# Patient Record
Sex: MALE | Race: WHITE | NOT HISPANIC OR LATINO | Employment: OTHER | URBAN - METROPOLITAN AREA
[De-identification: names, ages, dates, MRNs, and addresses within clinical notes are randomized per-mention and may not be internally consistent; named-entity substitution may affect disease eponyms.]

---

## 2019-08-15 LAB
EXT MICROALBUMIN URINE RANDOM: 2.5
HBA1C MFR BLD HPLC: 7.6 %

## 2019-08-19 ENCOUNTER — HOSPITAL ENCOUNTER (EMERGENCY)
Facility: HOSPITAL | Age: 64
Discharge: HOME | End: 2019-08-19
Attending: EMERGENCY MEDICINE
Payer: COMMERCIAL

## 2019-08-19 VITALS
DIASTOLIC BLOOD PRESSURE: 61 MMHG | HEART RATE: 85 BPM | OXYGEN SATURATION: 96 % | SYSTOLIC BLOOD PRESSURE: 123 MMHG | TEMPERATURE: 98.3 F | RESPIRATION RATE: 18 BRPM

## 2019-08-19 DIAGNOSIS — T18.128A ESOPHAGEAL OBSTRUCTION DUE TO FOOD IMPACTION: Primary | ICD-10-CM

## 2019-08-19 DIAGNOSIS — K22.2 ESOPHAGEAL OBSTRUCTION DUE TO FOOD IMPACTION: Primary | ICD-10-CM

## 2019-08-19 PROCEDURE — 96374 THER/PROPH/DIAG INJ IV PUSH: CPT

## 2019-08-19 PROCEDURE — 99283 EMERGENCY DEPT VISIT LOW MDM: CPT

## 2019-08-19 PROCEDURE — 99283 EMERGENCY DEPT VISIT LOW MDM: CPT | Performed by: EMERGENCY MEDICINE

## 2019-08-19 RX ADMIN — GLUCAGON HYDROCHLORIDE 1 MG: KIT at 22:35

## 2019-08-20 NOTE — ED PROVIDER NOTES
History  Chief Complaint   Patient presents with    Difficulty Swallowing     Pt reports eating shrimp and feeling like it is "stuck in my back not my throat"  Pt reports denies CP, difficulty breathing or sore/swollen throat  Pt reports he was bringing up phlem right after event  Pt reports this has happened before recently but this is worse, pt was suppsoed to see GI but has not had appointment yet  79-year-old male presents with recurrent episode of foreign body sensation in his throat since 1630 hours after eating two small shrimp  Patient notes multiple episodes over the past month that typically have resolved spontaneously  Patient was eating this room tonight and subsequently was unable to tolerate even oral secretions  Patient has been unable to tolerate any oral intake since the onset of the symptoms  He describes a sensation of something stuck in his throat since the symptoms started that is unchanged  Patient was administered glucagon with improvement in his symptoms  He subsequently was able to tolerate oral intake without difficulty  Impression and plan:  Esophageal blockage likely secondary to food bolus that is recurrent  Patient symptoms improved following treatment with glucagon in the emergency room but patient will require close outpatient follow-up with Gastroenterology for EGD to evaluate etiology of recurrent symptoms  I discussed follow-up, return precautions in detail with the patient        History provided by:  Patient  Foreign Body in Throat   Location:  Swallowed  Suspected object:  Food  Pain quality:  Aching  Pain severity:  Mild  Duration:  4 hours  Timing:  Constant  Progression:  Unchanged  Chronicity:  New  Associated symptoms: no abdominal pain, no choking, no congestion, no cough, no cyanosis, no difficulty breathing, no drooling, no ear pain, no hearing loss, no nasal discharge, no nausea, no nosebleeds, no rectal bleeding, no rectal pain, no rhinorrhea, no sore throat, no trouble swallowing, no voice change and no vomiting        None       Past Medical History:   Diagnosis Date    [facility] )     Diabetes mellitus ([facility] )     Hypertension        Past Surgical History:   Procedure Laterality Date    DISC REMOVAL         History reviewed  No pertinent family history  I have reviewed and agree with the history as documented  Social History     Tobacco Use    Smoking status: Never Smoker    Smokeless tobacco: Never Used   Substance Use Topics    Alcohol use: Not Currently    Drug use: Not Currently        Review of Systems   HENT: Negative for congestion, drooling, ear pain, hearing loss, nosebleeds, rhinorrhea, sore throat, trouble swallowing and voice change  Respiratory: Negative for cough and choking  Cardiovascular: Negative for cyanosis  Gastrointestinal: Negative for abdominal pain, nausea, rectal pain and vomiting  Physical Exam  Physical Exam   Constitutional: He appears well-developed and well-nourished  No distress  HENT:   Head: Normocephalic and atraumatic  Right Ear: External ear normal    Mouth/Throat: Oropharynx is clear and moist    Eyes: Pupils are equal, round, and reactive to light  EOM are normal    Neck: Normal range of motion  Neck supple  Cardiovascular: Normal rate  Pulmonary/Chest: Effort normal and breath sounds normal  No stridor  No respiratory distress  He has no wheezes  He has no rales  Abdominal: He exhibits no distension  Musculoskeletal: He exhibits no edema or deformity  Lymphadenopathy:     He has no cervical adenopathy  Neurological: He is alert  Skin: Skin is dry  He is not diaphoretic  Psychiatric: He has a normal mood and affect  Vitals reviewed        Vital Signs  ED Triage Vitals [08/19/19 2035]   Temperature Pulse Respirations Blood Pressure SpO2   98 3 °F (36 8 °C) 89 18 137/63 98 %      Temp Source Heart Rate Source Patient Position - Orthostatic VS BP Location FiO2 (%)   Oral Monitor Sitting Left arm --      Pain Score       3           Vitals:    08/19/19 2035 08/19/19 2230   BP: 137/63 123/61   Pulse: 89 85   Patient Position - Orthostatic VS: Sitting Sitting         Visual Acuity      ED Medications  Medications   glucagon (GLUCAGEN) injection 1 mg (1 mg Intravenous Given 8/19/19 2235)       Diagnostic Studies  Results Reviewed     None                 No orders to display              Procedures  Procedures       ED Course  ED Course as of Aug 20 0631   [facility] Aug 19, 2019   2311 After glucagon, patient's fluid bolus passed and he was able to tolerate oral intake without difficulty  I explained the importance of follow-up with Gastroenterology, contacting them tomorrow for close follow-up and likely EGD  Discussed return precautions in detail  Identification of Seniors at Risk      Most Recent Value   (ISAR) Identification of Seniors at Risk   Before the illness or injury that brought you to the Emergency, did you need someone to help you on a regular basis? 0 Filed at: 08/19/2019 2041   In the last 24 hours, have you needed more help than usual?  0 Filed at: 08/19/2019 2041   Have you been hospitalized for one or more nights during the past 6 months? 0 Filed at: 08/19/2019 2041   In general, do you see well?  0 Filed at: 08/19/2019 2041   In general, do you have serious problems with your memory? 0 Filed at: 08/19/2019 2041   Do you take more than three different medications every day?   1 Filed at: 08/19/2019 2041   ISAR Score  1 Filed at: 08/19/2019 2041                          MDM    Disposition  Final diagnoses:   Esophageal obstruction due to food impaction     Time reflects when diagnosis was documented in both MDM as applicable and the Disposition within this note     Time User Action Codes Description Comment    8/19/2019 11:12 PM Armando Chappell [K22 2,  B96 215V] Esophageal obstruction due to food impaction       ED Disposition     ED Disposition Condition Date/Time Comment    Discharge Stable Mon Aug 19, 2019 11:12 PM Skinny Charles discharge to home/self care  Follow-up Information     Follow up With Specialties Details Why Contact Info Additional [facility] Schedule an appointment as soon as possible for a visit  Recurrent food blockages  [address] [facility], [address] [facility],  [facility], [place], [place], [address] Emergency Department Emergency Medicine Go to  If symptoms worsen 34 [facility] ED, [address] [place] [address], [phone]          There are no discharge medications for this patient          ED Provider  Electronically Signed by           Randy Seaman MD  08/20/19 8458

## 2019-08-21 ENCOUNTER — OFFICE VISIT (OUTPATIENT)
Dept: GASTROENTEROLOGY | Facility: CLINIC | Age: 64
End: 2019-08-21
Payer: COMMERCIAL

## 2019-08-21 ENCOUNTER — PREP FOR PROCEDURE (OUTPATIENT)
Dept: GASTROENTEROLOGY | Facility: CLINIC | Age: 64
End: 2019-08-21

## 2019-08-21 VITALS
SYSTOLIC BLOOD PRESSURE: 116 MMHG | DIASTOLIC BLOOD PRESSURE: 70 MMHG | HEART RATE: 74 BPM | BODY MASS INDEX: 27.66 KG/M2 | WEIGHT: 193.2 LBS | HEIGHT: 70 IN

## 2019-08-21 DIAGNOSIS — R13.19 ESOPHAGEAL DYSPHAGIA: Primary | ICD-10-CM

## 2019-08-21 PROCEDURE — 99243 OFF/OP CNSLTJ NEW/EST LOW 30: CPT | Performed by: PHYSICIAN ASSISTANT

## 2019-08-21 RX ORDER — ALBUTEROL SULFATE 2.5 MG/3ML
3 SOLUTION RESPIRATORY (INHALATION) 3 TIMES DAILY
COMMUNITY
End: 2019-09-20 | Stop reason: SDUPTHER

## 2019-08-21 RX ORDER — ALBUTEROL SULFATE 90 UG/1
AEROSOL, METERED RESPIRATORY (INHALATION)
COMMUNITY
End: 2019-09-20 | Stop reason: ALTCHOICE

## 2019-08-21 RX ORDER — TRAMADOL HYDROCHLORIDE 50 MG/1
TABLET ORAL
COMMUNITY
End: 2019-09-25 | Stop reason: ALTCHOICE

## 2019-08-21 RX ORDER — PANTOPRAZOLE SODIUM 40 MG/1
40 TABLET, DELAYED RELEASE ORAL DAILY
Qty: 30 TABLET | Refills: 1 | Status: SHIPPED | OUTPATIENT
Start: 2019-08-21 | End: 2020-06-30 | Stop reason: ALTCHOICE

## 2019-08-21 RX ORDER — PIOGLITAZONEHYDROCHLORIDE 30 MG/1
30 TABLET ORAL DAILY
COMMUNITY
End: 2020-01-22

## 2019-08-21 NOTE — PROGRESS NOTES
[facility] - Outpatient Consultation  Chantelle Coffman 71 y o  male MRN: [id number]  Encounter: [id number]          ASSESSMENT AND PLAN:      1  Esophageal dysphagia  3 distinct episodes over the past few months  Start PPI  Schedule EGD  Reviewed swallowing precautions - chew food well, eat slowly, always drink liquids with swallows    ______________________________________________________________________    HPI:  50-year-old male presents for evaluation of dysphagia  He reports that over the past 2 months he has had 3 distinct episodes of food getting stuck  He states that each time the food gets stuck to where he cannot tolerate any liquids or secretions  It is very painful when he gets stuck  Usually the food will pass spontaneously however this past Monday August 19th he had to present herself to the [facility] Emergency Room  By the time he was evaluated the food bolus had passed and no further testing was performed  He admits that he has a constant pressure in his back and mild odynophagia since Monday  He denies hematemesis or melena  He has experienced some weight loss however he is unsure how quickly this has happened  He has a history of alcohol and tobacco abuse  He quit smoking approximately 1 year ago and stopped drinking alcohol several years ago  REVIEW OF SYSTEMS:    CONSTITUTIONAL: Denies any fever, chills, rigors, and weight loss  HEENT: No earache or tinnitus  Denies hearing loss or visual disturbances  CARDIOVASCULAR: No chest pain or palpitations  RESPIRATORY: Denies any cough, hemoptysis, shortness of breath or dyspnea on exertion  GASTROINTESTINAL: As noted in the History of Present Illness  GENITOURINARY: No problems with urination  Denies any hematuria or dysuria  NEUROLOGIC: No dizziness or vertigo, denies headaches  MUSCULOSKELETAL: Denies any muscle or joint pain  SKIN: Denies skin rashes or itching     ENDOCRINE: Denies excessive thirst  Denies intolerance to heat or cold  PSYCHOSOCIAL: Denies depression or anxiety  Denies any recent memory loss  Historical Information   Past Medical History:   Diagnosis Date    [facility]) ([facility] )     Diabetes mellitus ([facility] )     Hypertension      Past Surgical History:   Procedure Laterality Date    DISC REMOVAL       Social History   Social History     Substance and Sexual Activity   Alcohol Use Not Currently     Social History     Substance and Sexual Activity   Drug Use Not Currently     Social History     Tobacco Use   Smoking Status Never Smoker   Smokeless Tobacco Never Used     Family History   Problem Relation Age of Onset    Diabetes Mother     No Known Problems Father        Meds/Allergies       Current Outpatient Medications:     albuterol (2 5 mg/3 mL) 0 083 % nebulizer solution    albuterol (PROAIR HFA) 90 mcg/act inhaler    canagliflozin (INVOKANA) 100 mg    pioglitazone (ACTOS) 30 mg tablet    sitaGLIPtin (JANUVIA) 100 mg tablet    traMADol (ULTRAM) 50 mg tablet    pantoprazole (PROTONIX) 40 mg tablet    Allergies   Allergen Reactions    Penicillins Itching           Objective     Blood pressure 116/70, pulse 74, height 5' 10" (1 778 m), weight 87 6 kg (193 lb 3 2 oz)  Body mass index is 27 72 kg/m²  PHYSICAL EXAM:      General Appearance:   Alert, cooperative, no distress   HEENT:   Normocephalic, atraumatic, anicteric      Neck:  Supple, symmetrical, trachea midline   Lungs:   Clear to auscultation bilaterally; no rales, rhonchi or wheezing; respirations unlabored    Heart[de-identified]   Regular rate and rhythm; no murmur, rub, or gallop     Abdomen:   Soft, non-tender, non-distended; normal bowel sounds; no masses, no organomegaly    Genitalia:   Deferred    Rectal:   Deferred    Extremities:  No cyanosis, clubbing or edema    Pulses:  2+ and symmetric    Skin:  No jaundice, rashes, or lesions    Lymph nodes:  No palpable cervical lymphadenopathy        Lab Results:   No visits with results within 1 Day(s) from this visit  Latest known visit with results is:   No results found for any previous visit  Radiology Results:   No results found

## 2019-08-23 ENCOUNTER — ANESTHESIA EVENT (OUTPATIENT)
Dept: GASTROENTEROLOGY | Facility: HOSPITAL | Age: 64
End: 2019-08-23

## 2019-08-23 NOTE — ANESTHESIA PREPROCEDURE EVALUATION
Review of Systems/Medical History  Patient summary reviewed        Cardiovascular  Hypertension ,    Pulmonary  COPD ,        GI/Hepatic    GERD ,        Negative  ROS        Endo/Other  Diabetes ,      GYN  Negative gynecology ROS          Hematology  Negative hematology ROS      Musculoskeletal  Negative musculoskeletal ROS        Neurology  Negative neurology ROS      Psychology   Negative psychology ROS              Physical Exam    Airway    Mallampati score: II  TM Distance: >3 FB  Neck ROM: full     Dental       Cardiovascular  Cardiovascular exam normal    Pulmonary  Pulmonary exam normal     Other Findings        Anesthesia Plan  ASA Score- 2     Anesthesia Type- IV sedation with anesthesia with ASA Monitors  Additional Monitors:   Airway Plan:         Plan Factors-    Induction- intravenous  Postoperative Plan-     Informed Consent- Anesthetic plan and risks discussed with patient  I personally reviewed this patient with the CRNA  Discussed and agreed on the Anesthesia Plan with the CRNA  Pancho Diaz

## 2019-08-24 ENCOUNTER — ANESTHESIA (OUTPATIENT)
Dept: GASTROENTEROLOGY | Facility: HOSPITAL | Age: 64
End: 2019-08-24

## 2019-08-24 ENCOUNTER — HOSPITAL ENCOUNTER (OUTPATIENT)
Dept: GASTROENTEROLOGY | Facility: HOSPITAL | Age: 64
Setting detail: OUTPATIENT SURGERY
Discharge: HOME | End: 2019-08-24
Attending: INTERNAL MEDICINE | Admitting: INTERNAL MEDICINE
Payer: COMMERCIAL

## 2019-08-24 VITALS
RESPIRATION RATE: 17 BRPM | TEMPERATURE: 97 F | DIASTOLIC BLOOD PRESSURE: 66 MMHG | HEART RATE: 82 BPM | OXYGEN SATURATION: 99 % | SYSTOLIC BLOOD PRESSURE: 119 MMHG

## 2019-08-24 DIAGNOSIS — R13.19 ESOPHAGEAL DYSPHAGIA: ICD-10-CM

## 2019-08-24 LAB — GLUCOSE SERPL-MCNC: 112 MG/DL (ref 65–140)

## 2019-08-24 PROCEDURE — 88363 XM ARCHIVE TISSUE MOLEC ANAL: CPT | Performed by: PATHOLOGY

## 2019-08-24 PROCEDURE — 88342 IMHCHEM/IMCYTCHM 1ST ANTB: CPT | Performed by: PATHOLOGY

## 2019-08-24 PROCEDURE — 88360 TUMOR IMMUNOHISTOCHEM/MANUAL: CPT | Performed by: PATHOLOGY

## 2019-08-24 PROCEDURE — 88305 TISSUE EXAM BY PATHOLOGIST: CPT | Performed by: PATHOLOGY

## 2019-08-24 PROCEDURE — 82948 REAGENT STRIP/BLOOD GLUCOSE: CPT

## 2019-08-24 PROCEDURE — 43239 EGD BIOPSY SINGLE/MULTIPLE: CPT | Performed by: INTERNAL MEDICINE

## 2019-08-24 RX ORDER — SODIUM CHLORIDE, SODIUM LACTATE, POTASSIUM CHLORIDE, CALCIUM CHLORIDE 600; 310; 30; 20 MG/100ML; MG/100ML; MG/100ML; MG/100ML
125 INJECTION, SOLUTION INTRAVENOUS CONTINUOUS
Status: DISCONTINUED | OUTPATIENT
Start: 2019-08-24 | End: 2019-08-28 | Stop reason: HOSPADM

## 2019-08-24 RX ORDER — LIDOCAINE HYDROCHLORIDE 10 MG/ML
INJECTION, SOLUTION EPIDURAL; INFILTRATION; INTRACAUDAL; PERINEURAL AS NEEDED
Status: DISCONTINUED | OUTPATIENT
Start: 2019-08-24 | End: 2019-08-24 | Stop reason: SURG

## 2019-08-24 RX ORDER — PROPOFOL 10 MG/ML
INJECTION, EMULSION INTRAVENOUS AS NEEDED
Status: DISCONTINUED | OUTPATIENT
Start: 2019-08-24 | End: 2019-08-24 | Stop reason: SURG

## 2019-08-24 RX ADMIN — PROPOFOL 50 MG: 10 INJECTION, EMULSION INTRAVENOUS at 08:58

## 2019-08-24 RX ADMIN — PROPOFOL 100 MG: 10 INJECTION, EMULSION INTRAVENOUS at 08:50

## 2019-08-24 RX ADMIN — LIDOCAINE HYDROCHLORIDE 5 ML: 10 INJECTION, SOLUTION EPIDURAL; INFILTRATION; INTRACAUDAL; PERINEURAL at 08:50

## 2019-08-24 RX ADMIN — PROPOFOL 50 MG: 10 INJECTION, EMULSION INTRAVENOUS at 08:54

## 2019-08-24 NOTE — DISCHARGE INSTRUCTIONS
Upper Endoscopy   WHAT YOU NEED TO KNOW:   An upper endoscopy is also called an upper gastrointestinal (GI) endoscopy, or an esophagogastroduodenoscopy (EGD)  You may feel bloated, gassy, or have some abdominal discomfort after your procedure  Your throat may be sore for 24 to 36 hours  You may burp or pass gas from air that is still inside your body  DISCHARGE INSTRUCTIONS:   Call 911 for any of the following:   · You have sudden chest pain or trouble breathing  Seek care immediately if:   · You feel dizzy or faint  · You have trouble swallowing  · Your bowel movements are very dark or black  · Your abdomen is hard and firm and you have severe pain  · You vomit blood  Contact your healthcare provider if:   · You feel full or bloated and cannot burp or pass gas  · You have not had a bowel movement for 3 days after your procedure  · You have neck pain  · You have a fever or chills  · You have nausea or are vomiting  · You have a rash or hives  · You have questions or concerns about your endoscopy  Relieve a sore throat:  Suck on throat lozenges or crushed ice  Gargle with a small amount of warm salt water  Mix 1 teaspoon of salt and 1 cup of warm water to make salt water  Relieve gas and discomfort from bloating:  Lie on your right side with a heating pad on your abdomen  Take short walks to help pass gas  Eat small meals until bloating is relieved  Rest after your procedure: You have been given medicine to relax you  Do not  drive or make important decisions until the day after your procedure  Return to your normal activity as directed  You can usually return to work the day after your procedure  Follow up with your healthcare provider as directed:  Write down your questions so you remember to ask them during your visits     © 2017 [address] is for End User's use only and may not be sold, redistributed or otherwise used for commercial purposes  All illustrations and images included in CareNotes® are the copyrighted property of A D A M [employer] Inc  or Doroteo Clarke  The above information is an  only  It is not intended as medical advice for individual conditions or treatments  Talk to your doctor, nurse or pharmacist before following any medical regimen to see if it is safe and effective for you

## 2019-08-24 NOTE — ANESTHESIA POSTPROCEDURE EVALUATION
Post-Op Assessment Note    CV Status:  Stable  Pain Score: 0    Pain management: adequate     Mental Status:  Alert and awake   Hydration Status:  Euvolemic   PONV Controlled:  Controlled   Airway Patency:  Patent   Post Op Vitals Reviewed: Yes      Staff: Anesthesiologist           /66 (08/24/19 0900)    Temp (!) 97 °F (36 1 °C) (08/24/19 0900)    Pulse 86 (08/24/19 0900)   Resp 18 (08/24/19 0900)    SpO2 94 % (08/24/19 0900)

## 2019-08-24 NOTE — H&P
History and Physical -  [facility]  Epifanio Deras 71 y o  male MRN: [id number]      HPI: Epifanio Deras is a 71y o  year old male who presents for dysphagia      REVIEW OF SYSTEMS: Per the HPI, and otherwise unremarkable  Historical Information   Past Medical History:   Diagnosis Date    [facility]) ([facility] )     Diabetes mellitus ([facility] )     Hypertension      Past Surgical History:   Procedure Laterality Date    DISC REMOVAL       Social History   Social History     Substance and Sexual Activity   Alcohol Use Not Currently     Social History     Substance and Sexual Activity   Drug Use Not Currently     Social History     Tobacco Use   Smoking Status Never Smoker   Smokeless Tobacco Never Used     Family History   Problem Relation Age of Onset    Diabetes Mother     No Known Problems Father        Meds/Allergies       (Not in a hospital admission)    Allergies   Allergen Reactions    Penicillins Itching       Objective     There were no vitals taken for this visit  PHYSICAL EXAM    Gen: NAD  CV: RRR  CHEST: Clear  ABD: soft, NT/ND  EXT: no edema      ASSESSMENT/PLAN:  This is a 71y o  year old male here for esophagogastroduodenoscopy with biopsy and possible dilation, and he is stable and optimized for his procedure

## 2019-08-29 ENCOUNTER — TELEPHONE (OUTPATIENT)
Dept: GASTROENTEROLOGY | Facility: CLINIC | Age: 64
End: 2019-08-29

## 2019-08-29 ENCOUNTER — OFFICE VISIT (OUTPATIENT)
Dept: GASTROENTEROLOGY | Facility: CLINIC | Age: 64
End: 2019-08-29
Payer: COMMERCIAL

## 2019-08-29 VITALS
WEIGHT: 192.8 LBS | DIASTOLIC BLOOD PRESSURE: 70 MMHG | SYSTOLIC BLOOD PRESSURE: 110 MMHG | HEIGHT: 70 IN | HEART RATE: 93 BPM | BODY MASS INDEX: 27.6 KG/M2

## 2019-08-29 DIAGNOSIS — C15.5 MALIGNANT NEOPLASM OF LOWER THIRD OF ESOPHAGUS (HCC): Primary | ICD-10-CM

## 2019-08-29 PROCEDURE — 99214 OFFICE O/P EST MOD 30 MIN: CPT | Performed by: INTERNAL MEDICINE

## 2019-08-29 NOTE — TELEPHONE ENCOUNTER
morena patient-I entered a referral for oncology, radiation oncology, and thoracic surgery, the patient is aware he should be getting a call from these departments please follow up next week to make sure he has appointments made thanks

## 2019-08-29 NOTE — PROGRESS NOTES
[facility] - Outpatient Follow-up Note  Leo Norman 71 y o  male MRN: [id number]  Encounter: [id number]          ASSESSMENT AND PLAN:      1  Malignant neoplasm of lower third of esophagus (HCC)  Adenocarcinoma    Consults to radiation oncology, oncology, thoracic surgery    CT scan of the chest/abdomen/pelvis    PET scan    ______________________________________________________________________    SUBJECTIVE:  This 71year old male returns to the office for followup on his recent EGD performed 19 which showed neoplastic changes and luminal narrowing in the distal 1/3 of the esophagus, 30-40 cm from the incisors  Biopsies obtained demonstrated adenocarcinoma, 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            The patient was initially referred for the complaint of dyshagia  He admits to some weight loss  There has been odynophagia  He has a long history of acid reflux symptoms  His last EGD prior to this one was over 20 years ago  REVIEW OF SYSTEMS IS OTHERWISE NEGATIVE        Historical Information   Past Medical History:   Diagnosis Date    COPD [facility]chronic obstructive [facility] disease) [facility]     Diabetes mellitus ([facility] )     Hypertension      Past Surgical History:   Procedure Laterality Date    DISC REMOVAL       Social History   Social History     Substance and Sexual Activity   Alcohol Use Not Currently     Social History     Substance and Sexual Activity   Drug Use Not Currently     Social History     Tobacco Use   Smoking Status Former Smoker    Last attempt to quit: 2017    Years since quittin 6   Smokeless Tobacco Never Used     Family History   Problem Relation Age of Onset    Diabetes Mother     No Known Problems Father        Meds/Allergies       Current Outpatient Medications:     albuterol (2 5 mg/3 mL) 0 083 % nebulizer solution    albuterol (PROAIR HFA) 90 mcg/act inhaler   canagliflozin (INVOKANA) 100 mg    pantoprazole (PROTONIX) 40 mg tablet    pioglitazone (ACTOS) 30 mg tablet    sitaGLIPtin (JANUVIA) 100 mg tablet    traMADol (ULTRAM) 50 mg tablet    Allergies   Allergen Reactions    Penicillins Itching           Objective     Blood pressure 110/70, pulse 93, height 5' 10" (1 778 m), weight 87 5 kg (192 lb 12 8 oz)  Body mass index is 27 66 kg/m²  PHYSICAL EXAM:      General Appearance:   Alert, cooperative, no distress   HEENT:   Normocephalic, atraumatic, anicteric      Neck:  Supple, symmetrical, trachea midline   Lungs:   Clear to auscultation bilaterally; no rales, rhonchi or wheezing; respirations unlabored    Heart[de-identified]   Regular rate and rhythm; no murmur, rub, or gallop  Abdomen:   Soft, non-tender, non-distended; normal bowel sounds; no masses, no organomegaly    Genitalia:   Deferred    Rectal:   Deferred    Extremities:  No cyanosis, clubbing or edema    Pulses:  2+ and symmetric    Skin:  No jaundice, rashes, or lesions    Lymph nodes:  No palpable cervical lymphadenopathy        Lab Results:   No visits with results within 1 Day(s) from this visit     Latest known visit with results is:   Hospital Outpatient Visit on 08/24/2019   Component Date Value    POC Glucose 08/24/2019 112     Case Report 08/24/2019                      Value:Surgical Pathology Report                         Case: [id number]                                   Authorizing Provider:  Saad Loyola DO          Collected:           08/24/2019 6003              Ordering Location:      [facility]       Received:            08/24/2019 [facility]                                                             Pathologist:           Ynes Hernandez MD                                                               Specimen:    Esophagus, esophagus                                                                       Addendum 08/24/2019 Value:This result contains rich text formatting which cannot be displayed here   Final Diagnosis 08/24/2019                      Value: This result contains rich text formatting which cannot be displayed here   Additional Information 08/24/2019                      Value: This result contains rich text formatting which cannot be displayed here  Rossy Fairly Description 08/24/2019                      Value: This result contains rich text formatting which cannot be displayed here   Clinical Information 08/24/2019                      Value:Cold bx r/o Malignancy    FINDINGS:  Severe, generalized edematous, erythematous and hemorrhagic mucosa in the  lower third of the esophagus (30 cm from the incisors) with bleeding  before intervention; performed 10 cold biopsies  There is circumferential  erythema, friability, easy bleeding, neoplastic abnormality and narrowing  from 30 cm down to 40 cm  Ten biopsies were obtained to rule out  malignancy  A dilation was not performed since I suspect that the  underlying etiology is malignancy    The stomach and duodenum appeared normal     Collected:  8/24/2019  8:56 AM   Status:  Edited Result - FINAL   Visible to patient:  No (Not Released)   Dx:  Esophageal dysphagia   Component    Case Report   Surgical Pathology Report                         Case: [id number]                                    Authorizing Provider: Debbi Dyson DO          Collected:           08/24/2019 0856               Ordering Location:      [facility]       Received:            08/24/2019 [address] [place] [address] Endoscopy                                                              Pathologist:           Henrietta Rodriguez MD                                                                Specimen:    Esophagus, esophagus                                                                       Addendum   HER2 IMMUNOHISTOCHEMICAL ANALYSIS FOR GASTRIC/ESOPHAGEAL ADENOCARCINOMA     Test Description                                        Result                                                     HER2/SHIMON by IHC (clone [id number])                   3+/Positive         These immunohistochemical tests were performed at [facility] in [place], [place] and interpreted by Dr Micki Adams  An electronic copy of this report will be kept on file in the Medical Records Department at [facility]      Comment:      Immunohistochemistry scoring for Her2 in gastric and gastroesophageal carcinoma (2016 CAP/ASCO guidelines)     Biopsy specimen staining pattern:     0 No reactivity or no membranous reactivity in any tumor cell  1+ Tumor cell cluster with a faint or barely perceptible membranous reactivity irrespective of percentage of tumor cells  2+ Tumor cell cluster with a weak to moderate complete, basolateral, or lateral membranous reactivity irrespective of  percentage of tumor cells stained  3+ Tumor cell cluster with a strong complete, basolateral, or lateral membranous reactivity irrespective of tumor cells stained     Surgical specimen staining pattern:     0 No reactivity or membranous reactivity in less than 10% of tumor cells  1+ Faint or barely perceptible membranous reactivity in 10% or more of tumor cells; cells are reactive only in part of their  membrane  2+ Weak to moderate complete, basolateral, or lateral membranous reactivity in 10% or more of tumor cells  3+ Strong complete, basolateral, or lateral membranous reactivity in 10% or more of tumor cells     A positive control for each antibody has been reviewed and accepted       Reference:   1  Aga POSADA, Omkar, Andrey CB et al  HER2 Testing and Clinical Decision Making in Gastroesophageal Adenocarcinoma: Guideline from the [address], [address] for Clinical Pathology, and [address] of Clinical Oncology  Arch [facility]ol [facility]  Kami Bolanos: 10 5858/arpa  [id number]      Addendum electronically signed by Marcos Paul MD on 8/29/2019 at  2:54 PM   Final Diagnosis   A  Esophagus (biopsy):  - At 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      Comment:  - CK AE1/3 highlights few single cells and small clusters of cells  P53 is aberrantly expressed   - Dr Sameera Brennan was notified of the diagnosis on 8/29/19 at 7:14 am via Epic messaging/email    - Her2 IHC has been ordered and results will be indicated in an addendum  Electronically signed by Marcos Paul MD on 8/29/2019 at  7:17 AM   Additional Information    All controls performed with the immunohistochemical stains reported above reacted appropriately  These tests were developed and their performance characteristics determined by [facility] or [facility]  They may not be cleared or approved by the [place]  Food and Drug Administration  The FDA has determined that such clearance or approval is not necessary  These tests are used for clinical purposes  They should not be regarded as investigational or for research  This laboratory has been approved by [place] [address], designated as a high-complexity laboratory and is qualified to perform these tests  Gross Description    A  The specimen is received in formalin, labeled with the patient's name and medical record number, and is designated "esophagus biopsy rule out malignancy  The specimen consists of multiple tan friable soft tissue fragments measuring in aggregate 0 8 x 0 8 x 0 1 cm  Entirely submitted   Screened cassette      Note: The estimated total formalin fixation time based upon information provided by the submitting clinician and the standard processing schedule is under 72 hours      MCrites       Clinical Information    Cold bx r/o Malignancy     FINDINGS:   Severe, generalized edematous, erythematous and hemorrhagic mucosa in the   lower third of the esophagus (30 cm from the incisors) with bleeding   before intervention; performed 10 cold biopsies  There is circumferential   erythema, friability, easy bleeding, neoplastic abnormality and narrowing   from 30 cm down to 40 cm  Ten biopsies were obtained to rule out   malignancy  A dilation was not performed since I suspect that the   underlying etiology is malignancy  The stomach and duodenum appeared normal   Resulting Agency [facility]         Specimen Collected: 08/24/19  8:56 AM   Last Resulted: 08/29/19  2:54 PM                  Radiology Results:   No results found

## 2019-08-30 ENCOUNTER — TELEPHONE (OUTPATIENT)
Dept: GASTROENTEROLOGY | Facility: CLINIC | Age: 64
End: 2019-08-30

## 2019-08-30 NOTE — TELEPHONE ENCOUNTER
Ct scan approved:    Jake:  Approved from 8/30/2019 to 10/01/2019  Case number [id number]  Spoke to clinical review :  Jovan Toney

## 2019-09-06 ENCOUNTER — TELEPHONE (OUTPATIENT)
Dept: CARDIAC SURGERY | Facility: CLINIC | Age: 64
End: 2019-09-06

## 2019-09-06 ENCOUNTER — TELEPHONE (OUTPATIENT)
Dept: GASTROENTEROLOGY | Facility: CLINIC | Age: 64
End: 2019-09-06

## 2019-09-06 ENCOUNTER — HOSPITAL ENCOUNTER (OUTPATIENT)
Dept: CT IMAGING | Facility: CLINIC | Age: 64
Discharge: HOME | End: 2019-09-06
Payer: COMMERCIAL

## 2019-09-06 DIAGNOSIS — C15.5 MALIGNANT NEOPLASM OF LOWER THIRD OF ESOPHAGUS (HCC): ICD-10-CM

## 2019-09-06 PROCEDURE — 74177 CT ABD & PELVIS W/CONTRAST: CPT

## 2019-09-06 PROCEDURE — 71260 CT THORAX DX C+: CPT

## 2019-09-06 RX ADMIN — IOHEXOL 100 ML: 350 INJECTION, SOLUTION INTRAVENOUS at 14:09

## 2019-09-06 NOTE — TELEPHONE ENCOUNTER
Patient was referred by Dr John Billings from [facility] informed me of this  Dr Gloria De Anda office called today to inform her of the referral for us and med onc  Patient has a new diagnosis of esophageal cancer  Patient has only had an EGD done, and is having a CT CAP done today  I lm on patients phone to call us to sched an appt  He does not need to see med onc at this point till after he see's us

## 2019-09-06 NOTE — TELEPHONE ENCOUNTER
Called Nydia and explained PET won't get approved until after results of CT showing cancer did not metastasis  PET scan will be rescheduled further out   Will put in reminder to be on the lookout for the CT to result

## 2019-09-06 NOTE — TELEPHONE ENCOUNTER
Spoke to Thoracic and they will call pt to make appt and explain until results of CT can't make appts for radioation oncology or oncology

## 2019-09-06 NOTE — TELEPHONE ENCOUNTER
Dr Maria Elena Solis - Patient called has not received phone call from radiologist or oncologist  Please call patient  [phone]

## 2019-09-06 NOTE — TELEPHONE ENCOUNTER
Dr Mayte Doran called  lmom - patient is scheduled for Pet Scan on Tuesday 09/10/19 - Denied   Please call Marily Henry at [phone]

## 2019-09-10 NOTE — TELEPHONE ENCOUNTER
I did not hear back from patient, I called and left another message for patient to call me back in regards to scheduling a new patient appt

## 2019-09-12 ENCOUNTER — TELEPHONE (OUTPATIENT)
Dept: GASTROENTEROLOGY | Facility: CLINIC | Age: 64
End: 2019-09-12

## 2019-09-12 NOTE — TELEPHONE ENCOUNTER
Called Austin Arevalo back    Since CT results not in as of yet and there is a 48 hour window to cancel, pt will be called back to reschedule a week out since we need the results of the CT scan to determine the auth decision for the PET scan

## 2019-09-12 NOTE — TELEPHONE ENCOUNTER
Michelle charlton - Leida Sweet called regarding canceled pet scan, had questions  Needs to know before noon   Ty

## 2019-09-12 NOTE — TELEPHONE ENCOUNTER
Called [facility] and faxed CT results and clinicals to the appeal department   Awaiting decision    Case number [id number]  Sukhwinder gamble

## 2019-09-12 NOTE — TELEPHONE ENCOUNTER
----- Message from Jacobo Curry DO sent at 9/12/2019 12:04 PM EDT -----  I have reviewed the results of the CT scan of the abdomen  The findings are consistent with the adenocarcinoma of the distal esophagus which has already been observed, biopsied and described to the patient  There is a abnormality in left upper lobe in the chest   Patient should also be set up for a PET scan  Please set this up as soon as possible  Also please call the patient with this result and make them aware that were doing a PET scan at the next evaluation

## 2019-09-13 ENCOUNTER — TELEPHONE (OUTPATIENT)
Dept: GASTROENTEROLOGY | Facility: CLINIC | Age: 64
End: 2019-09-13

## 2019-09-16 ENCOUNTER — APPOINTMENT (EMERGENCY)
Dept: CT IMAGING | Facility: HOSPITAL | Age: 64
DRG: 375 | End: 2019-09-16
Payer: COMMERCIAL

## 2019-09-16 ENCOUNTER — TELEPHONE (OUTPATIENT)
Dept: GASTROENTEROLOGY | Facility: CLINIC | Age: 64
End: 2019-09-16

## 2019-09-16 ENCOUNTER — HOSPITAL ENCOUNTER (INPATIENT)
Facility: HOSPITAL | Age: 64
LOS: 1 days | Discharge: HOME | DRG: 375 | End: 2019-09-18
Attending: EMERGENCY MEDICINE | Admitting: INTERNAL MEDICINE
Payer: COMMERCIAL

## 2019-09-16 DIAGNOSIS — C15.9 ESOPHAGEAL ADENOCARCINOMA (HCC): Primary | ICD-10-CM

## 2019-09-16 DIAGNOSIS — R13.10 DYSPHAGIA: Primary | ICD-10-CM

## 2019-09-16 DIAGNOSIS — K22.2 ESOPHAGEAL OBSTRUCTION: ICD-10-CM

## 2019-09-16 DIAGNOSIS — C15.5 MALIGNANT NEOPLASM OF LOWER THIRD OF ESOPHAGUS (HCC): ICD-10-CM

## 2019-09-16 LAB
ALBUMIN SERPL BCP-MCNC: 3.5 G/DL (ref 3.5–5)
ALP SERPL-CCNC: 48 U/L (ref 46–116)
ALT SERPL W P-5'-P-CCNC: 11 U/L (ref 12–78)
ANION GAP SERPL CALCULATED.3IONS-SCNC: 8 MMOL/L (ref 4–13)
AST SERPL W P-5'-P-CCNC: 7 U/L (ref 5–45)
BASOPHILS # BLD AUTO: 0.06 THOUSANDS/ΜL (ref 0–0.1)
BASOPHILS NFR BLD AUTO: 1 % (ref 0–1)
BILIRUB SERPL-MCNC: 1.1 MG/DL (ref 0.2–1)
BUN SERPL-MCNC: 26 MG/DL (ref 5–25)
CALCIUM SERPL-MCNC: 9.6 MG/DL (ref 8.3–10.1)
CHLORIDE SERPL-SCNC: 101 MMOL/L (ref 100–108)
CO2 SERPL-SCNC: 28 MMOL/L (ref 21–32)
CREAT SERPL-MCNC: 1.12 MG/DL (ref 0.6–1.3)
EOSINOPHIL # BLD AUTO: 0.61 THOUSAND/ΜL (ref 0–0.61)
EOSINOPHIL NFR BLD AUTO: 6 % (ref 0–6)
ERYTHROCYTE [DISTWIDTH] IN BLOOD BY AUTOMATED COUNT: 13.5 % (ref 11.6–15.1)
GFR SERPL CREATININE-BSD FRML MDRD: 69 ML/MIN/1.73SQ M
GLUCOSE SERPL-MCNC: 141 MG/DL (ref 65–140)
HCT VFR BLD AUTO: 38.5 % (ref 36.5–49.3)
HGB BLD-MCNC: 12.4 G/DL (ref 12–17)
IMM GRANULOCYTES # BLD AUTO: 0.04 THOUSAND/UL (ref 0–0.2)
IMM GRANULOCYTES NFR BLD AUTO: 0 % (ref 0–2)
LYMPHOCYTES # BLD AUTO: 1.22 THOUSANDS/ΜL (ref 0.6–4.47)
LYMPHOCYTES NFR BLD AUTO: 12 % (ref 14–44)
MCH RBC QN AUTO: 27.9 PG (ref 26.8–34.3)
MCHC RBC AUTO-ENTMCNC: 32.2 G/DL (ref 31.4–37.4)
MCV RBC AUTO: 87 FL (ref 82–98)
MONOCYTES # BLD AUTO: 0.67 THOUSAND/ΜL (ref 0.17–1.22)
MONOCYTES NFR BLD AUTO: 7 % (ref 4–12)
NEUTROPHILS # BLD AUTO: 7.2 THOUSANDS/ΜL (ref 1.85–7.62)
NEUTS SEG NFR BLD AUTO: 74 % (ref 43–75)
NRBC BLD AUTO-RTO: 0 /100 WBCS
PLATELET # BLD AUTO: 252 THOUSANDS/UL (ref 149–390)
PMV BLD AUTO: 9.6 FL (ref 8.9–12.7)
POTASSIUM SERPL-SCNC: 3.9 MMOL/L (ref 3.5–5.3)
PROT SERPL-MCNC: 7.5 G/DL (ref 6.4–8.2)
RBC # BLD AUTO: 4.45 MILLION/UL (ref 3.88–5.62)
SODIUM SERPL-SCNC: 137 MMOL/L (ref 136–145)
WBC # BLD AUTO: 9.8 THOUSAND/UL (ref 4.31–10.16)

## 2019-09-16 PROCEDURE — 74177 CT ABD & PELVIS W/CONTRAST: CPT

## 2019-09-16 PROCEDURE — 80053 COMPREHEN METABOLIC PANEL: CPT | Performed by: PHYSICIAN ASSISTANT

## 2019-09-16 PROCEDURE — 71260 CT THORAX DX C+: CPT

## 2019-09-16 PROCEDURE — 96361 HYDRATE IV INFUSION ADD-ON: CPT

## 2019-09-16 PROCEDURE — 96374 THER/PROPH/DIAG INJ IV PUSH: CPT

## 2019-09-16 PROCEDURE — 36415 COLL VENOUS BLD VENIPUNCTURE: CPT | Performed by: PHYSICIAN ASSISTANT

## 2019-09-16 PROCEDURE — 99285 EMERGENCY DEPT VISIT HI MDM: CPT

## 2019-09-16 PROCEDURE — 85025 COMPLETE CBC W/AUTO DIFF WBC: CPT | Performed by: PHYSICIAN ASSISTANT

## 2019-09-16 RX ORDER — ONDANSETRON 2 MG/ML
4 INJECTION INTRAMUSCULAR; INTRAVENOUS ONCE
Status: COMPLETED | OUTPATIENT
Start: 2019-09-16 | End: 2019-09-16

## 2019-09-16 RX ADMIN — SODIUM CHLORIDE 1000 ML: 0.9 INJECTION, SOLUTION INTRAVENOUS at 22:36

## 2019-09-16 RX ADMIN — IOHEXOL 100 ML: 350 INJECTION, SOLUTION INTRAVENOUS at 23:55

## 2019-09-16 RX ADMIN — ONDANSETRON 4 MG: 2 INJECTION INTRAMUSCULAR; INTRAVENOUS at 22:36

## 2019-09-16 NOTE — TELEPHONE ENCOUNTER
Dr Lisa Covarrubias - Patient's daughter, Charissa Jett called  Patient DX with Esophageal cancer - Patient is in so much pain he can not eat  Dwindling away to nothing  Needs to speak with someone about options   Please call Charissa Jett at [phone]

## 2019-09-16 NOTE — TELEPHONE ENCOUNTER
BRAULIO: Spoke with patient and his daughter Jacoby Ferguson  H/O esophageal adenocarcinoma    Patient c/o persistent tightness in his chest with mild relief wearing an old back brace  He is only able to sleep for short periods at a time  He feels very fatigued  He was taking whey protein, suggested patient start using boost, or ensure complete to supplement his meal times  He is able to drink the formula although solid foods are inconsistently tolerated  Patient has been denied for PET scan 9/19/2019 and his visit for thoracic surgery is 9/20/2019  We sent and appeal for the PET scan  Patient expresses he will be changing to Medicare in the next month or two once he gets a pharmacy plan in place  Spoke with Mary from the [facility] [place], whom spoke with Hever Wilson GI nurse navigator  She states we should at least put in a referral to palliative care for patient  I placed referral  Patient daughter will call to schedule an OV  She will call us for any additional questions  Offered temporary pain medication for patient by Dylan Muniz PA-C, patient daughter will let us know if her father would like this

## 2019-09-17 ENCOUNTER — TELEPHONE (OUTPATIENT)
Dept: GASTROENTEROLOGY | Facility: CLINIC | Age: 64
End: 2019-09-17

## 2019-09-17 PROBLEM — G47.33 OBSTRUCTIVE SLEEP APNEA SYNDROME: Status: ACTIVE | Noted: 2019-09-17

## 2019-09-17 PROBLEM — E78.00 HIGH CHOLESTEROL: Status: ACTIVE | Noted: 2019-09-17

## 2019-09-17 PROBLEM — R11.10 VOMITING: Status: ACTIVE | Noted: 2019-09-17

## 2019-09-17 PROBLEM — S09.90XS HEADACHES DUE TO OLD HEAD INJURY: Status: ACTIVE | Noted: 2019-09-17

## 2019-09-17 PROBLEM — J44.9 COPD (CHRONIC OBSTRUCTIVE PULMONARY DISEASE) (HCC): Status: ACTIVE | Noted: 2019-09-17

## 2019-09-17 PROBLEM — C15.9 ESOPHAGEAL CANCER (HCC): Status: ACTIVE | Noted: 2019-09-17

## 2019-09-17 PROBLEM — E11.65 TYPE 2 DIABETES MELLITUS WITH HYPERGLYCEMIA, WITHOUT LONG-TERM CURRENT USE OF INSULIN (HCC): Status: ACTIVE | Noted: 2019-09-17

## 2019-09-17 PROBLEM — C15.5 MALIGNANT NEOPLASM OF LOWER THIRD OF ESOPHAGUS (HCC): Status: ACTIVE | Noted: 2019-09-17

## 2019-09-17 PROBLEM — E11.9 TYPE 2 DIABETES MELLITUS (HCC): Status: ACTIVE | Noted: 2019-09-17

## 2019-09-17 PROBLEM — K22.2 ESOPHAGEAL OBSTRUCTION: Status: ACTIVE | Noted: 2019-09-17

## 2019-09-17 PROBLEM — G44.309 HEADACHES DUE TO OLD HEAD INJURY: Status: ACTIVE | Noted: 2019-09-17

## 2019-09-17 LAB
ANION GAP SERPL CALCULATED.3IONS-SCNC: 11 MMOL/L (ref 4–13)
BUN SERPL-MCNC: 22 MG/DL (ref 5–25)
CALCIUM SERPL-MCNC: 9 MG/DL (ref 8.3–10.1)
CHLORIDE SERPL-SCNC: 103 MMOL/L (ref 100–108)
CO2 SERPL-SCNC: 23 MMOL/L (ref 21–32)
CREAT SERPL-MCNC: 0.84 MG/DL (ref 0.6–1.3)
GFR SERPL CREATININE-BSD FRML MDRD: 92 ML/MIN/1.73SQ M
GLUCOSE SERPL-MCNC: 100 MG/DL (ref 65–140)
GLUCOSE SERPL-MCNC: 101 MG/DL (ref 65–140)
GLUCOSE SERPL-MCNC: 69 MG/DL (ref 65–140)
GLUCOSE SERPL-MCNC: 72 MG/DL (ref 65–140)
GLUCOSE SERPL-MCNC: 90 MG/DL (ref 65–140)
POTASSIUM SERPL-SCNC: 3.7 MMOL/L (ref 3.5–5.3)
SODIUM SERPL-SCNC: 137 MMOL/L (ref 136–145)

## 2019-09-17 PROCEDURE — 99223 1ST HOSP IP/OBS HIGH 75: CPT | Performed by: INTERNAL MEDICINE

## 2019-09-17 PROCEDURE — 80048 BASIC METABOLIC PNL TOTAL CA: CPT | Performed by: INTERNAL MEDICINE

## 2019-09-17 PROCEDURE — 82948 REAGENT STRIP/BLOOD GLUCOSE: CPT

## 2019-09-17 PROCEDURE — 99285 EMERGENCY DEPT VISIT HI MDM: CPT | Performed by: PHYSICIAN ASSISTANT

## 2019-09-17 PROCEDURE — 99223 1ST HOSP IP/OBS HIGH 75: CPT | Performed by: NURSE PRACTITIONER

## 2019-09-17 RX ORDER — SODIUM CHLORIDE, SODIUM GLUCONATE, SODIUM ACETATE, POTASSIUM CHLORIDE, MAGNESIUM CHLORIDE, SODIUM PHOSPHATE, DIBASIC, AND POTASSIUM PHOSPHATE .53; .5; .37; .037; .03; .012; .00082 G/100ML; G/100ML; G/100ML; G/100ML; G/100ML; G/100ML; G/100ML
100 INJECTION, SOLUTION INTRAVENOUS CONTINUOUS
Status: DISPENSED | OUTPATIENT
Start: 2019-09-17 | End: 2019-09-18

## 2019-09-17 RX ORDER — ACETAMINOPHEN 325 MG/1
650 TABLET ORAL EVERY 6 HOURS PRN
Status: DISCONTINUED | OUTPATIENT
Start: 2019-09-17 | End: 2019-09-18 | Stop reason: HOSPADM

## 2019-09-17 RX ORDER — ALBUTEROL SULFATE 2.5 MG/3ML
2.5 SOLUTION RESPIRATORY (INHALATION) EVERY 4 HOURS PRN
Status: DISCONTINUED | OUTPATIENT
Start: 2019-09-17 | End: 2019-09-18 | Stop reason: HOSPADM

## 2019-09-17 RX ORDER — DIPHENHYDRAMINE HYDROCHLORIDE 50 MG/ML
12.5 INJECTION INTRAMUSCULAR; INTRAVENOUS
Status: DISCONTINUED | OUTPATIENT
Start: 2019-09-17 | End: 2019-09-18 | Stop reason: HOSPADM

## 2019-09-17 RX ORDER — ONDANSETRON 2 MG/ML
4 INJECTION INTRAMUSCULAR; INTRAVENOUS EVERY 4 HOURS PRN
Status: DISCONTINUED | OUTPATIENT
Start: 2019-09-17 | End: 2019-09-18 | Stop reason: HOSPADM

## 2019-09-17 RX ORDER — ERGOCALCIFEROL 1.25 MG/1
50000 CAPSULE ORAL
COMMUNITY
Start: 2019-08-24 | End: 2019-09-25

## 2019-09-17 RX ORDER — OXYCODONE HYDROCHLORIDE 5 MG/1
5 TABLET ORAL EVERY 6 HOURS PRN
Status: DISCONTINUED | OUTPATIENT
Start: 2019-09-17 | End: 2019-09-18 | Stop reason: HOSPADM

## 2019-09-17 RX ORDER — LEVOTHYROXINE SODIUM 0.03 MG/1
25 TABLET ORAL
COMMUNITY
Start: 2019-08-24 | End: 2020-10-26

## 2019-09-17 RX ADMIN — ENOXAPARIN SODIUM 40 MG: 40 INJECTION SUBCUTANEOUS at 08:13

## 2019-09-17 RX ADMIN — SODIUM CHLORIDE, SODIUM GLUCONATE, SODIUM ACETATE, POTASSIUM CHLORIDE AND MAGNESIUM CHLORIDE 100 ML/HR: 526; 502; 368; 37; 30 INJECTION, SOLUTION INTRAVENOUS at 22:17

## 2019-09-17 RX ADMIN — ACETAMINOPHEN 650 MG: 325 TABLET, FILM COATED ORAL at 10:46

## 2019-09-17 RX ADMIN — SODIUM CHLORIDE, SODIUM GLUCONATE, SODIUM ACETATE, POTASSIUM CHLORIDE AND MAGNESIUM CHLORIDE 100 ML/HR: 526; 502; 368; 37; 30 INJECTION, SOLUTION INTRAVENOUS at 11:49

## 2019-09-17 RX ADMIN — SODIUM CHLORIDE, SODIUM GLUCONATE, SODIUM ACETATE, POTASSIUM CHLORIDE AND MAGNESIUM CHLORIDE 100 ML/HR: 526; 502; 368; 37; 30 INJECTION, SOLUTION INTRAVENOUS at 05:31

## 2019-09-17 NOTE — ASSESSMENT & PLAN NOTE
Secondary to esophageal malignancy  CT scan demonstrated progression of lower esophageal mass  Patient now has dysphagia to solids and liquids    · NPO  · Follow-up GI consult  · Hydration with IV isolyte

## 2019-09-17 NOTE — H&P
H&P- Kim Mall 1955, 71 y o  male MRN: [id number]    Unit/Bed#: ERON Encounter: [id number]    Primary Care Provider: Coty Mack MD   Date and time admitted to hospital: 9/16/2019  9:59 PM        Malignant neoplasm of lower third of esophagus [facility])  Assessment & Plan  This is the primary source of patient's esophageal obstruction  CT imaging demonstrated progression of his lower esophageal mass  In addition, CT imaging demonstrated a right adrenal nodule, a posterior bladder nodule, and a left upper lobe lung nodule (this is stable from prior imaging, but requires follow-up)  Patient does not currently smoke, although he quit about a year and half ago  Does not drink alcohol  · Patient thus far unable to obtain PET CT scan secondary to insurance reasons  · GI consult  · Would also recommend Oncology consult  · NPO as outlined under plan for esophageal obstruction    * Esophageal obstruction  Assessment & Plan  Secondary to esophageal malignancy  CT scan demonstrated progression of lower esophageal mass  Patient now has dysphagia to solids and liquids  · NPO  · Follow-up GI consult  · Hydration with IV isolyte    Type 2 diabetes mellitus with hyperglycemia, without long-term current use of insulin (Formerly [facility])  Assessment & Plan  Lab Results   Component Value Date    HGBA1C 7 6 08/15/2019       No results for input(s): POCGLU in the last 72 hours  Blood Sugar Average: Last 72 hrs:     · Hold oral diabetic medications while inpatient  · Will start correctional insulin every 6 hours while NPO    COPD (chronic obstructive pulmonary disease) ([facility])  Assessment & Plan  · Albuterol p r n    · Patient does not take inhaler as outpatient      VTE Prophylaxis: Enoxaparin (Lovenox)  / sequential compression device   Code Status: No Order full code  POLST: There is no POLST form on file for this patient (pre-hospital)    Anticipated Length of Stay:  Patient will be admitted on an Inpatient basis with an anticipated length of stay of > 2 midnights  Justification for Hospital Stay: Please see detailed plans noted above  Chief Complaint:     Dysphagia    History of Present Illness:  Magda Jaeger is a 71 y o  male who is presenting with primary complaint of esophageal dysphagia/obstruction  Patient was recently seen in the ED for similar complaint, where he was diagnosed with esophageal cancer  Outpatient follow-up for a PET-CT scan was arranged, however, patient was unable to obtain this secondary to insurance reasons  Since then, he has reported progressively worsening dysphagia  Patient is unable to tolerate solids  He ate chicken broth which soon came up, afterward was vomiting repeatedly, along with trace hematemesis  He has no abdominal pain, diarrhea, fevers, chills, chest pain, shortness of breath  CT imaging in the ED showed what appeared to be progressive worsening of his lower esophageal mass along with nodules of the right adrenal, posterior bladder, as well as a left upper lung nodule which is consistent from prior imaging  ED staff spoke with GI physician, who recommended inpatient monitoring  Currently, patient is awake, in no acute distress  Says he is not in pain currently  However, is reporting difficulty sleeping recently and is requesting something for sleep  Review of Systems   Constitutional: Negative for chills and fever  HENT: Positive for trouble swallowing  Respiratory: Negative for choking, chest tightness and shortness of breath  Gastrointestinal: Positive for nausea and vomiting  Negative for abdominal pain and diarrhea  Endocrine: Negative for polydipsia, polyphagia and polyuria  Genitourinary: Negative  Negative for dysuria  Musculoskeletal: Negative for arthralgias and myalgias  Skin: Negative for pallor, rash and wound  Allergic/Immunologic: Negative for immunocompromised state     Neurological: Negative for dizziness, syncope, light-headedness and headaches  Hematological: Negative for adenopathy  Psychiatric/Behavioral: Negative for agitation and confusion  Past Medical and Surgical History:   Past Medical History:   Diagnosis Date    COPD (chronic obstructive pulmonary disease) ([facility] )     Diabetes mellitus ([facility] )     Hypertension      Past Surgical History:   Procedure Laterality Date    BACK SURGERY      DISC REMOVAL      TONSILLECTOMY         Meds/Allergies:    (Not in a hospital admission)    Allergies: Allergies   Allergen Reactions    Penicillins Itching     History:  Marital Status: Single   Occupation: Retired  Patient Pre-hospital Living Situation: Own home  Patient Pre-hospital Level of Mobility: Ambulatory  Patient Pre-hospital Diet Restrictions: N/A  Substance Use History:   Social History     Substance and Sexual Activity   Alcohol Use Not Currently     Social History     Tobacco Use   Smoking Status Former Smoker    Last attempt to quit: 2017    Years since quittin 7   Smokeless Tobacco Never Used     Social History     Substance and Sexual Activity   Drug Use Not Currently       Family History:  Family History   Problem Relation Age of Onset    Diabetes Mother     No Known Problems Father        Physical Exam:     Vitals:   Blood Pressure: 113/62 (19)  Pulse: 91 (19)  Temperature: 98 8 °F (37 1 °C) (19)  Temp Source: Oral (19)  Respirations: 20 (19)  Height: 5' 10" (177 8 cm) (19)  Weight - Scale: 87 5 kg (192 lb 14 4 oz) (19)  SpO2: 97 % (19)    Constitutional:  Well developed, well nourished, no acute distress, non-toxic appearance   Eyes:  PERRL, conjunctiva normal   HENT:  Atraumatic, external ears normal, nose normal, oropharynx moist, no pharyngeal exudates  Neck - normal range of motion, no tenderness, supple   Respiratory:  No respiratory distress  Normal breath sounds   No rales, no wheezing   Cardiovascular: Normal rate, normal rhythm, no murmurs, no gallops, no rubs   GI:  Soft, nondistended, normal bowel sounds, nontender, no organomegaly, no mass, no rebound, no guarding   :  No costovertebral angle tenderness  Musculoskeletal:  No edema, no tenderness, no deformities  Back - no tenderness  Integument:  Well hydrated, no rash   Lymphatic:  No lymphadenopathy noted   Neurologic:  Alert & awake, communicative, CN 2-12 normal, normal motor function, normal sensory function, no focal deficits noted   Psychiatric:  Speech and behavior appropriate       Lab Results: I have personally reviewed pertinent reports  Results from last 7 days   Lab Units 09/16/19  2235   WBC Thousand/uL 9 80   HEMOGLOBIN g/dL 12 4   HEMATOCRIT % 38 5   PLATELETS Thousands/uL 252   NEUTROS PCT % 74   LYMPHS PCT % 12*   MONOS PCT % 7   EOS PCT % 6     Results from last 7 days   Lab Units 09/16/19  2235   POTASSIUM mmol/L 3 9   CHLORIDE mmol/L 101   CO2 mmol/L 28   BUN mg/dL 26*   CREATININE mg/dL 1 12   CALCIUM mg/dL 9 6   ALK PHOS U/L 48   ALT U/L 11*   AST U/L 7           EKG:  Not on file    Imaging: I have personally reviewed pertinent reports  and I have personally reviewed pertinent films in PACS    Ct Chest Abdomen Pelvis W Contrast    Result Date: 9/17/2019  Narrative: CT CHEST, ABDOMEN AND PELVIS WITH IV CONTRAST INDICATION:   dysphagia  Patient recently diagnosed with adenocarcinoma of the lower 3rd of the esophagus  Dysphagia, "feels like there is a bubble in my throat",  blood in vomit  History of COPD, diabetes, hypertension  Prior history of tobacco use  COMPARISON:  September 6, 2019  TECHNIQUE: CT examination of the chest, abdomen and pelvis was performed  Axial, sagittal, and coronal 2D reformatted images were created from the source data and submitted for interpretation  Radiation dose length product (DLP) for this visit:  650 mGy-cm     This examination, like all CT scans performed in the [facility], was performed utilizing techniques to minimize radiation dose exposure, including the use of iterative reconstruction and automated exposure control  IV Contrast:  100 mL of iohexol (OMNIPAQUE) Enteric Contrast: Enteric contrast was not administered  FINDINGS: CHEST LUNGS:  A focal area of opacity within the anterior aspect of the left upper lobe of the lung measuring approximately 7 x 8 mm is again evident, presently best demonstrated on series 3 image 28  Once again, this appears somewhat more linear on the coronal images  This is similar in appearance to September 6, 2019  Continued follow-up is recommended  Minimal atelectasis in the inferior left lingula is unchanged  PLEURA:  Unremarkable  HEART/GREAT VESSELS:  Coronary artery calcifications are present  There is mild atherosclerosis of the thoracic aorta  MEDIASTINUM AND SHEA:  There is irregular wall thickening and some mucosal enhancement evident involving the distal esophagus and extending to the left hand aspect of the upper stomach  There is a masslike area of abnormal soft tissue density along the left hand aspect of the upper stomach which measures approximately 5 5 x 3 6 cm  There appears to be some infiltration of the fat adjacent to this masslike area which is suspicious for local extension of disease  Several mildly prominent nodes are present between this mass and the liver, measuring up to 1 2 x 0 9 cm  A left para esophageal node on series 2 image 43 presently measures 1 x 0 6 cm whereas it measured 0 8 x 0 6 cm on the prior study  There is some fluid within the esophagus near the  level of the gregg  A subcarinal node measures approximately 8 mm short axis, similar to the prior study  A right paratracheal node measures approximately 9 mm, similar to the prior study  Another right paratracheal node measures approximately 10 mm, also similar to the prior study  CHEST WALL AND LOWER NECK:   Unremarkable   ABDOMEN LIVER/BILIARY TREE: There is a small area of focal fatty infiltration adjacent to the falciform ligament, similar to the previous examination  GALLBLADDER:  There are gallstone(s) within the gallbladder, without pericholecystic inflammatory changes  SPLEEN:  Unremarkable  PANCREAS:  Unremarkable  ADRENAL GLANDS:  There is an approximately 1 5 x 1 cm right adrenal nodule  This appears similar to slightly larger compared to the prior examination  There is a questionable 0 6 x 0 5 cm left adrenal nodule, not clearly demonstrated on the prior study  Follow-up of both of these nodules is suggested  KIDNEYS/URETERS:  Unremarkable  No hydronephrosis  STOMACH AND BOWEL:  As described above  There is no evidence of small bowel obstruction  There is no evidence of acute diverticulitis  APPENDIX:  A normal appendix was visualized  ABDOMINOPELVIC CAVITY:  As described above  No pneumoperitoneum  No significant ascites  VESSELS:  There is atherosclerosis  There is no abdominal aortic aneurysm  PELVIS REPRODUCTIVE ORGANS AND URINARY BLADDER:  There is a soft tissue density nodule within the posterior aspect of the urinary bladder near the midline which measures approximately 1 2 cm  This appears similar to the prior study  This could be caused by indentation of the prostate, however, a small bladder mass cannot be entirely excluded  Urology consultation and follow-up is recommended  ABDOMINAL WALL/INGUINAL REGIONS:  Small fat-containing left inguinal hernia  OSSEOUS STRUCTURES:  No acute fracture or destructive osseous lesion  Disc spacers are present at L5-S1  Impression: There is irregular wall thickening involving the distal esophagus and extending to the left hand aspect of the upper stomach  There is a masslike area of abnormal soft tissue density along the left hand aspect of the upper stomach which measures approximately 5 5 x 3 6 cm, and there appears to be some infiltration of the adjacent fat    This is concerning for local extension of the patient's known neoplasm  Several mildly prominent nodes are present between this mass and the liver  There are also some mildly prominent mediastinal nodes  Please see discussion  There is some fluid within the esophagus near level of the gregg  There is an approximately 1 5 x 1 cm right adrenal nodule  This appears similar to slightly larger compared to the prior examination  There is a questionable tiny left adrenal nodule  Follow-up of both of these adrenal findings is suggested  There is an approximately 1 2 cm soft tissue density nodule within the posterior aspect of the urinary bladder  Although this may be related to indentation of the prostate, a small bladder mass cannot be entirely excluded  Urology consultation and follow-up is recommended  A focal area of opacity within the anterior aspect of the left upper lobe of the lung appears similar to September 6, 2019  Please see discussion  Continued follow-up is recommended  This could be reassessed with chest CT in 3 months  PET/CT could also be considered, however, it is of borderline size for accurate characterization with PET/ CT  If more remote CTs of the chest exist, direct comparison would be helpful  PET/CT should be considered for further evaluation of the extent of adenopathy and extent of disease  PET/CT or MRI could be utilized to evaluate the adrenal findings, if there are no contraindications  Cholelithiasis  No CT evidence of acute cholecystitis  Atherosclerosis  Coronary artery disease  Other findings as described above  Please see discussion  The study was marked in [facility] for immediate notification  Workstation performed: [id number]     Ct Chest Abdomen Pelvis W Contrast    Result Date: 9/12/2019  Narrative: CT CHEST, ABDOMEN AND PELVIS WITH IV CONTRAST INDICATION:   C15 5: Malignant neoplasm of lower third of esophagus  Recent diagnosis of esophageal carcinoma (adenocarcinoma)  COMPARISON:  None   TECHNIQUE: CT examination of the chest, abdomen and pelvis was performed  Axial, sagittal, and coronal 2D reformatted images were created from the source data and submitted for interpretation  Radiation dose length product (DLP) for this visit:  818 mGy-cm   This examination, like all CT scans performed in the [facility], was performed utilizing techniques to minimize radiation dose exposure, including the use of iterative reconstruction and automated exposure control  IV Contrast:  100 mL of iohexol (OMNIPAQUE) Enteric Contrast: Enteric contrast was administered  FINDINGS: CHEST LUNGS:  Solitary focal opacity noted within the anterior left upper lobe, on image 205/25 measuring 7 x 8 mm  On coronal imaging appearing somewhat more elongated although with central nodular aspect about 6 mm  PLEURA:  Unremarkable  HEART/GREAT VESSELS:  Coronary artery calcification  Otherwise unremarkable MEDIASTINUM AND SHEA:  As already known, abnormal appearance of the distal esophagus, beginning just below level of the inferior pulmonary veins and extending to the gastric cardia  Irregular wall thickening and mucosal enhancement noted  Along the left margin of the abnormal esophagus image 201/41 there is a paraesophageal lymph node measuring 8 x 6 mm  A subcarinal lymph node demonstrates short axis diameter 8 mm  CHEST WALL AND LOWER NECK:   Unremarkable  ABDOMEN LIVER/BILIARY TREE:  Unremarkable  GALLBLADDER:  There are gallstone(s) within the gallbladder, without pericholecystic inflammatory changes  SPLEEN:  Unremarkable  PANCREAS:  Unremarkable  ADRENAL GLANDS:  There is a right adrenal nodule measuring 1 2 x 0 7 cm in size  It is somewhat small for accurate characterization  As best as can be told, density is well above fluid on this enhanced only exam KIDNEYS/URETERS:  Unremarkable  No hydronephrosis  STOMACH AND BOWEL:  As above, abnormal appearance of the distal esophagus, involving the gastric cardia as well  Moderately increased stool within the colon  Small bowel unremarkable  APPENDIX:  No findings to suggest appendicitis  ABDOMINOPELVIC CAVITY:  Posterior to the gastric cardia there is an ovoid soft tissue nodule best measured on coronal image 79, measuring 1 7 x 1 0 cm  Along the right margin of the gastric cardia on coronal image 71 there is a soft tissue nodule measuring 1 3 x 1 1 cm, and there is also an ill-defined right lateral border of the gastric cardia for example image 201/54 and coronal image 68  There is a trace amount of free fluid present within the pelvis  VESSELS:  Unremarkable for patient's age  PELVIS REPRODUCTIVE ORGANS:  See below URINARY BLADDER:  Soft tissue nodular density associated with the posterior inferior aspect of the urinary bladder image 201/114 or coronal image 84 measuring approximately 9 mm  This is favored to be enlargement of the median lobe of the prostate gland, less likely bladder polyp ABDOMINAL WALL/INGUINAL REGIONS:  Unremarkable  OSSEOUS STRUCTURES:  No acute fracture or destructive osseous lesion  Impression: 1  Irregular wall thickening involving the distal esophagus beginning just below the inferior pulmonary veins, extending through the gastric cardia compatible with biopsy-proven adenocarcinoma  As above, there is involvement of the gastric cardia, and possible extra luminal extension of tumor along its right lateral margin 2  There is mild adenopathy identified adjacent to the gastric cardia and distal esophagus  Additional borderline prominent mediastinal lymph nodes 3  There is a left upper lobe opacity identified  Although potentially representing an area of inflammation or scarring, an irregularly marginated solitary pulmonary metastasis should be excluded  This could either be reassessed with chest CT in 3 months time or further evaluated with PET CT (it is of borderline size for accurate characterization with PET) 4   Hypertrophy of the median lobe of the prostate (favored) or less likely 9 mm bladder polyp 5  Nonspecific 1 2 cm right adrenal nodule  Overall, PET/CT may be helpful to determine exact extent of adenopathy within the lower chest and upper abdomen, evaluation for any extraluminal extension of tumor, and further characterization of left upper lobe density and right adrenal nodule Workstation performed: [id number]         Portions of the record may have been created with voice recognition software  Occasional wrong word or "sound a like" substitutions may have occurred due to the inherent limitations of voice recognition software  Read the chart carefully and recognize, using context, where substitutions have occurred

## 2019-09-17 NOTE — TELEPHONE ENCOUNTER
PET approved however according to message from Jc Jorge:   Clarence Lucio needs to call [employer] and verify that he is going to [facility] and then they should release the authorization number    Called pt but he was in hospital , went to ER last night  Advised pt needing to call [employer]   the patient verified he will be going to [facility] in [facility]  Could not give [employer] number o f [phone] due to pt not having something to write with  Pt said he would call back later

## 2019-09-17 NOTE — ASSESSMENT & PLAN NOTE
Lab Results   Component Value Date    HGBA1C 7 6 08/15/2019       No results for input(s): POCGLU in the last 72 hours      Blood Sugar Average: Last 72 hrs:     · Hold oral diabetic medications while inpatient  · Will start correctional insulin every 6 hours while NPO

## 2019-09-17 NOTE — PLAN OF CARE
Problem: Nutrition/Hydration-ADULT  Goal: Nutrient/Hydration intake appropriate for improving, restoring or maintaining nutritional needs  Description  Monitor and assess patient's nutrition/hydration status for malnutrition  Collaborate with interdisciplinary team and initiate plan and interventions as ordered  Monitor patient's weight and dietary intake as ordered or per policy  Utilize nutrition screening tool and intervene as necessary  Determine patient's food preferences and provide high-protein, high-caloric foods as appropriate       INTERVENTIONS:  - Monitor oral intake, urinary output, labs, and treatment plans  - Assess nutrition and hydration status and recommend course of action  - Evaluate amount of meals eaten  - Assist patient with eating if necessary   - Allow adequate time for meals  - Recommend/ encourage appropriate diets, oral nutritional supplements, and vitamin/mineral supplements  - Order, calculate, and assess calorie counts as needed  - Recommend, monitor, and adjust tube feedings and TPN/PPN based on assessed needs  - Assess need for intravenous fluids  - Provide specific nutrition/hydration education as appropriate  - Include patient/family/caregiver in decisions related to nutrition  Outcome: Progressing     Problem: PAIN - ADULT  Goal: Verbalizes/displays adequate comfort level or baseline comfort level  Description  Interventions:  - Encourage patient to monitor pain and request assistance  - Assess pain using appropriate pain scale  - Administer analgesics based on type and severity of pain and evaluate response  - Implement non-pharmacological measures as appropriate and evaluate response  - Consider cultural and social influences on pain and pain management  - Notify physician/advanced practitioner if interventions unsuccessful or patient reports new pain  Outcome: Progressing     Problem: INFECTION - ADULT  Goal: Absence or prevention of progression during hospitalization  Description  INTERVENTIONS:  - Assess and monitor for signs and symptoms of infection  - Monitor lab/diagnostic results  - Monitor all insertion sites, i e  indwelling lines, tubes, and drains  - Monitor endotracheal if appropriate and nasal secretions for changes in amount and color  - [place] appropriate cooling/warming therapies per order  - Administer medications as ordered  - Instruct and encourage patient and family to use good hand hygiene technique  - Identify and instruct in appropriate isolation precautions for identified infection/condition  Outcome: Progressing  Goal: Absence of fever/infection during neutropenic period  Description  INTERVENTIONS:  - Monitor WBC    Outcome: Progressing     Problem: SAFETY ADULT  Goal: Patient will remain free of falls  Description  INTERVENTIONS:  - Assess patient frequently for physical needs  -  Identify cognitive and physical deficits and behaviors that affect risk of falls    -  [place] fall precautions as indicated by assessment   - Educate patient/family on patient safety including physical limitations  - Instruct patient to call for assistance with activity based on assessment  - Modify environment to reduce risk of injury  - Consider OT/PT consult to assist with strengthening/mobility  Outcome: Progressing  Goal: Maintain or return to baseline ADL function  Description  INTERVENTIONS:  -  Assess patient's ability to carry out ADLs; assess patient's baseline for ADL function and identify physical deficits which impact ability to perform ADLs (bathing, care of mouth/teeth, toileting, grooming, dressing, etc )  - Assess/evaluate cause of self-care deficits   - Assess range of motion  - Assess patient's mobility; develop plan if impaired  - Assess patient's need for assistive devices and provide as appropriate  - Encourage maximum independence but intervene and supervise when necessary  - Involve family in performance of ADLs  - Assess for home care needs following discharge   - Consider OT consult to assist with ADL evaluation and planning for discharge  - Provide patient education as appropriate  Outcome: Progressing  Goal: Maintain or return mobility status to optimal level  Description  INTERVENTIONS:  - Assess patient's baseline mobility status (ambulation, transfers, stairs, etc )    - Identify cognitive and physical deficits and behaviors that affect mobility  - Identify mobility aids required to assist with transfers and/or ambulation (gait belt, sit-to-stand, lift, walker, cane, etc )  - [place] fall precautions as indicated by assessment  - Record patient progress and toleration of activity level on Mobility SBAR; progress patient to next Phase/Stage  - Instruct patient to call for assistance with activity based on assessment  - Consider rehabilitation consult to assist with strengthening/weightbearing, etc   Outcome: Progressing     Problem: DISCHARGE PLANNING  Goal: Discharge to home or other facility with appropriate resources  Description  INTERVENTIONS:  - Identify barriers to discharge w/patient and caregiver  - Arrange for needed discharge resources and transportation as appropriate  - Identify discharge learning needs (meds, wound care, etc )  - Arrange for interpretive services to assist at discharge as needed  - Refer to Case Management Department for coordinating discharge planning if the patient needs post-hospital services based on physician/advanced practitioner order or complex needs related to functional status, cognitive ability, or social support system  Outcome: Progressing     Problem: Knowledge Deficit  Goal: Patient/family/caregiver demonstrates understanding of disease process, treatment plan, medications, and discharge instructions  Description  Complete learning assessment and assess knowledge base    Interventions:  - Provide teaching at level of understanding  - Provide teaching via preferred learning methods  Outcome: Progressing

## 2019-09-17 NOTE — ED PROVIDER NOTES
History  Chief Complaint   Patient presents with    Vomiting     Per Pt " I was Dx with a tumor in my throat in August  My throat feels like there is a bubble in it  I saw blood in my vomit  "      Patient is a 39-year-old male who was recently diagnosed with esophageal tumor  He states that he has been having increased issues and difficulty of swallowing  He has been seeing Dr Whit Coffman outpatient GI for evaluation treatment of his dysphagia  Dr Whit Coffman has recommended at PET scan, which the insurance company has not improved  Patient states he began having more difficulty swallowing today  He states feels that there is a bubble stuck in his throat  Patient states he was vomiting earlier today because of the sensation  Prior to Admission Medications   Prescriptions Last Dose Informant Patient Reported? Taking?    albuterol (2 5 mg/3 mL) 0 083 % nebulizer solution  Self Yes No   Sig: 3 mL 3 (three) times a day   albuterol (PROAIR HFA) 90 mcg/act inhaler  Self Yes No   canagliflozin (INVOKANA) 100 mg  Self Yes Yes   Sig: Daily   ergocalciferol (VITAMIN D2) 50,000 units   Yes No   Sig: Take 50,000 Units by mouth   levothyroxine 25 mcg tablet   Yes Yes   Sig: Take 25 mcg by mouth daily in the early morning   pantoprazole (PROTONIX) 40 mg tablet  Self No No   Sig: Take 1 tablet (40 mg total) by mouth daily   pioglitazone (ACTOS) 30 mg tablet  Self Yes Yes   Sig: pioglitazone 30 mg tablet   sitaGLIPtin (JANUVIA) 100 mg tablet  Self Yes Yes   traMADol (ULTRAM) 50 mg tablet  Self Yes Yes   Sig: tramadol 50 mg tablet   take 1 tablet by mouth every 12 hours      Facility-Administered Medications: None       Past Medical History:   Diagnosis Date    [facility] [address] [facility]     [facility]     Hypertension     Sleep apnea        Past Surgical History:   Procedure Laterality Date    BACK SURGERY      DISC REMOVAL      TONSILLECTOMY         Family History   Problem Relation Age of Onset    Diabetes Mother     No Known Problems Father      I have reviewed and agree with the history as documented  Social History     Tobacco Use    Smoking status: Former Smoker     Last attempt to quit: 2017     Years since quittin 7    Smokeless tobacco: Never Used   Substance Use Topics    Alcohol use: Not Currently     Alcohol/week: 0 0 standard drinks     Frequency: Never     Drinks per session: 1 or 2     Binge frequency: Never    Drug use: Not Currently        Review of Systems   Constitutional: Negative for fever  HENT: Positive for trouble swallowing  Cardiovascular: Negative for chest pain  All other systems reviewed and are negative  Physical Exam  Physical Exam   Constitutional: He is oriented to person, place, and time  He appears well-developed and well-nourished  HENT:   Head: Normocephalic and atraumatic  Right Ear: External ear normal    Left Ear: External ear normal    Nose: Nose normal    Mouth/Throat: Oropharynx is clear and moist    Cardiovascular: Normal rate, regular rhythm and normal heart sounds  Pulmonary/Chest: Effort normal and breath sounds normal    Musculoskeletal: Normal range of motion  Neurological: He is alert and oriented to person, place, and time  Skin: Skin is warm and dry  Psychiatric: He has a normal mood and affect  His behavior is normal  Judgment and thought content normal    Vitals reviewed        Vital Signs  ED Triage Vitals   Temperature Pulse Respirations Blood Pressure SpO2   19   98 8 °F (37 1 °C) 101 16 104/57 99 %      Temp Source Heart Rate Source Patient Position - Orthostatic VS BP Location FiO2 (%)   19 --   Oral Monitor Sitting Left arm       Pain Score       19 2242       No Pain           Vitals:    19 2242 19 0030 19 0200 19 0230   BP: 117/68 137/72 118/69 113/62   Pulse: 93 90 91 91   Patient Position - Orthostatic VS: Sitting Lying Lying          Visual Acuity      ED Medications  Medications   insulin lispro (HumaLOG) 100 units/mL subcutaneous injection 1-6 Units (has no administration in time range)   ondansetron (ZOFRAN) injection 4 mg (has no administration in time range)   albuterol inhalation solution 2 5 mg (has no administration in time range)   enoxaparin (LOVENOX) subcutaneous injection 40 mg (has no administration in time range)   diphenhydrAMINE (BENADRYL) injection 12 5 mg (has no administration in time range)   multi-electrolyte (ISOLYTE-S PH 7 4 equivalent) IV solution (has no administration in time range)   ondansetron (ZOFRAN) injection 4 mg (4 mg Intravenous Given 9/16/19 2236)   sodium chloride 0 9 % bolus 1,000 mL (0 mL Intravenous Stopped 9/17/19 0103)   iohexol (OMNIPAQUE) 350 MG/ML injection (MULTI-DOSE) 100 mL (100 mL Intravenous Given 9/16/19 2355)       Diagnostic Studies  Results Reviewed     Procedure Component Value Units Date/Time    Basic metabolic panel [[id number]]     Lab Status:  No result Specimen:  Blood     Platelet count [[id number]]     Lab Status:  No result Specimen:  Blood     Comprehensive metabolic panel [[id number]]  (Abnormal) Collected:  09/16/19 2235    Lab Status:  Final result Specimen:  Blood from Arm, Right Updated:  09/16/19 2307     Sodium 137 mmol/L      Potassium 3 9 mmol/L      Chloride 101 mmol/L      CO2 28 mmol/L      ANION GAP 8 mmol/L      BUN 26 mg/dL      Creatinine 1 12 mg/dL      Glucose 141 mg/dL      Calcium 9 6 mg/dL      AST 7 U/L      ALT 11 U/L      Alkaline Phosphatase 48 U/L      Total Protein 7 5 g/dL      Albumin 3 5 g/dL      Total Bilirubin 1 10 mg/dL      eGFR 69 ml/min/1 73sq m     Narrative:       [place] guidelines for Chronic Kidney Disease (CKD):     Stage 1 with normal or high GFR (GFR > 90 mL/min/1 73 square meters)    Stage 2 Mild CKD (GFR = 60-89 mL/min/1 73 square meters)    Stage 3A Moderate CKD (GFR = 45-59 mL/min/1 73 square meters)    Stage 3B Moderate CKD (GFR = 30-44 mL/min/1 73 square meters)    Stage 4 Severe CKD (GFR = 15-29 mL/min/1 73 square meters)    Stage 5 End Stage CKD (GFR <15 mL/min/1 73 square meters)  Note: GFR calculation is accurate only with a steady state creatinine    CBC and differential [[id number]]  (Abnormal) Collected:  09/16/19 2235    Lab Status:  Final result Specimen:  Blood from Arm, Right Updated:  09/16/19 2251     WBC 9 80 Thousand/uL      RBC 4 45 Million/uL      Hemoglobin 12 4 g/dL      Hematocrit 38 5 %      MCV 87 fL      MCH 27 9 pg      MCHC 32 2 g/dL      RDW 13 5 %      MPV 9 6 fL      Platelets 083 Thousands/uL      nRBC 0 /100 WBCs      Neutrophils Relative 74 %      Immat GRANS % 0 %      Lymphocytes Relative 12 %      Monocytes Relative 7 %      Eosinophils Relative 6 %      Basophils Relative 1 %      Neutrophils Absolute 7 20 Thousands/µL      Immature Grans Absolute 0 04 Thousand/uL      Lymphocytes Absolute 1 22 Thousands/µL      Monocytes Absolute 0 67 Thousand/µL      Eosinophils Absolute 0 61 Thousand/µL      Basophils Absolute 0 06 Thousands/µL                  CT chest abdomen pelvis w contrast   Final Result by Luis Armando Cedeno MD (09/17 0116)      There is irregular wall thickening involving the distal esophagus and extending to the left hand aspect of the upper stomach  There is a masslike area of abnormal soft tissue density along the left hand aspect of the upper stomach which measures    approximately 5 5 x 3 6 cm, and there appears to be some infiltration of the adjacent fat  This is concerning for local extension of the patient's known neoplasm  Several mildly prominent nodes are present between this mass and the liver  There are    also some mildly prominent mediastinal nodes  Please see discussion  There is some fluid within the esophagus near level of the gregg        There is an approximately 1 5 x 1 cm right adrenal nodule  This appears similar to slightly larger compared to the prior examination  There is a questionable tiny left adrenal nodule  Follow-up of both of these adrenal findings is suggested  There is an approximately 1 2 cm soft tissue density nodule within the posterior aspect of the urinary bladder  Although this may be related to indentation of the prostate, a small bladder mass cannot be entirely excluded  Urology consultation and    follow-up is recommended  A focal area of opacity within the anterior aspect of the left upper lobe of the lung appears similar to September 6, 2019  Please see discussion  Continued follow-up is recommended  This could be reassessed with chest CT in 3 months  PET/CT could    also be considered, however, it is of borderline size for accurate characterization with PET/ CT  If more remote CTs of the chest exist, direct comparison would be helpful  PET/CT should be considered for further evaluation of the extent of adenopathy and extent of disease  PET/CT or MRI could be utilized to evaluate the adrenal findings, if there are no contraindications  Cholelithiasis  No CT evidence of acute cholecystitis  Atherosclerosis  Coronary artery disease  Other findings as described above  Please see discussion  The study was marked in [facility] for immediate notification  Workstation performed: [id number]                    Procedures  Procedures       ED Course                               MDM  Number of Diagnoses or Management Options  Dysphagia:   Diagnosis management comments: Patient is a 57-year-old male that was recently diagnosed with esophageal cancer  Patient has been the active stages of diagnostic procedures and studies  He has not seen Oncology  Patient is having worsening symptoms  CT scan was reviewed and is consistent with esophageal tumor    I spoke with Dr Norberto Sims and he recommended admission to the hospital with consult for GI  Patient be admitted to the [facility] service  Amount and/or Complexity of Data Reviewed  Clinical lab tests: ordered and reviewed  Tests in the radiology section of CPT®: ordered and reviewed  Independent visualization of images, tracings, or specimens: yes    Risk of Complications, Morbidity, and/or Mortality  Presenting problems: moderate        Disposition  Final diagnoses:   Dysphagia     Time reflects when diagnosis was documented in both MDM as applicable and the Disposition within this note     Time User Action Codes Description Comment    9/17/2019  2:24 AM Terry Means Add [R13 10] Dysphagia     9/17/2019  2:26 AM Catana Krabbe D Add [K22 2] Esophageal obstruction     9/17/2019  2:26 AM Madina Graham Add [C15 5] Malignant neoplasm of lower third of esophagus [facility])       ED Disposition     ED Disposition Condition Date/Time Comment    Admit Stable Tue Sep 17, 2019  2:24 AM Case was discussed with Dr Merrill Wilson and the patient's admission status was agreed to be Admission Status: inpatient status to the service of Dr Merrill Wilson          Follow-up Information    None         Current Discharge Medication List      CONTINUE these medications which have NOT CHANGED    Details   canagliflozin (INVOKANA) 100 mg Daily      levothyroxine 25 mcg tablet Take 25 mcg by mouth daily in the early morning      pioglitazone (ACTOS) 30 mg tablet pioglitazone 30 mg tablet      sitaGLIPtin (JANUVIA) 100 mg tablet       traMADol (ULTRAM) 50 mg tablet tramadol 50 mg tablet   take 1 tablet by mouth every 12 hours      albuterol (2 5 mg/3 mL) 0 083 % nebulizer solution 3 mL 3 (three) times a day      albuterol (PROAIR HFA) 90 mcg/act inhaler     Comments: Substitution to a formulary equivalent within the same pharmaceutical class is authorized       ergocalciferol (VITAMIN D2) 50,000 units Take 50,000 Units by mouth      pantoprazole (PROTONIX) 40 mg tablet Take 1 tablet (40 mg total) by mouth daily  Qty: 30 tablet, Refills: 1    Associated Diagnoses: Esophageal dysphagia           No discharge procedures on file      ED Provider  Electronically Signed by           Jason Beckman PA-C  09/17/19 0695

## 2019-09-17 NOTE — PLAN OF CARE
Problem: Nutrition/Hydration-ADULT  Goal: Nutrient/Hydration intake appropriate for improving, restoring or maintaining nutritional needs  Description  Monitor and assess patient's nutrition/hydration status for malnutrition  Collaborate with interdisciplinary team and initiate plan and interventions as ordered  Monitor patient's weight and dietary intake as ordered or per policy  Utilize nutrition screening tool and intervene as necessary  Determine patient's food preferences and provide high-protein, high-caloric foods as appropriate       INTERVENTIONS:  - Monitor oral intake, urinary output, labs, and treatment plans  - Assess nutrition and hydration status and recommend course of action  - Evaluate amount of meals eaten  - Assist patient with eating if necessary   - Allow adequate time for meals  - Recommend/ encourage appropriate diets, oral nutritional supplements, and vitamin/mineral supplements  - Order, calculate, and assess calorie counts as needed  - Recommend, monitor, and adjust tube feedings and TPN/PPN based on assessed needs  - Assess need for intravenous fluids  - Provide specific nutrition/hydration education as appropriate  - Include patient/family/caregiver in decisions related to nutrition  Outcome: Progressing     Problem: PAIN - ADULT  Goal: Verbalizes/displays adequate comfort level or baseline comfort level  Description  Interventions:  - Encourage patient to monitor pain and request assistance  - Assess pain using appropriate pain scale  - Administer analgesics based on type and severity of pain and evaluate response  - Implement non-pharmacological measures as appropriate and evaluate response  - Consider cultural and social influences on pain and pain management  - Notify physician/advanced practitioner if interventions unsuccessful or patient reports new pain  Outcome: Progressing     Problem: INFECTION - ADULT  Goal: Absence or prevention of progression during hospitalization  Description  INTERVENTIONS:  - Assess and monitor for signs and symptoms of infection  - Monitor lab/diagnostic results  - Monitor all insertion sites, i e  indwelling lines, tubes, and drains  - Monitor endotracheal if appropriate and nasal secretions for changes in amount and color  - [place] appropriate cooling/warming therapies per order  - Administer medications as ordered  - Instruct and encourage patient and family to use good hand hygiene technique  - Identify and instruct in appropriate isolation precautions for identified infection/condition  Outcome: Progressing  Goal: Absence of fever/infection during neutropenic period  Description  INTERVENTIONS:  - Monitor WBC    Outcome: Progressing     Problem: SAFETY ADULT  Goal: Patient will remain free of falls  Description  INTERVENTIONS:  - Assess patient frequently for physical needs  -  Identify cognitive and physical deficits and behaviors that affect risk of falls    -  [place] fall precautions as indicated by assessment   - Educate patient/family on patient safety including physical limitations  - Instruct patient to call for assistance with activity based on assessment  - Modify environment to reduce risk of injury  - Consider OT/PT consult to assist with strengthening/mobility  Outcome: Progressing  Goal: Maintain or return to baseline ADL function  Description  INTERVENTIONS:  -  Assess patient's ability to carry out ADLs; assess patient's baseline for ADL function and identify physical deficits which impact ability to perform ADLs (bathing, care of mouth/teeth, toileting, grooming, dressing, etc )  - Assess/evaluate cause of self-care deficits   - Assess range of motion  - Assess patient's mobility; develop plan if impaired  - Assess patient's need for assistive devices and provide as appropriate  - Encourage maximum independence but intervene and supervise when necessary  - Involve family in performance of ADLs  - Assess for home care needs following discharge   - Consider OT consult to assist with ADL evaluation and planning for discharge  - Provide patient education as appropriate  Outcome: Progressing  Goal: Maintain or return mobility status to optimal level  Description  INTERVENTIONS:  - Assess patient's baseline mobility status (ambulation, transfers, stairs, etc )    - Identify cognitive and physical deficits and behaviors that affect mobility  - Identify mobility aids required to assist with transfers and/or ambulation (gait belt, sit-to-stand, lift, walker, cane, etc )  - [place] fall precautions as indicated by assessment  - Record patient progress and toleration of activity level on Mobility SBAR; progress patient to next Phase/Stage  - Instruct patient to call for assistance with activity based on assessment  - Consider rehabilitation consult to assist with strengthening/weightbearing, etc   Outcome: Progressing     Problem: DISCHARGE PLANNING  Goal: Discharge to home or other facility with appropriate resources  Description  INTERVENTIONS:  - Identify barriers to discharge w/patient and caregiver  - Arrange for needed discharge resources and transportation as appropriate  - Identify discharge learning needs (meds, wound care, etc )  - Arrange for interpretive services to assist at discharge as needed  - Refer to Case Management Department for coordinating discharge planning if the patient needs post-hospital services based on physician/advanced practitioner order or complex needs related to functional status, cognitive ability, or social support system  Outcome: Progressing     Problem: Knowledge Deficit  Goal: Patient/family/caregiver demonstrates understanding of disease process, treatment plan, medications, and discharge instructions  Description  Complete learning assessment and assess knowledge base    Interventions:  - Provide teaching at level of understanding  - Provide teaching via preferred learning methods  Outcome: Progressing     Problem: GASTROINTESTINAL - ADULT  Goal: Minimal or absence of nausea and/or vomiting  Description  INTERVENTIONS:  - Administer IV fluids if ordered to ensure adequate hydration  - Maintain NPO status until nausea and vomiting are resolved  - Nasogastric tube if ordered  - Administer ordered antiemetic medications as needed  - Provide nonpharmacologic comfort measures as appropriate  - Advance diet as tolerated, if ordered  - Consider nutrition services referral to assist patient with adequate nutrition and appropriate food choices  Outcome: Progressing     Problem: METABOLIC, FLUID AND ELECTROLYTES - ADULT  Goal: Electrolytes maintained within normal limits  Description  INTERVENTIONS:  - Monitor labs and assess patient for signs and symptoms of electrolyte imbalances  - Administer electrolyte replacement as ordered  - Monitor response to electrolyte replacements, including repeat lab results as appropriate  - Instruct patient on fluid and nutrition as appropriate  Outcome: Progressing  Goal: Glucose maintained within target range  Description  INTERVENTIONS:  - Monitor Blood Glucose as ordered  - Assess for signs and symptoms of hyperglycemia and hypoglycemia  - Administer ordered medications to maintain glucose within target range  - Assess nutritional intake and initiate nutrition service referral as needed  Outcome: Progressing

## 2019-09-17 NOTE — ASSESSMENT & PLAN NOTE
This is the primary source of patient's esophageal obstruction  CT imaging demonstrated progression of his lower esophageal mass  In addition, CT imaging demonstrated a right adrenal nodule, a posterior bladder nodule, and a left upper lobe lung nodule (this is stable from prior imaging, but requires follow-up)  Patient does not currently smoke, although he quit about a year and half ago  Does not drink alcohol    · Patient thus far unable to obtain PET CT scan secondary to insurance reasons  · GI consult  · Would also recommend Oncology consult  · NPO as outlined under plan for esophageal obstruction

## 2019-09-17 NOTE — CONSULTS
Consultation - [facility]  Nabil Phillips 71 y o  male MRN: [id number]  Unit/Bed#: -01 Encounter: [id number]         Reason for Consult / Principal Problem:  Esophageal adenocarcinoma    HPI:  Julio Nino is a 26-year-old male with history of type 2 diabetes, COPD, and previous tobacco/alcohol abuse  Recently diagnosed with esophageal adenocarcinoma by Dr Ayala Foss on 08/24  EGD revealing circumferential narrowing in neoplastic changes in the distal esophagus with marked friability  Biopsies consistent with adenocarcinoma  Patient presented to the emergency room yesterday as he had an episode of vomiting after eating  Patient reports that he was having a chicken broth he experienced an episode of vomiting as he felt as though the liquid was not reaching his stomach  Patient reports that he was vomiting intermittently for 3 hours  When he vomited the 1st time, he did know a small amount of bright red streaking which she also saw on other subsequent episodes  Because of this, patient's daughter decided to bring the patient to the emergency room  On admission, patient's hemoglobin was 12 4  BUN very slightly elevated at 26  CT with IV contrast revealing findings consistent with adenocarcinoma of the esophagus with a masslike area of abnormal soft tissue density along the left hand aspect of the upper stomach measuring 5 5 x 3 6 cm with infiltration into the adjacent fat  There is also mildly prominent nodes between this infiltrative mass area and the liver  There are mildly prominent mediastinal lymph nodes  Patient reports that he does feel more improved since yesterday, does not feel as though he has a sensation of liquid stuck in his chest   He has had no further episodes of hematemesis thus far today  He is complaining of chest tightness and upper back pain  Review of Systems:    CONSTITUTIONAL: Denies any fever, chills, or rigors   Good appetite, and no recent weight loss   HEENT: No earache or tinnitus  Denies hearing loss or visual disturbances  CARDIOVASCULAR: No chest pain or palpitations  RESPIRATORY: Denies any cough, hemoptysis, shortness of breath or dyspnea on exertion  GASTROINTESTINAL: As noted in the History of Present Illness  GENITOURINARY: No problems with urination  Denies any hematuria or dysuria  NEUROLOGIC: No dizziness or vertigo, denies headaches  MUSCULOSKELETAL: Denies any muscle or joint pain  SKIN: Denies skin rashes or itching  ENDOCRINE: Denies excessive thirst  Denies intolerance to heat or cold  PSYCHOSOCIAL: Denies depression or anxiety  Denies any recent memory loss         Historical Information   Past Medical History:   Diagnosis Date    COPD (chronic obstructive pulmonary disease) ([facility] )     Diabetes mellitus ([facility] )     Hypertension     Sleep apnea      Past Surgical History:   Procedure Laterality Date    BACK SURGERY      DISC REMOVAL      TONSILLECTOMY       Social History   Social History     Substance and Sexual Activity   Alcohol Use Not Currently    Alcohol/week: 0 0 standard drinks    Frequency: Never    Drinks per session: 1 or 2    Binge frequency: Never     Social History     Substance and Sexual Activity   Drug Use Not Currently     Social History     Tobacco Use   Smoking Status Former Smoker    Last attempt to quit: 2017    Years since quittin 7   Smokeless Tobacco Never Used     Family History   Problem Relation Age of Onset    Diabetes Mother     No Known Problems Father         Meds/Allergies     Current Facility-Administered Medications   Medication Dose Route Frequency    acetaminophen (TYLENOL) tablet 650 mg  650 mg Oral Q6H PRN    albuterol inhalation solution 2 5 mg  2 5 mg Nebulization Q4H PRN    diphenhydrAMINE (BENADRYL) injection 12 5 mg  12 5 mg Intravenous HS PRN    enoxaparin (LOVENOX) subcutaneous injection 40 mg  40 mg Subcutaneous Daily    insulin lispro (HumaLOG) 100 units/mL subcutaneous injection 1-6 Units  1-6 Units Subcutaneous Q6H [facility]    multi-electrolyte (ISOLYTE-S PH 7 4 equivalent) IV solution  100 mL/hr Intravenous Continuous    ondansetron (ZOFRAN) injection 4 mg  4 mg Intravenous Q4H PRN    oxyCODONE (ROXICODONE) IR tablet 5 mg  5 mg Oral Q6H PRN       Allergies   Allergen Reactions    Penicillins Itching         Objective     Blood pressure 109/58, pulse 82, temperature 98 5 °F (36 9 °C), temperature source Oral, resp  rate 18, height 5' 10" (1 778 m), weight 87 5 kg (192 lb 14 4 oz), SpO2 95 %  Intake/Output Summary (Last 24 hours) at 9/17/2019 1224  Last data filed at 9/17/2019 1149  Gross per 24 hour   Intake 500 ml   Output --   Net 500 ml         PHYSICAL EXAM:      General Appearance:   Alert and oriented x 3  Cooperative, and in no respiratory distress   HEENT:   Normocephalic, atraumatic, anicteric      Neck:  Supple, symmetrical, trachea midline   Lungs:   Clear to auscultation bilaterally; no rales, rhonchi or wheezing; respirations unlabored    Heart[de-identified]   S1 and S2 normal; regular rate and rhythm; no murmur, rub, or gallop     Abdomen:   Soft, non-tender, non-distended; normal bowel sounds; no masses, no organomegaly    Genitalia:   Deferred    Rectal:   Deferred    Extremities:  No cyanosis, clubbing or edema    Pulses:  2+ and symmetric all extremities    Skin:  Skin color, texture, turgor normal, no rashes or lesions    Lymph nodes:  No palpable cervical or supraclavicular lymphadenopathy        Lab Results:   Results from last 7 days   Lab Units 09/16/19  2235   WBC Thousand/uL 9 80   HEMOGLOBIN g/dL 12 4   HEMATOCRIT % 38 5   PLATELETS Thousands/uL 252   NEUTROS PCT % 74   LYMPHS PCT % 12*   MONOS PCT % 7   EOS PCT % 6     Results from last 7 days   Lab Units 09/17/19  0533 09/16/19  2235   POTASSIUM mmol/L 3 7 3 9   CHLORIDE mmol/L 103 101   CO2 mmol/L 23 28   BUN mg/dL 22 26*   CREATININE mg/dL 0 84 1 12   CALCIUM mg/dL 9 0 9 6   ALK PHOS U/L --  48   ALT U/L  --  11*   AST U/L  --  7               Imaging Studies: I have personally reviewed pertinent imaging studies  Ct Chest Abdomen Pelvis W Contrast    Result Date: 9/17/2019  Impression: There is irregular wall thickening involving the distal esophagus and extending to the left hand aspect of the upper stomach  There is a masslike area of abnormal soft tissue density along the left hand aspect of the upper stomach which measures approximately 5 5 x 3 6 cm, and there appears to be some infiltration of the adjacent fat  This is concerning for local extension of the patient's known neoplasm  Several mildly prominent nodes are present between this mass and the liver  There are also some mildly prominent mediastinal nodes  Please see discussion  There is some fluid within the esophagus near level of the gregg  There is an approximately 1 5 x 1 cm right adrenal nodule  This appears similar to slightly larger compared to the prior examination  There is a questionable tiny left adrenal nodule  Follow-up of both of these adrenal findings is suggested  There is an approximately 1 2 cm soft tissue density nodule within the posterior aspect of the urinary bladder  Although this may be related to indentation of the prostate, a small bladder mass cannot be entirely excluded  Urology consultation and follow-up is recommended  A focal area of opacity within the anterior aspect of the left upper lobe of the lung appears similar to September 6, 2019  Please see discussion  Continued follow-up is recommended  This could be reassessed with chest CT in 3 months  PET/CT could also be considered, however, it is of borderline size for accurate characterization with PET/ CT  If more remote CTs of the chest exist, direct comparison would be helpful  PET/CT should be considered for further evaluation of the extent of adenopathy and extent of disease    PET/CT or MRI could be utilized to evaluate the adrenal findings, if there are no contraindications  Cholelithiasis  No CT evidence of acute cholecystitis  Atherosclerosis  Coronary artery disease  Other findings as described above  Please see discussion  The study was marked in [facility] for immediate notification  Workstation performed: [id number]       ASSESSMENT and PLAN:      1) Esophageal adenocarcinoma with infiltration and possible metastasis - As reported on CT scan  He had an EGD with biopsy confirming adenocarcinoma  He has a history of alcohol and tobacco abuse  He has not been evaluated by Oncology yet  - Will appreciate Oncology recommendations  - Unclear if patient would be a candidate for stent placement given the extensive infiltration seen on CT scan  - Will consult palliative care for pain management    The patient was seen and examined by Dr Eddie Garcia, all perla medical decisions were made with Dr Eddie Garcia  Thank you for allowing us to participate in the care of this pleasant patient  We will follow up with you closely

## 2019-09-17 NOTE — QUICK NOTE
Post admission check    69-year-old male recent diagnosis of esophageal cancer presenting with nausea and vomiting and dysphagia  CT of the chest abdomen pelvis again demonstrated irregular thickening of distal esophagus as well as multiple prominent nodules in liver mediastinal nodes  New findings of left upper lobe as well as adrenal nodule also seen    Gastroenterology and Oncology consult placed and pending  Continue supportive care

## 2019-09-17 NOTE — UTILIZATION REVIEW
Initial Clinical Review    Admission: Date/Time/Statement: Inpatient Admission Orders (From admission, onward)     Ordered        09/17/19 0228  Inpatient Admission  Once                   Orders Placed This Encounter   Procedures    Inpatient Admission     Standing Status:   Standing     Number of Occurrences:   1     Order Specific Question:   Admitting Physician     Answer:   Keon Samuels [[id number]]     Order Specific Question:   Level of Care     Answer:   Med Surg [[id number]]     Order Specific Question:   Estimated length of stay     Answer:   More than 2 Midnights     Order Specific Question:   Certification     Answer:   I certify that inpatient services are medically necessary for this patient for a duration of greater than two midnights  See H&P and MD Progress Notes for additional information about the patient's course of treatment  ED Arrival Information     Expected Arrival Acuity Means of Arrival Escorted By Service Admission Type    - 9/16/2019 20:47 Urgent [facility] Hospitalist Urgent    Arrival Complaint    vomiting blood         Chief Complaint   Patient presents with    Vomiting     Per Pt " I was Dx with a tumor in my throat in August  My throat feels like there is a bubble in it  I saw blood in my vomit  "      Assessment/Plan: 72 yo male to ED from  home w/ c/o esophageal dysphagia /obstruction   Recently seen in ED and dx w/ esophageal cancer  C/o progressively worsening dysphagia / Unable to tolerate solids  CT imaging in the ED showed what appeared to be progressive worsening of his lower esophageal mass along with nodules of the right adrenal, posterior bladder, as well as a left upper lung nodule which is consistent from prior imaging  Admitted IP status   W/ malignant neoplasm lower third of esophagus  Plan to consult GI , oncology and make NPO , IVF        ED Triage Vitals   Temperature Pulse Respirations Blood Pressure SpO2   09/16/19 2114 09/16/19 2114 09/16/19 2114 09/16/19 2114 09/16/19 2114   98 8 °F (37 1 °C) 101 16 104/57 99 %      Temp Source Heart Rate Source Patient Position - Orthostatic VS BP Location FiO2 (%)   09/16/19 2114 09/16/19 2114 09/16/19 2114 09/16/19 2114 --   Oral Monitor Sitting Left arm       Pain Score       09/16/19 2242       No Pain        Wt Readings from Last 1 Encounters:   09/16/19 87 5 kg (192 lb 14 4 oz)     Additional Vital Signs:   09/17/19 0700  98 5 °F (36 9 °C)  82  18  109/58  77  95 %  None (Room air)  Lying   09/17/19 0230  --  91  --  113/62  --  97 %  --  --   09/17/19 0200  --  91  20  118/69  --  98 %  --  Lying   09/17/19 0030  --  90  20  137/72  --  98 %  None (Room air)  Lying   09/16/19 2242  --  93  20  117/68  --  98 %  None (Room air)  Sitting       Pertinent Labs/Diagnostic Test Results:   9/16 CT chest - There is irregular wall thickening involving the distal esophagus and extending to the left hand aspect of the upper stomach  There is a masslike area of abnormal soft tissue density along the left hand aspect of the upper stomach which measures   approximately 5 5 x 3 6 cm, and there appears to be some infiltration of the adjacent fat  This is concerning for local extension of the patient's known neoplasm  Several mildly prominent nodes are present between this mass and the liver  There are   also some mildly prominent mediastinal nodes  Please see discussion    There is some fluid within the esophagus near level of the gregg    There is an approximately 1 5 x 1 cm right adrenal nodule  This appears similar to slightly larger compared to the prior examination  There is a questionable tiny left adrenal nodule  Follow-up of both of these adrenal findings is suggested   Ildefonso Oliveraney is an approximately 1 2 cm soft tissue density nodule within the posterior aspect of the urinary bladder  Although this may be related to indentation of the prostate, a small bladder mass cannot be entirely excluded    Urology consultation and   follow-up is recommended    A focal area of opacity within the anterior aspect of the left upper lobe of the lung appears similar to September 6, 2019  Please see discussion  Continued follow-up is recommended  This could be reassessed with chest CT in 3 months  PET/CT could   also be considered, however, it is of borderline size for accurate characterization with PET/ CT  If more remote CTs of the chest exist, direct comparison would be helpful    PET/CT should be considered for further evaluation of the extent of adenopathy and extent of disease  PET/CT or MRI could be utilized to evaluate the adrenal findings, if there are no contraindications    Cholelithiasis  No CT evidence of acute cholecystitis    Atherosclerosis    Coronary artery disease      Results from last 7 days   Lab Units 09/16/19  2235   WBC Thousand/uL 9 80   HEMOGLOBIN g/dL 12 4   HEMATOCRIT % 38 5   PLATELETS Thousands/uL 252   NEUTROS ABS Thousands/µL 7 20     Results from last 7 days   Lab Units 09/17/19  0533 09/16/19  2235   SODIUM mmol/L 137 137   POTASSIUM mmol/L 3 7 3 9   CHLORIDE mmol/L 103 101   CO2 mmol/L 23 28   ANION GAP mmol/L 11 8   BUN mg/dL 22 26*   CREATININE mg/dL 0 84 1 12   EGFR ml/min/1 73sq m 92 69   CALCIUM mg/dL 9 0 9 6     Results from last 7 days   Lab Units 09/16/19  2235   AST U/L 7   ALT U/L 11*   ALK PHOS U/L 48   TOTAL PROTEIN g/dL 7 5   ALBUMIN g/dL 3 5   TOTAL BILIRUBIN mg/dL 1 10*     Results from last 7 days   Lab Units 09/17/19  1041 09/17/19  0536   POC GLUCOSE mg/dl 90 100     Results from last 7 days   Lab Units 09/17/19  0533 09/16/19  2235   GLUCOSE RANDOM mg/dL 101 141*     ED Treatment:   Medication Administration from 09/16/2019 2047 to 09/17/2019 0305       Date/Time Order Dose Route Action     09/16/2019 2236 ondansetron (ZOFRAN) injection 4 mg 4 mg Intravenous Given     09/16/2019 2236 sodium chloride 0 9 % bolus 1,000 mL 1,000 mL Intravenous New Bag        Past Medical History: Diagnosis Date    [facility]chronic obstructive pulmonary disease) ([facility] [address] )     Diabetes mellitus ([facility] )     Hypertension     Sleep apnea      Present on Admission:   Malignant neoplasm of lower third of esophagus (HCC)   Esophageal obstruction   COPD (chronic obstructive pulmonary disease) (HCC)   Type 2 diabetes mellitus with hyperglycemia, without long-term current use of insulin (HCC)      Admitting Diagnosis: Malignant neoplasm of lower third of esophagus (HCC) [C15 5]  Vomiting [R11 10]  Esophageal obstruction [K22 2]  Dysphagia [R13 10]  Age/Sex: 71 y o  male  Admission Orders:    Current Facility-Administered Medications:  acetaminophen 650 mg Oral Q6H PRN     albuterol 2 5 mg Nebulization Q4H PRN     diphenhydrAMINE 12 5 mg Intravenous HS PRN     enoxaparin 40 mg Subcutaneous Daily     insulin lispro 1-6 Units Subcutaneous Q6H [address]     multi-electrolyte 100 mL/hr Intravenous Continuous     ondansetron 4 mg Intravenous Q4H PRN     oxyCODONE 5 mg Oral Q6H PRN       NPO   Up and OOB as armida   SCD  Fingerstick q6hr     IP CONSULT TO GASTROENTEROLOGY  IP CONSULT TO ONCOLOGY    Network Utilization Review Department  Phone: [phone]; Fax [phone]  [email or web address]  ATTENTION: Please call with any questions or concerns to [phone]  and carefully listen to the prompts so that you are directed to the right person  Send all requests for admission clinical reviews, approved or denied determinations and any other requests to fax [phone]   All voicemails are confidential

## 2019-09-17 NOTE — ED NOTES
1  CC- vomitting r/t esophageal cancer   2  Admission related to injury? 3  Orientation status- alert oriented x4  4  Abnormal labs, tests, vitals- abnormal Ct Scan  5  Medication drips- N/a  6  Time of last narcotics- N/A  7  IV lines, drains, tubes- 20 Rac  8  Isolation status - N/A  9  Skin  10   Ambulation status-  11  ED phone number     Moises Dean RN  09/17/19 7305

## 2019-09-17 NOTE — CONSULTS
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  Esophageal biopsy revealed at least 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 and high-grade dysplasia  He then presented to the emergency room today on 09/17/2019 with a complaint of vomiting and dysphagia  His blood counts remain within acceptable range  Bilirubin is very slightly elevated at 1 1  He did have a CT chest abdomen pelvis completed yesterday showing irregular wall thickening of the distal esophagus and extending to the left hand aspect of the upper stomach  There is a masslike area in the upper stomach measuring 5 5 x 3 6 cm  There are several mildly prominent nodes between this mass and liver along with mildly prominent mediastinal nodes  There is a right adrenal nodule measuring 1 5 x 1 cm along with a questionable left adrenal nodule  There is a 1 2 cm nodule within the posterior aspect of the urinary bladder that may either be a mass or may be related to the indentation of the prostate  There is also a stable focal area of opacity within the anterior aspect of the left upper lobe of the lung  He also noted blood in his vomit  At this point he is on a clear liquid diet and is still having difficulty swallowing  He denies chest pain, shortness of breath, nausea, diarrhea, bleeding/bruising, and fevers/infection      Review of Systems - As stated in the HPI otherwise the fourteen point review of systems was negative      Past Medical History:   Diagnosis Date    COPD (chronic obstructive pulmonary disease) ([facility] )     Diabetes mellitus ([facility] )     Hypertension     Sleep apnea        Social History     Socioeconomic History    Marital status: Single     Spouse name: None    Number of children: None    Years of education: None    Highest education level: None   Occupational History    None   Social Needs    Financial resource strain: None    Food insecurity:     Worry: None     Inability: None    Transportation needs:     Medical: None     Non-medical: None   Tobacco Use    Smoking status: Former Smoker     Last attempt to quit: 2017     Years since quittin 7    Smokeless tobacco: Never Used   Substance and Sexual Activity    Alcohol use: Not Currently     Alcohol/week: 0 0 standard drinks     Frequency: Never     Drinks per session: 1 or 2     Binge frequency: Never    Drug use: Not Currently    Sexual activity: None   Lifestyle    Physical activity:     Days per week: None     Minutes per session: None    Stress: None   Relationships    Social connections:     Talks on phone: None     Gets together: None     Attends Restorationism service: None     Active member of club or organization: None     Attends meetings of clubs or organizations: None     Relationship status: None    Intimate partner violence:     Fear of current or ex partner: None     Emotionally abused: None     Physically abused: None     Forced sexual activity: None   Other Topics Concern    None   Social History Narrative    None       Family History   Problem Relation Age of Onset    Diabetes Mother     No Known Problems Father        Allergies   Allergen Reactions    Penicillins Itching         Current Facility-Administered Medications:     acetaminophen (TYLENOL) tablet 650 mg, 650 mg, Oral, Q6H PRN, Rabia Rai MD, 650 mg at 19 1046    albuterol inhalation solution 2 5 mg, 2 5 mg, Nebulization, Q4H PRN, Tana Zacarias ROSALINDA Bynum DO    diphenhydrAMINE (BENADRYL) injection 12 5 mg, 12 5 mg, Intravenous, HS PRN, Feroz Bynum DO    enoxaparin (LOVENOX) subcutaneous injection 40 mg, 40 mg, Subcutaneous, Daily, Feroz Bynum DO, 40 mg at 09/17/19 0813    insulin lispro (HumaLOG) 100 units/mL subcutaneous injection 1-6 Units, 1-6 Units, Subcutaneous, Q6H [facility] **AND** Fingerstick Glucose (POCT), , , Q6H, Ferzo Bynum DO    multi-electrolyte (ISOLYTE-S PH 7 4 equivalent) IV solution, 100 mL/hr, Intravenous, Continuous, Feroz Bynum DO, Last Rate: 100 mL/hr at 09/17/19 1149, 100 mL/hr at 09/17/19 1149    ondansetron (ZOFRAN) injection 4 mg, 4 mg, Intravenous, Q4H PRN, Feroz Bynum DO    oxyCODONE (ROXICODONE) IR tablet 5 mg, 5 mg, Oral, Q6H PRN, Ramesh Rodriguez MD      /59 (BP Location: Left arm)   Pulse 82   Temp 97 9 °F (36 6 °C) (Oral)   Resp 18   Ht 5' 10" (1 778 m)   Wt 87 1 kg (192 lb)   SpO2 97%   BMI 27 55 kg/m²     General Appearance:    Alert, oriented        Eyes:    PERRL   Ears:    Normal external ear canals, both ears   Nose:   Nares normal, septum midline   Throat:   Mucosa moist  Pharynx without injection  Neck:   Supple       Lungs:     Clear to auscultation bilaterally   Chest Wall:    No tenderness or deformity    Heart:    Regular rate and rhythm       Abdomen:     Soft, non-tender, bowel sounds +, no organomegaly           Extremities:   Extremities no cyanosis or edema       Skin:   no rash or icterus      Lymph nodes:   Cervical, supraclavicular, and axillary nodes normal   Neurologic:   CNII-XII intact, normal strength, sensation and reflexes     Throughout               Recent Results (from the past 48 hour(s))   CBC and differential    Collection Time: 09/16/19 10:35 PM   Result Value Ref Range    WBC 9 80 4 31 - 10 16 Thousand/uL    RBC 4 45 3 88 - 5 62 Million/uL    Hemoglobin 12 4 12 0 - 17 0 g/dL    Hematocrit 38 5 36 5 - 49 3 %    MCV 87 82 - 98 fL    MCH 27 9 26 8 - 34 3 pg    MCHC 32 2 31 4 - 37 4 g/dL    RDW 13 5 11 6 - 15 1 %    MPV 9 6 8 9 - 12 7 fL    Platelets 658 341 - 644 Thousands/uL    nRBC 0 /100 WBCs    Neutrophils Relative 74 43 - 75 %    Immat GRANS % 0 0 - 2 %    Lymphocytes Relative 12 (L) 14 - 44 %    Monocytes Relative 7 4 - 12 %    Eosinophils Relative 6 0 - 6 %    Basophils Relative 1 0 - 1 %    Neutrophils Absolute 7 20 1 85 - 7 62 Thousands/µL    Immature Grans Absolute 0 04 0 00 - 0 20 Thousand/uL    Lymphocytes Absolute 1 22 0 60 - 4 47 Thousands/µL    Monocytes Absolute 0 67 0 17 - 1 22 Thousand/µL    Eosinophils Absolute 0 61 0 00 - 0 61 Thousand/µL    Basophils Absolute 0 06 0 00 - 0 10 Thousands/µL   Comprehensive metabolic panel    Collection Time: 09/16/19 10:35 PM   Result Value Ref Range    Sodium 137 136 - 145 mmol/L    Potassium 3 9 3 5 - 5 3 mmol/L    Chloride 101 100 - 108 mmol/L    CO2 28 21 - 32 mmol/L    ANION GAP 8 4 - 13 mmol/L    BUN 26 (H) 5 - 25 mg/dL    Creatinine 1 12 0 60 - 1 30 mg/dL    Glucose 141 (H) 65 - 140 mg/dL    Calcium 9 6 8 3 - 10 1 mg/dL    AST 7 5 - 45 U/L    ALT 11 (L) 12 - 78 U/L    Alkaline Phosphatase 48 46 - 116 U/L    Total Protein 7 5 6 4 - 8 2 g/dL    Albumin 3 5 3 5 - 5 0 g/dL    Total Bilirubin 1 10 (H) 0 20 - 1 00 mg/dL    eGFR 69 ml/min/1 73sq m   Basic metabolic panel    Collection Time: 09/17/19  5:33 AM   Result Value Ref Range    Sodium 137 136 - 145 mmol/L    Potassium 3 7 3 5 - 5 3 mmol/L    Chloride 103 100 - 108 mmol/L    CO2 23 21 - 32 mmol/L    ANION GAP 11 4 - 13 mmol/L    BUN 22 5 - 25 mg/dL    Creatinine 0 84 0 60 - 1 30 mg/dL    Glucose 101 65 - 140 mg/dL    Calcium 9 0 8 3 - 10 1 mg/dL    eGFR 92 ml/min/1 73sq m   Fingerstick Glucose (POCT)    Collection Time: 09/17/19  5:36 AM   Result Value Ref Range    POC Glucose 100 65 - 140 mg/dl   Fingerstick Glucose (POCT)    Collection Time: 09/17/19 10:41 AM   Result Value Ref Range    POC Glucose 90 65 - 140 mg/dl         Ct Chest Abdomen Pelvis W Contrast    Result Date: 9/17/2019  Narrative: CT CHEST, ABDOMEN AND PELVIS WITH IV CONTRAST INDICATION:   dysphagia  Patient recently diagnosed with adenocarcinoma of the lower 3rd of the esophagus  Dysphagia, "feels like there is a bubble in my throat",  blood in vomit  History of COPD, diabetes, hypertension  Prior history of tobacco use  COMPARISON:  September 6, 2019  TECHNIQUE: CT examination of the chest, abdomen and pelvis was performed  Axial, sagittal, and coronal 2D reformatted images were created from the source data and submitted for interpretation  Radiation dose length product (DLP) for this visit:  650 mGy-cm   This examination, like all CT scans performed in the [facility], was performed utilizing techniques to minimize radiation dose exposure, including the use of iterative reconstruction and automated exposure control  IV Contrast:  100 mL of iohexol (OMNIPAQUE) Enteric Contrast: Enteric contrast was not administered  FINDINGS: CHEST LUNGS:  A focal area of opacity within the anterior aspect of the left upper lobe of the lung measuring approximately 7 x 8 mm is again evident, presently best demonstrated on series 3 image 28  Once again, this appears somewhat more linear on the coronal images  This is similar in appearance to September 6, 2019  Continued follow-up is recommended  Minimal atelectasis in the inferior left lingula is unchanged  PLEURA:  Unremarkable  HEART/GREAT VESSELS:  Coronary artery calcifications are present  There is mild atherosclerosis of the thoracic aorta  MEDIASTINUM AND SHEA:  There is irregular wall thickening and some mucosal enhancement evident involving the distal esophagus and extending to the left hand aspect of the upper stomach  There is a masslike area of abnormal soft tissue density along the left hand aspect of the upper stomach which measures approximately 5 5 x 3 6 cm    There appears to be some infiltration of the fat adjacent to this masslike area which is suspicious for local extension of disease  Several mildly prominent nodes are present between this mass and the liver, measuring up to 1 2 x 0 9 cm  A left para esophageal node on series 2 image 43 presently measures 1 x 0 6 cm whereas it measured 0 8 x 0 6 cm on the prior study  There is some fluid within the esophagus near the  level of the gregg  A subcarinal node measures approximately 8 mm short axis, similar to the prior study  A right paratracheal node measures approximately 9 mm, similar to the prior study  Another right paratracheal node measures approximately 10 mm, also similar to the prior study  CHEST WALL AND LOWER NECK:   Unremarkable  ABDOMEN LIVER/BILIARY TREE:  There is a small area of focal fatty infiltration adjacent to the falciform ligament, similar to the previous examination  GALLBLADDER:  There are gallstone(s) within the gallbladder, without pericholecystic inflammatory changes  SPLEEN:  Unremarkable  PANCREAS:  Unremarkable  ADRENAL GLANDS:  There is an approximately 1 5 x 1 cm right adrenal nodule  This appears similar to slightly larger compared to the prior examination  There is a questionable 0 6 x 0 5 cm left adrenal nodule, not clearly demonstrated on the prior study  Follow-up of both of these nodules is suggested  KIDNEYS/URETERS:  Unremarkable  No hydronephrosis  STOMACH AND BOWEL:  As described above  There is no evidence of small bowel obstruction  There is no evidence of acute diverticulitis  APPENDIX:  A normal appendix was visualized  ABDOMINOPELVIC CAVITY:  As described above  No pneumoperitoneum  No significant ascites  VESSELS:  There is atherosclerosis  There is no abdominal aortic aneurysm  PELVIS REPRODUCTIVE ORGANS AND URINARY BLADDER:  There is a soft tissue density nodule within the posterior aspect of the urinary bladder near the midline which measures approximately 1 2 cm  This appears similar to the prior study    This could be caused by indentation of the prostate, however, a small bladder mass cannot be entirely excluded  Urology consultation and follow-up is recommended  ABDOMINAL WALL/INGUINAL REGIONS:  Small fat-containing left inguinal hernia  OSSEOUS STRUCTURES:  No acute fracture or destructive osseous lesion  Disc spacers are present at L5-S1  Impression: There is irregular wall thickening involving the distal esophagus and extending to the left hand aspect of the upper stomach  There is a masslike area of abnormal soft tissue density along the left hand aspect of the upper stomach which measures approximately 5 5 x 3 6 cm, and there appears to be some infiltration of the adjacent fat  This is concerning for local extension of the patient's known neoplasm  Several mildly prominent nodes are present between this mass and the liver  There are also some mildly prominent mediastinal nodes  Please see discussion  There is some fluid within the esophagus near level of the gregg  There is an approximately 1 5 x 1 cm right adrenal nodule  This appears similar to slightly larger compared to the prior examination  There is a questionable tiny left adrenal nodule  Follow-up of both of these adrenal findings is suggested  There is an approximately 1 2 cm soft tissue density nodule within the posterior aspect of the urinary bladder  Although this may be related to indentation of the prostate, a small bladder mass cannot be entirely excluded  Urology consultation and follow-up is recommended  A focal area of opacity within the anterior aspect of the left upper lobe of the lung appears similar to September 6, 2019  Please see discussion  Continued follow-up is recommended  This could be reassessed with chest CT in 3 months  PET/CT could also be considered, however, it is of borderline size for accurate characterization with PET/ CT  If more remote CTs of the chest exist, direct comparison would be helpful   PET/CT should be considered for further evaluation of the extent of adenopathy and extent of disease  PET/CT or MRI could be utilized to evaluate the adrenal findings, if there are no contraindications  Cholelithiasis  No CT evidence of acute cholecystitis  Atherosclerosis  Coronary artery disease  Other findings as described above  Please see discussion  The study was marked in [facility] for immediate notification  Workstation performed: [id number]     Ct Chest Abdomen Pelvis W Contrast    Result Date: 9/12/2019  Narrative: CT CHEST, ABDOMEN AND PELVIS WITH IV CONTRAST INDICATION:   C15 5: Malignant neoplasm of lower third of esophagus  Recent diagnosis of esophageal carcinoma (adenocarcinoma)  COMPARISON:  None  TECHNIQUE: CT examination of the chest, abdomen and pelvis was performed  Axial, sagittal, and coronal 2D reformatted images were created from the source data and submitted for interpretation  Radiation dose length product (DLP) for this visit:  818 mGy-cm   This examination, like all CT scans performed in the [facility], was performed utilizing techniques to minimize radiation dose exposure, including the use of iterative reconstruction and automated exposure control  IV Contrast:  100 mL of iohexol (OMNIPAQUE) Enteric Contrast: Enteric contrast was administered  FINDINGS: CHEST LUNGS:  Solitary focal opacity noted within the anterior left upper lobe, on image 205/25 measuring 7 x 8 mm  On coronal imaging appearing somewhat more elongated although with central nodular aspect about 6 mm  PLEURA:  Unremarkable  HEART/GREAT VESSELS:  Coronary artery calcification  Otherwise unremarkable MEDIASTINUM AND SHEA:  As already known, abnormal appearance of the distal esophagus, beginning just below level of the inferior pulmonary veins and extending to the gastric cardia  Irregular wall thickening and mucosal enhancement noted    Along the left margin of the abnormal esophagus image 201/41 there is a paraesophageal lymph node measuring 8 x 6 mm  A subcarinal lymph node demonstrates short axis diameter 8 mm  CHEST WALL AND LOWER NECK:   Unremarkable  ABDOMEN LIVER/BILIARY TREE:  Unremarkable  GALLBLADDER:  There are gallstone(s) within the gallbladder, without pericholecystic inflammatory changes  SPLEEN:  Unremarkable  PANCREAS:  Unremarkable  ADRENAL GLANDS:  There is a right adrenal nodule measuring 1 2 x 0 7 cm in size  It is somewhat small for accurate characterization  As best as can be told, density is well above fluid on this enhanced only exam KIDNEYS/URETERS:  Unremarkable  No hydronephrosis  STOMACH AND BOWEL:  As above, abnormal appearance of the distal esophagus, involving the gastric cardia as well  Moderately increased stool within the colon  Small bowel unremarkable  APPENDIX:  No findings to suggest appendicitis  ABDOMINOPELVIC CAVITY:  Posterior to the gastric cardia there is an ovoid soft tissue nodule best measured on coronal image 79, measuring 1 7 x 1 0 cm  Along the right margin of the gastric cardia on coronal image 71 there is a soft tissue nodule measuring 1 3 x 1 1 cm, and there is also an ill-defined right lateral border of the gastric cardia for example image 201/54 and coronal image 68  There is a trace amount of free fluid present within the pelvis  VESSELS:  Unremarkable for patient's age  PELVIS REPRODUCTIVE ORGANS:  See below URINARY BLADDER:  Soft tissue nodular density associated with the posterior inferior aspect of the urinary bladder image 201/114 or coronal image 84 measuring approximately 9 mm  This is favored to be enlargement of the median lobe of the prostate gland, less likely bladder polyp ABDOMINAL WALL/INGUINAL REGIONS:  Unremarkable  OSSEOUS STRUCTURES:  No acute fracture or destructive osseous lesion  Impression: 1   Irregular wall thickening involving the distal esophagus beginning just below the inferior pulmonary veins, extending through the gastric cardia compatible with biopsy-proven adenocarcinoma  As above, there is involvement of the gastric cardia, and possible extra luminal extension of tumor along its right lateral margin 2  There is mild adenopathy identified adjacent to the gastric cardia and distal esophagus  Additional borderline prominent mediastinal lymph nodes 3  There is a left upper lobe opacity identified  Although potentially representing an area of inflammation or scarring, an irregularly marginated solitary pulmonary metastasis should be excluded  This could either be reassessed with chest CT in 3 months time or further evaluated with PET CT (it is of borderline size for accurate characterization with PET) 4  Hypertrophy of the median lobe of the prostate (favored) or less likely 9 mm bladder polyp 5  Nonspecific 1 2 cm right adrenal nodule  Overall, PET/CT may be helpful to determine exact extent of adenopathy within the lower chest and upper abdomen, evaluation for any extraluminal extension of tumor, and further characterization of left upper lobe density and right adrenal nodule Workstation performed: [id number]       Assessment and Plan:    The patient is a 58-year-old male who originally presented with difficulty swallowing and was seen by a gastroenterologist   An EGD was performed and he was found to have biopsy-proven carcinoma of the esophagus  CT scan reveals irregular wall thickening of the distal esophagus and left hand aspect of the upper stomach with masslike area along the upper stomach  There is also lymphadenopathy of the mediastinal nodes and of the nodes between the stomach mass and the liver  A right adrenal nodule, tiny left adrenal nodule, and bladder nodule were also identified  Follow-up is scheduled to evaluate the extent of his disease further with a PET-CT on 09/19/2019  This will also allow us to complete his staging    I will plan to set him up to see Dr Vivian Alexandre after his scan and I will also ask our schedulers to set him up with radiation oncology as well  He will likely be receiving chemo/radiation  I did discuss with the patient that the biopsy of his esophagus did reveal carcinoma and the results of his CT scan showing possible extensive disease  He is aware of his PET scan on Thursday and plans to follow through with having it done  Again I have asked our schedulers to set him up with Dr Radha Cash and Radiation Oncology preferably next week  Please let me know if you have any questions

## 2019-09-18 ENCOUNTER — TELEPHONE (OUTPATIENT)
Dept: GASTROENTEROLOGY | Facility: CLINIC | Age: 64
End: 2019-09-18

## 2019-09-18 ENCOUNTER — TELEPHONE (OUTPATIENT)
Dept: HEMATOLOGY ONCOLOGY | Facility: CLINIC | Age: 64
End: 2019-09-18

## 2019-09-18 VITALS
BODY MASS INDEX: 27.49 KG/M2 | HEIGHT: 70 IN | HEART RATE: 77 BPM | WEIGHT: 192 LBS | DIASTOLIC BLOOD PRESSURE: 79 MMHG | TEMPERATURE: 97.6 F | RESPIRATION RATE: 18 BRPM | OXYGEN SATURATION: 98 % | SYSTOLIC BLOOD PRESSURE: 163 MMHG

## 2019-09-18 LAB
GLUCOSE SERPL-MCNC: 63 MG/DL (ref 65–140)
GLUCOSE SERPL-MCNC: 73 MG/DL (ref 65–140)
GLUCOSE SERPL-MCNC: 90 MG/DL (ref 65–140)

## 2019-09-18 PROCEDURE — 99239 HOSP IP/OBS DSCHRG MGMT >30: CPT | Performed by: INTERNAL MEDICINE

## 2019-09-18 PROCEDURE — 82948 REAGENT STRIP/BLOOD GLUCOSE: CPT

## 2019-09-18 RX ADMIN — ENOXAPARIN SODIUM 40 MG: 40 INJECTION SUBCUTANEOUS at 09:22

## 2019-09-18 NOTE — ASSESSMENT & PLAN NOTE
Secondary to esophageal malignancy  CT scan demonstrated progression of lower esophageal mass  Patient now has dysphagia to solids and liquids    Clear liquid diet advanced to soft surgical as tolerated

## 2019-09-18 NOTE — TELEPHONE ENCOUNTER
New Patient Encounter    New Patient Intake Form   Patient Details:  Yenifer Lombardo  1955  [id number]    Background Information:  [address] starts by opening a telephone encounter and gathering the following information   Who is calling to schedule? If not self, relationship to patient? self   Referring Provider Luz Marina Martin   What is the diagnosis? Esophageal CA   When was the diagnosis? 8/2019   Is patient aware of diagnosis? Yes   Reason for visit? NP DX   Have you had any testing done? If so: when, where? Yes   Are records in [place]? yes   Was the patient told to bring a disk? no   Scheduling Information:   Preferred [place]:  [facility]     Requesting Specific Provider? no   Are there any dates/time the patient cannot be seen? no   Counseling Pre-Screen:  If the patient answers YES to any of the below questions, please route to the appropriate location specific counselor    Have you felt anxious or worried about cancer and the treatment you are receiving? Yes   Has your diagnosis caused physical, emotional, or financial hardship for you? No   Note: Do not ask the patient about transportation issues/needs  Please notate if the patient brings it up and the counselor will schedule accordingly  Miscellaneous: PET scheduled for 9/19, Thoracic surgery consult 9/20   After completing the above information, please route to Financial Counselor and the appropriate Nurse Navigator for review

## 2019-09-18 NOTE — ASSESSMENT & PLAN NOTE
Lab Results   Component Value Date    HGBA1C 7 6 08/15/2019       Recent Labs     09/17/19 2052 09/18/19  0025 09/18/19  0633 09/18/19  0825   POCGLU 69 73 63* 90       Blood Sugar Average: Last 72 hrs:  (P) 32 [id number]   · Resume oral antidiabetic medications upon discharge

## 2019-09-18 NOTE — DISCHARGE SUMMARY
Discharge- Corinna Ybarra 1955, 71 y o  male MRN: [id number]    Unit/Bed#: -01 Encounter: [id number]    Primary Care Provider: Veronica De Los Santos MD   Date and time admitted to hospital: 9/16/2019  9:59 PM        Malignant neoplasm of lower third of esophagus [facility])  Assessment & Plan  Biopsy-proven   This is the primary source of patient's esophageal obstruction  CT imaging demonstrated progression of his lower esophageal mass  In addition, CT imaging demonstrated a right adrenal nodule, a posterior bladder nodule, and a left upper lobe lung nodule (this is stable from prior imaging, but requires follow-up)  Patient does not currently smoke, although he quit about a year and half ago  Gastroenterology Oncology recommendations appreciated  Patient scheduled for PET scan tomorrow 09/19/2019  He has appointment with radiation oncology on 09/20/2019  After PET scan patient should follow up with Hematology/Oncology as well as Gastroenterology plan of care was discussed extensively with the patient            Type 2 diabetes mellitus with hyperglycemia, without long-term current use of insulin [facility])  Assessment & Plan  Lab Results   Component Value Date    HGBA1C 7 6 08/15/2019       Recent Labs     09/17/19  2052 09/18/19  0025 09/18/19  0633 09/18/19  0825   POCGLU 69 73 63* 90       Blood Sugar Average: Last 72 hrs:  (P) 91 [id number]   · Resume oral antidiabetic medications upon discharge    COPD (chronic obstructive pulmonary disease) (HCC)  Assessment & Plan  · Albuterol p r n  · Patient does not take inhaler as outpatient    * Esophageal obstruction  Assessment & Plan  Secondary to esophageal malignancy  CT scan demonstrated progression of lower esophageal mass  Patient now has dysphagia to solids and liquids    Clear liquid diet advanced to soft surgical as tolerated          Discharging Physician / Practitioner: Joseph Elliott MD  PCP: Veronica De Los Santos MD  Admission Date: Admission Orders (From admission, onward)     Ordered        09/17/19 0228  Inpatient Admission  Once                   Discharge Date: 09/18/19    Resolved Problems  Date Reviewed: 9/18/2019    None          Consultations During Hospital Stay:  · Oncology  · Gastroenterology    Procedures Performed:   · None    Significant Findings / Test Results:   · CT scan    Incidental Findings:   · None    Test Results Pending at Discharge (will require follow up): · None     Outpatient Tests Requested:  · None    Complications:  None    Reason for Admission:  None    Hospital Course:     Miraim Washington is a 71 y o  male patient with recent diagnosis of esophageal cancer, currently awaiting to get a PET scan as outpatient, delayed due to insurance issues who originally presented to the hospital on 9/16/2019 due to dysphagia  CT of the chest abdomen pelvis demonstrated irregular wall thickening of distal esophagus extending into upper stomach also concerning for esophageal cancer as well as multiple metastatic notes  He was seen by Gastroenterology, recommended no further inpatient evaluation at this time  Recommended clear liquid diet and to be advanced as tolerated  Also was evaluated by Oncology recommended to get his PET scan as outpatient(scheduled for tomorrow 09/19/2019) and have close outpatient follow-up with Medical Oncology as well as Radiation Oncology  Please see above list of diagnoses and related plan for additional information  Condition at Discharge: stable     Discharge Day Visit / Exam:     Subjective:  Patient seen and examined    No complaints at this time  Vitals: Blood Pressure: 163/79 (09/18/19 0700)  Pulse: 77 (09/18/19 0700)  Temperature: 97 6 °F (36 4 °C) (09/18/19 0700)  Temp Source: Oral (09/18/19 0700)  Respirations: 18 (09/18/19 0700)  Height: 5' 10" (177 8 cm) (09/17/19 1505)  Weight - Scale: 87 1 kg (192 lb) (09/17/19 1505)  SpO2: 98 % (09/18/19 0700)  Exam:   Physical Exam Constitutional: He is oriented to person, place, and time  He appears well-developed and well-nourished  HENT:   Head: Normocephalic and atraumatic  Eyes: Pupils are equal, round, and reactive to light  EOM are normal    Neck: Normal range of motion  Neck supple  Cardiovascular: Normal rate and regular rhythm  Pulmonary/Chest: Effort normal and breath sounds normal    Abdominal: Soft  Bowel sounds are normal    Musculoskeletal: Normal range of motion  Neurological: He is alert and oriented to person, place, and time  Discussion with Family:  Patient    Discharge instructions/Information to patient and family:   See after visit summary for information provided to patient and family  Provisions for Follow-Up Care:  See after visit summary for information related to follow-up care and any pertinent home health orders  Disposition:     Home    Planned Readmission:  None  Discharge Statement:  I spent 35 minutes discharging the patient  This time was spent on the day of discharge  I had direct contact with the patient on the day of discharge  Greater than 50% of the total time was spent examining patient, answering all patient questions, arranging and discussing plan of care with patient as well as directly providing post-discharge instructions  Additional time then spent on discharge activities  Discharge Medications:  See after visit summary for reconciled discharge medications provided to patient and family        ** Please Note: This note has been constructed using a voice recognition system **

## 2019-09-18 NOTE — ASSESSMENT & PLAN NOTE
Biopsy-proven   This is the primary source of patient's esophageal obstruction  CT imaging demonstrated progression of his lower esophageal mass  In addition, CT imaging demonstrated a right adrenal nodule, a posterior bladder nodule, and a left upper lobe lung nodule (this is stable from prior imaging, but requires follow-up)  Patient does not currently smoke, although he quit about a year and half ago  Gastroenterology Oncology recommendations appreciated    Patient scheduled for PET scan tomorrow 09/19/2019  He has appointment with radiation oncology on 09/20/2019  After PET scan patient should follow up with Hematology/Oncology as well as Gastroenterology plan of care was discussed extensively with the patient

## 2019-09-18 NOTE — PLAN OF CARE
Problem: Nutrition/Hydration-ADULT  Goal: Nutrient/Hydration intake appropriate for improving, restoring or maintaining nutritional needs  Description  Monitor and assess patient's nutrition/hydration status for malnutrition  Collaborate with interdisciplinary team and initiate plan and interventions as ordered  Monitor patient's weight and dietary intake as ordered or per policy  Utilize nutrition screening tool and intervene as necessary  Determine patient's food preferences and provide high-protein, high-caloric foods as appropriate       INTERVENTIONS:  - Monitor oral intake, urinary output, labs, and treatment plans  - Assess nutrition and hydration status and recommend course of action  - Evaluate amount of meals eaten  - Assist patient with eating if necessary   - Allow adequate time for meals  - Recommend/ encourage appropriate diets, oral nutritional supplements, and vitamin/mineral supplements  - Order, calculate, and assess calorie counts as needed  - Recommend, monitor, and adjust tube feedings and TPN/PPN based on assessed needs  - Assess need for intravenous fluids  - Provide specific nutrition/hydration education as appropriate  - Include patient/family/caregiver in decisions related to nutrition  Outcome: Progressing     Problem: PAIN - ADULT  Goal: Verbalizes/displays adequate comfort level or baseline comfort level  Description  Interventions:  - Encourage patient to monitor pain and request assistance  - Assess pain using appropriate pain scale  - Administer analgesics based on type and severity of pain and evaluate response  - Implement non-pharmacological measures as appropriate and evaluate response  - Consider cultural and social influences on pain and pain management  - Notify physician/advanced practitioner if interventions unsuccessful or patient reports new pain  Outcome: Progressing     Problem: INFECTION - ADULT  Goal: Absence or prevention of progression during hospitalization  Description  INTERVENTIONS:  - Assess and monitor for signs and symptoms of infection  - Monitor lab/diagnostic results  - Monitor all insertion sites, i e  indwelling lines, tubes, and drains  - Monitor endotracheal if appropriate and nasal secretions for changes in amount and color  - [place] appropriate cooling/warming therapies per order  - Administer medications as ordered  - Instruct and encourage patient and family to use good hand hygiene technique  - Identify and instruct in appropriate isolation precautions for identified infection/condition  Outcome: Progressing  Goal: Absence of fever/infection during neutropenic period  Description  INTERVENTIONS:  - Monitor WBC    Outcome: Progressing     Problem: SAFETY ADULT  Goal: Patient will remain free of falls  Description  INTERVENTIONS:  - Assess patient frequently for physical needs  -  Identify cognitive and physical deficits and behaviors that affect risk of falls    -  [place] fall precautions as indicated by assessment   - Educate patient/family on patient safety including physical limitations  - Instruct patient to call for assistance with activity based on assessment  - Modify environment to reduce risk of injury  - Consider OT/PT consult to assist with strengthening/mobility  Outcome: Progressing  Goal: Maintain or return to baseline ADL function  Description  INTERVENTIONS:  -  Assess patient's ability to carry out ADLs; assess patient's baseline for ADL function and identify physical deficits which impact ability to perform ADLs (bathing, care of mouth/teeth, toileting, grooming, dressing, etc )  - Assess/evaluate cause of self-care deficits   - Assess range of motion  - Assess patient's mobility; develop plan if impaired  - Assess patient's need for assistive devices and provide as appropriate  - Encourage maximum independence but intervene and supervise when necessary  - Involve family in performance of ADLs  - Assess for home care needs following discharge   - Consider OT consult to assist with ADL evaluation and planning for discharge  - Provide patient education as appropriate  Outcome: Progressing  Goal: Maintain or return mobility status to optimal level  Description  INTERVENTIONS:  - Assess patient's baseline mobility status (ambulation, transfers, stairs, etc )    - Identify cognitive and physical deficits and behaviors that affect mobility  - Identify mobility aids required to assist with transfers and/or ambulation (gait belt, sit-to-stand, lift, walker, cane, etc )  - [place] fall precautions as indicated by assessment  - Record patient progress and toleration of activity level on Mobility SBAR; progress patient to next Phase/Stage  - Instruct patient to call for assistance with activity based on assessment  - Consider rehabilitation consult to assist with strengthening/weightbearing, etc   Outcome: Progressing     Problem: DISCHARGE PLANNING  Goal: Discharge to home or other facility with appropriate resources  Description  INTERVENTIONS:  - Identify barriers to discharge w/patient and caregiver  - Arrange for needed discharge resources and transportation as appropriate  - Identify discharge learning needs (meds, wound care, etc )  - Arrange for interpretive services to assist at discharge as needed  - Refer to Case Management Department for coordinating discharge planning if the patient needs post-hospital services based on physician/advanced practitioner order or complex needs related to functional status, cognitive ability, or social support system  Outcome: Progressing     Problem: Knowledge Deficit  Goal: Patient/family/caregiver demonstrates understanding of disease process, treatment plan, medications, and discharge instructions  Description  Complete learning assessment and assess knowledge base    Interventions:  - Provide teaching at level of understanding  - Provide teaching via preferred learning methods  Outcome: Progressing     Problem: GASTROINTESTINAL - ADULT  Goal: Minimal or absence of nausea and/or vomiting  Description  INTERVENTIONS:  - Administer IV fluids if ordered to ensure adequate hydration  - Maintain NPO status until nausea and vomiting are resolved  - Nasogastric tube if ordered  - Administer ordered antiemetic medications as needed  - Provide nonpharmacologic comfort measures as appropriate  - Advance diet as tolerated, if ordered  - Consider nutrition services referral to assist patient with adequate nutrition and appropriate food choices  Outcome: Progressing     Problem: METABOLIC, FLUID AND ELECTROLYTES - ADULT  Goal: Electrolytes maintained within normal limits  Description  INTERVENTIONS:  - Monitor labs and assess patient for signs and symptoms of electrolyte imbalances  - Administer electrolyte replacement as ordered  - Monitor response to electrolyte replacements, including repeat lab results as appropriate  - Instruct patient on fluid and nutrition as appropriate  Outcome: Progressing  Goal: Glucose maintained within target range  Description  INTERVENTIONS:  - Monitor Blood Glucose as ordered  - Assess for signs and symptoms of hyperglycemia and hypoglycemia  - Administer ordered medications to maintain glucose within target range  - Assess nutritional intake and initiate nutrition service referral as needed  Outcome: Progressing     Problem: Potential for Falls  Goal: Patient will remain free of falls  Description  INTERVENTIONS:  - Assess patient frequently for physical needs  -  Identify cognitive and physical deficits and behaviors that affect risk of falls    -  [place] fall precautions as indicated by assessment   - Educate patient/family on patient safety including physical limitations  - Instruct patient to call for assistance with activity based on assessment  - Modify environment to reduce risk of injury  - Consider OT/PT consult to assist with strengthening/mobility  Outcome: Progressing

## 2019-09-18 NOTE — SOCIAL WORK
Not readmission  Cm met w pt who resides w his daughter, Camille Adenuntto Cousins is POA and pt has advanced directive  Pt ind w adls  No dme  Drives  Reports no needs  Has oncology follow up with MD Alisa Lacy  PCP is KEMAL Mack  Pt agreeable to cm making f u PCP appt   Cm scheduled for 9/26 and notified pt

## 2019-09-19 ENCOUNTER — TELEPHONE (OUTPATIENT)
Dept: GASTROENTEROLOGY | Facility: CLINIC | Age: 64
End: 2019-09-19

## 2019-09-19 ENCOUNTER — HOSPITAL ENCOUNTER (OUTPATIENT)
Dept: RADIOLOGY | Age: 64
Discharge: HOME | End: 2019-09-19
Payer: COMMERCIAL

## 2019-09-19 DIAGNOSIS — C15.5 MALIGNANT NEOPLASM OF LOWER THIRD OF ESOPHAGUS (HCC): ICD-10-CM

## 2019-09-19 LAB — GLUCOSE SERPL-MCNC: 82 MG/DL (ref 65–140)

## 2019-09-19 PROCEDURE — 78815 PET IMAGE W/CT SKULL-THIGH: CPT

## 2019-09-19 PROCEDURE — 82948 REAGENT STRIP/BLOOD GLUCOSE: CPT

## 2019-09-19 PROCEDURE — A9552 F18 FDG: HCPCS

## 2019-09-19 NOTE — TELEPHONE ENCOUNTER
Michelle pt - Pt not doing well, his daughter called she needs some help with pt  Please call [phone] ty

## 2019-09-19 NOTE — TELEPHONE ENCOUNTER
BRAULIO: Spoke with patients daughter Tish Choudhary  On the way to patients PET scan, patient lost his vision, and was breathing heavily  His vision returned as they pulled into [address]  She states the nurse at the PET scan would monitor him during his testing  Merlene Grumbling she should have him evaluated in the ED if symptoms reoccur  She understands patient needs to see thoracic surgery, and oncology

## 2019-09-19 NOTE — UTILIZATION REVIEW
Notification of Discharge  This is a Notification of Discharge from our facility [address]  Please be advised that this patient has been discharge from our facility  Below you will find the admission and discharge date and time including the patients disposition  PRESENTATION DATE: 9/16/2019  9:59 PM  OBS ADMISSION DATE: 09/16/2019  IP ADMISSION DATE: 9/17/19 0229   DISCHARGE DATE: 9/18/2019  1:15 PM  DISPOSITION: Home/Self Care Home/Self Care   Admission Orders listed below:  Admission Orders (From admission, onward)     Ordered        09/17/19 0228  Inpatient Admission  Once                   Please contact the UR Department if additional information is required to close this patient's authorization/case  [address]  Utilization Review Department  Phone: [phone]; Fax [phone]  [email or web address]  ATTENTION: Please call with any questions or concerns to [phone]  and carefully listen to the prompts so that you are directed to the right person  Send all requests for admission clinical reviews, approved or denied determinations and any other requests to fax [phone]   All voicemails are confidential

## 2019-09-19 NOTE — TELEPHONE ENCOUNTER
Michelle pt - [facility] called regarding this pt, there were significant findings  Please call [phone] option 3   Ty

## 2019-09-19 NOTE — TELEPHONE ENCOUNTER
Yes I agree he should go to ED at [facility]  Thank you

## 2019-09-20 ENCOUNTER — OFFICE VISIT (OUTPATIENT)
Dept: CARDIAC SURGERY | Facility: CLINIC | Age: 64
End: 2019-09-20
Payer: COMMERCIAL

## 2019-09-20 VITALS
HEIGHT: 70 IN | HEART RATE: 104 BPM | BODY MASS INDEX: 25.91 KG/M2 | DIASTOLIC BLOOD PRESSURE: 58 MMHG | SYSTOLIC BLOOD PRESSURE: 90 MMHG | OXYGEN SATURATION: 99 % | TEMPERATURE: 98 F | WEIGHT: 181 LBS

## 2019-09-20 DIAGNOSIS — F51.01 PRIMARY INSOMNIA: ICD-10-CM

## 2019-09-20 DIAGNOSIS — C15.5 MALIGNANT NEOPLASM OF LOWER THIRD OF ESOPHAGUS (HCC): Primary | ICD-10-CM

## 2019-09-20 DIAGNOSIS — R06.02 SHORTNESS OF BREATH: ICD-10-CM

## 2019-09-20 PROCEDURE — 99245 OFF/OP CONSLTJ NEW/EST HI 55: CPT | Performed by: THORACIC SURGERY (CARDIOTHORACIC VASCULAR SURGERY)

## 2019-09-20 RX ORDER — ALBUTEROL SULFATE 2.5 MG/3ML
2.5 SOLUTION RESPIRATORY (INHALATION) EVERY 6 HOURS PRN
Qty: 1 VIAL | Refills: 5 | Status: SHIPPED | OUTPATIENT
Start: 2019-09-20 | End: 2020-02-12 | Stop reason: HOSPADM

## 2019-09-20 NOTE — PROGRESS NOTES
Thoracic Consult  Assessment/Plan:    Malignant neoplasm of lower third of esophagus [facility])    Mr Beth Nascimento is a 50-year-old male who presents to my office with a brand new diagnosis of esophageal cancer  Today in the office, we had a long conversation regarding his newly diagnosed esophageal cancer  I explained to him that the typical management of esophageal cancer is chemotherapy and radiation 1st followed by surgical resection  However, I discussed with him his PET-CT  I personally reviewed all of his imaging in PACS including the PET-CT  The PET-CT demonstrates a 6th rib lesion and the patient denies a history of trauma to the area  It is possible that this is consistent with a metastasis  I explained to him that if this is a metastasis, he is not a candidate for resection  In the interim, the bigger problem is his continued weight loss and inability to tolerate a diet  My recommendation would be for a feeding tube to be placed  I have put in a referral to Surgical Oncology for their evaluation  In addition, he has a appointment scheduled with Dr Holden Evans and Dr Clarisa Belle to discuss chemotherapy and radiation  I explained to him that if he is deemed resectable, he would come back and see thoracic surgery after his chemotherapy and radiation has been complete  Additionally, I discussed with him that in order to complete his staging, he may need an additional EGD with an EUS  We will discuss him at the next tumor board meeting to discuss further workup of his esophageal cancer and moving forward with his treatment  Jennifer Gan MD  Thoracic Surgery  (Available on [place] Text)  Office: [phone]         Diagnoses and all orders for this visit:    Malignant neoplasm of lower third of esophagus [facility])  -     Ambulatory referral to Surgical Oncology;  Future    Primary insomnia  -     Melatonin 5 MG/ML LIQD; Take 2 mL (10 mg total) by mouth daily at bedtime as needed (insomnia)    Shortness of breath  - albuterol (2 5 mg/3 mL) 0 083 % nebulizer solution; Take 1 vial (2 5 mg total) by nebulization every 6 (six) hours as needed for wheezing or shortness of breath          Thoracic History   Diagnosis:    Procedures/Surgeries:    Pathology:    Adjuvant Therapy:       Subjective:    Patient ID: Deb Castro is a 71 y o  male  HPI    Mr Gaurav Merlos is a 25-year-old male who presents to my office with a brand new diagnosis of esophageal cancer  Today in the office, he states that this started about a month ago  He has been unable to swallow solid foods  He is still able to drink liquids without a problem  He reports a 25 lb weight loss over the past month  He reports that he has had a history of reflux his entire life noting that he had an ulcer at the age of 23  He denies any odynophagia  He has a history of smoking having quit approximately 18 months ago        The following portions of the patient's history were reviewed and updated as appropriate: allergies, current medications, past family history, past medical history, past social history, past surgical history and problem list     Past Medical History:   Diagnosis Date    COPD [facility]chronic obstructive [facility] disease) ([facility] )     Diabetes mellitus ([facility] )     Difficulty swallowing     Esophageal mass     Hypertension     Sleep apnea       Past Surgical History:   Procedure Laterality Date    BACK SURGERY      DISC REMOVAL      TONSILLECTOMY        Family History   Problem Relation Age of Onset    Diabetes Mother     No Known Problems Father       Social History     Socioeconomic History    Marital status: Single     Spouse name: Not on file    Number of children: Not on file    Years of education: Not on file    Highest education level: Not on file   Occupational History    Not on file   Social Needs    Financial resource strain: Not on file    Food insecurity:     Worry: Not on file     Inability: Not on file    Transportation needs: Medical: Not on file     Non-medical: Not on file   Tobacco Use    Smoking status: Former Smoker     Packs/day: 0 50     Years: 50 00     Pack years: 25 00     Types: Cigarettes     Last attempt to quit: 2017     Years since quittin 7    Smokeless tobacco: Never Used   Substance and Sexual Activity    Alcohol use: Not Currently     Alcohol/week: 0 0 standard drinks     Frequency: Never     Drinks per session: 1 or 2     Binge frequency: Never    Drug use: Not Currently    Sexual activity: Not on file   Lifestyle    Physical activity:     Days per week: Not on file     Minutes per session: Not on file    Stress: Not on file   Relationships    Social connections:     Talks on phone: Not on file     Gets together: Not on file     Attends Christian service: Not on file     Active member of club or organization: Not on file     Attends meetings of clubs or organizations: Not on file     Relationship status: Not on file    Intimate partner violence:     Fear of current or ex partner: Not on file     Emotionally abused: Not on file     Physically abused: Not on file     Forced sexual activity: Not on file   Other Topics Concern    Not on file   Social History Narrative    Not on file      Review of Systems   Constitutional: Positive for activity change, appetite change, fatigue and unexpected weight change  Negative for chills and fever  HENT: Positive for trouble swallowing  Eyes: Negative  Negative for visual disturbance  Respiratory: Negative for cough, chest tightness, shortness of breath, wheezing and stridor  Cardiovascular: Negative for chest pain and leg swelling  Gastrointestinal: Negative  Negative for abdominal pain, nausea and vomiting  Endocrine: Negative  Genitourinary: Negative  Musculoskeletal: Negative  Skin: Negative  Allergic/Immunologic: Negative  Neurological: Negative for speech difficulty, weakness, light-headedness and headaches     Hematological: Negative for adenopathy  Psychiatric/Behavioral: Negative  Objective:   Physical Exam   Constitutional: He is oriented to person, place, and time  He appears well-developed and well-nourished  HENT:   Head: Normocephalic and atraumatic  Neck: Normal range of motion  No tracheal deviation present  Cardiovascular: Normal rate, regular rhythm and normal heart sounds  No murmur heard  Pulmonary/Chest: Effort normal and breath sounds normal  No stridor  No respiratory distress  He has no wheezes  He has no rales  He exhibits no tenderness  Abdominal: Soft  Bowel sounds are normal  He exhibits no distension  There is no tenderness  Musculoskeletal: Normal range of motion  He exhibits no edema  Lymphadenopathy:     He has no cervical adenopathy  Neurological: He is alert and oriented to person, place, and time  Skin: Skin is warm and dry  No rash noted  He is not diaphoretic  No erythema  No pallor  Psychiatric: He has a normal mood and affect  His behavior is normal  Judgment and thought content normal    Nursing note and vitals reviewed  BP 90/58 (BP Location: Right arm, Patient Position: Sitting, Cuff Size: Large)   Pulse 104   Temp 98 °F (36 7 °C) (Oral)   Ht 5' 10" (1 778 m)   Wt 82 1 kg (181 lb)   SpO2 99%   BMI 25 97 kg/m²     No Chest XR results available for this patient  No CT Chest results available for this patient  Ct Chest Abdomen Pelvis W Contrast    Result Date: 9/17/2019  Narrative CT CHEST, ABDOMEN AND PELVIS WITH IV CONTRAST INDICATION:   dysphagia  Patient recently diagnosed with adenocarcinoma of the lower 3rd of the esophagus  Dysphagia, "feels like there is a bubble in my throat",  blood in vomit  History of COPD, diabetes, hypertension  Prior history of tobacco use  COMPARISON:  September 6, 2019  TECHNIQUE: CT examination of the chest, abdomen and pelvis was performed   Axial, sagittal, and coronal 2D reformatted images were created from the source data and submitted for interpretation  Radiation dose length product (DLP) for this visit:  650 mGy-cm   This examination, like all CT scans performed in the [facility], was performed utilizing techniques to minimize radiation dose exposure, including the use of iterative reconstruction and automated exposure control  IV Contrast:  100 mL of iohexol (OMNIPAQUE) Enteric Contrast: Enteric contrast was not administered  FINDINGS: CHEST LUNGS:  A focal area of opacity within the anterior aspect of the left upper lobe of the lung measuring approximately 7 x 8 mm is again evident, presently best demonstrated on series 3 image 28  Once again, this appears somewhat more linear on the coronal images  This is similar in appearance to September 6, 2019  Continued follow-up is recommended  Minimal atelectasis in the inferior left lingula is unchanged  PLEURA:  Unremarkable  HEART/GREAT VESSELS:  Coronary artery calcifications are present  There is mild atherosclerosis of the thoracic aorta  MEDIASTINUM AND SHEA:  There is irregular wall thickening and some mucosal enhancement evident involving the distal esophagus and extending to the left hand aspect of the upper stomach  There is a masslike area of abnormal soft tissue density along the left hand aspect of the upper stomach which measures approximately 5 5 x 3 6 cm  There appears to be some infiltration of the fat adjacent to this masslike area which is suspicious for local extension of disease  Several mildly prominent nodes are present between this mass and the liver, measuring up to 1 2 x 0 9 cm  A left para esophageal node on series 2 image 43 presently measures 1 x 0 6 cm whereas it measured 0 8 x 0 6 cm on the prior study  There is some fluid within the esophagus near the  level of the gregg  A subcarinal node measures approximately 8 mm short axis, similar to the prior study    A right paratracheal node measures approximately 9 mm, similar to the prior study  Another right paratracheal node measures approximately 10 mm, also similar to the prior study  CHEST WALL AND LOWER NECK:   Unremarkable  ABDOMEN LIVER/BILIARY TREE:  There is a small area of focal fatty infiltration adjacent to the falciform ligament, similar to the previous examination  GALLBLADDER:  There are gallstone(s) within the gallbladder, without pericholecystic inflammatory changes  SPLEEN:  Unremarkable  PANCREAS:  Unremarkable  ADRENAL GLANDS:  There is an approximately 1 5 x 1 cm right adrenal nodule  This appears similar to slightly larger compared to the prior examination  There is a questionable 0 6 x 0 5 cm left adrenal nodule, not clearly demonstrated on the prior study  Follow-up of both of these nodules is suggested  KIDNEYS/URETERS:  Unremarkable  No hydronephrosis  STOMACH AND BOWEL:  As described above  There is no evidence of small bowel obstruction  There is no evidence of acute diverticulitis  APPENDIX:  A normal appendix was visualized  ABDOMINOPELVIC CAVITY:  As described above  No pneumoperitoneum  No significant ascites  VESSELS:  There is atherosclerosis  There is no abdominal aortic aneurysm  PELVIS REPRODUCTIVE ORGANS AND URINARY BLADDER:  There is a soft tissue density nodule within the posterior aspect of the urinary bladder near the midline which measures approximately 1 2 cm  This appears similar to the prior study  This could be caused by indentation of the prostate, however, a small bladder mass cannot be entirely excluded  Urology consultation and follow-up is recommended  ABDOMINAL WALL/INGUINAL REGIONS:  Small fat-containing left inguinal hernia  OSSEOUS STRUCTURES:  No acute fracture or destructive osseous lesion  Disc spacers are present at L5-S1  Impression There is irregular wall thickening involving the distal esophagus and extending to the left hand aspect of the upper stomach    There is a masslike area of abnormal soft tissue density along the left hand aspect of the upper stomach which measures approximately 5 5 x 3 6 cm, and there appears to be some infiltration of the adjacent fat  This is concerning for local extension of the patient's known neoplasm  Several mildly prominent nodes are present between this mass and the liver  There are also some mildly prominent mediastinal nodes  Please see discussion  There is some fluid within the esophagus near level of the gregg  There is an approximately 1 5 x 1 cm right adrenal nodule  This appears similar to slightly larger compared to the prior examination  There is a questionable tiny left adrenal nodule  Follow-up of both of these adrenal findings is suggested  There is an approximately 1 2 cm soft tissue density nodule within the posterior aspect of the urinary bladder  Although this may be related to indentation of the prostate, a small bladder mass cannot be entirely excluded  Urology consultation and follow-up is recommended  A focal area of opacity within the anterior aspect of the left upper lobe of the lung appears similar to September 6, 2019  Please see discussion  Continued follow-up is recommended  This could be reassessed with chest CT in 3 months  PET/CT could also be considered, however, it is of borderline size for accurate characterization with PET/ CT  If more remote CTs of the chest exist, direct comparison would be helpful  PET/CT should be considered for further evaluation of the extent of adenopathy and extent of disease  PET/CT or MRI could be utilized to evaluate the adrenal findings, if there are no contraindications  Cholelithiasis  No CT evidence of acute cholecystitis  Atherosclerosis  Coronary artery disease  Other findings as described above  Please see discussion  The study was marked in [facility] for immediate notification   Workstation performed: [id number]     Ct Chest Abdomen Pelvis W Contrast    Result Date: 9/12/2019  Narrative CT CHEST, ABDOMEN AND PELVIS WITH IV CONTRAST INDICATION:   C15 5: Malignant neoplasm of lower third of esophagus  Recent diagnosis of esophageal carcinoma (adenocarcinoma)  COMPARISON:  None  TECHNIQUE: CT examination of the chest, abdomen and pelvis was performed  Axial, sagittal, and coronal 2D reformatted images were created from the source data and submitted for interpretation  Radiation dose length product (DLP) for this visit:  818 mGy-cm   This examination, like all CT scans performed in the [facility], was performed utilizing techniques to minimize radiation dose exposure, including the use of iterative reconstruction and automated exposure control  IV Contrast:  100 mL of iohexol (OMNIPAQUE) Enteric Contrast: Enteric contrast was administered  FINDINGS: CHEST LUNGS:  Solitary focal opacity noted within the anterior left upper lobe, on image 205/25 measuring 7 x 8 mm  On coronal imaging appearing somewhat more elongated although with central nodular aspect about 6 mm  PLEURA:  Unremarkable  HEART/GREAT VESSELS:  Coronary artery calcification  Otherwise unremarkable MEDIASTINUM AND SHEA:  As already known, abnormal appearance of the distal esophagus, beginning just below level of the inferior pulmonary veins and extending to the gastric cardia  Irregular wall thickening and mucosal enhancement noted  Along the left margin of the abnormal esophagus image 201/41 there is a paraesophageal lymph node measuring 8 x 6 mm  A subcarinal lymph node demonstrates short axis diameter 8 mm  CHEST WALL AND LOWER NECK:   Unremarkable  ABDOMEN LIVER/BILIARY TREE:  Unremarkable  GALLBLADDER:  There are gallstone(s) within the gallbladder, without pericholecystic inflammatory changes  SPLEEN:  Unremarkable  PANCREAS:  Unremarkable  ADRENAL GLANDS:  There is a right adrenal nodule measuring 1 2 x 0 7 cm in size  It is somewhat small for accurate characterization    As best as can be told, density is well above fluid on this enhanced only exam KIDNEYS/URETERS:  Unremarkable  No hydronephrosis  STOMACH AND BOWEL:  As above, abnormal appearance of the distal esophagus, involving the gastric cardia as well  Moderately increased stool within the colon  Small bowel unremarkable  APPENDIX:  No findings to suggest appendicitis  ABDOMINOPELVIC CAVITY:  Posterior to the gastric cardia there is an ovoid soft tissue nodule best measured on coronal image 79, measuring 1 7 x 1 0 cm  Along the right margin of the gastric cardia on coronal image 71 there is a soft tissue nodule measuring 1 3 x 1 1 cm, and there is also an ill-defined right lateral border of the gastric cardia for example image 201/54 and coronal image 68  There is a trace amount of free fluid present within the pelvis  VESSELS:  Unremarkable for patient's age  PELVIS REPRODUCTIVE ORGANS:  See below URINARY BLADDER:  Soft tissue nodular density associated with the posterior inferior aspect of the urinary bladder image 201/114 or coronal image 84 measuring approximately 9 mm  This is favored to be enlargement of the median lobe of the prostate gland, less likely bladder polyp ABDOMINAL WALL/INGUINAL REGIONS:  Unremarkable  OSSEOUS STRUCTURES:  No acute fracture or destructive osseous lesion  Impression 1  Irregular wall thickening involving the distal esophagus beginning just below the inferior pulmonary veins, extending through the gastric cardia compatible with biopsy-proven adenocarcinoma  As above, there is involvement of the gastric cardia, and possible extra luminal extension of tumor along its right lateral margin 2  There is mild adenopathy identified adjacent to the gastric cardia and distal esophagus  Additional borderline prominent mediastinal lymph nodes 3  There is a left upper lobe opacity identified  Although potentially representing an area of inflammation or scarring, an irregularly marginated solitary pulmonary metastasis should be excluded    This could either be reassessed with chest CT in 3 months time or further evaluated with PET CT (it is of borderline size for accurate characterization with PET) 4  Hypertrophy of the median lobe of the prostate (favored) or less likely 9 mm bladder polyp 5  Nonspecific 1 2 cm right adrenal nodule  Overall, PET/CT may be helpful to determine exact extent of adenopathy within the lower chest and upper abdomen, evaluation for any extraluminal extension of tumor, and further characterization of left upper lobe density and right adrenal nodule Workstation performed: [id number]      Nm Pet Ct Skull Base To Mid Thigh    Result Date: 9/19/2019  Narrative PET/CT SCAN INDICATION: Newly diagnosed esophageal cancer  Initial staging  C15 5: Malignant neoplasm of lower third of esophagus MODIFIER: PI COMPARISON: CT chest abdomen pelvis 9/16/2019 and 9/6/2019 CELL TYPE:  Intramucosal carcinoma, 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 TECHNIQUE:   8 8 mCi F-18-FDG administered IV  Multiplanar attenuation corrected and non attenuation corrected PET images are available for interpretation, and contiguous, low dose, axial CT sections were obtained from the skull base through the femurs   Intravenous contrast material was not utilized  This examination, like all CT scans performed in the [facility], was performed utilizing techniques to minimize radiation dose exposure, including the use of iterative reconstruction and automated exposure control  Fasting serum glucose: 82 mg/dl FINDINGS: VISUALIZED BRAIN:   No acute abnormalities are seen  HEAD/NECK:   Fairly diffuse FDG uptake at the thyroid gland, SUV max of 6 2 may be related to thyroiditis  No FDG avid cervical adenopathy is seen  CT images: Scattered mucosal thickening noted in the paranasal sinuses  CHEST:   Extensive FDG uptake at the distal esophagus leading to the GE junction and proximal stomach, SUV max of 21 0  Associated wall thickening noted here on CT   No FDG avid lymph nodes  CT images: Scattered coronary calcifications noted  Stable subcentimeter irregular density in the left upper lobe anteriorly, image 110 series 3  This is not FDG avid  ABDOMEN:   Extensive FDG uptake at the distal esophagus leading to the GE junction and proximal stomach, SUV max of 21 0  Associated wall thickening noted here on CT  Soft tissue density at the level of the proximal stomach extending to the gastrohepatic region measures 6 0 x 4 7 cm  There may be contiguous FDG avid lymphadenopathy in the gastrohepatic region where is there is soft tissue fullness here on CT inseparable from the gastric wall thickening  Small focus of FDG uptake noted posterior to the proximal stomach, SUV max of 3 7  There is a 2 1 x 1 5 cm perigastric lymph node here on CT image 164 series 3  No suspicious FDG uptake at the adrenal glands  CT images: There is cholelithiasis  Stable nodularity of the right adrenal gland  PELVIS: Focal FDG uptake at the distal sigmoid colon, SUV max of 7 8  Possible eccentric wall thickening on CT  Additional focus of FDG uptake at the level of the proximal sigmoid colon, SUV max of 6 6  No obvious findings here on CT  CT images: Prostate is mildly prominent  Tiny fat-containing left inguinal hernia  OSSEOUS STRUCTURES: Focal FDG uptake at the left anterior 6th rib demonstrates SUV max of 3 7  No obvious findings here on CT  Subtle focus of FDG uptake at the right lateral 2nd rib, SUV max of 2 2  No obvious findings here on CT  CT images: Prior lumbosacral fusion  Impression 1  Extensive FDG uptake at the distal esophagus, GE junction and proximal stomach compatible with known malignancy  2  FDG avid perigastric lymph node posterior to the proximal stomach suspicious for metastasis  There may be FDG avid gastrohepatic lymphadenopathy but this is inseparable from the gastric mass  3   Focal FDG uptake at the left anterior 6th rib    Possible subtle focus of FDG uptake at the right lateral 2nd rib  No obvious findings here on the limited low-dose CT images  Metastasis is not excluded  Distribution is not typical for prior trauma  Consider follow-up with MRI of the chest  4   Foci of FDG uptake at the sigmoid colon for which underlying lesions should be excluded  Recommend follow-up with colonoscopy  5   Fairly diffuse FDG uptake at the thyroid gland, may be related to thyroiditis  The study was marked in [employer] for significant notification  Workstation performed: [id number]      No Barium Swallow results available for this patient

## 2019-09-20 NOTE — ASSESSMENT & PLAN NOTE
Mr Mari Mai is a 14-year-old male who presents to my office with a brand new diagnosis of esophageal cancer  Today in the office, we had a long conversation regarding his newly diagnosed esophageal cancer  I explained to him that the typical management of esophageal cancer is chemotherapy and radiation 1st followed by surgical resection  However, I discussed with him his PET-CT  I personally reviewed all of his imaging in PACS including the PET-CT  The PET-CT demonstrates a 6th rib lesion and the patient denies a history of trauma to the area  It is possible that this is consistent with a metastasis  I explained to him that if this is a metastasis, he is not a candidate for resection  In the interim, the bigger problem is his continued weight loss and inability to tolerate a diet  My recommendation would be for a feeding tube to be placed  I have put in a referral to Surgical Oncology for their evaluation  In addition, he has a appointment scheduled with Dr Lady Helton and Dr Erika Bruno to discuss chemotherapy and radiation  I explained to him that if he is deemed resectable, he would come back and see thoracic surgery after his chemotherapy and radiation has been complete  Additionally, I discussed with him that in order to complete his staging, he may need an additional EGD with an EUS  We will discuss him at the next tumor board meeting to discuss further workup of his esophageal cancer and moving forward with his treatment      Jenise Aleman MD  Thoracic Surgery  (Available on [place] Text)  Office: [phone]

## 2019-09-23 ENCOUNTER — TELEPHONE (OUTPATIENT)
Dept: CARDIAC SURGERY | Facility: CLINIC | Age: 64
End: 2019-09-23

## 2019-09-23 NOTE — TELEPHONE ENCOUNTER
Pts Daughter Iman Alfarows called and stated that Dr Oscar Rose ordered a nebulizer with solution but pt only received the solution  Pt does not have a nebulizer and would like to know how about getting one  Pts daughter can be reached back at [phone]  As per [facility], pt should call family doctor to get a nebulizer

## 2019-09-24 ENCOUNTER — CONSULT (OUTPATIENT)
Dept: SURGICAL ONCOLOGY | Facility: CLINIC | Age: 64
End: 2019-09-24
Payer: COMMERCIAL

## 2019-09-24 ENCOUNTER — TRANSCRIBE ORDERS (OUTPATIENT)
Dept: LAB | Facility: CLINIC | Age: 64
End: 2019-09-24

## 2019-09-24 ENCOUNTER — TREATMENT (OUTPATIENT)
Dept: GASTROENTEROLOGY | Facility: CLINIC | Age: 64
End: 2019-09-24

## 2019-09-24 ENCOUNTER — OFFICE VISIT (OUTPATIENT)
Dept: LAB | Facility: CLINIC | Age: 64
End: 2019-09-24
Payer: MEDICARE

## 2019-09-24 ENCOUNTER — DOCUMENTATION (OUTPATIENT)
Dept: HEMATOLOGY ONCOLOGY | Facility: CLINIC | Age: 64
End: 2019-09-24

## 2019-09-24 ENCOUNTER — CONSULT (OUTPATIENT)
Dept: HEMATOLOGY ONCOLOGY | Facility: CLINIC | Age: 64
End: 2019-09-24
Payer: COMMERCIAL

## 2019-09-24 VITALS
SYSTOLIC BLOOD PRESSURE: 142 MMHG | HEIGHT: 69 IN | RESPIRATION RATE: 16 BRPM | DIASTOLIC BLOOD PRESSURE: 60 MMHG | BODY MASS INDEX: 26.96 KG/M2 | HEART RATE: 94 BPM | TEMPERATURE: 96 F | OXYGEN SATURATION: 97 % | WEIGHT: 182 LBS

## 2019-09-24 VITALS
SYSTOLIC BLOOD PRESSURE: 142 MMHG | RESPIRATION RATE: 16 BRPM | HEIGHT: 69 IN | WEIGHT: 182 LBS | HEART RATE: 86 BPM | DIASTOLIC BLOOD PRESSURE: 60 MMHG | TEMPERATURE: 96.8 F | BODY MASS INDEX: 26.96 KG/M2

## 2019-09-24 DIAGNOSIS — T45.1X5A CHEMOTHERAPY INDUCED NEUTROPENIA (HCC): ICD-10-CM

## 2019-09-24 DIAGNOSIS — C16.0 GE JUNCTION CARCINOMA (HCC): ICD-10-CM

## 2019-09-24 DIAGNOSIS — Z79.899 ENCOUNTER FOR MONITORING CARDIOTOXIC DRUG THERAPY: Primary | ICD-10-CM

## 2019-09-24 DIAGNOSIS — Z51.81 ENCOUNTER FOR MONITORING CARDIOTOXIC DRUG THERAPY: Primary | ICD-10-CM

## 2019-09-24 DIAGNOSIS — C15.5 MALIGNANT NEOPLASM OF LOWER THIRD OF ESOPHAGUS (HCC): ICD-10-CM

## 2019-09-24 DIAGNOSIS — C15.5 MALIGNANT NEOPLASM OF LOWER THIRD OF ESOPHAGUS (HCC): Primary | ICD-10-CM

## 2019-09-24 DIAGNOSIS — D70.1 CHEMOTHERAPY INDUCED NEUTROPENIA (HCC): ICD-10-CM

## 2019-09-24 PROCEDURE — 93005 ELECTROCARDIOGRAM TRACING: CPT

## 2019-09-24 PROCEDURE — 99214 OFFICE O/P EST MOD 30 MIN: CPT | Performed by: INTERNAL MEDICINE

## 2019-09-24 PROCEDURE — 99205 OFFICE O/P NEW HI 60 MIN: CPT | Performed by: SURGERY

## 2019-09-24 RX ORDER — LEVOFLOXACIN 5 MG/ML
500 INJECTION, SOLUTION INTRAVENOUS ONCE
Status: CANCELLED | OUTPATIENT
Start: 2019-09-24

## 2019-09-24 NOTE — PROGRESS NOTES
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  Esophageal biopsy revealed at least 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 and high-grade dysplasia  He was seen by her colleagues in cardiothoracic surgery who recommended neoadjuvant therapy upfront  He is going to be presented at tumor board  PET/CT scan showed Extensive FDG uptake at the distal esophagus GE junction and proximal stomach compatible with known malignancy  There were FDG avid perigastric lymph node posterior to the proximal stomach suspicious for metastatic disease and possible gastrohepatic lymphadenopathy which was inseparable from the gastric mass  There is also some uptake at the sixth rib but the patient states he did have trauma to this area recently  Metastatic disease could not be ruled out  There is also some uptake in the sigmoid colon for which a colonoscopy was recommended down the road  The patient himself states he cannot swallow  Denies any nausea or vomiting  Does get short of breath on occasion  His main complaint is dysphagia  The rest of his 14 point review of systems today was negative  He is set up for port and feeding tube within this week      Previous Hematologic/ Oncologic History:       Malignant neoplasm of lower third of esophagus ([address] )    8/24/2019 Initial Diagnosis     Malignant neoplasm of lower third of esophagus (HCC)  At least intramucosal carcinoma  Her2/SHIMON positive             Test Results:    Imaging: Ct Chest Abdomen Pelvis W Contrast    Result Date: 9/17/2019  Narrative: CT CHEST, ABDOMEN AND PELVIS WITH IV CONTRAST INDICATION:   dysphagia  Patient recently diagnosed with adenocarcinoma of the lower 3rd of the esophagus  Dysphagia, "feels like there is a bubble in my throat",  blood in vomit  History of COPD, diabetes, hypertension  Prior history of tobacco use  COMPARISON:  September 6, 2019  TECHNIQUE: CT examination of the chest, abdomen and pelvis was performed  Axial, sagittal, and coronal 2D reformatted images were created from the source data and submitted for interpretation  Radiation dose length product (DLP) for this visit:  650 mGy-cm   This examination, like all CT scans performed in the [facility], was performed utilizing techniques to minimize radiation dose exposure, including the use of iterative reconstruction and automated exposure control  IV Contrast:  100 mL of iohexol (OMNIPAQUE) Enteric Contrast: Enteric contrast was not administered  FINDINGS: CHEST LUNGS:  A focal area of opacity within the anterior aspect of the left upper lobe of the lung measuring approximately 7 x 8 mm is again evident, presently best demonstrated on series 3 image 28  Once again, this appears somewhat more linear on the coronal images  This is similar in appearance to September 6, 2019  Continued follow-up is recommended  Minimal atelectasis in the inferior left lingula is unchanged  PLEURA:  Unremarkable  HEART/GREAT VESSELS:  Coronary artery calcifications are present  There is mild atherosclerosis of the thoracic aorta  MEDIASTINUM AND SHEA:  There is irregular wall thickening and some mucosal enhancement evident involving the distal esophagus and extending to the left hand aspect of the upper stomach    There is a masslike area of abnormal soft tissue density along the left hand aspect of the upper stomach which measures approximately 5 5 x 3 6 cm  There appears to be some infiltration of the fat adjacent to this masslike area which is suspicious for local extension of disease  Several mildly prominent nodes are present between this mass and the liver, measuring up to 1 2 x 0 9 cm  A left para esophageal node on series 2 image 43 presently measures 1 x 0 6 cm whereas it measured 0 8 x 0 6 cm on the prior study  There is some fluid within the esophagus near the  level of the gregg  A subcarinal node measures approximately 8 mm short axis, similar to the prior study  A right paratracheal node measures approximately 9 mm, similar to the prior study  Another right paratracheal node measures approximately 10 mm, also similar to the prior study  CHEST WALL AND LOWER NECK:   Unremarkable  ABDOMEN LIVER/BILIARY TREE:  There is a small area of focal fatty infiltration adjacent to the falciform ligament, similar to the previous examination  GALLBLADDER:  There are gallstone(s) within the gallbladder, without pericholecystic inflammatory changes  SPLEEN:  Unremarkable  PANCREAS:  Unremarkable  ADRENAL GLANDS:  There is an approximately 1 5 x 1 cm right adrenal nodule  This appears similar to slightly larger compared to the prior examination  There is a questionable 0 6 x 0 5 cm left adrenal nodule, not clearly demonstrated on the prior study  Follow-up of both of these nodules is suggested  KIDNEYS/URETERS:  Unremarkable  No hydronephrosis  STOMACH AND BOWEL:  As described above  There is no evidence of small bowel obstruction  There is no evidence of acute diverticulitis  APPENDIX:  A normal appendix was visualized  ABDOMINOPELVIC CAVITY:  As described above  No pneumoperitoneum  No significant ascites  VESSELS:  There is atherosclerosis  There is no abdominal aortic aneurysm   PELVIS REPRODUCTIVE ORGANS AND URINARY BLADDER:  There is a soft tissue density nodule within the posterior aspect of the urinary bladder near the midline which measures approximately 1 2 cm  This appears similar to the prior study  This could be caused by indentation of the prostate, however, a small bladder mass cannot be entirely excluded  Urology consultation and follow-up is recommended  ABDOMINAL WALL/INGUINAL REGIONS:  Small fat-containing left inguinal hernia  OSSEOUS STRUCTURES:  No acute fracture or destructive osseous lesion  Disc spacers are present at L5-S1  Impression: There is irregular wall thickening involving the distal esophagus and extending to the left hand aspect of the upper stomach  There is a masslike area of abnormal soft tissue density along the left hand aspect of the upper stomach which measures approximately 5 5 x 3 6 cm, and there appears to be some infiltration of the adjacent fat  This is concerning for local extension of the patient's known neoplasm  Several mildly prominent nodes are present between this mass and the liver  There are also some mildly prominent mediastinal nodes  Please see discussion  There is some fluid within the esophagus near level of the gregg  There is an approximately 1 5 x 1 cm right adrenal nodule  This appears similar to slightly larger compared to the prior examination  There is a questionable tiny left adrenal nodule  Follow-up of both of these adrenal findings is suggested  There is an approximately 1 2 cm soft tissue density nodule within the posterior aspect of the urinary bladder  Although this may be related to indentation of the prostate, a small bladder mass cannot be entirely excluded  Urology consultation and follow-up is recommended  A focal area of opacity within the anterior aspect of the left upper lobe of the lung appears similar to September 6, 2019  Please see discussion  Continued follow-up is recommended  This could be reassessed with chest CT in 3 months    PET/CT could also be considered, however, it is of borderline size for accurate characterization with PET/ CT  If more remote CTs of the chest exist, direct comparison would be helpful  PET/CT should be considered for further evaluation of the extent of adenopathy and extent of disease  PET/CT or MRI could be utilized to evaluate the adrenal findings, if there are no contraindications  Cholelithiasis  No CT evidence of acute cholecystitis  Atherosclerosis  Coronary artery disease  Other findings as described above  Please see discussion  The study was marked in [facility] for immediate notification  Workstation performed: [id number]     Ct Chest Abdomen Pelvis W Contrast    Result Date: 9/12/2019  Narrative: CT CHEST, ABDOMEN AND PELVIS WITH IV CONTRAST INDICATION:   C15 5: Malignant neoplasm of lower third of esophagus  Recent diagnosis of esophageal carcinoma (adenocarcinoma)  COMPARISON:  None  TECHNIQUE: CT examination of the chest, abdomen and pelvis was performed  Axial, sagittal, and coronal 2D reformatted images were created from the source data and submitted for interpretation  Radiation dose length product (DLP) for this visit:  818 mGy-cm   This examination, like all CT scans performed in the [facility], was performed utilizing techniques to minimize radiation dose exposure, including the use of iterative reconstruction and automated exposure control  IV Contrast:  100 mL of iohexol (OMNIPAQUE) Enteric Contrast: Enteric contrast was administered  FINDINGS: CHEST LUNGS:  Solitary focal opacity noted within the anterior left upper lobe, on image 205/25 measuring 7 x 8 mm  On coronal imaging appearing somewhat more elongated although with central nodular aspect about 6 mm  PLEURA:  Unremarkable  HEART/GREAT VESSELS:  Coronary artery calcification    Otherwise unremarkable MEDIASTINUM AND SHEA:  As already known, abnormal appearance of the distal esophagus, beginning just below level of the inferior pulmonary veins and extending to the gastric cardia  Irregular wall thickening and mucosal enhancement noted  Along the left margin of the abnormal esophagus image 201/41 there is a paraesophageal lymph node measuring 8 x 6 mm  A subcarinal lymph node demonstrates short axis diameter 8 mm  CHEST WALL AND LOWER NECK:   Unremarkable  ABDOMEN LIVER/BILIARY TREE:  Unremarkable  GALLBLADDER:  There are gallstone(s) within the gallbladder, without pericholecystic inflammatory changes  SPLEEN:  Unremarkable  PANCREAS:  Unremarkable  ADRENAL GLANDS:  There is a right adrenal nodule measuring 1 2 x 0 7 cm in size  It is somewhat small for accurate characterization  As best as can be told, density is well above fluid on this enhanced only exam KIDNEYS/URETERS:  Unremarkable  No hydronephrosis  STOMACH AND BOWEL:  As above, abnormal appearance of the distal esophagus, involving the gastric cardia as well  Moderately increased stool within the colon  Small bowel unremarkable  APPENDIX:  No findings to suggest appendicitis  ABDOMINOPELVIC CAVITY:  Posterior to the gastric cardia there is an ovoid soft tissue nodule best measured on coronal image 79, measuring 1 7 x 1 0 cm  Along the right margin of the gastric cardia on coronal image 71 there is a soft tissue nodule measuring 1 3 x 1 1 cm, and there is also an ill-defined right lateral border of the gastric cardia for example image 201/54 and coronal image 68  There is a trace amount of free fluid present within the pelvis  VESSELS:  Unremarkable for patient's age  PELVIS REPRODUCTIVE ORGANS:  See below URINARY BLADDER:  Soft tissue nodular density associated with the posterior inferior aspect of the urinary bladder image 201/114 or coronal image 84 measuring approximately 9 mm  This is favored to be enlargement of the median lobe of the prostate gland, less likely bladder polyp ABDOMINAL WALL/INGUINAL REGIONS:  Unremarkable   OSSEOUS STRUCTURES:  No acute fracture or destructive osseous lesion  Impression: 1  Irregular wall thickening involving the distal esophagus beginning just below the inferior pulmonary veins, extending through the gastric cardia compatible with biopsy-proven adenocarcinoma  As above, there is involvement of the gastric cardia, and possible extra luminal extension of tumor along its right lateral margin 2  There is mild adenopathy identified adjacent to the gastric cardia and distal esophagus  Additional borderline prominent mediastinal lymph nodes 3  There is a left upper lobe opacity identified  Although potentially representing an area of inflammation or scarring, an irregularly marginated solitary pulmonary metastasis should be excluded  This could either be reassessed with chest CT in 3 months time or further evaluated with PET CT (it is of borderline size for accurate characterization with PET) 4  Hypertrophy of the median lobe of the prostate (favored) or less likely 9 mm bladder polyp 5  Nonspecific 1 2 cm right adrenal nodule  Overall, PET/CT may be helpful to determine exact extent of adenopathy within the lower chest and upper abdomen, evaluation for any extraluminal extension of tumor, and further characterization of left upper lobe density and right adrenal nodule Workstation performed: [id number]     Nm Pet Ct Skull Base To Mid Thigh    Result Date: 9/19/2019  Narrative: PET/CT SCAN INDICATION: Newly diagnosed esophageal cancer  Initial staging  C15 5: Malignant neoplasm of lower third of esophagus MODIFIER: PI COMPARISON: CT chest abdomen pelvis 9/16/2019 and 9/6/2019 CELL TYPE:  Intramucosal carcinoma, 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 TECHNIQUE:   8 8 mCi F-18-FDG administered IV  Multiplanar attenuation corrected and non attenuation corrected PET images are available for interpretation, and contiguous, low dose, axial CT sections were obtained from the skull base through the femurs   Intravenous contrast material was not utilized    This examination, like all CT scans performed in the [facility], was performed utilizing techniques to minimize radiation dose exposure, including the use of iterative reconstruction and automated exposure control  Fasting serum glucose: 82 mg/dl FINDINGS: VISUALIZED BRAIN:   No acute abnormalities are seen  HEAD/NECK:   Fairly diffuse FDG uptake at the thyroid gland, SUV max of 6 2 may be related to thyroiditis  No FDG avid cervical adenopathy is seen  CT images: Scattered mucosal thickening noted in the paranasal sinuses  CHEST:   Extensive FDG uptake at the distal esophagus leading to the GE junction and proximal stomach, SUV max of 21 0  Associated wall thickening noted here on CT  No FDG avid lymph nodes  CT images: Scattered coronary calcifications noted  Stable subcentimeter irregular density in the left upper lobe anteriorly, image 110 series 3  This is not FDG avid  ABDOMEN:   Extensive FDG uptake at the distal esophagus leading to the GE junction and proximal stomach, SUV max of 21 0  Associated wall thickening noted here on CT  Soft tissue density at the level of the proximal stomach extending to the gastrohepatic region measures 6 0 x 4 7 cm  There may be contiguous FDG avid lymphadenopathy in the gastrohepatic region where is there is soft tissue fullness here on CT inseparable from the gastric wall thickening  Small focus of FDG uptake noted posterior to the proximal stomach, SUV max of 3 7  There is a 2 1 x 1 5 cm perigastric lymph node here on CT image 164 series 3  No suspicious FDG uptake at the adrenal glands  CT images: There is cholelithiasis  Stable nodularity of the right adrenal gland  PELVIS: Focal FDG uptake at the distal sigmoid colon, SUV max of 7 8  Possible eccentric wall thickening on CT  Additional focus of FDG uptake at the level of the proximal sigmoid colon, SUV max of 6 6  No obvious findings here on CT  CT images: Prostate is mildly prominent    Tiny fat-containing left inguinal hernia  OSSEOUS STRUCTURES: Focal FDG uptake at the left anterior 6th rib demonstrates SUV max of 3 7  No obvious findings here on CT  Subtle focus of FDG uptake at the right lateral 2nd rib, SUV max of 2 2  No obvious findings here on CT  CT images: Prior lumbosacral fusion  Impression: 1  Extensive FDG uptake at the distal esophagus, GE junction and proximal stomach compatible with known malignancy  2  FDG avid perigastric lymph node posterior to the proximal stomach suspicious for metastasis  There may be FDG avid gastrohepatic lymphadenopathy but this is inseparable from the gastric mass  3   Focal FDG uptake at the left anterior 6th rib  Possible subtle focus of FDG uptake at the right lateral 2nd rib  No obvious findings here on the limited low-dose CT images  Metastasis is not excluded  Distribution is not typical for prior trauma  Consider follow-up with MRI of the chest  4   Foci of FDG uptake at the sigmoid colon for which underlying lesions should be excluded  Recommend follow-up with colonoscopy  5   Fairly diffuse FDG uptake at the thyroid gland, may be related to thyroiditis  The study was marked in [employer] for significant notification  Workstation performed: [id number]       Labs:   Lab Results   Component Value Date    WBC 9 80 09/16/2019    HGB 12 4 09/16/2019    HCT 38 5 09/16/2019    MCV 87 09/16/2019     09/16/2019     Lab Results   Component Value Date    K 3 7 09/17/2019     09/17/2019    CO2 23 09/17/2019    BUN 22 09/17/2019    CREATININE 0 84 09/17/2019    CALCIUM 9 0 09/17/2019    AST 7 09/16/2019    ALT 11 (L) 09/16/2019    ALKPHOS 48 09/16/2019    EGFR 92 09/17/2019     ROS: As stated in history of present illness otherwise her 14 point review of systems today was negative      Active Problems:   Patient Active Problem List   Diagnosis    COPD (chronic obstructive pulmonary disease) ([address]    Headaches due to old head injury    High cholesterol    Obstructive sleep apnea syndrome    Type 2 diabetes mellitus with hyperglycemia, without long-term current use of insulin (HCC)    Malignant neoplasm of lower third of esophagus (HCC)    Esophageal obstruction       Past Medical History:   Past Medical History:   Diagnosis Date    [facility]ch[facility] obstructive [facility] disease) ([facility] )     Diabetes mellitus ([facility] )     Difficulty swallowing     Esophageal mass     Hypertension     Sleep apnea        Surgical History:   Past Surgical History:   Procedure Laterality Date    BACK SURGERY      DISC REMOVAL      TONSILLECTOMY         Family History:    Family History   Problem Relation Age of Onset    Diabetes Mother     No Known Problems Father        Cancer-related family history is not on file      Social History:   Social History     Socioeconomic History    Marital status: Single     Spouse name: Not on file    Number of children: Not on file    Years of education: Not on file    Highest education level: Not on file   Occupational History    Not on file   Social Needs    Financial resource strain: Not on file    Food insecurity:     Worry: Not on file     Inability: Not on file    Transportation needs:     Medical: Not on file     Non-medical: Not on file   Tobacco Use    Smoking status: Former Smoker     Packs/day: 0 50     Years: 50 00     Pack years: 25 00     Types: Cigarettes     Last attempt to quit: 2017     Years since quittin 7    Smokeless tobacco: Never Used   Substance and Sexual Activity    Alcohol use: Not Currently     Alcohol/week: 0 0 standard drinks     Frequency: Never     Drinks per session: 1 or 2     Binge frequency: Never    Drug use: Not Currently    Sexual activity: Not on file   Lifestyle    Physical activity:     Days per week: Not on file     Minutes per session: Not on file    Stress: Not on file   Relationships    Social connections:     Talks on phone: Not on file     Gets together: Not on file Attends Cheondoism service: Not on file     Active member of club or organization: Not on file     Attends meetings of clubs or organizations: Not on file     Relationship status: Not on file    Intimate partner violence:     Fear of current or ex partner: Not on file     Emotionally abused: Not on file     Physically abused: Not on file     Forced sexual activity: Not on file   Other Topics Concern    Not on file   Social History Narrative    Not on file       Current Medications:   Current Outpatient Medications   Medication Sig Dispense Refill    albuterol (2 5 mg/3 mL) 0 083 % nebulizer solution Take 1 vial (2 5 mg total) by nebulization every 6 (six) hours as needed for wheezing or shortness of breath 1 vial 5    canagliflozin (INVOKANA) 100 mg Daily      ergocalciferol (VITAMIN D2) 50,000 units Take 50,000 Units by mouth      levothyroxine 25 mcg tablet Take 25 mcg by mouth daily in the early morning      Melatonin 5 MG/ML LIQD Take 2 mL (10 mg total) by mouth daily at bedtime as needed (insomnia) 1 Bottle 1    pantoprazole (PROTONIX) 40 mg tablet Take 1 tablet (40 mg total) by mouth daily 30 tablet 1    pioglitazone (ACTOS) 30 mg tablet pioglitazone 30 mg tablet      sitaGLIPtin (JANUVIA) 100 mg tablet       traMADol (ULTRAM) 50 mg tablet tramadol 50 mg tablet   take 1 tablet by mouth every 12 hours       No current facility-administered medications for this visit  Allergies: Allergies   Allergen Reactions    Penicillins Itching         Physical Exam:    Body surface area is 1 98 meters squared      Wt Readings from Last 3 Encounters:   09/24/19 82 6 kg (182 lb)   09/20/19 82 1 kg (181 lb)   09/17/19 87 1 kg (192 lb)        Temp Readings from Last 3 Encounters:   09/24/19 (!) 96 °F (35 6 °C) (Tympanic)   09/20/19 98 °F (36 7 °C) (Oral)   09/18/19 97 6 °F (36 4 °C) (Oral)        BP Readings from Last 3 Encounters:   09/24/19 142/60   09/20/19 90/58   09/18/19 163/79         Pulse Readings from Last 3 Encounters:   09/24/19 94   09/20/19 104   09/18/19 77       Physical Exam     Constitutional   General appearance: No acute distress, well appearing and well nourished  Eyes   Conjunctiva and lids: No swelling, erythema or discharge  Pupils and irises: Equal, round and reactive to light  Ears, Nose, Mouth, and Throat   External inspection of ears and nose: Normal     Nasal mucosa, septum, and turbinates: Normal without edema or erythema  Oropharynx: Normal with no erythema, edema, exudate or lesions  Pulmonary   Respiratory effort: No increased work of breathing or signs of respiratory distress  Auscultation of lungs: Clear to auscultation  Cardiovascular   Palpation of heart: Normal PMI, no thrills  Auscultation of heart: Normal rate and rhythm, normal S1 and S2, without murmurs  Examination of extremities for edema and/or varicosities: Normal     Carotid pulses: Normal     Abdomen   Abdomen: Non-tender, no masses  Liver and spleen: No hepatomegaly or splenomegaly  Lymphatic   Palpation of lymph nodes in neck: No lymphadenopathy  Musculoskeletal   Gait and station: Normal     Digits and nails: Normal without clubbing or cyanosis  Inspection/palpation of joints, bones, and muscles: Normal     Skin   Skin and subcutaneous tissue: Normal without rashes or lesions  Neurologic   Cranial nerves: Cranial nerves 2-12 intact  Sensation: No sensory loss  Psychiatric   Orientation to person, place, and time: Normal     Mood and affect: Normal       Assessment/ Plan:      Patient is a 50-year-old male with newly diagnosed locally advanced GE junction malignancy which is HER-2 positive  I  Had a discussion with the patient's surgical oncologist will be seeing him later today   I think this tumor Has a reasonable amount of gastric involvement although it is a GE junction tumor and as such I think the patient would benefit from neoadjuvant chemotherapy upfront prior to consideration of either concurrent chemoradiation or even surgery  I explained the rationale behind my thoughts to the patient and his family  They agreed with this assessment  We will give him Herceptin with FLOT chemotherapy Every 2 weeks for 6 cycles  This will consist of 5-FU 2600 mg/m² over 24 hours, leucovorin 20 mg meter square, Oxaliplatin 85 mg/m2 square and Taxotere 60 mg meter square  I went over the risks benefits and alternatives of the drug  I explained the possible side effects to include but not limited to nausea, vomiting, diarrhea, mouth sores, neuropathy, low blood counts and even death  I will give him Neulasta for chemotherapy-induced neutropenia  I will also give him Venofer for 6 doses  Once he completed 6 doses of chemotherapy we will reimage him  If he has a nice response we will then consider concurrent chemoradiation and then surgery  If he has a very nice response with very little residual disease surgery could possibly be considered for now I did talk to him about most probably getting concurrent chemoradiation after the chemotherapy  Goals and Barriers:  Current Goal: Prolong Survival from esophageal Cancer  Barriers: None  Patient's Capacity to Self Care:  Patient able to self care  Portions of the record may have been created with voice recognition software   Occasional wrong word or "sound a like" substitutions may have occurred due to the inherent limitations of voice recognition software   Read the chart carefully and recognize, using context, where substitutions have occurred

## 2019-09-24 NOTE — TELEPHONE ENCOUNTER
Called insurance & spoke to casper call ref# [id number] pt has an active ppo plan that runs on a contract year 07/01/19-06/30/20 not a cobra plan  No other insurance  Effective date is 04/01/18  Pt has chemo & radiation benefits that are the same  No deduct/no co-pays/no out of pocket/covered 100% chemo drugs covered 100$% advanced imaging has no deduct/no co-pay/no out of pocket covered 100%  xrays have a $25 co-pay then covered 100%  Labs have a $10 co-pay then covered 100%  Can go to any in network lab   Genetic & genomic testing is under the labs  in pt hospitalization & out pt surgery has a $25 co-pay then covered 100%  RX benefits are thru [facility] [facility]  That's the only rx info  Doesn't show a speciality pharmacy  Can buy & bill   Called the pt to go over the benefits but his daughter answered & sd that he's on his way to the Dr Nat Bonner call when they get back

## 2019-09-24 NOTE — PROGRESS NOTES
PET scan has demonstrated focal uptake in the sigmoid colon  Colonoscopy was recommended  I will proceed to a colonoscopy at the earliest convenient date

## 2019-09-24 NOTE — PROGRESS NOTES
GI Oncology Nurse Navigator Visit    Met Farrah Mario after his office visit with Dr Yeny Otero, introduced myself and explained how I can assist him throughout his treatment process, provided him with my card with my contact information, they do have insurance questions, will ask financial to reach out, instructed him to call with any other questions or concerns

## 2019-09-24 NOTE — PROGRESS NOTES
Surgical Oncology Consult       [address] [facility]  [facility] [place]  [address] [facility] [address]  [facility] [address]    [facility]  1955  [id number]  [address]  [facility] [place]  [address] [facility] [address]    Diagnoses and all orders for this visit:    Malignant neoplasm of lower third of esophagus ([address] )  -     Ambulatory referral to Surgical Oncology  -     Case request operating room: INSERTION VENOUS PORT (PORT-A-CATH), INSERTION JEJUNOSTOMY TUBE OPEN; Standing    Other orders  -     Incentive spirometry; Standing  -     Insert and maintain IV line; Standing  -     Void On-Call to O R ; Standing  -     Place sequential compression device; Standing  -     EKG 12 lead; Future  -     levofloxacin (LEVAQUIN) IVPB (premix) 500 mg  -     metroNIDAZOLE (FLAGYL) IVPB (premix) 500 mg        Chief Complaint   Patient presents with    Esophageal Cancer       No follow-ups on file         Malignant neoplasm of lower third of esophagus (HCC)    8/24/2019 Initial Diagnosis     Malignant neoplasm of lower third of esophagus (HCC)  At least intramucosal carcinoma  Her2/SHIMON positive      10/2/2019 -  Chemotherapy     pegfilgrastim (NEULASTA ONPRO) subcutaneous injection kit 6 mg, 6 mg, Subcutaneous, Once, 0 of 6 cycles  DOCEtaxel (TAXOTERE) 118 8 mg in sodium chloride 0 9 % 250 mL chemo infusion, 60 mg/m2 = 118 8 mg, Intravenous, Once, 0 of 6 cycles  leucovorin 396 mg in dextrose 5 % 250 mL IVPB, 200 mg/m2 = 396 mg (50 % of original dose 400 mg/m2), Intravenous, Once, 0 of 6 cycles  Dose modification: 200 mg/m2 (original dose 400 mg/m2, Cycle 1, Reason: Other (See Comments), Comment: FLOT regimen standard)  oxaliplatin (ELOXATIN) 168 3 mg in dextrose 5 % 250 mL chemo infusion, 85 mg/m2, Intravenous, Once, 0 of 6 cycles  trastuzumab (HERCEPTIN) 496 mg in sodium chloride 0 9 % 250 mL chemo infusion, 6 mg/kg, Intravenous, Once, 0 of 6 cycles        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[address] )    9/24/2019 Initial Diagnosis     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HCC)      10/2/2019 -  Chemotherapy     pegfilgrastim (NEULASTA ONPRO) subcutaneous injection kit 6 mg, 6 mg, Subcutaneous, Once, 0 of 6 cycles  DOCEtaxel (TAXOTERE) 118 8 mg in sodium chloride 0 9 % 250 mL chemo infusion, 60 mg/m2 = 118 8 mg, Intravenous, Once, 0 of 6 cycles  leucovorin 396 mg in dextrose 5 % 250 mL IVPB, 200 mg/m2 = 396 mg (50 % of original dose 400 mg/m2), Intravenous, Once, 0 of 6 cycles  Dose modification: 200 mg/m2 (original dose 400 mg/m2, Cycle 1, Reason: Other (See Comments), Comment: FLOT regimen standard)  oxaliplatin (ELOXATIN) 168 3 mg in dextrose 5 % 250 mL chemo infusion, 85 mg/m2, Intravenous, Once, 0 of 6 cycles  trastuzumab (HERCEPTIN) 496 mg in sodium chloride 0 9 % 250 mL chemo infusion, 6 mg/kg, Intravenous, Once, 0 of 6 cycles         History of Present Illness:  70-year-old male who initially started have dysphagia with solid foods approximately 1 month ago  He has essentially been drinking liquids without difficulty  He is not clear how much weight he has lost over the last 6 months, but he has lost approximately 10 lb over the last week  He has been essentially subsisting on liquids and 2 ensures a day  He does have a history of reflux with an ulcer at age 23  Quit smoking 18 months ago  EGD on he August 21, 2019 revealed erythematous and hemorrhagic mucosa in the lower 3rd of the esophagus, 30 cm from the incisors with bleeding  This extended from 30-40 cm  Biopsy revealed adenocarcinoma  This was HER2 positive  He comes in now to discuss further therapy  CT from September 6, 2019 revealed wall thickening of the distal esophagus extending to the level of the gastric cardia  There was also adenopathy posterior to the gastric cardia  A left upper lobe pulmonary opacity was seen  There was a nonspecific 1 2 cm right adrenal nodule    Follow-up PET-CT from September 19, 2019 reveals extensive FDG activity at the GE junction as well as a perigastric lymph node  There was also uptake in the left 6th rib and right lateral 2nd rib  No CT findings were seen  There was focal FDG uptake in the sigmoid colon  Colonoscopy was recommended  I personally reviewed his films  The patient denied any rib trauma to me, but did tell medical oncology that he recently had some chest trauma  He denies any nausea or vomiting  Review of Systems  Complete ROS Surg Onc:   Constitutional: The patient denies new or recent history of general fatigue  10 lb weight loss in the last week  Change in appetite  Eyes: No complaints of visual problems, no scleral icterus  ENT: no complaints of ear pain, no hoarseness, no difficulty swallowing,  no tinnitus and no new masses in head, oral cavity, or neck  Cardiovascular: No complaints of chest pain, no palpitations, no ankle edema  Respiratory: No complaints of shortness of breath, no cough  Gastrointestinal: No complaints of jaundice, no bloody stools, no pale stools  Genitourinary: No complaints of dysuria, no hematuria, no nocturia, no frequent urination, no urethral discharge  Musculoskeletal: No complaints of weakness, paralysis, joint stiffness or arthralgias  Integumentary: No complaints of rash, no new lesions  Neurological: No complaints of convulsions, no seizures, no dizziness  Hematologic/Lymphatic: No complaints of easy bruising  Endocrine:  No hot or cold intolerance  No polydipsia, polyphagia, or polyuria  Allergy/immunology:  No environmental allergies  No food allergies  Not immunocompromised  Skin:  No pallor or rash  No wound            Patient Active Problem List   Diagnosis    COPD (chronic obstructive pulmonary disease) [facility]    Headaches due to old head injury    High cholesterol    Obstructive sleep apnea syndrome    Type 2 diabetes mellitus with hyperglycemia, without long-term current use of insulin ([facility] )    Malignant neoplasm of lower third of esophagus ([facility] )    Esophageal obstruction    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[place] [address] )    Chemotherapy induced neutropenia (HCC)     Past Medical History:   Diagnosis Date    COPD (chronic obstructive pulmonary disease) ([facility] )     Diabetes mellitus ([facility] )     Difficulty swallowing     Esophageal mass     Hypertension     Sleep apnea      Past Surgical History:   Procedure Laterality Date    BACK SURGERY      DISC REMOVAL      TONSILLECTOMY       Family History   Problem Relation Age of Onset    Diabetes Mother     No Known Problems Father      Social History     Socioeconomic History    Marital status: Single     Spouse name: Not on file    Number of children: Not on file    Years of education: Not on file    Highest education level: Not on file   Occupational History    Not on file   Social Needs    Financial resource strain: Not on file    Food insecurity:     Worry: Not on file     Inability: Not on file    Transportation needs:     Medical: Not on file     Non-medical: Not on file   Tobacco Use    Smoking status: Former Smoker     Packs/day: 0 50     Years: 50 00     Pack years: 25 00     Types: Cigarettes     Last attempt to quit: 2017     Years since quittin 7    Smokeless tobacco: Never Used   Substance and Sexual Activity    Alcohol use: Not Currently     Alcohol/week: 0 0 standard drinks     Frequency: Never     Drinks per session: 1 or 2     Binge frequency: Never    Drug use: Not Currently    Sexual activity: Not on file   Lifestyle    Physical activity:     Days per week: Not on file     Minutes per session: Not on file    Stress: Not on file   Relationships    Social connections:     Talks on phone: Not on file     Gets together: Not on file     Attends Ca[employer] service: Not on file     Active member of club or organization: Not on file     Attends meetings of clubs or organizations: Not on file     Relationship status: Not on file    Intimate partner violence:     Fear of current or ex partner: Not on file     Emotionally abused: Not on file     Physically abused: Not on file     Forced sexual activity: Not on file   Other Topics Concern    Not on file   Social History Narrative    Not on file       Current Outpatient Medications:     albuterol (2 5 mg/3 mL) 0 083 % nebulizer solution, Take 1 vial (2 5 mg total) by nebulization every 6 (six) hours as needed for wheezing or shortness of breath, Disp: 1 vial, Rfl: 5    canagliflozin (INVOKANA) 100 mg, Daily, Disp: , Rfl:     ergocalciferol (VITAMIN D2) 50,000 units, Take 50,000 Units by mouth, Disp: , Rfl:     levothyroxine 25 mcg tablet, Take 25 mcg by mouth daily in the early morning, Disp: , Rfl:     Melatonin 5 MG/ML LIQD, Take 2 mL (10 mg total) by mouth daily at bedtime as needed (insomnia), Disp: 1 Bottle, Rfl: 1    pantoprazole (PROTONIX) 40 mg tablet, Take 1 tablet (40 mg total) by mouth daily, Disp: 30 tablet, Rfl: 1    pioglitazone (ACTOS) 30 mg tablet, pioglitazone 30 mg tablet, Disp: , Rfl:     sitaGLIPtin (JANUVIA) 100 mg tablet, , Disp: , Rfl:     traMADol (ULTRAM) 50 mg tablet, tramadol 50 mg tablet  take 1 tablet by mouth every 12 hours, Disp: , Rfl:   Allergies   Allergen Reactions    Penicillins Itching     Vitals:    09/24/19 1303   BP: 142/60   Pulse: 86   Resp: 16   Temp: (!) 96 8 °F (36 °C)       Physical Exam   Constitutional: General appearance: The Patient is well-developed and well-nourished who appears the stated age in no acute distress  Patient is pleasant and talkative  HEENT:  Normocephalic  Sclerae are anicteric  Mucous membranes are moist  Neck is supple without adenopathy  No JVD  Chest: The lungs are clear to auscultation  Cardiac: Heart is regular rate  Abdomen: Abdomen is soft, non-tender, non-distended and without masses  Extremities: There is no clubbing or cyanosis  There is no edema  Symmetric  Neuro: Grossly nonfocal  Gait is normal      Lymphatic: No evidence of cervical adenopathy bilaterally  No evidence of axillary adenopathy bilaterally  No evidence of inguinal adenopathy bilaterally  Skin: Warm, anicteric  Psych:  Patient is pleasant and talkative  Breasts:      Pathology:  Addendum   HER2 IMMUNOHISTOCHEMICAL ANALYSIS FOR GASTRIC/ESOPHAGEAL ADENOCARCINOMA     Test Description                                        Result                                                     HER2/SHIMON by IHC (clone [id number])                   3+/Positive         These immunohistochemical tests were performed at [facility] and interpreted by Dr Flores Higuera  An electronic copy of this report will be kept on file in the Medical Records Department at [facility]      Comment:      Immunohistochemistry scoring for Her2 in gastric and gastroesophageal carcinoma (2016 CAP/ASCO guidelines)     Biopsy specimen staining pattern:     0 No reactivity or no membranous reactivity in any tumor cell  1+ Tumor cell cluster with a faint or barely perceptible membranous reactivity irrespective of percentage of tumor cells  2+ Tumor cell cluster with a weak to moderate complete, basolateral, or lateral membranous reactivity irrespective of  percentage of tumor cells stained  3+ Tumor cell cluster with a strong complete, basolateral, or lateral membranous reactivity irrespective of tumor cells stained     Surgical specimen staining pattern:     0 No reactivity or membranous reactivity in less than 10% of tumor cells  1+ Faint or barely perceptible membranous reactivity in 10% or more of tumor cells; cells are reactive only in part of their  membrane    2+ Weak to moderate complete, basolateral, or lateral membranous reactivity in 10% or more of tumor cells  3+ Strong complete, basolateral, or lateral membranous reactivity in 10% or more of tumor cells     A positive control for each antibody has been reviewed and accepted       Reference:   1  Omkar Samuels, Andrey MOREAU et al  HER2 Testing and Clinical Decision Making in Gastroesophageal Adenocarcinoma: Guideline from the [address], [address] for Clinical Pathology, and [address] of Clinical Oncology  [facility]  Nicole Dus: [phone]/[id number]  [phone]       Addendum electronically signed by Emeterio Parra MD on 8/29/2019 at  2:54 PM   Final Diagnosis   A  Esophagus (biopsy):  - At 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      Comment:  - CK AE1/3 highlights few single cells and small clusters of cells  P53 is aberrantly expressed   - Dr Mark Leone was notified of the diagnosis on 8/29/19 at 7:14 am via Epic messaging/email    - Her2 IHC has been ordered and results will be indicated in an addendum  Electronically signed by Emeterio Parra MD on 8/29/2019 at  7:17 AM   Additional Information    All controls performed with the immunohistochemical stains reported above reacted appropriately  These tests were developed and their performance characteristics determined by [facility] or [facility]  They may not be cleared or approved by the [place]  Food and Drug Administration  The FDA has determined that such clearance or approval is not necessary  These tests are used for clinical purposes  They should not be regarded as investigational or for research  This laboratory has been approved by [place] 88, designated as a high-complexity laboratory and is qualified to perform these tests  Labs:      Imaging  Ct Chest Abdomen Pelvis W Contrast    Result Date: 9/17/2019  Narrative: CT CHEST, ABDOMEN AND PELVIS WITH IV CONTRAST INDICATION:   dysphagia  Patient recently diagnosed with adenocarcinoma of the lower 3rd of the esophagus  Dysphagia, "feels like there is a bubble in my throat",  blood in vomit    History of COPD, diabetes, hypertension  Prior history of tobacco use  COMPARISON:  September 6, 2019  TECHNIQUE: CT examination of the chest, abdomen and pelvis was performed  Axial, sagittal, and coronal 2D reformatted images were created from the source data and submitted for interpretation  Radiation dose length product (DLP) for this visit:  650 mGy-cm   This examination, like all CT scans performed in the [facility], was performed utilizing techniques to minimize radiation dose exposure, including the use of iterative reconstruction and automated exposure control  IV Contrast:  100 mL of iohexol (OMNIPAQUE) Enteric Contrast: Enteric contrast was not administered  FINDINGS: CHEST LUNGS:  A focal area of opacity within the anterior aspect of the left upper lobe of the lung measuring approximately 7 x 8 mm is again evident, presently best demonstrated on series 3 image 28  Once again, this appears somewhat more linear on the coronal images  This is similar in appearance to September 6, 2019  Continued follow-up is recommended  Minimal atelectasis in the inferior left lingula is unchanged  PLEURA:  Unremarkable  HEART/GREAT VESSELS:  Coronary artery calcifications are present  There is mild atherosclerosis of the thoracic aorta  MEDIASTINUM AND SHEA:  There is irregular wall thickening and some mucosal enhancement evident involving the distal esophagus and extending to the left hand aspect of the upper stomach  There is a masslike area of abnormal soft tissue density along the left hand aspect of the upper stomach which measures approximately 5 5 x 3 6 cm  There appears to be some infiltration of the fat adjacent to this masslike area which is suspicious for local extension of disease  Several mildly prominent nodes are present between this mass and the liver, measuring up to 1 2 x 0 9 cm    A left para esophageal node on series 2 image 43 presently measures 1 x 0 6 cm whereas it measured 0 8 x 0 6 cm on the prior study  There is some fluid within the esophagus near the  level of the gregg  A subcarinal node measures approximately 8 mm short axis, similar to the prior study  A right paratracheal node measures approximately 9 mm, similar to the prior study  Another right paratracheal node measures approximately 10 mm, also similar to the prior study  CHEST WALL AND LOWER NECK:   Unremarkable  ABDOMEN LIVER/BILIARY TREE:  There is a small area of focal fatty infiltration adjacent to the falciform ligament, similar to the previous examination  GALLBLADDER:  There are gallstone(s) within the gallbladder, without pericholecystic inflammatory changes  SPLEEN:  Unremarkable  PANCREAS:  Unremarkable  ADRENAL GLANDS:  There is an approximately 1 5 x 1 cm right adrenal nodule  This appears similar to slightly larger compared to the prior examination  There is a questionable 0 6 x 0 5 cm left adrenal nodule, not clearly demonstrated on the prior study  Follow-up of both of these nodules is suggested  KIDNEYS/URETERS:  Unremarkable  No hydronephrosis  STOMACH AND BOWEL:  As described above  There is no evidence of small bowel obstruction  There is no evidence of acute diverticulitis  APPENDIX:  A normal appendix was visualized  ABDOMINOPELVIC CAVITY:  As described above  No pneumoperitoneum  No significant ascites  VESSELS:  There is atherosclerosis  There is no abdominal aortic aneurysm  PELVIS REPRODUCTIVE ORGANS AND URINARY BLADDER:  There is a soft tissue density nodule within the posterior aspect of the urinary bladder near the midline which measures approximately 1 2 cm  This appears similar to the prior study  This could be caused by indentation of the prostate, however, a small bladder mass cannot be entirely excluded  Urology consultation and follow-up is recommended  ABDOMINAL WALL/INGUINAL REGIONS:  Small fat-containing left inguinal hernia   OSSEOUS STRUCTURES:  No acute fracture or destructive osseous lesion  Disc spacers are present at L5-S1  Impression: There is irregular wall thickening involving the distal esophagus and extending to the left hand aspect of the upper stomach  There is a masslike area of abnormal soft tissue density along the left hand aspect of the upper stomach which measures approximately 5 5 x 3 6 cm, and there appears to be some infiltration of the adjacent fat  This is concerning for local extension of the patient's known neoplasm  Several mildly prominent nodes are present between this mass and the liver  There are also some mildly prominent mediastinal nodes  Please see discussion  There is some fluid within the esophagus near level of the gregg  There is an approximately 1 5 x 1 cm right adrenal nodule  This appears similar to slightly larger compared to the prior examination  There is a questionable tiny left adrenal nodule  Follow-up of both of these adrenal findings is suggested  There is an approximately 1 2 cm soft tissue density nodule within the posterior aspect of the urinary bladder  Although this may be related to indentation of the prostate, a small bladder mass cannot be entirely excluded  Urology consultation and follow-up is recommended  A focal area of opacity within the anterior aspect of the left upper lobe of the lung appears similar to September 6, 2019  Please see discussion  Continued follow-up is recommended  This could be reassessed with chest CT in 3 months  PET/CT could also be considered, however, it is of borderline size for accurate characterization with PET/ CT  If more remote CTs of the chest exist, direct comparison would be helpful  PET/CT should be considered for further evaluation of the extent of adenopathy and extent of disease  PET/CT or MRI could be utilized to evaluate the adrenal findings, if there are no contraindications  Cholelithiasis  No CT evidence of acute cholecystitis  Atherosclerosis    Coronary artery disease  Other findings as described above  Please see discussion  The study was marked in [facility] for immediate notification  Workstation performed: [id number]     Ct Chest Abdomen Pelvis W Contrast    Result Date: 9/12/2019  Narrative: CT CHEST, ABDOMEN AND PELVIS WITH IV CONTRAST INDICATION:   C15 5: Malignant neoplasm of lower third of esophagus  Recent diagnosis of esophageal carcinoma (adenocarcinoma)  COMPARISON:  None  TECHNIQUE: CT examination of the chest, abdomen and pelvis was performed  Axial, sagittal, and coronal 2D reformatted images were created from the source data and submitted for interpretation  Radiation dose length product (DLP) for this visit:  818 mGy-cm   This examination, like all CT scans performed in the [facility], was performed utilizing techniques to minimize radiation dose exposure, including the use of iterative reconstruction and automated exposure control  IV Contrast:  100 mL of iohexol (OMNIPAQUE) Enteric Contrast: Enteric contrast was administered  FINDINGS: CHEST LUNGS:  Solitary focal opacity noted within the anterior left upper lobe, on image 205/25 measuring 7 x 8 mm  On coronal imaging appearing somewhat more elongated although with central nodular aspect about 6 mm  PLEURA:  Unremarkable  HEART/GREAT VESSELS:  Coronary artery calcification  Otherwise unremarkable MEDIASTINUM AND SHEA:  As already known, abnormal appearance of the distal esophagus, beginning just below level of the inferior pulmonary veins and extending to the gastric cardia  Irregular wall thickening and mucosal enhancement noted  Along the left margin of the abnormal esophagus image 201/41 there is a paraesophageal lymph node measuring 8 x 6 mm  A subcarinal lymph node demonstrates short axis diameter 8 mm  CHEST WALL AND LOWER NECK:   Unremarkable  ABDOMEN LIVER/BILIARY TREE:  Unremarkable   GALLBLADDER:  There are gallstone(s) within the gallbladder, without pericholecystic inflammatory changes  SPLEEN:  Unremarkable  PANCREAS:  Unremarkable  ADRENAL GLANDS:  There is a right adrenal nodule measuring 1 2 x 0 7 cm in size  It is somewhat small for accurate characterization  As best as can be told, density is well above fluid on this enhanced only exam KIDNEYS/URETERS:  Unremarkable  No hydronephrosis  STOMACH AND BOWEL:  As above, abnormal appearance of the distal esophagus, involving the gastric cardia as well  Moderately increased stool within the colon  Small bowel unremarkable  APPENDIX:  No findings to suggest appendicitis  ABDOMINOPELVIC CAVITY:  Posterior to the gastric cardia there is an ovoid soft tissue nodule best measured on coronal image 79, measuring 1 7 x 1 0 cm  Along the right margin of the gastric cardia on coronal image 71 there is a soft tissue nodule measuring 1 3 x 1 1 cm, and there is also an ill-defined right lateral border of the gastric cardia for example image 201/54 and coronal image 68  There is a trace amount of free fluid present within the pelvis  VESSELS:  Unremarkable for patient's age  PELVIS REPRODUCTIVE ORGANS:  See below URINARY BLADDER:  Soft tissue nodular density associated with the posterior inferior aspect of the urinary bladder image 201/114 or coronal image 84 measuring approximately 9 mm  This is favored to be enlargement of the median lobe of the prostate gland, less likely bladder polyp ABDOMINAL WALL/INGUINAL REGIONS:  Unremarkable  OSSEOUS STRUCTURES:  No acute fracture or destructive osseous lesion  Impression: 1  Irregular wall thickening involving the distal esophagus beginning just below the inferior pulmonary veins, extending through the gastric cardia compatible with biopsy-proven adenocarcinoma  As above, there is involvement of the gastric cardia, and possible extra luminal extension of tumor along its right lateral margin 2  There is mild adenopathy identified adjacent to the gastric cardia and distal esophagus  Additional borderline prominent mediastinal lymph nodes 3  There is a left upper lobe opacity identified  Although potentially representing an area of inflammation or scarring, an irregularly marginated solitary pulmonary metastasis should be excluded  This could either be reassessed with chest CT in 3 months time or further evaluated with PET CT (it is of borderline size for accurate characterization with PET) 4  Hypertrophy of the median lobe of the prostate (favored) or less likely 9 mm bladder polyp 5  Nonspecific 1 2 cm right adrenal nodule  Overall, PET/CT may be helpful to determine exact extent of adenopathy within the lower chest and upper abdomen, evaluation for any extraluminal extension of tumor, and further characterization of left upper lobe density and right adrenal nodule Workstation performed: [id number]     Nm Pet Ct Skull Base To Mid Thigh    Result Date: 9/19/2019  Narrative: PET/CT SCAN INDICATION: Newly diagnosed esophageal cancer  Initial staging  C15 5: Malignant neoplasm of lower third of esophagus MODIFIER: PI COMPARISON: CT chest abdomen pelvis 9/16/2019 and 9/6/2019 CELL TYPE:  Intramucosal carcinoma, 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 TECHNIQUE:   8 8 mCi F-18-FDG administered IV  Multiplanar attenuation corrected and non attenuation corrected PET images are available for interpretation, and contiguous, low dose, axial CT sections were obtained from the skull base through the femurs   Intravenous contrast material was not utilized  This examination, like all CT scans performed in the [facility], was performed utilizing techniques to minimize radiation dose exposure, including the use of iterative reconstruction and automated exposure control  Fasting serum glucose: 82 mg/dl FINDINGS: VISUALIZED BRAIN:   No acute abnormalities are seen  HEAD/NECK:   Fairly diffuse FDG uptake at the thyroid gland, SUV max of 6 2 may be related to thyroiditis   No FDG avid cervical adenopathy is seen  CT images: Scattered mucosal thickening noted in the paranasal sinuses  CHEST:   Extensive FDG uptake at the distal esophagus leading to the GE junction and proximal stomach, SUV max of 21 0  Associated wall thickening noted here on CT  No FDG avid lymph nodes  CT images: Scattered coronary calcifications noted  Stable subcentimeter irregular density in the left upper lobe anteriorly, image 110 series 3  This is not FDG avid  ABDOMEN:   Extensive FDG uptake at the distal esophagus leading to the GE junction and proximal stomach, SUV max of 21 0  Associated wall thickening noted here on CT  Soft tissue density at the level of the proximal stomach extending to the gastrohepatic region measures 6 0 x 4 7 cm  There may be contiguous FDG avid lymphadenopathy in the gastrohepatic region where is there is soft tissue fullness here on CT inseparable from the gastric wall thickening  Small focus of FDG uptake noted posterior to the proximal stomach, SUV max of 3 7  There is a 2 1 x 1 5 cm perigastric lymph node here on CT image 164 series 3  No suspicious FDG uptake at the adrenal glands  CT images: There is cholelithiasis  Stable nodularity of the right adrenal gland  PELVIS: Focal FDG uptake at the distal sigmoid colon, SUV max of 7 8  Possible eccentric wall thickening on CT  Additional focus of FDG uptake at the level of the proximal sigmoid colon, SUV max of 6 6  No obvious findings here on CT  CT images: Prostate is mildly prominent  Tiny fat-containing left inguinal hernia  OSSEOUS STRUCTURES: Focal FDG uptake at the left anterior 6th rib demonstrates SUV max of 3 7  No obvious findings here on CT  Subtle focus of FDG uptake at the right lateral 2nd rib, SUV max of 2 2  No obvious findings here on CT  CT images: Prior lumbosacral fusion  Impression: 1  Extensive FDG uptake at the distal esophagus, GE junction and proximal stomach compatible with known malignancy   2  FDG avid perigastric lymph node posterior to the proximal stomach suspicious for metastasis  There may be FDG avid gastrohepatic lymphadenopathy but this is inseparable from the gastric mass  3   Focal FDG uptake at the left anterior 6th rib  Possible subtle focus of FDG uptake at the right lateral 2nd rib  No obvious findings here on the limited low-dose CT images  Metastasis is not excluded  Distribution is not typical for prior trauma  Consider follow-up with MRI of the chest  4   Foci of FDG uptake at the sigmoid colon for which underlying lesions should be excluded  Recommend follow-up with colonoscopy  5   Fairly diffuse FDG uptake at the thyroid gland, may be related to thyroiditis  The study was marked in EPIC for significant notification  Workstation performed: [id number]     I reviewed the above laboratory and imaging data  Discussion/Summary:  48-year-old male with a clinical [id number]/1 esophageal carcinoma  We discussed typically with esophageal cancer, chemo radiation followed by surgery would be the standard procedure  His PET-CT is certainly concerning for rib metastasis, although the patient is completely asymptomatic  Because of the finding of a possible metastasis I do not think it is unreasonable to consider a short course of chemotherapy and then based on those results potentially add chemo radiation  I suspect his swallowing difficulties will improve just with chemotherapy alone  I have recommended that he have a port as well as a feeding tube placed since he may undergo esophagectomy in the future  We did discuss that if the rib lesions are indeed metastasis, chemotherapy would be the mainstay of his treatment  He has already seen Medical Oncology who is planning on chemotherapy followed by possible chemo radiation, which I think in this case is reasonable given his PET-CT findings    I explained the risks of port placement and feeding jejunostomy to include bleeding, infection, need for further surgery, wound complications, port malfunction, DVT, pneumothorax, and adjacent organ injury  Informed consent was obtained  I have discussed this with Dr Charli Yañez  We will schedule this at our earliest mutual convenience  He will need an outpatient colonoscopy in the future  He is agreeable to this  All his questions were answered

## 2019-09-24 NOTE — LETTER
September 24, 2019     Mp Hester [address]  2014 [facility]  [address] [address]    Patient: Sharri Valencia   YOB: 1955   Date of Visit: 9/24/2019       Dear Dr Vinh Pedraza:    Thank you for referring Sharri Valencia to me for evaluation  Below are my notes for this consultation  If you have questions, please do not hesitate to call me  I look forward to following your patient along with you  Sincerely,        Zayra Pittman MD        CC: MD Remy Urena MD Juliann Munroe, MD  9/24/2019  1:46 PM  Sign at close encounter               Surgical Oncology Consult       [address] [facility]  [address] [facility] 200 [facility]  1955  [id number]  [address] [facility] SURGICAL ONCOLOGY [place]  [address] [facility] [address]    Diagnoses and all orders for this visit:    Malignant neoplasm of lower third of esophagus ([facility] )  -     Ambulatory referral to Surgical Oncology  -     Case request operating room: INSERTION VENOUS PORT (PORT-A-CATH), INSERTION JEJUNOSTOMY TUBE OPEN; Standing    Other orders  -     Incentive spirometry; Standing  -     Insert and maintain IV line; Standing  -     Void On-Call to O R ; Standing  -     Place sequential compression device; Standing  -     EKG 12 lead; Future  -     levofloxacin (LEVAQUIN) IVPB (premix) 500 mg  -     metroNIDAZOLE (FLAGYL) IVPB (premix) 500 mg        Chief Complaint   Patient presents with    Esophageal Cancer       No follow-ups on file         Malignant neoplasm of lower third of esophagus ([facility] )    8/24/2019 Initial Diagnosis     Malignant neoplasm of lower third of esophagus (HCC)  At least intramucosal carcinoma  Her2/SHIMON positive      10/2/2019 -  Chemotherapy     pegfilgrastim (NEULASTA ONPRO) subcutaneous injection kit 6 mg, 6 mg, Subcutaneous, Once, 0 of 6 cycles  DOCEtaxel (TAXOTERE) 118 8 mg in sodium chloride 0 9 % 250 mL chemo infusion, 60 mg/m2 = 118 8 mg, Intravenous, Once, 0 of 6 cycles  leucovorin 396 mg in dextrose 5 % 250 mL IVPB, 200 mg/m2 = 396 mg (50 % of original dose 400 mg/m2), Intravenous, Once, 0 of 6 cycles  Dose modification: 200 mg/m2 (original dose 400 mg/m2, Cycle 1, Reason: Other (See Comments), Comment: FLOT regimen standard)  oxaliplatin (ELOXATIN) 168 3 mg in dextrose 5 % 250 mL chemo infusion, 85 mg/m2, Intravenous, Once, 0 of 6 cycles  trastuzumab (HERCEPTIN) 496 mg in sodium chloride 0 9 % 250 mL chemo infusion, 6 mg/kg, Intravenous, Once, 0 of 6 cycles        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HCC)    9/24/2019 Initial Diagnosis     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HCC)      10/2/2019 -  Chemotherapy     pegfilgrastim (NEULASTA ONPRO) subcutaneous injection kit 6 mg, 6 mg, Subcutaneous, Once, 0 of 6 cycles  DOCEtaxel (TAXOTERE) 118 8 mg in sodium chloride 0 9 % 250 mL chemo infusion, 60 mg/m2 = 118 8 mg, Intravenous, Once, 0 of 6 cycles  leucovorin 396 mg in dextrose 5 % 250 mL IVPB, 200 mg/m2 = 396 mg (50 % of original dose 400 mg/m2), Intravenous, Once, 0 of 6 cycles  Dose modification: 200 mg/m2 (original dose 400 mg/m2, Cycle 1, Reason: Other (See Comments), Comment: FLOT regimen standard)  oxaliplatin (ELOXATIN) 168 3 mg in dextrose 5 % 250 mL chemo infusion, 85 mg/m2, Intravenous, Once, 0 of 6 cycles  trastuzumab (HERCEPTIN) 496 mg in sodium chloride 0 9 % 250 mL chemo infusion, 6 mg/kg, Intravenous, Once, 0 of 6 cycles         History of Present Illness:  62-year-old male who initially started have dysphagia with solid foods approximately 1 month ago  He has essentially been drinking liquids without difficulty  He is not clear how much weight he has lost over the last 6 months, but he has lost approximately 10 lb over the last week  He has been essentially subsisting on liquids and 2 ensures a day  He does have a history of reflux with an ulcer at age 23   Quit smoking 18 months ago  EGD on he August 21, 2019 revealed erythematous and hemorrhagic mucosa in the lower 3rd of the esophagus, 30 cm from the incisors with bleeding  This extended from 30-40 cm  Biopsy revealed adenocarcinoma  This was HER2 positive  He comes in now to discuss further therapy  CT from September 6, 2019 revealed wall thickening of the distal esophagus extending to the level of the gastric cardia  There was also adenopathy posterior to the gastric cardia  A left upper lobe pulmonary opacity was seen  There was a nonspecific 1 2 cm right adrenal nodule  Follow-up PET-CT from September 19, 2019 reveals extensive FDG activity at the GE junction as well as a perigastric lymph node  There was also uptake in the left 6th rib and right lateral 2nd rib  No CT findings were seen  There was focal FDG uptake in the sigmoid colon  Colonoscopy was recommended  I personally reviewed his films  The patient denied any rib trauma to me, but did tell medical oncology that he recently had some chest trauma  He denies any nausea or vomiting  Review of Systems  Complete ROS Surg Onc:   Constitutional: The patient denies new or recent history of general fatigue  10 lb weight loss in the last week  Change in appetite  Eyes: No complaints of visual problems, no scleral icterus  ENT: no complaints of ear pain, no hoarseness, no difficulty swallowing,  no tinnitus and no new masses in head, oral cavity, or neck  Cardiovascular: No complaints of chest pain, no palpitations, no ankle edema  Respiratory: No complaints of shortness of breath, no cough  Gastrointestinal: No complaints of jaundice, no bloody stools, no pale stools  Genitourinary: No complaints of dysuria, no hematuria, no nocturia, no frequent urination, no urethral discharge  Musculoskeletal: No complaints of weakness, paralysis, joint stiffness or arthralgias  Integumentary: No complaints of rash, no new lesions     Neurological: No complaints of convulsions, no seizures, no dizziness  Hematologic/Lymphatic: No complaints of easy bruising  Endocrine:  No hot or cold intolerance  No polydipsia, polyphagia, or polyuria  Allergy/immunology:  No environmental allergies  No food allergies  Not immunocompromised  Skin:  No pallor or rash  No wound            Patient Active Problem List   Diagnosis    [facility] disease[facility]    Headaches due to old head injury    High cholesterol    Obstructive sleep apnea syndrome    Type 2 diabetes mellitus with hyperglycemia, without long-term current use of insulin (HCC)    Malignant neoplasm of lower third of esophagus ([facility]    Esophageal obstruction    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[facility] )    Chemotherapy induced neutropenia ([facility])     Past Medical History:   Diagnosis Date    COPD (chronic obstructive pulmonary disease) ([facility] )     Diabetes mellitus ([facility] )     Difficulty swallowing     Esophageal mass     Hypertension     Sleep apnea      Past Surgical History:   Procedure Laterality Date    BACK SURGERY      DISC REMOVAL      TONSILLECTOMY       Family History   Problem Relation Age of Onset    Diabetes Mother     No Known Problems Father      Social History     Socioeconomic History    Marital status: Single     Spouse name: Not on file    Number of children: Not on file    Years of education: Not on file    Highest education level: Not on file   Occupational History    Not on file   Social Needs    Financial resource strain: Not on file    Food insecurity:     Worry: Not on file     Inability: Not on file    Transportation needs:     Medical: Not on file     Non-medical: Not on file   Tobacco Use    Smoking status: Former Smoker     Packs/day: 0 50     Years: 50 00     Pack years: 25 00     Types: Cigarettes     Last attempt to quit: 2017     Years since quittin 7    Smokeless tobacco: Never Used   Substance and Sexual Activity    Alcohol use: Not Currently     Alcohol/week: 0 0 standard drinks     Frequency: Never     Drinks per session: 1 or 2     Binge frequency: Never    Drug use: Not Currently    Sexual activity: Not on file   Lifestyle    Physical activity:     Days per week: Not on file     Minutes per session: Not on file    Stress: Not on file   Relationships    Social connections:     Talks on phone: Not on file     Gets together: Not on file     Attends Alevism service: Not on file     Active member of club or organization: Not on file     Attends meetings of clubs or organizations: Not on file     Relationship status: Not on file    Intimate partner violence:     Fear of current or ex partner: Not on file     Emotionally abused: Not on file     Physically abused: Not on file     Forced sexual activity: Not on file   Other Topics Concern    Not on file   Social History Narrative    Not on file       Current Outpatient Medications:     albuterol (2 5 mg/3 mL) 0 083 % nebulizer solution, Take 1 vial (2 5 mg total) by nebulization every 6 (six) hours as needed for wheezing or shortness of breath, Disp: 1 vial, Rfl: 5    canagliflozin (INVOKANA) 100 mg, Daily, Disp: , Rfl:     ergocalciferol (VITAMIN D2) 50,000 units, Take 50,000 Units by mouth, Disp: , Rfl:     levothyroxine 25 mcg tablet, Take 25 mcg by mouth daily in the early morning, Disp: , Rfl:     Melatonin 5 MG/ML LIQD, Take 2 mL (10 mg total) by mouth daily at bedtime as needed (insomnia), Disp: 1 Bottle, Rfl: 1    pantoprazole (PROTONIX) 40 mg tablet, Take 1 tablet (40 mg total) by mouth daily, Disp: 30 tablet, Rfl: 1    pioglitazone (ACTOS) 30 mg tablet, pioglitazone 30 mg tablet, Disp: , Rfl:     sitaGLIPtin (JANUVIA) 100 mg tablet, , Disp: , Rfl:     traMADol (ULTRAM) 50 mg tablet, tramadol 50 mg tablet  take 1 tablet by mouth every 12 hours, Disp: , Rfl:   Allergies   Allergen Reactions    Penicillins Itching     Vitals:    09/24/19 1303   BP: 142/60   Pulse: 86 Resp: 16   Temp: (!) 96 8 °F (36 °C)       Physical Exam   Constitutional: General appearance: The Patient is well-developed and well-nourished who appears the stated age in no acute distress  Patient is pleasant and talkative  HEENT:  Normocephalic  Sclerae are anicteric  Mucous membranes are moist  Neck is supple without adenopathy  No JVD  Chest: The lungs are clear to auscultation  Cardiac: Heart is regular rate  Abdomen: Abdomen is soft, non-tender, non-distended and without masses  Extremities: There is no clubbing or cyanosis  There is no edema  Symmetric  Neuro: Grossly nonfocal  Gait is normal      Lymphatic: No evidence of cervical adenopathy bilaterally  No evidence of axillary adenopathy bilaterally  No evidence of inguinal adenopathy bilaterally  Skin: Warm, anicteric  Psych:  Patient is pleasant and talkative  Breasts:      Pathology:  Addendum   HER2 IMMUNOHISTOCHEMICAL ANALYSIS FOR GASTRIC/ESOPHAGEAL ADENOCARCINOMA     Test Description                                        Result                                                     HER2/SHIMON by IHC (clone [id number])                   3+/Positive         These immunohistochemical tests were performed at [facility] in [place], [place] and interpreted by Dr Antonio Dickens    An electronic copy of this report will be kept on file in the Medical Records Department at [address] [place] [address]      Comment:      Immunohistochemistry scoring for Her2 in gastric and gastroesophageal carcinoma (2016 CAP/ASCO guidelines)     Biopsy specimen staining pattern:     0 No reactivity or no membranous reactivity in any tumor cell  1+ Tumor cell cluster with a faint or barely perceptible membranous reactivity irrespective of percentage of tumor cells  2+ Tumor cell cluster with a weak to moderate complete, basolateral, or lateral membranous reactivity irrespective of  percentage of tumor cells stained  3+ Tumor cell cluster with a strong complete, basolateral, or lateral membranous reactivity irrespective of tumor cells stained     Surgical specimen staining pattern:     0 No reactivity or membranous reactivity in less than 10% of tumor cells  1+ Faint or barely perceptible membranous reactivity in 10% or more of tumor cells; cells are reactive only in part of their  membrane  2+ Weak to moderate complete, basolateral, or lateral membranous reactivity in 10% or more of tumor cells  3+ Strong complete, basolateral, or lateral membranous reactivity in 10% or more of tumor cells     A positive control for each antibody has been reviewed and accepted       Reference:   1  Omkar Samuels Ventura CB et al  HER2 Testing and Clinical Decision Making in Gastroesophageal Adenocarcinoma: Guideline from the [address], [address] for Clinical Pathology, and [address] of Clinical Oncology  Arch Pathol [facility]: [id number]/arpa  [id number]       Addendum electronically signed by Lakshmi Lyman MD on 8/29/2019 at  2:54 PM   Final Diagnosis   A  Esophagus (biopsy):  - At 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      Comment:  - CK AE1/3 highlights few single cells and small clusters of cells  P53 is aberrantly expressed   - Dr John Paul Burnett was notified of the diagnosis on 8/29/19 at 7:14 am via Epic messaging/email    - Her2 IHC has been ordered and results will be indicated in an addendum  Electronically signed by Lakshmi Lyman MD on 8/29/2019 at  7:17 AM   Additional Information    All controls performed with the immunohistochemical stains reported above reacted appropriately  These tests were developed and their performance characteristics determined by [facility] or [facility]  They may not be cleared or approved by the [place]  Food and Drug Administration   The FDA has determined that such clearance or approval is not necessary  These tests are used for clinical purposes  They should not be regarded as investigational or for research  This laboratory has been approved by [place] [id number], designated as a high-complexity laboratory and is qualified to perform these tests  Labs:      Imaging  Ct Chest Abdomen Pelvis W Contrast    Result Date: 9/17/2019  Narrative: CT CHEST, ABDOMEN AND PELVIS WITH IV CONTRAST INDICATION:   dysphagia  Patient recently diagnosed with adenocarcinoma of the lower 3rd of the esophagus  Dysphagia, "feels like there is a bubble in my throat",  blood in vomit  History of COPD, diabetes, hypertension  Prior history of tobacco use  COMPARISON:  September 6, 2019  TECHNIQUE: CT examination of the chest, abdomen and pelvis was performed  Axial, sagittal, and coronal 2D reformatted images were created from the source data and submitted for interpretation  Radiation dose length product (DLP) for this visit:  650 mGy-cm   This examination, like all CT scans performed in the [facility], was performed utilizing techniques to minimize radiation dose exposure, including the use of iterative reconstruction and automated exposure control  IV Contrast:  100 mL of iohexol (OMNIPAQUE) Enteric Contrast: Enteric contrast was not administered  FINDINGS: CHEST LUNGS:  A focal area of opacity within the anterior aspect of the left upper lobe of the lung measuring approximately 7 x 8 mm is again evident, presently best demonstrated on series 3 image 28  Once again, this appears somewhat more linear on the coronal images  This is similar in appearance to September 6, 2019  Continued follow-up is recommended  Minimal atelectasis in the inferior left lingula is unchanged  PLEURA:  Unremarkable  HEART/GREAT VESSELS:  Coronary artery calcifications are present  There is mild atherosclerosis of the thoracic aorta   MEDIASTINUM AND SHEA:  There is irregular wall thickening and some mucosal enhancement evident involving the distal esophagus and extending to the left hand aspect of the upper stomach  There is a masslike area of abnormal soft tissue density along the left hand aspect of the upper stomach which measures approximately 5 5 x 3 6 cm  There appears to be some infiltration of the fat adjacent to this masslike area which is suspicious for local extension of disease  Several mildly prominent nodes are present between this mass and the liver, measuring up to 1 2 x 0 9 cm  A left para esophageal node on series 2 image 43 presently measures 1 x 0 6 cm whereas it measured 0 8 x 0 6 cm on the prior study  There is some fluid within the esophagus near the  level of the gregg  A subcarinal node measures approximately 8 mm short axis, similar to the prior study  A right paratracheal node measures approximately 9 mm, similar to the prior study  Another right paratracheal node measures approximately 10 mm, also similar to the prior study  CHEST WALL AND LOWER NECK:   Unremarkable  ABDOMEN LIVER/BILIARY TREE:  There is a small area of focal fatty infiltration adjacent to the falciform ligament, similar to the previous examination  GALLBLADDER:  There are gallstone(s) within the gallbladder, without pericholecystic inflammatory changes  SPLEEN:  Unremarkable  PANCREAS:  Unremarkable  ADRENAL GLANDS:  There is an approximately 1 5 x 1 cm right adrenal nodule  This appears similar to slightly larger compared to the prior examination  There is a questionable 0 6 x 0 5 cm left adrenal nodule, not clearly demonstrated on the prior study  Follow-up of both of these nodules is suggested  KIDNEYS/URETERS:  Unremarkable  No hydronephrosis  STOMACH AND BOWEL:  As described above  There is no evidence of small bowel obstruction  There is no evidence of acute diverticulitis  APPENDIX:  A normal appendix was visualized  ABDOMINOPELVIC CAVITY:  As described above  No pneumoperitoneum  No significant ascites   VESSELS:  There is atherosclerosis  There is no abdominal aortic aneurysm  PELVIS REPRODUCTIVE ORGANS AND URINARY BLADDER:  There is a soft tissue density nodule within the posterior aspect of the urinary bladder near the midline which measures approximately 1 2 cm  This appears similar to the prior study  This could be caused by indentation of the prostate, however, a small bladder mass cannot be entirely excluded  Urology consultation and follow-up is recommended  ABDOMINAL WALL/INGUINAL REGIONS:  Small fat-containing left inguinal hernia  OSSEOUS STRUCTURES:  No acute fracture or destructive osseous lesion  Disc spacers are present at L5-S1  Impression: There is irregular wall thickening involving the distal esophagus and extending to the left hand aspect of the upper stomach  There is a masslike area of abnormal soft tissue density along the left hand aspect of the upper stomach which measures approximately 5 5 x 3 6 cm, and there appears to be some infiltration of the adjacent fat  This is concerning for local extension of the patient's known neoplasm  Several mildly prominent nodes are present between this mass and the liver  There are also some mildly prominent mediastinal nodes  Please see discussion  There is some fluid within the esophagus near level of the gregg  There is an approximately 1 5 x 1 cm right adrenal nodule  This appears similar to slightly larger compared to the prior examination  There is a questionable tiny left adrenal nodule  Follow-up of both of these adrenal findings is suggested  There is an approximately 1 2 cm soft tissue density nodule within the posterior aspect of the urinary bladder  Although this may be related to indentation of the prostate, a small bladder mass cannot be entirely excluded  Urology consultation and follow-up is recommended  A focal area of opacity within the anterior aspect of the left upper lobe of the lung appears similar to September 6, 2019    Please see discussion  Continued follow-up is recommended  This could be reassessed with chest CT in 3 months  PET/CT could also be considered, however, it is of borderline size for accurate characterization with PET/ CT  If more remote CTs of the chest exist, direct comparison would be helpful  PET/CT should be considered for further evaluation of the extent of adenopathy and extent of disease  PET/CT or MRI could be utilized to evaluate the adrenal findings, if there are no contraindications  Cholelithiasis  No CT evidence of acute cholecystitis  Atherosclerosis  Coronary artery disease  Other findings as described above  Please see discussion  The study was marked in [facility] for immediate notification  Workstation performed: [id number]     Ct Chest Abdomen Pelvis W Contrast    Result Date: 9/12/2019  Narrative: CT CHEST, ABDOMEN AND PELVIS WITH IV CONTRAST INDICATION:   C15 5: Malignant neoplasm of lower third of esophagus  Recent diagnosis of esophageal carcinoma (adenocarcinoma)  COMPARISON:  None  TECHNIQUE: CT examination of the chest, abdomen and pelvis was performed  Axial, sagittal, and coronal 2D reformatted images were created from the source data and submitted for interpretation  Radiation dose length product (DLP) for this visit:  818 mGy-cm   This examination, like all CT scans performed in the [facility], was performed utilizing techniques to minimize radiation dose exposure, including the use of iterative reconstruction and automated exposure control  IV Contrast:  100 mL of iohexol (OMNIPAQUE) Enteric Contrast: Enteric contrast was administered  FINDINGS: CHEST LUNGS:  Solitary focal opacity noted within the anterior left upper lobe, on image 205/25 measuring 7 x 8 mm  On coronal imaging appearing somewhat more elongated although with central nodular aspect about 6 mm  PLEURA:  Unremarkable  HEART/GREAT VESSELS:  Coronary artery calcification    Otherwise unremarkable MEDIASTINUM AND SHEA:  As already known, abnormal appearance of the distal esophagus, beginning just below level of the inferior pulmonary veins and extending to the gastric cardia  Irregular wall thickening and mucosal enhancement noted  Along the left margin of the abnormal esophagus image 201/41 there is a paraesophageal lymph node measuring 8 x 6 mm  A subcarinal lymph node demonstrates short axis diameter 8 mm  CHEST WALL AND LOWER NECK:   Unremarkable  ABDOMEN LIVER/BILIARY TREE:  Unremarkable  GALLBLADDER:  There are gallstone(s) within the gallbladder, without pericholecystic inflammatory changes  SPLEEN:  Unremarkable  PANCREAS:  Unremarkable  ADRENAL GLANDS:  There is a right adrenal nodule measuring 1 2 x 0 7 cm in size  It is somewhat small for accurate characterization  As best as can be told, density is well above fluid on this enhanced only exam KIDNEYS/URETERS:  Unremarkable  No hydronephrosis  STOMACH AND BOWEL:  As above, abnormal appearance of the distal esophagus, involving the gastric cardia as well  Moderately increased stool within the colon  Small bowel unremarkable  APPENDIX:  No findings to suggest appendicitis  ABDOMINOPELVIC CAVITY:  Posterior to the gastric cardia there is an ovoid soft tissue nodule best measured on coronal image 79, measuring 1 7 x 1 0 cm  Along the right margin of the gastric cardia on coronal image 71 there is a soft tissue nodule measuring 1 3 x 1 1 cm, and there is also an ill-defined right lateral border of the gastric cardia for example image 201/54 and coronal image 68  There is a trace amount of free fluid present within the pelvis  VESSELS:  Unremarkable for patient's age  PELVIS REPRODUCTIVE ORGANS:  See below URINARY BLADDER:  Soft tissue nodular density associated with the posterior inferior aspect of the urinary bladder image 201/114 or coronal image 84 measuring approximately 9 mm    This is favored to be enlargement of the median lobe of the prostate gland, less likely bladder polyp ABDOMINAL WALL/INGUINAL REGIONS:  Unremarkable  OSSEOUS STRUCTURES:  No acute fracture or destructive osseous lesion  Impression: 1  Irregular wall thickening involving the distal esophagus beginning just below the inferior pulmonary veins, extending through the gastric cardia compatible with biopsy-proven adenocarcinoma  As above, there is involvement of the gastric cardia, and possible extra luminal extension of tumor along its right lateral margin 2  There is mild adenopathy identified adjacent to the gastric cardia and distal esophagus  Additional borderline prominent mediastinal lymph nodes 3  There is a left upper lobe opacity identified  Although potentially representing an area of inflammation or scarring, an irregularly marginated solitary pulmonary metastasis should be excluded  This could either be reassessed with chest CT in 3 months time or further evaluated with PET CT (it is of borderline size for accurate characterization with PET) 4  Hypertrophy of the median lobe of the prostate (favored) or less likely 9 mm bladder polyp 5  Nonspecific 1 2 cm right adrenal nodule  Overall, PET/CT may be helpful to determine exact extent of adenopathy within the lower chest and upper abdomen, evaluation for any extraluminal extension of tumor, and further characterization of left upper lobe density and right adrenal nodule Workstation performed: [id number]     Nm Pet Ct Skull Base To Mid Thigh    Result Date: 9/19/2019  Narrative: PET/CT SCAN INDICATION: Newly diagnosed esophageal cancer  Initial staging  C15 5: Malignant neoplasm of lower third of esophagus MODIFIER: PI COMPARISON: CT chest abdomen pelvis 9/16/2019 and 9/6/2019 CELL TYPE:  Intramucosal carcinoma, 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 TECHNIQUE:   8 8 mCi F-18-FDG administered IV   Multiplanar attenuation corrected and non attenuation corrected PET images are available for interpretation, and contiguous, low dose, axial CT sections were obtained from the skull base through the femurs   Intravenous contrast material was not utilized  This examination, like all CT scans performed in the [facility], was performed utilizing techniques to minimize radiation dose exposure, including the use of iterative reconstruction and automated exposure control  Fasting serum glucose: 82 mg/dl FINDINGS: VISUALIZED BRAIN:   No acute abnormalities are seen  HEAD/NECK:   Fairly diffuse FDG uptake at the thyroid gland, SUV max of 6 2 may be related to thyroiditis  No FDG avid cervical adenopathy is seen  CT images: Scattered mucosal thickening noted in the paranasal sinuses  CHEST:   Extensive FDG uptake at the distal esophagus leading to the GE junction and proximal stomach, SUV max of 21 0  Associated wall thickening noted here on CT  No FDG avid lymph nodes  CT images: Scattered coronary calcifications noted  Stable subcentimeter irregular density in the left upper lobe anteriorly, image 110 series 3  This is not FDG avid  ABDOMEN:   Extensive FDG uptake at the distal esophagus leading to the GE junction and proximal stomach, SUV max of 21 0  Associated wall thickening noted here on CT  Soft tissue density at the level of the proximal stomach extending to the gastrohepatic region measures 6 0 x 4 7 cm  There may be contiguous FDG avid lymphadenopathy in the gastrohepatic region where is there is soft tissue fullness here on CT inseparable from the gastric wall thickening  Small focus of FDG uptake noted posterior to the proximal stomach, SUV max of 3 7  There is a 2 1 x 1 5 cm perigastric lymph node here on CT image 164 series 3  No suspicious FDG uptake at the adrenal glands  CT images: There is cholelithiasis  Stable nodularity of the right adrenal gland  PELVIS: Focal FDG uptake at the distal sigmoid colon, SUV max of 7 8  Possible eccentric wall thickening on CT    Additional focus of FDG uptake at the level of the proximal sigmoid colon, SUV max of 6 6  No obvious findings here on CT  CT images: Prostate is mildly prominent  Tiny fat-containing left inguinal hernia  OSSEOUS STRUCTURES: Focal FDG uptake at the left anterior 6th rib demonstrates SUV max of 3 7  No obvious findings here on CT  Subtle focus of FDG uptake at the right lateral 2nd rib, SUV max of 2 2  No obvious findings here on CT  CT images: Prior lumbosacral fusion  Impression: 1  Extensive FDG uptake at the distal esophagus, GE junction and proximal stomach compatible with known malignancy  2  FDG avid perigastric lymph node posterior to the proximal stomach suspicious for metastasis  There may be FDG avid gastrohepatic lymphadenopathy but this is inseparable from the gastric mass  3   Focal FDG uptake at the left anterior 6th rib  Possible subtle focus of FDG uptake at the right lateral 2nd rib  No obvious findings here on the limited low-dose CT images  Metastasis is not excluded  Distribution is not typical for prior trauma  Consider follow-up with MRI of the chest  4   Foci of FDG uptake at the sigmoid colon for which underlying lesions should be excluded  Recommend follow-up with colonoscopy  5   Fairly diffuse FDG uptake at the thyroid gland, may be related to thyroiditis  The study was marked in EPIC for significant notification  Workstation performed: [id number]     I reviewed the above laboratory and imaging data  Discussion/Summary:  59-year-old male with a clinical [id number]/1 esophageal carcinoma  We discussed typically with esophageal cancer, chemo radiation followed by surgery would be the standard procedure  His PET-CT is certainly concerning for rib metastasis, although the patient is completely asymptomatic  Because of the finding of a possible metastasis I do not think it is unreasonable to consider a short course of chemotherapy and then based on those results potentially add chemo radiation    I suspect his swallowing difficulties will improve just with chemotherapy alone  I have recommended that he have a port as well as a feeding tube placed since he may undergo esophagectomy in the future  We did discuss that if the rib lesions are indeed metastasis, chemotherapy would be the mainstay of his treatment  He has already seen Medical Oncology who is planning on chemotherapy followed by possible chemo radiation, which I think in this case is reasonable given his PET-CT findings  I explained the risks of port placement and feeding jejunostomy to include bleeding, infection, need for further surgery, wound complications, port malfunction, DVT, pneumothorax, and adjacent organ injury  Informed consent was obtained  I have discussed this with Dr Bessy Copeland  We will schedule this at our earliest mutual convenience  He will need an outpatient colonoscopy in the future  He is agreeable to this  All his questions were answered

## 2019-09-24 NOTE — H&P (VIEW-ONLY)
Surgical Oncology Consult       [address] [facility]  [facility]  [address] [facility] [address]  [facility] [address]    Cindymello Vazquez  1955  [phone]  [address]  [facility] [place]  [address] [address]    Diagnoses and all orders for this visit:    Malignant neoplasm of lower third of esophagus ([address] )  -     Ambulatory referral to Surgical Oncology  -     Case request operating room: INSERTION VENOUS PORT (PORT-A-CATH), INSERTION JEJUNOSTOMY TUBE OPEN; Standing    Other orders  -     Incentive spirometry; Standing  -     Insert and maintain IV line; Standing  -     Void On-Call to O R ; Standing  -     Place sequential compression device; Standing  -     EKG 12 lead; Future  -     levofloxacin (LEVAQUIN) IVPB (premix) 500 mg  -     metroNIDAZOLE (FLAGYL) IVPB (premix) 500 mg        Chief Complaint   Patient presents with    Esophageal Cancer       No follow-ups on file         Malignant neoplasm of lower third of esophagus (HCC)    8/24/2019 Initial Diagnosis     Malignant neoplasm of lower third of esophagus (HCC)  At least intramucosal carcinoma  Her2/SHIMON positive      10/2/2019 -  Chemotherapy     pegfilgrastim (NEULASTA ONPRO) subcutaneous injection kit 6 mg, 6 mg, Subcutaneous, Once, 0 of 6 cycles  DOCEtaxel (TAXOTERE) 118 8 mg in sodium chloride 0 9 % 250 mL chemo infusion, 60 mg/m2 = 118 8 mg, Intravenous, Once, 0 of 6 cycles  leucovorin 396 mg in dextrose 5 % 250 mL IVPB, 200 mg/m2 = 396 mg (50 % of original dose 400 mg/m2), Intravenous, Once, 0 of 6 cycles  Dose modification: 200 mg/m2 (original dose 400 mg/m2, Cycle 1, Reason: Other (See Comments), Comment: FLOT regimen standard)  oxaliplatin (ELOXATIN) 168 3 mg in dextrose 5 % 250 mL chemo infusion, 85 mg/m2, Intravenous, Once, 0 of 6 cycles  trastuzumab (HERCEPTIN) 496 mg in sodium chloride 0 9 % 250 mL chemo infusion, 6 mg/kg, Intravenous, Once, 0 of 6 cycles        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[address] )    9/24/2019 Initial Diagnosis     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HCC)      10/2/2019 -  Chemotherapy     pegfilgrastim (NEULASTA ONPRO) subcutaneous injection kit 6 mg, 6 mg, Subcutaneous, Once, 0 of 6 cycles  DOCEtaxel (TAXOTERE) 118 8 mg in sodium chloride 0 9 % 250 mL chemo infusion, 60 mg/m2 = 118 8 mg, Intravenous, Once, 0 of 6 cycles  leucovorin 396 mg in dextrose 5 % 250 mL IVPB, 200 mg/m2 = 396 mg (50 % of original dose 400 mg/m2), Intravenous, Once, 0 of 6 cycles  Dose modification: 200 mg/m2 (original dose 400 mg/m2, Cycle 1, Reason: Other (See Comments), Comment: FLOT regimen standard)  oxaliplatin (ELOXATIN) 168 3 mg in dextrose 5 % 250 mL chemo infusion, 85 mg/m2, Intravenous, Once, 0 of 6 cycles  trastuzumab (HERCEPTIN) 496 mg in sodium chloride 0 9 % 250 mL chemo infusion, 6 mg/kg, Intravenous, Once, 0 of 6 cycles         History of Present Illness:  69-year-old male who initially started have dysphagia with solid foods approximately 1 month ago  He has essentially been drinking liquids without difficulty  He is not clear how much weight he has lost over the last 6 months, but he has lost approximately 10 lb over the last week  He has been essentially subsisting on liquids and 2 ensures a day  He does have a history of reflux with an ulcer at age 23  Quit smoking 18 months ago  EGD on he August 21, 2019 revealed erythematous and hemorrhagic mucosa in the lower 3rd of the esophagus, 30 cm from the incisors with bleeding  This extended from 30-40 cm  Biopsy revealed adenocarcinoma  This was HER2 positive  He comes in now to discuss further therapy  CT from September 6, 2019 revealed wall thickening of the distal esophagus extending to the level of the gastric cardia  There was also adenopathy posterior to the gastric cardia  A left upper lobe pulmonary opacity was seen  There was a nonspecific 1 2 cm right adrenal nodule    Follow-up PET-CT from September 19, 2019 reveals extensive FDG activity at the GE junction as well as a perigastric lymph node  There was also uptake in the left 6th rib and right lateral 2nd rib  No CT findings were seen  There was focal FDG uptake in the sigmoid colon  Colonoscopy was recommended  I personally reviewed his films  The patient denied any rib trauma to me, but did tell medical oncology that he recently had some chest trauma  He denies any nausea or vomiting  Review of Systems  Complete ROS Surg Onc:   Constitutional: The patient denies new or recent history of general fatigue  10 lb weight loss in the last week  Change in appetite  Eyes: No complaints of visual problems, no scleral icterus  ENT: no complaints of ear pain, no hoarseness, no difficulty swallowing,  no tinnitus and no new masses in head, oral cavity, or neck  Cardiovascular: No complaints of chest pain, no palpitations, no ankle edema  Respiratory: No complaints of shortness of breath, no cough  Gastrointestinal: No complaints of jaundice, no bloody stools, no pale stools  Genitourinary: No complaints of dysuria, no hematuria, no nocturia, no frequent urination, no urethral discharge  Musculoskeletal: No complaints of weakness, paralysis, joint stiffness or arthralgias  Integumentary: No complaints of rash, no new lesions  Neurological: No complaints of convulsions, no seizures, no dizziness  Hematologic/Lymphatic: No complaints of easy bruising  Endocrine:  No hot or cold intolerance  No polydipsia, polyphagia, or polyuria  Allergy/immunology:  No environmental allergies  No food allergies  Not immunocompromised  Skin:  No pallor or rash  No wound            Patient Active Problem List   Diagnosis    COPD (chronic obstructive pulmonary disease) ([facility]    Headaches due to old head injury    High cholesterol    Obstructive sleep apnea syndrome    Type 2 diabetes mellitus with hyperglycemia, without long-term current use of insulin ([facility] )    Malignant neoplasm of lower third of esophagus ([facility] )    Esophageal obstruction    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[place] [address] )    Chemotherapy induced neutropenia (HCC)     Past Medical History:   Diagnosis Date    COPD (chronic obstructive pulmonary disease) ([facility] )     Diabetes mellitus ([facility] )     Difficulty swallowing     Esophageal mass     Hypertension     Sleep apnea      Past Surgical History:   Procedure Laterality Date    BACK SURGERY      DISC REMOVAL      TONSILLECTOMY       Family History   Problem Relation Age of Onset    Diabetes Mother     No Known Problems Father      Social History     Socioeconomic History    Marital status: Single     Spouse name: Not on file    Number of children: Not on file    Years of education: Not on file    Highest education level: Not on file   Occupational History    Not on file   Social Needs    Financial resource strain: Not on file    Food insecurity:     Worry: Not on file     Inability: Not on file    Transportation needs:     Medical: Not on file     Non-medical: Not on file   Tobacco Use    Smoking status: Former Smoker     Packs/day: 0 50     Years: 50 00     Pack years: 25 00     Types: Cigarettes     Last attempt to quit: 2017     Years since quittin 7    Smokeless tobacco: Never Used   Substance and Sexual Activity    Alcohol use: Not Currently     Alcohol/week: 0 0 standard drinks     Frequency: Never     Drinks per session: 1 or 2     Binge frequency: Never    Drug use: Not Currently    Sexual activity: Not on file   Lifestyle    Physical activity:     Days per week: Not on file     Minutes per session: Not on file    Stress: Not on file   Relationships    Social connections:     Talks on phone: Not on file     Gets together: Not on file     Attends Christian service: Not on file     Active member of club or organization: Not on file     Attends meetings of clubs or organizations: Not on file     Relationship status: Not on file    Intimate partner violence:     Fear of current or ex partner: Not on file     Emotionally abused: Not on file     Physically abused: Not on file     Forced sexual activity: Not on file   Other Topics Concern    Not on file   Social History Narrative    Not on file       Current Outpatient Medications:     albuterol (2 5 mg/3 mL) 0 083 % nebulizer solution, Take 1 vial (2 5 mg total) by nebulization every 6 (six) hours as needed for wheezing or shortness of breath, Disp: 1 vial, Rfl: 5    canagliflozin (INVOKANA) 100 mg, Daily, Disp: , Rfl:     ergocalciferol (VITAMIN D2) 50,000 units, Take 50,000 Units by mouth, Disp: , Rfl:     levothyroxine 25 mcg tablet, Take 25 mcg by mouth daily in the early morning, Disp: , Rfl:     Melatonin 5 MG/ML LIQD, Take 2 mL (10 mg total) by mouth daily at bedtime as needed (insomnia), Disp: 1 Bottle, Rfl: 1    pantoprazole (PROTONIX) 40 mg tablet, Take 1 tablet (40 mg total) by mouth daily, Disp: 30 tablet, Rfl: 1    pioglitazone (ACTOS) 30 mg tablet, pioglitazone 30 mg tablet, Disp: , Rfl:     sitaGLIPtin (JANUVIA) 100 mg tablet, , Disp: , Rfl:     traMADol (ULTRAM) 50 mg tablet, tramadol 50 mg tablet  take 1 tablet by mouth every 12 hours, Disp: , Rfl:   Allergies   Allergen Reactions    Penicillins Itching     Vitals:    09/24/19 1303   BP: 142/60   Pulse: 86   Resp: 16   Temp: (!) 96 8 °F (36 °C)       Physical Exam   Constitutional: General appearance: The Patient is well-developed and well-nourished who appears the stated age in no acute distress  Patient is pleasant and talkative  HEENT:  Normocephalic  Sclerae are anicteric  Mucous membranes are moist  Neck is supple without adenopathy  No JVD  Chest: The lungs are clear to auscultation  Cardiac: Heart is regular rate  Abdomen: Abdomen is soft, non-tender, non-distended and without masses  Extremities: There is no clubbing or cyanosis  There is no edema  Symmetric  Neuro: Grossly nonfocal  Gait is normal      Lymphatic: No evidence of cervical adenopathy bilaterally  No evidence of axillary adenopathy bilaterally  No evidence of inguinal adenopathy bilaterally  Skin: Warm, anicteric  Psych:  Patient is pleasant and talkative  Breasts:      Pathology:  Addendum   HER2 IMMUNOHISTOCHEMICAL ANALYSIS FOR GASTRIC/ESOPHAGEAL ADENOCARCINOMA     Test Description                                        Result                                                     HER2/SHIMON by IHC (clone [id number])                   3+/Positive         These immunohistochemical tests were performed at [facility] in [place], [place] and interpreted by Dr Viktor Arguelles  An electronic copy of this report will be kept on file in the Medical Records Department at [facility]      Comment:      Immunohistochemistry scoring for Her2 in gastric and gastroesophageal carcinoma (2016 CAP/ASCO guidelines)     Biopsy specimen staining pattern:     0 No reactivity or no membranous reactivity in any tumor cell  1+ Tumor cell cluster with a faint or barely perceptible membranous reactivity irrespective of percentage of tumor cells  2+ Tumor cell cluster with a weak to moderate complete, basolateral, or lateral membranous reactivity irrespective of  percentage of tumor cells stained  3+ Tumor cell cluster with a strong complete, basolateral, or lateral membranous reactivity irrespective of tumor cells stained     Surgical specimen staining pattern:     0 No reactivity or membranous reactivity in less than 10% of tumor cells  1+ Faint or barely perceptible membranous reactivity in 10% or more of tumor cells; cells are reactive only in part of their  membrane    2+ Weak to moderate complete, basolateral, or lateral membranous reactivity in 10% or more of tumor cells  3+ Strong complete, basolateral, or lateral membranous reactivity in 10% or more of tumor cells     A positive control for each antibody has been reviewed and accepted       Reference:   1  Omkar Samuels, Andrey MOREAU et al  HER2 Testing and Clinical Decision Making in Gastroesophageal Adenocarcinoma: Guideline from the [address], [address] for Clinical Pathology, and [address] of Clinical Oncology  Arch Pathol [facility] Med  [place]: [phone]/[facility]  [id number]       Addendum electronically signed by Henrietta Rodriguez MD on 8/29/2019 at  2:54 PM   Final Diagnosis   A  Esophagus (biopsy):  - At 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      Comment:  - CK AE1/3 highlights few single cells and small clusters of cells  P53 is aberrantly expressed   - Dr Harlow Moritz was notified of the diagnosis on 8/29/19 at 7:14 am via Epic messaging/email    - Her2 IHC has been ordered and results will be indicated in an addendum  Electronically signed by Henrietta Rodriguez MD on 8/29/2019 at  7:17 AM   Additional Information    All controls performed with the immunohistochemical stains reported above reacted appropriately  These tests were developed and their performance characteristics determined by [facility] or [facility]  They may not be cleared or approved by the [place]  Food and Drug Administration  The FDA has determined that such clearance or approval is not necessary  These tests are used for clinical purposes  They should not be regarded as investigational or for research  This laboratory has been approved by [place] , designated as a high-complexity laboratory and is qualified to perform these tests  Labs:      Imaging  Ct Chest Abdomen Pelvis W Contrast    Result Date: 9/17/2019  Narrative: CT CHEST, ABDOMEN AND PELVIS WITH IV CONTRAST INDICATION:   dysphagia  Patient recently diagnosed with adenocarcinoma of the lower 3rd of the esophagus  Dysphagia, "feels like there is a bubble in my throat",  blood in vomit    History of COPD, diabetes, hypertension  Prior history of tobacco use  COMPARISON:  September 6, 2019  TECHNIQUE: CT examination of the chest, abdomen and pelvis was performed  Axial, sagittal, and coronal 2D reformatted images were created from the source data and submitted for interpretation  Radiation dose length product (DLP) for this visit:  650 mGy-cm   This examination, like all CT scans performed in the [facility], was performed utilizing techniques to minimize radiation dose exposure, including the use of iterative reconstruction and automated exposure control  IV Contrast:  100 mL of iohexol (OMNIPAQUE) Enteric Contrast: Enteric contrast was not administered  FINDINGS: CHEST LUNGS:  A focal area of opacity within the anterior aspect of the left upper lobe of the lung measuring approximately 7 x 8 mm is again evident, presently best demonstrated on series 3 image 28  Once again, this appears somewhat more linear on the coronal images  This is similar in appearance to September 6, 2019  Continued follow-up is recommended  Minimal atelectasis in the inferior left lingula is unchanged  PLEURA:  Unremarkable  HEART/GREAT VESSELS:  Coronary artery calcifications are present  There is mild atherosclerosis of the thoracic aorta  MEDIASTINUM AND SHEA:  There is irregular wall thickening and some mucosal enhancement evident involving the distal esophagus and extending to the left hand aspect of the upper stomach  There is a masslike area of abnormal soft tissue density along the left hand aspect of the upper stomach which measures approximately 5 5 x 3 6 cm  There appears to be some infiltration of the fat adjacent to this masslike area which is suspicious for local extension of disease  Several mildly prominent nodes are present between this mass and the liver, measuring up to 1 2 x 0 9 cm    A left para esophageal node on series 2 image 43 presently measures 1 x 0 6 cm whereas it measured 0 8 x 0 6 cm on the prior study  There is some fluid within the esophagus near the  level of the gregg  A subcarinal node measures approximately 8 mm short axis, similar to the prior study  A right paratracheal node measures approximately 9 mm, similar to the prior study  Another right paratracheal node measures approximately 10 mm, also similar to the prior study  CHEST WALL AND LOWER NECK:   Unremarkable  ABDOMEN LIVER/BILIARY TREE:  There is a small area of focal fatty infiltration adjacent to the falciform ligament, similar to the previous examination  GALLBLADDER:  There are gallstone(s) within the gallbladder, without pericholecystic inflammatory changes  SPLEEN:  Unremarkable  PANCREAS:  Unremarkable  ADRENAL GLANDS:  There is an approximately 1 5 x 1 cm right adrenal nodule  This appears similar to slightly larger compared to the prior examination  There is a questionable 0 6 x 0 5 cm left adrenal nodule, not clearly demonstrated on the prior study  Follow-up of both of these nodules is suggested  KIDNEYS/URETERS:  Unremarkable  No hydronephrosis  STOMACH AND BOWEL:  As described above  There is no evidence of small bowel obstruction  There is no evidence of acute diverticulitis  APPENDIX:  A normal appendix was visualized  ABDOMINOPELVIC CAVITY:  As described above  No pneumoperitoneum  No significant ascites  VESSELS:  There is atherosclerosis  There is no abdominal aortic aneurysm  PELVIS REPRODUCTIVE ORGANS AND URINARY BLADDER:  There is a soft tissue density nodule within the posterior aspect of the urinary bladder near the midline which measures approximately 1 2 cm  This appears similar to the prior study  This could be caused by indentation of the prostate, however, a small bladder mass cannot be entirely excluded  Urology consultation and follow-up is recommended  ABDOMINAL WALL/INGUINAL REGIONS:  Small fat-containing left inguinal hernia   OSSEOUS STRUCTURES:  No acute fracture or destructive osseous lesion  Disc spacers are present at L5-S1  Impression: There is irregular wall thickening involving the distal esophagus and extending to the left hand aspect of the upper stomach  There is a masslike area of abnormal soft tissue density along the left hand aspect of the upper stomach which measures approximately 5 5 x 3 6 cm, and there appears to be some infiltration of the adjacent fat  This is concerning for local extension of the patient's known neoplasm  Several mildly prominent nodes are present between this mass and the liver  There are also some mildly prominent mediastinal nodes  Please see discussion  There is some fluid within the esophagus near level of the gregg  There is an approximately 1 5 x 1 cm right adrenal nodule  This appears similar to slightly larger compared to the prior examination  There is a questionable tiny left adrenal nodule  Follow-up of both of these adrenal findings is suggested  There is an approximately 1 2 cm soft tissue density nodule within the posterior aspect of the urinary bladder  Although this may be related to indentation of the prostate, a small bladder mass cannot be entirely excluded  Urology consultation and follow-up is recommended  A focal area of opacity within the anterior aspect of the left upper lobe of the lung appears similar to September 6, 2019  Please see discussion  Continued follow-up is recommended  This could be reassessed with chest CT in 3 months  PET/CT could also be considered, however, it is of borderline size for accurate characterization with PET/ CT  If more remote CTs of the chest exist, direct comparison would be helpful  PET/CT should be considered for further evaluation of the extent of adenopathy and extent of disease  PET/CT or MRI could be utilized to evaluate the adrenal findings, if there are no contraindications  Cholelithiasis  No CT evidence of acute cholecystitis  Atherosclerosis    Coronary artery disease  Other findings as described above  Please see discussion  The study was marked in [facility] for immediate notification  Workstation performed: [id number]     Ct Chest Abdomen Pelvis W Contrast    Result Date: 9/12/2019  Narrative: CT CHEST, ABDOMEN AND PELVIS WITH IV CONTRAST INDICATION:   C15 5: Malignant neoplasm of lower third of esophagus  Recent diagnosis of esophageal carcinoma (adenocarcinoma)  COMPARISON:  None  TECHNIQUE: CT examination of the chest, abdomen and pelvis was performed  Axial, sagittal, and coronal 2D reformatted images were created from the source data and submitted for interpretation  Radiation dose length product (DLP) for this visit:  818 mGy-cm   This examination, like all CT scans performed in the [facility], was performed utilizing techniques to minimize radiation dose exposure, including the use of iterative reconstruction and automated exposure control  IV Contrast:  100 mL of iohexol (OMNIPAQUE) Enteric Contrast: Enteric contrast was administered  FINDINGS: CHEST LUNGS:  Solitary focal opacity noted within the anterior left upper lobe, on image 205/25 measuring 7 x 8 mm  On coronal imaging appearing somewhat more elongated although with central nodular aspect about 6 mm  PLEURA:  Unremarkable  HEART/GREAT VESSELS:  Coronary artery calcification  Otherwise unremarkable MEDIASTINUM AND SHEA:  As already known, abnormal appearance of the distal esophagus, beginning just below level of the inferior pulmonary veins and extending to the gastric cardia  Irregular wall thickening and mucosal enhancement noted  Along the left margin of the abnormal esophagus image 201/41 there is a paraesophageal lymph node measuring 8 x 6 mm  A subcarinal lymph node demonstrates short axis diameter 8 mm  CHEST WALL AND LOWER NECK:   Unremarkable  ABDOMEN LIVER/BILIARY TREE:  Unremarkable   GALLBLADDER:  There are gallstone(s) within the gallbladder, without pericholecystic inflammatory changes  SPLEEN:  Unremarkable  PANCREAS:  Unremarkable  ADRENAL GLANDS:  There is a right adrenal nodule measuring 1 2 x 0 7 cm in size  It is somewhat small for accurate characterization  As best as can be told, density is well above fluid on this enhanced only exam KIDNEYS/URETERS:  Unremarkable  No hydronephrosis  STOMACH AND BOWEL:  As above, abnormal appearance of the distal esophagus, involving the gastric cardia as well  Moderately increased stool within the colon  Small bowel unremarkable  APPENDIX:  No findings to suggest appendicitis  ABDOMINOPELVIC CAVITY:  Posterior to the gastric cardia there is an ovoid soft tissue nodule best measured on coronal image 79, measuring 1 7 x 1 0 cm  Along the right margin of the gastric cardia on coronal image 71 there is a soft tissue nodule measuring 1 3 x 1 1 cm, and there is also an ill-defined right lateral border of the gastric cardia for example image 201/54 and coronal image 68  There is a trace amount of free fluid present within the pelvis  VESSELS:  Unremarkable for patient's age  PELVIS REPRODUCTIVE ORGANS:  See below URINARY BLADDER:  Soft tissue nodular density associated with the posterior inferior aspect of the urinary bladder image 201/114 or coronal image 84 measuring approximately 9 mm  This is favored to be enlargement of the median lobe of the prostate gland, less likely bladder polyp ABDOMINAL WALL/INGUINAL REGIONS:  Unremarkable  OSSEOUS STRUCTURES:  No acute fracture or destructive osseous lesion  Impression: 1  Irregular wall thickening involving the distal esophagus beginning just below the inferior pulmonary veins, extending through the gastric cardia compatible with biopsy-proven adenocarcinoma  As above, there is involvement of the gastric cardia, and possible extra luminal extension of tumor along its right lateral margin 2  There is mild adenopathy identified adjacent to the gastric cardia and distal esophagus  Additional borderline prominent mediastinal lymph nodes 3  There is a left upper lobe opacity identified  Although potentially representing an area of inflammation or scarring, an irregularly marginated solitary pulmonary metastasis should be excluded  This could either be reassessed with chest CT in 3 months time or further evaluated with PET CT (it is of borderline size for accurate characterization with PET) 4  Hypertrophy of the median lobe of the prostate (favored) or less likely 9 mm bladder polyp 5  Nonspecific 1 2 cm right adrenal nodule  Overall, PET/CT may be helpful to determine exact extent of adenopathy within the lower chest and upper abdomen, evaluation for any extraluminal extension of tumor, and further characterization of left upper lobe density and right adrenal nodule Workstation performed: [id number]     Nm Pet Ct Skull Base To Mid Thigh    Result Date: 9/19/2019  Narrative: PET/CT SCAN INDICATION: Newly diagnosed esophageal cancer  Initial staging  C15 5: Malignant neoplasm of lower third of esophagus MODIFIER: PI COMPARISON: CT chest abdomen pelvis 9/16/2019 and 9/6/2019 CELL TYPE:  Intramucosal carcinoma, 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 TECHNIQUE:   8 8 mCi F-18-FDG administered IV  Multiplanar attenuation corrected and non attenuation corrected PET images are available for interpretation, and contiguous, low dose, axial CT sections were obtained from the skull base through the femurs   Intravenous contrast material was not utilized  This examination, like all CT scans performed in the [facility], was performed utilizing techniques to minimize radiation dose exposure, including the use of iterative reconstruction and automated exposure control  Fasting serum glucose: 82 mg/dl FINDINGS: VISUALIZED BRAIN:   No acute abnormalities are seen  HEAD/NECK:   Fairly diffuse FDG uptake at the thyroid gland, SUV max of 6 2 may be related to thyroiditis   No FDG avid cervical adenopathy is seen  CT images: Scattered mucosal thickening noted in the paranasal sinuses  CHEST:   Extensive FDG uptake at the distal esophagus leading to the GE junction and proximal stomach, SUV max of 21 0  Associated wall thickening noted here on CT  No FDG avid lymph nodes  CT images: Scattered coronary calcifications noted  Stable subcentimeter irregular density in the left upper lobe anteriorly, image 110 series 3  This is not FDG avid  ABDOMEN:   Extensive FDG uptake at the distal esophagus leading to the GE junction and proximal stomach, SUV max of 21 0  Associated wall thickening noted here on CT  Soft tissue density at the level of the proximal stomach extending to the gastrohepatic region measures 6 0 x 4 7 cm  There may be contiguous FDG avid lymphadenopathy in the gastrohepatic region where is there is soft tissue fullness here on CT inseparable from the gastric wall thickening  Small focus of FDG uptake noted posterior to the proximal stomach, SUV max of 3 7  There is a 2 1 x 1 5 cm perigastric lymph node here on CT image 164 series 3  No suspicious FDG uptake at the adrenal glands  CT images: There is cholelithiasis  Stable nodularity of the right adrenal gland  PELVIS: Focal FDG uptake at the distal sigmoid colon, SUV max of 7 8  Possible eccentric wall thickening on CT  Additional focus of FDG uptake at the level of the proximal sigmoid colon, SUV max of 6 6  No obvious findings here on CT  CT images: Prostate is mildly prominent  Tiny fat-containing left inguinal hernia  OSSEOUS STRUCTURES: Focal FDG uptake at the left anterior 6th rib demonstrates SUV max of 3 7  No obvious findings here on CT  Subtle focus of FDG uptake at the right lateral 2nd rib, SUV max of 2 2  No obvious findings here on CT  CT images: Prior lumbosacral fusion  Impression: 1  Extensive FDG uptake at the distal esophagus, GE junction and proximal stomach compatible with known malignancy   2  FDG avid perigastric lymph node posterior to the proximal stomach suspicious for metastasis  There may be FDG avid gastrohepatic lymphadenopathy but this is inseparable from the gastric mass  3   Focal FDG uptake at the left anterior 6th rib  Possible subtle focus of FDG uptake at the right lateral 2nd rib  No obvious findings here on the limited low-dose CT images  Metastasis is not excluded  Distribution is not typical for prior trauma  Consider follow-up with MRI of the chest  4   Foci of FDG uptake at the sigmoid colon for which underlying lesions should be excluded  Recommend follow-up with colonoscopy  5   Fairly diffuse FDG uptake at the thyroid gland, may be related to thyroiditis  The study was marked in EPIC for significant notification  Workstation performed: [id number]     I reviewed the above laboratory and imaging data  Discussion/Summary:  78-year-old male with a clinical T3N1M0/1 esophageal carcinoma  We discussed typically with esophageal cancer, chemo radiation followed by surgery would be the standard procedure  His PET-CT is certainly concerning for rib metastasis, although the patient is completely asymptomatic  Because of the finding of a possible metastasis I do not think it is unreasonable to consider a short course of chemotherapy and then based on those results potentially add chemo radiation  I suspect his swallowing difficulties will improve just with chemotherapy alone  I have recommended that he have a port as well as a feeding tube placed since he may undergo esophagectomy in the future  We did discuss that if the rib lesions are indeed metastasis, chemotherapy would be the mainstay of his treatment  He has already seen Medical Oncology who is planning on chemotherapy followed by possible chemo radiation, which I think in this case is reasonable given his PET-CT findings    I explained the risks of port placement and feeding jejunostomy to include bleeding, infection, need for further surgery, wound complications, port malfunction, DVT, pneumothorax, and adjacent organ injury  Informed consent was obtained  I have discussed this with Dr Aleshia Johnson  We will schedule this at our earliest mutual convenience  He will need an outpatient colonoscopy in the future  He is agreeable to this  All his questions were answered

## 2019-09-25 ENCOUNTER — RADIATION ONCOLOGY CONSULT (OUTPATIENT)
Dept: RADIATION ONCOLOGY | Facility: CLINIC | Age: 64
End: 2019-09-25
Attending: RADIOLOGY

## 2019-09-25 ENCOUNTER — CLINICAL SUPPORT (OUTPATIENT)
Dept: RADIATION ONCOLOGY | Facility: CLINIC | Age: 64
End: 2019-09-25
Attending: RADIOLOGY

## 2019-09-25 ENCOUNTER — DOCUMENTATION (OUTPATIENT)
Dept: INFUSION CENTER | Facility: CLINIC | Age: 64
End: 2019-09-25

## 2019-09-25 ENCOUNTER — ANESTHESIA EVENT (OUTPATIENT)
Dept: PERIOP | Facility: HOSPITAL | Age: 64
DRG: 346 | End: 2019-09-25
Payer: COMMERCIAL

## 2019-09-25 VITALS
SYSTOLIC BLOOD PRESSURE: 128 MMHG | DIASTOLIC BLOOD PRESSURE: 80 MMHG | WEIGHT: 182 LBS | RESPIRATION RATE: 16 BRPM | HEART RATE: 92 BPM | BODY MASS INDEX: 26.96 KG/M2 | HEIGHT: 69 IN

## 2019-09-25 DIAGNOSIS — C16.0 GE JUNCTION CARCINOMA (HCC): Primary | ICD-10-CM

## 2019-09-25 DIAGNOSIS — C15.5 MALIGNANT NEOPLASM OF LOWER THIRD OF ESOPHAGUS (HCC): ICD-10-CM

## 2019-09-25 LAB
ATRIAL RATE: 81 BPM
P AXIS: 64 DEGREES
PR INTERVAL: 168 MS
QRS AXIS: 49 DEGREES
QRSD INTERVAL: 74 MS
QT INTERVAL: 348 MS
QTC INTERVAL: 404 MS
T WAVE AXIS: 35 DEGREES
VENTRICULAR RATE: 81 BPM

## 2019-09-25 PROCEDURE — 93010 ELECTROCARDIOGRAM REPORT: CPT | Performed by: INTERNAL MEDICINE

## 2019-09-25 NOTE — SOCIAL WORK
MSW reviewed pt's referral from the [facility] and reviewed his chart notes as well  He has been in touch with a financial counselor as well as his nurse navigator  He is scheduled to begin chemo on 10/8, MSW will meet him at this appointment, to introduce myself and assess for any needs

## 2019-09-25 NOTE — PROGRESS NOTES
Sumeet Alegre 1955 is a 71 y o  male    Oncology History    8/19/19 to ER with recurrent episode of foreign body sensation in his throat  hours after eating two small shrimp  Patient notes multiple episodes over the past month that typically have resolved spontaneously    Impression and plan:  Esophageal blockage likely secondary to food bolus that is recurrent  Patient symptoms improved following treatment with glucagon in the emergency room but patient will require close outpatient follow-up with Gastroenterology for EGD to evaluate etiology of recurrent symptoms    8/21/19 Yoan Lindsay PA-C Gastroenterology  over the past 2 months he has had 3 distinct episodes of food getting stuck  He states that each time the food gets stuck to where he cannot tolerate any liquids or secretions  Plan EGD    8/24/19 EGD Dr Lisa Covarrubias  · Severe, generalized edematous, erythematous and hemorrhagic mucosa in the lower third of the esophagus (30 cm from the incisors) with bleeding before intervention; performed 10 cold biopsies  There is circumferential erythema, friability, easy bleeding,  neoplastic abnormality and narrowing from 30 cm down to 40 cm  Ten biopsies were obtained to rule out malignancy  A dilation was not performed since I suspect that the underlying etiology is malignancy  · The stomach and duodenum appeared normal    Esophagus (biopsy):  - At 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     HER2 positive    9/6/19 CT chest, abdomen and pelvis  IMPRESSION:     1  Irregular wall thickening involving the distal esophagus beginning just below the inferior pulmonary veins, extending through the gastric cardia compatible with biopsy-proven adenocarcinoma  As above, there is involvement of the gastric cardia, and possible extra luminal extension of tumor along its right lateral margin  2   There is mild adenopathy identified adjacent to the gastric cardia and distal esophagus  Additional borderline prominent mediastinal lymph nodes  3  There is a left upper lobe opacity identified  Although potentially representing an area of inflammation or scarring, an irregularly marginated solitary pulmonary metastasis should be excluded  This could either be reassessed with chest CT in 3 months time or further evaluated with PET CT (it is of borderline size for accurate characterization with PET)  4  Hypertrophy of the median lobe of the prostate (favored) or less likely 9 mm bladder polyp  5  Nonspecific 1 2 cm right adrenal nodule  Overall, PET/CT may be helpful to determine exact extent of adenopathy within the lower chest and upper abdomen, evaluation for any extraluminal extension of tumor, and further characterization of left upper lobe density and right adrenal nodule    9/16-9/18 admitted with dysphagia    9/16/19 CT chest, abdomen and pelvis  IMPRESSION:     There is irregular wall thickening involving the distal esophagus and extending to the left hand aspect of the upper stomach  There is a masslike area of abnormal soft tissue density along the left hand aspect of the upper stomach which measures approximately 5 5 x 3 6 cm, and there appears to be some infiltration of the adjacent fat  This is concerning for local extension of the patient's known neoplasm  Several mildly prominent nodes are present between this mass and the liver  There are also some mildly prominent mediastinal nodes  Please see discussion      There is some fluid within the esophagus near level of the gregg      There is an approximately 1 5 x 1 cm right adrenal nodule  This appears similar to slightly larger compared to the prior examination  There is a questionable tiny left adrenal nodule  Follow-up of both of these adrenal findings is suggested      There is an approximately 1 2 cm soft tissue density nodule within the posterior aspect of the urinary bladder    Although this may be related to indentation of the prostate, a small bladder mass cannot be entirely excluded  Urology consultation and follow-up is recommended      A focal area of opacity within the anterior aspect of the left upper lobe of the lung appears similar to September 6, 2019  Please see discussion  Continued follow-up is recommended  This could be reassessed with chest CT in 3 months  PET/CT could also be considered, however, it is of borderline size for accurate characterization with PET/ CT  If more remote CTs of the chest exist, direct comparison would be helpful      PET/CT should be considered for further evaluation of the extent of adenopathy and extent of disease  PET/CT or MRI could be utilized to evaluate the adrenal findings, if there are no contraindications      Cholelithiasis  No CT evidence of acute cholecystitis      Atherosclerosis  Coronary artery disease      Other findings as described above  Please see discussion    9/19/19 PET  IMPRESSION:     1  Extensive FDG uptake at the distal esophagus, GE junction and proximal stomach compatible with known malignancy  2  FDG avid perigastric lymph node posterior to the proximal stomach suspicious for metastasis  There may be FDG avid gastrohepatic lymphadenopathy but this is inseparable from the gastric mass  3   Focal FDG uptake at the left anterior 6th rib  Possible subtle focus of FDG uptake at the right lateral 2nd rib  No obvious findings here on the limited low-dose CT images  Metastasis is not excluded  Distribution is not typical for prior trauma  Consider follow-up with MRI of the chest   4   Foci of FDG uptake at the sigmoid colon for which underlying lesions should be excluded  Recommend follow-up with colonoscopy  5   Fairly diffuse FDG uptake at the thyroid gland, may be related to thyroiditis    9/20/19 Parmjit Castillo MD  Plan feeding tube, referral to Med Onc and Rad Onc    If the rib lesion is a metastasis, he is not a candidate for resection    9/24/19 Dr Carol House  recommended that he have a port as well as a feeding tube placed since he may undergo esophagectomy in the future  We did discuss that if the rib lesions are indeed metastasis, chemotherapy would be the mainstay of his treatment    9/24/19 Dr Vivian Alexandre  " I think the patient would benefit from neoadjuvant chemotherapy upfront prior to consideration of either concurrent chemoradiation or even surgery  Herceptin with FLOT chemotherapy Every 2 weeks for 6 cycles  This will consist of 5-FU 2600 mg/m² over 24 hours, leucovorin 20 mg meter square, Oxaliplatin 85 mg/m2 square and Taxotere 60 mg meter square  Once he completed 6 doses of chemotherapy we will reimage him  If he has a nice response we will then consider concurrent chemoradiation and then surgery  If he has a very nice response with very little residual disease surgery could possibly be considered for now I did talk to him about most probably getting concurrent chemoradiation after the chemotherapy "    9/26/19 Port insertion and J tube insertion scheduled at [place] (Dr Carol House)  10/8/19 Infusion scheduled  Dr Juan Alberto Richards on colonoscopy ASAP    Mostly liquids  Can feel any food going     A lot of phlegm gets stuck and causes back pain    Constant cough if tries to lie down  Not sleeping much  Only sleep is sitting          Malignant neoplasm of lower third of esophagus (HCC)    8/24/2019 Initial Diagnosis     Malignant neoplasm of lower third of esophagus (HCC)  At least intramucosal carcinoma  Her2/SHIMON positive      10/2/2019 -  Chemotherapy     pegfilgrastim (NEULASTA ONPRO) subcutaneous injection kit 6 mg, 6 mg, Subcutaneous, Once, 0 of 6 cycles  DOCEtaxel (TAXOTERE) 99 mg in sodium chloride 0 9 % 250 mL chemo infusion, 50 mg/m2 = 99 mg (83 3 % of original dose 60 mg/m2), Intravenous, Once, 0 of 6 cycles  Dose modification: 50 mg/m2 (original dose 60 mg/m2, Cycle 1, Reason: Other (See Comments))  leucovorin 396 mg in dextrose 5 % 250 mL IVPB, 200 mg/m2 = 396 mg (50 % of original dose 400 mg/m2), Intravenous, Once, 0 of 6 cycles  Dose modification: 200 mg/m2 (original dose 400 mg/m2, Cycle 1, Reason: Other (See Comments), Comment: FLOT regimen standard)  oxaliplatin (ELOXATIN) 168 3 mg in dextrose 5 % 250 mL chemo infusion, 85 mg/m2, Intravenous, Once, 0 of 6 cycles  trastuzumab (HERCEPTIN) 496 mg in sodium chloride 0 9 % 250 mL chemo infusion, 6 mg/kg, Intravenous, Once, 0 of 6 cycles        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HCC)    8/24/2019 Biopsy     Esophagus (biopsy):  - At 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       9/24/2019 Initial Diagnosis     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HCC)      10/2/2019 -  Chemotherapy     pegfilgrastim (NEULASTA ONPRO) subcutaneous injection kit 6 mg, 6 mg, Subcutaneous, Once, 0 of 6 cycles  DOCEtaxel (TAXOTERE) 99 mg in sodium chloride 0 9 % 250 mL chemo infusion, 50 mg/m2 = 99 mg (83 3 % of original dose 60 mg/m2), Intravenous, Once, 0 of 6 cycles  Dose modification: 50 mg/m2 (original dose 60 mg/m2, Cycle 1, Reason: Other (See Comments))  leucovorin 396 mg in dextrose 5 % 250 mL IVPB, 200 mg/m2 = 396 mg (50 % of original dose 400 mg/m2), Intravenous, Once, 0 of 6 cycles  Dose modification: 200 mg/m2 (original dose 400 mg/m2, Cycle 1, Reason: Other (See Comments), Comment: FLOT regimen standard)  oxaliplatin (ELOXATIN) 168 3 mg in dextrose 5 % 250 mL chemo infusion, 85 mg/m2, Intravenous, Once, 0 of 6 cycles  trastuzumab (HERCEPTIN) 496 mg in sodium chloride 0 9 % 250 mL chemo infusion, 6 mg/kg, Intravenous, Once, 0 of 6 cycles             Health Maintenance   Topic Date Due    Hepatitis C Screening  1955    Depression Screening PHQ  1955    CRC Screening: Colonoscopy  1955    Diabetic Foot Exam  07/19/1964    DM Eye Exam  07/19/1964    BMI: Followup Plan  07/19/1972    HEPATITIS B VACCINES (1 of 3 - Risk 3-dose series) 1973    DTaP,Tdap,and Td Vaccines (1 - Tdap) 1975    INFLUENZA VACCINE  2019    Fall Risk  2019    Pneumococcal Vaccine: 65+ Years (1 of 2 - PCV13) 2019    HEMOGLOBIN A1C  02/15/2020    URINE MICROALBUMIN  08/15/2020    BMI: Adult  2020    Pneumococcal Vaccine: Pediatrics (0 to 5 Years) and At-Risk Patients (6 to 59 Years)  Aged Out       Past Medical History:   Diagnosis Date    COPD [facility]chronic obstructive pulmonary disease) ([facility] [address] [facility]     Diabetes mellitus ([facility] )     Difficulty swallowing     Esophageal mass     Sleep apnea        Past Surgical History:   Procedure Laterality Date    BACK SURGERY      DISC REMOVAL      TONSILLECTOMY         Family History   Problem Relation Age of Onset    Diabetes Mother     No Known Problems Father        Social History     Tobacco Use    Smoking status: Former Smoker     Packs/day: 0 50     Years: 50 00     Pack years: 25 00     Types: Cigarettes     Last attempt to quit: 2017     Years since quittin 7    Smokeless tobacco: Never Used   Substance Use Topics    Alcohol use: Not Currently     Alcohol/week: 0 0 standard drinks     Frequency: Never     Drinks per session: 1 or 2     Binge frequency: Never    Drug use: Not Currently          Current Outpatient Medications:     albuterol (2 5 mg/3 mL) 0 083 % nebulizer solution, Take 1 vial (2 5 mg total) by nebulization every 6 (six) hours as needed for wheezing or shortness of breath, Disp: 1 vial, Rfl: 5    canagliflozin (INVOKANA) 100 mg, Daily, Disp: , Rfl:     levothyroxine 25 mcg tablet, Take 25 mcg by mouth daily in the early morning, Disp: , Rfl:     Melatonin 5 MG/ML LIQD, Take 2 mL (10 mg total) by mouth daily at bedtime as needed (insomnia), Disp: 1 Bottle, Rfl: 1    pantoprazole (PROTONIX) 40 mg tablet, Take 1 tablet (40 mg total) by mouth daily, Disp: 30 tablet, Rfl: 1    pioglitazone (ACTOS) 30 mg tablet, Take 30 mg by mouth daily , Disp: , Rfl:   sitaGLIPtin (JANUVIA) 100 mg tablet, Take 100 mg by mouth daily , Disp: , Rfl:     Allergies   Allergen Reactions    Penicillins Itching        Review of Systems:  Review of Systems   Constitutional: Positive for activity change, appetite change (wants to eat but limited) and fatigue  HENT: Negative  Eyes: Negative  Respiratory: Positive for cough and shortness of breath  Gastrointestinal: Negative for nausea and vomiting  Endocrine: Negative  Genitourinary: Negative  Musculoskeletal: Positive for back pain (when tries to eat)  Skin: Negative  Allergic/Immunologic: Negative  Neurological: Negative  Hematological: Negative  Psychiatric/Behavioral: Negative  Vitals:    09/25/19 1157   BP: 128/80   BP Location: Right arm   Patient Position: Sitting   Cuff Size: Standard   Pulse: 92   Resp: 16   Weight: 82 6 kg (182 lb)   Height: 5' 9" (1 753 m)   Oxygen 94%    Pain Score: 0-No pain    Pain assessment: 0    No results found for: PSA:    Imaging:No images are attached to the encounter

## 2019-09-25 NOTE — PRE-PROCEDURE INSTRUCTIONS
Pre-Surgery Instructions:   Medication Instructions    albuterol (2 5 mg/3 mL) 0 083 % nebulizer solution Instructed patient per Anesthesia Guidelines   canagliflozin (INVOKANA) 100 mg Instructed patient per Anesthesia Guidelines   levothyroxine 25 mcg tablet Instructed patient per Anesthesia Guidelines   Melatonin 5 MG/ML LIQD Instructed patient per Anesthesia Guidelines   pantoprazole (PROTONIX) 40 mg tablet Instructed patient per Anesthesia Guidelines   pioglitazone (ACTOS) 30 mg tablet Instructed patient per Anesthesia Guidelines   sitaGLIPtin (JANUVIA) 100 mg tablet Instructed patient per Anesthesia Guidelines  WILL TAKE THYROID PILL, PROTONIX, AM SURG SM SIP H2O  NO DM PILLS AM SURG  INSTR ON KATEY CALL,  REPORT LOC , BRING PHOTO ID/MED LIST/INS  INFO ,SHOWER REV , STOP ASA/NSAID/VIT 7 DAY PREOP, PT VERBALIZES UNDERSTANDING W/ NO FURTHER QUESTIONS

## 2019-09-26 ENCOUNTER — ANESTHESIA (OUTPATIENT)
Dept: PERIOP | Facility: HOSPITAL | Age: 64
DRG: 346 | End: 2019-09-26
Payer: COMMERCIAL

## 2019-09-26 ENCOUNTER — DOCUMENTATION (OUTPATIENT)
Dept: HEMATOLOGY ONCOLOGY | Facility: CLINIC | Age: 64
End: 2019-09-26

## 2019-09-26 ENCOUNTER — APPOINTMENT (OUTPATIENT)
Dept: RADIOLOGY | Facility: HOSPITAL | Age: 64
DRG: 346 | End: 2019-09-26
Payer: COMMERCIAL

## 2019-09-26 ENCOUNTER — TELEPHONE (OUTPATIENT)
Dept: NUTRITION | Facility: CLINIC | Age: 64
End: 2019-09-26

## 2019-09-26 ENCOUNTER — HOSPITAL ENCOUNTER (INPATIENT)
Facility: HOSPITAL | Age: 64
LOS: 3 days | Discharge: HOME HEALTH | DRG: 346 | End: 2019-09-29
Attending: SURGERY | Admitting: SURGERY
Payer: COMMERCIAL

## 2019-09-26 ENCOUNTER — HOSPITAL ENCOUNTER (OUTPATIENT)
Dept: RADIOLOGY | Facility: HOSPITAL | Age: 64
Setting detail: SURGERY ADMIT
Discharge: HOME | DRG: 346 | End: 2019-09-26
Payer: COMMERCIAL

## 2019-09-26 DIAGNOSIS — K22.2 ESOPHAGEAL OBSTRUCTION: ICD-10-CM

## 2019-09-26 DIAGNOSIS — E11.65 TYPE 2 DIABETES MELLITUS WITH HYPERGLYCEMIA, WITHOUT LONG-TERM CURRENT USE OF INSULIN (HCC): ICD-10-CM

## 2019-09-26 DIAGNOSIS — C15.5 MALIGNANT NEOPLASM OF LOWER THIRD OF ESOPHAGUS (HCC): ICD-10-CM

## 2019-09-26 DIAGNOSIS — C16.0 GE JUNCTION CARCINOMA (HCC): Primary | ICD-10-CM

## 2019-09-26 LAB
GLUCOSE SERPL-MCNC: 117 MG/DL (ref 65–140)
GLUCOSE SERPL-MCNC: 134 MG/DL (ref 65–140)
GLUCOSE SERPL-MCNC: 152 MG/DL (ref 65–140)
GLUCOSE SERPL-MCNC: 189 MG/DL (ref 65–140)
GLUCOSE SERPL-MCNC: 195 MG/DL (ref 65–140)

## 2019-09-26 PROCEDURE — 77001 FLUOROGUIDE FOR VEIN DEVICE: CPT | Performed by: SURGERY

## 2019-09-26 PROCEDURE — 94760 N-INVAS EAR/PLS OXIMETRY 1: CPT

## 2019-09-26 PROCEDURE — 02HV33Z INSERTION OF INFUSION DEVICE INTO SUPERIOR VENA CAVA, PERCUTANEOUS APPROACH: ICD-10-PCS | Performed by: SURGERY

## 2019-09-26 PROCEDURE — 77001 FLUOROGUIDE FOR VEIN DEVICE: CPT

## 2019-09-26 PROCEDURE — 36571 INSERT PICVAD CATH: CPT | Performed by: SURGERY

## 2019-09-26 PROCEDURE — C1788 PORT, INDWELLING, IMP: HCPCS | Performed by: SURGERY

## 2019-09-26 PROCEDURE — 71045 X-RAY EXAM CHEST 1 VIEW: CPT

## 2019-09-26 PROCEDURE — 0D9A30Z DRAINAGE OF JEJUNUM WITH DRAINAGE DEVICE, PERCUTANEOUS APPROACH: ICD-10-PCS | Performed by: SURGERY

## 2019-09-26 PROCEDURE — 44300 OPEN BOWEL TO SKIN: CPT | Performed by: SURGERY

## 2019-09-26 PROCEDURE — 94640 AIRWAY INHALATION TREATMENT: CPT

## 2019-09-26 PROCEDURE — 82948 REAGENT STRIP/BLOOD GLUCOSE: CPT

## 2019-09-26 DEVICE — PORT HIGH PROFILE 8FR KIT PRO-FUSE: Type: IMPLANTABLE DEVICE | Site: CHEST  WALL | Status: FUNCTIONAL

## 2019-09-26 RX ORDER — OXYCODONE HCL 5 MG/5 ML
10 SOLUTION, ORAL ORAL EVERY 4 HOURS PRN
Status: DISCONTINUED | OUTPATIENT
Start: 2019-09-26 | End: 2019-09-29 | Stop reason: HOSPADM

## 2019-09-26 RX ORDER — EPHEDRINE SULFATE 50 MG/ML
INJECTION INTRAVENOUS AS NEEDED
Status: DISCONTINUED | OUTPATIENT
Start: 2019-09-26 | End: 2019-09-26 | Stop reason: SURG

## 2019-09-26 RX ORDER — ROCURONIUM BROMIDE 10 MG/ML
INJECTION, SOLUTION INTRAVENOUS AS NEEDED
Status: DISCONTINUED | OUTPATIENT
Start: 2019-09-26 | End: 2019-09-26 | Stop reason: SURG

## 2019-09-26 RX ORDER — HYDROMORPHONE HCL/PF 1 MG/ML
0.5 SYRINGE (ML) INJECTION
Status: DISCONTINUED | OUTPATIENT
Start: 2019-09-26 | End: 2019-09-29 | Stop reason: HOSPADM

## 2019-09-26 RX ORDER — LEVALBUTEROL 1.25 MG/.5ML
1.25 SOLUTION, CONCENTRATE RESPIRATORY (INHALATION)
Status: DISCONTINUED | OUTPATIENT
Start: 2019-09-26 | End: 2019-09-29 | Stop reason: HOSPADM

## 2019-09-26 RX ORDER — LEVOTHYROXINE SODIUM 0.03 MG/1
25 TABLET ORAL
Status: DISCONTINUED | OUTPATIENT
Start: 2019-09-27 | End: 2019-09-29 | Stop reason: HOSPADM

## 2019-09-26 RX ORDER — HEPARIN SODIUM 5000 [USP'U]/ML
5000 INJECTION, SOLUTION INTRAVENOUS; SUBCUTANEOUS EVERY 8 HOURS SCHEDULED
Status: DISCONTINUED | OUTPATIENT
Start: 2019-09-26 | End: 2019-09-29 | Stop reason: HOSPADM

## 2019-09-26 RX ORDER — LIDOCAINE HYDROCHLORIDE 10 MG/ML
0.5 INJECTION, SOLUTION EPIDURAL; INFILTRATION; INTRACAUDAL; PERINEURAL ONCE AS NEEDED
Status: COMPLETED | OUTPATIENT
Start: 2019-09-26 | End: 2019-09-26

## 2019-09-26 RX ORDER — HYDROMORPHONE HCL/PF 1 MG/ML
0.2 SYRINGE (ML) INJECTION
Status: DISCONTINUED | OUTPATIENT
Start: 2019-09-26 | End: 2019-09-26 | Stop reason: HOSPADM

## 2019-09-26 RX ORDER — ACETAMINOPHEN 160 MG/1
500 BAR, CHEWABLE ORAL EVERY 6 HOURS PRN
Status: DISCONTINUED | OUTPATIENT
Start: 2019-09-26 | End: 2019-09-27 | Stop reason: SDUPTHER

## 2019-09-26 RX ORDER — DEXMEDETOMIDINE HYDROCHLORIDE 100 UG/ML
INJECTION, SOLUTION INTRAVENOUS AS NEEDED
Status: DISCONTINUED | OUTPATIENT
Start: 2019-09-26 | End: 2019-09-26 | Stop reason: SURG

## 2019-09-26 RX ORDER — SUCCINYLCHOLINE/SOD CL,ISO/PF 100 MG/5ML
SYRINGE (ML) INTRAVENOUS AS NEEDED
Status: DISCONTINUED | OUTPATIENT
Start: 2019-09-26 | End: 2019-09-26 | Stop reason: SURG

## 2019-09-26 RX ORDER — SODIUM CHLORIDE, SODIUM LACTATE, POTASSIUM CHLORIDE, CALCIUM CHLORIDE 600; 310; 30; 20 MG/100ML; MG/100ML; MG/100ML; MG/100ML
100 INJECTION, SOLUTION INTRAVENOUS CONTINUOUS
Status: DISCONTINUED | OUTPATIENT
Start: 2019-09-26 | End: 2019-09-27

## 2019-09-26 RX ORDER — LANOLIN ALCOHOL/MO/W.PET/CERES
6 CREAM (GRAM) TOPICAL
Status: DISCONTINUED | OUTPATIENT
Start: 2019-09-26 | End: 2019-09-29 | Stop reason: HOSPADM

## 2019-09-26 RX ORDER — LIDOCAINE HYDROCHLORIDE 10 MG/ML
INJECTION, SOLUTION INFILTRATION; PERINEURAL AS NEEDED
Status: DISCONTINUED | OUTPATIENT
Start: 2019-09-26 | End: 2019-09-26 | Stop reason: SURG

## 2019-09-26 RX ORDER — BUPIVACAINE HYDROCHLORIDE 2.5 MG/ML
INJECTION, SOLUTION EPIDURAL; INFILTRATION; INTRACAUDAL AS NEEDED
Status: DISCONTINUED | OUTPATIENT
Start: 2019-09-26 | End: 2019-09-26 | Stop reason: HOSPADM

## 2019-09-26 RX ORDER — 0.9 % SODIUM CHLORIDE 0.9 %
VIAL (ML) INJECTION AS NEEDED
Status: DISCONTINUED | OUTPATIENT
Start: 2019-09-26 | End: 2019-09-26 | Stop reason: HOSPADM

## 2019-09-26 RX ORDER — PROPOFOL 10 MG/ML
INJECTION, EMULSION INTRAVENOUS AS NEEDED
Status: DISCONTINUED | OUTPATIENT
Start: 2019-09-26 | End: 2019-09-26 | Stop reason: SURG

## 2019-09-26 RX ORDER — ONDANSETRON 2 MG/ML
INJECTION INTRAMUSCULAR; INTRAVENOUS AS NEEDED
Status: DISCONTINUED | OUTPATIENT
Start: 2019-09-26 | End: 2019-09-26 | Stop reason: SURG

## 2019-09-26 RX ORDER — ALBUTEROL SULFATE 2.5 MG/3ML
2.5 SOLUTION RESPIRATORY (INHALATION) EVERY 6 HOURS PRN
Status: DISCONTINUED | OUTPATIENT
Start: 2019-09-26 | End: 2019-09-29

## 2019-09-26 RX ORDER — ONDANSETRON 2 MG/ML
4 INJECTION INTRAMUSCULAR; INTRAVENOUS ONCE AS NEEDED
Status: DISCONTINUED | OUTPATIENT
Start: 2019-09-26 | End: 2019-09-26 | Stop reason: HOSPADM

## 2019-09-26 RX ORDER — MAGNESIUM HYDROXIDE 1200 MG/15ML
LIQUID ORAL AS NEEDED
Status: DISCONTINUED | OUTPATIENT
Start: 2019-09-26 | End: 2019-09-26 | Stop reason: HOSPADM

## 2019-09-26 RX ORDER — OXYCODONE HCL 5 MG/5 ML
5 SOLUTION, ORAL ORAL EVERY 4 HOURS PRN
Status: DISCONTINUED | OUTPATIENT
Start: 2019-09-26 | End: 2019-09-29 | Stop reason: HOSPADM

## 2019-09-26 RX ORDER — LEVOFLOXACIN 5 MG/ML
500 INJECTION, SOLUTION INTRAVENOUS ONCE
Status: COMPLETED | OUTPATIENT
Start: 2019-09-26 | End: 2019-09-26

## 2019-09-26 RX ORDER — MIDAZOLAM HYDROCHLORIDE 1 MG/ML
INJECTION INTRAMUSCULAR; INTRAVENOUS AS NEEDED
Status: DISCONTINUED | OUTPATIENT
Start: 2019-09-26 | End: 2019-09-26 | Stop reason: SURG

## 2019-09-26 RX ORDER — ONDANSETRON 2 MG/ML
4 INJECTION INTRAMUSCULAR; INTRAVENOUS EVERY 6 HOURS PRN
Status: DISCONTINUED | OUTPATIENT
Start: 2019-09-26 | End: 2019-09-29 | Stop reason: HOSPADM

## 2019-09-26 RX ORDER — FENTANYL CITRATE/PF 50 MCG/ML
25 SYRINGE (ML) INJECTION
Status: COMPLETED | OUTPATIENT
Start: 2019-09-26 | End: 2019-09-26

## 2019-09-26 RX ORDER — DEXAMETHASONE SODIUM PHOSPHATE 10 MG/ML
INJECTION, SOLUTION INTRAMUSCULAR; INTRAVENOUS AS NEEDED
Status: DISCONTINUED | OUTPATIENT
Start: 2019-09-26 | End: 2019-09-26 | Stop reason: SURG

## 2019-09-26 RX ORDER — PANTOPRAZOLE SODIUM 40 MG/1
40 TABLET, DELAYED RELEASE ORAL
Status: DISCONTINUED | OUTPATIENT
Start: 2019-09-27 | End: 2019-09-29 | Stop reason: HOSPADM

## 2019-09-26 RX ORDER — SODIUM CHLORIDE, SODIUM LACTATE, POTASSIUM CHLORIDE, CALCIUM CHLORIDE 600; 310; 30; 20 MG/100ML; MG/100ML; MG/100ML; MG/100ML
125 INJECTION, SOLUTION INTRAVENOUS CONTINUOUS
Status: DISCONTINUED | OUTPATIENT
Start: 2019-09-26 | End: 2019-09-26

## 2019-09-26 RX ORDER — FENTANYL CITRATE 50 UG/ML
INJECTION, SOLUTION INTRAMUSCULAR; INTRAVENOUS AS NEEDED
Status: DISCONTINUED | OUTPATIENT
Start: 2019-09-26 | End: 2019-09-26 | Stop reason: SURG

## 2019-09-26 RX ORDER — SODIUM CHLORIDE FOR INHALATION 0.9 %
3 VIAL, NEBULIZER (ML) INHALATION
Status: DISCONTINUED | OUTPATIENT
Start: 2019-09-26 | End: 2019-09-29 | Stop reason: HOSPADM

## 2019-09-26 RX ADMIN — FENTANYL CITRATE 50 MCG: 50 INJECTION, SOLUTION INTRAMUSCULAR; INTRAVENOUS at 13:31

## 2019-09-26 RX ADMIN — INSULIN LISPRO 1 UNITS: 100 INJECTION, SOLUTION INTRAVENOUS; SUBCUTANEOUS at 20:07

## 2019-09-26 RX ADMIN — LIDOCAINE HYDROCHLORIDE 0.5 ML: 10 INJECTION, SOLUTION EPIDURAL; INFILTRATION; INTRACAUDAL; PERINEURAL at 11:57

## 2019-09-26 RX ADMIN — PHENYLEPHRINE HYDROCHLORIDE 200 MCG: 10 INJECTION INTRAVENOUS at 13:28

## 2019-09-26 RX ADMIN — SODIUM CHLORIDE, SODIUM LACTATE, POTASSIUM CHLORIDE, AND CALCIUM CHLORIDE: .6; .31; .03; .02 INJECTION, SOLUTION INTRAVENOUS at 14:28

## 2019-09-26 RX ADMIN — SUGAMMADEX 200 MG: 100 INJECTION, SOLUTION INTRAVENOUS at 14:39

## 2019-09-26 RX ADMIN — DEXMEDETOMIDINE HCL 8 MCG: 100 INJECTION INTRAVENOUS at 14:50

## 2019-09-26 RX ADMIN — DEXMEDETOMIDINE HCL 8 MCG: 100 INJECTION INTRAVENOUS at 14:17

## 2019-09-26 RX ADMIN — MIDAZOLAM 2 MG: 1 INJECTION INTRAMUSCULAR; INTRAVENOUS at 13:12

## 2019-09-26 RX ADMIN — FENTANYL CITRATE 25 MCG: 50 INJECTION INTRAMUSCULAR; INTRAVENOUS at 17:03

## 2019-09-26 RX ADMIN — Medication 80 MG: at 13:19

## 2019-09-26 RX ADMIN — PHENYLEPHRINE HYDROCHLORIDE 200 MCG: 10 INJECTION INTRAVENOUS at 13:34

## 2019-09-26 RX ADMIN — EPHEDRINE SULFATE 10 MG: 50 INJECTION, SOLUTION INTRAVENOUS at 13:45

## 2019-09-26 RX ADMIN — PROPOFOL 160 MG: 10 INJECTION, EMULSION INTRAVENOUS at 13:19

## 2019-09-26 RX ADMIN — PROPOFOL 40 MG: 10 INJECTION, EMULSION INTRAVENOUS at 13:29

## 2019-09-26 RX ADMIN — FENTANYL CITRATE 25 MCG: 50 INJECTION INTRAMUSCULAR; INTRAVENOUS at 15:51

## 2019-09-26 RX ADMIN — HEPARIN SODIUM 5000 UNITS: 5000 INJECTION INTRAVENOUS; SUBCUTANEOUS at 22:15

## 2019-09-26 RX ADMIN — OXYCODONE HYDROCHLORIDE 5 MG: 5 SOLUTION ORAL at 17:49

## 2019-09-26 RX ADMIN — ISODIUM CHLORIDE 3 ML: 0.03 SOLUTION RESPIRATORY (INHALATION) at 19:08

## 2019-09-26 RX ADMIN — LIDOCAINE HYDROCHLORIDE 50 MG: 10 INJECTION, SOLUTION INFILTRATION; PERINEURAL at 13:19

## 2019-09-26 RX ADMIN — DEXMEDETOMIDINE HCL 16 MCG: 100 INJECTION INTRAVENOUS at 14:47

## 2019-09-26 RX ADMIN — FENTANYL CITRATE 50 MCG: 50 INJECTION, SOLUTION INTRAMUSCULAR; INTRAVENOUS at 13:19

## 2019-09-26 RX ADMIN — FENTANYL CITRATE 25 MCG: 50 INJECTION INTRAMUSCULAR; INTRAVENOUS at 15:59

## 2019-09-26 RX ADMIN — ONDANSETRON 4 MG: 2 INJECTION INTRAMUSCULAR; INTRAVENOUS at 13:31

## 2019-09-26 RX ADMIN — METRONIDAZOLE 500 MG: 500 INJECTION, SOLUTION INTRAVENOUS at 13:27

## 2019-09-26 RX ADMIN — DEXAMETHASONE SODIUM PHOSPHATE 10 MG: 10 INJECTION, SOLUTION INTRAMUSCULAR; INTRAVENOUS at 13:31

## 2019-09-26 RX ADMIN — FENTANYL CITRATE 25 MCG: 50 INJECTION INTRAMUSCULAR; INTRAVENOUS at 16:10

## 2019-09-26 RX ADMIN — LEVALBUTEROL HYDROCHLORIDE 1.25 MG: 1.25 SOLUTION, CONCENTRATE RESPIRATORY (INHALATION) at 19:08

## 2019-09-26 RX ADMIN — ROCURONIUM BROMIDE 20 MG: 10 INJECTION INTRAVENOUS at 13:24

## 2019-09-26 RX ADMIN — INSULIN LISPRO 1 UNITS: 100 INJECTION, SOLUTION INTRAVENOUS; SUBCUTANEOUS at 23:27

## 2019-09-26 RX ADMIN — SODIUM CHLORIDE, SODIUM LACTATE, POTASSIUM CHLORIDE, AND CALCIUM CHLORIDE 125 ML/HR: .6; .31; .03; .02 INJECTION, SOLUTION INTRAVENOUS at 11:57

## 2019-09-26 RX ADMIN — LEVOFLOXACIN 500 MG: 5 INJECTION, SOLUTION INTRAVENOUS at 11:50

## 2019-09-26 NOTE — TELEPHONE ENCOUNTER
Thelma Ku to set up nutrition consultation after receiving notification by David Corbin RN on 9/26/19 that pt is appropriate for oncology nutrition care (reason for referral: Esophageal CA dx and TF)  No answer, left voice message with reason for today's call and this RDs contact information asking that Nadya Samanta call back as able/desired  PMH: COPD, DM, Jejunostomy 9/26/19  Oncology Diagnosis & Treatments: Malignant neoplasm of lower third of esophagus diagnosed 8/24/19  Chemotherapy (neulasta, taxotere, oxaliplatin, trastuzumab) to start 10/8/19  Oncology History    8/19/19 to ER with recurrent episode of foreign body sensation in his throat  hours after eating two small shrimp  Patient notes multiple episodes over the past month that typically have resolved spontaneously    Impression and plan:  Esophageal blockage likely secondary to food bolus that is recurrent  Patient symptoms improved following treatment with glucagon in the emergency room but patient will require close outpatient follow-up with Gastroenterology for EGD to evaluate etiology of recurrent symptoms    8/21/19 Yolanda Mckenna PA-C Gastroenterology  over the past 2 months he has had 3 distinct episodes of food getting stuck  He states that each time the food gets stuck to where he cannot tolerate any liquids or secretions  Plan EGD    8/24/19 EGD Dr Leno Ramos  · Severe, generalized edematous, erythematous and hemorrhagic mucosa in the lower third of the esophagus (30 cm from the incisors) with bleeding before intervention; performed 10 cold biopsies  There is circumferential erythema, friability, easy bleeding,  neoplastic abnormality and narrowing from 30 cm down to 40 cm  Ten biopsies were obtained to rule out malignancy  A dilation was not performed since I suspect that the underlying etiology is malignancy    · The stomach and duodenum appeared normal    Esophagus (biopsy):  - At 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     HER2 positive    9/6/19 CT chest, abdomen and pelvis  IMPRESSION:     1  Irregular wall thickening involving the distal esophagus beginning just below the inferior pulmonary veins, extending through the gastric cardia compatible with biopsy-proven adenocarcinoma  As above, there is involvement of the gastric cardia, and possible extra luminal extension of tumor along its right lateral margin  2  There is mild adenopathy identified adjacent to the gastric cardia and distal esophagus  Additional borderline prominent mediastinal lymph nodes  3  There is a left upper lobe opacity identified  Although potentially representing an area of inflammation or scarring, an irregularly marginated solitary pulmonary metastasis should be excluded  This could either be reassessed with chest CT in 3 months time or further evaluated with PET CT (it is of borderline size for accurate characterization with PET)  4  Hypertrophy of the median lobe of the prostate (favored) or less likely 9 mm bladder polyp  5  Nonspecific 1 2 cm right adrenal nodule  Overall, PET/CT may be helpful to determine exact extent of adenopathy within the lower chest and upper abdomen, evaluation for any extraluminal extension of tumor, and further characterization of left upper lobe density and right adrenal nodule    9/16-9/18 admitted with dysphagia    9/16/19 CT chest, abdomen and pelvis  IMPRESSION:     There is irregular wall thickening involving the distal esophagus and extending to the left hand aspect of the upper stomach  There is a masslike area of abnormal soft tissue density along the left hand aspect of the upper stomach which measures approximately 5 5 x 3 6 cm, and there appears to be some infiltration of the adjacent fat  This is concerning for local extension of the patient's known neoplasm  Several mildly prominent nodes are present between this mass and the liver    There are also some mildly prominent mediastinal nodes  Please see discussion      There is some fluid within the esophagus near level of the gregg      There is an approximately 1 5 x 1 cm right adrenal nodule  This appears similar to slightly larger compared to the prior examination  There is a questionable tiny left adrenal nodule  Follow-up of both of these adrenal findings is suggested      There is an approximately 1 2 cm soft tissue density nodule within the posterior aspect of the urinary bladder  Although this may be related to indentation of the prostate, a small bladder mass cannot be entirely excluded  Urology consultation and follow-up is recommended      A focal area of opacity within the anterior aspect of the left upper lobe of the lung appears similar to September 6, 2019  Please see discussion  Continued follow-up is recommended  This could be reassessed with chest CT in 3 months  PET/CT could also be considered, however, it is of borderline size for accurate characterization with PET/ CT  If more remote CTs of the chest exist, direct comparison would be helpful      PET/CT should be considered for further evaluation of the extent of adenopathy and extent of disease  PET/CT or MRI could be utilized to evaluate the adrenal findings, if there are no contraindications      Cholelithiasis  No CT evidence of acute cholecystitis      Atherosclerosis  Coronary artery disease      Other findings as described above  Please see discussion    9/19/19 PET  IMPRESSION:     1  Extensive FDG uptake at the distal esophagus, GE junction and proximal stomach compatible with known malignancy  2  FDG avid perigastric lymph node posterior to the proximal stomach suspicious for metastasis  There may be FDG avid gastrohepatic lymphadenopathy but this is inseparable from the gastric mass  3   Focal FDG uptake at the left anterior 6th rib  Possible subtle focus of FDG uptake at the right lateral 2nd rib    No obvious findings here on the limited low-dose CT images  Metastasis is not excluded  Distribution is not typical for prior trauma  Consider follow-up with MRI of the chest   4   Foci of FDG uptake at the sigmoid colon for which underlying lesions should be excluded  Recommend follow-up with colonoscopy  5   Fairly diffuse FDG uptake at the thyroid gland, may be related to thyroiditis    9/20/19 Mary Kate Mancera MD  Plan feeding tube, referral to Med Onc and Rad Onc  If the rib lesion is a metastasis, he is not a candidate for resection    9/24/19 Dr Marian Fernandes  recommended that he have a port as well as a feeding tube placed since he may undergo esophagectomy in the future  We did discuss that if the rib lesions are indeed metastasis, chemotherapy would be the mainstay of his treatment    9/24/19 Dr Rickie Francis  " I think the patient would benefit from neoadjuvant chemotherapy upfront prior to consideration of either concurrent chemoradiation or even surgery  Herceptin with FLOT chemotherapy Every 2 weeks for 6 cycles  This will consist of 5-FU 2600 mg/m² over 24 hours, leucovorin 20 mg meter square, Oxaliplatin 85 mg/m2 square and Taxotere 60 mg meter square  Once he completed 6 doses of chemotherapy we will reimage him  If he has a nice response we will then consider concurrent chemoradiation and then surgery  If he has a very nice response with very little residual disease surgery could possibly be considered for now I did talk to him about most probably getting concurrent chemoradiation after the chemotherapy "    9/26/19 Port insertion and J tube insertion scheduled at [facility] (Dr Marian Fernandes)  10/8/19 Infusion scheduled  Dr Barton Agent on colonoscopy ASAP    Mostly liquids  Can feel any food going     A lot of phlegm gets stuck and causes back pain    Constant cough if tries to lie down  Not sleeping much  Only sleep is sitting          Malignant neoplasm of lower third of esophagus (HCC)    8/24/2019 Initial Diagnosis Malignant neoplasm of lower third of esophagus (HCC)  At least intramucosal carcinoma  Her2/SHIMON positive      10/2/2019 -  Chemotherapy     pegfilgrastim (NEULASTA ONPRO) subcutaneous injection kit 6 mg, 6 mg, Subcutaneous, Once, 0 of 6 cycles  DOCEtaxel (TAXOTERE) 99 mg in sodium chloride 0 9 % 250 mL chemo infusion, 50 mg/m2 = 99 mg (83 3 % of original dose 60 mg/m2), Intravenous, Once, 0 of 6 cycles  Dose modification: 50 mg/m2 (original dose 60 mg/m2, Cycle 1, Reason: Other (See Comments))  leucovorin 396 mg in dextrose 5 % 250 mL IVPB, 200 mg/m2 = 396 mg (50 % of original dose 400 mg/m2), Intravenous, Once, 0 of 6 cycles  Dose modification: 200 mg/m2 (original dose 400 mg/m2, Cycle 1, Reason: Other (See Comments), Comment: FLOT regimen standard)  oxaliplatin (ELOXATIN) 168 3 mg in dextrose 5 % 250 mL chemo infusion, 85 mg/m2, Intravenous, Once, 0 of 6 cycles  trastuzumab (HERCEPTIN) 496 mg in sodium chloride 0 9 % 250 mL chemo infusion, 6 mg/kg, Intravenous, Once, 0 of 6 cycles        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HCC)    8/24/2019 Biopsy     Esophagus (biopsy):  - At 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       9/24/2019 Initial Diagnosis     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HCC)      10/2/2019 -  Chemotherapy     pegfilgrastim (NEULASTA ONPRO) subcutaneous injection kit 6 mg, 6 mg, Subcutaneous, Once, 0 of 6 cycles  DOCEtaxel (TAXOTERE) 99 mg in sodium chloride 0 9 % 250 mL chemo infusion, 50 mg/m2 = 99 mg (83 3 % of original dose 60 mg/m2), Intravenous, Once, 0 of 6 cycles  Dose modification: 50 mg/m2 (original dose 60 mg/m2, Cycle 1, Reason: Other (See Comments))  leucovorin 396 mg in dextrose 5 % 250 mL IVPB, 200 mg/m2 = 396 mg (50 % of original dose 400 mg/m2), Intravenous, Once, 0 of 6 cycles  Dose modification: 200 mg/m2 (original dose 400 mg/m2, Cycle 1, Reason: Other (See Comments), Comment: FLOT regimen standard)  oxaliplatin (ELOXATIN) 168 3 mg in dextrose 5 % 250 mL chemo infusion, 85 mg/m2, Intravenous, Once, 0 of 6 cycles  trastuzumab (HERCEPTIN) 496 mg in sodium chloride 0 9 % 250 mL chemo infusion, 6 mg/kg, Intravenous, Once, 0 of 6 cycles       Past Medical History:   Diagnosis Date    COPD [facility]chronic obstructive [facility]) [facility] [address] [facility]     Diabetes mellitus [facility] [address] [facility]     Difficulty swallowing     Esophageal mass     Sleep apnea      Past Surgical History:   Procedure Laterality Date    BACK SURGERY      DISC REMOVAL      TONSILLECTOMY         Review of Medications:   No current facility-administered medications for this visit  No current outpatient medications on file      Facility-Administered Medications Ordered in Other Visits:     dexamethasone (PF) (DECADRON) injection, , , PRN, Lazara Bray MD, 10 mg at 09/26/19 1331    ePHEDrine injection, , , PRN, Lazara Bray MD, 10 mg at 09/26/19 1345    fentanyl citrate (PF) 100 MCG/2ML, , Intravenous, PRN, Lazara Bray MD, 50 mcg at 09/26/19 1331    lactated ringers infusion, 125 mL/hr, Intravenous, Continuous, Lazara Bray MD, Last Rate: 125 mL/hr at 09/26/19 1157    lidocaine (XYLOCAINE) 1 % injection, , , PRN, Lazara Bray MD, 50 mg at 09/26/19 1319    midazolam (VERSED) injection, , Intravenous, PRN, Lazara Bray MD, 2 mg at 09/26/19 1312    ondansetron (ZOFRAN) injection, , , PRN, Lazara Bray MD, 4 mg at 09/26/19 1331    phenylephrine bolus from bag, , , PRN, Lazara Bray MD, 200 mcg at 09/26/19 1334    propofol (DIPRIVAN) 200 MG/20ML bolus injection, , Intravenous, PRN, Lazara Bray MD, 40 mg at 09/26/19 1329    rocuronium (ZEMURON) injection, , , PRN, Lazara Bray MD, 20 mg at 09/26/19 1324    sodium chloride (PF) 0 9 % injection, , , PRN, Sonia Grossman MD, 20 mL at 09/26/19 1359    sodium chloride 0 9 % irrigation solution, , , PRN, Sonia Grossman MD, 300 mL at 09/26/19 1357    sterile water irrigation solution, , , PRN, Sonia Grossman MD, 1,000 mL at 09/26/19 1356   Succinylcholine Chloride 100 mg/5 mL syringe, , Intravenous, PRN, Asiya Medina MD, 80 mg at 09/26/19 1319    Most Recent Lab Results:   Lab Results   Component Value Date    WBC 9 80 09/16/2019    ALT 11 (L) 09/16/2019    AST 7 09/16/2019    K 3 7 09/17/2019    K 3 9 09/16/2019    BUN 22 09/17/2019    BUN 26 (H) 09/16/2019    CREATININE 0 84 09/17/2019    CREATININE 1 12 09/16/2019    HGBA1C 7 6 08/15/2019    CALCIUM 9 0 09/17/2019       Anthropometric Measurements:   Ht Readings from Last 1 Encounters:   09/26/19 5' 9" (1 753 m)     -Weight History: Wt Readings from Last 15 Encounters:   09/26/19 82 6 kg (182 lb)   09/25/19 82 6 kg (182 lb)   09/24/19 82 6 kg (182 lb)   09/24/19 82 6 kg (182 lb)   09/20/19 82 1 kg (181 lb)   09/17/19 87 1 kg (192 lb)   08/29/19 87 5 kg (192 lb 12 8 oz)   08/21/19 87 6 kg (193 lb 3 2 oz)     Estimated body mass index is 26 88 kg/m² as calculated from the following:    Height as of an earlier encounter on 9/26/19: 5' 9" (1 753 m)  Weight as of an earlier encounter on 9/26/19: 82 6 kg (182 lb)       Estimated Nutrition Needs: (based on 82 7kg / 182#)  Energy Needs: 9296-1517 kcal/day (30 to 35 calories/kg)  Protein Needs:  grams/day (1 2-1 5 grams/kg)  Fluid Needs: 8623-7340 mL/day (1 mL/kcal)

## 2019-09-26 NOTE — ANESTHESIA POSTPROCEDURE EVALUATION
Post-Op Assessment Note    CV Status:  Stable  Pain Score: 0    Pain management: adequate     Mental Status:  Sleepy   Hydration Status:  Euvolemic   PONV Controlled:  Controlled   Airway Patency:  Patent   Post Op Vitals Reviewed:  Yes              BP   134/71   Temp   96 8   Pulse  80   Resp   16   SpO2   100

## 2019-09-26 NOTE — PLAN OF CARE
Problem: PAIN - ADULT  Goal: Verbalizes/displays adequate comfort level or baseline comfort level  Description  Interventions:  - Encourage patient to monitor pain and request assistance  - Assess pain using appropriate pain scale  - Administer analgesics based on type and severity of pain and evaluate response  - Implement non-pharmacological measures as appropriate and evaluate response  - Consider cultural and social influences on pain and pain management  - Notify physician/advanced practitioner if interventions unsuccessful or patient reports new pain  Outcome: Progressing     Problem: INFECTION - ADULT  Goal: Absence or prevention of progression during hospitalization  Description  INTERVENTIONS:  - Assess and monitor for signs and symptoms of infection  - Monitor lab/diagnostic results  - Monitor all insertion sites, i e  indwelling lines, tubes, and drains  - Monitor endotracheal if appropriate and nasal secretions for changes in amount and color  - [place] appropriate cooling/warming therapies per order  - Administer medications as ordered  - Instruct and encourage patient and family to use good hand hygiene technique  - Identify and instruct in appropriate isolation precautions for identified infection/condition  Outcome: Progressing     Problem: SAFETY ADULT  Goal: Patient will remain free of falls  Description  INTERVENTIONS:  - Assess patient frequently for physical needs  -  Identify cognitive and physical deficits and behaviors that affect risk of falls    -  [place] fall precautions as indicated by assessment   - Educate patient/family on patient safety including physical limitations  - Instruct patient to call for assistance with activity based on assessment  - Modify environment to reduce risk of injury  - Consider OT/PT consult to assist with strengthening/mobility  Outcome: Progressing  Goal: Maintain or return to baseline ADL function  Description  INTERVENTIONS:  -  Assess patient's ability to carry out ADLs; assess patient's baseline for ADL function and identify physical deficits which impact ability to perform ADLs (bathing, care of mouth/teeth, toileting, grooming, dressing, etc )  - Assess/evaluate cause of self-care deficits   - Assess range of motion  - Assess patient's mobility; develop plan if impaired  - Assess patient's need for assistive devices and provide as appropriate  - Encourage maximum independence but intervene and supervise when necessary  - Involve family in performance of ADLs  - Assess for home care needs following discharge   - Consider OT consult to assist with ADL evaluation and planning for discharge  - Provide patient education as appropriate  Outcome: Progressing  Goal: Maintain or return mobility status to optimal level  Description  INTERVENTIONS:  - Assess patient's baseline mobility status (ambulation, transfers, stairs, etc )    - Identify cognitive and physical deficits and behaviors that affect mobility  - Identify mobility aids required to assist with transfers and/or ambulation (gait belt, sit-to-stand, lift, walker, cane, etc )  - [place] fall precautions as indicated by assessment  - Record patient progress and toleration of activity level on Mobility SBAR; progress patient to next Phase/Stage  - Instruct patient to call for assistance with activity based on assessment  - Consider rehabilitation consult to assist with strengthening/weightbearing, etc   Outcome: Progressing     Problem: DISCHARGE PLANNING  Goal: Discharge to home or other facility with appropriate resources  Description  INTERVENTIONS:  - Identify barriers to discharge w/patient and caregiver  - Arrange for needed discharge resources and transportation as appropriate  - Identify discharge learning needs (meds, wound care, etc )  - Arrange for interpretive services to assist at discharge as needed  - Refer to Case Management Department for coordinating discharge planning if the patient needs post-hospital services based on physician/advanced practitioner order or complex needs related to functional status, cognitive ability, or social support system  Outcome: Progressing     Problem: Knowledge Deficit  Goal: Patient/family/caregiver demonstrates understanding of disease process, treatment plan, medications, and discharge instructions  Description  Complete learning assessment and assess knowledge base    Interventions:  - Provide teaching at level of understanding  - Provide teaching via preferred learning methods  Outcome: Progressing

## 2019-09-26 NOTE — OP NOTE
OPERATIVE REPORT  PATIENT NAME: Sharri Valencia    :  1955  MRN: [id number]  Pt Location: [facility] OR ROOM 05    SURGERY DATE: 2019    Surgeon(s) and Role:     * Zayra Pittman MD - Primary     * Fifi Dawson MD - Assisting    Preop Diagnosis:  Malignant neoplasm of lower third of esophagus ([address] ) [C15 5]    Post-Op Diagnosis Codes:     * Malignant neoplasm of lower third of esophagus ([address] ) [C15 5]    Procedure(s) (LRB):  INSERTION VENOUS PORT (PORT-A-CATH) (N/A)  INSERTION JEJUNOSTOMY TUBE OPEN (N/A)    Specimen(s):  * No specimens in log *    Estimated Blood Loss:   Minimal    Drains:  Gastrostomy/Enterostomy Jejunostomy 14 Fr  LUQ (Active)   Number of days: 0       Anesthesia Type:   General    Operative Indications:  Malignant neoplasm of lower third of esophagus ([address] ) [C135]  59-year-old male with esophagus cancer  He needs a port for chemotherapy and a J-tube for nutritional support  Risks and benefits were explained  Informed consent was obtained  Patient was brought to the operating room  Operative Findings:  Catheter tip at the cavoatrial junction  No evidence of metastatic disease in the abdomen  Complications:   None    Procedure and Technique:  After identifying the patient, general anesthesia was induced  Patient was prepped and draped in the usual sterile fashion  A time-out was performed  An incision was made in the left deltopectoral groove  This was taken down to the cephalic vein  The cephalic vein was encircled and tied off distally with 2-0 silk suture  A 2-0 silk was placed proximally around the vein to prevent any backbleeding  The catheter was introduced through the vein and confirmed to be in the superior vena cava using fluoroscopy  A subcutaneous port pocket was created using sharp dissection  There was excellent hemostasis  Three 2-0 Prolene sutures were placed in the port pocket and attached to the port   Using fluoroscopy, the catheter was manipulated into the cavoatrial junction  Catheter was cut and attached to the port and the port was secured using the previously placed Prolene sutures  The port withdrew blood easily and flushed very easily  The proximal tie was placed around the catheter and vein to prevent any backbleeding  Wound was now copiously irrigated there was excellent hemostasis  The incision was approximated with interrupted 3-0 Vicryl suture subcutaneously and a running 4 Monocryl suture in a subcuticular fashion  Steri-Strips were applied  Port was once again accessed and withdrew blood easily and flushed very easily and was ultimately flushed with the final Hep-Lock solution  Histocryl was applied  We now turned our attention to the J-tube  A small supraumbilical incision was made  Using sharp dissection this was taken through the skin and through the fascia into the peritoneal cavity  There was no evidence of carcinomatosis  We were able to identify the ligament of Treitz  Approximately 30 cm distal to the ligament of Treitz we identified a suitable area of small bowel for placement J-tube  A 3 0 silk pursestring suture was placed  The J-tube was brought through a separate stab incision  An enterotomy was made  The J-tube was placed through the enterotomy and 3 0 silk pursestring suture was secured  A [place] tunnel was created with interrupted 3 0 silk suture in interrupted fashion  The small bowel was now tacked at its exit site from the anterior abdominal wall at 4 quadrants  The J-tube was now tacked proximally distally with interrupted 3 0 silk suture  The J-tube was secured to the skin using 3 0 nylon suture  Wound was copiously irrigated  There was excellent hemostasis  The fascia was approximated with 1  PDS suture in a running fashion  Subcutaneous tissue was irrigated  Skin was approximated with interrupted 3 0 Vicryl suture subcutaneously and a running 4 Monocryl suture in a subcuticular fashion    Histocryl was used on the skin  The patient was extubated  Patient tolerated the procedure well and was taken to the recovery room in stable condition  I was present and participated in all aspects of this procedure  A chest x-ray was pending at the time of this dictation         I was present for the entire procedure    Patient Disposition:  PACU     SIGNATURE: Zayra Pittman MD  DATE: September 26, 2019  TIME: 2:44 PM

## 2019-09-26 NOTE — RESPIRATORY THERAPY NOTE
RT Protocol Note  Teri Ferguson 71 y o  male MRN: [id number]  Unit/Bed#: -01 Encounter: [id number]    Assessment    Principal Problem:    Malignant neoplasm of lower third of esophagus ([facility] )      Home Pulmonary Medications:  2 5 mg albuterol prn       Past Medical History:   Diagnosis Date    COPD (chronic obstructive pulmonary disease) ([facility] )     Diabetes mellitus ([place] [address] )     Difficulty swallowing     Esophageal mass     Sleep apnea      Social History     Socioeconomic History    Marital status: Single     Spouse name: None    Number of children: None    Years of education: None    Highest education level: None   Occupational History    None   Social Needs    Financial resource strain: None    Food insecurity:     Worry: None     Inability: None    Transportation needs:     Medical: None     Non-medical: None   Tobacco Use    Smoking status: Former Smoker     Packs/day: 0 50     Years: 50 00     Pack years: 25 00     Types: Cigarettes     Last attempt to quit: 2017     Years since quittin 7    Smokeless tobacco: Never Used   Substance and Sexual Activity    Alcohol use: Not Currently     Alcohol/week: 0 0 standard drinks     Frequency: Never     Drinks per session: 1 or 2     Binge frequency: Never    Drug use: Never    Sexual activity: None   Lifestyle    Physical activity:     Days per week: None     Minutes per session: None    Stress: None   Relationships    Social connections:     Talks on phone: None     Gets together: None     Attends [employer] service: None     Active member of club or organization: None     Attends meetings of clubs or organizations: None     Relationship status: None    Intimate partner violence:     Fear of current or ex partner: None     Emotionally abused: None     Physically abused: None     Forced sexual activity: None   Other Topics Concern    None   Social History Narrative    None       Subjective         Objective    Physical Exam: Assessment Type: Assess only  General Appearance: Alert, Awake  Respiratory Pattern: Normal  Chest Assessment: Chest expansion symmetrical  Bilateral Breath Sounds: Clear, Diminished    Vitals:  Blood pressure 107/73, pulse 85, temperature (!) 97 3 °F (36 3 °C), resp  rate 18, height 5' 9" (1 753 m), weight 82 6 kg (182 lb), SpO2 100 %  Imaging and other studies: I have personally reviewed pertinent reports  Plan    Respiratory Plan: Home Bronchodilator Patient pathway        Resp Comments: Pt evaluted per respiratory protocal  He was found to be resting comfortably with no sob  BS are clear  Per pts MAR he has albuterol prn at home, and this has been ordered by the pnysician as well  After speaking with the pt he states he has been using his UDN TID because of coughing and sob issues   He is not been admitted for an exacerbation of his COPD but I will order UDN TID at his request

## 2019-09-26 NOTE — ANESTHESIA PREPROCEDURE EVALUATION
Review of Systems/Medical History  Patient summary reviewed  Chart reviewed  No history of anesthetic complications     Cardiovascular  Hypertension ,    Pulmonary  COPD , Sleep apnea ,        GI/Hepatic  Dysphagia (difficulty swallowing secretions),   GERD well controlled,   Comment: Esophageal CA          Endo/Other  Diabetes type 2 , History of thyroid disease , hypothyroidism,      GYN       Hematology  Negative hematology ROS      Musculoskeletal  Negative musculoskeletal ROS        Neurology  Negative neurology ROS      Psychology   Negative psychology ROS              Physical Exam    Airway    Mallampati score: II  TM Distance: >3 FB  Neck ROM: full     Dental       Cardiovascular  Rhythm: regular, Rate: normal, Cardiovascular exam normal    Pulmonary  Pulmonary exam normal Breath sounds clear to auscultation,     Other Findings        Anesthesia Plan  ASA Score- 3     Anesthesia Type- general with ASA Monitors  Additional Monitors:   Airway Plan: ETT  Plan Factors-    Induction- intravenous and rapid sequence induction  Postoperative Plan- Plan for postoperative opioid use  Planned trial extubation    Informed Consent- Anesthetic plan and risks discussed with patient

## 2019-09-26 NOTE — INTERVAL H&P NOTE
H&P reviewed  After examining the patient I find no changes in the patients condition since the H&P had been written      Vitals:    09/26/19 1135   BP: 115/65   Pulse: 84   Resp: 18   Temp: 97 7 °F (36 5 °C)   SpO2: 95%

## 2019-09-26 NOTE — PROGRESS NOTES
revwd the auth w/Natalie & she sd that the pt isn't coming for chemo until 10/08/19  Its a little too early for the auth it will be done as it gets closer to the treatment date  Mo Bailey the pts daughter to let her know this but I spoke to the pt & she isn't home   He will give her the message to call me back

## 2019-09-27 LAB
GLUCOSE SERPL-MCNC: 122 MG/DL (ref 65–140)
GLUCOSE SERPL-MCNC: 139 MG/DL (ref 65–140)
GLUCOSE SERPL-MCNC: 148 MG/DL (ref 65–140)
GLUCOSE SERPL-MCNC: 166 MG/DL (ref 65–140)
GLUCOSE SERPL-MCNC: 169 MG/DL (ref 65–140)

## 2019-09-27 PROCEDURE — 94760 N-INVAS EAR/PLS OXIMETRY 1: CPT

## 2019-09-27 PROCEDURE — 94640 AIRWAY INHALATION TREATMENT: CPT

## 2019-09-27 PROCEDURE — 82948 REAGENT STRIP/BLOOD GLUCOSE: CPT

## 2019-09-27 PROCEDURE — 99024 POSTOP FOLLOW-UP VISIT: CPT | Performed by: SURGERY

## 2019-09-27 RX ORDER — ACETAMINOPHEN 160 MG/5ML
650 SUSPENSION, ORAL (FINAL DOSE FORM) ORAL EVERY 4 HOURS PRN
Status: DISCONTINUED | OUTPATIENT
Start: 2019-09-27 | End: 2019-09-29 | Stop reason: HOSPADM

## 2019-09-27 RX ADMIN — ONDANSETRON 4 MG: 2 INJECTION INTRAMUSCULAR; INTRAVENOUS at 03:43

## 2019-09-27 RX ADMIN — ISODIUM CHLORIDE 3 ML: 0.03 SOLUTION RESPIRATORY (INHALATION) at 08:38

## 2019-09-27 RX ADMIN — PANTOPRAZOLE SODIUM 40 MG: 40 TABLET, DELAYED RELEASE ORAL at 10:56

## 2019-09-27 RX ADMIN — OXYCODONE HYDROCHLORIDE 10 MG: 5 SOLUTION ORAL at 03:52

## 2019-09-27 RX ADMIN — OXYCODONE HYDROCHLORIDE 10 MG: 5 SOLUTION ORAL at 20:26

## 2019-09-27 RX ADMIN — LEVOTHYROXINE SODIUM 25 MCG: 25 TABLET ORAL at 05:46

## 2019-09-27 RX ADMIN — ISODIUM CHLORIDE 3 ML: 0.03 SOLUTION RESPIRATORY (INHALATION) at 19:41

## 2019-09-27 RX ADMIN — HEPARIN SODIUM 5000 UNITS: 5000 INJECTION INTRAVENOUS; SUBCUTANEOUS at 05:45

## 2019-09-27 RX ADMIN — INSULIN LISPRO 1 UNITS: 100 INJECTION, SOLUTION INTRAVENOUS; SUBCUTANEOUS at 16:36

## 2019-09-27 RX ADMIN — HEPARIN SODIUM 5000 UNITS: 5000 INJECTION INTRAVENOUS; SUBCUTANEOUS at 21:28

## 2019-09-27 RX ADMIN — LEVALBUTEROL HYDROCHLORIDE 1.25 MG: 1.25 SOLUTION, CONCENTRATE RESPIRATORY (INHALATION) at 14:00

## 2019-09-27 RX ADMIN — HEPARIN SODIUM 5000 UNITS: 5000 INJECTION INTRAVENOUS; SUBCUTANEOUS at 14:02

## 2019-09-27 RX ADMIN — ISODIUM CHLORIDE 3 ML: 0.03 SOLUTION RESPIRATORY (INHALATION) at 14:00

## 2019-09-27 RX ADMIN — INSULIN LISPRO 1 UNITS: 100 INJECTION, SOLUTION INTRAVENOUS; SUBCUTANEOUS at 21:27

## 2019-09-27 RX ADMIN — ACETAMINOPHEN 650 MG: 160 SUSPENSION ORAL at 12:13

## 2019-09-27 RX ADMIN — LEVALBUTEROL HYDROCHLORIDE 1.25 MG: 1.25 SOLUTION, CONCENTRATE RESPIRATORY (INHALATION) at 19:41

## 2019-09-27 RX ADMIN — LEVALBUTEROL HYDROCHLORIDE 1.25 MG: 1.25 SOLUTION, CONCENTRATE RESPIRATORY (INHALATION) at 08:38

## 2019-09-27 RX ADMIN — OXYCODONE HYDROCHLORIDE 5 MG: 5 SOLUTION ORAL at 10:56

## 2019-09-27 NOTE — MALNUTRITION/BMI
This medical record reflects one or more clinical indicators suggestive of malnutrition  Malnutrition Findings:   Malnutrition type: Acute illness(malnutrition related to esophageal cancer as evidenced by <50% energy intake for >5 days and 6% weight loss x 1 month to be treated with tube feeding )  Degree of Malnutrition: Other severe protein calorie malnutrition  Malnutrition Characteristics: Inadequate energy, Weight loss    See Nutrition note dated 9/27/19 for additional details  Completed nutrition assessment is viewable in the nutrition documentation

## 2019-09-27 NOTE — PLAN OF CARE
Problem: PAIN - ADULT  Goal: Verbalizes/displays adequate comfort level or baseline comfort level  Description  Interventions:  - Encourage patient to monitor pain and request assistance  - Assess pain using appropriate pain scale  - Administer analgesics based on type and severity of pain and evaluate response  - Implement non-pharmacological measures as appropriate and evaluate response  - Consider cultural and social influences on pain and pain management  - Notify physician/advanced practitioner if interventions unsuccessful or patient reports new pain  Outcome: Progressing     Problem: INFECTION - ADULT  Goal: Absence or prevention of progression during hospitalization  Description  INTERVENTIONS:  - Assess and monitor for signs and symptoms of infection  - Monitor lab/diagnostic results  - Monitor all insertion sites, i e  indwelling lines, tubes, and drains  - Monitor endotracheal if appropriate and nasal secretions for changes in amount and color  - Brick appropriate cooling/warming therapies per order  - Administer medications as ordered  - Instruct and encourage patient and family to use good hand hygiene technique  - Identify and instruct in appropriate isolation precautions for identified infection/condition  Outcome: Progressing     Problem: SAFETY ADULT  Goal: Patient will remain free of falls  Description  INTERVENTIONS:  - Assess patient frequently for physical needs  -  Identify cognitive and physical deficits and behaviors that affect risk of falls    -  Brick fall precautions as indicated by assessment   - Educate patient/family on patient safety including physical limitations  - Instruct patient to call for assistance with activity based on assessment  - Modify environment to reduce risk of injury  - Consider OT/PT consult to assist with strengthening/mobility  Outcome: Progressing  Goal: Maintain or return to baseline ADL function  Description  INTERVENTIONS:  -  Assess patient's ability to carry out ADLs; assess patient's baseline for ADL function and identify physical deficits which impact ability to perform ADLs (bathing, care of mouth/teeth, toileting, grooming, dressing, etc )  - Assess/evaluate cause of self-care deficits   - Assess range of motion  - Assess patient's mobility; develop plan if impaired  - Assess patient's need for assistive devices and provide as appropriate  - Encourage maximum independence but intervene and supervise when necessary  - Involve family in performance of ADLs  - Assess for home care needs following discharge   - Consider OT consult to assist with ADL evaluation and planning for discharge  - Provide patient education as appropriate  Outcome: Progressing  Goal: Maintain or return mobility status to optimal level  Description  INTERVENTIONS:  - Assess patient's baseline mobility status (ambulation, transfers, stairs, etc )    - Identify cognitive and physical deficits and behaviors that affect mobility  - Identify mobility aids required to assist with transfers and/or ambulation (gait belt, sit-to-stand, lift, walker, cane, etc )  - [place] fall precautions as indicated by assessment  - Record patient progress and toleration of activity level on Mobility SBAR; progress patient to next Phase/Stage  - Instruct patient to call for assistance with activity based on assessment  - Consider rehabilitation consult to assist with strengthening/weightbearing, etc   Outcome: Progressing     Problem: DISCHARGE PLANNING  Goal: Discharge to home or other facility with appropriate resources  Description  INTERVENTIONS:  - Identify barriers to discharge w/patient and caregiver  - Arrange for needed discharge resources and transportation as appropriate  - Identify discharge learning needs (meds, wound care, etc )  - Arrange for interpretive services to assist at discharge as needed  - Refer to Case Management Department for coordinating discharge planning if the patient needs post-hospital services based on physician/advanced practitioner order or complex needs related to functional status, cognitive ability, or social support system  Outcome: Progressing     Problem: Knowledge Deficit  Goal: Patient/family/caregiver demonstrates understanding of disease process, treatment plan, medications, and discharge instructions  Description  Complete learning assessment and assess knowledge base  Interventions:  - Provide teaching at level of understanding  - Provide teaching via preferred learning methods  Outcome: Progressing     Problem: Nutrition/Hydration-ADULT  Goal: Nutrient/Hydration intake appropriate for improving, restoring or maintaining nutritional needs  Description  Monitor and assess patient's nutrition/hydration status for malnutrition  Collaborate with interdisciplinary team and initiate plan and interventions as ordered  Monitor patient's weight and dietary intake as ordered or per policy  Utilize nutrition screening tool and intervene as necessary  Determine patient's food preferences and provide high-protein, high-caloric foods as appropriate       INTERVENTIONS:  - Monitor oral intake, urinary output, labs, and treatment plans  - Assess nutrition and hydration status and recommend course of action  - Evaluate amount of meals eaten  - Assist patient with eating if necessary   - Allow adequate time for meals  - Recommend/ encourage appropriate diets, oral nutritional supplements, and vitamin/mineral supplements  - Order, calculate, and assess calorie counts as needed  - Recommend, monitor, and adjust tube feedings and TPN/PPN based on assessed needs  - Assess need for intravenous fluids  - Provide specific nutrition/hydration education as appropriate  - Include patient/family/caregiver in decisions related to nutrition  Outcome: Progressing

## 2019-09-27 NOTE — SOCIAL WORK
Initial interview:     CM met with the patient to review the CM role and discuss possible dc needs  Pt lives with his Dtr Faraz Mccloud in a 2 story home in [place] [address]  1 PETE[address]  1st floor bedroom and full bathroom  No DME  No hx of  VNA  or IP rehab  Pt denied drug, etoh or mental illness history  Dtr Faraz Mccloud is POA and he has a LWl; CM requested document copy for patient's chart  Prescriptions are filled at [facility] in [place]  Main contact:  Dtr / Leeroy Rliey [phone]  Dtr Maricel Hunter [phone]  Son [address] w RN VNA and Infusion services for dc with new J-tube tube feeds  CM discussed dc service needs for discharge; pt agreeable and requesting [facility]  and [employer] for TF supplies  ECIN sent  Patient not anticipated for dc for another day or two; pending TF goals and tolerance  CM reviewed d/c planning process including the following: identifying help at home, patient preference for d/c planning needs, [facility], availability of treatment team to discuss questions or concerns patient and/or family may have regarding understanding medications and recognizing signs and symptoms once discharged  CM also encouraged patient to follow up with all recommended appointments after discharge  Patient advised of importance for patient and family to participate in managing patients medical well being

## 2019-09-27 NOTE — NUTRITION
09/27/19 1301   Recommendations/Interventions   Summary with current liquid PO intake recommend providing tube feeding to meet 80% of his calorie needs: Recommend Britton [email or web address] advance as tolerated to goal rate of 85ml/hr to run for 16 hours daily  Once at goal, TF will provide 1360ml, 2040kcal, 87g pro, 1034ml free water  Flush with at least 60ml water at start and end of tube feeding cycle  Will need additional water flushes if PO fluid intake declines  Monitor weight and electrolytes

## 2019-09-27 NOTE — PROGRESS NOTES
Pt is a 72 y/o M POD#1 s/p J tube insertion and R port a cath on 9/26/19 w Dr Smith Dad  Surg onc paged w concern for 10-15 mins of Right upper extremity weakness and shoulder discomfort w concern for possible stroke  Pt's main complaint is discomfort in R shoulder  Of note pt explained he has had multiple prior injuries to Right arm shoulder, and w baseline weakness as he "can not throw a baseball at home"  On exam Pt AAOx3  CN 2-12 grossly intact  RUE strength mildly reduced  Otherwise sensation intact upper and lower extremities b/l  Also strength left arm, and b/l lower extremities full  Pt ambulating without difficulty  No evidence of CVA       Roby Cavazos MD  9/27/2019  11:14 AM

## 2019-09-27 NOTE — UTILIZATION REVIEW
Initial Clinical Review    Elective Inpatient surgical procedure    Age/Sex: 71 y o  male     Surgery Date: 9//26    Procedure: S/P INSERTION VENOUS PORT (PORT-A-CATH) (N/A)  INSERTION JEJUNOSTOMY TUBE OPEN (N/A)     Anesthesia: General    Admission Orders: Date/Time/Statement: Inpatient Admission Orders (From admission, onward)     Ordered        09/26/19 1715  Inpatient Admission  Once                   Orders Placed This Encounter   Procedures    Inpatient Admission     Standing Status:   Standing     Number of Occurrences:   1     Order Specific Question:   Admitting Physician     Answer:   Beatrice Valdez     Order Specific Question:   Level of Care     Answer:   Med Surg [16]     Order Specific Question:   Estimated length of stay     Answer:   Inpatient Only Surgery     Vital Signs: /64   Pulse 91   Temp 98 6 °F (37 °C)   Resp 18   Ht 5' 9" (1 753 m)   Wt 82 6 kg (182 lb)   SpO2 97%   BMI 26 88 kg/m²      Diet: Tube Feed with Oral Diet - Jevity 1 5; continuous; 85; Surgical; Diabetic FL liuqids  Mobility: Up as tolerated  DVT Prophylaxis: Sequential compression device    Medications/Pain Control:   Current Facility-Administered Medications:  acetaminophen 650 mg Oral Q4H PRN 9/27 x1   albuterol 2 5 mg Nebulization Q6H PRN   heparin (porcine) 5,000 Units Subcutaneous Q8H [address]   HYDROmorphone 0 5 mg Intravenous Q3H PRN   insulin lispro 1-6 Units Subcutaneous TID AC   insulin lispro 1-6 Units Subcutaneous HS   levalbuterol 1 25 mg Nebulization TID   levothyroxine 25 mcg Oral Early Morning   melatonin 6 mg Oral HS PRN   ondansetron 4 mg Intravenous Q6H PRN  9/27 x1   oxyCODONE 10 mg Oral Q4H PRN  9/27 x1   oxyCODONE 5 mg Oral Q4H PRN  9/27 x1,  9/28 x1   pantoprazole 40 mg Oral Early Morning   sodium chloride 3 mL Nebulization TID     Network Utilization Review Department  Phone: [phone]; Fax [phone]  [email or web address]  ATTENTION: Please call with any questions or concerns to [phone]  and carefully listen to the prompts so that you are directed to the right person  Send all requests for admission clinical reviews, approved or denied determinations and any other requests to fax [phone]   All voicemails are confidential

## 2019-09-27 NOTE — PROGRESS NOTES
Progress Note - Oncologic Surgery   Wiley Guevara 71 y o  male MRN: [id number]  Unit/Bed#: [place]2-01 Encounter: [id number]    Assessment:  71 y o  male 1 Day Post-Op s/p Procedure(s) (LRB):  INSERTION VENOUS PORT (PORT-A-CATH) (N/A)  INSERTION JEJUNOSTOMY TUBE OPEN (N/A)    Plan:  - Diet Surgical; Diabetic FL Liquids  - OOBTC, AAT  - Pulmonary Toilet, IS  - PPx: SQH, PPI, Colace  - Pain and Nausea control PRN  - Consult to Nutrition for tube feed recs, will start tube feeds today  - CM for home needs      Subjective/Objective     Subjective:  No acute events overnight  Pain is well controlled with pain medication  Denies any nausea vomiting  Objective:     Blood pressure 107/69, pulse 99, temperature 98 4 °F (36 9 °C), resp  rate 19, height 5' 9" (1 753 m), weight 82 6 kg (182 lb), SpO2 96 %  ,Body mass index is 26 88 kg/m²        Intake/Output Summary (Last 24 hours) at 9/27/2019 0419  Last data filed at 9/26/2019 2100  Gross per 24 hour   Intake 1480 ml   Output --   Net 1480 ml       Invasive Devices     Central Venous Catheter Line            Port A Cath 09/26/19 Left Chest less than 1 day          Peripheral Intravenous Line            Peripheral IV 09/26/19 Left Hand less than 1 day          Drain            Gastrostomy/Enterostomy Jejunostomy 14 Fr  LUQ less than 1 day                Physical Exam:   Gen: NAD, A&O, Comfortable   Chest: Normal work of breathing, no resp distress, left chest incision CDI over port  Abd: S, ND, NT, midline incision CDI, J-tube site with minimal seepage  Ext: No edema  Skin: warm, dry, intact      Lab, Imaging and other studies:CBC: No results found for: WBC, HGB, HCT, MCV, PLT, ADJUSTEDWBC, MCH, MCHC, RDW, MPV, NRBC, CMP: No results found for: SODIUM, K, CL, CO2, ANIONGAP, BUN, CREATININE, GLUCOSE, CALCIUM, AST, ALT, ALKPHOS, PROT, BILITOT, EGFR, Coagulation: No results found for: PT, INR, APTT, Urinalysis: No results found for: Bianka Cedar, SPECGRAV, PHUR, LEUKOCYTESUR, NITRITE, PROTEINUA, GLUCOSEU, KETONESU, BILIRUBINUR, BLOODU, Amylase: No results found for: AMYLASE, Lipase: No results found for: LIPASE  VTE Pharmacologic Prophylaxis: Heparin  VTE Mechanical Prophylaxis: sequential compression device

## 2019-09-28 LAB
ANION GAP SERPL CALCULATED.3IONS-SCNC: 7 MMOL/L (ref 4–13)
BASOPHILS # BLD AUTO: 0.03 THOUSANDS/ΜL (ref 0–0.1)
BASOPHILS NFR BLD AUTO: 0 % (ref 0–1)
BUN SERPL-MCNC: 14 MG/DL (ref 5–25)
CALCIUM SERPL-MCNC: 8.8 MG/DL (ref 8.3–10.1)
CHLORIDE SERPL-SCNC: 102 MMOL/L (ref 100–108)
CO2 SERPL-SCNC: 28 MMOL/L (ref 21–32)
CREAT SERPL-MCNC: 0.91 MG/DL (ref 0.6–1.3)
EOSINOPHIL # BLD AUTO: 0.19 THOUSAND/ΜL (ref 0–0.61)
EOSINOPHIL NFR BLD AUTO: 3 % (ref 0–6)
ERYTHROCYTE [DISTWIDTH] IN BLOOD BY AUTOMATED COUNT: 13.9 % (ref 11.6–15.1)
GFR SERPL CREATININE-BSD FRML MDRD: 88 ML/MIN/1.73SQ M
GLUCOSE SERPL-MCNC: 165 MG/DL (ref 65–140)
GLUCOSE SERPL-MCNC: 167 MG/DL (ref 65–140)
GLUCOSE SERPL-MCNC: 179 MG/DL (ref 65–140)
GLUCOSE SERPL-MCNC: 190 MG/DL (ref 65–140)
GLUCOSE SERPL-MCNC: 199 MG/DL (ref 65–140)
HCT VFR BLD AUTO: 28.5 % (ref 36.5–49.3)
HGB BLD-MCNC: 8.7 G/DL (ref 12–17)
IMM GRANULOCYTES # BLD AUTO: 0.04 THOUSAND/UL (ref 0–0.2)
IMM GRANULOCYTES NFR BLD AUTO: 1 % (ref 0–2)
LYMPHOCYTES # BLD AUTO: 1.17 THOUSANDS/ΜL (ref 0.6–4.47)
LYMPHOCYTES NFR BLD AUTO: 17 % (ref 14–44)
MCH RBC QN AUTO: 26.7 PG (ref 26.8–34.3)
MCHC RBC AUTO-ENTMCNC: 30.5 G/DL (ref 31.4–37.4)
MCV RBC AUTO: 87 FL (ref 82–98)
MONOCYTES # BLD AUTO: 0.5 THOUSAND/ΜL (ref 0.17–1.22)
MONOCYTES NFR BLD AUTO: 7 % (ref 4–12)
NEUTROPHILS # BLD AUTO: 4.83 THOUSANDS/ΜL (ref 1.85–7.62)
NEUTS SEG NFR BLD AUTO: 72 % (ref 43–75)
NRBC BLD AUTO-RTO: 0 /100 WBCS
PLATELET # BLD AUTO: 331 THOUSANDS/UL (ref 149–390)
PMV BLD AUTO: 9.4 FL (ref 8.9–12.7)
POTASSIUM SERPL-SCNC: 3.7 MMOL/L (ref 3.5–5.3)
RBC # BLD AUTO: 3.26 MILLION/UL (ref 3.88–5.62)
SODIUM SERPL-SCNC: 137 MMOL/L (ref 136–145)
WBC # BLD AUTO: 6.76 THOUSAND/UL (ref 4.31–10.16)

## 2019-09-28 PROCEDURE — 99024 POSTOP FOLLOW-UP VISIT: CPT | Performed by: SURGERY

## 2019-09-28 PROCEDURE — 82948 REAGENT STRIP/BLOOD GLUCOSE: CPT

## 2019-09-28 PROCEDURE — 80048 BASIC METABOLIC PNL TOTAL CA: CPT | Performed by: SURGERY

## 2019-09-28 PROCEDURE — 85025 COMPLETE CBC W/AUTO DIFF WBC: CPT | Performed by: SURGERY

## 2019-09-28 PROCEDURE — 94760 N-INVAS EAR/PLS OXIMETRY 1: CPT

## 2019-09-28 PROCEDURE — 94640 AIRWAY INHALATION TREATMENT: CPT

## 2019-09-28 RX ADMIN — INSULIN LISPRO 1 UNITS: 100 INJECTION, SOLUTION INTRAVENOUS; SUBCUTANEOUS at 07:03

## 2019-09-28 RX ADMIN — HEPARIN SODIUM 5000 UNITS: 5000 INJECTION INTRAVENOUS; SUBCUTANEOUS at 05:40

## 2019-09-28 RX ADMIN — INSULIN LISPRO 2 UNITS: 100 INJECTION, SOLUTION INTRAVENOUS; SUBCUTANEOUS at 16:24

## 2019-09-28 RX ADMIN — ISODIUM CHLORIDE 3 ML: 0.03 SOLUTION RESPIRATORY (INHALATION) at 19:19

## 2019-09-28 RX ADMIN — HEPARIN SODIUM 5000 UNITS: 5000 INJECTION INTRAVENOUS; SUBCUTANEOUS at 21:45

## 2019-09-28 RX ADMIN — HEPARIN SODIUM 5000 UNITS: 5000 INJECTION INTRAVENOUS; SUBCUTANEOUS at 14:12

## 2019-09-28 RX ADMIN — INSULIN LISPRO 1 UNITS: 100 INJECTION, SOLUTION INTRAVENOUS; SUBCUTANEOUS at 21:44

## 2019-09-28 RX ADMIN — PANTOPRAZOLE SODIUM 40 MG: 40 TABLET, DELAYED RELEASE ORAL at 05:40

## 2019-09-28 RX ADMIN — LEVALBUTEROL HYDROCHLORIDE 1.25 MG: 1.25 SOLUTION, CONCENTRATE RESPIRATORY (INHALATION) at 19:19

## 2019-09-28 RX ADMIN — LEVALBUTEROL HYDROCHLORIDE 1.25 MG: 1.25 SOLUTION, CONCENTRATE RESPIRATORY (INHALATION) at 13:19

## 2019-09-28 RX ADMIN — INSULIN LISPRO 2 UNITS: 100 INJECTION, SOLUTION INTRAVENOUS; SUBCUTANEOUS at 10:57

## 2019-09-28 RX ADMIN — ISODIUM CHLORIDE 3 ML: 0.03 SOLUTION RESPIRATORY (INHALATION) at 06:31

## 2019-09-28 RX ADMIN — LEVOTHYROXINE SODIUM 25 MCG: 25 TABLET ORAL at 05:40

## 2019-09-28 RX ADMIN — LEVALBUTEROL HYDROCHLORIDE 1.25 MG: 1.25 SOLUTION, CONCENTRATE RESPIRATORY (INHALATION) at 06:31

## 2019-09-28 RX ADMIN — ISODIUM CHLORIDE 3 ML: 0.03 SOLUTION RESPIRATORY (INHALATION) at 13:19

## 2019-09-28 NOTE — PROGRESS NOTES
Progress Note - Oncologic Surgery   Sumeet Alegre 71 y o  male MRN: [id number]  Unit/Bed#: -01 Encounter: [id number]    Assessment:  71 y o  male 2 Days Post-Op s/p Procedure(s) (LRB):  INSERTION VENOUS PORT (PORT-A-CATH) (N/A)  INSERTION JEJUNOSTOMY TUBE OPEN (N/A)    Plan:  - Diet Enteral/Parenteral; Tube Feeding with Oral Diet; Jevity 1 5; Continuous; 85; Surgical; Diabetic FL Liquids   - tube feeds at 70 mL/hr this morning, almost to goal of 85 mL/hr, patient tolerating well  - OOBTC, AAT  - Pulmonary Toilet, IS   - PPx: SQH, PPI  - Pain and Nausea control PRN  - Consult to Nutrition for tube feed recs, will start tube feeds today  - CM for home needs  - Disp: VNA established for J-tube feeds, will d/c once tolerating goal tube feeds      Subjective/Objective     Subjective:  Nothing acute overnight  Patient is tolerating advancement of J-tube feeds  Patient denies any flatus or bowel movement  Patient endorses feeling the need to go this morning  Objective:     Blood pressure 116/68, pulse 85, temperature 98 2 °F (36 8 °C), resp  rate 20, height 5' 9" (1 753 m), weight 82 6 kg (182 lb), SpO2 96 %  ,Body mass index is 26 88 kg/m²        Intake/Output Summary (Last 24 hours) at 9/28/2019 0646  Last data filed at 9/28/2019 0500  Gross per 24 hour   Intake 696 ml   Output 800 ml   Net -104 ml       Invasive Devices     Central Venous Catheter Line            Port A Cath 09/26/19 Left Chest 1 day          Peripheral Intravenous Line            Peripheral IV 09/26/19 Left Hand 1 day          Drain            Gastrostomy/Enterostomy Jejunostomy 14 Fr  LUQ 1 day                Physical Exam:   Gen: NAD, A&O, Comfortable   Chest: Normal work of breathing, no resp distress, Port site CDI  Abd: S, ND, NT, midline incision and j-tube CDI  Ext: No edema  Skin: warm, dry, intact        Lab, Imaging and other studies:CBC:   Lab Results   Component Value Date    WBC 6 76 09/28/2019    HGB 8 7 (L) 09/28/2019    HCT 28 5 (L) 09/28/2019    MCV 87 09/28/2019     09/28/2019    MCH 26 7 (L) 09/28/2019    MCHC 30 5 (L) 09/28/2019    RDW 13 9 09/28/2019    MPV 9 4 09/28/2019    NRBC 0 09/28/2019   , CMP:   Lab Results   Component Value Date    SODIUM 137 09/28/2019    K 3 7 09/28/2019     09/28/2019    CO2 28 09/28/2019    BUN 14 09/28/2019    CREATININE 0 91 09/28/2019    CALCIUM 8 8 09/28/2019    EGFR 88 09/28/2019   , Coagulation: No results found for: PT, INR, APTT, Urinalysis: No results found for: COLORU, CLARITYU, SPECGRAV, PHUR, LEUKOCYTESUR, NITRITE, PROTEINUA, GLUCOSEU, KETONESU, BILIRUBINUR, BLOODU, Amylase: No results found for: AMYLASE, Lipase: No results found for: LIPASE  VTE Pharmacologic Prophylaxis: Heparin  VTE Mechanical Prophylaxis: sequential compression device

## 2019-09-28 NOTE — SOCIAL WORK
CM spoke with provider regarding [address] services  Planned d/c for today  [facility]  [address] [facility] will contact CM back if they can accommodate a time for tonight to start services  CM made provider aware  Kakaren Fernandez unable to start services until 9/29  CM made provider aware  Pt will d/c tomorrow with [address] [facility]  F2F filled out in chart  C aware of d/c tomorrow  F2F filled out   [facility] entered in f/u providers and fax [phone] entered for AVS

## 2019-09-29 VITALS
HEART RATE: 82 BPM | BODY MASS INDEX: 26.96 KG/M2 | SYSTOLIC BLOOD PRESSURE: 116 MMHG | HEIGHT: 69 IN | DIASTOLIC BLOOD PRESSURE: 63 MMHG | TEMPERATURE: 97.7 F | OXYGEN SATURATION: 97 % | WEIGHT: 182 LBS | RESPIRATION RATE: 18 BRPM

## 2019-09-29 LAB
GLUCOSE SERPL-MCNC: 128 MG/DL (ref 65–140)
GLUCOSE SERPL-MCNC: 232 MG/DL (ref 65–140)

## 2019-09-29 PROCEDURE — 94640 AIRWAY INHALATION TREATMENT: CPT

## 2019-09-29 PROCEDURE — 82948 REAGENT STRIP/BLOOD GLUCOSE: CPT

## 2019-09-29 PROCEDURE — 94760 N-INVAS EAR/PLS OXIMETRY 1: CPT

## 2019-09-29 PROCEDURE — 94668 MNPJ CHEST WALL SBSQ: CPT

## 2019-09-29 PROCEDURE — 94664 DEMO&/EVAL PT USE INHALER: CPT

## 2019-09-29 PROCEDURE — 99024 POSTOP FOLLOW-UP VISIT: CPT | Performed by: SURGERY

## 2019-09-29 RX ORDER — ALBUTEROL SULFATE 90 UG/1
2 AEROSOL, METERED RESPIRATORY (INHALATION) EVERY 4 HOURS PRN
Status: DISCONTINUED | OUTPATIENT
Start: 2019-09-29 | End: 2019-09-29 | Stop reason: HOSPADM

## 2019-09-29 RX ORDER — ACETAMINOPHEN 160 MG/5ML
650 SUSPENSION, ORAL (FINAL DOSE FORM) ORAL EVERY 4 HOURS PRN
Qty: 118 ML | Refills: 0 | Status: SHIPPED | OUTPATIENT
Start: 2019-09-29 | End: 2020-02-12 | Stop reason: HOSPADM

## 2019-09-29 RX ADMIN — LEVOTHYROXINE SODIUM 25 MCG: 25 TABLET ORAL at 06:33

## 2019-09-29 RX ADMIN — OXYCODONE HYDROCHLORIDE 5 MG: 5 SOLUTION ORAL at 00:13

## 2019-09-29 RX ADMIN — ISODIUM CHLORIDE 3 ML: 0.03 SOLUTION RESPIRATORY (INHALATION) at 07:18

## 2019-09-29 RX ADMIN — LEVALBUTEROL HYDROCHLORIDE 1.25 MG: 1.25 SOLUTION, CONCENTRATE RESPIRATORY (INHALATION) at 07:18

## 2019-09-29 RX ADMIN — ACETAMINOPHEN 650 MG: 160 SUSPENSION ORAL at 03:29

## 2019-09-29 RX ADMIN — HEPARIN SODIUM 5000 UNITS: 5000 INJECTION INTRAVENOUS; SUBCUTANEOUS at 06:33

## 2019-09-29 RX ADMIN — PANTOPRAZOLE SODIUM 40 MG: 40 TABLET, DELAYED RELEASE ORAL at 06:33

## 2019-09-29 RX ADMIN — INSULIN LISPRO 3 UNITS: 100 INJECTION, SOLUTION INTRAVENOUS; SUBCUTANEOUS at 10:55

## 2019-09-29 NOTE — NURSING NOTE
Patient discharged to home walking accompanied by daughter in no acute distress or pain  AVS explained to patient prior to discharge  Patient had [employer] and tube feeding supplies with them at time of discharge

## 2019-09-29 NOTE — SOCIAL WORK
SOL received a call from [facility] on call Abbykatiana Burr who reported the Pt's tube feeds will be delivered to his room today by 1pm  SOL made Pt's daughter Orin Acuña and RN aware of the above  Orin Acuña reported she will be transporting the Pt upon his d/c today  SOL made [facility]  aware of the pending d/c for today

## 2019-09-29 NOTE — SOCIAL WORK
SOL was in contact with Pt's daughter Sahra Reyez [phone] regarding the delivery of the Pt's tube feeds  Sahra Reyez reported no tube feeds had been delivered as of yet  SOL called [facility] on call Suma, requesting a call back to discuss the delivery of the Pt's tube feeds for his d/c today  SOL currently awaiting a call back  SOL will continue to follow

## 2019-09-29 NOTE — RESPIRATORY THERAPY NOTE
RT Protocol Note  [facility] 71 y o  male MRN: [id number]  Unit/Bed#: -01 Encounter: [id number]    Assessment    Principal Problem:    Malignant neoplasm of lower third of esophagus ([facility] )      Home Pulmonary Medications:         Past Medical History:   Diagnosis Date    COPD (chronic obstructive pulmonary disease) ([facility] )     Diabetes mellitus ([place] [address] )     Difficulty swallowing     Esophageal mass     Sleep apnea      Social History     Socioeconomic History    Marital status: Single     Spouse name: None    Number of children: None    Years of education: None    Highest education level: None   Occupational History    None   Social Needs    Financial resource strain: None    Food insecurity:     Worry: None     Inability: None    Transportation needs:     Medical: None     Non-medical: None   Tobacco Use    Smoking status: Former Smoker     Packs/day: 0 50     Years: 50 00     Pack years: 25 00     Types: Cigarettes     Last attempt to quit: 2017     Years since quittin 7    Smokeless tobacco: Never Used   Substance and Sexual Activity    Alcohol use: Not Currently     Alcohol/week: 0 0 standard drinks     Frequency: Never     Drinks per session: 1 or 2     Binge frequency: Never    Drug use: Never    Sexual activity: None   Lifestyle    Physical activity:     Days per week: None     Minutes per session: None    Stress: None   Relationships    Social connections:     Talks on phone: None     Gets together: None     Attends Mandaeism service: None     Active member of club or organization: None     Attends meetings of clubs or organizations: None     Relationship status: None    Intimate partner violence:     Fear of current or ex partner: None     Emotionally abused: None     Physically abused: None     Forced sexual activity: None   Other Topics Concern    None   Social History Narrative    None       Subjective         Objective    Physical Exam:   Assessment Type: (P) Assess only  General Appearance: (P) Alert, Awake  Respiratory Pattern: (P) Shallow  Chest Assessment: (P) Chest expansion symmetrical  Bilateral Breath Sounds: (P) Diminished, Coarse  Cough: (P) Non-productive, Strong, Moist  O2 Device: (P) RA    Vitals:  Blood pressure 118/67, pulse 92, temperature 98 6 °F (37 °C), resp  rate (P) 18, height 5' 9" (1 753 m), weight 82 6 kg (182 lb), SpO2 94 %  Imaging and other studies: I have personally reviewed pertinent reports  O2 Device: (P) RA     Plan    Respiratory Plan: Home Bronchodilator Patient pathway        Resp Comments: (P) Pt called for prn udn tx  Upon arrival to room pt sitting on edge of bed holding right rib area  Pt c/o pain with cough  Pt cutting breaths off and stating he does not want to cough because it hurts  Pt again instructed on stenting with blanket roll, sat pt up further and helped with deep breathing and coughing  Pt moved mucus and now lungs are clear  Discussed with pt at length about needing to use IS  Pt also instructed on flutter at this time  Pt does not have any wheezing and feels much better after coughing with flutter valve  No prn udn tx was given at this time  Also discussed KIMMIE with pt and COPD  Pt quit smoking 2yr ago, former 2ppd smoker  Pt used to see someone in [address] [place] [address] for his lungs however since moving here does not see anyone  Discussed [facility] with pt  Pt also has KIMMIE and used to have a machine he states he could not stand it and returned it  Pt says he moves constantly and would get wrapped up in hosing  Advised pt to discuss this with our pulmonary group as he does not sleep at night and finds himself wanting to nap around noon time

## 2019-09-29 NOTE — PROGRESS NOTES
Progress Note - Oncologic Surgery   Epifanio Deras 71 y o  male MRN: [id number]  Unit/Bed#: [facility]2-01 Encounter: [id number]    Assessment:  71 y o  male 3 Days Post-Op s/p Procedure(s) (LRB):  INSERTION VENOUS PORT (PORT-A-CATH) (N/A)  INSERTION JEJUNOSTOMY TUBE OPEN (N/A)    Plan:  - Diet Enteral/Parenteral; Tube Feeding with Oral Diet; Jevity 1 5; Continuous; 85; Surgical; Diabetic FL Liquids   - OOBTC, AAT  - Pulmonary Toilet, IS   - PPx: SQH, PPI  - Pain and Nausea control PRN  - CM for home needs  - Disp: VNA established for J-tube feeds, D/C home today      Subjective/Objective     Subjective:  No acute events overnight  Patient tolerated his tube feeds  No complaints  Objective:     Blood pressure 116/63, pulse 82, temperature 97 7 °F (36 5 °C), temperature source Oral, resp  rate 18, height 5' 9" (1 753 m), weight 82 6 kg (182 lb), SpO2 97 %  ,Body mass index is 26 88 kg/m²        Intake/Output Summary (Last 24 hours) at 9/29/2019 0845  Last data filed at 9/29/2019 0730  Gross per 24 hour   Intake 2356 ml   Output 500 ml   Net 1856 ml       Invasive Devices     Central Venous Catheter Line            Port A Cath 09/26/19 Left Chest 2 days          Peripheral Intravenous Line            Peripheral IV 09/26/19 Left Hand 2 days          Drain            Gastrostomy/Enterostomy Jejunostomy 14 Fr  LUQ 2 days                Physical Exam:   Gen: NAD, A&O, Comfortable   Chest: Normal work of breathing, no resp distress, port site CDI  Abd: S, ND, NT, midline incision CDI, J-tube with minimal dried blood  Ext: No edema  Skin: warm, dry, intact        Lab, Imaging and other studies:CBC:   No results found for: WBC, HGB, HCT, MCV, PLT, ADJUSTEDWBC, MCH, MCHC, RDW, MPV, NRBC, CMP:   No results found for: SODIUM, K, CL, CO2, ANIONGAP, BUN, CREATININE, GLUCOSE, CALCIUM, AST, ALT, ALKPHOS, PROT, BILITOT, EGFR, Coagulation: No results found for: PT, INR, APTT, Urinalysis: No results found for: COLORU, CLARITYU, Cherise Breeding, LEUKOCYTESUR, NITRITE, PROTEINUA, GLUCOSEU, KETONESU, BILIRUBINUR, BLOODU, Amylase: No results found for: AMYLASE, Lipase: No results found for: LIPASE  VTE Pharmacologic Prophylaxis: Heparin  VTE Mechanical Prophylaxis: sequential compression device

## 2019-09-29 NOTE — DISCHARGE SUMMARY
Discharge Summary - Surgical Oncology  Leo Norman 71 y o  male MRN: [id number]  Unit/Bed#: -01 Encounter: [id number]    Admission Date: 9/26/2019     Discharge Date: 09/29/19    Admitting Diagnosis: Malignant neoplasm of lower third of esophagus [facility]) [C15 5]    Discharge Diagnosis: Same    Attending: Dr Vish Funk Physician(s): None    Procedures Performed:   9/26 Port-a-cath and open jejunostomy tube placement    Pathology: N/A    Hospital Course: Patient was taken to the operating room on 9/26 for the above stated procedure  Intra-operative findings include port catheter tip terminating at the cavoatrial junction and no evidence of metastatic disease in the abdomen  Procedure was without complication and for further details please refer to the full operative report  A PACU chest xray demonstrated no evidence of pneumothorax  The patient return to the floor post-operatively where we worked with case management to coordinate delivery of his tube feeds  The remainder of his hospital course was unremarkable and he was tolerating tube feeds at goal on the day of discharge, ambulating without assistance, his pain was well controlled, and he was voiding spontaneously  He was discharged home on 9/29 with VNA to continue the remainder of his convalescence  Condition at Discharge: good     Discharge instructions/Information to patient and family:   See after visit summary for information provided to patient and family  Provisions for Follow-Up Care:  See after visit summary for information related to follow-up care and any pertinent home health orders  Disposition: Home    Planned Readmission: No    Discharge Statement   I spent 30 minutes discharging the patient  This time was spent on the day of discharge  I had direct contact with the patient on the day of discharge  Additional documentation is required if more than 30 minutes were spent on discharge       Discharge Medications:  See after visit summary for reconciled discharge medications provided to patient and family

## 2019-09-30 ENCOUNTER — HOSPITAL ENCOUNTER (OUTPATIENT)
Dept: NON INVASIVE DIAGNOSTICS | Facility: CLINIC | Age: 64
Discharge: HOME | End: 2019-09-30
Payer: COMMERCIAL

## 2019-09-30 DIAGNOSIS — Z79.899 ENCOUNTER FOR MONITORING CARDIOTOXIC DRUG THERAPY: ICD-10-CM

## 2019-09-30 DIAGNOSIS — Z51.81 ENCOUNTER FOR MONITORING CARDIOTOXIC DRUG THERAPY: ICD-10-CM

## 2019-09-30 PROCEDURE — 93306 TTE W/DOPPLER COMPLETE: CPT

## 2019-09-30 NOTE — PROGRESS NOTES
Consultation - Radiation Oncology     WOX:[id number] : 1955  Encounter: [id number]  Patient Information: Jannet Winter      CHIEF COMPLAINT  Chief Complaint   Patient presents with    Consult    Esophageal Cancer          History of Present Illness   Jannet Winter is a 71y o  year old male who presented to ED in 19 with recurrent symptoms of foreign body sensation after swallowing food and dysphagia to food and secretions        19 EGD with Dr Suzanne Wolf demonstrated hemorrhagic mucosa in the lower third of the esophagus (30 cm from the incisors) with bleeding before intervention  Neoplastic abnormality and narrowing from 30 cm down to 40 cm   Ten biopsies were obtained    The stomach and duodenum appeared normal     The pathology demonstrated 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  Tumor cells were HER2 positive      19 CT chest, abdomen and pelvis showed irregular wall thickening involving the distal esophagus beginning just below the inferior pulmonary veins, extending through the gastric cardia compatible with biopsy-proven adenocarcinoma  [place] was involvement of the gastric cardia, and possible extra luminal extension of tumor along its right lateral margin  There was mild adenopathy adjacent to the gastric cardia and distal esophagus   Additional borderline prominent mediastinal lymph nodes were noted    Nonspecific 1 2 cm right adrenal nodule        -19 admitted with dysphagia      19 CT chest, abdomen and pelvis again demonstrated irregular wall thickening involving the distal esophagus and extending to the left upper stomach   There was a masslike area of abnormal soft tissue density about 5 5cm along the left hand aspect of the upper stomach with some infiltration of the adjacent fat   Several mildly prominent nodes are present between this mass and the liver   There were also some mildly prominent mediastinal nodes  Garlan Michael was an approximately 1 5 cm right adrenal nodule  Garlan Michael was a focal area of opacity within the anterior aspect of the left upper lobe of the lung   Continued follow-up is recommended        9/19/19 PET scan showed extensive FDG uptake at the distal esophagus, GE junction and proximal stomach compatible with known malignancy  FDG avid perigastric lymph node posterior to the proximal stomach suspicious for metastasis   There may be FDG avid gastrohepatic lymphadenopathy but this was inseparable from the gastric mass  Focal FDG uptake at the left anterior 6th rib   Metastasis was not excluded  Foci of FDG uptake at the sigmoid colon for which underlying lesions should be excluded   Recommend follow-up with colonoscopy      9/20/19 Eliseo Rodriguez MD has planned feeding tube  Dr Lesia Goff is planning colonoscopy      9/24/19 Dr Mary Rodriguez of medical oncology has recommended upfront neoadjuvant chemotherapy with Herceptin with FLOT for 6 cycles  Depending on response, the patient may be considered concurrent chemoradiation and/or surgery       9/26/19 Port insertion and J tube insertion scheduled at [place] (Dr Alfredo Benito)  10/8/19 Infusion scheduled    The patient continues to experience dysphagia primarily to secretions  This results in discomfort and gagging  He is able to tolerate liquids, but has lost about 25 pounds in the last 6 months due to difficulty eating  He denies effortless regurgitation  He has odynophagia as well  He denies productive cough, fever, or palpitations  He has back pain with food getting stuck and he has cough when lying down flat  Historical Information   Oncology History    8/19/19 to ER with recurrent episode of foreign body sensation in his throat  hours after eating two small shrimp    Patient notes multiple episodes over the past month that typically have resolved spontaneously    Impression and plan:  Esophageal blockage likely secondary to food bolus that is recurrent  Patient symptoms improved following treatment with glucagon in the emergency room but patient will require close outpatient follow-up with Gastroenterology for EGD to evaluate etiology of recurrent symptoms    8/21/19 Dwayne Rubin PA-C Gastroenterology  over the past 2 months he has had 3 distinct episodes of food getting stuck  He states that each time the food gets stuck to where he cannot tolerate any liquids or secretions  Plan EGD    8/24/19 EGD Dr Ayala Foss  · Severe, generalized edematous, erythematous and hemorrhagic mucosa in the lower third of the esophagus (30 cm from the incisors) with bleeding before intervention; performed 10 cold biopsies  There is circumferential erythema, friability, easy bleeding,  neoplastic abnormality and narrowing from 30 cm down to 40 cm  Ten biopsies were obtained to rule out malignancy  A dilation was not performed since I suspect that the underlying etiology is malignancy  · The stomach and duodenum appeared normal    Esophagus (biopsy):  - At 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     HER2 positive    9/6/19 CT chest, abdomen and pelvis  IMPRESSION:     1  Irregular wall thickening involving the distal esophagus beginning just below the inferior pulmonary veins, extending through the gastric cardia compatible with biopsy-proven adenocarcinoma  As above, there is involvement of the gastric cardia, and possible extra luminal extension of tumor along its right lateral margin  2  There is mild adenopathy identified adjacent to the gastric cardia and distal esophagus  Additional borderline prominent mediastinal lymph nodes  3  There is a left upper lobe opacity identified  Although potentially representing an area of inflammation or scarring, an irregularly marginated solitary pulmonary metastasis should be excluded    This could either be reassessed with chest CT in 3 months time or further evaluated with PET CT (it is of borderline size for accurate characterization with PET)  4  Hypertrophy of the median lobe of the prostate (favored) or less likely 9 mm bladder polyp  5  Nonspecific 1 2 cm right adrenal nodule  Overall, PET/CT may be helpful to determine exact extent of adenopathy within the lower chest and upper abdomen, evaluation for any extraluminal extension of tumor, and further characterization of left upper lobe density and right adrenal nodule    9/16-9/18 admitted with dysphagia    9/16/19 CT chest, abdomen and pelvis  IMPRESSION:     There is irregular wall thickening involving the distal esophagus and extending to the left hand aspect of the upper stomach  There is a masslike area of abnormal soft tissue density along the left hand aspect of the upper stomach which measures approximately 5 5 x 3 6 cm, and there appears to be some infiltration of the adjacent fat  This is concerning for local extension of the patient's known neoplasm  Several mildly prominent nodes are present between this mass and the liver  There are also some mildly prominent mediastinal nodes  Please see discussion      There is some fluid within the esophagus near level of the gregg      There is an approximately 1 5 x 1 cm right adrenal nodule  This appears similar to slightly larger compared to the prior examination  There is a questionable tiny left adrenal nodule  Follow-up of both of these adrenal findings is suggested      There is an approximately 1 2 cm soft tissue density nodule within the posterior aspect of the urinary bladder  Although this may be related to indentation of the prostate, a small bladder mass cannot be entirely excluded  Urology consultation and follow-up is recommended      A focal area of opacity within the anterior aspect of the left upper lobe of the lung appears similar to September 6, 2019  Please see discussion  Continued follow-up is recommended    This could be reassessed with chest CT in 3 months  PET/CT could also be considered, however, it is of borderline size for accurate characterization with PET/ CT  If more remote CTs of the chest exist, direct comparison would be helpful      PET/CT should be considered for further evaluation of the extent of adenopathy and extent of disease  PET/CT or MRI could be utilized to evaluate the adrenal findings, if there are no contraindications      Cholelithiasis  No CT evidence of acute cholecystitis      Atherosclerosis  Coronary artery disease      Other findings as described above  Please see discussion    9/19/19 PET  IMPRESSION:     1  Extensive FDG uptake at the distal esophagus, GE junction and proximal stomach compatible with known malignancy  2  FDG avid perigastric lymph node posterior to the proximal stomach suspicious for metastasis  There may be FDG avid gastrohepatic lymphadenopathy but this is inseparable from the gastric mass  3   Focal FDG uptake at the left anterior 6th rib  Possible subtle focus of FDG uptake at the right lateral 2nd rib  No obvious findings here on the limited low-dose CT images  Metastasis is not excluded  Distribution is not typical for prior trauma  Consider follow-up with MRI of the chest   4   Foci of FDG uptake at the sigmoid colon for which underlying lesions should be excluded  Recommend follow-up with colonoscopy  5   Fairly diffuse FDG uptake at the thyroid gland, may be related to thyroiditis    9/20/19 Anjali Forde MD  Plan feeding tube, referral to Med Onc and Rad Onc  If the rib lesion is a metastasis, he is not a candidate for resection    9/24/19 Dr Criss Fowler  recommended that he have a port as well as a feeding tube placed since he may undergo esophagectomy in the future    We did discuss that if the rib lesions are indeed metastasis, chemotherapy would be the mainstay of his treatment    9/24/19 Dr Andrea Peterson  " I think the patient would benefit from neoadjuvant chemotherapy upfront prior to consideration of either concurrent chemoradiation or even surgery  Herceptin with FLOT chemotherapy Every 2 weeks for 6 cycles  This will consist of 5-FU 2600 mg/m² over 24 hours, leucovorin 20 mg meter square, Oxaliplatin 85 mg/m2 square and Taxotere 60 mg meter square  Once he completed 6 doses of chemotherapy we will reimage him  If he has a nice response we will then consider concurrent chemoradiation and then surgery  If he has a very nice response with very little residual disease surgery could possibly be considered for now I did talk to him about most probably getting concurrent chemoradiation after the chemotherapy "    9/26/19 Port insertion and J tube insertion scheduled at [place] (Dr Darell Buckley)  10/8/19 Infusion scheduled  Dr Kait Kaplan on colonoscopy ASAP    Mostly liquids  Can feel any food going     A lot of phlegm gets stuck and causes back pain    Constant cough if tries to lie down  Not sleeping much  Only sleep is sitting          Malignant neoplasm of lower third of esophagus (HCC)    8/24/2019 Initial Diagnosis     Malignant neoplasm of lower third of esophagus (HCC)  At least intramucosal carcinoma  Her2/SHIMON positive      10/2/2019 -  Chemotherapy     pegfilgrastim (NEULASTA ONPRO) subcutaneous injection kit 6 mg, 6 mg, Subcutaneous, Once, 0 of 6 cycles  DOCEtaxel (TAXOTERE) 99 mg in sodium chloride 0 9 % 250 mL chemo infusion, 50 mg/m2 = 99 mg (83 3 % of original dose 60 mg/m2), Intravenous, Once, 0 of 6 cycles  Dose modification: 50 mg/m2 (original dose 60 mg/m2, Cycle 1, Reason: Other (See Comments))  leucovorin 396 mg in dextrose 5 % 250 mL IVPB, 200 mg/m2 = 396 mg (50 % of original dose 400 mg/m2), Intravenous, Once, 0 of 6 cycles  Dose modification: 200 mg/m2 (original dose 400 mg/m2, Cycle 1, Reason: Other (See Comments), Comment: FLOT regimen standard)  oxaliplatin (ELOXATIN) 168 3 mg in dextrose 5 % 250 mL chemo infusion, 85 mg/m2, Intravenous, Once, 0 of 6 cycles  trastuzumab (HERCEPTIN) 496 mg in sodium chloride 0 9 % 250 mL chemo infusion, 6 mg/kg, Intravenous, Once, 0 of 6 cycles        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[facility] [address] )    8/24/2019 Biopsy     Esophagus (biopsy):  - At 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       9/24/2019 Initial Diagnosis     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HCC)      10/2/2019 -  Chemotherapy     pegfilgrastim (NEULASTA ONPRO) subcutaneous injection kit 6 mg, 6 mg, Subcutaneous, Once, 0 of 6 cycles  DOCEtaxel (TAXOTERE) 99 mg in sodium chloride 0 9 % 250 mL chemo infusion, 50 mg/m2 = 99 mg (83 3 % of original dose 60 mg/m2), Intravenous, Once, 0 of 6 cycles  Dose modification: 50 mg/m2 (original dose 60 mg/m2, Cycle 1, Reason: Other (See Comments))  leucovorin 396 mg in dextrose 5 % 250 mL IVPB, 200 mg/m2 = 396 mg (50 % of original dose 400 mg/m2), Intravenous, Once, 0 of 6 cycles  Dose modification: 200 mg/m2 (original dose 400 mg/m2, Cycle 1, Reason: Other (See Comments), Comment: FLOT regimen standard)  oxaliplatin (ELOXATIN) 168 3 mg in dextrose 5 % 250 mL chemo infusion, 85 mg/m2, Intravenous, Once, 0 of 6 cycles  trastuzumab (HERCEPTIN) 496 mg in sodium chloride 0 9 % 250 mL chemo infusion, 6 mg/kg, Intravenous, Once, 0 of 6 cycles         Past Medical History:   Diagnosis Date    COPD [facility]chronic obstructive pulmonary disease) ([facility] )     Diabetes mellitus ([facility] )     Difficulty swallowing     Esophageal mass     Sleep apnea      Past Surgical History:   Procedure Laterality Date    BACK SURGERY      DISC REMOVAL      FL GUIDED CENTRAL VENOUS ACCESS DEVICE INSERTION  9/26/2019    GASTROJEJUNOSTOMY W/ JEJUNOSTOMY TUBE N/A 9/26/2019    Procedure: INSERTION JEJUNOSTOMY TUBE OPEN;  Surgeon: Rolf Lees MD;  [facility];  Service: Surgical Oncology    TONSILLECTOMY      TUNNELED VENOUS PORT PLACEMENT N/A 9/26/2019    Procedure: INSERTION VENOUS PORT (PORT-A-CATH);   Surgeon: Emilia Gaona MD;  Location: [facility];  Service: Surgical Oncology       Family History   Problem Relation Age of Onset    Diabetes Mother     No Known Problems Father        Social History   Social History     Substance and Sexual Activity   Alcohol Use Not Currently    Alcohol/week: 0 0 standard drinks    Frequency: Never    Drinks per session: 1 or 2    Binge frequency: Never     Social History     Substance and Sexual Activity   Drug Use Never     Social History     Tobacco Use   Smoking Status Former Smoker    Packs/day: 0 50    Years: 50 00    Pack years: 25 00    Types: Cigarettes    Last attempt to quit: 2017    Years since quittin 7   Smokeless Tobacco Never Used         Meds/Allergies     Current Outpatient Medications:     albuterol (2 5 mg/3 mL) 0 083 % nebulizer solution, Take 1 vial (2 5 mg total) by nebulization every 6 (six) hours as needed for wheezing or shortness of breath, Disp: 1 vial, Rfl: 5    canagliflozin (INVOKANA) 100 mg, Daily, Disp: , Rfl:     levothyroxine 25 mcg tablet, Take 25 mcg by mouth daily in the early morning, Disp: , Rfl:     Melatonin 5 MG/ML LIQD, Take 2 mL (10 mg total) by mouth daily at bedtime as needed (insomnia), Disp: 1 Bottle, Rfl: 1    pantoprazole (PROTONIX) 40 mg tablet, Take 1 tablet (40 mg total) by mouth daily, Disp: 30 tablet, Rfl: 1    pioglitazone (ACTOS) 30 mg tablet, Take 30 mg by mouth daily , Disp: , Rfl:     sitaGLIPtin (JANUVIA) 100 mg tablet, Take 100 mg by mouth daily , Disp: , Rfl:     acetaminophen (TYLENOL) 160 mg/5 mL suspension, Take 20 3 mL (650 mg total) by mouth every 4 (four) hours as needed for mild pain or fever, Disp: 118 mL, Rfl: 0  Allergies   Allergen Reactions    Penicillins Itching       Review of Systems   Constitutional: Positive for activity change, appetite change (wants to eat but limited) and fatigue  HENT: Negative  Eyes: Negative  Respiratory: Positive for cough and shortness of breath  Gastrointestinal: Negative for nausea and vomiting  Endocrine: Negative  Genitourinary: Negative  Musculoskeletal: Positive for back pain (when tries to eat)  Skin: Negative  Allergic/Immunologic: Negative  Neurological: Negative  Hematological: Negative  Psychiatric/Behavioral: Negative  OBJECTIVE:   /80 (BP Location: Right arm, Patient Position: Sitting, Cuff Size: Standard)   Pulse 92   Resp 16   Ht 5' 9" (1 753 m)   Wt 82 6 kg (182 lb)   BMI 26 88 kg/m²   Pain Assessment:  0  Performance Status: Karnofsky: 80 - Normal activity with effort; some signs or symptoms of disease    Physical Exam   Constitutional: He is oriented to person, place, and time  He appears well-developed and well-nourished  No distress  Eyes: No scleral icterus  Cardiovascular: Normal rate and regular rhythm  No murmur heard  Pulmonary/Chest: Effort normal  No respiratory distress  He has no rhonchi  Abdominal: Soft  He exhibits no distension  There is no tenderness  Musculoskeletal: He exhibits no edema  Lymphadenopathy:     He has no cervical adenopathy  Right: No supraclavicular adenopathy present  Left: No supraclavicular adenopathy present  Neurological: He is alert and oriented to person, place, and time  Psychiatric: He has a normal mood and affect  His behavior is normal    Nursing note and vitals reviewed  RESULTS  Imaging Studies    Ct Chest Abdomen Pelvis W Contrast    Result Date: 9/17/2019  Narrative: CT CHEST, ABDOMEN AND PELVIS WITH IV CONTRAST INDICATION:   dysphagia  Patient recently diagnosed with adenocarcinoma of the lower 3rd of the esophagus  Dysphagia, "feels like there is a bubble in my throat",  blood in vomit  History of COPD, diabetes, hypertension  Prior history of tobacco use  COMPARISON:  September 6, 2019  TECHNIQUE: CT examination of the chest, abdomen and pelvis was performed   Axial, sagittal, and coronal 2D reformatted images were created from the source data and submitted for interpretation  Radiation dose length product (DLP) for this visit:  650 mGy-cm   This examination, like all CT scans performed in the [facility], was performed utilizing techniques to minimize radiation dose exposure, including the use of iterative reconstruction and automated exposure control  IV Contrast:  100 mL of iohexol (OMNIPAQUE) Enteric Contrast: Enteric contrast was not administered  FINDINGS: CHEST LUNGS:  A focal area of opacity within the anterior aspect of the left upper lobe of the lung measuring approximately 7 x 8 mm is again evident, presently best demonstrated on series 3 image 28  Once again, this appears somewhat more linear on the coronal images  This is similar in appearance to September 6, 2019  Continued follow-up is recommended  Minimal atelectasis in the inferior left lingula is unchanged  PLEURA:  Unremarkable  HEART/GREAT VESSELS:  Coronary artery calcifications are present  There is mild atherosclerosis of the thoracic aorta  MEDIASTINUM AND SHEA:  There is irregular wall thickening and some mucosal enhancement evident involving the distal esophagus and extending to the left hand aspect of the upper stomach  There is a masslike area of abnormal soft tissue density along the left hand aspect of the upper stomach which measures approximately 5 5 x 3 6 cm  There appears to be some infiltration of the fat adjacent to this masslike area which is suspicious for local extension of disease  Several mildly prominent nodes are present between this mass and the liver, measuring up to 1 2 x 0 9 cm  A left para esophageal node on series 2 image 43 presently measures 1 x 0 6 cm whereas it measured 0 8 x 0 6 cm on the prior study  There is some fluid within the esophagus near the  level of the gregg  A subcarinal node measures approximately 8 mm short axis, similar to the prior study    A right paratracheal node measures approximately 9 mm, similar to the prior study  Another right paratracheal node measures approximately 10 mm, also similar to the prior study  CHEST WALL AND LOWER NECK:   Unremarkable  ABDOMEN LIVER/BILIARY TREE:  There is a small area of focal fatty infiltration adjacent to the falciform ligament, similar to the previous examination  GALLBLADDER:  There are gallstone(s) within the gallbladder, without pericholecystic inflammatory changes  SPLEEN:  Unremarkable  PANCREAS:  Unremarkable  ADRENAL GLANDS:  There is an approximately 1 5 x 1 cm right adrenal nodule  This appears similar to slightly larger compared to the prior examination  There is a questionable 0 6 x 0 5 cm left adrenal nodule, not clearly demonstrated on the prior study  Follow-up of both of these nodules is suggested  KIDNEYS/URETERS:  Unremarkable  No hydronephrosis  STOMACH AND BOWEL:  As described above  There is no evidence of small bowel obstruction  There is no evidence of acute diverticulitis  APPENDIX:  A normal appendix was visualized  ABDOMINOPELVIC CAVITY:  As described above  No pneumoperitoneum  No significant ascites  VESSELS:  There is atherosclerosis  There is no abdominal aortic aneurysm  PELVIS REPRODUCTIVE ORGANS AND URINARY BLADDER:  There is a soft tissue density nodule within the posterior aspect of the urinary bladder near the midline which measures approximately 1 2 cm  This appears similar to the prior study  This could be caused by indentation of the prostate, however, a small bladder mass cannot be entirely excluded  Urology consultation and follow-up is recommended  ABDOMINAL WALL/INGUINAL REGIONS:  Small fat-containing left inguinal hernia  OSSEOUS STRUCTURES:  No acute fracture or destructive osseous lesion  Disc spacers are present at L5-S1  Impression: There is irregular wall thickening involving the distal esophagus and extending to the left hand aspect of the upper stomach    There is a masslike area of abnormal soft tissue density along the left hand aspect of the upper stomach which measures approximately 5 5 x 3 6 cm, and there appears to be some infiltration of the adjacent fat  This is concerning for local extension of the patient's known neoplasm  Several mildly prominent nodes are present between this mass and the liver  There are also some mildly prominent mediastinal nodes  Please see discussion  There is some fluid within the esophagus near level of the gregg  There is an approximately 1 5 x 1 cm right adrenal nodule  This appears similar to slightly larger compared to the prior examination  There is a questionable tiny left adrenal nodule  Follow-up of both of these adrenal findings is suggested  There is an approximately 1 2 cm soft tissue density nodule within the posterior aspect of the urinary bladder  Although this may be related to indentation of the prostate, a small bladder mass cannot be entirely excluded  Urology consultation and follow-up is recommended  A focal area of opacity within the anterior aspect of the left upper lobe of the lung appears similar to September 6, 2019  Please see discussion  Continued follow-up is recommended  This could be reassessed with chest CT in 3 months  PET/CT could also be considered, however, it is of borderline size for accurate characterization with PET/ CT  If more remote CTs of the chest exist, direct comparison would be helpful  PET/CT should be considered for further evaluation of the extent of adenopathy and extent of disease  PET/CT or MRI could be utilized to evaluate the adrenal findings, if there are no contraindications  Cholelithiasis  No CT evidence of acute cholecystitis  Atherosclerosis  Coronary artery disease  Other findings as described above  Please see discussion  The study was marked in [facility] for immediate notification   Workstation performed: [id number]     Ct Chest Abdomen Pelvis W Contrast    Result Date: 9/12/2019  Narrative: CT CHEST, ABDOMEN AND PELVIS WITH IV CONTRAST INDICATION:   C15 5: Malignant neoplasm of lower third of esophagus  Recent diagnosis of esophageal carcinoma (adenocarcinoma)  COMPARISON:  None  TECHNIQUE: CT examination of the chest, abdomen and pelvis was performed  Axial, sagittal, and coronal 2D reformatted images were created from the source data and submitted for interpretation  Radiation dose length product (DLP) for this visit:  818 mGy-cm   This examination, like all CT scans performed in the [facility], was performed utilizing techniques to minimize radiation dose exposure, including the use of iterative reconstruction and automated exposure control  IV Contrast:  100 mL of iohexol (OMNIPAQUE) Enteric Contrast: Enteric contrast was administered  FINDINGS: CHEST LUNGS:  Solitary focal opacity noted within the anterior left upper lobe, on image 205/25 measuring 7 x 8 mm  On coronal imaging appearing somewhat more elongated although with central nodular aspect about 6 mm  PLEURA:  Unremarkable  HEART/GREAT VESSELS:  Coronary artery calcification  Otherwise unremarkable MEDIASTINUM AND SHEA:  As already known, abnormal appearance of the distal esophagus, beginning just below level of the inferior pulmonary veins and extending to the gastric cardia  Irregular wall thickening and mucosal enhancement noted  Along the left margin of the abnormal esophagus image 201/41 there is a paraesophageal lymph node measuring 8 x 6 mm  A subcarinal lymph node demonstrates short axis diameter 8 mm  CHEST WALL AND LOWER NECK:   Unremarkable  ABDOMEN LIVER/BILIARY TREE:  Unremarkable  GALLBLADDER:  There are gallstone(s) within the gallbladder, without pericholecystic inflammatory changes  SPLEEN:  Unremarkable  PANCREAS:  Unremarkable  ADRENAL GLANDS:  There is a right adrenal nodule measuring 1 2 x 0 7 cm in size  It is somewhat small for accurate characterization    As best as can be told, density is well above fluid on this enhanced only exam KIDNEYS/URETERS:  Unremarkable  No hydronephrosis  STOMACH AND BOWEL:  As above, abnormal appearance of the distal esophagus, involving the gastric cardia as well  Moderately increased stool within the colon  Small bowel unremarkable  APPENDIX:  No findings to suggest appendicitis  ABDOMINOPELVIC CAVITY:  Posterior to the gastric cardia there is an ovoid soft tissue nodule best measured on coronal image 79, measuring 1 7 x 1 0 cm  Along the right margin of the gastric cardia on coronal image 71 there is a soft tissue nodule measuring 1 3 x 1 1 cm, and there is also an ill-defined right lateral border of the gastric cardia for example image 201/54 and coronal image 68  There is a trace amount of free fluid present within the pelvis  VESSELS:  Unremarkable for patient's age  PELVIS REPRODUCTIVE ORGANS:  See below URINARY BLADDER:  Soft tissue nodular density associated with the posterior inferior aspect of the urinary bladder image 201/114 or coronal image 84 measuring approximately 9 mm  This is favored to be enlargement of the median lobe of the prostate gland, less likely bladder polyp ABDOMINAL WALL/INGUINAL REGIONS:  Unremarkable  OSSEOUS STRUCTURES:  No acute fracture or destructive osseous lesion  Impression: 1  Irregular wall thickening involving the distal esophagus beginning just below the inferior pulmonary veins, extending through the gastric cardia compatible with biopsy-proven adenocarcinoma  As above, there is involvement of the gastric cardia, and possible extra luminal extension of tumor along its right lateral margin 2  There is mild adenopathy identified adjacent to the gastric cardia and distal esophagus  Additional borderline prominent mediastinal lymph nodes 3  There is a left upper lobe opacity identified    Although potentially representing an area of inflammation or scarring, an irregularly marginated solitary pulmonary metastasis should be excluded  This could either be reassessed with chest CT in 3 months time or further evaluated with PET CT (it is of borderline size for accurate characterization with PET) 4  Hypertrophy of the median lobe of the prostate (favored) or less likely 9 mm bladder polyp 5  Nonspecific 1 2 cm right adrenal nodule  Overall, PET/CT may be helpful to determine exact extent of adenopathy within the lower chest and upper abdomen, evaluation for any extraluminal extension of tumor, and further characterization of left upper lobe density and right adrenal nodule Workstation performed: [id number]     Nm Pet Ct Skull Base To Mid Thigh    Result Date: 9/19/2019  Narrative: PET/CT SCAN INDICATION: Newly diagnosed esophageal cancer  Initial staging  C15 5: Malignant neoplasm of lower third of esophagus MODIFIER: PI COMPARISON: CT chest abdomen pelvis 9/16/2019 and 9/6/2019 CELL TYPE:  Intramucosal carcinoma, 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 TECHNIQUE:   8 8 mCi F-18-FDG administered IV  Multiplanar attenuation corrected and non attenuation corrected PET images are available for interpretation, and contiguous, low dose, axial CT sections were obtained from the skull base through the femurs   Intravenous contrast material was not utilized  This examination, like all CT scans performed in the [facility], was performed utilizing techniques to minimize radiation dose exposure, including the use of iterative reconstruction and automated exposure control  Fasting serum glucose: 82 mg/dl FINDINGS: VISUALIZED BRAIN:   No acute abnormalities are seen  HEAD/NECK:   Fairly diffuse FDG uptake at the thyroid gland, SUV max of 6 2 may be related to thyroiditis  No FDG avid cervical adenopathy is seen  CT images: Scattered mucosal thickening noted in the paranasal sinuses  CHEST:   Extensive FDG uptake at the distal esophagus leading to the GE junction and proximal stomach, SUV max of 21 0    Associated wall thickening noted here on CT  No FDG avid lymph nodes  CT images: Scattered coronary calcifications noted  Stable subcentimeter irregular density in the left upper lobe anteriorly, image 110 series 3  This is not FDG avid  ABDOMEN:   Extensive FDG uptake at the distal esophagus leading to the GE junction and proximal stomach, SUV max of 21 0  Associated wall thickening noted here on CT  Soft tissue density at the level of the proximal stomach extending to the gastrohepatic region measures 6 0 x 4 7 cm  There may be contiguous FDG avid lymphadenopathy in the gastrohepatic region where is there is soft tissue fullness here on CT inseparable from the gastric wall thickening  Small focus of FDG uptake noted posterior to the proximal stomach, SUV max of 3 7  There is a 2 1 x 1 5 cm perigastric lymph node here on CT image 164 series 3  No suspicious FDG uptake at the adrenal glands  CT images: There is cholelithiasis  Stable nodularity of the right adrenal gland  PELVIS: Focal FDG uptake at the distal sigmoid colon, SUV max of 7 8  Possible eccentric wall thickening on CT  Additional focus of FDG uptake at the level of the proximal sigmoid colon, SUV max of 6 6  No obvious findings here on CT  CT images: Prostate is mildly prominent  Tiny fat-containing left inguinal hernia  OSSEOUS STRUCTURES: Focal FDG uptake at the left anterior 6th rib demonstrates SUV max of 3 7  No obvious findings here on CT  Subtle focus of FDG uptake at the right lateral 2nd rib, SUV max of 2 2  No obvious findings here on CT  CT images: Prior lumbosacral fusion  Impression: 1  Extensive FDG uptake at the distal esophagus, GE junction and proximal stomach compatible with known malignancy  2  FDG avid perigastric lymph node posterior to the proximal stomach suspicious for metastasis  There may be FDG avid gastrohepatic lymphadenopathy but this is inseparable from the gastric mass   3   Focal FDG uptake at the left anterior 6th rib   Possible subtle focus of FDG uptake at the right lateral 2nd rib  No obvious findings here on the limited low-dose CT images  Metastasis is not excluded  Distribution is not typical for prior trauma  Consider follow-up with MRI of the chest  4   Foci of FDG uptake at the sigmoid colon for which underlying lesions should be excluded  Recommend follow-up with colonoscopy  5   Fairly diffuse FDG uptake at the thyroid gland, may be related to thyroiditis  The study was marked in EPIC for significant notification  Workstation performed: [id number]     Fl Guided Central Venous Access Device Insertion    Result Date: 9/27/2019  Narrative: C-ARM - chest INDICATION: C15 5: Malignant neoplasm of lower third of esophagus  Procedure guidance  COMPARISON:  None TECHNIQUE: FLUOROSCOPY TIME:   11 SECONDS 2 FLUOROSCOPIC IMAGES FINDINGS: Fluoroscopic guidance provided for surgical procedure  Osseous and soft tissue detail limited by technique  Impression: Fluoroscopic guidance provided for surgical procedure  Please refer to the separate procedure notes for additional details   Workstation performed: [id number]       Pathology:  Collected:  8/24/2019 08:56 Status:  Edited Result - FINAL   Visible to patient:  Yes (MyChart) Dx:  Esophageal dysphagia   Component    Case Report   Surgical Pathology Report                         Case: [id number]                                    Authorizing Provider: Horacio Allen DO          Collected:           08/24/2019 0856               Ordering Location:      [facility]       Received:            08/24/2019 [address] [place] [address] Endoscopy                                                              Pathologist:           Jose D Bartholomew MD                                                                Specimen:    Esophagus, esophagus                                                                       Addendum   HER2 IMMUNOHISTOCHEMICAL ANALYSIS FOR GASTRIC/ESOPHAGEAL ADENOCARCINOMA     Test Description                                        Result                                                     HER2/SHIMON by IHC (clone [id number])                   3+/Positive         These immunohistochemical tests were performed at [facility] in [place], [place] and interpreted by Dr Kim Lee  An electronic copy of this report will be kept on file in the Medical Records Department at [facility]      Comment:      Immunohistochemistry scoring for Her2 in gastric and gastroesophageal carcinoma (2016 CAP/ASCO guidelines)     Biopsy specimen staining pattern:     0 No reactivity or no membranous reactivity in any tumor cell  1+ Tumor cell cluster with a faint or barely perceptible membranous reactivity irrespective of percentage of tumor cells  2+ Tumor cell cluster with a weak to moderate complete, basolateral, or lateral membranous reactivity irrespective of  percentage of tumor cells stained  3+ Tumor cell cluster with a strong complete, basolateral, or lateral membranous reactivity irrespective of tumor cells stained     Surgical specimen staining pattern:     0 No reactivity or membranous reactivity in less than 10% of tumor cells  1+ Faint or barely perceptible membranous reactivity in 10% or more of tumor cells; cells are reactive only in part of their  membrane  2+ Weak to moderate complete, basolateral, or lateral membranous reactivity in 10% or more of tumor cells  3+ Strong complete, basolateral, or lateral membranous reactivity in 10% or more of tumor cells     A positive control for each antibody has been reviewed and accepted       Reference:   1  Aga POSADA, Omkar, Andrey CB et al  HER2 Testing and Clinical Decision Making in Gastroesophageal Adenocarcinoma: Guideline from the College of [address] [place] [address] Rd, [address] for Clinical Pathology, and [address] of Clinical Oncology  Arch Pathol Lab Med   Doi: 10 [id number]/[facility]  [id number]       Addendum electronically signed by Scott Patiño MD on 8/29/2019 at [phone]   Final Diagnosis   A  Esophagus (biopsy):  - At 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      Comment:  - CK AE1/3 highlights few single cells and small clusters of cells  P53 is aberrantly expressed   - Dr Cristiane Marx was notified of the diagnosis on 8/29/19 at 7:14 am via Epic messaging/email    - Her2 IHC has been ordered and results will be indicated in an addendum  Electronically signed by Scott Patiño MD on 8/29/2019 at 8487               ASSESSMENT  1  Malignant neoplasm of lower third of esophagus [facility])  Ambulatory referral to Radiation Oncology         PLAN/DISCUSSION    Yenifer Lombardo is a 71y o  year old male with at least stage III, possible stage IV, esophageal adenocarcinoma of the GE junction with suspicious lymphadenopathy and possible infiltration from gastric cardia tumor component into adjacent fat  There are areas of FDG uptake in the ribs and a questionable pulmonary lesion  Given his presentation, I agree with upfront  chemotherapy with Herceptin and FLOT  Depending on response he may or may not need consolidative chemotherapy and radiation  We discussed the role of radiation depending on response of tumor to chemotherapy  Radiation may be delivered either with palliative or definitive or neoadjuvant intent  Dose would range from 30Gy in 10fractions to 50 4Gy in 28 fractions  Neoadjuvant chemotherapy with FLOT alone is also excepted standard of care assuming patient is not stage IV and considered resectable after completion of systemic therapy  The patient was given the opportunity to ask questions, which were answered to his satisfaction  We will be happy to see him again as the need arises  We are available for any questions or concerns        Gissell Gomez MD  1/77/3077,4:59 PM      Portions of the record may have been created with voice recognition software   Occasional wrong word or "sound a like" substitutions may have occurred due to the inherent limitations of voice recognition software   Read the chart carefully and recognize, using context, where substitutions have occurred

## 2019-09-30 NOTE — UTILIZATION REVIEW
Notification of Discharge  This is a Notification of Discharge from our facility [address]  Please be advised that this patient has been discharge from our facility  Below you will find the admission and discharge date and time including the patients disposition  PRESENTATION DATE: 9/26/2019 10:55 AM  OBS ADMISSION DATE:  IP ADMISSION DATE: 9/26/19 1715   DISCHARGE DATE: 9/29/2019 12:37 PM  DISPOSITION: Home with [facility] with [facility]   Admission Orders listed below:  Admission Orders (From admission, onward)     Ordered        09/26/19 1715  Inpatient Admission  Once                   Please contact the UR Department if additional information is required to close this patient's authorization/case  [address]  Utilization Review Department  Phone: [phone]; Fax [phone]  [email or web address]  ATTENTION: Please call with any questions or concerns to [phone]  and carefully listen to the prompts so that you are directed to the right person  Send all requests for admission clinical reviews, approved or denied determinations and any other requests to fax [phone]   All voicemails are confidential

## 2019-10-02 PROCEDURE — 93306 TTE W/DOPPLER COMPLETE: CPT | Performed by: INTERNAL MEDICINE

## 2019-10-02 NOTE — SOCIAL WORK
Post-Discharge phone call:     CM received a call from [place] [phone] requesting dc instructions and tube feeds orders  Saray reported that it has take two days to reach the patient by telephone and have him answer his door  SAMEERA Saeed found his cell phone on the front lawn when she went to visit  CM faxed AVS, DC summary, tube feed order and dietitian's note to [facility] via [address]       Pako Saeed [phone]

## 2019-10-03 ENCOUNTER — TELEPHONE (OUTPATIENT)
Dept: GASTROENTEROLOGY | Facility: CLINIC | Age: 64
End: 2019-10-03

## 2019-10-03 ENCOUNTER — PREP FOR PROCEDURE (OUTPATIENT)
Dept: GASTROENTEROLOGY | Facility: CLINIC | Age: 64
End: 2019-10-03

## 2019-10-03 ENCOUNTER — DOCUMENTATION (OUTPATIENT)
Dept: HEMATOLOGY ONCOLOGY | Facility: CLINIC | Age: 64
End: 2019-10-03

## 2019-10-03 DIAGNOSIS — Z12.11 SPECIAL SCREENING FOR MALIGNANT NEOPLASMS, COLON: Primary | ICD-10-CM

## 2019-10-03 NOTE — TELEPHONE ENCOUNTER
Michelle pt - Pt's nurse Dar Durbin called and has some questions about pt's colonoscopy  Please call [phone]

## 2019-10-03 NOTE — PROGRESS NOTES
GI Oncology Nurse Navigator:  Rectal/GI Multidisciplinary Conference: 10/3/19    Diagnosis: Esophageal Cancer      Philipp Mir was presented at the Rectal/GI Multidisciplinary Conference today  PHYSICIAN RECOMMENDED PLAN:    -Chemotherapy up front, FLOT + Herceptin,  x3 months    -Repeat imaging    -Possible concurrent chemotherapy/radiation therapy    -Set up for colonoscopy or flexible sigmoidoscopy if cannot prep (per Dr Gabino Toribio prep can be given through patients feeding tube)    -If patient needs a palliative stent due to obstruction at a later date and he is not a surgical candidate, contact Dr Varela Clear    -Message sent to Dr Santi Marquez via tiger text about setting up patient for colonoscopy as he has been following him    -Spoke to Alva Butler, Dr Servin Check , Singh Davenport is scheduled for a colonoscopy on 10/7/19, Dr Servin Check office is taking care of the details and has contacted patient with instructions    Team agreed to plan

## 2019-10-03 NOTE — PROGRESS NOTES
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  Chemotherapy (neulasta, taxotere, oxaliplatin, trastuzumab) started 10/8/19  Oncology History    8/19/19 to ER with recurrent episode of foreign body sensation in his throat  hours after eating two small shrimp  Patient notes multiple episodes over the past month that typically have resolved spontaneously    Impression and plan:  Esophageal blockage likely secondary to food bolus that is recurrent  Patient symptoms improved following treatment with glucagon in the emergency room but patient will require close outpatient follow-up with Gastroenterology for EGD to evaluate etiology of recurrent symptoms    8/21/19 Mindy Lomeli PA-C Gastroenterology  over the past 2 months he has had 3 distinct episodes of food getting stuck  He states that each time the food gets stuck to where he cannot tolerate any liquids or secretions  Plan EGD    8/24/19 EGD Dr Rolando Echevarria  · Severe, generalized edematous, erythematous and hemorrhagic mucosa in the lower third of the esophagus (30 cm from the incisors) with bleeding before intervention; performed 10 cold biopsies  There is circumferential erythema, friability, easy bleeding,  neoplastic abnormality and narrowing from 30 cm down to 40 cm  Ten biopsies were obtained to rule out malignancy  A dilation was not performed since I suspect that the underlying etiology is malignancy    · The stomach and duodenum appeared normal    Esophagus (biopsy):  - At 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     HER2 positive    9/6/19 CT chest, abdomen and pelvis  IMPRESSION:     1  Irregular wall thickening involving the distal esophagus beginning just below the inferior pulmonary veins, extending through the gastric cardia compatible with biopsy-proven adenocarcinoma  As above, there is involvement of the gastric cardia, and possible extra luminal extension of tumor along its right lateral margin  2  There is mild adenopathy identified adjacent to the gastric cardia and distal esophagus  Additional borderline prominent mediastinal lymph nodes  3  There is a left upper lobe opacity identified  Although potentially representing an area of inflammation or scarring, an irregularly marginated solitary pulmonary metastasis should be excluded  This could either be reassessed with chest CT in 3 months time or further evaluated with PET CT (it is of borderline size for accurate characterization with PET)  4  Hypertrophy of the median lobe of the prostate (favored) or less likely 9 mm bladder polyp  5  Nonspecific 1 2 cm right adrenal nodule  Overall, PET/CT may be helpful to determine exact extent of adenopathy within the lower chest and upper abdomen, evaluation for any extraluminal extension of tumor, and further characterization of left upper lobe density and right adrenal nodule    9/16-9/18 admitted with dysphagia    9/16/19 CT chest, abdomen and pelvis  IMPRESSION:     There is irregular wall thickening involving the distal esophagus and extending to the left hand aspect of the upper stomach  There is a masslike area of abnormal soft tissue density along the left hand aspect of the upper stomach which measures approximately 5 5 x 3 6 cm, and there appears to be some infiltration of the adjacent fat  This is concerning for local extension of the patient's known neoplasm  Several mildly prominent nodes are present between this mass and the liver  There are also some mildly prominent mediastinal nodes    Please see discussion      There is some fluid within the esophagus near level of the gregg      There is an approximately 1 5 x 1 cm right adrenal nodule  This appears similar to slightly larger compared to the prior examination  There is a questionable tiny left adrenal nodule  Follow-up of both of these adrenal findings is suggested      There is an approximately 1 2 cm soft tissue density nodule within the posterior aspect of the urinary bladder  Although this may be related to indentation of the prostate, a small bladder mass cannot be entirely excluded  Urology consultation and follow-up is recommended      A focal area of opacity within the anterior aspect of the left upper lobe of the lung appears similar to September 6, 2019  Please see discussion  Continued follow-up is recommended  This could be reassessed with chest CT in 3 months  PET/CT could also be considered, however, it is of borderline size for accurate characterization with PET/ CT  If more remote CTs of the chest exist, direct comparison would be helpful      PET/CT should be considered for further evaluation of the extent of adenopathy and extent of disease  PET/CT or MRI could be utilized to evaluate the adrenal findings, if there are no contraindications      Cholelithiasis  No CT evidence of acute cholecystitis      Atherosclerosis  Coronary artery disease      Other findings as described above  Please see discussion    9/19/19 PET  IMPRESSION:     1  Extensive FDG uptake at the distal esophagus, GE junction and proximal stomach compatible with known malignancy  2  FDG avid perigastric lymph node posterior to the proximal stomach suspicious for metastasis  There may be FDG avid gastrohepatic lymphadenopathy but this is inseparable from the gastric mass  3   Focal FDG uptake at the left anterior 6th rib  Possible subtle focus of FDG uptake at the right lateral 2nd rib  No obvious findings here on the limited low-dose CT images  Metastasis is not excluded  Distribution is not typical for prior trauma  Consider follow-up with MRI of the chest   4   Foci of FDG uptake at the sigmoid colon for which underlying lesions should be excluded  Recommend follow-up with colonoscopy  5   Fairly diffuse FDG uptake at the thyroid gland, may be related to thyroiditis    9/20/19 Robert Canchola MD  Plan feeding tube, referral to Med Onc and Rad Onc  If the rib lesion is a metastasis, he is not a candidate for resection    9/24/19 Dr Jayne Hurt  recommended that he have a port as well as a feeding tube placed since he may undergo esophagectomy in the future  We did discuss that if the rib lesions are indeed metastasis, chemotherapy would be the mainstay of his treatment    9/24/19 Dr Meaghan Petit  " I think the patient would benefit from neoadjuvant chemotherapy upfront prior to consideration of either concurrent chemoradiation or even surgery  Herceptin with FLOT chemotherapy Every 2 weeks for 6 cycles  This will consist of 5-FU 2600 mg/m² over 24 hours, leucovorin 20 mg meter square, Oxaliplatin 85 mg/m2 square and Taxotere 60 mg meter square  Once he completed 6 doses of chemotherapy we will reimage him  If he has a nice response we will then consider concurrent chemoradiation and then surgery  If he has a very nice response with very little residual disease surgery could possibly be considered for now I did talk to him about most probably getting concurrent chemoradiation after the chemotherapy "    9/26/19 Port insertion and J tube insertion scheduled at [facility] (Dr Jayne Hurt)  10/8/19 Infusion scheduled  Dr Obey Lopez on colonoscopy ASAP    Mostly liquids  Can feel any food going     A lot of phlegm gets stuck and causes back pain    Constant cough if tries to lie down  Not sleeping much  Only sleep is sitting          Malignant neoplasm of lower third of esophagus ([address] )    8/24/2019 Initial Diagnosis     Malignant neoplasm of lower third of esophagus (HCC)  At least intramucosal carcinoma  Her2/SHIMON positive      10/8/2019 -  Chemotherapy     pegfilgrastim (NEULASTA ONPRO) subcutaneous injection kit 6 mg, 6 mg, Subcutaneous, Once, 1 of 6 cycles  DOCEtaxel (TAXOTERE) 99 mg in sodium chloride 0 9 % 250 mL chemo infusion, 50 mg/m2 = 99 mg (83 3 % of original dose 60 mg/m2), Intravenous, Once, 1 of 6 cycles  Dose modification: 50 mg/m2 (original dose 60 mg/m2, Cycle 1, Reason: Other (See Comments))  leucovorin 396 mg in dextrose 5 % 250 mL IVPB, 200 mg/m2 = 396 mg (50 % of original dose 400 mg/m2), Intravenous, Once, 1 of 6 cycles  Dose modification: 200 mg/m2 (original dose 400 mg/m2, Cycle 1, Reason: Other (See Comments), Comment: FLOT regimen standard)  oxaliplatin (ELOXATIN) 168 3 mg in dextrose 5 % 250 mL chemo infusion, 85 mg/m2 = 168 3 mg, Intravenous, Once, 1 of 6 cycles  trastuzumab (HERCEPTIN) 496 mg in sodium chloride 0 9 % 250 mL chemo infusion, 6 mg/kg = 496 mg, Intravenous, Once, 1 of 6 cycles        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HCC)    8/24/2019 Biopsy     Esophagus (biopsy):  - At 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       9/24/2019 Initial Diagnosis     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HCC)      10/8/2019 -  Chemotherapy     pegfilgrastim (NEULASTA ONPRO) subcutaneous injection kit 6 mg, 6 mg, Subcutaneous, Once, 1 of 6 cycles  DOCEtaxel (TAXOTERE) 99 mg in sodium chloride 0 9 % 250 mL chemo infusion, 50 mg/m2 = 99 mg (83 3 % of original dose 60 mg/m2), Intravenous, Once, 1 of 6 cycles  Dose modification: 50 mg/m2 (original dose 60 mg/m2, Cycle 1, Reason: Other (See Comments))  leucovorin 396 mg in dextrose 5 % 250 mL IVPB, 200 mg/m2 = 396 mg (50 % of original dose 400 mg/m2), Intravenous, Once, 1 of 6 cycles  Dose modification: 200 mg/m2 (original dose 400 mg/m2, Cycle 1, Reason: Other (See Comments), Comment: FLOT regimen standard)  oxaliplatin (ELOXATIN) 168 3 mg in dextrose 5 % 250 mL chemo infusion, 85 mg/m2 = 168 3 mg, Intravenous, Once, 1 of 6 cycles  trastuzumab (HERCEPTIN) 496 mg in sodium chloride 0 9 % 250 mL chemo infusion, 6 mg/kg = 496 mg, Intravenous, Once, 1 of 6 cycles       Past Medical History:   Diagnosis Date    [facility]     Diabetes mellitus [facility] [facility]     Difficulty swallowing     Esophageal mass     Sleep apnea      Past Surgical History:   Procedure Laterality Date    BACK SURGERY      DISC REMOVAL      FL GUIDED CENTRAL VENOUS ACCESS DEVICE INSERTION  9/26/2019    GASTROJEJUNOSTOMY W/ JEJUNOSTOMY TUBE N/A 9/26/2019    Procedure: INSERTION JEJUNOSTOMY TUBE OPEN;  Surgeon: Denver Romance, MD;  [facility]: [facility];  Service: Surgical Oncology    TONSILLECTOMY      TUNNELED VENOUS PORT PLACEMENT N/A 9/26/2019    Procedure: INSERTION VENOUS PORT (PORT-A-CATH); Surgeon: Denver Romance, MD;  Location: [facility];  Service: Surgical Oncology       Review of Medications:   Vitamins, Supplements and Herbals: yes: Vitamin A supplment by mouth and Advised against high dose antioxidant supplements during tx      Current Outpatient Medications:     acetaminophen (TYLENOL) 160 mg/5 mL suspension, Take 20 3 mL (650 mg total) by mouth every 4 (four) hours as needed for mild pain or fever, Disp: 118 mL, Rfl: 0    albuterol (2 5 mg/3 mL) 0 083 % nebulizer solution, Take 1 vial (2 5 mg total) by nebulization every 6 (six) hours as needed for wheezing or shortness of breath, Disp: 1 vial, Rfl: 5    canagliflozin (INVOKANA) 100 mg, Daily, Disp: , Rfl:     dexamethasone (DECADRON) 4 mg tablet, Take 2 tablets (8 mg total) by mouth 2 (two) times a day with meals The day before and the day after each chemotherapy treatment, Disp: 48 tablet, Rfl: 0    levothyroxine 25 mcg tablet, Take 25 mcg by mouth daily in the early morning, Disp: , Rfl:     Melatonin 5 MG/ML LIQD, Take 2 mL (10 mg total) by mouth daily at bedtime as needed (insomnia), Disp: 1 Bottle, Rfl: 1    Nutritional Supplements (JEVITY 1 5 SHUN) LIQD, Take 85 mL by mouth continuous [email or web address] [email or web address] as tolerated to goal rate of 85ml/hr to run for 16 hours daily  Flush with at least 60ml water at start and end of cycle  Needs additional water flushes if PO fluid intake declines, Disp: 1000 mL, Rfl: 0    pantoprazole (PROTONIX) 40 mg tablet, Take 1 tablet (40 mg total) by mouth daily, Disp: 30 tablet, Rfl: 1    pioglitazone (ACTOS) 30 mg tablet, Take 30 mg by mouth daily , Disp: , Rfl:     prochlorperazine (COMPAZINE) 10 mg tablet, Take 1 tablet (10 mg total) by mouth every 6 (six) hours as needed for nausea or vomiting, Disp: 45 tablet, Rfl: 3    sitaGLIPtin (JANUVIA) 100 mg tablet, Take 100 mg by mouth daily , Disp: , Rfl:   No current facility-administered medications for this visit       Facility-Administered Medications Ordered in Other Visits:     dexamethasone (DECADRON) 10 mg in sodium chloride 0 9 % 50 mL IVPB, 10 mg, Intravenous, Once, Oksana Alexandra MD    dextrose 5 % infusion, 20 mL/hr, Intravenous, Once, Oksana Alexandra MD    DOCEtaxel (TAXOTERE) 99 mg in sodium chloride 0 9 % 250 mL chemo infusion, 50 mg/m2 (Treatment Plan Recorded), Intravenous, Once, Oksana Alexandra MD    fosaprepitant (EMEND) 150 mg in sodium chloride 0 9 % 250 mL IVPB, 150 mg, Intravenous, Once, Oksana Alexandra MD    iron sucrose (VENOFER) 200 mg in sodium chloride 0 9 % 100 mL IVPB, 200 mg, Intravenous, Once, Oksana Alexandra MD, Last Rate: 100 mL/hr at 10/08/19 0916, 200 mg at 10/08/19 0916    leucovorin 396 mg in dextrose 5 % 250 mL IVPB, 200 mg/m2 (Treatment Plan Recorded), Intravenous, Once, Oksana Alexandra MD    oxaliplatin (ELOXATIN) 168 3 mg in dextrose 5 % 250 mL chemo infusion, 85 mg/m2 (Treatment Plan Recorded), Intravenous, Once, Oksana Alexandra MD    palonosetron (ALOXI) injection 0 25 mg, 0 25 mg, Intravenous, Once, Oksana Alexandra MD    trastuzumab (HERCEPTIN) 496 mg in sodium chloride 0 9 % 250 mL chemo infusion, 6 mg/kg (Treatment Plan Recorded), Intravenous, Once, Oksana Alexandra MD    Most Recent Lab Results:   Lab Results   Component Value Date    WBC 7 56 10/07/2019    ALT 13 10/07/2019    AST 8 10/07/2019    K 4 7 10/07/2019    K 3 7 09/28/2019    BUN 23 10/07/2019    BUN 14 09/28/2019    CREATININE 0 90 10/07/2019    CREATININE 0 91 09/28/2019    HGBA1C 7 6 08/15/2019    CALCIUM 9 5 10/07/2019       Anthropometric Measurements:   Height: 69"  Ht Readings from Last 1 Encounters:   10/08/19 5' 10" (1 778 m)     -Weight History:   Usual Weight: 205#  Ideal Body Weight: 160#  Wt Readings from Last 20 Encounters:   10/08/19 81 6 kg (180 lb)   09/26/19 82 6 kg (182 lb)   09/25/19 82 6 kg (182 lb)   09/24/19 82 6 kg (182 lb)   09/24/19 82 6 kg (182 lb)   09/20/19 82 1 kg (181 lb)   09/17/19 87 1 kg (192 lb)   08/29/19 87 5 kg (192 lb 12 8 oz)   08/21/19 87 6 kg (193 lb 3 2 oz)     Varian: None  Weight Changes: loss of 2 0# (1 0%) in 2 weeks (not significant) and loss of 12 0# (6 3%) in 1 month (significant)  Estimated body mass index is 25 83 kg/m² as calculated from the following:    Height as of an earlier encounter on 10/8/19: 5' 10" (1 778 m)  Weight as of an earlier encounter on 10/8/19: 81 6 kg (180 lb)      Nutrition-Focused Physical Findings: none observed    Food/Nutrition-Related History & Client/Social History:    Current Nutrition Impact Symptoms:  [] Nausea  [] Reduced Appetite  [] Acid Reflux    [] Vomiting  [x] Unintended Wt Loss -6 3% x 1 month [] Malabsorption    [] Diarrhea  [] Unintended Wt Gain  [] Dumping Syndrome    [x] Constipation  [] Thick Mucous/Secretions  [] Abdominal Pain    [x] Dysgeusia (Altered Taste) - pts sister reports he has had altered taste and smell for years [] Xerostomia (Dry Mouth)  [] Gas    [x] Dysosmia (Altered Smell)  [] Thrush  [] Difficulty Chewing    [] Oral Mucositis (Sore Mouth)  [x] Fatigue   [] Other:    [] Odynophagia   [] Esophagitis  [] Other:    [x] Dysphagia  [] Early Satiety  [] No Problems Eating Food Allergies: no  Food Intolerances: no    Current Diet: Liquids and TF  Current Nutrition Intake: Less than usual   Appetite: Good - pt reports that he is hungry but has trouble swallowing  Nutrition Route: PO and J-Tube  Meal planning/preparation mainly done by: Self and Daughter  Oral Care: brushes QD  Activity level: Mainly sedentary, feels fatigued often  Social Hx: Sisters are visiting him from out of state, his daughter Fortunato Diego helps take care of him  Diet Recall:   Breakfast: Tried to eat oatmeal but felt it was "getting stuck"   Lunch: Glucerna Hunger Smart Shake with 1/4c almond milk  Dinner: [employer] with 1/4c almond milk  Snacks: none    Beverages: water (8z x3)  Supplements:    Glucerna Hunger Smart (10 oz, 180 kcal, 15 g pro) 2-3 daily, somtimes mixed with 1/4c almond milk     Enteral Nutrition:  Pt admitted to [facility] for Jejunostomy, discharged on:   Initiation: Jevity 1 5 at 10 mL/hr via J-tube cycled over 16 hours and advance (as tolerated) until goal rate is achieved   TF Goal: Jevity 1 5 at 85 mL/hr via J-tube cycled over 16 hours daily   Water Flushin mL free water flush at the beginning and end of TF infusion (120 mL total), will need additional water flushes if PO fluid intake declines   Enteral Nutrition goal to provide: 1360 mL volume (5 7 cartons day), 2040 kcal, 87 grams protein, 1034 mL free water, 1154 mL total water daily +PO intake of fluid  Reported Enteral Nutrition Intake:   · TF Infused: Jevity 1 5 at 85 mL/hr via J-tube cycled over 16 hours daily (until 5 7 cartons is infused)  · Water Flushin mL free water flush at the beginning and end of TF infusion (120 mL total) plus 720 mL free water PO during the day  (total of 120 mL/day in flushes; 720 mL watre PO)  · Enteral Nutrition provides: 1360 mL volume (5 7 containers/day), 2040 kcal, 87 grams protein, 1034 mL free water, 1874 mL total water daily      DME: [employer]     Estimated Nutrition Needs: (based on 82 7kg/ 182# actual wt)  Energy Needs: 3340-1073 kcal/day (30-35 kcal/kg)  Protein Needs:  grams/day (1 2-1 5 g/kg)  Fluid Needs: 8184-8077 mL/day (1 mL/kcal)    Discussion/Summary: Singh Davenport was seen in infusion for initial nutrition consultation  He reports that he is using his jejunostomy as his main source of nutrition due to difficulty swallowing  He has been infusing Jevity 1 5 at 85 mL / hr for 16 hrs via j-tube  By mouth, he is able to tolerate water (about 24 oz daily) and 2-3 Glucerna Hunger Smart Shakes daily (360-540 kcal, 30-45g pro)  This meets about 14-22% estimated calorie needs, 30-45% estimated protein needs, and 29% estimated fluids needs  Singh Davenport states that when he tries to consume anything solid by mouth such as oatmeal, he feels that it gets stuck when trying to swallow  Recommend that Singh Davenport use j-tube as main source of nutrition and drink nutrition supplements PO as tolerated  Discussed higher calorie nutrition supplements such as Boost VHC (530 kcal, 22g pro), Enu nutrition shakes (340 kcal, 17g pro)  Pt does not want to try Ensure Enlive, Ensure Plus, or Boost Plus due to the sugar content  Pt currently mixes Glucerna shake with almond milk, suggested that he use whole milk for higher calorie and protein content  Discussed proper j-tube use and care, TF goals, hydration goals, and individualized calorie and protein goals  Will contact SurgOnc RN/MD for tube feeding order  Due to difficulty swallowing, this patient may benefit from Speech Therapy consult      Discussed/Reviewed:   · the importance of weight maintenance during CA tx  · indications & use of oral nutrition supplements : Choose nutrition supplement with >300 calories  · how to modify foods for anticipated nutrition impact symptoms pt may experience during CA tx  · maintaining proper oral care Brushing TID, non alcohol containing mouthwash, baking soda/salt water rinse  · adequate hydation & tips to increase overall fluid intake : goal of 83-96 oz fluid daily, fluids PO as tolerated, use j-tube for additional flushes  · MNT for: Esophageal cancer, jejunostomy nutrition, difficulty swallowing  · individualized calorie, protein and fluid daily goals  · MNT for patient specific tx plan -  avoid cold temperature foods/fluids during the course of oxaliplatin tx  · individualized enteral nutrition recommendations & plan: see below:     Enteral nutrition is needed for Fabiano's sole source of nutrition, recommend:  TF Goal: Jevity 1 5 at 110 mL/hr via J-tube cycled over 15 hours daily (until 7 cartons is infused)  Water Flushin mL free water flush at the beginning and end of TF infusion (250 mL total) plus 125 mL free water flush 8 times per day (1000 mL total) (total of 1250 mL/day in flushes; flushes should be spread throughout the day and every 2-3 hours during TF infusion; will need to adjust flushes prn for IV fluids)  Enteral Nutrition goal to provide: 1650 mL volume (7 cartons day), 2475 kcal, 105 grams protein, 1254 mL free water, 2504 mL total water daily  *Pt will require an enteral feeding pump to administer TF due to J-Tube due to esophageal cancer diagnosis  All questions and concerns addressed during todays visit  Erick Bermudez has RD contact information  Nutrition Diagnosis:     · Increased Nutrient Needs (kcal & pro) related to increased demand for nutrients and disease state as evidenced by cancer dx and pt undergoing tx for cancer  · Swallowing Difficulty related to diagnosis/treatment related causes as evidenced by pt report of difficulty swallowing, only able to tolerate liquid PO       Intervention & Recommendations:     Topics addressed: Nutrition education, Nutrition Symptom Management, Adequate Hydration, Medical Food Supplements, Nutrition-Related Medication Management, Vitamin & Mineral Supplements, Enteral Nutrition and Weight Management    Barriers: None  Readiness to change: preparation  Comprehension: verbalizes understanding  Expected Compliance: good    Materials Provided: [employer] Eating Hints book, Tube Feeding at Home booklet, Feeding Tube Use & Care booklet, Ensure samples, Ensure Coupons, Boost samples, Boost Coupons, Soft and Moist High-Protein Menu Ideas, Jevity 1 5 samples, Enu Nutrition Shake samples  and Oncology Nutrition Services Brochure  Monitoring & Evaluation:     Dietitian to Monitor: Food and Nutrition Intake, Nutrtion Impact Symptoms, Body Weight, Enteral and/or Parenteral Intake and Biochemical Data     Goals:  · weight stabilization  · adequate nutrition related symptom management  · pt to meet >/=75% estimated nutrition needs daily  · achieve tube feeding goal rate    · Progress Towards Goals: Initiated    Nutrition Rx & Recommendations:  · Diet: high calorie high protein liquids by mouth and tube feeding  · Avoid spicy, acidic, sharp/hard/crunchy foods & carbonated beverages as needed  · Nutrition Supplements: Choose nutrition supplements with greater than 300 calories  Refer to handout with highlighted suggestions     May use homemade shakes/smoothies as desired  If using a pre-made shake/bar, choose ones with >300 calories and >10 grams protein (ex  Ensure Enlive, Ensure Plus, Boost Plus, Boost Very High Calorie, etc )  · Stay hydrated by sipping fluids of choice/tolerance throughout the day  · Liquid nutrition may be better tolerated than solids at times  · Refer to Eating Hints book for other meal/snack ideas and symptom management  · Follow proper oral care; Try baking soda/salt water rinse recipe (mix 3/4 tsp salt + 1 tsp baking soda + 1 qt water; rinse with pain water after using) in Eating Hints book (pg 18)  Brush your teeth before/after meals & before bed  · Weigh yourself regularly  If you notice weight loss, make an effort to increase your daily food/calorie intake   If you continue to notice loss after these efforts, reach out to your dietitian to establish a plan to stabilize weight  · Consider stopping all high dose antioxidant supplements (vitamin A, C, E, selenium, etc )  Refer to [facility] "About Herbs" website for more information on the safety of supplements  · Your syringes are each 60 mL or 2 fl oz  Always flush your feeding tube with 60 mL room-temp water 1-2 times daily while feeding tube is not in use  · Tube Feeding Plan: See below:   Enteral nutrition is needed for Fabiano's sole source of nutrition, recommend:  TF Goal: Jevity 1 5 at 110 mL/hr via J-tube cycled over 15 hours daily (until 7 cartons is infused)  Water Flushin mL free water flush at the beginning and end of TF infusion (250 mL total) plus 125 mL free water flush 8 times per day (1000 mL total) (total of 1250 mL/day in flushes; flushes should be spread throughout the day and every 2-3 hours during TF infusion; will need to adjust flushes prn for IV fluids)  Enteral Nutrition goal to provide: 1650 mL volume (7 cartons day), 2475 kcal, 105 grams protein, 1254 mL free water, 2504 mL total water daily  Remember that 1 carton of Jevity 1 5 = 355 calories   *Pt will require an enteral feeding pump to administer TF due to J-Tube due to esophageal cancer diagnosis  · Call [facility] when you have 10 days left of TF supplies: [phone], option 3 (customer support)        Follow Up Plan: 10/25/19 phone visit 1pm    Recommend Referral to Other Providers: Speech Language Pathologist

## 2019-10-04 ENCOUNTER — TELEPHONE (OUTPATIENT)
Dept: GASTROENTEROLOGY | Facility: CLINIC | Age: 64
End: 2019-10-04

## 2019-10-04 DIAGNOSIS — C15.5 MALIGNANT NEOPLASM OF LOWER THIRD OF ESOPHAGUS (HCC): Primary | ICD-10-CM

## 2019-10-04 DIAGNOSIS — C16.0 GE JUNCTION CARCINOMA (HCC): Primary | ICD-10-CM

## 2019-10-04 DIAGNOSIS — R11.0 NAUSEA: ICD-10-CM

## 2019-10-04 DIAGNOSIS — T45.1X5A CHEMOTHERAPY INDUCED NEUTROPENIA (HCC): ICD-10-CM

## 2019-10-04 DIAGNOSIS — D70.1 CHEMOTHERAPY INDUCED NEUTROPENIA (HCC): ICD-10-CM

## 2019-10-04 DIAGNOSIS — C15.5 MALIGNANT NEOPLASM OF LOWER THIRD OF ESOPHAGUS (HCC): ICD-10-CM

## 2019-10-04 RX ORDER — DEXTROSE MONOHYDRATE 50 MG/ML
20 INJECTION, SOLUTION INTRAVENOUS ONCE
Status: CANCELLED | OUTPATIENT
Start: 2019-10-08

## 2019-10-04 RX ORDER — DEXAMETHASONE 4 MG/1
8 TABLET ORAL 2 TIMES DAILY WITH MEALS
Qty: 48 TABLET | Refills: 0 | Status: SHIPPED | OUTPATIENT
Start: 2019-10-04 | End: 2019-10-14

## 2019-10-04 RX ORDER — PALONOSETRON 0.05 MG/ML
0.25 INJECTION, SOLUTION INTRAVENOUS ONCE
Status: CANCELLED | OUTPATIENT
Start: 2019-10-08

## 2019-10-04 RX ORDER — LACTOSE-REDUCED FOOD/FIBER 0.06 G-1.5
85 LIQUID (ML) ORAL CONTINUOUS
Qty: 1000 ML | Refills: 0 | Status: ON HOLD | OUTPATIENT
Start: 2019-10-04 | End: 2020-02-12 | Stop reason: SDUPTHER

## 2019-10-04 RX ORDER — SODIUM CHLORIDE 9 MG/ML
20 INJECTION, SOLUTION INTRAVENOUS ONCE
Status: CANCELLED | OUTPATIENT
Start: 2019-10-08

## 2019-10-04 RX ORDER — PROCHLORPERAZINE MALEATE 10 MG
10 TABLET ORAL EVERY 6 HOURS PRN
Qty: 45 TABLET | Refills: 3 | Status: SHIPPED | OUTPATIENT
Start: 2019-10-04 | End: 2020-01-22

## 2019-10-04 NOTE — SOCIAL WORK
Post-DC follow up for TF orders:     10/4/2019 1635 hrs:    CM received signed home TF orders and updated dc instructions, from Dr Senia Fragoso  Script and DCI sent via [facility] to [facility]

## 2019-10-04 NOTE — TELEPHONE ENCOUNTER
morena pt        Pt's daughter called pt has too many things going on and she cancelled the colonoscopy for Monday 10/7

## 2019-10-07 ENCOUNTER — APPOINTMENT (OUTPATIENT)
Dept: LAB | Facility: CLINIC | Age: 64
End: 2019-10-07
Payer: MEDICARE

## 2019-10-07 DIAGNOSIS — C15.5 MALIGNANT NEOPLASM OF LOWER THIRD OF ESOPHAGUS (HCC): ICD-10-CM

## 2019-10-07 DIAGNOSIS — D70.1 CHEMOTHERAPY INDUCED NEUTROPENIA (HCC): ICD-10-CM

## 2019-10-07 DIAGNOSIS — C16.0 GE JUNCTION CARCINOMA (HCC): ICD-10-CM

## 2019-10-07 DIAGNOSIS — T45.1X5A CHEMOTHERAPY INDUCED NEUTROPENIA (HCC): ICD-10-CM

## 2019-10-07 LAB
ALBUMIN SERPL BCP-MCNC: 3.5 G/DL (ref 3.5–5)
ALP SERPL-CCNC: 46 U/L (ref 46–116)
ALT SERPL W P-5'-P-CCNC: 13 U/L (ref 12–78)
ANION GAP SERPL CALCULATED.3IONS-SCNC: 6 MMOL/L (ref 4–13)
AST SERPL W P-5'-P-CCNC: 8 U/L (ref 5–45)
ATRIAL RATE: 89 BPM
BASOPHILS # BLD AUTO: 0.05 THOUSANDS/ΜL (ref 0–0.1)
BASOPHILS NFR BLD AUTO: 1 % (ref 0–1)
BILIRUB SERPL-MCNC: 0.78 MG/DL (ref 0.2–1)
BUN SERPL-MCNC: 23 MG/DL (ref 5–25)
CALCIUM SERPL-MCNC: 9.5 MG/DL (ref 8.3–10.1)
CHLORIDE SERPL-SCNC: 107 MMOL/L (ref 100–108)
CO2 SERPL-SCNC: 26 MMOL/L (ref 21–32)
CREAT SERPL-MCNC: 0.9 MG/DL (ref 0.6–1.3)
EOSINOPHIL # BLD AUTO: 0.26 THOUSAND/ΜL (ref 0–0.61)
EOSINOPHIL NFR BLD AUTO: 3 % (ref 0–6)
ERYTHROCYTE [DISTWIDTH] IN BLOOD BY AUTOMATED COUNT: 14.9 % (ref 11.6–15.1)
GFR SERPL CREATININE-BSD FRML MDRD: 89 ML/MIN/1.73SQ M
GLUCOSE P FAST SERPL-MCNC: 135 MG/DL (ref 65–99)
HCT VFR BLD AUTO: 28.7 % (ref 36.5–49.3)
HGB BLD-MCNC: 8.6 G/DL (ref 12–17)
IMM GRANULOCYTES # BLD AUTO: 0.03 THOUSAND/UL (ref 0–0.2)
IMM GRANULOCYTES NFR BLD AUTO: 0 % (ref 0–2)
LYMPHOCYTES # BLD AUTO: 1.72 THOUSANDS/ΜL (ref 0.6–4.47)
LYMPHOCYTES NFR BLD AUTO: 23 % (ref 14–44)
MCH RBC QN AUTO: 26.2 PG (ref 26.8–34.3)
MCHC RBC AUTO-ENTMCNC: 30 G/DL (ref 31.4–37.4)
MCV RBC AUTO: 88 FL (ref 82–98)
MONOCYTES # BLD AUTO: 0.47 THOUSAND/ΜL (ref 0.17–1.22)
MONOCYTES NFR BLD AUTO: 6 % (ref 4–12)
NEUTROPHILS # BLD AUTO: 5.03 THOUSANDS/ΜL (ref 1.85–7.62)
NEUTS SEG NFR BLD AUTO: 67 % (ref 43–75)
NRBC BLD AUTO-RTO: 0 /100 WBCS
P AXIS: 57 DEGREES
PLATELET # BLD AUTO: 335 THOUSANDS/UL (ref 149–390)
PMV BLD AUTO: 9.7 FL (ref 8.9–12.7)
POTASSIUM SERPL-SCNC: 4.7 MMOL/L (ref 3.5–5.3)
PR INTERVAL: 168 MS
PROT SERPL-MCNC: 7.1 G/DL (ref 6.4–8.2)
QRS AXIS: 52 DEGREES
QRSD INTERVAL: 88 MS
QT INTERVAL: 338 MS
QTC INTERVAL: 411 MS
RBC # BLD AUTO: 3.28 MILLION/UL (ref 3.88–5.62)
SODIUM SERPL-SCNC: 139 MMOL/L (ref 136–145)
T WAVE AXIS: 33 DEGREES
VENTRICULAR RATE: 89 BPM
WBC # BLD AUTO: 7.56 THOUSAND/UL (ref 4.31–10.16)

## 2019-10-07 PROCEDURE — 80053 COMPREHEN METABOLIC PANEL: CPT

## 2019-10-07 PROCEDURE — 93010 ELECTROCARDIOGRAM REPORT: CPT | Performed by: INTERNAL MEDICINE

## 2019-10-07 PROCEDURE — 85025 COMPLETE CBC W/AUTO DIFF WBC: CPT

## 2019-10-07 PROCEDURE — 36415 COLL VENOUS BLD VENIPUNCTURE: CPT

## 2019-10-08 ENCOUNTER — NUTRITION (OUTPATIENT)
Dept: NUTRITION | Facility: CLINIC | Age: 64
End: 2019-10-08

## 2019-10-08 ENCOUNTER — HOSPITAL ENCOUNTER (OUTPATIENT)
Dept: INFUSION CENTER | Facility: CLINIC | Age: 64
Discharge: HOME | End: 2019-10-08
Payer: COMMERCIAL

## 2019-10-08 VITALS
BODY MASS INDEX: 25.77 KG/M2 | SYSTOLIC BLOOD PRESSURE: 114 MMHG | RESPIRATION RATE: 22 BRPM | DIASTOLIC BLOOD PRESSURE: 54 MMHG | TEMPERATURE: 98 F | WEIGHT: 180 LBS | HEART RATE: 85 BPM | HEIGHT: 70 IN

## 2019-10-08 DIAGNOSIS — Z71.3 NUTRITIONAL COUNSELING: Primary | ICD-10-CM

## 2019-10-08 DIAGNOSIS — C16.0 GE JUNCTION CARCINOMA (HCC): ICD-10-CM

## 2019-10-08 DIAGNOSIS — D70.1 CHEMOTHERAPY INDUCED NEUTROPENIA (HCC): ICD-10-CM

## 2019-10-08 DIAGNOSIS — T45.1X5A CHEMOTHERAPY INDUCED NEUTROPENIA (HCC): ICD-10-CM

## 2019-10-08 DIAGNOSIS — C15.5 MALIGNANT NEOPLASM OF LOWER THIRD OF ESOPHAGUS (HCC): Primary | ICD-10-CM

## 2019-10-08 PROCEDURE — 96375 TX/PRO/DX INJ NEW DRUG ADDON: CPT

## 2019-10-08 PROCEDURE — 96413 CHEMO IV INFUSION 1 HR: CPT

## 2019-10-08 PROCEDURE — 96415 CHEMO IV INFUSION ADDL HR: CPT

## 2019-10-08 PROCEDURE — 96417 CHEMO IV INFUS EACH ADDL SEQ: CPT

## 2019-10-08 PROCEDURE — 96367 TX/PROPH/DG ADDL SEQ IV INF: CPT

## 2019-10-08 PROCEDURE — 96368 THER/DIAG CONCURRENT INF: CPT

## 2019-10-08 PROCEDURE — G0498 CHEMO EXTEND IV INFUS W/PUMP: HCPCS

## 2019-10-08 RX ORDER — PALONOSETRON 0.05 MG/ML
0.25 INJECTION, SOLUTION INTRAVENOUS ONCE
Status: COMPLETED | OUTPATIENT
Start: 2019-10-08 | End: 2019-10-08

## 2019-10-08 RX ORDER — DEXTROSE MONOHYDRATE 50 MG/ML
20 INJECTION, SOLUTION INTRAVENOUS ONCE
Status: COMPLETED | OUTPATIENT
Start: 2019-10-08 | End: 2019-10-08

## 2019-10-08 RX ORDER — SODIUM CHLORIDE 9 MG/ML
20 INJECTION, SOLUTION INTRAVENOUS ONCE
Status: COMPLETED | OUTPATIENT
Start: 2019-10-08 | End: 2019-10-08

## 2019-10-08 RX ADMIN — DOCETAXEL 99 MG: 20 INJECTION, SOLUTION, CONCENTRATE INTRAVENOUS at 11:26

## 2019-10-08 RX ADMIN — TRASTUZUMAB 496 MG: 150 INJECTION, POWDER, LYOPHILIZED, FOR SOLUTION INTRAVENOUS at 12:33

## 2019-10-08 RX ADMIN — PALONOSETRON 0.25 MG: 0.25 INJECTION, SOLUTION INTRAVENOUS at 11:17

## 2019-10-08 RX ADMIN — DEXAMETHASONE SODIUM PHOSPHATE 10 MG: 10 INJECTION, SOLUTION INTRAMUSCULAR; INTRAVENOUS at 10:19

## 2019-10-08 RX ADMIN — SODIUM CHLORIDE 20 ML/HR: 0.9 INJECTION, SOLUTION INTRAVENOUS at 09:13

## 2019-10-08 RX ADMIN — IRON SUCROSE 200 MG: 20 INJECTION, SOLUTION INTRAVENOUS at 09:16

## 2019-10-08 RX ADMIN — DEXTROSE 20 ML/HR: 5 SOLUTION INTRAVENOUS at 14:08

## 2019-10-08 RX ADMIN — OXALIPLATIN 168.3 MG: 5 INJECTION, SOLUTION, CONCENTRATE INTRAVENOUS at 14:10

## 2019-10-08 RX ADMIN — SODIUM CHLORIDE 150 MG: 0.9 INJECTION, SOLUTION INTRAVENOUS at 10:42

## 2019-10-08 RX ADMIN — LEUCOVORIN CALCIUM 396 MG: 350 INJECTION, POWDER, LYOPHILIZED, FOR SOLUTION INTRAMUSCULAR; INTRAVENOUS at 14:08

## 2019-10-08 NOTE — PLAN OF CARE
Problem: Potential for Falls  Goal: Patient will remain free of falls  Description  INTERVENTIONS:  - Assess patient frequently for physical needs  -  Identify cognitive and physical deficits and behaviors that affect risk of falls  -  Eloy fall precautions as indicated by assessment   - Educate patient/family on patient safety including physical limitations  - Instruct patient to call for assistance with activity based on assessment  - Modify environment to reduce risk of injury  - Consider OT/PT consult to assist with strengthening/mobility  Outcome: Progressing     Problem: Knowledge Deficit  Goal: Patient/family/caregiver demonstrates understanding of disease process, treatment plan, medications, and discharge instructions  Description  Complete learning assessment and assess knowledge base    Interventions:  - Provide teaching at level of understanding  - Provide teaching via preferred learning methods  Outcome: Progressing

## 2019-10-08 NOTE — SOCIAL WORK
MSW met with pt in the infusion center today, he is here for his first infusion  He says that his two sisters, from [place] and [place], flew in to be here with him today and plan on staying with him for an undetermined amount of time  He lives in a multi-family home, his daughter and her family live in one unit and he lives in the other  He has two other children in [place] and says that they are a close, supportive family  He has recently retired and unfortunately was diagnosed the day after he retired from his job  His residential was planned and had nothing to do with his health issues  MSW expressed my condolences at his diagnosis and the timing of it, and pt laughed and said "Why? I won! I got out of there  And I didn't have to leave because of this  I don't care about the timing of it, I still feel like I won "  He denies any needs or concerns at this time  He says that his family will be bringing him to all appointments and treatments, and his insurance is Medicare with a secondary through his residential plan  He denies any need for ongoing support  MSW explained the role of the cancer counselor and provided my contact information  Pt agreed to call me if his needs change before his next treatment  MSW will remain available to pt and his family throughout his treatment time  No other needs or concerns at this time

## 2019-10-08 NOTE — PATIENT INSTRUCTIONS
Nutrition Rx & Recommendations:  · Diet: high calorie high protein liquids by mouth and tube feeding  · Avoid spicy, acidic, sharp/hard/crunchy foods & carbonated beverages as needed  · Nutrition Supplements: Choose nutrition supplements with greater than 300 calories  Refer to handout with highlighted suggestions     May use homemade shakes/smoothies as desired  If using a pre-made shake/bar, choose ones with >300 calories and >10 grams protein (ex  Ensure Enlive, Ensure Plus, Boost Plus, Boost Very High Calorie, etc )  · Stay hydrated by sipping fluids of choice/tolerance throughout the day  · Liquid nutrition may be better tolerated than solids at times  · Refer to Eating Hints book for other meal/snack ideas and symptom management  · Follow proper oral care; Try baking soda/salt water rinse recipe (mix 3/4 tsp salt + 1 tsp baking soda + 1 qt water; rinse with pain water after using) in Eating Hints book (pg 18)  Brush your teeth before/after meals & before bed  · Weigh yourself regularly  If you notice weight loss, make an effort to increase your daily food/calorie intake  If you continue to notice loss after these efforts, reach out to your dietitian to establish a plan to stabilize weight  · Consider stopping all high dose antioxidant supplements (vitamin A, C, E, selenium, etc )  Refer to [facility] "About Herbs" website for more information on the safety of supplements  · Your syringes are each 60 mL or 2 fl oz  Always flush your feeding tube with 60 mL room-temp water 1-2 times daily while feeding tube is not in use  · Tube Feeding Plan: See below:   Enteral nutrition is needed for Fabiano's sole source of nutrition, recommend:  TF Goal: Jevity 1 5 at 110 mL/hr via J-tube cycled over 15 hours daily (until 7 cartons is infused)    Water Flushin mL free water flush at the beginning and end of TF infusion (250 mL total) plus 125 mL free water flush 8 times per day (1000 mL total) (total of 1250 mL/day in flushes; flushes should be spread throughout the day and every 2-3 hours during TF infusion; will need to adjust flushes prn for IV fluids)  Enteral Nutrition goal to provide: 1650 mL volume (7 cartons day), 2475 kcal, 105 grams protein, 1254 mL free water, 2504 mL total water daily  Remember that 1 carton of Jevity 1 5 = 355 calories   *Pt will require an enteral feeding pump to administer TF due to J-Tube due to esophageal cancer diagnosis  · Call [facility] when you have 10 days left of TF supplies: [phone], option [phone] (customer support)        Follow Up Plan: 10/25/19 phone visit 1pm    Recommend Referral to Other Providers: Speech Language Pathologist

## 2019-10-08 NOTE — Clinical Note
Can you assist in ordering this patient's tube feeding from [facility]? Recommendations for formula/volume are included in RD note   Thank you

## 2019-10-08 NOTE — Clinical Note
Patient needs tube feeding order sent to [facility], please see RD note with recommendations  Thanks!

## 2019-10-08 NOTE — PROGRESS NOTES
We do not manage his tube feedings  Please contact his ordering provider for information in regards to an order  Thanks

## 2019-10-08 NOTE — PROGRESS NOTES
Patient here today accompanied by his 2 sisters  for his first chemo infusion, port accessed well and without incident, blood return noted and flushed well, education on port given, education on taking home prescribed meds given "patient states he did not take his home meds yesterday"   Patient educated the importance of maintaining his home medications, patient sister states patient lives with this daughter and she will make sure he takes his meds, vitals are stable, labs are within parameters to treat, EF IS 60%

## 2019-10-09 ENCOUNTER — HOSPITAL ENCOUNTER (OUTPATIENT)
Dept: INFUSION CENTER | Facility: CLINIC | Age: 64
Discharge: HOME | End: 2019-10-09
Payer: COMMERCIAL

## 2019-10-09 ENCOUNTER — TELEPHONE (OUTPATIENT)
Dept: HEMATOLOGY ONCOLOGY | Facility: CLINIC | Age: 64
End: 2019-10-09

## 2019-10-09 DIAGNOSIS — C16.0 GE JUNCTION CARCINOMA (HCC): ICD-10-CM

## 2019-10-09 DIAGNOSIS — C15.5 MALIGNANT NEOPLASM OF LOWER THIRD OF ESOPHAGUS (HCC): Primary | ICD-10-CM

## 2019-10-09 DIAGNOSIS — R11.0 NAUSEA: Primary | ICD-10-CM

## 2019-10-09 DIAGNOSIS — D70.1 CHEMOTHERAPY INDUCED NEUTROPENIA (HCC): ICD-10-CM

## 2019-10-09 DIAGNOSIS — T45.1X5A CHEMOTHERAPY INDUCED NEUTROPENIA (HCC): ICD-10-CM

## 2019-10-09 PROCEDURE — 96372 THER/PROPH/DIAG INJ SC/IM: CPT

## 2019-10-09 RX ORDER — ONDANSETRON HYDROCHLORIDE 4 MG/5ML
8 SOLUTION ORAL EVERY 8 HOURS PRN
Qty: 240 ML | Refills: 6 | Status: SHIPPED | OUTPATIENT
Start: 2019-10-09 | End: 2020-01-22

## 2019-10-09 RX ADMIN — PEGFILGRASTIM 6 MG: KIT SUBCUTANEOUS at 16:29

## 2019-10-09 NOTE — PROGRESS NOTES
Pt completed cadd disconnect with port flush per protocol without any difficulty or complaint  Pt neulasta onpro was placed in rt arm, pt watched video and given instructions when medication will deliver and when to remove  Pt verbalizes understanding  Pt decline AVS, pt aware of return appointment  Pt d/c'ed in stable condition

## 2019-10-09 NOTE — TELEPHONE ENCOUNTER
Pt daughter called asking for a script for nausea in liquid form  Pt had chemo yesterday    Please call back Sunday Gus at [phone]

## 2019-10-10 NOTE — PROGRESS NOTES
NEW PATIENT FOLLOW UP PHONE CALL    Pt states he is doing well  Drinking as much as he can tolerate and using his feeding tube for the remainder of nutrition  Pt notes some constipation, aware that he can take an OTC stool softener if needed  Zofran prescription has not been picked up from pharmacy yet  Pt's daughter will pick it up today  Educated pt on nausea/anti-nausea medication  Pt's only complaint was that he had to sit for a very long time  Informed pt that his treatment lengths will be the same amount of time for each infusion unless treatment plan is changed  Encouraged pt to walk around infusion center as he is receiving treatment  Informed patient of bed option in infusion as well as chair, where he had his first infusion  Pt is not interested in Nutrition or Treatment classes due to having to travel  Majority of pt's support is going to be going back home to different states  Aware of next appointment with [facility] and infusion  Pt will obtain labs the day before his next infusion

## 2019-10-11 ENCOUNTER — TELEPHONE (OUTPATIENT)
Dept: NUTRITION | Facility: CLINIC | Age: 64
End: 2019-10-11

## 2019-10-11 NOTE — TELEPHONE ENCOUNTER
Reached out to [facility] to get an update on the status of Fabiano's tube feeding order delivery  [facility] states that they have not received a tube feeding order for Mr  Mikel Santoro  Followed up with GI, who will be sending the order today

## 2019-10-14 DIAGNOSIS — C16.0 GE JUNCTION CARCINOMA (HCC): Primary | ICD-10-CM

## 2019-10-15 ENCOUNTER — OFFICE VISIT (OUTPATIENT)
Dept: HEMATOLOGY ONCOLOGY | Facility: CLINIC | Age: 64
End: 2019-10-15
Payer: MEDICARE

## 2019-10-15 ENCOUNTER — TELEPHONE (OUTPATIENT)
Dept: HEMATOLOGY ONCOLOGY | Facility: CLINIC | Age: 64
End: 2019-10-15

## 2019-10-15 ENCOUNTER — TELEPHONE (OUTPATIENT)
Dept: GASTROENTEROLOGY | Facility: CLINIC | Age: 64
End: 2019-10-15

## 2019-10-15 ENCOUNTER — TELEPHONE (OUTPATIENT)
Dept: PALLIATIVE MEDICINE | Facility: CLINIC | Age: 64
End: 2019-10-15

## 2019-10-15 ENCOUNTER — TELEPHONE (OUTPATIENT)
Dept: NUTRITION | Facility: CLINIC | Age: 64
End: 2019-10-15

## 2019-10-15 VITALS
OXYGEN SATURATION: 98 % | TEMPERATURE: 96 F | HEIGHT: 70 IN | HEART RATE: 102 BPM | WEIGHT: 176 LBS | RESPIRATION RATE: 16 BRPM | SYSTOLIC BLOOD PRESSURE: 106 MMHG | DIASTOLIC BLOOD PRESSURE: 58 MMHG | BODY MASS INDEX: 25.2 KG/M2

## 2019-10-15 DIAGNOSIS — G47.9 DIFFICULTY SLEEPING: ICD-10-CM

## 2019-10-15 DIAGNOSIS — C16.0 GE JUNCTION CARCINOMA (HCC): Primary | ICD-10-CM

## 2019-10-15 PROCEDURE — 99214 OFFICE O/P EST MOD 30 MIN: CPT | Performed by: NURSE PRACTITIONER

## 2019-10-15 RX ORDER — PALONOSETRON 0.05 MG/ML
0.25 INJECTION, SOLUTION INTRAVENOUS ONCE
Status: CANCELLED | OUTPATIENT
Start: 2019-10-22

## 2019-10-15 RX ORDER — DEXTROSE MONOHYDRATE 50 MG/ML
20 INJECTION, SOLUTION INTRAVENOUS ONCE
Status: CANCELLED | OUTPATIENT
Start: 2019-10-22

## 2019-10-15 RX ORDER — SODIUM CHLORIDE 9 MG/ML
20 INJECTION, SOLUTION INTRAVENOUS ONCE
Status: CANCELLED | OUTPATIENT
Start: 2019-10-22

## 2019-10-15 NOTE — TELEPHONE ENCOUNTER
Marisa from [employer] is calling @ this [facility] patient- is  wanting management of restless leg syndrome/sleeplessness- is this appropriate?

## 2019-10-15 NOTE — TELEPHONE ENCOUNTER
Spoke with Ritu Adhikari about order  Corrected everything they needed and sent back today! Thank you

## 2019-10-15 NOTE — TELEPHONE ENCOUNTER
Called for status update and they need the following info:    1  Length needed  2  order date  3  Notes of why needed  4   Rate need to below 99 ML for medicare

## 2019-10-15 NOTE — TELEPHONE ENCOUNTER
His fatigue alone in the setting of a cancer diagnosis qualifies him for Palliative Care  He should also work on getting a PCP if he does not have one already

## 2019-10-15 NOTE — TELEPHONE ENCOUNTER
Notified by Kuldip You , that pts daughter, Kelly Ceron, called twice today with questions about her father's TF order as she has not heard anything yet  This RD reached out to Kelly Ceron to inform her that [place] office (GI) will be calling the [employer] [facility]) to find out more information about the status of Fabiano's TF order as it was faxed last week  This RD also inquired about how much Jevity 1 5 the pt has left  Saraemory Ceron reports that she is not concerned with the Jevity 1 5 because they have a small supply still, but, she is concerned with running out of [employer]  This RD will leave a box of enteral bags for Kelly Ceron to  in the [address] tomorrow morning  Also informed Kelly Ceron that James Lopez ([facility] RD) is out of the office until next Thursday  I asked that she please contact me with nutrition-related questions in the meantime, contact info provided

## 2019-10-15 NOTE — PROGRESS NOTES
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    HPI:  Vani Cason was seen for initial consultation 9/24/2019 regarding  Newly Diagnosed locally advanced GE junction/gastric cancer which is HER-2/thea positive  The patient is a 26-year-old male who was originally referred to Gastroenterology with the complaint of dysphagia  [facility] then had an EGD performed on 08/24/2019 showing neoplastic changes and luminal narrowing in the distal 1/3 of the esophagus   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   Esophageal biopsy revealed at least 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 and high-grade dysplasia   He was seen by her colleagues in cardiothoracic surgery who recommended neoadjuvant therapy upfront  He is going to be presented at tumor board  PET/CT scan showed Extensive FDG uptake at the distal esophagus GE junction and proximal stomach compatible with known malignancy  There were FDG avid perigastric lymph node posterior to the proximal stomach suspicious for metastatic disease and possible gastrohepatic lymphadenopathy which was inseparable from the gastric mass  There is also some uptake at the sixth rib but the patient states he did have trauma to this area recently  Metastatic disease could not be ruled out  There is also some uptake in the sigmoid colon for which a colonoscopy was recommended down the road  Previous Hematologic/ Oncologic History:    Oncology History    8/19/19 to ER with recurrent episode of foreign body sensation in his throat  hours after eating two small shrimp  Patient notes multiple episodes over the past month that typically have resolved spontaneously    Impression and plan:  Esophageal blockage likely secondary to food bolus that is recurrent  Patient symptoms improved following treatment with glucagon in the emergency room but patient will require close outpatient follow-up with Gastroenterology for EGD to evaluate etiology of recurrent symptoms    8/21/19 Yonathan Escobar PA-C Gastroenterology  over the past 2 months he has had 3 distinct episodes of food getting stuck  He states that each time the food gets stuck to where he cannot tolerate any liquids or secretions  Plan EGD    8/24/19 EGD Dr Deuce Joshi  · Severe, generalized edematous, erythematous and hemorrhagic mucosa in the lower third of the esophagus (30 cm from the incisors) with bleeding before intervention; performed 10 cold biopsies  There is circumferential erythema, friability, easy bleeding,  neoplastic abnormality and narrowing from 30 cm down to 40 cm  Ten biopsies were obtained to rule out malignancy  A dilation was not performed since I suspect that the underlying etiology is malignancy  · The stomach and duodenum appeared normal    Esophagus (biopsy):  - At 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     HER2 positive    9/6/19 CT chest, abdomen and pelvis  IMPRESSION:     1  Irregular wall thickening involving the distal esophagus beginning just below the inferior pulmonary veins, extending through the gastric cardia compatible with biopsy-proven adenocarcinoma  As above, there is involvement of the gastric cardia, and possible extra luminal extension of tumor along its right lateral margin  2  There is mild adenopathy identified adjacent to the gastric cardia and distal esophagus  Additional borderline prominent mediastinal lymph nodes  3  There is a left upper lobe opacity identified  Although potentially representing an area of inflammation or scarring, an irregularly marginated solitary pulmonary metastasis should be excluded    This could either be reassessed with chest CT in 3 months time or further evaluated with PET CT (it is of borderline size for accurate characterization with PET)  4  Hypertrophy of the median lobe of the prostate (favored) or less likely 9 mm bladder polyp  5  Nonspecific 1 2 cm right adrenal nodule  Overall, PET/CT may be helpful to determine exact extent of adenopathy within the lower chest and upper abdomen, evaluation for any extraluminal extension of tumor, and further characterization of left upper lobe density and right adrenal nodule    9/16-9/18 admitted with dysphagia    9/16/19 CT chest, abdomen and pelvis  IMPRESSION:     There is irregular wall thickening involving the distal esophagus and extending to the left hand aspect of the upper stomach  There is a masslike area of abnormal soft tissue density along the left hand aspect of the upper stomach which measures approximately 5 5 x 3 6 cm, and there appears to be some infiltration of the adjacent fat  This is concerning for local extension of the patient's known neoplasm  Several mildly prominent nodes are present between this mass and the liver  There are also some mildly prominent mediastinal nodes  Please see discussion      There is some fluid within the esophagus near level of the gregg      There is an approximately 1 5 x 1 cm right adrenal nodule  This appears similar to slightly larger compared to the prior examination  There is a questionable tiny left adrenal nodule  Follow-up of both of these adrenal findings is suggested      There is an approximately 1 2 cm soft tissue density nodule within the posterior aspect of the urinary bladder  Although this may be related to indentation of the prostate, a small bladder mass cannot be entirely excluded  Urology consultation and follow-up is recommended      A focal area of opacity within the anterior aspect of the left upper lobe of the lung appears similar to September 6, 2019  Please see discussion  Continued follow-up is recommended    This could be reassessed with chest CT in 3 months  PET/CT could also be considered, however, it is of borderline size for accurate characterization with PET/ CT  If more remote CTs of the chest exist, direct comparison would be helpful      PET/CT should be considered for further evaluation of the extent of adenopathy and extent of disease  PET/CT or MRI could be utilized to evaluate the adrenal findings, if there are no contraindications      Cholelithiasis  No CT evidence of acute cholecystitis      Atherosclerosis  Coronary artery disease      Other findings as described above  Please see discussion    9/19/19 PET  IMPRESSION:     1  Extensive FDG uptake at the distal esophagus, GE junction and proximal stomach compatible with known malignancy  2  FDG avid perigastric lymph node posterior to the proximal stomach suspicious for metastasis  There may be FDG avid gastrohepatic lymphadenopathy but this is inseparable from the gastric mass  3   Focal FDG uptake at the left anterior 6th rib  Possible subtle focus of FDG uptake at the right lateral 2nd rib  No obvious findings here on the limited low-dose CT images  Metastasis is not excluded  Distribution is not typical for prior trauma  Consider follow-up with MRI of the chest   4   Foci of FDG uptake at the sigmoid colon for which underlying lesions should be excluded  Recommend follow-up with colonoscopy  5   Fairly diffuse FDG uptake at the thyroid gland, may be related to thyroiditis    9/20/19 Mk Parker MD  Plan feeding tube, referral to Med Onc and Rad Onc  If the rib lesion is a metastasis, he is not a candidate for resection    9/24/19 Dr Neri Olmstead  recommended that he have a port as well as a feeding tube placed since he may undergo esophagectomy in the future    We did discuss that if the rib lesions are indeed metastasis, chemotherapy would be the mainstay of his treatment    9/24/19 Dr Chela Ladd  " I think the patient would benefit from neoadjuvant chemotherapy upfront prior to consideration of either concurrent chemoradiation or even surgery  Herceptin with FLOT chemotherapy Every 2 weeks for 6 cycles  This will consist of 5-FU 2600 mg/m² over 24 hours, leucovorin 20 mg meter square, Oxaliplatin 85 mg/m2 square and Taxotere 60 mg meter square  Once he completed 6 doses of chemotherapy we will reimage him  If he has a nice response we will then consider concurrent chemoradiation and then surgery  If he has a very nice response with very little residual disease surgery could possibly be considered for now I did talk to him about most probably getting concurrent chemoradiation after the chemotherapy "    9/26/19 Port insertion and J tube insertion scheduled [facility] (Dr Ines Barbosa)  10/8/19 Infusion scheduled  Dr Priya Alicia on colonoscopy ASAP    Mostly liquids  Can feel any food going     A lot of phlegm gets stuck and causes back pain    Constant cough if tries to lie down  Not sleeping much  Only sleep is sitting          Malignant neoplasm of lower third of esophagus (HCC)    8/24/2019 Initial Diagnosis     Malignant neoplasm of lower third of esophagus (HCC)  At least intramucosal carcinoma  Her2/SHIMON positive      10/8/2019 -  Chemotherapy     pegfilgrastim (NEULASTA ONPRO) subcutaneous injection kit 6 mg, 6 mg, Subcutaneous, Once, 1 of 6 cycles  DOCEtaxel (TAXOTERE) 99 mg in sodium chloride 0 9 % 250 mL chemo infusion, 50 mg/m2 = 99 mg (83 3 % of original dose 60 mg/m2), Intravenous, Once, 1 of 6 cycles  Dose modification: 50 mg/m2 (original dose 60 mg/m2, Cycle 1, Reason: Other (See Comments))  Administration: 99 mg (10/8/2019)  leucovorin 396 mg in dextrose 5 % 250 mL IVPB, 200 mg/m2 = 396 mg (50 % of original dose 400 mg/m2), Intravenous, Once, 1 of 6 cycles  Dose modification: 200 mg/m2 (original dose 400 mg/m2, Cycle 1, Reason: Other (See Comments), Comment: FLOT regimen standard)  Administration: 396 mg (10/8/2019)  oxaliplatin (ELOXATIN) 168 3 mg in dextrose 5 % 250 mL chemo infusion, 85 mg/m2 = 168 3 mg, Intravenous, Once, 1 of 6 cycles  Administration: 168 3 mg (10/8/2019)  trastuzumab (HERCEPTIN) 496 mg in sodium chloride 0 9 % 250 mL chemo infusion, 6 mg/kg = 496 mg, Intravenous, Once, 1 of 6 cycles  Administration: 496 mg (10/8/2019)        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[address] )    8/24/2019 Biopsy     Esophagus (biopsy):  - At 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       9/24/2019 Initial Diagnosis     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HCC)      10/8/2019 -  Chemotherapy     pegfilgrastim (NEULASTA ONPRO) subcutaneous injection kit 6 mg, 6 mg, Subcutaneous, Once, 1 of 6 cycles  DOCEtaxel (TAXOTERE) 99 mg in sodium chloride 0 9 % 250 mL chemo infusion, 50 mg/m2 = 99 mg (83 3 % of original dose 60 mg/m2), Intravenous, Once, 1 of 6 cycles  Dose modification: 50 mg/m2 (original dose 60 mg/m2, Cycle 1, Reason: Other (See Comments))  Administration: 99 mg (10/8/2019)  leucovorin 396 mg in dextrose 5 % 250 mL IVPB, 200 mg/m2 = 396 mg (50 % of original dose 400 mg/m2), Intravenous, Once, 1 of 6 cycles  Dose modification: 200 mg/m2 (original dose 400 mg/m2, Cycle 1, Reason: Other (See Comments), Comment: FLOT regimen standard)  Administration: 396 mg (10/8/2019)  oxaliplatin (ELOXATIN) 168 3 mg in dextrose 5 % 250 mL chemo infusion, 85 mg/m2 = 168 3 mg, Intravenous, Once, 1 of 6 cycles  Administration: 168 3 mg (10/8/2019)  trastuzumab (HERCEPTIN) 496 mg in sodium chloride 0 9 % 250 mL chemo infusion, 6 mg/kg = 496 mg, Intravenous, Once, 1 of 6 cycles  Administration: 496 mg (10/8/2019)         Current Hematologic/ Oncologic Treatment:    FLOT plus Herceptin    Interval History:    The patient returns for follow-up visit  He has completed his 1st cycle of FLOT plus Herceptin  Echo was completed on 09/30/2019 and estimated a left ventricular ejection fraction of 60%  Blood counts remain within acceptable range    He is still anemic hemoglobin 8 6 and he is receiving Venofer 200 mg with each of his treatments  His biggest complaint today is that he is extremely fatigued and is having difficulty sleeping  He also has restless legs  He also reports some mild nausea however he is taking liquid Zofran for this and reports that his nausea and prove significantly after taking the Zofran  He did not take his Decadron with his 1st cycle of chemotherapy stating that he has difficulty swallowing pills  He did report that his swallowing has slightly improved and he was able to eat a peanut butter sandwich yesterday  Otherwise denies chest pain, shortness of breath, vomiting, diarrhea, bleeding/bruising, and fevers/infection  Test Results:    Imaging: Xr Chest Portable    Result Date: 9/26/2019  Narrative: CHEST INDICATION:   post op  COMPARISON:  None EXAM PERFORMED/VIEWS:  XR CHEST PORTABLE FINDINGS:  There is a left-sided Mediport with its tip in SVC  Cardiomediastinal silhouette appears unremarkable  The lungs are clear  No pneumothorax or pleural effusion  Osseous structures appear within normal limits for patient age  Impression: No acute cardiopulmonary disease  Workstation performed: [id number]     Ct Chest Abdomen Pelvis W Contrast    Result Date: 9/17/2019  Narrative: CT CHEST, ABDOMEN AND PELVIS WITH IV CONTRAST INDICATION:   dysphagia  Patient recently diagnosed with adenocarcinoma of the lower 3rd of the esophagus  Dysphagia, "feels like there is a bubble in my throat",  blood in vomit  History of COPD, diabetes, hypertension  Prior history of tobacco use  COMPARISON:  September 6, 2019  TECHNIQUE: CT examination of the chest, abdomen and pelvis was performed  Axial, sagittal, and coronal 2D reformatted images were created from the source data and submitted for interpretation  Radiation dose length product (DLP) for this visit:  650 mGy-cm     This examination, like all CT scans performed in the [facility], was performed utilizing techniques to minimize radiation dose exposure, including the use of iterative reconstruction and automated exposure control  IV Contrast:  100 mL of iohexol (OMNIPAQUE) Enteric Contrast: Enteric contrast was not administered  FINDINGS: CHEST LUNGS:  A focal area of opacity within the anterior aspect of the left upper lobe of the lung measuring approximately 7 x 8 mm is again evident, presently best demonstrated on series 3 image 28  Once again, this appears somewhat more linear on the coronal images  This is similar in appearance to September 6, 2019  Continued follow-up is recommended  Minimal atelectasis in the inferior left lingula is unchanged  PLEURA:  Unremarkable  HEART/GREAT VESSELS:  Coronary artery calcifications are present  There is mild atherosclerosis of the thoracic aorta  MEDIASTINUM AND SHEA:  There is irregular wall thickening and some mucosal enhancement evident involving the distal esophagus and extending to the left hand aspect of the upper stomach  There is a masslike area of abnormal soft tissue density along the left hand aspect of the upper stomach which measures approximately 5 5 x 3 6 cm  There appears to be some infiltration of the fat adjacent to this masslike area which is suspicious for local extension of disease  Several mildly prominent nodes are present between this mass and the liver, measuring up to 1 2 x 0 9 cm  A left para esophageal node on series 2 image 43 presently measures 1 x 0 6 cm whereas it measured 0 8 x 0 6 cm on the prior study  There is some fluid within the esophagus near the  level of the gregg  A subcarinal node measures approximately 8 mm short axis, similar to the prior study  A right paratracheal node measures approximately 9 mm, similar to the prior study  Another right paratracheal node measures approximately 10 mm, also similar to the prior study  CHEST WALL AND LOWER NECK:   Unremarkable   ABDOMEN LIVER/BILIARY TREE: There is a small area of focal fatty infiltration adjacent to the falciform ligament, similar to the previous examination  GALLBLADDER:  There are gallstone(s) within the gallbladder, without pericholecystic inflammatory changes  SPLEEN:  Unremarkable  PANCREAS:  Unremarkable  ADRENAL GLANDS:  There is an approximately 1 5 x 1 cm right adrenal nodule  This appears similar to slightly larger compared to the prior examination  There is a questionable 0 6 x 0 5 cm left adrenal nodule, not clearly demonstrated on the prior study  Follow-up of both of these nodules is suggested  KIDNEYS/URETERS:  Unremarkable  No hydronephrosis  STOMACH AND BOWEL:  As described above  There is no evidence of small bowel obstruction  There is no evidence of acute diverticulitis  APPENDIX:  A normal appendix was visualized  ABDOMINOPELVIC CAVITY:  As described above  No pneumoperitoneum  No significant ascites  VESSELS:  There is atherosclerosis  There is no abdominal aortic aneurysm  PELVIS REPRODUCTIVE ORGANS AND URINARY BLADDER:  There is a soft tissue density nodule within the posterior aspect of the urinary bladder near the midline which measures approximately 1 2 cm  This appears similar to the prior study  This could be caused by indentation of the prostate, however, a small bladder mass cannot be entirely excluded  Urology consultation and follow-up is recommended  ABDOMINAL WALL/INGUINAL REGIONS:  Small fat-containing left inguinal hernia  OSSEOUS STRUCTURES:  No acute fracture or destructive osseous lesion  Disc spacers are present at L5-S1  Impression: There is irregular wall thickening involving the distal esophagus and extending to the left hand aspect of the upper stomach  There is a masslike area of abnormal soft tissue density along the left hand aspect of the upper stomach which measures approximately 5 5 x 3 6 cm, and there appears to be some infiltration of the adjacent fat    This is concerning for local extension of the patient's known neoplasm  Several mildly prominent nodes are present between this mass and the liver  There are also some mildly prominent mediastinal nodes  Please see discussion  There is some fluid within the esophagus near level of the gregg  There is an approximately 1 5 x 1 cm right adrenal nodule  This appears similar to slightly larger compared to the prior examination  There is a questionable tiny left adrenal nodule  Follow-up of both of these adrenal findings is suggested  There is an approximately 1 2 cm soft tissue density nodule within the posterior aspect of the urinary bladder  Although this may be related to indentation of the prostate, a small bladder mass cannot be entirely excluded  Urology consultation and follow-up is recommended  A focal area of opacity within the anterior aspect of the left upper lobe of the lung appears similar to September 6, 2019  Please see discussion  Continued follow-up is recommended  This could be reassessed with chest CT in 3 months  PET/CT could also be considered, however, it is of borderline size for accurate characterization with PET/ CT  If more remote CTs of the chest exist, direct comparison would be helpful  PET/CT should be considered for further evaluation of the extent of adenopathy and extent of disease  PET/CT or MRI could be utilized to evaluate the adrenal findings, if there are no contraindications  Cholelithiasis  No CT evidence of acute cholecystitis  Atherosclerosis  Coronary artery disease  Other findings as described above  Please see discussion  The study was marked in [facility] for immediate notification  Workstation performed: [id number]     Nm Pet Ct Skull Base To Mid Thigh    Result Date: 9/19/2019  Narrative: PET/CT SCAN INDICATION: Newly diagnosed esophageal cancer  Initial staging    C15 5: Malignant neoplasm of lower third of esophagus MODIFIER: PI COMPARISON: CT chest abdomen pelvis 9/16/2019 and 9/6/2019 CELL TYPE:  Intramucosal carcinoma, 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 TECHNIQUE:   8 8 mCi F-18-FDG administered IV  Multiplanar attenuation corrected and non attenuation corrected PET images are available for interpretation, and contiguous, low dose, axial CT sections were obtained from the skull base through the femurs   Intravenous contrast material was not utilized  This examination, like all CT scans performed in the [facility], was performed utilizing techniques to minimize radiation dose exposure, including the use of iterative reconstruction and automated exposure control  Fasting serum glucose: 82 mg/dl FINDINGS: VISUALIZED BRAIN:   No acute abnormalities are seen  HEAD/NECK:   Fairly diffuse FDG uptake at the thyroid gland, SUV max of 6 2 may be related to thyroiditis  No FDG avid cervical adenopathy is seen  CT images: Scattered mucosal thickening noted in the paranasal sinuses  CHEST:   Extensive FDG uptake at the distal esophagus leading to the GE junction and proximal stomach, SUV max of 21 0  Associated wall thickening noted here on CT  No FDG avid lymph nodes  CT images: Scattered coronary calcifications noted  Stable subcentimeter irregular density in the left upper lobe anteriorly, image 110 series 3  This is not FDG avid  ABDOMEN:   Extensive FDG uptake at the distal esophagus leading to the GE junction and proximal stomach, SUV max of 21 0  Associated wall thickening noted here on CT  Soft tissue density at the level of the proximal stomach extending to the gastrohepatic region measures 6 0 x 4 7 cm  There may be contiguous FDG avid lymphadenopathy in the gastrohepatic region where is there is soft tissue fullness here on CT inseparable from the gastric wall thickening  Small focus of FDG uptake noted posterior to the proximal stomach, SUV max of 3 7  There is a 2 1 x 1 5 cm perigastric lymph node here on CT image 164 series 3  No suspicious FDG uptake at the adrenal glands   CT images: There is cholelithiasis  Stable nodularity of the right adrenal gland  PELVIS: Focal FDG uptake at the distal sigmoid colon, SUV max of 7 8  Possible eccentric wall thickening on CT  Additional focus of FDG uptake at the level of the proximal sigmoid colon, SUV max of 6 6  No obvious findings here on CT  CT images: Prostate is mildly prominent  Tiny fat-containing left inguinal hernia  OSSEOUS STRUCTURES: Focal FDG uptake at the left anterior 6th rib demonstrates SUV max of 3 7  No obvious findings here on CT  Subtle focus of FDG uptake at the right lateral 2nd rib, SUV max of 2 2  No obvious findings here on CT  CT images: Prior lumbosacral fusion  Impression: 1  Extensive FDG uptake at the distal esophagus, GE junction and proximal stomach compatible with known malignancy  2  FDG avid perigastric lymph node posterior to the proximal stomach suspicious for metastasis  There may be FDG avid gastrohepatic lymphadenopathy but this is inseparable from the gastric mass  3   Focal FDG uptake at the left anterior 6th rib  Possible subtle focus of FDG uptake at the right lateral 2nd rib  No obvious findings here on the limited low-dose CT images  Metastasis is not excluded  Distribution is not typical for prior trauma  Consider follow-up with MRI of the chest  4   Foci of FDG uptake at the sigmoid colon for which underlying lesions should be excluded  Recommend follow-up with colonoscopy  5   Fairly diffuse FDG uptake at the thyroid gland, may be related to thyroiditis  The study was marked in EP[employer] for significant notification  Workstation performed: [id number]     Fl Guided Central Venous Access Device Insertion    Result Date: 9/27/2019  Narrative: C-ARM - chest INDICATION: C15 5: Malignant neoplasm of lower third of esophagus  Procedure guidance  COMPARISON:  None TECHNIQUE: FLUOROSCOPY TIME:   11 SECONDS 2 FLUOROSCOPIC IMAGES FINDINGS: Fluoroscopic guidance provided for surgical procedure  Osseous and soft tissue detail limited by technique  Impression: Fluoroscopic guidance provided for surgical procedure  Please refer to the separate procedure notes for additional details  Workstation performed: [id number]       Labs:   Lab Results   Component Value Date    WBC 7 56 10/07/2019    HGB 8 6 (L) 10/07/2019    HCT 28 7 (L) 10/07/2019    MCV 88 10/07/2019     10/07/2019     Lab Results   Component Value Date    K 4 7 10/07/2019     10/07/2019    CO2 26 10/07/2019    BUN 23 10/07/2019    CREATININE 0 90 10/07/2019    GLUF 135 (H) 10/07/2019    CALCIUM 9 5 10/07/2019    AST 8 10/07/2019    ALT 13 10/07/2019    ALKPHOS 46 10/07/2019    EGFR 88 10/07/2019         No results found for: SPEP, UPEP    No results found for: PSA    No results found for: CEA    No results found for:     No results found for: AFP    No results found for: IRON, TIBC, FERRITIN    No results found for: [id number]      ROS: As stated in the history of present illness otherwise his 14 point review of systems today was negative        Active Problems:   Patient Active Problem List   Diagnosis    COPD (chronic obstructive pulmonary disease) (HCC)    Headaches due to old head injury    High cholesterol    Obstructive sleep apnea syndrome    Type 2 diabetes mellitus with hyperglycemia, without long-term current use of insulin (HCC)    Malignant neoplasm of lower third of esophagus (HCC)    Esophageal obstruction    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[address] )    Chemotherapy induced neutropenia (HCC)       Past Medical History:   Past Medical History:   Diagnosis Date    COPD [facility]chronic obstructive pulmonary disease) [facility] [address] [facility]     Diabetes mellitus ([address] )     Difficulty swallowing     Esophageal mass     Sleep apnea        Surgical History:   Past Surgical History:   Procedure Laterality Date    BACK SURGERY      DISC REMOVAL      FL GUIDED CENTRAL VENOUS ACCESS DEVICE INSERTION  9/26/2019    GASTROJEJUNOSTOMY W/ JEJUNOSTOMY TUBE N/A 2019    Procedure: INSERTION JEJUNOSTOMY TUBE OPEN;  Surgeon: Denver Romance, MD;  Location: [facility];  Service: Surgical Oncology    TONSILLECTOMY      TUNNELED VENOUS PORT PLACEMENT N/A 2019    Procedure: INSERTION VENOUS PORT (PORT-A-CATH); Surgeon: Denver Romance, MD;  Location: [facility];  Service: Surgical Oncology       Family History:    Family History   Problem Relation Age of Onset    Diabetes Mother     No Known Problems Father        Cancer-related family history is not on file      Social History:   Social History     Socioeconomic History    Marital status: Single     Spouse name: Not on file    Number of children: Not on file    Years of education: Not on file    Highest education level: Not on file   Occupational History    Not on file   Social Needs    Financial resource strain: Not on file    Food insecurity:     Worry: Not on file     Inability: Not on file    Transportation needs:     Medical: Not on file     Non-medical: Not on file   Tobacco Use    Smoking status: Former Smoker     Packs/day: 0 50     Years: 50 00     Pack years: 25 00     Types: Cigarettes     Last attempt to quit: 2017     Years since quittin 8    Smokeless tobacco: Never Used   Substance and Sexual Activity    Alcohol use: Not Currently     Alcohol/week: 0 0 standard drinks     Frequency: Never     Drinks per session: 1 or 2     Binge frequency: Never    Drug use: Never    Sexual activity: Not on file   Lifestyle    Physical activity:     Days per week: Not on file     Minutes per session: Not on file    Stress: Not on file   Relationships    Social connections:     Talks on phone: Not on file     Gets together: Not on file     Attends Holiness service: Not on file     Active member of club or organization: Not on file     Attends meetings of clubs or organizations: Not on file     Relationship status: Not on file    Intimate partner violence:     Fear of current or ex partner: Not on file     Emotionally abused: Not on file     Physically abused: Not on file     Forced sexual activity: Not on file   Other Topics Concern    Not on file   Social History Narrative    Not on file       Current Medications:   Current Outpatient Medications   Medication Sig Dispense Refill    acetaminophen (TYLENOL) 160 mg/5 mL suspension Take 20 3 mL (650 mg total) by mouth every 4 (four) hours as needed for mild pain or fever 118 mL 0    albuterol (2 5 mg/3 mL) 0 083 % nebulizer solution Take 1 vial (2 5 mg total) by nebulization every 6 (six) hours as needed for wheezing or shortness of breath 1 vial 5    canagliflozin (INVOKANA) 100 mg Daily      dexamethasone (DECADRON) 1 MG/ML solution Take 8 mL (8 mg total) by mouth daily 30 mL 6    levothyroxine 25 mcg tablet Take 25 mcg by mouth daily in the early morning      Melatonin 5 MG/ML LIQD Take 2 mL (10 mg total) by mouth daily at bedtime as needed (insomnia) 1 Bottle 1    Nutritional Supplements (JEVITY 1 5 SHUN) LIQD Take 85 mL by mouth continuous [email or web address] [email or web address] advance as tolerated to goal rate of 85ml/hr to run for 16 hours daily  Flush with at least 60ml water at start and end of cycle  Needs additional water flushes if PO fluid intake declines 1000 mL 0    ondansetron (ZOFRAN) 4 MG/5ML solution Take 10 mL (8 mg total) by mouth every 8 (eight) hours as needed for nausea or vomiting 240 mL 6    pantoprazole (PROTONIX) 40 mg tablet Take 1 tablet (40 mg total) by mouth daily 30 tablet 1    pioglitazone (ACTOS) 30 mg tablet Take 30 mg by mouth daily       prochlorperazine (COMPAZINE) 10 mg tablet Take 1 tablet (10 mg total) by mouth every 6 (six) hours as needed for nausea or vomiting 45 tablet 3    sitaGLIPtin (JANUVIA) 100 mg tablet Take 100 mg by mouth daily        No current facility-administered medications for this visit  Allergies:    Allergies   Allergen Reactions    Penicillins Itching       Physical Exam:    There is no height or weight on file to calculate BSA  Wt Readings from Last 3 Encounters:   10/08/19 81 6 kg (180 lb)   09/26/19 82 6 kg (182 lb)   09/25/19 82 6 kg (182 lb)        Temp Readings from Last 3 Encounters:   10/08/19 98 °F (36 7 °C) (Oral)   09/29/19 97 7 °F (36 5 °C) (Oral)   09/24/19 (!) 96 8 °F (36 °C)        BP Readings from Last 3 Encounters:   10/08/19 114/54   09/29/19 116/63   09/25/19 128/80         Pulse Readings from Last 3 Encounters:   10/08/19 85   09/29/19 82   09/25/19 92     @LASTSAO2(3)@      Physical Exam     Constitutional   General appearance: No acute distress, well appearing and well nourished  Eyes   Conjunctiva and lids: No swelling, erythema or discharge  Pupils and irises: Equal, round and reactive to light  Ears, Nose, Mouth, and Throat   External inspection of ears and nose: Normal     Nasal mucosa, septum, and turbinates: Normal without edema or erythema  Oropharynx: Normal with no erythema, edema, exudate or lesions  Pulmonary   Respiratory effort: No increased work of breathing or signs of respiratory distress  Auscultation of lungs: Clear to auscultation  Cardiovascular   Palpation of heart: Normal PMI, no thrills  Auscultation of heart: Normal rate and rhythm, normal S1 and S2, without murmurs  Examination of extremities for edema and/or varicosities: Normal     Carotid pulses: Normal     Abdomen   Abdomen: Non-tender, no masses  Liver and spleen: No hepatomegaly or splenomegaly  Lymphatic   Palpation of lymph nodes in neck: No lymphadenopathy  Musculoskeletal   Gait and station: Normal     Digits and nails: Normal without clubbing or cyanosis  Inspection/palpation of joints, bones, and muscles: Normal     Skin   Skin and subcutaneous tissue: Normal without rashes or lesions  Neurologic   Cranial nerves: Cranial nerves 2-12 intact  Sensation: No sensory loss      Psychiatric   Orientation to person, place, and time: Normal     Mood and affect: Normal         Assessment / Plan:    The patient is a 79-year-old male with 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  He has a reasonable amount of gastric involvement so it was decided that he would benefit from neoadjuvant chemotherapy prior to resection  He has been started on chemotherapy treatment with FLOT plus Herceptin and is due for cycle 2  On 10/22/2019  Yesterday we did receive a call from his daughter, Sahil Coffman, stating that he was unable to swallow his Decadron pills at home so his Decadron was switched to a liquid form which the patient did confirm yesterday they he would be able to swallow  He agreed to take 8 mg b i d  The day before and after treatment  He is receiving Decadron on the day of treatment at the infusion center  We will plan to proceed with treatment AKA cycle #4 on 10/22/19 provided his lab work remains within acceptable range  This includes 5 FU 2600 milligrams/meter squared/day over 24 hours, leucovorin 200 milligrams/meter squared, oxaliplatin 85 milligrams/meter squared, Taxotere 50 milligrams/meter squared, and Herceptin 4 milligrams/kilogram   He will continue receiving Venofer 200 mg with each of his treatments  I will refer him to palliative care for assistance with symptom management, specifically difficulty sleeping and restless leg  We will see him back in 4 weeks  The patient is in agreement with the plan and will call with any questions or concerns in the meantime  Goals and Barriers:  Current Goal:  Prolong Survival from gastroesophageal junction cancer  Barriers: None  Patient's Capacity to Self Care:  Patient able to self care    ECOG    Portions of the record may have been created with voice recognition software   Occasional wrong word or "sound a like" substitutions may have occurred due to the inherent limitations of voice recognition software   Read the chart carefully and recognize, using context, where substitutions have occurred

## 2019-10-15 NOTE — TELEPHONE ENCOUNTER
Kj Sotomayor from Palliative care called regarding a new patient appt  She stated she would reach out to the patient directly

## 2019-10-16 DIAGNOSIS — C16.0 GE JUNCTION CARCINOMA (HCC): Primary | ICD-10-CM

## 2019-10-16 DIAGNOSIS — C15.5 MALIGNANT NEOPLASM OF LOWER THIRD OF ESOPHAGUS (HCC): ICD-10-CM

## 2019-10-16 DIAGNOSIS — D70.1 CHEMOTHERAPY INDUCED NEUTROPENIA (HCC): ICD-10-CM

## 2019-10-16 DIAGNOSIS — T45.1X5A CHEMOTHERAPY INDUCED NEUTROPENIA (HCC): ICD-10-CM

## 2019-10-21 ENCOUNTER — APPOINTMENT (OUTPATIENT)
Dept: LAB | Facility: CLINIC | Age: 64
End: 2019-10-21
Payer: MEDICARE

## 2019-10-21 DIAGNOSIS — C16.0 GE JUNCTION CARCINOMA (HCC): ICD-10-CM

## 2019-10-21 DIAGNOSIS — T45.1X5A CHEMOTHERAPY INDUCED NEUTROPENIA (HCC): ICD-10-CM

## 2019-10-21 DIAGNOSIS — C15.5 MALIGNANT NEOPLASM OF LOWER THIRD OF ESOPHAGUS (HCC): ICD-10-CM

## 2019-10-21 DIAGNOSIS — D70.1 CHEMOTHERAPY INDUCED NEUTROPENIA (HCC): ICD-10-CM

## 2019-10-21 LAB
ALBUMIN SERPL BCP-MCNC: 3.8 G/DL (ref 3.5–5)
ALP SERPL-CCNC: 94 U/L (ref 46–116)
ALT SERPL W P-5'-P-CCNC: 16 U/L (ref 12–78)
ANION GAP SERPL CALCULATED.3IONS-SCNC: 8 MMOL/L (ref 4–13)
ANISOCYTOSIS BLD QL SMEAR: PRESENT
AST SERPL W P-5'-P-CCNC: 13 U/L (ref 5–45)
BASOPHILS # BLD MANUAL: 0 THOUSAND/UL (ref 0–0.1)
BASOPHILS NFR MAR MANUAL: 0 % (ref 0–1)
BILIRUB SERPL-MCNC: 1.08 MG/DL (ref 0.2–1)
BUN SERPL-MCNC: 13 MG/DL (ref 5–25)
CALCIUM SERPL-MCNC: 9.1 MG/DL (ref 8.3–10.1)
CHLORIDE SERPL-SCNC: 107 MMOL/L (ref 100–108)
CO2 SERPL-SCNC: 25 MMOL/L (ref 21–32)
CREAT SERPL-MCNC: 0.98 MG/DL (ref 0.6–1.3)
EOSINOPHIL # BLD MANUAL: 0 THOUSAND/UL (ref 0–0.4)
EOSINOPHIL NFR BLD MANUAL: 0 % (ref 0–6)
ERYTHROCYTE [DISTWIDTH] IN BLOOD BY AUTOMATED COUNT: 17.2 % (ref 11.6–15.1)
GFR SERPL CREATININE-BSD FRML MDRD: 81 ML/MIN/1.73SQ M
GLUCOSE P FAST SERPL-MCNC: 110 MG/DL (ref 65–99)
HCT VFR BLD AUTO: 27.4 % (ref 36.5–49.3)
HGB BLD-MCNC: 7.9 G/DL (ref 12–17)
LYMPHOCYTES # BLD AUTO: 0.91 THOUSAND/UL (ref 0.6–4.47)
LYMPHOCYTES # BLD AUTO: 8 % (ref 14–44)
MCH RBC QN AUTO: 25.5 PG (ref 26.8–34.3)
MCHC RBC AUTO-ENTMCNC: 28.8 G/DL (ref 31.4–37.4)
MCV RBC AUTO: 88 FL (ref 82–98)
MONOCYTES # BLD AUTO: 0.23 THOUSAND/UL (ref 0–1.22)
MONOCYTES NFR BLD: 2 % (ref 4–12)
NEUTROPHILS # BLD MANUAL: 9.83 THOUSAND/UL (ref 1.85–7.62)
NEUTS SEG NFR BLD AUTO: 86 % (ref 43–75)
NRBC BLD AUTO-RTO: 2 /100 WBCS
PLATELET # BLD AUTO: 140 THOUSANDS/UL (ref 149–390)
PLATELET BLD QL SMEAR: ADEQUATE
PMV BLD AUTO: 10.7 FL (ref 8.9–12.7)
POLYCHROMASIA BLD QL SMEAR: PRESENT
POTASSIUM SERPL-SCNC: 4.1 MMOL/L (ref 3.5–5.3)
PROT SERPL-MCNC: 7.1 G/DL (ref 6.4–8.2)
RBC # BLD AUTO: 3.1 MILLION/UL (ref 3.88–5.62)
RBC MORPH BLD: PRESENT
SODIUM SERPL-SCNC: 140 MMOL/L (ref 136–145)
VARIANT LYMPHS # BLD AUTO: 4 %
WBC # BLD AUTO: 11.43 THOUSAND/UL (ref 4.31–10.16)

## 2019-10-21 PROCEDURE — 85027 COMPLETE CBC AUTOMATED: CPT

## 2019-10-21 PROCEDURE — 36415 COLL VENOUS BLD VENIPUNCTURE: CPT

## 2019-10-21 PROCEDURE — 85007 BL SMEAR W/DIFF WBC COUNT: CPT

## 2019-10-21 PROCEDURE — 80053 COMPREHEN METABOLIC PANEL: CPT

## 2019-10-22 ENCOUNTER — HOSPITAL ENCOUNTER (OUTPATIENT)
Dept: INFUSION CENTER | Facility: CLINIC | Age: 64
Discharge: HOME | End: 2019-10-22
Payer: COMMERCIAL

## 2019-10-22 VITALS
SYSTOLIC BLOOD PRESSURE: 104 MMHG | HEIGHT: 70 IN | WEIGHT: 174 LBS | BODY MASS INDEX: 24.91 KG/M2 | HEART RATE: 93 BPM | RESPIRATION RATE: 22 BRPM | TEMPERATURE: 98.8 F | DIASTOLIC BLOOD PRESSURE: 55 MMHG

## 2019-10-22 DIAGNOSIS — C15.5 MALIGNANT NEOPLASM OF LOWER THIRD OF ESOPHAGUS (HCC): Primary | ICD-10-CM

## 2019-10-22 DIAGNOSIS — D70.1 CHEMOTHERAPY INDUCED NEUTROPENIA (HCC): ICD-10-CM

## 2019-10-22 DIAGNOSIS — C16.0 GE JUNCTION CARCINOMA (HCC): ICD-10-CM

## 2019-10-22 DIAGNOSIS — D50.0 IRON DEFICIENCY ANEMIA DUE TO CHRONIC BLOOD LOSS: ICD-10-CM

## 2019-10-22 DIAGNOSIS — T45.1X5A CHEMOTHERAPY INDUCED NEUTROPENIA (HCC): ICD-10-CM

## 2019-10-22 PROBLEM — D64.9 ANEMIA, UNSPECIFIED: Status: ACTIVE | Noted: 2019-10-22

## 2019-10-22 LAB
ABO GROUP BLD: NORMAL
BLD GP AB SCN SERPL QL: NEGATIVE
RH BLD: POSITIVE
SPECIMEN EXPIRATION DATE: NORMAL

## 2019-10-22 PROCEDURE — G0498 CHEMO EXTEND IV INFUS W/PUMP: HCPCS

## 2019-10-22 PROCEDURE — 96367 TX/PROPH/DG ADDL SEQ IV INF: CPT

## 2019-10-22 PROCEDURE — 86850 RBC ANTIBODY SCREEN: CPT | Performed by: INTERNAL MEDICINE

## 2019-10-22 PROCEDURE — 96368 THER/DIAG CONCURRENT INF: CPT

## 2019-10-22 PROCEDURE — 96417 CHEMO IV INFUS EACH ADDL SEQ: CPT

## 2019-10-22 PROCEDURE — 86920 COMPATIBILITY TEST SPIN: CPT

## 2019-10-22 PROCEDURE — 96413 CHEMO IV INFUSION 1 HR: CPT

## 2019-10-22 PROCEDURE — 96372 THER/PROPH/DIAG INJ SC/IM: CPT

## 2019-10-22 PROCEDURE — 86901 BLOOD TYPING SEROLOGIC RH(D): CPT | Performed by: INTERNAL MEDICINE

## 2019-10-22 PROCEDURE — 86900 BLOOD TYPING SEROLOGIC ABO: CPT | Performed by: INTERNAL MEDICINE

## 2019-10-22 PROCEDURE — 96415 CHEMO IV INFUSION ADDL HR: CPT

## 2019-10-22 RX ORDER — DEXTROSE MONOHYDRATE 50 MG/ML
20 INJECTION, SOLUTION INTRAVENOUS ONCE
Status: COMPLETED | OUTPATIENT
Start: 2019-10-22 | End: 2019-10-22

## 2019-10-22 RX ORDER — SODIUM CHLORIDE 9 MG/ML
20 INJECTION, SOLUTION INTRAVENOUS ONCE
Status: COMPLETED | OUTPATIENT
Start: 2019-10-22 | End: 2019-10-22

## 2019-10-22 RX ORDER — ACETAMINOPHEN 325 MG/1
650 TABLET ORAL ONCE
Status: CANCELLED | OUTPATIENT
Start: 2019-10-24

## 2019-10-22 RX ORDER — SODIUM CHLORIDE 9 MG/ML
20 INJECTION, SOLUTION INTRAVENOUS ONCE
Status: CANCELLED | OUTPATIENT
Start: 2019-10-24

## 2019-10-22 RX ORDER — PALONOSETRON 0.05 MG/ML
0.25 INJECTION, SOLUTION INTRAVENOUS ONCE
Status: COMPLETED | OUTPATIENT
Start: 2019-10-22 | End: 2019-10-22

## 2019-10-22 RX ADMIN — LEUCOVORIN CALCIUM 396 MG: 350 INJECTION, POWDER, LYOPHILIZED, FOR SOLUTION INTRAMUSCULAR; INTRAVENOUS at 13:47

## 2019-10-22 RX ADMIN — OXALIPLATIN 168.3 MG: 5 INJECTION, SOLUTION, CONCENTRATE INTRAVENOUS at 13:55

## 2019-10-22 RX ADMIN — IRON SUCROSE 200 MG: 20 INJECTION, SOLUTION INTRAVENOUS at 09:57

## 2019-10-22 RX ADMIN — DEXAMETHASONE SODIUM PHOSPHATE 10 MG: 10 INJECTION, SOLUTION INTRAMUSCULAR; INTRAVENOUS at 10:58

## 2019-10-22 RX ADMIN — DOCETAXEL 99 MG: 20 INJECTION, SOLUTION, CONCENTRATE INTRAVENOUS at 12:38

## 2019-10-22 RX ADMIN — SODIUM CHLORIDE 20 ML/HR: 0.9 INJECTION, SOLUTION INTRAVENOUS at 09:54

## 2019-10-22 RX ADMIN — DEXTROSE 20 ML/HR: 5 SOLUTION INTRAVENOUS at 13:46

## 2019-10-22 RX ADMIN — TRASTUZUMAB 331 MG: 150 INJECTION, POWDER, LYOPHILIZED, FOR SOLUTION INTRAVENOUS at 12:03

## 2019-10-22 RX ADMIN — SODIUM CHLORIDE 150 MG: 0.9 INJECTION, SOLUTION INTRAVENOUS at 11:29

## 2019-10-22 RX ADMIN — PALONOSETRON 0.25 MG: 0.25 INJECTION, SOLUTION INTRAVENOUS at 11:29

## 2019-10-22 NOTE — PROGRESS NOTES
Spoke with Ahsan Parsons ok to treat with a hemoglobin of 7 9, patient will go down and sign consent after treatment today for blood, patient made aware

## 2019-10-22 NOTE — PROGRESS NOTES
Patient here today for chemo,hemoglobin below parameters patient does report being very tired, awaiting on how to proceed from Dr Beth Los office   Vitals are stable at this time

## 2019-10-23 ENCOUNTER — HOSPITAL ENCOUNTER (OUTPATIENT)
Dept: INFUSION CENTER | Facility: CLINIC | Age: 64
Discharge: HOME | End: 2019-10-23
Payer: COMMERCIAL

## 2019-10-23 VITALS
HEART RATE: 102 BPM | OXYGEN SATURATION: 100 % | RESPIRATION RATE: 16 BRPM | DIASTOLIC BLOOD PRESSURE: 53 MMHG | SYSTOLIC BLOOD PRESSURE: 92 MMHG | TEMPERATURE: 98 F

## 2019-10-23 DIAGNOSIS — C16.0 GE JUNCTION CARCINOMA (HCC): ICD-10-CM

## 2019-10-23 DIAGNOSIS — C15.5 MALIGNANT NEOPLASM OF LOWER THIRD OF ESOPHAGUS (HCC): Primary | ICD-10-CM

## 2019-10-23 DIAGNOSIS — T45.1X5A CHEMOTHERAPY INDUCED NEUTROPENIA (HCC): ICD-10-CM

## 2019-10-23 DIAGNOSIS — D70.1 CHEMOTHERAPY INDUCED NEUTROPENIA (HCC): ICD-10-CM

## 2019-10-23 PROCEDURE — 96372 THER/PROPH/DIAG INJ SC/IM: CPT

## 2019-10-23 RX ORDER — SODIUM CHLORIDE 9 MG/ML
20 INJECTION, SOLUTION INTRAVENOUS ONCE
Status: CANCELLED | OUTPATIENT
Start: 2019-10-23

## 2019-10-23 RX ORDER — ACETAMINOPHEN 325 MG/1
650 TABLET ORAL ONCE
Status: CANCELLED | OUTPATIENT
Start: 2019-10-23

## 2019-10-23 RX ADMIN — PEGFILGRASTIM 6 MG: KIT SUBCUTANEOUS at 16:22

## 2019-10-23 NOTE — PATIENT INSTRUCTIONS
Neulasta OnPro-    Please make sure device is blinking green at all times  If it blinks red, please contact providers office ASAP  Device will start infusion Neulasta on 10/24/2019 at 730pm    It will infuse for 45 minutes  You can remove the device on 10/24/2019 at 830pm       Please call providers office with any concerns

## 2019-10-23 NOTE — PROGRESS NOTES
Treatment tolerated without complication, no questions at this time  Res vol 0mls  CADD d/c'd and port flushed per protocol without any issue  Pt states that he is really tired  BP low upon arrival  Pt did not have much to eat or drink today  Offered patient ginger ale  Drank some of ginger ale, pt appeared to be feeling a little better upon discharge  BP increased closer to baseline for pt  Pt is set up for blood transfusion tomorrow at 9am, pt and patients daughter aware  Neulasta OnPro placed on R arm  Green light flashing upon discharge  Pt and daughter aware of when to remove device tomorrow  AVS declined

## 2019-10-24 ENCOUNTER — HOSPITAL ENCOUNTER (OUTPATIENT)
Dept: INFUSION CENTER | Facility: CLINIC | Age: 64
Discharge: HOME | End: 2019-10-24
Payer: MEDICARE

## 2019-10-24 VITALS
SYSTOLIC BLOOD PRESSURE: 115 MMHG | TEMPERATURE: 98.4 F | DIASTOLIC BLOOD PRESSURE: 61 MMHG | HEART RATE: 67 BPM | RESPIRATION RATE: 16 BRPM

## 2019-10-24 DIAGNOSIS — D50.0 IRON DEFICIENCY ANEMIA DUE TO CHRONIC BLOOD LOSS: Primary | ICD-10-CM

## 2019-10-24 PROCEDURE — 36430 TRANSFUSION BLD/BLD COMPNT: CPT

## 2019-10-24 PROCEDURE — P9016 RBC LEUKOCYTES REDUCED: HCPCS

## 2019-10-24 PROCEDURE — 96365 THER/PROPH/DIAG IV INF INIT: CPT

## 2019-10-24 RX ORDER — SODIUM CHLORIDE 9 MG/ML
20 INJECTION, SOLUTION INTRAVENOUS ONCE
Status: COMPLETED | OUTPATIENT
Start: 2019-10-24 | End: 2019-10-24

## 2019-10-24 RX ORDER — ACETAMINOPHEN 325 MG/1
650 TABLET ORAL ONCE
Status: COMPLETED | OUTPATIENT
Start: 2019-10-24 | End: 2019-10-24

## 2019-10-24 RX ADMIN — ACETAMINOPHEN 650 MG: 325 TABLET ORAL at 09:39

## 2019-10-24 RX ADMIN — DIPHENHYDRAMINE HYDROCHLORIDE 25 MG: 50 INJECTION, SOLUTION INTRAMUSCULAR; INTRAVENOUS at 09:44

## 2019-10-24 RX ADMIN — SODIUM CHLORIDE 20 ML/HR: 0.9 INJECTION, SOLUTION INTRAVENOUS at 09:38

## 2019-10-24 NOTE — PROGRESS NOTES
Transfusions tolerated well without any adverse reactions  Aware of next appointments  AVS provided

## 2019-10-24 NOTE — PROGRESS NOTES
Outpatient Oncology Nutrition Consult     Type of Consult: Follow Up  Care Location: Telephone Call  - met with patient and daughter Amado Ness  Reason for referral: Franki RN: Esophageal CA dx and TF (Date of referral: 9/26/19)    Nutrition Assessment:     PMH: COPD, DM, Jejunostomy 9/26/19  Oncology Diagnosis & Treatments: Malignant neoplasm of lower third of esophagus diagnosed 8/24/19  Chemotherapy (neulasta, taxotere, oxaliplatin, trastuzumab) started 10/8/19  Oncology History    8/19/19 to ER with recurrent episode of foreign body sensation in his throat  hours after eating two small shrimp  Patient notes multiple episodes over the past month that typically have resolved spontaneously    Impression and plan:  Esophageal blockage likely secondary to food bolus that is recurrent  Patient symptoms improved following treatment with glucagon in the emergency room but patient will require close outpatient follow-up with Gastroenterology for EGD to evaluate etiology of recurrent symptoms    8/21/19 Miracle Carnes PA-C Gastroenterology  over the past 2 months he has had 3 distinct episodes of food getting stuck  He states that each time the food gets stuck to where he cannot tolerate any liquids or secretions  Plan EGD    8/24/19 EGD Dr Joana Henao  · Severe, generalized edematous, erythematous and hemorrhagic mucosa in the lower third of the esophagus (30 cm from the incisors) with bleeding before intervention; performed 10 cold biopsies  There is circumferential erythema, friability, easy bleeding,  neoplastic abnormality and narrowing from 30 cm down to 40 cm  Ten biopsies were obtained to rule out malignancy  A dilation was not performed since I suspect that the underlying etiology is malignancy    · The stomach and duodenum appeared normal    Esophagus (biopsy):  - At 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     HER2 positive    9/6/19 CT chest, abdomen and pelvis  IMPRESSION:     1  Irregular wall thickening involving the distal esophagus beginning just below the inferior pulmonary veins, extending through the gastric cardia compatible with biopsy-proven adenocarcinoma  As above, there is involvement of the gastric cardia, and possible extra luminal extension of tumor along its right lateral margin  2  There is mild adenopathy identified adjacent to the gastric cardia and distal esophagus  Additional borderline prominent mediastinal lymph nodes  3  There is a left upper lobe opacity identified  Although potentially representing an area of inflammation or scarring, an irregularly marginated solitary pulmonary metastasis should be excluded  This could either be reassessed with chest CT in 3 months time or further evaluated with PET CT (it is of borderline size for accurate characterization with PET)  4  Hypertrophy of the median lobe of the prostate (favored) or less likely 9 mm bladder polyp  5  Nonspecific 1 2 cm right adrenal nodule  Overall, PET/CT may be helpful to determine exact extent of adenopathy within the lower chest and upper abdomen, evaluation for any extraluminal extension of tumor, and further characterization of left upper lobe density and right adrenal nodule    9/16-9/18 admitted with dysphagia    9/16/19 CT chest, abdomen and pelvis  IMPRESSION:     There is irregular wall thickening involving the distal esophagus and extending to the left hand aspect of the upper stomach  There is a masslike area of abnormal soft tissue density along the left hand aspect of the upper stomach which measures approximately 5 5 x 3 6 cm, and there appears to be some infiltration of the adjacent fat  This is concerning for local extension of the patient's known neoplasm  Several mildly prominent nodes are present between this mass and the liver  There are also some mildly prominent mediastinal nodes    Please see discussion      There is some fluid within the esophagus near level of the gregg      There is an approximately 1 5 x 1 cm right adrenal nodule  This appears similar to slightly larger compared to the prior examination  There is a questionable tiny left adrenal nodule  Follow-up of both of these adrenal findings is suggested      There is an approximately 1 2 cm soft tissue density nodule within the posterior aspect of the urinary bladder  Although this may be related to indentation of the prostate, a small bladder mass cannot be entirely excluded  Urology consultation and follow-up is recommended      A focal area of opacity within the anterior aspect of the left upper lobe of the lung appears similar to September 6, 2019  Please see discussion  Continued follow-up is recommended  This could be reassessed with chest CT in 3 months  PET/CT could also be considered, however, it is of borderline size for accurate characterization with PET/ CT  If more remote CTs of the chest exist, direct comparison would be helpful      PET/CT should be considered for further evaluation of the extent of adenopathy and extent of disease  PET/CT or MRI could be utilized to evaluate the adrenal findings, if there are no contraindications      Cholelithiasis  No CT evidence of acute cholecystitis      Atherosclerosis  Coronary artery disease      Other findings as described above  Please see discussion    9/19/19 PET  IMPRESSION:     1  Extensive FDG uptake at the distal esophagus, GE junction and proximal stomach compatible with known malignancy  2  FDG avid perigastric lymph node posterior to the proximal stomach suspicious for metastasis  There may be FDG avid gastrohepatic lymphadenopathy but this is inseparable from the gastric mass  3   Focal FDG uptake at the left anterior 6th rib  Possible subtle focus of FDG uptake at the right lateral 2nd rib  No obvious findings here on the limited low-dose CT images  Metastasis is not excluded  Distribution is not typical for prior trauma  Consider follow-up with MRI of the chest   4   Foci of FDG uptake at the sigmoid colon for which underlying lesions should be excluded  Recommend follow-up with colonoscopy  5   Fairly diffuse FDG uptake at the thyroid gland, may be related to thyroiditis    9/20/19 Jessica Mcmahan MD  Plan feeding tube, referral to Med Onc and Rad Onc  If the rib lesion is a metastasis, he is not a candidate for resection    9/24/19 Dr Gabino Anders  recommended that he have a port as well as a feeding tube placed since he may undergo esophagectomy in the future  We did discuss that if the rib lesions are indeed metastasis, chemotherapy would be the mainstay of his treatment    9/24/19 Dr Sydnee Kerns  " I think the patient would benefit from neoadjuvant chemotherapy upfront prior to consideration of either concurrent chemoradiation or even surgery  Herceptin with FLOT chemotherapy Every 2 weeks for 6 cycles  This will consist of 5-FU 2600 mg/m² over 24 hours, leucovorin 20 mg meter square, Oxaliplatin 85 mg/m2 square and Taxotere 60 mg meter square  Once he completed 6 doses of chemotherapy we will reimage him  If he has a nice response we will then consider concurrent chemoradiation and then surgery  If he has a very nice response with very little residual disease surgery could possibly be considered for now I did talk to him about most probably getting concurrent chemoradiation after the chemotherapy "    9/26/19 Port insertion and J tube insertion scheduled at [place] (Dr Gabino Anders)  10/8/19 Infusion scheduled  Dr Arminda Butler on colonoscopy ASAP    Mostly liquids  Can feel any food going     A lot of phlegm gets stuck and causes back pain    Constant cough if tries to lie down  Not sleeping much  Only sleep is sitting          Malignant neoplasm of lower third of esophagus ([address] )    8/24/2019 Initial Diagnosis     Malignant neoplasm of lower third of esophagus (HCC)  At least intramucosal carcinoma  Her2/SHIMON positive      10/8/2019 -  Chemotherapy     pegfilgrastim (NEULASTA ONPRO) subcutaneous injection kit 6 mg, 6 mg, Subcutaneous, Once, 2 of 6 cycles  Administration: 6 mg (10/9/2019), 6 mg (10/23/2019)  DOCEtaxel (TAXOTERE) 99 mg in sodium chloride 0 9 % 250 mL chemo infusion, 50 mg/m2 = 99 mg (83 3 % of original dose 60 mg/m2), Intravenous, Once, 2 of 6 cycles  Dose modification: 50 mg/m2 (original dose 60 mg/m2, Cycle 1, Reason: Other (See Comments))  Administration: 99 mg (10/8/2019), 99 mg (10/22/2019)  leucovorin 396 mg in dextrose 5 % 250 mL IVPB, 200 mg/m2 = 396 mg (50 % of original dose 400 mg/m2), Intravenous, Once, 2 of 6 cycles  Dose modification: 200 mg/m2 (original dose 400 mg/m2, Cycle 1, Reason: Other (See Comments), Comment: FLOT regimen standard)  Administration: 396 mg (10/8/2019), 396 mg (10/22/2019)  oxaliplatin (ELOXATIN) 168 3 mg in dextrose 5 % 250 mL chemo infusion, 85 mg/m2 = 168 3 mg, Intravenous, Once, 2 of 6 cycles  Administration: 168 3 mg (10/8/2019), 168 3 mg (10/22/2019)  trastuzumab (HERCEPTIN) 496 mg in sodium chloride 0 9 % 250 mL chemo infusion, 6 mg/kg = 496 mg, Intravenous, Once, 2 of 6 cycles  Administration: 496 mg (10/8/2019), 331 mg (10/22/2019)        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[address] )    8/24/2019 Biopsy     Esophagus (biopsy):  - At 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       9/24/2019 Initial Diagnosis     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HCC)      10/8/2019 -  Chemotherapy     pegfilgrastim (NEULASTA ONPRO) subcutaneous injection kit 6 mg, 6 mg, Subcutaneous, Once, 2 of 6 cycles  Administration: 6 mg (10/9/2019), 6 mg (10/23/2019)  DOCEtaxel (TAXOTERE) 99 mg in sodium chloride 0 9 % 250 mL chemo infusion, 50 mg/m2 = 99 mg (83 3 % of original dose 60 mg/m2), Intravenous, Once, 2 of 6 cycles  Dose modification: 50 mg/m2 (original dose 60 mg/m2, Cycle 1, Reason: Other (See Comments))  Administration: 99 mg (10/8/2019), 99 mg (10/22/2019)  leucovorin 396 mg in dextrose 5 % 250 mL IVPB, 200 mg/m2 = 396 mg (50 % of original dose 400 mg/m2), Intravenous, Once, 2 of 6 cycles  Dose modification: 200 mg/m2 (original dose 400 mg/m2, Cycle 1, Reason: Other (See Comments), Comment: FLOT regimen standard)  Administration: 396 mg (10/8/2019), 396 mg (10/22/2019)  oxaliplatin (ELOXATIN) 168 3 mg in dextrose 5 % 250 mL chemo infusion, 85 mg/m2 = 168 3 mg, Intravenous, Once, 2 of 6 cycles  Administration: 168 3 mg (10/8/2019), 168 3 mg (10/22/2019)  trastuzumab (HERCEPTIN) 496 mg in sodium chloride 0 9 % 250 mL chemo infusion, 6 mg/kg = 496 mg, Intravenous, Once, 2 of 6 cycles  Administration: 496 mg (10/8/2019), 331 mg (10/22/2019)       Past Medical History:   Diagnosis Date    COPD [facility]chronic obstructive pulmonary disease) [facility] [address]     Diabetes mellitus ([facility] [address] )     Difficulty swallowing     Esophageal mass     Sleep apnea      Past Surgical History:   Procedure Laterality Date    BACK SURGERY      DISC REMOVAL      FL GUIDED CENTRAL VENOUS ACCESS DEVICE INSERTION  9/26/2019    GASTROJEJUNOSTOMY W/ JEJUNOSTOMY TUBE N/A 9/26/2019    Procedure: INSERTION JEJUNOSTOMY TUBE OPEN;  Surgeon: Precious Ryan MD;  [facility]: [facility];  Service: Surgical Oncology    TONSILLECTOMY      TUNNELED VENOUS PORT PLACEMENT N/A 9/26/2019    Procedure: INSERTION VENOUS PORT (PORT-A-CATH); Surgeon: Precious Ryan MD;  Location: [facility];  Service: Surgical Oncology       Review of Medications:   Vitamins, Supplements and Herbals: yes: Vitamin A supplment by mouth and Advised against high dose antioxidant supplements during tx      Current Outpatient Medications:     acetaminophen (TYLENOL) 160 mg/5 mL suspension, Take 20 3 mL (650 mg total) by mouth every 4 (four) hours as needed for mild pain or fever, Disp: 118 mL, Rfl: 0    albuterol (2 5 mg/3 mL) 0 083 % nebulizer solution, Take 1 vial (2 5 mg total) by nebulization every 6 (six) hours as needed for wheezing or shortness of breath, Disp: 1 vial, Rfl: 5    canagliflozin (INVOKANA) 100 mg, Daily, Disp: , Rfl:     dexamethasone (DECADRON) 1 MG/ML solution, Take 8 mL (8 mg total) by mouth daily, Disp: 30 mL, Rfl: 6    levothyroxine 25 mcg tablet, Take 25 mcg by mouth daily in the early morning, Disp: , Rfl:     Melatonin 5 MG/ML LIQD, Take 2 mL (10 mg total) by mouth daily at bedtime as needed (insomnia), Disp: 1 Bottle, Rfl: 1    Nutritional Supplements (JEVITY 1 5 SHUN) LIQD, Take 85 mL by mouth continuous [email or web address] as tolerated to goal rate of 85ml/hr to run for 16 hours daily  Flush with at least 60ml water at start and end of cycle  Needs additional water flushes if PO fluid intake declines, Disp: 1000 mL, Rfl: 0    ondansetron (ZOFRAN) 4 MG/5ML solution, Take 10 mL (8 mg total) by mouth every 8 (eight) hours as needed for nausea or vomiting, Disp: 240 mL, Rfl: 6    pantoprazole (PROTONIX) 40 mg tablet, Take 1 tablet (40 mg total) by mouth daily, Disp: 30 tablet, Rfl: 1    pioglitazone (ACTOS) 30 mg tablet, Take 30 mg by mouth daily , Disp: , Rfl:     prochlorperazine (COMPAZINE) 10 mg tablet, Take 1 tablet (10 mg total) by mouth every 6 (six) hours as needed for nausea or vomiting, Disp: 45 tablet, Rfl: 3    sitaGLIPtin (JANUVIA) 100 mg tablet, Take 100 mg by mouth daily , Disp: , Rfl:   No current facility-administered medications for this visit       Most Recent Lab Results:   Lab Results   Component Value Date    WBC 11 43 (H) 10/21/2019    ALT 16 10/21/2019    AST 13 10/21/2019    K 4 1 10/21/2019    K 4 7 10/07/2019    BUN 13 10/21/2019    BUN 23 10/07/2019    CREATININE 0 98 10/21/2019    CREATININE 0 90 10/07/2019    HGBA1C 7 6 08/15/2019    CALCIUM 9 1 10/21/2019       Anthropometric Measurements:   Height: 69"  Ht Readings from Last 1 Encounters:   10/22/19 5' 10" (1 778 m)     -Weight History:   Usual Weight: 205#  Ideal Body Weight: 160#  Wt Readings from Last 20 Encounters:   10/22/19 78 9 kg (174 lb)   10/15/19 79 8 kg (176 lb)   10/08/19 81 6 kg (180 lb)   09/26/19 82 6 kg (182 lb)   09/25/19 82 6 kg (182 lb)   09/24/19 82 6 kg (182 lb)   09/24/19 82 6 kg (182 lb)   09/20/19 82 1 kg (181 lb)   09/17/19 87 1 kg (192 lb)   08/29/19 87 5 kg (192 lb 12 8 oz)   08/21/19 87 6 kg (193 lb 3 2 oz)     Varian: None  Weight Changes: loss of 8 0# (4 4%) in 1 month (not significant)     Estimated body mass index is 24 97 kg/m² as calculated from the following:    Height as of 10/22/19: 5' 10" (1 778 m)  Weight as of 10/22/19: 78 9 kg (174 lb)  Nutrition-Focused Physical Findings: n/a-phone visit     Food/Nutrition-Related History & Client/Social History:    Current Nutrition Impact Symptoms:  [x] Nausea  [] Reduced Appetite  [] Acid Reflux    [] Vomiting  [x] Unintended Wt Loss - loss of 4 4% in last month  [] Malabsorption    [] Diarrhea  [] Unintended Wt Gain  [] Dumping Syndrome    [x] Constipation -significant [] Thick Mucous/Secretions  [] Abdominal Pain    [x] Dysgeusia (Altered Taste) -has had altered taste and smell for years, per pts sister at initial visit [] Xerostomia (Dry Mouth)  [] Gas    [x] Dysosmia (Altered Smell)  [] Thrush  [] Difficulty Chewing    [] Oral Mucositis (Sore Mouth)  [x] Fatigue   [] Other:    [] Odynophagia   [] Esophagitis  [] Other:    [x] Dysphagia - "improved slightly over the last few days"  [] Early Satiety  [] No Problems Eating      Food Allergies: no  Food Intolerances: no    Current Diet: Liquids and TF  Current Nutrition Intake: Unchanged from usual  Appetite: Good   Nutrition Route: PO and J-Tube  Meal planning/preparation mainly done by: Self and Daughter  Oral Care: brushes QD  Activity level: Mainly sedentary, feels fatigued often  Social Hx: Sisters are visiting him from out of state, his daughter Rita Shayna helps take care of him       Diet Recall:   1 meal/ day by mouth: scrambled eggs or chicken noodle soup   3 cartons Jevity 1 5 PO (1066 kcal, 45g pro, 540 mL free water)     Beverages: water (16 9 oz x1)  Supplements:    None     Enteral Nutrition:  Reported Enteral Nutrition Intake:   · TF Infused: Jevity 1 5 at 85 mL/hr via J-tube cycled over 11 hours daily (until 4 cartons is infused)  · Water Flushin mL free water flush at the beginning and end of TF infusion (120 mL total) plus 500 mL free water PO during the day  (total of 120 mL/day in flushes; 500 mL water PO)  · Enteral Nutrition provides: 948 mL volume (4 containers/day), 1422 kcal, 60 5 grams protein, 720 mL free water, 1340 mL total water daily  DME: [facility]     Estimated Nutrition Needs: (based on 82 7kg/ 182# actual wt)  Energy Needs: 0721-7945 kcal/day (30-35 kcal/kg)  Protein Needs:  grams/day (1 2-1 5 g/kg)  Fluid Needs: 1694-6119 mL/day (1 mL/kcal)    Discussion/Summary: Laura Tony was contacted for nutrition follow up  Spoke mostly to his daughter Rhianna Leal because he was not feeling well during time of call  She reports that he has been consuming Jevity 1 5 through a combination of Jtube and PO  He will infuse 4 cartons of Jevity 1 5 via Jtube and drink 3 cartons of Jevity 1 5 by mouth daily  This totals 7 cartons of Jevity 1 5 daily ( 2488 kcal, 106 g pro, 1261 mL free water, 1340 mL total water with flushes and PO)  Discussed proper j-tube use and care, TF goals, hydration goals, and individualized calorie and protein goals  Rhianna Leal also reports that he is consuming at least 1 meal by mouth usually scrambled eggs or chicken soup  She states that his swallowing has improved over the last few days so it has been easier for him to consume food/liquids by mouth  Encouraged that he continue PO as much as possible as tolerated  Reviewed nutrition supplements that patient can drink PO  Encouraged adequate hydration to help reduce constipation        Discussed/Reviewed:   · the importance of weight maintenance during CA tx  · indications & use of oral nutrition supplements : Choose nutrition supplement with >300 calories  · how to modify foods for anticipated nutrition impact symptoms pt may experience during CA tx  · maintaining proper oral care Brushing TID, non alcohol containing mouthwash, baking soda/salt water rinse  · adequate hydation & tips to increase overall fluid intake : goal of 83-96 oz fluid daily, fluids PO as tolerated, use j-tube for additional flushes  · MNT for: jejunostomy nutrition, constipation, nausea  · individualized calorie, protein and fluid daily goals  · MNT for patient specific tx plan -  avoid cold temperature foods/fluids during the course of oxaliplatin tx  · individualized enteral nutrition recommendations & plan: see below:     Enteral nutrition is needed for Fabiano's sole source of nutrition, recommend:  TF Goal: Jevity 1 5 at 99 mL/hr via J-tube cycled over 17 hours daily (until 7 cartons is infused)  Water Flushin mL free water flush at the beginning and end of TF infusion (250 mL total) plus 125 mL free water flush 8 times per day (1000 mL total) (total of 1250 mL/day in flushes; flushes should be spread throughout the day and every 2-3 hours during TF infusion; will need to adjust flushes prn for IV fluids)  Enteral Nutrition goal to provide: 1650 mL volume (7 cartons day), 2485 kcal, 106 grams protein, 1261 mL free water, 2511 mL total water daily  *Pt will require an enteral feeding pump to administer TF due to J-Tube due to esophageal cancer diagnosis  All questions and concerns addressed during todays visit  Johanna Wadsworth has RD contact information  Nutrition Diagnosis:     · Increased Nutrient Needs (kcal & pro) related to increased demand for nutrients and disease state as evidenced by cancer dx and pt undergoing tx for cancer    Intervention & Recommendations:     Topics addressed: Nutrition education, Nutrition Symptom Management, Adequate Hydration, Medical Food Supplements, Nutrition-Related Medication Management, Vitamin & Mineral Supplements, Enteral Nutrition and Weight Management    Barriers: None  Readiness to change: preparation  Comprehension: verbalizes understanding  Expected Compliance: good    Materials Provided: not applicable  Monitoring & Evaluation:     Dietitian to Monitor: Food and Nutrition Intake, Nutrtion Impact Symptoms, Body Weight, Enteral and/or Parenteral Intake and Biochemical Data     Goals:  · weight stabilization  · adequate nutrition related symptom management  · pt to meet >/=75% estimated nutrition needs daily    · Progress Towards Goals: Progressing    Nutrition Rx & Recommendations:  · Diet: high calorie high protein by mouth and tube feeding  · Avoid spicy, acidic, sharp/hard/crunchy foods & carbonated beverages as needed  · Eat slowly and chew food thoroughly before swallowing  · Nutrition Supplements: Choose nutrition supplements with greater than 300 calories  Refer to handout with highlighted suggestions     May use homemade shakes/smoothies as desired  If using a pre-made shake/bar, choose ones with >300 calories and >10 grams protein (ex  Ensure Enlive, Ensure Plus, Boost Plus, Boost Very High Calorie, etc )  · Include protein at all meals/snacks  · Include a variety of foods (as tolerated/allowed by diet)  · Stay hydrated by sipping fluids of choice/tolerance throughout the day  · Liquid nutrition may be better tolerated than solids at times  · Alter food choices and eating patterns to accommodate changing needs  · Refer to Eating Hints book for other meal/snack ideas and symptom management  · Follow proper oral care; Try baking soda/salt water rinse recipe (mix 3/4 tsp salt + 1 tsp baking soda + 1 qt water; rinse with pain water after using) in Eating Hints book (pg 18)  Brush your teeth before/after meals & before bed    · For constipation: drink plenty of fluids (>64 oz/day); drink hot liquids; eat high-fiber foods as tolerated (whole grains, beans, peas, nuts, seeds, fruits, vegetables, etc); increase physical activity as tolerated  Avoid increasing fiber intake too quickly, add fiber into your diet slowly; keep a record of your bowel movements (see pages 13-14 in you Eating Hints book)  · For nausea/vomiting: try plain/bland foods; try lemon/lime flavors; eat 5-6 small meals/day (except when vomiting); do not skip meals/snacks; choose appealing foods; sip only small amounts of liquids during meals; stay hydrated in between meals; eat foods/drinks that are room temperature; eat dry toast/crackers (see pages  21-22 and 31-32 in your Eating Hints book)  · Weigh yourself regularly  If you notice weight loss, make an effort to increase your daily food/calorie intake  If you continue to notice loss after these efforts, reach out to your dietitian to establish a plan to stabilize weight  · Consider stopping all high dose antioxidant supplements (vitamin A, C, E, selenium, etc )  Refer to [facility] "About Herbs" website for more information on the safety of supplements  · Your syringes are each 60 mL or 2 fl oz  Always flush your feeding tube with 60 mL room-temp water 1-2 times daily while feeding tube is not in use  · Tube Feeding Plan: See below:   Enteral nutrition is needed for Fabiano's sole source of nutrition, recommend:  TF Goal: Jevity 1 5 at 99 mL/hr via J-tube cycled over 17 hours daily (until 7 cartons is infused)  Water Flushin mL free water flush at the beginning and end of TF infusion (250 mL total) plus 125 mL free water flush 8 times per day (1000 mL total) (total of 1250 mL/day in flushes; flushes should be spread throughout the day and every 2-3 hours during TF infusion; will need to adjust flushes prn for IV fluids)  Enteral Nutrition goal to provide: 1650 mL volume (7 cartons day), 2485 kcal, 106 grams protein, 1261 mL free water, 2511 mL total water daily    Remember that 1 carton of Jevity 1 5 = 355 calories   *Pt will require an enteral feeding pump to administer TF due to J-Tube due to esophageal cancer diagnosis  · Call [facility] when you have 10 days left of TF supplies: [phone], option 3 (customer support)        Follow Up Plan: 11/8/19 1 pm phone visit     Recommend Referral to Other Providers: none at this time

## 2019-10-24 NOTE — PROGRESS NOTES
Pt here for 2 units PRBC, stats he is still feeling tired, occasional dizziness  Vitals stable  Labs reviewed- hgb 7 9 on 10/21, pt received chemotherapy treatment since then as well  Call bell in reach, will continue to monitor

## 2019-10-25 ENCOUNTER — OFFICE VISIT (OUTPATIENT)
Dept: GASTROENTEROLOGY | Facility: CLINIC | Age: 64
End: 2019-10-25
Payer: MEDICARE

## 2019-10-25 ENCOUNTER — NUTRITION (OUTPATIENT)
Dept: NUTRITION | Facility: CLINIC | Age: 64
End: 2019-10-25

## 2019-10-25 ENCOUNTER — PREP FOR PROCEDURE (OUTPATIENT)
Dept: GASTROENTEROLOGY | Facility: CLINIC | Age: 64
End: 2019-10-25

## 2019-10-25 VITALS
SYSTOLIC BLOOD PRESSURE: 90 MMHG | BODY MASS INDEX: 25.45 KG/M2 | DIASTOLIC BLOOD PRESSURE: 58 MMHG | HEART RATE: 80 BPM | WEIGHT: 177.8 LBS | HEIGHT: 70 IN

## 2019-10-25 DIAGNOSIS — C15.9 ESOPHAGEAL ADENOCARCINOMA (HCC): Primary | ICD-10-CM

## 2019-10-25 DIAGNOSIS — D50.0 IRON DEFICIENCY ANEMIA DUE TO CHRONIC BLOOD LOSS: Primary | ICD-10-CM

## 2019-10-25 DIAGNOSIS — D50.0 IRON DEFICIENCY ANEMIA DUE TO CHRONIC BLOOD LOSS: ICD-10-CM

## 2019-10-25 DIAGNOSIS — Z71.3 NUTRITIONAL COUNSELING: Primary | ICD-10-CM

## 2019-10-25 DIAGNOSIS — R94.8 ABNORMAL PET SCAN OF COLON: ICD-10-CM

## 2019-10-25 LAB
ABO GROUP BLD BPU: NORMAL
ABO GROUP BLD BPU: NORMAL
BPU ID: NORMAL
BPU ID: NORMAL
CROSSMATCH: NORMAL
CROSSMATCH: NORMAL
UNIT DISPENSE STATUS: NORMAL
UNIT DISPENSE STATUS: NORMAL
UNIT PRODUCT CODE: NORMAL
UNIT PRODUCT CODE: NORMAL
UNIT RH: NORMAL
UNIT RH: NORMAL

## 2019-10-25 PROCEDURE — 99213 OFFICE O/P EST LOW 20 MIN: CPT | Performed by: PHYSICIAN ASSISTANT

## 2019-10-25 NOTE — PROGRESS NOTES
[facility] - Outpatient Follow-up Note  Kendell Grande 71 y o  male MRN: [id number]  Encounter: [id number]          ASSESSMENT AND PLAN:      1  Esophageal adenocarcinoma ([facility] )  Diagnosed in September  Receiving Chemo  Has J-Tube placed by Dr Kelley Prado    2  Abnormal PET scan of colon    3  Iron deficiency anemia due to chronic blood loss  Likely in part to esophageal tumor  Will plan colonoscopy for further investigation    ______________________________________________________________________    SUBJECTIVE:  43-year-old male with a recently diagnosed esophageal adenocarcinoma presents for evaluation of anemia  He is currently receiving chemotherapy  He had a G-tube placed by Dr Zuleika Argueta earlier this month  He reports that he is able to swallow however he frequently has a very poor appetite thus he does use the J-tube for supplementation  He denies any other abdominal pain or rectal bleeding  He denies any melena  He has no vomiting or hematemesis  He was found to be significantly anemic recently  He is receiving iron infusions and required a blood transfusion several days ago  He had a PET-CT scan after he was diagnosed in September which did show uptake in the sigmoid colon  A colonoscopy was recommended and scheduled for him however due to all of his other necessary medical appointments this was canceled  REVIEW OF SYSTEMS IS OTHERWISE NEGATIVE        Historical Information   Past Medical History:   Diagnosis Date    COPD (chronic obstructive pulmonary disease) ([place] [address] )     Diabetes mellitus ([facility] )     Difficulty swallowing     Esophageal mass     Sleep apnea      Past Surgical History:   Procedure Laterality Date    BACK SURGERY      DISC REMOVAL      FL GUIDED CENTRAL VENOUS ACCESS DEVICE INSERTION  9/26/2019    GASTROJEJUNOSTOMY W/ JEJUNOSTOMY TUBE N/A 9/26/2019    Procedure: INSERTION JEJUNOSTOMY TUBE OPEN;  Surgeon: Modesto Lira MD;  [facility]: [facility]; Service: Surgical Oncology    TONSILLECTOMY      TUNNELED VENOUS PORT PLACEMENT N/A 2019    Procedure: INSERTION VENOUS PORT (PORT-A-CATH); Surgeon: Maral Chatterjee MD;  Location[facility];  Service: Surgical Oncology     Social History   Social History     Substance and Sexual Activity   Alcohol Use Not Currently    Alcohol/week: 0 0 standard drinks    Frequency: Never    Drinks per session: 1 or 2    Binge frequency: Never     Social History     Substance and Sexual Activity   Drug Use Never     Social History     Tobacco Use   Smoking Status Former Smoker    Packs/day: 0 50    Years: 50 00    Pack years: 25 00    Types: Cigarettes    Last attempt to quit: 2017    Years since quittin 8   Smokeless Tobacco Never Used     Family History   Problem Relation Age of Onset    Diabetes Mother     No Known Problems Father        Meds/Allergies       Current Outpatient Medications:     acetaminophen (TYLENOL) 160 mg/5 mL suspension    albuterol (2 5 mg/3 mL) 0 083 % nebulizer solution    canagliflozin (INVOKANA) 100 mg    dexamethasone (DECADRON) 1 MG/ML solution    levothyroxine 25 mcg tablet    Melatonin 5 MG/ML LIQD    Nutritional Supplements (JEVITY 1 5 SHUN) LIQD    ondansetron (ZOFRAN) 4 MG/5ML solution    pantoprazole (PROTONIX) 40 mg tablet    pioglitazone (ACTOS) 30 mg tablet    prochlorperazine (COMPAZINE) 10 mg tablet    sitaGLIPtin (JANUVIA) 100 mg tablet    Allergies   Allergen Reactions    Penicillins Itching           Objective     Blood pressure 90/58, pulse 80, height 5' 10" (1 778 m), weight 80 6 kg (177 lb 12 8 oz)  Body mass index is 25 51 kg/m²        PHYSICAL EXAM:      General Appearance:   Alert, cooperative, no distress   HEENT:   Normocephalic, atraumatic, anicteric      Neck:  Supple, symmetrical, trachea midline   Lungs:   Clear to auscultation bilaterally; no rales, rhonchi or wheezing; respirations unlabored    Heart[de-identified]   Regular rate and rhythm; no murmur, rub, or gallop  Abdomen:   Soft, non-tender, non-distended; normal bowel sounds; no masses, no organomegaly    Genitalia:   Deferred    Rectal:   Deferred    Extremities:  No cyanosis, clubbing or edema    Pulses:  2+ and symmetric    Skin:  No jaundice, rashes, or lesions    Lymph nodes:  No palpable cervical lymphadenopathy        Lab Results:   No visits with results within 1 Day(s) from this visit  Latest known visit with results is:   Hospital Outpatient Visit on 10/24/2019   Component Date Value    Unit Product Code 10/25/2019 [id number]     Unit Number 10/25/2019 [id number]     Unit ABO 10/25/2019 O     Unit RH 10/25/2019 POS     Crossmatch 10/25/2019 Compatible     Unit Dispense Status 10/25/2019 Presumed Trans     Unit Product Code 10/25/2019 [id number]     Unit Number 10/25/2019 [id number]     Unit ABO 10/25/2019 O     Unit RH 10/25/2019 POS     Crossmatch 10/25/2019 Compatible     Unit Dispense Status 10/25/2019 Presumed Trans          Radiology Results:   Xr Chest Portable    Result Date: 9/26/2019  Narrative: CHEST INDICATION:   post op  COMPARISON:  None EXAM PERFORMED/VIEWS:  XR CHEST PORTABLE FINDINGS:  There is a left-sided Mediport with its tip in SVC  Cardiomediastinal silhouette appears unremarkable  The lungs are clear  No pneumothorax or pleural effusion  Osseous structures appear within normal limits for patient age  Impression: No acute cardiopulmonary disease  Workstation performed: [id number]     Fl Guided Central Venous Access Device Insertion    Result Date: 9/27/2019  Narrative: C-ARM - chest INDICATION: C15 5: Malignant neoplasm of lower third of esophagus  Procedure guidance  COMPARISON:  None TECHNIQUE: FLUOROSCOPY TIME:   11 SECONDS 2 FLUOROSCOPIC IMAGES FINDINGS: Fluoroscopic guidance provided for surgical procedure  Osseous and soft tissue detail limited by technique  Impression: Fluoroscopic guidance provided for surgical procedure    Please refer to the separate procedure notes for additional details   Workstation performed: [id number]

## 2019-10-25 NOTE — PATIENT INSTRUCTIONS
Nutrition Rx & Recommendations:  · Diet: high calorie high protein by mouth and tube feeding  · Avoid spicy, acidic, sharp/hard/crunchy foods & carbonated beverages as needed  · Eat slowly and chew food thoroughly before swallowing  · Nutrition Supplements: Choose nutrition supplements with greater than 300 calories  Refer to handout with highlighted suggestions     May use homemade shakes/smoothies as desired  If using a pre-made shake/bar, choose ones with >300 calories and >10 grams protein (ex  Ensure Enlive, Ensure Plus, Boost Plus, Boost Very High Calorie, etc )  · Include protein at all meals/snacks  · Include a variety of foods (as tolerated/allowed by diet)  · Stay hydrated by sipping fluids of choice/tolerance throughout the day  · Liquid nutrition may be better tolerated than solids at times  · Alter food choices and eating patterns to accommodate changing needs  · Refer to Eating Hints book for other meal/snack ideas and symptom management  · Follow proper oral care; Try baking soda/salt water rinse recipe (mix 3/4 tsp salt + 1 tsp baking soda + 1 qt water; rinse with pain water after using) in Eating Hints book (pg 18)  Brush your teeth before/after meals & before bed  · For constipation: drink plenty of fluids (>64 oz/day); drink hot liquids; eat high-fiber foods as tolerated (whole grains, beans, peas, nuts, seeds, fruits, vegetables, etc); increase physical activity as tolerated  Avoid increasing fiber intake too quickly, add fiber into your diet slowly; keep a record of your bowel movements (see pages 13-14 in you Eating Hints book)    · For nausea/vomiting: try plain/bland foods; try lemon/lime flavors; eat 5-6 small meals/day (except when vomiting); do not skip meals/snacks; choose appealing foods; sip only small amounts of liquids during meals; stay hydrated in between meals; eat foods/drinks that are room temperature; eat dry toast/crackers (see pages  21-22 and 31-32 in your Eating Hints book)  · Weigh yourself regularly  If you notice weight loss, make an effort to increase your daily food/calorie intake  If you continue to notice loss after these efforts, reach out to your dietitian to establish a plan to stabilize weight  · Consider stopping all high dose antioxidant supplements (vitamin A, C, E, selenium, etc )  Refer to [facility] "About Herbs" website for more information on the safety of supplements  · Your syringes are each 60 mL or 2 fl oz  Always flush your feeding tube with 60 mL room-temp water 1-2 times daily while feeding tube is not in use  · Tube Feeding Plan: See below:   Enteral nutrition is needed for Fabiano's sole source of nutrition, recommend:  TF Goal: Jevity 1 5 at 99 mL/hr via J-tube cycled over 17 hours daily (until 7 cartons is infused)  Water Flushin mL free water flush at the beginning and end of TF infusion (250 mL total) plus 125 mL free water flush 8 times per day (1000 mL total) (total of 1250 mL/day in flushes; flushes should be spread throughout the day and every 2-3 hours during TF infusion; will need to adjust flushes prn for IV fluids)  Enteral Nutrition goal to provide: 1650 mL volume (7 cartons day), 2485 kcal, 106 grams protein, 1261 mL free water, 2511 mL total water daily  Remember that 1 carton of Jevity 1 5 = 355 calories   *Pt will require an enteral feeding pump to administer TF due to J-Tube due to esophageal cancer diagnosis  · Call [facility] when you have 10 days left of TF supplies: [phone], option 3 (customer support)        Follow Up Plan: 19 1 pm phone visit     Recommend Referral to Other Providers: none at this time

## 2019-10-28 ENCOUNTER — SOCIAL WORK (OUTPATIENT)
Dept: PALLIATIVE MEDICINE | Facility: CLINIC | Age: 64
End: 2019-10-28
Payer: MEDICARE

## 2019-10-28 ENCOUNTER — OFFICE VISIT (OUTPATIENT)
Dept: PALLIATIVE MEDICINE | Facility: CLINIC | Age: 64
End: 2019-10-28
Payer: MEDICARE

## 2019-10-28 VITALS
TEMPERATURE: 97.8 F | BODY MASS INDEX: 25.54 KG/M2 | DIASTOLIC BLOOD PRESSURE: 64 MMHG | WEIGHT: 178 LBS | HEART RATE: 78 BPM | OXYGEN SATURATION: 98 % | SYSTOLIC BLOOD PRESSURE: 94 MMHG | RESPIRATION RATE: 20 BRPM

## 2019-10-28 DIAGNOSIS — Z71.89 COUNSELING AND COORDINATION OF CARE: Primary | ICD-10-CM

## 2019-10-28 DIAGNOSIS — G25.81 RESTLESS LEG SYNDROME: ICD-10-CM

## 2019-10-28 DIAGNOSIS — C15.5 MALIGNANT NEOPLASM OF LOWER THIRD OF ESOPHAGUS (HCC): Primary | ICD-10-CM

## 2019-10-28 DIAGNOSIS — C80.1 NEUROPATHY ASSOCIATED WITH CANCER (HCC): ICD-10-CM

## 2019-10-28 DIAGNOSIS — G63 NEUROPATHY ASSOCIATED WITH CANCER (HCC): ICD-10-CM

## 2019-10-28 DIAGNOSIS — G47.01 INSOMNIA DUE TO MEDICAL CONDITION: ICD-10-CM

## 2019-10-28 PROCEDURE — NC001 PR NO CHARGE

## 2019-10-28 PROCEDURE — 99204 OFFICE O/P NEW MOD 45 MIN: CPT | Performed by: INTERNAL MEDICINE

## 2019-10-28 RX ORDER — GABAPENTIN 300 MG/1
300 CAPSULE ORAL
Qty: 30 CAPSULE | Refills: 0 | Status: SHIPPED | OUTPATIENT
Start: 2019-10-28 | End: 2019-12-18 | Stop reason: SDUPTHER

## 2019-10-28 RX ORDER — ROPINIROLE 0.25 MG/1
0.25 TABLET, FILM COATED ORAL
Qty: 30 TABLET | Refills: 0 | Status: SHIPPED | OUTPATIENT
Start: 2019-10-28 | End: 2019-12-18 | Stop reason: SDUPTHER

## 2019-10-28 NOTE — PROGRESS NOTES
Mr  Malick Land and his daughter, Jimmy Curran present for initial [facility] consult today  LSW introduced self and Dr Ramana Glynn explained service  Pt endorses RLS symptoms and poor sleep  Pt does not like to take a lot of medicines but is agreeable to low-dose prescriptions "to be able to get a couple hours of sleep "  Confirmed pt has Medicare and [employer]  He appears to be coping well with his illness and treatments, and has good support from his daughter  LSW will be available for further support as needed

## 2019-10-28 NOTE — PROGRESS NOTES
Palliative and Supportive Care   Gonzalo Beth 71 y o  male [id number]    Assessment/Plan:  1  Malignant neoplasm of lower third of esophagus (HCC)    2  Insomnia due to medical condition    3  Restless leg syndrome    4  Neuropathy associated with cancer ([address] )      Begin below meds  Patient's requip can be upped to 0 5mg qHS in a few days if poor efficacy  Advised to take OTC tylenol as well  D/c melatonin  Does not appear to be a primary sleep disturbance but rather sleeplessness from his discomfort  Follow up in 2-3 weeks  Requested Prescriptions     Signed Prescriptions Disp Refills    gabapentin (NEURONTIN) 300 mg capsule 30 capsule 0     Sig: Take 1 capsule (300 mg total) by mouth daily at bedtime    rOPINIRole (REQUIP) 0 25 mg tablet 30 tablet 0     Sig: Take 1 tablet (0 25 mg total) by mouth daily at bedtime     There are no discontinued medications  Representatives have queried the patient's controlled substance dispensing history in the Prescription Drug Monitoring Program in compliance with regulations before I have prescribed any controlled substances  The prescription history is consistent with prescribed therapy and our practice policies  25 minutes were spent face to face with Gonzalo Beth and his daughter with greater than 50% of the time spent in counseling or coordination of care including discussions of treatment instructions   All of the patient's questions were answered during this discussion  No follow-ups on file  Subjective:   Chief Complaint  New consultation for:  symptom management  HPI     Gonzalo Beth is a 71 y o  male who presented with difficulty swallowing in August Unfortunately he has now been diagnosed with esophageal cancer and has had a PEG Tube placed by Dr Aleida Bhagat and is undergoing neoadjuvant treatment with FLOT + Herceptin with Dr Xavier Guzman  Patient's chief complaint today is lower extremity dullness/discomfort    Patient endorses worsening sensation of what he describes as "leather" of his bilateral feet  This is worse when he lays down  Did have some relief with tylenol  Worsened over the past few weeks  Simultaneously patient reports approximately 1 year of restless leg syndrome  He has taken topical witch hazel to help in the past   He feels his feet are constantly fidgeting when he is resting  Neither of these problems are severe when patient is up and walking about  Otherwise patient reports a good overall mood  He denies any other pain other than occasional low back pain  He reports his appetite is returning and his ability to take PO has actually improved  Denies nausea  Denies SOB  No constipation  Good energy level, though fatigued  The following portions of the medical history were reviewed: past medical history, problem list, medication list, and social history  Current Outpatient Medications:     acetaminophen (TYLENOL) 160 mg/5 mL suspension, Take 20 3 mL (650 mg total) by mouth every 4 (four) hours as needed for mild pain or fever, Disp: 118 mL, Rfl: 0    albuterol (2 5 mg/3 mL) 0 083 % nebulizer solution, Take 1 vial (2 5 mg total) by nebulization every 6 (six) hours as needed for wheezing or shortness of breath, Disp: 1 vial, Rfl: 5    canagliflozin (INVOKANA) 100 mg, Daily, Disp: , Rfl:     dexamethasone (DECADRON) 1 MG/ML solution, Take 8 mL (8 mg total) by mouth daily, Disp: 30 mL, Rfl: 6    gabapentin (NEURONTIN) 300 mg capsule, Take 1 capsule (300 mg total) by mouth daily at bedtime, Disp: 30 capsule, Rfl: 0    levothyroxine 25 mcg tablet, Take 25 mcg by mouth daily in the early morning, Disp: , Rfl:     Melatonin 5 MG/ML LIQD, Take 2 mL (10 mg total) by mouth daily at bedtime as needed (insomnia), Disp: 1 Bottle, Rfl: 1    Nutritional Supplements (JEVITY 1 5 SHUN) LIQD, Take 85 mL by mouth continuous [email or web address] [email or web address] advance as tolerated to goal rate of 85ml/hr to run for 16 hours daily   Flush with at least 60ml water at start and end of cycle  Needs additional water flushes if PO fluid intake declines, Disp: 1000 mL, Rfl: 0    ondansetron (ZOFRAN) 4 MG/5ML solution, Take 10 mL (8 mg total) by mouth every 8 (eight) hours as needed for nausea or vomiting, Disp: 240 mL, Rfl: 6    pantoprazole (PROTONIX) 40 mg tablet, Take 1 tablet (40 mg total) by mouth daily, Disp: 30 tablet, Rfl: 1    pioglitazone (ACTOS) 30 mg tablet, Take 30 mg by mouth daily , Disp: , Rfl:     prochlorperazine (COMPAZINE) 10 mg tablet, Take 1 tablet (10 mg total) by mouth every 6 (six) hours as needed for nausea or vomiting, Disp: 45 tablet, Rfl: 3    rOPINIRole (REQUIP) 0 25 mg tablet, Take 1 tablet (0 25 mg total) by mouth daily at bedtime, Disp: 30 tablet, Rfl: 0    sitaGLIPtin (JANUVIA) 100 mg tablet, Take 100 mg by mouth daily , Disp: , Rfl:   Review of Systems   Constitutional: Positive for appetite change, fatigue and unexpected weight change  Negative for activity change  HENT: Positive for trouble swallowing (improving)  Negative for sore throat  Respiratory: Negative for cough and shortness of breath  Cardiovascular: Negative for chest pain and leg swelling  Gastrointestinal: Negative for constipation, diarrhea and nausea  Musculoskeletal: Positive for back pain  Neurological: Negative for weakness  Psychiatric/Behavioral: Positive for sleep disturbance  Negative for dysphoric mood  The patient is not nervous/anxious  All other systems negative    Objective:  Vital Signs  BP 94/64 (BP Location: Left arm, Patient Position: Sitting, Cuff Size: Standard)   Pulse 78   Temp 97 8 °F (36 6 °C) (Oral)   Resp 20   Wt 80 7 kg (178 lb)   SpO2 98%   BMI 25 54 kg/m²    Physical Exam    Constitutional: Appears well-developed and well-nourished  In no acute distress  Head: Normocephalic and atraumatic  Eyes: EOM are normal  Right eye exhibits no discharge  Left eye exhibits no discharge  No scleral icterus  Pulmonary/Chest: Effort normal  No stridor  No respiratory distress  Abdominal: No distension  PEG tube  Musculoskeletal: No edema  Neurological: Alert, oriented and appropriately conversant  Skin: Skin is dry, not diaphoretic  Psychiatric: Displays a normal mood and affect  Behavior, judgement and thought content appear normal    Vitals reviewed

## 2019-10-29 RX ORDER — DEXTROSE MONOHYDRATE 50 MG/ML
20 INJECTION, SOLUTION INTRAVENOUS ONCE
Status: CANCELLED | OUTPATIENT
Start: 2019-11-05

## 2019-10-29 RX ORDER — PALONOSETRON 0.05 MG/ML
0.25 INJECTION, SOLUTION INTRAVENOUS ONCE
Status: CANCELLED | OUTPATIENT
Start: 2019-11-05

## 2019-10-29 RX ORDER — SODIUM CHLORIDE 9 MG/ML
20 INJECTION, SOLUTION INTRAVENOUS ONCE
Status: CANCELLED | OUTPATIENT
Start: 2019-11-05

## 2019-10-30 DIAGNOSIS — D70.1 CHEMOTHERAPY INDUCED NEUTROPENIA (HCC): ICD-10-CM

## 2019-10-30 DIAGNOSIS — C16.0 GE JUNCTION CARCINOMA (HCC): Primary | ICD-10-CM

## 2019-10-30 DIAGNOSIS — T45.1X5A CHEMOTHERAPY INDUCED NEUTROPENIA (HCC): ICD-10-CM

## 2019-10-30 DIAGNOSIS — C15.5 MALIGNANT NEOPLASM OF LOWER THIRD OF ESOPHAGUS (HCC): ICD-10-CM

## 2019-11-04 ENCOUNTER — APPOINTMENT (OUTPATIENT)
Dept: LAB | Facility: CLINIC | Age: 64
End: 2019-11-04
Payer: COMMERCIAL

## 2019-11-04 DIAGNOSIS — C16.0 GE JUNCTION CARCINOMA (HCC): ICD-10-CM

## 2019-11-04 DIAGNOSIS — T45.1X5A CHEMOTHERAPY INDUCED NEUTROPENIA (HCC): ICD-10-CM

## 2019-11-04 DIAGNOSIS — C15.5 MALIGNANT NEOPLASM OF LOWER THIRD OF ESOPHAGUS (HCC): ICD-10-CM

## 2019-11-04 DIAGNOSIS — D70.1 CHEMOTHERAPY INDUCED NEUTROPENIA (HCC): ICD-10-CM

## 2019-11-04 LAB
ALBUMIN SERPL BCP-MCNC: 3.1 G/DL (ref 3.5–5)
ALP SERPL-CCNC: 97 U/L (ref 46–116)
ALT SERPL W P-5'-P-CCNC: 16 U/L (ref 12–78)
ANION GAP SERPL CALCULATED.3IONS-SCNC: 7 MMOL/L (ref 4–13)
AST SERPL W P-5'-P-CCNC: 14 U/L (ref 5–45)
BASOPHILS # BLD MANUAL: 0 THOUSAND/UL (ref 0–0.1)
BASOPHILS NFR MAR MANUAL: 0 % (ref 0–1)
BILIRUB SERPL-MCNC: 0.44 MG/DL (ref 0.2–1)
BUN SERPL-MCNC: 11 MG/DL (ref 5–25)
CALCIUM SERPL-MCNC: 8.8 MG/DL (ref 8.3–10.1)
CHLORIDE SERPL-SCNC: 110 MMOL/L (ref 100–108)
CO2 SERPL-SCNC: 24 MMOL/L (ref 21–32)
CREAT SERPL-MCNC: 0.88 MG/DL (ref 0.6–1.3)
EOSINOPHIL # BLD MANUAL: 0 THOUSAND/UL (ref 0–0.4)
EOSINOPHIL NFR BLD MANUAL: 0 % (ref 0–6)
ERYTHROCYTE [DISTWIDTH] IN BLOOD BY AUTOMATED COUNT: 16.4 % (ref 11.6–15.1)
GFR SERPL CREATININE-BSD FRML MDRD: 90 ML/MIN/1.73SQ M
GLUCOSE P FAST SERPL-MCNC: 125 MG/DL (ref 65–99)
HCT VFR BLD AUTO: 32.6 % (ref 36.5–49.3)
HGB BLD-MCNC: 9.6 G/DL (ref 12–17)
LYMPHOCYTES # BLD AUTO: 1.21 THOUSAND/UL (ref 0.6–4.47)
LYMPHOCYTES # BLD AUTO: 9 % (ref 14–44)
MCH RBC QN AUTO: 25.4 PG (ref 26.8–34.3)
MCHC RBC AUTO-ENTMCNC: 29.4 G/DL (ref 31.4–37.4)
MCV RBC AUTO: 86 FL (ref 82–98)
METAMYELOCYTES NFR BLD MANUAL: 1 % (ref 0–1)
MONOCYTES # BLD AUTO: 0.67 THOUSAND/UL (ref 0–1.22)
MONOCYTES NFR BLD: 5 % (ref 4–12)
NEUTROPHILS # BLD MANUAL: 11.42 THOUSAND/UL (ref 1.85–7.62)
NEUTS BAND NFR BLD MANUAL: 9 % (ref 0–8)
NEUTS SEG NFR BLD AUTO: 76 % (ref 43–75)
NRBC BLD AUTO-RTO: 0 /100 WBCS
PLATELET # BLD AUTO: 171 THOUSANDS/UL (ref 149–390)
PLATELET BLD QL SMEAR: ADEQUATE
PMV BLD AUTO: 9.7 FL (ref 8.9–12.7)
POTASSIUM SERPL-SCNC: 4.3 MMOL/L (ref 3.5–5.3)
PROT SERPL-MCNC: 6.2 G/DL (ref 6.4–8.2)
RBC # BLD AUTO: 3.78 MILLION/UL (ref 3.88–5.62)
SODIUM SERPL-SCNC: 141 MMOL/L (ref 136–145)
TOTAL CELLS COUNTED SPEC: 100
WBC # BLD AUTO: 13.43 THOUSAND/UL (ref 4.31–10.16)

## 2019-11-04 PROCEDURE — 80053 COMPREHEN METABOLIC PANEL: CPT

## 2019-11-04 PROCEDURE — 36415 COLL VENOUS BLD VENIPUNCTURE: CPT

## 2019-11-04 PROCEDURE — 85027 COMPLETE CBC AUTOMATED: CPT

## 2019-11-04 PROCEDURE — 85007 BL SMEAR W/DIFF WBC COUNT: CPT

## 2019-11-05 ENCOUNTER — HOSPITAL ENCOUNTER (OUTPATIENT)
Dept: INFUSION CENTER | Facility: CLINIC | Age: 64
Discharge: HOME | End: 2019-11-05
Payer: COMMERCIAL

## 2019-11-05 VITALS
SYSTOLIC BLOOD PRESSURE: 126 MMHG | TEMPERATURE: 97.8 F | WEIGHT: 188.05 LBS | HEART RATE: 100 BPM | DIASTOLIC BLOOD PRESSURE: 58 MMHG | HEIGHT: 70 IN | BODY MASS INDEX: 26.92 KG/M2 | RESPIRATION RATE: 20 BRPM

## 2019-11-05 DIAGNOSIS — C15.5 MALIGNANT NEOPLASM OF LOWER THIRD OF ESOPHAGUS (HCC): Primary | ICD-10-CM

## 2019-11-05 DIAGNOSIS — C16.0 GE JUNCTION CARCINOMA (HCC): ICD-10-CM

## 2019-11-05 DIAGNOSIS — D70.1 CHEMOTHERAPY INDUCED NEUTROPENIA (HCC): ICD-10-CM

## 2019-11-05 DIAGNOSIS — T45.1X5A CHEMOTHERAPY INDUCED NEUTROPENIA (HCC): ICD-10-CM

## 2019-11-05 PROCEDURE — 96413 CHEMO IV INFUSION 1 HR: CPT

## 2019-11-05 PROCEDURE — 96367 TX/PROPH/DG ADDL SEQ IV INF: CPT

## 2019-11-05 PROCEDURE — 96375 TX/PRO/DX INJ NEW DRUG ADDON: CPT

## 2019-11-05 PROCEDURE — 96417 CHEMO IV INFUS EACH ADDL SEQ: CPT

## 2019-11-05 PROCEDURE — G0498 CHEMO EXTEND IV INFUS W/PUMP: HCPCS

## 2019-11-05 PROCEDURE — 96415 CHEMO IV INFUSION ADDL HR: CPT

## 2019-11-05 PROCEDURE — 96368 THER/DIAG CONCURRENT INF: CPT

## 2019-11-05 RX ORDER — DEXTROSE MONOHYDRATE 50 MG/ML
20 INJECTION, SOLUTION INTRAVENOUS ONCE
Status: COMPLETED | OUTPATIENT
Start: 2019-11-05 | End: 2019-11-05

## 2019-11-05 RX ORDER — SODIUM CHLORIDE 9 MG/ML
20 INJECTION, SOLUTION INTRAVENOUS ONCE
Status: COMPLETED | OUTPATIENT
Start: 2019-11-05 | End: 2019-11-05

## 2019-11-05 RX ORDER — PALONOSETRON 0.05 MG/ML
0.25 INJECTION, SOLUTION INTRAVENOUS ONCE
Status: COMPLETED | OUTPATIENT
Start: 2019-11-05 | End: 2019-11-05

## 2019-11-05 RX ADMIN — OXALIPLATIN 168.3 MG: 5 INJECTION, SOLUTION, CONCENTRATE INTRAVENOUS at 14:09

## 2019-11-05 RX ADMIN — SODIUM CHLORIDE 150 MG: 0.9 INJECTION, SOLUTION INTRAVENOUS at 11:13

## 2019-11-05 RX ADMIN — TRASTUZUMAB 331 MG: 150 INJECTION, POWDER, LYOPHILIZED, FOR SOLUTION INTRAVENOUS at 12:11

## 2019-11-05 RX ADMIN — LEUCOVORIN CALCIUM 396 MG: 350 INJECTION, POWDER, LYOPHILIZED, FOR SOLUTION INTRAMUSCULAR; INTRAVENOUS at 14:09

## 2019-11-05 RX ADMIN — SODIUM CHLORIDE 20 ML/HR: 0.9 INJECTION, SOLUTION INTRAVENOUS at 09:35

## 2019-11-05 RX ADMIN — DEXAMETHASONE SODIUM PHOSPHATE 10 MG: 10 INJECTION, SOLUTION INTRAMUSCULAR; INTRAVENOUS at 10:50

## 2019-11-05 RX ADMIN — IRON SUCROSE 200 MG: 20 INJECTION, SOLUTION INTRAVENOUS at 09:43

## 2019-11-05 RX ADMIN — PALONOSETRON 0.25 MG: 0.25 INJECTION, SOLUTION INTRAVENOUS at 12:07

## 2019-11-05 RX ADMIN — DOCETAXEL 99 MG: 20 INJECTION, SOLUTION, CONCENTRATE INTRAVENOUS at 12:47

## 2019-11-05 RX ADMIN — DEXTROSE 20 ML/HR: 5 SOLUTION INTRAVENOUS at 14:05

## 2019-11-05 NOTE — PROGRESS NOTES
Pt here for chemotherapy, states his stomach is upset, feels that he drank his protein drink too fast this morning  Pt notes that sometimes when he wipes his nose there is blood on the tissue  Reports that he never has a nose bleed, just some blood on the tissue  Encouraged pt to monitor and report persistent bleeding  Feels well otherwise  Vitals stable  Labs reviewed-within parameters for treatment  Call bell in reach, will continue to monitor

## 2019-11-05 NOTE — PROGRESS NOTES
Pt tolerated treatment well  CADD connected after two RN check  Green light flashing, screen displays running upon discharge  Pt aware to return 11/6/2019 at 4pm for disconnect  AVS provided  Pt was set up for [facility] with [facility] transport

## 2019-11-06 ENCOUNTER — HOSPITAL ENCOUNTER (OUTPATIENT)
Dept: INFUSION CENTER | Facility: CLINIC | Age: 64
Discharge: HOME | End: 2019-11-06
Payer: COMMERCIAL

## 2019-11-06 DIAGNOSIS — T45.1X5A CHEMOTHERAPY INDUCED NEUTROPENIA (HCC): ICD-10-CM

## 2019-11-06 DIAGNOSIS — C15.5 MALIGNANT NEOPLASM OF LOWER THIRD OF ESOPHAGUS (HCC): Primary | ICD-10-CM

## 2019-11-06 DIAGNOSIS — C16.0 GE JUNCTION CARCINOMA (HCC): ICD-10-CM

## 2019-11-06 DIAGNOSIS — D70.1 CHEMOTHERAPY INDUCED NEUTROPENIA (HCC): ICD-10-CM

## 2019-11-06 PROCEDURE — 96372 THER/PROPH/DIAG INJ SC/IM: CPT

## 2019-11-06 RX ADMIN — PEGFILGRASTIM 6 MG: KIT SUBCUTANEOUS at 16:23

## 2019-11-06 NOTE — PROGRESS NOTES
Pt presents for CADD pump disconnection and neulasta onpro  Pt offers no complaints  Patch placed on R arm  AVD declined, next appointment reviewed

## 2019-11-08 ENCOUNTER — NUTRITION (OUTPATIENT)
Dept: NUTRITION | Facility: CLINIC | Age: 64
End: 2019-11-08

## 2019-11-08 DIAGNOSIS — Z71.3 NUTRITIONAL COUNSELING: Primary | ICD-10-CM

## 2019-11-08 NOTE — PATIENT INSTRUCTIONS
Nutrition Rx & Recommendations:  · Diet: High Calorie, High Protein (for high calorie foods see pages 52-53, and for high protein foods see pages 49-51 in your Eating Hints book)  · Avoid spicy, acidic, sharp/hard/crunchy foods & carbonated beverages as needed  · Eat slowly and chew food thoroughly before swallowing  · Keep a daily food journal (pen/paper, elham such as [employer])  · Nutrition Supplements: Choose nutrition supplements with greater than 300 calories  Refer to handout with highlighted suggestions     May use homemade shakes/smoothies as desired  If using a pre-made shake/bar, choose ones with >300 calories and >10 grams protein (ex  Ensure Enlive, Ensure Plus, Boost Plus, Boost Very High Calorie, etc )  · Small, frequent meals/snacks may be easier to tolerate than 3 large daily meals  Aim for 5-6 small meals per day (every 2-3 hours)  · Include protein at all meals/snacks  · Include a variety of foods (as tolerated/allowed by diet)  · Stay hydrated by sipping fluids of choice/tolerance throughout the day  · Liquid nutrition may be better tolerated than solids at times  · Alter food choices and eating patterns to accommodate changing needs  · Incorporate physical activity as able/allowed  · Refer to Eating Hints book for other meal/snack ideas and symptom management  · Follow proper oral care; Try baking soda/salt water rinse recipe (mix 3/4 tsp salt + 1 tsp baking soda + 1 qt water; rinse with pain water after using) in Eating Hints book (pg 18)  Brush your teeth before/after meals & before bed  · For lack of taste: Practice good oral care  Blend fresh fruits into shakes, ice cream or yogurt  Eat frozen fruits: grapes, chopped cantaloupe, watermelon  Select fresh vegetables (may be more appealing than canned/frozen)  Add extra flavor to foods: experiment with spices, salt & sweeteners, extra oil/butter; marinate meats (see pages 29-30 in your Eating Hints book)    · For constipation: drink plenty of fluids (>64 oz/day); drink hot liquids; eat high-fiber foods as tolerated (whole grains, beans, peas, nuts, seeds, fruits, vegetables, etc); increase physical activity as tolerated  Avoid increasing fiber intake too quickly, add fiber into your diet slowly; keep a record of your bowel movements (see pages 13-14 in you Eating Hints book)  · Consider trying "Apple/Prune Sauce" recipe on page 14 of your Eating Hints booklet  Be sure to drink 8 oz of water after taking 1-2 tbsp of the mixture before bedtime  · For nausea/vomiting: try plain/bland foods; try lemon/lime flavors; eat 5-6 small meals/day (except when vomiting); do not skip meals/snacks; choose appealing foods; sip only small amounts of liquids during meals; stay hydrated in between meals; eat foods/drinks that are room temperature; eat dry toast/crackers (see pages  21-22 and 31-32 in your Eating Hints book)  · Weigh yourself regularly  If you notice weight loss, make an effort to increase your daily food/calorie intake  If you continue to notice loss after these efforts, reach out to your dietitian to establish a plan to stabilize weight  · Consider stopping all high dose antioxidant supplements (vitamin A, C, E, selenium, etc )  Refer to [facility] "About Herbs" website for more information on the safety of supplements  · High calorie foods to try: peanut butter, whole milk, cheese  (see pages 52-53 in your Eating Hints book)  · High protein foods to try: eggs, fish, chicken, greek yogurt  (see pages 49-51 in your Eating Hints book)  · Foods that are easy to chew and swallow: mashed potatoes, yogurt, cream soups, smoothies (see page 47 in your Eating Hints book)  · Your syringes are each 60 mL or 2 fl oz  Always flush your feeding tube with 60 mL room-temp water 1-2 times daily while feeding tube is not in use  · Tube Feeding Plan: See below:    In the event enteral nutrition is needed for Fabiano's sole source of nutrition, recommend:  TF Goal: Jevity 1 5 at 99 mL/hr via J-tube cycled over 17 hours daily (until 7 cartons is infused)  Water Flushin mL free water flush at the beginning and end of TF infusion (250 mL total) plus 125 mL free water flush 8 times per day (1000 mL total) (total of 1250 mL/day in flushes; flushes should be spread throughout the day and every 2-3 hours during TF infusion; will need to adjust flushes prn for IV fluids)  Enteral Nutrition goal to provide: 1650 mL volume (7 cartons day), 2485 kcal, 106 grams protein, 1261 mL free water, 2511 mL total water daily  Remember that 1 carton of Jevity 1 5 = 355 calories   *Pt will require an enteral feeding pump to administer TF due to J-Tube due to esophageal cancer diagnosis  · Call [facility] when you have 10 days left of TF supplies: [phone], option 3 (customer support)        Follow Up Plan: 19 during infusion      Recommend Referral to Other Providers: none at this time

## 2019-11-08 NOTE — PROGRESS NOTES
Outpatient Oncology Nutrition Consult     Type of Consult: Follow Up  Care Location: Telephone Call     Reason for referral: Franki RN: Esophageal CA dx and TF (Date of referral: 9/26/19)    Nutrition Assessment:     PMH: COPD, DM, Jejunostomy 9/26/19  Oncology Diagnosis & Treatments: Malignant neoplasm of lower third of esophagus diagnosed 8/24/19  Chemotherapy (neulasta, taxotere, oxaliplatin, trastuzumab) started 10/8/19  Oncology History    8/19/19 to ER with recurrent episode of foreign body sensation in his throat  hours after eating two small shrimp  Patient notes multiple episodes over the past month that typically have resolved spontaneously    Impression and plan:  Esophageal blockage likely secondary to food bolus that is recurrent  Patient symptoms improved following treatment with glucagon in the emergency room but patient will require close outpatient follow-up with Gastroenterology for EGD to evaluate etiology of recurrent symptoms    8/21/19 Bronson Kingsley PA-C Gastroenterology  over the past 2 months he has had 3 distinct episodes of food getting stuck  He states that each time the food gets stuck to where he cannot tolerate any liquids or secretions  Plan EGD    8/24/19 EGD Dr Nathalie Aquino  · Severe, generalized edematous, erythematous and hemorrhagic mucosa in the lower third of the esophagus (30 cm from the incisors) with bleeding before intervention; performed 10 cold biopsies  There is circumferential erythema, friability, easy bleeding,  neoplastic abnormality and narrowing from 30 cm down to 40 cm  Ten biopsies were obtained to rule out malignancy  A dilation was not performed since I suspect that the underlying etiology is malignancy  · The stomach and duodenum appeared normal    Esophagus (biopsy):  - At 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     HER2 positive    9/6/19 CT chest, abdomen and pelvis  IMPRESSION:     1  Irregular wall thickening involving the distal esophagus beginning just below the inferior pulmonary veins, extending through the gastric cardia compatible with biopsy-proven adenocarcinoma  As above, there is involvement of the gastric cardia, and possible extra luminal extension of tumor along its right lateral margin  2  There is mild adenopathy identified adjacent to the gastric cardia and distal esophagus  Additional borderline prominent mediastinal lymph nodes  3  There is a left upper lobe opacity identified  Although potentially representing an area of inflammation or scarring, an irregularly marginated solitary pulmonary metastasis should be excluded  This could either be reassessed with chest CT in 3 months time or further evaluated with PET CT (it is of borderline size for accurate characterization with PET)  4  Hypertrophy of the median lobe of the prostate (favored) or less likely 9 mm bladder polyp  5  Nonspecific 1 2 cm right adrenal nodule  Overall, PET/CT may be helpful to determine exact extent of adenopathy within the lower chest and upper abdomen, evaluation for any extraluminal extension of tumor, and further characterization of left upper lobe density and right adrenal nodule    9/16-9/18 admitted with dysphagia    9/16/19 CT chest, abdomen and pelvis  IMPRESSION:     There is irregular wall thickening involving the distal esophagus and extending to the left hand aspect of the upper stomach  There is a masslike area of abnormal soft tissue density along the left hand aspect of the upper stomach which measures approximately 5 5 x 3 6 cm, and there appears to be some infiltration of the adjacent fat  This is concerning for local extension of the patient's known neoplasm  Several mildly prominent nodes are present between this mass and the liver  There are also some mildly prominent mediastinal nodes    Please see discussion      There is some fluid within the esophagus near level of the gregg      There is an approximately 1 5 x 1 cm right adrenal nodule  This appears similar to slightly larger compared to the prior examination  There is a questionable tiny left adrenal nodule  Follow-up of both of these adrenal findings is suggested      There is an approximately 1 2 cm soft tissue density nodule within the posterior aspect of the urinary bladder  Although this may be related to indentation of the prostate, a small bladder mass cannot be entirely excluded  Urology consultation and follow-up is recommended      A focal area of opacity within the anterior aspect of the left upper lobe of the lung appears similar to September 6, 2019  Please see discussion  Continued follow-up is recommended  This could be reassessed with chest CT in 3 months  PET/CT could also be considered, however, it is of borderline size for accurate characterization with PET/ CT  If more remote CTs of the chest exist, direct comparison would be helpful      PET/CT should be considered for further evaluation of the extent of adenopathy and extent of disease  PET/CT or MRI could be utilized to evaluate the adrenal findings, if there are no contraindications      Cholelithiasis  No CT evidence of acute cholecystitis      Atherosclerosis  Coronary artery disease      Other findings as described above  Please see discussion    9/19/19 PET  IMPRESSION:     1  Extensive FDG uptake at the distal esophagus, GE junction and proximal stomach compatible with known malignancy  2  FDG avid perigastric lymph node posterior to the proximal stomach suspicious for metastasis  There may be FDG avid gastrohepatic lymphadenopathy but this is inseparable from the gastric mass  3   Focal FDG uptake at the left anterior 6th rib  Possible subtle focus of FDG uptake at the right lateral 2nd rib  No obvious findings here on the limited low-dose CT images  Metastasis is not excluded  Distribution is not typical for prior trauma  Consider follow-up with MRI of the chest   4   Foci of FDG uptake at the sigmoid colon for which underlying lesions should be excluded  Recommend follow-up with colonoscopy  5   Fairly diffuse FDG uptake at the thyroid gland, may be related to thyroiditis    9/20/19 Eulalio Lynch MD  Plan feeding tube, referral to Med Onc and Rad Onc  If the rib lesion is a metastasis, he is not a candidate for resection    9/24/19 Dr Aubree Gandhi  recommended that he have a port as well as a feeding tube placed since he may undergo esophagectomy in the future  We did discuss that if the rib lesions are indeed metastasis, chemotherapy would be the mainstay of his treatment    9/24/19 Dr Jordin Thomas  " I think the patient would benefit from neoadjuvant chemotherapy upfront prior to consideration of either concurrent chemoradiation or even surgery  Herceptin with FLOT chemotherapy Every 2 weeks for 6 cycles  This will consist of 5-FU 2600 mg/m² over 24 hours, leucovorin 20 mg meter square, Oxaliplatin 85 mg/m2 square and Taxotere 60 mg meter square  Once he completed 6 doses of chemotherapy we will reimage him  If he has a nice response we will then consider concurrent chemoradiation and then surgery  If he has a very nice response with very little residual disease surgery could possibly be considered for now I did talk to him about most probably getting concurrent chemoradiation after the chemotherapy "    9/26/19 Port insertion and J tube [facility] scheduled at [facility] (Dr Aubree Gandhi)  10/8/19 Infusion scheduled  Dr Alexander Blackman on colonoscopy ASAP    Mostly liquids  Can feel any food going     A lot of phlegm gets stuck and causes back pain    Constant cough if tries to lie down  Not sleeping much  Only sleep is sitting          Malignant neoplasm of lower third of esophagus ([address] )    8/24/2019 Initial Diagnosis     Malignant neoplasm of lower third of esophagus ([address] )  At least intramucosal carcinoma  Her2/SHIMON positive      10/8/2019 - Chemotherapy     pegfilgrastim (NEULASTA ONPRO) subcutaneous injection kit 6 mg, 6 mg, Subcutaneous, Once, 3 of 6 cycles  Administration: 6 mg (10/9/2019), 6 mg (10/23/2019)  DOCEtaxel (TAXOTERE) 99 mg in sodium chloride 0 9 % 250 mL chemo infusion, 50 mg/m2 = 99 mg (83 3 % of original dose 60 mg/m2), Intravenous, Once, 3 of 6 cycles  Dose modification: 50 mg/m2 (original dose 60 mg/m2, Cycle 1, Reason: Other (See Comments))  Administration: 99 mg (10/8/2019), 99 mg (10/22/2019), 99 mg (11/5/2019)  leucovorin 396 mg in dextrose 5 % 250 mL IVPB, 200 mg/m2 = 396 mg (50 % of original dose 400 mg/m2), Intravenous, Once, 3 of 6 cycles  Dose modification: 200 mg/m2 (original dose 400 mg/m2, Cycle 1, Reason: Other (See Comments), Comment: FLOT regimen standard)  Administration: 396 mg (10/8/2019), 396 mg (10/22/2019), 396 mg (11/5/2019)  oxaliplatin (ELOXATIN) 168 3 mg in dextrose 5 % 250 mL chemo infusion, 85 mg/m2 = 168 3 mg, Intravenous, Once, 3 of 6 cycles  Administration: 168 3 mg (10/8/2019), 168 3 mg (10/22/2019), 168 3 mg (11/5/2019)  trastuzumab (HERCEPTIN) 496 mg in sodium chloride 0 9 % 250 mL chemo infusion, 6 mg/kg = 496 mg, Intravenous, Once, 3 of 6 cycles  Administration: 496 mg (10/8/2019), 331 mg (10/22/2019), 331 mg (11/5/2019)        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[address] )    8/24/2019 Biopsy     Esophagus (biopsy):  - At 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       9/24/2019 Initial Diagnosis     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[facility])      10/8/2019 -  Chemotherapy     pegfilgrastim (NEULASTA ONPRO) subcutaneous injection kit 6 mg, 6 mg, Subcutaneous, Once, 3 of 6 cycles  Administration: 6 mg (10/9/2019), 6 mg (10/23/2019)  DOCEtaxel (TAXOTERE) 99 mg in sodium chloride 0 9 % 250 mL chemo infusion, 50 mg/m2 = 99 mg (83 3 % of original dose 60 mg/m2), Intravenous, Once, 3 of 6 cycles  Dose modification: 50 mg/m2 (original dose 60 mg/m2, Cycle 1, Reason: Other (See Comments))  Administration: 99 mg (10/8/2019), 99 mg (10/22/2019), 99 mg (11/5/2019)  leucovorin 396 mg in dextrose 5 % 250 mL IVPB, 200 mg/m2 = 396 mg (50 % of original dose 400 mg/m2), Intravenous, Once, 3 of 6 cycles  Dose modification: 200 mg/m2 (original dose 400 mg/m2, Cycle 1, Reason: Other (See Comments), Comment: FLOT regimen standard)  Administration: 396 mg (10/8/2019), 396 mg (10/22/2019), 396 mg (11/5/2019)  oxaliplatin (ELOXATIN) 168 3 mg in dextrose 5 % 250 mL chemo infusion, 85 mg/m2 = 168 3 mg, Intravenous, Once, 3 of 6 cycles  Administration: 168 3 mg (10/8/2019), 168 3 mg (10/22/2019), 168 3 mg (11/5/2019)  trastuzumab (HERCEPTIN) 496 mg in sodium chloride 0 9 % 250 mL chemo infusion, 6 mg/kg = 496 mg, Intravenous, Once, 3 of 6 cycles  Administration: 496 mg (10/8/2019), 331 mg (10/22/2019), 331 mg (11/5/2019)       Past Medical History:   Diagnosis Date    [facility]chronic obstructive [facility]) [facility] [address] [facility]     Diabetes mellitus [facility] [address] [facility]     Difficulty swallowing     Esophageal mass     Sleep apnea      Past Surgical History:   Procedure Laterality Date    BACK SURGERY      DISC REMOVAL      FL GUIDED CENTRAL VENOUS ACCESS DEVICE INSERTION  9/26/2019    GASTROJEJUNOSTOMY W/ JEJUNOSTOMY TUBE N/A 9/26/2019    Procedure: INSERTION JEJUNOSTOMY TUBE OPEN;  Surgeon: Marley Díaz MD;  [facility]: [facility];  Service: Surgical Oncology    TONSILLECTOMY      TUNNELED VENOUS PORT PLACEMENT N/A 9/26/2019    Procedure: INSERTION VENOUS PORT (PORT-A-CATH);   Surgeon: Marley Díaz MD;  Location: [facility];  Service: Surgical Oncology       Review of Medications:   Vitamins, Supplements and Herbals: no    Current Outpatient Medications:     acetaminophen (TYLENOL) 160 mg/5 mL suspension, Take 20 3 mL (650 mg total) by mouth every 4 (four) hours as needed for mild pain or fever, Disp: 118 mL, Rfl: 0    albuterol (2 5 mg/3 mL) 0 083 % nebulizer solution, Take 1 vial (2 5 mg total) by nebulization every 6 (six) hours as needed for wheezing or shortness of breath, Disp: 1 vial, Rfl: 5    canagliflozin (INVOKANA) 100 mg, Daily, Disp: , Rfl:     dexamethasone (DECADRON) 1 MG/ML solution, Take 8 mL (8 mg total) by mouth daily, Disp: 30 mL, Rfl: 6    gabapentin (NEURONTIN) 300 mg capsule, Take 1 capsule (300 mg total) by mouth daily at bedtime, Disp: 30 capsule, Rfl: 0    levothyroxine 25 mcg tablet, Take 25 mcg by mouth daily in the early morning, Disp: , Rfl:     Nutritional Supplements (JEVITY 1 5 SHUN) LIQD, Take 85 mL by mouth continuous [email or web address] [email or web address] advance as tolerated to goal rate of 85ml/hr to run for 16 hours daily  Flush with at least 60ml water at start and end of cycle  Needs additional water flushes if PO fluid intake declines, Disp: 1000 mL, Rfl: 0    ondansetron (ZOFRAN) 4 MG/5ML solution, Take 10 mL (8 mg total) by mouth every 8 (eight) hours as needed for nausea or vomiting, Disp: 240 mL, Rfl: 6    pantoprazole (PROTONIX) 40 mg tablet, Take 1 tablet (40 mg total) by mouth daily, Disp: 30 tablet, Rfl: 1    pioglitazone (ACTOS) 30 mg tablet, Take 30 mg by mouth daily , Disp: , Rfl:     prochlorperazine (COMPAZINE) 10 mg tablet, Take 1 tablet (10 mg total) by mouth every 6 (six) hours as needed for nausea or vomiting, Disp: 45 tablet, Rfl: 3    rOPINIRole (REQUIP) 0 25 mg tablet, Take 1 tablet (0 25 mg total) by mouth daily at bedtime, Disp: 30 tablet, Rfl: 0    sitaGLIPtin (JANUVIA) 100 mg tablet, Take 100 mg by mouth daily , Disp: , Rfl:   No current facility-administered medications for this visit       Most Recent Lab Results:   Lab Results   Component Value Date    WBC 13 43 (H) 11/04/2019    ALT 16 11/04/2019    AST 14 11/04/2019    K 4 3 11/04/2019    K 4 1 10/21/2019    BUN 11 11/04/2019    BUN 13 10/21/2019    CREATININE 0 88 11/04/2019    CREATININE 0 98 10/21/2019    HGBA1C 7 6 08/15/2019    CALCIUM 8 8 11/04/2019       Anthropometric Measurements:   Height: 69"  Ht Readings from Last 1 Encounters:   11/05/19 5' 10" (1 778 m)     -Weight History:   Usual Weight: 205#  Ideal Body Weight: 160#  Wt Readings from Last 20 Encounters:   11/05/19 85 3 kg (188 lb 0 8 oz)   10/28/19 80 7 kg (178 lb)   10/25/19 80 6 kg (177 lb 12 8 oz)   10/22/19 78 9 kg (174 lb)   10/15/19 79 8 kg (176 lb)   10/08/19 81 6 kg (180 lb)   09/26/19 82 6 kg (182 lb)   09/25/19 82 6 kg (182 lb)   09/24/19 82 6 kg (182 lb)   09/24/19 82 6 kg (182 lb)   09/20/19 82 1 kg (181 lb)   09/17/19 87 1 kg (192 lb)   08/29/19 87 5 kg (192 lb 12 8 oz)   08/21/19 87 6 kg (193 lb 3 2 oz)     Varian: None  Weight Changes: gain of 8 1#/ (4 5%) in 1 month (not significant) and gain of 10 1#/ (5 6%) in 1 week (significant)    Estimated body mass index is 26 98 kg/m² as calculated from the following:    Height as of 11/5/19: 5' 10" (1 778 m)  Weight as of 11/5/19: 85 3 kg (188 lb 0 8 oz)      Nutrition-Focused Physical Findings: n/a-phone visit     Food/Nutrition-Related History & Client/Social History:    Current Nutrition Impact Symptoms:  [] Nausea  [] Reduced Appetite  [] Acid Reflux    [] Vomiting  [] Unintended Wt Loss -resolved, pt now gaining weight [] Malabsorption    [] Diarrhea  [] Unintended Wt Gain  [] Dumping Syndrome    [x] Constipation -"occasional" [] Thick Mucous/Secretions  [] Abdominal Pain    [x] Dysgeusia (Altered Taste) -has had altered taste and smell for years, per pts sister at initial visit [] Xerostomia (Dry Mouth)  [x] Gas    [x] Dysosmia (Altered Smell)  [] Thrush  [] Difficulty Chewing    [] Oral Mucositis (Sore Mouth)  [x] Fatigue -improving  [] Other:    [] Odynophagia   [] Esophagitis  [] Other:    [] Dysphagia - resolved   [] Early Satiety  [] No Problems Eating      Food Allergies: no  Food Intolerances: no    Current Diet: Regular Diet, No Restrictions  Current Nutrition Intake: More than usual  Appetite: Good   Nutrition Route: PO; pt not using J-tube for nutrition at this time   Meal planning/preparation mainly done by: Self and Daughter  Oral Care: brushes QD  Activity level: Mainly sedentary, ADL  Social Hx: His daughter Reginaldo helps take care of him  Diet Recall:   Breakfast: scrambled eggs with sausage, coffee   Lunch: chicken pot pie  Dinner: spaghetti with tomato sauce and meatballs/sausage    Beverages: water (16 9 oz x3), coffee (16oz x1)   Supplements:    Ensure Enlive (8 oz, 350 kcal, 20 g pro) "once in a while"      Enteral Nutrition:  Reported Enteral Nutrition Intake:   · Pt has J-tube, was infusing Jevity 1 5 but has been eating well for the past few days so he has not been using TF      DME: [employer]     Estimated Nutrition Needs: (based on 82 7kg/ 182# actual wt)  Energy Needs: 1291-4086 kcal/day (30-35 kcal/kg)  Protein Needs:  grams/day (1 2-1 5 g/kg)  Fluid Needs: 3156-3037 mL/day (1 mL/kcal)    Discussion/Summary: Gauri Sun was contacted for nutrition follow up  He reports that his appetite and ability to swallow have improved significantly  For the past few days he states that he has been eating well and has not used his tube feeding  His weight has been increasing, he has gained 10# in one week  Encouraged him to continue to monitor his weight and use tube feeding to supplement his PO diet if he starts losing weight or PO intakes decrease  Reviewed nutrition supplements that patient can drink PO, he states that he drinks Ensure "once in a while " Encouraged that he continue PO as much as possible as tolerated  Gauri Sun complains of gas recently  Reviewed MNT for gas including: chew slowly and avoid talking when eating to avoid swallowing too much air, chew with mouth closed, gas causing foods      Discussed/Reviewed:   · the importance of weight maintenance during CA tx  · indications & use of oral nutrition supplements : Choose nutrition supplement with >300 calories  · how to modify foods for anticipated nutrition impact symptoms pt may experience during CA tx  · high protein foods to include at all meals and snacks eggs, fish, greek yogurt, chicken   · ways to increase overall calorie intake peanut butter, whole milk, cheese  · encouraged eating every 2-3 hours (5-6 small meals/day)  · maintaining proper oral care Brushing TID, non alcohol containing mouthwash, baking soda/salt water rinse  · how to modify foods & eating for nutrition impact symptoms avoid spicy/acidic foods, avoid sharp/crunchy foods, soft/easy to swallow foods as needed   · adequate hydation & tips to increase overall fluid intake : goal of 83-96 oz fluid daily, fluids PO as much as possible, use j-tube for additional flushes  · MNT for: jejunostomy nutrition, gas  · individualized calorie, protein and fluid daily goals  · MNT for patient specific tx plan -  avoid cold temperature foods/fluids during the course of oxaliplatin tx  · individualized enteral nutrition recommendations & plan: see below: In the event enteral nutrition is needed for Fabiano's sole source of nutrition, recommend:  TF Goal: Jevity 1 5 at 99 mL/hr via J-tube cycled over 17 hours daily (until 7 cartons is infused)  Water Flushin mL free water flush at the beginning and end of TF infusion (250 mL total) plus 125 mL free water flush 8 times per day (1000 mL total) (total of 1250 mL/day in flushes; flushes should be spread throughout the day and every 2-3 hours during TF infusion; will need to adjust flushes prn for IV fluids)  Enteral Nutrition goal to provide: 1650 mL volume (7 cartons day), 2485 kcal, 106 grams protein, 1261 mL free water, 2511 mL total water daily  *Pt will require an enteral feeding pump to administer TF due to J-Tube due to esophageal cancer diagnosis  All questions and concerns addressed during todays visit  Martinez Roy has RD contact information      Nutrition Diagnosis:     · Increased Nutrient Needs (kcal & pro) related to increased demand for nutrients and disease state as evidenced by cancer dx and pt undergoing tx for cancer  Intervention & Recommendations:     Topics addressed: Nutrition education, Balance/Variety, Meals & Snacks, Meal planning, Choosing high protein meals/snacks, Meal pattern: eating small/frequent meals (every 2-3 hours), Nutrition Symptom Management, Adequate Hydration, Medical Food Supplements, Nutrition-Related Medication Management, Vitamin & Mineral Supplements, Enteral Nutrition and Weight Management    Barriers: None  Readiness to change: preparation  Comprehension: verbalizes understanding  Expected Compliance: good    Materials Provided: not applicable  Monitoring & Evaluation:     Dietitian to Monitor: Food and Nutrition Intake, Nutrtion Impact Symptoms, Body Weight, Enteral and/or Parenteral Intake and Biochemical Data     Goals:  · adequate nutrition related symptom management  · pt to meet >/=75% estimated nutrition needs daily  · Weight maintenance    · Progress Towards Goals: Progressing    Nutrition Rx & Recommendations:  · Diet: High Calorie, High Protein (for high calorie foods see pages 52-53, and for high protein foods see pages 49-51 in your Eating Hints book)  · Avoid spicy, acidic, sharp/hard/crunchy foods & carbonated beverages as needed  · Eat slowly and chew food thoroughly before swallowing  · Keep a daily food journal (pen/paper, elham such as [employer])  · Nutrition Supplements: Choose nutrition supplements with greater than 300 calories  Refer to handout with highlighted suggestions     May use homemade shakes/smoothies as desired  If using a pre-made shake/bar, choose ones with >300 calories and >10 grams protein (ex  Ensure Enlive, Ensure Plus, Boost Plus, Boost Very High Calorie, etc )  · Small, frequent meals/snacks may be easier to tolerate than 3 large daily meals  Aim for 5-6 small meals per day (every 2-3 hours)  · Include protein at all meals/snacks  · Include a variety of foods (as tolerated/allowed by diet)    · Stay hydrated by sipping fluids of choice/tolerance throughout the day  · Liquid nutrition may be better tolerated than solids at times  · Alter food choices and eating patterns to accommodate changing needs  · Incorporate physical activity as able/allowed  · Refer to Eating Hints book for other meal/snack ideas and symptom management  · Follow proper oral care; Try baking soda/salt water rinse recipe (mix 3/4 tsp salt + 1 tsp baking soda + 1 qt water; rinse with pain water after using) in Eating Hints book (pg 18)  Brush your teeth before/after meals & before bed  · For lack of taste: Practice good oral care  Blend fresh fruits into shakes, ice cream or yogurt  Eat frozen fruits: grapes, chopped cantaloupe, watermelon  Select fresh vegetables (may be more appealing than canned/frozen)  Add extra flavor to foods: experiment with spices, salt & sweeteners, extra oil/butter; marinate meats (see pages 29-30 in your Eating Hints book)  · For constipation: drink plenty of fluids (>64 oz/day); drink hot liquids; eat high-fiber foods as tolerated (whole grains, beans, peas, nuts, seeds, fruits, vegetables, etc); increase physical activity as tolerated  Avoid increasing fiber intake too quickly, add fiber into your diet slowly; keep a record of your bowel movements (see pages 13-14 in you Eating Hints book)  · Consider trying "Apple/Prune Sauce" recipe on page 14 of your Eating Hints booklet  Be sure to drink 8 oz of water after taking 1-2 tbsp of the mixture before bedtime  · For nausea/vomiting: try plain/bland foods; try lemon/lime flavors; eat 5-6 small meals/day (except when vomiting); do not skip meals/snacks; choose appealing foods; sip only small amounts of liquids during meals; stay hydrated in between meals; eat foods/drinks that are room temperature; eat dry toast/crackers (see pages  21-22 and 31-32 in your Eating Hints book)  · Weigh yourself regularly   If you notice weight loss, make an effort to increase your daily food/calorie intake  If you continue to notice loss after these efforts, reach out to your dietitian to establish a plan to stabilize weight  · Consider stopping all high dose antioxidant supplements (vitamin A, C, E, selenium, etc )  Refer to [facility] "About Herbs" website for more information on the safety of supplements  · High calorie foods to try: peanut butter, whole milk, cheese  (see pages 52-53 in your Eating Hints book)  · High protein foods to try: eggs, fish, chicken, greek yogurt  (see pages 49-51 in your Eating Hints book)  · Foods that are easy to chew and swallow: mashed potatoes, yogurt, cream soups, smoothies (see page 47 in your Eating Hints book)  · Your syringes are each 60 mL or 2 fl oz  Always flush your feeding tube with 60 mL room-temp water 1-2 times daily while feeding tube is not in use  · Tube Feeding Plan: See below: In the event enteral nutrition is needed for [facility] sole source of nutrition, recommend:  TF Goal: Jevity 1 5 at 99 mL/hr via J-tube cycled over 17 hours daily (until 7 cartons is infused)  Water Flushin mL free water flush at the beginning and end of TF infusion (250 mL total) plus 125 mL free water flush 8 times per day (1000 mL total) (total of 1250 mL/day in flushes; flushes should be spread throughout the day and every 2-3 hours during TF infusion; will need to adjust flushes prn for IV fluids)  Enteral Nutrition goal to provide: 1650 mL volume (7 cartons day), 2485 kcal, 106 grams protein, 1261 mL free water, 2511 mL total water daily  Remember that 1 carton of Jevity 1 5 = 355 calories   *Pt will require an enteral feeding pump to administer TF due to J-Tube due to esophageal cancer diagnosis  · Call [facility] when you have 10 days left of TF supplies: [phone], option 3 (customer support)        Follow Up Plan: 19 during infusion      Recommend Referral to Other Providers: none at this time

## 2019-11-11 ENCOUNTER — TELEPHONE (OUTPATIENT)
Dept: HEMATOLOGY ONCOLOGY | Facility: CLINIC | Age: 64
End: 2019-11-11

## 2019-11-11 NOTE — TELEPHONE ENCOUNTER
Please reschedule patient with Indra Ferguson in [facility] at [phone]

## 2019-11-11 NOTE — TELEPHONE ENCOUNTER
[facility] for pt to be seen in [place] on 11/15 at [facility] with Vianey Crocker

## 2019-11-12 ENCOUNTER — ANESTHESIA (OUTPATIENT)
Dept: GASTROENTEROLOGY | Facility: HOSPITAL | Age: 64
End: 2019-11-12

## 2019-11-12 ENCOUNTER — HOSPITAL ENCOUNTER (OUTPATIENT)
Dept: GASTROENTEROLOGY | Facility: HOSPITAL | Age: 64
Setting detail: OUTPATIENT SURGERY
Discharge: HOME | End: 2019-11-12
Attending: INTERNAL MEDICINE
Payer: COMMERCIAL

## 2019-11-12 ENCOUNTER — ANESTHESIA EVENT (OUTPATIENT)
Dept: GASTROENTEROLOGY | Facility: HOSPITAL | Age: 64
End: 2019-11-12

## 2019-11-12 VITALS
OXYGEN SATURATION: 96 % | HEART RATE: 77 BPM | DIASTOLIC BLOOD PRESSURE: 58 MMHG | TEMPERATURE: 53.6 F | BODY MASS INDEX: 26.98 KG/M2 | SYSTOLIC BLOOD PRESSURE: 112 MMHG | RESPIRATION RATE: 18 BRPM | HEIGHT: 70 IN

## 2019-11-12 DIAGNOSIS — D50.0 IRON DEFICIENCY ANEMIA DUE TO CHRONIC BLOOD LOSS: ICD-10-CM

## 2019-11-12 LAB — GLUCOSE SERPL-MCNC: 100 MG/DL (ref 65–140)

## 2019-11-12 PROCEDURE — 88342 IMHCHEM/IMCYTCHM 1ST ANTB: CPT | Performed by: PATHOLOGY

## 2019-11-12 PROCEDURE — 88341 IMHCHEM/IMCYTCHM EA ADD ANTB: CPT | Performed by: PATHOLOGY

## 2019-11-12 PROCEDURE — 88305 TISSUE EXAM BY PATHOLOGIST: CPT | Performed by: PATHOLOGY

## 2019-11-12 PROCEDURE — 82948 REAGENT STRIP/BLOOD GLUCOSE: CPT

## 2019-11-12 PROCEDURE — 45385 COLONOSCOPY W/LESION REMOVAL: CPT | Performed by: INTERNAL MEDICINE

## 2019-11-12 RX ORDER — PROPOFOL 10 MG/ML
INJECTION, EMULSION INTRAVENOUS AS NEEDED
Status: DISCONTINUED | OUTPATIENT
Start: 2019-11-12 | End: 2019-11-12 | Stop reason: SURG

## 2019-11-12 RX ORDER — LIDOCAINE HYDROCHLORIDE 10 MG/ML
0.5 INJECTION, SOLUTION EPIDURAL; INFILTRATION; INTRACAUDAL; PERINEURAL ONCE AS NEEDED
Status: DISCONTINUED | OUTPATIENT
Start: 2019-11-12 | End: 2019-11-16 | Stop reason: HOSPADM

## 2019-11-12 RX ORDER — SODIUM CHLORIDE, SODIUM LACTATE, POTASSIUM CHLORIDE, CALCIUM CHLORIDE 600; 310; 30; 20 MG/100ML; MG/100ML; MG/100ML; MG/100ML
125 INJECTION, SOLUTION INTRAVENOUS CONTINUOUS
Status: DISCONTINUED | OUTPATIENT
Start: 2019-11-12 | End: 2019-11-12

## 2019-11-12 RX ORDER — EPHEDRINE SULFATE 50 MG/ML
INJECTION INTRAVENOUS AS NEEDED
Status: DISCONTINUED | OUTPATIENT
Start: 2019-11-12 | End: 2019-11-12 | Stop reason: SURG

## 2019-11-12 RX ORDER — ONDANSETRON 2 MG/ML
4 INJECTION INTRAMUSCULAR; INTRAVENOUS ONCE AS NEEDED
Status: CANCELLED | OUTPATIENT
Start: 2019-11-12

## 2019-11-12 RX ORDER — METOCLOPRAMIDE HYDROCHLORIDE 5 MG/ML
10 INJECTION INTRAMUSCULAR; INTRAVENOUS ONCE AS NEEDED
Status: CANCELLED | OUTPATIENT
Start: 2019-11-12

## 2019-11-12 RX ORDER — DIPHENHYDRAMINE HYDROCHLORIDE 50 MG/ML
12.5 INJECTION INTRAMUSCULAR; INTRAVENOUS ONCE AS NEEDED
Status: CANCELLED | OUTPATIENT
Start: 2019-11-12

## 2019-11-12 RX ORDER — LIDOCAINE HYDROCHLORIDE 10 MG/ML
INJECTION, SOLUTION EPIDURAL; INFILTRATION; INTRACAUDAL; PERINEURAL AS NEEDED
Status: DISCONTINUED | OUTPATIENT
Start: 2019-11-12 | End: 2019-11-12 | Stop reason: SURG

## 2019-11-12 RX ORDER — ONDANSETRON 2 MG/ML
INJECTION INTRAMUSCULAR; INTRAVENOUS AS NEEDED
Status: DISCONTINUED | OUTPATIENT
Start: 2019-11-12 | End: 2019-11-12 | Stop reason: SURG

## 2019-11-12 RX ADMIN — SODIUM CHLORIDE, SODIUM LACTATE, POTASSIUM CHLORIDE, AND CALCIUM CHLORIDE 125 ML/HR: .6; .31; .03; .02 INJECTION, SOLUTION INTRAVENOUS at 10:34

## 2019-11-12 RX ADMIN — PROPOFOL 30 MG: 10 INJECTION, EMULSION INTRAVENOUS at 11:31

## 2019-11-12 RX ADMIN — PROPOFOL 80 MG: 10 INJECTION, EMULSION INTRAVENOUS at 11:16

## 2019-11-12 RX ADMIN — PROPOFOL 30 MG: 10 INJECTION, EMULSION INTRAVENOUS at 11:22

## 2019-11-12 RX ADMIN — ONDANSETRON 4 MG: 2 INJECTION INTRAMUSCULAR; INTRAVENOUS at 11:25

## 2019-11-12 RX ADMIN — PROPOFOL 30 MG: 10 INJECTION, EMULSION INTRAVENOUS at 11:34

## 2019-11-12 RX ADMIN — LIDOCAINE HYDROCHLORIDE 50 MG: 10 INJECTION, SOLUTION EPIDURAL; INFILTRATION; INTRACAUDAL; PERINEURAL at 11:16

## 2019-11-12 RX ADMIN — EPHEDRINE SULFATE 10 MG: 50 INJECTION, SOLUTION INTRAVENOUS at 11:38

## 2019-11-12 RX ADMIN — PROPOFOL 30 MG: 10 INJECTION, EMULSION INTRAVENOUS at 11:25

## 2019-11-12 RX ADMIN — PROPOFOL 30 MG: 10 INJECTION, EMULSION INTRAVENOUS at 11:24

## 2019-11-12 RX ADMIN — PROPOFOL 30 MG: 10 INJECTION, EMULSION INTRAVENOUS at 11:29

## 2019-11-12 RX ADMIN — PROPOFOL 20 MG: 10 INJECTION, EMULSION INTRAVENOUS at 11:19

## 2019-11-12 NOTE — ANESTHESIA PREPROCEDURE EVALUATION
Review of Systems/Medical History  Patient summary reviewed  Chart reviewed  No history of anesthetic complications     Cardiovascular  Hyperlipidemia,    Pulmonary  COPD , Sleep apnea ,        GI/Hepatic    Esophageal disease esophageal cancer, GI malignancy, Bowel prep       Negative  ROS        Endo/Other  Diabetes type 2 ,      GYN       Hematology  Anemia chronic blood loss anemia,     Musculoskeletal  Negative musculoskeletal ROS        Neurology    Headaches,    Psychology   Negative psychology ROS              Physical Exam    Airway    Mallampati score: II  TM Distance: >3 FB  Neck ROM: full     Dental   No notable dental hx     Cardiovascular  Rhythm: regular, Rate: normal, Cardiovascular exam normal    Pulmonary  Pulmonary exam normal Breath sounds clear to auscultation,     Other Findings        Anesthesia Plan  ASA Score- 3     Anesthesia Type- IV sedation with anesthesia with ASA Monitors  Additional Monitors:   Airway Plan:         Plan Factors-    Induction- intravenous  Postoperative Plan-     Informed Consent- Anesthetic plan and risks discussed with patient  I personally reviewed this patient with the CRNA  Discussed and agreed on the Anesthesia Plan with the CRNA  Jeny Padilla Recent labs personally reviewed:  Lab Results   Component Value Date    WBC 13 43 (H) 11/04/2019    HGB 9 6 (L) 11/04/2019     11/04/2019     Lab Results   Component Value Date    K 4 3 11/04/2019    BUN 11 11/04/2019    CREATININE 0 88 11/04/2019     No results found for: PTT   No results found for: INR    Blood type O    Lab Results   Component Value Date    HGBA1C 7 6 08/15/2019       I, Jose Guy MD, have personally seen and evaluated the patient prior to anesthetic care  I have reviewed the pre-anesthetic record, and other medical records if appropriate to the anesthetic care  If a CRNA is involved in the case, I have reviewed the CRNA assessment, if present, and agree   Risks/benefits and alternatives discussed with patient including possible PONV, sore throat, and possibility of rare anesthetic and surgical emergencies

## 2019-11-12 NOTE — H&P
History and Physical -  [facility]  Joseph Ledesma 71 y o  male MRN: [id number]      HPI: Joseph Ledesma is a 71y o  year old male who presents for iron deficiency anemia      REVIEW OF SYSTEMS: Per the HPI, and otherwise unremarkable  Historical Information   Past Medical History:   Diagnosis Date    [facility]ronic [facility] [address] [facility]     Diabetes mellitus ([facility] )     Difficulty swallowing     Disease of thyroid gland     Esophageal mass     History of transfusion     Sleep apnea      Past Surgical History:   Procedure Laterality Date    BACK SURGERY      x2    DISC REMOVAL      FL GUIDED CENTRAL VENOUS ACCESS DEVICE INSERTION  2019    GASTROJEJUNOSTOMY W/ JEJUNOSTOMY TUBE N/A 2019    Procedure: INSERTION JEJUNOSTOMY TUBE OPEN;  Surgeon: Zahra Zamora MD;  [facility];  Service: Surgical Oncology    TONSILLECTOMY      TUNNELED VENOUS PORT PLACEMENT N/A 2019    Procedure: INSERTION VENOUS PORT (PORT-A-CATH); Surgeon: Zahra Zamora MD;  [facility];  Service: Surgical Oncology     Social History   Social History     Substance and Sexual Activity   Alcohol Use Not Currently    Alcohol/week: 0 0 standard drinks    Frequency: Never    Drinks per session: 1 or 2    Binge frequency: Never     Social History     Substance and Sexual Activity   Drug Use Never     Social History     Tobacco Use   Smoking Status Former Smoker    Packs/day: 0 50    Years: 50 00    Pack years: 25 00    Types: Cigarettes    Last attempt to quit: 2017    Years since quittin 8   Smokeless Tobacco Never Used     Family History   Problem Relation Age of Onset    Diabetes Mother     No Known Problems Father        Meds/Allergies       (Not in a hospital admission)    Allergies   Allergen Reactions    Penicillins Itching       Objective     Blood pressure 118/64, pulse 84, temperature (!) 97 1 °F (36 2 °C), temperature source Temporal, resp  rate 18, height 5' 10" (1 778 m), SpO2 99 %  PHYSICAL EXAM    Gen: NAD  CV: RRR  CHEST: Clear  ABD: soft, NT/ND  EXT: no edema      ASSESSMENT/PLAN:  This is a 71y o  year old male here for colonoscopy, and he is stable and optimized for his procedure

## 2019-11-12 NOTE — DISCHARGE INSTRUCTIONS
Colonoscopy   WHAT YOU NEED TO KNOW:   A colonoscopy is a procedure to examine the inside of your colon (intestine) with a scope  Polyps or tissue growths may have been removed during your colonoscopy  It is normal to feel bloated and to have some abdominal discomfort  You should be passing gas  If you have hemorrhoids or you had polyps removed, you may have a small amount of bleeding  DISCHARGE INSTRUCTIONS:   Seek care immediately if:   · You have a large amount of bright red blood in your bowel movements  · Your abdomen is hard and firm and you have severe pain  · You have sudden trouble breathing  Contact your healthcare provider if:   · You develop a rash or hives  · You have a fever within 24 hours of your procedure  · You have not had a bowel movement for 3 days after your procedure  · You have questions or concerns about your condition or care  Activity:   · Do not lift, strain, or run  for 3 days after your procedure  · Rest after your procedure  You have been given medicine to relax you  Do not  drive or make important decisions until the day after your procedure  Return to your normal activity as directed  · Relieve gas and discomfort from bloating  by lying on your right side with a heating pad on your abdomen  You may need to take short walks to help the gas move out  Eat small meals until bloating is relieved  If you had polyps removed: For 7 days after your procedure:  · Do not  take aspirin  · Do not  go on long car rides  Help prevent constipation:   · Eat a variety of healthy foods  Healthy foods include fruit, vegetables, whole-grain breads, low-fat dairy products, beans, lean meat, and fish  Ask if you need to be on a special diet  Your healthcare provider may recommend that you eat high-fiber foods such as cooked beans  Fiber helps you have regular bowel movements  · Drink liquids as directed    Adults should drink between 9 and 13 eight-ounce cups of liquid every day  Ask what amount is best for you  For most people, good liquids to drink are water, juice, and milk  · Exercise as directed  Talk to your healthcare provider about the best exercise plan for you  Exercise can help prevent constipation, decrease your blood pressure and improve your health  Follow up with your healthcare provider as directed:  Write down your questions so you remember to ask them during your visits  © 2017 [facility] Information is for End User's use only and may not be sold, redistributed or otherwise used for commercial purposes  All illustrations and images included in CareNotes® are the copyrighted property of [employer] M [employer]  or Doroteo Clarke  The above information is an  only  It is not intended as medical advice for individual conditions or treatments  Talk to your doctor, nurse or pharmacist before following any medical regimen to see if it is safe and effective for you

## 2019-11-12 NOTE — ANESTHESIA POSTPROCEDURE EVALUATION
Post-Op Assessment Note    CV Status:  Stable  Pain Score: 0    Pain management: adequate     Mental Status:  Awake   Hydration Status:  Stable   PONV Controlled:  None   Airway Patency:  Patent and adequate   Post Op Vitals Reviewed: Yes      Staff: CRNA           BP   97/57   Temp     Pulse  86   Resp   20   SpO2   98

## 2019-11-13 DIAGNOSIS — C15.5 MALIGNANT NEOPLASM OF LOWER THIRD OF ESOPHAGUS (HCC): ICD-10-CM

## 2019-11-13 DIAGNOSIS — C16.0 GE JUNCTION CARCINOMA (HCC): Primary | ICD-10-CM

## 2019-11-13 DIAGNOSIS — D70.1 CHEMOTHERAPY INDUCED NEUTROPENIA (HCC): ICD-10-CM

## 2019-11-13 DIAGNOSIS — T45.1X5A CHEMOTHERAPY INDUCED NEUTROPENIA (HCC): ICD-10-CM

## 2019-11-13 RX ORDER — SODIUM CHLORIDE 9 MG/ML
20 INJECTION, SOLUTION INTRAVENOUS ONCE
Status: CANCELLED | OUTPATIENT
Start: 2019-11-19

## 2019-11-13 RX ORDER — PALONOSETRON 0.05 MG/ML
0.25 INJECTION, SOLUTION INTRAVENOUS ONCE
Status: CANCELLED | OUTPATIENT
Start: 2019-11-19

## 2019-11-13 RX ORDER — DEXTROSE MONOHYDRATE 50 MG/ML
20 INJECTION, SOLUTION INTRAVENOUS ONCE
Status: CANCELLED | OUTPATIENT
Start: 2019-11-19

## 2019-11-15 ENCOUNTER — OFFICE VISIT (OUTPATIENT)
Dept: HEMATOLOGY ONCOLOGY | Facility: CLINIC | Age: 64
End: 2019-11-15
Payer: MEDICARE

## 2019-11-15 VITALS
BODY MASS INDEX: 26.05 KG/M2 | TEMPERATURE: 98.8 F | DIASTOLIC BLOOD PRESSURE: 66 MMHG | RESPIRATION RATE: 16 BRPM | HEART RATE: 101 BPM | HEIGHT: 70 IN | OXYGEN SATURATION: 97 % | WEIGHT: 182 LBS | SYSTOLIC BLOOD PRESSURE: 102 MMHG

## 2019-11-15 DIAGNOSIS — C16.0 GE JUNCTION CARCINOMA (HCC): Primary | ICD-10-CM

## 2019-11-15 PROCEDURE — 99214 OFFICE O/P EST MOD 30 MIN: CPT | Performed by: NURSE PRACTITIONER

## 2019-11-15 RX ORDER — SODIUM CHLORIDE 9 MG/ML
20 INJECTION, SOLUTION INTRAVENOUS ONCE
Status: CANCELLED | OUTPATIENT
Start: 2019-12-03

## 2019-11-15 RX ORDER — PALONOSETRON 0.05 MG/ML
0.25 INJECTION, SOLUTION INTRAVENOUS ONCE
Status: CANCELLED | OUTPATIENT
Start: 2019-12-03

## 2019-11-15 RX ORDER — DEXTROSE MONOHYDRATE 50 MG/ML
20 INJECTION, SOLUTION INTRAVENOUS ONCE
Status: CANCELLED | OUTPATIENT
Start: 2019-12-03

## 2019-11-15 NOTE — PROGRESS NOTES
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    HPI:  Raad Johnson seen for initial consultation 9/24/2019 regarding  Newly Diagnosed locally advanced GE junction/gastric cancer which is HER-2/thea positive  The patient is a 65-year-old male who was originally referred to Gastroenterology with the complaint of dysphagia  Anabel Edwards then had an EGD performed on 08/24/2019 showing neoplastic changes and luminal narrowing in the distal 1/3 of the esophagus   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   Esophageal biopsy revealed at least 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 and high-grade dysplasia   He was seen by her colleagues in cardiothoracic surgery who recommended neoadjuvant therapy upfront  He is going to be presented at tumor board  PET/CT scan showed Extensive FDG uptake at the distal esophagus GE junction and proximal stomach compatible with known malignancy  There were FDG avid perigastric lymph node posterior to the proximal stomach suspicious for metastatic disease and possible gastrohepatic lymphadenopathy which was inseparable from the gastric mass  There is also some uptake at the sixth rib but the patient states he did have trauma to this area recently  Metastatic disease could not be ruled out  There is also some uptake in the sigmoid colon for which a colonoscopy was recommended down the road  Previous Hematologic/ Oncologic History:    Oncology History    8/19/19 to ER with recurrent episode of foreign body sensation in his throat  hours after eating two small shrimp  Patient notes multiple episodes over the past month that typically have resolved spontaneously    Impression and plan:  Esophageal blockage likely secondary to food bolus that is recurrent    Patient symptoms improved following treatment with glucagon in the emergency room but patient will require close outpatient follow-up with Gastroenterology for EGD to evaluate etiology of recurrent symptoms    8/21/19 Jocelyn Duenas PA-C Gastroenterology  over the past 2 months he has had 3 distinct episodes of food getting stuck  He states that each time the food gets stuck to where he cannot tolerate any liquids or secretions  Plan EGD    8/24/19 EGD Dr Ricardo John  · Severe, generalized edematous, erythematous and hemorrhagic mucosa in the lower third of the esophagus (30 cm from the incisors) with bleeding before intervention; performed 10 cold biopsies  There is circumferential erythema, friability, easy bleeding,  neoplastic abnormality and narrowing from 30 cm down to 40 cm  Ten biopsies were obtained to rule out malignancy  A dilation was not performed since I suspect that the underlying etiology is malignancy  · The stomach and duodenum appeared normal    Esophagus (biopsy):  - At 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     HER2 positive    9/6/19 CT chest, abdomen and pelvis  IMPRESSION:     1  Irregular wall thickening involving the distal esophagus beginning just below the inferior pulmonary veins, extending through the gastric cardia compatible with biopsy-proven adenocarcinoma  As above, there is involvement of the gastric cardia, and possible extra luminal extension of tumor along its right lateral margin  2  There is mild adenopathy identified adjacent to the gastric cardia and distal esophagus  Additional borderline prominent mediastinal lymph nodes  3  There is a left upper lobe opacity identified  Although potentially representing an area of inflammation or scarring, an irregularly marginated solitary pulmonary metastasis should be excluded    This could either be reassessed with chest CT in 3 months time or further evaluated with PET CT (it is of borderline size for accurate characterization with PET)  4  Hypertrophy of the median lobe of the prostate (favored) or less likely 9 mm bladder polyp  5  Nonspecific 1 2 cm right adrenal nodule  Overall, PET/CT may be helpful to determine exact extent of adenopathy within the lower chest and upper abdomen, evaluation for any extraluminal extension of tumor, and further characterization of left upper lobe density and right adrenal nodule    9/16-9/18 admitted with dysphagia    9/16/19 CT chest, abdomen and pelvis  IMPRESSION:     There is irregular wall thickening involving the distal esophagus and extending to the left hand aspect of the upper stomach  There is a masslike area of abnormal soft tissue density along the left hand aspect of the upper stomach which measures approximately 5 5 x 3 6 cm, and there appears to be some infiltration of the adjacent fat  This is concerning for local extension of the patient's known neoplasm  Several mildly prominent nodes are present between this mass and the liver  There are also some mildly prominent mediastinal nodes  Please see discussion      There is some fluid within the esophagus near level of the gregg      There is an approximately 1 5 x 1 cm right adrenal nodule  This appears similar to slightly larger compared to the prior examination  There is a questionable tiny left adrenal nodule  Follow-up of both of these adrenal findings is suggested      There is an approximately 1 2 cm soft tissue density nodule within the posterior aspect of the urinary bladder  Although this may be related to indentation of the prostate, a small bladder mass cannot be entirely excluded  Urology consultation and follow-up is recommended      A focal area of opacity within the anterior aspect of the left upper lobe of the lung appears similar to September 6, 2019  Please see discussion  Continued follow-up is recommended    This could be reassessed with chest CT in 3 months  PET/CT could also be considered, however, it is of borderline size for accurate characterization with PET/ CT  If more remote CTs of the chest exist, direct comparison would be helpful      PET/CT should be considered for further evaluation of the extent of adenopathy and extent of disease  PET/CT or MRI could be utilized to evaluate the adrenal findings, if there are no contraindications      Cholelithiasis  No CT evidence of acute cholecystitis      Atherosclerosis  Coronary artery disease      Other findings as described above  Please see discussion    9/19/19 PET  IMPRESSION:     1  Extensive FDG uptake at the distal esophagus, GE junction and proximal stomach compatible with known malignancy  2  FDG avid perigastric lymph node posterior to the proximal stomach suspicious for metastasis  There may be FDG avid gastrohepatic lymphadenopathy but this is inseparable from the gastric mass  3   Focal FDG uptake at the left anterior 6th rib  Possible subtle focus of FDG uptake at the right lateral 2nd rib  No obvious findings here on the limited low-dose CT images  Metastasis is not excluded  Distribution is not typical for prior trauma  Consider follow-up with MRI of the chest   4   Foci of FDG uptake at the sigmoid colon for which underlying lesions should be excluded  Recommend follow-up with colonoscopy  5   Fairly diffuse FDG uptake at the thyroid gland, may be related to thyroiditis    9/20/19 Edin Bradley MD  Plan feeding tube, referral to Med Onc and Rad Onc  If the rib lesion is a metastasis, he is not a candidate for resection    9/24/19 Dr Trever Mcknight  recommended that he have a port as well as a feeding tube placed since he may undergo esophagectomy in the future    We did discuss that if the rib lesions are indeed metastasis, chemotherapy would be the mainstay of his treatment    9/24/19 Dr Rincon Parents  " I think the patient would benefit from neoadjuvant chemotherapy upfront prior to consideration of either concurrent chemoradiation or even surgery  Herceptin with FLOT chemotherapy Every 2 weeks for 6 cycles  This will consist of 5-FU 2600 mg/m² over 24 hours, leucovorin 20 mg meter square, Oxaliplatin 85 mg/m2 square and Taxotere 60 mg meter square  Once he completed 6 doses of chemotherapy we will reimage him  If he has a nice response we will then consider concurrent chemoradiation and then surgery  If he has a very nice response with very little residual disease surgery could possibly be considered for now I did talk to him about most probably getting concurrent chemoradiation after the chemotherapy "    9/26/19 Port insertion and J tube insertion scheduled at [facility] [place] (Dr Mauricio Guerra)  10/8/19 Infusion scheduled  Dr Frank King on colonoscopy ASAP    Mostly liquids  Can feel any food going     A lot of phlegm gets stuck and causes back pain    Constant cough if tries to lie down  Not sleeping much  Only sleep is sitting          Malignant neoplasm of lower third of esophagus ([address] )    8/24/2019 Initial Diagnosis     Malignant neoplasm of lower third of esophagus (HCC)  At least intramucosal carcinoma  Her2/SHIMON positive      10/8/2019 -  Chemotherapy     palonosetron (ALOXI) injection 0 25 mg, 0 25 mg, Intravenous, Once, 3 of 6 cycles  Administration: 0 25 mg (10/8/2019), 0 25 mg (10/22/2019), 0 25 mg (11/5/2019)  pegfilgrastim (NEULASTA ONPRO) subcutaneous injection kit 6 mg, 6 mg, Subcutaneous, Once, 3 of 6 cycles  Administration: 6 mg (10/9/2019), 6 mg (10/23/2019), 6 mg (11/6/2019)  fosaprepitant (EMEND) 150 mg in sodium chloride 0 9 % 250 mL IVPB, 150 mg, Intravenous, Once, 3 of 6 cycles  Administration: 150 mg (10/8/2019), 150 mg (10/22/2019), 150 mg (11/5/2019)  DOCEtaxel (TAXOTERE) 99 mg in sodium chloride 0 9 % 250 mL chemo infusion, 50 mg/m2 = 99 mg (83 3 % of original dose 60 mg/m2), Intravenous, Once, 3 of 6 cycles  Dose modification: 50 mg/m2 (original dose 60 mg/m2, Cycle 1, Reason: Other (See Comments))  Administration: 99 mg (10/8/2019), 99 mg (10/22/2019), 99 mg (11/5/2019)  leucovorin 396 mg in dextrose 5 % 250 mL IVPB, 200 mg/m2 = 396 mg (50 % of original dose 400 mg/m2), Intravenous, Once, 3 of 6 cycles  Dose modification: 200 mg/m2 (original dose 400 mg/m2, Cycle 1, Reason: Other (See Comments), Comment: FLOT regimen standard)  Administration: 396 mg (10/8/2019), 396 mg (10/22/2019), 396 mg (11/5/2019)  oxaliplatin (ELOXATIN) 168 3 mg in dextrose 5 % 250 mL chemo infusion, 85 mg/m2 = 168 3 mg, Intravenous, Once, 3 of 6 cycles  Administration: 168 3 mg (10/8/2019), 168 3 mg (10/22/2019), 168 3 mg (11/5/2019)  trastuzumab (HERCEPTIN) 496 mg in sodium chloride 0 9 % 250 mL chemo infusion, 6 mg/kg = 496 mg, Intravenous, Once, 3 of 6 cycles  Administration: 496 mg (10/8/2019), 331 mg (10/22/2019), 331 mg (11/5/2019)        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[facility] [address] )    8/24/2019 Biopsy     Esophagus (biopsy):  - At 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       9/24/2019 Initial Diagnosis     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[facility] [address] )      10/8/2019 -  Chemotherapy     palonosetron (ALOXI) injection 0 25 mg, 0 25 mg, Intravenous, Once, 3 of 6 cycles  Administration: 0 25 mg (10/8/2019), 0 25 mg (10/22/2019), 0 25 mg (11/5/2019)  pegfilgrastim (NEULASTA ONPRO) subcutaneous injection kit 6 mg, 6 mg, Subcutaneous, Once, 3 of 6 cycles  Administration: 6 mg (10/9/2019), 6 mg (10/23/2019), 6 mg (11/6/2019)  fosaprepitant (EMEND) 150 mg in sodium chloride 0 9 % 250 mL IVPB, 150 mg, Intravenous, Once, 3 of 6 cycles  Administration: 150 mg (10/8/2019), 150 mg (10/22/2019), 150 mg (11/5/2019)  DOCEtaxel (TAXOTERE) 99 mg in sodium chloride 0 9 % 250 mL chemo infusion, 50 mg/m2 = 99 mg (83 3 % of original dose 60 mg/m2), Intravenous, Once, 3 of 6 cycles  Dose modification: 50 mg/m2 (original dose 60 mg/m2, Cycle 1, Reason: Other (See Comments))  Administration: 99 mg (10/8/2019), 99 mg (10/22/2019), 99 mg (11/5/2019)  leucovorin 396 mg in dextrose 5 % 250 mL IVPB, 200 mg/m2 = 396 mg (50 % of original dose 400 mg/m2), Intravenous, Once, 3 of 6 cycles  Dose modification: 200 mg/m2 (original dose 400 mg/m2, Cycle 1, Reason: Other (See Comments), Comment: FLOT regimen standard)  Administration: 396 mg (10/8/2019), 396 mg (10/22/2019), 396 mg (11/5/2019)  oxaliplatin (ELOXATIN) 168 3 mg in dextrose 5 % 250 mL chemo infusion, 85 mg/m2 = 168 3 mg, Intravenous, Once, 3 of 6 cycles  Administration: 168 3 mg (10/8/2019), 168 3 mg (10/22/2019), 168 3 mg (11/5/2019)  trastuzumab (HERCEPTIN) 496 mg in sodium chloride 0 9 % 250 mL chemo infusion, 6 mg/kg = 496 mg, Intravenous, Once, 3 of 6 cycles  Administration: 496 mg (10/8/2019), 331 mg (10/22/2019), 331 mg (11/5/2019)         Current Hematologic/ Oncologic Treatment:    FLOT plus Herceptin every 2 weeks for 6 cycles    Interval History:    The patient returns for follow-up visit  Overall he feels well  He is eating well and only giving himself 3 cans of tube feeding through his PEG tube  Denies chest pain , shortness of breath , nausea , vomiting , diarrhea , bleeding/bruising , and fevers /infection  Blood counts remain within acceptable range  Test Results:    Imaging: No results found      Labs:   Lab Results   Component Value Date    WBC 13 43 (H) 11/04/2019    HGB 9 6 (L) 11/04/2019    HCT 32 6 (L) 11/04/2019    MCV 86 11/04/2019     11/04/2019     Lab Results   Component Value Date    K 4 3 11/04/2019     (H) 11/04/2019    CO2 24 11/04/2019    BUN 11 11/04/2019    CREATININE 0 88 11/04/2019    GLUF 125 (H) 11/04/2019    CALCIUM 8 8 11/04/2019    AST 14 11/04/2019    ALT 16 11/04/2019    ALKPHOS 97 11/04/2019    EGFR 90 11/04/2019         No results found for: SPEP, UPEP    No results found for: PSA    No results found for: CEA    No results found for:     No results found for: AFP    No results found for: IRON, TIBC, FERRITIN    No results found for: [id number]      ROS: As stated in the history of present illness otherwise his 14 point review of systems today was negative  Active Problems:   Patient Active Problem List   Diagnosis    COPD (chronic obstructive pulmonary disease) (HCC)    Headaches due to old head injury    High cholesterol    Obstructive sleep apnea syndrome    Type 2 diabetes mellitus with hyperglycemia, without long-term current use of insulin (HCC)    Malignant neoplasm of lower third of esophagus (HCC)    Esophageal obstruction    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HCC)    Chemotherapy induced neutropenia (HCC)    Anemia, unspecified    Insomnia due to medical condition       Past Medical History:   Past Medical History:   Diagnosis Date    Cancer ([facility] )     esophageal    COPD (chronic obstructive pulmonary disease) ([facility] )     Diabetes mellitus ([place] [address] )     Difficulty swallowing     Disease of thyroid gland     Esophageal mass     History of transfusion     Sleep apnea        Surgical History:   Past Surgical History:   Procedure Laterality Date    BACK SURGERY      x2    DISC REMOVAL      FL GUIDED CENTRAL VENOUS ACCESS DEVICE INSERTION  9/26/2019    GASTROJEJUNOSTOMY W/ JEJUNOSTOMY TUBE N/A 9/26/2019    Procedure: INSERTION JEJUNOSTOMY TUBE OPEN;  Surgeon: Simba Odell MD;  [facility];  Service: Surgical Oncology    TONSILLECTOMY      TUNNELED VENOUS PORT PLACEMENT N/A 9/26/2019    Procedure: INSERTION VENOUS PORT (PORT-A-CATH); Surgeon: Simba Odell MD;  [facility];  Service: Surgical Oncology       Family History:    Family History   Problem Relation Age of Onset    Diabetes Mother     No Known Problems Father        Cancer-related family history is not on file      Social History:   Social History     Socioeconomic History    Marital status: Single     Spouse name: Not on file    Number of children: Not on file    Years of education: Not on file    Highest education level: Not on file   Occupational History    Not on file   Social Needs    Financial resource strain: Not on file    Food insecurity:     Worry: Not on file     Inability: Not on file    Transportation needs:     Medical: Not on file     Non-medical: Not on file   Tobacco Use    Smoking status: Former Smoker     Packs/day: 0 50     Years: 50 00     Pack years: 25 00     Types: Cigarettes     Last attempt to quit: 2017     Years since quittin 8    Smokeless tobacco: Never Used   Substance and Sexual Activity    Alcohol use: Not Currently     Alcohol/week: 0 0 standard drinks     Frequency: Never     Drinks per session: 1 or 2     Binge frequency: Never    Drug use: Never    Sexual activity: Not on file   Lifestyle    Physical activity:     Days per week: Not on file     Minutes per session: Not on file    Stress: Not on file   Relationships    Social connections:     Talks on phone: Not on file     Gets together: Not on file     Attends [employer] service: Not on file     Active member of club or organization: Not on file     Attends meetings of clubs or organizations: Not on file     Relationship status: Not on file    Intimate partner violence:     Fear of current or ex partner: Not on file     Emotionally abused: Not on file     Physically abused: Not on file     Forced sexual activity: Not on file   Other Topics Concern    Not on file   Social History Narrative    Not on file       Current Medications:   Current Outpatient Medications   Medication Sig Dispense Refill    acetaminophen (TYLENOL) 160 mg/5 mL suspension Take 20 3 mL (650 mg total) by mouth every 4 (four) hours as needed for mild pain or fever 118 mL 0    albuterol (2 5 mg/3 mL) 0 083 % nebulizer solution Take 1 vial (2 5 mg total) by nebulization every 6 (six) hours as needed for wheezing or shortness of breath 1 vial 5    canagliflozin (INVOKANA) 100 mg Daily      dexamethasone (DECADRON) 1 MG/ML solution Take 8 mL (8 mg total) by mouth daily 30 mL 6    gabapentin (NEURONTIN) 300 mg capsule Take 1 capsule (300 mg total) by mouth daily at bedtime 30 capsule 0    levothyroxine 25 mcg tablet Take 25 mcg by mouth daily in the early morning      Nutritional Supplements (JEVITY 1 5 SHUN) LIQD Take 85 mL by mouth continuous [email or web address] advance as tolerated to goal rate of 85ml/hr to run for 16 hours daily  Flush with at least 60ml water at start and end of cycle  Needs additional water flushes if PO fluid intake declines 1000 mL 0    ondansetron (ZOFRAN) 4 MG/5ML solution Take 10 mL (8 mg total) by mouth every 8 (eight) hours as needed for nausea or vomiting 240 mL 6    pantoprazole (PROTONIX) 40 mg tablet Take 1 tablet (40 mg total) by mouth daily 30 tablet 1    pioglitazone (ACTOS) 30 mg tablet Take 30 mg by mouth daily       prochlorperazine (COMPAZINE) 10 mg tablet Take 1 tablet (10 mg total) by mouth every 6 (six) hours as needed for nausea or vomiting 45 tablet 3    rOPINIRole (REQUIP) 0 25 mg tablet Take 1 tablet (0 25 mg total) by mouth daily at bedtime 30 tablet 0    sitaGLIPtin (JANUVIA) 100 mg tablet Take 100 mg by mouth daily        No current facility-administered medications for this visit  Facility-Administered Medications Ordered in Other Visits   Medication Dose Route Frequency Provider Last Rate Last Dose    lidocaine (PF) (XYLOCAINE-MPF) 1 % injection 0 5 mL  0 5 mL Infiltration Once PRN Geroge Lundborg, MD           Allergies: Allergies   Allergen Reactions    Penicillins Itching       Physical Exam:    There is no height or weight on file to calculate BSA      Wt Readings from Last 3 Encounters:   11/05/19 85 3 kg (188 lb 0 8 oz)   10/28/19 80 7 kg (178 lb)   10/25/19 80 6 kg (177 lb 12 8 oz)        Temp Readings from Last 3 Encounters:   11/12/19 (!) 53 6 °F (12 °C)   11/05/19 97 8 °F (36 6 °C) (Oral)   10/28/19 97 8 °F (36 6 °C) (Oral)        BP Readings from Last 3 Encounters:   11/12/19 112/58   11/05/19 126/58   10/28/19 94/64         Pulse Readings from Last 3 Encounters:   11/12/19 77   11/05/19 100   10/28/19 78     @LASTSAO2(3)@      Physical Exam     Constitutional   General appearance: No acute distress, well appearing and well nourished  Eyes   Conjunctiva and lids: No swelling, erythema or discharge  Pupils and irises: Equal, round and reactive to light  Ears, Nose, Mouth, and Throat   External inspection of ears and nose: Normal     Nasal mucosa, septum, and turbinates: Normal without edema or erythema  Oropharynx: Normal with no erythema, edema, exudate or lesions  Pulmonary   Respiratory effort: No increased work of breathing or signs of respiratory distress  Auscultation of lungs: Clear to auscultation  Cardiovascular   Palpation of heart: Normal PMI, no thrills  Auscultation of heart: Normal rate and rhythm, normal S1 and S2, without murmurs  Examination of extremities for edema and/or varicosities: Normal     Carotid pulses: Normal     Abdomen   Abdomen: Non-tender, no masses  Liver and spleen: No hepatomegaly or splenomegaly  Lymphatic   Palpation of lymph nodes in neck: No lymphadenopathy  Musculoskeletal   Gait and station: Normal     Digits and nails: Normal without clubbing or cyanosis  Inspection/palpation of joints, bones, and muscles: Normal     Skin   Skin and subcutaneous tissue: Normal without rashes or lesions  Neurologic   Cranial nerves: Cranial nerves 2-12 intact  Sensation: No sensory loss  Psychiatric   Orientation to person, place, and time: Normal     Mood and affect: Normal         Assessment / Plan:    The patient is a 77-year-old male with 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  He has a reasonable amount of gastric involvement so it was decided that he would benefit from neoadjuvant chemotherapy prior to resection    He has been started on chemotherapy treatment with FLOT plus Herceptin for 3 months I e  6 cycles  We will plan to proceed with treatment provided his lab work remains within acceptable range  This includes 5 FU 2600 milligrams/meter squared/day over 24 hours, leucovorin 200 milligrams/meter squared, oxaliplatin 85 milligrams/meter squared, Taxotere 50 milligrams/meter squared, and Herceptin 4 milligrams/kilogram   He will continue receiving Venofer 200 mg with each of his treatments  He is taking Decadron 8mg BID the day before and after treatment  He gets Decadron at the infusion center the day of his treatment  We will plan to see him back prior to cycle 6 at this visit we will order a PET-CT to be done prior to follow-up with Surgical Oncology  In the meantime he will call if any questions or concerns  Goals and Barriers:  Current Goal:  Prolong Survival from GE junction malignancy  Barriers: None  Patient's Capacity to Self Care:  Patient able to self care  Portions of the record may have been created with voice recognition software   Occasional wrong word or "sound a like" substitutions may have occurred due to the inherent limitations of voice recognition software   Read the chart carefully and recognize, using context, where substitutions have occurred

## 2019-11-18 ENCOUNTER — APPOINTMENT (OUTPATIENT)
Dept: LAB | Facility: CLINIC | Age: 64
End: 2019-11-18
Payer: COMMERCIAL

## 2019-11-18 DIAGNOSIS — C16.0 GE JUNCTION CARCINOMA (HCC): ICD-10-CM

## 2019-11-18 DIAGNOSIS — T45.1X5A CHEMOTHERAPY INDUCED NEUTROPENIA (HCC): ICD-10-CM

## 2019-11-18 DIAGNOSIS — C15.5 MALIGNANT NEOPLASM OF LOWER THIRD OF ESOPHAGUS (HCC): ICD-10-CM

## 2019-11-18 DIAGNOSIS — D70.1 CHEMOTHERAPY INDUCED NEUTROPENIA (HCC): ICD-10-CM

## 2019-11-18 LAB
ALBUMIN SERPL BCP-MCNC: 3.6 G/DL (ref 3.5–5)
ALP SERPL-CCNC: 94 U/L (ref 46–116)
ALT SERPL W P-5'-P-CCNC: 12 U/L (ref 12–78)
ANION GAP SERPL CALCULATED.3IONS-SCNC: 10 MMOL/L (ref 4–13)
ANISOCYTOSIS BLD QL SMEAR: PRESENT
AST SERPL W P-5'-P-CCNC: 6 U/L (ref 5–45)
BASOPHILS # BLD MANUAL: 0.13 THOUSAND/UL (ref 0–0.1)
BASOPHILS NFR MAR MANUAL: 1 % (ref 0–1)
BILIRUB SERPL-MCNC: 0.51 MG/DL (ref 0.2–1)
BUN SERPL-MCNC: 11 MG/DL (ref 5–25)
CALCIUM SERPL-MCNC: 8.4 MG/DL (ref 8.3–10.1)
CHLORIDE SERPL-SCNC: 110 MMOL/L (ref 100–108)
CO2 SERPL-SCNC: 22 MMOL/L (ref 21–32)
CREAT SERPL-MCNC: 0.87 MG/DL (ref 0.6–1.3)
EOSINOPHIL # BLD MANUAL: 0.4 THOUSAND/UL (ref 0–0.4)
EOSINOPHIL NFR BLD MANUAL: 3 % (ref 0–6)
ERYTHROCYTE [DISTWIDTH] IN BLOOD BY AUTOMATED COUNT: 17 % (ref 11.6–15.1)
GFR SERPL CREATININE-BSD FRML MDRD: 91 ML/MIN/1.73SQ M
GLUCOSE P FAST SERPL-MCNC: 106 MG/DL (ref 65–99)
HCT VFR BLD AUTO: 33.4 % (ref 36.5–49.3)
HGB BLD-MCNC: 9.9 G/DL (ref 12–17)
HYPERCHROMIA BLD QL SMEAR: PRESENT
LYMPHOCYTES # BLD AUTO: 1.74 THOUSAND/UL (ref 0.6–4.47)
LYMPHOCYTES # BLD AUTO: 13 % (ref 14–44)
MCH RBC QN AUTO: 25 PG (ref 26.8–34.3)
MCHC RBC AUTO-ENTMCNC: 29.6 G/DL (ref 31.4–37.4)
MCV RBC AUTO: 84 FL (ref 82–98)
MONOCYTES # BLD AUTO: 0.13 THOUSAND/UL (ref 0–1.22)
MONOCYTES NFR BLD: 1 % (ref 4–12)
NEUTROPHILS # BLD MANUAL: 10.98 THOUSAND/UL (ref 1.85–7.62)
NEUTS BAND NFR BLD MANUAL: 2 % (ref 0–8)
NEUTS SEG NFR BLD AUTO: 80 % (ref 43–75)
NRBC BLD AUTO-RTO: 0 /100 WBCS
PLATELET # BLD AUTO: 177 THOUSANDS/UL (ref 149–390)
PLATELET BLD QL SMEAR: ADEQUATE
PMV BLD AUTO: 10 FL (ref 8.9–12.7)
POIKILOCYTOSIS BLD QL SMEAR: PRESENT
POLYCHROMASIA BLD QL SMEAR: PRESENT
POTASSIUM SERPL-SCNC: 3.9 MMOL/L (ref 3.5–5.3)
PROT SERPL-MCNC: 6.6 G/DL (ref 6.4–8.2)
RBC # BLD AUTO: 3.96 MILLION/UL (ref 3.88–5.62)
RBC MORPH BLD: PRESENT
SODIUM SERPL-SCNC: 142 MMOL/L (ref 136–145)
TOXIC GRANULES BLD QL SMEAR: PRESENT
WBC # BLD AUTO: 13.39 THOUSAND/UL (ref 4.31–10.16)

## 2019-11-18 PROCEDURE — 85027 COMPLETE CBC AUTOMATED: CPT

## 2019-11-18 PROCEDURE — 36415 COLL VENOUS BLD VENIPUNCTURE: CPT

## 2019-11-18 PROCEDURE — 85007 BL SMEAR W/DIFF WBC COUNT: CPT

## 2019-11-18 PROCEDURE — 80053 COMPREHEN METABOLIC PANEL: CPT

## 2019-11-18 NOTE — PROGRESS NOTES
Outpatient Oncology Nutrition Consult     Type of Consult: Follow Up  Care Location: [facility]     Reason for referral: Franki RN: Esophageal CA dx and TF (Date of referral: 9/26/19)    Nutrition Assessment:     PMH: COPD, DM, Jejunostomy 9/26/19  Oncology Diagnosis & Treatments: Malignant neoplasm of lower third of esophagus diagnosed 8/24/19  Chemotherapy (neulasta, taxotere, oxaliplatin, trastuzumab) started 10/8/19  Oncology History    8/19/19 to ER with recurrent episode of foreign body sensation in his throat  hours after eating two small shrimp  Patient notes multiple episodes over the past month that typically have resolved spontaneously    Impression and plan:  Esophageal blockage likely secondary to food bolus that is recurrent  Patient symptoms improved following treatment with glucagon in the emergency room but patient will require close outpatient follow-up with Gastroenterology for EGD to evaluate etiology of recurrent symptoms    8/21/19 Deandre Calvin PA-C Gastroenterology  over the past 2 months he has had 3 distinct episodes of food getting stuck  He states that each time the food gets stuck to where he cannot tolerate any liquids or secretions  Plan EGD    8/24/19 EGD Dr Everardo Kidd  · Severe, generalized edematous, erythematous and hemorrhagic mucosa in the lower third of the esophagus (30 cm from the incisors) with bleeding before intervention; performed 10 cold biopsies  There is circumferential erythema, friability, easy bleeding,  neoplastic abnormality and narrowing from 30 cm down to 40 cm  Ten biopsies were obtained to rule out malignancy  A dilation was not performed since I suspect that the underlying etiology is malignancy  · The stomach and duodenum appeared normal    Esophagus (biopsy):  - At 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     HER2 positive    9/6/19 CT chest, abdomen and pelvis  IMPRESSION:     1  Irregular wall thickening involving the distal esophagus beginning just below the inferior pulmonary veins, extending through the gastric cardia compatible with biopsy-proven adenocarcinoma  As above, there is involvement of the gastric cardia, and possible extra luminal extension of tumor along its right lateral margin  2  There is mild adenopathy identified adjacent to the gastric cardia and distal esophagus  Additional borderline prominent mediastinal lymph nodes  3  There is a left upper lobe opacity identified  Although potentially representing an area of inflammation or scarring, an irregularly marginated solitary pulmonary metastasis should be excluded  This could either be reassessed with chest CT in 3 months time or further evaluated with PET CT (it is of borderline size for accurate characterization with PET)  4  Hypertrophy of the median lobe of the prostate (favored) or less likely 9 mm bladder polyp  5  Nonspecific 1 2 cm right adrenal nodule  Overall, PET/CT may be helpful to determine exact extent of adenopathy within the lower chest and upper abdomen, evaluation for any extraluminal extension of tumor, and further characterization of left upper lobe density and right adrenal nodule    9/16-9/18 admitted with dysphagia    9/16/19 CT chest, abdomen and pelvis  IMPRESSION:     There is irregular wall thickening involving the distal esophagus and extending to the left hand aspect of the upper stomach  There is a masslike area of abnormal soft tissue density along the left hand aspect of the upper stomach which measures approximately 5 5 x 3 6 cm, and there appears to be some infiltration of the adjacent fat  This is concerning for local extension of the patient's known neoplasm  Several mildly prominent nodes are present between this mass and the liver  There are also some mildly prominent mediastinal nodes    Please see discussion      There is some fluid within the esophagus near level of the gregg      There is an approximately 1 5 x 1 cm right adrenal nodule  This appears similar to slightly larger compared to the prior examination  There is a questionable tiny left adrenal nodule  Follow-up of both of these adrenal findings is suggested      There is an approximately 1 2 cm soft tissue density nodule within the posterior aspect of the urinary bladder  Although this may be related to indentation of the prostate, a small bladder mass cannot be entirely excluded  Urology consultation and follow-up is recommended      A focal area of opacity within the anterior aspect of the left upper lobe of the lung appears similar to September 6, 2019  Please see discussion  Continued follow-up is recommended  This could be reassessed with chest CT in 3 months  PET/CT could also be considered, however, it is of borderline size for accurate characterization with PET/ CT  If more remote CTs of the chest exist, direct comparison would be helpful      PET/CT should be considered for further evaluation of the extent of adenopathy and extent of disease  PET/CT or MRI could be utilized to evaluate the adrenal findings, if there are no contraindications      Cholelithiasis  No CT evidence of acute cholecystitis      Atherosclerosis  Coronary artery disease      Other findings as described above  Please see discussion    9/19/19 PET  IMPRESSION:     1  Extensive FDG uptake at the distal esophagus, GE junction and proximal stomach compatible with known malignancy  2  FDG avid perigastric lymph node posterior to the proximal stomach suspicious for metastasis  There may be FDG avid gastrohepatic lymphadenopathy but this is inseparable from the gastric mass  3   Focal FDG uptake at the left anterior 6th rib  Possible subtle focus of FDG uptake at the right lateral 2nd rib  No obvious findings here on the limited low-dose CT images  Metastasis is not excluded  Distribution is not typical for prior trauma  Consider follow-up with MRI of the chest   4   Foci of FDG uptake at the sigmoid colon for which underlying lesions should be excluded  Recommend follow-up with colonoscopy  5   Fairly diffuse FDG uptake at the thyroid gland, may be related to thyroiditis    9/20/19 Nancy Duran MD  Plan feeding tube, referral to Med Onc and Rad Onc  If the rib lesion is a metastasis, he is not a candidate for resection    9/24/19 Dr Jeffrey Michaels  recommended that he have a port as well as a feeding tube placed since he may undergo esophagectomy in the future  We did discuss that if the rib lesions are indeed metastasis, chemotherapy would be the mainstay of his treatment    9/24/19 Dr Leticia Menendez  " I think the patient would benefit from neoadjuvant chemotherapy upfront prior to consideration of either concurrent chemoradiation or even surgery  Herceptin with FLOT chemotherapy Every 2 weeks for 6 cycles  This will consist of 5-FU 2600 mg/m² over 24 hours, leucovorin 20 mg meter square, Oxaliplatin 85 mg/m2 square and Taxotere 60 mg meter square  Once he completed 6 doses of chemotherapy we will reimage him  If he has a nice response we will then consider concurrent chemoradiation and then surgery  If he has a very nice response with very little residual disease surgery could possibly be considered for now I did talk to him about most probably getting concurrent chemoradiation after the chemotherapy "    9/26/19 Port insertion and J tube insertion scheduled at [place] (Dr Jeffrey Michaels)  10/8/19 Infusion scheduled  Dr Norberto Quesada on colonoscopy ASAP    Mostly liquids  Can feel any food going     A lot of phlegm gets stuck and causes back pain    Constant cough if tries to lie down  Not sleeping much  Only sleep is sitting          Malignant neoplasm of lower third of esophagus ([address] )    8/24/2019 Initial Diagnosis     Malignant neoplasm of lower third of esophagus ([address] )  At least intramucosal carcinoma  Her2/SHIMON positive      10/8/2019 - Chemotherapy     palonosetron (ALOXI) injection 0 25 mg, 0 25 mg, Intravenous, Once, 4 of 6 cycles  Administration: 0 25 mg (10/8/2019), 0 25 mg (10/22/2019), 0 25 mg (11/5/2019)  pegfilgrastim (NEULASTA ONPRO) subcutaneous injection kit 6 mg, 6 mg, Subcutaneous, Once, 4 of 6 cycles  Administration: 6 mg (10/9/2019), 6 mg (10/23/2019), 6 mg (11/6/2019)  fosaprepitant (EMEND) 150 mg in sodium chloride 0 9 % 250 mL IVPB, 150 mg, Intravenous, Once, 4 of 6 cycles  Administration: 150 mg (10/8/2019), 150 mg (10/22/2019), 150 mg (11/5/2019)  DOCEtaxel (TAXOTERE) 99 mg in sodium chloride 0 9 % 250 mL chemo infusion, 50 mg/m2 = 99 mg (83 3 % of original dose 60 mg/m2), Intravenous, Once, 4 of 6 cycles  Dose modification: 50 mg/m2 (original dose 60 mg/m2, Cycle 1, Reason: Other (See Comments))  Administration: 99 mg (10/8/2019), 99 mg (10/22/2019), 99 mg (11/5/2019)  leucovorin 396 mg in dextrose 5 % 250 mL IVPB, 200 mg/m2 = 396 mg (50 % of original dose 400 mg/m2), Intravenous, Once, 4 of 6 cycles  Dose modification: 200 mg/m2 (original dose 400 mg/m2, Cycle 1, Reason: Other (See Comments), Comment: FLOT regimen standard)  Administration: 396 mg (10/8/2019), 396 mg (10/22/2019), 396 mg (11/5/2019)  oxaliplatin (ELOXATIN) 168 3 mg in dextrose 5 % 250 mL chemo infusion, 85 mg/m2 = 168 3 mg, Intravenous, Once, 4 of 6 cycles  Administration: 168 3 mg (10/8/2019), 168 3 mg (10/22/2019), 168 3 mg (11/5/2019)  trastuzumab (HERCEPTIN) 496 mg in sodium chloride 0 9 % 250 mL chemo infusion, 6 mg/kg = 496 mg, Intravenous, Once, 4 of 6 cycles  Administration: 496 mg (10/8/2019), 331 mg (10/22/2019), 331 mg (11/5/2019)        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[address] )    8/24/2019 Biopsy     Esophagus (biopsy):  - At 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       9/24/2019 Initial Diagnosis     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[facility])      10/8/2019 -  Chemotherapy     palonosetron (ALOXI) injection 0 25 mg, 0 25 mg, Intravenous, Once, 4 of 6 cycles  Administration: 0 25 mg (10/8/2019), 0 25 mg (10/22/2019), 0 25 mg (11/5/2019)  pegfilgrastim (NEULASTA ONPRO) subcutaneous injection kit 6 mg, 6 mg, Subcutaneous, Once, 4 of 6 cycles  Administration: 6 mg (10/9/2019), 6 mg (10/23/2019), 6 mg (11/6/2019)  fosaprepitant (EMEND) 150 mg in sodium chloride 0 9 % 250 mL IVPB, 150 mg, Intravenous, Once, 4 of 6 cycles  Administration: 150 mg (10/8/2019), 150 mg (10/22/2019), 150 mg (11/5/2019)  DOCEtaxel (TAXOTERE) 99 mg in sodium chloride 0 9 % 250 mL chemo infusion, 50 mg/m2 = 99 mg (83 3 % of original dose 60 mg/m2), Intravenous, Once, 4 of 6 cycles  Dose modification: 50 mg/m2 (original dose 60 mg/m2, Cycle 1, Reason: Other (See Comments))  Administration: 99 mg (10/8/2019), 99 mg (10/22/2019), 99 mg (11/5/2019)  leucovorin 396 mg in dextrose 5 % 250 mL IVPB, 200 mg/m2 = 396 mg (50 % of original dose 400 mg/m2), Intravenous, Once, 4 of 6 cycles  Dose modification: 200 mg/m2 (original dose 400 mg/m2, Cycle 1, Reason: Other (See Comments), Comment: FLOT regimen standard)  Administration: 396 mg (10/8/2019), 396 mg (10/22/2019), 396 mg (11/5/2019)  oxaliplatin (ELOXATIN) 168 3 mg in dextrose 5 % 250 mL chemo infusion, 85 mg/m2 = 168 3 mg, Intravenous, Once, 4 of 6 cycles  Administration: 168 3 mg (10/8/2019), 168 3 mg (10/22/2019), 168 3 mg (11/5/2019)  trastuzumab (HERCEPTIN) 496 mg in sodium chloride 0 9 % 250 mL chemo infusion, 6 mg/kg = 496 mg, Intravenous, Once, 4 of 6 cycles  Administration: 496 mg (10/8/2019), 331 mg (10/22/2019), 331 mg (11/5/2019)       Past Medical History:   Diagnosis Date    Cancer ([facility] )     esophageal    COPD (chronic obstructive pulmonary disease) ([facility] )     Diabetes mellitus ([facility] )     Difficulty swallowing     Disease of thyroid gland     Esophageal mass     History of transfusion     Sleep apnea      Past Surgical History:   Procedure Laterality Date    BACK SURGERY      x2    DISC REMOVAL      FL GUIDED CENTRAL VENOUS ACCESS DEVICE INSERTION  9/26/2019    GASTROJEJUNOSTOMY W/ JEJUNOSTOMY TUBE N/A 9/26/2019    Procedure: INSERTION JEJUNOSTOMY TUBE OPEN;  Surgeon: Luke Montana MD;  [facility]: [facility];  Service: Surgical Oncology    TONSILLECTOMY      TUNNELED VENOUS PORT PLACEMENT N/A 9/26/2019    Procedure: INSERTION VENOUS PORT (PORT-A-CATH); Surgeon: Luke Montana MD;  Location: [facility];  Service: Surgical Oncology       Review of Medications:   Vitamins, Supplements and Herbals: no    Current Outpatient Medications:     acetaminophen (TYLENOL) 160 mg/5 mL suspension, Take 20 3 mL (650 mg total) by mouth every 4 (four) hours as needed for mild pain or fever, Disp: 118 mL, Rfl: 0    albuterol (2 5 mg/3 mL) 0 083 % nebulizer solution, Take 1 vial (2 5 mg total) by nebulization every 6 (six) hours as needed for wheezing or shortness of breath, Disp: 1 vial, Rfl: 5    canagliflozin (INVOKANA) 100 mg, Daily, Disp: , Rfl:     dexamethasone (DECADRON) 1 MG/ML solution, Take 8 mL (8 mg total) by mouth daily, Disp: 30 mL, Rfl: 6    gabapentin (NEURONTIN) 300 mg capsule, Take 1 capsule (300 mg total) by mouth daily at bedtime, Disp: 30 capsule, Rfl: 0    levothyroxine 25 mcg tablet, Take 25 mcg by mouth daily in the early morning, Disp: , Rfl:     Nutritional Supplements (JEVITY 1 5 SHUN) LIQD, Take 85 mL by mouth continuous Jevity Lidia@[employer] advance as tolerated to goal rate of 85ml/hr to run for 16 hours daily  Flush with at least 60ml water at start and end of cycle   Needs additional water flushes if PO fluid intake declines, Disp: 1000 mL, Rfl: 0    ondansetron (ZOFRAN) 4 MG/5ML solution, Take 10 mL (8 mg total) by mouth every 8 (eight) hours as needed for nausea or vomiting, Disp: 240 mL, Rfl: 6    pantoprazole (PROTONIX) 40 mg tablet, Take 1 tablet (40 mg total) by mouth daily, Disp: 30 tablet, Rfl: 1    pioglitazone (ACTOS) 30 mg tablet, Take 30 mg by mouth daily , Disp: , Rfl:     prochlorperazine (COMPAZINE) 10 mg tablet, Take 1 tablet (10 mg total) by mouth every 6 (six) hours as needed for nausea or vomiting, Disp: 45 tablet, Rfl: 3    rOPINIRole (REQUIP) 0 25 mg tablet, Take 1 tablet (0 25 mg total) by mouth daily at bedtime, Disp: 30 tablet, Rfl: 0    sitaGLIPtin (JANUVIA) 100 mg tablet, Take 100 mg by mouth daily , Disp: , Rfl:   No current facility-administered medications for this visit       Facility-Administered Medications Ordered in Other Visits:     dexamethasone (DECADRON) 10 mg in sodium chloride 0 9 % 50 mL IVPB, 10 mg, Intravenous, Once, Saad Fajardo MD, Last Rate: 150 mL/hr at 11/19/19 1029, 10 mg at 11/19/19 1029    dextrose 5 % infusion, 20 mL/hr, Intravenous, Once, Saad Fajardo MD    DOCEtaxel (TAXOTERE) 99 mg in sodium chloride 0 9 % 250 mL chemo infusion, 50 mg/m2 (Treatment Plan Recorded), Intravenous, Once, Saad Fajardo MD    fosaprepitant (EMEND) 150 mg in sodium chloride 0 9 % 250 mL IVPB, 150 mg, Intravenous, Once, Saad Fajardo MD    iron sucrose (VENOFER) 200 mg in sodium chloride 0 9 % 100 mL IVPB, 200 mg, Intravenous, Once, Saad Fajardo MD    leucovorin 396 mg in dextrose 5 % 250 mL IVPB, 200 mg/m2 (Treatment Plan Recorded), Intravenous, Once, Saad Fajardo MD    oxaliplatin (ELOXATIN) 168 3 mg in dextrose 5 % 250 mL chemo infusion, 85 mg/m2 (Treatment Plan Recorded), Intravenous, Once, Saad Fajardo MD    palonosetron (ALOXI) injection 0 25 mg, 0 25 mg, Intravenous, Once, Saad Fajardo MD    trastuzumab (HERCEPTIN) 331 mg in sodium chloride 0 9 % 250 mL chemo infusion, 4 mg/kg (Treatment Plan Recorded), Intravenous, Once, Saad Fajardo MD    Most Recent Lab Results:   Lab Results   Component Value Date    WBC 13 39 (H) 11/18/2019    ALT 12 11/18/2019    AST 6 11/18/2019    K 3 9 11/18/2019    K 4 3 11/04/2019    BUN 11 11/18/2019    BUN 11 11/04/2019    CREATININE 0 87 11/18/2019    CREATININE 0 88 11/04/2019    HGBA1C 7 6 08/15/2019    CALCIUM 8 4 11/18/2019 Anthropometric Measurements:   Height: 69"  Ht Readings from Last 1 Encounters:   11/19/19 5' 10" (1 778 m)     -Weight History:   Usual Weight: 205#  Ideal Body Weight: 160#  Wt Readings from Last 20 Encounters:   11/19/19 88 4 kg (194 lb 14 2 oz)   11/15/19 82 6 kg (182 lb)   11/05/19 85 3 kg (188 lb 0 8 oz)   10/28/19 80 7 kg (178 lb)   10/25/19 80 6 kg (177 lb 12 8 oz)   10/22/19 78 9 kg (174 lb)   10/15/19 79 8 kg (176 lb)   10/08/19 81 6 kg (180 lb)   09/26/19 82 6 kg (182 lb)   09/25/19 82 6 kg (182 lb)   09/24/19 82 6 kg (182 lb)   09/24/19 82 6 kg (182 lb)   09/20/19 82 1 kg (181 lb)   09/17/19 87 1 kg (192 lb)   08/29/19 87 5 kg (192 lb 12 8 oz)   08/21/19 87 6 kg (193 lb 3 2 oz)     Varian: None  Weight Changes: gain of 20 9#/ (12 0%) in 1 month (significant)     Estimated body mass index is 27 96 kg/m² as calculated from the following:    Height as of an earlier encounter on 11/19/19: 5' 10" (1 778 m)  Weight as of an earlier encounter on 11/19/19: 88 4 kg (194 lb 14 2 oz)      Nutrition-Focused Physical Findings: none observed     Food/Nutrition-Related History & Client/Social History:    Current Nutrition Impact Symptoms:  [x] Nausea - when he takes steroid [] Reduced Appetite  [] Acid Reflux    [] Vomiting  [] Unintended Wt Loss  [] Malabsorption    [x] Diarrhea - reports taking something for it this morning but cannot remember what he took [] Unintended Wt Gain  [] Dumping Syndrome    [] Constipation  [] Thick Mucous/Secretions  [] Abdominal Pain    [x] Dysgeusia (Altered Taste) -has had altered taste and smell for years, per pts sister at initial visit [x] Xerostomia (Dry Mouth) - "once in a while"  [x] Gas    [x] Dysosmia (Altered Smell)  [] Thrush  [] Difficulty Chewing    [] Oral Mucositis (Sore Mouth)  [x] Fatigue -improving  [] Other:    [] Odynophagia   [] Esophagitis  [] Other:    [] Dysphagia - resolved   [] Early Satiety  [] No Problems Eating      Food Allergies: no  Food Intolerances: no    Current Diet: Regular Diet, No Restrictions  Current Nutrition Intake: More than usual  Appetite: Good   Nutrition Route: PO; pt not using J-tube for nutrition at this time   Meal planning/preparation mainly done by: Self and Daughter  Oral Care: brushes QD  Activity level: Mainly sedentary, ADL  Social Hx: His daughter Isauro Liang helps take care of him  Diet Recall:   Breakfast: scrambled eggs with sausage, coffee   Lunch: Tuna salad on whole wheat bread    Dinner: spaghetti with tomato/meat sauce     Beverages: water (16 9 oz x3), coffee (16oz x1), hot tea (8oz x1)   Supplements:    Ensure Enlive (8 oz, 350 kcal, 20 g pro) Has but not drinking   Boost Plus (8 oz, 360 kcal, 14 g pro) has but not drinking      Enteral Nutrition:  Reported Enteral Nutrition Intake:   · Has not been using J-tube for nutrition     DME: [employer]     Estimated Nutrition Needs: (based on 82 7kg/ 182# actual wt)  Energy Needs: 3377-7808 kcal/day (30-35 kcal/kg)  Protein Needs:  grams/day (1 2-1 5 g/kg)  Fluid Needs: 1318-1125 mL/day (1 mL/kcal)    Discussion/Summary: Johanna Wadsworth was seen in infusion for nutrition follow up  He reports that his appetite and ability to swallow have improved significantly and he has been eating more than usual  He has not been using his J-tube for nutrition  Due to his increased PO intakes, his weight has significantly increased over the last month  He did express some frustration with his family and what they "allow" him to eat  He states that they tell him he cannot eat some of the things that he is hungry for  Explained to Johanna Wadsworth the need for increased calories and protein during the treatment process and discussed food sources/ways to add extra calories and protein  This RD has discussed nutrition with Fabiano's family during past nutrition follow ups and is available for family to contact with further nutrition questions about Fabiano's nutrition needs   Johanna Wadsworth states that he has not been drinking nutrition supplements, but has many at home incase his appetite decreases  Kamlesh Varner also states that he feels nausea when he takes the steroid he has been prescribed, encouraged him to follow directions for use that are either on the prescription bottle or were provided by the pharmacy and/or report symptom to prescribing MD  The directions for use may include taking the medication either with food or without food, which may effect nausea  Discussed/Reviewed:   · the importance of weight maintenance during CA tx  · indications & use of oral nutrition supplements : Choose nutrition supplement with >300 calories   · how to modify foods for anticipated nutrition impact symptoms pt may experience during CA tx  · high protein foods to include at all meals and snacks eggs, fish, greek yogurt, chicken   · ways to increase overall calorie intake peanut butter, whole milk, cheese  · encouraged eating every 2-3 hours (5-6 small meals/day)  · maintaining proper oral care Brushing TID, non alcohol containing mouthwash, baking soda/salt water rinse  · how to modify foods & eating for nutrition impact symptoms avoid spicy/acidic foods, avoid sharp/crunchy foods, soft/easy to swallow foods as needed   · adequate hydation & tips to increase overall fluid intake : goal of 83-96 oz fluid daily  · MNT for: nausea, nutrition throughout the treatment process, diarrhea   · individualized calorie, protein and fluid daily goals  · MNT for patient specific tx plan -  avoid cold temperature foods/fluids during the course of oxaliplatin tx  · individualized enteral nutrition recommendations & plan: see below: In the event enteral nutrition is needed for Fabiano's sole source of nutrition, recommend:  TF Goal: Jevity 1 5 at 99 mL/hr via J-tube cycled over 17 hours daily (until 7 cartons is infused)    Water Flushin mL free water flush at the beginning and end of TF infusion (250 mL total) plus 125 mL free water flush 8 times per day (1000 mL total) (total of 1250 mL/day in flushes; flushes should be spread throughout the day and every 2-3 hours during TF infusion; will need to adjust flushes prn for IV fluids)  Enteral Nutrition goal to provide: 1650 mL volume (7 cartons day), 2485 kcal, 106 grams protein, 1261 mL free water, 2511 mL total water daily  *Pt will require an enteral feeding pump to administer TF due to J-Tube due to esophageal cancer diagnosis  All questions and concerns addressed during todays visit  Grazyna Ralph has RD contact information  Nutrition Diagnosis:     · Increased Nutrient Needs (kcal & pro) related to increased demand for nutrients and disease state as evidenced by cancer dx and pt undergoing tx for cancer    Intervention & Recommendations:     Topics addressed: Nutrition education, Balance/Variety, Meals & Snacks, Meal planning, Choosing high protein meals/snacks, Meal pattern: eating small/frequent meals (every 2-3 hours), Nutrition Symptom Management, Adequate Hydration, Medical Food Supplements, Nutrition-Related Medication Management, Vitamin & Mineral Supplements, Enteral Nutrition and Weight Management    Barriers: Lack of Family Support- related to foods he would like to eat   Readiness to change: preparation  Comprehension: verbalizes understanding  Expected Compliance: fair    Materials Provided: declined-nutrition supplements   Monitoring & Evaluation:     Dietitian to Monitor: Food and Nutrition Intake, Nutrtion Impact Symptoms, Body Weight, Enteral and/or Parenteral Intake and Biochemical Data     Goals:  · adequate nutrition related symptom management  · pt to meet >/=75% estimated nutrition needs daily  · Weight maintenance    · Progress Towards Goals: Progressing    Nutrition Rx & Recommendations:  · Diet: High Calorie, High Protein (for high calorie foods see pages 52-53, and for high protein foods see pages 49-51 in your Eating Hints book)  · Avoid spicy, acidic, sharp/hard/crunchy foods & carbonated beverages as needed  · Eat slowly and chew food thoroughly before swallowing  · Keep a daily food journal (pen/paper, elham such as [employer])  · Nutrition Supplements: Choose nutrition supplements with greater than 300 calories  Refer to handout with highlighted suggestions     May use homemade shakes/smoothies as desired  If using a pre-made shake/bar, choose ones with >300 calories and >10 grams protein (ex  Ensure Enlive, Ensure Plus, Boost Plus, Boost Very High Calorie, etc )  · Small, frequent meals/snacks may be easier to tolerate than 3 large daily meals  Aim for 5-6 small meals per day (every 2-3 hours)  · Include protein at all meals/snacks  · Include a variety of foods (as tolerated/allowed by diet)  · Stay hydrated by sipping fluids of choice/tolerance throughout the day  · Liquid nutrition may be better tolerated than solids at times  · Alter food choices and eating patterns to accommodate changing needs  · Incorporate physical activity as able/allowed  · Refer to Eating Hints book for other meal/snack ideas and symptom management  · Follow proper oral care; Try baking soda/salt water rinse recipe (mix 3/4 tsp salt + 1 tsp baking soda + 1 qt water; rinse with pain water after using) in Eating Hints book (pg 18)  Brush your teeth before/after meals & before bed  · For lack of taste: Practice good oral care  Blend fresh fruits into shakes, ice cream or yogurt  Eat frozen fruits: grapes, chopped cantaloupe, watermelon  Select fresh vegetables (may be more appealing than canned/frozen)  Add extra flavor to foods: experiment with spices, salt & sweeteners, extra oil/butter; marinate meats (see pages 29-30 in your Eating Hints book)  · For diarrhea: drink plenty of fluids (>64 oz/day), avoid carbonation; eat 5-6 small meals/day; include high sodium & potassium foods/liquids (Sodium: soup/broth;   Potassium: bananas, canned apricots, potatoes); eat low-fiber foods; choose foods/liquids that are room temperature; avoid foods/drinks that can make diarrhea worse (ex  high fiber, high sugar, hot/cold drinks, greasy/fatty foods, gas forming foods such as beans, raw produce, milk products, alcohol, spicy foods, caffeine, sugar alcohols (xylitol, sorbitol), and apple juice (high in sorbitol) (see pages 15-16 in your Eating Hints book)  If diarrhea is severe, consider adding Banatrol (banana flakes) 1-3 times per day mixed in applesauce (can be purchased online from [address] [place] [address])  · Try adding soluble fiber: applesauce, banana, oatmeal, potatoes, rice, etc  to help thicken stools  · For nausea/vomiting: try plain/bland foods; try lemon/lime flavors; eat 5-6 small meals/day (except when vomiting); do not skip meals/snacks; choose appealing foods; sip only small amounts of liquids during meals; stay hydrated in between meals; eat foods/drinks that are room temperature; eat dry toast/crackers (see pages  21-22 and 31-32 in your Eating Hints book)  · Weigh yourself regularly  If you notice weight loss, make an effort to increase your daily food/calorie intake  If you continue to notice loss after these efforts, reach out to your dietitian to establish a plan to stabilize weight  · Consider stopping all high dose antioxidant supplements (vitamin A, C, E, selenium, etc )  Refer to [facility] "About Herbs" website for more information on the safety of supplements  · High calorie foods to try: peanut butter, whole milk, cheese  (see pages 52-53 in your Eating Hints book)  · High protein foods to try: eggs, fish, chicken, greek yogurt  (see pages 49-51 in your Eating Hints book)  · Foods that are easy to chew and swallow: mashed potatoes, yogurt, cream soups, smoothies (see page 47 in your Eating Hints book)  · Your syringes are each 60 mL or 2 fl oz  Always flush your feeding tube with 60 mL room-temp water 1-2 times daily while feeding tube is not in use    · Tube Feeding Plan: See below: In the event enteral nutrition is needed for [facility] sole source of nutrition, recommend:  TF Goal: Jevity 1 5 at 99 mL/hr via J-tube cycled over 17 hours daily (until 7 cartons is infused)  Water Flushin mL free water flush at the beginning and end of TF infusion (250 mL total) plus 125 mL free water flush 8 times per day (1000 mL total) (total of 1250 mL/day in flushes; flushes should be spread throughout the day and every 2-3 hours during TF infusion; will need to adjust flushes prn for IV fluids)  Enteral Nutrition goal to provide: 1650 mL volume (7 cartons day), 2485 kcal, 106 grams protein, 1261 mL free water, 2511 mL total water daily  Remember that 1 carton of Jevity 1 5 = 355 calories   *Pt will require an enteral feeding pump to administer TF due to J-Tube due to esophageal cancer diagnosis  · Call [facility] when you have 10 days left of TF supplies: [phone], option 3 (customer support)        Follow Up Plan: 19 during infusion        Recommend Referral to Other Providers: none at this time

## 2019-11-19 ENCOUNTER — HOSPITAL ENCOUNTER (OUTPATIENT)
Dept: INFUSION CENTER | Facility: CLINIC | Age: 64
Discharge: HOME | End: 2019-11-19
Payer: COMMERCIAL

## 2019-11-19 ENCOUNTER — NUTRITION (OUTPATIENT)
Dept: NUTRITION | Facility: CLINIC | Age: 64
End: 2019-11-19

## 2019-11-19 VITALS
HEART RATE: 95 BPM | BODY MASS INDEX: 27.9 KG/M2 | WEIGHT: 194.89 LBS | HEIGHT: 70 IN | RESPIRATION RATE: 20 BRPM | TEMPERATURE: 97.8 F | OXYGEN SATURATION: 96 % | DIASTOLIC BLOOD PRESSURE: 58 MMHG | SYSTOLIC BLOOD PRESSURE: 122 MMHG

## 2019-11-19 DIAGNOSIS — T45.1X5A CHEMOTHERAPY INDUCED NEUTROPENIA (HCC): ICD-10-CM

## 2019-11-19 DIAGNOSIS — Z71.3 NUTRITIONAL COUNSELING: Primary | ICD-10-CM

## 2019-11-19 DIAGNOSIS — C15.5 MALIGNANT NEOPLASM OF LOWER THIRD OF ESOPHAGUS (HCC): Primary | ICD-10-CM

## 2019-11-19 DIAGNOSIS — D70.1 CHEMOTHERAPY INDUCED NEUTROPENIA (HCC): ICD-10-CM

## 2019-11-19 DIAGNOSIS — C16.0 GE JUNCTION CARCINOMA (HCC): ICD-10-CM

## 2019-11-19 PROCEDURE — 96413 CHEMO IV INFUSION 1 HR: CPT

## 2019-11-19 PROCEDURE — 96375 TX/PRO/DX INJ NEW DRUG ADDON: CPT

## 2019-11-19 PROCEDURE — 96417 CHEMO IV INFUS EACH ADDL SEQ: CPT

## 2019-11-19 PROCEDURE — 96367 TX/PROPH/DG ADDL SEQ IV INF: CPT

## 2019-11-19 PROCEDURE — 96368 THER/DIAG CONCURRENT INF: CPT

## 2019-11-19 PROCEDURE — 96415 CHEMO IV INFUSION ADDL HR: CPT

## 2019-11-19 PROCEDURE — G0498 CHEMO EXTEND IV INFUS W/PUMP: HCPCS

## 2019-11-19 RX ORDER — SODIUM CHLORIDE 9 MG/ML
20 INJECTION, SOLUTION INTRAVENOUS ONCE
Status: COMPLETED | OUTPATIENT
Start: 2019-11-19 | End: 2019-11-19

## 2019-11-19 RX ORDER — PALONOSETRON 0.05 MG/ML
0.25 INJECTION, SOLUTION INTRAVENOUS ONCE
Status: COMPLETED | OUTPATIENT
Start: 2019-11-19 | End: 2019-11-19

## 2019-11-19 RX ORDER — DEXTROSE MONOHYDRATE 50 MG/ML
20 INJECTION, SOLUTION INTRAVENOUS ONCE
Status: DISCONTINUED | OUTPATIENT
Start: 2019-11-19 | End: 2019-11-22 | Stop reason: HOSPADM

## 2019-11-19 RX ADMIN — PALONOSETRON 0.25 MG: 0.05 INJECTION, SOLUTION INTRAVENOUS at 11:56

## 2019-11-19 RX ADMIN — IRON SUCROSE 200 MG: 20 INJECTION, SOLUTION INTRAVENOUS at 10:54

## 2019-11-19 RX ADMIN — TRASTUZUMAB 331 MG: 150 INJECTION, POWDER, LYOPHILIZED, FOR SOLUTION INTRAVENOUS at 13:33

## 2019-11-19 RX ADMIN — SODIUM CHLORIDE 20 ML/HR: 0.9 INJECTION, SOLUTION INTRAVENOUS at 10:29

## 2019-11-19 RX ADMIN — DEXAMETHASONE SODIUM PHOSPHATE 10 MG: 10 INJECTION, SOLUTION INTRAMUSCULAR; INTRAVENOUS at 10:29

## 2019-11-19 RX ADMIN — LEUCOVORIN CALCIUM 396 MG: 350 INJECTION, POWDER, LYOPHILIZED, FOR SOLUTION INTRAMUSCULAR; INTRAVENOUS at 14:30

## 2019-11-19 RX ADMIN — OXALIPLATIN 168.3 MG: 5 INJECTION, SOLUTION, CONCENTRATE INTRAVENOUS at 14:30

## 2019-11-19 RX ADMIN — SODIUM CHLORIDE 150 MG: 0.9 INJECTION, SOLUTION INTRAVENOUS at 11:55

## 2019-11-19 RX ADMIN — DOCETAXEL 99 MG: 20 INJECTION, SOLUTION, CONCENTRATE INTRAVENOUS at 12:28

## 2019-11-19 NOTE — PLAN OF CARE
Problem: Potential for Falls  Goal: Patient will remain free of falls  Description  INTERVENTIONS:  - Assess patient frequently for physical needs  -  Identify cognitive and physical deficits and behaviors that affect risk of falls  -  [place] fall precautions as indicated by assessment   - Educate patient/family on patient safety including physical limitations  - Instruct patient to call for assistance with activity based on assessment  - Modify environment to reduce risk of injury  - Consider OT/PT consult to assist with strengthening/mobility  11/19/2019 1220 by Johny Cotto RN  Outcome: Progressing  11/19/2019 0851 by Johny Cotto RN  Outcome: Progressing     Problem: Knowledge Deficit  Goal: Patient/family/caregiver demonstrates understanding of disease process, treatment plan, medications, and discharge instructions  Description  Complete learning assessment and assess knowledge base    Interventions:  - Provide teaching at level of understanding  - Provide teaching via preferred learning methods  11/19/2019 1220 by Johny Cotto RN  Outcome: Progressing  11/19/2019 0851 by Johny Cotto RN  Outcome: Progressing

## 2019-11-19 NOTE — PATIENT INSTRUCTIONS
Nutrition Rx & Recommendations:  · Diet: High Calorie, High Protein (for high calorie foods see pages 52-53, and for high protein foods see pages 49-51 in your Eating Hints book)  · Avoid spicy, acidic, sharp/hard/crunchy foods & carbonated beverages as needed  · Eat slowly and chew food thoroughly before swallowing  · Keep a daily food journal (pen/paper, elham such as [employer])  · Nutrition Supplements: Choose nutrition supplements with greater than 300 calories  Refer to handout with highlighted suggestions     May use homemade shakes/smoothies as desired  If using a pre-made shake/bar, choose ones with >300 calories and >10 grams protein (ex  Ensure Enlive, Ensure Plus, Boost Plus, Boost Very High Calorie, etc )  · Small, frequent meals/snacks may be easier to tolerate than 3 large daily meals  Aim for 5-6 small meals per day (every 2-3 hours)  · Include protein at all meals/snacks  · Include a variety of foods (as tolerated/allowed by diet)  · Stay hydrated by sipping fluids of choice/tolerance throughout the day  · Liquid nutrition may be better tolerated than solids at times  · Alter food choices and eating patterns to accommodate changing needs  · Incorporate physical activity as able/allowed  · Refer to Eating Hints book for other meal/snack ideas and symptom management  · Follow proper oral care; Try baking soda/salt water rinse recipe (mix 3/4 tsp salt + 1 tsp baking soda + 1 qt water; rinse with pain water after using) in Eating Hints book (pg 18)  Brush your teeth before/after meals & before bed  · For lack of taste: Practice good oral care  Blend fresh fruits into shakes, ice cream or yogurt  Eat frozen fruits: grapes, chopped cantaloupe, watermelon  Select fresh vegetables (may be more appealing than canned/frozen)  Add extra flavor to foods: experiment with spices, salt & sweeteners, extra oil/butter; marinate meats (see pages 29-30 in your Eating Hints book)    · For diarrhea: drink plenty of fluids (>64 oz/day), avoid carbonation; eat 5-6 small meals/day; include high sodium & potassium foods/liquids (Sodium: soup/broth; Potassium: bananas, canned apricots, potatoes); eat low-fiber foods; choose foods/liquids that are room temperature; avoid foods/drinks that can make diarrhea worse (ex  high fiber, high sugar, hot/cold drinks, greasy/fatty foods, gas forming foods such as beans, raw produce, milk products, alcohol, spicy foods, caffeine, sugar alcohols (xylitol, sorbitol), and apple juice (high in sorbitol) (see pages 15-16 in your Eating Hints book)  If diarrhea is severe, consider adding Banatrol (banana flakes) 1-3 times per day mixed in applesauce (can be purchased online from [address] [place] [address])  · Try adding soluble fiber: applesauce, banana, oatmeal, potatoes, rice, etc  to help thicken stools  · For nausea/vomiting: try plain/bland foods; try lemon/lime flavors; eat 5-6 small meals/day (except when vomiting); do not skip meals/snacks; choose appealing foods; sip only small amounts of liquids during meals; stay hydrated in between meals; eat foods/drinks that are room temperature; eat dry toast/crackers (see pages  21-22 and 31-32 in your Eating Hints book)  · Weigh yourself regularly  If you notice weight loss, make an effort to increase your daily food/calorie intake  If you continue to notice loss after these efforts, reach out to your dietitian to establish a plan to stabilize weight  · Consider stopping all high dose antioxidant supplements (vitamin A, C, E, selenium, etc )  Refer to [facility] "About Herbs" website for more information on the safety of supplements    · High calorie foods to try: peanut butter, whole milk, cheese  (see pages 52-53 in your Eating Hints book)  · High protein foods to try: eggs, fish, chicken, greek yogurt  (see pages 49-51 in your Eating Hints book)  · Foods that are easy to chew and swallow: mashed potatoes, yogurt, cream soups, smoothies (see page 47 in your Eating Hints book)  · Your syringes are each 60 mL or 2 fl oz  Always flush your feeding tube with 60 mL room-temp water 1-2 times daily while feeding tube is not in use  · Tube Feeding Plan: See below: In the event enteral nutrition is needed for [facility] sole source of nutrition, recommend:  TF Goal: Jevity 1 5 at 99 mL/hr via J-tube cycled over 17 hours daily (until 7 cartons is infused)  Water Flushin mL free water flush at the beginning and end of TF infusion (250 mL total) plus 125 mL free water flush 8 times per day (1000 mL total) (total of 1250 mL/day in flushes; flushes should be spread throughout the day and every 2-3 hours during TF infusion; will need to adjust flushes prn for IV fluids)  Enteral Nutrition goal to provide: 1650 mL volume (7 cartons day), 2485 kcal, 106 grams protein, 1261 mL free water, 2511 mL total water daily  Remember that 1 carton of Jevity 1 5 = 355 calories   *Pt will require an enteral feeding pump to administer TF due to J-Tube due to esophageal cancer diagnosis  · Call [facility] when you have 10 days left of TF supplies: [phone], option 3 (customer support)        Follow Up Plan: 19 during infusion        Recommend Referral to Other Providers: none at this time

## 2019-11-19 NOTE — PROGRESS NOTES
Pt tolerated chemo well, port flushed CADD CONCNECTED, running and flashing green, patient to come back 11/20 at 430 for disconnect and onpro

## 2019-11-19 NOTE — PLAN OF CARE
Problem: Potential for Falls  Goal: Patient will remain free of falls  Description  INTERVENTIONS:  - Assess patient frequently for physical needs  -  Identify cognitive and physical deficits and behaviors that affect risk of falls  -  [place] fall precautions as indicated by assessment   - Educate patient/family on patient safety including physical limitations  - Instruct patient to call for assistance with activity based on assessment  - Modify environment to reduce risk of injury  - Consider OT/PT consult to assist with strengthening/mobility  Outcome: Progressing     Problem: Knowledge Deficit  Goal: Patient/family/caregiver demonstrates understanding of disease process, treatment plan, medications, and discharge instructions  Description  Complete learning assessment and assess knowledge base    Interventions:  - Provide teaching at level of understanding  - Provide teaching via preferred learning methods  Outcome: Progressing

## 2019-11-20 ENCOUNTER — HOSPITAL ENCOUNTER (OUTPATIENT)
Dept: INFUSION CENTER | Facility: CLINIC | Age: 64
Discharge: HOME | End: 2019-11-20
Payer: COMMERCIAL

## 2019-11-20 DIAGNOSIS — C16.0 GE JUNCTION CARCINOMA (HCC): ICD-10-CM

## 2019-11-20 DIAGNOSIS — T45.1X5A CHEMOTHERAPY INDUCED NEUTROPENIA (HCC): ICD-10-CM

## 2019-11-20 DIAGNOSIS — C15.5 MALIGNANT NEOPLASM OF LOWER THIRD OF ESOPHAGUS (HCC): Primary | ICD-10-CM

## 2019-11-20 DIAGNOSIS — D70.1 CHEMOTHERAPY INDUCED NEUTROPENIA (HCC): ICD-10-CM

## 2019-11-20 PROCEDURE — 96372 THER/PROPH/DIAG INJ SC/IM: CPT

## 2019-11-20 RX ADMIN — PEGFILGRASTIM 6 MG: KIT SUBCUTANEOUS at 16:33

## 2019-11-20 NOTE — PROGRESS NOTES
Pt presents for CADD pump disconnection  Pt offers no complaints, reservoir volume 0mL  Port de accessed without difficulty  Small "lump" noted on port site after needle discontinuation, no discoloration noted  Pt denies any pain around port  Pt advised to monitor site and notify MD if swelling remains or worsens  Greta New made aware  Pt verbalized understanding  AVS declined, next appointment reviewed

## 2019-11-22 DIAGNOSIS — C15.5 MALIGNANT NEOPLASM OF LOWER THIRD OF ESOPHAGUS (HCC): ICD-10-CM

## 2019-11-22 DIAGNOSIS — D70.1 CHEMOTHERAPY INDUCED NEUTROPENIA (HCC): ICD-10-CM

## 2019-11-22 DIAGNOSIS — T45.1X5A CHEMOTHERAPY INDUCED NEUTROPENIA (HCC): ICD-10-CM

## 2019-11-22 DIAGNOSIS — C16.0 GE JUNCTION CARCINOMA (HCC): Primary | ICD-10-CM

## 2019-12-02 ENCOUNTER — APPOINTMENT (OUTPATIENT)
Dept: LAB | Facility: CLINIC | Age: 64
End: 2019-12-02
Payer: COMMERCIAL

## 2019-12-02 DIAGNOSIS — C16.0 GE JUNCTION CARCINOMA (HCC): ICD-10-CM

## 2019-12-02 DIAGNOSIS — D70.1 CHEMOTHERAPY INDUCED NEUTROPENIA (HCC): ICD-10-CM

## 2019-12-02 DIAGNOSIS — T45.1X5A CHEMOTHERAPY INDUCED NEUTROPENIA (HCC): ICD-10-CM

## 2019-12-02 DIAGNOSIS — C15.5 MALIGNANT NEOPLASM OF LOWER THIRD OF ESOPHAGUS (HCC): ICD-10-CM

## 2019-12-02 LAB
ALBUMIN SERPL BCP-MCNC: 3.6 G/DL (ref 3.5–5)
ALP SERPL-CCNC: 101 U/L (ref 46–116)
ALT SERPL W P-5'-P-CCNC: 14 U/L (ref 12–78)
ANION GAP SERPL CALCULATED.3IONS-SCNC: 5 MMOL/L (ref 4–13)
ANISOCYTOSIS BLD QL SMEAR: PRESENT
AST SERPL W P-5'-P-CCNC: 14 U/L (ref 5–45)
BASOPHILS # BLD MANUAL: 0.29 THOUSAND/UL (ref 0–0.1)
BASOPHILS NFR MAR MANUAL: 2 % (ref 0–1)
BILIRUB SERPL-MCNC: 0.57 MG/DL (ref 0.2–1)
BUN SERPL-MCNC: 13 MG/DL (ref 5–25)
CALCIUM SERPL-MCNC: 8.9 MG/DL (ref 8.3–10.1)
CHLORIDE SERPL-SCNC: 111 MMOL/L (ref 100–108)
CO2 SERPL-SCNC: 27 MMOL/L (ref 21–32)
CREAT SERPL-MCNC: 0.84 MG/DL (ref 0.6–1.3)
EOSINOPHIL # BLD MANUAL: 0.58 THOUSAND/UL (ref 0–0.4)
EOSINOPHIL NFR BLD MANUAL: 4 % (ref 0–6)
ERYTHROCYTE [DISTWIDTH] IN BLOOD BY AUTOMATED COUNT: 18.6 % (ref 11.6–15.1)
GFR SERPL CREATININE-BSD FRML MDRD: 92 ML/MIN/1.73SQ M
GLUCOSE P FAST SERPL-MCNC: 128 MG/DL (ref 65–99)
HCT VFR BLD AUTO: 33.4 % (ref 36.5–49.3)
HGB BLD-MCNC: 10 G/DL (ref 12–17)
LYMPHOCYTES # BLD AUTO: 1.45 THOUSAND/UL (ref 0.6–4.47)
LYMPHOCYTES # BLD AUTO: 10 % (ref 14–44)
MCH RBC QN AUTO: 24.9 PG (ref 26.8–34.3)
MCHC RBC AUTO-ENTMCNC: 29.9 G/DL (ref 31.4–37.4)
MCV RBC AUTO: 83 FL (ref 82–98)
MONOCYTES # BLD AUTO: 0.72 THOUSAND/UL (ref 0–1.22)
MONOCYTES NFR BLD: 5 % (ref 4–12)
NEUTROPHILS # BLD MANUAL: 10.71 THOUSAND/UL (ref 1.85–7.62)
NEUTS BAND NFR BLD MANUAL: 1 % (ref 0–8)
NEUTS SEG NFR BLD AUTO: 73 % (ref 43–75)
NRBC BLD AUTO-RTO: 0 /100 WBCS
NRBC BLD AUTO-RTO: 3 /100 WBC (ref 0–2)
PLATELET # BLD AUTO: 104 THOUSANDS/UL (ref 149–390)
PLATELET BLD QL SMEAR: ABNORMAL
PMV BLD AUTO: 10.3 FL (ref 8.9–12.7)
POIKILOCYTOSIS BLD QL SMEAR: PRESENT
POLYCHROMASIA BLD QL SMEAR: PRESENT
POTASSIUM SERPL-SCNC: 3.9 MMOL/L (ref 3.5–5.3)
PROT SERPL-MCNC: 6.5 G/DL (ref 6.4–8.2)
RBC # BLD AUTO: 4.01 MILLION/UL (ref 3.88–5.62)
RBC MORPH BLD: PRESENT
SODIUM SERPL-SCNC: 143 MMOL/L (ref 136–145)
VARIANT LYMPHS # BLD AUTO: 5 %
WBC # BLD AUTO: 14.47 THOUSAND/UL (ref 4.31–10.16)

## 2019-12-02 PROCEDURE — 85007 BL SMEAR W/DIFF WBC COUNT: CPT

## 2019-12-02 PROCEDURE — 36415 COLL VENOUS BLD VENIPUNCTURE: CPT

## 2019-12-02 PROCEDURE — 80053 COMPREHEN METABOLIC PANEL: CPT

## 2019-12-02 PROCEDURE — 85027 COMPLETE CBC AUTOMATED: CPT

## 2019-12-03 ENCOUNTER — HOSPITAL ENCOUNTER (OUTPATIENT)
Dept: INFUSION CENTER | Facility: CLINIC | Age: 64
Discharge: HOME | End: 2019-12-03
Payer: COMMERCIAL

## 2019-12-03 VITALS
DIASTOLIC BLOOD PRESSURE: 65 MMHG | WEIGHT: 197.09 LBS | TEMPERATURE: 97.4 F | RESPIRATION RATE: 20 BRPM | HEART RATE: 94 BPM | HEIGHT: 70 IN | SYSTOLIC BLOOD PRESSURE: 113 MMHG | BODY MASS INDEX: 28.22 KG/M2

## 2019-12-03 DIAGNOSIS — D70.1 CHEMOTHERAPY INDUCED NEUTROPENIA (HCC): ICD-10-CM

## 2019-12-03 DIAGNOSIS — T45.1X5A CHEMOTHERAPY INDUCED NEUTROPENIA (HCC): ICD-10-CM

## 2019-12-03 DIAGNOSIS — R19.7 DIARRHEA, UNSPECIFIED TYPE: Primary | ICD-10-CM

## 2019-12-03 DIAGNOSIS — C15.5 MALIGNANT NEOPLASM OF LOWER THIRD OF ESOPHAGUS (HCC): Primary | ICD-10-CM

## 2019-12-03 DIAGNOSIS — C16.0 GE JUNCTION CARCINOMA (HCC): ICD-10-CM

## 2019-12-03 PROCEDURE — 96417 CHEMO IV INFUS EACH ADDL SEQ: CPT

## 2019-12-03 PROCEDURE — 96367 TX/PROPH/DG ADDL SEQ IV INF: CPT

## 2019-12-03 PROCEDURE — 96368 THER/DIAG CONCURRENT INF: CPT

## 2019-12-03 PROCEDURE — 96375 TX/PRO/DX INJ NEW DRUG ADDON: CPT

## 2019-12-03 PROCEDURE — 96413 CHEMO IV INFUSION 1 HR: CPT

## 2019-12-03 PROCEDURE — 96415 CHEMO IV INFUSION ADDL HR: CPT

## 2019-12-03 PROCEDURE — G0498 CHEMO EXTEND IV INFUS W/PUMP: HCPCS

## 2019-12-03 RX ORDER — PALONOSETRON 0.05 MG/ML
0.25 INJECTION, SOLUTION INTRAVENOUS ONCE
Status: COMPLETED | OUTPATIENT
Start: 2019-12-03 | End: 2019-12-03

## 2019-12-03 RX ORDER — SODIUM CHLORIDE 9 MG/ML
20 INJECTION, SOLUTION INTRAVENOUS ONCE
Status: COMPLETED | OUTPATIENT
Start: 2019-12-03 | End: 2019-12-03

## 2019-12-03 RX ORDER — DEXTROSE MONOHYDRATE 50 MG/ML
20 INJECTION, SOLUTION INTRAVENOUS ONCE
Status: COMPLETED | OUTPATIENT
Start: 2019-12-03 | End: 2019-12-03

## 2019-12-03 RX ADMIN — SODIUM CHLORIDE 150 MG: 0.9 INJECTION, SOLUTION INTRAVENOUS at 12:06

## 2019-12-03 RX ADMIN — IRON SUCROSE 200 MG: 20 INJECTION, SOLUTION INTRAVENOUS at 10:46

## 2019-12-03 RX ADMIN — TRASTUZUMAB 331 MG: 150 INJECTION, POWDER, LYOPHILIZED, FOR SOLUTION INTRAVENOUS at 14:03

## 2019-12-03 RX ADMIN — SODIUM CHLORIDE 20 ML/HR: 0.9 INJECTION, SOLUTION INTRAVENOUS at 10:44

## 2019-12-03 RX ADMIN — DEXTROSE 20 ML/HR: 5 SOLUTION INTRAVENOUS at 14:40

## 2019-12-03 RX ADMIN — DOCETAXEL 99 MG: 20 INJECTION, SOLUTION, CONCENTRATE INTRAVENOUS at 12:56

## 2019-12-03 RX ADMIN — DEXAMETHASONE SODIUM PHOSPHATE 10 MG: 10 INJECTION, SOLUTION INTRAMUSCULAR; INTRAVENOUS at 11:43

## 2019-12-03 RX ADMIN — OXALIPLATIN 168.3 MG: 5 INJECTION, SOLUTION, CONCENTRATE INTRAVENOUS at 14:42

## 2019-12-03 RX ADMIN — PALONOSETRON 0.25 MG: 0.05 INJECTION, SOLUTION INTRAVENOUS at 12:50

## 2019-12-03 RX ADMIN — LEUCOVORIN CALCIUM 396 MG: 350 INJECTION, POWDER, LYOPHILIZED, FOR SOLUTION INTRAMUSCULAR; INTRAVENOUS at 14:41

## 2019-12-03 NOTE — PLAN OF CARE
Problem: Potential for Falls  Goal: Patient will remain free of falls  Description  INTERVENTIONS:  - Assess patient frequently for physical needs  -  Identify cognitive and physical deficits and behaviors that affect risk of falls  -  [place] fall precautions as indicated by assessment   - Educate patient/family on patient safety including physical limitations  - Instruct patient to call for assistance with activity based on assessment  - Modify environment to reduce risk of injury  - Consider OT/PT consult to assist with strengthening/mobility  Outcome: Progressing     Problem: Knowledge Deficit  Goal: Patient/family/caregiver demonstrates understanding of disease process, treatment plan, medications, and discharge instructions  Description  Complete learning assessment and assess knowledge base    Interventions:  - Provide teaching at level of understanding  - Provide teaching via preferred learning methods  Outcome: Progressing

## 2019-12-03 NOTE — PROGRESS NOTES
Pt tolerated treatment without incident  CADD patent and running  AVS declined, tomorrows appointment time reviewed

## 2019-12-04 ENCOUNTER — HOSPITAL ENCOUNTER (OUTPATIENT)
Dept: INFUSION CENTER | Facility: CLINIC | Age: 64
Discharge: HOME | End: 2019-12-04
Payer: COMMERCIAL

## 2019-12-04 DIAGNOSIS — C16.0 GE JUNCTION CARCINOMA (HCC): ICD-10-CM

## 2019-12-04 DIAGNOSIS — D70.1 CHEMOTHERAPY INDUCED NEUTROPENIA (HCC): ICD-10-CM

## 2019-12-04 DIAGNOSIS — C15.5 MALIGNANT NEOPLASM OF LOWER THIRD OF ESOPHAGUS (HCC): Primary | ICD-10-CM

## 2019-12-04 DIAGNOSIS — T45.1X5A CHEMOTHERAPY INDUCED NEUTROPENIA (HCC): ICD-10-CM

## 2019-12-04 PROCEDURE — 96372 THER/PROPH/DIAG INJ SC/IM: CPT

## 2019-12-04 RX ADMIN — PEGFILGRASTIM 6 MG: KIT SUBCUTANEOUS at 16:47

## 2019-12-04 NOTE — PROGRESS NOTES
Pt returns for CADD disconnect  While reviewing medications, pt reports will not take home meds while on CADD pump "it is too much at once"  Encouraged strongly for patient to continue home medications, in particular the diabetes medication  Pt reports will not take the steriod on day after because he does not feel good when he takes it  CADD DC'd    OnPro intact with green light blinking

## 2019-12-09 RX ORDER — PALONOSETRON 0.05 MG/ML
0.25 INJECTION, SOLUTION INTRAVENOUS ONCE
Status: CANCELLED | OUTPATIENT
Start: 2019-12-17

## 2019-12-09 RX ORDER — DEXTROSE MONOHYDRATE 50 MG/ML
20 INJECTION, SOLUTION INTRAVENOUS ONCE
Status: CANCELLED | OUTPATIENT
Start: 2019-12-17

## 2019-12-09 RX ORDER — SODIUM CHLORIDE 9 MG/ML
20 INJECTION, SOLUTION INTRAVENOUS ONCE
Status: CANCELLED | OUTPATIENT
Start: 2019-12-17

## 2019-12-10 DIAGNOSIS — C16.0 GE JUNCTION CARCINOMA (HCC): Primary | ICD-10-CM

## 2019-12-10 DIAGNOSIS — T45.1X5A CHEMOTHERAPY INDUCED NEUTROPENIA (HCC): ICD-10-CM

## 2019-12-10 DIAGNOSIS — D70.1 CHEMOTHERAPY INDUCED NEUTROPENIA (HCC): ICD-10-CM

## 2019-12-10 DIAGNOSIS — C15.5 MALIGNANT NEOPLASM OF LOWER THIRD OF ESOPHAGUS (HCC): ICD-10-CM

## 2019-12-16 ENCOUNTER — APPOINTMENT (OUTPATIENT)
Dept: LAB | Facility: CLINIC | Age: 64
End: 2019-12-16
Payer: COMMERCIAL

## 2019-12-16 DIAGNOSIS — D70.1 CHEMOTHERAPY INDUCED NEUTROPENIA (HCC): ICD-10-CM

## 2019-12-16 DIAGNOSIS — D50.0 IRON DEFICIENCY ANEMIA DUE TO CHRONIC BLOOD LOSS: ICD-10-CM

## 2019-12-16 DIAGNOSIS — C15.5 MALIGNANT NEOPLASM OF LOWER THIRD OF ESOPHAGUS (HCC): ICD-10-CM

## 2019-12-16 DIAGNOSIS — T45.1X5A CHEMOTHERAPY INDUCED NEUTROPENIA (HCC): ICD-10-CM

## 2019-12-16 DIAGNOSIS — C16.0 GE JUNCTION CARCINOMA (HCC): ICD-10-CM

## 2019-12-16 LAB
ANISOCYTOSIS BLD QL SMEAR: PRESENT
BASOPHILS # BLD MANUAL: 0 THOUSAND/UL (ref 0–0.1)
BASOPHILS NFR MAR MANUAL: 0 % (ref 0–1)
EOSINOPHIL # BLD MANUAL: 0.11 THOUSAND/UL (ref 0–0.4)
EOSINOPHIL NFR BLD MANUAL: 1 % (ref 0–6)
ERYTHROCYTE [DISTWIDTH] IN BLOOD BY AUTOMATED COUNT: 20.4 % (ref 11.6–15.1)
HCT VFR BLD AUTO: 35.4 % (ref 36.5–49.3)
HGB BLD-MCNC: 10.5 G/DL (ref 12–17)
LYMPHOCYTES # BLD AUTO: 2.27 THOUSAND/UL (ref 0.6–4.47)
LYMPHOCYTES # BLD AUTO: 21 % (ref 14–44)
MCH RBC QN AUTO: 25.4 PG (ref 26.8–34.3)
MCHC RBC AUTO-ENTMCNC: 29.7 G/DL (ref 31.4–37.4)
MCV RBC AUTO: 86 FL (ref 82–98)
MONOCYTES # BLD AUTO: 0.43 THOUSAND/UL (ref 0–1.22)
MONOCYTES NFR BLD: 4 % (ref 4–12)
MYELOCYTES NFR BLD MANUAL: 1 % (ref 0–1)
NEUTROPHILS # BLD MANUAL: 7.13 THOUSAND/UL (ref 1.85–7.62)
NEUTS BAND NFR BLD MANUAL: 4 % (ref 0–8)
NEUTS SEG NFR BLD AUTO: 62 % (ref 43–75)
NRBC BLD AUTO-RTO: 1 /100 WBCS
PLATELET # BLD AUTO: 78 THOUSANDS/UL (ref 149–390)
PLATELET BLD QL SMEAR: ABNORMAL
PMV BLD AUTO: 11.1 FL (ref 8.9–12.7)
POIKILOCYTOSIS BLD QL SMEAR: PRESENT
POLYCHROMASIA BLD QL SMEAR: PRESENT
RBC # BLD AUTO: 4.14 MILLION/UL (ref 3.88–5.62)
RBC MORPH BLD: PRESENT
TOXIC GRANULES BLD QL SMEAR: PRESENT
VARIANT LYMPHS # BLD AUTO: 7 %
WBC # BLD AUTO: 10.8 THOUSAND/UL (ref 4.31–10.16)

## 2019-12-16 PROCEDURE — 36415 COLL VENOUS BLD VENIPUNCTURE: CPT

## 2019-12-16 PROCEDURE — 85007 BL SMEAR W/DIFF WBC COUNT: CPT

## 2019-12-16 PROCEDURE — 85027 COMPLETE CBC AUTOMATED: CPT

## 2019-12-17 ENCOUNTER — HOSPITAL ENCOUNTER (OUTPATIENT)
Dept: INFUSION CENTER | Facility: CLINIC | Age: 64
Discharge: HOME | End: 2019-12-17
Payer: COMMERCIAL

## 2019-12-17 ENCOUNTER — NUTRITION (OUTPATIENT)
Dept: NUTRITION | Facility: CLINIC | Age: 64
End: 2019-12-17

## 2019-12-17 VITALS
WEIGHT: 190.92 LBS | HEIGHT: 70 IN | OXYGEN SATURATION: 96 % | TEMPERATURE: 97.8 F | DIASTOLIC BLOOD PRESSURE: 58 MMHG | BODY MASS INDEX: 27.33 KG/M2 | SYSTOLIC BLOOD PRESSURE: 123 MMHG | HEART RATE: 85 BPM | RESPIRATION RATE: 18 BRPM

## 2019-12-17 DIAGNOSIS — C16.0 GE JUNCTION CARCINOMA (HCC): ICD-10-CM

## 2019-12-17 DIAGNOSIS — T45.1X5A CHEMOTHERAPY INDUCED NEUTROPENIA (HCC): ICD-10-CM

## 2019-12-17 DIAGNOSIS — D70.1 CHEMOTHERAPY INDUCED NEUTROPENIA (HCC): ICD-10-CM

## 2019-12-17 DIAGNOSIS — C15.5 MALIGNANT NEOPLASM OF LOWER THIRD OF ESOPHAGUS (HCC): Primary | ICD-10-CM

## 2019-12-17 DIAGNOSIS — Z71.3 NUTRITIONAL COUNSELING: Primary | ICD-10-CM

## 2019-12-17 LAB
ALBUMIN SERPL BCP-MCNC: 3.1 G/DL (ref 3.5–5)
ALP SERPL-CCNC: 94 U/L (ref 46–116)
ALT SERPL W P-5'-P-CCNC: 16 U/L (ref 12–78)
ANION GAP SERPL CALCULATED.3IONS-SCNC: 9 MMOL/L (ref 4–13)
AST SERPL W P-5'-P-CCNC: 15 U/L (ref 5–45)
BILIRUB SERPL-MCNC: 0.6 MG/DL (ref 0.2–1)
BUN SERPL-MCNC: 12 MG/DL (ref 5–25)
CALCIUM SERPL-MCNC: 7.7 MG/DL (ref 8.3–10.1)
CHLORIDE SERPL-SCNC: 107 MMOL/L (ref 100–108)
CO2 SERPL-SCNC: 26 MMOL/L (ref 21–32)
CREAT SERPL-MCNC: 0.75 MG/DL (ref 0.6–1.3)
GFR SERPL CREATININE-BSD FRML MDRD: 96 ML/MIN/1.73SQ M
GLUCOSE SERPL-MCNC: 124 MG/DL (ref 65–140)
POTASSIUM SERPL-SCNC: 3.5 MMOL/L (ref 3.5–5.3)
PROT SERPL-MCNC: 6.3 G/DL (ref 6.4–8.2)
SODIUM SERPL-SCNC: 142 MMOL/L (ref 136–145)

## 2019-12-17 PROCEDURE — 96417 CHEMO IV INFUS EACH ADDL SEQ: CPT

## 2019-12-17 PROCEDURE — 80053 COMPREHEN METABOLIC PANEL: CPT | Performed by: INTERNAL MEDICINE

## 2019-12-17 PROCEDURE — 96368 THER/DIAG CONCURRENT INF: CPT

## 2019-12-17 PROCEDURE — G0498 CHEMO EXTEND IV INFUS W/PUMP: HCPCS

## 2019-12-17 PROCEDURE — 96375 TX/PRO/DX INJ NEW DRUG ADDON: CPT

## 2019-12-17 PROCEDURE — 96413 CHEMO IV INFUSION 1 HR: CPT

## 2019-12-17 PROCEDURE — 96415 CHEMO IV INFUSION ADDL HR: CPT

## 2019-12-17 PROCEDURE — 96367 TX/PROPH/DG ADDL SEQ IV INF: CPT

## 2019-12-17 RX ORDER — SODIUM CHLORIDE 9 MG/ML
20 INJECTION, SOLUTION INTRAVENOUS ONCE
Status: COMPLETED | OUTPATIENT
Start: 2019-12-17 | End: 2019-12-17

## 2019-12-17 RX ORDER — DEXTROSE MONOHYDRATE 50 MG/ML
20 INJECTION, SOLUTION INTRAVENOUS ONCE
Status: COMPLETED | OUTPATIENT
Start: 2019-12-17 | End: 2019-12-17

## 2019-12-17 RX ORDER — PALONOSETRON 0.05 MG/ML
0.25 INJECTION, SOLUTION INTRAVENOUS ONCE
Status: COMPLETED | OUTPATIENT
Start: 2019-12-17 | End: 2019-12-17

## 2019-12-17 RX ADMIN — SODIUM CHLORIDE 150 MG: 0.9 INJECTION, SOLUTION INTRAVENOUS at 12:16

## 2019-12-17 RX ADMIN — TRASTUZUMAB 331 MG: 150 INJECTION, POWDER, LYOPHILIZED, FOR SOLUTION INTRAVENOUS at 14:04

## 2019-12-17 RX ADMIN — IRON SUCROSE 200 MG: 20 INJECTION, SOLUTION INTRAVENOUS at 10:33

## 2019-12-17 RX ADMIN — DOCETAXEL 99 MG: 20 INJECTION, SOLUTION, CONCENTRATE INTRAVENOUS at 12:59

## 2019-12-17 RX ADMIN — PALONOSETRON 0.25 MG: 0.05 INJECTION, SOLUTION INTRAVENOUS at 12:18

## 2019-12-17 RX ADMIN — DEXAMETHASONE SODIUM PHOSPHATE 10 MG: 10 INJECTION, SOLUTION INTRAMUSCULAR; INTRAVENOUS at 11:48

## 2019-12-17 RX ADMIN — OXALIPLATIN 168.3 MG: 5 INJECTION, SOLUTION INTRAVENOUS at 14:48

## 2019-12-17 RX ADMIN — LEUCOVORIN CALCIUM 396 MG: 350 INJECTION, POWDER, LYOPHILIZED, FOR SOLUTION INTRAMUSCULAR; INTRAVENOUS at 14:46

## 2019-12-17 RX ADMIN — DEXTROSE 20 ML/HR: 5 SOLUTION INTRAVENOUS at 14:47

## 2019-12-17 RX ADMIN — SODIUM CHLORIDE 20 ML/HR: 0.9 INJECTION, SOLUTION INTRAVENOUS at 10:34

## 2019-12-17 NOTE — PROGRESS NOTES
Outpatient Oncology Nutrition Consult     Type of Consult: Follow Up  Care Location: [facility]     Reason for referral: Franki RN: Esophageal CA dx and TF (Date of referral: 9/26/19)    Nutrition Assessment:     PMH: COPD, DM, Jejunostomy 9/26/19  Oncology Diagnosis & Treatments: Malignant neoplasm of lower third of esophagus diagnosed 8/24/19  Chemotherapy (neulasta, taxotere, oxaliplatin, trastuzumab) started 10/8/19  Oncology History    8/19/19 to ER with recurrent episode of foreign body sensation in his throat  hours after eating two small shrimp  Patient notes multiple episodes over the past month that typically have resolved spontaneously    Impression and plan:  Esophageal blockage likely secondary to food bolus that is recurrent  Patient symptoms improved following treatment with glucagon in the emergency room but patient will require close outpatient follow-up with Gastroenterology for EGD to evaluate etiology of recurrent symptoms    8/21/19 Bety Arias PA-C Gastroenterology  over the past 2 months he has had 3 distinct episodes of food getting stuck  He states that each time the food gets stuck to where he cannot tolerate any liquids or secretions  Plan EGD    8/24/19 EGD Dr Kelby Saucedo  · Severe, generalized edematous, erythematous and hemorrhagic mucosa in the lower third of the esophagus (30 cm from the incisors) with bleeding before intervention; performed 10 cold biopsies  There is circumferential erythema, friability, easy bleeding,  neoplastic abnormality and narrowing from 30 cm down to 40 cm  Ten biopsies were obtained to rule out malignancy  A dilation was not performed since I suspect that the underlying etiology is malignancy  · The stomach and duodenum appeared normal    Esophagus (biopsy):  - At 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     HER2 positive    9/6/19 CT chest, abdomen and pelvis  IMPRESSION:     1  Irregular wall thickening involving the distal esophagus beginning just below the inferior pulmonary veins, extending through the gastric cardia compatible with biopsy-proven adenocarcinoma  As above, there is involvement of the gastric cardia, and possible extra luminal extension of tumor along its right lateral margin  2  There is mild adenopathy identified adjacent to the gastric cardia and distal esophagus  Additional borderline prominent mediastinal lymph nodes  3  There is a left upper lobe opacity identified  Although potentially representing an area of inflammation or scarring, an irregularly marginated solitary pulmonary metastasis should be excluded  This could either be reassessed with chest CT in 3 months time or further evaluated with PET CT (it is of borderline size for accurate characterization with PET)  4  Hypertrophy of the median lobe of the prostate (favored) or less likely 9 mm bladder polyp  5  Nonspecific 1 2 cm right adrenal nodule  Overall, PET/CT may be helpful to determine exact extent of adenopathy within the lower chest and upper abdomen, evaluation for any extraluminal extension of tumor, and further characterization of left upper lobe density and right adrenal nodule    9/16-9/18 admitted with dysphagia    9/16/19 CT chest, abdomen and pelvis  IMPRESSION:     There is irregular wall thickening involving the distal esophagus and extending to the left hand aspect of the upper stomach  There is a masslike area of abnormal soft tissue density along the left hand aspect of the upper stomach which measures approximately 5 5 x 3 6 cm, and there appears to be some infiltration of the adjacent fat  This is concerning for local extension of the patient's known neoplasm  Several mildly prominent nodes are present between this mass and the liver  There are also some mildly prominent mediastinal nodes    Please see discussion      There is some fluid within the esophagus near level of the gregg      There is an approximately 1 5 x 1 cm right adrenal nodule  This appears similar to slightly larger compared to the prior examination  There is a questionable tiny left adrenal nodule  Follow-up of both of these adrenal findings is suggested      There is an approximately 1 2 cm soft tissue density nodule within the posterior aspect of the urinary bladder  Although this may be related to indentation of the prostate, a small bladder mass cannot be entirely excluded  Urology consultation and follow-up is recommended      A focal area of opacity within the anterior aspect of the left upper lobe of the lung appears similar to September 6, 2019  Please see discussion  Continued follow-up is recommended  This could be reassessed with chest CT in 3 months  PET/CT could also be considered, however, it is of borderline size for accurate characterization with PET/ CT  If more remote CTs of the chest exist, direct comparison would be helpful      PET/CT should be considered for further evaluation of the extent of adenopathy and extent of disease  PET/CT or MRI could be utilized to evaluate the adrenal findings, if there are no contraindications      Cholelithiasis  No CT evidence of acute cholecystitis      Atherosclerosis  Coronary artery disease      Other findings as described above  Please see discussion    9/19/19 PET  IMPRESSION:     1  Extensive FDG uptake at the distal esophagus, GE junction and proximal stomach compatible with known malignancy  2  FDG avid perigastric lymph node posterior to the proximal stomach suspicious for metastasis  There may be FDG avid gastrohepatic lymphadenopathy but this is inseparable from the gastric mass  3   Focal FDG uptake at the left anterior 6th rib  Possible subtle focus of FDG uptake at the right lateral 2nd rib  No obvious findings here on the limited low-dose CT images  Metastasis is not excluded  Distribution is not typical for prior trauma  Consider follow-up with MRI of the chest   4   Foci of FDG uptake at the sigmoid colon for which underlying lesions should be excluded  Recommend follow-up with colonoscopy  5   Fairly diffuse FDG uptake at the thyroid gland, may be related to thyroiditis    9/20/19 Omar Stallworth MD  Plan feeding tube, referral to Med Onc and Rad Onc  If the rib lesion is a metastasis, he is not a candidate for resection    9/24/19 Dr Kelley Prado  recommended that he have a port as well as a feeding tube placed since he may undergo esophagectomy in the future  We did discuss that if the rib lesions are indeed metastasis, chemotherapy would be the mainstay of his treatment    9/24/19 Dr Kamala Flores  " I think the patient would benefit from neoadjuvant chemotherapy upfront prior to consideration of either concurrent chemoradiation or even surgery  Herceptin with FLOT chemotherapy Every 2 weeks for 6 cycles  This will consist of 5-FU 2600 mg/m² over 24 hours, leucovorin 20 mg meter square, Oxaliplatin 85 mg/m2 square and Taxotere 60 mg meter square  Once he completed 6 doses of chemotherapy we will reimage him  If he has a nice response we will then consider concurrent chemoradiation and then surgery  If he has a very nice response with very little residual disease surgery could possibly be considered for now I did talk to him about most probably getting concurrent chemoradiation after the chemotherapy "    9/26/19 Port insertion and J tube insertion scheduled at [facility] (Dr Kelley Prado)  10/8/19 Infusion scheduled  Dr Maria A Donovan on colonoscopy ASAP    Mostly liquids  Can feel any food going     A lot of phlegm gets stuck and causes back pain    Constant cough if tries to lie down  Not sleeping much  Only sleep is sitting          Malignant neoplasm of lower third of esophagus ([address] )    8/24/2019 Initial Diagnosis     Malignant neoplasm of lower third of esophagus ([address] )  At least intramucosal carcinoma  Her2/SHIMON positive      10/8/2019 - Chemotherapy     palonosetron (ALOXI) injection 0 25 mg, 0 25 mg, Intravenous, Once, 6 of 6 cycles  Administration: 0 25 mg (10/8/2019), 0 25 mg (10/22/2019), 0 25 mg (11/5/2019), 0 25 mg (11/19/2019), 0 25 mg (12/3/2019)  pegfilgrastim (NEULASTA ONPRO) subcutaneous injection kit 6 mg, 6 mg, Subcutaneous, Once, 6 of 6 cycles  Administration: 6 mg (10/9/2019), 6 mg (10/23/2019), 6 mg (11/6/2019), 6 mg (11/20/2019), 6 mg (12/4/2019)  fosaprepitant (EMEND) 150 mg in sodium chloride 0 9 % 250 mL IVPB, 150 mg, Intravenous, Once, 6 of 6 cycles  Administration: 150 mg (10/8/2019), 150 mg (10/22/2019), 150 mg (11/5/2019), 150 mg (11/19/2019), 150 mg (12/3/2019)  DOCEtaxel (TAXOTERE) 99 mg in sodium chloride 0 9 % 250 mL chemo infusion, 50 mg/m2 = 99 mg (83 3 % of original dose 60 mg/m2), Intravenous, Once, 6 of 6 cycles  Dose modification: 50 mg/m2 (original dose 60 mg/m2, Cycle 1, Reason: Other (See Comments))  Administration: 99 mg (10/8/2019), 99 mg (10/22/2019), 99 mg (11/5/2019), 99 mg (11/19/2019), 99 mg (12/3/2019)  leucovorin 396 mg in dextrose 5 % 250 mL IVPB, 200 mg/m2 = 396 mg (50 % of original dose 400 mg/m2), Intravenous, Once, 6 of 6 cycles  Dose modification: 200 mg/m2 (original dose 400 mg/m2, Cycle 1, Reason: Other (See Comments), Comment: FLOT regimen standard)  Administration: 396 mg (10/8/2019), 396 mg (10/22/2019), 396 mg (11/5/2019), 396 mg (11/19/2019), 396 mg (12/3/2019)  oxaliplatin (ELOXATIN) 168 3 mg in dextrose 5 % 250 mL chemo infusion, 85 mg/m2 = 168 3 mg, Intravenous, Once, 6 of 6 cycles  Administration: 168 3 mg (10/8/2019), 168 3 mg (10/22/2019), 168 3 mg (11/5/2019), 168 3 mg (11/19/2019), 168 3 mg (12/3/2019)  trastuzumab (HERCEPTIN) 496 mg in sodium chloride 0 9 % 250 mL chemo infusion, 6 mg/kg = 496 mg, Intravenous, Once, 6 of 6 cycles  Administration: 496 mg (10/8/2019), 331 mg (10/22/2019), 331 mg (11/5/2019), 331 mg (11/19/2019), 331 mg (12/3/2019)        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[facility] [address]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 Biopsy     Esophagus (biopsy):  - At 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       9/24/2019 Initial Diagnosis     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[address] )      10/8/2019 -  Chemotherapy     palonosetron (ALOXI) injection 0 25 mg, 0 25 mg, Intravenous, Once, 6 of 6 cycles  Administration: 0 25 mg (10/8/2019), 0 25 mg (10/22/2019), 0 25 mg (11/5/2019), 0 25 mg (11/19/2019), 0 25 mg (12/3/2019)  pegfilgrastim (NEULASTA ONPRO) subcutaneous injection kit 6 mg, 6 mg, Subcutaneous, Once, 6 of 6 cycles  Administration: 6 mg (10/9/2019), 6 mg (10/23/2019), 6 mg (11/6/2019), 6 mg (11/20/2019), 6 mg (12/4/2019)  fosaprepitant (EMEND) 150 mg in sodium chloride 0 9 % 250 mL IVPB, 150 mg, Intravenous, Once, 6 of 6 cycles  Administration: 150 mg (10/8/2019), 150 mg (10/22/2019), 150 mg (11/5/2019), 150 mg (11/19/2019), 150 mg (12/3/2019)  DOCEtaxel (TAXOTERE) 99 mg in sodium chloride 0 9 % 250 mL chemo infusion, 50 mg/m2 = 99 mg (83 3 % of original dose 60 mg/m2), Intravenous, Once, 6 of 6 cycles  Dose modification: 50 mg/m2 (original dose 60 mg/m2, Cycle 1, Reason: Other (See Comments))  Administration: 99 mg (10/8/2019), 99 mg (10/22/2019), 99 mg (11/5/2019), 99 mg (11/19/2019), 99 mg (12/3/2019)  leucovorin 396 mg in dextrose 5 % 250 mL IVPB, 200 mg/m2 = 396 mg (50 % of original dose 400 mg/m2), Intravenous, Once, 6 of 6 cycles  Dose modification: 200 mg/m2 (original dose 400 mg/m2, Cycle 1, Reason: Other (See Comments), Comment: FLOT regimen standard)  Administration: 396 mg (10/8/2019), 396 mg (10/22/2019), 396 mg (11/5/2019), 396 mg (11/19/2019), 396 mg (12/3/2019)  oxaliplatin (ELOXATIN) 168 3 mg in dextrose 5 % 250 mL chemo infusion, 85 mg/m2 = 168 3 mg, Intravenous, Once, 6 of 6 cycles  Administration: 168 3 mg (10/8/2019), 168 3 mg (10/22/2019), 168 3 mg (11/5/2019), 168 3 mg (11/19/2019), 168 3 mg (12/3/2019)  trastuzumab (HERCEPTIN) 496 mg in sodium chloride 0 9 % 250 mL chemo infusion, 6 mg/kg = 496 mg, Intravenous, Once, 6 of 6 cycles  Administration: 496 mg (10/8/2019), 331 mg (10/22/2019), 331 mg (11/5/2019), 331 mg (11/19/2019), 331 mg (12/3/2019)       Past Medical History:   Diagnosis Date    Cancer ([facility] )     esophageal    COPD (chronic obstructive pulmonary disease) ([facility] )     Diabetes mellitus ([facility] )     Difficulty swallowing     Disease of thyroid gland     Esophageal mass     History of transfusion     Sleep apnea      Past Surgical History:   Procedure Laterality Date    BACK SURGERY      x2    DISC REMOVAL      FL GUIDED CENTRAL VENOUS ACCESS DEVICE INSERTION  9/26/2019    GASTROJEJUNOSTOMY W/ JEJUNOSTOMY TUBE N/A 9/26/2019    Procedure: INSERTION JEJUNOSTOMY TUBE OPEN;  Surgeon: Mary Ann Holt MD;  [facility]: [facility];  Service: Surgical Oncology    TONSILLECTOMY      TUNNELED VENOUS PORT PLACEMENT N/A 9/26/2019    Procedure: INSERTION VENOUS PORT (PORT-A-CATH);   Surgeon: Mary Ann Holt MD;  Location: [facility];  Service: Surgical Oncology       Review of Medications:   Vitamins, Supplements and Herbals: no    Current Outpatient Medications:     acetaminophen (TYLENOL) 160 mg/5 mL suspension, Take 20 3 mL (650 mg total) by mouth every 4 (four) hours as needed for mild pain or fever, Disp: 118 mL, Rfl: 0    albuterol (2 5 mg/3 mL) 0 083 % nebulizer solution, Take 1 vial (2 5 mg total) by nebulization every 6 (six) hours as needed for wheezing or shortness of breath, Disp: 1 vial, Rfl: 5    canagliflozin (INVOKANA) 100 mg, Daily, Disp: , Rfl:     dexamethasone (DECADRON) 1 MG/ML solution, Take 8 mL (8 mg total) by mouth daily, Disp: 30 mL, Rfl: 6    gabapentin (NEURONTIN) 300 mg capsule, Take 1 capsule (300 mg total) by mouth daily at bedtime, Disp: 30 capsule, Rfl: 0    levothyroxine 25 mcg tablet, Take 25 mcg by mouth daily in the early morning, Disp: , Rfl:     Nutritional Supplements (JEVITY 1 5 SHUN) LIQD, Take 85 mL by mouth continuous [email or web address] as tolerated to goal rate of 85ml/hr to run for 16 hours daily  Flush with at least 60ml water at start and end of cycle  Needs additional water flushes if PO fluid intake declines, Disp: 1000 mL, Rfl: 0    ondansetron (ZOFRAN) 4 MG/5ML solution, Take 10 mL (8 mg total) by mouth every 8 (eight) hours as needed for nausea or vomiting, Disp: 240 mL, Rfl: 6    pantoprazole (PROTONIX) 40 mg tablet, Take 1 tablet (40 mg total) by mouth daily, Disp: 30 tablet, Rfl: 1    pioglitazone (ACTOS) 30 mg tablet, Take 30 mg by mouth daily , Disp: , Rfl:     prochlorperazine (COMPAZINE) 10 mg tablet, Take 1 tablet (10 mg total) by mouth every 6 (six) hours as needed for nausea or vomiting, Disp: 45 tablet, Rfl: 3    rOPINIRole (REQUIP) 0 25 mg tablet, Take 1 tablet (0 25 mg total) by mouth daily at bedtime, Disp: 30 tablet, Rfl: 0    sitaGLIPtin (JANUVIA) 100 mg tablet, Take 100 mg by mouth daily , Disp: , Rfl:   No current facility-administered medications for this visit       Facility-Administered Medications Ordered in Other Visits:     dextrose 5 % infusion, 20 mL/hr, Intravenous, Once, Moses Desouza MD    DOCEtaxel (TAXOTERE) 99 mg in sodium chloride 0 9 % 250 mL chemo infusion, 50 mg/m2 (Treatment Plan Recorded), Intravenous, Once, Moses Desouza MD    fosaprepitant (EMEND) 150 mg in sodium chloride 0 9 % 250 mL IVPB, 150 mg, Intravenous, Once, Moses Desouza MD    leucovorin 396 mg in dextrose 5 % 250 mL IVPB, 200 mg/m2 (Treatment Plan Recorded), Intravenous, Once, Moses Desouza MD    oxaliplatin (ELOXATIN) 168 3 mg in dextrose 5 % 250 mL chemo infusion, 85 mg/m2 (Treatment Plan Recorded), Intravenous, Once, Moses Desouza MD    palonosetron (ALOXI) injection 0 25 mg, 0 25 mg, Intravenous, Once, Moses Desouza MD    trastuzumab (HERCEPTIN) 331 mg in sodium chloride 0 9 % 250 mL chemo infusion, 4 mg/kg (Treatment Plan Recorded), Intravenous, Once, Moses Desouza MD    Most Recent Lab Results:   Lab Results Component Value Date    WBC 10 80 (H) 12/16/2019    ALT 16 12/17/2019    AST 15 12/17/2019    ALB 3 1 (L) 12/17/2019    SODIUM 142 12/17/2019    SODIUM 143 12/02/2019    K 3 5 12/17/2019    K 3 9 12/02/2019     12/17/2019    BUN 12 12/17/2019    BUN 13 12/02/2019    CREATININE 0 75 12/17/2019    CREATININE 0 84 12/02/2019    EGFR 96 12/17/2019    POCGLU 100 11/12/2019    GLUF 128 (H) 12/02/2019    GLUF 106 (H) 11/18/2019    HGBA1C 7 6 08/15/2019    CALCIUM 7 7 (L) 12/17/2019       Anthropometric Measurements:   Height: 69"  Ht Readings from Last 1 Encounters:   12/17/19 5' 10" (1 778 m)     -Weight History:   Usual Weight: 205#  Ideal Body Weight: 160#  Wt Readings from Last 20 Encounters:   12/17/19 86 6 kg (190 lb 14 7 oz)   12/03/19 89 4 kg (197 lb 1 5 oz)   11/19/19 88 4 kg (194 lb 14 2 oz)   11/15/19 82 6 kg (182 lb)   11/05/19 85 3 kg (188 lb 0 8 oz)   10/28/19 80 7 kg (178 lb)   10/25/19 80 6 kg (177 lb 12 8 oz)   10/22/19 78 9 kg (174 lb)   10/15/19 79 8 kg (176 lb)   10/08/19 81 6 kg (180 lb)   09/26/19 82 6 kg (182 lb)   09/25/19 82 6 kg (182 lb)   09/24/19 82 6 kg (182 lb)   09/24/19 82 6 kg (182 lb)   09/20/19 82 1 kg (181 lb)   09/17/19 87 1 kg (192 lb)   08/29/19 87 5 kg (192 lb 12 8 oz)   08/21/19 87 6 kg (193 lb 3 2 oz)     Varian: None  Weight Changes: loss of 6 2# (3 1%) in 2 weeks (not significant) and loss of 8 9# (4 9%) in 1 month (not significant)    Estimated body mass index is 27 39 kg/m² as calculated from the following:    Height as of an earlier encounter on 12/17/19: 5' 10" (1 778 m)  Weight as of an earlier encounter on 12/17/19: 86 6 kg (190 lb 14 7 oz)      Nutrition-Focused Physical Findings: none observed     Food/Nutrition-Related History & Client/Social History:    Current Nutrition Impact Symptoms:  [] Nausea  [] Reduced Appetite  [] Acid Reflux    [] Vomiting  [] Unintended Wt Loss  [] Malabsorption    [] Diarrhea  [] Unintended Wt Gain  [] Dumping Syndrome    [] Constipation  [] Thick Mucous/Secretions  [] Abdominal Pain    [x] Dysgeusia (Altered Taste) -has had altered taste and smell for years, per pts sister at initial visit [x] Xerostomia (Dry Mouth) - "once in a while"  [] Gas    [x] Dysosmia (Altered Smell)  [] Thrush  [] Difficulty Chewing    [] Oral Mucositis (Sore Mouth)  [x] Fatigue -improving  [] Other:    [] Odynophagia   [] Esophagitis  [] Other:    [] Dysphagia - resolved   [] Early Satiety  [] No Problems Eating      Food Allergies: no  Food Intolerances: no    Current Diet: Regular Diet, No Restrictions  Current Nutrition Intake: Unchanged from last visit  Appetite: Good   Nutrition Route: PO; pt not using J-tube for nutrition at this time reports J-tube has odor and secretions  Has apt with SurgOnc on 12/18  Meal planning/preparation mainly done by: Self and Daughter  Oral Care: brushes QD  Activity level: Mainly sedentary, ADL  Social Hx: His daughter Jimmy Curran helps take care of him  Diet Recall:   Breakfast: scrambled eggs with forrester, coffee   Lunch: hot dog   Dinner: chicken baked, and 1 cup mac and cheese     Beverages: water (16 9 oz x3), coffee (16oz x1), Flavored sparkling water (8oz x1)   Supplements:    Ensure Enlive (8 oz, 350 kcal, 20 g pro) Has but not drinking   Boost Plus (8 oz, 360 kcal, 14 g pro) has but not drinking      Enteral Nutrition:  Reported Enteral Nutrition Intake:   · Has not been using J-tube for nutrition "for the past month"     DME: [employer]     Estimated Nutrition Needs: (based on 82 7kg/ 182# actual wt)  Energy Needs: 7321-6352 kcal/day (30-35 kcal/kg)  Protein Needs:  grams/day (1 2-1 5 g/kg)  Fluid Needs: 0821-6824 mL/day (1 mL/kcal)    Discussion/Summary: Orman Blizzard was seen in infusion for nutrition follow up  He continues to eat well by mouth and reports not using his Jtube for nutrition   He no longer is drinking Ensure Enlive or Boost Plus by mouth, suggested that he resume 1x daily and increase frequency if PO intakes and/or weight decreases  Reinforced need for high calorie and high protein diet during and after cancer treatment  He reported that he has been feeling some tingling in his fingers, reviewed the need to avoid cold with oxaliplatin  Will monitor for surgical plan and possible need for tube feeding recommendations post chemo completion  Discussed/Reviewed:   · the importance of weight maintenance during CA tx  · indications & use of oral nutrition supplements  · how to modify foods for anticipated nutrition impact symptoms pt may experience during CA tx  · high protein foods to include at all meals and snacks  · ways to increase overall calorie intake  · encouraged eating every 2-3 hours (5-6 small meals/day)  · maintaining proper oral care  · adequate hydation & tips to increase overall fluid intake  · MNT for: nutrition during and after cancer treatment, unintentional weight loss  · nutrition strategies to promote healing post-tx  · individualized calorie, protein and fluid daily goals  · MNT for patient specific tx plan -  avoid cold temperature foods/fluids during the course of oxaliplatin tx  · individualized enteral nutrition recommendations & plan: (see below)     In the event enteral nutrition is needed for Fabiano's sole source of nutrition, recommend:  TF Goal: Jevity 1 5 at 99 mL/hr via J-tube cycled over 17 hours daily (until 7 cartons is infused)  Water Flushin mL free water flush at the beginning and end of TF infusion (250 mL total) plus 125 mL free water flush 8 times per day (1000 mL total) (total of 1250 mL/day in flushes; flushes should be spread throughout the day and every 2-3 hours during TF infusion; will need to adjust flushes prn for IV fluids)  Enteral Nutrition goal to provide: 1650 mL volume (7 cartons day), 2485 kcal, 106 grams protein, 1261 mL free water, 2511 mL total water daily    *Pt will require an enteral feeding pump to administer TF due to J-Tube due to esophageal cancer diagnosis  All questions and concerns addressed during todays visit  Gauri Sun has RD contact information  Nutrition Diagnosis:     · Increased Nutrient Needs (kcal & pro) related to increased demand for nutrients and disease state as evidenced by cancer dx and pt undergoing tx for cancer  Intervention & Recommendations:     Topics addressed: Nutrition education, Balance/Variety, Meals & Snacks, Meal planning, Choosing high protein meals/snacks, Meal pattern: eating small/frequent meals (every 2-3 hours), Nutrition Symptom Management, Adequate Hydration, Medical Food Supplements, Nutrition-Related Medication Management, Vitamin & Mineral Supplements, Enteral Nutrition and Weight Management    Barriers: Lack of Family Support- related to foods he would like to eat   Readiness to change: preparation  Comprehension: verbalizes understanding  Expected Compliance: fair    Materials Provided: declined-nutrition supplements   Monitoring & Evaluation:     Dietitian to Monitor: Food and Nutrition Intake, Nutrtion Impact Symptoms, Body Weight, Enteral and/or Parenteral Intake and Biochemical Data     Goals:  · weight maintenance/stabilization  · adequate nutrition impact symptom management  · pt to meet >/=75% estimated nutrition needs daily    · Progress Towards Goals: Progressing    Nutrition Rx & Recommendations:  · Diet: High Calorie, High Protein (for high calorie foods see pages 52-53, and for high protein foods see pages 49-51 in your Eating Hints book)  · Avoid spicy, acidic, sharp/hard/crunchy foods & carbonated beverages as needed  · Eat slowly and chew food thoroughly before swallowing  · Keep a daily food journal (pen/paper, elham such as [employer])  · Nutrition Supplements: Ensure Enlive or Boost Plus 1-2x daily  Increase frequency if appetite and/or weight decreases     May use homemade shakes/smoothies as desired    If using a pre-made shake/bar, choose ones with >300 calories and >10 grams protein (ex  Ensure Enlive, Ensure Plus, Boost Plus, Boost Very High Calorie, etc )  · Small, frequent meals/snacks may be easier to tolerate than 3 large daily meals  Aim for 5-6 small meals per day (every 2-3 hours)  · Include protein at all meals/snacks  · Include a variety of foods (as tolerated/allowed by diet)  · Stay hydrated by sipping fluids of choice/tolerance throughout the day  · Liquid nutrition may be better tolerated than solids at times  · Alter food choices and eating patterns to accommodate changing needs  · Incorporate physical activity as able/allowed  · Refer to Eating Hints book for other meal/snack ideas and symptom management  · Follow proper oral care; Try baking soda/salt water rinse recipe (mix 3/4 tsp salt + 1 tsp baking soda + 1 qt water; rinse with pain water after using) in Eating Hints book (pg 18)  Brush your teeth before/after meals & before bed  · For lack of taste: Practice good oral care  Blend fresh fruits into shakes, ice cream or yogurt  Eat frozen fruits: grapes, chopped cantaloupe, watermelon  Select fresh vegetables (may be more appealing than canned/frozen)  Add extra flavor to foods: experiment with spices, salt & sweeteners, extra oil/butter; marinate meats (see pages 29-30 in your Eating Hints book)  · For dry mouth: sip water throughout the day; try very sweet drinks; chew gum or suck on hard candy, popsicles, & ice chips; eat easy to swallow foods (such as pureed cooked foods or soups); moisten food with sauce/gravy/salad dressing; do not drink beer, wine, or any type of alcohol; keep lips moist with lip balm; avoid tobacco products & second-hand smoke; try Biotene as needed (see pages 17-18 in your Eating Hints book)  Follow proper oral care; Try baking soda/salt water rinse recipe (mix 3/4 tsp salt + 1 tsp baking soda + 1 qt water; rinse with pain water after using) in Eating Hints book (pg 18)    Brush your teeth before/after meals & before bed  · Weigh yourself regularly  If you notice weight loss, make an effort to increase your daily food/calorie intake  If you continue to notice loss after these efforts, reach out to your dietitian to establish a plan to stabilize weight  · Consider stopping all high dose antioxidant supplements (vitamin A, C, E, selenium, etc )  Refer to [facility] "About Herbs" website for more information on the safety of supplements  · High calorie foods to try: peanut butter, whole milk, cheese  (see pages 52-53 in your Eating Hints book)  · High protein foods to try: eggs, fish, chicken, greek yogurt  (see pages 49-51 in your Eating Hints book)  · Foods that are easy to chew and swallow: mashed potatoes, yogurt, cream soups, smoothies (see page 47 in your Eating Hints book)  · Your syringes are each 60 mL or 2 fl oz  Always flush your feeding tube with 60 mL room-temp water 1-2 times daily while feeding tube is not in use       Follow Up Plan: 1/17/19 phone visit 10:30 am         Recommend Referral to Other Providers: none at this time

## 2019-12-17 NOTE — PROGRESS NOTES
Infusion center visit complete without issue  Cadd pump connected and running  Green light flashing  Verified with 2nd RN  Pt aware of apt time  to disconnect 12/18/19   Pt declined AVS

## 2019-12-17 NOTE — PATIENT INSTRUCTIONS
Nutrition Rx & Recommendations:  · Diet: High Calorie, High Protein (for high calorie foods see pages 52-53, and for high protein foods see pages 49-51 in your Eating Hints book)  · Avoid spicy, acidic, sharp/hard/crunchy foods & carbonated beverages as needed  · Eat slowly and chew food thoroughly before swallowing  · Keep a daily food journal (pen/paper, elham such as "[employer]  · Nutrition Supplements: Ensure Enlive or Boost Plus 1-2x daily  Increase frequency if appetite and/or weight decreases     May use homemade shakes/smoothies as desired  If using a pre-made shake/bar, choose ones with >300 calories and >10 grams protein (ex  Ensure Enlive, Ensure Plus, Boost Plus, Boost Very High Calorie, etc )  · Small, frequent meals/snacks may be easier to tolerate than 3 large daily meals  Aim for 5-6 small meals per day (every 2-3 hours)  · Include protein at all meals/snacks  · Include a variety of foods (as tolerated/allowed by diet)  · Stay hydrated by sipping fluids of choice/tolerance throughout the day  · Liquid nutrition may be better tolerated than solids at times  · Alter food choices and eating patterns to accommodate changing needs  · Incorporate physical activity as able/allowed  · Refer to Eating Hints book for other meal/snack ideas and symptom management  · Follow proper oral care; Try baking soda/salt water rinse recipe (mix 3/4 tsp salt + 1 tsp baking soda + 1 qt water; rinse with pain water after using) in Eating Hints book (pg 18)  Brush your teeth before/after meals & before bed  · For lack of taste: Practice good oral care  Blend fresh fruits into shakes, ice cream or yogurt  Eat frozen fruits: grapes, chopped cantaloupe, watermelon  Select fresh vegetables (may be more appealing than canned/frozen)  Add extra flavor to foods: experiment with spices, salt & sweeteners, extra oil/butter; marinate meats (see pages 29-30 in your Eating Hints book)    · For dry mouth: sip water throughout the day; try very sweet drinks; chew gum or suck on hard candy, popsicles, & ice chips; eat easy to swallow foods (such as pureed cooked foods or soups); moisten food with sauce/gravy/salad dressing; do not drink beer, wine, or any type of alcohol; keep lips moist with lip balm; avoid tobacco products & second-hand smoke; try Biotene as needed (see pages 17-18 in your Eating Hints book)  Follow proper oral care; Try baking soda/salt water rinse recipe (mix 3/4 tsp salt + 1 tsp baking soda + 1 qt water; rinse with pain water after using) in Eating Hints book (pg 18)  Brush your teeth before/after meals & before bed  · Weigh yourself regularly  If you notice weight loss, make an effort to increase your daily food/calorie intake  If you continue to notice loss after these efforts, reach out to your dietitian to establish a plan to stabilize weight  · Consider stopping all high dose antioxidant supplements (vitamin A, C, E, selenium, etc )  Refer to [facility] "About Herbs" website for more information on the safety of supplements  · High calorie foods to try: peanut butter, whole milk, cheese  (see pages 52-53 in your Eating Hints book)  · High protein foods to try: eggs, fish, chicken, greek yogurt  (see pages 49-51 in your Eating Hints book)  · Foods that are easy to chew and swallow: mashed potatoes, yogurt, cream soups, smoothies (see page 47 in your Eating Hints book)  · Your syringes are each 60 mL or 2 fl oz  Always flush your feeding tube with 60 mL room-temp water 1-2 times daily while feeding tube is not in use       Follow Up Plan: 1/17/19 phone visit 10:30 am         Recommend Referral to Other Providers: none at this time

## 2019-12-18 ENCOUNTER — HOSPITAL ENCOUNTER (OUTPATIENT)
Dept: INFUSION CENTER | Facility: CLINIC | Age: 64
Discharge: HOME | End: 2019-12-18
Payer: COMMERCIAL

## 2019-12-18 VITALS — TEMPERATURE: 98.5 F

## 2019-12-18 DIAGNOSIS — D70.1 CHEMOTHERAPY INDUCED NEUTROPENIA (HCC): ICD-10-CM

## 2019-12-18 DIAGNOSIS — C15.5 MALIGNANT NEOPLASM OF LOWER THIRD OF ESOPHAGUS (HCC): Primary | ICD-10-CM

## 2019-12-18 DIAGNOSIS — G63 NEUROPATHY ASSOCIATED WITH CANCER (HCC): ICD-10-CM

## 2019-12-18 DIAGNOSIS — G25.81 RESTLESS LEG SYNDROME: ICD-10-CM

## 2019-12-18 DIAGNOSIS — C80.1 NEUROPATHY ASSOCIATED WITH CANCER (HCC): ICD-10-CM

## 2019-12-18 DIAGNOSIS — C16.0 GE JUNCTION CARCINOMA (HCC): ICD-10-CM

## 2019-12-18 DIAGNOSIS — T45.1X5A CHEMOTHERAPY INDUCED NEUTROPENIA (HCC): ICD-10-CM

## 2019-12-18 PROBLEM — Z46.59 ENCOUNTER FOR CARE RELATED TO FEEDING TUBE: Status: ACTIVE | Noted: 2019-12-18

## 2019-12-18 PROCEDURE — 96372 THER/PROPH/DIAG INJ SC/IM: CPT

## 2019-12-18 RX ORDER — ROPINIROLE 0.25 MG/1
TABLET, FILM COATED ORAL
Qty: 30 TABLET | Refills: 0 | Status: SHIPPED | OUTPATIENT
Start: 2019-12-18 | End: 2020-05-17

## 2019-12-18 RX ORDER — GABAPENTIN 300 MG/1
CAPSULE ORAL
Qty: 30 CAPSULE | Refills: 0 | Status: SHIPPED | OUTPATIENT
Start: 2019-12-18 | End: 2020-05-20

## 2019-12-18 RX ADMIN — PEGFILGRASTIM 6 MG: KIT SUBCUTANEOUS at 17:01

## 2019-12-18 NOTE — PROGRESS NOTES
Pt presents for CADD pump disconnection  Pt offers no complaints  Reservoir volume 0mL  Neulasta onpro placed  On left arm, instructed on care and removal time  Port flushed and de accessed without difficulty  AVS declined, pt aware this is last treatment

## 2019-12-19 ENCOUNTER — OFFICE VISIT (OUTPATIENT)
Dept: HEMATOLOGY ONCOLOGY | Facility: CLINIC | Age: 64
End: 2019-12-19
Payer: COMMERCIAL

## 2019-12-19 ENCOUNTER — OFFICE VISIT (OUTPATIENT)
Dept: SURGICAL ONCOLOGY | Facility: CLINIC | Age: 64
End: 2019-12-19

## 2019-12-19 VITALS
BODY MASS INDEX: 27.35 KG/M2 | OXYGEN SATURATION: 98 % | SYSTOLIC BLOOD PRESSURE: 138 MMHG | TEMPERATURE: 98.2 F | WEIGHT: 191 LBS | HEIGHT: 70 IN | HEART RATE: 90 BPM | DIASTOLIC BLOOD PRESSURE: 74 MMHG | RESPIRATION RATE: 16 BRPM

## 2019-12-19 VITALS
DIASTOLIC BLOOD PRESSURE: 80 MMHG | BODY MASS INDEX: 27.49 KG/M2 | HEIGHT: 70 IN | HEART RATE: 99 BPM | RESPIRATION RATE: 18 BRPM | SYSTOLIC BLOOD PRESSURE: 120 MMHG | WEIGHT: 192 LBS

## 2019-12-19 DIAGNOSIS — Z46.59 ENCOUNTER FOR CARE RELATED TO FEEDING TUBE: ICD-10-CM

## 2019-12-19 DIAGNOSIS — C16.0 GE JUNCTION CARCINOMA (HCC): Primary | ICD-10-CM

## 2019-12-19 PROCEDURE — 99024 POSTOP FOLLOW-UP VISIT: CPT | Performed by: SURGERY

## 2019-12-19 PROCEDURE — 99214 OFFICE O/P EST MOD 30 MIN: CPT | Performed by: INTERNAL MEDICINE

## 2019-12-19 NOTE — PROGRESS NOTES
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[address] )    Encounter for care related to feeding tube        Chief Complaint   Patient presents with    Post-op     possibly remove feeding tube        No follow-ups on file  Oncology History    8/19/19 to ER with recurrent episode of foreign body sensation in his throat  hours after eating two small shrimp  Patient notes multiple episodes over the past month that typically have resolved spontaneously    Impression and plan:  Esophageal blockage likely secondary to food bolus that is recurrent  Patient symptoms improved following treatment with glucagon in the emergency room but patient will require close outpatient follow-up with Gastroenterology for EGD to evaluate etiology of recurrent symptoms    8/21/19 Alia Nation PA-C Gastroenterology  over the past 2 months he has had 3 distinct episodes of food getting stuck  He states that each time the food gets stuck to where he cannot tolerate any liquids or secretions  Plan EGD    8/24/19 EGD Dr Ricky Long  · Severe, generalized edematous, erythematous and hemorrhagic mucosa in the lower third of the esophagus (30 cm from the incisors) with bleeding before intervention; performed 10 cold biopsies  There is circumferential erythema, friability, easy bleeding,  neoplastic abnormality and narrowing from 30 cm down to 40 cm  Ten biopsies were obtained to rule out malignancy  A dilation was not performed since I suspect that the underlying etiology is malignancy    · The stomach and duodenum appeared normal    Esophagus (biopsy):  - At 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     HER2 positive    9/6/19 CT chest, abdomen and pelvis  IMPRESSION:     1  Irregular wall thickening involving the distal esophagus beginning just below the inferior pulmonary veins, extending through the gastric cardia compatible with biopsy-proven adenocarcinoma  As above, there is involvement of the gastric cardia, and possible extra luminal extension of tumor along its right lateral margin  2  There is mild adenopathy identified adjacent to the gastric cardia and distal esophagus  Additional borderline prominent mediastinal lymph nodes  3  There is a left upper lobe opacity identified  Although potentially representing an area of inflammation or scarring, an irregularly marginated solitary pulmonary metastasis should be excluded  This could either be reassessed with chest CT in 3 months time or further evaluated with PET CT (it is of borderline size for accurate characterization with PET)  4  Hypertrophy of the median lobe of the prostate (favored) or less likely 9 mm bladder polyp  5  Nonspecific 1 2 cm right adrenal nodule  Overall, PET/CT may be helpful to determine exact extent of adenopathy within the lower chest and upper abdomen, evaluation for any extraluminal extension of tumor, and further characterization of left upper lobe density and right adrenal nodule    9/16-9/18 admitted with dysphagia    9/16/19 CT chest, abdomen and pelvis  IMPRESSION:     There is irregular wall thickening involving the distal esophagus and extending to the left hand aspect of the upper stomach  There is a masslike area of abnormal soft tissue density along the left hand aspect of the upper stomach which measures approximately 5 5 x 3 6 cm, and there appears to be some infiltration of the adjacent fat  This is concerning for local extension of the patient's known neoplasm  Several mildly prominent nodes are present between this mass and the liver    There are also some mildly prominent mediastinal nodes  Please see discussion      There is some fluid within the esophagus near level of the gregg      There is an approximately 1 5 x 1 cm right adrenal nodule  This appears similar to slightly larger compared to the prior examination  There is a questionable tiny left adrenal nodule  Follow-up of both of these adrenal findings is suggested      There is an approximately 1 2 cm soft tissue density nodule within the posterior aspect of the urinary bladder  Although this may be related to indentation of the prostate, a small bladder mass cannot be entirely excluded  Urology consultation and follow-up is recommended      A focal area of opacity within the anterior aspect of the left upper lobe of the lung appears similar to September 6, 2019  Please see discussion  Continued follow-up is recommended  This could be reassessed with chest CT in 3 months  PET/CT could also be considered, however, it is of borderline size for accurate characterization with PET/ CT  If more remote CTs of the chest exist, direct comparison would be helpful      PET/CT should be considered for further evaluation of the extent of adenopathy and extent of disease  PET/CT or MRI could be utilized to evaluate the adrenal findings, if there are no contraindications      Cholelithiasis  No CT evidence of acute cholecystitis      Atherosclerosis  Coronary artery disease      Other findings as described above  Please see discussion    9/19/19 PET  IMPRESSION:     1  Extensive FDG uptake at the distal esophagus, GE junction and proximal stomach compatible with known malignancy  2  FDG avid perigastric lymph node posterior to the proximal stomach suspicious for metastasis  There may be FDG avid gastrohepatic lymphadenopathy but this is inseparable from the gastric mass  3   Focal FDG uptake at the left anterior 6th rib  Possible subtle focus of FDG uptake at the right lateral 2nd rib    No obvious findings here on the limited low-dose CT images  Metastasis is not excluded  Distribution is not typical for prior trauma  Consider follow-up with MRI of the chest   4   Foci of FDG uptake at the sigmoid colon for which underlying lesions should be excluded  Recommend follow-up with colonoscopy  5   Fairly diffuse FDG uptake at the thyroid gland, may be related to thyroiditis    9/20/19 Nancy Duran MD  Plan feeding tube, referral to Med Onc and Rad Onc  If the rib lesion is a metastasis, he is not a candidate for resection    9/24/19 Dr Jeffrey Michaels  recommended that he have a port as well as a feeding tube placed since he may undergo esophagectomy in the future  We did discuss that if the rib lesions are indeed metastasis, chemotherapy would be the mainstay of his treatment    9/24/19 Dr Leticia Menendez  " I think the patient would benefit from neoadjuvant chemotherapy upfront prior to consideration of either concurrent chemoradiation or even surgery  Herceptin with FLOT chemotherapy Every 2 weeks for 6 cycles  This will consist of 5-FU 2600 mg/m² over 24 hours, leucovorin 20 mg meter square, Oxaliplatin 85 mg/m2 square and Taxotere 60 mg meter square  Once he completed 6 doses of chemotherapy we will reimage him  If he has a nice response we will then consider concurrent chemoradiation and then surgery  If he has a very nice response with very little residual disease surgery could possibly be considered for now I did talk to him about most probably getting concurrent chemoradiation after the chemotherapy "    9/26/19 Port insertion and J tube insertion scheduled at [facility] (Dr Jeffrey Michaels)  10/8/19 Infusion scheduled  Dr Norberto Quesada on colonoscopy ASAP    Mostly liquids  Can feel any food going     A lot of phlegm gets stuck and causes back pain    Constant cough if tries to lie down  Not sleeping much  Only sleep is sitting          Malignant neoplasm of lower third of esophagus (HCC)    8/24/2019 Initial Diagnosis     Malignant neoplasm of lower third of esophagus (HCC)  At least intramucosal carcinoma  Her2/SHIMON positive      10/8/2019 - 12/30/2019 Chemotherapy     palonosetron (ALOXI) injection 0 25 mg, 0 25 mg, Intravenous, Once, 6 of 6 cycles  Administration: 0 25 mg (10/8/2019), 0 25 mg (10/22/2019), 0 25 mg (11/5/2019), 0 25 mg (11/19/2019), 0 25 mg (12/3/2019), 0 25 mg (12/17/2019)  pegfilgrastim (NEULASTA ONPRO) subcutaneous injection kit 6 mg, 6 mg, Subcutaneous, Once, 6 of 6 cycles  Administration: 6 mg (10/9/2019), 6 mg (10/23/2019), 6 mg (11/6/2019), 6 mg (11/20/2019), 6 mg (12/4/2019), 6 mg (12/18/2019)  fosaprepitant (EMEND) 150 mg in sodium chloride 0 9 % 250 mL IVPB, 150 mg, Intravenous, Once, 6 of 6 cycles  Administration: 150 mg (10/8/2019), 150 mg (10/22/2019), 150 mg (11/5/2019), 150 mg (11/19/2019), 150 mg (12/3/2019), 150 mg (12/17/2019)  DOCEtaxel (TAXOTERE) 99 mg in sodium chloride 0 9 % 250 mL chemo infusion, 50 mg/m2 = 99 mg (83 3 % of original dose 60 mg/m2), Intravenous, Once, 6 of 6 cycles  Dose modification: 50 mg/m2 (original dose 60 mg/m2, Cycle 1, Reason: Other (See Comments))  Administration: 99 mg (10/8/2019), 99 mg (10/22/2019), 99 mg (11/5/2019), 99 mg (11/19/2019), 99 mg (12/3/2019), 99 mg (12/17/2019)  leucovorin 396 mg in dextrose 5 % 250 mL IVPB, 200 mg/m2 = 396 mg (50 % of original dose 400 mg/m2), Intravenous, Once, 6 of 6 cycles  Dose modification: 200 mg/m2 (original dose 400 mg/m2, Cycle 1, Reason: Other (See Comments), Comment: FLOT regimen standard)  Administration: 396 mg (10/8/2019), 396 mg (10/22/2019), 396 mg (11/5/2019), 396 mg (11/19/2019), 396 mg (12/3/2019), 396 mg (12/17/2019)  oxaliplatin (ELOXATIN) 168 3 mg in dextrose 5 % 250 mL chemo infusion, 85 mg/m2 = 168 3 mg, Intravenous, Once, 6 of 6 cycles  Administration: 168 3 mg (10/8/2019), 168 3 mg (10/22/2019), 168 3 mg (11/5/2019), 168 3 mg (11/19/2019), 168 3 mg (12/3/2019), 168 3 mg (12/17/2019)  trastuzumab (HERCEPTIN) 496 mg in sodium chloride 0 9 % 250 mL chemo infusion, 6 mg/kg = 496 mg, Intravenous, Once, 6 of 6 cycles  Administration: 496 mg (10/8/2019), 331 mg (10/22/2019), 331 mg (11/5/2019), 331 mg (11/19/2019), 331 mg (12/3/2019), 331 mg (12/17/2019)        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[address] )    8/24/2019 Biopsy     Esophagus (biopsy):  - At 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       9/24/2019 Initial Diagnosis     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HCC)      10/8/2019 - 12/30/2019 Chemotherapy     palonosetron (ALOXI) injection 0 25 mg, 0 25 mg, Intravenous, Once, 6 of 6 cycles  Administration: 0 25 mg (10/8/2019), 0 25 mg (10/22/2019), 0 25 mg (11/5/2019), 0 25 mg (11/19/2019), 0 25 mg (12/3/2019), 0 25 mg (12/17/2019)  pegfilgrastim (NEULASTA ONPRO) subcutaneous injection kit 6 mg, 6 mg, Subcutaneous, Once, 6 of 6 cycles  Administration: 6 mg (10/9/2019), 6 mg (10/23/2019), 6 mg (11/6/2019), 6 mg (11/20/2019), 6 mg (12/4/2019), 6 mg (12/18/2019)  fosaprepitant (EMEND) 150 mg in sodium chloride 0 9 % 250 mL IVPB, 150 mg, Intravenous, Once, 6 of 6 cycles  Administration: 150 mg (10/8/2019), 150 mg (10/22/2019), 150 mg (11/5/2019), 150 mg (11/19/2019), 150 mg (12/3/2019), 150 mg (12/17/2019)  DOCEtaxel (TAXOTERE) 99 mg in sodium chloride 0 9 % 250 mL chemo infusion, 50 mg/m2 = 99 mg (83 3 % of original dose 60 mg/m2), Intravenous, Once, 6 of 6 cycles  Dose modification: 50 mg/m2 (original dose 60 mg/m2, Cycle 1, Reason: Other (See Comments))  Administration: 99 mg (10/8/2019), 99 mg (10/22/2019), 99 mg (11/5/2019), 99 mg (11/19/2019), 99 mg (12/3/2019), 99 mg (12/17/2019)  leucovorin 396 mg in dextrose 5 % 250 mL IVPB, 200 mg/m2 = 396 mg (50 % of original dose 400 mg/m2), Intravenous, Once, 6 of 6 cycles  Dose modification: 200 mg/m2 (original dose 400 mg/m2, Cycle 1, Reason: Other (See Comments), Comment: FLOT regimen standard)  Administration: 396 mg (10/8/2019), 396 mg (10/22/2019), 396 mg (11/5/2019), 396 mg (11/19/2019), 396 mg (12/3/2019), 396 mg (12/17/2019)  oxaliplatin (ELOXATIN) 168 3 mg in dextrose 5 % 250 mL chemo infusion, 85 mg/m2 = 168 3 mg, Intravenous, Once, 6 of 6 cycles  Administration: 168 3 mg (10/8/2019), 168 3 mg (10/22/2019), 168 3 mg (11/5/2019), 168 3 mg (11/19/2019), 168 3 mg (12/3/2019), 168 3 mg (12/17/2019)  trastuzumab (HERCEPTIN) 496 mg in sodium chloride 0 9 % 250 mL chemo infusion, 6 mg/kg = 496 mg, Intravenous, Once, 6 of 6 cycles  Administration: 496 mg (10/8/2019), 331 mg (10/22/2019), 331 mg (11/5/2019), 331 mg (11/19/2019), 331 mg (12/3/2019), 331 mg (12/17/2019)             History of Present Illness:  Patient returns because he is questioning what to do with this feeding tube  He is not using his feeding tube except for flushes  He is eating all food without any difficulty or dysphagia  He notices a foul smell coming from the feeding tube  Review of Systems  Complete ROS Surg Onc:   Complete ROS Surg Onc:   Constitutional: The patient denies new or recent history of general fatigue, no recent weight loss, no change in appetite  Eyes: No complaints of visual problems, no scleral icterus  ENT: no complaints of ear pain, no hoarseness, no difficulty swallowing,  no tinnitus and no new masses in head, oral cavity, or neck  Cardiovascular: No complaints of chest pain, no palpitations, no ankle edema  Respiratory: No complaints of shortness of breath, no cough  Gastrointestinal: No complaints of jaundice, no bloody stools, no pale stools  Genitourinary: No complaints of dysuria, no hematuria, no nocturia, no frequent urination, no urethral discharge  Musculoskeletal: No complaints of weakness, paralysis, joint stiffness or arthralgias  Integumentary: No complaints of rash, no new lesions  Neurological: No complaints of convulsions, no seizures, no dizziness  Hematologic/Lymphatic: No complaints of easy bruising  Endocrine:  No hot or cold intolerance  No polydipsia, polyphagia, or polyuria  Allergy/immunology:  No environmental allergies  No food allergies  Not immunocompromised  Skin:  No pallor or rash  No wound  Patient Active Problem List   Diagnosis    COPD (chronic obstructive pulmonary disease) (HCC)    Headaches due to old head injury    High cholesterol    Obstructive sleep apnea syndrome    Type 2 diabetes mellitus with hyperglycemia, without long-term current use of insulin (HCC)    Malignant neoplasm of lower third of esophagus (HCC)    Esophageal obstruction    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[place] [address] )    [facility]ropenia [facility])    Anemia, unspecified    Insomnia due to medical condition    Encounter for care related to feeding tube     Past Medical History:   Diagnosis Date    Cancer ([place] [address] )     esophageal    COPD (chronic obstructive pulmonary disease) ([address] [place] [address] )     Diabetes mellitus ([place] [address] )     Difficulty swallowing     Disease of thyroid gland     Esophageal mass     History of transfusion     Sleep apnea      Past Surgical History:   Procedure Laterality Date    BACK SURGERY      x2    DISC REMOVAL      FL GUIDED CENTRAL VENOUS ACCESS DEVICE INSERTION  9/26/2019    GASTROJEJUNOSTOMY W/ JEJUNOSTOMY TUBE N/A 9/26/2019    Procedure: INSERTION JEJUNOSTOMY TUBE OPEN;  Surgeon: Anabella Burciaga MD;  [facility];  Service: Surgical Oncology    TONSILLECTOMY      TUNNELED VENOUS PORT PLACEMENT N/A 9/26/2019    Procedure: INSERTION VENOUS PORT (PORT-A-CATH);   Surgeon: Anabella Burciaga MD;  [facility];  Service: Surgical Oncology     Family History   Problem Relation Age of Onset    Diabetes Mother     No Known Problems Father      Social History     Socioeconomic History    Marital status: Single     Spouse name: Not on file    Number of children: Not on file    Years of education: Not on file    Highest education level: Not on file   Occupational History    Not on file   Social Needs    Financial resource strain: Not on file    Food insecurity:     Worry: Not on file     Inability: Not on file    Transportation needs:     Medical: Not on file     Non-medical: Not on file   Tobacco Use    Smoking status: Former Smoker     Packs/day: 0 50     Years: 50 00     Pack years: 25 00     Types: Cigarettes     Last attempt to quit: 2017     Years since quittin 9    Smokeless tobacco: Never Used   Substance and Sexual Activity    Alcohol use: Not Currently     Alcohol/week: 0 0 standard drinks     Frequency: Never     Drinks per session: 1 or 2     Binge frequency: Never    Drug use: Never    Sexual activity: Not on file   Lifestyle    Physical activity:     Days per week: Not on file     Minutes per session: Not on file    Stress: Not on file   Relationships    Social connections:     Talks on phone: Not on file     Gets together: Not on file     Attends [employer] service: Not on file     Active member of club or organization: Not on file     Attends meetings of clubs or organizations: Not on file     Relationship status: Not on file    Intimate partner violence:     Fear of current or ex partner: Not on file     Emotionally abused: Not on file     Physically abused: Not on file     Forced sexual activity: Not on file   Other Topics Concern    Not on file   Social History Narrative    Not on file       Current Outpatient Medications:     acetaminophen (TYLENOL) 160 mg/5 mL suspension, Take 20 3 mL (650 mg total) by mouth every 4 (four) hours as needed for mild pain or fever, Disp: 118 mL, Rfl: 0    albuterol (2 5 mg/3 mL) 0 083 % nebulizer solution, Take 1 vial (2 5 mg total) by nebulization every 6 (six) hours as needed for wheezing or shortness of breath, Disp: 1 vial, Rfl: 5    canagliflozin (INVOKANA) 100 mg, Daily, Disp: , Rfl:     dexamethasone (DECADRON) 1 MG/ML solution, Take 8 mL (8 mg total) by mouth daily, Disp: 30 mL, Rfl: 6    gabapentin (NEURONTIN) 300 mg capsule, take 1 capsule by mouth daily at bedtime, Disp: 30 capsule, Rfl: 0    levothyroxine 25 mcg tablet, Take 25 mcg by mouth daily in the early morning, Disp: , Rfl:     Nutritional Supplements (JEVITY 1 5 SHUN) LIQD, Take 85 mL by mouth continuous Jevity Tiki@[employer] advance as tolerated to goal rate of 85ml/hr to run for 16 hours daily  Flush with at least 60ml water at start and end of cycle  Needs additional water flushes if PO fluid intake declines, Disp: 1000 mL, Rfl: 0    ondansetron (ZOFRAN) 4 MG/5ML solution, Take 10 mL (8 mg total) by mouth every 8 (eight) hours as needed for nausea or vomiting, Disp: 240 mL, Rfl: 6    pantoprazole (PROTONIX) 40 mg tablet, Take 1 tablet (40 mg total) by mouth daily, Disp: 30 tablet, Rfl: 1    pioglitazone (ACTOS) 30 mg tablet, Take 30 mg by mouth daily , Disp: , Rfl:     prochlorperazine (COMPAZINE) 10 mg tablet, Take 1 tablet (10 mg total) by mouth every 6 (six) hours as needed for nausea or vomiting, Disp: 45 tablet, Rfl: 3    rOPINIRole (REQUIP) 0 25 mg tablet, take 1 tablet by mouth daily at bedtime, Disp: 30 tablet, Rfl: 0    sitaGLIPtin (JANUVIA) 100 mg tablet, Take 100 mg by mouth daily , Disp: , Rfl:   No current facility-administered medications for this visit  Allergies   Allergen Reactions    Penicillins Itching     Vitals:    12/19/19 1246   BP: 120/80   Pulse: 99   Resp: 18       Physical Exam  Constitutional: General appearance: The Patient is well-developed and well-nourished who appears the stated age in no acute distress  Patient is pleasant and talkative  HEENT:  Normocephalic  Sclerae are anicteric  Mucous membranes are moist      Abdomen: Abdomen is soft, non-tender, non-distended and without masses  J-tube site is clear  Sutures were removed and the site was cleaned  Extremities: There is no clubbing or cyanosis  There is no edema  Symmetric    Neuro: Grossly nonfocal  Gait is normal      Skin: Warm, anicteric  Psych:  Patient is pleasant and talkative  Breasts:        Pathology:  [unfilled]    Labs:      Imaging  No results found  I reviewed the above laboratory and imaging data  Discussion/Summary:  69-year-old male with a clinical T3 N1 M0 esophageal carcinoma  He has completed chemotherapy  He is getting his PET-CT in the next week  We will await those results  Based on those results he may either going to radiation or potentially surgery  The feeding tube sutures were removed and the site was cleaned  I did inflate the balloon with 4 cc of saline to make sure that J-tube status intact  I explained the reasoning of leaving the J-tube in since we would need 1 after surgery  He is agreeable to keeping the J-tube in place  I will see him back tentatively either after the PET or after he completes radiation  All of his questions were answered

## 2019-12-19 NOTE — LETTER
December 19, 2019     Sari Valdez MD  [address] [facility] [address]    Patient: Ann Penn   YOB: 1955   Date of Visit: 12/19/2019       Dear Dr Matteo Overton:    Thank you for referring Ann Penn to me for evaluation  Below are my notes for this consultation  If you have questions, please do not hesitate to call me  I look forward to following your patient along with you  Sincerely,        Yamilka Kraft MD        CC: MD Dom Berry MD Linward Mustache, MD  12/19/2019  1:13 PM  Sign at close encounter               Surgical Oncology Follow Up       [address]  5/40/3630  [id number]  [facility]  [facility]  [facility]  [address]  [facility] [address]    Diagnoses and all orders for this visit:    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[facility] [address] )    Encounter for care related to feeding tube        Chief Complaint   Patient presents with    Post-op     possibly remove feeding tube        No follow-ups on file  Oncology History    8/19/19 to ER with recurrent episode of foreign body sensation in his throat  hours after eating two small shrimp  Patient notes multiple episodes over the past month that typically have resolved spontaneously    Impression and plan:  Esophageal blockage likely secondary to food bolus that is recurrent  Patient symptoms improved following treatment with glucagon in the emergency room but patient will require close outpatient follow-up with Gastroenterology for EGD to evaluate etiology of recurrent symptoms    8/21/19 Lorna Zuniga PA-C Gastroenterology  over the past 2 months he has had 3 distinct episodes of food getting stuck  He states that each time the food gets stuck to where he cannot tolerate any liquids or secretions    Plan EGD    8/24/19 EGD Dr Harjinder Mahoney  · Severe, generalized edematous, erythematous and hemorrhagic mucosa in the lower third of the esophagus (30 cm from the incisors) with bleeding before intervention; performed 10 cold biopsies  There is circumferential erythema, friability, easy bleeding,  neoplastic abnormality and narrowing from 30 cm down to 40 cm  Ten biopsies were obtained to rule out malignancy  A dilation was not performed since I suspect that the underlying etiology is malignancy  · The stomach and duodenum appeared normal    Esophagus (biopsy):  - At 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     HER2 positive    9/6/19 CT chest, abdomen and pelvis  IMPRESSION:     1  Irregular wall thickening involving the distal esophagus beginning just below the inferior pulmonary veins, extending through the gastric cardia compatible with biopsy-proven adenocarcinoma  As above, there is involvement of the gastric cardia, and possible extra luminal extension of tumor along its right lateral margin  2  There is mild adenopathy identified adjacent to the gastric cardia and distal esophagus  Additional borderline prominent mediastinal lymph nodes  3  There is a left upper lobe opacity identified  Although potentially representing an area of inflammation or scarring, an irregularly marginated solitary pulmonary metastasis should be excluded  This could either be reassessed with chest CT in 3 months time or further evaluated with PET CT (it is of borderline size for accurate characterization with PET)  4  Hypertrophy of the median lobe of the prostate (favored) or less likely 9 mm bladder polyp  5  Nonspecific 1 2 cm right adrenal nodule    Overall, PET/CT may be helpful to determine exact extent of adenopathy within the lower chest and upper abdomen, evaluation for any extraluminal extension of tumor, and further characterization of left upper lobe density and right adrenal nodule    9/16-9/18 admitted with dysphagia    9/16/19 CT chest, abdomen and pelvis  IMPRESSION:     There is irregular wall thickening involving the distal esophagus and extending to the left hand aspect of the upper stomach  There is a masslike area of abnormal soft tissue density along the left hand aspect of the upper stomach which measures approximately 5 5 x 3 6 cm, and there appears to be some infiltration of the adjacent fat  This is concerning for local extension of the patient's known neoplasm  Several mildly prominent nodes are present between this mass and the liver  There are also some mildly prominent mediastinal nodes  Please see discussion      There is some fluid within the esophagus near level of the gregg      There is an approximately 1 5 x 1 cm right adrenal nodule  This appears similar to slightly larger compared to the prior examination  There is a questionable tiny left adrenal nodule  Follow-up of both of these adrenal findings is suggested      There is an approximately 1 2 cm soft tissue density nodule within the posterior aspect of the urinary bladder  Although this may be related to indentation of the prostate, a small bladder mass cannot be entirely excluded  Urology consultation and follow-up is recommended      A focal area of opacity within the anterior aspect of the left upper lobe of the lung appears similar to September 6, 2019  Please see discussion  Continued follow-up is recommended  This could be reassessed with chest CT in 3 months  PET/CT could also be considered, however, it is of borderline size for accurate characterization with PET/ CT  If more remote CTs of the chest exist, direct comparison would be helpful      PET/CT should be considered for further evaluation of the extent of adenopathy and extent of disease  PET/CT or MRI could be utilized to evaluate the adrenal findings, if there are no contraindications      Cholelithiasis    No CT evidence of acute cholecystitis      Atherosclerosis  Coronary artery disease      Other findings as described above  Please see discussion    9/19/19 PET  IMPRESSION:     1  Extensive FDG uptake at the distal esophagus, GE junction and proximal stomach compatible with known malignancy  2  FDG avid perigastric lymph node posterior to the proximal stomach suspicious for metastasis  There may be FDG avid gastrohepatic lymphadenopathy but this is inseparable from the gastric mass  3   Focal FDG uptake at the left anterior 6th rib  Possible subtle focus of FDG uptake at the right lateral 2nd rib  No obvious findings here on the limited low-dose CT images  Metastasis is not excluded  Distribution is not typical for prior trauma  Consider follow-up with MRI of the chest   4   Foci of FDG uptake at the sigmoid colon for which underlying lesions should be excluded  Recommend follow-up with colonoscopy  5   Fairly diffuse FDG uptake at the thyroid gland, may be related to thyroiditis    9/20/19 Anand Campa MD  Plan feeding tube, referral to Med Onc and Rad Onc  If the rib lesion is a metastasis, he is not a candidate for resection    9/24/19 Dr Ham Hernandez  recommended that he have a port as well as a feeding tube placed since he may undergo esophagectomy in the future  We did discuss that if the rib lesions are indeed metastasis, chemotherapy would be the mainstay of his treatment    9/24/19 Dr Jackie Wetzel  " I think the patient would benefit from neoadjuvant chemotherapy upfront prior to consideration of either concurrent chemoradiation or even surgery  Herceptin with FLOT chemotherapy Every 2 weeks for 6 cycles  This will consist of 5-FU 2600 mg/m² over 24 hours, leucovorin 20 mg meter square, Oxaliplatin 85 mg/m2 square and Taxotere 60 mg meter square  Once he completed 6 doses of chemotherapy we will reimage him  If he has a nice response we will then consider concurrent chemoradiation and then surgery   If he has a very nice response with very little residual disease surgery could possibly be considered for now I did talk to him about most probably getting concurrent chemoradiation after the chemotherapy "    9/26/19 Port insertion and J tube insertion scheduled at [facility] (Dr Vinita Shannon)  10/8/19 Infusion scheduled  Dr Stanley Cazares on colonoscopy ASAP    Mostly liquids  Can feel any food going     A lot of phlegm gets stuck and causes back pain    Constant cough if tries to lie down  Not sleeping much  Only sleep is sitting          Malignant neoplasm of lower third of esophagus (HCC)    8/24/2019 Initial Diagnosis     Malignant neoplasm of lower third of esophagus (HCC)  At least intramucosal carcinoma  Her2/SHIMON positive      10/8/2019 - 12/30/2019 Chemotherapy     palonosetron (ALOXI) injection 0 25 mg, 0 25 mg, Intravenous, Once, 6 of 6 cycles  Administration: 0 25 mg (10/8/2019), 0 25 mg (10/22/2019), 0 25 mg (11/5/2019), 0 25 mg (11/19/2019), 0 25 mg (12/3/2019), 0 25 mg (12/17/2019)  pegfilgrastim (NEULASTA ONPRO) subcutaneous injection kit 6 mg, 6 mg, Subcutaneous, Once, 6 of 6 cycles  Administration: 6 mg (10/9/2019), 6 mg (10/23/2019), 6 mg (11/6/2019), 6 mg (11/20/2019), 6 mg (12/4/2019), 6 mg (12/18/2019)  fosaprepitant (EMEND) 150 mg in sodium chloride 0 9 % 250 mL IVPB, 150 mg, Intravenous, Once, 6 of 6 cycles  Administration: 150 mg (10/8/2019), 150 mg (10/22/2019), 150 mg (11/5/2019), 150 mg (11/19/2019), 150 mg (12/3/2019), 150 mg (12/17/2019)  DOCEtaxel (TAXOTERE) 99 mg in sodium chloride 0 9 % 250 mL chemo infusion, 50 mg/m2 = 99 mg (83 3 % of original dose 60 mg/m2), Intravenous, Once, 6 of 6 cycles  Dose modification: 50 mg/m2 (original dose 60 mg/m2, Cycle 1, Reason: Other (See Comments))  Administration: 99 mg (10/8/2019), 99 mg (10/22/2019), 99 mg (11/5/2019), 99 mg (11/19/2019), 99 mg (12/3/2019), 99 mg (12/17/2019)  leucovorin 396 mg in dextrose 5 % 250 mL IVPB, 200 mg/m2 = 396 mg (50 % of original dose 400 mg/m2), Intravenous, Once, 6 of 6 cycles  Dose modification: 200 mg/m2 (original dose 400 mg/m2, Cycle 1, Reason: Other (See Comments), Comment: FLOT regimen standard)  Administration: 396 mg (10/8/2019), 396 mg (10/22/2019), 396 mg (11/5/2019), 396 mg (11/19/2019), 396 mg (12/3/2019), 396 mg (12/17/2019)  oxaliplatin (ELOXATIN) 168 3 mg in dextrose 5 % 250 mL chemo infusion, 85 mg/m2 = 168 3 mg, Intravenous, Once, 6 of 6 cycles  Administration: 168 3 mg (10/8/2019), 168 3 mg (10/22/2019), 168 3 mg (11/5/2019), 168 3 mg (11/19/2019), 168 3 mg (12/3/2019), 168 3 mg (12/17/2019)  trastuzumab (HERCEPTIN) 496 mg in sodium chloride 0 9 % 250 mL chemo infusion, 6 mg/kg = 496 mg, Intravenous, Once, 6 of 6 cycles  Administration: 496 mg (10/8/2019), 331 mg (10/22/2019), 331 mg (11/5/2019), 331 mg (11/19/2019), 331 mg (12/3/2019), 331 mg (12/17/2019)        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[facility] [address] )    8/24/2019 Biopsy     Esophagus (biopsy):  - At 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       9/24/2019 Initial Diagnosis     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[facility] [address] )      10/8/2019 - 12/30/2019 Chemotherapy     palonosetron (ALOXI) injection 0 25 mg, 0 25 mg, Intravenous, Once, 6 of 6 cycles  Administration: 0 25 mg (10/8/2019), 0 25 mg (10/22/2019), 0 25 mg (11/5/2019), 0 25 mg (11/19/2019), 0 25 mg (12/3/2019), 0 25 mg (12/17/2019)  pegfilgrastim (NEULASTA ONPRO) subcutaneous injection kit 6 mg, 6 mg, Subcutaneous, Once, 6 of 6 cycles  Administration: 6 mg (10/9/2019), 6 mg (10/23/2019), 6 mg (11/6/2019), 6 mg (11/20/2019), 6 mg (12/4/2019), 6 mg (12/18/2019)  fosaprepitant (EMEND) 150 mg in sodium chloride 0 9 % 250 mL IVPB, 150 mg, Intravenous, Once, 6 of 6 cycles  Administration: 150 mg (10/8/2019), 150 mg (10/22/2019), 150 mg (11/5/2019), 150 mg (11/19/2019), 150 mg (12/3/2019), 150 mg (12/17/2019)  DOCEtaxel (TAXOTERE) 99 mg in sodium chloride 0 9 % 250 mL chemo infusion, 50 mg/m2 = 99 mg (83 3 % of original dose 60 mg/m2), Intravenous, Once, 6 of 6 cycles  Dose modification: 50 mg/m2 (original dose 60 mg/m2, Cycle 1, Reason: Other (See Comments))  Administration: 99 mg (10/8/2019), 99 mg (10/22/2019), 99 mg (11/5/2019), 99 mg (11/19/2019), 99 mg (12/3/2019), 99 mg (12/17/2019)  leucovorin 396 mg in dextrose 5 % 250 mL IVPB, 200 mg/m2 = 396 mg (50 % of original dose 400 mg/m2), Intravenous, Once, 6 of 6 cycles  Dose modification: 200 mg/m2 (original dose 400 mg/m2, Cycle 1, Reason: Other (See Comments), Comment: FLOT regimen standard)  Administration: 396 mg (10/8/2019), 396 mg (10/22/2019), 396 mg (11/5/2019), 396 mg (11/19/2019), 396 mg (12/3/2019), 396 mg (12/17/2019)  oxaliplatin (ELOXATIN) 168 3 mg in dextrose 5 % 250 mL chemo infusion, 85 mg/m2 = 168 3 mg, Intravenous, Once, 6 of 6 cycles  Administration: 168 3 mg (10/8/2019), 168 3 mg (10/22/2019), 168 3 mg (11/5/2019), 168 3 mg (11/19/2019), 168 3 mg (12/3/2019), 168 3 mg (12/17/2019)  trastuzumab (HERCEPTIN) 496 mg in sodium chloride 0 9 % 250 mL chemo infusion, 6 mg/kg = 496 mg, Intravenous, Once, 6 of 6 cycles  Administration: 496 mg (10/8/2019), 331 mg (10/22/2019), 331 mg (11/5/2019), 331 mg (11/19/2019), 331 mg (12/3/2019), 331 mg (12/17/2019)             History of Present Illness:  Patient returns because he is questioning what to do with this feeding tube  He is not using his feeding tube except for flushes  He is eating all food without any difficulty or dysphagia  He notices a foul smell coming from the feeding tube  Review of Systems  Complete ROS Surg Onc:   Complete ROS Surg Onc:   Constitutional: The patient denies new or recent history of general fatigue, no recent weight loss, no change in appetite  Eyes: No complaints of visual problems, no scleral icterus  ENT: no complaints of ear pain, no hoarseness, no difficulty swallowing,  no tinnitus and no new masses in head, oral cavity, or neck     Cardiovascular: No complaints of chest pain, no palpitations, no ankle edema  Respiratory: No complaints of shortness of breath, no cough  Gastrointestinal: No complaints of jaundice, no bloody stools, no pale stools  Genitourinary: No complaints of dysuria, no hematuria, no nocturia, no frequent urination, no urethral discharge  Musculoskeletal: No complaints of weakness, paralysis, joint stiffness or arthralgias  Integumentary: No complaints of rash, no new lesions  Neurological: No complaints of convulsions, no seizures, no dizziness  Hematologic/Lymphatic: No complaints of easy bruising  Endocrine:  No hot or cold intolerance  No polydipsia, polyphagia, or polyuria  Allergy/immunology:  No environmental allergies  No food allergies  Not immunocompromised  Skin:  No pallor or rash  No wound          Patient Active Problem List   Diagnosis    COPD (chronic obstructive pulmonary disease) (HCC)    Headaches due to old head injury    High cholesterol    Obstructive sleep apnea syndrome    Type 2 diabetes mellitus with hyperglycemia, without long-term current use of insulin (HCC)    Malignant neoplasm of lower third of esophagus (HCC)    Esophageal obstruction    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    Chemotherapy induced neutropenia (HCC)    Anemia, unspecified    Insomnia due to medical condition    Encounter for care related to feeding tube     Past Medical History:   Diagnosis Date    Cancer ([facility] )     esophageal    [facility]ch[facility] disease) [facility]     Diabetes mellitus ([facility]     Difficulty swallowing     Disease of thyroid gland     Esophageal mass     History of transfusion     Sleep apnea      Past Surgical History:   Procedure Laterality Date    BACK SURGERY      x2    DISC REMOVAL      FL GUIDED CENTRAL VENOUS ACCESS DEVICE INSERTION  9/26/2019    GASTROJEJUNOSTOMY W/ JEJUNOSTOMY TUBE N/A 9/26/2019    Procedure: INSERTION JEJUNOSTOMY TUBE OPEN;  Surgeon: Tika Zhang MD; [facility]: [facility];  Service: Surgical Oncology    TONSILLECTOMY      TUNNELED VENOUS PORT PLACEMENT N/A 2019    Procedure: INSERTION VENOUS PORT (PORT-A-CATH);   Surgeon: Jesse Saurez MD;  Location: [facility];  Service: Surgical Oncology     Family History   Problem Relation Age of Onset    Diabetes Mother     No Known Problems Father      Social History     Socioeconomic History    Marital status: Single     Spouse name: Not on file    Number of children: Not on file    Years of education: Not on file    Highest education level: Not on file   Occupational History    Not on file   Social Needs    Financial resource strain: Not on file    Food insecurity:     Worry: Not on file     Inability: Not on file    Transportation needs:     Medical: Not on file     Non-medical: Not on file   Tobacco Use    Smoking status: Former Smoker     Packs/day: 0 50     Years: 50 00     Pack years: 25 00     Types: Cigarettes     Last attempt to quit: 2017     Years since quittin 9    Smokeless tobacco: Never Used   Substance and Sexual Activity    Alcohol use: Not Currently     Alcohol/week: 0 0 standard drinks     Frequency: Never     Drinks per session: 1 or 2     Binge frequency: Never    Drug use: Never    Sexual activity: Not on file   Lifestyle    Physical activity:     Days per week: Not on file     Minutes per session: Not on file    Stress: Not on file   Relationships    Social connections:     Talks on phone: Not on file     Gets together: Not on file     Attends [employer] service: Not on file     Active member of club or organization: Not on file     Attends meetings of clubs or organizations: Not on file     Relationship status: Not on file    Intimate partner violence:     Fear of current or ex partner: Not on file     Emotionally abused: Not on file     Physically abused: Not on file     Forced sexual activity: Not on file   Other Topics Concern    Not on file   Social History Narrative    Not on file       Current Outpatient Medications:     acetaminophen (TYLENOL) 160 mg/5 mL suspension, Take 20 3 mL (650 mg total) by mouth every 4 (four) hours as needed for mild pain or fever, Disp: 118 mL, Rfl: 0    albuterol (2 5 mg/3 mL) 0 083 % nebulizer solution, Take 1 vial (2 5 mg total) by nebulization every 6 (six) hours as needed for wheezing or shortness of breath, Disp: 1 vial, Rfl: 5    canagliflozin (INVOKANA) 100 mg, Daily, Disp: , Rfl:     dexamethasone (DECADRON) 1 MG/ML solution, Take 8 mL (8 mg total) by mouth daily, Disp: 30 mL, Rfl: 6    gabapentin (NEURONTIN) 300 mg capsule, take 1 capsule by mouth daily at bedtime, Disp: 30 capsule, Rfl: 0    levothyroxine 25 mcg tablet, Take 25 mcg by mouth daily in the early morning, Disp: , Rfl:     Nutritional Supplements (JEVITY 1 5 SHUN) LIQD, Take 85 mL by mouth continuous [email or web address] as tolerated to goal rate of 85ml/hr to run for 16 hours daily  Flush with at least 60ml water at start and end of cycle  Needs additional water flushes if PO fluid intake declines, Disp: 1000 mL, Rfl: 0    ondansetron (ZOFRAN) 4 MG/5ML solution, Take 10 mL (8 mg total) by mouth every 8 (eight) hours as needed for nausea or vomiting, Disp: 240 mL, Rfl: 6    pantoprazole (PROTONIX) 40 mg tablet, Take 1 tablet (40 mg total) by mouth daily, Disp: 30 tablet, Rfl: 1    pioglitazone (ACTOS) 30 mg tablet, Take 30 mg by mouth daily , Disp: , Rfl:     prochlorperazine (COMPAZINE) 10 mg tablet, Take 1 tablet (10 mg total) by mouth every 6 (six) hours as needed for nausea or vomiting, Disp: 45 tablet, Rfl: 3    rOPINIRole (REQUIP) 0 25 mg tablet, take 1 tablet by mouth daily at bedtime, Disp: 30 tablet, Rfl: 0    sitaGLIPtin (JANUVIA) 100 mg tablet, Take 100 mg by mouth daily , Disp: , Rfl:   No current facility-administered medications for this visit     Allergies   Allergen Reactions    Penicillins Itching     Vitals:    12/19/19 1246   BP: 120/80   Pulse: 99   Resp: 18       Physical Exam  Constitutional: General appearance: The Patient is well-developed and well-nourished who appears the stated age in no acute distress  Patient is pleasant and talkative  HEENT:  Normocephalic  Sclerae are anicteric  Mucous membranes are moist      Abdomen: Abdomen is soft, non-tender, non-distended and without masses  J-tube site is clear  Sutures were removed and the site was cleaned  Extremities: There is no clubbing or cyanosis  There is no edema  Symmetric  Neuro: Grossly nonfocal  Gait is normal      Skin: Warm, anicteric  Psych:  Patient is pleasant and talkative  Breasts:        Pathology:  [unfilled]    Labs:      Imaging  No results found  I reviewed the above laboratory and imaging data  Discussion/Summary:  59-year-old male with a clinical T3 N1 M0 esophageal carcinoma  He has completed chemotherapy  He is getting his PET-CT in the next week  We will await those results  Based on those results he may either going to radiation or potentially surgery  The feeding tube sutures were removed and the site was cleaned  I did inflate the balloon with 4 cc of saline to make sure that J-tube status intact  I explained the reasoning of leaving the J-tube in since we would need 1 after surgery  He is agreeable to keeping the J-tube in place  I will see him back tentatively either after the PET or after he completes radiation  All of his questions were answered

## 2019-12-19 NOTE — PROGRESS NOTES
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    HPI:      Randell Scott seen for initial consultation 9/24/2019 regarding  Newly Diagnosed locally advanced GE junction/gastric cancer which is HER-2/thea positive  The patient is a 69-year-old male who was originally referred to Gastroenterology with the complaint of dysphagia  [facility] then had an EGD performed on 08/24/2019 showing neoplastic changes and luminal narrowing in the distal 1/3 of the esophagus   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   Esophageal biopsy revealed at least 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 and high-grade dysplasia   He was seen by her colleagues in cardiothoracic surgery who recommended neoadjuvant therapy upfront  He is going to be presented at tumor board  PET/CT scan showed Extensive FDG uptake at the distal esophagus GE junction and proximal stomach compatible with known malignancy  There were FDG avid perigastric lymph node posterior to the proximal stomach suspicious for metastatic disease and possible gastrohepatic lymphadenopathy which was inseparable from the gastric mass  There is also some uptake at the sixth rib but the patient states he did have trauma to this area recently  Metastatic disease could not be ruled out  There is also some uptake in the sigmoid colon for which a colonoscopy was recommended down the road  Previous Hematologic/ Oncologic History:    Oncology History    8/19/19 to ER with recurrent episode of foreign body sensation in his throat  hours after eating two small shrimp  Patient notes multiple episodes over the past month that typically have resolved spontaneously    Impression and plan:  Esophageal blockage likely secondary to food bolus that is recurrent  Patient symptoms improved following treatment with glucagon in the emergency room but patient will require close outpatient follow-up with Gastroenterology for EGD to evaluate etiology of recurrent symptoms    8/21/19 Bronson Kingsley PA-C Gastroenterology  over the past 2 months he has had 3 distinct episodes of food getting stuck  He states that each time the food gets stuck to where he cannot tolerate any liquids or secretions  Plan EGD    8/24/19 EGD Dr Nathalie Aquino  · Severe, generalized edematous, erythematous and hemorrhagic mucosa in the lower third of the esophagus (30 cm from the incisors) with bleeding before intervention; performed 10 cold biopsies  There is circumferential erythema, friability, easy bleeding,  neoplastic abnormality and narrowing from 30 cm down to 40 cm  Ten biopsies were obtained to rule out malignancy  A dilation was not performed since I suspect that the underlying etiology is malignancy  · The stomach and duodenum appeared normal    Esophagus (biopsy):  - At 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     HER2 positive    9/6/19 CT chest, abdomen and pelvis  IMPRESSION:     1  Irregular wall thickening involving the distal esophagus beginning just below the inferior pulmonary veins, extending through the gastric cardia compatible with biopsy-proven adenocarcinoma  As above, there is involvement of the gastric cardia, and possible extra luminal extension of tumor along its right lateral margin  2  There is mild adenopathy identified adjacent to the gastric cardia and distal esophagus  Additional borderline prominent mediastinal lymph nodes  3  There is a left upper lobe opacity identified  Although potentially representing an area of inflammation or scarring, an irregularly marginated solitary pulmonary metastasis should be excluded    This could either be reassessed with chest CT in 3 months time or further evaluated with PET CT (it is of borderline size for accurate characterization with PET)  4  Hypertrophy of the median lobe of the prostate (favored) or less likely 9 mm bladder polyp  5  Nonspecific 1 2 cm right adrenal nodule  Overall, PET/CT may be helpful to determine exact extent of adenopathy within the lower chest and upper abdomen, evaluation for any extraluminal extension of tumor, and further characterization of left upper lobe density and right adrenal nodule    9/16-9/18 admitted with dysphagia    9/16/19 CT chest, abdomen and pelvis  IMPRESSION:     There is irregular wall thickening involving the distal esophagus and extending to the left hand aspect of the upper stomach  There is a masslike area of abnormal soft tissue density along the left hand aspect of the upper stomach which measures approximately 5 5 x 3 6 cm, and there appears to be some infiltration of the adjacent fat  This is concerning for local extension of the patient's known neoplasm  Several mildly prominent nodes are present between this mass and the liver  There are also some mildly prominent mediastinal nodes  Please see discussion      There is some fluid within the esophagus near level of the gregg      There is an approximately 1 5 x 1 cm right adrenal nodule  This appears similar to slightly larger compared to the prior examination  There is a questionable tiny left adrenal nodule  Follow-up of both of these adrenal findings is suggested      There is an approximately 1 2 cm soft tissue density nodule within the posterior aspect of the urinary bladder  Although this may be related to indentation of the prostate, a small bladder mass cannot be entirely excluded  Urology consultation and follow-up is recommended      A focal area of opacity within the anterior aspect of the left upper lobe of the lung appears similar to September 6, 2019  Please see discussion  Continued follow-up is recommended    This could be reassessed with chest CT in 3 months  PET/CT could also be considered, however, it is of borderline size for accurate characterization with PET/ CT  If more remote CTs of the chest exist, direct comparison would be helpful      PET/CT should be considered for further evaluation of the extent of adenopathy and extent of disease  PET/CT or MRI could be utilized to evaluate the adrenal findings, if there are no contraindications      Cholelithiasis  No CT evidence of acute cholecystitis      Atherosclerosis  Coronary artery disease      Other findings as described above  Please see discussion    9/19/19 PET  IMPRESSION:     1  Extensive FDG uptake at the distal esophagus, GE junction and proximal stomach compatible with known malignancy  2  FDG avid perigastric lymph node posterior to the proximal stomach suspicious for metastasis  There may be FDG avid gastrohepatic lymphadenopathy but this is inseparable from the gastric mass  3   Focal FDG uptake at the left anterior 6th rib  Possible subtle focus of FDG uptake at the right lateral 2nd rib  No obvious findings here on the limited low-dose CT images  Metastasis is not excluded  Distribution is not typical for prior trauma  Consider follow-up with MRI of the chest   4   Foci of FDG uptake at the sigmoid colon for which underlying lesions should be excluded  Recommend follow-up with colonoscopy  5   Fairly diffuse FDG uptake at the thyroid gland, may be related to thyroiditis    9/20/19 Mendy Davis MD  Plan feeding tube, referral to Med Onc and Rad Onc  If the rib lesion is a metastasis, he is not a candidate for resection    9/24/19 Dr Arnold Dietrich  recommended that he have a port as well as a feeding tube placed since he may undergo esophagectomy in the future    We did discuss that if the rib lesions are indeed metastasis, chemotherapy would be the mainstay of his treatment    9/24/19 Dr Branden Gunderson  " I think the patient would benefit from neoadjuvant chemotherapy upfront prior to consideration of either concurrent chemoradiation or even surgery  Herceptin with FLOT chemotherapy Every 2 weeks for 6 cycles  This will consist of 5-FU 2600 mg/m² over 24 hours, leucovorin 20 mg meter square, Oxaliplatin 85 mg/m2 square and Taxotere 60 mg meter square  Once he completed 6 doses of chemotherapy we will reimage him  If he has a nice response we will then consider concurrent chemoradiation and then surgery  If he has a very nice response with very little residual disease surgery could possibly be considered for now I did talk to him about most probably getting concurrent chemoradiation after the chemotherapy "    9/26/19 Port insertion and J tube insertion scheduled at [facility] (Dr Ines Barbosa)  10/8/19 Infusion scheduled  Dr Priya Alicia on colonoscopy ASAP    Mostly liquids  Can feel any food going     A lot of phlegm gets stuck and causes back pain    Constant cough if tries to lie down  Not sleeping much  Only sleep is sitting          Malignant neoplasm of lower third of esophagus (HCC)    8/24/2019 Initial Diagnosis     Malignant neoplasm of lower third of esophagus (HCC)  At least intramucosal carcinoma  Her2/SHIMON positive      10/8/2019 -  Chemotherapy     palonosetron (ALOXI) injection 0 25 mg, 0 25 mg, Intravenous, Once, 6 of 6 cycles  Administration: 0 25 mg (10/8/2019), 0 25 mg (10/22/2019), 0 25 mg (11/5/2019), 0 25 mg (11/19/2019), 0 25 mg (12/3/2019), 0 25 mg (12/17/2019)  pegfilgrastim (NEULASTA ONPRO) subcutaneous injection kit 6 mg, 6 mg, Subcutaneous, Once, 6 of 6 cycles  Administration: 6 mg (10/9/2019), 6 mg (10/23/2019), 6 mg (11/6/2019), 6 mg (11/20/2019), 6 mg (12/4/2019), 6 mg (12/18/2019)  fosaprepitant (EMEND) 150 mg in sodium chloride 0 9 % 250 mL IVPB, 150 mg, Intravenous, Once, 6 of 6 cycles  Administration: 150 mg (10/8/2019), 150 mg (10/22/2019), 150 mg (11/5/2019), 150 mg (11/19/2019), 150 mg (12/3/2019), 150 mg (12/17/2019)  DOCEtaxel (TAXOTERE) 99 mg in sodium chloride 0 9 % 250 mL chemo infusion, 50 mg/m2 = 99 mg (83 3 % of original dose 60 mg/m2), Intravenous, Once, 6 of 6 cycles  Dose modification: 50 mg/m2 (original dose 60 mg/m2, Cycle 1, Reason: Other (See Comments))  Administration: 99 mg (10/8/2019), 99 mg (10/22/2019), 99 mg (11/5/2019), 99 mg (11/19/2019), 99 mg (12/3/2019), 99 mg (12/17/2019)  leucovorin 396 mg in dextrose 5 % 250 mL IVPB, 200 mg/m2 = 396 mg (50 % of original dose 400 mg/m2), Intravenous, Once, 6 of 6 cycles  Dose modification: 200 mg/m2 (original dose 400 mg/m2, Cycle 1, Reason: Other (See Comments), Comment: FLOT regimen standard)  Administration: 396 mg (10/8/2019), 396 mg (10/22/2019), 396 mg (11/5/2019), 396 mg (11/19/2019), 396 mg (12/3/2019), 396 mg (12/17/2019)  oxaliplatin (ELOXATIN) 168 3 mg in dextrose 5 % 250 mL chemo infusion, 85 mg/m2 = 168 3 mg, Intravenous, Once, 6 of 6 cycles  Administration: 168 3 mg (10/8/2019), 168 3 mg (10/22/2019), 168 3 mg (11/5/2019), 168 3 mg (11/19/2019), 168 3 mg (12/3/2019), 168 3 mg (12/17/2019)  trastuzumab (HERCEPTIN) 496 mg in sodium chloride 0 9 % 250 mL chemo infusion, 6 mg/kg = 496 mg, Intravenous, Once, 6 of 6 cycles  Administration: 496 mg (10/8/2019), 331 mg (10/22/2019), 331 mg (11/5/2019), 331 mg (11/19/2019), 331 mg (12/3/2019), 331 mg (12/17/2019)        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[address] )    8/24/2019 Biopsy     Esophagus (biopsy):  - At 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       9/24/2019 Initial Diagnosis     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HCC)      10/8/2019 -  Chemotherapy     palonosetron (ALOXI) injection 0 25 mg, 0 25 mg, Intravenous, Once, 6 of 6 cycles  Administration: 0 25 mg (10/8/2019), 0 25 mg (10/22/2019), 0 25 mg (11/5/2019), 0 25 mg (11/19/2019), 0 25 mg (12/3/2019), 0 25 mg (12/17/2019)  pegfilgrastim (NEULASTA ONPRO) subcutaneous injection kit 6 mg, 6 mg, Subcutaneous, Once, 6 of 6 cycles  Administration: 6 mg (10/9/2019), 6 mg (10/23/2019), 6 mg (11/6/2019), 6 mg (11/20/2019), 6 mg (12/4/2019), 6 mg (12/18/2019)  fosaprepitant (EMEND) 150 mg in sodium chloride 0 9 % 250 mL IVPB, 150 mg, Intravenous, Once, 6 of 6 cycles  Administration: 150 mg (10/8/2019), 150 mg (10/22/2019), 150 mg (11/5/2019), 150 mg (11/19/2019), 150 mg (12/3/2019), 150 mg (12/17/2019)  DOCEtaxel (TAXOTERE) 99 mg in sodium chloride 0 9 % 250 mL chemo infusion, 50 mg/m2 = 99 mg (83 3 % of original dose 60 mg/m2), Intravenous, Once, 6 of 6 cycles  Dose modification: 50 mg/m2 (original dose 60 mg/m2, Cycle 1, Reason: Other (See Comments))  Administration: 99 mg (10/8/2019), 99 mg (10/22/2019), 99 mg (11/5/2019), 99 mg (11/19/2019), 99 mg (12/3/2019), 99 mg (12/17/2019)  leucovorin 396 mg in dextrose 5 % 250 mL IVPB, 200 mg/m2 = 396 mg (50 % of original dose 400 mg/m2), Intravenous, Once, 6 of 6 cycles  Dose modification: 200 mg/m2 (original dose 400 mg/m2, Cycle 1, Reason: Other (See Comments), Comment: FLOT regimen standard)  Administration: 396 mg (10/8/2019), 396 mg (10/22/2019), 396 mg (11/5/2019), 396 mg (11/19/2019), 396 mg (12/3/2019), 396 mg (12/17/2019)  oxaliplatin (ELOXATIN) 168 3 mg in dextrose 5 % 250 mL chemo infusion, 85 mg/m2 = 168 3 mg, Intravenous, Once, 6 of 6 cycles  Administration: 168 3 mg (10/8/2019), 168 3 mg (10/22/2019), 168 3 mg (11/5/2019), 168 3 mg (11/19/2019), 168 3 mg (12/3/2019), 168 3 mg (12/17/2019)  trastuzumab (HERCEPTIN) 496 mg in sodium chloride 0 9 % 250 mL chemo infusion, 6 mg/kg = 496 mg, Intravenous, Once, 6 of 6 cycles  Administration: 496 mg (10/8/2019), 331 mg (10/22/2019), 331 mg (11/5/2019), 331 mg (11/19/2019), 331 mg (12/3/2019), 331 mg (12/17/2019)         Current Hematologic/ Oncologic Treatment:      FLOT plus Herceptin every 2 weeks for 6 cycles  He has completed cycle #6 on 18 December  Interval History:      The patient returns for follow-up visit   He has finished up his SLO T chemotherapy with Herceptin  The plan is now to repeat his PET CT scan  We'll also set him up to see our colleagues in radiation  Again if he has a nice response we will consider neoadjuvant concurrent chemoradiation and then proceed with surgery  If he has a very nice response he can even proceed straight to surgery  For now I will schedule his imaging and see him back in approximately 2-4 weeks  More importantly I will have him see our colleagues in surgery And radiation oncology with the results of the PET/CT scan  As far as symptoms are concerned he is fatigued  He is eating very well  Has gaining weight  States he is to try to control his weight which I explained he should not do at this time  He should eat and gain weight within reason considering he has diabetes in preparation for surgery and I explained this would help him recover better and get more treatment with less side effects  He is concerned slightly about his feeding tube and states it's smells  He is seeing our colleagues in surgery who placed it later today  On exam it does not look infected today  Denies any nausea denies any vomiting denies any diarrhea  Rest of his 14 point review of systems today was negative  Test Results:    Imaging: No results found  Labs:   Lab Results   Component Value Date    WBC 10 80 (H) 12/16/2019    HGB 10 5 (L) 12/16/2019    HCT 35 4 (L) 12/16/2019    MCV 86 12/16/2019    PLT 78 (L) 12/16/2019     Lab Results   Component Value Date    K 3 5 12/17/2019     12/17/2019    CO2 26 12/17/2019    BUN 12 12/17/2019    CREATININE 0 75 12/17/2019    GLUF 128 (H) 12/02/2019    CALCIUM 7 7 (L) 12/17/2019    AST 15 12/17/2019    ALT 16 12/17/2019    ALKPHOS 94 12/17/2019    EGFR 96 12/17/2019         ROS: As stated in the history of present illness otherwise his 14 point review of systems today was negative        Active Problems:   Patient Active Problem List   Diagnosis    COPD (chronic obstructive pulmonary disease) (HCC)    Headaches due to old head injury    High cholesterol    Obstructive sleep apnea syndrome    Type 2 diabetes mellitus with hyperglycemia, without long-term current use of insulin (HCC)    Malignant neoplasm of lower third of esophagus (HCC)    Esophageal obstruction    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HCC)    Chemotherapy induced neutropenia (HCC)    Anemia, unspecified    Insomnia due to medical condition    Encounter for care related to feeding tube       Past Medical History:   Past Medical History:   Diagnosis Date    Cancer ([place] [address] )     esophageal    COPD (chronic obstructive pulmonary disease) ([place] [address] )     Diabetes mellitus ([place] [address] )     Difficulty swallowing     Disease of thyroid gland     Esophageal mass     History of transfusion     Sleep apnea        Surgical History:   Past Surgical History:   Procedure Laterality Date    BACK SURGERY      x2    DISC REMOVAL      FL GUIDED CENTRAL VENOUS ACCESS DEVICE INSERTION  9/26/2019    GASTROJEJUNOSTOMY W/ JEJUNOSTOMY TUBE N/A 9/26/2019    Procedure: INSERTION JEJUNOSTOMY TUBE OPEN;  Surgeon: Jp Nelson MD;  [facility]: [facility];  Service: Surgical Oncology    TONSILLECTOMY      TUNNELED VENOUS PORT PLACEMENT N/A 9/26/2019    Procedure: INSERTION VENOUS PORT (PORT-A-CATH); Surgeon: Jp Nelson MD;  Location: [facility];  Service: Surgical Oncology       Family History:    Family History   Problem Relation Age of Onset    Diabetes Mother     No Known Problems Father        Cancer-related family history is not on file      Social History:   Social History     Socioeconomic History    Marital status: Single     Spouse name: Not on file    Number of children: Not on file    Years of education: Not on file    Highest education level: Not on file   Occupational History    Not on file   Social Needs    Financial resource strain: Not on file    Food insecurity:     Worry: Not on file     Inability: Not on file    Transportation needs:     Medical: Not on file Non-medical: Not on file   Tobacco Use    Smoking status: Former Smoker     Packs/day: 0 50     Years: 50 00     Pack years: 25 00     Types: Cigarettes     Last attempt to quit: 2017     Years since quittin 9    Smokeless tobacco: Never Used   Substance and Sexual Activity    Alcohol use: Not Currently     Alcohol/week: 0 0 standard drinks     Frequency: Never     Drinks per session: 1 or 2     Binge frequency: Never    Drug use: Never    Sexual activity: Not on file   Lifestyle    Physical activity:     Days per week: Not on file     Minutes per session: Not on file    Stress: Not on file   Relationships    Social connections:     Talks on phone: Not on file     Gets together: Not on file     Attends Hinduism service: Not on file     Active member of club or organization: Not on file     Attends meetings of clubs or organizations: Not on file     Relationship status: Not on file    Intimate partner violence:     Fear of current or ex partner: Not on file     Emotionally abused: Not on file     Physically abused: Not on file     Forced sexual activity: Not on file   Other Topics Concern    Not on file   Social History Narrative    Not on file       Current Medications:   Current Outpatient Medications   Medication Sig Dispense Refill    acetaminophen (TYLENOL) 160 mg/5 mL suspension Take 20 3 mL (650 mg total) by mouth every 4 (four) hours as needed for mild pain or fever 118 mL 0    albuterol (2 5 mg/3 mL) 0 083 % nebulizer solution Take 1 vial (2 5 mg total) by nebulization every 6 (six) hours as needed for wheezing or shortness of breath 1 vial 5    canagliflozin (INVOKANA) 100 mg Daily      dexamethasone (DECADRON) 1 MG/ML solution Take 8 mL (8 mg total) by mouth daily 30 mL 6    gabapentin (NEURONTIN) 300 mg capsule take 1 capsule by mouth daily at bedtime 30 capsule 0    levothyroxine 25 mcg tablet Take 25 mcg by mouth daily in the early morning      Nutritional Supplements (JEVITY 1 5 SHUN) LIQD Take 85 mL by mouth continuous [employer]evity [employer] advance as tolerated to goal rate of 85ml/hr to run for 16 hours daily  Flush with at least 60ml water at start and end of cycle  Needs additional water flushes if PO fluid intake declines 1000 mL 0    ondansetron (ZOFRAN) 4 MG/5ML solution Take 10 mL (8 mg total) by mouth every 8 (eight) hours as needed for nausea or vomiting 240 mL 6    pantoprazole (PROTONIX) 40 mg tablet Take 1 tablet (40 mg total) by mouth daily 30 tablet 1    pioglitazone (ACTOS) 30 mg tablet Take 30 mg by mouth daily       prochlorperazine (COMPAZINE) 10 mg tablet Take 1 tablet (10 mg total) by mouth every 6 (six) hours as needed for nausea or vomiting 45 tablet 3    rOPINIRole (REQUIP) 0 25 mg tablet take 1 tablet by mouth daily at bedtime 30 tablet 0    sitaGLIPtin (JANUVIA) 100 mg tablet Take 100 mg by mouth daily        No current facility-administered medications for this visit  Allergies: Allergies   Allergen Reactions    Penicillins Itching       Physical Exam:    Body surface area is 2 05 meters squared  Wt Readings from Last 3 Encounters:   12/19/19 86 6 kg (191 lb)   12/17/19 86 6 kg (190 lb 14 7 oz)   12/03/19 89 4 kg (197 lb 1 5 oz)        Temp Readings from Last 3 Encounters:   12/19/19 98 2 °F (36 8 °C) (Oral)   12/18/19 98 5 °F (36 9 °C) (Oral)   12/17/19 97 8 °F (36 6 °C) (Temporal)        BP Readings from Last 3 Encounters:   12/19/19 138/74   12/17/19 123/58   12/03/19 113/65         Pulse Readings from Last 3 Encounters:   12/19/19 90   12/17/19 85   12/03/19 94         Physical Exam     Constitutional   General appearance: No acute distress, well appearing and well nourished  Eyes   Conjunctiva and lids: No swelling, erythema or discharge  Pupils and irises: Equal, round and reactive to light      Ears, Nose, Mouth, and Throat   External inspection of ears and nose: Normal     Nasal mucosa, septum, and turbinates: Normal without edema or erythema  Oropharynx: Normal with no erythema, edema, exudate or lesions  Pulmonary   Respiratory effort: No increased work of breathing or signs of respiratory distress  Auscultation of lungs: Clear to auscultation  Cardiovascular   Palpation of heart: Normal PMI, no thrills  Auscultation of heart: Normal rate and rhythm, normal S1 and S2, without murmurs  Examination of extremities for edema and/or varicosities: Normal     Carotid pulses: Normal     Abdomen   Abdomen: Non-tender, no masses  Liver and spleen: No hepatomegaly or splenomegaly  Lymphatic   Palpation of lymph nodes in neck: No lymphadenopathy  Musculoskeletal   Gait and station: Normal     Digits and nails: Normal without clubbing or cyanosis  Inspection/palpation of joints, bones, and muscles: Normal     Skin   Skin and subcutaneous tissue: Normal without rashes or lesions  Neurologic   Cranial nerves: Cranial nerves 2-12 intact  Sensation: No sensory loss  Psychiatric   Orientation to person, place, and time: Normal     Mood and affect: Normal         Assessment / Plan:    Patient is a 26-year-old male with newly diagnosed locally advanced GE junction malignancy which is HER-2 positive  I  Had a discussion with the patient's surgical oncologist will be seeing him later today  I think this tumor Has a reasonable amount of gastric involvement although it is a GE junction tumor and as such I think the patient would benefit from neoadjuvant chemotherapy upfront prior to consideration of either concurrent chemoradiation or even surgery  I explained the rationale behind my thoughts to the patient and his family  They agreed with this assessment  We will give him Herceptin with FLOT chemotherapy Every 2 weeks for 6 cycles  This will consist of 5-FU 2600 mg/m² over 24 hours, leucovorin 20 mg meter square, Oxaliplatin 85 mg/m2 square and Taxotere 60 mg meter square   The patient started this and has done quite well with 6 cycles  He has had a very nice response clinically where he is swallowing very well  He states this has been a very positive change over the last 4-6 weeks  I will now repeat his PET/CT scan  Depending on the results he will either go for surgery or we will proceed with concurrent chemoradiation and then possibly surgery  He is seeing our colleagues in surgery today  This is for his feeding tube  He does not look infected on exam but I would like for them to evaluate it also  I will see him back in 2-4 weeks with the results of his PET CT scan  I will set him up to see our colleagues in surgery and radiation oncology with the results of the scan prior to seeing me so we can make an form a plan  The patient is in agreement with this  He will be off of chemotherapy till he gets his scan  We will then decide how to proceed  Goals and Barriers:  Current Goal:  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

## 2019-12-30 ENCOUNTER — HOSPITAL ENCOUNTER (OUTPATIENT)
Dept: RADIOLOGY | Age: 64
Discharge: HOME | End: 2019-12-30
Payer: COMMERCIAL

## 2019-12-30 DIAGNOSIS — C16.0 GE JUNCTION CARCINOMA (HCC): ICD-10-CM

## 2019-12-30 LAB — GLUCOSE SERPL-MCNC: 102 MG/DL (ref 65–140)

## 2019-12-30 PROCEDURE — A9552 F18 FDG: HCPCS

## 2019-12-30 PROCEDURE — 78815 PET IMAGE W/CT SKULL-THIGH: CPT

## 2019-12-30 PROCEDURE — 82948 REAGENT STRIP/BLOOD GLUCOSE: CPT

## 2020-01-02 ENCOUNTER — TELEPHONE (OUTPATIENT)
Dept: HEMATOLOGY ONCOLOGY | Facility: CLINIC | Age: 65
End: 2020-01-02

## 2020-01-02 ENCOUNTER — TELEPHONE (OUTPATIENT)
Dept: CARDIAC SURGERY | Facility: CLINIC | Age: 65
End: 2020-01-02

## 2020-01-02 NOTE — TELEPHONE ENCOUNTER
Our office received a New Patient referral from Dr Kervin Boston from [address] [place] [address] nurse navigator  We reviewed patient's information on 12/31/19 with Dr Dann Green, and left message for patient to call our office back to schedule New patient appointment for esophagectomy consult

## 2020-01-02 NOTE — TELEPHONE ENCOUNTER
Called and informed the patient that Dr Leatha Rankin is on vacation and will return on Monday 1/6  Until then the results will have to wait to be reviewed

## 2020-01-03 ENCOUNTER — TELEPHONE (OUTPATIENT)
Dept: RADIATION ONCOLOGY | Facility: CLINIC | Age: 65
End: 2020-01-03

## 2020-01-03 NOTE — TELEPHONE ENCOUNTER
Pt  LM  questioning reason for L/C today in [place]  FD returned patients phone call and patient has decided to cancel his L/C today and declined to kwabena  Patient states he wishes to F/U with Dr Majorie Goltz and he was under the impression that a plan was already in place whether to have surgery or XRT  FD explained that Dr Lidia Clifford would go over the PET CT w/patient and discuss treatment options and co-ordinate with his care team   Pt  stated he will call the office if he needs to see us and hung up   Mr Nails Form is not scheduled to F/U with MED ONC unitl 1/14   Maicol Tang,  RAD ONC

## 2020-01-06 ENCOUNTER — TELEPHONE (OUTPATIENT)
Dept: HEMATOLOGY ONCOLOGY | Facility: HOSPITAL | Age: 65
End: 2020-01-06

## 2020-01-06 ENCOUNTER — TELEPHONE (OUTPATIENT)
Dept: HEMATOLOGY ONCOLOGY | Facility: CLINIC | Age: 65
End: 2020-01-06

## 2020-01-06 NOTE — TELEPHONE ENCOUNTER
Carlos Guajardo called looking to speak to Naya  Patient would not go into detail with me about the matter was concerning  Carlos Guajardo stated he would like to have the results of his PET scan  Please review

## 2020-01-06 NOTE — TELEPHONE ENCOUNTER
Dr Shelia Bess and I both spoke with Juvenla Salmeron over the phone  His daughter was also involved in the conversation  Patient was requesting PET CT scan results and also requesting that we tell him what the next step in his treatment is  We both explained to the patient that his PET-CT scan showed a favorable response to treatment  We explained to him that he will either next need radiation and then surgery or surgery  He had canceled his radiation appointment  He was advised to reschedule his radiation appointment as soon as possible and keep his appointment with the surgeon  He was also made aware that if he does not pursue next treatment steps soon with radiation and surgery, then there is risk for his disease progressing and potentially becoming uncurable  He wanted to discuss radiation options and surgical options with us (medical oncology)  We explained that we cannot discuss this as we do not give radiation therapy or perform surgery  We explained to him and his daughter several times that he will need to be seen by radiation oncology and surgical oncology to discuss next steps  He will be following up with us this month as well and we will ensure that there is a plan in place  All of his questions were answered

## 2020-01-08 ENCOUNTER — APPOINTMENT (OUTPATIENT)
Dept: RADIATION ONCOLOGY | Facility: CLINIC | Age: 65
End: 2020-01-08
Payer: COMMERCIAL

## 2020-01-08 ENCOUNTER — CLINICAL SUPPORT (OUTPATIENT)
Dept: RADIATION ONCOLOGY | Facility: CLINIC | Age: 65
End: 2020-01-08
Attending: RADIOLOGY
Payer: COMMERCIAL

## 2020-01-08 VITALS
SYSTOLIC BLOOD PRESSURE: 155 MMHG | HEART RATE: 97 BPM | RESPIRATION RATE: 16 BRPM | WEIGHT: 194 LBS | DIASTOLIC BLOOD PRESSURE: 78 MMHG | BODY MASS INDEX: 27.77 KG/M2 | HEIGHT: 70 IN

## 2020-01-08 DIAGNOSIS — C15.5 MALIGNANT NEOPLASM OF LOWER THIRD OF ESOPHAGUS (HCC): Primary | ICD-10-CM

## 2020-01-08 DIAGNOSIS — C16.0 GE JUNCTION CARCINOMA (HCC): Primary | ICD-10-CM

## 2020-01-08 PROCEDURE — 99211 OFF/OP EST MAY X REQ PHY/QHP: CPT | Performed by: RADIOLOGY

## 2020-01-08 NOTE — PROGRESS NOTES
Justa Valenzuela 1955 is a 71 y o  male     Follow up visit     Oncology History    Initially seen in consultation 9/25/19  Plan was to start with chemotherapy and depending on response he may or may not need consolidative chemotherapy and radiation  10/8/19 Initiated chemotherapyTaxotere, Oxaliplatin, Herceptin, 5FU    10/25/19 Colonoscopy  1  IMPRESSION:  2  1  Sessile polyp of the ascending colon, status post cold snare polypectomy   3  2  Pedunculated polyp of the sigmoid colon, status post hot snare polypectomy    12/17/19 Last Taxotere, Oxaliplatin, Herceptin with 5FU disconnect 12/18 12/19/19 Dr Ham Ruvalcaba and if a good response will consider neoadjuvant concurrent chemoradiation and then proceed with surgery  If he has a very nice response he can even proceed straight to surgery  I will have him see our colleagues in surgery and radiation oncology with the results of the PET/CT scan    12/19/19 Naldo Zuniga MD  Will await PET results  Based on those results he may either going to radiation or potentially surgery  The feeding tube sutures were removed and the site was cleaned  I did inflate the balloon with 4 cc of saline to make sure that J-tube status intact  Will keep J tube as he will need it post operatively  I will see him back tentatively either after the PET or after he completes radiation    12/30/19 PET  IMPRESSION:  1  Findings compatible with response to therapy  2  Significant interval improvement in abnormal FDG uptake at the distal esophagus, gastroesophageal junction and the proximal stomach  Mild residual FDG uptake suggested at the distal esophagus and gastroesophageal junction, residual disease not excluded  3  No findings for hypermetabolic metastasis  4  New splenomegaly with mild diffuse FDG uptake, question related to hematopoietic response to therapy  5   New diffuse FDG uptake in the osseous structures likely representing marrow response to therapy  Potentially this does limit evaluation for underlying lesions  6   Now more conspicuous focal photopenia in the right frontal lobe of the brain anteriorly, may be related to prior infarct  Questionable encephalomalacia here on CT  Correlate with dedicated brain imaging, MRI or CT if not previously evaluated  1/6/20 Christophe King phone call  PET-CT scan showed a favorable response to treatment  We explained to him that he will either next need radiation and then surgery or surgery  He wanted to discuss radiation options and surgical options with us (medical oncology)  We explained that we cannot discuss this as we do not give radiation therapy or perform surgery        1/14/20 Lala ParikhSARA follow up after seeing Rad onc, surg onc & pet    1/16/19 Nick Salazar MD to discuss surgery        Malignant neoplasm of lower third of esophagus ([facility] )    8/24/2019 Initial Diagnosis     Malignant neoplasm of lower third of esophagus ([facility] [address] )  At least intramucosal carcinoma  Her2/SHIMON positive      10/8/2019 - 12/30/2019 Chemotherapy     palonosetron (ALOXI) injection 0 25 mg, 0 25 mg, Intravenous, Once, 6 of 6 cycles  Administration: 0 25 mg (10/8/2019), 0 25 mg (10/22/2019), 0 25 mg (11/5/2019), 0 25 mg (11/19/2019), 0 25 mg (12/3/2019), 0 25 mg (12/17/2019)  pegfilgrastim (NEULASTA ONPRO) subcutaneous injection kit 6 mg, 6 mg, Subcutaneous, Once, 6 of 6 cycles  Administration: 6 mg (10/9/2019), 6 mg (10/23/2019), 6 mg (11/6/2019), 6 mg (11/20/2019), 6 mg (12/4/2019), 6 mg (12/18/2019)  fosaprepitant (EMEND) 150 mg in sodium chloride 0 9 % 250 mL IVPB, 150 mg, Intravenous, Once, 6 of 6 cycles  Administration: 150 mg (10/8/2019), 150 mg (10/22/2019), 150 mg (11/5/2019), 150 mg (11/19/2019), 150 mg (12/3/2019), 150 mg (12/17/2019)  DOCEtaxel (TAXOTERE) 99 mg in sodium chloride 0 9 % 250 mL chemo infusion, 50 mg/m2 = 99 mg (83 3 % of original dose 60 mg/m2), Intravenous, Once, 6 of 6 cycles  Dose modification: 50 mg/m2 (original dose 60 mg/m2, Cycle 1, Reason: Other (See Comments))  Administration: 99 mg (10/8/2019), 99 mg (10/22/2019), 99 mg (11/5/2019), 99 mg (11/19/2019), 99 mg (12/3/2019), 99 mg (12/17/2019)  leucovorin 396 mg in dextrose 5 % 250 mL IVPB, 200 mg/m2 = 396 mg (50 % of original dose 400 mg/m2), Intravenous, Once, 6 of 6 cycles  Dose modification: 200 mg/m2 (original dose 400 mg/m2, Cycle 1, Reason: Other (See Comments), Comment: FLOT regimen standard)  Administration: 396 mg (10/8/2019), 396 mg (10/22/2019), 396 mg (11/5/2019), 396 mg (11/19/2019), 396 mg (12/3/2019), 396 mg (12/17/2019)  oxaliplatin (ELOXATIN) 168 3 mg in dextrose 5 % 250 mL chemo infusion, 85 mg/m2 = 168 3 mg, Intravenous, Once, 6 of 6 cycles  Administration: 168 3 mg (10/8/2019), 168 3 mg (10/22/2019), 168 3 mg (11/5/2019), 168 3 mg (11/19/2019), 168 3 mg (12/3/2019), 168 3 mg (12/17/2019)  trastuzumab (HERCEPTIN) 496 mg in sodium chloride 0 9 % 250 mL chemo infusion, 6 mg/kg = 496 mg, Intravenous, Once, 6 of 6 cycles  Administration: 496 mg (10/8/2019), 331 mg (10/22/2019), 331 mg (11/5/2019), 331 mg (11/19/2019), 331 mg (12/3/2019), 331 mg (12/17/2019)        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[facility] [address] )    8/24/2019 Biopsy     Esophagus (biopsy):  - At 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       9/24/2019 Initial Diagnosis     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HCC)      10/8/2019 - 12/30/2019 Chemotherapy     palonosetron (ALOXI) injection 0 25 mg, 0 25 mg, Intravenous, Once, 6 of 6 cycles  Administration: 0 25 mg (10/8/2019), 0 25 mg (10/22/2019), 0 25 mg (11/5/2019), 0 25 mg (11/19/2019), 0 25 mg (12/3/2019), 0 25 mg (12/17/2019)  pegfilgrastim (NEULASTA ONPRO) subcutaneous injection kit 6 mg, 6 mg, Subcutaneous, Once, 6 of 6 cycles  Administration: 6 mg (10/9/2019), 6 mg (10/23/2019), 6 mg (11/6/2019), 6 mg (11/20/2019), 6 mg (12/4/2019), 6 mg (12/18/2019)  fosaprepitant (EMEND) 150 mg in sodium chloride 0 9 % 250 mL IVPB, 150 mg, Intravenous, Once, 6 of 6 cycles  Administration: 150 mg (10/8/2019), 150 mg (10/22/2019), 150 mg (11/5/2019), 150 mg (11/19/2019), 150 mg (12/3/2019), 150 mg (12/17/2019)  DOCEtaxel (TAXOTERE) 99 mg in sodium chloride 0 9 % 250 mL chemo infusion, 50 mg/m2 = 99 mg (83 3 % of original dose 60 mg/m2), Intravenous, Once, 6 of 6 cycles  Dose modification: 50 mg/m2 (original dose 60 mg/m2, Cycle 1, Reason: Other (See Comments))  Administration: 99 mg (10/8/2019), 99 mg (10/22/2019), 99 mg (11/5/2019), 99 mg (11/19/2019), 99 mg (12/3/2019), 99 mg (12/17/2019)  leucovorin 396 mg in dextrose 5 % 250 mL IVPB, 200 mg/m2 = 396 mg (50 % of original dose 400 mg/m2), Intravenous, Once, 6 of 6 cycles  Dose modification: 200 mg/m2 (original dose 400 mg/m2, Cycle 1, Reason: Other (See Comments), Comment: FLOT regimen standard)  Administration: 396 mg (10/8/2019), 396 mg (10/22/2019), 396 mg (11/5/2019), 396 mg (11/19/2019), 396 mg (12/3/2019), 396 mg (12/17/2019)  oxaliplatin (ELOXATIN) 168 3 mg in dextrose 5 % 250 mL chemo infusion, 85 mg/m2 = 168 3 mg, Intravenous, Once, 6 of 6 cycles  Administration: 168 3 mg (10/8/2019), 168 3 mg (10/22/2019), 168 3 mg (11/5/2019), 168 3 mg (11/19/2019), 168 3 mg (12/3/2019), 168 3 mg (12/17/2019)  trastuzumab (HERCEPTIN) 496 mg in sodium chloride 0 9 % 250 mL chemo infusion, 6 mg/kg = 496 mg, Intravenous, Once, 6 of 6 cycles  Administration: 496 mg (10/8/2019), 331 mg (10/22/2019), 331 mg (11/5/2019), 331 mg (11/19/2019), 331 mg (12/3/2019), 331 mg (12/17/2019)         Clinical Trial: no      Health Maintenance   Topic Date Due    Hepatitis C Screening  1955    Medicare Annual Wellness Visit (AWV)  1955    Diabetic Foot Exam  07/19/1964    DM Eye Exam  07/19/1964    DTaP,Tdap,and Td Vaccines (1 - Tdap) 07/19/1965    HIV Screening  07/19/1969    BMI: Followup Plan  07/19/1972    Hepatitis B Vaccine (1 of 3 - Risk 3-dose series) 07/19/1973    Influenza Vaccine  07/01/2019    Fall Risk  07/19/2019    Pneumococcal Vaccine: 65+ Years (1 of 2 - PCV13) 07/19/2019    HEMOGLOBIN A1C  02/15/2020    URINE MICROALBUMIN  08/15/2020    Depression Screening PHQ  09/25/2020    BMI: Adult  12/19/2020    CRC Screening: Colonoscopy  11/12/2022    Pneumococcal Vaccine: Pediatrics (0 to 5 Years) and At-Risk Patients (6 to 59 Years)  Aged Out    HIB Vaccine  Aged Out    IPV Vaccine  Aged Out    Hepatitis A Vaccine  Aged Out    Meningococcal ACWY Vaccine  Aged Out    HPV Vaccine  Aged Out       Patient Active Problem List   Diagnosis    COPD (chronic obstructive pulmonary disease) [facility] [address] )    Headaches due to old head injury    High cholesterol    Obstructive sleep apnea syndrome    Type 2 diabetes mellitus with hyperglycemia, without long-term current use of insulin [facility]    Malignant neoplasm of lower third of esophagus (HCC)    Esophageal obstruction    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[facility] [address] )    Chemotherapy induced neutropenia (HCC)    Anemia, unspecified    Insomnia due to medical condition    Encounter for care related to feeding tube     Past Medical History:   Diagnosis Date    Cancer [facility]     esophageal    COPD [facility]chronic obstructive [facility] disease) [facility]     Diabetes mellitus ([facility] [address] )     Difficulty swallowing     Disease of thyroid gland     Esophageal mass     History of transfusion     Sleep apnea      Past Surgical History:   Procedure Laterality Date    BACK SURGERY      x2    DISC REMOVAL      FL GUIDED CENTRAL VENOUS ACCESS DEVICE INSERTION  9/26/2019    GASTROJEJUNOSTOMY W/ JEJUNOSTOMY TUBE N/A 9/26/2019    Procedure: INSERTION JEJUNOSTOMY TUBE OPEN;  Surgeon: Maral Chatterjee MD;  [facility];  Service: Surgical Oncology    TONSILLECTOMY      TUNNELED VENOUS PORT PLACEMENT N/A 9/26/2019    Procedure: INSERTION VENOUS PORT (PORT-A-CATH);   Surgeon: Maral Chatterjee MD;  [facility];  Service: Surgical Oncology     Family History   Problem Relation Age of Onset    Diabetes Mother     No Known Problems Father      Social History     Socioeconomic History    Marital status: Single     Spouse name: Not on file    Number of children: Not on file    Years of education: Not on file    Highest education level: Not on file   Occupational History    Not on file   Social Needs    Financial resource strain: Not on file    Food insecurity:     Worry: Not on file     Inability: Not on file    Transportation needs:     Medical: Not on file     Non-medical: Not on file   Tobacco Use    Smoking status: Former Smoker     Packs/day: 0 50     Years: 50 00     Pack years: 25 00     Types: Cigarettes     Last attempt to quit: 2017     Years since quittin 0    Smokeless tobacco: Never Used   Substance and Sexual Activity    Alcohol use: Not Currently     Alcohol/week: 0 0 standard drinks     Frequency: Never     Drinks per session: 1 or 2     Binge frequency: Never    Drug use: Never    Sexual activity: Not on file   Lifestyle    Physical activity:     Days per week: Not on file     Minutes per session: Not on file    Stress: Not on file   Relationships    Social connections:     Talks on phone: Not on file     Gets together: Not on file     Attends Denominational service: Not on file     Active member of club or organization: Not on file     Attends meetings of clubs or organizations: Not on file     Relationship status: Not on file    Intimate partner violence:     Fear of current or ex partner: Not on file     Emotionally abused: Not on file     Physically abused: Not on file     Forced sexual activity: Not on file   Other Topics Concern    Not on file   Social History Narrative    Not on file       Current Outpatient Medications:     acetaminophen (TYLENOL) 160 mg/5 mL suspension, Take 20 3 mL (650 mg total) by mouth every 4 (four) hours as needed for mild pain or fever, Disp: 118 mL, Rfl: 0    albuterol (2 5 mg/3 mL) 0 083 % nebulizer solution, Take 1 vial (2 5 mg total) by nebulization every 6 (six) hours as needed for wheezing or shortness of breath, Disp: 1 vial, Rfl: 5    canagliflozin (INVOKANA) 100 mg, Daily, Disp: , Rfl:     gabapentin (NEURONTIN) 300 mg capsule, take 1 capsule by mouth daily at bedtime, Disp: 30 capsule, Rfl: 0    levothyroxine 25 mcg tablet, Take 25 mcg by mouth daily in the early morning, Disp: , Rfl:     pioglitazone (ACTOS) 30 mg tablet, Take 30 mg by mouth daily , Disp: , Rfl:     rOPINIRole (REQUIP) 0 25 mg tablet, take 1 tablet by mouth daily at bedtime, Disp: 30 tablet, Rfl: 0    sitaGLIPtin (JANUVIA) 100 mg tablet, Take 100 mg by mouth daily , Disp: , Rfl:     dexamethasone (DECADRON) 1 MG/ML solution, Take 8 mL (8 mg total) by mouth daily (Patient not taking: Reported on 1/8/2020), Disp: 30 mL, Rfl: 6    Nutritional Supplements (JEVITY 1 5 SHUN) LIQD, Take 85 mL by mouth continuous Jevity @[employer] advance as tolerated to goal rate of 85ml/hr to run for 16 hours daily  Flush with at least 60ml water at start and end of cycle  Needs additional water flushes if PO fluid intake declines (Patient not taking: Reported on 1/8/2020), Disp: 1000 mL, Rfl: 0    ondansetron (ZOFRAN) 4 MG/5ML solution, Take 10 mL (8 mg total) by mouth every 8 (eight) hours as needed for nausea or vomiting (Patient not taking: Reported on 1/8/2020), Disp: 240 mL, Rfl: 6    pantoprazole (PROTONIX) 40 mg tablet, Take 1 tablet (40 mg total) by mouth daily, Disp: 30 tablet, Rfl: 1    prochlorperazine (COMPAZINE) 10 mg tablet, Take 1 tablet (10 mg total) by mouth every 6 (six) hours as needed for nausea or vomiting (Patient not taking: Reported on 1/8/2020), Disp: 45 tablet, Rfl: 3  Allergies   Allergen Reactions    Penicillins Itching       Review of Systems:  Review of Systems   Constitutional: Positive for fatigue  HENT: Negative  Eyes: Negative  Respiratory: Negative      Cardiovascular: Negative  Gastrointestinal:        Eating everything  Has excess gas   Endocrine: Negative  Genitourinary: Negative  Musculoskeletal: Negative  Skin: Negative  Allergic/Immunologic: Negative  Neurological: Negative  Hematological: Negative  Psychiatric/Behavioral: Negative  Vitals:    01/08/20 0946   BP: 155/78   BP Location: Right arm   Patient Position: Sitting   Cuff Size: Standard   Pulse: 97   Resp: 16   Weight: 88 kg (194 lb)   Height: 5' 10" (1 778 m)        Pain Score: 0-No pain      Imaging:Nm Pet Ct Skull Base To Mid Thigh    Result Date: 12/30/2019  Narrative: PET/CT SCAN INDICATION: Restaging of esophageal/gastric cancer postchemotherapy  C16 0: Malignant neoplasm of cardia MODIFIER: PS COMPARISON: PET CT 9/19/2019 CELL TYPE:  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, biopsy esophagus 8/24/2019 TECHNIQUE:   8 33 mCi F-18-FDG administered IV  Multiplanar attenuation corrected and non attenuation corrected PET images were acquired 60 minutes post injection  Contiguous, low dose, axial CT sections were obtained from the skull vertex through the femurs   Intravenous contrast material was not utilized  This examination, like all CT scans performed in the [facility], was performed utilizing techniques to minimize radiation dose exposure, including the use of iterative reconstruction and automated exposure control  Fasting serum glucose: 102 mg/dl FINDINGS: VISUALIZED BRAIN:   No acute abnormalities are seen  Now more conspicuous focal photopenia in the right frontal lobe anteriorly, question related to prior infarct  Questionable encephalomalacia  here on CT  HEAD/NECK:   Diffuse thyroid gland activity again noted, SUV max of 5 0, previously 6 2  This may be related to underlying thyroiditis  There is a physiologic distribution of FDG  No FDG avid cervical adenopathy is seen  CT images: Unremarkable  CHEST:   Mild residual FDG uptake at the distal esophagus and gastroesophageal junction, SUV max of 2 6, previously 21 0  Persistent circumferential wall thickening suggested here on CT  No FDG avid lymph nodes  CT images: Left-sided Mediport line tip terminates at the SVC  Scattered coronary artery calcifications  Stable 5 mm irregular nodular density in the left upper lobe anteriorly image 106 series 3, not FDG avid  Calcified granuloma in the left upper lobe  ABDOMEN:   No FDG avid soft tissue lesions are seen  No residual FDG uptake at the proximal stomach, SUV max of 1 8 in this region  No FDG avid lymph nodes  Previously there was a FDG avid lymph node posterior to the proximal stomach which is no longer seen  New splenomegaly with mild FDG uptake, SUV max of 3 0, higher than the liver, SUV max of 2 6  Spleen measures 16 8 cm in AP dimension  CT images: Cholelithiasis  Stable nodularity of the right adrenal gland, not FDG avid  Jejunostomy tube in position  Mild ascites, new  PELVIS: No FDG avid soft tissue lesions are seen  Previously noted colonic foci of FDG uptake are no longer seen  CT images: Mildly prominent prostate  Tiny fat-containing left inguinal hernia  OSSEOUS STRUCTURES: New diffuse FDG uptake in the osseous structures likely representing response to therapy  Potentially this does limit evaluation for the previously seen small rib foci of FDG uptake and other underlying lesions  CT images: Lumbosacral fusion device  Impression: 1  Findings compatible with response to therapy  2  Significant interval improvement in abnormal FDG uptake at the distal esophagus, gastroesophageal junction and the proximal stomach  Mild residual FDG uptake suggested at the distal esophagus and gastroesophageal junction, residual disease not excluded  3  No findings for hypermetabolic metastasis  4  New splenomegaly with mild diffuse FDG uptake, question related to hematopoietic response to therapy   5  New diffuse FDG uptake in the osseous structures likely representing marrow response to therapy  Potentially this does limit evaluation for underlying lesions  6   Now more conspicuous focal photopenia in the right frontal lobe of the brain anteriorly, may be related to prior infarct  Questionable encephalomalacia here on CT  Correlate with dedicated brain imaging, MRI or CT if not previously evaluated   Workstation performed: [id number]

## 2020-01-09 ENCOUNTER — DOCUMENTATION (OUTPATIENT)
Dept: HEMATOLOGY ONCOLOGY | Facility: CLINIC | Age: 65
End: 2020-01-09

## 2020-01-09 NOTE — PROGRESS NOTES
GI Oncology Nurse Navigator:  Rectal/GI Multidisciplinary Conference: 1/9/2020    Diagnosis: GE Junction Cancer      Carmen Schuler was presented at the Rectal/GI Multidisciplinary Conference today  PHYSICIAN RECOMMENDED PLAN:    -Coordinate appointments for surgery    Team agreed to plan

## 2020-01-10 ENCOUNTER — TELEPHONE (OUTPATIENT)
Dept: HEMATOLOGY ONCOLOGY | Facility: CLINIC | Age: 65
End: 2020-01-10

## 2020-01-10 NOTE — TELEPHONE ENCOUNTER
Spoke with patient on the phone today  Advised him to call Thoracic Surgery to schedule an appointment for surgical consultation  I provided him with their office number, [phone], and advised he be seen at the soonest possible appointment  I also reviewed and confirmed with him his appointments for next week with Canelo Seymour and Dr Jayne Hurt  He has my contact information should he have any questions

## 2020-01-10 NOTE — PROGRESS NOTES
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 into adjacent fat  Staging PET in 2019 demonstrated areas of FDG uptake in the ribs and a questionable pulmonary lesion  He was seen in consultation 19 and FLOT chemotherapy with Herceptin was planned  FLOT and Herceptin chemotherapy was initiated on 10/8/19  He completed 6 cycles on 2019  He tolerated chemotherapy well with resolution of previous dysphagia and odynophagia symptoms  He is currently tolerating and eating a normal diet  He denies reflux, gastritis, nausea, vomiting, diarrhea, odynophagia or dysphagia  He denies diarrhea or abdominal cramping  He denies any bone pain  He is no longer using his J-tube, which was inserted due to severe dysphagia and odynophagia with weight loss at presentation  He states that he suffered cellulitis around the J-tube site, but since sutures were removed in December by Dr Joellen Muniz, he has been able to clean the area more easily and has had no further issues  He has gained 12 pounds since initial consultation in our office  He denies any chest pain, palpitations, shortness of breath, persistent cough      10/25/19 Colonoscopy  1   IMPRESSION:  2  1          Sessile polyp of the ascending colon, status post cold snare polypectomy   3  2          Pedunculated polyp of the sigmoid colon, status post hot snare polypectomy      19 PET demonstrated significant interval improvement in abnormal FDG uptake at the distal esophagus, gastroesophageal junction and the proximal stomach   Mild residual FDG uptake suggested at the distal esophagus and gastroesophageal junction  There is also some residual thickening of soft tissue in this area  No lymphadenopathy noted  No findings for hypermetabolic metastasis   New diffuse FDG uptake in the osseous structures likely representing marrow response to therapy   Potentially this does limit evaluation for underlying lesions  Now more conspicuous focal photopenia in the right frontal lobe of the brain anteriorly, may be related to prior infarct        The patient states that he suffered significant head trauma in the past   He has suffered chronic headaches for many years, but states that recently his headaches have decreased in frequency  He denies any seizures, new visual changes, or gait instability  He has peripheral neuropathy in his fingers and toes associated with treatment, but denies any new focal numbness or weakness otherwise      Upcoming appointments:  1/14/20 Early SARA Shanks follow up after seeing Rad onc, surg onc & pet   1/16/19 Tressia Pallas, MD to discuss surgery    Historical Information      Malignant neoplasm of lower third of esophagus ([facility] [address] )    8/24/2019 Initial Diagnosis     Malignant neoplasm of lower third of esophagus ([facility] [address] )  At least intramucosal carcinoma  Her2/SHIMON positive      10/8/2019 - 12/30/2019 Chemotherapy     palonosetron (ALOXI) injection 0 25 mg, 0 25 mg, Intravenous, Once, 6 of 6 cycles  Administration: 0 25 mg (10/8/2019), 0 25 mg (10/22/2019), 0 25 mg (11/5/2019), 0 25 mg (11/19/2019), 0 25 mg (12/3/2019), 0 25 mg (12/17/2019)  pegfilgrastim (NEULASTA ONPRO) subcutaneous injection kit 6 mg, 6 mg, Subcutaneous, Once, 6 of 6 cycles  Administration: 6 mg (10/9/2019), 6 mg (10/23/2019), 6 mg (11/6/2019), 6 mg (11/20/2019), 6 mg (12/4/2019), 6 mg (12/18/2019)  fosaprepitant (EMEND) 150 mg in sodium chloride 0 9 % 250 mL IVPB, 150 mg, Intravenous, Once, 6 of 6 cycles  Administration: 150 mg (10/8/2019), 150 mg (10/22/2019), 150 mg (11/5/2019), 150 mg (11/19/2019), 150 mg (12/3/2019), 150 mg (12/17/2019)  DOCEtaxel (TAXOTERE) 99 mg in sodium chloride 0 9 % 250 mL chemo infusion, 50 mg/m2 = 99 mg (83 3 % of original dose 60 mg/m2), Intravenous, Once, 6 of 6 cycles  Dose modification: 50 mg/m2 (original dose 60 mg/m2, Cycle 1, Reason: Other (See Comments))  Administration: 99 mg (10/8/2019), 99 mg (10/22/2019), 99 mg (11/5/2019), 99 mg (11/19/2019), 99 mg (12/3/2019), 99 mg (12/17/2019)  leucovorin 396 mg in dextrose 5 % 250 mL IVPB, 200 mg/m2 = 396 mg (50 % of original dose 400 mg/m2), Intravenous, Once, 6 of 6 cycles  Dose modification: 200 mg/m2 (original dose 400 mg/m2, Cycle 1, Reason: Other (See Comments), Comment: FLOT regimen standard)  Administration: 396 mg (10/8/2019), 396 mg (10/22/2019), 396 mg (11/5/2019), 396 mg (11/19/2019), 396 mg (12/3/2019), 396 mg (12/17/2019)  oxaliplatin (ELOXATIN) 168 3 mg in dextrose 5 % 250 mL chemo infusion, 85 mg/m2 = 168 3 mg, Intravenous, Once, 6 of 6 cycles  Administration: 168 3 mg (10/8/2019), 168 3 mg (10/22/2019), 168 3 mg (11/5/2019), 168 3 mg (11/19/2019), 168 3 mg (12/3/2019), 168 3 mg (12/17/2019)  trastuzumab (HERCEPTIN) 496 mg in sodium chloride 0 9 % 250 mL chemo infusion, 6 mg/kg = 496 mg, Intravenous, Once, 6 of 6 cycles  Administration: 496 mg (10/8/2019), 331 mg (10/22/2019), 331 mg (11/5/2019), 331 mg (11/19/2019), 331 mg (12/3/2019), 331 mg (12/17/2019)        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[facility] [address] )    8/24/2019 Biopsy     Esophagus (biopsy):  - At 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       9/24/2019 Initial Diagnosis     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H[facility])      10/8/2019 - 12/30/2019 Chemotherapy     palonosetron (ALOXI) injection 0 25 mg, 0 25 mg, Intravenous, Once, 6 of 6 cycles  Administration: 0 25 mg (10/8/2019), 0 25 mg (10/22/2019), 0 25 mg (11/5/2019), 0 25 mg (11/19/2019), 0 25 mg (12/3/2019), 0 25 mg (12/17/2019)  pegfilgrastim (NEULASTA ONPRO) subcutaneous injection kit 6 mg, 6 mg, Subcutaneous, Once, 6 of 6 cycles  Administration: 6 mg (10/9/2019), 6 mg (10/23/2019), 6 mg (11/6/2019), 6 mg (11/20/2019), 6 mg (12/4/2019), 6 mg (12/18/2019)  fosaprepitant (EMEND) 150 mg in sodium chloride 0 9 % 250 mL IVPB, 150 mg, Intravenous, Once, 6 of 6 cycles  Administration: 150 mg (10/8/2019), 150 mg (10/22/2019), 150 mg (11/5/2019), 150 mg (11/19/2019), 150 mg (12/3/2019), 150 mg (12/17/2019)  DOCEtaxel (TAXOTERE) 99 mg in sodium chloride 0 9 % 250 mL chemo infusion, 50 mg/m2 = 99 mg (83 3 % of original dose 60 mg/m2), Intravenous, Once, 6 of 6 cycles  Dose modification: 50 mg/m2 (original dose 60 mg/m2, Cycle 1, Reason: Other (See Comments))  Administration: 99 mg (10/8/2019), 99 mg (10/22/2019), 99 mg (11/5/2019), 99 mg (11/19/2019), 99 mg (12/3/2019), 99 mg (12/17/2019)  leucovorin 396 mg in dextrose 5 % 250 mL IVPB, 200 mg/m2 = 396 mg (50 % of original dose 400 mg/m2), Intravenous, Once, 6 of 6 cycles  Dose modification: 200 mg/m2 (original dose 400 mg/m2, Cycle 1, Reason: Other (See Comments), Comment: FLOT regimen standard)  Administration: 396 mg (10/8/2019), 396 mg (10/22/2019), 396 mg (11/5/2019), 396 mg (11/19/2019), 396 mg (12/3/2019), 396 mg (12/17/2019)  oxaliplatin (ELOXATIN) 168 3 mg in dextrose 5 % 250 mL chemo infusion, 85 mg/m2 = 168 3 mg, Intravenous, Once, 6 of 6 cycles  Administration: 168 3 mg (10/8/2019), 168 3 mg (10/22/2019), 168 3 mg (11/5/2019), 168 3 mg (11/19/2019), 168 3 mg (12/3/2019), 168 3 mg (12/17/2019)  trastuzumab (HERCEPTIN) 496 mg in sodium chloride 0 9 % 250 mL chemo infusion, 6 mg/kg = 496 mg, Intravenous, Once, 6 of 6 cycles  Administration: 496 mg (10/8/2019), 331 mg (10/22/2019), 331 mg (11/5/2019), 331 mg (11/19/2019), 331 mg (12/3/2019), 331 mg (12/17/2019)           Past Medical History:   Diagnosis Date    Cancer ([facility] [address] )     esophageal    COPD (chronic obstructive pulmonary disease) ([facility] [address] [facility]     [facility]     Difficulty swallowing     Disease of thyroid gland     Esophageal mass     History of transfusion     Sleep apnea      Past Surgical History:   Procedure Laterality Date    BACK SURGERY      x2    DISC REMOVAL      FL GUIDED CENTRAL VENOUS ACCESS DEVICE INSERTION  2019    GASTROJEJUNOSTOMY W/ JEJUNOSTOMY TUBE N/A 2019    Procedure: INSERTION JEJUNOSTOMY TUBE OPEN;  Surgeon: Ihsan Haywood MD;  [facility];  Service: Surgical Oncology    TONSILLECTOMY      TUNNELED VENOUS PORT PLACEMENT N/A 2019    Procedure: INSERTION VENOUS PORT (PORT-A-CATH);   Surgeon: Ihsan Haywood MD;  [facility];  Service: Surgical Oncology       Family History   Problem Relation Age of Onset    Diabetes Mother     No Known Problems Father        Social History   Social History     Substance and Sexual Activity   Alcohol Use Not Currently    Alcohol/week: 0 0 standard drinks    Frequency: Never    Drinks per session: 1 or 2    Binge frequency: Never     Social History     Substance and Sexual Activity   Drug Use Never     Social History     Tobacco Use   Smoking Status Former Smoker    Packs/day: 0 50    Years: 50 00    Pack years: 25 00    Types: Cigarettes    Last attempt to quit: 2017    Years since quittin 0   Smokeless Tobacco Never Used         Meds/Allergies     Current Outpatient Medications:     acetaminophen (TYLENOL) 160 mg/5 mL suspension, Take 20 3 mL (650 mg total) by mouth every 4 (four) hours as needed for mild pain or fever, Disp: 118 mL, Rfl: 0    albuterol (2 5 mg/3 mL) 0 083 % nebulizer solution, Take 1 vial (2 5 mg total) by nebulization every 6 (six) hours as needed for wheezing or shortness of breath, Disp: 1 vial, Rfl: 5    canagliflozin (INVOKANA) 100 mg, Daily, Disp: , Rfl:     gabapentin (NEURONTIN) 300 mg capsule, take 1 capsule by mouth daily at bedtime, Disp: 30 capsule, Rfl: 0    levothyroxine 25 mcg tablet, Take 25 mcg by mouth daily in the early morning, Disp: , Rfl:     pioglitazone (ACTOS) 30 mg tablet, Take 30 mg by mouth daily , Disp: , Rfl:     rOPINIRole (REQUIP) 0 25 mg tablet, take 1 tablet by mouth daily at bedtime, Disp: 30 tablet, Rfl: 0    sitaGLIPtin (JANUVIA) 100 mg tablet, Take 100 mg by mouth daily , Disp: , Rfl:     dexamethasone (DECADRON) 1 MG/ML solution, Take 8 mL (8 mg total) by mouth daily (Patient not taking: Reported on 1/8/2020), Disp: 30 mL, Rfl: 6    Nutritional Supplements (JEVITY 1 5 SHUN) LIQD, Take 85 mL by mouth continuous [email or web address] [email or web address] advance as tolerated to goal rate of 85ml/hr to run for 16 hours daily  Flush with at least 60ml water at start and end of cycle  Needs additional water flushes if PO fluid intake declines (Patient not taking: Reported on 1/8/2020), Disp: 1000 mL, Rfl: 0    ondansetron (ZOFRAN) 4 MG/5ML solution, Take 10 mL (8 mg total) by mouth every 8 (eight) hours as needed for nausea or vomiting (Patient not taking: Reported on 1/8/2020), Disp: 240 mL, Rfl: 6    pantoprazole (PROTONIX) 40 mg tablet, Take 1 tablet (40 mg total) by mouth daily, Disp: 30 tablet, Rfl: 1    prochlorperazine (COMPAZINE) 10 mg tablet, Take 1 tablet (10 mg total) by mouth every 6 (six) hours as needed for nausea or vomiting (Patient not taking: Reported on 1/8/2020), Disp: 45 tablet, Rfl: 3  Allergies   Allergen Reactions    Penicillins Itching       Review of Systems    Constitutional: Positive for fatigue  HENT: Negative  Eyes: Negative  Respiratory: Negative  Cardiovascular: Negative  Gastrointestinal:        Eating everything  Has excess gas   Endocrine: Negative  Genitourinary: Negative  Musculoskeletal: Negative  Skin: Negative  Allergic/Immunologic: Negative  Neurological: Negative  Hematological: Negative  Psychiatric/Behavioral: Negative          OBJECTIVE:   /78 (BP Location: Right arm, Patient Position: Sitting, Cuff Size: Standard)   Pulse 97   Resp 16   Ht 5' 10" (1 778 m)   Wt 88 kg (194 lb)   BMI 27 84 kg/m²   Pain Assessment:  0  Performance Status: Karnofsky: 90 - Able to carry on normal activity; minor signs or symptoms of disease     Physical Exam   Constitutional: He is oriented to person, place, and time  He appears well-developed and well-nourished  No distress  HENT:   Mouth/Throat: Oropharynx is clear and moist    Eyes: No scleral icterus  Cardiovascular: Normal rate and regular rhythm  No murmur heard  Pulmonary/Chest: Effort normal  No respiratory distress  He has no wheezes  He has no rhonchi  He has no rales  Abdominal: Soft  He exhibits no distension  There is no tenderness  J-tube site is clean, dry, intact   Musculoskeletal: He exhibits no edema  Lymphadenopathy:     He has no cervical adenopathy  Right: No supraclavicular adenopathy present  Left: No supraclavicular adenopathy present  Neurological: He is alert and oriented to person, place, and time  He has normal strength  No sensory deficit  Gait normal    Psychiatric: He has a normal mood and affect  His behavior is normal    Nursing note and vitals reviewed  RESULTS  Lab Results    Chemistry        Component Value Date/Time    K 3 5 12/17/2019 1017     12/17/2019 1017    CO2 26 12/17/2019 1017    BUN 12 12/17/2019 1017    CREATININE 0 75 12/17/2019 1017        Component Value Date/Time    CALCIUM 7 7 (L) 12/17/2019 1017    ALKPHOS 94 12/17/2019 1017    AST 15 12/17/2019 1017    ALT 16 12/17/2019 1017          Lab Results   Component Value Date    WBC 10 80 (H) 12/16/2019    HGB 10 5 (L) 12/16/2019    HCT 35 4 (L) 12/16/2019    MCV 86 12/16/2019    PLT 78 (L) 12/16/2019       Imaging Studies  Nm Pet Ct Skull Base To Mid Thigh    Result Date: 12/30/2019  Narrative: PET/CT SCAN INDICATION: Restaging of esophageal/gastric cancer postchemotherapy    C16 0: Malignant neoplasm of cardia MODIFIER: PS COMPARISON: PET CT 9/19/2019 CELL TYPE:  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, biopsy esophagus 8/24/2019 TECHNIQUE:   8 33 mCi F-18-FDG administered IV  Multiplanar attenuation corrected and non attenuation corrected PET images were acquired 60 minutes post injection  Contiguous, low dose, axial CT sections were obtained from the skull vertex through the femurs   Intravenous contrast material was not utilized  This examination, like all CT scans performed in the [facility], was performed utilizing techniques to minimize radiation dose exposure, including the use of iterative reconstruction and automated exposure control  Fasting serum glucose: 102 mg/dl FINDINGS: VISUALIZED BRAIN:   No acute abnormalities are seen  Now more conspicuous focal photopenia in the right frontal lobe anteriorly, question related to prior infarct  Questionable encephalomalacia  here on CT  HEAD/NECK:   Diffuse thyroid gland activity again noted, SUV max of 5 0, previously 6 2  This may be related to underlying thyroiditis  There is a physiologic distribution of FDG  No FDG avid cervical adenopathy is seen  CT images: Unremarkable  CHEST:   Mild residual FDG uptake at the distal esophagus and gastroesophageal junction, SUV max of 2 6, previously 21 0  Persistent circumferential wall thickening suggested here on CT  No FDG avid lymph nodes  CT images: Left-sided Mediport line tip terminates at the SVC  Scattered coronary artery calcifications  Stable 5 mm irregular nodular density in the left upper lobe anteriorly image 106 series 3, not FDG avid  Calcified granuloma in the left upper lobe  ABDOMEN:   No FDG avid soft tissue lesions are seen  No residual FDG uptake at the proximal stomach, SUV max of 1 8 in this region  No FDG avid lymph nodes    Previously there was a FDG avid lymph node posterior to the proximal stomach which is no longer seen  New splenomegaly with mild FDG uptake, SUV max of 3 0, higher than the liver, SUV max of 2 6  Spleen measures 16 8 cm in AP dimension  CT images: Cholelithiasis  Stable nodularity of the right adrenal gland, not FDG avid  Jejunostomy tube in position  Mild ascites, new  PELVIS: No FDG avid soft tissue lesions are seen  Previously noted colonic foci of FDG uptake are no longer seen  CT images: Mildly prominent prostate  Tiny fat-containing left inguinal hernia  OSSEOUS STRUCTURES: New diffuse FDG uptake in the osseous structures likely representing response to therapy  Potentially this does limit evaluation for the previously seen small rib foci of FDG uptake and other underlying lesions  CT images: Lumbosacral fusion device  Impression: 1  Findings compatible with response to therapy  2  Significant interval improvement in abnormal FDG uptake at the distal esophagus, gastroesophageal junction and the proximal stomach  Mild residual FDG uptake suggested at the distal esophagus and gastroesophageal junction, residual disease not excluded  3  No findings for hypermetabolic metastasis  4  New splenomegaly with mild diffuse FDG uptake, question related to hematopoietic response to therapy  5   New diffuse FDG uptake in the osseous structures likely representing marrow response to therapy  Potentially this does limit evaluation for underlying lesions  6   Now more conspicuous focal photopenia in the right frontal lobe of the brain anteriorly, may be related to prior infarct  Questionable encephalomalacia here on CT  Correlate with dedicated brain imaging, MRI or CT if not previously evaluated  Workstation performed: [id number]         ASSESSMENT  1   Malignant neoplasm of lower third of esophagus [facility])           PLAN/DISCUSSION    Candelaria Vasquez is a 71y o  year old male with at least stage III, possible stage IV, esophageal adenocarcinoma of the GE junction with suspicious lymphadenopathy and possible infiltration from gastric cardia tumor component into adjacent fat  Staging PET in September 2019 demonstrated areas of FDG uptake in the ribs and a questionable pulmonary lesion  He completed FLOT x 6 chemotherapy and Herceptin on 12/17/2019  His previous symptoms of dysphagia and odynophagia have resolved  Repeat PET demonstrates good partial response with resolution of previous lymphadenopathy, significant decrease in primary tumor extent and size and no new metastases  Previous pulmonary lesion is stable and is not FDG avid  Rib lesions are not fully evaluated due to uptake in the bone marrow, but no obvious osseous metastasis  Given the patient's good clinical response, resolution of previous tumor related symptoms, and no definite evidence of distant metastatic disease at this time, I recommended the patient proceed with surgical resection  This is in accordance with NCCN guidelines  He will follow-up with Dr Jeffrey Michaels next week  If he is not a surgical candidate due to unexpected comorbidities, then we discussed potential definitive concurrent chemo radiation briefly  We would plan a dose of 50 4Gy with concurrent chemotherapy in this scenario  We briefly reviewed the rationale as well as the acute and late toxicities of radiation for esophageal cancer  A more detailed discussion would be warranted if he actually proceed radiation, which is currently not planned  The patient was noted with an area of photopenia in the brain on PET that appeared more notable on this exam   He is neurologically stable with no gross symptoms  He has a history of head trauma in the past  I reviewed this imaging with Radiology and it was felt that this area was inconsistent with metastasis or acute infarction  His case will be presented at GI working group tomorrow    I will defer further imaging at this time unless the GI working group or our colleagues in medical or surgical oncology feel further work-up is indicated  We would be happy to see him again should the need arise  Brittany Posaad MD  1/10/2020,1:41 PM      Portions of the record may have been created with voice recognition software   Occasional wrong word or "sound a like" substitutions may have occurred due to the inherent limitations of voice recognition software   Read the chart carefully and recognize, using context, where substitutions have occurred

## 2020-01-13 ENCOUNTER — TELEPHONE (OUTPATIENT)
Dept: HEMATOLOGY ONCOLOGY | Facility: HOSPITAL | Age: 65
End: 2020-01-13

## 2020-01-13 NOTE — TELEPHONE ENCOUNTER
Called and [facility] for pt to return my call  We want to know if the patient can come in at 11 and see Dr Parrish Carmen instead of seeing Perfecto Amos at 80 tomorrow 1/14

## 2020-01-14 ENCOUNTER — OFFICE VISIT (OUTPATIENT)
Dept: HEMATOLOGY ONCOLOGY | Facility: CLINIC | Age: 65
End: 2020-01-14
Payer: COMMERCIAL

## 2020-01-14 VITALS
RESPIRATION RATE: 16 BRPM | TEMPERATURE: 98.3 F | SYSTOLIC BLOOD PRESSURE: 126 MMHG | HEART RATE: 93 BPM | OXYGEN SATURATION: 98 % | BODY MASS INDEX: 27.77 KG/M2 | DIASTOLIC BLOOD PRESSURE: 86 MMHG | WEIGHT: 194 LBS | HEIGHT: 70 IN

## 2020-01-14 DIAGNOSIS — C15.5 MALIGNANT NEOPLASM OF LOWER THIRD OF ESOPHAGUS (HCC): Primary | ICD-10-CM

## 2020-01-14 DIAGNOSIS — C16.0 GE JUNCTION CARCINOMA (HCC): ICD-10-CM

## 2020-01-14 PROCEDURE — 99214 OFFICE O/P EST MOD 30 MIN: CPT | Performed by: NURSE PRACTITIONER

## 2020-01-14 NOTE — PROGRESS NOTES
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    HPI:  Rhett Hdz seen for initial consultation 9/24/2019 regarding  Newly Diagnosed locally advanced GE junction/gastric cancer which is HER-2/thea positive  The patient is a 50-year-old male who was originally referred to Gastroenterology with the complaint of dysphagia  Lestine Party then had an EGD performed on 08/24/2019 showing neoplastic changes and luminal narrowing in the distal 1/3 of the esophagus   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   Esophageal biopsy revealed at least 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 and high-grade dysplasia   He was seen by her colleagues in cardiothoracic surgery who recommended neoadjuvant therapy upfront  He is going to be presented at tumor board  PET/CT scan showed Extensive FDG uptake at the distal esophagus GE junction and proximal stomach compatible with known malignancy  There were FDG avid perigastric lymph node posterior to the proximal stomach suspicious for metastatic disease and possible gastrohepatic lymphadenopathy which was inseparable from the gastric mass  There is also some uptake at the sixth rib but the patient states he did have trauma to this area recently  Metastatic disease could not be ruled out  There is also some uptake in the sigmoid colon for which a colonoscopy was recommended down the road       Previous Hematologic/ Oncologic History:       Malignant neoplasm of lower third of esophagus ([facility] [address] )    8/24/2019 Initial Diagnosis     Malignant neoplasm of lower third of esophagus ([facility] [address] )  At least intramucosal carcinoma  Her2/THEA positive      10/8/2019 - 12/30/2019 Chemotherapy     palonosetron (ALOXI) injection 0 25 mg, 0 25 mg, Intravenous, Once, 6 of 6 cycles  Administration: 0 25 mg (10/8/2019), 0 25 mg (10/22/2019), 0 25 mg (11/5/2019), 0 25 mg (11/19/2019), 0 25 mg (12/3/2019), 0 25 mg (12/17/2019)  pegfilgrastim (NEULASTA ONPRO) subcutaneous injection kit 6 mg, 6 mg, Subcutaneous, Once, 6 of 6 cycles  Administration: 6 mg (10/9/2019), 6 mg (10/23/2019), 6 mg (11/6/2019), 6 mg (11/20/2019), 6 mg (12/4/2019), 6 mg (12/18/2019)  fosaprepitant (EMEND) 150 mg in sodium chloride 0 9 % 250 mL IVPB, 150 mg, Intravenous, Once, 6 of 6 cycles  Administration: 150 mg (10/8/2019), 150 mg (10/22/2019), 150 mg (11/5/2019), 150 mg (11/19/2019), 150 mg (12/3/2019), 150 mg (12/17/2019)  DOCEtaxel (TAXOTERE) 99 mg in sodium chloride 0 9 % 250 mL chemo infusion, 50 mg/m2 = 99 mg (83 3 % of original dose 60 mg/m2), Intravenous, Once, 6 of 6 cycles  Dose modification: 50 mg/m2 (original dose 60 mg/m2, Cycle 1, Reason: Other (See Comments))  Administration: 99 mg (10/8/2019), 99 mg (10/22/2019), 99 mg (11/5/2019), 99 mg (11/19/2019), 99 mg (12/3/2019), 99 mg (12/17/2019)  leucovorin 396 mg in dextrose 5 % 250 mL IVPB, 200 mg/m2 = 396 mg (50 % of original dose 400 mg/m2), Intravenous, Once, 6 of 6 cycles  Dose modification: 200 mg/m2 (original dose 400 mg/m2, Cycle 1, Reason: Other (See Comments), Comment: FLOT regimen standard)  Administration: 396 mg (10/8/2019), 396 mg (10/22/2019), 396 mg (11/5/2019), 396 mg (11/19/2019), 396 mg (12/3/2019), 396 mg (12/17/2019)  oxaliplatin (ELOXATIN) 168 3 mg in dextrose 5 % 250 mL chemo infusion, 85 mg/m2 = 168 3 mg, Intravenous, Once, 6 of 6 cycles  Administration: 168 3 mg (10/8/2019), 168 3 mg (10/22/2019), 168 3 mg (11/5/2019), 168 3 mg (11/19/2019), 168 3 mg (12/3/2019), 168 3 mg (12/17/2019)  trastuzumab (HERCEPTIN) 496 mg in sodium chloride 0 9 % 250 mL chemo infusion, 6 mg/kg = 496 mg, Intravenous, Once, 6 of 6 cycles  Administration: 496 mg (10/8/2019), 331 mg (10/22/2019), 331 mg (11/5/2019), 331 mg (11/19/2019), 331 mg (12/3/2019), 331 mg (12/17/2019)        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[facility] [address] )    8/24/2019 Biopsy     Esophagus (biopsy):  - At 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       9/24/2019 Initial Diagnosis     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HCC)      10/8/2019 - 12/30/2019 Chemotherapy     palonosetron (ALOXI) injection 0 25 mg, 0 25 mg, Intravenous, Once, 6 of 6 cycles  Administration: 0 25 mg (10/8/2019), 0 25 mg (10/22/2019), 0 25 mg (11/5/2019), 0 25 mg (11/19/2019), 0 25 mg (12/3/2019), 0 25 mg (12/17/2019)  pegfilgrastim (NEULASTA ONPRO) subcutaneous injection kit 6 mg, 6 mg, Subcutaneous, Once, 6 of 6 cycles  Administration: 6 mg (10/9/2019), 6 mg (10/23/2019), 6 mg (11/6/2019), 6 mg (11/20/2019), 6 mg (12/4/2019), 6 mg (12/18/2019)  fosaprepitant (EMEND) 150 mg in sodium chloride 0 9 % 250 mL IVPB, 150 mg, Intravenous, Once, 6 of 6 cycles  Administration: 150 mg (10/8/2019), 150 mg (10/22/2019), 150 mg (11/5/2019), 150 mg (11/19/2019), 150 mg (12/3/2019), 150 mg (12/17/2019)  DOCEtaxel (TAXOTERE) 99 mg in sodium chloride 0 9 % 250 mL chemo infusion, 50 mg/m2 = 99 mg (83 3 % of original dose 60 mg/m2), Intravenous, Once, 6 of 6 cycles  Dose modification: 50 mg/m2 (original dose 60 mg/m2, Cycle 1, Reason: Other (See Comments))  Administration: 99 mg (10/8/2019), 99 mg (10/22/2019), 99 mg (11/5/2019), 99 mg (11/19/2019), 99 mg (12/3/2019), 99 mg (12/17/2019)  leucovorin 396 mg in dextrose 5 % 250 mL IVPB, 200 mg/m2 = 396 mg (50 % of original dose 400 mg/m2), Intravenous, Once, 6 of 6 cycles  Dose modification: 200 mg/m2 (original dose 400 mg/m2, Cycle 1, Reason: Other (See Comments), Comment: FLOT regimen standard)  Administration: 396 mg (10/8/2019), 396 mg (10/22/2019), 396 mg (11/5/2019), 396 mg (11/19/2019), 396 mg (12/3/2019), 396 mg (12/17/2019)  oxaliplatin (ELOXATIN) 168 3 mg in dextrose 5 % 250 mL chemo infusion, 85 mg/m2 = 168 3 mg, Intravenous, Once, 6 of 6 cycles  Administration: 168 3 mg (10/8/2019), 168 3 mg (10/22/2019), 168 3 mg (11/5/2019), 168 3 mg (11/19/2019), 168 3 mg (12/3/2019), 168 3 mg (12/17/2019)  trastuzumab (HERCEPTIN) 496 mg in sodium chloride 0 9 % 250 mL chemo infusion, 6 mg/kg = 496 mg, Intravenous, Once, 6 of 6 cycles  Administration: 496 mg (10/8/2019), 331 mg (10/22/2019), 331 mg (11/5/2019), 331 mg (11/19/2019), 331 mg (12/3/2019), 331 mg (12/17/2019)         Current Hematologic/ Oncologic Treatment:    FLOT plus Herceptin every 2 weeks for 6 cycles  He has completed cycle #6 on 18 December  Interval History:    The patient returns for follow-up visit  Completed chemotherapy with flat plus Herceptin on December 18th  Overall he is doing well  Denies chest pain, shortness of breath, nausea, vomiting, diarrhea, bleeding/bruising, and fevers/infection  He is eating well and is not using his feeding tube anymore  He did see Radiation Oncology who recommended surgical intervention  He is plan to see surgical oncology in 2 days  If he is deemed not to be a surgical candidate then he will proceed with concurrent chemoradiation  Test Results:    Imaging: Nm Pet Ct Skull Base To Mid Thigh    Result Date: 12/30/2019  Narrative: PET/CT SCAN INDICATION: Restaging of esophageal/gastric cancer postchemotherapy  C16 0: Malignant neoplasm of cardia MODIFIER: PS COMPARISON: PET CT 9/19/2019 CELL TYPE:  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, biopsy esophagus 8/24/2019 TECHNIQUE:   8 33 mCi F-18-FDG administered IV  Multiplanar attenuation corrected and non attenuation corrected PET images were acquired 60 minutes post injection  Contiguous, low dose, axial CT sections were obtained from the skull vertex through the femurs   Intravenous contrast material was not utilized    This examination, like all CT scans performed in the [place], was performed utilizing techniques to minimize radiation dose exposure, including the use of iterative reconstruction and automated exposure control  Fasting serum glucose: 102 mg/dl FINDINGS: VISUALIZED BRAIN:   No acute abnormalities are seen  Now more conspicuous focal photopenia in the right frontal lobe anteriorly, question related to prior infarct  Questionable encephalomalacia  here on CT  HEAD/NECK:   Diffuse thyroid gland activity again noted, SUV max of 5 0, previously 6 2  This may be related to underlying thyroiditis  There is a physiologic distribution of FDG  No FDG avid cervical adenopathy is seen  CT images: Unremarkable  CHEST:   Mild residual FDG uptake at the distal esophagus and gastroesophageal junction, SUV max of 2 6, previously 21 0  Persistent circumferential wall thickening suggested here on CT  No FDG avid lymph nodes  CT images: Left-sided Mediport line tip terminates at the SVC  Scattered coronary artery calcifications  Stable 5 mm irregular nodular density in the left upper lobe anteriorly image 106 series 3, not FDG avid  Calcified granuloma in the left upper lobe  ABDOMEN:   No FDG avid soft tissue lesions are seen  No residual FDG uptake at the proximal stomach, SUV max of 1 8 in this region  No FDG avid lymph nodes  Previously there was a FDG avid lymph node posterior to the proximal stomach which is no longer seen  New splenomegaly with mild FDG uptake, SUV max of 3 0, higher than the liver, SUV max of 2 6  Spleen measures 16 8 cm in AP dimension  CT images: Cholelithiasis  Stable nodularity of the right adrenal gland, not FDG avid  Jejunostomy tube in position  Mild ascites, new  PELVIS: No FDG avid soft tissue lesions are seen  Previously noted colonic foci of FDG uptake are no longer seen  CT images: Mildly prominent prostate  Tiny fat-containing left inguinal hernia  OSSEOUS STRUCTURES: New diffuse FDG uptake in the osseous structures likely representing response to therapy    Potentially this does limit evaluation for the previously seen small rib foci of FDG uptake and other underlying lesions  CT images: Lumbosacral fusion device  Impression: 1  Findings compatible with response to therapy  2  Significant interval improvement in abnormal FDG uptake at the distal esophagus, gastroesophageal junction and the proximal stomach  Mild residual FDG uptake suggested at the distal esophagus and gastroesophageal junction, residual disease not excluded  3  No findings for hypermetabolic metastasis  4  New splenomegaly with mild diffuse FDG uptake, question related to hematopoietic response to therapy  5   New diffuse FDG uptake in the osseous structures likely representing marrow response to therapy  Potentially this does limit evaluation for underlying lesions  6   Now more conspicuous focal photopenia in the right frontal lobe of the brain anteriorly, may be related to prior infarct  Questionable encephalomalacia here on CT  Correlate with dedicated brain imaging, MRI or CT if not previously evaluated  Workstation performed: [id number]       Labs:   Lab Results   Component Value Date    WBC 10 80 (H) 12/16/2019    HGB 10 5 (L) 12/16/2019    HCT 35 4 (L) 12/16/2019    MCV 86 12/16/2019    PLT 78 (L) 12/16/2019     Lab Results   Component Value Date    K 3 5 12/17/2019     12/17/2019    CO2 26 12/17/2019    BUN 12 12/17/2019    CREATININE 0 75 12/17/2019    GLUF 128 (H) 12/02/2019    CALCIUM 7 7 (L) 12/17/2019    AST 15 12/17/2019    ALT 16 12/17/2019    ALKPHOS 94 12/17/2019    EGFR 96 12/17/2019         No results found for: SPEP, UPEP    No results found for: PSA    No results found for: CEA    No results found for:     No results found for: AFP    No results found for: IRON, TIBC, FERRITIN    No results found for: APTENGMS21      ROS: As stated in the history of present illness otherwise his 14 point review of systems today was negative        Active Problems:   Patient Active Problem List Diagnosis    COPD (chronic obstructive pulmonary disease) (HCC)    Headaches due to old head injury    High cholesterol    Obstructive sleep apnea syndrome    Type 2 diabetes mellitus with hyperglycemia, without long-term current use of insulin (HCC)    Malignant neoplasm of lower third of esophagus (HCC)    Esophageal obstruction    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HCC)    Chemotherapy induced neutropenia (HCC)    Anemia, unspecified    Insomnia due to medical condition    Encounter for care related to feeding tube       Past Medical History:   Past Medical History:   Diagnosis Date    Cancer ([facility] )     esophageal    COPD (chronic obstructive pulmonary disease) ([facility] )     Diabetes mellitus ([place] [address] )     Difficulty swallowing     Disease of thyroid gland     Esophageal mass     History of transfusion     Sleep apnea        Surgical History:   Past Surgical History:   Procedure Laterality Date    BACK SURGERY      x2    DISC REMOVAL      FL GUIDED CENTRAL VENOUS ACCESS DEVICE INSERTION  9/26/2019    GASTROJEJUNOSTOMY W/ JEJUNOSTOMY TUBE N/A 9/26/2019    Procedure: INSERTION JEJUNOSTOMY TUBE OPEN;  Surgeon: Cristy Curran MD;  [facility]: [facility];  Service: Surgical Oncology    TONSILLECTOMY      TUNNELED VENOUS PORT PLACEMENT N/A 9/26/2019    Procedure: INSERTION VENOUS PORT (PORT-A-CATH); Surgeon: Cristy Curran MD;  [facility]: [facility];  Service: Surgical Oncology       Family History:    Family History   Problem Relation Age of Onset    Diabetes Mother     No Known Problems Father        Cancer-related family history is not on file      Social History:   Social History     Socioeconomic History    Marital status: Single     Spouse name: Not on file    Number of children: Not on file    Years of education: Not on file    Highest education level: Not on file   Occupational History    Not on file   Social Needs    Financial resource strain: Not on file    Food insecurity:     Worry: Not on file Inability: Not on file    Transportation needs:     Medical: Not on file     Non-medical: Not on file   Tobacco Use    Smoking status: Former Smoker     Packs/day: 0 50     Years: 50 00     Pack years: 25 00     Types: Cigarettes     Last attempt to quit: 2017     Years since quittin 0    Smokeless tobacco: Never Used   Substance and Sexual Activity    Alcohol use: Not Currently     Alcohol/week: 0 0 standard drinks     Frequency: Never     Drinks per session: 1 or 2     Binge frequency: Never    Drug use: Never    Sexual activity: Not on file   Lifestyle    Physical activity:     Days per week: Not on file     Minutes per session: Not on file    Stress: Not on file   Relationships    Social connections:     Talks on phone: Not on file     Gets together: Not on file     Attends Jew service: Not on file     Active member of club or organization: Not on file     Attends meetings of clubs or organizations: Not on file     Relationship status: Not on file    Intimate partner violence:     Fear of current or ex partner: Not on file     Emotionally abused: Not on file     Physically abused: Not on file     Forced sexual activity: Not on file   Other Topics Concern    Not on file   Social History Narrative    Not on file       Current Medications:   Current Outpatient Medications   Medication Sig Dispense Refill    acetaminophen (TYLENOL) 160 mg/5 mL suspension Take 20 3 mL (650 mg total) by mouth every 4 (four) hours as needed for mild pain or fever 118 mL 0    albuterol (2 5 mg/3 mL) 0 083 % nebulizer solution Take 1 vial (2 5 mg total) by nebulization every 6 (six) hours as needed for wheezing or shortness of breath 1 vial 5    canagliflozin (INVOKANA) 100 mg Daily      dexamethasone (DECADRON) 1 MG/ML solution Take 8 mL (8 mg total) by mouth daily 30 mL 6    gabapentin (NEURONTIN) 300 mg capsule take 1 capsule by mouth daily at bedtime 30 capsule 0    levothyroxine 25 mcg tablet Take 25 mcg by mouth daily in the early morning      Nutritional Supplements (JEVITY 1 5 SHUN) LIQD Take 85 mL by mouth continuous Jevity Tiki@O2 [place] advance as tolerated to goal rate of 85ml/hr to run for 16 hours daily  Flush with at least 60ml water at start and end of cycle  Needs additional water flushes if PO fluid intake declines 1000 mL 0    ondansetron (ZOFRAN) 4 MG/5ML solution Take 10 mL (8 mg total) by mouth every 8 (eight) hours as needed for nausea or vomiting 240 mL 6    pantoprazole (PROTONIX) 40 mg tablet Take 1 tablet (40 mg total) by mouth daily 30 tablet 1    pioglitazone (ACTOS) 30 mg tablet Take 30 mg by mouth daily       prochlorperazine (COMPAZINE) 10 mg tablet Take 1 tablet (10 mg total) by mouth every 6 (six) hours as needed for nausea or vomiting 45 tablet 3    rOPINIRole (REQUIP) 0 25 mg tablet take 1 tablet by mouth daily at bedtime 30 tablet 0    sitaGLIPtin (JANUVIA) 100 mg tablet Take 100 mg by mouth daily        No current facility-administered medications for this visit  Allergies: Allergies   Allergen Reactions    Penicillins Itching       Physical Exam:    Body surface area is 2 06 meters squared  Wt Readings from Last 3 Encounters:   01/14/20 88 kg (194 lb)   01/08/20 88 kg (194 lb)   12/19/19 87 1 kg (192 lb)        Temp Readings from Last 3 Encounters:   01/14/20 98 3 °F (36 8 °C) (Oral)   12/19/19 98 2 °F (36 8 °C) (Oral)   12/18/19 98 5 °F (36 9 °C) (Oral)        BP Readings from Last 3 Encounters:   01/14/20 126/86   01/08/20 155/78   12/19/19 120/80         Pulse Readings from Last 3 Encounters:   01/14/20 93   01/08/20 97   12/19/19 99     @LASTSAO2(3)@      Physical Exam     Constitutional   General appearance: No acute distress, well appearing and well nourished  Eyes   Conjunctiva and lids: No swelling, erythema or discharge  Pupils and irises: Equal, round and reactive to light      Ears, Nose, Mouth, and Throat   External inspection of ears and nose: Normal     Nasal mucosa, septum, and turbinates: Normal without edema or erythema  Oropharynx: Normal with no erythema, edema, exudate or lesions  Pulmonary   Respiratory effort: No increased work of breathing or signs of respiratory distress  Auscultation of lungs: Clear to auscultation  Cardiovascular   Palpation of heart: Normal PMI, no thrills  Auscultation of heart: Normal rate and rhythm, normal S1 and S2, without murmurs  Examination of extremities for edema and/or varicosities: Normal     Carotid pulses: Normal     Abdomen   Abdomen: Non-tender, no masses  Liver and spleen: No hepatomegaly or splenomegaly  Lymphatic   Palpation of lymph nodes in neck: No lymphadenopathy  Musculoskeletal   Gait and station: Normal     Digits and nails: Normal without clubbing or cyanosis  Inspection/palpation of joints, bones, and muscles: Normal     Skin   Skin and subcutaneous tissue: Normal without rashes or lesions  Neurologic   Cranial nerves: Cranial nerves 2-12 intact  Sensation: No sensory loss  Psychiatric   Orientation to person, place, and time: Normal     Mood and affect: Normal         Assessment / Plan:    The patient is a 78-year-old male with 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   He has a reasonable amount of gastric involvement so it was decided that he would benefit from neoadjuvant chemotherapy prior to resection   He received neoadjuvant chemotherapy treatment with FLOT plus Herceptin for 6 cycles which ended December 18th 2019  He is now being evaluated for surgical intervention versus neoadjuvant concurrent chemoradiation  He did see Radiation Oncology who recommended surgical intervention  If he is not a candidate for surgical intervention he will return to radiation oncology and also to us to most likely start concurrent chemoradiation    In the event that he does proceed to surgical intervention we will plan to see him back in 2-3 months otherwise will see him back sooner to start him on concurrent chemoradiation  We will plan to call him next week with an appointment depending on his discussion with Surgical Oncology  The patient is in agreement with the plan and will call with any questions or concerns  Goals and Barriers:  Current Goal:  Prolong Survival from GE junction malignancy  Barriers: None  Patient's Capacity to Self Care:  Patient able to self care  Portions of the record may have been created with voice recognition software   Occasional wrong word or "sound a like" substitutions may have occurred due to the inherent limitations of voice recognition software   Read the chart carefully and recognize, using context, where substitutions have occurred

## 2020-01-16 ENCOUNTER — OFFICE VISIT (OUTPATIENT)
Dept: SURGICAL ONCOLOGY | Facility: CLINIC | Age: 65
End: 2020-01-16
Payer: COMMERCIAL

## 2020-01-16 ENCOUNTER — TRANSCRIBE ORDERS (OUTPATIENT)
Dept: ADMINISTRATIVE | Facility: HOSPITAL | Age: 65
End: 2020-01-16

## 2020-01-16 ENCOUNTER — HOSPITAL ENCOUNTER (OUTPATIENT)
Dept: MRI IMAGING | Facility: HOSPITAL | Age: 65
Discharge: HOME | DRG: 326 | End: 2020-01-16
Attending: SURGERY
Payer: COMMERCIAL

## 2020-01-16 ENCOUNTER — OFFICE VISIT (OUTPATIENT)
Dept: LAB | Facility: HOSPITAL | Age: 65
End: 2020-01-16
Attending: SURGERY
Payer: COMMERCIAL

## 2020-01-16 ENCOUNTER — APPOINTMENT (OUTPATIENT)
Dept: LAB | Facility: HOSPITAL | Age: 65
End: 2020-01-16
Attending: SURGERY
Payer: COMMERCIAL

## 2020-01-16 VITALS
RESPIRATION RATE: 18 BRPM | SYSTOLIC BLOOD PRESSURE: 124 MMHG | DIASTOLIC BLOOD PRESSURE: 82 MMHG | HEIGHT: 70 IN | BODY MASS INDEX: 27.63 KG/M2 | HEART RATE: 86 BPM | WEIGHT: 193 LBS | TEMPERATURE: 98.3 F

## 2020-01-16 DIAGNOSIS — C16.0 GE JUNCTION CARCINOMA (HCC): ICD-10-CM

## 2020-01-16 DIAGNOSIS — Z01.818 PREOP TESTING: Primary | ICD-10-CM

## 2020-01-16 DIAGNOSIS — Z01.818 PREOP TESTING: ICD-10-CM

## 2020-01-16 DIAGNOSIS — C16.0 GE JUNCTION CARCINOMA (HCC): Primary | ICD-10-CM

## 2020-01-16 LAB
ALBUMIN SERPL BCP-MCNC: 3.5 G/DL (ref 3.5–5)
ALP SERPL-CCNC: 63 U/L (ref 46–116)
ALT SERPL W P-5'-P-CCNC: 14 U/L (ref 12–78)
ANION GAP SERPL CALCULATED.3IONS-SCNC: 9 MMOL/L (ref 4–13)
APTT PPP: 34 SECONDS (ref 23–37)
AST SERPL W P-5'-P-CCNC: 12 U/L (ref 5–45)
ATRIAL RATE: 74 BPM
BASOPHILS # BLD AUTO: 0.09 THOUSANDS/ΜL (ref 0–0.1)
BASOPHILS NFR BLD AUTO: 2 % (ref 0–1)
BILIRUB SERPL-MCNC: 0.7 MG/DL (ref 0.2–1)
BUN SERPL-MCNC: 14 MG/DL (ref 5–25)
CALCIUM SERPL-MCNC: 9 MG/DL (ref 8.3–10.1)
CHLORIDE SERPL-SCNC: 104 MMOL/L (ref 100–108)
CO2 SERPL-SCNC: 26 MMOL/L (ref 21–32)
CREAT SERPL-MCNC: 0.83 MG/DL (ref 0.6–1.3)
EOSINOPHIL # BLD AUTO: 1.01 THOUSAND/ΜL (ref 0–0.61)
EOSINOPHIL NFR BLD AUTO: 18 % (ref 0–6)
ERYTHROCYTE [DISTWIDTH] IN BLOOD BY AUTOMATED COUNT: 20.1 % (ref 11.6–15.1)
EST. AVERAGE GLUCOSE BLD GHB EST-MCNC: 108 MG/DL
GFR SERPL CREATININE-BSD FRML MDRD: 92 ML/MIN/1.73SQ M
GLUCOSE SERPL-MCNC: 135 MG/DL (ref 65–140)
HBA1C MFR BLD: 5.4 % (ref 4.2–6.3)
HCT VFR BLD AUTO: 40.6 % (ref 36.5–49.3)
HGB BLD-MCNC: 12.5 G/DL (ref 12–17)
IMM GRANULOCYTES # BLD AUTO: 0.03 THOUSAND/UL (ref 0–0.2)
IMM GRANULOCYTES NFR BLD AUTO: 1 % (ref 0–2)
INR PPP: 0.99 (ref 0.84–1.19)
LYMPHOCYTES # BLD AUTO: 1.06 THOUSANDS/ΜL (ref 0.6–4.47)
LYMPHOCYTES NFR BLD AUTO: 19 % (ref 14–44)
MCH RBC QN AUTO: 26.4 PG (ref 26.8–34.3)
MCHC RBC AUTO-ENTMCNC: 30.8 G/DL (ref 31.4–37.4)
MCV RBC AUTO: 86 FL (ref 82–98)
MONOCYTES # BLD AUTO: 0.5 THOUSAND/ΜL (ref 0.17–1.22)
MONOCYTES NFR BLD AUTO: 9 % (ref 4–12)
NEUTROPHILS # BLD AUTO: 3 THOUSANDS/ΜL (ref 1.85–7.62)
NEUTS SEG NFR BLD AUTO: 51 % (ref 43–75)
NRBC BLD AUTO-RTO: 0 /100 WBCS
P AXIS: 60 DEGREES
PLATELET # BLD AUTO: 170 THOUSANDS/UL (ref 149–390)
PMV BLD AUTO: 9.3 FL (ref 8.9–12.7)
POTASSIUM SERPL-SCNC: 4.5 MMOL/L (ref 3.5–5.3)
PR INTERVAL: 170 MS
PROT SERPL-MCNC: 7.3 G/DL (ref 6.4–8.2)
PROTHROMBIN TIME: 13.1 SECONDS (ref 11.6–14.5)
QRS AXIS: 54 DEGREES
QRSD INTERVAL: 80 MS
QT INTERVAL: 360 MS
QTC INTERVAL: 399 MS
RBC # BLD AUTO: 4.73 MILLION/UL (ref 3.88–5.62)
SODIUM SERPL-SCNC: 139 MMOL/L (ref 136–145)
T WAVE AXIS: 33 DEGREES
VENTRICULAR RATE: 74 BPM
WBC # BLD AUTO: 5.69 THOUSAND/UL (ref 4.31–10.16)

## 2020-01-16 PROCEDURE — 85730 THROMBOPLASTIN TIME PARTIAL: CPT

## 2020-01-16 PROCEDURE — 86850 RBC ANTIBODY SCREEN: CPT

## 2020-01-16 PROCEDURE — 93005 ELECTROCARDIOGRAM TRACING: CPT

## 2020-01-16 PROCEDURE — 93010 ELECTROCARDIOGRAM REPORT: CPT | Performed by: INTERNAL MEDICINE

## 2020-01-16 PROCEDURE — 83036 HEMOGLOBIN GLYCOSYLATED A1C: CPT

## 2020-01-16 PROCEDURE — 36415 COLL VENOUS BLD VENIPUNCTURE: CPT

## 2020-01-16 PROCEDURE — 85025 COMPLETE CBC W/AUTO DIFF WBC: CPT

## 2020-01-16 PROCEDURE — 80053 COMPREHEN METABOLIC PANEL: CPT

## 2020-01-16 PROCEDURE — 99215 OFFICE O/P EST HI 40 MIN: CPT | Performed by: SURGERY

## 2020-01-16 PROCEDURE — 86901 BLOOD TYPING SEROLOGIC RH(D): CPT

## 2020-01-16 PROCEDURE — 85610 PROTHROMBIN TIME: CPT

## 2020-01-16 PROCEDURE — 70553 MRI BRAIN STEM W/O & W/DYE: CPT

## 2020-01-16 PROCEDURE — A9585 GADOBUTROL INJECTION: HCPCS | Performed by: SURGERY

## 2020-01-16 PROCEDURE — 86900 BLOOD TYPING SEROLOGIC ABO: CPT

## 2020-01-16 RX ADMIN — GADOBUTROL 8 ML: 604.72 INJECTION INTRAVENOUS at 12:03

## 2020-01-16 NOTE — H&P (VIEW-ONLY)
Surgical Oncology Follow Up       [address] SURGICAL ONCOLOGY [place]  [address]  [facility] [place] [address]  1955  [phone]  [address] [facility]  [address]  [facility] [place] [address]    Diagnoses and all orders for this visit:    GE junction carcinoma [facility])        Chief Complaint   Patient presents with    Follow-up     discuss surgery        No follow-ups on file           Malignant neoplasm of lower third of esophagus (HCC)    8/24/2019 Initial Diagnosis     Malignant neoplasm of lower third of esophagus (HCC)  At least intramucosal carcinoma  Her2/SHIMON positive      10/8/2019 - 12/30/2019 Chemotherapy     palonosetron (ALOXI) injection 0 25 mg, 0 25 mg, Intravenous, Once, 6 of 6 cycles  Administration: 0 25 mg (10/8/2019), 0 25 mg (10/22/2019), 0 25 mg (11/5/2019), 0 25 mg (11/19/2019), 0 25 mg (12/3/2019), 0 25 mg (12/17/2019)  pegfilgrastim (NEULASTA ONPRO) subcutaneous injection kit 6 mg, 6 mg, Subcutaneous, Once, 6 of 6 cycles  Administration: 6 mg (10/9/2019), 6 mg (10/23/2019), 6 mg (11/6/2019), 6 mg (11/20/2019), 6 mg (12/4/2019), 6 mg (12/18/2019)  fosaprepitant (EMEND) 150 mg in sodium chloride 0 9 % 250 mL IVPB, 150 mg, Intravenous, Once, 6 of 6 cycles  Administration: 150 mg (10/8/2019), 150 mg (10/22/2019), 150 mg (11/5/2019), 150 mg (11/19/2019), 150 mg (12/3/2019), 150 mg (12/17/2019)  DOCEtaxel (TAXOTERE) 99 mg in sodium chloride 0 9 % 250 mL chemo infusion, 50 mg/m2 = 99 mg (83 3 % of original dose 60 mg/m2), Intravenous, Once, 6 of 6 cycles  Dose modification: 50 mg/m2 (original dose 60 mg/m2, Cycle 1, Reason: Other (See Comments))  Administration: 99 mg (10/8/2019), 99 mg (10/22/2019), 99 mg (11/5/2019), 99 mg (11/19/2019), 99 mg (12/3/2019), 99 mg (12/17/2019)  leucovorin 396 mg in dextrose 5 % 250 mL IVPB, 200 mg/m2 = 396 mg (50 % of original dose 400 mg/m2), Intravenous, Once, 6 of 6 cycles  Dose modification: 200 mg/m2 (original dose 400 mg/m2, Cycle 1, Reason: Other (See Comments), Comment: FLOT regimen standard)  Administration: 396 mg (10/8/2019), 396 mg (10/22/2019), 396 mg (11/5/2019), 396 mg (11/19/2019), 396 mg (12/3/2019), 396 mg (12/17/2019)  oxaliplatin (ELOXATIN) 168 3 mg in dextrose 5 % 250 mL chemo infusion, 85 mg/m2 = 168 3 mg, Intravenous, Once, 6 of 6 cycles  Administration: 168 3 mg (10/8/2019), 168 3 mg (10/22/2019), 168 3 mg (11/5/2019), 168 3 mg (11/19/2019), 168 3 mg (12/3/2019), 168 3 mg (12/17/2019)  trastuzumab (HERCEPTIN) 496 mg in sodium chloride 0 9 % 250 mL chemo infusion, 6 mg/kg = 496 mg, Intravenous, Once, 6 of 6 cycles  Administration: 496 mg (10/8/2019), 331 mg (10/22/2019), 331 mg (11/5/2019), 331 mg (11/19/2019), 331 mg (12/3/2019), 331 mg (12/17/2019)        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[facility] [address] )    8/24/2019 Biopsy     Esophagus (biopsy):  - At 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       9/24/2019 Initial Diagnosis     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HCC)      10/8/2019 - 12/30/2019 Chemotherapy     palonosetron (ALOXI) injection 0 25 mg, 0 25 mg, Intravenous, Once, 6 of 6 cycles  Administration: 0 25 mg (10/8/2019), 0 25 mg (10/22/2019), 0 25 mg (11/5/2019), 0 25 mg (11/19/2019), 0 25 mg (12/3/2019), 0 25 mg (12/17/2019)  pegfilgrastim (NEULASTA ONPRO) subcutaneous injection kit 6 mg, 6 mg, Subcutaneous, Once, 6 of 6 cycles  Administration: 6 mg (10/9/2019), 6 mg (10/23/2019), 6 mg (11/6/2019), 6 mg (11/20/2019), 6 mg (12/4/2019), 6 mg (12/18/2019)  fosaprepitant (EMEND) 150 mg in sodium chloride 0 9 % 250 mL IVPB, 150 mg, Intravenous, Once, 6 of 6 cycles  Administration: 150 mg (10/8/2019), 150 mg (10/22/2019), 150 mg (11/5/2019), 150 mg (11/19/2019), 150 mg (12/3/2019), 150 mg (12/17/2019)  DOCEtaxel (TAXOTERE) 99 mg in sodium chloride 0 9 % 250 mL chemo infusion, 50 mg/m2 = 99 mg (83 3 % of original dose 60 mg/m2), Intravenous, Once, 6 of 6 cycles  Dose modification: 50 mg/m2 (original dose 60 mg/m2, Cycle 1, Reason: Other (See Comments))  Administration: 99 mg (10/8/2019), 99 mg (10/22/2019), 99 mg (11/5/2019), 99 mg (11/19/2019), 99 mg (12/3/2019), 99 mg (12/17/2019)  leucovorin 396 mg in dextrose 5 % 250 mL IVPB, 200 mg/m2 = 396 mg (50 % of original dose 400 mg/m2), Intravenous, Once, 6 of 6 cycles  Dose modification: 200 mg/m2 (original dose 400 mg/m2, Cycle 1, Reason: Other (See Comments), Comment: FLOT regimen standard)  Administration: 396 mg (10/8/2019), 396 mg (10/22/2019), 396 mg (11/5/2019), 396 mg (11/19/2019), 396 mg (12/3/2019), 396 mg (12/17/2019)  oxaliplatin (ELOXATIN) 168 3 mg in dextrose 5 % 250 mL chemo infusion, 85 mg/m2 = 168 3 mg, Intravenous, Once, 6 of 6 cycles  Administration: 168 3 mg (10/8/2019), 168 3 mg (10/22/2019), 168 3 mg (11/5/2019), 168 3 mg (11/19/2019), 168 3 mg (12/3/2019), 168 3 mg (12/17/2019)  trastuzumab (HERCEPTIN) 496 mg in sodium chloride 0 9 % 250 mL chemo infusion, 6 mg/kg = 496 mg, Intravenous, Once, 6 of 6 cycles  Administration: 496 mg (10/8/2019), 331 mg (10/22/2019), 331 mg (11/5/2019), 331 mg (11/19/2019), 331 mg (12/3/2019), 331 mg (12/17/2019)             History of Present Illness:  Patient returns in follow-up  He initially started to have dysphagia with solid foods in August of 2019  EGD at that time revealed adenocarcinoma extending from 30-40 cm  There was significant wall thickening seen on CT extending to the level gastric cardia  PET-CT revealed FDG activity in the GE junction as well as a perigastric lymph node  There was also uptake in a left 6th rib and right lateral 2nd rib  There was FDG activity in the colon  Colonoscopy was negative for a colon cancer  He underwent neoadjuvant chemotherapy because of the questionable rib metastasis  Follow-up PET-CT from December 30, 2019 revealed an excellent response to therapy  There was minimal FDG uptake in the distal esophagus  There was no obvious metastasis  I personally reviewed the films  He comes in now to discuss further therapy  He was discussed at upper GI working group and surgery was recommended  He has seen Radiation Oncology who has also recommended surgery and he is not really inclined to have surgery at this time  He comes in now to discuss surgical intervention  His appetite is good  He is eating all food without difficulty  He is gaining weight  Review of Systems  Complete ROS Surg Onc:   Complete ROS Surg Onc:   Constitutional: The patient denies new or recent history of general fatigue, no recent weight loss, no change in appetite  Eyes: No complaints of visual problems, no scleral icterus  ENT: no complaints of ear pain, no hoarseness, no difficulty swallowing,  no tinnitus and no new masses in head, oral cavity, or neck  Cardiovascular: No complaints of chest pain, no palpitations, no ankle edema  Respiratory: No complaints of shortness of breath, no cough  Gastrointestinal: No complaints of jaundice, no bloody stools, no pale stools  Genitourinary: No complaints of dysuria, no hematuria, no nocturia, no frequent urination, no urethral discharge  Musculoskeletal: No complaints of weakness, paralysis, joint stiffness or arthralgias  Integumentary: No complaints of rash, no new lesions  Neurological: No complaints of convulsions, no seizures, no dizziness  Hematologic/Lymphatic: No complaints of easy bruising  Endocrine:  No hot or cold intolerance  No polydipsia, polyphagia, or polyuria  Allergy/immunology:  No environmental allergies  No food allergies  Not immunocompromised  Skin:  No pallor or rash  No wound          Patient Active Problem List   Diagnosis    COPD (chronic obstructive pulmonary disease) (HCC)    Headaches due to old head injury    High cholesterol    Obstructive sleep apnea syndrome    Type 2 diabetes mellitus with hyperglycemia, without long-term current use of insulin (HCC)    Malignant neoplasm of lower third of esophagus (HCC)    Esophageal obstruction    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HCC)    Chemotherapy induced neutropenia (HCC)    Anemia, unspecified    Insomnia due to medical condition    Encounter for care related to feeding tube     Past Medical History:   Diagnosis Date    Cancer ([facility] )     esophageal    COPD (chronic obstructive pulmonary disease) ([facility] )     Diabetes mellitus ([place] [address] )     Difficulty swallowing     Disease of thyroid gland     Esophageal mass     History of transfusion     Sleep apnea      Past Surgical History:   Procedure Laterality Date    BACK SURGERY      x2    DISC REMOVAL      FL GUIDED CENTRAL VENOUS ACCESS DEVICE INSERTION  2019    GASTROJEJUNOSTOMY W/ JEJUNOSTOMY TUBE N/A 2019    Procedure: INSERTION JEJUNOSTOMY TUBE OPEN;  Surgeon: Crystal Gallagher MD;  [facility];  Service: Surgical Oncology    TONSILLECTOMY      TUNNELED VENOUS PORT PLACEMENT N/A 2019    Procedure: INSERTION VENOUS PORT (PORT-A-CATH);   Surgeon: Crystal Gallagher MD;  [facility];  Service: Surgical Oncology     Family History   Problem Relation Age of Onset    Diabetes Mother     No Known Problems Father      Social History     Socioeconomic History    Marital status: Single     Spouse name: Not on file    Number of children: Not on file    Years of education: Not on file    Highest education level: Not on file   Occupational History    Not on file   Social Needs    Financial resource strain: Not on file    Food insecurity:     Worry: Not on file     Inability: Not on file    Transportation needs:     Medical: Not on file     Non-medical: Not on file   Tobacco Use    Smoking status: Former Smoker     Packs/day: 0 50     Years: 50 00     Pack years: 25 00     Types: Cigarettes     Last attempt to quit: 2017     Years since quittin 0    Smokeless tobacco: Never Used   Substance and Sexual Activity    Alcohol use: Not Currently     Alcohol/week: 0 0 standard drinks     Frequency: Never     Drinks per session: 1 or 2     Binge frequency: Never    Drug use: Never    Sexual activity: Not on file   Lifestyle    Physical activity:     Days per week: Not on file     Minutes per session: Not on file    Stress: Not on file   Relationships    Social connections:     Talks on phone: Not on file     Gets together: Not on file     Attends [employer] service: Not on file     Active member of club or organization: Not on file     Attends meetings of clubs or organizations: Not on file     Relationship status: Not on file    Intimate partner violence:     Fear of current or ex partner: Not on file     Emotionally abused: Not on file     Physically abused: Not on file     Forced sexual activity: Not on file   Other Topics Concern    Not on file   Social History Narrative    Not on file       Current Outpatient Medications:     acetaminophen (TYLENOL) 160 mg/5 mL suspension, Take 20 3 mL (650 mg total) by mouth every 4 (four) hours as needed for mild pain or fever, Disp: 118 mL, Rfl: 0    albuterol (2 5 mg/3 mL) 0 083 % nebulizer solution, Take 1 vial (2 5 mg total) by nebulization every 6 (six) hours as needed for wheezing or shortness of breath, Disp: 1 vial, Rfl: 5    canagliflozin (INVOKANA) 100 mg, Daily, Disp: , Rfl:     dexamethasone (DECADRON) 1 MG/ML solution, Take 8 mL (8 mg total) by mouth daily, Disp: 30 mL, Rfl: 6    gabapentin (NEURONTIN) 300 mg capsule, take 1 capsule by mouth daily at bedtime, Disp: 30 capsule, Rfl: 0    levothyroxine 25 mcg tablet, Take 25 mcg by mouth daily in the early morning, Disp: , Rfl:     Nutritional Supplements (JEVITY 1 5 SHUN) LIQD, Take 85 mL by mouth continuous Jevity Tulio@[employer] advance as tolerated to goal rate of 85ml/hr to run for 16 hours daily  Flush with at least 60ml water at start and end of cycle   Needs additional water flushes if PO fluid intake declines, Disp: 1000 mL, Rfl: 0    ondansetron (ZOFRAN) 4 MG/5ML solution, Take 10 mL (8 mg total) by mouth every 8 (eight) hours as needed for nausea or vomiting, Disp: 240 mL, Rfl: 6    pantoprazole (PROTONIX) 40 mg tablet, Take 1 tablet (40 mg total) by mouth daily, Disp: 30 tablet, Rfl: 1    pioglitazone (ACTOS) 30 mg tablet, Take 30 mg by mouth daily , Disp: , Rfl:     prochlorperazine (COMPAZINE) 10 mg tablet, Take 1 tablet (10 mg total) by mouth every 6 (six) hours as needed for nausea or vomiting, Disp: 45 tablet, Rfl: 3    rOPINIRole (REQUIP) 0 25 mg tablet, take 1 tablet by mouth daily at bedtime, Disp: 30 tablet, Rfl: 0    sitaGLIPtin (JANUVIA) 100 mg tablet, Take 100 mg by mouth daily , Disp: , Rfl:   Allergies   Allergen Reactions    Penicillins Itching     Vitals:    01/16/20 0959   BP: 124/82   Pulse: 86   Resp: 18   Temp: 98 3 °F (36 8 °C)       Physical Exam  Constitutional: General appearance: The Patient is well-developed and well-nourished who appears the stated age in no acute distress  Patient is pleasant and talkative  HEENT:  Normocephalic  Sclerae are anicteric  Mucous membranes are moist  Neck is supple without adenopathy  No JVD  Chest: The lungs are clear to auscultation  Cardiac: Heart is regular rate  Abdomen: Abdomen is soft, non-tender, non-distended and without masses  Extremities: There is no clubbing or cyanosis  There is no edema  Symmetric  Neuro: Grossly nonfocal  Gait is normal      Lymphatic: No evidence of cervical adenopathy bilaterally  No evidence of axillary adenopathy bilaterally  No evidence of inguinal adenopathy bilaterally  Skin: Warm, anicteric  Psych:  Patient is pleasant and talkative    Breasts:        Pathology:  [unfilled]    Labs:      Imaging  Nm Pet Ct Skull Base To Mid Thigh    Result Date: 12/30/2019  Narrative: PET/CT SCAN INDICATION: Restaging of esophageal/gastric cancer postchemotherapy  C16 0: Malignant neoplasm of cardia MODIFIER: PS COMPARISON: PET CT 9/19/2019 CELL TYPE:  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, biopsy esophagus 8/24/2019 TECHNIQUE:   8 33 mCi F-18-FDG administered IV  Multiplanar attenuation corrected and non attenuation corrected PET images were acquired 60 minutes post injection  Contiguous, low dose, axial CT sections were obtained from the skull vertex through the femurs   Intravenous contrast material was not utilized  This examination, like all CT scans performed in the [facility], was performed utilizing techniques to minimize radiation dose exposure, including the use of iterative reconstruction and automated exposure control  Fasting serum glucose: 102 mg/dl FINDINGS: VISUALIZED BRAIN:   No acute abnormalities are seen  Now more conspicuous focal photopenia in the right frontal lobe anteriorly, question related to prior infarct  Questionable encephalomalacia  here on CT  HEAD/NECK:   Diffuse thyroid gland activity again noted, SUV max of 5 0, previously 6 2  This may be related to underlying thyroiditis  There is a physiologic distribution of FDG  No FDG avid cervical adenopathy is seen  CT images: Unremarkable  CHEST:   Mild residual FDG uptake at the distal esophagus and gastroesophageal junction, SUV max of 2 6, previously 21 0  Persistent circumferential wall thickening suggested here on CT  No FDG avid lymph nodes  CT images: Left-sided Mediport line tip terminates at the SVC  Scattered coronary artery calcifications  Stable 5 mm irregular nodular density in the left upper lobe anteriorly image 106 series 3, not FDG avid  Calcified granuloma in the left upper lobe  ABDOMEN:   No FDG avid soft tissue lesions are seen  No residual FDG uptake at the proximal stomach, SUV max of 1 8 in this region  No FDG avid lymph nodes    Previously there was a FDG avid lymph node posterior to the proximal stomach which is no longer seen  New splenomegaly with mild FDG uptake, SUV max of 3 0, higher than the liver, SUV max of 2 6  Spleen measures 16 8 cm in AP dimension  CT images: Cholelithiasis  Stable nodularity of the right adrenal gland, not FDG avid  Jejunostomy tube in position  Mild ascites, new  PELVIS: No FDG avid soft tissue lesions are seen  Previously noted colonic foci of FDG uptake are no longer seen  CT images: Mildly prominent prostate  Tiny fat-containing left inguinal hernia  OSSEOUS STRUCTURES: New diffuse FDG uptake in the osseous structures likely representing response to therapy  Potentially this does limit evaluation for the previously seen small rib foci of FDG uptake and other underlying lesions  CT images: Lumbosacral fusion device  Impression: 1  Findings compatible with response to therapy  2  Significant interval improvement in abnormal FDG uptake at the distal esophagus, gastroesophageal junction and the proximal stomach  Mild residual FDG uptake suggested at the distal esophagus and gastroesophageal junction, residual disease not excluded  3  No findings for hypermetabolic metastasis  4  New splenomegaly with mild diffuse FDG uptake, question related to hematopoietic response to therapy  5   New diffuse FDG uptake in the osseous structures likely representing marrow response to therapy  Potentially this does limit evaluation for underlying lesions  6   Now more conspicuous focal photopenia in the right frontal lobe of the brain anteriorly, may be related to prior infarct  Questionable encephalomalacia here on CT  Correlate with dedicated brain imaging, MRI or CT if not previously evaluated  Workstation performed: [id number]     I reviewed the above laboratory and imaging data  Discussion/Summary:  53-year-old male with a clinical [id number] esophageal adenocarcinoma  He has completed neoadjuvant chemotherapy    I did discuss chemo radiation to make sure no metastasis recur in the rib while he is undergoing therapy  We also discussed that he likely would not be able to receive radiation after esophagectomy with the conduit in place  Once again, he is disinclined to have any radiation at this time  We discussed surgical intervention which would be EGD, transhiatal esophagectomy, possible [place]-Cylde esophagogastrectomy  I explained the risks including bleeding, infection, need for further surgery, wound complications, adjacent organ injury, anastomotic leak, sepsis, mi, DVT, stroke, pulmonary embolism, and death  Informed consent was obtained  We will plan on scheduling this at our earliest mutual convenience once he has met with thoracic surgery  I will obtain an MRI as suggested by his PET-CT  We will inform him those results  All of his questions were answered

## 2020-01-16 NOTE — LETTER
January 16, 2020     José Serrano, [address]  [facility] [address]    Patient: Philipp Mir   YOB: 1955   Date of Visit: 1/16/2020       Dear Dr Clemons Found:    Thank you for referring Philipp Mir to me for evaluation  Below are my notes for this consultation  If you have questions, please do not hesitate to call me  I look forward to following your patient along with you  Sincerely,        Cristy Curran MD        CC: MD Ji Bedolla MD Sol Roca, MD Lark Chow, MD  1/16/2020 10:36 AM  Sign at close encounter               Surgical Oncology Follow Up       [address] [facility]  [address]  6/24/9027  [id number]  [facility]  [facility]  [address]  [facility] [address]    Diagnoses and all orders for this visit:    GE junction carcinoma [facility])        Chief Complaint   Patient presents with    Follow-up     discuss surgery        No follow-ups on file           Malignant neoplasm of lower third of esophagus (HCC)    8/24/2019 Initial Diagnosis     Malignant neoplasm of lower third of esophagus (HCC)  At least intramucosal carcinoma  Her2/SHIMON positive      10/8/2019 - 12/30/2019 Chemotherapy     palonosetron (ALOXI) injection 0 25 mg, 0 25 mg, Intravenous, Once, 6 of 6 cycles  Administration: 0 25 mg (10/8/2019), 0 25 mg (10/22/2019), 0 25 mg (11/5/2019), 0 25 mg (11/19/2019), 0 25 mg (12/3/2019), 0 25 mg (12/17/2019)  pegfilgrastim (NEULASTA ONPRO) subcutaneous injection kit 6 mg, 6 mg, Subcutaneous, Once, 6 of 6 cycles  Administration: 6 mg (10/9/2019), 6 mg (10/23/2019), 6 mg (11/6/2019), 6 mg (11/20/2019), 6 mg (12/4/2019), 6 mg (12/18/2019)  fosaprepitant (EMEND) 150 mg in sodium chloride 0 9 % 250 mL IVPB, 150 mg, Intravenous, Once, 6 of 6 cycles  Administration: 150 mg (10/8/2019), 150 mg (10/22/2019), 150 mg (11/5/2019), 150 mg (11/19/2019), 150 mg (12/3/2019), 150 mg (12/17/2019)  DOCEtaxel (TAXOTERE) 99 mg in sodium chloride 0 9 % 250 mL chemo infusion, 50 mg/m2 = 99 mg (83 3 % of original dose 60 mg/m2), Intravenous, Once, 6 of 6 cycles  Dose modification: 50 mg/m2 (original dose 60 mg/m2, Cycle 1, Reason: Other (See Comments))  Administration: 99 mg (10/8/2019), 99 mg (10/22/2019), 99 mg (11/5/2019), 99 mg (11/19/2019), 99 mg (12/3/2019), 99 mg (12/17/2019)  leucovorin 396 mg in dextrose 5 % 250 mL IVPB, 200 mg/m2 = 396 mg (50 % of original dose 400 mg/m2), Intravenous, Once, 6 of 6 cycles  Dose modification: 200 mg/m2 (original dose 400 mg/m2, Cycle 1, Reason: Other (See Comments), Comment: FLOT regimen standard)  Administration: 396 mg (10/8/2019), 396 mg (10/22/2019), 396 mg (11/5/2019), 396 mg (11/19/2019), 396 mg (12/3/2019), 396 mg (12/17/2019)  oxaliplatin (ELOXATIN) 168 3 mg in dextrose 5 % 250 mL chemo infusion, 85 mg/m2 = 168 3 mg, Intravenous, Once, 6 of 6 cycles  Administration: 168 3 mg (10/8/2019), 168 3 mg (10/22/2019), 168 3 mg (11/5/2019), 168 3 mg (11/19/2019), 168 3 mg (12/3/2019), 168 3 mg (12/17/2019)  trastuzumab (HERCEPTIN) 496 mg in sodium chloride 0 9 % 250 mL chemo infusion, 6 mg/kg = 496 mg, Intravenous, Once, 6 of 6 cycles  Administration: 496 mg (10/8/2019), 331 mg (10/22/2019), 331 mg (11/5/2019), 331 mg (11/19/2019), 331 mg (12/3/2019), 331 mg (12/17/2019)        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[facility] [address] )    8/24/2019 Biopsy     Esophagus (biopsy):  - At 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       9/24/2019 Initial Diagnosis     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HCC)      10/8/2019 - 12/30/2019 Chemotherapy     palonosetron (ALOXI) injection 0 25 mg, 0 25 mg, Intravenous, Once, 6 of 6 cycles  Administration: 0 25 mg (10/8/2019), 0 25 mg (10/22/2019), 0 25 mg (11/5/2019), 0 25 mg (11/19/2019), 0 25 mg (12/3/2019), 0 25 mg (12/17/2019)  pegfilgrastim (NEULASTA ONPRO) subcutaneous injection kit 6 mg, 6 mg, Subcutaneous, Once, 6 of 6 cycles  Administration: 6 mg (10/9/2019), 6 mg (10/23/2019), 6 mg (11/6/2019), 6 mg (11/20/2019), 6 mg (12/4/2019), 6 mg (12/18/2019)  fosaprepitant (EMEND) 150 mg in sodium chloride 0 9 % 250 mL IVPB, 150 mg, Intravenous, Once, 6 of 6 cycles  Administration: 150 mg (10/8/2019), 150 mg (10/22/2019), 150 mg (11/5/2019), 150 mg (11/19/2019), 150 mg (12/3/2019), 150 mg (12/17/2019)  DOCEtaxel (TAXOTERE) 99 mg in sodium chloride 0 9 % 250 mL chemo infusion, 50 mg/m2 = 99 mg (83 3 % of original dose 60 mg/m2), Intravenous, Once, 6 of 6 cycles  Dose modification: 50 mg/m2 (original dose 60 mg/m2, Cycle 1, Reason: Other (See Comments))  Administration: 99 mg (10/8/2019), 99 mg (10/22/2019), 99 mg (11/5/2019), 99 mg (11/19/2019), 99 mg (12/3/2019), 99 mg (12/17/2019)  leucovorin 396 mg in dextrose 5 % 250 mL IVPB, 200 mg/m2 = 396 mg (50 % of original dose 400 mg/m2), Intravenous, Once, 6 of 6 cycles  Dose modification: 200 mg/m2 (original dose 400 mg/m2, Cycle 1, Reason: Other (See Comments), Comment: FLOT regimen standard)  Administration: 396 mg (10/8/2019), 396 mg (10/22/2019), 396 mg (11/5/2019), 396 mg (11/19/2019), 396 mg (12/3/2019), 396 mg (12/17/2019)  oxaliplatin (ELOXATIN) 168 3 mg in dextrose 5 % 250 mL chemo infusion, 85 mg/m2 = 168 3 mg, Intravenous, Once, 6 of 6 cycles  Administration: 168 3 mg (10/8/2019), 168 3 mg (10/22/2019), 168 3 mg (11/5/2019), 168 3 mg (11/19/2019), 168 3 mg (12/3/2019), 168 3 mg (12/17/2019)  trastuzumab (HERCEPTIN) 496 mg in sodium chloride 0 9 % 250 mL chemo infusion, 6 mg/kg = 496 mg, Intravenous, Once, 6 of 6 cycles  Administration: 496 mg (10/8/2019), 331 mg (10/22/2019), 331 mg (11/5/2019), 331 mg (11/19/2019), 331 mg (12/3/2019), 331 mg (12/17/2019)             History of Present Illness:  Patient returns in follow-up    He initially started to have dysphagia with solid foods in August of 2019  EGD at that time revealed adenocarcinoma extending from 30-40 cm  There was significant wall thickening seen on CT extending to the level gastric cardia  PET-CT revealed FDG activity in the GE junction as well as a perigastric lymph node  There was also uptake in a left 6th rib and right lateral 2nd rib  There was FDG activity in the colon  Colonoscopy was negative for a colon cancer  He underwent neoadjuvant chemotherapy because of the questionable rib metastasis  Follow-up PET-CT from December 30, 2019 revealed an excellent response to therapy  There was minimal FDG uptake in the distal esophagus  There was no obvious metastasis  I personally reviewed the films  He comes in now to discuss further therapy  He was discussed at upper GI working group and surgery was recommended  He has seen Radiation Oncology who has also recommended surgery and he is not really inclined to have surgery at this time  He comes in now to discuss surgical intervention  His appetite is good  He is eating all food without difficulty  He is gaining weight  Review of Systems  Complete ROS Surg Onc:   Complete ROS Surg Onc:   Constitutional: The patient denies new or recent history of general fatigue, no recent weight loss, no change in appetite  Eyes: No complaints of visual problems, no scleral icterus  ENT: no complaints of ear pain, no hoarseness, no difficulty swallowing,  no tinnitus and no new masses in head, oral cavity, or neck  Cardiovascular: No complaints of chest pain, no palpitations, no ankle edema  Respiratory: No complaints of shortness of breath, no cough  Gastrointestinal: No complaints of jaundice, no bloody stools, no pale stools  Genitourinary: No complaints of dysuria, no hematuria, no nocturia, no frequent urination, no urethral discharge  Musculoskeletal: No complaints of weakness, paralysis, joint stiffness or arthralgias    Integumentary: No complaints of rash, no new lesions  Neurological: No complaints of convulsions, no seizures, no dizziness  Hematologic/Lymphatic: No complaints of easy bruising  Endocrine:  No hot or cold intolerance  No polydipsia, polyphagia, or polyuria  Allergy/immunology:  No environmental allergies  No food allergies  Not immunocompromised  Skin:  No pallor or rash  No wound  Patient Active Problem List   Diagnosis    COPD (chronic obstructive pulmonary disease) (HCC)    Headaches due to old head injury    High cholesterol    Obstructive sleep apnea syndrome    Type 2 diabetes mellitus with hyperglycemia, without long-term current use of insulin (HCC)    Malignant neoplasm of lower third of esophagus (HCC)    Esophageal obstruction    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[place] [address] )    Chemotherapy induced neutropenia (HCC)    Anemia, unspecified    Insomnia due to medical condition    Encounter for care related to feeding tube     Past Medical History:   Diagnosis Date    Cancer ([place] [address] )     esophageal    COPD (chronic obstructive pulmonary disease) ([place] [address] )     Diabetes mellitus ([place] [address] )     Difficulty swallowing     Disease of thyroid gland     Esophageal mass     History of transfusion     Sleep apnea      Past Surgical History:   Procedure Laterality Date    BACK SURGERY      x2    DISC REMOVAL      FL GUIDED CENTRAL VENOUS ACCESS DEVICE INSERTION  9/26/2019    GASTROJEJUNOSTOMY W/ JEJUNOSTOMY TUBE N/A 9/26/2019    Procedure: INSERTION JEJUNOSTOMY TUBE OPEN;  Surgeon: Sandra Hernandez MD;  [facility];  Service: Surgical Oncology    TONSILLECTOMY      TUNNELED VENOUS PORT PLACEMENT N/A 9/26/2019    Procedure: INSERTION VENOUS PORT (PORT-A-CATH);   Surgeon: Sandra Hernandez MD;  [facility];  Service: Surgical Oncology     Family History   Problem Relation Age of Onset    Diabetes Mother     No Known Problems Father      Social History     Socioeconomic History    Marital status: Single     Spouse name: Not on file    Number of children: Not on file    Years of education: Not on file    Highest education level: Not on file   Occupational History    Not on file   Social Needs    Financial resource strain: Not on file    Food insecurity:     Worry: Not on file     Inability: Not on file    Transportation needs:     Medical: Not on file     Non-medical: Not on file   Tobacco Use    Smoking status: Former Smoker     Packs/day: 0 50     Years: 50 00     Pack years: 25 00     Types: Cigarettes     Last attempt to quit: 2017     Years since quittin 0    Smokeless tobacco: Never Used   Substance and Sexual Activity    Alcohol use: Not Currently     Alcohol/week: 0 0 standard drinks     Frequency: Never     Drinks per session: 1 or 2     Binge frequency: Never    Drug use: Never    Sexual activity: Not on file   Lifestyle    Physical activity:     Days per week: Not on file     Minutes per session: Not on file    Stress: Not on file   Relationships    Social connections:     Talks on phone: Not on file     Gets together: Not on file     Attends Sikh service: Not on file     Active member of club or organization: Not on file     Attends meetings of clubs or organizations: Not on file     Relationship status: Not on file    Intimate partner violence:     Fear of current or ex partner: Not on file     Emotionally abused: Not on file     Physically abused: Not on file     Forced sexual activity: Not on file   Other Topics Concern    Not on file   Social History Narrative    Not on file       Current Outpatient Medications:     acetaminophen (TYLENOL) 160 mg/5 mL suspension, Take 20 3 mL (650 mg total) by mouth every 4 (four) hours as needed for mild pain or fever, Disp: 118 mL, Rfl: 0    albuterol (2 5 mg/3 mL) 0 083 % nebulizer solution, Take 1 vial (2 5 mg total) by nebulization every 6 (six) hours as needed for wheezing or shortness of breath, Disp: 1 vial, Rfl: 5    canagliflozin (INVOKANA) 100 mg, Daily, Disp: , Rfl:     dexamethasone (DECADRON) 1 MG/ML solution, Take 8 mL (8 mg total) by mouth daily, Disp: 30 mL, Rfl: 6    gabapentin (NEURONTIN) 300 mg capsule, take 1 capsule by mouth daily at bedtime, Disp: 30 capsule, Rfl: 0    levothyroxine 25 mcg tablet, Take 25 mcg by mouth daily in the early morning, Disp: , Rfl:     Nutritional Supplements (JEVITY 1 5 SHUN) LIQD, Take 85 mL by mouth continuous [email or web address] as tolerated to goal rate of 85ml/hr to run for 16 hours daily  Flush with at least 60ml water at start and end of cycle  Needs additional water flushes if PO fluid intake declines, Disp: 1000 mL, Rfl: 0    ondansetron (ZOFRAN) 4 MG/5ML solution, Take 10 mL (8 mg total) by mouth every 8 (eight) hours as needed for nausea or vomiting, Disp: 240 mL, Rfl: 6    pantoprazole (PROTONIX) 40 mg tablet, Take 1 tablet (40 mg total) by mouth daily, Disp: 30 tablet, Rfl: 1    pioglitazone (ACTOS) 30 mg tablet, Take 30 mg by mouth daily , Disp: , Rfl:     prochlorperazine (COMPAZINE) 10 mg tablet, Take 1 tablet (10 mg total) by mouth every 6 (six) hours as needed for nausea or vomiting, Disp: 45 tablet, Rfl: 3    rOPINIRole (REQUIP) 0 25 mg tablet, take 1 tablet by mouth daily at bedtime, Disp: 30 tablet, Rfl: 0    sitaGLIPtin (JANUVIA) 100 mg tablet, Take 100 mg by mouth daily , Disp: , Rfl:   Allergies   Allergen Reactions    Penicillins Itching     Vitals:    01/16/20 0959   BP: 124/82   Pulse: 86   Resp: 18   Temp: 98 3 °F (36 8 °C)       Physical Exam  Constitutional: General appearance: The Patient is well-developed and well-nourished who appears the stated age in no acute distress  Patient is pleasant and talkative  HEENT:  Normocephalic  Sclerae are anicteric  Mucous membranes are moist  Neck is supple without adenopathy  No JVD  Chest: The lungs are clear to auscultation  Cardiac: Heart is regular rate       Abdomen: Abdomen is soft, non-tender, non-distended and without masses  Extremities: There is no clubbing or cyanosis  There is no edema  Symmetric  Neuro: Grossly nonfocal  Gait is normal      Lymphatic: No evidence of cervical adenopathy bilaterally  No evidence of axillary adenopathy bilaterally  No evidence of inguinal adenopathy bilaterally  Skin: Warm, anicteric  Psych:  Patient is pleasant and talkative  Breasts:        Pathology:  [unfilled]    Labs:      Imaging  Nm Pet Ct Skull Base To Mid Thigh    Result Date: 12/30/2019  Narrative: PET/CT SCAN INDICATION: Restaging of esophageal/gastric cancer postchemotherapy  C16 0: Malignant neoplasm of cardia MODIFIER: PS COMPARISON: PET CT 9/19/2019 CELL TYPE:  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, biopsy esophagus 8/24/2019 TECHNIQUE:   8 33 mCi F-18-FDG administered IV  Multiplanar attenuation corrected and non attenuation corrected PET images were acquired 60 minutes post injection  Contiguous, low dose, axial CT sections were obtained from the skull vertex through the femurs   Intravenous contrast material was not utilized  This examination, like all CT scans performed in the [facility], was performed utilizing techniques to minimize radiation dose exposure, including the use of iterative reconstruction and automated exposure control  Fasting serum glucose: 102 mg/dl FINDINGS: VISUALIZED BRAIN:   No acute abnormalities are seen  Now more conspicuous focal photopenia in the right frontal lobe anteriorly, question related to prior infarct  Questionable encephalomalacia  here on CT  HEAD/NECK:   Diffuse thyroid gland activity again noted, SUV max of 5 0, previously 6 2  This may be related to underlying thyroiditis  There is a physiologic distribution of FDG  No FDG avid cervical adenopathy is seen  CT images: Unremarkable   CHEST:   Mild residual FDG uptake at the distal esophagus and gastroesophageal junction, SUV max of 2 6, previously 21 0  Persistent circumferential wall thickening suggested here on CT  No FDG avid lymph nodes  CT images: Left-sided Mediport line tip terminates at the SVC  Scattered coronary artery calcifications  Stable 5 mm irregular nodular density in the left upper lobe anteriorly image 106 series 3, not FDG avid  Calcified granuloma in the left upper lobe  ABDOMEN:   No FDG avid soft tissue lesions are seen  No residual FDG uptake at the proximal stomach, SUV max of 1 8 in this region  No FDG avid lymph nodes  Previously there was a FDG avid lymph node posterior to the proximal stomach which is no longer seen  New splenomegaly with mild FDG uptake, SUV max of 3 0, higher than the liver, SUV max of 2 6  Spleen measures 16 8 cm in AP dimension  CT images: Cholelithiasis  Stable nodularity of the right adrenal gland, not FDG avid  Jejunostomy tube in position  Mild ascites, new  PELVIS: No FDG avid soft tissue lesions are seen  Previously noted colonic foci of FDG uptake are no longer seen  CT images: Mildly prominent prostate  Tiny fat-containing left inguinal hernia  OSSEOUS STRUCTURES: New diffuse FDG uptake in the osseous structures likely representing response to therapy  Potentially this does limit evaluation for the previously seen small rib foci of FDG uptake and other underlying lesions  CT images: Lumbosacral fusion device  Impression: 1  Findings compatible with response to therapy  2  Significant interval improvement in abnormal FDG uptake at the distal esophagus, gastroesophageal junction and the proximal stomach  Mild residual FDG uptake suggested at the distal esophagus and gastroesophageal junction, residual disease not excluded  3  No findings for hypermetabolic metastasis  4  New splenomegaly with mild diffuse FDG uptake, question related to hematopoietic response to therapy   5   New diffuse FDG uptake in the osseous structures likely representing marrow response to therapy  Potentially this does limit evaluation for underlying lesions  6   Now more conspicuous focal photopenia in the right frontal lobe of the brain anteriorly, may be related to prior infarct  Questionable encephalomalacia here on CT  Correlate with dedicated brain imaging, MRI or CT if not previously evaluated  Workstation performed: [id number]     I reviewed the above laboratory and imaging data  Discussion/Summary:  50-year-old male with a clinical [id number] esophageal adenocarcinoma  He has completed neoadjuvant chemotherapy  I did discuss chemo radiation to make sure no metastasis recur in the rib while he is undergoing therapy  We also discussed that he likely would not be able to receive radiation after esophagectomy with the conduit in place  Once again, he is disinclined to have any radiation at this time  We discussed surgical intervention which would be EGD, transhiatal esophagectomy, possible [place] esophagogastrectomy  I explained the risks including bleeding, infection, need for further surgery, wound complications, adjacent organ injury, anastomotic leak, sepsis, mi, DVT, stroke, pulmonary embolism, and death  Informed consent was obtained  We will plan on scheduling this at our earliest mutual convenience once he has met with thoracic surgery  I will obtain an MRI as suggested by his PET-CT  We will inform him those results  All of his questions were answered

## 2020-01-16 NOTE — PROGRESS NOTES
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)        Chief Complaint   Patient presents with    Follow-up     discuss surgery        No follow-ups on file           Malignant neoplasm of lower third of esophagus (HCC)    8/24/2019 Initial Diagnosis     Malignant neoplasm of lower third of esophagus (HCC)  At least intramucosal carcinoma  Her2/SHIMON positive      10/8/2019 - 12/30/2019 Chemotherapy     palonosetron (ALOXI) injection 0 25 mg, 0 25 mg, Intravenous, Once, 6 of 6 cycles  Administration: 0 25 mg (10/8/2019), 0 25 mg (10/22/2019), 0 25 mg (11/5/2019), 0 25 mg (11/19/2019), 0 25 mg (12/3/2019), 0 25 mg (12/17/2019)  pegfilgrastim (NEULASTA ONPRO) subcutaneous injection kit 6 mg, 6 mg, Subcutaneous, Once, 6 of 6 cycles  Administration: 6 mg (10/9/2019), 6 mg (10/23/2019), 6 mg (11/6/2019), 6 mg (11/20/2019), 6 mg (12/4/2019), 6 mg (12/18/2019)  fosaprepitant (EMEND) 150 mg in sodium chloride 0 9 % 250 mL IVPB, 150 mg, Intravenous, Once, 6 of 6 cycles  Administration: 150 mg (10/8/2019), 150 mg (10/22/2019), 150 mg (11/5/2019), 150 mg (11/19/2019), 150 mg (12/3/2019), 150 mg (12/17/2019)  DOCEtaxel (TAXOTERE) 99 mg in sodium chloride 0 9 % 250 mL chemo infusion, 50 mg/m2 = 99 mg (83 3 % of original dose 60 mg/m2), Intravenous, Once, 6 of 6 cycles  Dose modification: 50 mg/m2 (original dose 60 mg/m2, Cycle 1, Reason: Other (See Comments))  Administration: 99 mg (10/8/2019), 99 mg (10/22/2019), 99 mg (11/5/2019), 99 mg (11/19/2019), 99 mg (12/3/2019), 99 mg (12/17/2019)  leucovorin 396 mg in dextrose 5 % 250 mL IVPB, 200 mg/m2 = 396 mg (50 % of original dose 400 mg/m2), Intravenous, Once, 6 of 6 cycles  Dose modification: 200 mg/m2 (original dose 400 mg/m2, Cycle 1, Reason: Other (See Comments), Comment: FLOT regimen standard)  Administration: 396 mg (10/8/2019), 396 mg (10/22/2019), 396 mg (11/5/2019), 396 mg (11/19/2019), 396 mg (12/3/2019), 396 mg (12/17/2019)  oxaliplatin (ELOXATIN) 168 3 mg in dextrose 5 % 250 mL chemo infusion, 85 mg/m2 = 168 3 mg, Intravenous, Once, 6 of 6 cycles  Administration: 168 3 mg (10/8/2019), 168 3 mg (10/22/2019), 168 3 mg (11/5/2019), 168 3 mg (11/19/2019), 168 3 mg (12/3/2019), 168 3 mg (12/17/2019)  trastuzumab (HERCEPTIN) 496 mg in sodium chloride 0 9 % 250 mL chemo infusion, 6 mg/kg = 496 mg, Intravenous, Once, 6 of 6 cycles  Administration: 496 mg (10/8/2019), 331 mg (10/22/2019), 331 mg (11/5/2019), 331 mg (11/19/2019), 331 mg (12/3/2019), 331 mg (12/17/2019)        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[facility] )    8/24/2019 Biopsy     Esophagus (biopsy):  - At 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       9/24/2019 Initial Diagnosis     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HCC)      10/8/2019 - 12/30/2019 Chemotherapy     palonosetron (ALOXI) injection 0 25 mg, 0 25 mg, Intravenous, Once, 6 of 6 cycles  Administration: 0 25 mg (10/8/2019), 0 25 mg (10/22/2019), 0 25 mg (11/5/2019), 0 25 mg (11/19/2019), 0 25 mg (12/3/2019), 0 25 mg (12/17/2019)  pegfilgrastim (NEULASTA ONPRO) subcutaneous injection kit 6 mg, 6 mg, Subcutaneous, Once, 6 of 6 cycles  Administration: 6 mg (10/9/2019), 6 mg (10/23/2019), 6 mg (11/6/2019), 6 mg (11/20/2019), 6 mg (12/4/2019), 6 mg (12/18/2019)  fosaprepitant (EMEND) 150 mg in sodium chloride 0 9 % 250 mL IVPB, 150 mg, Intravenous, Once, 6 of 6 cycles  Administration: 150 mg (10/8/2019), 150 mg (10/22/2019), 150 mg (11/5/2019), 150 mg (11/19/2019), 150 mg (12/3/2019), 150 mg (12/17/2019)  DOCEtaxel (TAXOTERE) 99 mg in sodium chloride 0 9 % 250 mL chemo infusion, 50 mg/m2 = 99 mg (83 3 % of original dose 60 mg/m2), Intravenous, Once, 6 of 6 cycles  Dose modification: 50 mg/m2 (original dose 60 mg/m2, Cycle 1, Reason: Other (See Comments))  Administration: 99 mg (10/8/2019), 99 mg (10/22/2019), 99 mg (11/5/2019), 99 mg (11/19/2019), 99 mg (12/3/2019), 99 mg (12/17/2019)  leucovorin 396 mg in dextrose 5 % 250 mL IVPB, 200 mg/m2 = 396 mg (50 % of original dose 400 mg/m2), Intravenous, Once, 6 of 6 cycles  Dose modification: 200 mg/m2 (original dose 400 mg/m2, Cycle 1, Reason: Other (See Comments), Comment: FLOT regimen standard)  Administration: 396 mg (10/8/2019), 396 mg (10/22/2019), 396 mg (11/5/2019), 396 mg (11/19/2019), 396 mg (12/3/2019), 396 mg (12/17/2019)  oxaliplatin (ELOXATIN) 168 3 mg in dextrose 5 % 250 mL chemo infusion, 85 mg/m2 = 168 3 mg, Intravenous, Once, 6 of 6 cycles  Administration: 168 3 mg (10/8/2019), 168 3 mg (10/22/2019), 168 3 mg (11/5/2019), 168 3 mg (11/19/2019), 168 3 mg (12/3/2019), 168 3 mg (12/17/2019)  trastuzumab (HERCEPTIN) 496 mg in sodium chloride 0 9 % 250 mL chemo infusion, 6 mg/kg = 496 mg, Intravenous, Once, 6 of 6 cycles  Administration: 496 mg (10/8/2019), 331 mg (10/22/2019), 331 mg (11/5/2019), 331 mg (11/19/2019), 331 mg (12/3/2019), 331 mg (12/17/2019)             History of Present Illness:  Patient returns in follow-up  He initially started to have dysphagia with solid foods in August of 2019  EGD at that time revealed adenocarcinoma extending from 30-40 cm  There was significant wall thickening seen on CT extending to the level gastric cardia  PET-CT revealed FDG activity in the GE junction as well as a perigastric lymph node  There was also uptake in a left 6th rib and right lateral 2nd rib  There was FDG activity in the colon  Colonoscopy was negative for a colon cancer  He underwent neoadjuvant chemotherapy because of the questionable rib metastasis  Follow-up PET-CT from December 30, 2019 revealed an excellent response to therapy  There was minimal FDG uptake in the distal esophagus  There was no obvious metastasis  I personally reviewed the films  He comes in now to discuss further therapy  He was discussed at upper GI working group and surgery was recommended  He has seen Radiation Oncology who has also recommended surgery and he is not really inclined to have surgery at this time  He comes in now to discuss surgical intervention  His appetite is good  He is eating all food without difficulty  He is gaining weight  Review of Systems  Complete ROS Surg Onc:   Complete ROS Surg Onc:   Constitutional: The patient denies new or recent history of general fatigue, no recent weight loss, no change in appetite  Eyes: No complaints of visual problems, no scleral icterus  ENT: no complaints of ear pain, no hoarseness, no difficulty swallowing,  no tinnitus and no new masses in head, oral cavity, or neck  Cardiovascular: No complaints of chest pain, no palpitations, no ankle edema  Respiratory: No complaints of shortness of breath, no cough  Gastrointestinal: No complaints of jaundice, no bloody stools, no pale stools  Genitourinary: No complaints of dysuria, no hematuria, no nocturia, no frequent urination, no urethral discharge  Musculoskeletal: No complaints of weakness, paralysis, joint stiffness or arthralgias  Integumentary: No complaints of rash, no new lesions  Neurological: No complaints of convulsions, no seizures, no dizziness  Hematologic/Lymphatic: No complaints of easy bruising  Endocrine:  No hot or cold intolerance  No polydipsia, polyphagia, or polyuria  Allergy/immunology:  No environmental allergies  No food allergies  Not immunocompromised  Skin:  No pallor or rash  No wound          Patient Active Problem List   Diagnosis    COPD (chronic obstructive pulmonary disease) (HCC)    Headaches due to old head injury    High cholesterol    Obstructive sleep apnea syndrome    Type 2 diabetes mellitus with hyperglycemia, without long-term current use of insulin (HCC)    Malignant neoplasm of lower third of esophagus (HCC)    Esophageal obstruction    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HCC)    Chemotherapy induced neutropenia (HCC)    Anemia, unspecified    Insomnia due to medical condition    Encounter for care related to feeding tube     Past Medical History:   Diagnosis Date    Cancer ([facility] )     esophageal    COPD (chronic obstructive pulmonary disease) ([facility] )     Diabetes mellitus ([place] [address] )     Difficulty swallowing     Disease of thyroid gland     Esophageal mass     History of transfusion     Sleep apnea      Past Surgical History:   Procedure Laterality Date    BACK SURGERY      x2    DISC REMOVAL      FL GUIDED CENTRAL VENOUS ACCESS DEVICE INSERTION  2019    GASTROJEJUNOSTOMY W/ JEJUNOSTOMY TUBE N/A 2019    Procedure: INSERTION JEJUNOSTOMY TUBE OPEN;  Surgeon: Zahra Zamora MD;  [facility];  Service: Surgical Oncology    TONSILLECTOMY      TUNNELED VENOUS PORT PLACEMENT N/A 2019    Procedure: INSERTION VENOUS PORT (PORT-A-CATH);   Surgeon: Zahra Zamora MD;  [facility];  Service: Surgical Oncology     Family History   Problem Relation Age of Onset    Diabetes Mother     No Known Problems Father      Social History     Socioeconomic History    Marital status: Single     Spouse name: Not on file    Number of children: Not on file    Years of education: Not on file    Highest education level: Not on file   Occupational History    Not on file   Social Needs    Financial resource strain: Not on file    Food insecurity:     Worry: Not on file     Inability: Not on file    Transportation needs:     Medical: Not on file     Non-medical: Not on file   Tobacco Use    Smoking status: Former Smoker     Packs/day: 0 50     Years: 50 00     Pack years: 25 00     Types: Cigarettes     Last attempt to quit: 2017     Years since quittin 0    Smokeless tobacco: Never Used   Substance and Sexual Activity    Alcohol use: Not Currently     Alcohol/week: 0 0 standard drinks     Frequency: Never     Drinks per session: 1 or 2     Binge frequency: Never    Drug use: Never    Sexual activity: Not on file   Lifestyle    Physical activity:     Days per week: Not on file     Minutes per session: Not on file    Stress: Not on file   Relationships    Social connections:     Talks on phone: Not on file     Gets together: Not on file     Attends Jain service: Not on file     Active member of club or organization: Not on file     Attends meetings of clubs or organizations: Not on file     Relationship status: Not on file    Intimate partner violence:     Fear of current or ex partner: Not on file     Emotionally abused: Not on file     Physically abused: Not on file     Forced sexual activity: Not on file   Other Topics Concern    Not on file   Social History Narrative    Not on file       Current Outpatient Medications:     acetaminophen (TYLENOL) 160 mg/5 mL suspension, Take 20 3 mL (650 mg total) by mouth every 4 (four) hours as needed for mild pain or fever, Disp: 118 mL, Rfl: 0    albuterol (2 5 mg/3 mL) 0 083 % nebulizer solution, Take 1 vial (2 5 mg total) by nebulization every 6 (six) hours as needed for wheezing or shortness of breath, Disp: 1 vial, Rfl: 5    canagliflozin (INVOKANA) 100 mg, Daily, Disp: , Rfl:     dexamethasone (DECADRON) 1 MG/ML solution, Take 8 mL (8 mg total) by mouth daily, Disp: 30 mL, Rfl: 6    gabapentin (NEURONTIN) 300 mg capsule, take 1 capsule by mouth daily at bedtime, Disp: 30 capsule, Rfl: 0    levothyroxine 25 mcg tablet, Take 25 mcg by mouth daily in the early morning, Disp: , Rfl:     Nutritional Supplements (JEVITY 1 5 SHUN) LIQD, Take 85 mL by mouth continuous Jevity [email or web address] as tolerated to goal rate of 85ml/hr to run for 16 hours daily  Flush with at least 60ml water at start and end of cycle   Needs additional water flushes if PO fluid intake declines, Disp: 1000 mL, Rfl: 0    ondansetron (ZOFRAN) 4 MG/5ML solution, Take 10 mL (8 mg total) by mouth every 8 (eight) hours as needed for nausea or vomiting, Disp: 240 mL, Rfl: 6    pantoprazole (PROTONIX) 40 mg tablet, Take 1 tablet (40 mg total) by mouth daily, Disp: 30 tablet, Rfl: 1    pioglitazone (ACTOS) 30 mg tablet, Take 30 mg by mouth daily , Disp: , Rfl:     prochlorperazine (COMPAZINE) 10 mg tablet, Take 1 tablet (10 mg total) by mouth every 6 (six) hours as needed for nausea or vomiting, Disp: 45 tablet, Rfl: 3    rOPINIRole (REQUIP) 0 25 mg tablet, take 1 tablet by mouth daily at bedtime, Disp: 30 tablet, Rfl: 0    sitaGLIPtin (JANUVIA) 100 mg tablet, Take 100 mg by mouth daily , Disp: , Rfl:   Allergies   Allergen Reactions    Penicillins Itching     Vitals:    01/16/20 0959   BP: 124/82   Pulse: 86   Resp: 18   Temp: 98 3 °F (36 8 °C)       Physical Exam  Constitutional: General appearance: The Patient is well-developed and well-nourished who appears the stated age in no acute distress  Patient is pleasant and talkative  HEENT:  Normocephalic  Sclerae are anicteric  Mucous membranes are moist  Neck is supple without adenopathy  No JVD  Chest: The lungs are clear to auscultation  Cardiac: Heart is regular rate  Abdomen: Abdomen is soft, non-tender, non-distended and without masses  Extremities: There is no clubbing or cyanosis  There is no edema  Symmetric  Neuro: Grossly nonfocal  Gait is normal      Lymphatic: No evidence of cervical adenopathy bilaterally  No evidence of axillary adenopathy bilaterally  No evidence of inguinal adenopathy bilaterally  Skin: Warm, anicteric  Psych:  Patient is pleasant and talkative    Breasts:        Pathology:  [unfilled]    Labs:      Imaging  Nm Pet Ct Skull Base To Mid Thigh    Result Date: 12/30/2019  Narrative: PET/CT SCAN INDICATION: Restaging of esophageal/gastric cancer postchemotherapy  C16 0: Malignant neoplasm of cardia MODIFIER: PS COMPARISON: PET CT 9/19/2019 CELL TYPE:  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, biopsy esophagus 8/24/2019 TECHNIQUE:   8 33 mCi F-18-FDG administered IV  Multiplanar attenuation corrected and non attenuation corrected PET images were acquired 60 minutes post injection  Contiguous, low dose, axial CT sections were obtained from the skull vertex through the femurs   Intravenous contrast material was not utilized  This examination, like all CT scans performed in the [facility], was performed utilizing techniques to minimize radiation dose exposure, including the use of iterative reconstruction and automated exposure control  Fasting serum glucose: 102 mg/dl FINDINGS: VISUALIZED BRAIN:   No acute abnormalities are seen  Now more conspicuous focal photopenia in the right frontal lobe anteriorly, question related to prior infarct  Questionable encephalomalacia  here on CT  HEAD/NECK:   Diffuse thyroid gland activity again noted, SUV max of 5 0, previously 6 2  This may be related to underlying thyroiditis  There is a physiologic distribution of FDG  No FDG avid cervical adenopathy is seen  CT images: Unremarkable  CHEST:   Mild residual FDG uptake at the distal esophagus and gastroesophageal junction, SUV max of 2 6, previously 21 0  Persistent circumferential wall thickening suggested here on CT  No FDG avid lymph nodes  CT images: Left-sided Mediport line tip terminates at the SVC  Scattered coronary artery calcifications  Stable 5 mm irregular nodular density in the left upper lobe anteriorly image 106 series 3, not FDG avid  Calcified granuloma in the left upper lobe  ABDOMEN:   No FDG avid soft tissue lesions are seen  No residual FDG uptake at the proximal stomach, SUV max of 1 8 in this region  No FDG avid lymph nodes    Previously there was a FDG avid lymph node posterior to the proximal stomach which is no longer seen  New splenomegaly with mild FDG uptake, SUV max of 3 0, higher than the liver, SUV max of 2 6  Spleen measures 16 8 cm in AP dimension  CT images: Cholelithiasis  Stable nodularity of the right adrenal gland, not FDG avid  Jejunostomy tube in position  Mild ascites, new  PELVIS: No FDG avid soft tissue lesions are seen  Previously noted colonic foci of FDG uptake are no longer seen  CT images: Mildly prominent prostate  Tiny fat-containing left inguinal hernia  OSSEOUS STRUCTURES: New diffuse FDG uptake in the osseous structures likely representing response to therapy  Potentially this does limit evaluation for the previously seen small rib foci of FDG uptake and other underlying lesions  CT images: Lumbosacral fusion device  Impression: 1  Findings compatible with response to therapy  2  Significant interval improvement in abnormal FDG uptake at the distal esophagus, gastroesophageal junction and the proximal stomach  Mild residual FDG uptake suggested at the distal esophagus and gastroesophageal junction, residual disease not excluded  3  No findings for hypermetabolic metastasis  4  New splenomegaly with mild diffuse FDG uptake, question related to hematopoietic response to therapy  5   New diffuse FDG uptake in the osseous structures likely representing marrow response to therapy  Potentially this does limit evaluation for underlying lesions  6   Now more conspicuous focal photopenia in the right frontal lobe of the brain anteriorly, may be related to prior infarct  Questionable encephalomalacia here on CT  Correlate with dedicated brain imaging, MRI or CT if not previously evaluated  Workstation performed: [id number]     I reviewed the above laboratory and imaging data  Discussion/Summary:  26-year-old male with a clinical [id number] esophageal adenocarcinoma  He has completed neoadjuvant chemotherapy    I did discuss chemo radiation to make sure no metastasis recur in the rib while he is undergoing therapy  We also discussed that he likely would not be able to receive radiation after esophagectomy with the conduit in place  Once again, he is disinclined to have any radiation at this time  We discussed surgical intervention which would be EGD, transhiatal esophagectomy, possible [place] esophagogastrectomy  I explained the risks including bleeding, infection, need for further surgery, wound complications, adjacent organ injury, anastomotic leak, sepsis, mi, DVT, stroke, pulmonary embolism, and death  Informed consent was obtained  We will plan on scheduling this at our earliest mutual convenience once he has met with thoracic surgery  I will obtain an MRI as suggested by his PET-CT  We will inform him those results  All of his questions were answered

## 2020-01-17 ENCOUNTER — NUTRITION (OUTPATIENT)
Dept: NUTRITION | Facility: CLINIC | Age: 65
End: 2020-01-17

## 2020-01-17 DIAGNOSIS — Z71.3 NUTRITIONAL COUNSELING: Primary | ICD-10-CM

## 2020-01-17 DIAGNOSIS — C16.0 GE JUNCTION CARCINOMA (HCC): Primary | ICD-10-CM

## 2020-01-17 NOTE — PROGRESS NOTES
Outpatient Oncology Nutrition Consult     Type of Consult: Follow Up  Care Location: Telephone Call     Reason for referral: Franki RN: Esophageal CA dx and TF (Date of referral: 9/26/19)    Nutrition Assessment:     PMH: COPD, DM, Jejunostomy 9/26/19  Oncology Diagnosis & Treatments: Malignant neoplasm of lower third of esophagus diagnosed 8/24/19  Chemotherapy (neulasta, taxotere, oxaliplatin, trastuzumab) started 10/8/19, EOT 12/30/19  Planned for esophagectomy 1/28/20        Malignant neoplasm of lower third of esophagus (HCC)    8/24/2019 Initial Diagnosis     Malignant neoplasm of lower third of esophagus (HCC)  At least intramucosal carcinoma  Her2/SHIMON positive      10/8/2019 - 12/30/2019 Chemotherapy     palonosetron (ALOXI) injection 0 25 mg, 0 25 mg, Intravenous, Once, 6 of 6 cycles  Administration: 0 25 mg (10/8/2019), 0 25 mg (10/22/2019), 0 25 mg (11/5/2019), 0 25 mg (11/19/2019), 0 25 mg (12/3/2019), 0 25 mg (12/17/2019)  pegfilgrastim (NEULASTA ONPRO) subcutaneous injection kit 6 mg, 6 mg, Subcutaneous, Once, 6 of 6 cycles  Administration: 6 mg (10/9/2019), 6 mg (10/23/2019), 6 mg (11/6/2019), 6 mg (11/20/2019), 6 mg (12/4/2019), 6 mg (12/18/2019)  fosaprepitant (EMEND) 150 mg in sodium chloride 0 9 % 250 mL IVPB, 150 mg, Intravenous, Once, 6 of 6 cycles  Administration: 150 mg (10/8/2019), 150 mg (10/22/2019), 150 mg (11/5/2019), 150 mg (11/19/2019), 150 mg (12/3/2019), 150 mg (12/17/2019)  DOCEtaxel (TAXOTERE) 99 mg in sodium chloride 0 9 % 250 mL chemo infusion, 50 mg/m2 = 99 mg (83 3 % of original dose 60 mg/m2), Intravenous, Once, 6 of 6 cycles  Dose modification: 50 mg/m2 (original dose 60 mg/m2, Cycle 1, Reason: Other (See Comments))  Administration: 99 mg (10/8/2019), 99 mg (10/22/2019), 99 mg (11/5/2019), 99 mg (11/19/2019), 99 mg (12/3/2019), 99 mg (12/17/2019)  leucovorin 396 mg in dextrose 5 % 250 mL IVPB, 200 mg/m2 = 396 mg (50 % of original dose 400 mg/m2), Intravenous, Once, 6 of 6 cycles  Dose modification: 200 mg/m2 (original dose 400 mg/m2, Cycle 1, Reason: Other (See Comments), Comment: FLOT regimen standard)  Administration: 396 mg (10/8/2019), 396 mg (10/22/2019), 396 mg (11/5/2019), 396 mg (11/19/2019), 396 mg (12/3/2019), 396 mg (12/17/2019)  oxaliplatin (ELOXATIN) 168 3 mg in dextrose 5 % 250 mL chemo infusion, 85 mg/m2 = 168 3 mg, Intravenous, Once, 6 of 6 cycles  Administration: 168 3 mg (10/8/2019), 168 3 mg (10/22/2019), 168 3 mg (11/5/2019), 168 3 mg (11/19/2019), 168 3 mg (12/3/2019), 168 3 mg (12/17/2019)  trastuzumab (HERCEPTIN) 496 mg in sodium chloride 0 9 % 250 mL chemo infusion, 6 mg/kg = 496 mg, Intravenous, Once, 6 of 6 cycles  Administration: 496 mg (10/8/2019), 331 mg (10/22/2019), 331 mg (11/5/2019), 331 mg (11/19/2019), 331 mg (12/3/2019), 331 mg (12/17/2019)        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[facility] [address] )    8/24/2019 Biopsy     Esophagus (biopsy):  - At 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       9/24/2019 Initial Diagnosis     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HCC)      10/8/2019 - 12/30/2019 Chemotherapy     palonosetron (ALOXI) injection 0 25 mg, 0 25 mg, Intravenous, Once, 6 of 6 cycles  Administration: 0 25 mg (10/8/2019), 0 25 mg (10/22/2019), 0 25 mg (11/5/2019), 0 25 mg (11/19/2019), 0 25 mg (12/3/2019), 0 25 mg (12/17/2019)  pegfilgrastim (NEULASTA ONPRO) subcutaneous injection kit 6 mg, 6 mg, Subcutaneous, Once, 6 of 6 cycles  Administration: 6 mg (10/9/2019), 6 mg (10/23/2019), 6 mg (11/6/2019), 6 mg (11/20/2019), 6 mg (12/4/2019), 6 mg (12/18/2019)  fosaprepitant (EMEND) 150 mg in sodium chloride 0 9 % 250 mL IVPB, 150 mg, Intravenous, Once, 6 of 6 cycles  Administration: 150 mg (10/8/2019), 150 mg (10/22/2019), 150 mg (11/5/2019), 150 mg (11/19/2019), 150 mg (12/3/2019), 150 mg (12/17/2019)  DOCEtaxel (TAXOTERE) 99 mg in sodium chloride 0 9 % 250 mL chemo infusion, 50 mg/m2 = 99 mg (83 3 % of original dose 60 mg/m2), Intravenous, Once, 6 of 6 cycles  Dose modification: 50 mg/m2 (original dose 60 mg/m2, Cycle 1, Reason: Other (See Comments))  Administration: 99 mg (10/8/2019), 99 mg (10/22/2019), 99 mg (11/5/2019), 99 mg (11/19/2019), 99 mg (12/3/2019), 99 mg (12/17/2019)  leucovorin 396 mg in dextrose 5 % 250 mL IVPB, 200 mg/m2 = 396 mg (50 % of original dose 400 mg/m2), Intravenous, Once, 6 of 6 cycles  Dose modification: 200 mg/m2 (original dose 400 mg/m2, Cycle 1, Reason: Other (See Comments), Comment: FLOT regimen standard)  Administration: 396 mg (10/8/2019), 396 mg (10/22/2019), 396 mg (11/5/2019), 396 mg (11/19/2019), 396 mg (12/3/2019), 396 mg (12/17/2019)  oxaliplatin (ELOXATIN) 168 3 mg in dextrose 5 % 250 mL chemo infusion, 85 mg/m2 = 168 3 mg, Intravenous, Once, 6 of 6 cycles  Administration: 168 3 mg (10/8/2019), 168 3 mg (10/22/2019), 168 3 mg (11/5/2019), 168 3 mg (11/19/2019), 168 3 mg (12/3/2019), 168 3 mg (12/17/2019)  trastuzumab (HERCEPTIN) 496 mg in sodium chloride 0 9 % 250 mL chemo infusion, 6 mg/kg = 496 mg, Intravenous, Once, 6 of 6 cycles  Administration: 496 mg (10/8/2019), 331 mg (10/22/2019), 331 mg (11/5/2019), 331 mg (11/19/2019), 331 mg (12/3/2019), 331 mg (12/17/2019)       Past Medical History:   Diagnosis Date    Cancer ([address] )     esophageal    COPD (chronic obstructive pulmonary disease) ([facility] [address] )     Diabetes mellitus ([facility] [address] )     Difficulty swallowing     Disease of thyroid gland     Esophageal mass     History of transfusion     Sleep apnea      Past Surgical History:   Procedure Laterality Date    BACK SURGERY      x2    DISC REMOVAL      FL GUIDED CENTRAL VENOUS ACCESS DEVICE INSERTION  9/26/2019    GASTROJEJUNOSTOMY W/ JEJUNOSTOMY TUBE N/A 9/26/2019    Procedure: INSERTION JEJUNOSTOMY TUBE OPEN;  Surgeon: Dipak Umanzor MD;  [facility]: [facility];  Service: Surgical Oncology    TONSILLECTOMY      TUNNELED VENOUS PORT PLACEMENT N/A 9/26/2019    Procedure: INSERTION VENOUS PORT (PORT-A-CATH); Surgeon: Naldo Zuniga MD;  Location: [facility];  Service: Surgical Oncology       Review of Medications:   Vitamins, Supplements and Herbals: no    Current Outpatient Medications:     acetaminophen (TYLENOL) 160 mg/5 mL suspension, Take 20 3 mL (650 mg total) by mouth every 4 (four) hours as needed for mild pain or fever, Disp: 118 mL, Rfl: 0    albuterol (2 5 mg/3 mL) 0 083 % nebulizer solution, Take 1 vial (2 5 mg total) by nebulization every 6 (six) hours as needed for wheezing or shortness of breath, Disp: 1 vial, Rfl: 5    canagliflozin (INVOKANA) 100 mg, Daily, Disp: , Rfl:     dexamethasone (DECADRON) 1 MG/ML solution, Take 8 mL (8 mg total) by mouth daily, Disp: 30 mL, Rfl: 6    gabapentin (NEURONTIN) 300 mg capsule, take 1 capsule by mouth daily at bedtime, Disp: 30 capsule, Rfl: 0    levothyroxine 25 mcg tablet, Take 25 mcg by mouth daily in the early morning, Disp: , Rfl:     Nutritional Supplements (JEVITY 1 5 SHUN) LIQD, Take 85 mL by mouth continuous Jevity [email or web address] advance as tolerated to goal rate of 85ml/hr to run for 16 hours daily  Flush with at least 60ml water at start and end of cycle   Needs additional water flushes if PO fluid intake declines, Disp: 1000 mL, Rfl: 0    ondansetron (ZOFRAN) 4 MG/5ML solution, Take 10 mL (8 mg total) by mouth every 8 (eight) hours as needed for nausea or vomiting, Disp: 240 mL, Rfl: 6    pantoprazole (PROTONIX) 40 mg tablet, Take 1 tablet (40 mg total) by mouth daily, Disp: 30 tablet, Rfl: 1    pioglitazone (ACTOS) 30 mg tablet, Take 30 mg by mouth daily , Disp: , Rfl:     prochlorperazine (COMPAZINE) 10 mg tablet, Take 1 tablet (10 mg total) by mouth every 6 (six) hours as needed for nausea or vomiting, Disp: 45 tablet, Rfl: 3    rOPINIRole (REQUIP) 0 25 mg tablet, take 1 tablet by mouth daily at bedtime, Disp: 30 tablet, Rfl: 0    sitaGLIPtin (JANUVIA) 100 mg tablet, Take 100 mg by mouth daily , Disp: , Rfl:   No current facility-administered medications for this visit  Most Recent Lab Results:   Lab Results   Component Value Date    WBC 5 69 01/16/2020    ALT 14 01/16/2020    AST 12 01/16/2020    ALB 3 5 01/16/2020    SODIUM 139 01/16/2020    SODIUM 142 12/17/2019    K 4 5 01/16/2020    K 3 5 12/17/2019     01/16/2020    BUN 14 01/16/2020    BUN 12 12/17/2019    CREATININE 0 83 01/16/2020    CREATININE 0 75 12/17/2019    EGFR 92 01/16/2020    POCGLU 102 12/30/2019    GLUF 128 (H) 12/02/2019    GLUF 106 (H) 11/18/2019    HGBA1C 5 4 01/16/2020    HGBA1C 7 6 08/15/2019    CALCIUM 9 0 01/16/2020       Anthropometric Measurements:   Height: 69"  Ht Readings from Last 1 Encounters:   01/16/20 5' 10" (1 778 m)     -Weight History:   Usual Weight: 205#  Ideal Body Weight: 160#  Wt Readings from Last 20 Encounters:   01/16/20 87 5 kg (193 lb)   01/14/20 88 kg (194 lb)   01/08/20 88 kg (194 lb)   12/19/19 87 1 kg (192 lb)   12/19/19 86 6 kg (191 lb)   12/17/19 86 6 kg (190 lb 14 7 oz)   12/03/19 89 4 kg (197 lb 1 5 oz)   11/19/19 88 4 kg (194 lb 14 2 oz)   11/15/19 82 6 kg (182 lb)   11/05/19 85 3 kg (188 lb 0 8 oz)   10/28/19 80 7 kg (178 lb)   10/25/19 80 6 kg (177 lb 12 8 oz)   10/22/19 78 9 kg (174 lb)   10/15/19 79 8 kg (176 lb)   10/08/19 81 6 kg (180 lb)   09/26/19 82 6 kg (182 lb)   09/25/19 82 6 kg (182 lb)   09/24/19 82 6 kg (182 lb)   09/24/19 82 6 kg (182 lb)   09/20/19 82 1 kg (181 lb)     Varian: None  Weight Changes: gain of 1 0#/ (0 5%) in 1 week (not significant) and gain of 2 1#/ (1 1%) in 1 month (not significant)    Estimated body mass index is 27 69 kg/m² as calculated from the following:    Height as of 1/16/20: 5' 10" (1 778 m)  Weight as of 1/16/20: 87 5 kg (193 lb)      Nutrition-Focused Physical Findings: n/a-phone visit      Food/Nutrition-Related History & Client/Social History:    Current Nutrition Impact Symptoms:  [] Nausea  [] Reduced Appetite  [] Acid Reflux    [] Vomiting  [] Unintended Wt Loss [] Malabsorption    [] Diarrhea  [] Unintended Wt Gain  [] Dumping Syndrome    [] Constipation  [] Thick Mucous/Secretions  [] Abdominal Pain    [x] Dysgeusia (Altered Taste) -has had altered taste and smell for years, per pts sister at initial visit [] Xerostomia (Dry Mouth)  [] Gas    [x] Dysosmia (Altered Smell)  [] Thrush  [] Difficulty Chewing    [] Oral Mucositis (Sore Mouth)  [x] Fatigue -improving  [] Other:    [] Odynophagia   [] Esophagitis  [] Other:    [] Dysphagia  [] Early Satiety  [x] No Problems Eating      Food Allergies: no  Food Intolerances: no    Current Diet: Regular Diet, No Restrictions  Current Nutrition Intake: Unchanged from last visit  Appetite: Good   Nutrition Route: PO; pt not using J-tube for nutrition at this time  Meal planning/preparation mainly done by: Self and Daughter  Oral Care: brushes QD  Activity level: Mainly sedentary, ADL  Feels like he is regaining strength  Social Hx: His daughter Curt Leslie helps take care of him  Diet Recall:   Breakfast: scrambled eggs with forrester, coffee   Lunch: Pork and beans   Dinner: spaghetti with tomato sauce     Beverages: water (16 9 oz x3), coffee (16oz x1)  Supplements:    Ensure Enlive (8 oz, 350 kcal, 20 g pro) Has but not drinking   Boost Plus (8 oz, 360 kcal, 14 g pro) has but not drinking      Enteral Nutrition:  Reported Enteral Nutrition Intake:   · Has not been using J-tube for nutrition     DME: [facility]     Estimated Nutrition Needs: (based on 87 7kg/ 193# actual wt)  Energy Needs: 0059-3427 kcal/day (30-35 kcal/kg)  Protein Needs: 105-131 grams/day (1 2-1 5 g/kg)  Fluid Needs: 8979-4342 mL/day (1 mL/kcal)    Discussion/Summary: Veronica Tineo was contacted for follow up nutrition consultation  He reports that at this time he is able to eat anything he wants and his appetite is very good  He is planned for esophagectomy on 1/28/20 after which he will likely require nutrition via J-tube  He has not been using his J-tube for nutrition  Reviewed use of J-tube for nutrition and using pump for administering tube feeding formula (see below for specific recommendations)  Maxx Bonner is familiar with this process as he was using his J-tube for nutrition Oct 2019-2019  He reports that he is comfortable using his J-tube for nutrition and has no questions at this time  Reviewed post esophagectomy diet, will send handout in the mail for Maxx Bonner to review prior to surgery  Encouraged him to reach out if he has further questions about tube feeding nutrition and/or post esophagectomy diet  Will follow up with patient post surgery  Discussed/Reviewed:   · weight management  · indications & use of oral nutrition supplements  · how to modify foods for anticipated nutrition impact symptoms pt may experience during CA tx  · high protein foods to include at all meals and snacks  · ways to increase overall calorie intake  · encouraged eating every 2-3 hours (5-6 small meals/day)  · maintaining proper oral care  · adequate hydation & tips to increase overall fluid intake  · MNT for: enteral nutrition, post esophagectomy diet, weight maintenance   · nutrition strategies to promote healing post-tx  · individualized calorie, protein and fluid daily goals  · individualized enteral nutrition recommendations & plan: (see below)     In the event enteral nutrition is needed for Fabiano's sole source of nutrition, recommend:  TF Goal: Jevity 1 5 at 99 mL/hr via J-tube cycled over 18 hours daily (until 7 5 cartons infused)  Water Flushin mL free water flush at the beginning and end of TF infusion (250 mL total) plus 125 mL free water flush 8 times per day (1000 mL total) (total of 1250 mL/day in flushes; flushes should be spread throughout the day and every 2-3 hours during TF infusion; will need to adjust flushes prn for IV fluids)     Enteral Nutrition goal to provide: 1782 mL volume (7 5 cartons day), 2673 kcal, 114 grams protein, 1354 mL free water, 2604 mL total water daily  *Pt will require an enteral feeding pump to administer TF due to J-Tube due to esophageal cancer diagnosis  All questions and concerns addressed during todays visit  Deborah Conti has RD contact information  Nutrition Diagnosis:     · Increased Nutrient Needs (kcal & pro) related to increased demand for nutrients and disease state as evidenced by recovering from cancer tx  Intervention & Recommendations:     Topics addressed: Nutrition education, Balance/Variety, Meals & Snacks, Meal planning, Choosing high protein meals/snacks, Meal pattern: eating small/frequent meals (every 2-3 hours), Nutrition Symptom Management, Adequate Hydration, Medical Food Supplements, Nutrition-Related Medication Management, Enteral Nutrition and Weight Management    Barriers: Lack of Family Support- related to foods he would like to eat   Readiness to change: preparation  Comprehension: verbalizes understanding  Expected Compliance: good    Materials Provided: Diet After Esophagectomy or Gastrectomy and declined-mailed to home   Monitoring & Evaluation:     Dietitian to Monitor: Food and Nutrition Intake, Nutrtion Impact Symptoms, Body Weight, Enteral and/or Parenteral Intake and Biochemical Data     Goals:  · weight maintenance/stabilization  · adequate nutrition impact symptom management  · pt to meet >/=75% estimated nutrition needs daily    · Progress Towards Goals: Progressing    Nutrition Rx & Recommendations:    Before Surgery:   · Diet: Continue High Calorie High Protein diet  · Nutrition Supplementation: Ensure Enlive or Boost Plus at least 2x daily  After Surgery:  · Advance diet according to doctor's instructions  · When you are able to have solid foods: start with soft foods such as oatmeal, pudding, custards  · Eat smaller/more frequent meals, chew food well and eat slowly  · Avoid beverages that are very hot or very cold     · Avoid foods high in fiber like bran and beans, choose low fiber foods such as white bread, white pasta, canned fruit  · Avoid high sugar foods (>10g sugar per serving)  · Include a protein at every meal (greek yogurt, peanut butter, cottage cheese)  · Limit high fat and greasy/fried foods  · Stay adequately hydrated with at least 64 oz non caffeine beverages daily  Avoid carbonated beverages and alcohol  · Do not lie flat for at least 2hrs after eating to help reduce risk of acid reflux  · Refer to Eating Hints Book for tips on symptom management  · Refer to hand out "Diet After an Esophagectomy" for reinforcement on these recommendations  · Contact your physician or dietitian if you are experiencing diarrhea, cramping, or abdominal pain  Tube Feeding Recommendations:   Tube Feeding Goal: Jevity 1 5 at 99 mL/hr via J-tube cycled over 18 hours daily (until 7 5 cartons infused)  Water Flushin mL free water flush at the beginning and end of TF infusion (250 mL total) plus 125 mL free water flush 8 times per day (1000 mL total) (total of 1250 mL/day in flushes; flushes should be spread throughout the day and every 2-3 hours during TF infusion; will need to adjust flushes prn for IV fluids)  Enteral Nutrition goal to provide: 1782 mL volume (7 5 cartons day), 2673 kcal, 114 grams protein, 1354 mL free water, 2604 mL total water daily        Follow Up Plan: 20 phone visit  10am        Recommend Referral to Other Providers: none at this time

## 2020-01-17 NOTE — PATIENT INSTRUCTIONS
Nutrition Rx & Recommendations:    Before Surgery:   · Diet: Continue High Calorie High Protein diet  · Nutrition Supplementation: Ensure Enlive or Boost Plus at least 2x daily  After Surgery:  · Advance diet according to doctor's instructions  · When you are able to have solid foods: start with soft foods such as oatmeal, pudding, custards  · Eat smaller/more frequent meals, chew food well and eat slowly  · Avoid beverages that are very hot or very cold  · Avoid foods high in fiber like bran and beans, choose low fiber foods such as white bread, white pasta, canned fruit  · Avoid high sugar foods (>10g sugar per serving)  · Include a protein at every meal (greek yogurt, peanut butter, cottage cheese)  · Limit high fat and greasy/fried foods  · Stay adequately hydrated with at least 64 oz non caffeine beverages daily  Avoid carbonated beverages and alcohol  · Do not lie flat for at least 2hrs after eating to help reduce risk of acid reflux  · Refer to Eating Hints Book for tips on symptom management  · Refer to hand out "Diet After an Esophagectomy" for reinforcement on these recommendations  · Contact your physician or dietitian if you are experiencing diarrhea, cramping, or abdominal pain  Tube Feeding Recommendations:   Tube Feeding Goal: Jevity 1 5 at 99 mL/hr via J-tube cycled over 18 hours daily (until 7 5 cartons infused)  Water Flushin mL free water flush at the beginning and end of TF infusion (250 mL total) plus 125 mL free water flush 8 times per day (1000 mL total) (total of 1250 mL/day in flushes; flushes should be spread throughout the day and every 2-3 hours during TF infusion; will need to adjust flushes prn for IV fluids)  Enteral Nutrition goal to provide: 1782 mL volume (7 5 cartons day), 2673 kcal, 114 grams protein, 1354 mL free water, 2604 mL total water daily        Follow Up Plan: 20 phone visit  10am        Recommend Referral to Other Providers: none at this time

## 2020-01-23 ENCOUNTER — OFFICE VISIT (OUTPATIENT)
Dept: CARDIAC SURGERY | Facility: CLINIC | Age: 65
End: 2020-01-23
Payer: COMMERCIAL

## 2020-01-23 VITALS
SYSTOLIC BLOOD PRESSURE: 110 MMHG | DIASTOLIC BLOOD PRESSURE: 57 MMHG | HEART RATE: 100 BPM | HEIGHT: 70 IN | OXYGEN SATURATION: 98 % | TEMPERATURE: 98 F | WEIGHT: 196.4 LBS | BODY MASS INDEX: 28.12 KG/M2

## 2020-01-23 DIAGNOSIS — C15.5 MALIGNANT NEOPLASM OF LOWER THIRD OF ESOPHAGUS (HCC): Primary | ICD-10-CM

## 2020-01-23 PROCEDURE — 99215 OFFICE O/P EST HI 40 MIN: CPT | Performed by: THORACIC SURGERY (CARDIOTHORACIC VASCULAR SURGERY)

## 2020-01-23 NOTE — ASSESSMENT & PLAN NOTE
Mr Luis Eduardo Crowley presents to discuss esophagogastrectomy after completing chemotherapy approximately one month ago  PET-CT demonstrated significant response to therapy, so surgical resection is recommended  Two options for esophagectomy include Yaakov-Clyde versus transhiatal approaches, which would be determined after laparoscopic portion of operation  Each operation was discussed at length as well as indications for both  Risks and benefits discussed  Questions were addressed and consent obtained  He signed consent with Dr Mauricio Guerra 1/16/20  Post-operative course presented  He is to ensure adequate nutrition in the meantime to optimize healing  Surgery scheduled for 1/28/2020  His labwork is up to date

## 2020-01-23 NOTE — PROGRESS NOTES
Thoracic Follow-Up  Assessment/Plan:    Malignant neoplasm of lower third of esophagus [facility])  Mr Veronika Harmon presents to discuss esophagogastrectomy after completing chemotherapy approximately one month ago  PET-CT demonstrated significant response to therapy, so surgical resection is recommended  Two options for esophagectomy include Brayton-Clyde versus transhiatal approaches, which would be determined after laparoscopic portion of operation  Each operation was discussed at length as well as indications for both  Risks and benefits discussed  Questions were addressed and consent obtained  He signed consent with Dr Trip Solano 1/16/20  Post-operative course presented  He is to ensure adequate nutrition in the meantime to optimize healing  Surgery scheduled for 1/28/2020  His labwork is up to date  Diagnoses and all orders for this visit:    Malignant neoplasm of lower third of esophagus [facility])          Thoracic History   Diagnosis: clinical T3 N1 M0 esophageal carcinoma    Procedure:  Pathology:  Adjuvant therapy: neoadjuvant FLOT x 6 chemotherapy and Herceptin, completed 12/17/19     Subjective:    Patient ID: Justa Valenzuela is a 71 y o  male  HPI   Mr Veronika Harmon is a 71year old man who initially presented to the office 9/20/219 for evaluation of esophageal cancer  He had been experiencing progressive dysphagia and 25 lb weight loss  EGD on 8/21/19 revealed erythematous and hemorrhagic mucosa in the lower 3rd of the esophagus, 30 cm from the incisors with bleeding  This extended from 30-40 cm  Biopsy revealed adenocarcinoma  This was HER2 positive  PET-CT 9/19/19 demonstrated uptake at distal esophagus, avid perigastric lymph node  There was also a 6th rib lesion that could represent metastatic disease  He had a J tube and venous port-a-cath placed 9/26/19  He began chemotherapy 10/8/19 and completed 12/17/19     Repeat PET-CT 12/30/19showed significant response to herapy with mild residual uptake at the distal esophagus  The perigastric lymph node was not avid  There were new diffuse FDG uptake within osseous structures representing marrow response to therapy  There was a brain lesion within the frontal lobe, recommended brain MRI  Brain MRI 1/16/20 showed no metastatic disease  There is encephalomalacia and gliosis of right frontal lobe, corresponding to PET lesion  It was decided that patient would not undergo radiation therapy given his excellent response  On discussion, he reports tingling of his finger tips from chemotherapy  He also has ecchymosis under his nails that is improving since completing chemotherapy  He denies dysphagia or regurgitation  He reports heartburn occasionally, not daily  Takes Rolaids for this only  Denies chest pain or shortness of breath  He can climb a flight of stairs without difficulty  Denies fevers, chills, or worsening fatigue  His appetite is very much improved  He is tolerating all food consistencies, and has gained 15 pounds since September  The following portions of the patient's history were reviewed and updated as appropriate: allergies, current medications, past family history, past medical history, past social history, past surgical history and problem list     Review of Systems   Constitutional: Negative for activity change, appetite change, chills, diaphoresis, fatigue, fever and unexpected weight change  HENT: Negative for trouble swallowing  Eyes: Negative for visual disturbance  Respiratory: Negative for cough, chest tightness, shortness of breath and wheezing  Cardiovascular: Negative for chest pain and palpitations  Gastrointestinal: Negative for constipation, diarrhea, nausea and vomiting  Musculoskeletal: Negative for joint swelling and myalgias  Skin: Negative for color change and rash  Neurological: Negative for weakness, light-headedness and headaches  Hematological: Negative for adenopathy  Does not bruise/bleed easily  Psychiatric/Behavioral: Positive for sleep disturbance  Negative for confusion  Objective:   Physical Exam   Constitutional: He is oriented to person, place, and time  He appears well-developed and well-nourished  No distress  HENT:   Head: Normocephalic and atraumatic  Eyes: Conjunctivae are normal  No scleral icterus  Neck: Normal range of motion  Neck supple  No tracheal deviation present  Cardiovascular: Normal rate, regular rhythm and normal heart sounds  Pulmonary/Chest: Effort normal and breath sounds normal  No respiratory distress  Left port-a-cath   Abdominal: Soft  Bowel sounds are normal  He exhibits no distension  J tube  No evidence of infection at site  Musculoskeletal:   Subungual hematomas, healing, right hand  Lymphadenopathy:     He has no cervical adenopathy  Neurological: He is alert and oriented to person, place, and time  Skin: Skin is warm and dry  Psychiatric: He has a normal mood and affect  His behavior is normal  Judgment and thought content normal    /57 (BP Location: Left arm, Patient Position: Sitting, Cuff Size: Large)   Pulse 100   Temp 98 °F (36 7 °C)   Ht 5' 10" (1 778 m)   Wt 89 1 kg (196 lb 6 4 oz)   SpO2 98%   BMI 28 18 kg/m²       Ct Chest Abdomen Pelvis W Contrast    Result Date: 9/17/2019  Narrative CT CHEST, ABDOMEN AND PELVIS WITH IV CONTRAST INDICATION:   dysphagia  Patient recently diagnosed with adenocarcinoma of the lower 3rd of the esophagus  Dysphagia, "feels like there is a bubble in my throat",  blood in vomit  History of COPD, diabetes, hypertension  Prior history of tobacco use  COMPARISON:  September 6, 2019  TECHNIQUE: CT examination of the chest, abdomen and pelvis was performed  Axial, sagittal, and coronal 2D reformatted images were created from the source data and submitted for interpretation  Radiation dose length product (DLP) for this visit:  650 mGy-cm     This examination, like all CT scans performed in the [facility], was performed utilizing techniques to minimize radiation dose exposure, including the use of iterative reconstruction and automated exposure control  IV Contrast:  100 mL of iohexol (OMNIPAQUE) Enteric Contrast: Enteric contrast was not administered  FINDINGS: CHEST LUNGS:  A focal area of opacity within the anterior aspect of the left upper lobe of the lung measuring approximately 7 x 8 mm is again evident, presently best demonstrated on series 3 image 28  Once again, this appears somewhat more linear on the coronal images  This is similar in appearance to September 6, 2019  Continued follow-up is recommended  Minimal atelectasis in the inferior left lingula is unchanged  PLEURA:  Unremarkable  HEART/GREAT VESSELS:  Coronary artery calcifications are present  There is mild atherosclerosis of the thoracic aorta  MEDIASTINUM AND SHEA:  There is irregular wall thickening and some mucosal enhancement evident involving the distal esophagus and extending to the left hand aspect of the upper stomach  There is a masslike area of abnormal soft tissue density along the left hand aspect of the upper stomach which measures approximately 5 5 x 3 6 cm  There appears to be some infiltration of the fat adjacent to this masslike area which is suspicious for local extension of disease  Several mildly prominent nodes are present between this mass and the liver, measuring up to 1 2 x 0 9 cm  A left para esophageal node on series 2 image 43 presently measures 1 x 0 6 cm whereas it measured 0 8 x 0 6 cm on the prior study  There is some fluid within the esophagus near the  level of the gregg  A subcarinal node measures approximately 8 mm short axis, similar to the prior study  A right paratracheal node measures approximately 9 mm, similar to the prior study  Another right paratracheal node measures approximately 10 mm, also similar to the prior study   CHEST WALL AND LOWER NECK: Unremarkable  ABDOMEN LIVER/BILIARY TREE:  There is a small area of focal fatty infiltration adjacent to the falciform ligament, similar to the previous examination  GALLBLADDER:  There are gallstone(s) within the gallbladder, without pericholecystic inflammatory changes  SPLEEN:  Unremarkable  PANCREAS:  Unremarkable  ADRENAL GLANDS:  There is an approximately 1 5 x 1 cm right adrenal nodule  This appears similar to slightly larger compared to the prior examination  There is a questionable 0 6 x 0 5 cm left adrenal nodule, not clearly demonstrated on the prior study  Follow-up of both of these nodules is suggested  KIDNEYS/URETERS:  Unremarkable  No hydronephrosis  STOMACH AND BOWEL:  As described above  There is no evidence of small bowel obstruction  There is no evidence of acute diverticulitis  APPENDIX:  A normal appendix was visualized  ABDOMINOPELVIC CAVITY:  As described above  No pneumoperitoneum  No significant ascites  VESSELS:  There is atherosclerosis  There is no abdominal aortic aneurysm  PELVIS REPRODUCTIVE ORGANS AND URINARY BLADDER:  There is a soft tissue density nodule within the posterior aspect of the urinary bladder near the midline which measures approximately 1 2 cm  This appears similar to the prior study  This could be caused by indentation of the prostate, however, a small bladder mass cannot be entirely excluded  Urology consultation and follow-up is recommended  ABDOMINAL WALL/INGUINAL REGIONS:  Small fat-containing left inguinal hernia  OSSEOUS STRUCTURES:  No acute fracture or destructive osseous lesion  Disc spacers are present at L5-S1  Impression There is irregular wall thickening involving the distal esophagus and extending to the left hand aspect of the upper stomach    There is a masslike area of abnormal soft tissue density along the left hand aspect of the upper stomach which measures approximately 5 5 x 3 6 cm, and there appears to be some infiltration of the adjacent fat  This is concerning for local extension of the patient's known neoplasm  Several mildly prominent nodes are present between this mass and the liver  There are also some mildly prominent mediastinal nodes  Please see discussion  There is some fluid within the esophagus near level of the gregg  There is an approximately 1 5 x 1 cm right adrenal nodule  This appears similar to slightly larger compared to the prior examination  There is a questionable tiny left adrenal nodule  Follow-up of both of these adrenal findings is suggested  There is an approximately 1 2 cm soft tissue density nodule within the posterior aspect of the urinary bladder  Although this may be related to indentation of the prostate, a small bladder mass cannot be entirely excluded  Urology consultation and follow-up is recommended  A focal area of opacity within the anterior aspect of the left upper lobe of the lung appears similar to September 6, 2019  Please see discussion  Continued follow-up is recommended  This could be reassessed with chest CT in 3 months  PET/CT could also be considered, however, it is of borderline size for accurate characterization with PET/ CT  If more remote CTs of the chest exist, direct comparison would be helpful  PET/CT should be considered for further evaluation of the extent of adenopathy and extent of disease  PET/CT or MRI could be utilized to evaluate the adrenal findings, if there are no contraindications  Cholelithiasis  No CT evidence of acute cholecystitis  Atherosclerosis  Coronary artery disease  Other findings as described above  Please see discussion  The study was marked in [facility] for immediate notification  Workstation performed: [id number]     Ct Chest Abdomen Pelvis W Contrast    Result Date: 9/12/2019  Narrative CT CHEST, ABDOMEN AND PELVIS WITH IV CONTRAST INDICATION:   C15 5: Malignant neoplasm of lower third of esophagus    Recent diagnosis of esophageal carcinoma (adenocarcinoma)  COMPARISON:  None  TECHNIQUE: CT examination of the chest, abdomen and pelvis was performed  Axial, sagittal, and coronal 2D reformatted images were created from the source data and submitted for interpretation  Radiation dose length product (DLP) for this visit:  818 mGy-cm   This examination, like all CT scans performed in the [address] [place] [address], was performed utilizing techniques to minimize radiation dose exposure, including the use of iterative reconstruction and automated exposure control  IV Contrast:  100 mL of iohexol (OMNIPAQUE) Enteric Contrast: Enteric contrast was administered  FINDINGS: CHEST LUNGS:  Solitary focal opacity noted within the anterior left upper lobe, on image 205/25 measuring 7 x 8 mm  On coronal imaging appearing somewhat more elongated although with central nodular aspect about 6 mm  PLEURA:  Unremarkable  HEART/GREAT VESSELS:  Coronary artery calcification  Otherwise unremarkable MEDIASTINUM AND SHEA:  As already known, abnormal appearance of the distal esophagus, beginning just below level of the inferior pulmonary veins and extending to the gastric cardia  Irregular wall thickening and mucosal enhancement noted  Along the left margin of the abnormal esophagus image 201/41 there is a paraesophageal lymph node measuring 8 x 6 mm  A subcarinal lymph node demonstrates short axis diameter 8 mm  CHEST WALL AND LOWER NECK:   Unremarkable  ABDOMEN LIVER/BILIARY TREE:  Unremarkable  GALLBLADDER:  There are gallstone(s) within the gallbladder, without pericholecystic inflammatory changes  SPLEEN:  Unremarkable  PANCREAS:  Unremarkable  ADRENAL GLANDS:  There is a right adrenal nodule measuring 1 2 x 0 7 cm in size  It is somewhat small for accurate characterization  As best as can be told, density is well above fluid on this enhanced only exam KIDNEYS/URETERS:  Unremarkable  No hydronephrosis   STOMACH AND BOWEL:  As above, abnormal appearance of the distal esophagus, involving the gastric cardia as well  Moderately increased stool within the colon  Small bowel unremarkable  APPENDIX:  No findings to suggest appendicitis  ABDOMINOPELVIC CAVITY:  Posterior to the gastric cardia there is an ovoid soft tissue nodule best measured on coronal image 79, measuring 1 7 x 1 0 cm  Along the right margin of the gastric cardia on coronal image 71 there is a soft tissue nodule measuring 1 3 x 1 1 cm, and there is also an ill-defined right lateral border of the gastric cardia for example image 201/54 and coronal image 68  There is a trace amount of free fluid present within the pelvis  VESSELS:  Unremarkable for patient's age  PELVIS REPRODUCTIVE ORGANS:  See below URINARY BLADDER:  Soft tissue nodular density associated with the posterior inferior aspect of the urinary bladder image 201/114 or coronal image 84 measuring approximately 9 mm  This is favored to be enlargement of the median lobe of the prostate gland, less likely bladder polyp ABDOMINAL WALL/INGUINAL REGIONS:  Unremarkable  OSSEOUS STRUCTURES:  No acute fracture or destructive osseous lesion  Impression 1  Irregular wall thickening involving the distal esophagus beginning just below the inferior pulmonary veins, extending through the gastric cardia compatible with biopsy-proven adenocarcinoma  As above, there is involvement of the gastric cardia, and possible extra luminal extension of tumor along its right lateral margin 2  There is mild adenopathy identified adjacent to the gastric cardia and distal esophagus  Additional borderline prominent mediastinal lymph nodes 3  There is a left upper lobe opacity identified  Although potentially representing an area of inflammation or scarring, an irregularly marginated solitary pulmonary metastasis should be excluded    This could either be reassessed with chest CT in 3 months time or further evaluated with PET CT (it is of borderline size for accurate characterization with PET) 4  Hypertrophy of the median lobe of the prostate (favored) or less likely 9 mm bladder polyp 5  Nonspecific 1 2 cm right adrenal nodule  Overall, PET/CT may be helpful to determine exact extent of adenopathy within the lower chest and upper abdomen, evaluation for any extraluminal extension of tumor, and further characterization of left upper lobe density and right adrenal nodule Workstation performed: [id number]      Nm Pet Ct Skull Base To Mid Thigh    Result Date: 12/30/2019  Narrative PET/CT SCAN INDICATION: Restaging of esophageal/gastric cancer postchemotherapy  C16 0: Malignant neoplasm of cardia MODIFIER: PS COMPARISON: PET CT 9/19/2019 CELL TYPE:  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, biopsy esophagus 8/24/2019 TECHNIQUE:   8 33 mCi F-18-FDG administered IV  Multiplanar attenuation corrected and non attenuation corrected PET images were acquired 60 minutes post injection  Contiguous, low dose, axial CT sections were obtained from the skull vertex through the femurs   Intravenous contrast material was not utilized  This examination, like all CT scans performed in the [facility], was performed utilizing techniques to minimize radiation dose exposure, including the use of iterative reconstruction and automated exposure control  Fasting serum glucose: 102 mg/dl FINDINGS: VISUALIZED BRAIN:   No acute abnormalities are seen  Now more conspicuous focal photopenia in the right frontal lobe anteriorly, question related to prior infarct  Questionable encephalomalacia  here on CT  HEAD/NECK:   Diffuse thyroid gland activity again noted, SUV max of 5 0, previously 6 2  This may be related to underlying thyroiditis  There is a physiologic distribution of FDG  No FDG avid cervical adenopathy is seen  CT images: Unremarkable   CHEST:   Mild residual FDG uptake at the distal esophagus and gastroesophageal junction, SUV max of 2 6, previously 21 0  Persistent circumferential wall thickening suggested here on CT  No FDG avid lymph nodes  CT images: Left-sided Mediport line tip terminates at the SVC  Scattered coronary artery calcifications  Stable 5 mm irregular nodular density in the left upper lobe anteriorly image 106 series 3, not FDG avid  Calcified granuloma in the left upper lobe  ABDOMEN:   No FDG avid soft tissue lesions are seen  No residual FDG uptake at the proximal stomach, SUV max of 1 8 in this region  No FDG avid lymph nodes  Previously there was a FDG avid lymph node posterior to the proximal stomach which is no longer seen  New splenomegaly with mild FDG uptake, SUV max of 3 0, higher than the liver, SUV max of 2 6  Spleen measures 16 8 cm in AP dimension  CT images: Cholelithiasis  Stable nodularity of the right adrenal gland, not FDG avid  Jejunostomy tube in position  Mild ascites, new  PELVIS: No FDG avid soft tissue lesions are seen  Previously noted colonic foci of FDG uptake are no longer seen  CT images: Mildly prominent prostate  Tiny fat-containing left inguinal hernia  OSSEOUS STRUCTURES: New diffuse FDG uptake in the osseous structures likely representing response to therapy  Potentially this does limit evaluation for the previously seen small rib foci of FDG uptake and other underlying lesions  CT images: Lumbosacral fusion device  Impression 1  Findings compatible with response to therapy  2  Significant interval improvement in abnormal FDG uptake at the distal esophagus, gastroesophageal junction and the proximal stomach  Mild residual FDG uptake suggested at the distal esophagus and gastroesophageal junction, residual disease not excluded  3  No findings for hypermetabolic metastasis  4  New splenomegaly with mild diffuse FDG uptake, question related to hematopoietic response to therapy   5   New diffuse FDG uptake in the osseous structures likely representing marrow response to therapy  Potentially this does limit evaluation for underlying lesions  6   Now more conspicuous focal photopenia in the right frontal lobe of the brain anteriorly, may be related to prior infarct  Questionable encephalomalacia here on CT  Correlate with dedicated brain imaging, MRI or CT if not previously evaluated  Workstation performed: [id number]     Nm Pet Ct Skull Base To Mid Thigh    Result Date: 9/19/2019  Narrative PET/CT SCAN INDICATION: Newly diagnosed esophageal cancer  Initial staging  C15 5: Malignant neoplasm of lower third of esophagus MODIFIER: PI COMPARISON: CT chest abdomen pelvis 9/16/2019 and 9/6/2019 CELL TYPE:  Intramucosal carcinoma, 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 TECHNIQUE:   8 8 mCi F-18-FDG administered IV  Multiplanar attenuation corrected and non attenuation corrected PET images are available for interpretation, and contiguous, low dose, axial CT sections were obtained from the skull base through the femurs   Intravenous contrast material was not utilized  This examination, like all CT scans performed in the [facility], was performed utilizing techniques to minimize radiation dose exposure, including the use of iterative reconstruction and automated exposure control  Fasting serum glucose: 82 mg/dl FINDINGS: VISUALIZED BRAIN:   No acute abnormalities are seen  HEAD/NECK:   Fairly diffuse FDG uptake at the thyroid gland, SUV max of 6 2 may be related to thyroiditis  No FDG avid cervical adenopathy is seen  CT images: Scattered mucosal thickening noted in the paranasal sinuses  CHEST:   Extensive FDG uptake at the distal esophagus leading to the GE junction and proximal stomach, SUV max of 21 0  Associated wall thickening noted here on CT  No FDG avid lymph nodes  CT images: Scattered coronary calcifications noted  Stable subcentimeter irregular density in the left upper lobe anteriorly, image 110 series 3  This is not FDG avid   ABDOMEN:   Extensive FDG uptake at the distal esophagus leading to the GE junction and proximal stomach, SUV max of 21 0  Associated wall thickening noted here on CT  Soft tissue density at the level of the proximal stomach extending to the gastrohepatic region measures 6 0 x 4 7 cm  There may be contiguous FDG avid lymphadenopathy in the gastrohepatic region where is there is soft tissue fullness here on CT inseparable from the gastric wall thickening  Small focus of FDG uptake noted posterior to the proximal stomach, SUV max of 3 7  There is a 2 1 x 1 5 cm perigastric lymph node here on CT image 164 series 3  No suspicious FDG uptake at the adrenal glands  CT images: There is cholelithiasis  Stable nodularity of the right adrenal gland  PELVIS: Focal FDG uptake at the distal sigmoid colon, SUV max of 7 8  Possible eccentric wall thickening on CT  Additional focus of FDG uptake at the level of the proximal sigmoid colon, SUV max of 6 6  No obvious findings here on CT  CT images: Prostate is mildly prominent  Tiny fat-containing left inguinal hernia  OSSEOUS STRUCTURES: Focal FDG uptake at the left anterior 6th rib demonstrates SUV max of 3 7  No obvious findings here on CT  Subtle focus of FDG uptake at the right lateral 2nd rib, SUV max of 2 2  No obvious findings here on CT  CT images: Prior lumbosacral fusion  Impression 1  Extensive FDG uptake at the distal esophagus, GE junction and proximal stomach compatible with known malignancy  2  FDG avid perigastric lymph node posterior to the proximal stomach suspicious for metastasis  There may be FDG avid gastrohepatic lymphadenopathy but this is inseparable from the gastric mass  3   Focal FDG uptake at the left anterior 6th rib  Possible subtle focus of FDG uptake at the right lateral 2nd rib  No obvious findings here on the limited low-dose CT images  Metastasis is not excluded  Distribution is not typical for prior trauma   Consider follow-up with MRI of the chest  4   Foci of FDG uptake at the sigmoid colon for which underlying lesions should be excluded  Recommend follow-up with colonoscopy  5   Fairly diffuse FDG uptake at the thyroid gland, may be related to thyroiditis  The study was marked in [employer] for significant notification   Workstation performed: [id number]

## 2020-01-23 NOTE — H&P (VIEW-ONLY)
Thoracic Follow-Up  Assessment/Plan:    Malignant neoplasm of lower third of esophagus [facility])  Mr Luis Eduardo Crowley presents to discuss esophagogastrectomy after completing chemotherapy approximately one month ago  PET-CT demonstrated significant response to therapy, so surgical resection is recommended  Two options for esophagectomy include Yaakov-Clyde versus transhiatal approaches, which would be determined after laparoscopic portion of operation  Each operation was discussed at length as well as indications for both  Risks and benefits discussed  Questions were addressed and consent obtained  He signed consent with Dr Mauricio Guerra 1/16/20  Post-operative course presented  He is to ensure adequate nutrition in the meantime to optimize healing  Surgery scheduled for 1/28/2020  His labwork is up to date  Diagnoses and all orders for this visit:    Malignant neoplasm of lower third of esophagus [facility])          Thoracic History   Diagnosis: clinical T3 N1 M0 esophageal carcinoma    Procedure:  Pathology:  Adjuvant therapy: neoadjuvant FLOT x 6 chemotherapy and Herceptin, completed 12/17/19     Subjective:    Patient ID: Marion England is a 71 y o  male  HPI   Mr Luis Eduardo Crowley is a 71year old man who initially presented to the office 9/20/219 for evaluation of esophageal cancer  He had been experiencing progressive dysphagia and 25 lb weight loss  EGD on 8/21/19 revealed erythematous and hemorrhagic mucosa in the lower 3rd of the esophagus, 30 cm from the incisors with bleeding  This extended from 30-40 cm  Biopsy revealed adenocarcinoma  This was HER2 positive  PET-CT 9/19/19 demonstrated uptake at distal esophagus, avid perigastric lymph node  There was also a 6th rib lesion that could represent metastatic disease  He had a J tube and venous port-a-cath placed 9/26/19  He began chemotherapy 10/8/19 and completed 12/17/19     Repeat PET-CT 12/30/19showed significant response to herapy with mild residual uptake at the distal esophagus  The perigastric lymph node was not avid  There were new diffuse FDG uptake within osseous structures representing marrow response to therapy  There was a brain lesion within the frontal lobe, recommended brain MRI  Brain MRI 1/16/20 showed no metastatic disease  There is encephalomalacia and gliosis of right frontal lobe, corresponding to PET lesion  It was decided that patient would not undergo radiation therapy given his excellent response  On discussion, he reports tingling of his finger tips from chemotherapy  He also has ecchymosis under his nails that is improving since completing chemotherapy  He denies dysphagia or regurgitation  He reports heartburn occasionally, not daily  Takes Rolaids for this only  Denies chest pain or shortness of breath  He can climb a flight of stairs without difficulty  Denies fevers, chills, or worsening fatigue  His appetite is very much improved  He is tolerating all food consistencies, and has gained 15 pounds since September  The following portions of the patient's history were reviewed and updated as appropriate: allergies, current medications, past family history, past medical history, past social history, past surgical history and problem list     Review of Systems   Constitutional: Negative for activity change, appetite change, chills, diaphoresis, fatigue, fever and unexpected weight change  HENT: Negative for trouble swallowing  Eyes: Negative for visual disturbance  Respiratory: Negative for cough, chest tightness, shortness of breath and wheezing  Cardiovascular: Negative for chest pain and palpitations  Gastrointestinal: Negative for constipation, diarrhea, nausea and vomiting  Musculoskeletal: Negative for joint swelling and myalgias  Skin: Negative for color change and rash  Neurological: Negative for weakness, light-headedness and headaches  Hematological: Negative for adenopathy  Does not bruise/bleed easily  Psychiatric/Behavioral: Positive for sleep disturbance  Negative for confusion  Objective:   Physical Exam   Constitutional: He is oriented to person, place, and time  He appears well-developed and well-nourished  No distress  HENT:   Head: Normocephalic and atraumatic  Eyes: Conjunctivae are normal  No scleral icterus  Neck: Normal range of motion  Neck supple  No tracheal deviation present  Cardiovascular: Normal rate, regular rhythm and normal heart sounds  Pulmonary/Chest: Effort normal and breath sounds normal  No respiratory distress  Left port-a-cath   Abdominal: Soft  Bowel sounds are normal  He exhibits no distension  J tube  No evidence of infection at site  Musculoskeletal:   Subungual hematomas, healing, right hand  Lymphadenopathy:     He has no cervical adenopathy  Neurological: He is alert and oriented to person, place, and time  Skin: Skin is warm and dry  Psychiatric: He has a normal mood and affect  His behavior is normal  Judgment and thought content normal    /57 (BP Location: Left arm, Patient Position: Sitting, Cuff Size: Large)   Pulse 100   Temp 98 °F (36 7 °C)   Ht 5' 10" (1 778 m)   Wt 89 1 kg (196 lb 6 4 oz)   SpO2 98%   BMI 28 18 kg/m²       Ct Chest Abdomen Pelvis W Contrast    Result Date: 9/17/2019  Narrative CT CHEST, ABDOMEN AND PELVIS WITH IV CONTRAST INDICATION:   dysphagia  Patient recently diagnosed with adenocarcinoma of the lower 3rd of the esophagus  Dysphagia, "feels like there is a bubble in my throat",  blood in vomit  History of COPD, diabetes, hypertension  Prior history of tobacco use  COMPARISON:  September 6, 2019  TECHNIQUE: CT examination of the chest, abdomen and pelvis was performed  Axial, sagittal, and coronal 2D reformatted images were created from the source data and submitted for interpretation  Radiation dose length product (DLP) for this visit:  650 mGy-cm     This examination, like all CT scans performed in the [facility], was performed utilizing techniques to minimize radiation dose exposure, including the use of iterative reconstruction and automated exposure control  IV Contrast:  100 mL of iohexol (OMNIPAQUE) Enteric Contrast: Enteric contrast was not administered  FINDINGS: CHEST LUNGS:  A focal area of opacity within the anterior aspect of the left upper lobe of the lung measuring approximately 7 x 8 mm is again evident, presently best demonstrated on series 3 image 28  Once again, this appears somewhat more linear on the coronal images  This is similar in appearance to September 6, 2019  Continued follow-up is recommended  Minimal atelectasis in the inferior left lingula is unchanged  PLEURA:  Unremarkable  HEART/GREAT VESSELS:  Coronary artery calcifications are present  There is mild atherosclerosis of the thoracic aorta  MEDIASTINUM AND SHEA:  There is irregular wall thickening and some mucosal enhancement evident involving the distal esophagus and extending to the left hand aspect of the upper stomach  There is a masslike area of abnormal soft tissue density along the left hand aspect of the upper stomach which measures approximately 5 5 x 3 6 cm  There appears to be some infiltration of the fat adjacent to this masslike area which is suspicious for local extension of disease  Several mildly prominent nodes are present between this mass and the liver, measuring up to 1 2 x 0 9 cm  A left para esophageal node on series 2 image 43 presently measures 1 x 0 6 cm whereas it measured 0 8 x 0 6 cm on the prior study  There is some fluid within the esophagus near the  level of the gregg  A subcarinal node measures approximately 8 mm short axis, similar to the prior study  A right paratracheal node measures approximately 9 mm, similar to the prior study  Another right paratracheal node measures approximately 10 mm, also similar to the prior study   CHEST WALL AND LOWER NECK: Unremarkable  ABDOMEN LIVER/BILIARY TREE:  There is a small area of focal fatty infiltration adjacent to the falciform ligament, similar to the previous examination  GALLBLADDER:  There are gallstone(s) within the gallbladder, without pericholecystic inflammatory changes  SPLEEN:  Unremarkable  PANCREAS:  Unremarkable  ADRENAL GLANDS:  There is an approximately 1 5 x 1 cm right adrenal nodule  This appears similar to slightly larger compared to the prior examination  There is a questionable 0 6 x 0 5 cm left adrenal nodule, not clearly demonstrated on the prior study  Follow-up of both of these nodules is suggested  KIDNEYS/URETERS:  Unremarkable  No hydronephrosis  STOMACH AND BOWEL:  As described above  There is no evidence of small bowel obstruction  There is no evidence of acute diverticulitis  APPENDIX:  A normal appendix was visualized  ABDOMINOPELVIC CAVITY:  As described above  No pneumoperitoneum  No significant ascites  VESSELS:  There is atherosclerosis  There is no abdominal aortic aneurysm  PELVIS REPRODUCTIVE ORGANS AND URINARY BLADDER:  There is a soft tissue density nodule within the posterior aspect of the urinary bladder near the midline which measures approximately 1 2 cm  This appears similar to the prior study  This could be caused by indentation of the prostate, however, a small bladder mass cannot be entirely excluded  Urology consultation and follow-up is recommended  ABDOMINAL WALL/INGUINAL REGIONS:  Small fat-containing left inguinal hernia  OSSEOUS STRUCTURES:  No acute fracture or destructive osseous lesion  Disc spacers are present at L5-S1  Impression There is irregular wall thickening involving the distal esophagus and extending to the left hand aspect of the upper stomach    There is a masslike area of abnormal soft tissue density along the left hand aspect of the upper stomach which measures approximately 5 5 x 3 6 cm, and there appears to be some infiltration of the adjacent fat  This is concerning for local extension of the patient's known neoplasm  Several mildly prominent nodes are present between this mass and the liver  There are also some mildly prominent mediastinal nodes  Please see discussion  There is some fluid within the esophagus near level of the gregg  There is an approximately 1 5 x 1 cm right adrenal nodule  This appears similar to slightly larger compared to the prior examination  There is a questionable tiny left adrenal nodule  Follow-up of both of these adrenal findings is suggested  There is an approximately 1 2 cm soft tissue density nodule within the posterior aspect of the urinary bladder  Although this may be related to indentation of the prostate, a small bladder mass cannot be entirely excluded  Urology consultation and follow-up is recommended  A focal area of opacity within the anterior aspect of the left upper lobe of the lung appears similar to September 6, 2019  Please see discussion  Continued follow-up is recommended  This could be reassessed with chest CT in 3 months  PET/CT could also be considered, however, it is of borderline size for accurate characterization with PET/ CT  If more remote CTs of the chest exist, direct comparison would be helpful  PET/CT should be considered for further evaluation of the extent of adenopathy and extent of disease  PET/CT or MRI could be utilized to evaluate the adrenal findings, if there are no contraindications  Cholelithiasis  No CT evidence of acute cholecystitis  Atherosclerosis  Coronary artery disease  Other findings as described above  Please see discussion  The study was marked in [facility] for immediate notification  Workstation performed: [id number]     Ct Chest Abdomen Pelvis W Contrast    Result Date: 9/12/2019  Narrative CT CHEST, ABDOMEN AND PELVIS WITH IV CONTRAST INDICATION:   C15 5: Malignant neoplasm of lower third of esophagus    Recent diagnosis of esophageal carcinoma (adenocarcinoma)  COMPARISON:  None  TECHNIQUE: CT examination of the chest, abdomen and pelvis was performed  Axial, sagittal, and coronal 2D reformatted images were created from the source data and submitted for interpretation  Radiation dose length product (DLP) for this visit:  818 mGy-cm   This examination, like all CT scans performed in the [facility], was performed utilizing techniques to minimize radiation dose exposure, including the use of iterative reconstruction and automated exposure control  IV Contrast:  100 mL of iohexol (OMNIPAQUE) Enteric Contrast: Enteric contrast was administered  FINDINGS: CHEST LUNGS:  Solitary focal opacity noted within the anterior left upper lobe, on image 205/25 measuring 7 x 8 mm  On coronal imaging appearing somewhat more elongated although with central nodular aspect about 6 mm  PLEURA:  Unremarkable  HEART/GREAT VESSELS:  Coronary artery calcification  Otherwise unremarkable MEDIASTINUM AND SHEA:  As already known, abnormal appearance of the distal esophagus, beginning just below level of the inferior pulmonary veins and extending to the gastric cardia  Irregular wall thickening and mucosal enhancement noted  Along the left margin of the abnormal esophagus image 201/41 there is a paraesophageal lymph node measuring 8 x 6 mm  A subcarinal lymph node demonstrates short axis diameter 8 mm  CHEST WALL AND LOWER NECK:   Unremarkable  ABDOMEN LIVER/BILIARY TREE:  Unremarkable  GALLBLADDER:  There are gallstone(s) within the gallbladder, without pericholecystic inflammatory changes  SPLEEN:  Unremarkable  PANCREAS:  Unremarkable  ADRENAL GLANDS:  There is a right adrenal nodule measuring 1 2 x 0 7 cm in size  It is somewhat small for accurate characterization  As best as can be told, density is well above fluid on this enhanced only exam KIDNEYS/URETERS:  Unremarkable  No hydronephrosis   STOMACH AND BOWEL:  As above, abnormal appearance of the distal esophagus, involving the gastric cardia as well  Moderately increased stool within the colon  Small bowel unremarkable  APPENDIX:  No findings to suggest appendicitis  ABDOMINOPELVIC CAVITY:  Posterior to the gastric cardia there is an ovoid soft tissue nodule best measured on coronal image 79, measuring 1 7 x 1 0 cm  Along the right margin of the gastric cardia on coronal image 71 there is a soft tissue nodule measuring 1 3 x 1 1 cm, and there is also an ill-defined right lateral border of the gastric cardia for example image 201/54 and coronal image 68  There is a trace amount of free fluid present within the pelvis  VESSELS:  Unremarkable for patient's age  PELVIS REPRODUCTIVE ORGANS:  See below URINARY BLADDER:  Soft tissue nodular density associated with the posterior inferior aspect of the urinary bladder image 201/114 or coronal image 84 measuring approximately 9 mm  This is favored to be enlargement of the median lobe of the prostate gland, less likely bladder polyp ABDOMINAL WALL/INGUINAL REGIONS:  Unremarkable  OSSEOUS STRUCTURES:  No acute fracture or destructive osseous lesion  Impression 1  Irregular wall thickening involving the distal esophagus beginning just below the inferior pulmonary veins, extending through the gastric cardia compatible with biopsy-proven adenocarcinoma  As above, there is involvement of the gastric cardia, and possible extra luminal extension of tumor along its right lateral margin 2  There is mild adenopathy identified adjacent to the gastric cardia and distal esophagus  Additional borderline prominent mediastinal lymph nodes 3  There is a left upper lobe opacity identified  Although potentially representing an area of inflammation or scarring, an irregularly marginated solitary pulmonary metastasis should be excluded    This could either be reassessed with chest CT in 3 months time or further evaluated with PET CT (it is of borderline size for accurate characterization with PET) 4  Hypertrophy of the median lobe of the prostate (favored) or less likely 9 mm bladder polyp 5  Nonspecific 1 2 cm right adrenal nodule  Overall, PET/CT may be helpful to determine exact extent of adenopathy within the lower chest and upper abdomen, evaluation for any extraluminal extension of tumor, and further characterization of left upper lobe density and right adrenal nodule Workstation performed: [id number]      Nm Pet Ct Skull Base To Mid Thigh    Result Date: 12/30/2019  Narrative PET/CT SCAN INDICATION: Restaging of esophageal/gastric cancer postchemotherapy  C16 0: Malignant neoplasm of cardia MODIFIER: PS COMPARISON: PET CT 9/19/2019 CELL TYPE:  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, biopsy esophagus 8/24/2019 TECHNIQUE:   8 33 mCi F-18-FDG administered IV  Multiplanar attenuation corrected and non attenuation corrected PET images were acquired 60 minutes post injection  Contiguous, low dose, axial CT sections were obtained from the skull vertex through the femurs   Intravenous contrast material was not utilized  This examination, like all CT scans performed in the [facility], was performed utilizing techniques to minimize radiation dose exposure, including the use of iterative reconstruction and automated exposure control  Fasting serum glucose: 102 mg/dl FINDINGS: VISUALIZED BRAIN:   No acute abnormalities are seen  Now more conspicuous focal photopenia in the right frontal lobe anteriorly, question related to prior infarct  Questionable encephalomalacia  here on CT  HEAD/NECK:   Diffuse thyroid gland activity again noted, SUV max of 5 0, previously 6 2  This may be related to underlying thyroiditis  There is a physiologic distribution of FDG  No FDG avid cervical adenopathy is seen  CT images: Unremarkable   CHEST:   Mild residual FDG uptake at the distal esophagus and gastroesophageal junction, SUV max of 2 6, previously 21 0  Persistent circumferential wall thickening suggested here on CT  No FDG avid lymph nodes  CT images: Left-sided Mediport line tip terminates at the SVC  Scattered coronary artery calcifications  Stable 5 mm irregular nodular density in the left upper lobe anteriorly image 106 series 3, not FDG avid  Calcified granuloma in the left upper lobe  ABDOMEN:   No FDG avid soft tissue lesions are seen  No residual FDG uptake at the proximal stomach, SUV max of 1 8 in this region  No FDG avid lymph nodes  Previously there was a FDG avid lymph node posterior to the proximal stomach which is no longer seen  New splenomegaly with mild FDG uptake, SUV max of 3 0, higher than the liver, SUV max of 2 6  Spleen measures 16 8 cm in AP dimension  CT images: Cholelithiasis  Stable nodularity of the right adrenal gland, not FDG avid  Jejunostomy tube in position  Mild ascites, new  PELVIS: No FDG avid soft tissue lesions are seen  Previously noted colonic foci of FDG uptake are no longer seen  CT images: Mildly prominent prostate  Tiny fat-containing left inguinal hernia  OSSEOUS STRUCTURES: New diffuse FDG uptake in the osseous structures likely representing response to therapy  Potentially this does limit evaluation for the previously seen small rib foci of FDG uptake and other underlying lesions  CT images: Lumbosacral fusion device  Impression 1  Findings compatible with response to therapy  2  Significant interval improvement in abnormal FDG uptake at the distal esophagus, gastroesophageal junction and the proximal stomach  Mild residual FDG uptake suggested at the distal esophagus and gastroesophageal junction, residual disease not excluded  3  No findings for hypermetabolic metastasis  4  New splenomegaly with mild diffuse FDG uptake, question related to hematopoietic response to therapy   5   New diffuse FDG uptake in the osseous structures likely representing marrow response to therapy  Potentially this does limit evaluation for underlying lesions  6   Now more conspicuous focal photopenia in the right frontal lobe of the brain anteriorly, may be related to prior infarct  Questionable encephalomalacia here on CT  Correlate with dedicated brain imaging, MRI or CT if not previously evaluated  Workstation performed: [id number]     Nm Pet Ct Skull Base To Mid Thigh    Result Date: 9/19/2019  Narrative PET/CT SCAN INDICATION: Newly diagnosed esophageal cancer  Initial staging  C15 5: Malignant neoplasm of lower third of esophagus MODIFIER: PI COMPARISON: CT chest abdomen pelvis 9/16/2019 and 9/6/2019 CELL TYPE:  Intramucosal carcinoma, 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 TECHNIQUE:   8 8 mCi F-18-FDG administered IV  Multiplanar attenuation corrected and non attenuation corrected PET images are available for interpretation, and contiguous, low dose, axial CT sections were obtained from the skull base through the femurs   Intravenous contrast material was not utilized  This examination, like all CT scans performed in the [facility], was performed utilizing techniques to minimize radiation dose exposure, including the use of iterative reconstruction and automated exposure control  Fasting serum glucose: 82 mg/dl FINDINGS: VISUALIZED BRAIN:   No acute abnormalities are seen  HEAD/NECK:   Fairly diffuse FDG uptake at the thyroid gland, SUV max of 6 2 may be related to thyroiditis  No FDG avid cervical adenopathy is seen  CT images: Scattered mucosal thickening noted in the paranasal sinuses  CHEST:   Extensive FDG uptake at the distal esophagus leading to the GE junction and proximal stomach, SUV max of 21 0  Associated wall thickening noted here on CT  No FDG avid lymph nodes  CT images: Scattered coronary calcifications noted  Stable subcentimeter irregular density in the left upper lobe anteriorly, image 110 series 3  This is not FDG avid   ABDOMEN:   Extensive FDG uptake at the distal esophagus leading to the GE junction and proximal stomach, SUV max of 21 0  Associated wall thickening noted here on CT  Soft tissue density at the level of the proximal stomach extending to the gastrohepatic region measures 6 0 x 4 7 cm  There may be contiguous FDG avid lymphadenopathy in the gastrohepatic region where is there is soft tissue fullness here on CT inseparable from the gastric wall thickening  Small focus of FDG uptake noted posterior to the proximal stomach, SUV max of 3 7  There is a 2 1 x 1 5 cm perigastric lymph node here on CT image 164 series 3  No suspicious FDG uptake at the adrenal glands  CT images: There is cholelithiasis  Stable nodularity of the right adrenal gland  PELVIS: Focal FDG uptake at the distal sigmoid colon, SUV max of 7 8  Possible eccentric wall thickening on CT  Additional focus of FDG uptake at the level of the proximal sigmoid colon, SUV max of 6 6  No obvious findings here on CT  CT images: Prostate is mildly prominent  Tiny fat-containing left inguinal hernia  OSSEOUS STRUCTURES: Focal FDG uptake at the left anterior 6th rib demonstrates SUV max of 3 7  No obvious findings here on CT  Subtle focus of FDG uptake at the right lateral 2nd rib, SUV max of 2 2  No obvious findings here on CT  CT images: Prior lumbosacral fusion  Impression 1  Extensive FDG uptake at the distal esophagus, GE junction and proximal stomach compatible with known malignancy  2  FDG avid perigastric lymph node posterior to the proximal stomach suspicious for metastasis  There may be FDG avid gastrohepatic lymphadenopathy but this is inseparable from the gastric mass  3   Focal FDG uptake at the left anterior 6th rib  Possible subtle focus of FDG uptake at the right lateral 2nd rib  No obvious findings here on the limited low-dose CT images  Metastasis is not excluded  Distribution is not typical for prior trauma   Consider follow-up with MRI of the chest  4   Foci of FDG uptake at the sigmoid colon for which underlying lesions should be excluded  Recommend follow-up with colonoscopy  5   Fairly diffuse FDG uptake at the thyroid gland, may be related to thyroiditis  The study was marked in [employer] for significant notification   Workstation performed: [id number]

## 2020-01-27 ENCOUNTER — ANESTHESIA EVENT (OUTPATIENT)
Dept: PERIOP | Facility: HOSPITAL | Age: 65
DRG: 326 | End: 2020-01-27
Payer: COMMERCIAL

## 2020-01-28 ENCOUNTER — ANESTHESIA (OUTPATIENT)
Dept: PERIOP | Facility: HOSPITAL | Age: 65
DRG: 326 | End: 2020-01-28
Payer: COMMERCIAL

## 2020-01-28 ENCOUNTER — APPOINTMENT (INPATIENT)
Dept: RADIOLOGY | Facility: HOSPITAL | Age: 65
DRG: 326 | End: 2020-01-28
Payer: COMMERCIAL

## 2020-01-28 ENCOUNTER — HOSPITAL ENCOUNTER (INPATIENT)
Facility: HOSPITAL | Age: 65
LOS: 15 days | Discharge: HOME HEALTH | DRG: 326 | End: 2020-02-12
Attending: SURGERY | Admitting: SURGERY
Payer: COMMERCIAL

## 2020-01-28 DIAGNOSIS — I48.91 ATRIAL FIBRILLATION, UNSPECIFIED TYPE (HCC): ICD-10-CM

## 2020-01-28 DIAGNOSIS — E11.65 TYPE 2 DIABETES MELLITUS WITH HYPERGLYCEMIA, WITHOUT LONG-TERM CURRENT USE OF INSULIN (HCC): ICD-10-CM

## 2020-01-28 DIAGNOSIS — R33.9 URINARY RETENTION: ICD-10-CM

## 2020-01-28 DIAGNOSIS — E27.40 ADRENAL INSUFFICIENCY (HCC): ICD-10-CM

## 2020-01-28 DIAGNOSIS — R49.0 DYSPHONIA: ICD-10-CM

## 2020-01-28 DIAGNOSIS — J38.01 PARALYSIS OF LEFT VOCAL FOLD: Primary | ICD-10-CM

## 2020-01-28 DIAGNOSIS — C16.0 GE JUNCTION CARCINOMA (HCC): ICD-10-CM

## 2020-01-28 DIAGNOSIS — J90 PLEURAL EFFUSION, BILATERAL: ICD-10-CM

## 2020-01-28 DIAGNOSIS — Z46.59 ENCOUNTER FOR CARE RELATED TO FEEDING TUBE: ICD-10-CM

## 2020-01-28 LAB
ABO GROUP BLD: NORMAL
ALBUMIN SERPL BCP-MCNC: 3 G/DL (ref 3.5–5)
ALP SERPL-CCNC: 43 U/L (ref 46–116)
ALT SERPL W P-5'-P-CCNC: 49 U/L (ref 12–78)
ANION GAP SERPL CALCULATED.3IONS-SCNC: 5 MMOL/L (ref 4–13)
ANION GAP SERPL CALCULATED.3IONS-SCNC: 7 MMOL/L (ref 4–13)
AST SERPL W P-5'-P-CCNC: 55 U/L (ref 5–45)
BASE EXCESS BLDA CALC-SCNC: -3 MMOL/L (ref -2–3)
BASE EXCESS BLDA CALC-SCNC: -4.6 MMOL/L
BASE EXCESS BLDA CALC-SCNC: -5 MMOL/L (ref -2–3)
BASOPHILS # BLD AUTO: 0.02 THOUSANDS/ΜL (ref 0–0.1)
BASOPHILS NFR BLD AUTO: 0 % (ref 0–1)
BILIRUB SERPL-MCNC: 1.09 MG/DL (ref 0.2–1)
BLD GP AB SCN SERPL QL: NEGATIVE
BUN SERPL-MCNC: 14 MG/DL (ref 5–25)
BUN SERPL-MCNC: 16 MG/DL (ref 5–25)
CA-I BLD-SCNC: 1.12 MMOL/L (ref 1.12–1.32)
CA-I BLD-SCNC: 1.18 MMOL/L (ref 1.12–1.32)
CALCIUM SERPL-MCNC: 7.4 MG/DL (ref 8.3–10.1)
CALCIUM SERPL-MCNC: 7.8 MG/DL (ref 8.3–10.1)
CHLORIDE SERPL-SCNC: 112 MMOL/L (ref 100–108)
CHLORIDE SERPL-SCNC: 116 MMOL/L (ref 100–108)
CO2 SERPL-SCNC: 21 MMOL/L (ref 21–32)
CO2 SERPL-SCNC: 23 MMOL/L (ref 21–32)
CREAT SERPL-MCNC: 0.69 MG/DL (ref 0.6–1.3)
CREAT SERPL-MCNC: 0.69 MG/DL (ref 0.6–1.3)
EOSINOPHIL # BLD AUTO: 0.02 THOUSAND/ΜL (ref 0–0.61)
EOSINOPHIL NFR BLD AUTO: 0 % (ref 0–6)
ERYTHROCYTE [DISTWIDTH] IN BLOOD BY AUTOMATED COUNT: 18.4 % (ref 11.6–15.1)
ERYTHROCYTE [DISTWIDTH] IN BLOOD BY AUTOMATED COUNT: 18.5 % (ref 11.6–15.1)
GFR SERPL CREATININE-BSD FRML MDRD: 100 ML/MIN/1.73SQ M
GFR SERPL CREATININE-BSD FRML MDRD: 100 ML/MIN/1.73SQ M
GLUCOSE SERPL-MCNC: 128 MG/DL (ref 65–140)
GLUCOSE SERPL-MCNC: 129 MG/DL (ref 65–140)
GLUCOSE SERPL-MCNC: 132 MG/DL (ref 65–140)
GLUCOSE SERPL-MCNC: 140 MG/DL (ref 65–140)
GLUCOSE SERPL-MCNC: 140 MG/DL (ref 65–140)
GLUCOSE SERPL-MCNC: 145 MG/DL (ref 65–140)
GLUCOSE SERPL-MCNC: 160 MG/DL (ref 65–140)
HCO3 BLDA-SCNC: 20.6 MMOL/L (ref 22–28)
HCO3 BLDA-SCNC: 21.6 MMOL/L (ref 22–28)
HCO3 BLDA-SCNC: 23.1 MMOL/L (ref 22–28)
HCT VFR BLD AUTO: 33.8 % (ref 36.5–49.3)
HCT VFR BLD AUTO: 36.3 % (ref 36.5–49.3)
HCT VFR BLD CALC: 30 % (ref 36.5–49.3)
HCT VFR BLD CALC: 31 % (ref 36.5–49.3)
HGB BLD-MCNC: 10.4 G/DL (ref 12–17)
HGB BLD-MCNC: 11.4 G/DL (ref 12–17)
HGB BLDA-MCNC: 10.2 G/DL (ref 12–17)
HGB BLDA-MCNC: 10.5 G/DL (ref 12–17)
IMM GRANULOCYTES # BLD AUTO: 0.02 THOUSAND/UL (ref 0–0.2)
IMM GRANULOCYTES NFR BLD AUTO: 0 % (ref 0–2)
LACTATE SERPL-SCNC: 0.7 MMOL/L (ref 0.5–2)
LYMPHOCYTES # BLD AUTO: 0.41 THOUSANDS/ΜL (ref 0.6–4.47)
LYMPHOCYTES NFR BLD AUTO: 6 % (ref 14–44)
MAGNESIUM SERPL-MCNC: 1.7 MG/DL (ref 1.6–2.6)
MAGNESIUM SERPL-MCNC: 1.9 MG/DL (ref 1.6–2.6)
MCH RBC QN AUTO: 26.3 PG (ref 26.8–34.3)
MCH RBC QN AUTO: 26.5 PG (ref 26.8–34.3)
MCHC RBC AUTO-ENTMCNC: 30.8 G/DL (ref 31.4–37.4)
MCHC RBC AUTO-ENTMCNC: 31.4 G/DL (ref 31.4–37.4)
MCV RBC AUTO: 84 FL (ref 82–98)
MCV RBC AUTO: 85 FL (ref 82–98)
MONOCYTES # BLD AUTO: 0.47 THOUSAND/ΜL (ref 0.17–1.22)
MONOCYTES NFR BLD AUTO: 7 % (ref 4–12)
NASAL CANNULA: 2
NEUTROPHILS # BLD AUTO: 6.16 THOUSANDS/ΜL (ref 1.85–7.62)
NEUTS SEG NFR BLD AUTO: 87 % (ref 43–75)
NRBC BLD AUTO-RTO: 0 /100 WBCS
O2 CT BLDA-SCNC: 16.5 ML/DL (ref 16–23)
OXYHGB MFR BLDA: 96.3 % (ref 94–97)
PCO2 BLD: 23 MMOL/L (ref 21–32)
PCO2 BLD: 24 MMOL/L (ref 21–32)
PCO2 BLD: 45.1 MM HG (ref 36–44)
PCO2 BLD: 45.8 MM HG (ref 36–44)
PCO2 BLDA: 38.6 MM HG (ref 36–44)
PH BLD: 7.29 [PH] (ref 7.35–7.45)
PH BLD: 7.31 [PH] (ref 7.35–7.45)
PH BLDA: 7.34 [PH] (ref 7.35–7.45)
PLATELET # BLD AUTO: 100 THOUSANDS/UL (ref 149–390)
PLATELET # BLD AUTO: 104 THOUSANDS/UL (ref 149–390)
PMV BLD AUTO: 9.4 FL (ref 8.9–12.7)
PMV BLD AUTO: 9.6 FL (ref 8.9–12.7)
PO2 BLD: 101 MM HG (ref 75–129)
PO2 BLD: 123 MM HG (ref 75–129)
PO2 BLDA: 107.2 MM HG (ref 75–129)
POTASSIUM BLD-SCNC: 3.5 MMOL/L (ref 3.5–5.3)
POTASSIUM BLD-SCNC: 3.7 MMOL/L (ref 3.5–5.3)
POTASSIUM SERPL-SCNC: 3.8 MMOL/L (ref 3.5–5.3)
POTASSIUM SERPL-SCNC: 4.1 MMOL/L (ref 3.5–5.3)
PROT SERPL-MCNC: 5.6 G/DL (ref 6.4–8.2)
RBC # BLD AUTO: 3.96 MILLION/UL (ref 3.88–5.62)
RBC # BLD AUTO: 4.3 MILLION/UL (ref 3.88–5.62)
RH BLD: POSITIVE
SAO2 % BLD FROM PO2: 97 % (ref 60–85)
SAO2 % BLD FROM PO2: 98 % (ref 60–85)
SODIUM BLD-SCNC: 138 MMOL/L (ref 136–145)
SODIUM BLD-SCNC: 140 MMOL/L (ref 136–145)
SODIUM SERPL-SCNC: 140 MMOL/L (ref 136–145)
SODIUM SERPL-SCNC: 144 MMOL/L (ref 136–145)
SPECIMEN EXPIRATION DATE: NORMAL
SPECIMEN SOURCE: ABNORMAL
WBC # BLD AUTO: 6.18 THOUSAND/UL (ref 4.31–10.16)
WBC # BLD AUTO: 7.1 THOUSAND/UL (ref 4.31–10.16)

## 2020-01-28 PROCEDURE — 0DJ08ZZ INSPECTION OF UPPER INTESTINAL TRACT, VIA NATURAL OR ARTIFICIAL OPENING ENDOSCOPIC: ICD-10-PCS | Performed by: SURGERY

## 2020-01-28 PROCEDURE — 83605 ASSAY OF LACTIC ACID: CPT | Performed by: NURSE PRACTITIONER

## 2020-01-28 PROCEDURE — 85027 COMPLETE CBC AUTOMATED: CPT | Performed by: SURGERY

## 2020-01-28 PROCEDURE — 88304 TISSUE EXAM BY PATHOLOGIST: CPT | Performed by: PATHOLOGY

## 2020-01-28 PROCEDURE — 71045 X-RAY EXAM CHEST 1 VIEW: CPT

## 2020-01-28 PROCEDURE — 88309 TISSUE EXAM BY PATHOLOGIST: CPT | Performed by: PATHOLOGY

## 2020-01-28 PROCEDURE — 88331 PATH CONSLTJ SURG 1 BLK 1SPC: CPT | Performed by: PATHOLOGY

## 2020-01-28 PROCEDURE — 85014 HEMATOCRIT: CPT

## 2020-01-28 PROCEDURE — 0DT40ZZ RESECTION OF ESOPHAGOGASTRIC JUNCTION, OPEN APPROACH: ICD-10-PCS | Performed by: SURGERY

## 2020-01-28 PROCEDURE — 84132 ASSAY OF SERUM POTASSIUM: CPT

## 2020-01-28 PROCEDURE — 88342 IMHCHEM/IMCYTCHM 1ST ANTB: CPT | Performed by: PATHOLOGY

## 2020-01-28 PROCEDURE — 86900 BLOOD TYPING SEROLOGIC ABO: CPT | Performed by: SURGERY

## 2020-01-28 PROCEDURE — 85025 COMPLETE CBC W/AUTO DIFF WBC: CPT | Performed by: NURSE PRACTITIONER

## 2020-01-28 PROCEDURE — 82803 BLOOD GASES ANY COMBINATION: CPT

## 2020-01-28 PROCEDURE — 80053 COMPREHEN METABOLIC PANEL: CPT | Performed by: NURSE PRACTITIONER

## 2020-01-28 PROCEDURE — 82805 BLOOD GASES W/O2 SATURATION: CPT | Performed by: NURSE PRACTITIONER

## 2020-01-28 PROCEDURE — 43107 REMOVAL OF ESOPHAGUS: CPT | Performed by: THORACIC SURGERY (CARDIOTHORACIC VASCULAR SURGERY)

## 2020-01-28 PROCEDURE — 82330 ASSAY OF CALCIUM: CPT

## 2020-01-28 PROCEDURE — 88341 IMHCHEM/IMCYTCHM EA ADD ANTB: CPT | Performed by: PATHOLOGY

## 2020-01-28 PROCEDURE — 80048 BASIC METABOLIC PNL TOTAL CA: CPT | Performed by: SURGERY

## 2020-01-28 PROCEDURE — 82947 ASSAY GLUCOSE BLOOD QUANT: CPT

## 2020-01-28 PROCEDURE — 83735 ASSAY OF MAGNESIUM: CPT | Performed by: SURGERY

## 2020-01-28 PROCEDURE — 43235 EGD DIAGNOSTIC BRUSH WASH: CPT | Performed by: SURGERY

## 2020-01-28 PROCEDURE — 82948 REAGENT STRIP/BLOOD GLUCOSE: CPT

## 2020-01-28 PROCEDURE — 86901 BLOOD TYPING SEROLOGIC RH(D): CPT | Performed by: SURGERY

## 2020-01-28 PROCEDURE — 99223 1ST HOSP IP/OBS HIGH 75: CPT | Performed by: NURSE PRACTITIONER

## 2020-01-28 PROCEDURE — 86850 RBC ANTIBODY SCREEN: CPT | Performed by: SURGERY

## 2020-01-28 PROCEDURE — 86923 COMPATIBILITY TEST ELECTRIC: CPT

## 2020-01-28 PROCEDURE — 43107 REMOVAL OF ESOPHAGUS: CPT | Performed by: SURGERY

## 2020-01-28 PROCEDURE — 83735 ASSAY OF MAGNESIUM: CPT | Performed by: NURSE PRACTITIONER

## 2020-01-28 PROCEDURE — 0DR507Z REPLACEMENT OF ESOPHAGUS WITH AUTOLOGOUS TISSUE SUBSTITUTE, OPEN APPROACH: ICD-10-PCS | Performed by: SURGERY

## 2020-01-28 PROCEDURE — 84295 ASSAY OF SERUM SODIUM: CPT

## 2020-01-28 RX ORDER — EPHEDRINE SULFATE 50 MG/ML
INJECTION INTRAVENOUS AS NEEDED
Status: DISCONTINUED | OUTPATIENT
Start: 2020-01-28 | End: 2020-01-28 | Stop reason: SURG

## 2020-01-28 RX ORDER — MAGNESIUM SULFATE HEPTAHYDRATE 40 MG/ML
2 INJECTION, SOLUTION INTRAVENOUS ONCE
Status: COMPLETED | OUTPATIENT
Start: 2020-01-28 | End: 2020-01-28

## 2020-01-28 RX ORDER — ALBUTEROL SULFATE 2.5 MG/3ML
2.5 SOLUTION RESPIRATORY (INHALATION) EVERY 6 HOURS PRN
Status: DISCONTINUED | OUTPATIENT
Start: 2020-01-28 | End: 2020-02-11

## 2020-01-28 RX ORDER — CEFAZOLIN SODIUM 2 G/50ML
2000 SOLUTION INTRAVENOUS ONCE
Status: COMPLETED | OUTPATIENT
Start: 2020-01-28 | End: 2020-01-28

## 2020-01-28 RX ORDER — HYDROMORPHONE HCL/PF 1 MG/ML
SYRINGE (ML) INJECTION AS NEEDED
Status: DISCONTINUED | OUTPATIENT
Start: 2020-01-28 | End: 2020-01-28 | Stop reason: SURG

## 2020-01-28 RX ORDER — SODIUM CHLORIDE, SODIUM GLUCONATE, SODIUM ACETATE, POTASSIUM CHLORIDE, MAGNESIUM CHLORIDE, SODIUM PHOSPHATE, DIBASIC, AND POTASSIUM PHOSPHATE .53; .5; .37; .037; .03; .012; .00082 G/100ML; G/100ML; G/100ML; G/100ML; G/100ML; G/100ML; G/100ML
1000 INJECTION, SOLUTION INTRAVENOUS ONCE
Status: COMPLETED | OUTPATIENT
Start: 2020-01-28 | End: 2020-01-28

## 2020-01-28 RX ORDER — MIDAZOLAM HYDROCHLORIDE 2 MG/2ML
INJECTION, SOLUTION INTRAMUSCULAR; INTRAVENOUS AS NEEDED
Status: DISCONTINUED | OUTPATIENT
Start: 2020-01-28 | End: 2020-01-28 | Stop reason: SURG

## 2020-01-28 RX ORDER — MAGNESIUM HYDROXIDE 1200 MG/15ML
LIQUID ORAL AS NEEDED
Status: DISCONTINUED | OUTPATIENT
Start: 2020-01-28 | End: 2020-01-28 | Stop reason: HOSPADM

## 2020-01-28 RX ORDER — HYDROMORPHONE HCL/PF 1 MG/ML
0.5 SYRINGE (ML) INJECTION EVERY 2 HOUR PRN
Status: DISCONTINUED | OUTPATIENT
Start: 2020-01-28 | End: 2020-02-01

## 2020-01-28 RX ORDER — FENTANYL CITRATE 50 UG/ML
INJECTION, SOLUTION INTRAMUSCULAR; INTRAVENOUS AS NEEDED
Status: DISCONTINUED | OUTPATIENT
Start: 2020-01-28 | End: 2020-01-28 | Stop reason: SURG

## 2020-01-28 RX ORDER — ONDANSETRON 2 MG/ML
4 INJECTION INTRAMUSCULAR; INTRAVENOUS ONCE AS NEEDED
Status: DISCONTINUED | OUTPATIENT
Start: 2020-01-28 | End: 2020-01-28 | Stop reason: HOSPADM

## 2020-01-28 RX ORDER — ALBUMIN, HUMAN INJ 5% 5 %
12.5 SOLUTION INTRAVENOUS ONCE
Status: COMPLETED | OUTPATIENT
Start: 2020-01-28 | End: 2020-01-28

## 2020-01-28 RX ORDER — LIDOCAINE HYDROCHLORIDE 10 MG/ML
INJECTION, SOLUTION EPIDURAL; INFILTRATION; INTRACAUDAL; PERINEURAL AS NEEDED
Status: DISCONTINUED | OUTPATIENT
Start: 2020-01-28 | End: 2020-01-28 | Stop reason: SURG

## 2020-01-28 RX ORDER — CHLORHEXIDINE GLUCONATE 0.12 MG/ML
15 RINSE ORAL EVERY 12 HOURS SCHEDULED
Status: DISCONTINUED | OUTPATIENT
Start: 2020-01-28 | End: 2020-01-31

## 2020-01-28 RX ORDER — SODIUM CHLORIDE, SODIUM LACTATE, POTASSIUM CHLORIDE, CALCIUM CHLORIDE 600; 310; 30; 20 MG/100ML; MG/100ML; MG/100ML; MG/100ML
20 INJECTION, SOLUTION INTRAVENOUS CONTINUOUS
Status: DISCONTINUED | OUTPATIENT
Start: 2020-01-28 | End: 2020-01-28

## 2020-01-28 RX ORDER — ONDANSETRON 2 MG/ML
INJECTION INTRAMUSCULAR; INTRAVENOUS AS NEEDED
Status: DISCONTINUED | OUTPATIENT
Start: 2020-01-28 | End: 2020-01-28 | Stop reason: SURG

## 2020-01-28 RX ORDER — BUPIVACAINE HYDROCHLORIDE 2.5 MG/ML
INJECTION, SOLUTION EPIDURAL; INFILTRATION; INTRACAUDAL AS NEEDED
Status: DISCONTINUED | OUTPATIENT
Start: 2020-01-28 | End: 2020-01-28 | Stop reason: SURG

## 2020-01-28 RX ORDER — SODIUM CHLORIDE 9 MG/ML
INJECTION, SOLUTION INTRAVENOUS CONTINUOUS PRN
Status: DISCONTINUED | OUTPATIENT
Start: 2020-01-28 | End: 2020-01-28 | Stop reason: SURG

## 2020-01-28 RX ORDER — SODIUM CHLORIDE, SODIUM GLUCONATE, SODIUM ACETATE, POTASSIUM CHLORIDE, MAGNESIUM CHLORIDE, SODIUM PHOSPHATE, DIBASIC, AND POTASSIUM PHOSPHATE .53; .5; .37; .037; .03; .012; .00082 G/100ML; G/100ML; G/100ML; G/100ML; G/100ML; G/100ML; G/100ML
1000 INJECTION, SOLUTION INTRAVENOUS ONCE
Status: COMPLETED | OUTPATIENT
Start: 2020-01-28 | End: 2020-01-29

## 2020-01-28 RX ORDER — PROPOFOL 10 MG/ML
INJECTION, EMULSION INTRAVENOUS AS NEEDED
Status: DISCONTINUED | OUTPATIENT
Start: 2020-01-28 | End: 2020-01-28 | Stop reason: SURG

## 2020-01-28 RX ORDER — NEOSTIGMINE METHYLSULFATE 1 MG/ML
INJECTION INTRAVENOUS AS NEEDED
Status: DISCONTINUED | OUTPATIENT
Start: 2020-01-28 | End: 2020-01-28 | Stop reason: SURG

## 2020-01-28 RX ORDER — HEPARIN SODIUM 5000 [USP'U]/ML
5000 INJECTION, SOLUTION INTRAVENOUS; SUBCUTANEOUS EVERY 8 HOURS SCHEDULED
Status: DISCONTINUED | OUTPATIENT
Start: 2020-01-28 | End: 2020-01-30

## 2020-01-28 RX ORDER — CALCIUM GLUCONATE 20 MG/ML
2 INJECTION, SOLUTION INTRAVENOUS ONCE
Status: COMPLETED | OUTPATIENT
Start: 2020-01-28 | End: 2020-01-28

## 2020-01-28 RX ORDER — GLYCOPYRROLATE 0.2 MG/ML
INJECTION INTRAMUSCULAR; INTRAVENOUS AS NEEDED
Status: DISCONTINUED | OUTPATIENT
Start: 2020-01-28 | End: 2020-01-28 | Stop reason: SURG

## 2020-01-28 RX ORDER — SODIUM CHLORIDE, SODIUM LACTATE, POTASSIUM CHLORIDE, CALCIUM CHLORIDE 600; 310; 30; 20 MG/100ML; MG/100ML; MG/100ML; MG/100ML
125 INJECTION, SOLUTION INTRAVENOUS CONTINUOUS
Status: DISCONTINUED | OUTPATIENT
Start: 2020-01-28 | End: 2020-01-28

## 2020-01-28 RX ORDER — ACETAMINOPHEN 325 MG/1
650 TABLET ORAL EVERY 6 HOURS
Status: DISCONTINUED | OUTPATIENT
Start: 2020-01-28 | End: 2020-01-29

## 2020-01-28 RX ORDER — SODIUM CHLORIDE, SODIUM GLUCONATE, SODIUM ACETATE, POTASSIUM CHLORIDE, MAGNESIUM CHLORIDE, SODIUM PHOSPHATE, DIBASIC, AND POTASSIUM PHOSPHATE .53; .5; .37; .037; .03; .012; .00082 G/100ML; G/100ML; G/100ML; G/100ML; G/100ML; G/100ML; G/100ML
125 INJECTION, SOLUTION INTRAVENOUS CONTINUOUS
Status: DISCONTINUED | OUTPATIENT
Start: 2020-01-28 | End: 2020-01-30

## 2020-01-28 RX ORDER — PANTOPRAZOLE SODIUM 40 MG/1
40 INJECTION, POWDER, FOR SOLUTION INTRAVENOUS
Status: DISCONTINUED | OUTPATIENT
Start: 2020-01-29 | End: 2020-01-31

## 2020-01-28 RX ORDER — ONDANSETRON 2 MG/ML
4 INJECTION INTRAMUSCULAR; INTRAVENOUS EVERY 6 HOURS PRN
Status: DISCONTINUED | OUTPATIENT
Start: 2020-01-28 | End: 2020-02-12 | Stop reason: HOSPADM

## 2020-01-28 RX ORDER — KETAMINE HYDROCHLORIDE 50 MG/ML
INJECTION, SOLUTION, CONCENTRATE INTRAMUSCULAR; INTRAVENOUS AS NEEDED
Status: DISCONTINUED | OUTPATIENT
Start: 2020-01-28 | End: 2020-01-28 | Stop reason: SURG

## 2020-01-28 RX ORDER — SODIUM CHLORIDE 9 MG/ML
125 INJECTION, SOLUTION INTRAVENOUS CONTINUOUS
Status: DISCONTINUED | OUTPATIENT
Start: 2020-01-28 | End: 2020-01-28

## 2020-01-28 RX ORDER — ALBUMIN, HUMAN INJ 5% 5 %
SOLUTION INTRAVENOUS CONTINUOUS PRN
Status: DISCONTINUED | OUTPATIENT
Start: 2020-01-28 | End: 2020-01-28 | Stop reason: SURG

## 2020-01-28 RX ORDER — ROCURONIUM BROMIDE 10 MG/ML
INJECTION, SOLUTION INTRAVENOUS AS NEEDED
Status: DISCONTINUED | OUTPATIENT
Start: 2020-01-28 | End: 2020-01-28 | Stop reason: SURG

## 2020-01-28 RX ORDER — POTASSIUM CHLORIDE 14.9 MG/ML
20 INJECTION INTRAVENOUS
Status: COMPLETED | OUTPATIENT
Start: 2020-01-28 | End: 2020-01-29

## 2020-01-28 RX ORDER — HYDROMORPHONE HCL/PF 1 MG/ML
0.2 SYRINGE (ML) INJECTION
Status: DISCONTINUED | OUTPATIENT
Start: 2020-01-28 | End: 2020-01-28 | Stop reason: HOSPADM

## 2020-01-28 RX ADMIN — CEFAZOLIN SODIUM 2000 MG: 2 SOLUTION INTRAVENOUS at 12:30

## 2020-01-28 RX ADMIN — SODIUM CHLORIDE, SODIUM LACTATE, POTASSIUM CHLORIDE, AND CALCIUM CHLORIDE: .6; .31; .03; .02 INJECTION, SOLUTION INTRAVENOUS at 07:05

## 2020-01-28 RX ADMIN — LIDOCAINE HYDROCHLORIDE 3 ML: 10 INJECTION, SOLUTION EPIDURAL; INFILTRATION; INTRACAUDAL; PERINEURAL at 08:15

## 2020-01-28 RX ADMIN — BUPIVACAINE HYDROCHLORIDE 3 ML: 2.5 INJECTION, SOLUTION EPIDURAL; INFILTRATION; INTRACAUDAL at 13:40

## 2020-01-28 RX ADMIN — SODIUM CHLORIDE, SODIUM GLUCONATE, SODIUM ACETATE, POTASSIUM CHLORIDE, MAGNESIUM CHLORIDE, SODIUM PHOSPHATE, DIBASIC, AND POTASSIUM PHOSPHATE 1000 ML: .53; .5; .37; .037; .03; .012; .00082 INJECTION, SOLUTION INTRAVENOUS at 23:39

## 2020-01-28 RX ADMIN — PHENYLEPHRINE HYDROCHLORIDE 50 MCG/MIN: 10 INJECTION INTRAVENOUS at 08:47

## 2020-01-28 RX ADMIN — ROCURONIUM BROMIDE 10 MG: 50 INJECTION, SOLUTION INTRAVENOUS at 12:22

## 2020-01-28 RX ADMIN — ALBUMIN (HUMAN): 12.5 SOLUTION INTRAVENOUS at 11:36

## 2020-01-28 RX ADMIN — SODIUM CHLORIDE, SODIUM LACTATE, POTASSIUM CHLORIDE, AND CALCIUM CHLORIDE: .6; .31; .03; .02 INJECTION, SOLUTION INTRAVENOUS at 09:09

## 2020-01-28 RX ADMIN — GLYCOPYRROLATE 1 MG: 0.2 INJECTION, SOLUTION INTRAMUSCULAR; INTRAVENOUS at 13:02

## 2020-01-28 RX ADMIN — ACETAMINOPHEN 650 MG: 325 TABLET ORAL at 21:40

## 2020-01-28 RX ADMIN — HYDROMORPHONE HYDROCHLORIDE 0.2 MG: 1 INJECTION, SOLUTION INTRAMUSCULAR; INTRAVENOUS; SUBCUTANEOUS at 13:04

## 2020-01-28 RX ADMIN — KETAMINE HYDROCHLORIDE 30 MG: 50 INJECTION, SOLUTION INTRAMUSCULAR; INTRAVENOUS at 08:18

## 2020-01-28 RX ADMIN — ROCURONIUM BROMIDE 40 MG: 50 INJECTION, SOLUTION INTRAVENOUS at 10:28

## 2020-01-28 RX ADMIN — HYDROMORPHONE HYDROCHLORIDE 0.4 MG: 1 INJECTION, SOLUTION INTRAMUSCULAR; INTRAVENOUS; SUBCUTANEOUS at 12:08

## 2020-01-28 RX ADMIN — ALBUMIN (HUMAN) 12.5 G: 12.5 INJECTION, SOLUTION INTRAVENOUS at 14:14

## 2020-01-28 RX ADMIN — SODIUM CHLORIDE, SODIUM GLUCONATE, SODIUM ACETATE, POTASSIUM CHLORIDE, MAGNESIUM CHLORIDE, SODIUM PHOSPHATE, DIBASIC, AND POTASSIUM PHOSPHATE 125 ML/HR: .53; .5; .37; .037; .03; .012; .00082 INJECTION, SOLUTION INTRAVENOUS at 17:30

## 2020-01-28 RX ADMIN — BUPIVACAINE HYDROCHLORIDE 3 ML: 2.5 INJECTION, SOLUTION EPIDURAL; INFILTRATION; INTRACAUDAL at 10:13

## 2020-01-28 RX ADMIN — METRONIDAZOLE 500 MG: 500 INJECTION, SOLUTION INTRAVENOUS at 08:46

## 2020-01-28 RX ADMIN — HEPARIN SODIUM 5000 UNITS: 5000 INJECTION INTRAVENOUS; SUBCUTANEOUS at 21:41

## 2020-01-28 RX ADMIN — EPHEDRINE SULFATE 10 MG: 50 INJECTION, SOLUTION INTRAVENOUS at 09:15

## 2020-01-28 RX ADMIN — CEFAZOLIN SODIUM 2000 MG: 2 SOLUTION INTRAVENOUS at 08:30

## 2020-01-28 RX ADMIN — ROCURONIUM BROMIDE 40 MG: 50 INJECTION, SOLUTION INTRAVENOUS at 08:18

## 2020-01-28 RX ADMIN — FENTANYL CITRATE 50 MCG: 50 INJECTION, SOLUTION INTRAMUSCULAR; INTRAVENOUS at 08:16

## 2020-01-28 RX ADMIN — SODIUM CHLORIDE, SODIUM LACTATE, POTASSIUM CHLORIDE, AND CALCIUM CHLORIDE: .6; .31; .03; .02 INJECTION, SOLUTION INTRAVENOUS at 10:21

## 2020-01-28 RX ADMIN — SODIUM CHLORIDE: 0.9 INJECTION, SOLUTION INTRAVENOUS at 11:57

## 2020-01-28 RX ADMIN — SODIUM CHLORIDE 125 ML/HR: 0.9 INJECTION, SOLUTION INTRAVENOUS at 14:11

## 2020-01-28 RX ADMIN — HEPARIN SODIUM 5000 UNITS: 5000 INJECTION INTRAVENOUS; SUBCUTANEOUS at 16:05

## 2020-01-28 RX ADMIN — CHLORHEXIDINE GLUCONATE 0.12% ORAL RINSE 15 ML: 1.2 LIQUID ORAL at 21:41

## 2020-01-28 RX ADMIN — CHLORHEXIDINE GLUCONATE 0.12% ORAL RINSE 15 ML: 1.2 LIQUID ORAL at 16:05

## 2020-01-28 RX ADMIN — KETAMINE HYDROCHLORIDE 20 MG: 50 INJECTION, SOLUTION INTRAMUSCULAR; INTRAVENOUS at 09:38

## 2020-01-28 RX ADMIN — PHENYLEPHRINE HYDROCHLORIDE 200 MCG: 10 INJECTION INTRAVENOUS at 08:45

## 2020-01-28 RX ADMIN — HYDROMORPHONE HYDROCHLORIDE 0.2 MG: 1 INJECTION, SOLUTION INTRAMUSCULAR; INTRAVENOUS; SUBCUTANEOUS at 12:45

## 2020-01-28 RX ADMIN — MIDAZOLAM 2 MG: 1 INJECTION INTRAMUSCULAR; INTRAVENOUS at 07:40

## 2020-01-28 RX ADMIN — POTASSIUM CHLORIDE 20 MEQ: 14.9 INJECTION, SOLUTION INTRAVENOUS at 17:40

## 2020-01-28 RX ADMIN — PROPOFOL 130 MG: 10 INJECTION, EMULSION INTRAVENOUS at 08:18

## 2020-01-28 RX ADMIN — PROPOFOL 70 MG: 10 INJECTION, EMULSION INTRAVENOUS at 08:23

## 2020-01-28 RX ADMIN — POTASSIUM CHLORIDE 20 MEQ: 14.9 INJECTION, SOLUTION INTRAVENOUS at 20:36

## 2020-01-28 RX ADMIN — ROPIVACAINE HYDROCHLORIDE: 5 INJECTION, SOLUTION EPIDURAL; INFILTRATION; PERINEURAL at 13:58

## 2020-01-28 RX ADMIN — SODIUM CHLORIDE, SODIUM LACTATE, POTASSIUM CHLORIDE, AND CALCIUM CHLORIDE: .6; .31; .03; .02 INJECTION, SOLUTION INTRAVENOUS at 11:35

## 2020-01-28 RX ADMIN — NEOSTIGMINE METHYLSULFATE 5 MG: 1 INJECTION INTRAVENOUS at 13:02

## 2020-01-28 RX ADMIN — ONDANSETRON 4 MG: 2 INJECTION INTRAMUSCULAR; INTRAVENOUS at 12:32

## 2020-01-28 RX ADMIN — SODIUM CHLORIDE, SODIUM LACTATE, POTASSIUM CHLORIDE, AND CALCIUM CHLORIDE 1000 ML: .6; .31; .03; .02 INJECTION, SOLUTION INTRAVENOUS at 13:50

## 2020-01-28 RX ADMIN — PHENYLEPHRINE HYDROCHLORIDE 200 MCG: 10 INJECTION INTRAVENOUS at 08:33

## 2020-01-28 RX ADMIN — ROCURONIUM BROMIDE 40 MG: 50 INJECTION, SOLUTION INTRAVENOUS at 09:03

## 2020-01-28 RX ADMIN — ALBUMIN (HUMAN): 12.5 SOLUTION INTRAVENOUS at 10:11

## 2020-01-28 RX ADMIN — EPHEDRINE SULFATE 5 MG: 50 INJECTION, SOLUTION INTRAVENOUS at 11:43

## 2020-01-28 RX ADMIN — PHENYLEPHRINE HYDROCHLORIDE 200 MCG: 10 INJECTION INTRAVENOUS at 09:25

## 2020-01-28 RX ADMIN — GLYCOPYRROLATE 0.2 MG: 0.2 INJECTION, SOLUTION INTRAMUSCULAR; INTRAVENOUS at 09:16

## 2020-01-28 RX ADMIN — HYDROMORPHONE HYDROCHLORIDE 0.2 MG: 1 INJECTION, SOLUTION INTRAMUSCULAR; INTRAVENOUS; SUBCUTANEOUS at 12:55

## 2020-01-28 RX ADMIN — MAGNESIUM SULFATE HEPTAHYDRATE 2 G: 40 INJECTION, SOLUTION INTRAVENOUS at 17:36

## 2020-01-28 RX ADMIN — PHENYLEPHRINE HYDROCHLORIDE 50 MCG: 10 INJECTION INTRAVENOUS at 13:45

## 2020-01-28 RX ADMIN — SODIUM CHLORIDE: 0.9 INJECTION, SOLUTION INTRAVENOUS at 08:49

## 2020-01-28 RX ADMIN — FENTANYL CITRATE 50 MCG: 50 INJECTION, SOLUTION INTRAMUSCULAR; INTRAVENOUS at 09:03

## 2020-01-28 RX ADMIN — SODIUM CHLORIDE, SODIUM GLUCONATE, SODIUM ACETATE, POTASSIUM CHLORIDE, MAGNESIUM CHLORIDE, SODIUM PHOSPHATE, DIBASIC, AND POTASSIUM PHOSPHATE 1000 ML: .53; .5; .37; .037; .03; .012; .00082 INJECTION, SOLUTION INTRAVENOUS at 19:45

## 2020-01-28 RX ADMIN — CALCIUM GLUCONATE 2 G: 20 INJECTION, SOLUTION INTRAVENOUS at 19:41

## 2020-01-28 NOTE — ANESTHESIA PROCEDURE NOTES
Arterial Line Insertion  Performed by: Sky Hirsch CRNA  Authorized by: Sky Hirsch CRNA   Consent given by: patient  Patient understanding: patient states understanding of the procedure being performed  Patient consent: the patient's understanding of the procedure matches consent given  Procedure consent: procedure consent matches procedure scheduled  Relevant documents: relevant documents present and verified  Test results: test results available and properly labeled  Site marked: the operative site was marked  Radiology Images: Radiology Images displayed and confirmed   If images not available, report reviewed  Indications: multiple ABGs and hemodynamic monitoring  Orientation:  Left  Location: radial artery

## 2020-01-28 NOTE — ANESTHESIA POSTPROCEDURE EVALUATION
Post-Op Assessment Note    CV Status:  Stable  Pain Score: 0    Pain management: adequate     Mental Status:  Arousable   Hydration Status:  Stable   PONV Controlled:  None   Airway Patency:  Patent   Post Op Vitals Reviewed: Yes      Staff: Anesthesiologist, CRNA   Comments: PIV x 2 in situ  Left radial brandyn in place  Epidural secured   Patient comfortable    Post-op block assessment: secured with tape and no complications        BP  113/72   Temp  95 4   Pulse  64   Resp   15   SpO2   98

## 2020-01-28 NOTE — OP NOTE
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]    Procedure(s) (LRB):  ESOPHAGECTOMY, TRANSHIATAL (N/A)  ESOPHAGOGASTRODUODENOSCOPY (EGD) (N/A)    Specimen(s):  ID Type Source Tests Collected by Time Destination   1 : esophagastrectomy stitch on celiac lymph node Tissue Esophagus TISSUE EXAM Dipak Umanzor MD 2020 1127    2 : final esophageal margin Tissue Anastomatic site TISSUE EXAM Dipak Umanzor MD 2020 1207        Estimated Blood Loss:   450 mL    Drains:  Open Drain Anterior; Left Neck (Active)   Site Description Unable to view 2020  3:37 PM   Dressing Status Dry; Intact; New drainage 2020  3:37 PM   Drainage Appearance Bloody 2020  3:37 PM   Number of days: 0       NG/OG/Enteral Tube Nasogastric Left nares (Active)   Site Assessment Clean;Dry; Intact 2020  3:37 PM   Status Suction-low continuous 2020  3:37 PM   Drainage Appearance Bloody 2020  3:37 PM   Intake (mL) 0 mL 2020  4:00 PM   Output (mL) 0 mL 2020  4:00 PM   Number of days: 0       Gastrostomy/Enterostomy Jejunostomy 14 Fr  LUQ (Active)   Surrounding Skin Dry; Intact 2020  3:37 PM   Drain Status Clamped 2020  3:37 PM   Dressing Status Open to air 2020  3:37 PM   Dressing Type Open to air 2020  3:37 PM   Intake (mL) 0 mL 2020  4:00 PM   Output (mL) 0 mL 2020  4:00 PM   Number of days: 124       Urethral Catheter Latex 16 Fr   (Active)   Site Assessment Clean;Skin intact 2020  3:37 PM   Collection Container Standard drainage bag 2020  3:37 PM   Securement Method Securing device (Describe) 1/28/2020  3:37 PM   Output (mL) 220 mL 1/28/2020  4:00 PM   Number of days: 0       Anesthesia Type:   General w/ Epidural    Operative Indications:  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[address] ) [C120]  48-year-old male with a a clinical T3 N1 M0 esophageal adenocarcinoma  He completed neoadjuvant chemotherapy  He now presents for resection  Risks and benefits were explained  Informed consent was obtained  Patient was brought to the operating room  Operative Findings:  Frozen sections were negative    Complications:   None    Procedure and Technique:  Patient was brought to the operating room and identified   General anesthesia was achieved  Lines were placed by Anesthesia  We then proceeded with EGD which I performed  This revealed some thickening of the distal esophagus but no tumor    The stomach appeared normal   There was food debris in the stomach  We could not adequately see the GE junction on retroflex view secondary to the food that was present despite adequate irrigation  The pylorus appeared normal and the duodenum distended normally as well  The scope was then withdrawn and the air and fluid were removed  Patient then had his arms tucked and the neck was extended   Patient was then prepped and draped in the usual sterile fashion  An upper midline incision was made from the umbilicus to the xiphoid   Using sharp dissection this was taken down through the skin, subcutaneous tissue, through the fascia and into the peritoneal cavity   The abdomen was explored and there was no evidence of metastasis  We now proceeded with our esophagogastrectomy  We entered the lesser sac through an avascular plane in the gastrocolic omentum   We now marched up the greater curve of the stomach preserving the right gastroepiploic artery taking this to the level of the spleen   The short gastrics were divided with the EnSeal device preserving the gastroepiploic along its course     We did free up the phrenoesophageal ligament at this time and mobilized the stomach from the right and left riley while freeing the stomach from the gastrohepatic ligament  We then worked towards the right side of the stomach towards the pylorus freeing this area up completely preserving the right gastroepiploic artery in its entirety   The esophagus was now encircled with a Penrose drain   A Kocher maneuver was now performed  We now were able to visualize the left gastric vessels  All lisa tissue was swept with the specimen   We divided the left gastric pedicle with two 45 white loads stapling device with all lisa tissue taken with the specimen after we ensured that there was a pulse in the hepatic artery   We now proceeded with our transhiatal dissection  We dissected the esophagus away from the pericardium and the back at the airway   Any tissue lateral to the esophagus was divided with the cautery or the EnSeal device  Once the airway was free, the lateral margins were dissected bluntly and freed  At this point the entire esophagus was free to the cervical spine  Dr Candy Medrano concomitantly perform the neck exploration   The mediastinum was packed and our neck incision was now made along the sternocleidomastoid   The platysma was divided   The sternocleidomastoid was retracted laterally and the carotid sheath was kept laterally   Middle thyroid vein was clamped, divided and ligated with 2 0 silk ties   Dissection plane was taken down to the spine and then into the mediastinum just anterior to the spinal ligament  The esophagus was now encircled with a Penrose drain  The dissection then continued until the neck and abdominal incisions met   The orogastric tube was removed and the esophagus was encircled   The esophagus was divided with a Penrose drain connected to the distal esophagus and the specimen was now brought through the abdomen   This was divided from the Penrose drain and the mediastinum was once again packed   We now started to tubularize our esophagus  We made sure the conduit was 4-5 cm wide   The lesser omentum was divided with the EnSeal device  Now using sequential fires of a 45 green load stapler we tubularized the stomach  Small area of bleeding at 1 edge of the staple line was oversewn with 2 0 silk sutures in a Lembert fashion  The specimen was oriented and sent to pathology for frozen section   The left gastric pedicle was marked with a suture  Frozen sections were negative   After we assured adequate hemostasis in the mediastinum, the conduit was attached to the Penrose drain and then gently delivered into the neck   This was not under any tension and the conduit appeared completely viable   A gastrotomy was made on the anterior surface of the conduit   The staple line was removed from the esophagus  A side-to-side, stapled anastomosis between the posterior  esophageal wall and the anterior gastric wall was created with a 45 blue load stapler  The billy was then closed with a running 4 0 PDS suture starting at either end of the incision and secured centrally  Kerry Flight was then pulled back into its normal anatomic location behind the trachea   An NG tube was then placed with the tip at the diaphragm  A Penrose drain was placed in the neck incision  This was secured to the skin using 3 0 nylon suture   The neck incision was irrigated with saline and then closed with interrupted 2 0 Vicryl suture for the platysma and skin staples  J-tube was already in place  The wound was now copiously irrigated   There was excellent hemostasis   Sponge and needle counts were correct   Bookwalter retractor was removed   Fascia was approximated with 1 PDS suture starting at either end of the incision and secured centrally   Subcutaneous tissue was irrigated   Skin was approximated with staples   Sterile dressings were applied   Patient was extubated having tolerated the procedure well and taken to the recovery room in stable condition   I was present and participated in all aspects of this procedure  A surgeon, and co-surgeon were critical for the performance of this operation due to each performing part of the operation that was specific to their specialty  I performed the EGD, the abdominal portion of the procedure including creation of the gastric conduit in lower mediastinal dissection  Dr Jake Quintanilla performed the transhiatal dissection as well as the creation of the esophagogastric anastomosis         I was present for the entire procedure, A qualified resident physician was not available and A co-surgeon was required because of skills and techniques relevant to speciality    Patient Disposition:  PACU     SIGNATURE: Alejo Delatorre MD  DATE: January 28, 2020  TIME: 4:46 PM

## 2020-01-28 NOTE — ANESTHESIA PREPROCEDURE EVALUATION
Review of Systems/Medical History  Patient summary reviewed  Chart reviewed  No history of anesthetic complications     Cardiovascular  Hyperlipidemia,    Pulmonary  Smoker ex-smoker  , COPD mild- PRN medication , Sleep apnea ,        GI/Hepatic    Esophageal disease esophageal cancer, GI malignancy,        Negative  ROS        Endo/Other  Diabetes type 2 Oral agent, History of thyroid disease , hypothyroidism,      GYN       Hematology  Anemia chronic blood loss anemia,     Musculoskeletal  Negative musculoskeletal ROS        Neurology    Headaches,    Psychology   Negative psychology ROS              Physical Exam    Airway    Mallampati score: II  TM Distance: >3 FB  Neck ROM: full     Dental   No notable dental hx     Cardiovascular      Pulmonary  Breath sounds clear to auscultation,     Other Findings        Anesthesia Plan  ASA Score- 3     Anesthesia Type- general and epidural with ASA Monitors  Additional Monitors: arterial line  Airway Plan: ETT  Plan Factors-    Induction- intravenous  Postoperative Plan- Plan for postoperative opioid use  Planned trial extubation    Informed Consent- Anesthetic plan and risks discussed with patient  I personally reviewed this patient with the CRNA  Discussed and agreed on the Anesthesia Plan with the CRNA  Elena Li Recent labs personally reviewed:  Lab Results   Component Value Date    WBC 5 69 01/16/2020    HGB 12 5 01/16/2020     01/16/2020     Lab Results   Component Value Date    K 4 5 01/16/2020    BUN 14 01/16/2020    CREATININE 0 83 01/16/2020     Lab Results   Component Value Date    PTT 34 01/16/2020      Lab Results   Component Value Date    INR 0 99 01/16/2020       Blood type O    Lab Results   Component Value Date    HGBA1C 5 4 01/16/2020       IJourdan MD, have personally seen and evaluated the patient prior to anesthetic care    I have reviewed the pre-anesthetic record, and other medical records if appropriate to the anesthetic care  If a CRNA is involved in the case, I have reviewed the CRNA assessment, if present, and agree  Risks/benefits and alternatives discussed with patient including possible PONV, sore throat, and possibility of rare anesthetic and surgical emergencies

## 2020-01-28 NOTE — OP NOTE
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]    Procedure(s) (LRB):  ESOPHAGECTOMY, TRANSHIATAL (N/A)  ESOPHAGOGASTRODUODENOSCOPY (EGD) (N/A)    Specimen(s):  ID Type Source Tests Collected by Time Destination   1 : esophagastrectomy stitch on celiac lymph node Tissue Esophagus TISSUE Brittany Burciaga MD 2020 1127    2 : final esophageal margin Tissue Esophagus TISSUE EXAM Anabella Burciaga MD 2020 1207        Estimated Blood Loss:   450 mL    Drains:  Open Drain Anterior; Left Neck (Active)   Number of days: 0       Gastrostomy/Enterostomy Jejunostomy 14 Fr  LUQ (Active)   Drain Status Clamped 2020  7:00 AM   Dressing Status Clean;Dry; Intact 2020  7:00 AM   Number of days: 124       Urethral Catheter Latex 16 Fr  (Active)   Number of days: 0       Anesthesia Type:   General w/ Epidural    Operative Indications:  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]  41-year-old male with distal esophageal carcinoma from 30-40 cm status post neoadjuvant chemotherapy with excellent response on PET-CT  History of COPD  Operative Findings: Thickening at GE junction but no gross tumor visible  EGD showed normal appearance the proximal esophagus  No masses seen in the stomach    Complications:   None    Procedure and Technique:  After obtaining informed consent from the patient, they were transferred to the operating room and placed supine on the operating table  Bilateral SCDs were placed and turned on prior to induction of general anesthesia   General anesthesia was then induced and an endotracheal tube was placed without incident  Monitoring lines and arterial lines were placed  The patient was then positioned with bilateral arms tucked at their side in all bony prominences were padded  Shoulder roll was placed to facilitate neck extension and the patient's head was tilted to the right  Doshi catheter was placed under sterile conditions  Next a formal time-out was called verifying patient , date of birth, procedure, consent, antibiotics, beta-blocker as indicated, anticipated specimens with handling, SCD use and other special considerations  Next an EGD was performed to evaluate the esophagus  There is no evidence of endoluminal masses on this  We could easily get through the GE junction  There is moderate amount of food debris in the stomach at the GE junction at the pylorus  This was irrigated out to better see  We are able to fully see into the duodenum through the pylorus  Examination of the hiatus showed no evidence of masses  The endoscope was removed at this time and a nasogastric tube was placed  Next the patient's abdomen chest and neck were prepped and draped in standard sterile fashion  Will defer to co-surgeon Dr Shannon Lake to dictate the abdominal portion of the case  After the Surgical Oncology team made entry into the abdomen and confirmed that there is no evidence of metastasis we began young our procedure in the left neck  A 5 cm incision was made along the anterior border of sternocleidomastoid from the cricoid cartilage down to the sternal notch  Using motor cautery we dissected down through the skin and soft tissue  We stayed on the anterior border of the sternocleidomastoid and mobilized this laterally  Staying in this plane we 1st came to the omohyoid muscle that was transected  Using finger dissection we stretched the plane just medial to the anterior border of SCM  We then continued this dissection moving towards the prevertebral space  We encountered the middle thyroid vein    This was tied off with 3 0 silk and ligated  At the level of the thyroid cartilage we encountered the inferior thyroid artery  This was also tied off and ligated  We continued our dissection down to the prevertebral space retracting the carotid sheath and its contents laterally  We were able to identify the esophagus and bluntly dissected around this  With blunt finger dissection we were able to stay in the prevertebral space posteriorly and on the trachea anteriorly  We encircled the esophagus with our finger and then passed a Penrose around this to help pull this up into the neck  From the neck incision we then began to mobilize down into the chest   Staying on the prevertebral space we bluntly dissected this off of the esophagus  We then started to dissect anteriorly off of the trachea  At this point in the case the Surgical Oncology team had finished their gastric mobilization and we began our mediastinal dissection from below  From below we placed a hand through the diaphragmatic crura staying posterior to the esophagus  We 1st developed the posterior plane staying on the prevertebral fascia  We continued this up cephalad as far as possible  This allowed us to meet up with our posterior dissection from the neck  We then went to the anterior surface of the esophagus  The NG tube help to confirm esophageal position  Staying on the anterior margin of the esophagus we continued this dissection upwards  We are eventually able to dissect under the trachea and into our plane from the neck  We then performed our circumferential dissection with combination of blunt dissection cautery where we could visualize attachments  With a hand in the neck and in the belly we could confirm that we were circumferentially freed  The cervical esophagus was then stapled at a oblique angle dividing our proximal and distal esophagus  In this staple line on the distal aspect we stapled the Penrose drain in place  Proximal esophagus was clamped with a Nicholas loosely and the distal esophagus was pulled down through the abdomen pulling the Penrose drain down maintaining our space for future conduit  With the help of the surgical oncology team we then created our gastric conduit  Care was taken to make a it least 5 cm wide conduit while maintaining gastric margins  Once we completed our conduit the specimen was sent off for final gastric margins  Frozen mentally came back as negative  The stomach was well perfused at this time  The distal tip of our conduit was pink and healthy  We loosely sutured the tip of the conduit to our Penrose drain and pushed this up into the neck from the abdomen  We maintain the right word orientation of this staple line for this to ensure that was not twisted  Once we could see our gastric conduit in the neck we loosely grasped this with a Nicholas and could easily advance this into our surgical field  The proximal staple line of and the esophagus was cut off with scissors and sent for final pathology  A 3-0 silk stay stitch was placed distally on the gastric conduit to elevate this to the skin  We then mapped out our incision  Staying away from our staple line we placed stay sutures through the stomach and esophagus to maintain our correct orientation  A 45 mm blue load Endo-MELISSA was used  We made a 1 5 cm gastrotomy  The anvil of the stapler was placed through our gastrotomy an angled towards healthy stomach, way from the staple line  The staple cartridges placed into the proximal esophagus  We advanced this with the help of this stay sutures and stapled the esophagus and stomach together for approximately 30 mm to create our back wall  This staple line looked well  We then ran for 0 PDS sutures from either corner towards the middle  These were full-thickness bites on the esophagus and stomach  We ensured mucosal any each bite  These were eventually tied down    The anastomosis was marked with clips at the stay suture sites  The anastomosis  was placed back into its cervical and thoracic location  Under direct palpation an NG tube was gently placed through the anastomosis and down into the conduit at the level the hiatus  We had previously palpated the pylorus and this was widely patent  This was approximately 3 cm distal to the hiatus  The left neck was copiously irrigated out and suctioned  This was dry at this time  We then began to close  A half-inch Penrose drain was placed near our anastomosis  We then closed the neck in layers with interrupted 2 0 Vicryl, interrupted 3 0 Vicryl and loose sutures  A drain stitch was placed to the Penrose drain  Dry sterile dressings were applied and the drapes were broken down  All instrument and needle counts were correct at this time  The patient was extubated and transported to the PACU in stable condition  My portion of the procedure was completed at this time  The surgical oncology team will dictate the abdominal portion in full  I was scrubbed and was present for the entire procedure        I was present for the entire procedure and A co-surgeon was required because of skills and techniques relevant to speciality    Patient Disposition:  Critical Care Unit    SIGNATURE: Singh De Guzman MD  DATE: January 28, 2020  TIME: 1:16 PM

## 2020-01-28 NOTE — CONSULTS
Consult - [address] [facility] 71 y o  male MRN: [id number]  Unit/Bed#: [facility] 266-86 Encounter: [id number]      -------------------------------------------------------------------------------------------------------------  Chief Complaint: Esophageal Adenocarcinoma    History of Present Illness     Salena Goldmann is a 71 y o  male who presents with a esophageal adenocarcinoma, T3 N1, diagnosed August 2019  This was a lower 3rd esophageal cancer extending from 30-40 cm from the incisors  PET-CT was obtained at that time which showed a PET avid mass and perigastric lymph node  He was treated with adjuvant chemotherapy from September through December 27, 2019  Repeat PET-CT on December 30, 2019 showed excellent response with minimal uptake at the mass  and no distant lymphadenopathy  He was evaluated Dr Rochelle Mcmahan surgical Oncology team last week and felt that he was an appropriate candidate for esophagectomy  Esophagogastrectomy with exploratory laporotomy was done by Dr Tl May, today under general anesthesia with epidural  Penrose drain was placed encircling the esophagus, one was also placed at the neck incision, as well as an NGT at the tip of the diaphragm  During procedure Dr Tl May notes "Thickening at [address] [place] [address] & [facility] junction but no gross tumor visible  EGD showed normal appearance the proximal esophagus  No masses seen in the stomach"  Patient was taken to PACU and extubated  No immediate complications noted  History obtained from chart review  -------------------------------------------------------------------------------------------------------------  Assessment and Plan:    Neuro:    Diagnosis: Analgesia, post-op  o Tylenol 650mg Q 6  o Ropivacaine 0 1% and fentanyl 2mcg/mL PCEA at a continuous rate of 6 mL/hr  - PCEA dose 3ml for 3 doses and hour  o Dilaudid 0 5 Q2 p r n   For breakthrough pain  o CAM ICU, delirium precautions, regulate sleep-wake      CV:    No acute issues continue to monitor VS   MAP goal >70   Currently, NSR in the 80s      Pulm:   Diagnosis: COPD  o Plan:   - PRN albuterol for SOB  - Currently on 2 L nasal cannula, continue to maintain SpO2 greater than 95%      GI:    Diagnosis:  Esophageal neoplasm S/p esophagectomy with exploratory laparotomy  o Plan  - Continue frequent exams of neck and abdominal wound  - Keep NG tube to suction  - Monitor neck and abdominal Penrose output  :    No acute issues   Monitor I&O   Maintain Doshi until tomorrow      F/E/N:    Isolate 125 mL/Hr   Replete electrolytes as needed   NPO until cleared by thoracic/white surgery      Heme/Onc:    Diagnosis:  Esophageal neoplasm s/p esophagectomy with exploratory laparotomy  o Plan:  - Per postop note no gross tumor was observed during exploratory laparotomy  - Will discuss future needs white surgery    Endo:    Diagnosis: DM  o Plan:   - Patient takes Invokana at home, which will be held due to NPO status   - Started on SSI, algorithm 1    - Q 6 hour blood sugars    ID:    No acute issues at this time   Received Ancef and Flagyl intra-op   Continue to monitor temperatures an WBCs      MSK/Skin:    No acute issues   Assist with turning and repositioning Q2   OOB when cleared by primary      Disposition: Continue Critical Care   Code Status: Level 1 - Full Code  --------------------------------------------------------------------------------------------------------------  Review of Systems   Constitutional: Negative  HENT: Positive for sore throat  Negative for congestion, ear pain, facial swelling, mouth sores and nosebleeds  Eyes: Negative  Respiratory: Positive for shortness of breath  Negative for cough, choking, chest tightness, wheezing and stridor  Gastrointestinal: Positive for abdominal pain  Negative for abdominal distention, constipation, diarrhea, nausea and vomiting  Endocrine: Negative  Genitourinary: Negative  Musculoskeletal: Negative  Allergic/Immunologic: Negative  Neurological: Negative  Hematological: Negative  Psychiatric/Behavioral: Negative  Physical Exam   Constitutional: He is oriented to person, place, and time  He appears well-developed and well-nourished  No distress  HENT:   Head: Normocephalic and atraumatic  Right Ear: External ear normal    Left Ear: External ear normal    Mouth/Throat: Uvula is midline, oropharynx is clear and moist and mucous membranes are normal    NGT present in left nostril   Eyes: Pupils are equal, round, and reactive to light  Conjunctivae, EOM and lids are normal    Neck:       Cardiovascular: Normal rate, regular rhythm, normal heart sounds and normal pulses  Pulmonary/Chest: Effort normal and breath sounds normal    Abdominal: Soft  Bowel sounds are normal        Midline incision covered with gauze and Tegaderm  Small amount of blood noted on upper 1/3 of dressing  Genitourinary:   Genitourinary Comments: Tico present   Neurological: He is alert and oriented to person, place, and time  He has normal strength  No cranial nerve deficit or sensory deficit  GCS eye subscore is 4  GCS verbal subscore is 5  GCS motor subscore is 6  Skin: Skin is warm and dry  Capillary refill takes less than 2 seconds  See wound notes in neck and abdominal section  Psychiatric: He has a normal mood and affect   His speech is normal and behavior is normal  Judgment and thought content normal  Cognition and memory are normal      --------------------------------------------------------------------------------------------------------------  Vitals:   Vitals:    01/28/20 1518 01/28/20 1540 01/28/20 1552 01/28/20 1600   BP: 109/64      Pulse: 70 72  76   Resp: 14 14  13   Temp:   (!) 97 3 °F (36 3 °C)    TempSrc:   Oral    SpO2: 98% 98%  99%   Weight:       Height:         Temp  Min: 95 4 °F (35 2 °C)  Max: 98 3 °F (36 8 °C)  IBW: 73 kg  Height: 5' 10" (177 8 cm)  Body mass index is 27 41 kg/m²  Laboratory and Diagnostics:  Results from last 7 days   Lab Units 20  1349 20  1241 20  1125   WBC Thousand/uL 6 18  --   --    HEMOGLOBIN g/dL 10 4*  --   --    I STAT HEMOGLOBIN g/dl  --  10 2* 10 5*   HEMATOCRIT % 33 8*  --   --    HEMATOCRIT, ISTAT %  --  30* 31*   PLATELETS Thousands/uL 104*  --   --      Results from last 7 days   Lab Units 20  1350 20  1241 20  1125   SODIUM mmol/L 144  --   --    POTASSIUM mmol/L 3 8  --   --    CHLORIDE mmol/L 116*  --   --    CO2 mmol/L 23  --   --    CO2, I-STAT mmol/L  --  23 24   ANION GAP mmol/L 5  --   --    BUN mg/dL 16  --   --    CREATININE mg/dL 0 69  --   --    CALCIUM mg/dL 7 4*  --   --    GLUCOSE RANDOM mg/dL 140  --   --      Results from last 7 days   Lab Units 20  1350   MAGNESIUM mg/dL 1 7                   ABG:    VBG:          Micro:        EKG:  No new EKG  Imagin/28: Postsurgical chest x-ray reviewed showing mild vascular congestion with patchy bibasilar atelectasis    Historical Information   Past Medical History:   Diagnosis Date    COPD (chronic obstructive pulmonary disease) ([facility] )     Diabetes mellitus (HCC)     Difficulty swallowing     Disease of thyroid gland     Esophageal mass     History of chemotherapy     History of transfusion     Malignant neoplasm of lower third of esophagus ([facility] )     Port-A-Cath in place     LCW    Sleep apnea     no CPAP     Past Surgical History:   Procedure Laterality Date    BACK SURGERY      x2    DISC REMOVAL      FL GUIDED CENTRAL VENOUS ACCESS DEVICE INSERTION  2019    GASTROJEJUNOSTOMY W/ JEJUNOSTOMY TUBE N/A 2019    Procedure: INSERTION JEJUNOSTOMY TUBE OPEN;  Surgeon: Cristy Curran MD;  [facility]: [facility];  Service: Surgical Oncology    TONSILLECTOMY      TUNNELED VENOUS PORT PLACEMENT N/A 2019    Procedure: INSERTION VENOUS PORT (PORT-A-CATH);   Surgeon: Cristy Curran MD;  [facility];  Service: Surgical Oncology     Social History   Social History     Substance and Sexual Activity   Alcohol Use Not Currently    Alcohol/week: 0 0 standard drinks    Frequency: Never    Drinks per session: 1 or 2    Binge frequency: Never     Social History     Substance and Sexual Activity   Drug Use Never     Social History     Tobacco Use   Smoking Status Former Smoker    Packs/day: 0 50    Years: 50 00    Pack years: 25 00    Types: Cigarettes    Last attempt to quit: 2017    Years since quittin 0   Smokeless Tobacco Never Used       Family History:   Family History   Problem Relation Age of Onset    Diabetes Mother     No Known Problems Father      I have reviewed this patient's family history and commented on sigificant items within the HPI      Medications:  Current Facility-Administered Medications   Medication Dose Route Frequency    acetaminophen (TYLENOL) tablet 650 mg  650 mg Oral Q6H    chlorhexidine (PERIDEX) 0 12 % oral rinse 15 mL  15 mL Swish & Spit Q12H [facility]    heparin (porcine) subcutaneous injection 5,000 Units  5,000 Units Subcutaneous Q8H [facility]    HYDROmorphone (DILAUDID) injection 0 5 mg  0 5 mg Intravenous Q2H PRN    lactated ringers infusion  20 mL/hr Intravenous Continuous    lactated ringers infusion  125 mL/hr Intravenous Continuous    ondansetron (ZOFRAN) injection 4 mg  4 mg Intravenous Q6H PRN    ropivacaine 0 1% and fentaNYL 2 mcg/mL PCEA   Epidural Continuous    sodium chloride 0 9 % infusion  125 mL/hr Intravenous Continuous     Home medications:  Prior to Admission Medications   Prescriptions Last Dose Informant Patient Reported? Taking? Nutritional Supplements (JEVITY 1 5 SHUN) LIQD More than a month at Unknown time Self No No   Sig: Take 85 mL by mouth continuous [email or web address] advance as tolerated to goal rate of 85ml/hr to run for 16 hours daily  Flush with at least 60ml water at start and end of cycle   Needs additional water flushes if PO fluid intake declines acetaminophen (TYLENOL) 160 mg/5 mL suspension 1/21/2020 Self No Yes   Sig: Take 20 3 mL (650 mg total) by mouth every 4 (four) hours as needed for mild pain or fever   albuterol (2 5 mg/3 mL) 0 083 % nebulizer solution More than a month at Unknown time Self No No   Sig: Take 1 vial (2 5 mg total) by nebulization every 6 (six) hours as needed for wheezing or shortness of breath   Patient not taking: Reported on 1/23/2020   canagliflozin (INVOKANA) 100 mg 1/27/2020 at Unknown time Self Yes Yes   Sig: Daily   gabapentin (NEURONTIN) 300 mg capsule 1/27/2020 at Unknown time Self No Yes   Sig: take 1 capsule by mouth daily at bedtime   levothyroxine 25 mcg tablet 1/28/2020 at 0430 Self Yes Yes   Sig: Take 25 mcg by mouth daily in the early morning   pantoprazole (PROTONIX) 40 mg tablet 1/28/2020 at 0430 Self No Yes   Sig: Take 1 tablet (40 mg total) by mouth daily   rOPINIRole (REQUIP) 0 25 mg tablet 1/27/2020 at Unknown time Self No Yes   Sig: take 1 tablet by mouth daily at bedtime      Facility-Administered Medications: None     Allergies: Allergies   Allergen Reactions    Penicillins Itching     ------------------------------------------------------------------------------------------------------------  Advance Directive and Living Will:      Power of :    POLST:    ------------------------------------------------------------------------------------------------------------  Anticipated Length of Stay is > 2 midnights    Counseling / Coordination of Care  Total Critical Care time spent 40 minutes excluding procedures, teaching and family updates  SARA Garcia        Portions of the record may have been created with voice recognition software  Occasional wrong word or "sound a like" substitutions may have occurred due to the inherent limitations of voice recognition software    Read the chart carefully and recognize, using context, where substitutions have occurred

## 2020-01-28 NOTE — INTERVAL H&P NOTE
H&P reviewed  After examining the patient I find no changes in the patients condition since the H&P had been written      Vitals:    01/28/20 0619   BP: 114/67   Pulse: 90   Resp: 18   Temp: (!) 96 5 °F (35 8 °C)   SpO2: 99%

## 2020-01-29 ENCOUNTER — APPOINTMENT (INPATIENT)
Dept: RADIOLOGY | Facility: HOSPITAL | Age: 65
DRG: 326 | End: 2020-01-29
Payer: COMMERCIAL

## 2020-01-29 LAB
ABO GROUP BLD BPU: NORMAL
ABO GROUP BLD BPU: NORMAL
ANION GAP SERPL CALCULATED.3IONS-SCNC: 7 MMOL/L (ref 4–13)
BASE EXCESS BLDA CALC-SCNC: -3.5 MMOL/L
BASOPHILS # BLD AUTO: 0.02 THOUSANDS/ΜL (ref 0–0.1)
BASOPHILS NFR BLD AUTO: 0 % (ref 0–1)
BPU ID: NORMAL
BPU ID: NORMAL
BUN SERPL-MCNC: 15 MG/DL (ref 5–25)
CALCIUM SERPL-MCNC: 8 MG/DL (ref 8.3–10.1)
CHLORIDE SERPL-SCNC: 112 MMOL/L (ref 100–108)
CO2 SERPL-SCNC: 23 MMOL/L (ref 21–32)
CREAT SERPL-MCNC: 0.75 MG/DL (ref 0.6–1.3)
CROSSMATCH: NORMAL
CROSSMATCH: NORMAL
EOSINOPHIL # BLD AUTO: 0 THOUSAND/ΜL (ref 0–0.61)
EOSINOPHIL NFR BLD AUTO: 0 % (ref 0–6)
ERYTHROCYTE [DISTWIDTH] IN BLOOD BY AUTOMATED COUNT: 18.5 % (ref 11.6–15.1)
GFR SERPL CREATININE-BSD FRML MDRD: 96 ML/MIN/1.73SQ M
GLUCOSE SERPL-MCNC: 131 MG/DL (ref 65–140)
GLUCOSE SERPL-MCNC: 132 MG/DL (ref 65–140)
GLUCOSE SERPL-MCNC: 135 MG/DL (ref 65–140)
HCO3 BLDA-SCNC: 21.1 MMOL/L (ref 22–28)
HCT VFR BLD AUTO: 34.8 % (ref 36.5–49.3)
HGB BLD-MCNC: 10.8 G/DL (ref 12–17)
IMM GRANULOCYTES # BLD AUTO: 0.02 THOUSAND/UL (ref 0–0.2)
IMM GRANULOCYTES NFR BLD AUTO: 0 % (ref 0–2)
LYMPHOCYTES # BLD AUTO: 0.49 THOUSANDS/ΜL (ref 0.6–4.47)
LYMPHOCYTES NFR BLD AUTO: 8 % (ref 14–44)
MAGNESIUM SERPL-MCNC: 2.4 MG/DL (ref 1.6–2.6)
MCH RBC QN AUTO: 26.2 PG (ref 26.8–34.3)
MCHC RBC AUTO-ENTMCNC: 31 G/DL (ref 31.4–37.4)
MCV RBC AUTO: 85 FL (ref 82–98)
MONOCYTES # BLD AUTO: 0.46 THOUSAND/ΜL (ref 0.17–1.22)
MONOCYTES NFR BLD AUTO: 7 % (ref 4–12)
NASAL CANNULA: 3
NEUTROPHILS # BLD AUTO: 5.48 THOUSANDS/ΜL (ref 1.85–7.62)
NEUTS SEG NFR BLD AUTO: 85 % (ref 43–75)
NRBC BLD AUTO-RTO: 0 /100 WBCS
O2 CT BLDA-SCNC: 16.6 ML/DL (ref 16–23)
OXYHGB MFR BLDA: 91.6 % (ref 94–97)
PCO2 BLDA: 36.7 MM HG (ref 36–44)
PH BLDA: 7.38 [PH] (ref 7.35–7.45)
PLATELET # BLD AUTO: 109 THOUSANDS/UL (ref 149–390)
PMV BLD AUTO: 9.6 FL (ref 8.9–12.7)
PO2 BLDA: 67.7 MM HG (ref 75–129)
POTASSIUM SERPL-SCNC: 4.3 MMOL/L (ref 3.5–5.3)
RBC # BLD AUTO: 4.12 MILLION/UL (ref 3.88–5.62)
SODIUM SERPL-SCNC: 142 MMOL/L (ref 136–145)
SPECIMEN SOURCE: ABNORMAL
UNIT DISPENSE STATUS: NORMAL
UNIT DISPENSE STATUS: NORMAL
UNIT PRODUCT CODE: NORMAL
UNIT PRODUCT CODE: NORMAL
UNIT RH: NORMAL
UNIT RH: NORMAL
WBC # BLD AUTO: 6.47 THOUSAND/UL (ref 4.31–10.16)

## 2020-01-29 PROCEDURE — 71045 X-RAY EXAM CHEST 1 VIEW: CPT

## 2020-01-29 PROCEDURE — 99232 SBSQ HOSP IP/OBS MODERATE 35: CPT | Performed by: ANESTHESIOLOGY

## 2020-01-29 PROCEDURE — 94760 N-INVAS EAR/PLS OXIMETRY 1: CPT

## 2020-01-29 PROCEDURE — 82805 BLOOD GASES W/O2 SATURATION: CPT | Performed by: STUDENT IN AN ORGANIZED HEALTH CARE EDUCATION/TRAINING PROGRAM

## 2020-01-29 PROCEDURE — 99232 SBSQ HOSP IP/OBS MODERATE 35: CPT | Performed by: EMERGENCY MEDICINE

## 2020-01-29 PROCEDURE — C9113 INJ PANTOPRAZOLE SODIUM, VIA: HCPCS | Performed by: NURSE PRACTITIONER

## 2020-01-29 PROCEDURE — 99024 POSTOP FOLLOW-UP VISIT: CPT | Performed by: STUDENT IN AN ORGANIZED HEALTH CARE EDUCATION/TRAINING PROGRAM

## 2020-01-29 PROCEDURE — 94660 CPAP INITIATION&MGMT: CPT

## 2020-01-29 PROCEDURE — 80048 BASIC METABOLIC PNL TOTAL CA: CPT | Performed by: SURGERY

## 2020-01-29 PROCEDURE — 99024 POSTOP FOLLOW-UP VISIT: CPT | Performed by: THORACIC SURGERY (CARDIOTHORACIC VASCULAR SURGERY)

## 2020-01-29 PROCEDURE — NC001 PR NO CHARGE: Performed by: NURSE PRACTITIONER

## 2020-01-29 PROCEDURE — 0W9930Z DRAINAGE OF RIGHT PLEURAL CAVITY WITH DRAINAGE DEVICE, PERCUTANEOUS APPROACH: ICD-10-PCS | Performed by: THORACIC SURGERY (CARDIOTHORACIC VASCULAR SURGERY)

## 2020-01-29 PROCEDURE — 85025 COMPLETE CBC W/AUTO DIFF WBC: CPT | Performed by: SURGERY

## 2020-01-29 PROCEDURE — 82948 REAGENT STRIP/BLOOD GLUCOSE: CPT

## 2020-01-29 PROCEDURE — 83735 ASSAY OF MAGNESIUM: CPT | Performed by: SURGERY

## 2020-01-29 RX ORDER — ALBUMIN, HUMAN INJ 5% 5 %
25 SOLUTION INTRAVENOUS ONCE
Status: COMPLETED | OUTPATIENT
Start: 2020-01-29 | End: 2020-01-29

## 2020-01-29 RX ORDER — SODIUM CHLORIDE, SODIUM GLUCONATE, SODIUM ACETATE, POTASSIUM CHLORIDE, MAGNESIUM CHLORIDE, SODIUM PHOSPHATE, DIBASIC, AND POTASSIUM PHOSPHATE .53; .5; .37; .037; .03; .012; .00082 G/100ML; G/100ML; G/100ML; G/100ML; G/100ML; G/100ML; G/100ML
1000 INJECTION, SOLUTION INTRAVENOUS ONCE
Status: COMPLETED | OUTPATIENT
Start: 2020-01-29 | End: 2020-01-29

## 2020-01-29 RX ORDER — LIDOCAINE HYDROCHLORIDE 10 MG/ML
5 INJECTION, SOLUTION EPIDURAL; INFILTRATION; INTRACAUDAL; PERINEURAL ONCE
Status: COMPLETED | OUTPATIENT
Start: 2020-01-29 | End: 2020-01-30

## 2020-01-29 RX ORDER — ACETAMINOPHEN 325 MG/1
975 TABLET ORAL EVERY 6 HOURS
Status: DISCONTINUED | OUTPATIENT
Start: 2020-01-29 | End: 2020-01-30

## 2020-01-29 RX ORDER — METHOCARBAMOL 500 MG/1
500 TABLET, FILM COATED ORAL EVERY 6 HOURS PRN
Status: DISCONTINUED | OUTPATIENT
Start: 2020-01-29 | End: 2020-02-12 | Stop reason: HOSPADM

## 2020-01-29 RX ORDER — KETOROLAC TROMETHAMINE 30 MG/ML
15 INJECTION, SOLUTION INTRAMUSCULAR; INTRAVENOUS EVERY 6 HOURS SCHEDULED
Status: COMPLETED | OUTPATIENT
Start: 2020-01-29 | End: 2020-01-29

## 2020-01-29 RX ORDER — GABAPENTIN 250 MG/5ML
100 SOLUTION ORAL 3 TIMES DAILY
Status: DISCONTINUED | OUTPATIENT
Start: 2020-01-29 | End: 2020-01-29

## 2020-01-29 RX ORDER — GABAPENTIN 250 MG/5ML
100 SOLUTION ORAL 3 TIMES DAILY
Status: DISCONTINUED | OUTPATIENT
Start: 2020-01-29 | End: 2020-02-12 | Stop reason: HOSPADM

## 2020-01-29 RX ORDER — LIDOCAINE HYDROCHLORIDE 10 MG/ML
INJECTION, SOLUTION EPIDURAL; INFILTRATION; INTRACAUDAL; PERINEURAL
Status: COMPLETED
Start: 2020-01-29 | End: 2020-01-30

## 2020-01-29 RX ORDER — FUROSEMIDE 10 MG/ML
20 INJECTION INTRAMUSCULAR; INTRAVENOUS ONCE
Status: COMPLETED | OUTPATIENT
Start: 2020-01-29 | End: 2020-01-29

## 2020-01-29 RX ADMIN — ACETAMINOPHEN 975 MG: 325 TABLET ORAL at 12:19

## 2020-01-29 RX ADMIN — HEPARIN SODIUM 5000 UNITS: 5000 INJECTION INTRAVENOUS; SUBCUTANEOUS at 06:33

## 2020-01-29 RX ADMIN — CHLORHEXIDINE GLUCONATE 0.12% ORAL RINSE 15 ML: 1.2 LIQUID ORAL at 08:49

## 2020-01-29 RX ADMIN — HYDROMORPHONE HYDROCHLORIDE 0.5 MG: 1 INJECTION, SOLUTION INTRAMUSCULAR; INTRAVENOUS; SUBCUTANEOUS at 14:15

## 2020-01-29 RX ADMIN — PANTOPRAZOLE SODIUM 40 MG: 40 INJECTION, POWDER, FOR SOLUTION INTRAVENOUS at 08:49

## 2020-01-29 RX ADMIN — ACETAMINOPHEN 975 MG: 325 TABLET ORAL at 18:18

## 2020-01-29 RX ADMIN — GABAPENTIN 100 MG: 250 SUSPENSION ORAL at 21:58

## 2020-01-29 RX ADMIN — SODIUM CHLORIDE, SODIUM GLUCONATE, SODIUM ACETATE, POTASSIUM CHLORIDE, MAGNESIUM CHLORIDE, SODIUM PHOSPHATE, DIBASIC, AND POTASSIUM PHOSPHATE 1000 ML: .53; .5; .37; .037; .03; .012; .00082 INJECTION, SOLUTION INTRAVENOUS at 06:49

## 2020-01-29 RX ADMIN — METHOCARBAMOL TABLETS 500 MG: 500 TABLET, COATED ORAL at 18:17

## 2020-01-29 RX ADMIN — HYDROMORPHONE HYDROCHLORIDE 0.5 MG: 1 INJECTION, SOLUTION INTRAMUSCULAR; INTRAVENOUS; SUBCUTANEOUS at 11:05

## 2020-01-29 RX ADMIN — KETOROLAC TROMETHAMINE 15 MG: 30 INJECTION, SOLUTION INTRAMUSCULAR at 18:17

## 2020-01-29 RX ADMIN — METHOCARBAMOL TABLETS 500 MG: 500 TABLET, COATED ORAL at 09:25

## 2020-01-29 RX ADMIN — GABAPENTIN 100 MG: 250 SUSPENSION ORAL at 16:45

## 2020-01-29 RX ADMIN — GABAPENTIN 100 MG: 250 SUSPENSION ORAL at 11:10

## 2020-01-29 RX ADMIN — ACETAMINOPHEN 650 MG: 325 TABLET ORAL at 06:33

## 2020-01-29 RX ADMIN — KETOROLAC TROMETHAMINE 15 MG: 30 INJECTION, SOLUTION INTRAMUSCULAR at 12:20

## 2020-01-29 RX ADMIN — HEPARIN SODIUM 5000 UNITS: 5000 INJECTION INTRAVENOUS; SUBCUTANEOUS at 15:00

## 2020-01-29 RX ADMIN — FUROSEMIDE 20 MG: 10 INJECTION, SOLUTION INTRAMUSCULAR; INTRAVENOUS at 20:17

## 2020-01-29 RX ADMIN — HEPARIN SODIUM 5000 UNITS: 5000 INJECTION INTRAVENOUS; SUBCUTANEOUS at 21:58

## 2020-01-29 RX ADMIN — ALBUMIN (HUMAN) 25 G: 12.5 INJECTION, SOLUTION INTRAVENOUS at 01:30

## 2020-01-29 RX ADMIN — CHLORHEXIDINE GLUCONATE 0.12% ORAL RINSE 15 ML: 1.2 LIQUID ORAL at 21:58

## 2020-01-29 NOTE — CONSULTS
Acute pain service following  Please see note by Dr Gearldine Dancer for full details of encounter      SARA Ren  Acute Pain Service

## 2020-01-29 NOTE — QUICK NOTE
Post op check - Surgical Oncology/Thoracic  Carolyn Avila 71 y o  male MRN: [id number]  Unit/Bed#: [facility] 518-01 Encounter: [id number]    Assessment:  72 yo mal with T3N1M0 esophageal adenocarcinoma s/p neoadjuvant chemo now s/p transhiatal esophagectomy with Thoracic and surgical oncology doing well  Plan:  NPO/NGT  Epidural  Maintain J-tube  Maintain Asha  Penrose in neck incision        Subjective/Objective     Subjective: Pain controlled, denies n/v  Doing well  Objective:     Vitals: Blood pressure 109/64, pulse 88, temperature 97 5 °F (36 4 °C), temperature source Oral, resp  rate (!) 23, height 5' 10" (1 778 m), weight 86 6 kg (191 lb), SpO2 98 %  ,Body mass index is 27 41 kg/m²  I/O       01/27 0701 - 01/28 0700 01/28 0701 - 01/29 0700    P  O   0    I V  (mL/kg)  5477 1 (63 2)    NG/GT  30    IV Piggyback  1900    Total Intake(mL/kg)  7407 1 (85 5)    Urine (mL/kg/hr)  1460 (1 4)    Emesis/NG output  0    Drains  0    Blood  450    Total Output  1910    Net  +5497 1                Physical Exam:  GEN: NAD  HEENT: MMM  Neck: incision covered with dressing, penrose in place under dressing some serosang output on gauze underneath  CV: RRR  Lung: normal effort  Ab: Soft, NT  Distended, tympanitic, nontender  Incision covered with gauze and tegaderm with some serosang on guaze but mostly clean, dry and intact  Extrem: No CCE  Neuro:  A+Ox3, motor and sensation grossly intact      Lab, Imaging and other studies:   CBC with diff:   Lab Results   Component Value Date    WBC 7 10 01/28/2020    HGB 11 4 (L) 01/28/2020    HCT 36 3 (L) 01/28/2020    MCV 84 01/28/2020     (L) 01/28/2020    MCH 26 5 (L) 01/28/2020    MCHC 31 4 01/28/2020    RDW 18 5 (H) 01/28/2020    MPV 9 6 01/28/2020    NRBC 0 01/28/2020   , BMP/CMP:   Lab Results   Component Value Date    SODIUM 140 01/28/2020    K 4 1 01/28/2020     (H) 01/28/2020    CO2 21 01/28/2020    CO2 23 01/28/2020    BUN 14 01/28/2020    CREATININE 0 69 01/28/2020    GLUCOSE 140 01/28/2020    CALCIUM 7 8 (L) 01/28/2020    AST 55 (H) 01/28/2020    ALT 49 01/28/2020    ALKPHOS 43 (L) 01/28/2020    EGFR 100 01/28/2020     VTE Pharmacologic Prophylaxis: Heparin  VTE Mechanical Prophylaxis: sequential compression device

## 2020-01-29 NOTE — CONSULTS
Anesthesia Progress Note - Epidural Pain Management    [facility] MRN: [id number]  Unit/Bed#: [facility] 521-36 Encounter: [id number]    SURGERY DATE: 1/28/2020  Post-Op Diagnosis Codes:     * 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[address] ) [C16 0]    Assessment:   71 y o  male STATUS POST   Procedure(s):  ESOPHAGECTOMY, TRANSHIATAL  ESOPHAGOGASTRODUODENOSCOPY (EGD)  POD# 1    Plan:   Patient's pain control is reasonable at this point, the bigger issue is hypotension most likely secondary to his PCEA  Blood pressures were treated with fluid boluses overnight and his CXR this morning is showing signs of fluid overload  We are holding his epidural this morning to allow his pressures to recover  In the meantime we are supplementing his pain control with adjunct medications  When his pressures improve we can restart his PCEA at a lower CI rate      1) Hold epidural to allow blood pressure to recover  2) Restart epidural at 4/4/10/4 when appropriate  3) To supplement analgesia start dilaudid IV 0 5mg q2h PRN, toradol 15mg q6h x2 doses, robaxin 500mg q6h, and gabapentin 100mg q8h    Please call  ( [facility] [phone]) with any questions    Subjective:  Current pain location(s): RLQ abdomen  Pain Scale:   5/10    Meds/Allergies     Allergies   Allergen Reactions    Penicillins Itching       Objective     Temp:  [95 4 °F (35 2 °C)-99 °F (37 2 °C)] 98 4 °F (36 9 °C)  HR:  [] 92  Resp:  [5-30] 19  BP: ()/(49-66) 97/52  Arterial Line BP: ()/(44-62) 126/46    Physical Exam:  Gen: Well appearing, NAD  CV: RRR  Pulm: CTAB  Neuro: No focal deficits  Epidural Site: Catheter in good position, not tender to palpation, site clean    SIGNATURE: Venecia Briggs MD  DATE: January 29, 2020  TIME: 8:52 AM

## 2020-01-29 NOTE — PLAN OF CARE
Problem: Prexisting or High Potential for Compromised Skin Integrity  Goal: Skin integrity is maintained or improved  Description  INTERVENTIONS:  - Identify patients at risk for skin breakdown  - Assess and monitor skin integrity  - Assess and monitor nutrition and hydration status  - Monitor labs   - Assess for incontinence   - Turn and reposition patient  - Assist with mobility/ambulation  - Relieve pressure over bony prominences  - Avoid friction and shearing  - Provide appropriate hygiene as needed including keeping skin clean and dry  - Evaluate need for skin moisturizer/barrier cream  - Collaborate with interdisciplinary team   - Patient/family teaching  - Consider wound care consult   Outcome: Progressing     Problem: Potential for Falls  Goal: Patient will remain free of falls  Description  INTERVENTIONS:  - Assess patient frequently for physical needs  -  Identify cognitive and physical deficits and behaviors that affect risk of falls    -  [place] fall precautions as indicated by assessment   - Educate patient/family on patient safety including physical limitations  - Instruct patient to call for assistance with activity based on assessment  - Modify environment to reduce risk of injury  - Consider OT/PT consult to assist with strengthening/mobility  Outcome: Progressing     Problem: PAIN - ADULT  Goal: Verbalizes/displays adequate comfort level or baseline comfort level  Description  Interventions:  - Encourage patient to monitor pain and request assistance  - Assess pain using appropriate pain scale  - Administer analgesics based on type and severity of pain and evaluate response  - Implement non-pharmacological measures as appropriate and evaluate response  - Consider cultural and social influences on pain and pain management  - Notify physician/advanced practitioner if interventions unsuccessful or patient reports new pain  Outcome: Progressing     Problem: SAFETY ADULT  Goal: Maintain or return to baseline ADL function  Description  INTERVENTIONS:  -  Assess patient's ability to carry out ADLs; assess patient's baseline for ADL function and identify physical deficits which impact ability to perform ADLs (bathing, care of mouth/teeth, toileting, grooming, dressing, etc )  - Assess/evaluate cause of self-care deficits   - Assess range of motion  - Assess patient's mobility; develop plan if impaired  - Assess patient's need for assistive devices and provide as appropriate  - Encourage maximum independence but intervene and supervise when necessary  - Involve family in performance of ADLs  - Assess for home care needs following discharge   - Consider OT consult to assist with ADL evaluation and planning for discharge  - Provide patient education as appropriate  Outcome: Progressing  Goal: Maintain or return mobility status to optimal level  Description  INTERVENTIONS:  - Assess patient's baseline mobility status (ambulation, transfers, stairs, etc )    - Identify cognitive and physical deficits and behaviors that affect mobility  - Identify mobility aids required to assist with transfers and/or ambulation (gait belt, sit-to-stand, lift, walker, cane, etc )  - [place] fall precautions as indicated by assessment  - Record patient progress and toleration of activity level on Mobility SBAR; progress patient to next Phase/Stage  - Instruct patient to call for assistance with activity based on assessment  - Consider rehabilitation consult to assist with strengthening/weightbearing, etc   Outcome: Progressing

## 2020-01-29 NOTE — RESPIRATORY THERAPY NOTE
RT Protocol Note  Eduarda Bosch 71 y o  male MRN: [id number]  Unit/Bed#: [facility] 518-01 Encounter: [id number]    Assessment    Principal Problem:    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[place] [address] )      Home Pulmonary Medications:  None per chart hx       Past Medical History:   Diagnosis Date    COPD (chronic obstructive pulmonary disease) ([place] [address] )     Diabetes mellitus ([place] [address] )     Difficulty swallowing     Disease of thyroid gland     Esophageal mass     History of chemotherapy     History of transfusion     Malignant neoplasm of lower third of esophagus ([place] [address] )     Port-A-Cath in place     LCW    Sleep apnea     no CPAP     Social History     Socioeconomic History    Marital status: Single     Spouse name: None    Number of children: None    Years of education: None    Highest education level: None   Occupational History    None   Social Needs    Financial resource strain: None    Food insecurity:     Worry: None     Inability: None    Transportation needs:     Medical: None     Non-medical: None   Tobacco Use    Smoking status: Former Smoker     Packs/day: 0 50     Years: 50 00     Pack years: 25 00     Types: Cigarettes     Last attempt to quit: 2017     Years since quittin 0    Smokeless tobacco: Never Used   Substance and Sexual Activity    Alcohol use: Not Currently     Alcohol/week: 0 0 standard drinks     Frequency: Never     Drinks per session: 1 or 2     Binge frequency: Never    Drug use: Never    Sexual activity: None   Lifestyle    Physical activity:     Days per week: None     Minutes per session: None    Stress: None   Relationships    Social connections:     Talks on phone: None     Gets together: None     Attends [employer] service: None     Active member of club or organization: None     Attends meetings of clubs or organizations: None     Relationship status: None    Intimate partner violence:     Fear of current or ex partner: None     Emotionally abused: None     Physically abused: None Forced sexual activity: None   Other Topics Concern    None   Social History Narrative    None       Subjective         Objective    Physical Exam:   Assessment Type: Assess only  Bilateral Breath Sounds: Clear    Vitals:  Blood pressure 99/66, pulse 100, temperature 99 °F (37 2 °C), temperature source Oral, resp  rate (!) 24, height 5' 10" (1 778 m), weight 86 6 kg (191 lb), SpO2 99 %  Results from last 7 days   Lab Units 01/28/20  1755   PH ART  7 345*   PCO2 ART mm Hg 38 6   PO2 ART mm Hg 107 2   HCO3 ART mmol/L 20 6*   BASE EXC ART mmol/L -4 6   O2 CONTENT ART mL/dL 16 5   O2 HGB, ARTERIAL % 96 3   ABG SOURCE  Line, Arterial       Imaging and other studies: I have personally reviewed pertinent reports  Plan    Respiratory Plan: No distress/Pulmonary history        Resp Comments: (P) Pt  evaluated for respiratory protocol  BS= clear and well aerated  Pt  in no distress on 2LPM  SpO2=95%  Pt  does not use respiratory meds at home per chart hx  Will continue PRN UDNs for now and titrate care as needed

## 2020-01-29 NOTE — PROGRESS NOTES
Rounds with Dr Wojciech Greenfield  Plan is to continue with pain management without epidural unless pt is normotensive and rating pain greater than 5  Chest Xray reviewed for concern of fluid overload  Per [address] Call before restarting PCEA  Continue IVF Dr Simin Berman new orders for toradol 15mg q6h x2 doses, robaxin 500mg q6h, and gabapentin 100mg q8h with PRN dilaudid 0 5mg q2h  OOB to chair, encouraging ISP pulm toilet, cough and deep breathing, and BP improving since PCEA on hold  Will continue to monitor

## 2020-01-29 NOTE — PROGRESS NOTES
Progress Note - Surgical Oncology  Kendell Grande 71 y o  male MRN: [id number]  Unit/Bed#: [facility] 518-01 Encounter: [id number]    Assessment:  70 yo mal with T3N1M0 esophageal adenocarcinoma s/p neoadjuvant chemo now POD#1 s/p transhiatal esophagectomy    NGT: 0  Penrose: 0    Plan:  · NPO/NGT  · IVF  · Maintain penrose drain, monitor output  · Maintain brandyn  · Discontinue johnson  · Pain control  · Consider decreasing epidural dose  · Pulmonary toilet  · Out of bed and ambulate  · Heparin prophylaxis    Subjective/Objective     Subjective: No acute events overnight  Some difficulty taking deep breaths  Pain is controlled  Objective:     Blood pressure 97/52, pulse 96, temperature 98 7 °F (37 1 °C), temperature source Oral, resp  rate 22, height 5' 10" (1 778 m), weight 86 6 kg (191 lb), SpO2 99 %  ,Body mass index is 27 41 kg/m²  Intake/Output Summary (Last 24 hours) at 1/29/2020 0603  Last data filed at 1/29/2020 0400  Gross per 24 hour   Intake 32769 59 ml   Output 2870 ml   Net 8332  59 ml       Invasive Devices     Central Venous Catheter Line            Port A Cath 09/26/19 Left Chest 124 days          Peripheral Intravenous Line            Peripheral IV 01/28/20 Left Hand less than 1 day    Peripheral IV 01/28/20 Right Arm less than 1 day          Epidural Line            Epidural Catheter 01/28/20 less than 1 day          Arterial Line            Arterial Line 01/28/20 Left Radial less than 1 day          Drain            Gastrostomy/Enterostomy Jejunostomy 14 Fr   days    NG/OG/Enteral Tube Nasogastric Left nares less than 1 day    Open Drain Anterior; Left Neck less than 1 day    Urethral Catheter Latex 16 Fr  less than 1 day                Physical Exam:  General: NAD, AAOx3  Eyes: PERRL  ENT: moist mucous membranes  Neck: supple, dressings c/d/i  CV: RRR +S1/S2  Chest: breath sounds bilaterally  Abdomen: soft, NT ND, incision c/d/i  J tube in place  Extremities: atraumatic, no edema      Lab, Imaging and other studies:  CBC:   Lab Results   Component Value Date    WBC 6 47 01/29/2020    HGB 10 8 (L) 01/29/2020    HCT 34 8 (L) 01/29/2020    MCV 85 01/29/2020     (L) 01/29/2020    MCH 26 2 (L) 01/29/2020    MCHC 31 0 (L) 01/29/2020    RDW 18 5 (H) 01/29/2020    MPV 9 6 01/29/2020    NRBC 0 01/29/2020   , CMP:   Lab Results   Component Value Date    SODIUM 142 01/29/2020    K 4 3 01/29/2020     (H) 01/29/2020    CO2 23 01/29/2020    CO2 23 01/28/2020    BUN 15 01/29/2020    CREATININE 0 75 01/29/2020    GLUCOSE 140 01/28/2020    CALCIUM 8 0 (L) 01/29/2020    AST 55 (H) 01/28/2020    ALT 49 01/28/2020    ALKPHOS 43 (L) 01/28/2020    EGFR 96 01/29/2020     VTE Pharmacologic Prophylaxis: Sequential compression device (Venodyne)  and Heparin  VTE Mechanical Prophylaxis: sequential compression device

## 2020-01-29 NOTE — PROGRESS NOTES
Pastoral Care Progress Note    2020  Patient: Joseph Ledesma : 1955  Admission Date & Time: 2020 0553  MRN: [id number] CSN: [id number]                     Chaplaincy Interventions Utilized:   Empowerment: Clarified, confirmed, or reviewed information from treatment team     Exploration: Explored emotional needs & resources      Relationship Building: Cultivated a relationship of care and support and Listened empathically      Spiritual Coping Strategies Utilized:   Spiritual gratitude

## 2020-01-29 NOTE — UTILIZATION REVIEW
Initial Clinical Review    Elective Inpatient surgical procedure  Age/Sex: 71 y o  male  Surgery Date: 1/28  Procedure: S/P ESOPHAGECTOMY, TRANSHIATAL (N/A)  ESOPHAGOGASTRODUODENOSCOPY (EGD) (N/A)  Anesthesia: General w/ Epidural    POD#1 Progress Note:   NPO/ NGT  Maintain penrose drain, monitor outpt  Maintain A Line  D/c johnson  Pain control  Consider decreasing epidural dose  Pulmonary toilet  OOB and ambulate  Admission Orders: Date/Time/Statement: Inpatient Admission Orders (From admission, onward)     Ordered        01/28/20 1310  Inpatient Admission  Once                   Orders Placed This Encounter   Procedures    Inpatient Admission     Standing Status:   Standing     Number of Occurrences:   1     Order Specific Question:   Admitting Physician     Answer:   Melvi Jade     Order Specific Question:   Level of Care     Answer:   Critical Care [15]     Order Specific Question:   Estimated length of stay     Answer:   More than 2 Midnights     Order Specific Question:   Certification     Answer:   I certify that inpatient services are medically necessary for this patient for a duration of greater than two midnights  See H&P and MD Progress Notes for additional information about the patient's course of treatment       Vital Signs: /64   Pulse 96   Temp 98 7 °F (37 1 °C)   Resp (!) 28   Ht 5' 10" (1 778 m)   Wt 86 6 kg (191 lb)   SpO2 94%   BMI 27 41 kg/m²      Diet: NPO  Mobility: Up with assistance  DVT Prophylaxis: Sequential compression device    Medications/Pain Control:   Scheduled Medications:  Medications:  acetaminophen 975 mg Per J Tube Q6H   chlorhexidine 15 mL Swish & Spit Q12H [address]   gabapentin 100 mg Per J Tube TID   heparin (porcine) 5,000 Units Subcutaneous Q8H [address]   insulin lispro 1-5 Units Subcutaneous Q6H TANNER   ketorolac 15 mg Intravenous Q6H [address]   pantoprazole 40 mg Intravenous Q24H [address]     Continuous IV Infusions:  ropivacaine 0 1% and fentaNYL 2 mcg/mL PCEA Continuous Rate: 6 mL/hr  PCEA Dose: 3 mL  PCEA Lock-out: 10 Minutes  Max Doses per Hour: 3 doses/hr  Freq: Continuous Route: EP  Last Dose: Stopped (01/29/20 0822)  Start: 01/28/20 1345 End: 01/29/20 0852    lactated ringers infusion   Rate: 125 mL/hr Dose: 125 mL/hr  Freq: Continuous Route: IV  Indications of Use: IV Hydration  Last Dose: Stopped (01/28/20 1307)  Start: 01/28/20 0715 End: 01/28/20 1715    sodium chloride 0 9 % infusion   Rate: 125 mL/hr Dose: 125 mL/hr  Freq: Continuous Route: IV  Indications of Use: IV Hydration  Last Dose: Stopped (01/28/20 1730)  Start: 01/28/20 1315 End: 01/28/20 1715    PRN Meds:  albuterol 2 5 mg Nebulization Q6H PRN   HYDROmorphone 0 5 mg Intravenous Q2H PRN  1/29 x1   methocarbamol 500 mg Per J Tube Q6H PRN  1/29 x1   ondansetron 4 mg Intravenous Q6H PRN     Network Utilization Review Department  [email or web address]  ATTENTION: Please call with any questions or concerns to [phone] and carefully listen to the prompts so that you are directed to the right person  All voicemails are confidential   Lisandra York all requests for admission clinical reviews, approved or denied determinations and any other requests to dedicated fax number below belonging to the campus where the patient is receiving treatment   List of dedicated fax numbers for the Facilities:  [address] [place] [address] DENIALS (Administrative/Medical Necessity) [phone]   [address] (Maternity/NICU/Pediatrics) [phone]   Dereck Day [phone]   [facility] [phone]   [address] [place] [address] [phone]   [address] [place] [address]  [phone]   [address] [phone]   [facility]  [phone]   [address] [facility], [address]  [address] [phone]

## 2020-01-29 NOTE — PROGRESS NOTES
01/29/20 1100   Clinical Encounter Type   Visited With Patient   Routine Visit Introduction   Continue Visiting Yes

## 2020-01-29 NOTE — SOCIAL WORK
CM met with pt to discuss DCP and cm role  Pt lives with daughter Arnold Fall in a  2sh with no edgard  Prior to admission pt  Was independent with ambulation and with ADLS  No prior hx of VNA  Pt's preference for pharmacy is [facility] in [place]  Pt denies any hx of drugs/ETOH or inpt psych stays  Patient/caregiver received discharge checklist  Content reviewed  Patient/caregiver encouraged to participate in discharge plan of care prior to discharge home  CM reviewed d/c planning process including the following: identifying help at home, patient preference for d/c planning needs, Discharge Lounge, [facility] Meds to Bed program, availability of treatment team to discuss questions or concerns patient and/or family may have regarding understanding medications and recognizing signs and symptoms once discharged  CM also encouraged patient to follow up with all recommended appointments after discharge  Patient advised of importance for patient and family to participate in managing patients medical well being

## 2020-01-29 NOTE — PROGRESS NOTES
Daily Progress Note - Critical Care   Yajaira Carvalho 71 y o  male MRN: [id number]  Unit/Bed#: [facility] 518-01 Encounter: [id number]        ----------------------------------------------------------------------------------------  HPI/24hr events:  Patient became mildly hypotensive overnight maps in the 50s to 60s  Patient responded well to fluid  He was given a total of 2 L of isolyte  Patient states that his pain has been well controlled with PCA     ---------------------------------------------------------------------------------------  SUBJECTIVE  Patient denies having any current pain  He confirms that his pain has been well controlled with use of the PCA  Denies shortness of breath, cough, difficulty breathing, nausea, vomiting  Review of Systems   Constitutional: Negative  HENT: Negative  Eyes: Negative  Respiratory: Negative  Cardiovascular: Negative  Gastrointestinal: Positive for abdominal pain  Endocrine: Negative  Genitourinary: Negative  Musculoskeletal: Negative  Allergic/Immunologic: Negative  Neurological: Negative  Psychiatric/Behavioral: Negative  Review of systems was reviewed and negative unless stated above in HPI/24-hour events   ---------------------------------------------------------------------------------------  Assessment and Plan:    Neuro:   · Diagnosis: Analgesia, post-op  ? Tylenol 650mg Q 6  ? Ropivacaine 0 1% and fentanyl 2mcg/mL PCA at a continuous rate of 6 mL/hr  § PCA dose 3ml for 3 doses and hour  ? Dilaudid 0 5 Q2 p r n  For breakthrough pain  ? CAM ICU, delirium precautions, regulate sleep-wake        CV:   · Hypotension, possibly secondary to pain control  · Per surgery, continue treat hypertension fluid  · Consider decreasing PCA doses if hypertension persists  · Maintain MAP goal >70, per surgery        Pulm:  · Diagnosis: COPD  ?  Plan:   § PRN albuterol for SOB  § Currently on 2 L nasal cannula, continue to maintain SpO2 greater than 95%        GI:   · Diagnosis:  Esophageal neoplasm S/p esophagectomy with exploratory laparotomy  ? Plan  § Continue frequent exams of neck and abdominal wound  § Keep NG tube to suction  § Monitor neck and abdominal Penrose output         :   · No acute issues  · Monitor I&O  · Discontinue Doshi today        F/E/N:   · Isolate 125 mL/Hr  · Replete electrolytes as needed  · NPO until cleared by thoracic/white surgery        Heme/Onc:   · Diagnosis:  Esophageal neoplasm s/p esophagectomy with exploratory laparotomy  ? Plan:  § Per postop note no gross tumor was observed during exploratory laparotomy  § Will discuss future needs white surgery     Endo:   · Diagnosis: DM  ? Plan:   § Patient takes Corrinne Goltz at home, which will be held due to NPO status  § Started on SSI, algorithm 1    § Blood sugars controlled, <130   § Q 6 hour blood sugars     ID:    · No acute issues at this time  · Received Ancef and Flagyl intra-op  · Continue to monitor temperatures an WBCs  · Afebrile, overnight        MSK/Skin:   · No acute issues  · Assist with turning and repositioning Q2  · OOB when cleared by primary      Disposition: Continue Critical Care   Code Status: Level 1 - Full Code  ---------------------------------------------------------------------------------------  ICU CORE MEASURES    Prophylaxis   VTE Pharmacologic Prophylaxis: Heparin  VTE Mechanical Prophylaxis: sequential compression device  Stress Ulcer Prophylaxis: Pantoprazole IV     ABCDE Protocol (if indicated)  Plan to perform spontaneous awakening trial today? Not applicable  Plan to perform spontaneous breathing trial today? No Not applicable  Obvious barriers to extubation?  Not applicable  CAM-ICU: Negative    Invasive Devices Review  Invasive Devices     Central Venous Catheter Line            Port A Cath 09/26/19 Left Chest 124 days          Peripheral Intravenous Line            Peripheral IV 01/28/20 Left Hand less than 1 day    Peripheral IV 20 Right Arm less than 1 day          Epidural Line            Epidural Catheter 20 less than 1 day          Arterial Line            Arterial Line 20 Left Radial less than 1 day          Drain            Gastrostomy/Enterostomy Jejunostomy 14 Fr   days    NG/OG/Enteral Tube Nasogastric Left nares less than 1 day    Open Drain Anterior; Left Neck less than 1 day    Urethral Catheter Latex 16 Fr  less than 1 day              ---------------------------------------------------------------------------------------  OBJECTIVE    Vitals   Vitals:    20 0100 20 0200 20 0300 20 0400   BP: 102/51 103/54 92/54 97/52   Pulse: 100 100 94 98   Resp: 21 21 (!) 30 21   Temp:   98 7 °F (37 1 °C)    TempSrc:   Oral    SpO2: 99% 99% 98% 99%   Weight:       Height:         Temp (24hrs), Av 2 °F (36 2 °C), Min:95 4 °F (35 2 °C), Max:99 °F (37 2 °C)  Current: Temperature: 98 7 °F (37 1 °C)      Physical Exam   Constitutional: He is oriented to person, place, and time  He appears well-developed and well-nourished  No distress  HENT:   Head: Normocephalic  Right Ear: External ear normal    Left Ear: External ear normal    Mouth/Throat: Oropharynx is clear and moist and mucous membranes are normal    NG tube present in left nostril   Eyes: Pupils are equal, round, and reactive to light  Conjunctivae, EOM and lids are normal    Neck:       No swelling  No stridor audible  Cardiovascular: Normal rate, regular rhythm, normal heart sounds and normal pulses  Pulmonary/Chest: Effort normal and breath sounds normal    Abdominal: Soft  Bowel sounds are decreased  Midline incision covered with gauze and Tegaderm  Small amount of old blood noted on upper 1/3 dressing  Genitourinary:   Genitourinary Comments: Doshi catheter present   Neurological: He is alert and oriented to person, place, and time  He has normal strength  GCS eye subscore is 4  GCS verbal subscore is 5   GCS motor subscore is 6  Skin: Skin is warm and dry  Capillary refill takes less than 2 seconds  Psychiatric: He has a normal mood and affect   His speech is normal and behavior is normal  Judgment and thought content normal  Cognition and memory are normal          Respiratory:    O2 Flow Rate (L/min): 3 L/min    Invasive/non-invasive ventilation settings   Respiratory    Lab Data (Last 4 hours)    None         O2/Vent Data (Last 4 hours)    None                Laboratory and Diagnostics:  Results from last 7 days   Lab Units 01/28/20 1755 01/28/20  1349 01/28/20  1241 01/28/20  1125   WBC Thousand/uL 7 10 6 18  --   --    HEMOGLOBIN g/dL 11 4* 10 4*  --   --    I STAT HEMOGLOBIN g/dl  --   --  10 2* 10 5*   HEMATOCRIT % 36 3* 33 8*  --   --    HEMATOCRIT, ISTAT %  --   --  30* 31*   PLATELETS Thousands/uL 100* 104*  --   --    NEUTROS PCT % 87*  --   --   --    MONOS PCT % 7  --   --   --      Results from last 7 days   Lab Units 01/28/20 1755 01/28/20  1350 01/28/20  1241 01/28/20  1125   SODIUM mmol/L 140 144  --   --    POTASSIUM mmol/L 4 1 3 8  --   --    CHLORIDE mmol/L 112* 116*  --   --    CO2 mmol/L 21 23  --   --    CO2, I-STAT mmol/L  --   --  23 24   ANION GAP mmol/L 7 5  --   --    BUN mg/dL 14 16  --   --    CREATININE mg/dL 0 69 0 69  --   --    CALCIUM mg/dL 7 8* 7 4*  --   --    GLUCOSE RANDOM mg/dL 132 140  --   --    ALT U/L 49  --   --   --    AST U/L 55*  --   --   --    ALK PHOS U/L 43*  --   --   --    ALBUMIN g/dL 3 0*  --   --   --    TOTAL BILIRUBIN mg/dL 1 09*  --   --   --      Results from last 7 days   Lab Units 01/28/20 1755 01/28/20  1350   MAGNESIUM mg/dL 1 9 1 7               Results from last 7 days   Lab Units 01/28/20 1755   LACTIC ACID mmol/L 0 7     ABG:  Results from last 7 days   Lab Units 01/28/20  1755   PH ART  7 345*   PCO2 ART mm Hg 38 6   PO2 ART mm Hg 107 2   HCO3 ART mmol/L 20 6*   BASE EXC ART mmol/L -4 6   ABG SOURCE  Line, Arterial     VBG:  Results from last 7 days Lab Units 01/28/20  1755   ABG SOURCE  Line, Arterial           Micro        EKG: No new EKG  Imaging:  I have personally reviewed pertinent reports  Intake and Output  I/O       01/27 0701 - 01/28 0700 01/28 0701 - 01/29 0700    P  O   0    I V  (mL/kg)  8792 6 (101 5)    NG/GT  60    IV Piggyback  2350    Total Intake(mL/kg)  20380 6 (129 4)    Urine (mL/kg/hr)  2420 (1 2)    Emesis/NG output  0    Drains  0    Blood  450    Total Output  2870    Net  +8332 6                Height and Weights   Height: 5' 10" (177 8 cm)  IBW: 73 kg  Body mass index is 27 41 kg/m²  Weight (last 2 days)     Date/Time   Weight    01/28/20 0619   86 6 (191)                Nutrition       Diet Orders   (From admission, onward)             Start     Ordered    01/28/20 1535  Diet NPO  Diet effective now     Question Answer Comment   Diet Type NPO    RD to adjust diet per protocol?  Yes        01/28/20 1534                    Active Medications  Scheduled Meds:  Current Facility-Administered Medications:  acetaminophen 650 mg Oral Q6H Clare Martinez MD    albuterol 2 5 mg Nebulization Q6H PRN SARA Multani    chlorhexidine 15 mL Swish & Spit Q12H [facility] Kaia Barron    heparin (porcine) 5,000 Units Subcutaneous Q8H [facility] Yuliya Barron    HYDROmorphone 0 5 mg Intravenous Q2H PRN Clare Martinez MD    insulin lispro 1-5 Units Subcutaneous Q6H [facility] SARA Multani    multi-electrolyte 125 mL/hr Intravenous Continuous SARA Multani Last Rate: 125 mL/hr (01/28/20 1730)   ondansetron 4 mg Intravenous Q6H PRN Yuliya Barron    pantoprazole 40 mg Intravenous Q24H [facility] SARA Multani    ropivacaine 0 1% and fentaNYL 2 mcg/mL  Epidural Continuous Clare Martinez MD      Continuous Infusions:    multi-electrolyte 125 mL/hr Last Rate: 125 mL/hr (01/28/20 1730)   ropivacaine 0 1% and fentaNYL 2 mcg/mL       PRN Meds:     albuterol 2 5 mg Q6H PRN   HYDROmorphone 0 5 mg Q2H PRN   ondansetron 4 mg Q6H PRN       Allergies Allergies   Allergen Reactions    Penicillins Itching     ---------------------------------------------------------------------------------------  Advance Directive and Living Will:      Power of :    POLST:    ---------------------------------------------------------------------------------------  Counseling / Coordination of Care  Total Critical Care time spent 31 minutes excluding procedures, teaching and family updates  SARA Reyes      Portions of the record may have been created with voice recognition software  Occasional wrong word or "sound a like" substitutions may have occurred due to the inherent limitations of voice recognition software    Read the chart carefully and recognize, using context, where substitutions have occurred

## 2020-01-30 ENCOUNTER — APPOINTMENT (INPATIENT)
Dept: RADIOLOGY | Facility: HOSPITAL | Age: 65
DRG: 326 | End: 2020-01-30
Payer: COMMERCIAL

## 2020-01-30 LAB
ANION GAP SERPL CALCULATED.3IONS-SCNC: 7 MMOL/L (ref 4–13)
APPEARANCE FLD: ABNORMAL
APPEARANCE FLD: ABNORMAL
BASE EXCESS BLDA CALC-SCNC: -1.4 MMOL/L
BASE EXCESS BLDA CALC-SCNC: -1.8 MMOL/L
BASOPHILS # BLD AUTO: 0.03 THOUSANDS/ΜL (ref 0–0.1)
BASOPHILS NFR BLD AUTO: 0 % (ref 0–1)
BUN SERPL-MCNC: 25 MG/DL (ref 5–25)
CALCIUM SERPL-MCNC: 8.3 MG/DL (ref 8.3–10.1)
CHLORIDE SERPL-SCNC: 109 MMOL/L (ref 100–108)
CO2 SERPL-SCNC: 23 MMOL/L (ref 21–32)
COLOR FLD: ABNORMAL
COLOR FLD: ABNORMAL
CREAT SERPL-MCNC: 1.06 MG/DL (ref 0.6–1.3)
EOSINOPHIL # BLD AUTO: 0.03 THOUSAND/ΜL (ref 0–0.61)
EOSINOPHIL NFR BLD AUTO: 0 % (ref 0–6)
ERYTHROCYTE [DISTWIDTH] IN BLOOD BY AUTOMATED COUNT: 18.5 % (ref 11.6–15.1)
GFR SERPL CREATININE-BSD FRML MDRD: 73 ML/MIN/1.73SQ M
GLUCOSE FLD-MCNC: 126 MG/DL
GLUCOSE SERPL-MCNC: 122 MG/DL (ref 65–140)
GLUCOSE SERPL-MCNC: 129 MG/DL (ref 65–140)
GLUCOSE SERPL-MCNC: 130 MG/DL (ref 65–140)
GLUCOSE SERPL-MCNC: 131 MG/DL (ref 65–140)
GLUCOSE SERPL-MCNC: 135 MG/DL (ref 65–140)
HCO3 BLDA-SCNC: 22.5 MMOL/L (ref 22–28)
HCO3 BLDA-SCNC: 23.5 MMOL/L (ref 22–28)
HCT VFR BLD AUTO: 36.8 % (ref 36.5–49.3)
HFNC FLOW LPM: 50
HFNC FLOW LPM: 50
HGB BLD-MCNC: 11.2 G/DL (ref 12–17)
IMM GRANULOCYTES # BLD AUTO: 0.03 THOUSAND/UL (ref 0–0.2)
IMM GRANULOCYTES NFR BLD AUTO: 0 % (ref 0–2)
LDH FLD L TO P-CCNC: 368 U/L
LYMPHOCYTES # BLD AUTO: 0.68 THOUSANDS/ΜL (ref 0.6–4.47)
LYMPHOCYTES NFR BLD AUTO: 13 %
LYMPHOCYTES NFR BLD AUTO: 15 %
LYMPHOCYTES NFR BLD AUTO: 8 % (ref 14–44)
MAGNESIUM SERPL-MCNC: 2.7 MG/DL (ref 1.6–2.6)
MCH RBC QN AUTO: 26 PG (ref 26.8–34.3)
MCHC RBC AUTO-ENTMCNC: 30.4 G/DL (ref 31.4–37.4)
MCV RBC AUTO: 85 FL (ref 82–98)
MONOCYTES # BLD AUTO: 0.6 THOUSAND/ΜL (ref 0.17–1.22)
MONOCYTES NFR BLD AUTO: 5 %
MONOCYTES NFR BLD AUTO: 7 % (ref 4–12)
MONOCYTES NFR BLD AUTO: 8 %
NEUTROPHILS # BLD AUTO: 7.24 THOUSANDS/ΜL (ref 1.85–7.62)
NEUTS SEG NFR BLD AUTO: 77 %
NEUTS SEG NFR BLD AUTO: 82 %
NEUTS SEG NFR BLD AUTO: 85 % (ref 43–75)
NON VENT HFNC FIO2: 72
NON VENT HFNC FIO2: 80
NON VENT TYPE HFNC: ABNORMAL
NON VENT TYPE HFNC: ABNORMAL
NRBC BLD AUTO-RTO: 0 /100 WBCS
O2 CT BLDA-SCNC: 15.4 ML/DL (ref 16–23)
O2 CT BLDA-SCNC: 16.9 ML/DL (ref 16–23)
OXYHGB MFR BLDA: 90.3 % (ref 94–97)
OXYHGB MFR BLDA: 98.2 % (ref 94–97)
PCO2 BLDA: 36.7 MM HG (ref 36–44)
PCO2 BLDA: 40 MM HG (ref 36–44)
PH BLDA: 7.39 [PH] (ref 7.35–7.45)
PH BLDA: 7.41 [PH] (ref 7.35–7.45)
PHOSPHATE SERPL-MCNC: 3.5 MG/DL (ref 2.3–4.1)
PLATELET # BLD AUTO: 142 THOUSANDS/UL (ref 149–390)
PMV BLD AUTO: 10.1 FL (ref 8.9–12.7)
PO2 BLDA: 168.7 MM HG (ref 75–129)
PO2 BLDA: 60.5 MM HG (ref 75–129)
POTASSIUM SERPL-SCNC: 4 MMOL/L (ref 3.5–5.3)
PROT FLD-MCNC: 3 G/DL
RBC # BLD AUTO: 4.31 MILLION/UL (ref 3.88–5.62)
SITE: ABNORMAL
SITE: ABNORMAL
SODIUM SERPL-SCNC: 139 MMOL/L (ref 136–145)
SPECIMEN SOURCE: ABNORMAL
SPECIMEN SOURCE: ABNORMAL
TOTAL CELLS COUNTED SPEC: 100
TOTAL CELLS COUNTED SPEC: 100
WBC # BLD AUTO: 8.61 THOUSAND/UL (ref 4.31–10.16)
WBC # FLD MANUAL: 4623 /UL
WBC # FLD MANUAL: 4917 /UL

## 2020-01-30 PROCEDURE — 83735 ASSAY OF MAGNESIUM: CPT | Performed by: EMERGENCY MEDICINE

## 2020-01-30 PROCEDURE — 82948 REAGENT STRIP/BLOOD GLUCOSE: CPT

## 2020-01-30 PROCEDURE — 97163 PT EVAL HIGH COMPLEX 45 MIN: CPT

## 2020-01-30 PROCEDURE — 94660 CPAP INITIATION&MGMT: CPT

## 2020-01-30 PROCEDURE — 87070 CULTURE OTHR SPECIMN AEROBIC: CPT | Performed by: SURGERY

## 2020-01-30 PROCEDURE — 82805 BLOOD GASES W/O2 SATURATION: CPT | Performed by: EMERGENCY MEDICINE

## 2020-01-30 PROCEDURE — 82805 BLOOD GASES W/O2 SATURATION: CPT | Performed by: SURGERY

## 2020-01-30 PROCEDURE — 99024 POSTOP FOLLOW-UP VISIT: CPT | Performed by: SURGERY

## 2020-01-30 PROCEDURE — 99232 SBSQ HOSP IP/OBS MODERATE 35: CPT | Performed by: ANESTHESIOLOGY

## 2020-01-30 PROCEDURE — 99233 SBSQ HOSP IP/OBS HIGH 50: CPT | Performed by: EMERGENCY MEDICINE

## 2020-01-30 PROCEDURE — 99024 POSTOP FOLLOW-UP VISIT: CPT | Performed by: THORACIC SURGERY (CARDIOTHORACIC VASCULAR SURGERY)

## 2020-01-30 PROCEDURE — 80048 BASIC METABOLIC PNL TOTAL CA: CPT | Performed by: EMERGENCY MEDICINE

## 2020-01-30 PROCEDURE — 83615 LACTATE (LD) (LDH) ENZYME: CPT | Performed by: SURGERY

## 2020-01-30 PROCEDURE — 82945 GLUCOSE OTHER FLUID: CPT | Performed by: SURGERY

## 2020-01-30 PROCEDURE — 89051 BODY FLUID CELL COUNT: CPT | Performed by: SURGERY

## 2020-01-30 PROCEDURE — 94760 N-INVAS EAR/PLS OXIMETRY 1: CPT

## 2020-01-30 PROCEDURE — 71045 X-RAY EXAM CHEST 1 VIEW: CPT

## 2020-01-30 PROCEDURE — 97167 OT EVAL HIGH COMPLEX 60 MIN: CPT

## 2020-01-30 PROCEDURE — 87205 SMEAR GRAM STAIN: CPT | Performed by: SURGERY

## 2020-01-30 PROCEDURE — 32554 ASPIRATE PLEURA W/O IMAGING: CPT | Performed by: SURGERY

## 2020-01-30 PROCEDURE — 84157 ASSAY OF PROTEIN OTHER: CPT | Performed by: SURGERY

## 2020-01-30 PROCEDURE — 85025 COMPLETE CBC W/AUTO DIFF WBC: CPT | Performed by: EMERGENCY MEDICINE

## 2020-01-30 PROCEDURE — C9113 INJ PANTOPRAZOLE SODIUM, VIA: HCPCS | Performed by: NURSE PRACTITIONER

## 2020-01-30 PROCEDURE — 84100 ASSAY OF PHOSPHORUS: CPT | Performed by: EMERGENCY MEDICINE

## 2020-01-30 RX ORDER — FUROSEMIDE 10 MG/ML
40 INJECTION INTRAMUSCULAR; INTRAVENOUS ONCE
Status: COMPLETED | OUTPATIENT
Start: 2020-01-30 | End: 2020-01-30

## 2020-01-30 RX ORDER — OXYCODONE HCL 5 MG/5 ML
10 SOLUTION, ORAL ORAL EVERY 4 HOURS PRN
Status: DISCONTINUED | OUTPATIENT
Start: 2020-01-30 | End: 2020-01-30

## 2020-01-30 RX ORDER — OXYCODONE HCL 5 MG/5 ML
5 SOLUTION, ORAL ORAL EVERY 4 HOURS PRN
Status: DISCONTINUED | OUTPATIENT
Start: 2020-01-30 | End: 2020-01-31

## 2020-01-30 RX ORDER — OXYCODONE HCL 5 MG/5 ML
2.5 SOLUTION, ORAL ORAL EVERY 4 HOURS PRN
Status: DISCONTINUED | OUTPATIENT
Start: 2020-01-30 | End: 2020-01-31

## 2020-01-30 RX ORDER — ACETAMINOPHEN 160 MG/5ML
975 SUSPENSION, ORAL (FINAL DOSE FORM) ORAL EVERY 6 HOURS SCHEDULED
Status: DISCONTINUED | OUTPATIENT
Start: 2020-01-30 | End: 2020-01-31

## 2020-01-30 RX ORDER — OXYCODONE HCL 5 MG/5 ML
5 SOLUTION, ORAL ORAL EVERY 4 HOURS PRN
Status: DISCONTINUED | OUTPATIENT
Start: 2020-01-30 | End: 2020-01-30

## 2020-01-30 RX ADMIN — LIDOCAINE HYDROCHLORIDE 5 ML: 10 INJECTION, SOLUTION EPIDURAL; INFILTRATION; INTRACAUDAL; PERINEURAL at 00:19

## 2020-01-30 RX ADMIN — GABAPENTIN 100 MG: 250 SUSPENSION ORAL at 08:31

## 2020-01-30 RX ADMIN — CHLORHEXIDINE GLUCONATE 0.12% ORAL RINSE 15 ML: 1.2 LIQUID ORAL at 08:31

## 2020-01-30 RX ADMIN — GABAPENTIN 100 MG: 250 SUSPENSION ORAL at 15:40

## 2020-01-30 RX ADMIN — CHLORHEXIDINE GLUCONATE 0.12% ORAL RINSE 15 ML: 1.2 LIQUID ORAL at 21:11

## 2020-01-30 RX ADMIN — PANTOPRAZOLE SODIUM 40 MG: 40 INJECTION, POWDER, FOR SOLUTION INTRAVENOUS at 08:31

## 2020-01-30 RX ADMIN — ACETAMINOPHEN 975 MG: 325 TABLET ORAL at 00:19

## 2020-01-30 RX ADMIN — ENOXAPARIN SODIUM 40 MG: 40 INJECTION SUBCUTANEOUS at 13:25

## 2020-01-30 RX ADMIN — Medication 1 SPRAY: at 22:49

## 2020-01-30 RX ADMIN — HEPARIN SODIUM 5000 UNITS: 5000 INJECTION INTRAVENOUS; SUBCUTANEOUS at 05:29

## 2020-01-30 RX ADMIN — ACETAMINOPHEN 975 MG: 650 SUSPENSION ORAL at 11:07

## 2020-01-30 RX ADMIN — ACETAMINOPHEN 975 MG: 650 SUSPENSION ORAL at 17:51

## 2020-01-30 RX ADMIN — FUROSEMIDE 40 MG: 10 INJECTION, SOLUTION INTRAMUSCULAR; INTRAVENOUS at 11:07

## 2020-01-30 RX ADMIN — GABAPENTIN 100 MG: 250 SUSPENSION ORAL at 21:11

## 2020-01-30 NOTE — PROCEDURES
Thoracentesis Procedure Note: Left thoracentesis    Pre-operative Diagnosis: Bilateral pleural effusions    Post-operative Diagnosis: same    Indications: Acute respiratory decompensation    Procedure Details   Consent: Informed consent was obtained  Risks of the procedure were discussed including: infection, bleeding, pain, pneumothorax  Under sterile conditions the patient was positioned  Betadine solution and sterile drapes were utilized  1% buffered lidocaine was used to anesthetize the left 7th rib space  Fluid was obtained without any difficulties and minimal blood loss  A dressing was applied to the wound and wound care instructions were provided  Findings  1500 ml of serosanguinous pleural fluid was obtained  A sample was sent to Pathology for cytogenetics, flow, and cell counts, as well as for infection analysis  Complications:  None; patient tolerated the procedure well  Condition: stable    Plan  A follow up chest x-ray was ordered  Bed Rest for 0 hours  Tylenol 650 mg  for pain      Attending Attestation:

## 2020-01-30 NOTE — PROGRESS NOTES
Progress Note - Oncology Surgery   Dayne Mancilla 71 y o  male MRN: [id number]  Unit/Bed#: [facility] 680-37 Encounter: [id number]    Assessment:  71 M with esophageal adenocarcinoma status post transhiatal esophagectomy  Some respiratory difficulties overnight, likely related to fluid overload  S/P b/l thoracentesis, 1400 and 1500 cc     WBC 8 6  7 4/36/168/22/-1 8    Afebrile    Plan:  Start TF today - Jevity 1 2 @ 20  Wean HFNC  PRN pain control - epidural off for pressures  OOB, ambulation  Keep in unit today due to HFNC requirements  Swallow study 2/3, NGT until then    Subjective/Objective   Chief Complaint:     Subjective: Denies pain, required HFNC overnight up to 80% (50LPM), ABG now improved    Objective:     Blood pressure 116/65, pulse 92, temperature 98 6 °F (37 °C), temperature source Oral, resp  rate 19, height 5' 10" (1 778 m), weight 86 6 kg (191 lb), SpO2 100 %  ,Body mass index is 27 41 kg/m²  Intake/Output Summary (Last 24 hours) at 1/30/2020 2673  Last data filed at 1/30/2020 0400  Gross per 24 hour   Intake 3152 21 ml   Output 1073 ml   Net 2079 21 ml       Invasive Devices     Central Venous Catheter Line            Port A Cath 09/26/19 Left Chest 125 days          Peripheral Intravenous Line            Peripheral IV 01/28/20 Left Hand 1 day    Peripheral IV 01/28/20 Right Arm 1 day          Epidural Line            Epidural Catheter 01/28/20 1 day          Arterial Line            Arterial Line 01/28/20 Left Radial 1 day          Drain            Gastrostomy/Enterostomy Jejunostomy 14 Fr   days    NG/OG/Enteral Tube Nasogastric Left nares 1 day    Open Drain Anterior; Left Neck 1 day    Urethral Catheter Latex 16 Fr  1 day              Physical Exam:   Gen: A&O, NAD  Cardio: RRR  Lungs: CTAB  Abd: Soft, non distended, non tender      Lab, Imaging and other studies:  CBC:   Lab Results   Component Value Date    WBC 8 61 01/30/2020    HGB 11 2 (L) 01/30/2020    HCT 36 8 01/30/2020 MCV 85 01/30/2020     (L) 01/30/2020    MCH 26 0 (L) 01/30/2020    MCHC 30 4 (L) 01/30/2020    RDW 18 5 (H) 01/30/2020    MPV 10 1 01/30/2020    NRBC 0 01/30/2020   , CMP: No results found for: SODIUM, K, CL, CO2, ANIONGAP, BUN, CREATININE, GLUCOSE, CALCIUM, AST, ALT, ALKPHOS, PROT, BILITOT, EGFR, Coagulation: No results found for: PT, INR, APTT  VTE Pharmacologic Prophylaxis: Heparin  VTE Mechanical Prophylaxis: sequential compression device

## 2020-01-30 NOTE — PROCEDURES
Thoracentesis Procedure Note: Right thoracentesis    Pre-operative Diagnosis: bilateral pleural effusion    Post-operative Diagnosis: same    Indications: acute respiratory decompensation    Procedure Details   Consent: Informed consent was obtained  Risks of the procedure were discussed including: infection, bleeding, pain, pneumothorax  Under sterile conditions the patient was positioned  Betadine solution and sterile drapes were utilized  1% buffered lidocaine was used to anesthetize the right 7th rib space  Fluid was obtained without any difficulties and minimal blood loss  A dressing was applied to the wound and wound care instructions were provided  Findings  1 4 ml of serosanguinous pleural fluid was obtained  A sample was sent to Pathology for cytogenetics, flow, and cell counts, as well as for infection analysis  Complications:  None; patient tolerated the procedure well  Condition: stable    Plan  A follow up chest x-ray was ordered  Bed Rest for 0 hours  Tylenol 650 mg  for pain      Attending Attestation: I was present for the entire procedure

## 2020-01-30 NOTE — PROGRESS NOTES
Urine output 15-20 mL/hr SARA Austin with [facility] made aware  Will be rounding on patient soon to develop plan

## 2020-01-30 NOTE — PROGRESS NOTES
Called [facility] Resident pt with increased work of breathing  B/l Breath sounds diminished  3L O2 97%    Questioned start of TF  Resident Naty up to assess pt awaiting new orders

## 2020-01-30 NOTE — OCCUPATIONAL THERAPY NOTE
Occupational Therapy Evaluation      Nirmal Castelan    1/30/2020    Principal Problem:    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[facility] )      Past Medical History:   Diagnosis Date    [facility]chronic obstructive pulmonary disease) ([facility] )     Diabetes mellitus ([facility] )     Difficulty swallowing     Disease of thyroid gland     Esophageal mass     History of chemotherapy     History of transfusion     [facility] third [facility]     Port-A-Cath in place     LCW    Sleep apnea     no CPAP       Past Surgical History:   Procedure Laterality Date    BACK SURGERY      x2    DISC REMOVAL      ESOPHAGECTOMY N/A 1/28/2020    Procedure: ESOPHAGECTOMY, TRANSHIATAL;  Surgeon: Kris Qureshi MD;  [facility];  Service: Surgical Oncology    ESOPHAGOGASTRODUODENOSCOPY N/A 1/28/2020    Procedure: ESOPHAGOGASTRODUODENOSCOPY (EGD); Surgeon: Kris Qureshi MD;  [facility];  Service: Surgical Oncology    FL GUIDED CENTRAL VENOUS ACCESS DEVICE INSERTION  9/26/2019    GASTROJEJUNOSTOMY W/ JEJUNOSTOMY TUBE N/A 9/26/2019    Procedure: INSERTION JEJUNOSTOMY TUBE OPEN;  Surgeon: Kris Qureshi MD;  [facility];  Service: Surgical Oncology    TONSILLECTOMY      TUNNELED VENOUS PORT PLACEMENT N/A 9/26/2019    Procedure: INSERTION VENOUS PORT (PORT-A-CATH); Surgeon: Kris Qureshi MD;  [facility];  Service: Surgical Oncology        01/30/20 1231   Note Type   Note type Eval/Treat   Restrictions/Precautions   Weight Bearing Precautions Per Order No   Other Precautions Multiple lines;Telemetry;O2;Fall Risk  (3L NC O2; NGT; PEG)   Pain Assessment   Pain Assessment No/denies pain   Pain Score No Pain   Home Living   Type of Home House   Home Layout Two level;Performs ADLs on one level; Able to live on main level with bedroom/bathroom  [facility]; apt on 1st flr; 0STE thru the back)   Bathroom Shower/Tub Walk-in shower   Bathroom Toilet Standard   Bathroom Equipment Other (Comment)  (denies DME) [address] Other (Comment)  (Denies DME)   Additional Comments Pt has 20STE to 2nd flr however lives on the 1st flr   Prior Function   Level of [place] Independent with ADLs and functional mobility   Lives With Daughter;Family  (DYANA and grandkids)   Receives Help From Family   ADL Assistance Independent   IADLs Independent   Falls in the last 6 months 0   Vocational Retired   Lifestyle   Autonomy Pt reports being I w/ all ADLS and IADLS; (+) drives PTA   Reciprocal Relationships Pt lives w/ dtr, DYANA, and grandkids  Pt's dtr is home and in good health to provide A as needed  Service to Others Pt is retired   Intrinsic Gratification Pt reports enjoying spending time w/ his family   Psychosocial   Psychosocial (WDL) WDL   ADL   Where Assessed Chair   Eating Assistance Unable to assess  (NGT; PEG )   Grooming Assistance 5  Supervision/Setup   UB Bathing Assistance 4  Minimal Assistance    [address]  [address]  [address]  4  Minimal Assistance   Bed Mobility   Supine to Sit Unable to assess   Sit to Supine Unable to assess   Additional Comments Pt seated in chair upon OT arrival  Pt seated OOB in chair w/ all needs in reach s/p OT session  Transfers   Sit to Stand 5  Supervision   Additional items Assist x 1; Increased time required;Verbal cues;Armrests   Stand to Sit 5  Supervision   Additional items Assist x 1; Increased time required;Verbal cues;Armrests   Additional Comments Transfers w/ RW  VC required for safety and hand placement   Functional Mobility   Functional Mobility 5  Supervision   Additional Comments Pt demonstrated long distance household mobility in hallway w/ RW at S level  Pt denies SOB or dizziness      Additional items Rolling walker   Balance   Static Sitting Good   Dynamic Sitting Fair +   Static Standing Fair   Dynamic Standing Fair -   Ambulatory Fair -   Activity Tolerance   Activity Tolerance Patient tolerated treatment well;Patient limited by fatigue   Medical Staff Made Aware PT Benedicto Push; CM for safe dispo planning   Nurse Made Aware RN Jalyn Surinder cleared Pt for OT eval   RUE Assessment   RUE Assessment WFL   LUE Assessment   LUE Assessment WFL   Hand Function   Gross Motor Coordination Functional   Fine Motor Coordination Functional   Sensation   Light Touch No apparent deficits   Cognition   Overall Cognitive Status WFL   Arousal/Participation Alert; Cooperative   Attention Within functional limits   Orientation Level Oriented X4   Memory Decreased recall of precautions   Following Commands Follows one step commands without difficulty   Comments Pt is pleasant and cooperative to participate in therapy this day  Pt required VC for safety and slow of pace  Assessment   Limitation Decreased ADL status; Decreased Safe judgement during ADL;Decreased endurance;Decreased high-level ADLs   Prognosis Good   Assessment Pt is a 70 yo male seen for OT eval s/p adm to [facility] dx'd w/ GE junction carcinoma  Pt s/p transhiatal esophagectomy 1/28/2020  Comorbidities include a h/o COPD, KIMMIE, DM 2, chemotherapy induced neutropenia, anemia, insomnia  Pt with active OT orders and ambulate  orders  Pt is retired and resides w/ dtr, [facility], and [facility] w/ 0STE and Pt lives on 1st level  Pt's dtr is home and in good health to provide A as needed  Pt was I w/  ADLS and IADLS, drove, & required no use of DME PTA  Pt is currently demonstrating the following occupational deficits: Min A all self care, S transfers and mobility w/ RW  These deficits that are impacting pt's baseline areas of occupation are a result of the following impairments: pain, endurance, activity tolerance, functional mobility, balance, functional standing tolerance and decreased I w/ ADLS/IADLS   The following Occupational Performance Areas to address include: grooming, bathing/shower, toilet hygiene, dressing, health maintenance, functional mobility and clothing management  Based on the aforementioned OT evaluation, functional performance deficits, and assessments, pt has been identified as a high complexity evaluation  Recommend home with family support and home OT upon D/C  Pt to continue to benefit from acute immediate OT services to address the following goals 3-5x/week to  w/in 7-10 days:    Goals   Patient Goals To return home   LTG Time Frame 7-10   Long Term Goal #1 Refer to goals below   Plan   Treatment Interventions ADL retraining;Functional transfer training;UE strengthening/ROM; Endurance training;Patient/family training;Equipment evaluation/education; Compensatory technique education; Activityengagement; Energy conservation   Goal Expiration Date 20   OT Frequency 3-5x/wk   Recommendation   OT Discharge Recommendation Home OT  (+ increased family support )   OT - OK to Discharge Yes  (when medically cleared)   Modified Fiordaliza Scale   Modified Calhoun Scale 4       GOALS    1) Pt will improve activity tolerance to G for min 30 min txment sessions for increase engagement in functional tasks    2) Pt will complete UB/LB dressing/self care w/ mod I using adaptive device and DME as needed    3) Pt will complete bathing w/ Mod I w/ use of AE and DME as needed    4) Pt will complete toileting w/ mod I w/ G hygiene/thoroughness using DME as needed    5) Pt will improve functional transfers to Mod I on/off all surfaces using DME as needed w/ G balance/safety     6) Pt will improve functional mobility during ADL/IADL/leisure tasks to Mod I using DME as needed w/ G balance/safety     7) Pt will participate in simulated IADL management task to increase independence to Mod I w/ G safety and endurance    8) Pt will be attentive 100% of the time during ongoing cognitive assessment w/ G participation to assist w/ safe d/c planning/recommendations PRN    9) Pt will demonstrate G carryover of pt/caregiver education and training as appropriate w/o cues w/ good tolerance to increase safety during functional tasks    10) Pt will demonstrate 100% carryover of energy conservation techniques t/o functional I/ADL/leisure tasks w/o cues s/p skilled education to increase endurance during functional tasks         Maria C Wade, MS, OTR/L

## 2020-01-30 NOTE — PROGRESS NOTES
Progress Note - Thoracic Surgery   Shelia Tejada 71 y o  male MRN: [id number]  Unit/Bed#: [facility] 112-04 Encounter: [id number]    Assessment:  71 M with esophageal adenocarcinoma status post transhiatal esophagectomy  Some respiratory difficulties overnight, likely related to fluid overload  S/P b/l thoracentesis, 1400 and 1500 cc     WBC 8 6  7 4/36/168/22/-1 8    Afebrile    Plan:  Start TF today - Jevity 1 2 @ 20  Wean HFNC  PRN pain control - epidural off for pressures  OOB, ambulation  Keep in unit today due to HFNC requirements  Swallow study 2/3, NGT until then    Subjective/Objective   Chief Complaint:     Subjective: Denies pain, required HFNC overnight up to 80% (50LPM), ABG now improved    Objective:     Blood pressure 116/65, pulse 92, temperature 98 6 °F (37 °C), temperature source Oral, resp  rate 19, height 5' 10" (1 778 m), weight 86 6 kg (191 lb), SpO2 100 %  ,Body mass index is 27 41 kg/m²  Intake/Output Summary (Last 24 hours) at 1/30/2020 6023  Last data filed at 1/30/2020 0400  Gross per 24 hour   Intake 3152 21 ml   Output 1073 ml   Net 2079 21 ml       Invasive Devices     Central Venous Catheter Line            Port A Cath 09/26/19 Left Chest 125 days          Peripheral Intravenous Line            Peripheral IV 01/28/20 Left Hand 1 day    Peripheral IV 01/28/20 Right Arm 1 day          Epidural Line            Epidural Catheter 01/28/20 1 day          Arterial Line            Arterial Line 01/28/20 Left Radial 1 day          Drain            Gastrostomy/Enterostomy Jejunostomy 14 Fr   days    NG/OG/Enteral Tube Nasogastric Left nares 1 day    Open Drain Anterior; Left Neck 1 day    Urethral Catheter Latex 16 Fr  1 day              Physical Exam:   Gen: A&O, NAD  Cardio: RRR  Lungs: CTAB  Abd: Soft, non distended, non tender      Lab, Imaging and other studies:  CBC:   Lab Results   Component Value Date    WBC 8 61 01/30/2020    HGB 11 2 (L) 01/30/2020    HCT 36 8 01/30/2020 MCV 85 01/30/2020     (L) 01/30/2020    MCH 26 0 (L) 01/30/2020    MCHC 30 4 (L) 01/30/2020    RDW 18 5 (H) 01/30/2020    MPV 10 1 01/30/2020    NRBC 0 01/30/2020   , CMP: No results found for: SODIUM, K, CL, CO2, ANIONGAP, BUN, CREATININE, GLUCOSE, CALCIUM, AST, ALT, ALKPHOS, PROT, BILITOT, EGFR, Coagulation: No results found for: PT, INR, APTT  VTE Pharmacologic Prophylaxis: Heparin  VTE Mechanical Prophylaxis: sequential compression device

## 2020-01-30 NOTE — PLAN OF CARE
Problem: PHYSICAL THERAPY ADULT  Goal: Performs mobility at highest level of function for planned discharge setting  See evaluation for individualized goals  Description  Treatment/Interventions: Functional transfer training, LE strengthening/ROM, Elevations, Therapeutic exercise, Endurance training, Patient/family training, Equipment eval/education, Bed mobility, Gait training, Spoke to nursing  Equipment Recommended: Walker(RW)       See flowsheet documentation for full assessment, interventions and recommendations  Note:   Prognosis: Good  Problem List: Decreased strength, Decreased endurance, Impaired balance, Decreased mobility  Assessment: Pt is 71 y o  male seen for PT evaluation s/p admit to [facility] on 1/28/2020 w/ 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[facility] [address] )  Pt presents w/ esophageal adenocarcinoma status post transhiatal esophagectomy and b/l thoracentesis  PT consulted to assess pt's functional mobility and d/c needs  Order placed for PT eval and tx, w/ ambulate patient order  Comorbidities affecting pt's physical performance at time of assessment include: COPD, Type 2 DM, malignant neoplasm of esophagus, anemia  PTA, pt was independent w/ all functional mobility w/ no AD and lives w/ dtr, DYANA, and grandchildren in two level house w/ 0 PETEY  Pt has FFSU and reports rarely going to 2nd floor  Pt was Ind w/ all ADLs PTA  Personal factors affecting pt at time of IE include: lives in two story house, inability to navigate community distances, limited home support and inability to perform IADLs  Please find objective findings from PT assessment regarding body systems outlined above with impairments and limitations including weakness, impaired balance, decreased endurance, impaired coordination, gait deviations, decreased activity tolerance, decreased functional mobility tolerance and fall risk  Pt performed STS at supervision and ambulated 300ft w/ RW at [facility]   The following objective measures performed on IE also reveal limitations: Modified Fiordaliza: 4 (moderate/severe disability)  Pt's clinical presentation is currently unstable/unpredictable seen in pt's presentation of recent admission for GE junction carcinoma requiring medical attention, recent decline in function as compared to baseline, multiple lines, fall risk, increased reliance on RW as compared to baseline  Pt to benefit from continued PT tx to address deficits as defined above and maximize level of functional independent mobility and consistency  From PT/mobility standpoint, recommendation at time of d/c would be Home PT with family support pending progress in order to facilitate return to PLOF  Recommendation: Home PT, Home with family support     PT - OK to Discharge: Yes    See flowsheet documentation for full assessment

## 2020-01-30 NOTE — NUTRITION
01/30/20 1411   Recommendations/Interventions   Nutrition Recommendations Adjust EN/PN  (Rec changing TF formula to Jevity 1 5 @20ml/hr and advance as tolerated to goal rate of 68ml/hr to provide 1632ml, 2448kcal, 104g pro, 1240ml free water  Add 150ml water flushes q 4 hours when not on IVF   Monitor weight and electrolytes)

## 2020-01-30 NOTE — NURSING NOTE
Pt annoyed with having to wear high flow NC overnight and removed it himself twice  O2 Sat has been % on 70% FIO2 and 50L  Per ABG drawn 1/29/2020 at 2000 PO2 at 67  Recheck ABG at 0330 PO2 60  S/w Elsa Hernandez , [facility] resident, increase high flow to FIO2 80% and 50L  Also reported that urine out pt has decreased to 25-30ml per hour since 2200 last night  No changes at this time  Reiterated to pt the importance on keeping the NC on  Pt verbalized understanding

## 2020-01-30 NOTE — PLAN OF CARE
Problem: OCCUPATIONAL THERAPY ADULT  Goal: Performs self-care activities at highest level of function for planned discharge setting  See evaluation for individualized goals  Description  Treatment Interventions: ADL retraining, Functional transfer training, UE strengthening/ROM, Endurance training, Patient/family training, Equipment evaluation/education, Compensatory technique education, Activityengagement, Energy conservation          See flowsheet documentation for full assessment, interventions and recommendations  Note:   Limitation: Decreased ADL status, Decreased Safe judgement during ADL, Decreased endurance, Decreased high-level ADLs  Prognosis: Good  Assessment: Pt is a 70 yo male seen for OT eval s/p adm to [facility] dx'd w/ GE junction carcinoma  Pt s/p transhiatal esophagectomy 1/28/2020  Comorbidities include a h/o COPD, KIMMIE, DM 2, chemotherapy induced neutropenia, anemia, insomnia  Pt with active OT orders and ambulate  orders  Pt is retired and resides w/ dtr, [facility], and grandkids in [facility] w/ 0STE and Pt lives on 1st level  Pt's dtr is home and in good health to provide A as needed  Pt was I w/  ADLS and IADLS, drove, & required no use of DME PTA  Pt is currently demonstrating the following occupational deficits: Min A all self care, S transfers and mobility w/ RW  These deficits that are impacting pt's baseline areas of occupation are a result of the following impairments: pain, endurance, activity tolerance, functional mobility, balance, functional standing tolerance and decreased I w/ ADLS/IADLS  The following Occupational Performance Areas to address include: grooming, bathing/shower, toilet hygiene, dressing, health maintenance, functional mobility and clothing management  Based on the aforementioned OT evaluation, functional performance deficits, and assessments, pt has been identified as a high complexity evaluation  Recommend home with family support and home OT upon D/C   Pt to continue to benefit from acute immediate OT services to address the following goals 3-5x/week to  w/in 7-10 days:      OT Discharge Recommendation: Home OT(+ increased family support )  OT - OK to Discharge: Yes(when medically cleared)

## 2020-01-30 NOTE — ADDENDUM NOTE
Addendum  created 01/30/20 0945 by Alcides Davenport, DO    Intraprocedure LDAs edited, LDA properties accepted

## 2020-01-30 NOTE — PHYSICAL THERAPY NOTE
Physical Therapy Evaluation     Patient's Name: Joseph Ledesma    Admitting Diagnosis  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[facility] ) [C16 0]    Problem List  Patient Active Problem List   Diagnosis    COPD (chronic obstructive pulmonary disease) ([facility] )    Headaches due to old head injury    High cholesterol    Obstructive sleep apnea syndrome    Type 2 diabetes mellitus with hyperglycemia, without long-term current use of insulin ([place] [address] )    Malignant neoplasm of lower third of esophagus (HCC)    Esophageal obstruction    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[facility] )    Chemotherapy induced neutropenia (HCC)    Anemia, unspecified    Insomnia due to medical condition    Encounter for care related to feeding tube       Past Medical History  Past Medical History:   Diagnosis Date    COPD (chronic obstructive pulmonary disease) ([place] [address] )     Diabetes mellitus ([place] [address] )     Difficulty swallowing     Disease of thyroid gland     Esophageal mass     History of chemotherapy     History of transfusion     Malignant neoplasm of lower third of esophagus ([facility] )     Port-A-Cath in place     LCW    Sleep apnea     no CPAP       Past Surgical History  Past Surgical History:   Procedure Laterality Date    BACK SURGERY      x2    DISC REMOVAL      ESOPHAGECTOMY N/A 1/28/2020    Procedure: ESOPHAGECTOMY, TRANSHIATAL;  Surgeon: Zahra Zamora MD;  [facility];  Service: Surgical Oncology    ESOPHAGOGASTRODUODENOSCOPY N/A 1/28/2020    Procedure: ESOPHAGOGASTRODUODENOSCOPY (EGD); Surgeon: Zahra Zamora MD;  [facility];  Service: Surgical Oncology    FL GUIDED CENTRAL VENOUS ACCESS DEVICE INSERTION  9/26/2019    GASTROJEJUNOSTOMY W/ JEJUNOSTOMY TUBE N/A 9/26/2019    Procedure: INSERTION JEJUNOSTOMY TUBE OPEN;  Surgeon: Zahra Zamora MD;  [facility];  Service: Surgical Oncology    TONSILLECTOMY      TUNNELED VENOUS PORT PLACEMENT N/A 9/26/2019    Procedure: INSERTION VENOUS PORT (PORT-A-CATH);   Surgeon: Zahra Zamora MD; [facility];  Service: Surgical Oncology        01/30/20 1490   Note Type   Note type Eval only   Pain Assessment   Pain Assessment No/denies pain   Pain Score No Pain   Home Living   Type of Home House   Home Layout Two level;Performs ADLs on one level; Able to live on main level with bedroom/bathroom   Bathroom Shower/Tub Walk-in shower   Bathroom Toilet Standard   Bathroom Equipment   (none per pt)   Home Equipment   (none per pt)   Additional Comments Pt resides in [facility] w/ FFSU and 0 PETEY  Pt reports 20steps to 2nd floor; however, pt reports rarely going to 2nd floor   Prior Function   Level of [place] Independent with ADLs and functional mobility   Lives With Daughter  (& DYANA, and granchildren)   Receives Help From Family   ADL Assistance Independent   IADLs Independent   Falls in the last 6 months 0   Vocational Retired   Restrictions/Precautions   [place] Bearing Precautions Per Order No   Other Precautions Multiple lines;Telemetry;O2;Fall Risk  (3L NC, NG tube, PEG)   General   Family/Caregiver Present No   Cognition   Overall Cognitive Status WFL   Arousal/Participation Alert   Orientation Level Oriented X4   Memory Decreased recall of precautions   Following Commands Follows one step commands without difficulty   Comments Pt pleasant and agreeable to work w/ therapy   RLE Assessment   RLE Assessment   (grossly 4/5)   LLE Assessment   LLE Assessment   (grossly 4/5)   Bed Mobility   Supine to Sit Unable to assess   Sit to Supine Unable to assess   Additional Comments Pt seated OOB upon PT arrival  Pt returned seated OOB at end of session w/ all needs within reach   Transfers   Sit to Stand 5  Supervision   Additional items Assist x 1; Increased time required;Verbal cues   Stand to Sit 5  Supervision   Additional items Assist x 1; Increased time required;Verbal cues   Additional Comments Transfers w/ RW   Ambulation/Elevation   Gait pattern Excessively slow; Short stride; Improper Weight shift;Decreased foot clearance; Inconsistent lilo   Gait Assistance 4  Minimal assist  ([facility])   Additional items Assist x 1;Verbal cues   Assistive Device Rolling walker   Distance 300ft    Stair Management Assistance Not tested   Balance   Static Sitting Good   Dynamic Sitting Fair +   Static Standing Fair   Dynamic Standing Fair -   Ambulatory Fair -   Endurance Deficit   Endurance Deficit No   Activity Tolerance   Activity Tolerance Patient tolerated treatment well;Patient limited by fatigue   Medical Staff Made Aware HARVINDER Flores CM   Nurse Made Aware RN cleared pt for therapy   Assessment   Prognosis Good   Problem List Decreased strength;Decreased endurance; Impaired balance;Decreased mobility   Assessment Pt is 71 y o  male seen for PT evaluation s/p admit to [facility] on 1/28/2020 w/ 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[facility] )  Pt presents w/ esophageal adenocarcinoma status post transhiatal esophagectomy and b/l thoracentesis  PT consulted to assess pt's functional mobility and d/c needs  Order placed for PT eval and tx, w/ ambulate patient order  Comorbidities affecting pt's physical performance at time of assessment include: COPD, Type 2 DM, malignant neoplasm of esophagus, anemia  PTA, pt was independent w/ all functional mobility w/ no AD and lives w/ dtr, DYANA, and grandchildren in two level house w/ 0 PETEY  Pt has FFSU and reports rarely going to 2nd floor  Pt was Ind w/ all ADLs PTA  Personal factors affecting pt at time of IE include: lives in two story house, inability to navigate community distances, limited home support and inability to perform IADLs  Please find objective findings from PT assessment regarding body systems outlined above with impairments and limitations including weakness, impaired balance, decreased endurance, impaired coordination, gait deviations, decreased activity tolerance, decreased functional mobility tolerance and fall risk  Pt performed STS at supervision and ambulated 300ft w/ RW at [facility]   The following objective measures performed on IE also reveal limitations: Modified [place]: 4 (moderate/severe disability)  Pt's clinical presentation is currently unstable/unpredictable seen in pt's presentation of recent admission for GE junction carcinoma requiring medical attention, recent decline in function as compared to baseline, multiple lines, fall risk, increased reliance on RW as compared to baseline  Pt to benefit from continued PT tx to address deficits as defined above and maximize level of functional independent mobility and consistency  From PT/mobility standpoint, recommendation at time of d/c would be Home PT with family support pending progress in order to facilitate return to PLOF  Goals   Patient Goals to return home   STG Expiration Date 02/13/20   Short Term Goal #1 1  Pt will demonstrate the ability to perform all aspects of bed mobility at Mod I in onder to maximize functional independence and decrease burden on caregivers  2 Pt will demonstrate the ability to perform all functional transfers at Mod I in order to maximize functional independence and decrease burden on caregivers  3 Pt will demonstrate the ability to ambulate at least 300ft with least restrictive device at Mod I in order to maximize functional independence  4 Pt will demonstrate the ability to negotiate 3 steps with HR and supervision in order to return to household/community mobility  5 Pt will demonstrate improved balance by one grade in order to decrease risk of falls  6 Pt will increase b/l Le strength by 1 grade in order to increase ease of functional mobility and transfers   PT Treatment Day 0   Plan   Treatment/Interventions Functional transfer training;LE strengthening/ROM; Elevations; Therapeutic exercise; Endurance training;Patient/family training;Equipment eval/education; Bed mobility;Gait training;Spoke to nursing   PT Frequency   (3-5x/week)   Recommendation   Recommendation Home PT; Home with family support   Equipment Recommended Walker  (RW)   PT - OK to Discharge Yes   Additional Comments when medically cleared w/ HHPT   Modified [place] Scale   Modified [place] Scale 4     Tom [place]ail, PT, DPT

## 2020-01-30 NOTE — PROGRESS NOTES
Daily Progress Note - Critical Care   Shelia Tejada 71 y o  male MRN: [id number]  [facility] Encounter: [id number]        ----------------------------------------------------------------------------------------  HPI/24hr events:   Started on HFNC last night around 1800 for increased work of breathing  CXR at that time showed significant pleural effusions, bilaterally  He was also give 20mg of lasix with minimal response  Bilateral thoracentesis were done around midnight  1 5L removed from left, 1 4L removed from right  Post-procedure CXR showed significant improvement and patient had decreased work of breathing   ---------------------------------------------------------------------------------------  SUBJECTIVE  Patient states that his breathing has significantly improved since last night  Denies being short of breath high-flow nasal cannula  He denies having any pain except in his left ear, worsened with swallowing  He denies hearing difficulty  Denies flatus  Review of Systems   Constitutional: Negative  HENT: Positive for ear pain  Negative for dental problem, hearing loss, postnasal drip, rhinorrhea, sinus pressure, sinus pain and tinnitus  Eyes: Negative  Respiratory: Negative for cough, chest tightness, shortness of breath, wheezing and stridor  Cardiovascular: Negative for chest pain, palpitations and leg swelling  Gastrointestinal: Negative for abdominal distention, abdominal pain, constipation, diarrhea, nausea and vomiting  Genitourinary: Negative  Musculoskeletal: Negative  Neurological: Negative  Psychiatric/Behavioral: Negative         ---------------------------------------------------------------------------------------  Assessment and Plan:    Neuro:   · Diagnosis: Analgesia, post-op  ? Tylenol 975mg Q 6  ? Toradol 15mg QID  ? Dilaudid 0 5 Q2 p r n  For breakthrough pain, Robaxin 500 q6 PRN  ? Gabapentin 100mg TID  ?  Epidural Ropivacaine and Fentanyl D/c'd yesterday @ 1400  § Discuss removal of epidural   ? CAM ICU, delirium precautions, regulate sleep-wake        CV:   · No acute issues  · Maintain MAP goal >70, per surgery        Pulm:  · Diagnosis: COPD, SOB r/t acute pleural effusions  ? Plan:   § PRN albuterol for SOB  § Currently, on [address] 30/80, wean as tolerated        GI:   · Diagnosis:  Esophageal neoplasm S/p esophagectomy with exploratory laparotomy  ? Plan  § Continue frequent exams of neck and abdominal wound  § Keep NG tube to suction  § Monitor neck Penrose output         :   · Monitor I&O  · Continue johnson for one more day for I&O        F/E/N:   · Replete electrolytes as needed  · TF started today  Jevity 1 2 with goal of 20ml/hr        Heme/Onc:   · Diagnosis:  Esophageal neoplasm s/p esophagectomy with exploratory laparotomy  ? Plan:  § Per postop note no gross tumor was observed during exploratory laparotomy  § Will discuss future needs white surgery     Endo:   · Diagnosis: DM  ? Plan:   § Continue SSI, algorithm 1    § Blood sugars controlled, 120-135  § Q 6 hour blood sugars     ID:    · No acute issues  · Afebrile, Normal WBCs        MSK/Skin:   · No acute issues  · Assist with turning and repositioning Q2  · OOB as tolerated      Disposition: Transfer to Stepdown Level 1   Code Status: Level 1 - Full Code  ---------------------------------------------------------------------------------------  ICU CORE MEASURES    Prophylaxis   VTE Pharmacologic Prophylaxis: Heparin  VTE Mechanical Prophylaxis: sequential compression device  Stress Ulcer Prophylaxis: Pantoprazole IV     ABCDE Protocol (if indicated)  Plan to perform spontaneous awakening trial today? Not applicable  Plan to perform spontaneous breathing trial today? Not applicable  Obvious barriers to extubation?  No  CAM-ICU: Negative    Invasive Devices Review  Invasive Devices     Central Venous Catheter Line            Port A Cath 09/26/19 Left Chest 125 days          Peripheral Intravenous Line            Peripheral IV 20 Left Hand 1 day    Peripheral IV 20 Right Arm 1 day          Epidural Line            Epidural Catheter 20 1 day          Arterial Line            Arterial Line 20 Left Radial 1 day          Drain            Gastrostomy/Enterostomy Jejunostomy 14 Fr   days    NG/OG/Enteral Tube Nasogastric Left nares 1 day    Open Drain Anterior; Left Neck 1 day    Urethral Catheter Latex 16 Fr  1 day              Can any invasive devices be discontinued today? Yes  ---------------------------------------------------------------------------------------  OBJECTIVE    Vitals   Vitals:    20 0354 20 0400 20 0500 20 0604   BP:       Pulse:  90 92 92   Resp:  (!) 10 18 19   Temp:       TempSrc:       SpO2: 100% 100% 100% 100%   Weight:       Height:         Temp (24hrs), Av 6 °F (37 °C), Min:98 4 °F (36 9 °C), Max:98 7 °F (37 1 °C)  Current: Temperature: 98 6 °F (37 °C)    Physical Exam   Constitutional: He is oriented to person, place, and time  He appears well-developed and well-nourished  No distress  HENT:   Head: Normocephalic  Right Ear: Hearing, tympanic membrane, external ear and ear canal normal  No drainage, swelling or tenderness  No mastoid tenderness  No decreased hearing is noted  Left Ear: Hearing, tympanic membrane, external ear and ear canal normal  No drainage, swelling or tenderness  No mastoid tenderness  No decreased hearing is noted  Mouth/Throat: Oropharynx is clear and moist and mucous membranes are normal    NGT right nare   Eyes: Pupils are equal, round, and reactive to light  Conjunctivae and EOM are normal  Lids are everted and swept, no foreign bodies found  Neck:       No drainage noted on Penrose dressing  Cardiovascular: Normal rate, regular rhythm, normal heart sounds and normal pulses     Pulmonary/Chest: Effort normal  He has decreased breath sounds in the right middle field, the right lower field and the left lower field  Abdominal: Normal appearance  Bowel sounds are absent  There is no tenderness  Staples well-approximated on midline incision  J-tube noted  RN initiating tube feeds  Genitourinary:   Genitourinary Comments: Doshi catheter noted  Neurological: He is alert and oriented to person, place, and time  He has normal strength  No cranial nerve deficit or sensory deficit  GCS eye subscore is 4  GCS verbal subscore is 5  GCS motor subscore is 6  Skin: Skin is warm and dry  Capillary refill takes less than 2 seconds  He is not diaphoretic  Psychiatric: He has a normal mood and affect   His speech is normal and behavior is normal  Judgment and thought content normal  Cognition and memory are normal          Respiratory:    O2 Flow Rate (L/min): 50 L/min    Invasive/non-invasive ventilation settings   Respiratory    Lab Data (Last 4 hours)      01/30 0327 01/30 0536          pH, Arterial       7 386          7 406          pCO2, Arterial       40 0          36 7          pO2, Arterial       60 5          168 7          HCO3, Arterial       23 5          22 5          Base Excess, Arterial       -1 4          -1 8               O2/Vent Data         01/30 0354   Most Recent      Non-Invasive Ventilation Mode  HFNC  HFNC                 Laboratory and Diagnostics:  Results from last 7 days   Lab Units 01/30/20  0536 01/29/20  0432 01/28/20  1755 01/28/20  1349 01/28/20  1241 01/28/20  1125   WBC Thousand/uL 8 61 6 47 7 10 6 18  --   --    HEMOGLOBIN g/dL 11 2* 10 8* 11 4* 10 4*  --   --    I STAT HEMOGLOBIN g/dl  --   --   --   --  10 2* 10 5*   HEMATOCRIT % 36 8 34 8* 36 3* 33 8*  --   --    HEMATOCRIT, ISTAT %  --   --   --   --  30* 31*   PLATELETS Thousands/uL 142* 109* 100* 104*  --   --    NEUTROS PCT % 85* 85* 87*  --   --   --    MONOS PCT % 7 7 7  --   --   --      Results from last 7 days   Lab Units 01/30/20  0536 01/29/20  0432 01/28/20  1755 01/28/20  1350 01/28/20  1241 01/28/20  1125   SODIUM mmol/L 139 142 140 144  --   --    POTASSIUM mmol/L 4 0 4 3 4 1 3 8  --   --    CHLORIDE mmol/L 109* 112* 112* 116*  --   --    CO2 mmol/L 23 23 21 23  --   --    CO2, I-STAT mmol/L  --   --   --   --  23 24   ANION GAP mmol/L 7 7 7 5  --   --    BUN mg/dL 25 15 14 16  --   --    CREATININE mg/dL 1 06 0 75 0 69 0 69  --   --    CALCIUM mg/dL 8 3 8 0* 7 8* 7 4*  --   --    GLUCOSE RANDOM mg/dL 135 132 132 140  --   --    ALT U/L  --   --  49  --   --   --    AST U/L  --   --  55*  --   --   --    ALK PHOS U/L  --   --  43*  --   --   --    ALBUMIN g/dL  --   --  3 0*  --   --   --    TOTAL BILIRUBIN mg/dL  --   --  1 09*  --   --   --      Results from last 7 days   Lab Units 01/30/20  0536 01/29/20  0432 01/28/20  1755 01/28/20  1350   MAGNESIUM mg/dL 2 7* 2 4 1 9 1 7   PHOSPHORUS mg/dL 3 5  --   --   --                Results from last 7 days   Lab Units 01/28/20  1755   LACTIC ACID mmol/L 0 7     ABG:  Results from last 7 days   Lab Units 01/30/20  0536   PH ART  7 406   PCO2 ART mm Hg 36 7   PO2 ART mm Hg 168 7*   HCO3 ART mmol/L 22 5   BASE EXC ART mmol/L -1 8   ABG SOURCE  Line, Arterial     VBG:  Results from last 7 days   Lab Units 01/30/20  0536   ABG SOURCE  Line, Arterial           Micro        EKG: No new EKG  Imaging: I have personally reviewed pertinent reports  Intake and Output  I/O       01/28 0701 - 01/29 0700 01/29 0701 - 01/30 0700    P  O  0 0    I V  (mL/kg) 8853 3 (102 2) 3062 2 (35 4)    NG/GT 60 90    IV Piggyback 2350     Total Intake(mL/kg) 50043 3 (130 1) 3152 2 (36 4)    Urine (mL/kg/hr) 2645 (1 3) 970 (0 5)    Emesis/NG output 0 100    Drains 0 3    Stool  0    Blood 450     Total Output 3095 1073    Net +8168 3 +2079 2          Unmeasured Stool Occurrence  0 x          Height and Weights   Height: 5' 10" (177 8 cm)  IBW: 73 kg  Body mass index is 27 41 kg/m²    Weight (last 2 days)     Date/Time   Weight    01/28/20 0619   86 6 (191) Nutrition       Diet Orders   (From admission, onward)             Start     Ordered    01/29/20 1643  Diet Enteral/Parenteral; Tube Feeding No Oral Diet; Jevity 1 2 Lloyd; Continuous; 20  Diet effective now     Comments:  Make sure feeds go through [place] tube   Question Answer Comment   Diet Type Enteral/Parenteral    Enteral/Parenteral Tube Feeding No Oral Diet    Tube Feeding Formula: Jevity 1 2 Lloyd    Bolus/Cyclic/Continuous Continuous    Tube Feeding Goal Rate (mL/hr): 20    RD to adjust diet per protocol?  Yes        01/29/20 1643                  Active Medications  Scheduled Meds:  Current Facility-Administered Medications:  acetaminophen 975 mg Per J Tube Q6H Andrea Wall MD    albuterol 2 5 mg Nebulization Q6H PRN SARA Rae    chlorhexidine 15 mL Swish & Spit Q12H [address] Rosie So Barron    gabapentin 100 mg Per J Tube TID Josr Bullock MD    heparin (porcine) 5,000 Units Subcutaneous Q8H [address] Rosie So Barron    HYDROmorphone 0 5 mg Intravenous Q2H PRN William Peng MD    insulin lispro 1-5 Units Subcutaneous Q6H [address] SARA Rae    methocarbamol 500 mg Per J Tube Q6H PRN Andrea Wall MD    multi-electrolyte 125 mL/hr Intravenous Continuous SARA Rae Last Rate: Stopped (01/29/20 2020)   ondansetron 4 mg Intravenous Q6H PRN Rosie So Barron    pantoprazole 40 mg Intravenous Q24H [address] SARA Rae    ropivacaine 0 1% and fentaNYL 2 mcg/mL  Epidural Continuous Josr Bullock MD Last Rate: Stopped (01/29/20 2749)     Continuous Infusions:    multi-electrolyte 125 mL/hr Last Rate: Stopped (01/29/20 2020)   ropivacaine 0 1% and fentaNYL 2 mcg/mL  Last Rate: Stopped (01/29/20 0904)     PRN Meds:     albuterol 2 5 mg Q6H PRN   HYDROmorphone 0 5 mg Q2H PRN   methocarbamol 500 mg Q6H PRN   ondansetron 4 mg Q6H PRN       Allergies   Allergies   Allergen Reactions    Penicillins Itching ---------------------------------------------------------------------------------------  Advance Directive and Living Will:      Power of :    POLST:    ---------------------------------------------------------------------------------------  Counseling / Coordination of Care  Total Critical Care time spent 31 minutes excluding procedures, teaching and family updates  SARA Landin      Portions of the record may have been created with voice recognition software  Occasional wrong word or "sound a like" substitutions may have occurred due to the inherent limitations of voice recognition software    Read the chart carefully and recognize, using context, where substitutions have occurred

## 2020-01-30 NOTE — PROGRESS NOTES
Progress Note - Acute Pain Service    Carmen Schuler 71 y o  male MRN: [id number]  Unit/Bed#: [facility] 215-06 Encounter: [id number]      Assessment:   Principal Problem:    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[address] )      Plan: Will remove epidural   Can resume DVT prophylaxis with subcutaneous heparin 2 hours following removal or 4 hours with enxoaparin  Agree with scheduled acetaminophen  Agree with Gabapentin 100 mg TID, as needed hydromorphone 0 5 mg Q 2 hours prn  Consider the addition of liquid oxycodone via enteral route 2 5-5 mg q 4 hours PRN moderate to severe pain and only utilize IV opioids for rescue  APS sign off  Thank you for the Consult  Please contact APS ( [phone]) with any further questions    Pain History  Current pain location(s):  Abdomen and lower chest  Pain Scale:   0 to 5  Quality:  Aching  24 hour history:  Epidural was discontinued around 8 am yesterday morning  The patient had been transition to intermittent opioids with scheduled acetaminophen for analgesia  He had satisfactory analgesia with regimen  He had escalation is oxygen requirement high-flow nasal cannula  Chest radiograph showed bilateral plural effusion  He had a 3 L thoracentesis 1 5 L proximal per side  This fluid was bloody/serogsanguinous in nature per review the chart  Following thoracentesis, the patient's oxygen requirement of improved  He has had adequate sleep and denies significant complaints      Opioid requirement previous 24 hours:  1 mg hydromorphone    Meds/Allergies   all current active meds have been reviewed and current meds:   Current Facility-Administered Medications   Medication Dose Route Frequency    acetaminophen (TYLENOL) oral suspension 975 mg  975 mg Oral Q6H [facility]    albuterol inhalation solution 2 5 mg  2 5 mg Nebulization Q6H PRN    chlorhexidine (PERIDEX) 0 12 % oral rinse 15 mL  15 mL Swish & Spit Q12H [facility]    gabapentin (NEURONTIN) oral solution 100 mg  100 mg Per J Tube TID   Martha Heaton heparin (porcine) subcutaneous injection 5,000 Units  5,000 Units Subcutaneous Q8H [address]    HYDROmorphone (DILAUDID) injection 0 5 mg  0 5 mg Intravenous Q2H PRN    insulin lispro (HumaLOG) 100 units/mL subcutaneous injection 1-5 Units  1-5 Units Subcutaneous Q6H [address]    methocarbamol (ROBAXIN) tablet 500 mg  500 mg Per J Tube Q6H PRN    ondansetron (ZOFRAN) injection 4 mg  4 mg Intravenous Q6H PRN    pantoprazole (PROTONIX) injection 40 mg  40 mg Intravenous Q24H [facility]       Allergies   Allergen Reactions    Penicillins Itching       Objective     Temp:  [97 9 °F (36 6 °C)-98 7 °F (37 1 °C)] 97 9 °F (36 6 °C)  HR:  [] 98  Resp:  [10-29] 22  BP: (104-120)/(58-65) 120/59  Arterial Line BP: ()/(42-58) 122/52  FiO2 (%):  [80] 80    Physical Exam   Constitutional: He is oriented to person, place, and time  No distress  HENT:   Head: Normocephalic and atraumatic  Eyes: Pupils are equal, round, and reactive to light  EOM are normal    Cardiovascular: Intact distal pulses  Pulmonary/Chest: Effort normal  He has decreased breath sounds  Abdominal: Soft  He exhibits distension  Neurological: He is alert and oriented to person, place, and time  Skin: Skin is warm and dry  Capillary refill takes less than 2 seconds  He is not diaphoretic         Lab Results:   Results from last 7 days   Lab Units 01/30/20  0536   WBC Thousand/uL 8 61   HEMOGLOBIN g/dL 11 2*   HEMATOCRIT % 36 8   PLATELETS Thousands/uL 142*      Results from last 7 days   Lab Units 01/30/20  0536  01/28/20  1755  01/28/20  1241   POTASSIUM mmol/L 4 0   < > 4 1   < >  --    CHLORIDE mmol/L 109*   < > 112*   < >  --    CO2 mmol/L 23   < > 21   < >  --    CO2, I-STAT mmol/L  --   --   --   --  23   BUN mg/dL 25   < > 14   < >  --    CREATININE mg/dL 1 06   < > 0 69   < >  --    CALCIUM mg/dL 8 3   < > 7 8*   < >  --    ALK PHOS U/L  --   --  43*  --   --    ALT U/L  --   --  49  --   --    AST U/L  --   --  55*  --   --    GLUCOSE, ISTAT mg/dl  --   --   --   --  140    < > = values in this interval not displayed  Imaging Studies: I have personally reviewed pertinent reports  - improved plural effusions following thoracentesis  EKG, Pathology, and Other Studies: I have personally reviewed pertinent reports  Counseling / Coordination of Care  Total floor / unit time spent today 20 minutes  Greater than 50% of total time was spent with the patient and / or family counseling and / or coordination of care   A description of the counseling / coordination of care: coordination of analgesic regimen with primary service    Britton Mcneal DO    Acute Pain Service

## 2020-01-31 LAB
ANION GAP SERPL CALCULATED.3IONS-SCNC: 9 MMOL/L (ref 4–13)
BASOPHILS # BLD AUTO: 0.03 THOUSANDS/ΜL (ref 0–0.1)
BASOPHILS NFR BLD AUTO: 0 % (ref 0–1)
BUN SERPL-MCNC: 38 MG/DL (ref 5–25)
CALCIUM SERPL-MCNC: 8.4 MG/DL (ref 8.3–10.1)
CHLORIDE SERPL-SCNC: 107 MMOL/L (ref 100–108)
CO2 SERPL-SCNC: 22 MMOL/L (ref 21–32)
CREAT SERPL-MCNC: 1.24 MG/DL (ref 0.6–1.3)
EOSINOPHIL # BLD AUTO: 0.07 THOUSAND/ΜL (ref 0–0.61)
EOSINOPHIL NFR BLD AUTO: 1 % (ref 0–6)
ERYTHROCYTE [DISTWIDTH] IN BLOOD BY AUTOMATED COUNT: 18.2 % (ref 11.6–15.1)
GFR SERPL CREATININE-BSD FRML MDRD: 61 ML/MIN/1.73SQ M
GLUCOSE SERPL-MCNC: 148 MG/DL (ref 65–140)
GLUCOSE SERPL-MCNC: 162 MG/DL (ref 65–140)
GLUCOSE SERPL-MCNC: 171 MG/DL (ref 65–140)
GLUCOSE SERPL-MCNC: 173 MG/DL (ref 65–140)
GLUCOSE SERPL-MCNC: 179 MG/DL (ref 65–140)
GLUCOSE SERPL-MCNC: 187 MG/DL (ref 65–140)
HCT VFR BLD AUTO: 40.8 % (ref 36.5–49.3)
HGB BLD-MCNC: 12.6 G/DL (ref 12–17)
IMM GRANULOCYTES # BLD AUTO: 0.06 THOUSAND/UL (ref 0–0.2)
IMM GRANULOCYTES NFR BLD AUTO: 1 % (ref 0–2)
LYMPHOCYTES # BLD AUTO: 0.82 THOUSANDS/ΜL (ref 0.6–4.47)
LYMPHOCYTES NFR BLD AUTO: 7 % (ref 14–44)
MAGNESIUM SERPL-MCNC: 2.8 MG/DL (ref 1.6–2.6)
MCH RBC QN AUTO: 26.6 PG (ref 26.8–34.3)
MCHC RBC AUTO-ENTMCNC: 30.9 G/DL (ref 31.4–37.4)
MCV RBC AUTO: 86 FL (ref 82–98)
MONOCYTES # BLD AUTO: 0.67 THOUSAND/ΜL (ref 0.17–1.22)
MONOCYTES NFR BLD AUTO: 6 % (ref 4–12)
NEUTROPHILS # BLD AUTO: 10.07 THOUSANDS/ΜL (ref 1.85–7.62)
NEUTS SEG NFR BLD AUTO: 85 % (ref 43–75)
NRBC BLD AUTO-RTO: 0 /100 WBCS
PHOSPHATE SERPL-MCNC: 3.8 MG/DL (ref 2.3–4.1)
PLATELET # BLD AUTO: 192 THOUSANDS/UL (ref 149–390)
PMV BLD AUTO: 9.9 FL (ref 8.9–12.7)
POTASSIUM SERPL-SCNC: 4.1 MMOL/L (ref 3.5–5.3)
RBC # BLD AUTO: 4.74 MILLION/UL (ref 3.88–5.62)
SODIUM SERPL-SCNC: 138 MMOL/L (ref 136–145)
WBC # BLD AUTO: 11.72 THOUSAND/UL (ref 4.31–10.16)

## 2020-01-31 PROCEDURE — 84100 ASSAY OF PHOSPHORUS: CPT | Performed by: ORTHOPAEDIC SURGERY

## 2020-01-31 PROCEDURE — 99232 SBSQ HOSP IP/OBS MODERATE 35: CPT | Performed by: EMERGENCY MEDICINE

## 2020-01-31 PROCEDURE — C9113 INJ PANTOPRAZOLE SODIUM, VIA: HCPCS | Performed by: NURSE PRACTITIONER

## 2020-01-31 PROCEDURE — 80048 BASIC METABOLIC PNL TOTAL CA: CPT | Performed by: ORTHOPAEDIC SURGERY

## 2020-01-31 PROCEDURE — 94760 N-INVAS EAR/PLS OXIMETRY 1: CPT

## 2020-01-31 PROCEDURE — 83735 ASSAY OF MAGNESIUM: CPT | Performed by: ORTHOPAEDIC SURGERY

## 2020-01-31 PROCEDURE — 97530 THERAPEUTIC ACTIVITIES: CPT

## 2020-01-31 PROCEDURE — 97116 GAIT TRAINING THERAPY: CPT

## 2020-01-31 PROCEDURE — 0T9B70Z DRAINAGE OF BLADDER WITH DRAINAGE DEVICE, VIA NATURAL OR ARTIFICIAL OPENING: ICD-10-PCS | Performed by: EMERGENCY MEDICINE

## 2020-01-31 PROCEDURE — 82948 REAGENT STRIP/BLOOD GLUCOSE: CPT

## 2020-01-31 PROCEDURE — 99024 POSTOP FOLLOW-UP VISIT: CPT | Performed by: SURGERY

## 2020-01-31 PROCEDURE — 99024 POSTOP FOLLOW-UP VISIT: CPT | Performed by: THORACIC SURGERY (CARDIOTHORACIC VASCULAR SURGERY)

## 2020-01-31 PROCEDURE — 85025 COMPLETE CBC W/AUTO DIFF WBC: CPT | Performed by: ORTHOPAEDIC SURGERY

## 2020-01-31 RX ORDER — OXYCODONE HCL 5 MG/5 ML
2.5 SOLUTION, ORAL ORAL EVERY 4 HOURS PRN
Status: DISCONTINUED | OUTPATIENT
Start: 2020-01-31 | End: 2020-02-01

## 2020-01-31 RX ORDER — ACETAMINOPHEN 160 MG/5ML
975 SUSPENSION, ORAL (FINAL DOSE FORM) ORAL EVERY 6 HOURS SCHEDULED
Status: DISCONTINUED | OUTPATIENT
Start: 2020-01-31 | End: 2020-02-12 | Stop reason: HOSPADM

## 2020-01-31 RX ORDER — OXYCODONE HCL 5 MG/5 ML
5 SOLUTION, ORAL ORAL EVERY 4 HOURS PRN
Status: DISCONTINUED | OUTPATIENT
Start: 2020-01-31 | End: 2020-02-01

## 2020-01-31 RX ADMIN — ONDANSETRON 4 MG: 2 INJECTION INTRAMUSCULAR; INTRAVENOUS at 16:50

## 2020-01-31 RX ADMIN — INSULIN LISPRO 1 UNITS: 100 INJECTION, SOLUTION INTRAVENOUS; SUBCUTANEOUS at 05:46

## 2020-01-31 RX ADMIN — INSULIN LISPRO 1 UNITS: 100 INJECTION, SOLUTION INTRAVENOUS; SUBCUTANEOUS at 17:53

## 2020-01-31 RX ADMIN — PANTOPRAZOLE SODIUM 40 MG: 40 INJECTION, POWDER, FOR SOLUTION INTRAVENOUS at 09:01

## 2020-01-31 RX ADMIN — Medication 1 SPRAY: at 10:08

## 2020-01-31 RX ADMIN — INSULIN LISPRO 1 UNITS: 100 INJECTION, SOLUTION INTRAVENOUS; SUBCUTANEOUS at 11:43

## 2020-01-31 RX ADMIN — ACETAMINOPHEN 975 MG: 650 SUSPENSION ORAL at 23:12

## 2020-01-31 RX ADMIN — GABAPENTIN 100 MG: 250 SUSPENSION ORAL at 09:03

## 2020-01-31 RX ADMIN — OXYCODONE HYDROCHLORIDE 2.5 MG: 5 SOLUTION ORAL at 16:39

## 2020-01-31 RX ADMIN — ACETAMINOPHEN 975 MG: 650 SUSPENSION ORAL at 11:43

## 2020-01-31 RX ADMIN — CHLORHEXIDINE GLUCONATE 0.12% ORAL RINSE 15 ML: 1.2 LIQUID ORAL at 09:01

## 2020-01-31 RX ADMIN — ACETAMINOPHEN 975 MG: 650 SUSPENSION ORAL at 05:46

## 2020-01-31 RX ADMIN — ACETAMINOPHEN 975 MG: 650 SUSPENSION ORAL at 00:10

## 2020-01-31 RX ADMIN — ENOXAPARIN SODIUM 40 MG: 40 INJECTION SUBCUTANEOUS at 09:01

## 2020-01-31 RX ADMIN — GABAPENTIN 100 MG: 250 SUSPENSION ORAL at 16:39

## 2020-01-31 NOTE — PROGRESS NOTES
Progress Note - Oncology Surgery   Lawence Fly 71 y o  male MRN: [id number]  Unit/Bed#: [facility] 901-31 Encounter: [id number]    Assessment:  71 M with esophageal adenocarcinoma status post transhiatal esophagectomy  Respiratory status much improved, on 3 L NC  WBC pending    Plan:  Keep NPO, do not replace NGT at this time  Still plan for swallow 2/3  Increase TF to goal today  F/U AM labs, monitor tachycardia/HR  Monitor urination - has not voided since johnson removed  OOB, ambulation  Can downgrade from unit- SD1    Subjective/Objective   Chief Complaint:     Subjective: Patient self- removed NGT overnight, said it was bothering his throat  He feels like he has to cough and has a hard time doing so or bringing things up  No N/V, tolerating TF without discomfort, + flatus  Had johnson removed yesterday and has not voided, bladder scan this AM <400  Objective:     Blood pressure 105/70, pulse (!) 114, temperature 97 6 °F (36 4 °C), temperature source Oral, resp  rate (!) 30, height 5' 10" (1 778 m), weight 86 6 kg (191 lb), SpO2 96 %  ,Body mass index is 27 41 kg/m²  Intake/Output Summary (Last 24 hours) at 1/31/2020 6423  Last data filed at 1/31/2020 0601  Gross per 24 hour   Intake 569 ml   Output 625 ml   Net -56 ml       Invasive Devices     Central Venous Catheter Line            Port A Cath 09/26/19 Left Chest 126 days          Peripheral Intravenous Line            Peripheral IV 01/28/20 Left Hand 2 days    Peripheral IV 01/28/20 Right Arm 2 days          Arterial Line            Arterial Line 01/28/20 Left Radial 2 days          Drain            Gastrostomy/Enterostomy Jejunostomy 14 Fr   days    NG/OG/Enteral Tube Nasogastric Left nares 2 days    Open Drain Anterior; Left Neck 2 days                Physical Exam:   Gen: A&O, NAD  HEENT: Neck incision dry, no driainage from penrose  Cardio: RRR  Lungs: CTAB  Abd: Soft, mildly distended, non tender   JT in place, staples c/d/i      Lab, Imaging and other studies:CBC: No results found for: WBC, HGB, HCT, MCV, PLT, ADJUSTEDWBC, MCH, MCHC, RDW, MPV, NRBC, CMP: No results found for: SODIUM, K, CL, CO2, ANIONGAP, BUN, CREATININE, GLUCOSE, CALCIUM, AST, ALT, ALKPHOS, PROT, BILITOT, EGFR, Coagulation: No results found for: PT, INR, APTT  VTE Pharmacologic Prophylaxis: Heparin  VTE Mechanical Prophylaxis: sequential compression device

## 2020-01-31 NOTE — NURSING NOTE
Pt has not voided at all overnight  Made [facility] Resident, Donovan Houser aware  And was informed to re-scan at 0600  No other intervention at this time   Bladder scan volumes:  1/30/20 at 2100- 242ml  1/31 at 0000: 313ml  1/31 at 0600: 342

## 2020-01-31 NOTE — PROGRESS NOTES
Daily Progress Note - Critical Care   Marion England 71 y o  male MRN: [id number]  [facility] Encounter: [id number]        ----------------------------------------------------------------------------------------  HPI/24hr events: Patient removed NGT, overnight  White surgery made aware      ---------------------------------------------------------------------------------------  SUBJECTIVE    Review of Systems  Review of systems was reviewed and negative unless stated above in HPI/24-hour events   ---------------------------------------------------------------------------------------  Assessment and Plan:    Neuro:   · Diagnosis: Analgesia, post-op  ? Tylenol 975mg Q 6  ? Toradol 15mg QID  ? Dilaudid 0 5 Q2 p r n  For breakthrough pain, Robaxin 500 q6 PRN  ? Gabapentin 100mg TID  ? Epidural removed yesterday  ? CAM ICU, delirium precautions, regulate sleep-wake        CV:   · No acute issues  · Maintain MAP goal >70, per surgery  · Continue to monitor BP today with A-line, per thoracic        Pulm:  · Diagnosis: COPD, SOB r/t acute pleural effusions S/P thoracentesis  ? Plan:   § PRN albuterol for SOB  § Continue encouraging Inspiratory spirometry  § Currently @ 250- IS        GI:   · Diagnosis:  Esophageal neoplasm S/p esophagectomy with exploratory laparotomy  ? Plan  § Continue frequent exams of neck and abdominal wound  § NGT removed overnight by patient  § Per [facility] and Thoracic, do not replace at this time  § Continue to monitor for nausea/vomiting  § PRN zofran  § Monitor neck Penrose output, remove Sunday 2/2        :   · Monitor I&O  · Doshi D/c yesterday at 1300, No void since  · Follow Urinary Retention Protocol       F/E/N:    · Replete electrolytes as needed  · Morning labs pending  · TF to be advance to goal today  · Jevity 69ml/hr/ 150ml of FW QID, per nutrition        Heme/Onc:   · Diagnosis:  Esophageal neoplasm s/p esophagectomy with exploratory laparotomy  ?  Plan:  § Per postop note no gross tumor was observed during exploratory laparotomy  § Will discuss future needs white surgery     Endo:   · Diagnosis: DM  ? Plan:   § Continue SSI, algorithm 1    § Blood sugars controlled, 140-160  § Q 6 hour blood sugars     ID:    · No acute issues  · Afebrile, Normal WBCs        MSK/Skin:   · No acute issues  · Assist with turning and repositioning Q2  · OOB as tolerated  · PT/OT      Disposition: Transfer to Stepdown Level 1   Code Status: Level 1 - Full Code  ---------------------------------------------------------------------------------------  ICU CORE MEASURES    Prophylaxis   VTE Pharmacologic Prophylaxis: Enoxaparin (Lovenox)  VTE Mechanical Prophylaxis: sequential compression device  Stress Ulcer Prophylaxis: Prophylaxis Not Indicated     ABCDE Protocol (if indicated)  Plan to perform spontaneous awakening trial today? Not applicable  Plan to perform spontaneous breathing trial today? Not applicable  Obvious barriers to extubation? Not applicable  CAM-ICU: Negative    Invasive Devices Review  Invasive Devices     Central Venous Catheter Line            Port A Cath 19 Left Chest 126 days          Peripheral Intravenous Line            Peripheral IV 20 Left Hand 3 days    Peripheral IV 20 Right Arm 2 days          Arterial Line            Arterial Line 20 Left Radial 2 days          Drain            Gastrostomy/Enterostomy Jejunostomy 14 Fr   days    NG/OG/Enteral Tube Nasogastric Left nares 2 days    Open Drain Anterior; Left Neck 2 days              ---------------------------------------------------------------------------------------  OBJECTIVE    Vitals   Vitals:    20 0300 20 0400 20 0500 20 0600   BP: 122/74 110/73 131/73 105/70   Pulse: (!) 106 102 104 (!) 114   Resp: (!) 29 (!) 26 (!) 26 (!) 30   Temp:       TempSrc:       SpO2: 98% 95% 96% 96%   Weight:       Height:         Temp (24hrs), Av °F (36 7 °C), Min:97 6 °F (36 4 °C), Max:98 6 °F (37 °C)  Current: Temperature: 97 6 °F (36 4 °C)      Physical Exam   Constitutional: He is oriented to person, place, and time  He appears well-developed and well-nourished  No distress  HENT:   Head: Normocephalic and atraumatic  Nose: Nose normal    Mouth/Throat: Oropharynx is clear and moist and mucous membranes are normal    Eyes: Pupils are equal, round, and reactive to light  Conjunctivae, EOM and lids are normal    Neck:       Cardiovascular: Normal rate, regular rhythm, normal heart sounds and normal pulses  Pulmonary/Chest: Effort normal and breath sounds normal    Abdominal: Soft  Bowel sounds are normal  There is no tenderness  Neurological: He is alert and oriented to person, place, and time  No cranial nerve deficit or sensory deficit  GCS eye subscore is 4  GCS verbal subscore is 5  GCS motor subscore is 6  Skin: Skin is warm and dry  Capillary refill takes less than 2 seconds  Midline incision well approximated  Staple intact  Psychiatric: He has a normal mood and affect   His speech is normal and behavior is normal  Judgment and thought content normal  Cognition and memory are normal          Respiratory:    O2 Flow Rate (L/min): 3 L/min    Invasive/non-invasive ventilation settings   Respiratory    Lab Data (Last 4 hours)    None         O2/Vent Data (Last 4 hours)    None                Laboratory and Diagnostics:  Results from last 7 days   Lab Units 01/30/20  0536 01/29/20  0432 01/28/20  1755 01/28/20  1349 01/28/20  1241 01/28/20  1125   WBC Thousand/uL 8 61 6 47 7 10 6 18  --   --    HEMOGLOBIN g/dL 11 2* 10 8* 11 4* 10 4*  --   --    I STAT HEMOGLOBIN g/dl  --   --   --   --  10 2* 10 5*   HEMATOCRIT % 36 8 34 8* 36 3* 33 8*  --   --    HEMATOCRIT, ISTAT %  --   --   --   --  30* 31*   PLATELETS Thousands/uL 142* 109* 100* 104*  --   --    NEUTROS PCT % 85* 85* 87*  --   --   --    MONOS PCT % 7 7 7  --   --   --      Results from last 7 days   Lab Units 01/30/20  0536 01/29/20  0432 01/28/20  1755 01/28/20  1350 01/28/20  1241 01/28/20  1125   SODIUM mmol/L 139 142 140 144  --   --    POTASSIUM mmol/L 4 0 4 3 4 1 3 8  --   --    CHLORIDE mmol/L 109* 112* 112* 116*  --   --    CO2 mmol/L 23 23 21 23  --   --    CO2, I-STAT mmol/L  --   --   --   --  23 24   ANION GAP mmol/L 7 7 7 5  --   --    BUN mg/dL 25 15 14 16  --   --    CREATININE mg/dL 1 06 0 75 0 69 0 69  --   --    CALCIUM mg/dL 8 3 8 0* 7 8* 7 4*  --   --    GLUCOSE RANDOM mg/dL 135 132 132 140  --   --    ALT U/L  --   --  49  --   --   --    AST U/L  --   --  55*  --   --   --    ALK PHOS U/L  --   --  43*  --   --   --    ALBUMIN g/dL  --   --  3 0*  --   --   --    TOTAL BILIRUBIN mg/dL  --   --  1 09*  --   --   --      Results from last 7 days   Lab Units 01/30/20  0536 01/29/20  0432 01/28/20  1755 01/28/20  1350   MAGNESIUM mg/dL 2 7* 2 4 1 9 1 7   PHOSPHORUS mg/dL 3 5  --   --   --                Results from last 7 days   Lab Units 01/28/20  1755   LACTIC ACID mmol/L 0 7     ABG:  Results from last 7 days   Lab Units 01/30/20  0536   PH ART  7 406   PCO2 ART mm Hg 36 7   PO2 ART mm Hg 168 7*   HCO3 ART mmol/L 22 5   BASE EXC ART mmol/L -1 8   ABG SOURCE  Line, Arterial     VBG:  Results from last 7 days   Lab Units 01/30/20  0536   ABG SOURCE  Line, Arterial           Micro  Results from last 7 days   Lab Units 01/30/20  0021   GRAM STAIN RESULT  2+ Polys  No bacteria seen  1+ Polys  No bacteria seen       EKG:   Imaging: I have personally reviewed pertinent reports  Intake and Output  I/O       01/29 0701 - 01/30 0700 01/30 0701 - 01/31 0700 01/31 0701 - 02/01 0700    P  O  0      I V  (mL/kg) 3062 2 (35 4)      NG/GT 90 140     IV Piggyback       Feedings  429     Total Intake(mL/kg) 3152 2 (36 4) 569 (6 6)     Urine (mL/kg/hr) 970 (0 5) 275 (0 1)     Emesis/NG output 100 350     Drains 3 0     Stool 0      Blood       Total Output 1073 625     Net +2079 2 -56            Unmeasured Stool Occurrence 0 x            Height and Weights   Height: 5' 10" (177 8 cm)  IBW: 73 kg  Body mass index is 27 41 kg/m²  Weight (last 2 days)     None            Nutrition       Diet Orders   (From admission, onward)             Start     Ordered    01/31/20 0705  Diet Enteral/Parenteral; Tube Feeding No Oral Diet; Jevity 1 2 Lloyd; Continuous; 68; 150; Every 4 hours  Diet effective now     Comments:  Make sure feeds go through JEJUNOSTOMY tube   Question Answer Comment   Diet Type Enteral/Parenteral    Enteral/Parenteral Tube Feeding No Oral Diet    Tube Feeding Formula: Jevity 1 2 Lloyd    Bolus/Cyclic/Continuous Continuous    Tube Feeding Goal Rate (mL/hr): 68    Tube Feeding water flush (mL): 150    Water flush frequency: Every 4 hours    RD to adjust diet per protocol?  Yes        01/31/20 0705                  Active Medications  Scheduled Meds:  Current Facility-Administered Medications:  acetaminophen 975 mg Oral Q6H [facility] Karly Kate MD   albuterol 2 5 mg Nebulization Q6H PRN SARA Brewster   chlorhexidine 15 mL Swish & Spit Q12H [facility] Georgette Comfort Barron   enoxaparin 40 mg Subcutaneous Q24H [facility] Marisel Domingo MD   gabapentin 100 mg Per J Tube TID Venecia Briggs MD   HYDROmorphone 0 5 mg Intravenous Q2H PRN Jh Gaona MD   insulin lispro 1-5 Units Subcutaneous Q6H [facility] SARA Brewster   methocarbamol 500 mg Per J Tube Q6H PRN Odalis Rojo MD   ondansetron 4 mg Intravenous Q6H PRN Sonia Butt   oxyCODONE 2 5 mg Oral Q4H PRN Marisel Domingo MD   oxyCODONE 5 mg Oral Q4H PRN Marisel Domingo MD   pantoprazole 40 mg Intravenous Q24H [facility] SARA Brewster   phenol 1 spray Mouth/Throat Q2H PRN SARA Brewster     Continuous Infusions:     PRN Meds:     albuterol 2 5 mg Q6H PRN   HYDROmorphone 0 5 mg Q2H PRN   methocarbamol 500 mg Q6H PRN   ondansetron 4 mg Q6H PRN   oxyCODONE 2 5 mg Q4H PRN   oxyCODONE 5 mg Q4H PRN   phenol 1 spray Q2H PRN       Allergies   Allergies   Allergen Reactions    Penicillins Itching     ---------------------------------------------------------------------------------------  Advance Directive and Living Will:      Power of :    POLST:    ---------------------------------------------------------------------------------------  Counseling / Coordination of Care  Total Critical Care time spent 31 minutes excluding procedures, teaching and family updates  SARA [place]      Portions of the record may have been created with voice recognition software  Occasional wrong word or "sound a like" substitutions may have occurred due to the inherent limitations of voice recognition software    Read the chart carefully and recognize, using context, where substitutions have occurred

## 2020-01-31 NOTE — PROGRESS NOTES
Patient not voided since removal of johnson yesterday @ 1300  Bladder scan showing 440mL SARA Austin made aware urinary retention protocol placed and patient striaght cath'd for 500 mL of dark gary urine with sediment  DTV 1330  Will continue to monitor

## 2020-01-31 NOTE — PROGRESS NOTES
Water found in patients room this am  Patient reporting drinking water  Reeducated patient about how he is not allowed to have anything to eat or drink and how this could effect the healing of the sx he had done  Water removed from patient  And chair alarm placed  [facility]X aware

## 2020-01-31 NOTE — PROGRESS NOTES
Progress Note - Thoracic Surgery   Adalgisa Mcfarlane 71 y o  male MRN: [id number]  Unit/Bed#: [facility] 558-28 Encounter: [id number]    Assessment:  71 M with esophageal adenocarcinoma status post transhiatal esophagectomy  Respiratory status much improved, on 3 L NC  WBC pending    Plan:  Keep NPO, do not replace NGT at this time  Still plan for swallow 2/3  Increase TF to goal today  F/U AM labs, monitor tachycardia/HR  Monitor urination - has not voided since johnson removed  DC neck staples and penrose today  OOB, ambulation  Can downgrade from unit- SD2    Subjective/Objective   Chief Complaint:     Subjective: Patient self- removed NGT overnight, said it was bothering his throat  He feels like he has to cough and has a hard time doing so or bringing things up  No N/V, tolerating TF without discomfort, + flatus  Had johnson removed yesterday and has not voided, bladder scan this AM <400  Objective:     Blood pressure 105/70, pulse (!) 114, temperature 97 6 °F (36 4 °C), temperature source Oral, resp  rate (!) 30, height 5' 10" (1 778 m), weight 86 6 kg (191 lb), SpO2 96 %  ,Body mass index is 27 41 kg/m²  Intake/Output Summary (Last 24 hours) at 1/31/2020 0718  Last data filed at 1/31/2020 0601  Gross per 24 hour   Intake 569 ml   Output 625 ml   Net -56 ml       Invasive Devices     Central Venous Catheter Line            Port A Cath 09/26/19 Left Chest 126 days          Peripheral Intravenous Line            Peripheral IV 01/28/20 Left Hand 2 days    Peripheral IV 01/28/20 Right Arm 2 days          Arterial Line            Arterial Line 01/28/20 Left Radial 2 days          Drain            Gastrostomy/Enterostomy Jejunostomy 14 Fr   days    NG/OG/Enteral Tube Nasogastric Left nares 2 days    Open Drain Anterior; Left Neck 2 days                Physical Exam:   Gen: A&O, NAD  HEENT: Neck incision dry, no driainage from penrose  Cardio: RRR  Lungs: CTAB  Abd: Soft, mildly distended, non tender   JT in place, staples c/d/i      Lab, Imaging and other studies:CBC: No results found for: WBC, HGB, HCT, MCV, PLT, ADJUSTEDWBC, MCH, MCHC, RDW, MPV, NRBC, CMP: No results found for: SODIUM, K, CL, CO2, ANIONGAP, BUN, CREATININE, GLUCOSE, CALCIUM, AST, ALT, ALKPHOS, PROT, BILITOT, EGFR, Coagulation: No results found for: PT, INR, APTT  VTE Pharmacologic Prophylaxis: Heparin  VTE Mechanical Prophylaxis: sequential compression device

## 2020-01-31 NOTE — PROGRESS NOTES
Patient unable to lay flat for bladder scan d/t coughing and unable to clear secretions  Flutter valve given  Patient sounds clear on auscultation  VSS  Patient denies any pain or SOB  KALEIGH Weber with [facility] made aware

## 2020-01-31 NOTE — PLAN OF CARE
Problem: PHYSICAL THERAPY ADULT  Goal: Performs mobility at highest level of function for planned discharge setting  See evaluation for individualized goals  Description  Treatment/Interventions: Functional transfer training, LE strengthening/ROM, Elevations, Therapeutic exercise, Endurance training, Patient/family training, Equipment eval/education, Bed mobility, Gait training, Spoke to nursing  Equipment Recommended: Walker(RW)       See flowsheet documentation for full assessment, interventions and recommendations  Outcome: Progressing  Note:   Prognosis: Good  Problem List: Decreased strength, Decreased endurance, Impaired balance, Decreased mobility  Assessment: The pt  was able to progress his ambulatory distance today, but he continues to require contact guard assistance especially when manuevering around obstacles  He noted no increase in pain with ambulation, but he was unable to perform any dynamic head or trunk movements with gait  He also has a reduced lilo from his baseline  He will benefit from continued physical therapy in order to maximize his functional independence and safety  Recommendation: Home PT, Home with family support     PT - OK to Discharge: No    See flowsheet documentation for full assessment

## 2020-01-31 NOTE — PHYSICAL THERAPY NOTE
Physical Therapy Progress Note     01/31/20 1155   Pain Assessment   Pain Assessment No/denies pain   Pain Score No Pain   Restrictions/Precautions   Other Precautions Fall Risk;Telemetry;Multiple lines;O2;Chair Alarm  (Alarm active post session )   Subjective   Subjective The pt  states that he is doing alright today; he denied any pain with ambulation  Transfers   Sit to Stand 5  Supervision   Additional items Increased time required;Verbal cues   Stand to Sit 5  Supervision   Additional items Increased time required   Ambulation/Elevation   Gait pattern Excessively slow; Short stride; Inconsistent lilo;Decreased foot clearance; Forward Flexion   Gait Assistance 4  Minimal assist  (Contact guard assistance )   Additional items Assist x 1;Verbal cues   Assistive Device Rolling walker   Distance 360 feet  Balance   Static Sitting Good   Dynamic Sitting Fair +   Static Standing Fair   Ambulatory Fair -   Activity Tolerance   Activity Tolerance Patient tolerated treatment well;Patient limited by fatigue   Nurse Harrison Centeno RN present  Assessment   Prognosis Good   Problem List Decreased strength;Decreased endurance; Impaired balance;Decreased mobility   Assessment The pt  was able to progress his ambulatory distance today, but he continues to require contact guard assistance especially when manuevering around obstacles  He noted no increase in pain with ambulation, but he was unable to perform any dynamic head or trunk movements with gait  He also has a reduced lilo from his baseline  He will benefit from continued physical therapy in order to maximize his functional independence and safety  Goals   Patient Goals To go home  STG Expiration Date 02/13/20   PT Treatment Day 1   Plan   Treatment/Interventions Functional transfer training;LE strengthening/ROM; Therapeutic exercise; Endurance training;Patient/family training;Bed mobility;Gait training;Elevations   Progress Progressing toward goals   PT Frequency   (3-5x a week )   Recommendation   Recommendation Home PT; Home with family support   Equipment Recommended Martita Rivas   PT - OK to Discharge No     Lasha Desir, PTA

## 2020-01-31 NOTE — NURSING NOTE
Pt pulled out his  NG tube this morning  Pt mentioned the NG tube was bothering his throat and that he needed to cough  [facility] sx and [facility] made aware  Advised to NOT replace NG tube at this time

## 2020-01-31 NOTE — PROGRESS NOTES
Patient complaining of new pain in abdomen and nausea  Patient states he is also feeling bloated  Tube feeds placed on hold  Oxy 2 5 given and zofran given  KALEIGH Choi with [facility] made aware and [facility]X paged made aware of patient condition  Stated to restart tube feeds at trickle and continue to monitor

## 2020-02-01 ENCOUNTER — APPOINTMENT (INPATIENT)
Dept: RADIOLOGY | Facility: HOSPITAL | Age: 65
DRG: 326 | End: 2020-02-01
Payer: COMMERCIAL

## 2020-02-01 PROBLEM — R49.0 DYSPHONIA: Status: ACTIVE | Noted: 2020-01-16

## 2020-02-01 PROBLEM — J38.01 PARALYSIS OF LEFT VOCAL FOLD: Status: ACTIVE | Noted: 2020-01-16

## 2020-02-01 LAB
ANION GAP SERPL CALCULATED.3IONS-SCNC: 6 MMOL/L (ref 4–13)
BASE EXCESS BLDA CALC-SCNC: -5 MMOL/L
BASOPHILS # BLD AUTO: 0.02 THOUSANDS/ΜL (ref 0–0.1)
BASOPHILS NFR BLD AUTO: 0 % (ref 0–1)
BUN SERPL-MCNC: 54 MG/DL (ref 5–25)
CA-I BLD-SCNC: 1.07 MMOL/L (ref 1.12–1.32)
CA-I BLD-SCNC: 1.19 MMOL/L (ref 1.12–1.32)
CALCIUM SERPL-MCNC: 9.4 MG/DL (ref 8.3–10.1)
CHLORIDE SERPL-SCNC: 105 MMOL/L (ref 100–108)
CO2 SERPL-SCNC: 25 MMOL/L (ref 21–32)
CREAT SERPL-MCNC: 1.95 MG/DL (ref 0.6–1.3)
EOSINOPHIL # BLD AUTO: 0.04 THOUSAND/ΜL (ref 0–0.61)
EOSINOPHIL NFR BLD AUTO: 0 % (ref 0–6)
ERYTHROCYTE [DISTWIDTH] IN BLOOD BY AUTOMATED COUNT: 17.6 % (ref 11.6–15.1)
ERYTHROCYTE [DISTWIDTH] IN BLOOD BY AUTOMATED COUNT: 18.6 % (ref 11.6–15.1)
GFR SERPL CREATININE-BSD FRML MDRD: 35 ML/MIN/1.73SQ M
GLUCOSE SERPL-MCNC: 199 MG/DL (ref 65–140)
GLUCOSE SERPL-MCNC: 224 MG/DL (ref 65–140)
GLUCOSE SERPL-MCNC: 230 MG/DL (ref 65–140)
GLUCOSE SERPL-MCNC: 234 MG/DL (ref 65–140)
GLUCOSE SERPL-MCNC: 278 MG/DL (ref 65–140)
GLUCOSE SERPL-MCNC: 339 MG/DL (ref 65–140)
HCO3 BLDA-SCNC: 21.2 MMOL/L (ref 22–28)
HCT VFR BLD AUTO: 38.1 % (ref 36.5–49.3)
HCT VFR BLD AUTO: 45.5 % (ref 36.5–49.3)
HCT VFR BLD CALC: 36 % (ref 36.5–49.3)
HGB BLD-MCNC: 11.5 G/DL (ref 12–17)
HGB BLD-MCNC: 13.9 G/DL (ref 12–17)
HGB BLDA-MCNC: 12.2 G/DL (ref 12–17)
IMM GRANULOCYTES # BLD AUTO: 0.04 THOUSAND/UL (ref 0–0.2)
IMM GRANULOCYTES NFR BLD AUTO: 0 % (ref 0–2)
LYMPHOCYTES # BLD AUTO: 0.71 THOUSANDS/ΜL (ref 0.6–4.47)
LYMPHOCYTES NFR BLD AUTO: 7 % (ref 14–44)
MAGNESIUM SERPL-MCNC: 3.2 MG/DL (ref 1.6–2.6)
MCH RBC QN AUTO: 26.5 PG (ref 26.8–34.3)
MCH RBC QN AUTO: 26.9 PG (ref 26.8–34.3)
MCHC RBC AUTO-ENTMCNC: 30.2 G/DL (ref 31.4–37.4)
MCHC RBC AUTO-ENTMCNC: 30.5 G/DL (ref 31.4–37.4)
MCV RBC AUTO: 87 FL (ref 82–98)
MCV RBC AUTO: 89 FL (ref 82–98)
MONOCYTES # BLD AUTO: 0.69 THOUSAND/ΜL (ref 0.17–1.22)
MONOCYTES NFR BLD AUTO: 6 % (ref 4–12)
NEUTROPHILS # BLD AUTO: 9.3 THOUSANDS/ΜL (ref 1.85–7.62)
NEUTS SEG NFR BLD AUTO: 87 % (ref 43–75)
NRBC BLD AUTO-RTO: 0 /100 WBCS
O2 CT BLDA-SCNC: 15.3 ML/DL (ref 16–23)
OXYHGB MFR BLDA: 97 % (ref 94–97)
PCO2 BLD: >103 MM HG (ref 42–50)
PCO2 BLDA: 44.1 MM HG (ref 36–44)
PH BLD: 7.07 [PH] (ref 7.3–7.4)
PH BLDA: 7.3 [PH] (ref 7.35–7.45)
PLATELET # BLD AUTO: 347 THOUSANDS/UL (ref 149–390)
PLATELET # BLD AUTO: 398 THOUSANDS/UL (ref 149–390)
PMV BLD AUTO: 11.3 FL (ref 8.9–12.7)
PMV BLD AUTO: 9.8 FL (ref 8.9–12.7)
PO2 BLD: 60 MM HG (ref 35–45)
PO2 BLDA: 113.9 MM HG (ref 75–129)
POTASSIUM BLD-SCNC: 7.1 MMOL/L (ref 3.5–5.3)
POTASSIUM SERPL-SCNC: 4.4 MMOL/L (ref 3.5–5.3)
RBC # BLD AUTO: 4.28 MILLION/UL (ref 3.88–5.62)
RBC # BLD AUTO: 5.24 MILLION/UL (ref 3.88–5.62)
SODIUM BLD-SCNC: 138 MMOL/L (ref 136–145)
SODIUM SERPL-SCNC: 136 MMOL/L (ref 136–145)
SPECIMEN SOURCE: ABNORMAL
SPECIMEN SOURCE: ABNORMAL
WBC # BLD AUTO: 10.8 THOUSAND/UL (ref 4.31–10.16)
WBC # BLD AUTO: 7.56 THOUSAND/UL (ref 4.31–10.16)

## 2020-02-01 PROCEDURE — 99221 1ST HOSP IP/OBS SF/LOW 40: CPT | Performed by: OTOLARYNGOLOGY

## 2020-02-01 PROCEDURE — 82330 ASSAY OF CALCIUM: CPT

## 2020-02-01 PROCEDURE — 83735 ASSAY OF MAGNESIUM: CPT | Performed by: STUDENT IN AN ORGANIZED HEALTH CARE EDUCATION/TRAINING PROGRAM

## 2020-02-01 PROCEDURE — 86850 RBC ANTIBODY SCREEN: CPT | Performed by: STUDENT IN AN ORGANIZED HEALTH CARE EDUCATION/TRAINING PROGRAM

## 2020-02-01 PROCEDURE — 31575 DIAGNOSTIC LARYNGOSCOPY: CPT | Performed by: OTOLARYNGOLOGY

## 2020-02-01 PROCEDURE — 86901 BLOOD TYPING SEROLOGIC RH(D): CPT | Performed by: STUDENT IN AN ORGANIZED HEALTH CARE EDUCATION/TRAINING PROGRAM

## 2020-02-01 PROCEDURE — 99024 POSTOP FOLLOW-UP VISIT: CPT | Performed by: THORACIC SURGERY (CARDIOTHORACIC VASCULAR SURGERY)

## 2020-02-01 PROCEDURE — 84132 ASSAY OF SERUM POTASSIUM: CPT

## 2020-02-01 PROCEDURE — 84100 ASSAY OF PHOSPHORUS: CPT | Performed by: STUDENT IN AN ORGANIZED HEALTH CARE EDUCATION/TRAINING PROGRAM

## 2020-02-01 PROCEDURE — 82330 ASSAY OF CALCIUM: CPT | Performed by: STUDENT IN AN ORGANIZED HEALTH CARE EDUCATION/TRAINING PROGRAM

## 2020-02-01 PROCEDURE — 94664 DEMO&/EVAL PT USE INHALER: CPT

## 2020-02-01 PROCEDURE — 92950 HEART/LUNG RESUSCITATION CPR: CPT

## 2020-02-01 PROCEDURE — 94760 N-INVAS EAR/PLS OXIMETRY 1: CPT

## 2020-02-01 PROCEDURE — 93005 ELECTROCARDIOGRAM TRACING: CPT

## 2020-02-01 PROCEDURE — 83605 ASSAY OF LACTIC ACID: CPT | Performed by: STUDENT IN AN ORGANIZED HEALTH CARE EDUCATION/TRAINING PROGRAM

## 2020-02-01 PROCEDURE — 82948 REAGENT STRIP/BLOOD GLUCOSE: CPT

## 2020-02-01 PROCEDURE — 71045 X-RAY EXAM CHEST 1 VIEW: CPT

## 2020-02-01 PROCEDURE — 85014 HEMATOCRIT: CPT

## 2020-02-01 PROCEDURE — 36600 WITHDRAWAL OF ARTERIAL BLOOD: CPT

## 2020-02-01 PROCEDURE — 86900 BLOOD TYPING SEROLOGIC ABO: CPT | Performed by: STUDENT IN AN ORGANIZED HEALTH CARE EDUCATION/TRAINING PROGRAM

## 2020-02-01 PROCEDURE — 94640 AIRWAY INHALATION TREATMENT: CPT

## 2020-02-01 PROCEDURE — 82803 BLOOD GASES ANY COMBINATION: CPT

## 2020-02-01 PROCEDURE — 36620 INSERTION CATHETER ARTERY: CPT

## 2020-02-01 PROCEDURE — 83735 ASSAY OF MAGNESIUM: CPT | Performed by: SURGERY

## 2020-02-01 PROCEDURE — 84295 ASSAY OF SERUM SODIUM: CPT

## 2020-02-01 PROCEDURE — 82805 BLOOD GASES W/O2 SATURATION: CPT | Performed by: STUDENT IN AN ORGANIZED HEALTH CARE EDUCATION/TRAINING PROGRAM

## 2020-02-01 PROCEDURE — 82947 ASSAY GLUCOSE BLOOD QUANT: CPT

## 2020-02-01 PROCEDURE — 94668 MNPJ CHEST WALL SBSQ: CPT

## 2020-02-01 PROCEDURE — 80048 BASIC METABOLIC PNL TOTAL CA: CPT | Performed by: STUDENT IN AN ORGANIZED HEALTH CARE EDUCATION/TRAINING PROGRAM

## 2020-02-01 PROCEDURE — 80048 BASIC METABOLIC PNL TOTAL CA: CPT | Performed by: SURGERY

## 2020-02-01 PROCEDURE — 5A12012 PERFORMANCE OF CARDIAC OUTPUT, SINGLE, MANUAL: ICD-10-PCS | Performed by: SURGERY

## 2020-02-01 PROCEDURE — 85025 COMPLETE CBC W/AUTO DIFF WBC: CPT | Performed by: SURGERY

## 2020-02-01 PROCEDURE — 94002 VENT MGMT INPAT INIT DAY: CPT

## 2020-02-01 PROCEDURE — 85027 COMPLETE CBC AUTOMATED: CPT | Performed by: STUDENT IN AN ORGANIZED HEALTH CARE EDUCATION/TRAINING PROGRAM

## 2020-02-01 PROCEDURE — 99024 POSTOP FOLLOW-UP VISIT: CPT | Performed by: SURGERY

## 2020-02-01 RX ORDER — NOREPINEPHRINE BITARTRATE 1 MG/ML
INJECTION, SOLUTION INTRAVENOUS
Status: DISPENSED
Start: 2020-02-01 | End: 2020-02-02

## 2020-02-01 RX ORDER — CHLORHEXIDINE GLUCONATE 0.12 MG/ML
15 RINSE ORAL EVERY 12 HOURS SCHEDULED
Status: DISCONTINUED | OUTPATIENT
Start: 2020-02-01 | End: 2020-02-01 | Stop reason: SDUPTHER

## 2020-02-01 RX ORDER — FENTANYL CITRATE 50 UG/ML
INJECTION, SOLUTION INTRAMUSCULAR; INTRAVENOUS
Status: COMPLETED
Start: 2020-02-01 | End: 2020-02-01

## 2020-02-01 RX ORDER — SODIUM CHLORIDE, SODIUM GLUCONATE, SODIUM ACETATE, POTASSIUM CHLORIDE, MAGNESIUM CHLORIDE, SODIUM PHOSPHATE, DIBASIC, AND POTASSIUM PHOSPHATE .53; .5; .37; .037; .03; .012; .00082 G/100ML; G/100ML; G/100ML; G/100ML; G/100ML; G/100ML; G/100ML
1000 INJECTION, SOLUTION INTRAVENOUS ONCE
Status: COMPLETED | OUTPATIENT
Start: 2020-02-01 | End: 2020-02-02

## 2020-02-01 RX ORDER — LIDOCAINE HYDROCHLORIDE 10 MG/ML
INJECTION, SOLUTION EPIDURAL; INFILTRATION; INTRACAUDAL; PERINEURAL
Status: COMPLETED
Start: 2020-02-01 | End: 2020-02-01

## 2020-02-01 RX ORDER — CALCIUM CHLORIDE 100 MG/ML
1 INJECTION INTRAVENOUS; INTRAVENTRICULAR ONCE
Status: COMPLETED | OUTPATIENT
Start: 2020-02-01 | End: 2020-02-01

## 2020-02-01 RX ORDER — CHLORHEXIDINE GLUCONATE 0.12 MG/ML
15 RINSE ORAL EVERY 12 HOURS SCHEDULED
Status: DISCONTINUED | OUTPATIENT
Start: 2020-02-01 | End: 2020-02-08

## 2020-02-01 RX ORDER — PROPOFOL 10 MG/ML
5-50 INJECTION, EMULSION INTRAVENOUS
Status: DISCONTINUED | OUTPATIENT
Start: 2020-02-01 | End: 2020-02-02

## 2020-02-01 RX ORDER — FENTANYL CITRATE-0.9 % NACL/PF 10 MCG/ML
75 PLASTIC BAG, INJECTION (ML) INTRAVENOUS CONTINUOUS
Status: DISCONTINUED | OUTPATIENT
Start: 2020-02-01 | End: 2020-02-04

## 2020-02-01 RX ORDER — METOPROLOL TARTRATE 5 MG/5ML
5 INJECTION INTRAVENOUS EVERY 6 HOURS
Status: DISCONTINUED | OUTPATIENT
Start: 2020-02-01 | End: 2020-02-01

## 2020-02-01 RX ORDER — PROPOFOL 10 MG/ML
50 INJECTION, EMULSION INTRAVENOUS ONCE
Status: DISCONTINUED | OUTPATIENT
Start: 2020-02-01 | End: 2020-02-02

## 2020-02-01 RX ORDER — SODIUM BICARBONATE 84 MG/ML
INJECTION, SOLUTION INTRAVENOUS CODE/TRAUMA/SEDATION MEDICATION
Status: COMPLETED | OUTPATIENT
Start: 2020-02-01 | End: 2020-02-01

## 2020-02-01 RX ORDER — FENTANYL CITRATE 50 UG/ML
50 INJECTION, SOLUTION INTRAMUSCULAR; INTRAVENOUS ONCE
Status: COMPLETED | OUTPATIENT
Start: 2020-02-01 | End: 2020-02-01

## 2020-02-01 RX ORDER — LORAZEPAM 2 MG/ML
1 INJECTION INTRAMUSCULAR ONCE
Status: COMPLETED | OUTPATIENT
Start: 2020-02-01 | End: 2020-02-01

## 2020-02-01 RX ORDER — ALBUMIN, HUMAN INJ 5% 5 %
12.5 SOLUTION INTRAVENOUS ONCE
Status: COMPLETED | OUTPATIENT
Start: 2020-02-02 | End: 2020-02-02

## 2020-02-01 RX ORDER — SODIUM CHLORIDE, SODIUM GLUCONATE, SODIUM ACETATE, POTASSIUM CHLORIDE, MAGNESIUM CHLORIDE, SODIUM PHOSPHATE, DIBASIC, AND POTASSIUM PHOSPHATE .53; .5; .37; .037; .03; .012; .00082 G/100ML; G/100ML; G/100ML; G/100ML; G/100ML; G/100ML; G/100ML
100 INJECTION, SOLUTION INTRAVENOUS CONTINUOUS
Status: DISCONTINUED | OUTPATIENT
Start: 2020-02-02 | End: 2020-02-02

## 2020-02-01 RX ORDER — LORAZEPAM 2 MG/ML
INJECTION INTRAMUSCULAR
Status: COMPLETED
Start: 2020-02-01 | End: 2020-02-01

## 2020-02-01 RX ORDER — CALCIUM CHLORIDE 100 MG/ML
1 INJECTION INTRAVENOUS; INTRAVENTRICULAR ONCE
Status: COMPLETED | OUTPATIENT
Start: 2020-02-02 | End: 2020-02-01

## 2020-02-01 RX ORDER — AMIODARONE HYDROCHLORIDE 50 MG/ML
INJECTION, SOLUTION INTRAVENOUS CODE/TRAUMA/SEDATION MEDICATION
Status: COMPLETED | OUTPATIENT
Start: 2020-02-01 | End: 2020-02-01

## 2020-02-01 RX ORDER — MAGNESIUM SULFATE 500 MG/ML
VIAL (ML) INJECTION CODE/TRAUMA/SEDATION MEDICATION
Status: COMPLETED | OUTPATIENT
Start: 2020-02-01 | End: 2020-02-01

## 2020-02-01 RX ORDER — FENTANYL CITRATE 50 UG/ML
25 INJECTION, SOLUTION INTRAMUSCULAR; INTRAVENOUS EVERY 2 HOUR PRN
Status: DISCONTINUED | OUTPATIENT
Start: 2020-02-01 | End: 2020-02-08

## 2020-02-01 RX ORDER — ALBUMIN, HUMAN INJ 5% 5 %
SOLUTION INTRAVENOUS
Status: COMPLETED
Start: 2020-02-01 | End: 2020-02-01

## 2020-02-01 RX ORDER — SODIUM CHLORIDE 9 MG/ML
125 INJECTION, SOLUTION INTRAVENOUS CONTINUOUS
Status: DISCONTINUED | OUTPATIENT
Start: 2020-02-01 | End: 2020-02-01

## 2020-02-01 RX ADMIN — LIDOCAINE HYDROCHLORIDE 300 MG: 10 INJECTION, SOLUTION EPIDURAL; INFILTRATION; INTRACAUDAL; PERINEURAL at 23:35

## 2020-02-01 RX ADMIN — SODIUM BICARBONATE 50 MEQ: 84 INJECTION INTRAVENOUS at 22:02

## 2020-02-01 RX ADMIN — Medication 50 MEQ: at 23:39

## 2020-02-01 RX ADMIN — INSULIN LISPRO 1 UNITS: 100 INJECTION, SOLUTION INTRAVENOUS; SUBCUTANEOUS at 06:39

## 2020-02-01 RX ADMIN — NOREPINEPHRINE BITARTRATE 6 MCG/MIN: 1 INJECTION INTRAVENOUS at 22:44

## 2020-02-01 RX ADMIN — EPINEPHRINE 1 MG: 0.1 INJECTION INTRACARDIAC; INTRAVENOUS at 22:04

## 2020-02-01 RX ADMIN — AMIODARONE HYDROCHLORIDE 300 MG: 50 INJECTION, SOLUTION INTRAVENOUS at 22:05

## 2020-02-01 RX ADMIN — Medication 50 MEQ: at 23:51

## 2020-02-01 RX ADMIN — METOPROLOL TARTRATE 5 MG: 1 INJECTION, SOLUTION INTRAVENOUS at 20:48

## 2020-02-01 RX ADMIN — CALCIUM CHLORIDE 1 G: 100 INJECTION, SOLUTION INTRAVENOUS; INTRAVENTRICULAR at 23:41

## 2020-02-01 RX ADMIN — ENOXAPARIN SODIUM 40 MG: 40 INJECTION SUBCUTANEOUS at 08:58

## 2020-02-01 RX ADMIN — Medication 20 MG: at 08:56

## 2020-02-01 RX ADMIN — FENTANYL CITRATE 50 MCG: 50 INJECTION, SOLUTION INTRAMUSCULAR; INTRAVENOUS at 22:42

## 2020-02-01 RX ADMIN — LORAZEPAM 1 MG: 2 INJECTION INTRAMUSCULAR; INTRAVENOUS at 22:42

## 2020-02-01 RX ADMIN — PROPOFOL 50 MCG/KG/MIN: 10 INJECTION, EMULSION INTRAVENOUS at 22:41

## 2020-02-01 RX ADMIN — ALBUMIN (HUMAN) 12.5 G: 12.5 INJECTION, SOLUTION INTRAVENOUS at 23:49

## 2020-02-01 RX ADMIN — SODIUM CHLORIDE 125 ML/HR: 0.9 INJECTION, SOLUTION INTRAVENOUS at 22:41

## 2020-02-01 RX ADMIN — Medication 50 MCG/HR: at 22:40

## 2020-02-01 RX ADMIN — METOPROLOL TARTRATE 5 MG: 1 INJECTION, SOLUTION INTRAVENOUS at 08:58

## 2020-02-01 RX ADMIN — SODIUM BICARBONATE 50 MEQ: 84 INJECTION, SOLUTION INTRAVENOUS at 22:06

## 2020-02-01 RX ADMIN — SODIUM CHLORIDE, SODIUM GLUCONATE, SODIUM ACETATE, POTASSIUM CHLORIDE, MAGNESIUM CHLORIDE, SODIUM PHOSPHATE, DIBASIC, AND POTASSIUM PHOSPHATE 1000 ML: .53; .5; .37; .037; .03; .012; .00082 INJECTION, SOLUTION INTRAVENOUS at 23:44

## 2020-02-01 RX ADMIN — ALBUTEROL SULFATE 2.5 MG: 2.5 SOLUTION RESPIRATORY (INHALATION) at 21:02

## 2020-02-01 RX ADMIN — EPINEPHRINE 1 MG: 0.1 INJECTION INTRACARDIAC; INTRAVENOUS at 22:01

## 2020-02-01 RX ADMIN — EPINEPHRINE 1 MG: 0.1 INJECTION INTRACARDIAC; INTRAVENOUS at 21:59

## 2020-02-01 RX ADMIN — INSULIN LISPRO 2 UNITS: 100 INJECTION, SOLUTION INTRAVENOUS; SUBCUTANEOUS at 00:48

## 2020-02-01 RX ADMIN — GABAPENTIN 100 MG: 250 SUSPENSION ORAL at 20:48

## 2020-02-01 RX ADMIN — INSULIN LISPRO 2 UNITS: 100 INJECTION, SOLUTION INTRAVENOUS; SUBCUTANEOUS at 18:40

## 2020-02-01 RX ADMIN — ACETAMINOPHEN 975 MG: 650 SUSPENSION ORAL at 06:06

## 2020-02-01 RX ADMIN — CALCIUM CHLORIDE 1 G: 100 INJECTION, SOLUTION INTRAVENOUS at 23:54

## 2020-02-01 RX ADMIN — MAGNESIUM SULFATE HEPTAHYDRATE 2 G: 500 INJECTION, SOLUTION INTRAMUSCULAR; INTRAVENOUS at 22:04

## 2020-02-01 RX ADMIN — LORAZEPAM 1 MG: 2 INJECTION INTRAMUSCULAR at 22:42

## 2020-02-01 RX ADMIN — GABAPENTIN 100 MG: 250 SUSPENSION ORAL at 10:42

## 2020-02-01 RX ADMIN — ACETAMINOPHEN 975 MG: 650 SUSPENSION ORAL at 18:40

## 2020-02-01 RX ADMIN — SODIUM CHLORIDE, SODIUM LACTATE, POTASSIUM CHLORIDE, AND CALCIUM CHLORIDE 500 ML: .6; .31; .03; .02 INJECTION, SOLUTION INTRAVENOUS at 12:04

## 2020-02-01 RX ADMIN — ALBUMIN, HUMAN INJ 5% 12.5 G: 5 SOLUTION at 23:49

## 2020-02-01 RX ADMIN — METOPROLOL TARTRATE 5 MG: 1 INJECTION, SOLUTION INTRAVENOUS at 15:59

## 2020-02-01 NOTE — PROGRESS NOTES
Progress Note - Oncology Surgery   Nish Erazo 71 y o  male MRN: [id number]  Unit/Bed#: [facility] 401-01 Encounter: [id number]    Assessment:  71 M with esophageal adenocarcinoma status post transhiatal esophagectomy  Respiratory status much improved, remains somewhat tachycardic but afebrile  1x BM    Plan:  Re-advance TF to goal of 68 w/ H2O flushes  Johnson for urinary retention  PRN pain control  OOB, ambulation  Plan for swallow study Monday - NPO until then  Will remove staples tomorrow  F/U AM labs    Subjective/Objective   Chief Complaint:     Subjective: No complaints, did have to have johnson placed due to persistent urinary retention  Had abdominal pain and distension last night but then had a BM and felt much better, no nausea this AM    Objective:     Blood pressure 117/74, pulse (!) 118, temperature 98 °F (36 7 °C), temperature source Oral, resp  rate 19, height 5' 10" (1 778 m), weight 86 6 kg (191 lb), SpO2 97 %  ,Body mass index is 27 41 kg/m²  Intake/Output Summary (Last 24 hours) at 2/1/2020 0555  Last data filed at 2/1/2020 0506  Gross per 24 hour   Intake 797 ml   Output 700 ml   Net 97 ml       Invasive Devices     Central Venous Catheter Line            Port A Cath 09/26/19 Left Chest 127 days          Peripheral Intravenous Line            Peripheral IV 01/28/20 Left Hand 3 days    Peripheral IV 01/28/20 Right Arm 3 days          Drain            Gastrostomy/Enterostomy Jejunostomy 14 Fr   days    Open Drain Anterior; Left Neck 3 days    Urethral Catheter Temperature probe 16 Fr  less than 1 day                Physical Exam:   Gen: A&O, NAD  HEENT: Neck incision free of erythema, no drainage from penrose  Cardio: RRR  Lungs: CTAB  Abd: Soft, mildly distended, non tender   Staples c/d/i      Lab, Imaging and other studies:CBC: No results found for: WBC, HGB, HCT, MCV, PLT, ADJUSTEDWBC, MCH, MCHC, RDW, MPV, NRBC, CMP: No results found for: SODIUM, K, CL, CO2, ANIONGAP, BUN, CREATININE, GLUCOSE, CALCIUM, AST, ALT, ALKPHOS, PROT, BILITOT, EGFR, Coagulation: No results found for: PT, INR, APTT  VTE Pharmacologic Prophylaxis: Heparin  VTE Mechanical Prophylaxis: sequential compression device

## 2020-02-01 NOTE — PROGRESS NOTES
Progress Note - Thoracic Surgery   Erick Diaz 71 y o  male MRN: [id number]  Unit/Bed#: [facility] 401-01 Encounter: [id number]    Assessment:  71 M with esophageal adenocarcinoma status post transhiatal esophagectomy  Respiratory status much improved, remains somewhat tachycardic but afebrile  1x BM    Plan:  Re-advance TF to goal of 68 w/ H2O flushes  Johnson for urinary retention  PRN pain control  OOB, ambulation  Plan for swallow study Monday - NPO until then  Will remove staples tomorrow  F/U AM labs    Subjective/Objective   Chief Complaint:     Subjective: No complaints, did have to have johnson placed due to persistent urinary retention  Had abdominal pain and distension last night but then had a BM and felt much better, no nausea this AM    Objective:     Blood pressure 117/74, pulse (!) 118, temperature 98 °F (36 7 °C), temperature source Oral, resp  rate 19, height 5' 10" (1 778 m), weight 86 6 kg (191 lb), SpO2 97 %  ,Body mass index is 27 41 kg/m²  Intake/Output Summary (Last 24 hours) at 2/1/2020 0558  Last data filed at 2/1/2020 0506  Gross per 24 hour   Intake 797 ml   Output 700 ml   Net 97 ml       Invasive Devices     Central Venous Catheter Line            Port A Cath 09/26/19 Left Chest 127 days          Peripheral Intravenous Line            Peripheral IV 01/28/20 Left Hand 3 days    Peripheral IV 01/28/20 Right Arm 3 days          Drain            Gastrostomy/Enterostomy Jejunostomy 14 Fr   days    Open Drain Anterior; Left Neck 3 days    Urethral Catheter Temperature probe 16 Fr  less than 1 day                Physical Exam:   Gen: A&O, NAD  HEENT: Neck incision free of erythema, no drainage from penrose  Cardio: RRR  Lungs: CTAB  Abd: Soft, mildly distended, non tender   Staples c/d/i      Lab, Imaging and other studies:CBC: No results found for: WBC, HGB, HCT, MCV, PLT, ADJUSTEDWBC, MCH, MCHC, RDW, MPV, NRBC, CMP: No results found for: SODIUM, K, CL, CO2, ANIONGAP, BUN, CREATININE, GLUCOSE, CALCIUM, AST, ALT, ALKPHOS, PROT, BILITOT, EGFR, Coagulation: No results found for: PT, INR, APTT  VTE Pharmacologic Prophylaxis: Heparin  VTE Mechanical Prophylaxis: sequential compression device

## 2020-02-01 NOTE — CONSULTS
Consultation - ENT   [facility] 71 y o  male MRN: [id number]  Unit/Bed#: [facility] 401-01 Encounter: [id number]      Assessment/Plan     Assessment:  Dysphonia  -left vocal fold immobility, favor paralysis although the left cricoarytenoid joint may be involved (hematoma, trauma, etc) given appearance on laryngoscopy  -glottic insufficiency  Dyspnea - incl recent pleural effusions, glottic insufficiency  Dysphagia - as above, and s/p esophagectomy    Esophageal adenocarcinoma  COPD  GERD  DM  KIMMIE    Plan:  Agree with vocal fold injection medialization (left>right)  Will attempt to schedule tomorrow in preparation for swallow study on Monday  NPO/hold TF at midnight  Agree with SLP recommendation to do VBS (limited consistencies) with esophagram in light of above findings  Continue optimization of pulmonary, GI systems  May consider nimodipine therapy to improve chances of RLN recovery if paralysis persists after discharge      History of Present Illness   Physician Requesting Consult: Armando Patel MD  Reason for Consult / Principal Problem: dysphonia  HPI: [facility] is a 71y o  year old male who developed breathy dysphonia following surgery for esophageal adenoca - esophagectomy on 1/28/20  Received neoadjuvant chemo  Was having difficulty swallowing - cough/choke with liquids which has been improving      Also subsequently developed bilateral pleural effusions requiring left thoracentesis 1/29/20  Receiving TF for nutrition    Consults    Review of Systems  Gen: +fatigue, no fevers/chills  ENT: as per HPI  GI: +GERD; esoph ca, dysphagia  Allergy/Immun: no allergies/asthma  Neuro: no headache  Heme: no bleeding/bruising concerns  Pulm: +SOB; no asthma; no cough  CV: no chest pain or palpitations  Derm: no rashes      Historical Information   Past Medical History:   Diagnosis Date    COPD (chronic obstructive pulmonary disease) [facility] [address]     Diabetes mellitus ([facility] )     Difficulty swallowing     Disease of thyroid gland     Esophageal mass     History of chemotherapy     History of transfusion     Malignant neoplasm of lower third of esophagus ([address] )     Port-A-Cath in place     LCW    Sleep apnea     no CPAP     Past Surgical History:   Procedure Laterality Date    BACK SURGERY      x2    DISC REMOVAL      ESOPHAGECTOMY N/A 2020    Procedure: ESOPHAGECTOMY, TRANSHIATAL;  Surgeon: Mary Ann Holt MD;  [facility];  Service: Surgical Oncology    ESOPHAGOGASTRODUODENOSCOPY N/A 2020    Procedure: ESOPHAGOGASTRODUODENOSCOPY (EGD); Surgeon: Mary Ann Holt MD;  [facility];  Service: Surgical Oncology    FL GUIDED CENTRAL VENOUS ACCESS DEVICE INSERTION  2019    GASTROJEJUNOSTOMY W/ JEJUNOSTOMY TUBE N/A 2019    Procedure: INSERTION JEJUNOSTOMY TUBE OPEN;  Surgeon: Mary Ann Holt MD;  [facility];  Service: Surgical Oncology    TONSILLECTOMY      TUNNELED VENOUS PORT PLACEMENT N/A 2019    Procedure: INSERTION VENOUS PORT (PORT-A-CATH);   Surgeon: Mary Ann Holt MD;  [facility];  Service: Surgical Oncology     Social History   Social History     Substance and Sexual Activity   Alcohol Use Not Currently    Alcohol/week: 0 0 standard drinks    Frequency: Never    Drinks per session: 1 or 2    Binge frequency: Never     Social History     Substance and Sexual Activity   Drug Use Never     Social History     Tobacco Use   Smoking Status Former Smoker    Packs/day: 0 50    Years: 50 00    Pack years: 25 00    Types: Cigarettes    Last attempt to quit: 2017    Years since quittin 1   Smokeless Tobacco Never Used     Family History:   Family History   Problem Relation Age of Onset    Diabetes Mother     No Known Problems Father        Meds/Allergies   all current active meds have been reviewed, current meds:   Current Facility-Administered Medications   Medication Dose Route Frequency    acetaminophen (TYLENOL) oral suspension 975 mg  975 mg Per J Tube Q6H [facility]    albuterol inhalation solution 2 5 mg  2 5 mg Nebulization Q6H PRN    enoxaparin (LOVENOX) subcutaneous injection 40 mg  40 mg Subcutaneous Q24H TANNER    gabapentin (NEURONTIN) oral solution 100 mg  100 mg Per J Tube TID    HYDROmorphone (DILAUDID) injection 0 5 mg  0 5 mg Intravenous Q2H PRN    insulin lispro (HumaLOG) 100 units/mL subcutaneous injection 1-5 Units  1-5 Units Subcutaneous Q6H [facility]    methocarbamol (ROBAXIN) tablet 500 mg  500 mg Per J Tube Q6H PRN    metoprolol (LOPRESSOR) injection 5 mg  5 mg Intravenous Q6H    omeprazole (PRILOSEC) suspension 2 mg/mL  20 mg Per J Tube Daily    ondansetron (ZOFRAN) injection 4 mg  4 mg Intravenous Q6H PRN    oxyCODONE (ROXICODONE) oral solution 2 5 mg  2 5 mg Per J Tube Q4H PRN    oxyCODONE (ROXICODONE) oral solution 5 mg  5 mg Per J Tube Q4H PRN    phenol (CHLORASEPTIC) 1 4 % mucosal liquid 1 spray  1 spray Mouth/Throat Q2H PRN    and PTA meds:   Prior to Admission Medications   Prescriptions Last Dose Informant Patient Reported? Taking? Nutritional Supplements (JEVITY 1 5 SHUN) LIQD More than a month at Unknown time Self No No   Sig: Take 85 mL by mouth continuous Jevhermes Davenport@[employer] advance as tolerated to goal rate of 85ml/hr to run for 16 hours daily  Flush with at least 60ml water at start and end of cycle   Needs additional water flushes if PO fluid intake declines   acetaminophen (TYLENOL) 160 mg/5 mL suspension 1/21/2020 Self No Yes   Sig: Take 20 3 mL (650 mg total) by mouth every 4 (four) hours as needed for mild pain or fever   albuterol (2 5 mg/3 mL) 0 083 % nebulizer solution More than a month at Unknown time Self No No   Sig: Take 1 vial (2 5 mg total) by nebulization every 6 (six) hours as needed for wheezing or shortness of breath   Patient not taking: Reported on 1/23/2020   canagliflozin (INVOKANA) 100 mg 1/27/2020 at Unknown time Self Yes Yes   Sig: Daily   gabapentin (NEURONTIN) 300 mg capsule 1/27/2020 at Unknown time Self No Yes   Sig: take 1 capsule by mouth daily at bedtime   levothyroxine 25 mcg tablet 1/28/2020 at 0430 Self Yes Yes   Sig: Take 25 mcg by mouth daily in the early morning   pantoprazole (PROTONIX) 40 mg tablet 1/28/2020 at 0430 Self No Yes   Sig: Take 1 tablet (40 mg total) by mouth daily   rOPINIRole (REQUIP) 0 25 mg tablet 1/27/2020 at Unknown time Self No Yes   Sig: take 1 tablet by mouth daily at bedtime      Facility-Administered Medications: None     No current facility-administered medications on file prior to encounter  Current Outpatient Medications on File Prior to Encounter   Medication Sig Dispense Refill    acetaminophen (TYLENOL) 160 mg/5 mL suspension Take 20 3 mL (650 mg total) by mouth every 4 (four) hours as needed for mild pain or fever 118 mL 0    canagliflozin (INVOKANA) 100 mg Daily      gabapentin (NEURONTIN) 300 mg capsule take 1 capsule by mouth daily at bedtime 30 capsule 0    levothyroxine 25 mcg tablet Take 25 mcg by mouth daily in the early morning      pantoprazole (PROTONIX) 40 mg tablet Take 1 tablet (40 mg total) by mouth daily 30 tablet 1    rOPINIRole (REQUIP) 0 25 mg tablet take 1 tablet by mouth daily at bedtime 30 tablet 0    albuterol (2 5 mg/3 mL) 0 083 % nebulizer solution Take 1 vial (2 5 mg total) by nebulization every 6 (six) hours as needed for wheezing or shortness of breath (Patient not taking: Reported on 1/23/2020) 1 vial 5    Nutritional Supplements (JEVITY 1 5 SHUN) LIQD Take 85 mL by mouth continuous Jevity Rich@[employer] advance as tolerated to goal rate of 85ml/hr to run for 16 hours daily  Flush with at least 60ml water at start and end of cycle   Needs additional water flushes if PO fluid intake declines 1000 mL 0       Allergies   Allergen Reactions    Penicillins Itching         Objective     Vitals:    02/01/20 0000 02/01/20 0333 02/01/20 0800 02/01/20 1300   BP:  117/74 113/66 99/63   BP Location:  Right arm Right arm Left arm   Pulse: (!) 118 (!) 117 105   Resp:  19 18 17   Temp:  98 °F (36 7 °C) 97 9 °F (36 6 °C) 98 1 °F (36 7 °C)   TempSrc:  Oral Oral Oral   SpO2: 96% 97% 99% 98%   Weight:       Height:             Intake/Output Summary (Last 24 hours) at 2/1/2020 1458  Last data filed at 2/1/2020 1204  Gross per 24 hour   Intake 680 ml   Output 475 ml   Net 205 ml       Invasive Devices     Central Venous Catheter Line            Port A Cath 09/26/19 Left Chest 128 days          Peripheral Intravenous Line            Peripheral IV 02/01/20 Left;Right Antecubital less than 1 day          Drain            Gastrostomy/Enterostomy Jejunostomy 14 Fr   days    Open Drain Anterior; Left Neck 4 days    Urethral Catheter Temperature probe 16 Fr  less than 1 day                Physical Exam  Gen: NAD, awake/alert, no stridor  Voice: severely breathy  Head: Normocephalic/atraumatic  Face: symmetric  Ears: pinnae unremarkable  Nose: no external abnormality; nares patent/NC; septum mild deviation; inferior turbinates pink; clear secretions; no pus; no polyps  Oral cavity: no lesions; tongue soft/mobile  Oropharynx: no lesions; tonsils without ulceration/exudate; soft palate/posterior wall unremarkable  Neck:soft, dressing over left neck    PROCEDURE: Flexible Laryngoscopy  Indication:  Dysphonia, dysphagia  Verbal consent obtained  Surgeon: Silvano Madison MD  Anesthesia: 2% lidocaine, oxymetazoline  Scope passed through nasal cavities  Nasopharynx: unremarkable  Oropharynx: unremarkable  Hypopharynx/Larynx:   Vocal fold mobility = left side immobile; jostle sign equivocal; muscular process left side surround cricoarytenoid joint somewhat bulging, pink-purple   Laryngeal edema  = mild-mod mostly on left   Laryngeal erythema = mild   Vocal folds = thin/atrophy bilaterally   Other findings = no pooling of secretions  Scope was removed  Patient tolerated procedure well without complications      Lab Results:   CBC:   Lab Results   Component Value Date WBC 10 80 (H) 02/01/2020    HGB 13 9 02/01/2020    HCT 45 5 02/01/2020    MCV 87 02/01/2020     (H) 02/01/2020    MCH 26 5 (L) 02/01/2020    MCHC 30 5 (L) 02/01/2020    RDW 18 6 (H) 02/01/2020    MPV 11 3 02/01/2020    NRBC 0 02/01/2020   , CMP:   Lab Results   Component Value Date    SODIUM 136 02/01/2020    K 4 4 02/01/2020     02/01/2020    CO2 25 02/01/2020    BUN 54 (H) 02/01/2020    CREATININE 1 95 (H) 02/01/2020    CALCIUM 9 4 02/01/2020    EGFR 35 02/01/2020   , Coags: No results found for: PT, PTT, INR  Imaging Studies:   EKG, Pathology, and Other Studies:     Code Status: Level 1 - Full Code  Advance Directive and Living Will:      Power of :    POLST:

## 2020-02-01 NOTE — SPEECH THERAPY NOTE
Order for evaluation received  Aware pt is s/p transhiatal esophagogastrectomy with plan for Ba Swallow/Esophagram to r/o leak on Monday and pt NPO until that evaluation  Consider order for Video Barium Swallow (instead of clinical assessment bedside) to follow study to r/o leak  Await ENT findings

## 2020-02-02 ENCOUNTER — APPOINTMENT (INPATIENT)
Dept: RADIOLOGY | Facility: HOSPITAL | Age: 65
DRG: 326 | End: 2020-02-02
Payer: COMMERCIAL

## 2020-02-02 ENCOUNTER — APPOINTMENT (INPATIENT)
Dept: NON INVASIVE DIAGNOSTICS | Facility: HOSPITAL | Age: 65
DRG: 326 | End: 2020-02-02
Payer: COMMERCIAL

## 2020-02-02 LAB
ABO GROUP BLD: NORMAL
ANION GAP SERPL CALCULATED.3IONS-SCNC: 7 MMOL/L (ref 4–13)
ANION GAP SERPL CALCULATED.3IONS-SCNC: 9 MMOL/L (ref 4–13)
ATRIAL RATE: 77 BPM
BACTERIA SPEC BFLD CULT: NO GROWTH
BACTERIA SPEC BFLD CULT: NO GROWTH
BASE EX.OXY STD BLDV CALC-SCNC: 74.4 % (ref 60–80)
BASE EXCESS BLDA CALC-SCNC: 1 MMOL/L
BASE EXCESS BLDV CALC-SCNC: 0 MMOL/L
BASOPHILS # BLD AUTO: 0.03 THOUSANDS/ΜL (ref 0–0.1)
BASOPHILS NFR BLD AUTO: 0 % (ref 0–1)
BLD GP AB SCN SERPL QL: NEGATIVE
BUN SERPL-MCNC: 57 MG/DL (ref 5–25)
BUN SERPL-MCNC: 67 MG/DL (ref 5–25)
CA-I BLD-SCNC: 1.21 MMOL/L (ref 1.12–1.32)
CALCIUM SERPL-MCNC: 8.6 MG/DL (ref 8.3–10.1)
CALCIUM SERPL-MCNC: 9.2 MG/DL (ref 8.3–10.1)
CHLORIDE SERPL-SCNC: 103 MMOL/L (ref 100–108)
CHLORIDE SERPL-SCNC: 106 MMOL/L (ref 100–108)
CO2 SERPL-SCNC: 26 MMOL/L (ref 21–32)
CO2 SERPL-SCNC: 27 MMOL/L (ref 21–32)
CREAT SERPL-MCNC: 1.63 MG/DL (ref 0.6–1.3)
CREAT SERPL-MCNC: 2.31 MG/DL (ref 0.6–1.3)
EOSINOPHIL # BLD AUTO: 0 THOUSAND/ΜL (ref 0–0.61)
EOSINOPHIL NFR BLD AUTO: 0 % (ref 0–6)
ERYTHROCYTE [DISTWIDTH] IN BLOOD BY AUTOMATED COUNT: 17.5 % (ref 11.6–15.1)
GFR SERPL CREATININE-BSD FRML MDRD: 29 ML/MIN/1.73SQ M
GFR SERPL CREATININE-BSD FRML MDRD: 44 ML/MIN/1.73SQ M
GLUCOSE SERPL-MCNC: 178 MG/DL (ref 65–140)
GLUCOSE SERPL-MCNC: 197 MG/DL (ref 65–140)
GLUCOSE SERPL-MCNC: 213 MG/DL (ref 65–140)
GLUCOSE SERPL-MCNC: 215 MG/DL (ref 65–140)
GLUCOSE SERPL-MCNC: 225 MG/DL (ref 65–140)
GLUCOSE SERPL-MCNC: 281 MG/DL (ref 65–140)
GLUCOSE SERPL-MCNC: 336 MG/DL (ref 65–140)
GRAM STN SPEC: NORMAL
HCO3 BLDA-SCNC: 25.5 MMOL/L (ref 22–28)
HCO3 BLDV-SCNC: 26.9 MMOL/L (ref 24–30)
HCT VFR BLD AUTO: 31.8 % (ref 36.5–49.3)
HGB BLD-MCNC: 10 G/DL (ref 12–17)
HGB BLD-MCNC: 10 G/DL (ref 12–17)
HOROWITZ INDEX BLDA+IHG-RTO: 70 MM[HG]
IMM GRANULOCYTES # BLD AUTO: 0.07 THOUSAND/UL (ref 0–0.2)
IMM GRANULOCYTES NFR BLD AUTO: 1 % (ref 0–2)
LACTATE SERPL-SCNC: 1.1 MMOL/L (ref 0.5–2)
LACTATE SERPL-SCNC: 6.7 MMOL/L (ref 0.5–2)
LYMPHOCYTES # BLD AUTO: 0.78 THOUSANDS/ΜL (ref 0.6–4.47)
LYMPHOCYTES NFR BLD AUTO: 7 % (ref 14–44)
MAGNESIUM SERPL-MCNC: 3.1 MG/DL (ref 1.6–2.6)
MAGNESIUM SERPL-MCNC: 3.7 MG/DL (ref 1.6–2.6)
MCH RBC QN AUTO: 26.6 PG (ref 26.8–34.3)
MCHC RBC AUTO-ENTMCNC: 31.4 G/DL (ref 31.4–37.4)
MCV RBC AUTO: 85 FL (ref 82–98)
MONOCYTES # BLD AUTO: 0.97 THOUSAND/ΜL (ref 0.17–1.22)
MONOCYTES NFR BLD AUTO: 8 % (ref 4–12)
NEUTROPHILS # BLD AUTO: 9.93 THOUSANDS/ΜL (ref 1.85–7.62)
NEUTS SEG NFR BLD AUTO: 84 % (ref 43–75)
NRBC BLD AUTO-RTO: 0 /100 WBCS
O2 CT BLDA-SCNC: 14.6 ML/DL (ref 16–23)
O2 CT BLDV-SCNC: 11.5 ML/DL
OXYHGB MFR BLDA: 94.3 % (ref 94–97)
PCO2 BLDA: 39.8 MM HG (ref 36–44)
PCO2 BLDV: 54.6 MM HG (ref 42–50)
PEEP RESPIRATORY: 5 CM[H2O]
PH BLDA: 7.42 [PH] (ref 7.35–7.45)
PH BLDV: 7.31 [PH] (ref 7.3–7.4)
PHOSPHATE SERPL-MCNC: 9.2 MG/DL (ref 2.3–4.1)
PLATELET # BLD AUTO: 257 THOUSANDS/UL (ref 149–390)
PMV BLD AUTO: 9.3 FL (ref 8.9–12.7)
PO2 BLDA: 73.4 MM HG (ref 75–129)
PO2 BLDV: 44.1 MM HG (ref 35–45)
POTASSIUM SERPL-SCNC: 4 MMOL/L (ref 3.5–5.3)
POTASSIUM SERPL-SCNC: 4.2 MMOL/L (ref 3.5–5.3)
QRS AXIS: 100 DEGREES
QRSD INTERVAL: 142 MS
QT INTERVAL: 402 MS
QTC INTERVAL: 489 MS
RBC # BLD AUTO: 3.76 MILLION/UL (ref 3.88–5.62)
RH BLD: POSITIVE
SODIUM SERPL-SCNC: 138 MMOL/L (ref 136–145)
SODIUM SERPL-SCNC: 140 MMOL/L (ref 136–145)
SPECIMEN EXPIRATION DATE: NORMAL
SPECIMEN SOURCE: ABNORMAL
T WAVE AXIS: 65 DEGREES
TROPONIN I SERPL-MCNC: 0.38 NG/ML
TROPONIN I SERPL-MCNC: 0.57 NG/ML
VENT AC: 16
VENT- AC: AC
VENTRICULAR RATE: 89 BPM
VT SETTING VENT: 500 ML
WBC # BLD AUTO: 11.78 THOUSAND/UL (ref 4.31–10.16)

## 2020-02-02 PROCEDURE — 36556 INSERT NON-TUNNEL CV CATH: CPT | Performed by: SURGERY

## 2020-02-02 PROCEDURE — 94003 VENT MGMT INPAT SUBQ DAY: CPT

## 2020-02-02 PROCEDURE — 02HV33Z INSERTION OF INFUSION DEVICE INTO SUPERIOR VENA CAVA, PERCUTANEOUS APPROACH: ICD-10-PCS | Performed by: STUDENT IN AN ORGANIZED HEALTH CARE EDUCATION/TRAINING PROGRAM

## 2020-02-02 PROCEDURE — 87040 BLOOD CULTURE FOR BACTERIA: CPT | Performed by: STUDENT IN AN ORGANIZED HEALTH CARE EDUCATION/TRAINING PROGRAM

## 2020-02-02 PROCEDURE — 93306 TTE W/DOPPLER COMPLETE: CPT | Performed by: INTERNAL MEDICINE

## 2020-02-02 PROCEDURE — 99291 CRITICAL CARE FIRST HOUR: CPT | Performed by: EMERGENCY MEDICINE

## 2020-02-02 PROCEDURE — 93005 ELECTROCARDIOGRAM TRACING: CPT

## 2020-02-02 PROCEDURE — 93010 ELECTROCARDIOGRAM REPORT: CPT | Performed by: INTERNAL MEDICINE

## 2020-02-02 PROCEDURE — NC001 PR NO CHARGE: Performed by: THORACIC SURGERY (CARDIOTHORACIC VASCULAR SURGERY)

## 2020-02-02 PROCEDURE — 0BH18EZ INSERTION OF ENDOTRACHEAL AIRWAY INTO TRACHEA, VIA NATURAL OR ARTIFICIAL OPENING ENDOSCOPIC: ICD-10-PCS | Performed by: SURGERY

## 2020-02-02 PROCEDURE — 0W9B30Z DRAINAGE OF LEFT PLEURAL CAVITY WITH DRAINAGE DEVICE, PERCUTANEOUS APPROACH: ICD-10-PCS | Performed by: STUDENT IN AN ORGANIZED HEALTH CARE EDUCATION/TRAINING PROGRAM

## 2020-02-02 PROCEDURE — 82805 BLOOD GASES W/O2 SATURATION: CPT | Performed by: STUDENT IN AN ORGANIZED HEALTH CARE EDUCATION/TRAINING PROGRAM

## 2020-02-02 PROCEDURE — 93306 TTE W/DOPPLER COMPLETE: CPT

## 2020-02-02 PROCEDURE — 84484 ASSAY OF TROPONIN QUANT: CPT | Performed by: PHYSICIAN ASSISTANT

## 2020-02-02 PROCEDURE — 71260 CT THORAX DX C+: CPT

## 2020-02-02 PROCEDURE — 85018 HEMOGLOBIN: CPT | Performed by: EMERGENCY MEDICINE

## 2020-02-02 PROCEDURE — 99024 POSTOP FOLLOW-UP VISIT: CPT | Performed by: THORACIC SURGERY (CARDIOTHORACIC VASCULAR SURGERY)

## 2020-02-02 PROCEDURE — 99024 POSTOP FOLLOW-UP VISIT: CPT | Performed by: SURGERY

## 2020-02-02 PROCEDURE — 80048 BASIC METABOLIC PNL TOTAL CA: CPT | Performed by: STUDENT IN AN ORGANIZED HEALTH CARE EDUCATION/TRAINING PROGRAM

## 2020-02-02 PROCEDURE — 85025 COMPLETE CBC W/AUTO DIFF WBC: CPT | Performed by: STUDENT IN AN ORGANIZED HEALTH CARE EDUCATION/TRAINING PROGRAM

## 2020-02-02 PROCEDURE — 5A1945Z RESPIRATORY VENTILATION, 24-96 CONSECUTIVE HOURS: ICD-10-PCS | Performed by: SURGERY

## 2020-02-02 PROCEDURE — 82330 ASSAY OF CALCIUM: CPT | Performed by: STUDENT IN AN ORGANIZED HEALTH CARE EDUCATION/TRAINING PROGRAM

## 2020-02-02 PROCEDURE — 82948 REAGENT STRIP/BLOOD GLUCOSE: CPT

## 2020-02-02 PROCEDURE — 71045 X-RAY EXAM CHEST 1 VIEW: CPT

## 2020-02-02 PROCEDURE — 0W9930Z DRAINAGE OF RIGHT PLEURAL CAVITY WITH DRAINAGE DEVICE, PERCUTANEOUS APPROACH: ICD-10-PCS | Performed by: SURGERY

## 2020-02-02 PROCEDURE — 94760 N-INVAS EAR/PLS OXIMETRY 1: CPT

## 2020-02-02 PROCEDURE — 83735 ASSAY OF MAGNESIUM: CPT | Performed by: STUDENT IN AN ORGANIZED HEALTH CARE EDUCATION/TRAINING PROGRAM

## 2020-02-02 PROCEDURE — 74177 CT ABD & PELVIS W/CONTRAST: CPT

## 2020-02-02 PROCEDURE — 83605 ASSAY OF LACTIC ACID: CPT | Performed by: STUDENT IN AN ORGANIZED HEALTH CARE EDUCATION/TRAINING PROGRAM

## 2020-02-02 PROCEDURE — NC001 PR NO CHARGE: Performed by: SURGERY

## 2020-02-02 RX ORDER — ALBUMIN, HUMAN INJ 5% 5 %
50 SOLUTION INTRAVENOUS ONCE
Status: COMPLETED | OUTPATIENT
Start: 2020-02-02 | End: 2020-02-02

## 2020-02-02 RX ORDER — SODIUM CHLORIDE, SODIUM GLUCONATE, SODIUM ACETATE, POTASSIUM CHLORIDE, MAGNESIUM CHLORIDE, SODIUM PHOSPHATE, DIBASIC, AND POTASSIUM PHOSPHATE .53; .5; .37; .037; .03; .012; .00082 G/100ML; G/100ML; G/100ML; G/100ML; G/100ML; G/100ML; G/100ML
1000 INJECTION, SOLUTION INTRAVENOUS ONCE
Status: COMPLETED | OUTPATIENT
Start: 2020-02-02 | End: 2020-02-02

## 2020-02-02 RX ORDER — FLUCONAZOLE 2 MG/ML
200 INJECTION, SOLUTION INTRAVENOUS EVERY 24 HOURS
Status: DISCONTINUED | OUTPATIENT
Start: 2020-02-03 | End: 2020-02-04

## 2020-02-02 RX ORDER — FENTANYL CITRATE 50 UG/ML
50 INJECTION, SOLUTION INTRAMUSCULAR; INTRAVENOUS ONCE
Status: COMPLETED | OUTPATIENT
Start: 2020-02-02 | End: 2020-02-02

## 2020-02-02 RX ORDER — FLUCONAZOLE 2 MG/ML
400 INJECTION, SOLUTION INTRAVENOUS ONCE
Status: COMPLETED | OUTPATIENT
Start: 2020-02-02 | End: 2020-02-02

## 2020-02-02 RX ADMIN — FENTANYL CITRATE 50 MCG: 50 INJECTION, SOLUTION INTRAMUSCULAR; INTRAVENOUS at 19:18

## 2020-02-02 RX ADMIN — Medication 75 MCG/HR: at 08:32

## 2020-02-02 RX ADMIN — METRONIDAZOLE 500 MG: 500 INJECTION, SOLUTION INTRAVENOUS at 01:54

## 2020-02-02 RX ADMIN — ALBUMIN (HUMAN) 50 G: 12.5 INJECTION, SOLUTION INTRAVENOUS at 20:48

## 2020-02-02 RX ADMIN — CHLORHEXIDINE GLUCONATE 0.12% ORAL RINSE 15 ML: 1.2 LIQUID ORAL at 12:09

## 2020-02-02 RX ADMIN — IODIXANOL 100 ML: 320 INJECTION, SOLUTION INTRAVASCULAR at 15:42

## 2020-02-02 RX ADMIN — DEXMEDETOMIDINE 1 MCG/KG/HR: 100 INJECTION, SOLUTION, CONCENTRATE INTRAVENOUS at 21:00

## 2020-02-02 RX ADMIN — FLUCONAZOLE IN SODIUM CHLORIDE 400 MG: 2 INJECTION, SOLUTION INTRAVENOUS at 01:47

## 2020-02-02 RX ADMIN — NOREPINEPHRINE BITARTRATE 7 MCG/MIN: 1 INJECTION INTRAVENOUS at 20:42

## 2020-02-02 RX ADMIN — VASOPRESSIN 0.04 UNITS/MIN: 20 INJECTION INTRAVENOUS at 00:25

## 2020-02-02 RX ADMIN — INSULIN LISPRO 1 UNITS: 100 INJECTION, SOLUTION INTRAVENOUS; SUBCUTANEOUS at 06:39

## 2020-02-02 RX ADMIN — DEXMEDETOMIDINE 1 MCG/KG/HR: 100 INJECTION, SOLUTION, CONCENTRATE INTRAVENOUS at 19:18

## 2020-02-02 RX ADMIN — INSULIN LISPRO 1 UNITS: 100 INJECTION, SOLUTION INTRAVENOUS; SUBCUTANEOUS at 17:55

## 2020-02-02 RX ADMIN — INSULIN LISPRO 2 UNITS: 100 INJECTION, SOLUTION INTRAVENOUS; SUBCUTANEOUS at 02:05

## 2020-02-02 RX ADMIN — SODIUM CHLORIDE, SODIUM GLUCONATE, SODIUM ACETATE, POTASSIUM CHLORIDE, MAGNESIUM CHLORIDE, SODIUM PHOSPHATE, DIBASIC, AND POTASSIUM PHOSPHATE 1000 ML: .53; .5; .37; .037; .03; .012; .00082 INJECTION, SOLUTION INTRAVENOUS at 00:14

## 2020-02-02 RX ADMIN — Medication 20 MG: at 12:19

## 2020-02-02 RX ADMIN — CEFEPIME HYDROCHLORIDE 2000 MG: 2 INJECTION, POWDER, FOR SOLUTION INTRAVENOUS at 12:56

## 2020-02-02 RX ADMIN — PROPOFOL 30 MCG/KG/MIN: 10 INJECTION, EMULSION INTRAVENOUS at 06:44

## 2020-02-02 RX ADMIN — CEFEPIME HYDROCHLORIDE 2000 MG: 2 INJECTION, POWDER, FOR SOLUTION INTRAVENOUS at 01:44

## 2020-02-02 RX ADMIN — METRONIDAZOLE 500 MG: 500 INJECTION, SOLUTION INTRAVENOUS at 12:09

## 2020-02-02 RX ADMIN — ACETAMINOPHEN 975 MG: 650 SUSPENSION ORAL at 17:40

## 2020-02-02 RX ADMIN — DEXMEDETOMIDINE 0.1 MCG/KG/HR: 100 INJECTION, SOLUTION, CONCENTRATE INTRAVENOUS at 12:13

## 2020-02-02 RX ADMIN — ENOXAPARIN SODIUM 40 MG: 40 INJECTION SUBCUTANEOUS at 12:12

## 2020-02-02 RX ADMIN — CHLORHEXIDINE GLUCONATE 0.12% ORAL RINSE 15 ML: 1.2 LIQUID ORAL at 20:42

## 2020-02-02 RX ADMIN — NOREPINEPHRINE BITARTRATE 13 MCG/MIN: 1 INJECTION INTRAVENOUS at 00:25

## 2020-02-02 RX ADMIN — NOREPINEPHRINE BITARTRATE 8 MCG/MIN: 1 INJECTION INTRAVENOUS at 12:32

## 2020-02-02 RX ADMIN — PROPOFOL 40 MCG/KG/MIN: 10 INJECTION, EMULSION INTRAVENOUS at 01:53

## 2020-02-02 RX ADMIN — FENTANYL CITRATE 25 MCG: 50 INJECTION, SOLUTION INTRAMUSCULAR; INTRAVENOUS at 20:01

## 2020-02-02 RX ADMIN — INSULIN LISPRO 1 UNITS: 100 INJECTION, SOLUTION INTRAVENOUS; SUBCUTANEOUS at 12:44

## 2020-02-02 RX ADMIN — GABAPENTIN 100 MG: 250 SUSPENSION ORAL at 21:00

## 2020-02-02 RX ADMIN — Medication 75 MCG/HR: at 21:00

## 2020-02-02 RX ADMIN — FENTANYL CITRATE 50 MCG: 50 INJECTION, SOLUTION INTRAMUSCULAR; INTRAVENOUS at 21:17

## 2020-02-02 RX ADMIN — PROPOFOL 30 MCG/KG/MIN: 10 INJECTION, EMULSION INTRAVENOUS at 12:31

## 2020-02-02 RX ADMIN — GABAPENTIN 100 MG: 250 SUSPENSION ORAL at 16:16

## 2020-02-02 RX ADMIN — SODIUM CHLORIDE, SODIUM GLUCONATE, SODIUM ACETATE, POTASSIUM CHLORIDE, MAGNESIUM CHLORIDE, SODIUM PHOSPHATE, DIBASIC, AND POTASSIUM PHOSPHATE 100 ML/HR: .53; .5; .37; .037; .03; .012; .00082 INJECTION, SOLUTION INTRAVENOUS at 00:15

## 2020-02-02 RX ADMIN — METRONIDAZOLE 500 MG: 500 INJECTION, SOLUTION INTRAVENOUS at 16:16

## 2020-02-02 RX ADMIN — ACETAMINOPHEN 975 MG: 650 SUSPENSION ORAL at 12:08

## 2020-02-02 NOTE — PROGRESS NOTES
Pt was feeling short of breath although sating at 96 on 6 LO2  Called respiratory and they administered PRN albuterol neb treatment  Post neb treatment Respiratory recommended calling primary because SOB was still present  [facility] surgery paged at that time and they said they would be there when given time  At 2150 I witnessed on monitor patient went from 110 to 35 and when entering the room patient was unresponsive  Called Code Blue and initiated CPR

## 2020-02-02 NOTE — CONSULTS
[address] [place] [address] 71 y o  male MRN: [id number]  Unit/Bed#: [facility] 401-01 Encounter: [id number]     Physician Requesting Consult: Precious Ryan MD  Consults     Reason for Consultation / Chief Complaint: Code Blue     History of Present Illness:  Gely Guevara is a 71 y o  male who presents pmh DM2, COPD, KIMMIE, HLD, with T3 N1 esophageal adenocarcinoma s/p 1/28 Transhiatal esophagectomy, and 1/29 B/L thoracentesis  ENT was consulted and found left vocal fold immobility favoring paralysis with plans for injection  He went unresponsive on the floor and was found pulseless  A code blue was was initiated and and CPR started, he underwent two rounds of epi, 1 bolus amio, 2 amps bicarb, calcium, two shocks, he was in fine Vfib, and regained ROSC  Family and surgical attendings updated  History obtained from chart review  Past Medical History:  Past Medical History:   Diagnosis Date    [facility]ch[facility] obstructive [facility] disease) [facility]     [facility]     Difficulty swallowing     Disease of thyroid gland     Esophageal mass     History of chemotherapy     History of transfusion     Malignant neoplasm of lower third of esophagus ([facility] )     Port-A-Cath in place     LCW    Sleep apnea     no CPAP        Past Surgical History:  Past Surgical History:   Procedure Laterality Date    BACK SURGERY      x2    DISC REMOVAL      ESOPHAGECTOMY N/A 1/28/2020    Procedure: ESOPHAGECTOMY, TRANSHIATAL;  Surgeon: Precious Ryan MD;  [facility];  Service: Surgical Oncology    ESOPHAGOGASTRODUODENOSCOPY N/A 1/28/2020    Procedure: ESOPHAGOGASTRODUODENOSCOPY (EGD);   Surgeon: Precious Ryan MD;  [facility];  Service: Surgical Oncology    FL GUIDED CENTRAL VENOUS ACCESS DEVICE INSERTION  9/26/2019    GASTROJEJUNOSTOMY W/ JEJUNOSTOMY TUBE N/A 9/26/2019    Procedure: INSERTION JEJUNOSTOMY TUBE OPEN;  Surgeon: Precious Ryan MD;  [facility];  Service: Surgical Oncology    TONSILLECTOMY      TUNNELED VENOUS PORT PLACEMENT N/A 2019    Procedure: INSERTION VENOUS PORT (PORT-A-CATH); Surgeon: Jolanta Felix MD;  [facility];  Service: Surgical Oncology        Past Family History:  Family History   Problem Relation Age of Onset    Diabetes Mother     No Known Problems Father         Social History:  Social History     Tobacco Use   Smoking Status Former Smoker    Packs/day: 0 50    Years: 50 00    Pack years: 25 00    Types: Cigarettes    Last attempt to quit: 2017    Years since quittin 1   Smokeless Tobacco Never Used     Social History     Substance and Sexual Activity   Alcohol Use Not Currently    Alcohol/week: 0 0 standard drinks    Frequency: Never    Drinks per session: 1 or 2    Binge frequency: Never     Social History     Substance and Sexual Activity   Drug Use Never     Marital Status: Single     Home Medications:   Prior to Admission medications    Medication Sig Start Date End Date Taking?  Authorizing Provider   acetaminophen (TYLENOL) 160 mg/5 mL suspension Take 20 3 mL (650 mg total) by mouth every 4 (four) hours as needed for mild pain or fever 19  Yes Giana Pedro MD   canagliflozin [facility]) 100 mg Daily   Yes Historical Provider, MD   gabapentin (NEURONTIN) 300 mg capsule take 1 capsule by mouth daily at bedtime 19  Yes Beecher Gaucher, MD   levothyroxine 25 mcg tablet Take 25 mcg by mouth daily in the early morning 19  Yes Historical Provider, MD   pantoprazole (PROTONIX) 40 mg tablet Take 1 tablet (40 mg total) by mouth daily 19  Yes Ganesh Patricio PA-C   rOPINIRole (REQUIP) 0 25 mg tablet take 1 tablet by mouth daily at bedtime 19  Yes Beecher Gaucher, MD   albuterol (2 5 mg/3 mL) 0 083 % nebulizer solution Take 1 vial (2 5 mg total) by nebulization every 6 (six) hours as needed for wheezing or shortness of breath  Patient not taking: Reported on 2020   Yuly Calle Michaelle Weldon MD   Nutritional Supplements (JEVITY 1 5 SHUN) LIQD Take 85 mL by mouth continuous Jevity Sweta@[employer] advance as tolerated to goal rate of 85ml/hr to run for 16 hours daily  Flush with at least 60ml water at start and end of cycle   Needs additional water flushes if PO fluid intake declines 10/4/19   Lisa Willard MD        Inpatient Medications:  Scheduled Meds:  Current Facility-Administered Medications:  acetaminophen 975 mg Per J Tube Q6H Kiran Rosado MD   albuterol 2 5 mg Nebulization Q6H PRN Timmy Haji MD   amiodarone   Code/Trauma/Sedation Sky Pedroza MD   enoxaparin 40 mg Subcutaneous Q24H Kiran Rosado MD   EPINEPHrine  Intravenous Code/Trauma/Sedation Sky Pedroza MD   gabapentin 100 mg Per J Tube TID Timmy Haji MD   HYDROmorphone 0 5 mg Intravenous Q2H PRN Timmy Haji MD   insulin lispro 1-5 Units Subcutaneous Q6H Kiran Rosado MD   magnesium sulfate   Code/Trauma/Sedation Sky Pedroza MD   methocarbamol 500 mg Per J Tube Q6H PRN Timmy Haji MD   metoprolol 5 mg Intravenous Q6H Timmy Haji MD   norepinephrine       omeprazole (PRILOSEC) suspension 2 mg/mL 20 mg Per J Tube Daily Timmy Haji MD   ondansetron 4 mg Intravenous Q6H PRN Timmy Haji MD   oxyCODONE 2 5 mg Per J Tube Q4H PRN Timmy Haji MD   oxyCODONE 5 mg Per J Tube Q4H PRN Timmy Haji MD   phenol 1 spray Mouth/Throat Q2H PRN Timmy Haji MD   sodium bicarbonate   Code/Trauma/Sedation Sky Pedroza MD   sodium bicarbonate  Intravenous Code/Trauma/Sedation Sky Pedroza MD     Continuous Infusions:   PRN Meds:    albuterol 2 5 mg Q6H PRN   amiodarone  Code/Trauma/Sedation Med   EPINEPHrine  Code/Trauma/Sedation Med   HYDROmorphone 0 5 mg Q2H PRN   magnesium sulfate  Code/Trauma/Sedation Med   methocarbamol 500 mg Q6H PRN   ondansetron 4 mg Q6H PRN   oxyCODONE 2 5 mg Q4H PRN   oxyCODONE 5 mg Q4H PRN   phenol 1 spray Q2H PRN   sodium bicarbonate  Code/Trauma/Sedation Med   sodium bicarbonate  Code/Trauma/Sedation Med        Allergies: Allergies   Allergen Reactions    Penicillins Itching        ROS:   Review of Systems   Unable to perform ROS: Intubated        Vitals:  Vitals:    20 2049 20   BP: 114/67   110/75   BP Location:       Pulse:   97    Resp:       Temp:       TempSrc:       SpO2:  95%     Weight:       Height:         Temperature:   Temp (24hrs), Av °F (36 7 °C), Min:97 9 °F (36 6 °C), Max:98 2 °F (36 8 °C)    Current Temperature: 98 °F (36 7 °C)     Weights:   IBW: 73 kg  Body mass index is 27 41 kg/m²  Hemodynamic Monitoring:  PAP mean:        Non-Invasive/Invasive Ventilation Settings:  Respiratory    Lab Data (Last 4 hours)    None         O2/Vent Data (Last 4 hours)    None              No results found for: PHART, SZQ1JWU, PO2ART, FFQ9WHT, D0CGCPMG, BEART, SOURCE  SpO2: SpO2: 95 %     Physical Exam:  Physical Exam   Constitutional: He appears well-developed and well-nourished  He appears distressed  HENT:   Head: Normocephalic and atraumatic  Eyes: Pupils are equal, round, and reactive to light  Neck: Normal range of motion  No tracheal deviation present  Cardiovascular: Normal rate and regular rhythm  Pulmonary/Chest: Effort normal  No respiratory distress  Abdominal: Soft  He exhibits distension  There is no tenderness  Markedly distended, J tube in place   Musculoskeletal:   Moving all extremities    Neurological:   Wakes up from sedation   Skin: Skin is warm and dry  Capillary refill takes less than 2 seconds          Labs:  Results from last 7 days   Lab Units 20  0601 20  0542 20  0536   WBC Thousand/uL  --  10 80* 11 72* 8 61   HEMOGLOBIN g/dL  --  13 9 12 6 11 2*   I STAT HEMOGLOBIN g/dl 12 2  --   --   --    HEMATOCRIT %  --  45 5 40 8 36 8   HEMATOCRIT, ISTAT % 36*  --   --   --    PLATELETS Thousands/uL --  398* 192 142*   NEUTROS PCT %  --  87* 85* 85*   MONOS PCT %  --  6 6 7    Results from last 7 days   Lab Units 02/01/20  2209 02/01/20  0825 01/31/20  0542 01/30/20  0536  01/28/20  1755  01/28/20  1241 01/28/20  1125   SODIUM mmol/L  --  136 138 139   < > 140   < >  --   --    POTASSIUM mmol/L  --  4 4 4 1 4 0   < > 4 1   < >  --   --    CHLORIDE mmol/L  --  105 107 109*   < > 112*   < >  --   --    CO2 mmol/L  --  25 22 23   < > 21   < >  --   --    CO2, I-STAT mmol/L  --   --   --   --   --   --   --  23 24   BUN mg/dL  --  54* 38* 25   < > 14   < >  --   --    CREATININE mg/dL  --  1 95* 1 24 1 06   < > 0 69   < >  --   --    CALCIUM mg/dL  --  9 4 8 4 8 3   < > 7 8*   < >  --   --    ALK PHOS U/L  --   --   --   --   --  43*  --   --   --    ALT U/L  --   --   --   --   --  49  --   --   --    AST U/L  --   --   --   --   --  55*  --   --   --    GLUCOSE, ISTAT mg/dl 339*  --   --   --   --   --   --  140 160*    < > = values in this interval not displayed  Results from last 7 days   Lab Units 02/01/20  0825 01/31/20  0542 01/30/20  0536   MAGNESIUM mg/dL 3 2* 2 8* 2 7*     Results from last 7 days   Lab Units 01/31/20  0542 01/30/20  0536   PHOSPHORUS mg/dL 3 8 3 5          Results from last 7 days   Lab Units 01/28/20  1755   LACTIC ACID mmol/L 0 7     No results found for: TROPONINI     Imaging: CXR- ETT in good position, OG tube to be backed out  I have personally reviewed pertinent reports  EKG: This was personally reviewed by myself  Micro:  No results found for: Jules Ochoa, SPUTUMCULTUR    Assessment: Pt is a 68y o  M with adenocarcinoma s/p 1/28 Transhiatal esophagectomy who coded s/p 2 rounds epi, 2 shocks with ROSC        Plan:                  Neuro: Agitated, moving all extremities  Sedation: propofol, fentanyl, prn fentanyl    Neurovascular checks                 CV: Code blue in fine vfib- s/p CPR, amio, 2amps bicarb, 2 epi, 2 shocks with ROSC   Now in sinus rhythm  [email or web address], R TLC placed, brandyn placed    Hold home metoprolol while on pressors                Lung: Acute respiratory failure- Intubated 2/1  AC 16/500/100%/5  L vocal fold paralysis- ENT planning on injection medialization    Hx COPD respiratory following                GI: s/p 1/28 transhiatal esophagectomy     J tube in place, OG tube in place to suction   Possible CT CAP once stable to eval for collections    Ppx: omeprazole                  FEN: [email or web address], replete electrolytes as needed   NPO- TFs on hold                 : [place] strict I&Os                 ID:  2/2 blood cultures drawn, prophylactic cefepime/flagyl/fluonazole started 2/2 hypotension                 Heme: f/u stat labs   DVT ppx lovenox                 Endo: DM2- SSI                 Msk/Skin: Frequent turning/repositioning                  Disposition: ICU level care      VTE Pharmacologic Prophylaxis: Enoxaparin (Lovenox)  VTE Mechanical Prophylaxis: sequential compression device     Invasive lines and devices: Invasive Devices     Central Venous Catheter Line            Port A Cath 09/26/19 Left Chest 128 days          Peripheral Intravenous Line            Peripheral IV 02/01/20 Left;Right Antecubital less than 1 day          Drain            Gastrostomy/Enterostomy Jejunostomy 14 Fr   days    Open Drain Anterior; Left Neck 4 days    Urethral Catheter Temperature probe 16 Fr  1 day                 Code Status: Level 1 - Full Code  POA:    POLST:       Given critical illness, patient length of stay will require greater than two midnights          Krupa Alvarez MD

## 2020-02-02 NOTE — PROGRESS NOTES
Progress Note - Oncology Surgery   Yajaira Carvalho 71 y o  male MRN: [id number]  Unit/Bed#: [facility] 416-01 Encounter: [id number]    Assessment:  70 yo M s/p transhiatal esophagectomy 1/28 with routine post op course until code blue 2/1  Etiology unknown however likely respiratory given intial CO2 of 130 and no cardiac abnormalities on tele post-ROSC    LA 6 7 --> 1 1  7 4/39/73/25/+1  WBC 11 7  Hb 10  Cr 1 63    Plan:  1  S/P esophagectomy   - TF on hold s/p code - okay to resume today   - DC staples in neck   - ENT to see 2/2 for vocal cord paralysis - will likely have to defer given intubation   - Likely fascial dehiscence - no overt skin opening therefore did not probe however likely given large sudden amount of drainage - will apply abd binder    2  S/P code   - Resuscitated, endpoints improved   - Attempt vent weaning today   - Wean levo - likely required 2/2 sedation gtt's rather than septic etiology given other vitals, endpoints   - Avoid fluid overload      Subjective/Objective   Chief Complaint:     24 hr events:  - Code blue around 10 pm last night - possible respiratory?  - ROSC achieved and sinus rhythm since  - Levo 7  - Nurses note copious amounts light pink fluid from midline - likely fascial dehiscence given CPR last night    Objective:     Blood pressure 106/59, pulse 76, temperature 99 °F (37 2 °C), resp  rate 15, height 5' 10" (1 778 m), weight 87 3 kg (192 lb 7 4 oz), SpO2 99 %  ,Body mass index is 27 62 kg/m²        Intake/Output Summary (Last 24 hours) at 2/2/2020 0654  Last data filed at 2/2/2020 0220  Gross per 24 hour   Intake 4492 34 ml   Output 400 ml   Net 4092 34 ml       Invasive Devices     Central Venous Catheter Line            Port A Cath 09/26/19 Left Chest 128 days    CVC Central Lines 02/02/20 Triple 16cm less than 1 day          Peripheral Intravenous Line            Peripheral IV 02/01/20 Left Antecubital less than 1 day          Arterial Line            Arterial Line 02/01/20 Right Radial less than 1 day          Drain            Gastrostomy/Enterostomy Jejunostomy 14 Fr   days    Open Drain Anterior; Left Neck 4 days    Urethral Catheter Temperature probe 16 Fr  1 day          Airway            ETT  Hi-Lo; Cuffed 8 mm less than 1 day                Physical Exam:   Gen:Intubated, sedated, NAD  Cardio: RRR  Lungs: CTAB  Abd: Soft, mildly distended, non tender   Small amount of fluid leaking between staples w/ manipulation, no skin opening, salmon colored      Lab, Imaging and other studies:  CBC:   Lab Results   Component Value Date    WBC 11 78 (H) 02/02/2020    HGB 10 0 (L) 02/02/2020    HCT 31 8 (L) 02/02/2020    MCV 85 02/02/2020     02/02/2020    MCH 26 6 (L) 02/02/2020    MCHC 31 4 02/02/2020    RDW 17 5 (H) 02/02/2020    MPV 9 3 02/02/2020    NRBC 0 02/02/2020   , CMP:   Lab Results   Component Value Date    SODIUM 140 02/02/2020    K 4 0 02/02/2020     02/02/2020    CO2 27 02/02/2020    CO2  02/01/2020      Comment:      Out of Reportable Range    BUN 57 (H) 02/02/2020    CREATININE 1 63 (H) 02/02/2020    GLUCOSE 339 (H) 02/01/2020    CALCIUM 9 2 02/02/2020    EGFR 44 02/02/2020   , Coagulation: No results found for: PT, INR, APTT  VTE Pharmacologic Prophylaxis: Heparin  VTE Mechanical Prophylaxis: sequential compression device

## 2020-02-02 NOTE — RESPIRATORY THERAPY NOTE
RT Ventilator Management Note  Carmen Schuler 71 y o  male MRN: [id number]  Unit/Bed#: [facility] 416-01 Encounter: [id number]      Daily Screen     No data found in the last 10 encounters              Physical Exam:   Assessment Type: Assess only  General Appearance: Sleeping  Respiratory Pattern: Assisted, Symmetrical  Chest Assessment: Chest expansion symmetrical  Bilateral Breath Sounds: Clear, Diminished  Cough: Productive, Moist  Suction: ET Tube  O2 Device: vent      Resp Comments: pt resting comfortably on current ACVC settings, BS CTA/diminished, sx for scant slightly brown ETTsecretions, no change or weaning at this time, will continue to [facility] per respiratory protocol

## 2020-02-02 NOTE — PROGRESS NOTES
Code blue called on patient for unresponsiveness and pulseless ness  Patient underwent RSI by code team  ROSC achieved following ACLS protocol  Patient currently transferred to 416  Family notified by amada team  Kajal [place], and [employer] aware  CXR, stat labs pending  Davis Chase MD  General Surgery  PGY5

## 2020-02-02 NOTE — PROGRESS NOTES
Progress Note - Thoracic Surgery   Ludy  71 y o  male MRN: [id number]  Unit/Bed#: [facility] 416-01 Encounter: [id number]    Assessment:  70 yo M s/p transhiatal esophagectomy 1/28 with routine post op course until code blue 2/1  Etiology unknown however likely respiratory given intial CO2 of 130 and no cardiac abnormalities on tele post-ROSC    LA 6 7 --> 1 1  7 4/39/73/25/+1  WBC 11 7  Hb 10  Cr 1 63    Plan:  1  S/P esophagectomy   - TF on hold s/p code - okay to resume today   - DC staples in neck   - ENT to see 2/2 for vocal cord paralysis - will likely have to defer given intubation   - Likely fascial dehiscence - no overt skin opening therefore did not probe however likely given large sudden amount of drainage - will apply abd binder    2  S/P code   - Resuscitated, endpoints improved   - Attempt vent weaning today   - Wean levo - likely required 2/2 sedation gtt's rather than septic etiology given other vitals, endpoints      Subjective/Objective   Chief Complaint:     24 hr events:  - Code blue around 10 pm last night - possible respiratory?  - ROSC achieved and sinus rhythm since  - Levo 7  - Nurses note copious amounts light pink fluid from midline - likely fascial dehiscence given CPR last night    Objective:     Blood pressure 111/67, pulse 74, temperature 97 9 °F (36 6 °C), resp  rate 16, height 5' 10" (1 778 m), weight 87 3 kg (192 lb 7 4 oz), SpO2 100 %  ,Body mass index is 27 62 kg/m²        Intake/Output Summary (Last 24 hours) at 2/2/2020 0645  Last data filed at 2/2/2020 0220  Gross per 24 hour   Intake 4492 34 ml   Output 200 ml   Net 4292 34 ml       Invasive Devices     Central Venous Catheter Line            Port A Cath 09/26/19 Left Chest 128 days    CVC Central Lines 02/02/20 Triple 16cm less than 1 day          Peripheral Intravenous Line            Peripheral IV 02/01/20 Left Antecubital less than 1 day          Arterial Line            Arterial Line 02/01/20 Right Radial less than 1 day          Drain            Gastrostomy/Enterostomy Jejunostomy 14 Fr   days    Open Drain Anterior; Left Neck 4 days    Urethral Catheter Temperature probe 16 Fr  1 day          Airway            ETT  Hi-Lo; Cuffed 8 mm less than 1 day                Physical Exam:   Gen:Intubated, sedated, NAD  Cardio: RRR  Lungs: CTAB  Abd: Soft, mildly distended, non tender   Small amount of fluid leaking between staples w/ manipulation, no skin opening, salmon colored      Lab, Imaging and other studies:  CBC:   Lab Results   Component Value Date    WBC 11 78 (H) 02/02/2020    HGB 10 0 (L) 02/02/2020    HCT 31 8 (L) 02/02/2020    MCV 85 02/02/2020     02/02/2020    MCH 26 6 (L) 02/02/2020    MCHC 31 4 02/02/2020    RDW 17 5 (H) 02/02/2020    MPV 9 3 02/02/2020    NRBC 0 02/02/2020   , CMP:   Lab Results   Component Value Date    SODIUM 140 02/02/2020    K 4 0 02/02/2020     02/02/2020    CO2 27 02/02/2020    CO2  02/01/2020      Comment:      Out of Reportable Range    BUN 57 (H) 02/02/2020    CREATININE 1 63 (H) 02/02/2020    GLUCOSE 339 (H) 02/01/2020    CALCIUM 9 2 02/02/2020    EGFR 44 02/02/2020   , Coagulation: No results found for: PT, INR, APTT  VTE Pharmacologic Prophylaxis: Heparin  VTE Mechanical Prophylaxis: sequential compression device

## 2020-02-02 NOTE — RESPIRATORY THERAPY NOTE
RT Ventilator Management Note  Maeve Smith 71 y o  male MRN: [id number]  Unit/Bed#: [facility] 416-01 Encounter: [id number]      Daily Screen     No data found in the last 10 encounters  Physical Exam:   Assessment Type: Assess only  Respiratory Pattern: Assisted, Symmetrical  Chest Assessment: Chest expansion symmetrical  Bilateral Breath Sounds: Clear, Diminished  O2 Device: Vent      Resp Comments: (P) Pt previously intubated s/p cardiac arrest  Pt received on ACVC vent settings 16/500/100/5  FiO2 titrated at this time and repeat abg pending  No other vent changes made  Will continue to monitor and assess per protocol

## 2020-02-02 NOTE — PROCEDURES
Central Line Insertion  Date/Time: 2/2/2020 12:06 AM  Performed by: Abhinav Cabrales MD  Authorized by: Abhinav Cabrales MD     Patient location:  Bedside  Other Assisting Provider: Yes (comment) (Dr Do Murphy )    Consent:     Consent obtained:  Verbal (Daughter POA)    Consent given by:  Guardian    Risks discussed:  Arterial puncture, incorrect placement, bleeding, infection, pneumothorax and nerve damage    Alternatives discussed:  No treatment  Universal protocol:     Procedure explained and questions answered to patient or proxy's satisfaction: yes      Relevant documents present and verified: yes      Radiology Images displayed and confirmed  If images not available, report reviewed: yes      Site/side marked: yes      Patient identity confirmed:  Arm band and hospital-assigned identification number  Pre-procedure details:     Hand hygiene: Hand hygiene performed prior to insertion      Sterile barrier technique: All elements of maximal sterile technique followed      Skin preparation:  ChloraPrep    Skin preparation agent: Skin preparation agent completely dried prior to procedure    Indications:     Central line indications: medications requiring central line    Sedation:     Sedation type: Moderate (conscious) sedation  Anesthesia (see MAR for exact dosages):      Anesthesia method:  Local infiltration    Local anesthetic:  Lidocaine 1% WITH epi  Procedure details:     Location:  Right internal jugular    Vessel type: vein      Laterality:  Right    Approach: percutaneous technique used      Catheter type:  Triple lumen 16cm    Catheter size:  7 Fr    Landmarks identified: yes      Ultrasound guidance: yes      Sterile ultrasound techniques: Sterile gel and sterile probe covers were used      Number of attempts:  2    Successful placement: yes    Post-procedure details:     Post-procedure:  Dressing applied and line sutured    Assessment:  Blood return through all ports  Comments:      CXR ordered, no immediate complications

## 2020-02-02 NOTE — CODE DOCUMENTATION
Code blue activated at 21:58 after nurse noted HR go from 50  to 30s and patient becoming unresponsive  Chest compressions initiated by nursing  PEA noted on monitor, compressions continued  Epi drawn and administered  Compressions continued and Epi given  Rhythm check showed Vtach  200 Joule shock administered with compressions restated  1 gram magnesium and 1 Amp bicarb administered  Chest compression continued  Check rhythm performed and noted Vtach  300 joule shock administered  Compressions resumed  Rhythm check again  V-tach noted  1 g amiodarone administered  ROSC achieved  A fib noted on monitor  Blood pressure 110/75,  pulse 97  Labs drawn and Patient transferred to ICU

## 2020-02-02 NOTE — PLAN OF CARE
Problem: Prexisting or High Potential for Compromised Skin Integrity  Goal: Skin integrity is maintained or improved  Description  INTERVENTIONS:  - Identify patients at risk for skin breakdown  - Assess and monitor skin integrity  - Assess and monitor nutrition and hydration status  - Monitor labs   - Assess for incontinence   - Turn and reposition patient  - Assist with mobility/ambulation  - Relieve pressure over bony prominences  - Avoid friction and shearing  - Provide appropriate hygiene as needed including keeping skin clean and dry  - Evaluate need for skin moisturizer/barrier cream  - Collaborate with interdisciplinary team   - Patient/family teaching  - Consider wound care consult   Outcome: Progressing     Problem: Potential for Falls  Goal: Patient will remain free of falls  Description  INTERVENTIONS:  - Assess patient frequently for physical needs  -  Identify cognitive and physical deficits and behaviors that affect risk of falls    -  [place] fall precautions as indicated by assessment   - Educate patient/family on patient safety including physical limitations  - Instruct patient to call for assistance with activity based on assessment  - Modify environment to reduce risk of injury  - Consider OT/PT consult to assist with strengthening/mobility  Outcome: Progressing     Problem: PAIN - ADULT  Goal: Verbalizes/displays adequate comfort level or baseline comfort level  Description  Interventions:  - Encourage patient to monitor pain and request assistance  - Assess pain using appropriate pain scale  - Administer analgesics based on type and severity of pain and evaluate response  - Implement non-pharmacological measures as appropriate and evaluate response  - Consider cultural and social influences on pain and pain management  - Notify physician/advanced practitioner if interventions unsuccessful or patient reports new pain  Outcome: Progressing     Problem: SAFETY ADULT  Goal: Maintain or return to baseline ADL function  Description  INTERVENTIONS:  -  Assess patient's ability to carry out ADLs; assess patient's baseline for ADL function and identify physical deficits which impact ability to perform ADLs (bathing, care of mouth/teeth, toileting, grooming, dressing, etc )  - Assess/evaluate cause of self-care deficits   - Assess range of motion  - Assess patient's mobility; develop plan if impaired  - Assess patient's need for assistive devices and provide as appropriate  - Encourage maximum independence but intervene and supervise when necessary  - Involve family in performance of ADLs  - Assess for home care needs following discharge   - Consider OT consult to assist with ADL evaluation and planning for discharge  - Provide patient education as appropriate  Outcome: Progressing  Goal: Maintain or return mobility status to optimal level  Description  INTERVENTIONS:  - Assess patient's baseline mobility status (ambulation, transfers, stairs, etc )    - Identify cognitive and physical deficits and behaviors that affect mobility  - Identify mobility aids required to assist with transfers and/or ambulation (gait belt, sit-to-stand, lift, walker, cane, etc )  - [place] fall precautions as indicated by assessment  - Record patient progress and toleration of activity level on Mobility SBAR; progress patient to next Phase/Stage  - Instruct patient to call for assistance with activity based on assessment  - Consider rehabilitation consult to assist with strengthening/weightbearing, etc   Outcome: Progressing     Problem: Nutrition/Hydration-ADULT  Goal: Nutrient/Hydration intake appropriate for improving, restoring or maintaining nutritional needs  Description  Monitor and assess patient's nutrition/hydration status for malnutrition  Collaborate with interdisciplinary team and initiate plan and interventions as ordered  Monitor patient's weight and dietary intake as ordered or per policy   Utilize nutrition screening tool and intervene as necessary  Determine patient's food preferences and provide high-protein, high-caloric foods as appropriate       INTERVENTIONS:  - Monitor oral intake, urinary output, labs, and treatment plans  - Assess nutrition and hydration status and recommend course of action  - Evaluate amount of meals eaten  - Assist patient with eating if necessary   - Allow adequate time for meals  - Recommend/ encourage appropriate diets, oral nutritional supplements, and vitamin/mineral supplements  - Order, calculate, and assess calorie counts as needed  - Recommend, monitor, and adjust tube feedings and TPN/PPN based on assessed needs  - Assess need for intravenous fluids  - Provide specific nutrition/hydration education as appropriate  - Include patient/family/caregiver in decisions related to nutrition  Outcome: Progressing     Problem: SAFETY,RESTRAINT: NV/NON-SELF DESTRUCTIVE BEHAVIOR  Goal: Remains free of harm/injury (restraint for non violent/non self-detsructive behavior)  Description  INTERVENTIONS:  - Instruct patient/family regarding restraint use   - Assess and monitor physiologic and psychological status   - Provide interventions and comfort measures to meet assessed patient needs   - Identify and implement measures to help patient regain control  - Assess readiness for release of restraint   Outcome: Progressing  Goal: Returns to optimal restraint-free functioning  Description  INTERVENTIONS:  - Assess the patient's behavior and symptoms that indicate continued need for restraint  - Identify and implement measures to help patient regain control  - Assess readiness for release of restraint   Outcome: Progressing     Problem: COPING  Goal: Pt/Family able to verbalize concerns and demonstrate effective coping strategies  Description  INTERVENTIONS:  - Assist patient/family to identify coping skills, available support systems and cultural and spiritual values  - Provide emotional support, including active listening and acknowledgement of concerns of patient and caregivers  - Reduce environmental stimuli, as able  - Provide patient education  - Assess for spiritual pain/suffering and initiate spiritual care, including notification of Pastoral Care or huan based community as needed  - Assess effectiveness of coping strategies  Outcome: Progressing  Goal: Will report anxiety at manageable levels  Description  INTERVENTIONS:  - Administer medication as ordered  - Teach and encourage coping skills  - Provide emotional support  - Assess patient/family for anxiety and ability to cope  Outcome: Progressing     Problem: DECISION MAKING  Goal: Pt/Family able to effectively weigh alternatives and participate in decision making related to treatment and care  Description  INTERVENTIONS:  - Identify decision maker  - Determine when there are differences among patient's view, family's view, and healthcare provider's view of patient condition and care goals  - Facilitate patient/family articulation of goals for care  - Help patient/family identify pros/cons of alternative solutions  - Provide information as requested by patient/family  - Respect patient/family rights related to privacy and sharing information   - Serve as a liaison between patient, family and health care team  - Initiate consults as appropriate (Ethics Team, Palliative Care, Family Care Conference, etc )  Outcome: Progressing     Problem: CONFUSION/THOUGHT DISTURBANCE  Goal: Thought disturbances (confusion, delirium, depression, dementia or psychosis) are managed to maintain or return to baseline mental status and functional level  Description  INTERVENTIONS:  - Assess for possible contributors to  thought disturbance, including but not limited to medications, infection, impaired vision or hearing, underlying metabolic abnormalities, dehydration, respiratory compromise,  psychiatric diagnoses and notify attending PHYSICAN/AP  - Monitor and intervene to maintain adequate nutrition, hydration, elimination, sleep and activity  - Decrease environmental stimuli, including noise as appropriate  - Provide frequent contacts to provide refocusing, direction and reassurance as needed  Approach patient calmly with eye contact and at their level  - [place] high risk fall precautions, aspiration precautions and other safety measures, as indicated  - If delirium suspected, notify physician/AP of change in condition and request immediate in-person evaluation  - Pursue consults as appropriate including Geriatric (campus dependent), OT for cognitive evaluation/activity planning, psychiatric, pastoral care, etc   Outcome: Progressing     Problem: BEHAVIOR  Goal: Pt/Family maintain appropriate behavior and adhere to behavioral management agreement, if implemented  Description  INTERVENTIONS:  - Assess the family dynamic   - Encourage verbalization of thoughts and concerns in a socially appropriate manner  - Assess patient/family's coping skills and non-compliant behavior (including use of illegal substances)  - Utilize positive, consistent limit setting strategies supporting safety of patient, staff and others  - Initiate consult with Case Management, Spiritual Care or other ancillary services as appropriate  - If a patient's/visitor's behavior jeopardizes the safety of the patient, staff, or others, refer to organization procedure     - Notify Security of behavior or suspected illegal substances which indicate the need for search of the patient and/or belongings  - Encourage participation in the decision making process about a behavioral management agreement; implement if patient meets criteria  Outcome: Progressing     Problem: SELF HARM  Goal: Effect of psychiatric condition will be minimized and patient will be protected from self harm  Description  INTERVENTIONS:  - Assess impact of patient's symptoms on level of functioning, self-care needs and offer support as indicated  - Assess patient/family knowledge of depression, impact on illness and need for teaching  - Provide emotional support, presence and reassurance  - Assess for possible suicidal thoughts, ideation or self-harm  If patient expresses suicidal thoughts or statements do not leave alone, notify physician/AP immediately, initiate suicide precautions, and determine need for continual observation    - initiate consults and referrals as appropriate (a mental health professional, Spiritual Care  Outcome: Progressing

## 2020-02-02 NOTE — RESPIRATORY THERAPY NOTE
RT Ventilator Management Note  Navid Hernandez 71 y o  male MRN: [id number]  Unit/Bed#: [facility] 416-01 Encounter: [id number]      Daily Screen     No data found in the last 10 encounters              Physical Exam:   Assessment Type: Assess only  General Appearance: Sleeping  Respiratory Pattern: Assisted, Symmetrical  Chest Assessment: Chest expansion symmetrical  Bilateral Breath Sounds: Clear, Diminished  Cough: Productive, Moist  Suction: ET Tube  O2 Device: vent      Resp Comments: pt resting comfortably on current vent settings, FIO2 decreased to 60%, will continue to monitor per resp protocol

## 2020-02-03 ENCOUNTER — APPOINTMENT (INPATIENT)
Dept: RADIOLOGY | Facility: HOSPITAL | Age: 65
DRG: 326 | End: 2020-02-03
Payer: COMMERCIAL

## 2020-02-03 LAB
ANION GAP SERPL CALCULATED.3IONS-SCNC: 6 MMOL/L (ref 4–13)
ATRIAL RATE: 80 BPM
ATRIAL RATE: 82 BPM
ATRIAL RATE: 84 BPM
ATRIAL RATE: 90 BPM
BASE EXCESS BLDA CALC-SCNC: 0.7 MMOL/L
BASOPHILS # BLD AUTO: 0.03 THOUSANDS/ΜL (ref 0–0.1)
BASOPHILS NFR BLD AUTO: 1 % (ref 0–1)
BUN SERPL-MCNC: 45 MG/DL (ref 5–25)
CA-I BLD-SCNC: 1.13 MMOL/L (ref 1.12–1.32)
CALCIUM SERPL-MCNC: 8.4 MG/DL (ref 8.3–10.1)
CHLORIDE SERPL-SCNC: 112 MMOL/L (ref 100–108)
CHOLEST SERPL-MCNC: 112 MG/DL (ref 50–200)
CO2 SERPL-SCNC: 27 MMOL/L (ref 21–32)
CREAT SERPL-MCNC: 1.28 MG/DL (ref 0.6–1.3)
EOSINOPHIL # BLD AUTO: 0.03 THOUSAND/ΜL (ref 0–0.61)
EOSINOPHIL NFR BLD AUTO: 1 % (ref 0–6)
ERYTHROCYTE [DISTWIDTH] IN BLOOD BY AUTOMATED COUNT: 17.3 % (ref 11.6–15.1)
GFR SERPL CREATININE-BSD FRML MDRD: 58 ML/MIN/1.73SQ M
GLUCOSE SERPL-MCNC: 167 MG/DL (ref 65–140)
GLUCOSE SERPL-MCNC: 175 MG/DL (ref 65–140)
GLUCOSE SERPL-MCNC: 179 MG/DL (ref 65–140)
GLUCOSE SERPL-MCNC: 202 MG/DL (ref 65–140)
GLUCOSE SERPL-MCNC: 305 MG/DL (ref 65–140)
HCO3 BLDA-SCNC: 24.9 MMOL/L (ref 22–28)
HCT VFR BLD AUTO: 32.2 % (ref 36.5–49.3)
HDLC SERPL-MCNC: 24 MG/DL
HGB BLD-MCNC: 9.7 G/DL (ref 12–17)
HOROWITZ INDEX BLDA+IHG-RTO: 40 MM[HG]
IMM GRANULOCYTES # BLD AUTO: 0.01 THOUSAND/UL (ref 0–0.2)
IMM GRANULOCYTES NFR BLD AUTO: 0 % (ref 0–2)
LDLC SERPL CALC-MCNC: 57 MG/DL (ref 0–100)
LYMPHOCYTES # BLD AUTO: 0.7 THOUSANDS/ΜL (ref 0.6–4.47)
LYMPHOCYTES NFR BLD AUTO: 11 % (ref 14–44)
MAGNESIUM SERPL-MCNC: 2.8 MG/DL (ref 1.6–2.6)
MCH RBC QN AUTO: 25.9 PG (ref 26.8–34.3)
MCHC RBC AUTO-ENTMCNC: 30.1 G/DL (ref 31.4–37.4)
MCV RBC AUTO: 86 FL (ref 82–98)
MONOCYTES # BLD AUTO: 0.62 THOUSAND/ΜL (ref 0.17–1.22)
MONOCYTES NFR BLD AUTO: 9 % (ref 4–12)
NEUTROPHILS # BLD AUTO: 5.2 THOUSANDS/ΜL (ref 1.85–7.62)
NEUTS SEG NFR BLD AUTO: 78 % (ref 43–75)
NONHDLC SERPL-MCNC: 88 MG/DL
NRBC BLD AUTO-RTO: 0 /100 WBCS
O2 CT BLDA-SCNC: 14.8 ML/DL (ref 16–23)
OXYHGB MFR BLDA: 97.2 % (ref 94–97)
P AXIS: 23 DEGREES
P AXIS: 24 DEGREES
P AXIS: 56 DEGREES
P AXIS: 80 DEGREES
PCO2 BLDA: 38 MM HG (ref 36–44)
PEEP RESPIRATORY: 10 CM[H2O]
PH BLDA: 7.43 [PH] (ref 7.35–7.45)
PHOSPHATE SERPL-MCNC: 3.2 MG/DL (ref 2.3–4.1)
PLATELET # BLD AUTO: 211 THOUSANDS/UL (ref 149–390)
PMV BLD AUTO: 9.2 FL (ref 8.9–12.7)
PO2 BLDA: 110.2 MM HG (ref 75–129)
POTASSIUM SERPL-SCNC: 4 MMOL/L (ref 3.5–5.3)
PR INTERVAL: 142 MS
PR INTERVAL: 150 MS
PR INTERVAL: 158 MS
PR INTERVAL: 200 MS
QRS AXIS: 30 DEGREES
QRS AXIS: 49 DEGREES
QRS AXIS: 71 DEGREES
QRS AXIS: 77 DEGREES
QRSD INTERVAL: 83 MS
QRSD INTERVAL: 88 MS
QRSD INTERVAL: 88 MS
QRSD INTERVAL: 96 MS
QT INTERVAL: 342 MS
QT INTERVAL: 358 MS
QT INTERVAL: 371 MS
QT INTERVAL: 375 MS
QTC INTERVAL: 419 MS
QTC INTERVAL: 424 MS
QTC INTERVAL: 433 MS
QTC INTERVAL: 434 MS
RBC # BLD AUTO: 3.75 MILLION/UL (ref 3.88–5.62)
SODIUM SERPL-SCNC: 145 MMOL/L (ref 136–145)
SPECIMEN SOURCE: ABNORMAL
T WAVE AXIS: 14 DEGREES
T WAVE AXIS: 2 DEGREES
T WAVE AXIS: 3 DEGREES
T WAVE AXIS: 50 DEGREES
TRIGL FLD-MCNC: 456 MG/DL
TRIGL SERPL-MCNC: 157 MG/DL
TROPONIN I SERPL-MCNC: 0.23 NG/ML
VENT AC: 16
VENT- AC: AC
VENTRICULAR RATE: 80 BPM
VENTRICULAR RATE: 82 BPM
VENTRICULAR RATE: 84 BPM
VENTRICULAR RATE: 90 BPM
VT SETTING VENT: 500 ML
WBC # BLD AUTO: 6.59 THOUSAND/UL (ref 4.31–10.16)

## 2020-02-03 PROCEDURE — 99291 CRITICAL CARE FIRST HOUR: CPT | Performed by: EMERGENCY MEDICINE

## 2020-02-03 PROCEDURE — 94760 N-INVAS EAR/PLS OXIMETRY 1: CPT

## 2020-02-03 PROCEDURE — 83735 ASSAY OF MAGNESIUM: CPT | Performed by: PHYSICIAN ASSISTANT

## 2020-02-03 PROCEDURE — 82805 BLOOD GASES W/O2 SATURATION: CPT | Performed by: PHYSICIAN ASSISTANT

## 2020-02-03 PROCEDURE — 87081 CULTURE SCREEN ONLY: CPT | Performed by: OBSTETRICS & GYNECOLOGY

## 2020-02-03 PROCEDURE — 84100 ASSAY OF PHOSPHORUS: CPT | Performed by: PHYSICIAN ASSISTANT

## 2020-02-03 PROCEDURE — 82948 REAGENT STRIP/BLOOD GLUCOSE: CPT

## 2020-02-03 PROCEDURE — 93010 ELECTROCARDIOGRAM REPORT: CPT | Performed by: INTERNAL MEDICINE

## 2020-02-03 PROCEDURE — 82330 ASSAY OF CALCIUM: CPT | Performed by: PHYSICIAN ASSISTANT

## 2020-02-03 PROCEDURE — 80061 LIPID PANEL: CPT | Performed by: OBSTETRICS & GYNECOLOGY

## 2020-02-03 PROCEDURE — 84484 ASSAY OF TROPONIN QUANT: CPT | Performed by: PHYSICIAN ASSISTANT

## 2020-02-03 PROCEDURE — 99024 POSTOP FOLLOW-UP VISIT: CPT | Performed by: SURGERY

## 2020-02-03 PROCEDURE — 71045 X-RAY EXAM CHEST 1 VIEW: CPT

## 2020-02-03 PROCEDURE — 80048 BASIC METABOLIC PNL TOTAL CA: CPT | Performed by: PHYSICIAN ASSISTANT

## 2020-02-03 PROCEDURE — 84478 ASSAY OF TRIGLYCERIDES: CPT | Performed by: PHYSICIAN ASSISTANT

## 2020-02-03 PROCEDURE — 99024 POSTOP FOLLOW-UP VISIT: CPT | Performed by: THORACIC SURGERY (CARDIOTHORACIC VASCULAR SURGERY)

## 2020-02-03 PROCEDURE — 85025 COMPLETE CBC W/AUTO DIFF WBC: CPT | Performed by: PHYSICIAN ASSISTANT

## 2020-02-03 RX ORDER — LANOLIN ALCOHOL/MO/W.PET/CERES
3 CREAM (GRAM) TOPICAL
Status: DISCONTINUED | OUTPATIENT
Start: 2020-02-03 | End: 2020-02-05

## 2020-02-03 RX ADMIN — ACETAMINOPHEN 975 MG: 650 SUSPENSION ORAL at 00:04

## 2020-02-03 RX ADMIN — FLUCONAZOLE 200 MG: 2 INJECTION, SOLUTION INTRAVENOUS at 00:18

## 2020-02-03 RX ADMIN — METRONIDAZOLE 500 MG: 500 INJECTION, SOLUTION INTRAVENOUS at 08:11

## 2020-02-03 RX ADMIN — DEXMEDETOMIDINE 1 MCG/KG/HR: 100 INJECTION, SOLUTION, CONCENTRATE INTRAVENOUS at 01:00

## 2020-02-03 RX ADMIN — VANCOMYCIN HYDROCHLORIDE 2000 MG: 1 INJECTION, POWDER, LYOPHILIZED, FOR SOLUTION INTRAVENOUS at 13:55

## 2020-02-03 RX ADMIN — CHLORHEXIDINE GLUCONATE 0.12% ORAL RINSE 15 ML: 1.2 LIQUID ORAL at 08:11

## 2020-02-03 RX ADMIN — GABAPENTIN 100 MG: 250 SUSPENSION ORAL at 17:52

## 2020-02-03 RX ADMIN — FENTANYL CITRATE 25 MCG: 50 INJECTION, SOLUTION INTRAMUSCULAR; INTRAVENOUS at 06:03

## 2020-02-03 RX ADMIN — INSULIN LISPRO 1 UNITS: 100 INJECTION, SOLUTION INTRAVENOUS; SUBCUTANEOUS at 12:33

## 2020-02-03 RX ADMIN — INSULIN LISPRO 3 UNITS: 100 INJECTION, SOLUTION INTRAVENOUS; SUBCUTANEOUS at 18:05

## 2020-02-03 RX ADMIN — GABAPENTIN 100 MG: 250 SUSPENSION ORAL at 08:12

## 2020-02-03 RX ADMIN — Medication 20 MG: at 08:12

## 2020-02-03 RX ADMIN — ACETAMINOPHEN 975 MG: 650 SUSPENSION ORAL at 17:51

## 2020-02-03 RX ADMIN — DEXMEDETOMIDINE 1 MCG/KG/HR: 100 INJECTION, SOLUTION, CONCENTRATE INTRAVENOUS at 12:12

## 2020-02-03 RX ADMIN — METRONIDAZOLE 500 MG: 500 INJECTION, SOLUTION INTRAVENOUS at 16:03

## 2020-02-03 RX ADMIN — Medication 75 MCG/HR: at 12:10

## 2020-02-03 RX ADMIN — CEFEPIME HYDROCHLORIDE 2000 MG: 2 INJECTION, POWDER, FOR SOLUTION INTRAVENOUS at 12:03

## 2020-02-03 RX ADMIN — ACETAMINOPHEN 975 MG: 650 SUSPENSION ORAL at 05:37

## 2020-02-03 RX ADMIN — DEXMEDETOMIDINE 1 MCG/KG/HR: 100 INJECTION, SOLUTION, CONCENTRATE INTRAVENOUS at 05:40

## 2020-02-03 RX ADMIN — ACETAMINOPHEN 975 MG: 650 SUSPENSION ORAL at 12:32

## 2020-02-03 RX ADMIN — INSULIN LISPRO 2 UNITS: 100 INJECTION, SOLUTION INTRAVENOUS; SUBCUTANEOUS at 00:16

## 2020-02-03 RX ADMIN — CHLORHEXIDINE GLUCONATE 0.12% ORAL RINSE 15 ML: 1.2 LIQUID ORAL at 20:25

## 2020-02-03 RX ADMIN — CEFEPIME HYDROCHLORIDE 2000 MG: 2 INJECTION, POWDER, FOR SOLUTION INTRAVENOUS at 00:23

## 2020-02-03 RX ADMIN — ENOXAPARIN SODIUM 40 MG: 40 INJECTION SUBCUTANEOUS at 08:12

## 2020-02-03 RX ADMIN — METRONIDAZOLE 500 MG: 500 INJECTION, SOLUTION INTRAVENOUS at 00:04

## 2020-02-03 RX ADMIN — INSULIN LISPRO 1 UNITS: 100 INJECTION, SOLUTION INTRAVENOUS; SUBCUTANEOUS at 05:37

## 2020-02-03 RX ADMIN — MELATONIN 3 MG: 3 TAB ORAL at 22:03

## 2020-02-03 RX ADMIN — GABAPENTIN 100 MG: 250 SUSPENSION ORAL at 22:07

## 2020-02-03 RX ADMIN — NOREPINEPHRINE BITARTRATE 5 MCG/MIN: 1 INJECTION INTRAVENOUS at 13:47

## 2020-02-03 RX ADMIN — METHOCARBAMOL TABLETS 500 MG: 500 TABLET, COATED ORAL at 17:50

## 2020-02-03 NOTE — RESPIRATORY THERAPY NOTE
respiratory care      02/03/20 9697   Respiratory Assessment   Assessment Type Assess only   General Appearance Sedated   Respiratory Pattern Assisted   Chest Assessment Chest expansion symmetrical   Bilateral Breath Sounds Diminished   Cough Non-productive   Suction ET Tube   Resp Comments Pt resting comfortably at this time  Pt remains on previous [address] settings  Will continue to monitor  O2 Device Vent   ETT  Hi-Lo; Cuffed 8 mm   Placement Date/Time: 02/01/20 2230   Type: Hi-Lo; Cuffed  Tube Size: 8 mm  Location: Oral  Secured at (cm): 24  Comments: (c)   Placed By: Resident    Secured at (cm) 24   Measured from [facility] 399   Repositioned Center to Right   Secured by Commercial tube gomez   Site Condition Dry   HI-LO Suction  Intermittent suction   Additional Assessments   SpO2 100 %

## 2020-02-03 NOTE — RESPIRATORY THERAPY NOTE
RT Ventilator Management Note  Renetta Tobin 71 y o  male MRN: [id number]  Unit/Bed#: [facility] 416-01 Encounter: [id number]      Daily Screen       2/3/2020  4372 2/3/2020  1806          Patient safety screen outcome[de-identified]  Passed  Passed      Spont breathing trial % for 30 min:    Yes      Spont breathing trial outcome[de-identified]    Passed      Name of Medical Team Notified[de-identified]    [facility]      Preparing to extubate/ Notify Nurse:  Cali Arreola  Yes      Extubation order obtained:    Yes      Consider Cuff Test:    Yes      Patient extubated:    Yes              Physical Exam:   Assessment Type: Assess only  General Appearance: Sedated  Respiratory Pattern: Assisted  Chest Assessment: Chest expansion symmetrical  Bilateral Breath Sounds: Diminished  Cough: Non-productive  Suction: ET Tube  O2 Device: (P) nc      Resp Comments: (P) pt extubated to 6lnc, 02 sat 98%, no stridor noted, prior to extubation +cuff leak, pt verbalizing well, pt appears comfortable will continue to monitor

## 2020-02-03 NOTE — PROCEDURES
Chest Tube Insertion  Date/Time: 2/2/2020 8:07 PM  Performed by: Kyle Burleson MD  Authorized by: Kyle Burleson MD     Patient location:  Bedside  Other Assisting Provider: Yes (comment) (Dr Lyssa Araujo )    Consent:     Consent obtained:  Verbal    Consent given by:  Guardian    Risks discussed:  Bleeding, incomplete drainage, infection and damage to surrounding structures    Alternatives discussed:  No treatment and delayed treatment  Universal protocol:     Procedure explained and questions answered to patient or proxy's satisfaction: yes      Relevant documents present and verified: yes      Test results available and properly labeled: yes      Radiology Images displayed and confirmed  If images not available, report reviewed: yes      Required blood products, implants, devices, and special equipment available: yes      Site/side marked: yes      Immediately prior to procedure a time out was called: yes      Patient identity confirmed:  Arm band and hospital-assigned identification number  Pre-procedure details:     Skin preparation:  ChloraPrep  Indications:     Indications: pleural effusion    Sedation:     Sedation type: Anxiolysis (50 fentanyl, propofol and fentanyl drips)  Anesthesia (see MAR for exact dosages): Anesthesia method:  Local infiltration    Local anesthetic:  Lidocaine 1% w/o epi  Procedure details:     Placement location:  Lateral (4th intercostal space)    Laterality:  Bilateral    Approach:  Percutaneous    Scalpel size:  11    Thal-Quick Chest Tube Kit:  20FR    Tube size (Fr):  20    Tube connected to:  Suction    Drainage characteristics:  Serosanguinous    Suture material:  2-0 silk    Dressing:  4x4 sterile gauze  Post-procedure details:     Patient tolerance of procedure: Tolerated well, no immediate complications  Comments:      CXR ordered  Left chest with 1900 serosang output, +AL, SXN  Right chest with no output, fogging in tube, -AL, SXN

## 2020-02-03 NOTE — PROGRESS NOTES
Daily Progress Note - Critical Care   Gonzalo Beth 71 y o  male MRN: [id number]  Unit/Bed#: [facility] 416-01 Encounter: [id number]        ----------------------------------------------------------------------------------------  HPI/24hr events: CT C/A/P yesterday revealed large bilateral effusions and BL CT were placed overnight  L CT drained 3 2 L and R drained 2 9 L  Both CT were placed to suction with continued drainage  ---------------------------------------------------------------------------------------  SUBJECTIVE  Unable to obtain     Review of Systems   Unable to perform ROS: Intubated     Review of systems was unable to be performed secondary to intubation and sedation  ---------------------------------------------------------------------------------------  Assessment and Plan:    Plan:    Neuro:  Diagnosis: Sedation and analgesia  Plan:  · Management of PAD  · Analgesia  · Sedation plan: Fentanyl 75 mcg/min and Precedex 1 00 mcg/kg/hr  · RASS goal: -5 Unarousable, -1 Drowsy and 0 Alert and Calm  · Delirium monitoring/management  · Regulate Sleep-wake cycle/CAM-ICU daily  · Daily SAT  · Serial Neuro Exams    Cardiac  Diagnosis: V fib arrest s/p CPR, amio, bicarb, epi, and 2 shocks with ROSC  Plan:  · Cardiac infusions: Levophed, 5 mcg/min  · Wean as able, MAP goal > 65  · Rhythm: NSR  · Follow rhythm on telemetry  · Holding home metoprolol while on pressors     Pulmonary  Diagnosis: Acute hypoxic respiratory failure   Bilateral pleural effusions now with CT to suction    Plan:  · AC/VC 16/500/40/10  · Wean PEEP as able with spO2 goal >92%  · Assess daily readiness to extubate: SBT in coordination with SAT  · VAP bundle  · CT with significant output one place, both to suction  · R CT putting out pus like fluid along with serosanguinous fluid, 3 15 L/24 hrs    · L CT serosanguineous, 3 35 L/ 24 hrs      Gastrointestinal  Diagnosis: s/p Transhiatal esophagectomy 1/28  Plan:  · Thoracics recommendations appreciated   · J tube in place, OG tube in place  · CT C/A/P 2/2 - mild ascites, no evidence for active intra-abdominal hemorrhage or hematoma formation, no collection at intrathoracic anastomotic stie, moderate size focal posterior splenic infarct     FEN  · Nutrition/diet plan: TF at 20 mL/hr   · Replete electrolytes with goals: K >4 0, Mag >2 0, and Phos >3 0    Genitourinary  Diagnosis: MARTHA, improving   Plan:  · Indwelling Doshi present: yes   · Trend UOP and BUN/creat  · Strict I and O    Infectious Diease  Diagnosis: Fever  Plan:  · Abx ordered:Cefepime, vancomycin, and fluconazole (Start date 2/01)  · Trend temps and WBC count  · Blood cultures pending   · 1/30 pleural fluid negative     Heme:   · Trend hgb and plts  · Transfuse as needed for goal hgb >7 or symptomatic anemia     Endo:   Diagnosis: Type 2 DM  Plan:  · ISS  · Goal -180mg/dL    MSK/Skin:  · Turn and position patient Q2 hours  · Off load pressure points  · Allevyn preventative per protocol      Disposition: Continue Critical Care   Code Status: Level 1 - Full Code  ---------------------------------------------------------------------------------------  ICU CORE MEASURES    Prophylaxis   VTE Pharmacologic Prophylaxis: Enoxaparin (Lovenox)  VTE Mechanical Prophylaxis: sequential compression device  Stress Ulcer Prophylaxis: Omeprazole PO    ABCDE Protocol (if indicated)  Plan to perform spontaneous awakening trial today? Yes  Plan to perform spontaneous breathing trial today? Yes  Obvious barriers to extubation?  Large BL pleural effusions  Now s/p BL CT  CAM-ICU: Negative    Invasive Devices Review  Invasive Devices     Central Venous Catheter Line            Port A Cath 09/26/19 Left Chest 129 days    CVC Central Lines 02/02/20 Triple 16cm 1 day          Peripheral Intravenous Line            Peripheral IV 02/01/20 Left Antecubital 1 day          Arterial Line            Arterial Line 02/01/20 Right Radial 1 day          Drain Gastrostomy/Enterostomy Jejunostomy 14 Fr   days    Open Drain Anterior; Left Neck 5 days    Urethral Catheter Temperature probe 16 Fr  2 days    NG/OG/Enteral Tube Orogastric Center mouth 1 day    Chest Tube 1 Left Pleural less than 1 day    Chest Tube 3 Posterior Pleural less than 1 day          Airway            ETT  Hi-Lo; Cuffed 8 mm 1 day              Can any invasive devices be discontinued today? Trial of SBT  ---------------------------------------------------------------------------------------  OBJECTIVE    Vitals   Vitals:    20 0538 20 0539 20 0600 20 0611   BP:   101/52    BP Location:       Pulse:   86 82   Resp:   22 17   Temp:   (!) 101 5 °F (38 6 °C) (!) 101 8 °F (38 8 °C)   TempSrc:       SpO2:   99% 100%   Weight: 81 8 kg (180 lb 5 4 oz) 81 8 kg (180 lb 5 4 oz)     Height:         Temp (24hrs), Av 1 °F (37 8 °C), Min:99 °F (37 2 °C), Max:101 8 °F (38 8 °C)  Current: Temperature: (!) 101 8 °F (38 8 °C)  HR: 80  BP: 120/55 MAP 73 on Levo 5  RR: 21  SpO2: 100%    Physical Exam   Constitutional: He appears well-developed and well-nourished  No distress  He is sedated and intubated  HENT:   Head: Normocephalic and atraumatic  Eyes: Pupils are equal, round, and reactive to light  Neck: Normal range of motion  Neck supple  Cardiovascular: Normal rate, regular rhythm, S1 normal, S2 normal and intact distal pulses  Exam reveals no gallop and no friction rub  No murmur heard  Pulmonary/Chest: Effort normal  Tachypnea noted  He is intubated  No respiratory distress  He has wheezes  BL CT to suction   R CT draining puss like fluid   L CT draining serosanguineous fluid   Mild wheezes   Abdominal: Soft  He exhibits distension  Distended abdomen  J tube in place  Midline incision with dressing and abdominal binder with mild serousanginous drainage    Genitourinary:   Genitourinary Comments: Doshi in place    Neurological: GCS eye subscore is 3   GCS verbal subscore is 1  GCS motor subscore is 6  GCS 10 T  Opens eyes to voice  Follows commands on sedation   Skin: He is not diaphoretic  Vitals reviewed        Invasive/non-invasive ventilation settings   Respiratory    Lab Data (Last 4 hours)      02/03 0447            pH, Arterial       7 434           pCO2, Arterial       38 0           pO2, Arterial       110 2           HCO3, Arterial       24 9           Base Excess, Arterial       0 7                O2/Vent Data       02/03 0259   Most Recent         Vent Mode AC/VC  AC/VC      Resp Rate (BPM) (BPM) 16  16      Vt (mL) (mL) 500  500      FIO2 (%) (%) 40  Comment: FIO2 decreased to 40%  40  Comment: FIO2 decreased to 40%      PEEP (cmH2O) (cmH2O) 10  10      MV 9 8  9 8                  Laboratory and Diagnostics:  Results from last 7 days   Lab Units 02/03/20 0447 02/02/20  1251 02/02/20  0430 02/01/20  2304 02/01/20  2209 02/01/20  0601 01/31/20  0542 01/30/20  0536 01/29/20  0432 01/28/20  1755   WBC Thousand/uL 6 59  --  11 78* 7 56  --  10 80* 11 72* 8 61 6 47 7 10   HEMOGLOBIN g/dL 9 7* 10 0* 10 0* 11 5*  --  13 9 12 6 11 2* 10 8* 11 4*   I STAT HEMOGLOBIN g/dl  --   --   --   --  12 2  --   --   --   --   --    HEMATOCRIT % 32 2*  --  31 8* 38 1  --  45 5 40 8 36 8 34 8* 36 3*   HEMATOCRIT, ISTAT %  --   --   --   --  36*  --   --   --   --   --    PLATELETS Thousands/uL 211  --  257 347  --  398* 192 142* 109* 100*   NEUTROS PCT % 78*  --  84*  --   --  87* 85* 85* 85* 87*   MONOS PCT % 9  --  8  --   --  6 6 7 7 7     Results from last 7 days   Lab Units 02/03/20  0447 02/02/20  0430 02/01/20  2304 02/01/20  0825 01/31/20  0542 01/30/20  0536 01/29/20  0432 01/28/20  1755   SODIUM mmol/L 145 140 138 136 138 139 142 140   POTASSIUM mmol/L 4 0 4 0 4 2 4 4 4 1 4 0 4 3 4 1   CHLORIDE mmol/L 112* 106 103 105 107 109* 112* 112*   CO2 mmol/L 27 27 26 25 22 23 23 21   ANION GAP mmol/L 6 7 9 6 9 7 7 7   BUN mg/dL 45* 57* 67* 54* 38* 25 15 14   CREATININE mg/dL 1 28 1 63* 2 31* 1 95* 1 24 1 06 0 75 0 69   CALCIUM mg/dL 8 4 9 2 8 6 9 4 8 4 8 3 8 0* 7 8*   GLUCOSE RANDOM mg/dL 179* 213* 336* 224* 171* 135 132 132   ALT U/L  --   --   --   --   --   --   --  49   AST U/L  --   --   --   --   --   --   --  55*   ALK PHOS U/L  --   --   --   --   --   --   --  43*   ALBUMIN g/dL  --   --   --   --   --   --   --  3 0*   TOTAL BILIRUBIN mg/dL  --   --   --   --   --   --   --  1 09*     Results from last 7 days   Lab Units 02/03/20 0447 02/02/20 0430 02/01/20 2304 02/01/20  0825 01/31/20  0542 01/30/20  0536 01/29/20  0432   MAGNESIUM mg/dL 2 8* 3 1* 3 7* 3 2* 2 8* 2 7* 2 4   PHOSPHORUS mg/dL 3 2  --  9 2*  --  3 8 3 5  --            Results from last 7 days   Lab Units 02/03/20  0004 02/02/20  1617 02/02/20  1251   TROPONIN I ng/mL 0 23* 0 38* 0 57*     Results from last 7 days   Lab Units 02/02/20 0431 02/01/20 2304 01/28/20  1755   LACTIC ACID mmol/L 1 1 6 7* 0 7     ABG:  Results from last 7 days   Lab Units 02/03/20 0447   PH ART  7 434   PCO2 ART mm Hg 38 0   PO2 ART mm Hg 110 2   HCO3 ART mmol/L 24 9   BASE EXC ART mmol/L 0 7   ABG SOURCE  Line, Arterial     VBG:  Results from last 7 days   Lab Units 02/03/20 0447 02/02/20  0022   PH DEEP   --   --  7 311   PCO2 DEEP mm Hg  --   --  54 6*   PO2 DEEP mm Hg  --   --  44 1   HCO3 DEEP mmol/L  --   --  26 9   BASE EXC DEEP mmol/L  --   --  0 0   ABG SOURCE  Line, Arterial   < >  --     < > = values in this interval not displayed  Micro  Results from last 7 days   Lab Units 02/02/20  0023 02/02/20  0021 01/30/20  0021   BLOOD CULTURE  Received in Microbiology Lab  Culture in Progress  Received in Microbiology Lab  Culture in Progress  --    GRAM STAIN RESULT   --   --  1+ Polys  No bacteria seen  2+ Polys  No bacteria seen   BODY FLUID CULTURE, STERILE   --   --  No growth  No growth       Imaging:  I have personally reviewed pertinent reports     and I have personally reviewed pertinent films in PACS 2/2 CXR - significantly improved opacity on L side and improvement of R with BL CT in place     Intake and Output  I/O       02/01 0701 - 02/02 0700 02/02 0701 - 02/03 0700    P  O  0 0    I V  (mL/kg) 3146 3 (36) 1649 2 (20 2)    NG/ 240    IV Piggyback 1650 1400    Feedings 220 240    Total Intake(mL/kg) 5436 3 (62 3) 3529 2 (43 1)    Urine (mL/kg/hr) 950 (0 5) 1315 (0 7)    Emesis/NG output 0 350    Drains 0 0    Stool  0    Chest Tube  6500    Total Output 950 8165    Net +4486 3 -4635 8          Unmeasured Stool Occurrence  0 x        UOP: 0 7 ml/hr     Height and Weights   Height: 5' 10" (177 8 cm)  IBW: 73 kg  Body mass index is 25 88 kg/m²  Weight (last 2 days)     Date/Time   Weight    02/03/20 0539   81 8 (180 34)    02/03/20 0538   81 8 (180 34)    02/02/20 2115       Comment rows:    OBSERV: Jeffrey Osier here to place another R sided CT at 02/02/20 2115 02/02/20 1928       Comment rows:    OBSERV: pt being prep'd for bedside CT placement with Zolhirae Stai and Shahram Nares at 02/02/20 1928 02/02/20 0600   87 3 (192 46)                Nutrition       Diet Orders   (From admission, onward)             Start     Ordered    02/02/20 1726  Diet Enteral/Parenteral; Tube Feeding No Oral Diet; Jevity 1 2 Lloyd; Continuous; 20  Diet effective now     Question Answer Comment   Diet Type Enteral/Parenteral    Enteral/Parenteral Tube Feeding No Oral Diet    Tube Feeding Formula: Jevity 1 2 Lloyd    Bolus/Cyclic/Continuous Continuous    Tube Feeding Goal Rate (mL/hr): 20    RD to adjust diet per protocol? Yes        02/02/20 1726              TF currently running at 20 ml/hr with a goal of 20 ml/hr   Formula: Jevity 1 2      Active Medications  Scheduled Meds:  Current Facility-Administered Medications:  acetaminophen 975 mg Per J Tube Q6H [address] [place]    albuterol 2 5 mg Nebulization Q6H PRN Larraine Bale [place]    cefepime 2,000 mg Intravenous Q12H Larraine Bale [place] Last Rate: Stopped (02/03/20 0100)   chlorhexidine 15 mL Swish & Spit Q12H [facility] Ifrah Barros [place]    dexmedetomidine 0 1-0 7 mcg/kg/hr Intravenous Titrated Meghan Baker MD Last Rate: 1 mcg/kg/hr (02/03/20 0540)   enoxaparin 40 mg Subcutaneous Q24H [facility] Ifrah Papo [place]    fentaNYL 75 mcg/hr Intravenous Continuous Ifrah Papo [place] Last Rate: 75 mcg/hr (02/02/20 2100)   fentanyl citrate (PF) 25 mcg Intravenous Q2H PRN [facility] [place]    fluconazole 200 mg Intravenous Q24H [facility] [place] Last Rate: Stopped (02/03/20 0023)   gabapentin 100 mg Per J Tube TID Ifrah Barros [place]    insulin lispro 1-5 Units Subcutaneous Q6H [facility] Ifrah Barros [place]    iohexol 50 mL Oral 90 min pre-procedure Jp Nelson MD    methocarbamol 500 mg Per J Tube Q6H PRN [place] [place]    metroNIDAZOLE 500 mg Intravenous Q8H [place] [facility] [place] Last Rate: Stopped (02/03/20 0018)   norepinephrine 1-30 mcg/min Intravenous Titrated Ifrah Barros [place] Last Rate: 5 mcg/min (02/03/20 0610)   omeprazole (PRILOSEC) suspension 2 mg/mL 20 mg Per J Tube Daily Ifrah Barros [place]    ondansetron 4 mg Intravenous Q6H PRN [place] [place]      Continuous Infusions:    dexmedetomidine 0 1-0 7 mcg/kg/hr Last Rate: 1 mcg/kg/hr (02/03/20 0540)   fentaNYL 75 mcg/hr Last Rate: 75 mcg/hr (02/02/20 2100)   norepinephrine 1-30 mcg/min Last Rate: 5 mcg/min (02/03/20 0610)     PRN Meds:     albuterol 2 5 mg Q6H PRN   fentanyl citrate (PF) 25 mcg Q2H PRN   methocarbamol 500 mg Q6H PRN   ondansetron 4 mg Q6H PRN       Allergies   Allergies   Allergen Reactions    Penicillins Itching     ---------------------------------------------------------------------------------------  Advance Directive and Living Will:      Power of :    POLST:    ---------------------------------------------------------------------------------------  Counseling / Coordination of Care  0 minutes of critical care time     Melissa Coyne PA-C      Portions of the record may have been created with voice recognition software  Occasional wrong word or "sound a like" substitutions may have occurred due to the inherent limitations of voice recognition software    Read the chart carefully and recognize, using context, where substitutions have occurred

## 2020-02-03 NOTE — RESPIRATORY THERAPY NOTE
RT Ventilator Management Note  Ann Penn 71 y o  male MRN: [id number]  Unit/Bed#: [facility] 416-01 Encounter: [id number]      Daily Screen       2/3/2020  1549             Patient safety screen outcome[de-identified]  Passed            Physical Exam:   Assessment Type: Assess only  General Appearance: Sedated  Respiratory Pattern: Assisted  Chest Assessment: Chest expansion symmetrical  Bilateral Breath Sounds: Diminished  Cough: Non-productive  Suction: ET Tube  O2 Device: Vent      Resp Comments: (P) pt placed on a cpap/ps wean by [facility] resident, pt appears comfortable, will continue to monitor

## 2020-02-03 NOTE — PROGRESS NOTES
Progress Note - Oncology Surgery   Adalgisa Mcfarlane 71 y o  male MRN: [id number]  Unit/Bed#: [facility] 416-01 Encounter: [id number]    Assessment:  70 yo M s/p transhiatal esophagectomy 1/28 with routine post op course until code blue 2/1  Recurrent pleural effusions drained yesterday, decreasing pressor requirements post procedure    Levo 3  CT L- 3390  CT R- 2950 - thicker yellow/white this AM?    Plan:  Continue CTs to suction, monitor output  Send CT fluid for triglycerides  May need further albumin replacements for output from CT, midline  Vent wean today s/p CT placement  Continue TF, advance to goal  Swallow studies / cord injection deferred while intubated  Continue broad spectrum antibiotics  Abdominal binder/ ABDs to midline abdomen - drainage slowing    Subjective/Objective   Chief Complaint:     Subjective: S/P b/l CT placement last night, nearly 3 L out of each  Pressors down to 3 of levo  Objective:     Blood pressure 101/52, pulse 80, temperature (!) 101 8 °F (38 8 °C), resp  rate 13, height 5' 10" (1 778 m), weight 81 8 kg (180 lb 5 4 oz), SpO2 100 %  ,Body mass index is 25 88 kg/m²  Intake/Output Summary (Last 24 hours) at 2/3/2020 0630  Last data filed at 2/3/2020 0600  Gross per 24 hour   Intake 3529 21 ml   Output 8165 ml   Net -4635 79 ml       Invasive Devices     Central Venous Catheter Line            Port A Cath 09/26/19 Left Chest 129 days    CVC Central Lines 02/02/20 Triple 16cm 1 day          Peripheral Intravenous Line            Peripheral IV 02/01/20 Left Antecubital 1 day          Arterial Line            Arterial Line 02/01/20 Right Radial 1 day          Drain            Gastrostomy/Enterostomy Jejunostomy 14 Fr   days    Open Drain Anterior; Left Neck 5 days    Urethral Catheter Temperature probe 16 Fr  2 days    NG/OG/Enteral Tube Orogastric Center mouth 1 day    Chest Tube 1 Left Pleural less than 1 day    Chest Tube 3 Posterior Pleural less than 1 day          Airway ETT  Hi-Lo; Cuffed 8 mm 1 day              Physical Exam:   Gen: A&O, NAD  Cardio: RRR  Lungs: CTAB  L CT serosang, R CT serosang in canister now yellow output  No AL in either, to suction  Abd: Soft, non distended, non tender   Light pink fluid in midline dressing, but staples intact      Lab, Imaging and other studies:  CBC:   Lab Results   Component Value Date    WBC 6 59 02/03/2020    HGB 9 7 (L) 02/03/2020    HCT 32 2 (L) 02/03/2020    MCV 86 02/03/2020     02/03/2020    MCH 25 9 (L) 02/03/2020    MCHC 30 1 (L) 02/03/2020    RDW 17 3 (H) 02/03/2020    MPV 9 2 02/03/2020    NRBC 0 02/03/2020   , CMP:   Lab Results   Component Value Date    SODIUM 145 02/03/2020    K 4 0 02/03/2020     (H) 02/03/2020    CO2 27 02/03/2020    BUN 45 (H) 02/03/2020    CREATININE 1 28 02/03/2020    CALCIUM 8 4 02/03/2020    EGFR 58 02/03/2020   , Coagulation: No results found for: PT, INR, APTT  VTE Pharmacologic Prophylaxis: Heparin  VTE Mechanical Prophylaxis: sequential compression device

## 2020-02-03 NOTE — UTILIZATION REVIEW
Continued Stay Review    Date: 02/02/2020                      s/p transhiatal esophagectomy 1/28 with routine post op course until code blue 2/1  Current Patient Class: Inpatient  Current Level of Care: Med/Surg    HPI:64 y o  male initially admitted on 01/28/2020     Assessment/Plan: A  Line right radial   Intubated / vented / Weaning  Code blue around 10 pm last night - possible respiratory?  - ROSC achieved and sinus rhythm since  - IV Levo gtt  - Nurses note copious amounts light pink fluid from midline - likely fascial dehiscence given CPR last night      VTE Pharmacologic Prophylaxis: Heparin  VTE Mechanical Prophylaxis: sequential compression device  Pertinent Labs/Diagnostic Results:   Results from last 7 days   Lab Units 02/03/20 0447 02/02/20  1251 02/02/20  0430 02/01/20 2304 02/01/20 2209 02/01/20  0601   WBC Thousand/uL 6 59  --  11 78* 7 56  --  10 80*   HEMOGLOBIN g/dL 9 7* 10 0* 10 0* 11 5*  --  13 9   I STAT HEMOGLOBIN g/dl  --   --   --   --  12 2  --    HEMATOCRIT % 32 2*  --  31 8* 38 1  --  45 5   HEMATOCRIT, ISTAT %  --   --   --   --  36*  --    PLATELETS Thousands/uL 211  --  257 347  --  398*   NEUTROS ABS Thousands/µL 5 20  --  9 93*  --   --  9 30*     Results from last 7 days   Lab Units 02/03/20  0447 02/02/20  0430 02/01/20  2304 02/01/20  2303 02/01/20  2209 02/01/20  0825 01/31/20  0542 01/30/20  0536  01/28/20  1241   SODIUM mmol/L 145 140 138  --   --  136 138 139   < >  --    POTASSIUM mmol/L 4 0 4 0 4 2  --   --  4 4 4 1 4 0   < >  --    CHLORIDE mmol/L 112* 106 103  --   --  105 107 109*   < >  --    CO2 mmol/L 27 27 26  --   --  25 22 23   < >  --    CO2, I-STAT mmol/L  --   --   --   --   --   --   --   --   --  23   ANION GAP mmol/L 6 7 9  --   --  6 9 7   < >  --    BUN mg/dL 45* 57* 67*  --   --  54* 38* 25   < >  --    CREATININE mg/dL 1 28 1 63* 2 31*  --   --  1 95* 1 24 1 06   < >  --    EGFR ml/min/1 73sq m 58 44 29  --   --  35 61 73   < >  --    CALCIUM mg/dL 8 4 9 2 8 6  --   --  9 4 8 4 8 3   < >  --    CALCIUM, IONIZED mmol/L 1 13 1 21  --  1 07*  --   --   --   --   --   --    CALCIUM, IONIZED, ISTAT mmol/L  --   --   --   --  1 19  --   --   --   --  1 12   MAGNESIUM mg/dL 2 8* 3 1* 3 7*  --   --  3 2* 2 8* 2 7*   < >  --    PHOSPHORUS mg/dL 3 2  --  9 2*  --   --   --  3 8 3 5  --   --     < > = values in this interval not displayed       Results from last 7 days   Lab Units 01/28/20  1755   AST U/L 55*   ALT U/L 49   ALK PHOS U/L 43*   TOTAL PROTEIN g/dL 5 6*   ALBUMIN g/dL 3 0*   TOTAL BILIRUBIN mg/dL 1 09*     Results from last 7 days   Lab Units 02/03/20  0533 02/03/20  0009 02/02/20  1754 02/02/20  1244 02/02/20  0615 02/02/20  0205 02/02/20  0020 02/01/20  1747 02/01/20  1217 02/01/20  0608   POC GLUCOSE mg/dl 175* 202* 197* 215* 178* 225* 281* 234* 230* 199*     Results from last 7 days   Lab Units 02/03/20  0447 02/02/20  0430 02/01/20  2304 02/01/20  0825 01/31/20  0542 01/30/20  0536 01/29/20  0432 01/28/20  1755 01/28/20  1350   GLUCOSE RANDOM mg/dL 179* 213* 336* 224* 171* 135 132 132 140      Results from last 7 days   Lab Units 02/03/20  0447 02/02/20  0431 02/01/20  2338 01/30/20  0536 01/30/20  0327   PH ART  7 434 7 424 7 300* 7 406 7 386   PCO2 ART mm Hg 38 0 39 8 44 1* 36 7 40 0   PO2 ART mm Hg 110 2 73 4* 113 9 168 7* 60 5*   HCO3 ART mmol/L 24 9 25 5 21 2* 22 5 23 5   BASE EXC ART mmol/L 0 7 1 0 -5 0 -1 8 -1 4   O2 CONTENT ART mL/dL 14 8* 14 6* 15 3* 16 9 15 4*   O2 HGB, ARTERIAL % 97 2* 94 3 97 0 98 2* 90 3*   ABG SOURCE  Line, Arterial Line, Arterial Line, Arterial Line, Arterial Line, Arterial   NON VENT TYPE HFNC   --   --   --  HFNC Flow HFNC Flow   HFNC FLOW LPM   --   --   --  50 50     Results from last 7 days   Lab Units 02/02/20  0022   PH DEEP  7 311   PCO2 DEEP mm Hg 54 6*   PO2 DEEP mm Hg 44 1   HCO3 DEEP mmol/L 26 9   BASE EXC DEEP mmol/L 0 0   O2 CONTENT DEEP ml/dL 11 5   O2 HGB, VENOUS % 74 4     Results from last 7 days   Lab Units 02/01/20  2209 01/28/20  1241 01/28/20  1125   PH, DEEP I-STAT  7 071*  --   --    PCO2, DEEP ISTAT mm HG >103 0*  --   --    PO2, DEEP ISTAT mm HG 60 0*  --   --    I STAT BASE EXC mmol/L  --  -5* -3*   I STAT O2 SAT %  --  98* 97*   ISTAT PH ART   --  7 290* 7 310*   I STAT ART PCO2 mm HG  --  45 1* 45 8*   I STAT ART PO2 mm HG  --  123 0 101 0   I STAT ART HCO3 mmol/L  --  21 6* 23 1     Results from last 7 days   Lab Units 02/03/20  0004 02/02/20  1617 02/02/20  1251   TROPONIN I ng/mL 0 23* 0 38* 0 57*     Results from last 7 days   Lab Units 02/02/20  0431 02/01/20  2304 01/28/20  1755   LACTIC ACID mmol/L 1 1 6 7* 0 7     Results from last 7 days   Lab Units 02/02/20  0023 02/02/20  0021 01/30/20  0021   BLOOD CULTURE  No Growth at 24 hrs  No Growth at 24 hrs   --    GRAM STAIN RESULT   --   --  1+ Polys  No bacteria seen  2+ Polys  No bacteria seen   BODY FLUID CULTURE, STERILE   --   --  No growth  No growth     Results from last 7 days   Lab Units 01/30/20  0022   TOTAL COUNTED  100  100   WBC FLUID /ul 4,623  4,917     Vital Signs: Blood pressure 111/67, pulse 74, temperature 97 9 °F (36 6 °C), resp  rate 16, height 5' 10" (1 778 m), weight 87 3 kg (192 lb 7 4 oz), SpO2 100 %  ,Body mass index is 27 62 kg/m²      Medications:   Scheduled Medications:  Medications:  acetaminophen 975 mg Per J Tube Q6H [address]   cefepime 2,000 mg Intravenous Q12H   chlorhexidine 15 mL Swish & Spit Q12H TANNER   enoxaparin 40 mg Subcutaneous Q24H TANNER   fluconazole 200 mg Intravenous Q24H   gabapentin 100 mg Per J Tube TID   insulin lispro 1-5 Units Subcutaneous Q6H [place]ANNER   iohexol 50 mL Oral 90 min pre-procedure   metroNIDAZOLE 500 mg Intravenous Q8H   omeprazole (PRILOSEC) suspension 2 mg/mL 20 mg Per J Tube Daily   Continuous IV Infusions:  dexmedetomidine 0 1-0 7 mcg/kg/hr Intravenous Titrated   fentaNYL 75 mcg/hr Intravenous Continuous   norepinephrine 1-30 mcg/min Intravenous Titrated   PRN Meds:  albuterol 2 5 mg Nebulization Q6H PRN   fentanyl citrate (PF) 25 mcg Intravenous Q2H PRN   methocarbamol 500 mg Per J Tube Q6H PRN   ondansetron 4 mg Intravenous Q6H PRN       Discharge Plan: [facility]    Network Utilization Review Department  [email or web address]  ATTENTION: Please call with any questions or concerns to [phone] and carefully listen to the prompts so that you are directed to the right person  All voicemails are confidential   Payam Jose all requests for admission clinical reviews, approved or denied determinations and any other requests to dedicated fax number below belonging to the campus where the patient is receiving treatment   List of dedicated fax numbers for the Facilities:  [address] DENIALS (Administrative/Medical Necessity) [phone]   [address] (Maternity/NICU/Pediatrics) [phone]   Dawn Forrest [phone]   Scooby Grooms [phone]   Phyllis Calumet [phone]   Mertha Finger [phone]   [facility] [address] [phone]   [facility]  [phone]   [address] [facility]  [address] [address] [address] [facility] [phone]

## 2020-02-03 NOTE — PROGRESS NOTES
Progress Note - Thoracic Surgery   Main Hidden 71 y o  male MRN: [id number]  Unit/Bed#: [facility] 416-01 Encounter: [id number]    Assessment:  70 yo M s/p transhiatal esophagectomy 1/28 with routine post op course until code blue 2/1  Recurrent pleural effusions drained yesterday, decreasing pressor requirements post procedure    Levo 3  CT L- 3390  CT R- 2950 - thicker yellow/white this AM?    Plan:  Continue CTs to suction, monitor output  Send CT fluid for triglycerides  May need further albumin replacements for output from CT, midline  Vent wean today s/p CT placement  Continue TF, advance to goal  Swallow studies / cord injection deferred while intubated  Continue broad spectrum antibiotics  Abdominal binder/ ABDs to midline abdomen - drainage slowing    Subjective/Objective   Chief Complaint:     Subjective: S/P b/l CT placement last night, nearly 3 L out of each  Pressors down to 3 of levo  Objective:     Blood pressure 101/52, pulse 82, temperature (!) 101 8 °F (38 8 °C), resp  rate 17, height 5' 10" (1 778 m), weight 81 8 kg (180 lb 5 4 oz), SpO2 100 %  ,Body mass index is 25 88 kg/m²  Intake/Output Summary (Last 24 hours) at 2/3/2020 0626  Last data filed at 2/3/2020 0600  Gross per 24 hour   Intake 3529 21 ml   Output 8165 ml   Net -4635 79 ml       Invasive Devices     Central Venous Catheter Line            Port A Cath 09/26/19 Left Chest 129 days    CVC Central Lines 02/02/20 Triple 16cm 1 day          Peripheral Intravenous Line            Peripheral IV 02/01/20 Left Antecubital 1 day          Arterial Line            Arterial Line 02/01/20 Right Radial 1 day          Drain            Gastrostomy/Enterostomy Jejunostomy 14 Fr   days    Open Drain Anterior; Left Neck 5 days    Urethral Catheter Temperature probe 16 Fr  2 days    NG/OG/Enteral Tube Orogastric Center mouth 1 day    Chest Tube 1 Left Pleural less than 1 day    Chest Tube 3 Posterior Pleural less than 1 day          Airway ETT  Hi-Lo; Cuffed 8 mm 1 day              Physical Exam:   Gen: A&O, NAD  Cardio: RRR  Lungs: CTAB  L CT serosang, R CT serosang in canister now yellow output  No AL in either, to suction  Abd: Soft, non distended, non tender   Light pink fluid in midline dressing, but staples intact      Lab, Imaging and other studies:  CBC:   Lab Results   Component Value Date    WBC 6 59 02/03/2020    HGB 9 7 (L) 02/03/2020    HCT 32 2 (L) 02/03/2020    MCV 86 02/03/2020     02/03/2020    MCH 25 9 (L) 02/03/2020    MCHC 30 1 (L) 02/03/2020    RDW 17 3 (H) 02/03/2020    MPV 9 2 02/03/2020    NRBC 0 02/03/2020   , CMP:   Lab Results   Component Value Date    SODIUM 145 02/03/2020    K 4 0 02/03/2020     (H) 02/03/2020    CO2 27 02/03/2020    BUN 45 (H) 02/03/2020    CREATININE 1 28 02/03/2020    CALCIUM 8 4 02/03/2020    EGFR 58 02/03/2020   , Coagulation: No results found for: PT, INR, APTT  VTE Pharmacologic Prophylaxis: Heparin  VTE Mechanical Prophylaxis: sequential compression device

## 2020-02-03 NOTE — RESPIRATORY THERAPY NOTE
RT Ventilator Management Note  Vani Cason 71 y o  male MRN: [id number]  Unit/Bed#: [facility] 416-01 Encounter: [id number]      Daily Screen     No data found in the last 10 encounters  Physical Exam:   Assessment Type: (P) Assess only  General Appearance: Awake  Respiratory Pattern: Assisted, Symmetrical  Chest Assessment: Chest expansion symmetrical  Bilateral Breath Sounds: (P) Diminished(sl coarse)  Cough: None      Resp Comments: (P) Pt  doing well on A/C  FiO2 decreased to 40%  No other changes throughout the night

## 2020-02-03 NOTE — PROGRESS NOTES
Vancomycin IV Pharmacy-to-Dose Consultation    Candelaria January is a 71 y o  male who is currently receiving Vancomycin IV with management by the Pharmacy Consult service  Relevant clinical data and objective / subjective history reviewed  Vancomycin Assessment:  Indication: Pneumonia    Status: critically ill  Micro:   1/30 Body fluid culture: no growth  2/2 Blood cultures x 2: no growth at 24 hours  2/3 MRSA culture: in process  Renal Function: Scr 1 28 (down from 2 31); CrCl 55 mL/min; UOP 0 7 mL/kg/hr  Potential Nephrotoxicity Factors (select ALL that apply):  Medications: vasopressors, IV contrast  Patient-Factors: advanced age, hypotension, renal dysfunction  Days of Therapy: 1  Current Dose: 1250 mg IV q12h (not started)  Goal Trough: 15-20 (appropriate for most indications)   Goal AUC(24h): 400-600  Last Level: n/a  Pharmacokinetic Analysis: Calculated elimination half life of 15 hours; predicted level 17 hours after bolus is 16 8    Vancomycin Plan:  New Dosing: change to 2000 mg IV x 1 followed by 750 mg IV q12h (next dose: STAT)  Predicted Trough / AUC: 18 3 / 575  Next Level: plan for trough prior to the 4th dose on 2/5 at 0530  Renal Function Monitoring: Daily BMP and [address] will continue to follow closely for s/sx of nephrotoxicity, infusion reactions and appropriateness of therapy  BMP and CBC will be ordered per protocol  We will continue to follow the patients culture results and clinical progress daily       Rossi Fajardo, PharmD, [address] [facility] [place] Clinical Pharmacist  [phone]

## 2020-02-03 NOTE — PROCEDURES
Chest Tube Insertion  Date/Time: 2/2/2020 9:33 PM  Performed by: Gladys Astorga MD  Authorized by: Gladys Astorga MD     Patient location:  Bedside  Other Assisting Provider: Yes (comment)    Consent:     Consent obtained:  Written    Risks discussed:  Bleeding, damage to surrounding structures and infection    Alternatives discussed:  No treatment  Universal protocol:     Radiology Images displayed and confirmed  If images not available, report reviewed: yes      Patient identity confirmed:  Provided demographic data and arm band  Pre-procedure details:     Skin preparation:  ChloraPrep    Preparation: Patient was prepped and draped in the usual sterile fashion    Indications:     Indications: pleural effusion    Sedation:     Sedation type: Anxiolysis  Anesthesia (see MAR for exact dosages): Anesthesia method:  Local infiltration    Local anesthetic:  Lidocaine 1% w/o epi  Procedure details:     Placement location:  Lateral    Laterality:  Right    Approach:  Percutaneous    Scalpel size:  11    Thal-Quick Chest Tube Kit:  20FR    Tube connected to:  Suction    Drainage characteristics:  Serosanguinous and yellow    Suture material:  0 silk    Dressing:  4x4 sterile gauze  Post-procedure details:     Post-insertion x-ray findings: tube in good position      Patient tolerance of procedure: Tolerated well, no immediate complications        Upon review of CXR after previous CT placement, it appeared that the right CT may have been placed in the fissure as it had not had any output and there still appeared to be a large effusion on the right  Therefore, a new right sided Ct was placed, with good result and immediate drainage of 2400 cc serosang fluid  Prior right sided CT removed  Will check CXR and continue both tubes to suction      Gladys Astorga MD  General Surgery PGY4

## 2020-02-03 NOTE — OCCUPATIONAL THERAPY NOTE
Occupational Therapy Cancellation        Patient Name: Fred Aquino  UTQJY'G Date: 2/3/2020    Chart reviewed  Pt remains intubated/sedated  OT will continue to follow to see as able      Ricardo Xiao MS, OTR/L

## 2020-02-03 NOTE — SPEECH THERAPY NOTE
Records reviewed  Plan: SLP to sign off at this time  Please re-order Video Barium Swallow to follow Barium Esophagram (r/o leak) when indicated

## 2020-02-03 NOTE — PHYSICAL THERAPY NOTE
Physical Therapy Cancellation Note    Chart reviewed; noted pt is currently intubated; will hold PT/mobilization at this time; will follow as clinical course allows      Rajni Marte PT

## 2020-02-04 ENCOUNTER — APPOINTMENT (INPATIENT)
Dept: RADIOLOGY | Facility: HOSPITAL | Age: 65
DRG: 326 | End: 2020-02-04
Payer: COMMERCIAL

## 2020-02-04 LAB
ANION GAP SERPL CALCULATED.3IONS-SCNC: 5 MMOL/L (ref 4–13)
ATRIAL RATE: 122 BPM
BUN SERPL-MCNC: 34 MG/DL (ref 5–25)
CALCIUM SERPL-MCNC: 7.9 MG/DL (ref 8.3–10.1)
CHLORIDE SERPL-SCNC: 114 MMOL/L (ref 100–108)
CO2 SERPL-SCNC: 25 MMOL/L (ref 21–32)
CREAT SERPL-MCNC: 1.05 MG/DL (ref 0.6–1.3)
ERYTHROCYTE [DISTWIDTH] IN BLOOD BY AUTOMATED COUNT: 17.2 % (ref 11.6–15.1)
GFR SERPL CREATININE-BSD FRML MDRD: 74 ML/MIN/1.73SQ M
GLUCOSE SERPL-MCNC: 248 MG/DL (ref 65–140)
GLUCOSE SERPL-MCNC: 260 MG/DL (ref 65–140)
GLUCOSE SERPL-MCNC: 263 MG/DL (ref 65–140)
GLUCOSE SERPL-MCNC: 281 MG/DL (ref 65–140)
GLUCOSE SERPL-MCNC: 306 MG/DL (ref 65–140)
HCT VFR BLD AUTO: 33.1 % (ref 36.5–49.3)
HGB BLD-MCNC: 9.9 G/DL (ref 12–17)
MAGNESIUM SERPL-MCNC: 2.4 MG/DL (ref 1.6–2.6)
MCH RBC QN AUTO: 26.2 PG (ref 26.8–34.3)
MCHC RBC AUTO-ENTMCNC: 29.9 G/DL (ref 31.4–37.4)
MCV RBC AUTO: 88 FL (ref 82–98)
MRSA NOSE QL CULT: NORMAL
PHOSPHATE SERPL-MCNC: 2.1 MG/DL (ref 2.3–4.1)
PLATELET # BLD AUTO: 229 THOUSANDS/UL (ref 149–390)
PMV BLD AUTO: 9.7 FL (ref 8.9–12.7)
POTASSIUM SERPL-SCNC: 3.7 MMOL/L (ref 3.5–5.3)
QRS AXIS: 44 DEGREES
QRSD INTERVAL: 79 MS
QT INTERVAL: 350 MS
QTC INTERVAL: 499 MS
RBC # BLD AUTO: 3.78 MILLION/UL (ref 3.88–5.62)
SODIUM SERPL-SCNC: 144 MMOL/L (ref 136–145)
T WAVE AXIS: -37 DEGREES
VENTRICULAR RATE: 122 BPM
WBC # BLD AUTO: 8.46 THOUSAND/UL (ref 4.31–10.16)

## 2020-02-04 PROCEDURE — 84100 ASSAY OF PHOSPHORUS: CPT | Performed by: SURGERY

## 2020-02-04 PROCEDURE — 93010 ELECTROCARDIOGRAM REPORT: CPT | Performed by: INTERNAL MEDICINE

## 2020-02-04 PROCEDURE — 80048 BASIC METABOLIC PNL TOTAL CA: CPT | Performed by: SURGERY

## 2020-02-04 PROCEDURE — 82948 REAGENT STRIP/BLOOD GLUCOSE: CPT

## 2020-02-04 PROCEDURE — 85027 COMPLETE CBC AUTOMATED: CPT | Performed by: SURGERY

## 2020-02-04 PROCEDURE — 94760 N-INVAS EAR/PLS OXIMETRY 1: CPT

## 2020-02-04 PROCEDURE — 99233 SBSQ HOSP IP/OBS HIGH 50: CPT | Performed by: SURGERY

## 2020-02-04 PROCEDURE — 71045 X-RAY EXAM CHEST 1 VIEW: CPT

## 2020-02-04 PROCEDURE — 83735 ASSAY OF MAGNESIUM: CPT | Performed by: SURGERY

## 2020-02-04 PROCEDURE — 99024 POSTOP FOLLOW-UP VISIT: CPT | Performed by: THORACIC SURGERY (CARDIOTHORACIC VASCULAR SURGERY)

## 2020-02-04 RX ORDER — OXYCODONE HCL 5 MG/5 ML
2.5 SOLUTION, ORAL ORAL EVERY 4 HOURS PRN
Status: DISCONTINUED | OUTPATIENT
Start: 2020-02-04 | End: 2020-02-08

## 2020-02-04 RX ORDER — OXYCODONE HCL 5 MG/5 ML
5 SOLUTION, ORAL ORAL EVERY 4 HOURS PRN
Status: DISCONTINUED | OUTPATIENT
Start: 2020-02-04 | End: 2020-02-08

## 2020-02-04 RX ADMIN — ACETAMINOPHEN 975 MG: 650 SUSPENSION ORAL at 00:50

## 2020-02-04 RX ADMIN — FLUCONAZOLE 200 MG: 2 INJECTION, SOLUTION INTRAVENOUS at 02:29

## 2020-02-04 RX ADMIN — INSULIN LISPRO 2 UNITS: 100 INJECTION, SOLUTION INTRAVENOUS; SUBCUTANEOUS at 13:59

## 2020-02-04 RX ADMIN — ACETAMINOPHEN 975 MG: 650 SUSPENSION ORAL at 18:39

## 2020-02-04 RX ADMIN — INSULIN LISPRO 2 UNITS: 100 INJECTION, SOLUTION INTRAVENOUS; SUBCUTANEOUS at 06:42

## 2020-02-04 RX ADMIN — METHOCARBAMOL TABLETS 500 MG: 500 TABLET, COATED ORAL at 00:49

## 2020-02-04 RX ADMIN — ENOXAPARIN SODIUM 40 MG: 40 INJECTION SUBCUTANEOUS at 09:19

## 2020-02-04 RX ADMIN — INSULIN LISPRO 3 UNITS: 100 INJECTION, SOLUTION INTRAVENOUS; SUBCUTANEOUS at 18:39

## 2020-02-04 RX ADMIN — METRONIDAZOLE 500 MG: 500 INJECTION, SOLUTION INTRAVENOUS at 16:43

## 2020-02-04 RX ADMIN — INSULIN LISPRO 2 UNITS: 100 INJECTION, SOLUTION INTRAVENOUS; SUBCUTANEOUS at 01:01

## 2020-02-04 RX ADMIN — CEFEPIME HYDROCHLORIDE 2000 MG: 2 INJECTION, POWDER, FOR SOLUTION INTRAVENOUS at 13:00

## 2020-02-04 RX ADMIN — CEFEPIME HYDROCHLORIDE 2000 MG: 2 INJECTION, POWDER, FOR SOLUTION INTRAVENOUS at 01:06

## 2020-02-04 RX ADMIN — GABAPENTIN 100 MG: 250 SUSPENSION ORAL at 21:07

## 2020-02-04 RX ADMIN — ACETAMINOPHEN 975 MG: 650 SUSPENSION ORAL at 13:59

## 2020-02-04 RX ADMIN — VANCOMYCIN HYDROCHLORIDE 750 MG: 1 INJECTION, POWDER, LYOPHILIZED, FOR SOLUTION INTRAVENOUS at 06:33

## 2020-02-04 RX ADMIN — CHLORHEXIDINE GLUCONATE 0.12% ORAL RINSE 15 ML: 1.2 LIQUID ORAL at 21:07

## 2020-02-04 RX ADMIN — MELATONIN 3 MG: 3 TAB ORAL at 21:07

## 2020-02-04 RX ADMIN — ACETAMINOPHEN 975 MG: 650 SUSPENSION ORAL at 06:39

## 2020-02-04 RX ADMIN — GABAPENTIN 100 MG: 250 SUSPENSION ORAL at 09:20

## 2020-02-04 RX ADMIN — METRONIDAZOLE 500 MG: 500 INJECTION, SOLUTION INTRAVENOUS at 00:55

## 2020-02-04 RX ADMIN — NOREPINEPHRINE BITARTRATE 5 MCG/MIN: 1 INJECTION INTRAVENOUS at 07:35

## 2020-02-04 RX ADMIN — POTASSIUM & SODIUM PHOSPHATES POWDER PACK 280-160-250 MG 1 PACKET: 280-160-250 PACK at 16:43

## 2020-02-04 RX ADMIN — CHLORHEXIDINE GLUCONATE 0.12% ORAL RINSE 15 ML: 1.2 LIQUID ORAL at 09:19

## 2020-02-04 RX ADMIN — Medication 20 MG: at 09:20

## 2020-02-04 RX ADMIN — METRONIDAZOLE 500 MG: 500 INJECTION, SOLUTION INTRAVENOUS at 09:20

## 2020-02-04 RX ADMIN — GABAPENTIN 100 MG: 250 SUSPENSION ORAL at 16:43

## 2020-02-04 RX ADMIN — POTASSIUM & SODIUM PHOSPHATES POWDER PACK 280-160-250 MG 1 PACKET: 280-160-250 PACK at 09:20

## 2020-02-04 NOTE — PROGRESS NOTES
Vancomycin IV Pharmacy-to-Dose Consultation    Joseph Ledesma is a 71 y o  male who is currently receiving Vancomycin IV with management by the Pharmacy Consult service for the treatment of Pneumonia    Assessment/Plan:    The patient's chart was reviewed  Renal function is stable  There are no signs or symptoms of nephrotoxicity and/or infusion reactions documented  The following nephrotoxicity factors are present:  Medications: vasopressors  Patient-Factors: advanced age, hypotension, renal dysfunction    Based on todays assessment, will continue current vancomycin (Day # 2) dosing of 750 mg IV q12h, with a plan for trough to be drawn at 0600 on 2/5 (prior to the 4th dose)  Pharmacy will continue to follow closely for s/sx of nephrotoxicity, infusion reactions and appropriateness of therapy  BMP and CBC will be ordered per protocol  We will continue to follow the patients culture results and clinical progress daily      Molly Galeana, PharmD, 4 [facility] [place] Clinical Pharmacist  [phone]

## 2020-02-04 NOTE — PROGRESS NOTES
Progress Note - Javon Elliott 71 y o  male MRN: [id number]    Unit/Bed#: [facility] 416-01 Encounter: [id number]      Assessment:  70 yo M s/p transhiatal esophagectomy 1/28 with routine post op course until code blue 2/1  Recurrent pleural effusions drained yesterday, decreasing pressor requirements post procedure    Extubated 2/3  Afebrile since 2/3 @ 1600  VSS  Left  cc out, chylous  Right CT 2L out, chylous  - Both on suction without airleak      Plan:  NPO  Continue J tube feeds, nutrihep, apprec nutrition recs  Continue b/l CT to sxn and trend output  Continue abx  Abdominal binder monitor midline drainage  Do not open midline incision  apprec critical care recs    Subjective:   No acute events overnight  Patient was extubated around 6PM and was weaned on NC throughout the night  He is resting comfortably in bed at this time with no complaints of chest or abdominal pain    Objective:     Vitals: Blood pressure 110/58, pulse 82, temperature 98 6 °F (37 °C), temperature source Probe, resp  rate 21, height 5' 10" (1 778 m), weight 81 8 kg (180 lb 5 4 oz), SpO2 98 %  ,Body mass index is 25 88 kg/m²  Intake/Output Summary (Last 24 hours) at 2/4/2020 0558  Last data filed at 2/4/2020 0400  Gross per 24 hour   Intake 3176 38 ml   Output 4290 ml   Net -1113 62 ml     Physical Exam  General: NAD  HEENT: NC/AT  MMM  Cv: RRR     Lungs: normal effort  Ab: Soft, mild tender/ND, midline incision is c/d/i, dressing over the incision with minimal drainage  Ex: no CCE  Neuro: AAOx3    Scheduled Meds:    Current Facility-Administered Medications:  acetaminophen 975 mg Per J Tube Q6H [address] [place]    albuterol 2 5 mg Nebulization Q6H PRN Henrique Neo [place]    cefepime 2,000 mg Intravenous Q12H Henrique Neo [place] Last Rate: Stopped (02/04/20 0200)   chlorhexidine 15 mL Swish & Spit Q12H [address] [place]    dexmedetomidine 0 1-0 7 mcg/kg/hr Intravenous Titrated Leatha Pérez MD Last Rate: Stopped (02/03/20 1800)   enoxaparin 40 mg Subcutaneous Q24H [facility] Vesna Erin [place]    fentaNYL 75 mcg/hr Intravenous Continuous [place]na Erin [place] Last Rate: Stopped (02/03/20 1800)   fentanyl citrate (PF) 25 mcg Intravenous Q2H PRN Vesna Erin [place]    fluconazole 200 mg Intravenous Q24H [place]na Erin [place] Last Rate: Stopped (02/04/20 0400)   gabapentin 100 mg Per J Tube TID [place]    insulin lispro 1-5 Units Subcutaneous Q6H [facility] Vesna Erin [place]    iohexol 50 mL Oral 90 min pre-procedure Tressia Pallas, MD    melatonin 3 mg Oral HS Angel Luis Franz PA-C    methocarbamol 500 mg Per J Tube Q6H PRN Vesna Erin [place]    metroNIDAZOLE 500 mg Intravenous Q8H [place] Erin [place] Last Rate: Stopped (02/04/20 0200)   norepinephrine 1-30 mcg/min Intravenous Titrated Vesna Erin [place] Last Rate: 3 mcg/min (02/04/20 0409)   omeprazole (PRILOSEC) suspension 2 mg/mL 20 mg Per J Tube Daily Vesna Erin [place]    ondansetron 4 mg Intravenous Q6H PRN Vesna Erin [place]    vancomycin 750 mg Intravenous Q12H Susan Alexander MD      Continuous Infusions:    dexmedetomidine 0 1-0 7 mcg/kg/hr Last Rate: Stopped (02/03/20 1800)   fentaNYL 75 mcg/hr Last Rate: Stopped (02/03/20 1800)   norepinephrine 1-30 mcg/min Last Rate: 3 mcg/min (02/04/20 0409)     PRN Meds: albuterol    fentanyl citrate (PF)    methocarbamol    ondansetron      Invasive Devices     Central Venous Catheter Line            Port A Cath 09/26/19 Left Chest 130 days    CVC Central Lines 02/02/20 Triple 16cm 2 days          Peripheral Intravenous Line            Peripheral IV 02/01/20 Left Antecubital 2 days          Arterial Line            Arterial Line 02/01/20 Right Radial 2 days          Drain            Gastrostomy/Enterostomy Jejunostomy 14 Fr   days    Open Drain Anterior; Left Neck 6 days    Urethral Catheter Temperature probe 16 Fr  3 days    Chest Tube 1 Left Pleural 1 day    Chest Tube 3 Posterior Pleural 1 day Lab, Imaging and other studies: I have personally reviewed pertinent reports       Results Reviewed     None          VTE Pharmacologic Prophylaxis: lovenox  VTE Mechanical Prophylaxis: sequential compression device

## 2020-02-04 NOTE — PROGRESS NOTES
Daily Progress Note - Critical Care   [place] Ford 71 y o  male MRN: [id number]  Unit/Bed#: [facility] 416-01 Encounter: [id number]        ----------------------------------------------------------------------------------------  HPI/24hr events: Extubated yesterday 2/3 to nasal canula without complications  Vancomycin added for MRSA coverage while nasal swab pending  Tube feeds changed to Nutri-Hep to decreased TGs 2/2 chylothorax, at goal  Fever broke yesterday and he has remained afebrile      ---------------------------------------------------------------------------------------  SUBJECTIVE  Pt reports he feels much better since being extubated, but reports he had a poor night of sleep because he was up coughing and had multiple bowel movements as well, all formed  Review of Systems   Constitutional: Negative  Negative for chills and fever  HENT: Positive for sore throat  Eyes: Negative  Respiratory: Positive for cough  Negative for choking, chest tightness, shortness of breath, wheezing and stridor  Cardiovascular: Negative for chest pain, palpitations and leg swelling  Gastrointestinal: Negative for abdominal pain, constipation, diarrhea, nausea and vomiting  Endocrine: Negative  Genitourinary: Negative  Musculoskeletal: Negative  Neurological: Negative for dizziness, syncope, weakness, light-headedness, numbness and headaches  Hematological: Negative  Psychiatric/Behavioral: Negative        Review of systems was reviewed and negative unless stated above in HPI/24-hour events   ---------------------------------------------------------------------------------------  Assessment and Plan:    Plan:    Neuro:  Diagnosis: Analgesia   Plan:  · Management of PAD  · Analgesia  · Tylenol 975 q6H, oxycodone 2 5-5 mg q6H PRN  · Delirium monitoring/management  · Regulate Sleep-wake cycle/CAM-ICU daily  · Melatonin 3 mg q HS   · Serial Neuro Exams    Cardiac  Diagnosis: Bradycardic hypoxic arrest 2/1 with ROSC  Plan:  · Cardiac infusions: Levophed, 3 mcg/min  · Wean as able, MAP goal > 65  · Rhythm: NSR  · Follow rhythm on telemetry  · Holding home metoprolol while on pressors     Pulmonary  Diagnosis: Acute hypoxic respiratory failure, BL chylothorax with CT both to suction  Plan:  · Extubated 2/2 to NC, on 2L NC at 99%  · Vancomycin, cefepime and flagyl for aspiration PNA (start 2/01)   · Triglycerides of pleural fluid diagnosed chylothorax   · CT both to suction, continuing to drain chylous fluid BL,    · R CT 2L/24 hrs, 100-200 cc/hr, -AL  · L  cc/24 hrs, -AL    · Will need ENT evaluation before speech/swallow for vocal fold injection medialization for L vocal fold immobility   · Will reconsult now extubated     Gastrointestinal  Diagnosis: s/p Transhiatal esophagectomy 1/28, J-tube  Plan:  · Thoracics and surgery recommendations appreciated  · BL R>L chylothorax with BL CT to suction   · Tube feeds changed to nutri-hep to limit lipids   ·   FEN  · Nutrition/diet plan: Nutri-hep to decrease lipids, at goal at 65  · Replete electrolytes with goals: K >4 0, Mag >2 0, and Phos >3 0    Genitourinary  Diagnosis: MARTHA, improving   Plan:  · Indwelling Doshi present: yes   · Trend UOP and BUN/creat  · Strict I and O  · Cr 1 05 today    Infectious Diease  Diagnosis: Aspiration PNA  Plan:  · Abx ordered: Cefepime, Metronidazole and Vancomycin and fluconazole   · Vancomycin started 2/3 for MRSA coverage   · MRSA swab pending   · Has remained afebrile since last evening   · Blood cultures negative x 48 hrs   · Trend temps and WBC    Heme:   Plan:  · Trend hgb and plts  · Transfuse as needed for goal hgb >7 or symptomatic anemia    Endo:   Diagnosis: Type 2 DM  Plan:  · Increase ISS to algorithm 2   · Goal -180mg/dL    MSK/Skin:  · PT/OT  · Turn and position patient Q2 hours  · Off load pressure points  · Allevyn preventative per protocol    Family:  · Family updated within 24 hours: yes Lines:  R IJ triple lumen   Doshi   J-tube      Disposition: Continue Critical Care   Code Status: Level 1 - Full Code  ---------------------------------------------------------------------------------------  ICU CORE MEASURES    Prophylaxis   VTE Pharmacologic Prophylaxis: Enoxaparin (Lovenox)  VTE Mechanical Prophylaxis: sequential compression device  Stress Ulcer Prophylaxis: Omeprazole PO    ABCDE Protocol (if indicated)  Plan to perform spontaneous awakening trial today? Not applicable  Plan to perform spontaneous breathing trial today? Not applicable  Obvious barriers to extubation? Not applicable  CAM-ICU: Negative    Invasive Devices Review  Invasive Devices     Central Venous Catheter Line            Port A Cath 19 Left Chest 130 days    CVC Central Lines 20 Triple 16cm 2 days          Peripheral Intravenous Line            Peripheral IV 20 Left Antecubital 2 days          Arterial Line            Arterial Line 20 Right Radial 2 days          Drain            Gastrostomy/Enterostomy Jejunostomy 14 Fr   days    Open Drain Anterior; Left Neck 6 days    Urethral Catheter Temperature probe 16 Fr  3 days    Chest Tube 1 Left Pleural 1 day    Chest Tube 3 Posterior Pleural 1 day              Can any invasive devices be discontinued today?  No  [place] for accurate I&O, TLC for levophed, will d/c when off   ---------------------------------------------------------------------------------------  OBJECTIVE    Vitals   Vitals:    20 0300 20 0400 20 0500 20 0600   BP: 115/62 110/58 115/59 113/62   BP Location:  Left arm     Pulse: 88 82 88 92   Resp:  20 20   Temp: 99 °F (37 2 °C) 98 6 °F (37 °C) 99 °F (37 2 °C) 99 °F (37 2 °C)   TempSrc:  Probe     SpO2: 98% 98% 100% 99%   Weight:       Height:         Temp (24hrs), Av 1 °F (37 8 °C), Min:98 6 °F (37 °C), Max:101 5 °F (38 6 °C)  Current: Temperature: 99 °F (37 2 °C)  HR: 90  BP: 120/47 (68)  RR: 22  SpO2: 99% 2L NC     Physical Exam   Constitutional: He is oriented to person, place, and time  He appears well-developed and well-nourished  No distress  HENT:   Head: Normocephalic and atraumatic  Mouth/Throat: Oropharynx is clear and moist    Hoarse, low voice   Eyes: Pupils are equal, round, and reactive to light  Neck: Normal range of motion  Neck supple  Cardiovascular: Normal rate, regular rhythm, normal heart sounds and intact distal pulses  Exam reveals no gallop and no friction rub  No murmur heard  Pulmonary/Chest: Effort normal  No respiratory distress  He has no wheezes  He has no rhonchi  He has no rales  Mildly diminished breath sounds left base  On 2 L nasal cannula   Abdominal: Soft  He exhibits distension  There is no tenderness  There is no guarding  Abdomen soft but distended  Midline incision with dressing, C/D/I  J-tube dressing C/D/I   Genitourinary:   Genitourinary Comments: Doshi in place   Musculoskeletal: Normal range of motion  He exhibits no edema  Moves all extremities   Neurological: He is alert and oriented to person, place, and time  He has normal strength  No sensory deficit  Skin: Skin is warm and dry  Capillary refill takes less than 2 seconds  He is not diaphoretic  Midline incision with staples and dressing over an abdominal binder with minimal drainage    C/D/I         Invasive/non-invasive ventilation settings   Respiratory    Lab Data (Last 4 hours)    None         O2/Vent Data (Last 4 hours)    None                Laboratory and Diagnostics:  Results from last 7 days   Lab Units 02/04/20  0407 02/03/20  0447 02/02/20  1251 02/02/20  0430 02/01/20  2304 02/01/20  2209 02/01/20  0601 01/31/20  0542 01/30/20  0536 01/29/20  0432 01/28/20  1755   WBC Thousand/uL 8 46 6 59  --  11 78* 7 56  --  10 80* 11 72* 8 61 6 47 7 10   HEMOGLOBIN g/dL 9 9* 9 7* 10 0* 10 0* 11 5*  --  13 9 12 6 11 2* 10 8* 11 4*   I STAT HEMOGLOBIN g/dl  --   --   --   --   -- 12 2  --   --   --   --   --    HEMATOCRIT % 33 1* 32 2*  --  31 8* 38 1  --  45 5 40 8 36 8 34 8* 36 3*   HEMATOCRIT, ISTAT %  --   --   --   --   --  36*  --   --   --   --   --    PLATELETS Thousands/uL 229 211  --  257 347  --  398* 192 142* 109* 100*   NEUTROS PCT %  --  78*  --  84*  --   --  87* 85* 85* 85* 87*   MONOS PCT %  --  9  --  8  --   --  6 6 7 7 7     Results from last 7 days   Lab Units 02/04/20 0407 02/03/20  0447 02/02/20  0430 02/01/20  2304 02/01/20  0825 01/31/20  0542 01/30/20  0536  01/28/20  1755   SODIUM mmol/L 144 145 140 138 136 138 139   < > 140   POTASSIUM mmol/L 3 7 4 0 4 0 4 2 4 4 4 1 4 0   < > 4 1   CHLORIDE mmol/L 114* 112* 106 103 105 107 109*   < > 112*   CO2 mmol/L 25 27 27 26 25 22 23   < > 21   ANION GAP mmol/L 5 6 7 9 6 9 7   < > 7   BUN mg/dL 34* 45* 57* 67* 54* 38* 25   < > 14   CREATININE mg/dL 1 05 1 28 1 63* 2 31* 1 95* 1 24 1 06   < > 0 69   CALCIUM mg/dL 7 9* 8 4 9 2 8 6 9 4 8 4 8 3   < > 7 8*   GLUCOSE RANDOM mg/dL 263* 179* 213* 336* 224* 171* 135   < > 132   ALT U/L  --   --   --   --   --   --   --   --  49   AST U/L  --   --   --   --   --   --   --   --  55*   ALK PHOS U/L  --   --   --   --   --   --   --   --  43*   ALBUMIN g/dL  --   --   --   --   --   --   --   --  3 0*   TOTAL BILIRUBIN mg/dL  --   --   --   --   --   --   --   --  1 09*    < > = values in this interval not displayed       Results from last 7 days   Lab Units 02/04/20 0407 02/03/20  0447 02/02/20  0430 02/01/20  2304 02/01/20  0825 01/31/20  0542 01/30/20  0536   MAGNESIUM mg/dL 2 4 2 8* 3 1* 3 7* 3 2* 2 8* 2 7*   PHOSPHORUS mg/dL 2 1* 3 2  --  9 2*  --  3 8 3 5           Results from last 7 days   Lab Units 02/03/20  0004 02/02/20  1617 02/02/20  1251   TROPONIN I ng/mL 0 23* 0 38* 0 57*     Results from last 7 days   Lab Units 02/02/20  0431 02/01/20  2304 01/28/20  1755   LACTIC ACID mmol/L 1 1 6 7* 0 7     ABG:  Results from last 7 days   Lab Units 02/03/20  0447   PH ART  7 434 PCO2 ART mm Hg 38 0   PO2 ART mm Hg 110 2   HCO3 ART mmol/L 24 9   BASE EXC ART mmol/L 0 7   ABG SOURCE  Line, Arterial     VBG:  Results from last 7 days   Lab Units 02/03/20  0447  02/02/20  0022   PH DEEP   --   --  7 311   PCO2 DEEP mm Hg  --   --  54 6*   PO2 DEEP mm Hg  --   --  44 1   HCO3 DEEP mmol/L  --   --  26 9   BASE EXC DEEP mmol/L  --   --  0 0   ABG SOURCE  Line, Arterial   < >  --     < > = values in this interval not displayed  Micro  Results from last 7 days   Lab Units 02/02/20  0023 02/02/20  0021 01/30/20  0021   BLOOD CULTURE  No Growth at 24 hrs  No Growth at 24 hrs   --    GRAM STAIN RESULT   --   --  1+ Polys  No bacteria seen  2+ Polys  No bacteria seen   BODY FLUID CULTURE, STERILE   --   --  No growth  No growth       Imaging: I have personally reviewed pertinent reports  and I have personally reviewed pertinent films in PACS  2/4 CXR Improved RLL opacity    Intake and Output  I/O       02/02 0701 - 02/03 0700 02/03 0701 - 02/04 0700    P  O  0 0    I V  (mL/kg) 1649 2 (20 2) 670 2 (8 2)    NG/ 920    IV Piggyback 1400 350    Feedings 240 1055    Total Intake(mL/kg) 3529 2 (43 1) 2995 2 (36 6)    Urine (mL/kg/hr) 1315 (0 7) 1590 (0 8)    Emesis/NG output 350 100    Drains 0 0    Stool 0 0    Chest Tube 6500 2250    Total Output 8165 3940    Net -4635 8 -944  8          Unmeasured Stool Occurrence 0 x 1 x        UOP: 0 8 ml/kg/hr, 66 25 mL/hr      Height and Weights   Height: 5' 10" (177 8 cm)  IBW: 73 kg  Body mass index is 25 88 kg/m²    Weight (last 2 days)     Date/Time   Weight    02/03/20 0539   81 8 (180 34)    02/03/20 0538   81 8 (180 34)    02/02/20 2115       Comment rows:    OBSERV: [place] Reji here to place another R sided CT at 02/02/20 2115 02/02/20 1928       Comment rows:    OBSERV: pt being prep'd for bedside CT placement with Dewight Coil and Lurlean Sera at 02/02/20 1928 02/02/20 0600   87 3 (849 59)                Nutrition       Diet Orders   (From admission, onward)             Start     Ordered    02/03/20 1858  Diet Enteral/Parenteral; Tube Feeding No Oral Diet; Nutri Hep-8oz; Continuous; 65; Prosource Protein Liquid - Two Packets; 150; Water; Every 4 hours  Diet effective now     Question Answer Comment   Diet Type Enteral/Parenteral    Enteral/Parenteral Tube Feeding No Oral Diet    Tube Feeding Formula: Nutri JEOVANNY-9ML    Bolus/Cyclic/Continuous Continuous    Tube Feeding Goal Rate (mL/hr): 65    Prosource Protein Liquid - No Carb Prosource Protein Liquid - Two Packets    Tube Feeding water flush (mL): 150    Water Flush type: Water    Water flush frequency: Every 4 hours    RD to adjust diet per protocol? Yes        02/03/20 1858              TF currently running at 65 ml/hr with a goal of 65 ml/hr   Formula: Nutri-Hep      Active Medications  Scheduled Meds:  Current Facility-Administered Medications:  acetaminophen 975 mg Per J Tube Q6H [facility] Oralia Zaleski [facility]    albuterol 2 5 mg Nebulization Q6H PRN Oralia Zaleski [facility]    cefepime 2,000 mg Intravenous Q12H Oralia Zaleski [facility] Last Rate: Stopped (02/04/20 0200)   chlorhexidine 15 mL Swish & Spit Q12H [facility] Oralia Zaleski [facility]    dexmedetomidine 0 1-0 7 mcg/kg/hr Intravenous Titrated Artist Bekah Mathews MD Last Rate: Stopped (02/03/20 1800)   enoxaparin 40 mg Subcutaneous Q24H [facility] Oralia Zaleski [facility]    fentaNYL 75 mcg/hr Intravenous Continuous Oralia Zaleski [facility]Me Last Rate: Stopped (02/03/20 1800)   fentanyl citrate (PF) 25 mcg Intravenous Q2H PRN Oralia Zaleski [facility]    fluconazole 200 mg Intravenous Q24H Oralia Zaleski [facility] Last Rate: Stopped (02/04/20 0400)   gabapentin 100 mg Per J Tube TID Oralia Zaleski [facility]    insulin lispro 1-5 Units Subcutaneous [facility]H [facility]    iohexol 50 mL Oral 90 min pre-procedure Armando Patel MD    melatonin 3 mg Oral HS Dawson Ordoñez PA-C    methocarbamol 500 mg Per J Tube Q6H PRN Oralia Powell [facility]    metroNIDAZOLE 500 mg Intravenous Kim Min Claude [facility] Last Rate: Stopped (02/04/20 0200)   norepinephrine 1-30 mcg/min Intravenous Titrated Sergey Flurry [facility] Last Rate: 3 mcg/min (02/04/20 0409)   omeprazole (PRILOSEC) suspension 2 mg/mL 20 mg Per J Tube Daily [place] Flurry [facility]    ondansetron 4 mg Intravenous Q6H PRN Sergey Flurry [facility]    oxyCODONE 2 5 mg Oral Q4H PRN Glena CLEMENTINA Allen    oxyCODONE 5 mg Oral Q4H PRN Glena CLEMENTINA Allen    vancomycin 750 mg Intravenous Q12H Marie Bey MD      Continuous Infusions:    dexmedetomidine 0 1-0 7 mcg/kg/hr Last Rate: Stopped (02/03/20 1800)   fentaNYL 75 mcg/hr Last Rate: Stopped (02/03/20 1800)   norepinephrine 1-30 mcg/min Last Rate: 3 mcg/min (02/04/20 0409)     PRN Meds:     albuterol 2 5 mg Q6H PRN   fentanyl citrate (PF) 25 mcg Q2H PRN   methocarbamol 500 mg Q6H PRN   ondansetron 4 mg Q6H PRN   oxyCODONE 2 5 mg Q4H PRN   oxyCODONE 5 mg Q4H PRN       Allergies   Allergies   Allergen Reactions    Penicillins Itching     ---------------------------------------------------------------------------------------  Advance Directive and Living Will:      Power of :    POLST:    ---------------------------------------------------------------------------------------  Counseling / Coordination of Care  0 minutes of critical care time     Nyla Dakins, PA-C      Portions of the record may have been created with voice recognition software  Occasional wrong word or "sound a like" substitutions may have occurred due to the inherent limitations of voice recognition software    Read the chart carefully and recognize, using context, where substitutions have occurred

## 2020-02-04 NOTE — RESPIRATORY THERAPY NOTE
resp care      02/04/20 0901   Respiratory Assessment   Assessment Type Assess only   General Appearance Alert; Awake   Respiratory Pattern Normal   Chest Assessment Chest expansion symmetrical   Bilateral Breath Sounds Diminished;Clear   Cough None   Resp Comments no prn udn tx indicated, breath sounds clear and diminished, will continue to monitor   O2 Device room air

## 2020-02-04 NOTE — PROGRESS NOTES
Progress Note - Erick Diaz 71 y o  male MRN: [id number]    Unit/Bed#: [facility] 416-01 Encounter: [id number]      Assessment:  70 yo M s/p transhiatal esophagectomy 1/28 with routine post op course until code blue 2/1  Recurrent pleural effusions drained yesterday, decreasing pressor requirements post procedure    Extubated 2/3  Afebrile since 2/3 @ 1600  VSS  B/L chest tubes (sxn, -AL)   Left  cc out, chylous  Right CT 2L out, chylous  - Both on suction without airleak      Plan:  NPO  Continue J tube feeds, nutrihep, apprec nutrition recs  Continue b/l CT to sxn and trend output  Will need ENT vocal cord medialization prior to swallow study  Continue abx  apprec critical care recs    Subjective:   No acute events overnight  Patient was extubated around 6PM and was weaned on NC throughout the night  He is resting comfortably in bed at this time with no complaints of chest or abdominal pain    Objective:     Vitals: Blood pressure 106/58, pulse 88, temperature 99 7 °F (37 6 °C), resp  rate 17, height 5' 10" (1 778 m), weight 81 8 kg (180 lb 5 4 oz), SpO2 98 %  ,Body mass index is 25 88 kg/m²  Intake/Output Summary (Last 24 hours) at 2/4/2020 0205  Last data filed at 2/4/2020 0106  Gross per 24 hour   Intake 2729 2 ml   Output 4110 ml   Net -1380 8 ml       Physical Exam  General: NAD  HEENT: NC/AT  MMM  Cv: RRR  Lungs: normal effort    B/L chest tubes (sxn, -AL), chylous output    Ab: Soft, NT/ND, incsion c/d/i  Ex: no CCE  Neuro: AAOx3    Scheduled Meds:  Current Facility-Administered Medications:  acetaminophen 975 mg Per J Tube Q6H [address] [facility]    albuterol 2 5 mg Nebulization Q6H PRN Marianne Kennedy [facility]Me    cefepime 2,000 mg Intravenous Q12H Marianne Kennedy [facility]Me Last Rate: 2,000 mg (02/04/20 0106)   chlorhexidine 15 mL Swish & Spit Q12H [address] [facility]    dexmedetomidine 0 1-0 7 mcg/kg/hr Intravenous Titrated Robb Mathews MD Last Rate: Stopped (02/03/20 1800)   enoxaparin 40 mg Subcutaneous Q24H [facility] Gene Saúl [facility]Me    fentaNYL 75 mcg/hr Intravenous Continuous Gene Saúl Petit-Me Last Rate: Stopped (02/03/20 1800)   fentanyl citrate (PF) 25 mcg Intravenous Q2H PRN Gene Saúl Petit-Me    fluconazole 200 mg Intravenous Q24H Gene Saúl [facility]it-Me Last Rate: Stopped (02/03/20 0023)   gabapentin 100 mg Per J Tube TID Gene Saúl [facility]Me    insulin lispro 1-5 Units Subcutaneous Q6H [facility] Gene Saúl [facility]    iohexol 50 mL Oral 90 min pre-procedure Flaquita Rojas MD    melatonin 3 mg Oral HS Aixa Hassan PA-C    methocarbamol 500 mg Per J Tube Q6H PRN Gene Saúl [facility]    metroNIDAZOLE 500 mg Intravenous Q8H Gene Saúl [facility] Last Rate: 500 mg (02/04/20 0055)   norepinephrine 1-30 mcg/min Intravenous Titrated Gene Saúl Petit-Me Last Rate: 5 mcg/min (02/04/20 0110)   omeprazole (PRILOSEC) suspension 2 mg/mL 20 mg Per J Tube Daily Gene Saúl [facility]    ondansetron 4 mg Intravenous Q6H PRN Gene Saúl [facility]Me    vancomycin 750 mg Intravenous Q12H Precious Hernandez MD      Continuous Infusions:  dexmedetomidine 0 1-0 7 mcg/kg/hr Last Rate: Stopped (02/03/20 1800)   fentaNYL 75 mcg/hr Last Rate: Stopped (02/03/20 1800)   norepinephrine 1-30 mcg/min Last Rate: 5 mcg/min (02/04/20 0110)     PRN Meds: albuterol    fentanyl citrate (PF)    methocarbamol    ondansetron      Invasive Devices     Central Venous Catheter Line            Port A Cath 09/26/19 Left Chest 130 days    CVC Central Lines 02/02/20 Triple 16cm 2 days          Peripheral Intravenous Line            Peripheral IV 02/01/20 Left Antecubital 2 days          Arterial Line            Arterial Line 02/01/20 Right Radial 2 days          Drain            Gastrostomy/Enterostomy Jejunostomy 14 Fr   days    Open Drain Anterior; Left Neck 6 days    Urethral Catheter Temperature probe 16 Fr  3 days    Chest Tube 1 Left Pleural 1 day    Chest Tube 3 Posterior Pleural 1 day                Lab, Imaging and other studies: I have personally reviewed pertinent reports      VTE Pharmacologic Prophylaxis: Enoxaparin (Lovenox)  VTE Mechanical Prophylaxis: sequential compression device

## 2020-02-04 NOTE — CONSULTS
Vancomycin IV Pharmacy-to-Dose Consultation    Alvin Novoa is a 71 y o  male who was receiving Vancomycin IV with management by the Pharmacy Consult service  The patient's Vancomycin therapy has been completed / discontinued  Thank you for allowing us to take part in this patient's care  Pharmacy will sign-off now; please call or re-consult if there are any questions          Irving Us PharmD  Pharmacist

## 2020-02-05 ENCOUNTER — APPOINTMENT (INPATIENT)
Dept: RADIOLOGY | Facility: HOSPITAL | Age: 65
DRG: 326 | End: 2020-02-05
Payer: COMMERCIAL

## 2020-02-05 LAB
ANION GAP SERPL CALCULATED.3IONS-SCNC: 6 MMOL/L (ref 4–13)
BUN SERPL-MCNC: 36 MG/DL (ref 5–25)
CALCIUM SERPL-MCNC: 8 MG/DL (ref 8.3–10.1)
CHLORIDE SERPL-SCNC: 110 MMOL/L (ref 100–108)
CO2 SERPL-SCNC: 25 MMOL/L (ref 21–32)
CREAT SERPL-MCNC: 1.06 MG/DL (ref 0.6–1.3)
ERYTHROCYTE [DISTWIDTH] IN BLOOD BY AUTOMATED COUNT: 17.2 % (ref 11.6–15.1)
GFR SERPL CREATININE-BSD FRML MDRD: 73 ML/MIN/1.73SQ M
GLUCOSE SERPL-MCNC: 166 MG/DL (ref 65–140)
GLUCOSE SERPL-MCNC: 193 MG/DL (ref 65–140)
GLUCOSE SERPL-MCNC: 195 MG/DL (ref 65–140)
GLUCOSE SERPL-MCNC: 212 MG/DL (ref 65–140)
GLUCOSE SERPL-MCNC: 285 MG/DL (ref 65–140)
GLUCOSE SERPL-MCNC: 290 MG/DL (ref 65–140)
GLUCOSE SERPL-MCNC: 301 MG/DL (ref 65–140)
GLUCOSE SERPL-MCNC: 311 MG/DL (ref 65–140)
GLUCOSE SERPL-MCNC: 409 MG/DL (ref 65–140)
HCT VFR BLD AUTO: 37.9 % (ref 36.5–49.3)
HGB BLD-MCNC: 11.1 G/DL (ref 12–17)
MCH RBC QN AUTO: 25.7 PG (ref 26.8–34.3)
MCHC RBC AUTO-ENTMCNC: 29.3 G/DL (ref 31.4–37.4)
MCV RBC AUTO: 88 FL (ref 82–98)
PHOSPHATE SERPL-MCNC: 2.5 MG/DL (ref 2.3–4.1)
PLATELET # BLD AUTO: 261 THOUSANDS/UL (ref 149–390)
PMV BLD AUTO: 9.7 FL (ref 8.9–12.7)
POTASSIUM SERPL-SCNC: 3.7 MMOL/L (ref 3.5–5.3)
RBC # BLD AUTO: 4.32 MILLION/UL (ref 3.88–5.62)
SODIUM SERPL-SCNC: 141 MMOL/L (ref 136–145)
WBC # BLD AUTO: 10.48 THOUSAND/UL (ref 4.31–10.16)

## 2020-02-05 PROCEDURE — 71045 X-RAY EXAM CHEST 1 VIEW: CPT

## 2020-02-05 PROCEDURE — 82948 REAGENT STRIP/BLOOD GLUCOSE: CPT

## 2020-02-05 PROCEDURE — 84100 ASSAY OF PHOSPHORUS: CPT | Performed by: PHYSICIAN ASSISTANT

## 2020-02-05 PROCEDURE — 97530 THERAPEUTIC ACTIVITIES: CPT

## 2020-02-05 PROCEDURE — 80048 BASIC METABOLIC PNL TOTAL CA: CPT | Performed by: PHYSICIAN ASSISTANT

## 2020-02-05 PROCEDURE — 99233 SBSQ HOSP IP/OBS HIGH 50: CPT | Performed by: SURGERY

## 2020-02-05 PROCEDURE — 99024 POSTOP FOLLOW-UP VISIT: CPT | Performed by: THORACIC SURGERY (CARDIOTHORACIC VASCULAR SURGERY)

## 2020-02-05 PROCEDURE — 85027 COMPLETE CBC AUTOMATED: CPT | Performed by: PHYSICIAN ASSISTANT

## 2020-02-05 PROCEDURE — 99024 POSTOP FOLLOW-UP VISIT: CPT | Performed by: SURGERY

## 2020-02-05 RX ORDER — POTASSIUM CHLORIDE 29.8 MG/ML
40 INJECTION INTRAVENOUS ONCE
Status: COMPLETED | OUTPATIENT
Start: 2020-02-05 | End: 2020-02-05

## 2020-02-05 RX ORDER — LANOLIN ALCOHOL/MO/W.PET/CERES
3 CREAM (GRAM) TOPICAL
Status: DISCONTINUED | OUTPATIENT
Start: 2020-02-05 | End: 2020-02-12 | Stop reason: HOSPADM

## 2020-02-05 RX ORDER — METRONIDAZOLE 500 MG/1
500 TABLET ORAL EVERY 8 HOURS SCHEDULED
Status: DISPENSED | OUTPATIENT
Start: 2020-02-05 | End: 2020-02-07

## 2020-02-05 RX ADMIN — METRONIDAZOLE 500 MG: 500 INJECTION, SOLUTION INTRAVENOUS at 08:30

## 2020-02-05 RX ADMIN — INSULIN LISPRO 5 UNITS: 100 INJECTION, SOLUTION INTRAVENOUS; SUBCUTANEOUS at 06:09

## 2020-02-05 RX ADMIN — SODIUM CHLORIDE 6 UNITS/HR: 9 INJECTION, SOLUTION INTRAVENOUS at 11:30

## 2020-02-05 RX ADMIN — GABAPENTIN 100 MG: 250 SUSPENSION ORAL at 19:12

## 2020-02-05 RX ADMIN — CHLORHEXIDINE GLUCONATE 0.12% ORAL RINSE 15 ML: 1.2 LIQUID ORAL at 08:30

## 2020-02-05 RX ADMIN — ENOXAPARIN SODIUM 40 MG: 40 INJECTION SUBCUTANEOUS at 08:30

## 2020-02-05 RX ADMIN — ACETAMINOPHEN 975 MG: 650 SUSPENSION ORAL at 05:17

## 2020-02-05 RX ADMIN — METRONIDAZOLE 500 MG: 500 INJECTION, SOLUTION INTRAVENOUS at 00:10

## 2020-02-05 RX ADMIN — CEFEPIME HYDROCHLORIDE 2000 MG: 2 INJECTION, POWDER, FOR SOLUTION INTRAVENOUS at 01:06

## 2020-02-05 RX ADMIN — ACETAMINOPHEN 975 MG: 650 SUSPENSION ORAL at 00:09

## 2020-02-05 RX ADMIN — OXYCODONE HYDROCHLORIDE 5 MG: 5 SOLUTION ORAL at 15:22

## 2020-02-05 RX ADMIN — METRONIDAZOLE 500 MG: 500 TABLET ORAL at 22:05

## 2020-02-05 RX ADMIN — POTASSIUM CHLORIDE 40 MEQ: 29.8 INJECTION, SOLUTION INTRAVENOUS at 12:47

## 2020-02-05 RX ADMIN — GABAPENTIN 100 MG: 250 SUSPENSION ORAL at 08:30

## 2020-02-05 RX ADMIN — METRONIDAZOLE 500 MG: 500 TABLET ORAL at 19:11

## 2020-02-05 RX ADMIN — Medication 20 MG: at 08:30

## 2020-02-05 RX ADMIN — CEFEPIME HYDROCHLORIDE 2000 MG: 2 INJECTION, POWDER, FOR SOLUTION INTRAVENOUS at 14:15

## 2020-02-05 RX ADMIN — ACETAMINOPHEN 975 MG: 650 SUSPENSION ORAL at 13:53

## 2020-02-05 RX ADMIN — CHLORHEXIDINE GLUCONATE 0.12% ORAL RINSE 15 ML: 1.2 LIQUID ORAL at 22:04

## 2020-02-05 RX ADMIN — ACETAMINOPHEN 975 MG: 650 SUSPENSION ORAL at 19:08

## 2020-02-05 RX ADMIN — INSULIN LISPRO 3 UNITS: 100 INJECTION, SOLUTION INTRAVENOUS; SUBCUTANEOUS at 00:09

## 2020-02-05 RX ADMIN — MELATONIN 3 MG: 3 TAB ORAL at 22:05

## 2020-02-05 NOTE — RESTORATIVE TECHNICIAN NOTE
Restorative Specialist Mobility Note       Activity: Ambulate in hill, Chair     Assistive Device: Front wheel walker

## 2020-02-05 NOTE — PROGRESS NOTES
Progress Note - Dayne Mancilla 71 y o  male MRN: [id number]    Unit/Bed#: [facility] 416-01 Encounter: [id number]      Assessment:  72 yo M s/p transhiatal esophagectomy 1/28 with routine post op course until code blue 2/1  Recurrent pleural effusions drained yesterday, decreasing pressor requirements post procedure    Left  cc out, chylous  Right CT 2 06 L out, chylous  - Both on suction without airleak    Patient of levo since 10PM, pressures somewhat soft this AM, MAPs 58-65      Plan:     NPO with J tube feeds on nutrihep  Contine CT to suction and trend output   - plan for IR thoracic duct embolization on 2/6  ENT for cord injection on 2/7  Do not open midline, monitor for drainage  Rest of care per [facility] appreciated    Subjective:   No acute events overnight  Was taken off of levophed at 10PM and did well, had some diarrhea    Objective:     Vitals: Blood pressure 100/63, pulse 102, temperature 98 2 °F (36 8 °C), resp  rate (!) 26, height 5' 10" (1 778 m), weight 80 2 kg (176 lb 12 9 oz), SpO2 95 %  ,Body mass index is 25 37 kg/m²  Intake/Output Summary (Last 24 hours) at 2/5/2020 0527  Last data filed at 2/5/2020 0400  Gross per 24 hour   Intake 2913 ml   Output 3710 ml   Net -797 ml       Physical Exam: General: AAOx3  Head: normocephalic, atraumatic  Neck: supple, trachea midline   Respiratory: BS b/l  Abdomen: Soft, NT, no rebound/guarding   Incision c/d/i , no drainage overnight  J tube in place  Heart: RRR, S1s2  Ext: Warm no cyanosis   Pulse: 2+ radial      Scheduled Meds:    Current Facility-Administered Medications:  acetaminophen 975 mg Per J Tube Q6H [address] [facility] Douglass [facility]    albuterol 2 5 mg Nebulization Q6H PRN [facility]    cefepime 2,000 mg Intravenous Q12H [facility] Last Rate: 2,000 mg (02/05/20 0106)   chlorhexidine 15 mL Swish & Spit Q12H [address] [facility] Douglass [facility]    enoxaparin 40 mg Subcutaneous Q24H [address] [facility] Douglass [facility]    fentanyl citrate (PF) 25 mcg Intravenous Q2H PRN [facility] Sings [facility]    gabapentin 100 mg Per J Tube TID [facility] Sings [facility]    insulin lispro 1-5 Units Subcutaneous Q6H [facility] Hayder Quevedo PA-C    iohexol 50 mL Oral 90 min pre-procedure Zahra Zamora MD    melatonin 3 mg Oral HS Hayder GroomCLEMENTINA    methocarbamol 500 mg Per J Tube Q6H PRN [facility] S[facility]    metroNIDAZOLE 500 mg Intravenous Q8H [place] S[facility] Last Rate: Stopped (02/05/20 0200)   norepinephrine 1-30 mcg/min Intravenous Titrated [place] Sings [facility] Last Rate: Stopped (02/04/20 2200)   omeprazole (PRILOSEC) suspension 2 mg/mL 20 mg Per J Tube Daily [facility] Sings [facility]    ondansetron 4 mg Intravenous Q6H PRN [facility] Sings [facility]    oxyCODONE 2 5 mg Oral Q4H PRN Hayder NikoomCLEMENTINA    oxyCODONE 5 mg Oral Q4H PRN Hayder NikoomCLEMENTINA      Continuous Infusions:    norepinephrine 1-30 mcg/min Last Rate: Stopped (02/04/20 2200)     PRN Meds: albuterol    fentanyl citrate (PF)    methocarbamol    ondansetron    oxyCODONE    oxyCODONE      Invasive Devices     Central Venous Catheter Line            Port A Cath 09/26/19 Left Chest 131 days    CVC Central Lines 02/02/20 Triple 16cm 3 days          Arterial Line            Arterial Line 02/01/20 Right Radial 3 days          Drain            Gastrostomy/Enterostomy Jejunostomy 14 Fr   days    Open Drain Anterior; Left Neck 7 days    Urethral Catheter Temperature probe 16 Fr  4 days    Chest Tube 1 Left Pleural 2 days    Chest Tube 3 Right Pleural 2 days                Lab, Imaging and other studies: I have personally reviewed pertinent reports      VTE Pharmacologic Prophylaxis: Enoxaparin (Lovenox)  VTE Mechanical Prophylaxis: sequential compression device

## 2020-02-05 NOTE — PROGRESS NOTES
Progress Note - Critical Care   Alvin Novoa 71 y o  male MRN: [id number]  Unit/Bed#: [facility] 416-01 Encounter: [id number]    Impression:  Principal Problem:    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[address] )  Active Problems:    COPD (chronic obstructive pulmonary disease) (HCC)    High cholesterol    Type 2 diabetes mellitus with hyperglycemia, without long-term current use of insulin (HCC)    Paralysis of left vocal fold    Dysphonia      Plan:    Neuro:   · Best Exam:  · GCS: 15  · Analgesia  · Scheduled:  · Tylenol [id number]  · Gabapentin 100mg TID  · PRN  · oxycodone 2 5-5mg Q6hr   · PRN:  · Regulate sleep/wake cycle  · Delirium precautions  · CAM-ICU daily  · Regulate sleep/wake cycle  · Melatonin 3mg q HS    CV:   · Dx Bradycardic hypoxic arrest 2/1 with ROSC  · Cardiac infusions: levophed weaned off  · MAP goal >70  · Home Lopressor on hold    Pulmonary:   · Dx Acute hypoxic respiratory failure, b/l chylothorax with b/l chest tube placement   · Extubated 2/2, on room air, goal O2 sat >92%  · cefepime/flagyl for aspiration PNA since 2/1  · Continue CTs to suction  · L CT: 180ml, -AL, SXN, SS milky  · R CT: 2060L, -AL, SXN SS milky   · Plan for thoracic duct embolization with IR 2/6  · ENT evaluation 2/7 for left vocal cord injection and medialization, NPO until that and speech/swallow  · Am CXR pending    GI:   · Stress ulcer prophylaxis: prilosec  · Nausea PRN: Zofran  · Dx S/P 1/28 Transhiatal esophagectomy, with previously placed J tube   · Thoracic and Surgery Oncology following  · TF with medium chain fatty acids for chylothorax  · Local wound care to midline incision   · Diarrhea may be secondary to new TF formula, monitor WBC, and output    :   · Indwelling Doshi present: yes - d/c today  · Trend UOP and BUN/creat  · Strict I and O  · Dx MARTHA- improving  · Cr 1 05-> f/u am labs    FEN:   Fluids  · UOP: 1 2L  · Maintenance fluids: None       Electrolytes  · Replete electrolytes with goals: K >4 0, Mag >2 0, and Phos >3 0    Nutrition  · TF/Diet: [email or web address]      ID:   · Source of infection: Aspiration PNA  · Abx:   · Cefepime/Flagyl since 2/1  · Vanco/fluconazole 2/1-2/4  · MRSA swab negative  · Trend temps and WBC count, maintain normothermia  · WBC: 8 4->10 4  · Tmax: 100 0      Heme:   · Trend hgb and plts  · Transfuse as needed for goal hgb > 7    Endo:   · Dx DM 2  · SSI algorithm 2, increase as needed  · Goal -180    MSK/Skin:  · PT/OT  · OOB/ambualte  · Allevyn preventive per protocol  · Local wound care to midline incision and underlying dehiscence     Lines:  Asha Doshi- d/kadie     VTE Prophylaxis:  · Pharmacologic Prophylaxis: Enoxaparin (Lovenox)  · Mechanical Prophylaxis: sequential compression device    Disposition: [facility] to [facility] if remains off pressors    Code Status: Full    Treatment Team/Consultants: Thoracic/Surg Onc/ENT    Date of admission: 1/28/2020    HPI/24hr events: HONG  Off levo since 10pm  Started having diarrhea yesterday afternoon and continuing throughout the night  Remains on room air, breathing well  Pain well controlled  Afebrile  ROS: 14 point ROS is negative and/or as stated in the HPI  Physical Exam:    Physical Exam   Constitutional: He is oriented to person, place, and time  He appears well-developed and well-nourished  No distress  HENT:   Head: Normocephalic and atraumatic  Eyes: Pupils are equal, round, and reactive to light  Neck: Normal range of motion  Cardiovascular: Normal rate and regular rhythm  Pulmonary/Chest: Effort normal  No respiratory distress  B/l CTs    Abdominal: Soft  He exhibits no distension  There is tenderness  Midline wound with serous drainage  J tube in place  Tender over incision   Musculoskeletal: He exhibits no edema  Neurological: He is alert and oriented to person, place, and time  Skin: Skin is warm and dry  Capillary refill takes less than 2 seconds  He is not diaphoretic     Psychiatric: He has a normal mood and affect  Vitals:   Vitals:    20 2100 20 2200 20 2300 20 0000   BP: 112/69 109/64 101/67 106/64   BP Location:    Left arm   Pulse: 104 100 102 102   Resp: 19 (!) 26 (!) 23 19   Temp: 99 7 °F (37 6 °C) 99 7 °F (37 6 °C) 99 3 °F (37 4 °C) 99 3 °F (37 4 °C)   TempSrc:    Bladder   SpO2: 95% 97% 95% 96%   Weight:       Height:           Arterial Line:  Arterial Line BP: 112/52  Arterial Line MAP (mmHg): 70 mmHg    Temperature: Temp (24hrs), Av 4 °F (37 4 °C), Min:98 4 °F (36 9 °C), Max:100 °F (37 8 °C)  Current: Temperature: 99 3 °F (37 4 °C)    Weights: IBW: 73 kg  Body mass index is 25 37 kg/m²  Hemodynamic Monitoring:  N/A       Respiratory:  SpO2: SpO2: 94 %  O2 Flow Rate (L/min): 2 L/min    Intake and Outputs:    Intake/Output Summary (Last 24 hours) at 2020 0059  Last data filed at 2020 0000  Gross per 24 hour   Intake 3021 38 ml   Output 3830 ml   Net -808 62 ml     I/O last 24 hours: In: 4103 5 [I V :279 5; NG/GT:1350; IV Piggyback:600; Feedings:1874]  Out: 5240 [Urine:1930; Chest Tube:3310]      Nutrition:        Diet Orders   (From admission, onward)             Start     Ordered    20 1858  Diet Enteral/Parenteral; Tube Feeding No Oral Diet; Nutri Hep-8oz; Continuous; 65; Prosource Protein Liquid - Two Packets; 150; Water; Every 4 hours  Diet effective now     Question Answer Comment   Diet Type Enteral/Parenteral    Enteral/Parenteral Tube Feeding No Oral Diet    Tube Feeding Formula: Nutri [id number]    Bolus/Cyclic/Continuous Continuous    Tube Feeding Goal Rate (mL/hr): 65    Prosource Protein Liquid - No Carb Prosource Protein Liquid - Two Packets    Tube Feeding water flush (mL): 150    Water Flush type: Water    Water flush frequency: Every 4 hours    RD to adjust diet per protocol? Yes        20 5377              TF currently running at 65ml/hour with a goal rate of 65ml/hr      Labs:   Results from last 7 days   Lab Units 02/04/20  0407 02/03/20  0447 02/02/20  1251 02/02/20  0430  02/01/20  0601   WBC Thousand/uL 8 46 6 59  --  11 78*   < > 10 80*   HEMOGLOBIN g/dL 9 9* 9 7* 10 0* 10 0*   < > 13 9   I STAT HEMOGLOBIN   --   --   --   --    < >  --    HEMATOCRIT % 33 1* 32 2*  --  31 8*   < > 45 5   HEMATOCRIT, ISTAT   --   --   --   --    < >  --    PLATELETS Thousands/uL 229 211  --  257   < > 398*   NEUTROS PCT %  --  78*  --  84*  --  87*   MONOS PCT %  --  9  --  8  --  6    < > = values in this interval not displayed  Results from last 7 days   Lab Units 02/04/20 0407 02/03/20 0447 02/02/20  0430 02/01/20  2209   SODIUM mmol/L 144 145 140   < >  --    POTASSIUM mmol/L 3 7 4 0 4 0   < >  --    CHLORIDE mmol/L 114* 112* 106   < >  --    CO2 mmol/L 25 27 27   < >  --    BUN mg/dL 34* 45* 57*   < >  --    CREATININE mg/dL 1 05 1 28 1 63*   < >  --    CALCIUM mg/dL 7 9* 8 4 9 2   < >  --    GLUCOSE, ISTAT mg/dl  --   --   --   --  339*    < > = values in this interval not displayed       Results from last 7 days   Lab Units 02/04/20 0407 02/03/20 0447 02/02/20  0430   MAGNESIUM mg/dL 2 4 2 8* 3 1*     Results from last 7 days   Lab Units 02/04/20 0407 02/03/20 0447 02/01/20  2304   PHOSPHORUS mg/dL 2 1* 3 2 9 2*          Results from last 7 days   Lab Units 02/02/20  0431 02/01/20  2304   LACTIC ACID mmol/L 1 1 6 7*     Results from last 7 days   Lab Units 02/03/20  0004 02/02/20  1617 02/02/20  1251   TROPONIN I ng/mL 0 23* 0 38* 0 57*     Results from last 7 days   Lab Units 02/03/20 0447 02/02/20  0431 02/01/20  2338 01/30/20  0536 01/30/20  0327   PH ART  7 434 7 424 7 300* 7 406 7 386   PCO2 ART mm Hg 38 0 39 8 44 1* 36 7 40 0   PO2 ART mm Hg 110 2 73 4* 113 9 168 7* 60 5*   HCO3 ART mmol/L 24 9 25 5 21 2* 22 5 23 5   BASE EXC ART mmol/L 0 7 1 0 -5 0 -1 8 -1 4   ABG SOURCE  Line, Arterial Line, Arterial Line, Arterial Line, Arterial Line, Arterial         No results found for: [facility]           Micro:   Blood Culture:   Lab Results   Component Value Date    BLOODCX No Growth at 48 hrs  02/02/2020    BLOODCX No Growth at 48 hrs  02/02/2020     Urine Culture: No results found for: URINECX  Sputum Culture: No components found for: SPUTUMCX  Wound Culure: No results found for: WOUNDCULT  Results from last 7 days   Lab Units 02/03/20  1225 02/02/20  0023 02/02/20  0021 01/30/20  0021   BLOOD CULTURE   --  No Growth at 48 hrs  No Growth at 48 hrs   --    GRAM STAIN RESULT   --   --   --  1+ Polys  No bacteria seen  2+ Polys  No bacteria seen   BODY FLUID CULTURE, STERILE   --   --   --  No growth  No growth   MRSA CULTURE ONLY  No Methicillin Resistant Staphlyococcus aureus (MRSA) isolated  --   --   --        Allergies:    Allergies   Allergen Reactions    Penicillins Itching       Medications:   Scheduled Meds:  Current Facility-Administered Medications:  acetaminophen 975 mg Per J Tube Q6H [facility]    albuterol 2 5 mg Nebulization Q6H PRN [facility]    cefepime 2,000 mg Intravenous Q12H [facility] Last Rate: Stopped (02/04/20 1401)   chlorhexidine 15 mL Swish & Spit [facility]    enoxaparin 40 mg Subcutaneous Q24H [facility]    fentanyl citrate (PF) 25 mcg Intravenous Q2H PRN [facility]    gabapentin 100 mg Per J Tube TID [facility]    insulin lispro 1-5 Units Subcutaneous Q6H [facility] Amanda Wise PA-C    iohexol 50 mL Oral 90 min pre-procedure Jp Nelson MD    melatonin 3 mg Oral HS Amanda Wise PA-C    methocarbamol 500 mg Per J Tube Q6H PRN [facility]    metroNIDAZOLE 500 mg Intravenous Q8H [facility] Last Rate: 500 mg (02/05/20 0010)   norepinephrine 1-30 mcg/min Intravenous Titrated [facility] Last Rate: Stopped (02/04/20 2200)   omeprazole (PRILOSEC) suspension 2 mg/mL 20 mg Per J Tube Daily If[facility] Lipscombit-Me    ondansetron 4 mg Intravenous Q6H PRN Ifrah Lipscombit-Me    oxyCODONE 2 5 mg Oral Q4H PRN Mingo Coho, PA-C    oxyCODONE 5 mg Oral Q4H PRN Mingo Coho, PA-C      Continuous Infusions:  norepinephrine 1-30 mcg/min Last Rate: Stopped (02/04/20 2200)     PRN Meds:    albuterol 2 5 mg Q6H PRN   fentanyl citrate (PF) 25 mcg Q2H PRN   methocarbamol 500 mg Q6H PRN   ondansetron 4 mg Q6H PRN   oxyCODONE 2 5 mg Q4H PRN   oxyCODONE 5 mg Q4H PRN       Invasive lines and devices: Invasive Devices     Central Venous Catheter Line            Port A Cath 09/26/19 Left Chest 131 days    CVC Central Lines 02/02/20 Triple 16cm 3 days          Arterial Line            Arterial Line 02/01/20 Right Radial 3 days          Drain            Gastrostomy/Enterostomy Jejunostomy 14 Fr   days    Open Drain Anterior; Left Neck 7 days    Urethral Catheter Temperature probe 16 Fr  4 days    Chest Tube 1 Left Pleural 2 days    Chest Tube 3 Right Pleural 2 days                Code Status: Level 1 - Full Code      SIGNATURE: Fernando Lerma MD  DATE: February 5, 2020  TIME: 12:59 AM

## 2020-02-05 NOTE — PROGRESS NOTES
Progress Note - Lesly Evangelista 71 y o  male MRN: [id number]    Unit/Bed#: [facility] 416-01 Encounter: [id number]      Assessment:  70 yo M s/p transhiatal esophagectomy 1/28 with routine post op course until code blue 2/1  Recurrent pleural effusions drained yesterday, decreasing pressor requirements post procedure    Left  cc out, chylous  Right CT 2 06 L out, chylous  - Both on suction without airleak    Patient of levo since 10PM, MAPS soft this AM 58-65      Plan:     NPO with J tube feeds on nutrihep  Contine CT to suction and trend output   - plan for IR thoracic duct embolization on 2/6  ENT for cord injection on 2/7  Do not open midline, monitor for drainage  Rest of care per [facility] appreciated  Subjective:   No acute events  Taken off pressors, no complaints    Objective:     Vitals: Blood pressure 100/63, pulse 102, temperature 98 2 °F (36 8 °C), resp  rate (!) 26, height 5' 10" (1 778 m), weight 80 2 kg (176 lb 12 9 oz), SpO2 95 %  ,Body mass index is 25 37 kg/m²  Intake/Output Summary (Last 24 hours) at 2/5/2020 0524  Last data filed at 2/5/2020 0400  Gross per 24 hour   Intake 2913 ml   Output 3710 ml   Net -797 ml     Physical Exam: General: AAOx3  Head: normocephalic, atraumatic  Neck: supple, trachea midline   Respiratory: BS b/l  Abdomen: Soft, NT, no rebound/guarding   Incision c/d/i, no drainage from midline overnight  J tube in place  Heart: RRR, S1s2  Ext: Warm no cyanosis   Pulse: 2+ radial      Scheduled Meds:    Current Facility-Administered Medications:  acetaminophen 975 mg Per J Tube Q6H [facility] [place] [facility]    albuterol 2 5 mg Nebulization Q6H PRN [place] [facility]    cefepime 2,000 mg Intravenous Q12H [place] [facility] Last Rate: 2,000 mg (02/05/20 0106)   chlorhexidine 15 mL Swish & Spit Q12H [facility] [place] [facility]    enoxaparin 40 mg Subcutaneous Q24H [facility]    fentanyl citrate (PF) 25 mcg Intravenous Q2H PRN [place] [facility]    gabapentin 100 mg Per J Tube TID [facility]    insulin lispro 1-5 Units Subcutaneous Q6H [facility] Amanda Wise PA-C    iohexol 50 mL Oral 90 min pre-procedure Jp Nelson MD    melatonin 3 mg Oral HS Amanda Wise PA-C    methocarbamol 500 mg Per J Tube Q6H PRN [facility]    metroNIDAZOLE 500 mg Intravenous Q8H [facility] [facility] Last Rate: Stopped (02/05/20 0200)   norepinephrine 1-30 mcg/min Intravenous Titrated [place] [facility] Last Rate: Stopped (02/04/20 2200)   omeprazole (PRILOSEC) suspension 2 mg/mL 20 mg Per J Tube Daily [facility]    ondansetron 4 mg Intravenous Q6H PRN [facility] [place] [facility]    oxyCODONE 2 5 mg Oral Q4H PRN Amanda Wise PA-C    oxyCODONE 5 mg Oral Q4H PRN Amanda Wise PA-C      Continuous Infusions:    norepinephrine 1-30 mcg/min Last Rate: Stopped (02/04/20 2200)     PRN Meds: albuterol    fentanyl citrate (PF)    methocarbamol    ondansetron    oxyCODONE    oxyCODONE      Invasive Devices     Central Venous Catheter Line            Port A Cath 09/26/19 Left Chest 131 days    CVC Central Lines 02/02/20 Triple 16cm 3 days          Arterial Line            Arterial Line 02/01/20 Right Radial 3 days          Drain            Gastrostomy/Enterostomy Jejunostomy 14 Fr   days    Open Drain Anterior; Left Neck 7 days    Urethral Catheter Temperature probe 16 Fr  4 days    Chest Tube 1 Left Pleural 2 days    Chest Tube 3 Right Pleural 2 days                Lab, Imaging and other studies: I have personally reviewed pertinent reports       Results Reviewed     None          VTE Pharmacologic Prophylaxis: lovenox  VTE Mechanical Prophylaxis: sequential compression device

## 2020-02-05 NOTE — PLAN OF CARE
Problem: PHYSICAL THERAPY ADULT  Goal: Performs mobility at highest level of function for planned discharge setting  See evaluation for individualized goals  Description  Treatment/Interventions: Functional transfer training, LE strengthening/ROM, Elevations, Therapeutic exercise, Endurance training, Patient/family training, Equipment eval/education, Bed mobility, Gait training, Spoke to nursing  Equipment Recommended: Walker(RW)       See flowsheet documentation for full assessment, interventions and recommendations  Outcome: Progressing  Note:   Prognosis: Good  Problem List: Decreased strength, Decreased endurance, Impaired balance, Decreased mobility  Assessment: Functional mobility re-assessment performed; pt presents s/p extensive clinical course and currently exhibits overall weakness, decreased endurance and associated balance and mobility deficits; min (A) was required for transfers and amb w/ rw w/ stand by (A) of 2 more staff for lines and chair follow; pt remained in NAD and appeared to be comfortable at the end of session; will cont to follow pt in PT for graded mobilization as clinical course allows to max overall mobility status, endurance, and level of (I); also, based on overall status, recommend rehab at this time upon D/C when medically cleared; will cont to follow however and make appropriate changes to D/C recommendations as needed based on progress;  Barriers to Discharge: (steps in the house)     Recommendation: Post acute IP rehab(will cont to monitor progress)     PT - OK to Discharge: No    See flowsheet documentation for full assessment

## 2020-02-05 NOTE — PHYSICAL THERAPY NOTE
PHYSICAL THERAPY NOTE          Patient Name: Nirmal Castelan  UUIMM'P Date: 2/5/2020 02/05/20 1510   Pain Assessment   Pain Assessment No/denies pain   Restrictions/Precautions   Braces or Orthoses   (denies at baseline)   Other Precautions Multiple lines;Telemetry; Fall Risk  ((B)CT to suction (OK to amb off suction as per CLEMENTINA w/CT sx))   General   Chart Reviewed Yes   Additional Pertinent History Pt underwent transhiatal esophagectomy on 1/28; postop course included: code blue on 2/1; pt was intubated; CT on 2/2 demonstrated (B) effusions --> (B) CT placed; IR is consulted for thoracic duct embolization on 02/06; ENT is also consulted and OR is scheduled for 2/7 for vocal cord medialization; pt is cleared for Tx session (spoke to RN who requested to ambulate the pt; pt reportedly ambulated earlier in AM off suction "360 ft"); ambulation off suction was also confirmed w/ Burak Mcclain PA-C w/ thoracic sx;   Response to Previous Treatment Patient with no complaints from previous session  Cognition   Overall Cognitive Status WFL   Arousal/Participation Cooperative;Responsive   Attention Attends with cues to redirect   Orientation Level Oriented to person;Oriented to place;Oriented to situation   Memory Decreased recall of recent events   Following Commands Follows one step commands without difficulty   Subjective   Subjective Pt is observed in the chair; reports being fatigued; agreeable to start w/ LE therex and then to amb as armida;   Transfers   Sit to Stand 4  Minimal assistance   Additional items Assist x 1;Verbal cues   Stand to Sit 4  Minimal assistance   Additional items Assist x 1;Verbal cues   Ambulation/Elevation   Gait pattern Excessively slow; Short stride; Foward flexed   Gait Assistance 4  Minimal assist   Additional items Assist x 1;Verbal cues; Tactile cues  (stand by (A) of 2 more staff for lines and chair follow) Assistive Device Rolling walker   Distance 300 ft w/ few standing rest stops during amb;   Balance   Static Sitting Fair +   Static Standing Fair -   Ambulatory Poor +   Activity Tolerance   Activity Tolerance Patient limited by fatigue   Nurse Made Aware spoke to [address]   Exercises   Knee AROM Long Arc Quad Sitting;10 reps;AROM; Bilateral   Ankle Pumps Sitting;10 reps;AROM; Bilateral   Marching Sitting;5 reps;AROM; Bilateral   Equipment Use   Comments Pt was reclined in the chair w/ LE elevated at the end of session; call bell placed w/in reach; pillow for (R) UE support; SCDs re-activated   Assessment   Prognosis Good   Problem List Decreased strength;Decreased endurance; Impaired balance;Decreased mobility   Assessment Functional mobility re-assessment performed; pt presents s/p extensive clinical course and currently exhibits overall weakness, decreased endurance and associated balance and mobility deficits; min (A) was required for transfers and amb w/ rw w/ stand by (A) of 2 more staff for lines and chair follow; pt remained in NAD and appeared to be comfortable at the end of session; will cont to follow pt in PT for graded mobilization as clinical course allows to max overall mobility status, endurance, and level of (I); also, based on overall status, recommend rehab at this time upon D/C when medically cleared; will cont to follow however and make appropriate changes to D/C recommendations as needed based on progress;   Barriers to Discharge   (steps in the house)   Goals   Patient Goals to rest   STG Expiration Date 02/19/20   Short Term Goal #1 see LTG below   LTG Expiration Date 02/19/20   Long Term Goal #1 10-14 days  Pt will amb 400 ft w/ least restrictive assistive device, mod (I) in order to facilitate safe return to premorbid environment and community amb status  Pt will negotiate 10 steps w/ hand rail and SPC PRN, mod (I) in order to navigate between levels of home environment safely   Pt will achieve (I) level w/ bed mob in order to facilitate safety with OOB and back to bed transitions in own living environment  Pt will perform transfers w/ mod (I) to assure (I) and safety w/ functional mobility/transitions w/ all aspects of mobility/locomotion  Pt will participate in LE therex and balance activities to max progression w/ mobility skills  PT Treatment Day 2   Plan   Treatment/Interventions Functional transfer training;LE strengthening/ROM; Elevations; Therapeutic exercise; Endurance training;Equipment eval/education; Bed mobility;Gait training;Spoke to nursing   Progress Progressing toward goals   PT Frequency Other (Comment)  (3-5x/wk)   Recommendation   Recommendation Post acute IP rehab  (will cont to monitor progress)   Equipment Recommended Walker  (at this time)       Marly Harrison, PT

## 2020-02-06 ENCOUNTER — ANESTHESIA (INPATIENT)
Dept: RADIOLOGY | Facility: HOSPITAL | Age: 65
DRG: 326 | End: 2020-02-06
Payer: COMMERCIAL

## 2020-02-06 ENCOUNTER — ANESTHESIA EVENT (INPATIENT)
Dept: PERIOP | Facility: HOSPITAL | Age: 65
DRG: 326 | End: 2020-02-06
Payer: COMMERCIAL

## 2020-02-06 ENCOUNTER — ANESTHESIA EVENT (INPATIENT)
Dept: RADIOLOGY | Facility: HOSPITAL | Age: 65
DRG: 326 | End: 2020-02-06
Payer: COMMERCIAL

## 2020-02-06 ENCOUNTER — APPOINTMENT (INPATIENT)
Dept: RADIOLOGY | Facility: HOSPITAL | Age: 65
DRG: 326 | End: 2020-02-06
Attending: THORACIC SURGERY (CARDIOTHORACIC VASCULAR SURGERY)
Payer: COMMERCIAL

## 2020-02-06 PROBLEM — J94.0 CHYLOTHORAX ON RIGHT: Status: ACTIVE | Noted: 2020-02-06

## 2020-02-06 LAB
ANION GAP SERPL CALCULATED.3IONS-SCNC: 5 MMOL/L (ref 4–13)
ANION GAP SERPL CALCULATED.3IONS-SCNC: 7 MMOL/L (ref 4–13)
BASOPHILS # BLD AUTO: 0.05 THOUSANDS/ΜL (ref 0–0.1)
BASOPHILS NFR BLD AUTO: 0 % (ref 0–1)
BUN SERPL-MCNC: 42 MG/DL (ref 5–25)
BUN SERPL-MCNC: 46 MG/DL (ref 5–25)
CALCIUM SERPL-MCNC: 8.2 MG/DL (ref 8.3–10.1)
CALCIUM SERPL-MCNC: 8.2 MG/DL (ref 8.3–10.1)
CHLORIDE SERPL-SCNC: 112 MMOL/L (ref 100–108)
CHLORIDE SERPL-SCNC: 113 MMOL/L (ref 100–108)
CO2 SERPL-SCNC: 22 MMOL/L (ref 21–32)
CO2 SERPL-SCNC: 25 MMOL/L (ref 21–32)
CREAT SERPL-MCNC: 1.08 MG/DL (ref 0.6–1.3)
CREAT SERPL-MCNC: 1.37 MG/DL (ref 0.6–1.3)
EOSINOPHIL # BLD AUTO: 0.79 THOUSAND/ΜL (ref 0–0.61)
EOSINOPHIL NFR BLD AUTO: 7 % (ref 0–6)
ERYTHROCYTE [DISTWIDTH] IN BLOOD BY AUTOMATED COUNT: 17.2 % (ref 11.6–15.1)
ERYTHROCYTE [DISTWIDTH] IN BLOOD BY AUTOMATED COUNT: 17.2 % (ref 11.6–15.1)
GFR SERPL CREATININE-BSD FRML MDRD: 54 ML/MIN/1.73SQ M
GFR SERPL CREATININE-BSD FRML MDRD: 72 ML/MIN/1.73SQ M
GLUCOSE SERPL-MCNC: 101 MG/DL (ref 65–140)
GLUCOSE SERPL-MCNC: 104 MG/DL (ref 65–140)
GLUCOSE SERPL-MCNC: 113 MG/DL (ref 65–140)
GLUCOSE SERPL-MCNC: 117 MG/DL (ref 65–140)
GLUCOSE SERPL-MCNC: 121 MG/DL (ref 65–140)
GLUCOSE SERPL-MCNC: 127 MG/DL (ref 65–140)
GLUCOSE SERPL-MCNC: 137 MG/DL (ref 65–140)
GLUCOSE SERPL-MCNC: 156 MG/DL (ref 65–140)
GLUCOSE SERPL-MCNC: 161 MG/DL (ref 65–140)
GLUCOSE SERPL-MCNC: 194 MG/DL (ref 65–140)
GLUCOSE SERPL-MCNC: 210 MG/DL (ref 65–140)
GLUCOSE SERPL-MCNC: 96 MG/DL (ref 65–140)
HCT VFR BLD AUTO: 40.1 % (ref 36.5–49.3)
HCT VFR BLD AUTO: 41.6 % (ref 36.5–49.3)
HGB BLD-MCNC: 12 G/DL (ref 12–17)
HGB BLD-MCNC: 12.5 G/DL (ref 12–17)
IMM GRANULOCYTES # BLD AUTO: 0.04 THOUSAND/UL (ref 0–0.2)
IMM GRANULOCYTES NFR BLD AUTO: 0 % (ref 0–2)
LYMPHOCYTES # BLD AUTO: 1.22 THOUSANDS/ΜL (ref 0.6–4.47)
LYMPHOCYTES NFR BLD AUTO: 11 % (ref 14–44)
MCH RBC QN AUTO: 26 PG (ref 26.8–34.3)
MCH RBC QN AUTO: 26 PG (ref 26.8–34.3)
MCHC RBC AUTO-ENTMCNC: 29.9 G/DL (ref 31.4–37.4)
MCHC RBC AUTO-ENTMCNC: 30 G/DL (ref 31.4–37.4)
MCV RBC AUTO: 87 FL (ref 82–98)
MCV RBC AUTO: 87 FL (ref 82–98)
MONOCYTES # BLD AUTO: 0.69 THOUSAND/ΜL (ref 0.17–1.22)
MONOCYTES NFR BLD AUTO: 6 % (ref 4–12)
NEUTROPHILS # BLD AUTO: 8.76 THOUSANDS/ΜL (ref 1.85–7.62)
NEUTS SEG NFR BLD AUTO: 76 % (ref 43–75)
NRBC BLD AUTO-RTO: 0 /100 WBCS
PLATELET # BLD AUTO: 343 THOUSANDS/UL (ref 149–390)
PLATELET # BLD AUTO: 391 THOUSANDS/UL (ref 149–390)
PMV BLD AUTO: 10 FL (ref 8.9–12.7)
PMV BLD AUTO: 9.4 FL (ref 8.9–12.7)
POTASSIUM SERPL-SCNC: 3.9 MMOL/L (ref 3.5–5.3)
POTASSIUM SERPL-SCNC: 5.1 MMOL/L (ref 3.5–5.3)
RBC # BLD AUTO: 4.62 MILLION/UL (ref 3.88–5.62)
RBC # BLD AUTO: 4.81 MILLION/UL (ref 3.88–5.62)
SODIUM SERPL-SCNC: 142 MMOL/L (ref 136–145)
SODIUM SERPL-SCNC: 142 MMOL/L (ref 136–145)
WBC # BLD AUTO: 11.55 THOUSAND/UL (ref 4.31–10.16)
WBC # BLD AUTO: 16.85 THOUSAND/UL (ref 4.31–10.16)

## 2020-02-06 PROCEDURE — 99221 1ST HOSP IP/OBS SF/LOW 40: CPT | Performed by: PHYSICIAN ASSISTANT

## 2020-02-06 PROCEDURE — 99024 POSTOP FOLLOW-UP VISIT: CPT | Performed by: THORACIC SURGERY (CARDIOTHORACIC VASCULAR SURGERY)

## 2020-02-06 PROCEDURE — 38794 ACCESS THORACIC LYMPH DUCT: CPT | Performed by: STUDENT IN AN ORGANIZED HEALTH CARE EDUCATION/TRAINING PROGRAM

## 2020-02-06 PROCEDURE — 80048 BASIC METABOLIC PNL TOTAL CA: CPT | Performed by: PHYSICIAN ASSISTANT

## 2020-02-06 PROCEDURE — B701YZZ PLAIN RADIOGRAPHY OF BILATERAL ABDOMINAL/RETROPERITONEAL LYMPHATICS USING OTHER CONTRAST: ICD-10-PCS | Performed by: STUDENT IN AN ORGANIZED HEALTH CARE EDUCATION/TRAINING PROGRAM

## 2020-02-06 PROCEDURE — 37244 VASC EMBOLIZE/OCCLUDE BLEED: CPT | Performed by: STUDENT IN AN ORGANIZED HEALTH CARE EDUCATION/TRAINING PROGRAM

## 2020-02-06 PROCEDURE — 75807 LYMPH VESSEL X-RAY TRUNK: CPT | Performed by: STUDENT IN AN ORGANIZED HEALTH CARE EDUCATION/TRAINING PROGRAM

## 2020-02-06 PROCEDURE — 22510 PERQ CERVICOTHORACIC INJECT: CPT

## 2020-02-06 PROCEDURE — 99024 POSTOP FOLLOW-UP VISIT: CPT | Performed by: ORTHOPAEDIC SURGERY

## 2020-02-06 PROCEDURE — 99024 POSTOP FOLLOW-UP VISIT: CPT | Performed by: STUDENT IN AN ORGANIZED HEALTH CARE EDUCATION/TRAINING PROGRAM

## 2020-02-06 PROCEDURE — 94760 N-INVAS EAR/PLS OXIMETRY 1: CPT

## 2020-02-06 PROCEDURE — 07LK3DZ OCCLUSION OF THORACIC DUCT WITH INTRALUMINAL DEVICE, PERCUTANEOUS APPROACH: ICD-10-PCS | Performed by: STUDENT IN AN ORGANIZED HEALTH CARE EDUCATION/TRAINING PROGRAM

## 2020-02-06 PROCEDURE — 76937 US GUIDE VASCULAR ACCESS: CPT

## 2020-02-06 PROCEDURE — 85027 COMPLETE CBC AUTOMATED: CPT | Performed by: PHYSICIAN ASSISTANT

## 2020-02-06 PROCEDURE — C1887 CATHETER, GUIDING: HCPCS

## 2020-02-06 PROCEDURE — B70CYZZ PLAIN RADIOGRAPHY OF PELVIC LYMPHATICS USING OTHER CONTRAST: ICD-10-PCS | Performed by: STUDENT IN AN ORGANIZED HEALTH CARE EDUCATION/TRAINING PROGRAM

## 2020-02-06 PROCEDURE — 85025 COMPLETE CBC W/AUTO DIFF WBC: CPT | Performed by: PHYSICIAN ASSISTANT

## 2020-02-06 PROCEDURE — 82948 REAGENT STRIP/BLOOD GLUCOSE: CPT

## 2020-02-06 PROCEDURE — 99233 SBSQ HOSP IP/OBS HIGH 50: CPT | Performed by: SURGERY

## 2020-02-06 PROCEDURE — 37241 VASC EMBOLIZE/OCCLUDE VENOUS: CPT

## 2020-02-06 PROCEDURE — 07LL3DZ OCCLUSION OF CISTERNA CHYLI WITH INTRALUMINAL DEVICE, PERCUTANEOUS APPROACH: ICD-10-PCS | Performed by: STUDENT IN AN ORGANIZED HEALTH CARE EDUCATION/TRAINING PROGRAM

## 2020-02-06 PROCEDURE — 38790 INJECT FOR LYMPHATIC X-RAY: CPT | Performed by: STUDENT IN AN ORGANIZED HEALTH CARE EDUCATION/TRAINING PROGRAM

## 2020-02-06 RX ORDER — PROPOFOL 10 MG/ML
INJECTION, EMULSION INTRAVENOUS AS NEEDED
Status: DISCONTINUED | OUTPATIENT
Start: 2020-02-06 | End: 2020-02-06 | Stop reason: SURG

## 2020-02-06 RX ORDER — DEXTROSE AND SODIUM CHLORIDE 5; .45 G/100ML; G/100ML
150 INJECTION, SOLUTION INTRAVENOUS CONTINUOUS
Status: DISCONTINUED | OUTPATIENT
Start: 2020-02-06 | End: 2020-02-06

## 2020-02-06 RX ORDER — LIDOCAINE HYDROCHLORIDE 10 MG/ML
0.5 INJECTION, SOLUTION EPIDURAL; INFILTRATION; INTRACAUDAL; PERINEURAL ONCE AS NEEDED
Status: DISCONTINUED | OUTPATIENT
Start: 2020-02-06 | End: 2020-02-07

## 2020-02-06 RX ORDER — ROCURONIUM BROMIDE 10 MG/ML
INJECTION, SOLUTION INTRAVENOUS AS NEEDED
Status: DISCONTINUED | OUTPATIENT
Start: 2020-02-06 | End: 2020-02-06 | Stop reason: SURG

## 2020-02-06 RX ORDER — LIDOCAINE HYDROCHLORIDE 10 MG/ML
INJECTION, SOLUTION EPIDURAL; INFILTRATION; INTRACAUDAL; PERINEURAL AS NEEDED
Status: DISCONTINUED | OUTPATIENT
Start: 2020-02-06 | End: 2020-02-06 | Stop reason: SURG

## 2020-02-06 RX ORDER — SODIUM CHLORIDE, SODIUM GLUCONATE, SODIUM ACETATE, POTASSIUM CHLORIDE, MAGNESIUM CHLORIDE, SODIUM PHOSPHATE, DIBASIC, AND POTASSIUM PHOSPHATE .53; .5; .37; .037; .03; .012; .00082 G/100ML; G/100ML; G/100ML; G/100ML; G/100ML; G/100ML; G/100ML
500 INJECTION, SOLUTION INTRAVENOUS ONCE
Status: COMPLETED | OUTPATIENT
Start: 2020-02-06 | End: 2020-02-06

## 2020-02-06 RX ORDER — SODIUM CHLORIDE, SODIUM LACTATE, POTASSIUM CHLORIDE, CALCIUM CHLORIDE 600; 310; 30; 20 MG/100ML; MG/100ML; MG/100ML; MG/100ML
125 INJECTION, SOLUTION INTRAVENOUS CONTINUOUS
Status: DISCONTINUED | OUTPATIENT
Start: 2020-02-06 | End: 2020-02-07

## 2020-02-06 RX ORDER — SODIUM CHLORIDE, SODIUM GLUCONATE, SODIUM ACETATE, POTASSIUM CHLORIDE, MAGNESIUM CHLORIDE, SODIUM PHOSPHATE, DIBASIC, AND POTASSIUM PHOSPHATE .53; .5; .37; .037; .03; .012; .00082 G/100ML; G/100ML; G/100ML; G/100ML; G/100ML; G/100ML; G/100ML
1000 INJECTION, SOLUTION INTRAVENOUS ONCE
Status: COMPLETED | OUTPATIENT
Start: 2020-02-06 | End: 2020-02-06

## 2020-02-06 RX ORDER — NEOSTIGMINE METHYLSULFATE 1 MG/ML
INJECTION INTRAVENOUS AS NEEDED
Status: DISCONTINUED | OUTPATIENT
Start: 2020-02-06 | End: 2020-02-06 | Stop reason: SURG

## 2020-02-06 RX ORDER — SODIUM CHLORIDE 9 MG/ML
INJECTION, SOLUTION INTRAVENOUS CONTINUOUS PRN
Status: DISCONTINUED | OUTPATIENT
Start: 2020-02-06 | End: 2020-02-06 | Stop reason: SURG

## 2020-02-06 RX ORDER — GLYCOPYRROLATE 0.2 MG/ML
INJECTION INTRAMUSCULAR; INTRAVENOUS AS NEEDED
Status: DISCONTINUED | OUTPATIENT
Start: 2020-02-06 | End: 2020-02-06 | Stop reason: SURG

## 2020-02-06 RX ORDER — SODIUM CHLORIDE, SODIUM GLUCONATE, SODIUM ACETATE, POTASSIUM CHLORIDE, MAGNESIUM CHLORIDE, SODIUM PHOSPHATE, DIBASIC, AND POTASSIUM PHOSPHATE .53; .5; .37; .037; .03; .012; .00082 G/100ML; G/100ML; G/100ML; G/100ML; G/100ML; G/100ML; G/100ML
50 INJECTION, SOLUTION INTRAVENOUS CONTINUOUS
Status: DISCONTINUED | OUTPATIENT
Start: 2020-02-07 | End: 2020-02-09

## 2020-02-06 RX ORDER — ONDANSETRON 2 MG/ML
INJECTION INTRAMUSCULAR; INTRAVENOUS AS NEEDED
Status: DISCONTINUED | OUTPATIENT
Start: 2020-02-06 | End: 2020-02-06 | Stop reason: SURG

## 2020-02-06 RX ORDER — FENTANYL CITRATE 50 UG/ML
INJECTION, SOLUTION INTRAMUSCULAR; INTRAVENOUS AS NEEDED
Status: DISCONTINUED | OUTPATIENT
Start: 2020-02-06 | End: 2020-02-06 | Stop reason: SURG

## 2020-02-06 RX ADMIN — ONDANSETRON 4 MG: 2 INJECTION INTRAMUSCULAR; INTRAVENOUS at 20:35

## 2020-02-06 RX ADMIN — PHENYLEPHRINE HYDROCHLORIDE 100 MCG: 10 INJECTION INTRAVENOUS at 18:32

## 2020-02-06 RX ADMIN — FENTANYL CITRATE 25 MCG: 50 INJECTION, SOLUTION INTRAMUSCULAR; INTRAVENOUS at 16:11

## 2020-02-06 RX ADMIN — ONDANSETRON 4 MG: 2 INJECTION INTRAMUSCULAR; INTRAVENOUS at 04:03

## 2020-02-06 RX ADMIN — ACETAMINOPHEN 975 MG: 650 SUSPENSION ORAL at 00:34

## 2020-02-06 RX ADMIN — FENTANYL CITRATE 25 MCG: 50 INJECTION, SOLUTION INTRAMUSCULAR; INTRAVENOUS at 17:29

## 2020-02-06 RX ADMIN — METRONIDAZOLE 500 MG: 500 TABLET ORAL at 14:13

## 2020-02-06 RX ADMIN — CEFEPIME HYDROCHLORIDE 2000 MG: 2 INJECTION, POWDER, FOR SOLUTION INTRAVENOUS at 12:28

## 2020-02-06 RX ADMIN — NEOSTIGMINE METHYLSULFATE 2 MG: 1 INJECTION INTRAVENOUS at 18:22

## 2020-02-06 RX ADMIN — Medication 20 MG: at 08:38

## 2020-02-06 RX ADMIN — SODIUM CHLORIDE: 0.9 INJECTION, SOLUTION INTRAVENOUS at 15:55

## 2020-02-06 RX ADMIN — PHENYLEPHRINE HYDROCHLORIDE 50 MCG/MIN: 10 INJECTION INTRAVENOUS at 16:31

## 2020-02-06 RX ADMIN — PHENYLEPHRINE HYDROCHLORIDE 100 MCG: 10 INJECTION INTRAVENOUS at 16:28

## 2020-02-06 RX ADMIN — GABAPENTIN 100 MG: 250 SUSPENSION ORAL at 08:39

## 2020-02-06 RX ADMIN — SODIUM CHLORIDE 8 UNITS/HR: 9 INJECTION, SOLUTION INTRAVENOUS at 00:40

## 2020-02-06 RX ADMIN — LIDOCAINE HYDROCHLORIDE 50 MG: 10 INJECTION, SOLUTION EPIDURAL; INFILTRATION; INTRACAUDAL; PERINEURAL at 16:11

## 2020-02-06 RX ADMIN — METRONIDAZOLE 500 MG: 500 TABLET ORAL at 21:53

## 2020-02-06 RX ADMIN — GLYCOPYRROLATE 0.4 MG: 0.2 INJECTION, SOLUTION INTRAMUSCULAR; INTRAVENOUS at 18:22

## 2020-02-06 RX ADMIN — SODIUM CHLORIDE, SODIUM GLUCONATE, SODIUM ACETATE, POTASSIUM CHLORIDE, MAGNESIUM CHLORIDE, SODIUM PHOSPHATE, DIBASIC, AND POTASSIUM PHOSPHATE 1000 ML: .53; .5; .37; .037; .03; .012; .00082 INJECTION, SOLUTION INTRAVENOUS at 21:39

## 2020-02-06 RX ADMIN — CHLORHEXIDINE GLUCONATE 0.12% ORAL RINSE 15 ML: 1.2 LIQUID ORAL at 08:38

## 2020-02-06 RX ADMIN — CEFEPIME HYDROCHLORIDE 2000 MG: 2 INJECTION, POWDER, FOR SOLUTION INTRAVENOUS at 00:22

## 2020-02-06 RX ADMIN — DEXTROSE AND SODIUM CHLORIDE 150 ML/HR: 5; .45 INJECTION, SOLUTION INTRAVENOUS at 12:23

## 2020-02-06 RX ADMIN — ONDANSETRON 4 MG: 2 INJECTION INTRAMUSCULAR; INTRAVENOUS at 10:08

## 2020-02-06 RX ADMIN — PROPOFOL 120 MG: 10 INJECTION, EMULSION INTRAVENOUS at 16:11

## 2020-02-06 RX ADMIN — OXYCODONE HYDROCHLORIDE 5 MG: 5 SOLUTION ORAL at 00:34

## 2020-02-06 RX ADMIN — NEOSTIGMINE METHYLSULFATE 2 MG: 1 INJECTION INTRAVENOUS at 18:26

## 2020-02-06 RX ADMIN — ENOXAPARIN SODIUM 40 MG: 40 INJECTION SUBCUTANEOUS at 08:39

## 2020-02-06 RX ADMIN — ACETAMINOPHEN 975 MG: 650 SUSPENSION ORAL at 05:53

## 2020-02-06 RX ADMIN — METRONIDAZOLE 500 MG: 500 TABLET ORAL at 05:53

## 2020-02-06 RX ADMIN — ETHIODIZED OIL 50 ML: 480 INJECTION INTRA-ARTERIAL; INTRALYMPHATIC; INTRAUTERINE at 17:56

## 2020-02-06 RX ADMIN — ONDANSETRON 4 MG: 2 INJECTION INTRAMUSCULAR; INTRAVENOUS at 18:18

## 2020-02-06 RX ADMIN — GABAPENTIN 100 MG: 250 SUSPENSION ORAL at 20:11

## 2020-02-06 RX ADMIN — CHLORHEXIDINE GLUCONATE 0.12% ORAL RINSE 15 ML: 1.2 LIQUID ORAL at 20:35

## 2020-02-06 RX ADMIN — PHENYLEPHRINE HYDROCHLORIDE 300 MCG: 10 INJECTION INTRAVENOUS at 16:30

## 2020-02-06 RX ADMIN — GLYCOPYRROLATE 0.4 MG: 0.2 INJECTION, SOLUTION INTRAMUSCULAR; INTRAVENOUS at 18:26

## 2020-02-06 RX ADMIN — FENTANYL CITRATE 50 MCG: 50 INJECTION, SOLUTION INTRAMUSCULAR; INTRAVENOUS at 15:56

## 2020-02-06 RX ADMIN — ROCURONIUM BROMIDE 20 MG: 50 INJECTION, SOLUTION INTRAVENOUS at 17:29

## 2020-02-06 RX ADMIN — OXYCODONE HYDROCHLORIDE 2.5 MG: 5 SOLUTION ORAL at 20:10

## 2020-02-06 RX ADMIN — PHENYLEPHRINE HYDROCHLORIDE 100 MCG: 10 INJECTION INTRAVENOUS at 18:24

## 2020-02-06 RX ADMIN — MELATONIN 3 MG: 3 TAB ORAL at 21:53

## 2020-02-06 RX ADMIN — ROCURONIUM BROMIDE 50 MG: 50 INJECTION, SOLUTION INTRAVENOUS at 16:11

## 2020-02-06 RX ADMIN — SODIUM CHLORIDE, SODIUM GLUCONATE, SODIUM ACETATE, POTASSIUM CHLORIDE, MAGNESIUM CHLORIDE, SODIUM PHOSPHATE, DIBASIC, AND POTASSIUM PHOSPHATE 500 ML: .53; .5; .37; .037; .03; .012; .00082 INJECTION, SOLUTION INTRAVENOUS at 07:00

## 2020-02-06 NOTE — PROGRESS NOTES
Progress Note - Renetta Tobin 71 y o  male MRN: [id number]    Unit/Bed#: [facility] 416-01 Encounter: [id number]      Assessment:  70 yo M s/p transhiatal esophagectomy 1/28 whose post op course has been complicated by left recurrent laryngeal nerve injury and thoracic duct injury  Left CT 30 cc out, serous, no AL  Right CT 3 1 L out, chylous, no AL    Plan:  Diet NPO  Continue chest tubes to suction and trend output  IR for thoracic duct embolization today  ENT for possible cord injection on 02/07  Barium swallow on 02/08 to evaluate conduit and anastomosis  Continue to monitor for any drainage from midline, do not open  Remaining care per critical care      Subjective:   No acute events overnight  Objective:     Vitals: Blood pressure 109/69, pulse 98, temperature 97 5 °F (36 4 °C), temperature source Oral, resp  rate (!) 24, height 5' 10" (1 778 m), weight 81 6 kg (179 lb 14 3 oz), SpO2 97 %  ,Body mass index is 25 81 kg/m²      I/O       02/04 0701 - 02/05 0700 02/05 0701 - 02/06 0700    I V  (mL/kg) 115 8 (1 4) 80 9 (1)    NG/ 370    IV Piggyback 350 200    Feedings 1484 1375    Total Intake(mL/kg) 2859 8 (35) 2025 9 (24 8)    Urine (mL/kg/hr) 1275 (0 7) 705 (0 4)    Emesis/NG output 0     Drains  0    Stool 0 0    Chest Tube 2340 2910    Total Output 3615 3615    Net -755 2 -1589 1          Unmeasured Stool Occurrence 5 x 1 x              Physical Exam:   Gen: NAD, A&O, Comfortable   Chest: Normal work of breathing, no resp distress, L CT w/ 30 out serous, R CT w/ 3100 out chylous  Abd: S, ND, NT, incision CDI, J-tube in place  Ext: No edema  Skin: warm, dry, intact        Scheduled Meds:    Current Facility-Administered Medications:  acetaminophen 975 mg Per J Tube Q6H [address] [place]    albuterol 2 5 mg Nebulization Q6H PRN Jarad Darrel [place]    cefepime 2,000 mg Intravenous Q12H Jarad Darrel [place] Last Rate: 2,000 mg (02/06/20 0022)   chlorhexidine 15 mL Swish & Spit Q12H [facility] Karen Dumont Claude [place]    enoxaparin 40 mg Subcutaneous Q24H [facility] Vesna Erin [place]    fentanyl citrate (PF) 25 mcg Intravenous Q2H PRN Vesna Erin [place]    gabapentin 100 mg Per J Tube TID Vesna Erin [place]    insulin regular (HumuLIN R,NovoLIN R) infusion 0 3-21 Units/hr Intravenous Titrated Aurea Macdonald PA-C Last Rate: 4 Units/hr (02/06/20 0215)   melatonin 3 mg Per J Tube HS Aurea Macdonald PA-C    methocarbamol 500 mg Per J Tube Q6H PRN Vesna Erin [place]    metroNIDAZOLE 500 mg Oral [facility] Tomasz Medellin PA-C    omeprazole (PRILOSEC) suspension 2 mg/mL 20 mg Per J Tube Daily Vesna Erin [place]    ondansetron 4 mg Intravenous Q6H PRN Vesna Erin [place]    oxyCODONE 2 5 mg Oral Q4H PRN Angel Luis Franz PA-C    oxyCODONE 5 mg Oral Q4H PRN Angel Luis Franz PA-C      Continuous Infusions:    insulin regular (HumuLIN R,NovoLIN R) infusion 0 3-21 Units/hr Last Rate: 4 Units/hr (02/06/20 0215)     PRN Meds: albuterol    fentanyl citrate (PF)    methocarbamol    ondansetron    oxyCODONE    oxyCODONE      Invasive Devices     Central Venous Catheter Line            Port A Cath 09/26/19 Left Chest 132 days    CVC Central Lines 02/02/20 Triple 16cm 4 days          Drain            Gastrostomy/Enterostomy Jejunostomy 14 Fr   days    Open Drain Anterior; Left Neck 8 days    Chest Tube 1 Left Pleural 3 days    Chest Tube 3 Right Pleural 3 days                Lab, Imaging and other studies:   Recent Labs     02/04/20  0407 02/05/20  0433 02/06/20  0425   WBC 8 46 10 48* 11 55*   HGB 9 9* 11 1* 12 0    261 343     Recent Labs     02/04/20  0407 02/05/20  0433 02/06/20  0425   SODIUM 144 141 142   K 3 7 3 7 3 9   * 110* 112*   CO2 25 25 25   BUN 34* 36* 42*   CREATININE 1 05 1 06 1 08   PHOS 2 1* 2 5  --    MG 2 4  --   --    CALCIUM 7 9* 8 0* 8 2*           VTE Pharmacologic Prophylaxis: lovenox  VTE Mechanical Prophylaxis: sequential compression device

## 2020-02-06 NOTE — BRIEF OP NOTE (RAD/CATH)
IR OTHER Procedure Note    PATIENT NAME: Adalgisa Mcfarlane  : 1955  MRN: [id number]    Pre-op Diagnosis:   1  Paralysis of left vocal fold    2  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[address] )    3  Dysphonia    4  Pleural effusion, bilateral    5  Encounter for care related to feeding tube      Post-op Diagnosis:   1  Paralysis of left vocal fold    2  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[address] )    3  Dysphonia    4  Pleural effusion, bilateral    5  Encounter for care related to feeding tube        Surgeon:   Rachel Partida MD  Assistants:     No qualified resident was available, Resident is only observing    Estimated Blood Loss: 0 ml  Findings:   Successful thoracic duct embolization of a transected thoracic duct  Specimens: None  Complications:  None  Anesthesia: General    Rachel Partida MD     Date: 2020  Time: 6:36 PM     Thank you for allowing me to participate in the care of Adalgisa Mcfarlane  Please don't hesitate to call, text, email, or TigerText with any questions  Rachel Partida MD  Interventional Radiologist  [facility]  Personal Cell: [phone]  [email or web address]

## 2020-02-06 NOTE — PROGRESS NOTES
Progress Note - Philipp Mir 71 y o  male MRN: [id number]    Unit/Bed#: [facility] 416-01 Encounter: [id number]      Assessment:  72 yo M s/p transhiatal esophagectomy 1/28 whose post op course has been complicated by left recurrent laryngeal nerve injury and thoracic duct injury  Left CT 30 cc out, serous, no AL  Right CT 3 1 L out, chylous, no AL      Plan:  Diet NPO  Continue chest tubes to suction and trend output  IR for thoracic duct embolization today  ENT for possible cord injection on 02/07  Barium swallow on 02/08 to evaluate conduit and anastomosis  Continue to monitor for any drainage from midline, do not open  Remaining care per critical care      Subjective:   No acute events overnight  Patient endorsed some nausea this morning no emesis  The patient is ready for his procedure today  Objective:     Vitals: Blood pressure 109/69, pulse 98, temperature 97 5 °F (36 4 °C), temperature source Oral, resp  rate (!) 24, height 5' 10" (1 778 m), weight 81 6 kg (179 lb 14 3 oz), SpO2 97 %  ,Body mass index is 25 81 kg/m²      I/O       02/04 0701 - 02/05 0700 02/05 0701 - 02/06 0700    I V  (mL/kg) 115 8 (1 4) 80 9 (1)    NG/ 370    IV Piggyback 350 200    Feedings 1484 1375    Total Intake(mL/kg) 2859 8 (35) 2025 9 (24 8)    Urine (mL/kg/hr) 1275 (0 7) 705 (0 4)    Emesis/NG output 0     Drains  0    Stool 0 0    Chest Tube 2340 2910    Total Output 3615 3615    Net -755 2 -1589 1          Unmeasured Stool Occurrence 5 x 1 x              Physical Exam:   Gen: NAD, A&O, Comfortable   Chest: Normal work of breathing, no resp distress, L CT w/ 30 out serous, R CT w/ 3100 out chylous  Abd: S, ND, NT, incision CDI, J-tube in place  Ext: No edema  Skin: warm, dry, intact        Scheduled Meds:    Current Facility-Administered Medications:  acetaminophen 975 mg Per J Tube Q6H [address] [place]    albuterol 2 5 mg Nebulization Q6H PRN Wilburt Felix [place]    cefepime 2,000 mg Intravenous Q12H Lawanda Serrano Claude [place] Last Rate: 2,000 mg (02/06/20 0022)   chlorhexidine 15 mL Swish & Spit Q12H [address] Gene Payne    enoxaparin 40 mg Subcutaneous Q24H [address] Gene [place]com[place]    fentanyl citrate (PF) 25 mcg Intravenous Q2H PRN Gene [place]-Me    gabapentin 100 mg Per J Tube TID eGne [place]    insulin regular (HumuLIN R,NovoLIN R) infusion 0 3-21 Units/hr Intravenous Titrated Boaz Agarwal PA-C Last Rate: 4 Units/hr (02/06/20 0215)   melatonin 3 mg Per J Tube HS Boaz Agarwal PA-C    methocarbamol 500 mg Per J Tube Q6H PRN Gene [place]    metroNIDAZOLE 500 mg Oral [facility] Tomasz Medellin PA-C    omeprazole (PRILOSEC) suspension 2 mg/mL 20 mg Per J Tube Daily Gene [place]    ondansetron 4 mg Intravenous Q6H PRN Gene [place]    oxyCODONE 2 5 mg Oral Q4H PRN Aixa Hassan PA-C    oxyCODONE 5 mg Oral Q4H PRN Aixa Hassan PA-C      Continuous Infusions:    insulin regular (HumuLIN R,NovoLIN R) infusion 0 3-21 Units/hr Last Rate: 4 Units/hr (02/06/20 0215)     PRN Meds: albuterol    fentanyl citrate (PF)    methocarbamol    ondansetron    oxyCODONE    oxyCODONE      Invasive Devices     Central Venous Catheter Line            Port A Cath 09/26/19 Left Chest 132 days    CVC Central Lines 02/02/20 Triple 16cm 4 days          Drain            Gastrostomy/Enterostomy Jejunostomy 14 Fr   days    Open Drain Anterior; Left Neck 8 days    Chest Tube 1 Left Pleural 3 days    Chest Tube 3 Right Pleural 3 days                Lab, Imaging and other studies:   Recent Labs     02/04/20 0407 02/05/20  0433 02/06/20  0425   WBC 8 46 10 48* 11 55*   HGB 9 9* 11 1* 12 0    261 343     Recent Labs     02/04/20  0407 02/05/20  0433 02/06/20  0425   SODIUM 144 141 142   K 3 7 3 7 3 9   * 110* 112*   CO2 25 25 25   BUN 34* 36* 42*   CREATININE 1 05 1 06 1 08   PHOS 2 1* 2 5  --    MG 2 4  --   --    CALCIUM 7 9* 8 0* 8 2*           VTE Pharmacologic Prophylaxis: lovenox  VTE Mechanical Prophylaxis: sequential compression device

## 2020-02-06 NOTE — CONSULTS
Consultation - Interventional Radiology  Waldo Bray 71 y o  male MRN: [id number]  Unit/Bed#: [facility] 416-01 Encounter: [id number]        Consults    ASSESSMENT/PLAN:    79-year-old male status post transhiatal esophagectomy 5/11 complicated by postop left recurrent laryngeal nerve injury and thoracic injury, right chylothorax    Left chest tube output 24 hours 50 cc serosanguineous  Right chest tube out with 24 hours 3280 cc murky    - will plan for thoracic duct embolization today  - patient has been NPO  Please keep NPO for procedure  - chest tubes to suction per thoracic  - appreciate ENT for left recurrent laryngeal nerve injury  Scheduled for OR 2/7 with ENT  Swallow study to follow after ENT procedure  - monitor blood pressure  Patient has soft pressures overnight    Most recent blood pressure 91/62    Problem List     * (Principal) 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HCC)    COPD (chronic obstructive pulmonary disease) (HCC)    Headaches due to old head injury    High cholesterol    Obstructive sleep apnea syndrome    Type 2 diabetes mellitus with hyperglycemia, without long-term current use of insulin ([facility] )    Lab Results   Component Value Date    HGBA1C 5 4 01/16/2020       Recent Labs     02/06/20  0357 02/06/20  0554 02/06/20  0841 02/06/20  1023   POCGLU 113 101 137 127       Blood Sugar Average: Last 72 hrs:  (P) 579 [id number]        Malignant neoplasm of lower third of esophagus (HCC)    Esophageal obstruction    Chemotherapy induced neutropenia (HCC)    Anemia, unspecified    Insomnia due to medical condition    Encounter for care related to feeding tube    Paralysis of left vocal fold    Overview Signed 2/1/2020  2:57 PM by Radha Butt MD     Added automatically from request for surgery [id number]         Dysphonia    Overview Signed 2/1/2020  2:57 PM by Radha Butt MD     Added automatically from request for surgery [id number]               Reason for Consult / Principal Problem:  Right chylothorax    HPI: Salena Goldmann is a 71y o  year old male with past medical history of GE junction carcinoma, status post transhiatal esophagectomy 1/28 with subsequent right chylothorax, left recurrence laryngeal nerve injury  IR consulted for thoracic duct embolization  Patient had trans hiatal esophagectomy on 01/28  Patient had subsequent bilateral pleural effusions which required bilateral thoracentesis on 01/30  No growth from fluid  Patient began having hoarseness  He required high-flow nasal cannula  ENT was consulted due to persistent hoarseness which revealed left vocal cord paresis/paralysis  Patient had cardiac arrest on 2/2 likely secondary to respiratory failure with 5 minutes of CPR performed  Bilateral chest tubes were placed after CT imaging on 02/02  Right pleural fluid at that time revealed triglycerides which was diagnostic for chylothorax  Currently patient states he had nausea this morning  He continues to have hoarseness  Bilateral chest tubes in place to suction  Right chest tube with 3280cc cloudy serosanguineous output  Denies fevers or chills  Would like to drink water  Tube feeds held due to patient having diarrhea which has improved  Review of Systems   Constitutional: Negative for chills and fever  HENT: Positive for voice change  Negative for congestion  Respiratory: Negative for choking, chest tightness and shortness of breath  Cardiovascular: Negative for chest pain and palpitations  Gastrointestinal: Positive for diarrhea and nausea  Negative for abdominal distention, abdominal pain and vomiting  Genitourinary: Negative for dysuria  Musculoskeletal: Negative for gait problem  Skin: Negative for rash  Neurological: Negative for light-headedness, numbness and headaches  Hematological: Does not bruise/bleed easily  Psychiatric/Behavioral: Negative for behavioral problems, confusion and decreased concentration           Past Medical History:  Past Medical History:   Diagnosis Date    [facility]     Diabetes mellitus (HCC)     Difficulty swallowing     Disease of thyroid gland     Esophageal mass     History of chemotherapy     History of transfusion     Malignant neoplasm of lower third of esophagus [facility]     Port-A-Cath in place     LCW    Sleep apnea     no CPAP       Past Surgical History:  Past Surgical History:   Procedure Laterality Date    BACK SURGERY      x2    DISC REMOVAL      ESOPHAGECTOMY N/A 2020    Procedure: ESOPHAGECTOMY, TRANSHIATAL;  Surgeon: Cullen Torres MD;  [facility];  Service: Surgical Oncology    ESOPHAGOGASTRODUODENOSCOPY N/A 2020    Procedure: ESOPHAGOGASTRODUODENOSCOPY (EGD); Surgeon: Cullen Torres MD;  [facility];  Service: Surgical Oncology    FL GUIDED CENTRAL VENOUS ACCESS DEVICE INSERTION  2019    GASTROJEJUNOSTOMY W/ JEJUNOSTOMY TUBE N/A 2019    Procedure: INSERTION JEJUNOSTOMY TUBE OPEN;  Surgeon: Cullen Torres MD;  [facility];  Service: Surgical Oncology    TONSILLECTOMY      TUNNELED VENOUS PORT PLACEMENT N/A 2019    Procedure: INSERTION VENOUS PORT (PORT-A-CATH);   Surgeon: Cullen Torres MD;  [facility];  Service: Surgical Oncology       Social History:  Social History     Substance and Sexual Activity   Alcohol Use Not Currently    Alcohol/week: 0 0 standard drinks    Frequency: Never    Drinks per session: 1 or 2    Binge frequency: Never     Social History     Substance and Sexual Activity   Drug Use Never     Social History     Tobacco Use   Smoking Status Former Smoker    Packs/day: 0 50    Years: 50 00    Pack years: 25 00    Types: Cigarettes    Last attempt to quit: 2017    Years since quittin 1   Smokeless Tobacco Never Used       Family History:  Family History   Problem Relation Age of Onset    Diabetes Mother     No Known Problems Father Allergies: Allergies   Allergen Reactions    Penicillins Itching       Medications:  Medications Prior to Admission   Medication    acetaminophen (TYLENOL) 160 mg/5 mL suspension    canagliflozin (INVOKANA) 100 mg    gabapentin (NEURONTIN) 300 mg capsule    levothyroxine 25 mcg tablet    pantoprazole (PROTONIX) 40 mg tablet    rOPINIRole (REQUIP) 0 25 mg tablet    albuterol (2 5 mg/3 mL) 0 083 % nebulizer solution    Nutritional Supplements (JEVITY 1 5 SHUN) LIQD     Current Facility-Administered Medications   Medication Dose Route Frequency    acetaminophen (TYLENOL) oral suspension 975 mg  975 mg Per J Tube Q6H [facility]    albuterol inhalation solution 2 5 mg  2 5 mg Nebulization Q6H PRN    cefepime (MAXIPIME) 2,000 mg in dextrose 5 % 50 mL IVPB  2,000 mg Intravenous Q12H    chlorhexidine (PERIDEX) 0 12 % oral rinse 15 mL  15 mL Swish & Spit Q12H [facility]    enoxaparin (LOVENOX) subcutaneous injection 40 mg  40 mg Subcutaneous Q24H TANNER    fentanyl citrate (PF) 100 MCG/2ML 25 mcg  25 mcg Intravenous Q2H PRN    gabapentin (NEURONTIN) oral solution 100 mg  100 mg Per J Tube TID    insulin regular (HumuLIN R,NovoLIN R) 1 Units/mL in sodium chloride 0 9 % 100 mL infusion  0 3-21 Units/hr Intravenous Titrated    melatonin tablet 3 mg  3 mg Per J Tube HS    methocarbamol (ROBAXIN) tablet 500 mg  500 mg Per J Tube Q6H PRN    metroNIDAZOLE (FLAGYL) tablet 500 mg  500 mg Oral Q8H [facility]    omeprazole (PRILOSEC) suspension 2 mg/mL  20 mg Per J Tube Daily    ondansetron (ZOFRAN) injection 4 mg  4 mg Intravenous Q6H PRN    oxyCODONE (ROXICODONE) oral solution 2 5 mg  2 5 mg Oral Q4H PRN    oxyCODONE (ROXICODONE) oral solution 5 mg  5 mg Oral Q4H PRN       Vitals:  BP 91/62 (BP Location: Left arm)   Pulse (!) 106   Temp 97 7 °F (36 5 °C) (Oral)   Resp 17   Ht 5' 10" (1 778 m)   Wt 79 6 kg (175 lb 7 8 oz)   SpO2 97%   BMI 25 18 kg/m²   Body mass index is 25 18 kg/m²    Weight (last 2 days)     Date/Time Weight    02/06/20 0533   79 6 (175 49)    02/05/20 0600   81 6 (179 9)    02/04/20 0600   80 2 (176 81)              I/Os:    Intake/Output Summary (Last 24 hours) at 2/6/2020 1031  Last data filed at 2/6/2020 1000  Gross per 24 hour   Intake 2171 9 ml   Output 3680 ml   Net -1508 1 ml       PHYSICAL EXAM  General appearance: alert and oriented, in no acute distress and fatigued  Skin: Skin color, texture, turgor normal  No rashes or lesions  Head: Normocephalic, without obvious abnormality  Heart: regular rate and rhythm, S1, S2 normal, no murmur, click, rub or gallop  Lungs: clear to auscultation bilaterally, bilateral chest tubes present  Abdomen: soft, non-tender; bowel sounds normal; no masses,  no organomegaly, j tube in place  Back: negative  Rectal: deferred  Neurological: aaox3, hoarse voice    Lab Results and Cultures:   CBC with diff:   Lab Results   Component Value Date    WBC 11 55 (H) 02/06/2020    HGB 12 0 02/06/2020    HCT 40 1 02/06/2020    MCV 87 02/06/2020     02/06/2020    MCH 26 0 (L) 02/06/2020    MCHC 29 9 (L) 02/06/2020    RDW 17 2 (H) 02/06/2020    MPV 10 0 02/06/2020    NRBC 0 02/06/2020      BMP/CMP:  Lab Results   Component Value Date    K 3 9 02/06/2020     (H) 02/06/2020    CO2 25 02/06/2020    CO2  02/01/2020      Comment:      Out of Reportable Range    BUN 42 (H) 02/06/2020    CREATININE 1 08 02/06/2020    GLUCOSE 339 (H) 02/01/2020    CALCIUM 8 2 (L) 02/06/2020    AST 55 (H) 01/28/2020    ALT 49 01/28/2020    ALKPHOS 43 (L) 01/28/2020    EGFR 72 02/06/2020   ,     Coags:   Lab Results   Component Value Date    PTT 34 01/16/2020    INR 0 99 01/16/2020   ,          Lipid Panel: No results found for: CHOL  Lab Results   Component Value Date    HDL 24 (L) 02/03/2020     Lab Results   Component Value Date    HDL 24 (L) 02/03/2020     Lab Results   Component Value Date    LDLCALC 57 02/03/2020     Lab Results   Component Value Date    TRIG 157 (H) 02/03/2020       HgbA1c: Lab Results   Component Value Date    HGBA1C 5 4 01/16/2020    HGBA1C 7 6 08/15/2019       Blood Culture:   Lab Results   Component Value Date    BLOODCX No Growth After 4 Days  02/02/2020   ,   Urinalysis: No results found for: Graydon Mykel, SPECGRAV, PHUR, LEUKOCYTESUR, NITRITE, PROTEINUA, GLUCOSEU, KETONESU, BILIRUBINUR, BLOODU,   Urine Culture: No results found for: URINECX,   Wound Culure:  No results found for: WOUNDCULT    Imaging Studies: I have personally reviewed pertinent reports  Counseling / Coordination of Care  Total time spent today  30 minutes  Greater than 50% of total time was spent with the patient and / or family counseling and / or coordination of care  Thank you for allowing me to participate in the care of Salena Goldmann  Please don't hesitate to call, text, email, or TigerText with any questions  This text is generated with voice recognition software  There may be translation, syntax,  or grammatical errors  If you have any questions, please contact the dictating provider      Mitchell Day PA-C

## 2020-02-06 NOTE — PHYSICAL THERAPY NOTE
Physical Therapy Cancellation Note      Chart reviewed; noted pt is off the floor in IR this PM; will follow as clinical course allows      Lubna Camarillo, PT

## 2020-02-06 NOTE — ANESTHESIA PREPROCEDURE EVALUATION
Review of Systems/Medical History  Patient summary reviewed  Chart reviewed  No history of anesthetic complications     Cardiovascular  Hyperlipidemia,    Pulmonary  Smoker ex-smoker  , COPD moderate- medication dependent , Sleep apnea ,   Comment: Left vocal cord paralysis; Right chylothorax     GI/Hepatic  Dysphagia,   GERD , Esophageal disease (esophageal mass s/p excision) esophageal cancer,   Comment: H/o chemo;  portacath in place          Endo/Other  Diabetes type 2 , History of thyroid disease , hypothyroidism,      GYN       Hematology  Anemia ,     Musculoskeletal       Neurology   Psychology           Physical Exam    Airway    Mallampati score: II  TM Distance: >3 FB  Neck ROM: full     Dental       Cardiovascular  Rhythm: regular, Rate: normal,     Pulmonary  Decreased breath sounds,     Other Findings        Anesthesia Plan  ASA Score- 3     Anesthesia Type- general with ASA Monitors  Additional Monitors:   Airway Plan: ETT  Plan Factors-    Induction- intravenous  Postoperative Plan-     Informed Consent- Anesthetic plan and risks discussed with patient  I personally reviewed this patient with the CRNA  Discussed and agreed on the Anesthesia Plan with the CRNA  Jeny Padilla

## 2020-02-06 NOTE — SEDATION DOCUMENTATION
Patient being monitored in IR holding area  VSS  Dr Lissa Campbell in to speak with patient and consent obtained for thoracic duct embo  Awaiting anesthesia and IR suite availability  NAD

## 2020-02-06 NOTE — PROGRESS NOTES
Daily Progress Note - Critical Care   Vani Cason 71 y o  male MRN: [id number]  Unit/Bed#: [facility] 416-01 Encounter: [id number]    ----------------------------------------------------------------------------------------  HPI/24hr events: Pt had a few transient episodes of asymptomatic hypotension overnight with SBPs in 80s  One occurrence after receiving 5 mg Oxycodone, 2nd occurrence without any medications  Levophed has been off since 2/4 evening  MAPs otherwise have been >65  This morning when seeing him his pressure was 79/51 MAP 56  He is currently receiving 500 cc bolus  His tube feeds were stopped yesterday 2/2 diarrhea  BMs have still been diarrhea since stopping but frequency has decreased  Only 1 BM overnight  Plan to go for IR thoracic duct embolization at 1230 today      ---------------------------------------------------------------------------------------  SUBJECTIVE  Pt complains of coughing and bringing up flem, especially when moving and sitting up in chair  Pt also reports he is very fixated on when he can drink something  Otherwise no other complaints or pain  ROS as below  Review of Systems   Constitutional: Negative  Negative for chills, diaphoresis, fatigue and fever  HENT: Negative  Eyes: Negative  Respiratory: Positive for cough  Negative for apnea, chest tightness, shortness of breath and wheezing  Cardiovascular: Negative for chest pain and palpitations  Gastrointestinal: Positive for diarrhea  Negative for abdominal pain, constipation, nausea and vomiting  Endocrine: Negative  Genitourinary: Negative  Negative for difficulty urinating, dysuria, frequency and hematuria  Musculoskeletal: Negative  Negative for arthralgias, back pain, joint swelling, myalgias and neck pain  Skin: Negative  Allergic/Immunologic: Negative  Neurological: Negative  Negative for dizziness, syncope, weakness, light-headedness and numbness  Hematological: Negative  Psychiatric/Behavioral: Negative  Review of systems was reviewed and negative unless stated above in HPI/24-hour events   ---------------------------------------------------------------------------------------  Assessment and Plan:    Plan:    Neuro:  Diagnosis: No active issues   Plan:  · Management of PAD  · Analgesia - scheduled Tylenol and gabapentin, p r n  Oxycodone  · Delirium monitoring/management  · Regulate Sleep-wake cycle/CAM-ICU daily  · Melatonin 3 mg q h s  · Serial Neuro Exams    Cardiac  Diagnosis:  Hypotension, s/p bradycardic hypoxic arrest 2/1 with ROSC  Plan:  · Few episodes of transient hypotension, lowest SBP in 70s  Currently receiving 500 cc bolus, will reassess responsiveness  · Per thoracic recommendations, will also give free water via J-tube for hypotension  · Levophed off since 2/4 evening  · Map goal >70  · Home Lopressor on hold    Pulmonary  Diagnosis:  Acute hypoxic respiratory failure, resolved  B/l chylothorax/pleural effusions with b/l chest tubes, aspiration pneumonia, vocal cord dysfunction  Plan:  · Extubated 2/2, remains on room air, goal SpO2 greater than 92%  · Continue CTs to suction  · R CT: 3 1 L/24 hrs, 200+cc/hr, to suction, -AL, serosanguineous/milky fluid    · L CT: 30 mL/24 hrs, to suction, -AL, serosanguineous/milky fluid   · Assess daily readiness to extubate: SBT in coordination with SAT  · Cefepime/Flagyl for aspiration pneumonia, start date 2/1  · ENT evaluation 2/7 for possible left vocal cord injection and medialization, NPO until that time    Barium swallow after procedure    Gastrointestinal  Diagnosis:  S/p transhiatal esophagectomy 1/28, previously placed J-tube, diarrhea  Plan:  · Thoracics and Surgical Oncology following  · Tube feeds were switched to medium chain fatty acids for chylothorax, now currently on hold secondary to diarrhea new since changing feeds  · Diarrhea frequency has improved since holding feeds   · Bowel regimen currently on hold with diarrhea  · P r n  Zofran for nausea, stress ulcer prophylaxis with Prilosec    FEN  · Currently receiving 500 cc bolus  · Nutrition/diet plan:  Tube feeds on hold secondary to diarrhea, NPO until ENT and swallow evaluation  · Replete electrolytes with goals: K >4 0, Mag >2 0, and Phos >3 0    Genitourinary  Diagnosis:  MARTHA, improving  Urinary retention  Plan:  · Doshi removed yesterday, was straight cathed once overnight for retention  · Trend UOP and BUN/creat  · Strict I and O    Infectious Diease  Diagnosis:  Aspiration pneumonia  Plan:  · Cefepime and Flagyl, started 2/1  · Vanco/fluconazole 2/1-2/4  · Blood cultures negative x72 hours, MRSA swab negative, pleural fluid Gram stain negative  · Trend temps and WBC count  · Has remained afebrile    Heme:   Diagnosis:  No active issues  Plan:  · Trend hgb and plts  · Transfuse as needed for goal hgb >7    Endo:   Diagnosis: Type 2 DM  Plan:  · Insulin drip 2 U/hr, algorithm 2   · Goal -180mg/dL    MSK/Skin:  · PT/OT  · Turn and position patient Q2 hours  · Off load pressure points  · Allevyn preventative per protocol  · Local wound care to midline incision in underlying fascia dehiscence    Lines:   TLC  J tube   A line     Disposition: Continue Critical Care   Code Status: Level 1 - Full Code  ---------------------------------------------------------------------------------------  ICU CORE MEASURES    Prophylaxis   VTE Pharmacologic Prophylaxis: Enoxaparin (Lovenox)  VTE Mechanical Prophylaxis: sequential compression device  Stress Ulcer Prophylaxis: Pantoprazole PO    ABCDE Protocol (if indicated)  Plan to perform spontaneous awakening trial today? Not applicable  Plan to perform spontaneous breathing trial today? Not applicable  Obvious barriers to extubation?  Not applicable  CAM-ICU: Negative    Invasive Devices Review  Invasive Devices     Central Venous Catheter Line            Port A Cath 09/26/19 Left Chest 132 days    CVC Central Lines 20 Triple 16cm 4 days          Drain            Gastrostomy/Enterostomy Jejunostomy 14 Fr   days    Open Drain Anterior; Left Neck 8 days    Chest Tube 1 Left Pleural 3 days    Chest Tube 3 Right Pleural 3 days              Can any invasive devices be discontinued today? No  ---------------------------------------------------------------------------------------  OBJECTIVE    Vitals   Vitals:    20 2200 20 2300 20 0000 20 0533   BP: 100/61  109/69    BP Location: Left arm  Left arm    Pulse: 94 104 98    Resp: 13 19 (!) 24    Temp:   97 5 °F (36 4 °C)    TempSrc:   Oral    SpO2: 90% 92% 97%    Weight:    79 6 kg (175 lb 7 8 oz)   Height:         Temp (24hrs), Av 1 °F (36 7 °C), Min:97 5 °F (36 4 °C), Max:98 6 °F (37 °C)  Current: Temperature: 97 5 °F (36 4 °C)  HR: 104  BP: 79/51 (MAP 56)  RR: 22  SpO2: 95%    Physical Exam   Constitutional: He is oriented to person, place, and time  He appears well-developed and well-nourished  No distress  HENT:   Head: Normocephalic and atraumatic  Mouth/Throat: Mucous membranes are normal    Eyes: Pupils are equal, round, and reactive to light  Neck: Normal range of motion  Neck supple  Hoarse voice   Cardiovascular: Normal rate, regular rhythm, normal heart sounds and intact distal pulses  Exam reveals no gallop and no friction rub  No murmur heard  Pulmonary/Chest: Effort normal  No respiratory distress  He has no wheezes  He has no rales  Breath sounds diminished in bilateral bases   Abdominal: Soft  Bowel sounds are normal  He exhibits distension  There is no tenderness  Abdomen distended but soft  No tenderness  J-tube in place  Midline incision closed with staples, C/D/I   Musculoskeletal: Normal range of motion  He exhibits no edema  Moves all extremities   Neurological: He is alert and oriented to person, place, and time  He has normal strength  No cranial nerve deficit or sensory deficit     Skin: Skin is warm and dry  Capillary refill takes less than 2 seconds  Midline incision closed with staples, no drainage, C/D/I   Psychiatric: He has a normal mood and affect  His behavior is normal  Judgment and thought content normal    Vitals reviewed        Invasive/non-invasive ventilation settings   Respiratory    Lab Data (Last 4 hours)    None         O2/Vent Data (Last 4 hours)    None                Laboratory and Diagnostics:  Results from last 7 days   Lab Units 02/06/20  0425 02/05/20  0433 02/04/20  0407 02/03/20  0447 02/02/20  1251 02/02/20  0430 02/01/20  2304 02/01/20  2209 02/01/20  0601 01/31/20  0542   WBC Thousand/uL 11 55* 10 48* 8 46 6 59  --  11 78* 7 56  --  10 80* 11 72*   HEMOGLOBIN g/dL 12 0 11 1* 9 9* 9 7* 10 0* 10 0* 11 5*  --  13 9 12 6   I STAT HEMOGLOBIN g/dl  --   --   --   --   --   --   --  12 2  --   --    HEMATOCRIT % 40 1 37 9 33 1* 32 2*  --  31 8* 38 1  --  45 5 40 8   HEMATOCRIT, ISTAT %  --   --   --   --   --   --   --  36*  --   --    PLATELETS Thousands/uL 343 261 229 211  --  257 347  --  398* 192   NEUTROS PCT % 76*  --   --  78*  --  84*  --   --  87* 85*   MONOS PCT % 6  --   --  9  --  8  --   --  6 6     Results from last 7 days   Lab Units 02/06/20  0425 02/05/20  0433 02/04/20  0407 02/03/20  0447 02/02/20  0430 02/01/20  2304 02/01/20  0825   SODIUM mmol/L 142 141 144 145 140 138 136   POTASSIUM mmol/L 3 9 3 7 3 7 4 0 4 0 4 2 4 4   CHLORIDE mmol/L 112* 110* 114* 112* 106 103 105   CO2 mmol/L 25 25 25 27 27 26 25   ANION GAP mmol/L 5 6 5 6 7 9 6   BUN mg/dL 42* 36* 34* 45* 57* 67* 54*   CREATININE mg/dL 1 08 1 06 1 05 1 28 1 63* 2 31* 1 95*   CALCIUM mg/dL 8 2* 8 0* 7 9* 8 4 9 2 8 6 9 4   GLUCOSE RANDOM mg/dL 96 301* 263* 179* 213* 336* 224*     Results from last 7 days   Lab Units 02/05/20  0433 02/04/20  0407 02/03/20  0447 02/02/20  0430 02/01/20  2304 02/01/20  0825 01/31/20  0542   MAGNESIUM mg/dL  --  2 4 2 8* 3 1* 3 7* 3 2* 2 8*   PHOSPHORUS mg/dL 2 5 2 1* 3 2  --  9 2*  -- 3 8           Results from last 7 days   Lab Units 02/03/20  0004 02/02/20  1617 02/02/20  1251   TROPONIN I ng/mL 0 23* 0 38* 0 57*     Results from last 7 days   Lab Units 02/02/20  0431 02/01/20  2304   LACTIC ACID mmol/L 1 1 6 7*     ABG:  Results from last 7 days   Lab Units 02/03/20  0447   PH ART  7 434   PCO2 ART mm Hg 38 0   PO2 ART mm Hg 110 2   HCO3 ART mmol/L 24 9   BASE EXC ART mmol/L 0 7   ABG SOURCE  Line, Arterial     VBG:  Results from last 7 days   Lab Units 02/03/20  0447  02/02/20  0022   PH DEEP   --   --  7 311   PCO2 DEEP mm Hg  --   --  54 6*   PO2 DEEP mm Hg  --   --  44 1   HCO3 DEEP mmol/L  --   --  26 9   BASE EXC DEEP mmol/L  --   --  0 0   ABG SOURCE  Line, Arterial   < >  --     < > = values in this interval not displayed  Micro  Results from last 7 days   Lab Units 02/03/20  1225 02/02/20  0023 02/02/20  0021   BLOOD CULTURE   --  No Growth at 72 hrs  No Growth at 72 hrs  MRSA CULTURE ONLY  No Methicillin Resistant Staphlyococcus aureus (MRSA) isolated  --   --        Imaging: I have personally reviewed pertinent reports  and I have personally reviewed pertinent films in PACS    Intake and Output  I/O       02/04 0701 - 02/05 0700 02/05 0701 - 02/06 0700    I V  (mL/kg) 115 8 (1 4) 89 9 (1 1)    NG/ 370    IV Piggyback 350 200    Feedings 1484 1375    Total Intake(mL/kg) 2859 8 (35) 2034 9 (25 6)    Urine (mL/kg/hr) 1275 (0 7) 705 (0 4)    Emesis/NG output 0     Drains  0    Stool 0 0    Chest Tube 2340 3160    Total Output 3615 3865    Net -755 2 -1830 1          Unmeasured Stool Occurrence 5 x 1 x        UOP: 29 ml/hr, 0 3 mL/kg/hr    Height and Weights   Height: 5' 10" (177 8 cm)  IBW: 73 kg  Body mass index is 25 18 kg/m²    Weight (last 2 days)     Date/Time   Weight    02/06/20 0533   79 6 (175 49)    02/05/20 0600   81 6 (179 9)    02/04/20 0600   80 2 (176 81)                Nutrition       Diet Orders   (From admission, onward)             Start     Ordered 02/06/20 0001  Diet NPO  Diet effective midnight     Question Answer Comment   Diet Type NPO    RD to adjust diet per protocol?  Yes        02/05/20 0724              Tube feeds on hold, NPO      Active Medications  Scheduled Meds:  Current Facility-Administered Medications:  acetaminophen 975 mg Per J Tube Q6H [address] Nica Bird [employer]    albuterol 2 5 mg Nebulization Q6H PRN Nica Bird [employer]    cefepime 2,000 mg Intravenous Q12H Nica Bird [employer] Last Rate: 2,000 mg (02/06/20 0022)   chlorhexidine 15 mL Swish & Spit Q12H [address] Nica Bird [employer]    enoxaparin 40 mg Subcutaneous Q24H [address] Nica Bird [employer]    fentanyl citrate (PF) 25 mcg Intravenous Q2H PRN Nica Bird [employer]    gabapentin 100 mg Per J Tube TID Nica Bird [employer]    insulin regular (HumuLIN R,NovoLIN R) infusion 0 3-21 Units/hr Intravenous Titrated Carla Dill PA-C Last Rate: 0 5 Units/hr (02/06/20 0555)   melatonin 3 mg Per J Tube HS Carla Dill PA-C    methocarbamol 500 mg Per J Tube Q6H PRN Nica Bird [employer]    metroNIDAZOLE 500 mg Oral [facility] Tomasz Medellin PA-C    omeprazole (PRILOSEC) suspension 2 mg/mL 20 mg Per J Tube Daily Nica Bird [employer]    ondansetron 4 mg Intravenous Q6H PRN Nica Bird [employer]    oxyCODONE 2 5 mg Oral Q4H PRN Kim Ramirez PA-C    oxyCODONE 5 mg Oral Q4H PRN Kim Ramirez PA-C      Continuous Infusions:    insulin regular (HumuLIN R,NovoLIN R) infusion 0 3-21 Units/hr Last Rate: 0 5 Units/hr (02/06/20 0555)     PRN Meds:     albuterol 2 5 mg Q6H PRN   fentanyl citrate (PF) 25 mcg Q2H PRN   methocarbamol 500 mg Q6H PRN   ondansetron 4 mg Q6H PRN   oxyCODONE 2 5 mg Q4H PRN   oxyCODONE 5 mg Q4H PRN       Allergies   Allergies   Allergen Reactions    Penicillins Itching     ---------------------------------------------------------------------------------------  Advance Directive and Living Will:      Power of :    POLST: ---------------------------------------------------------------------------------------  Counseling / Coordination of Care  0 minutes of critical care time    Charlette Fishman PA-C      Portions of the record may have been created with voice recognition software  Occasional wrong word or "sound a like" substitutions may have occurred due to the inherent limitations of voice recognition software    Read the chart carefully and recognize, using context, where substitutions have occurred

## 2020-02-06 NOTE — OCCUPATIONAL THERAPY NOTE
Occupational Therapy Cancellation        Patient Name: Philipp Mir  JYTDS'C Date: 2/6/2020    Chart reviewed  Pt off the floor at IR  OT will continue to follow to see as able      Kiersten Galo MS, OTR/L

## 2020-02-06 NOTE — PROGRESS NOTES
Interventional Radiology Preprocedure Note    History/Indication for procedure:   Adalgisa Mcfarlane is a 71 y o  male with a PMH of transhiatal esohpagectomy c/b R chylothorax who presents for TDE      Relevant past medical history:    Past Medical History:   Diagnosis Date    [facility]chronic obstructive pulmonary disease) [facility] [address] [facility]     [facility]     Difficulty swallowing     Disease of thyroid gland     Esophageal mass     History of chemotherapy     History of transfusion     Malignant neoplasm of lower third of esophagus (HCC)     Port-A-Cath in place     LCW    Sleep apnea     no CPAP     Patient Active Problem List   Diagnosis    COPD (chronic obstructive pulmonary disease) (HCC)    Headaches due to old head injury    High cholesterol    Obstructive sleep apnea syndrome    Type 2 diabetes mellitus with hyperglycemia, without long-term current use of insulin (HCC)    Malignant neoplasm of lower third of esophagus (HCC)    Esophageal obstruction    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HCC)    Chemotherapy induced neutropenia (HCC)    Anemia, unspecified    Insomnia due to medical condition    Encounter for care related to feeding tube    Paralysis of left vocal fold    Dysphonia    Chylothorax on right       BP 95/67 (BP Location: Left arm)   Pulse (!) 110   Temp (!) 97 3 °F (36 3 °C) (Oral)   Resp 22   Ht 5' 10" (1 778 m)   Wt 79 6 kg (175 lb 7 8 oz)   SpO2 96%   BMI 25 18 kg/m²     Medications:    Inpatient Medications:     Scheduled Medications:    Current Facility-Administered Medications:  acetaminophen 975 mg Per J Tube Q6H [address] Jolena Sheets [employer]    albuterol 2 5 mg Nebulization Q6H PRN Jolena Sheets [employer]    cefepime 2,000 mg Intravenous Q12H Suzan Hill PA-C Last Rate: 2,000 mg (02/06/20 1228)   chlorhexidine 15 mL Swish & Spit Q12H [address] Jolena Sheets [employer]    dextrose 5 % and sodium chloride 0 45 % 150 mL/hr Intravenous Continuous Julio Pina MD Last Rate: 150 mL/hr (02/06/20 1223)   enoxaparin 40 mg Subcutaneous Q24H [address] Wandalee Matilda [employer]    fentanyl citrate (PF) 25 mcg Intravenous Q2H PRN Wandalee Matilda [employer]    gabapentin 100 mg Per J Tube TID Wandalee Matilda [employer]    insulin regular (HumuLIN R,NovoLIN R) infusion 0 3-21 Units/hr Intravenous Titrated Bartolome rBown PA-C Last Rate: 3 Units/hr (02/06/20 1359)   melatonin 3 mg Per J Tube HS Bartolome Brown PA-C    methocarbamol 500 mg Per J Tube Q6H PRN Wandalee Matilda [employer]    metroNIDAZOLE 500 mg Oral Q8H [address] See Crenshaw PA-C    omeprazole (PRILOSEC) suspension 2 mg/mL 20 mg Per J Tube Daily Wandalee Matilda [employer]    ondansetron 4 mg Intravenous Q6H PRN Wandalee Matilda [employer]    oxyCODONE 2 5 mg Oral Q4H PRN See Crenshaw PA-C    oxyCODONE 5 mg Oral Q4H PRN See Crenshaw PA-C        Infusions:    dextrose 5 % and sodium chloride 0 45 % 150 mL/hr Last Rate: 150 mL/hr (02/06/20 1223)   insulin regular (HumuLIN R,NovoLIN R) infusion 0 3-21 Units/hr Last Rate: 3 Units/hr (02/06/20 1359)       PRN:  albuterol    fentanyl citrate (PF)    methocarbamol    ondansetron    oxyCODONE    oxyCODONE    Outpatient Medications:  No current facility-administered medications on file prior to encounter        Current Outpatient Medications on File Prior to Encounter   Medication Sig Dispense Refill    acetaminophen (TYLENOL) 160 mg/5 mL suspension Take 20 3 mL (650 mg total) by mouth every 4 (four) hours as needed for mild pain or fever 118 mL 0    canagliflozin (INVOKANA) 100 mg Daily      gabapentin (NEURONTIN) 300 mg capsule take 1 capsule by mouth daily at bedtime 30 capsule 0    levothyroxine 25 mcg tablet Take 25 mcg by mouth daily in the early morning      pantoprazole (PROTONIX) 40 mg tablet Take 1 tablet (40 mg total) by mouth daily 30 tablet 1    rOPINIRole (REQUIP) 0 25 mg tablet take 1 tablet by mouth daily at bedtime 30 tablet 0    albuterol (2 5 mg/3 mL) 0 083 % nebulizer solution Take 1 vial (2 5 mg total) by nebulization every 6 (six) hours as needed for wheezing or shortness of breath (Patient not taking: Reported on 1/23/2020) 1 vial 5    Nutritional Supplements (JEVITY 1 5 SHUN) LIQD Take 85 mL by mouth continuous Jevity Bimal[employer] advance as tolerated to goal rate of 85ml/hr to run for 16 hours daily  Flush with at least 60ml water at start and end of cycle  Needs additional water flushes if PO fluid intake declines 1000 mL 0       Allergies   Allergen Reactions    Penicillins Itching       Anticoagulants: none    ASA classification: ASA 3 - Patient with moderate systemic disease with functional limitations    Airway Assessment: II (hard and soft palate, upper portion of tonsils anduvula visible)    Relevant family history: None    Relevant review of systems: None    Prior sedation/anesthesia: yes    Can the patient lie flat? Yes     Does patient have sleep apnea? No    If yes, does patient use CPAP? not applicable    NPO Status: yes    Labs:   CBC with diff: Lab Results   Component Value Date    WBC 11 55 (H) 02/06/2020    HGB 12 0 02/06/2020    HCT 40 1 02/06/2020    MCV 87 02/06/2020     02/06/2020    MCH 26 0 (L) 02/06/2020    MCHC 29 9 (L) 02/06/2020    RDW 17 2 (H) 02/06/2020    MPV 10 0 02/06/2020    NRBC 0 02/06/2020     BMP/CMP:  Lab Results   Component Value Date    K 3 9 02/06/2020     (H) 02/06/2020    CO2 25 02/06/2020    CO2  02/01/2020      Comment:      Out of Reportable Range    BUN 42 (H) 02/06/2020    CREATININE 1 08 02/06/2020    GLUCOSE 339 (H) 02/01/2020    CALCIUM 8 2 (L) 02/06/2020    AST 55 (H) 01/28/2020    ALT 49 01/28/2020    ALKPHOS 43 (L) 01/28/2020    EGFR 72 02/06/2020   ,     Coags:   Lab Results   Component Value Date    PTT 34 01/16/2020    INR 0 99 01/16/2020   ,          Relevant imaging studies:   Reviewed  Directed physical examination:  I agree with the physical exam performed on 2/6/20 and there are no additional changes  Assessment/Plan:    For TDE     Sedation/Anesthesia plan: Moderate sedation will be used as needed for procedure      Consent with alternatives to the procedure, risks and benefits have been explained and discussed with the patient/patient's family: yes

## 2020-02-07 ENCOUNTER — ANESTHESIA (INPATIENT)
Dept: PERIOP | Facility: HOSPITAL | Age: 65
DRG: 326 | End: 2020-02-07
Payer: COMMERCIAL

## 2020-02-07 ENCOUNTER — APPOINTMENT (INPATIENT)
Dept: RADIOLOGY | Facility: HOSPITAL | Age: 65
DRG: 326 | End: 2020-02-07
Payer: COMMERCIAL

## 2020-02-07 LAB
ANION GAP SERPL CALCULATED.3IONS-SCNC: 4 MMOL/L (ref 4–13)
ANION GAP SERPL CALCULATED.3IONS-SCNC: 7 MMOL/L (ref 4–13)
ATRIAL RATE: 89 BPM
BACTERIA BLD CULT: NORMAL
BACTERIA BLD CULT: NORMAL
BASOPHILS # BLD AUTO: 0.03 THOUSANDS/ΜL (ref 0–0.1)
BASOPHILS # BLD AUTO: 0.04 THOUSANDS/ΜL (ref 0–0.1)
BASOPHILS NFR BLD AUTO: 0 % (ref 0–1)
BASOPHILS NFR BLD AUTO: 0 % (ref 0–1)
BUN SERPL-MCNC: 43 MG/DL (ref 5–25)
BUN SERPL-MCNC: 49 MG/DL (ref 5–25)
CALCIUM SERPL-MCNC: 7.6 MG/DL (ref 8.3–10.1)
CALCIUM SERPL-MCNC: 7.9 MG/DL (ref 8.3–10.1)
CHLORIDE SERPL-SCNC: 112 MMOL/L (ref 100–108)
CHLORIDE SERPL-SCNC: 115 MMOL/L (ref 100–108)
CO2 SERPL-SCNC: 25 MMOL/L (ref 21–32)
CO2 SERPL-SCNC: 26 MMOL/L (ref 21–32)
CREAT SERPL-MCNC: 1.09 MG/DL (ref 0.6–1.3)
CREAT SERPL-MCNC: 1.3 MG/DL (ref 0.6–1.3)
EOSINOPHIL # BLD AUTO: 0.18 THOUSAND/ΜL (ref 0–0.61)
EOSINOPHIL # BLD AUTO: 0.26 THOUSAND/ΜL (ref 0–0.61)
EOSINOPHIL NFR BLD AUTO: 1 % (ref 0–6)
EOSINOPHIL NFR BLD AUTO: 2 % (ref 0–6)
ERYTHROCYTE [DISTWIDTH] IN BLOOD BY AUTOMATED COUNT: 17.1 % (ref 11.6–15.1)
ERYTHROCYTE [DISTWIDTH] IN BLOOD BY AUTOMATED COUNT: 17.2 % (ref 11.6–15.1)
GFR SERPL CREATININE-BSD FRML MDRD: 57 ML/MIN/1.73SQ M
GFR SERPL CREATININE-BSD FRML MDRD: 71 ML/MIN/1.73SQ M
GLUCOSE SERPL-MCNC: 104 MG/DL (ref 65–140)
GLUCOSE SERPL-MCNC: 116 MG/DL (ref 65–140)
GLUCOSE SERPL-MCNC: 119 MG/DL (ref 65–140)
GLUCOSE SERPL-MCNC: 125 MG/DL (ref 65–140)
GLUCOSE SERPL-MCNC: 129 MG/DL (ref 65–140)
GLUCOSE SERPL-MCNC: 133 MG/DL (ref 65–140)
GLUCOSE SERPL-MCNC: 140 MG/DL (ref 65–140)
GLUCOSE SERPL-MCNC: 162 MG/DL (ref 65–140)
GLUCOSE SERPL-MCNC: 229 MG/DL (ref 65–140)
GLUCOSE SERPL-MCNC: 82 MG/DL (ref 65–140)
GLUCOSE SERPL-MCNC: 91 MG/DL (ref 65–140)
GLUCOSE SERPL-MCNC: 98 MG/DL (ref 65–140)
HCT VFR BLD AUTO: 33.5 % (ref 36.5–49.3)
HCT VFR BLD AUTO: 36.9 % (ref 36.5–49.3)
HGB BLD-MCNC: 10.9 G/DL (ref 12–17)
HGB BLD-MCNC: 9.9 G/DL (ref 12–17)
IMM GRANULOCYTES # BLD AUTO: 0.07 THOUSAND/UL (ref 0–0.2)
IMM GRANULOCYTES # BLD AUTO: 0.09 THOUSAND/UL (ref 0–0.2)
IMM GRANULOCYTES NFR BLD AUTO: 1 % (ref 0–2)
IMM GRANULOCYTES NFR BLD AUTO: 1 % (ref 0–2)
LYMPHOCYTES # BLD AUTO: 0.42 THOUSANDS/ΜL (ref 0.6–4.47)
LYMPHOCYTES # BLD AUTO: 0.81 THOUSANDS/ΜL (ref 0.6–4.47)
LYMPHOCYTES NFR BLD AUTO: 4 % (ref 14–44)
LYMPHOCYTES NFR BLD AUTO: 6 % (ref 14–44)
MCH RBC QN AUTO: 25.6 PG (ref 26.8–34.3)
MCH RBC QN AUTO: 25.7 PG (ref 26.8–34.3)
MCHC RBC AUTO-ENTMCNC: 29.5 G/DL (ref 31.4–37.4)
MCHC RBC AUTO-ENTMCNC: 29.6 G/DL (ref 31.4–37.4)
MCV RBC AUTO: 87 FL (ref 82–98)
MCV RBC AUTO: 87 FL (ref 82–98)
MONOCYTES # BLD AUTO: 0.32 THOUSAND/ΜL (ref 0.17–1.22)
MONOCYTES # BLD AUTO: 0.7 THOUSAND/ΜL (ref 0.17–1.22)
MONOCYTES NFR BLD AUTO: 3 % (ref 4–12)
MONOCYTES NFR BLD AUTO: 5 % (ref 4–12)
NEUTROPHILS # BLD AUTO: 11.05 THOUSANDS/ΜL (ref 1.85–7.62)
NEUTROPHILS # BLD AUTO: 9.75 THOUSANDS/ΜL (ref 1.85–7.62)
NEUTS SEG NFR BLD AUTO: 87 % (ref 43–75)
NEUTS SEG NFR BLD AUTO: 90 % (ref 43–75)
NRBC BLD AUTO-RTO: 0 /100 WBCS
NRBC BLD AUTO-RTO: 0 /100 WBCS
P AXIS: 55 DEGREES
PLATELET # BLD AUTO: 292 THOUSANDS/UL (ref 149–390)
PLATELET # BLD AUTO: 332 THOUSANDS/UL (ref 149–390)
PMV BLD AUTO: 9.5 FL (ref 8.9–12.7)
PMV BLD AUTO: 9.6 FL (ref 8.9–12.7)
POTASSIUM SERPL-SCNC: 4.2 MMOL/L (ref 3.5–5.3)
POTASSIUM SERPL-SCNC: 4.4 MMOL/L (ref 3.5–5.3)
PR INTERVAL: 154 MS
QRS AXIS: 75 DEGREES
QRSD INTERVAL: 79 MS
QT INTERVAL: 358 MS
QTC INTERVAL: 436 MS
RBC # BLD AUTO: 3.87 MILLION/UL (ref 3.88–5.62)
RBC # BLD AUTO: 4.24 MILLION/UL (ref 3.88–5.62)
SODIUM SERPL-SCNC: 144 MMOL/L (ref 136–145)
SODIUM SERPL-SCNC: 145 MMOL/L (ref 136–145)
T WAVE AXIS: 40 DEGREES
TROPONIN I SERPL-MCNC: <0.02 NG/ML
VENTRICULAR RATE: 89 BPM
WBC # BLD AUTO: 10.85 THOUSAND/UL (ref 4.31–10.16)
WBC # BLD AUTO: 12.87 THOUSAND/UL (ref 4.31–10.16)

## 2020-02-07 PROCEDURE — 80048 BASIC METABOLIC PNL TOTAL CA: CPT | Performed by: EMERGENCY MEDICINE

## 2020-02-07 PROCEDURE — 85025 COMPLETE CBC W/AUTO DIFF WBC: CPT | Performed by: SURGERY

## 2020-02-07 PROCEDURE — 97168 OT RE-EVAL EST PLAN CARE: CPT

## 2020-02-07 PROCEDURE — 71045 X-RAY EXAM CHEST 1 VIEW: CPT

## 2020-02-07 PROCEDURE — 84484 ASSAY OF TROPONIN QUANT: CPT | Performed by: SURGERY

## 2020-02-07 PROCEDURE — 3E0F8GC INTRODUCTION OF OTHER THERAPEUTIC SUBSTANCE INTO RESPIRATORY TRACT, VIA NATURAL OR ARTIFICIAL OPENING ENDOSCOPIC: ICD-10-PCS | Performed by: OTOLARYNGOLOGY

## 2020-02-07 PROCEDURE — 99024 POSTOP FOLLOW-UP VISIT: CPT | Performed by: SURGERY

## 2020-02-07 PROCEDURE — 93005 ELECTROCARDIOGRAM TRACING: CPT

## 2020-02-07 PROCEDURE — 80048 BASIC METABOLIC PNL TOTAL CA: CPT | Performed by: SURGERY

## 2020-02-07 PROCEDURE — 82948 REAGENT STRIP/BLOOD GLUCOSE: CPT

## 2020-02-07 PROCEDURE — 99024 POSTOP FOLLOW-UP VISIT: CPT | Performed by: THORACIC SURGERY (CARDIOTHORACIC VASCULAR SURGERY)

## 2020-02-07 PROCEDURE — 99233 SBSQ HOSP IP/OBS HIGH 50: CPT | Performed by: EMERGENCY MEDICINE

## 2020-02-07 PROCEDURE — 31571 LARYNGOSCOP W/VC INJ + SCOPE: CPT | Performed by: OTOLARYNGOLOGY

## 2020-02-07 PROCEDURE — 85025 COMPLETE CBC W/AUTO DIFF WBC: CPT | Performed by: EMERGENCY MEDICINE

## 2020-02-07 PROCEDURE — 93010 ELECTROCARDIOGRAM REPORT: CPT | Performed by: INTERNAL MEDICINE

## 2020-02-07 PROCEDURE — 94760 N-INVAS EAR/PLS OXIMETRY 1: CPT

## 2020-02-07 DEVICE — (2 ML) - GRAFT CYMETRA MICRONIZED ALLODERM INJ 2ML: Type: IMPLANTABLE DEVICE | Site: VOCAL CORD | Status: FUNCTIONAL

## 2020-02-07 RX ORDER — ONDANSETRON 2 MG/ML
4 INJECTION INTRAMUSCULAR; INTRAVENOUS ONCE AS NEEDED
Status: DISCONTINUED | OUTPATIENT
Start: 2020-02-07 | End: 2020-02-07

## 2020-02-07 RX ORDER — GLYCOPYRROLATE 0.2 MG/ML
INJECTION INTRAMUSCULAR; INTRAVENOUS AS NEEDED
Status: DISCONTINUED | OUTPATIENT
Start: 2020-02-07 | End: 2020-02-07 | Stop reason: SURG

## 2020-02-07 RX ORDER — PROPOFOL 10 MG/ML
INJECTION, EMULSION INTRAVENOUS AS NEEDED
Status: DISCONTINUED | OUTPATIENT
Start: 2020-02-07 | End: 2020-02-07 | Stop reason: SURG

## 2020-02-07 RX ORDER — FENTANYL CITRATE/PF 50 MCG/ML
50 SYRINGE (ML) INJECTION
Status: DISCONTINUED | OUTPATIENT
Start: 2020-02-07 | End: 2020-02-07

## 2020-02-07 RX ORDER — SODIUM CHLORIDE, SODIUM GLUCONATE, SODIUM ACETATE, POTASSIUM CHLORIDE, MAGNESIUM CHLORIDE, SODIUM PHOSPHATE, DIBASIC, AND POTASSIUM PHOSPHATE .53; .5; .37; .037; .03; .012; .00082 G/100ML; G/100ML; G/100ML; G/100ML; G/100ML; G/100ML; G/100ML
1000 INJECTION, SOLUTION INTRAVENOUS ONCE
Status: COMPLETED | OUTPATIENT
Start: 2020-02-07 | End: 2020-02-07

## 2020-02-07 RX ORDER — METOCLOPRAMIDE HYDROCHLORIDE 5 MG/ML
10 INJECTION INTRAMUSCULAR; INTRAVENOUS ONCE AS NEEDED
Status: DISCONTINUED | OUTPATIENT
Start: 2020-02-07 | End: 2020-02-07

## 2020-02-07 RX ORDER — HYDROMORPHONE HCL/PF 1 MG/ML
0.5 SYRINGE (ML) INJECTION
Status: DISCONTINUED | OUTPATIENT
Start: 2020-02-07 | End: 2020-02-07

## 2020-02-07 RX ORDER — DIPHENHYDRAMINE HYDROCHLORIDE 50 MG/ML
12.5 INJECTION INTRAMUSCULAR; INTRAVENOUS ONCE AS NEEDED
Status: DISCONTINUED | OUTPATIENT
Start: 2020-02-07 | End: 2020-02-07

## 2020-02-07 RX ORDER — LIDOCAINE HYDROCHLORIDE 10 MG/ML
INJECTION, SOLUTION EPIDURAL; INFILTRATION; INTRACAUDAL; PERINEURAL AS NEEDED
Status: DISCONTINUED | OUTPATIENT
Start: 2020-02-07 | End: 2020-02-07 | Stop reason: SURG

## 2020-02-07 RX ORDER — LIDOCAINE HYDROCHLORIDE 20 MG/ML
INJECTION, SOLUTION EPIDURAL; INFILTRATION; INTRACAUDAL; PERINEURAL AS NEEDED
Status: DISCONTINUED | OUTPATIENT
Start: 2020-02-07 | End: 2020-02-07 | Stop reason: HOSPADM

## 2020-02-07 RX ORDER — HYDROMORPHONE HCL/PF 1 MG/ML
0.2 SYRINGE (ML) INJECTION
Status: DISCONTINUED | OUTPATIENT
Start: 2020-02-07 | End: 2020-02-07

## 2020-02-07 RX ORDER — SODIUM CHLORIDE, SODIUM GLUCONATE, SODIUM ACETATE, POTASSIUM CHLORIDE, MAGNESIUM CHLORIDE, SODIUM PHOSPHATE, DIBASIC, AND POTASSIUM PHOSPHATE .53; .5; .37; .037; .03; .012; .00082 G/100ML; G/100ML; G/100ML; G/100ML; G/100ML; G/100ML; G/100ML
500 INJECTION, SOLUTION INTRAVENOUS ONCE
Status: DISCONTINUED | OUTPATIENT
Start: 2020-02-07 | End: 2020-02-07

## 2020-02-07 RX ORDER — DEXAMETHASONE SODIUM PHOSPHATE 10 MG/ML
INJECTION, SOLUTION INTRAMUSCULAR; INTRAVENOUS AS NEEDED
Status: DISCONTINUED | OUTPATIENT
Start: 2020-02-07 | End: 2020-02-07 | Stop reason: SURG

## 2020-02-07 RX ORDER — SUCCINYLCHOLINE/SOD CL,ISO/PF 100 MG/5ML
SYRINGE (ML) INTRAVENOUS AS NEEDED
Status: DISCONTINUED | OUTPATIENT
Start: 2020-02-07 | End: 2020-02-07 | Stop reason: SURG

## 2020-02-07 RX ORDER — KETAMINE HCL IN NACL, ISO-OSM 100MG/10ML
SYRINGE (ML) INJECTION AS NEEDED
Status: DISCONTINUED | OUTPATIENT
Start: 2020-02-07 | End: 2020-02-07 | Stop reason: SURG

## 2020-02-07 RX ADMIN — OXYCODONE HYDROCHLORIDE 5 MG: 5 SOLUTION ORAL at 21:27

## 2020-02-07 RX ADMIN — Medication 30 MG: at 16:50

## 2020-02-07 RX ADMIN — FENTANYL CITRATE 25 MCG: 50 INJECTION, SOLUTION INTRAMUSCULAR; INTRAVENOUS at 21:55

## 2020-02-07 RX ADMIN — GABAPENTIN 100 MG: 250 SUSPENSION ORAL at 21:07

## 2020-02-07 RX ADMIN — GABAPENTIN 100 MG: 250 SUSPENSION ORAL at 08:05

## 2020-02-07 RX ADMIN — ACETAMINOPHEN 975 MG: 650 SUSPENSION ORAL at 11:35

## 2020-02-07 RX ADMIN — FENTANYL CITRATE 25 MCG: 50 INJECTION, SOLUTION INTRAMUSCULAR; INTRAVENOUS at 17:11

## 2020-02-07 RX ADMIN — SODIUM CHLORIDE, SODIUM GLUCONATE, SODIUM ACETATE, POTASSIUM CHLORIDE, MAGNESIUM CHLORIDE, SODIUM PHOSPHATE, DIBASIC, AND POTASSIUM PHOSPHATE 100 ML/HR: .53; .5; .37; .037; .03; .012; .00082 INJECTION, SOLUTION INTRAVENOUS at 00:20

## 2020-02-07 RX ADMIN — MELATONIN 3 MG: 3 TAB ORAL at 21:01

## 2020-02-07 RX ADMIN — METRONIDAZOLE 500 MG: 500 TABLET ORAL at 15:14

## 2020-02-07 RX ADMIN — SODIUM CHLORIDE, SODIUM GLUCONATE, SODIUM ACETATE, POTASSIUM CHLORIDE, MAGNESIUM CHLORIDE, SODIUM PHOSPHATE, DIBASIC, AND POTASSIUM PHOSPHATE 100 ML/HR: .53; .5; .37; .037; .03; .012; .00082 INJECTION, SOLUTION INTRAVENOUS at 15:19

## 2020-02-07 RX ADMIN — FENTANYL CITRATE 50 MCG: 50 INJECTION, SOLUTION INTRAMUSCULAR; INTRAVENOUS at 16:50

## 2020-02-07 RX ADMIN — CHLORHEXIDINE GLUCONATE 0.12% ORAL RINSE 15 ML: 1.2 LIQUID ORAL at 08:04

## 2020-02-07 RX ADMIN — SODIUM CHLORIDE, SODIUM GLUCONATE, SODIUM ACETATE, POTASSIUM CHLORIDE, MAGNESIUM CHLORIDE, SODIUM PHOSPHATE, DIBASIC, AND POTASSIUM PHOSPHATE 125 ML/HR: .53; .5; .37; .037; .03; .012; .00082 INJECTION, SOLUTION INTRAVENOUS at 21:00

## 2020-02-07 RX ADMIN — PHENYLEPHRINE HYDROCHLORIDE 100 MCG: 10 INJECTION INTRAVENOUS at 17:28

## 2020-02-07 RX ADMIN — CEFEPIME HYDROCHLORIDE 2000 MG: 2 INJECTION, POWDER, FOR SOLUTION INTRAVENOUS at 11:36

## 2020-02-07 RX ADMIN — LIDOCAINE HYDROCHLORIDE 100 MG: 10 INJECTION, SOLUTION EPIDURAL; INFILTRATION; INTRACAUDAL; PERINEURAL at 16:50

## 2020-02-07 RX ADMIN — ONDANSETRON 4 MG: 2 INJECTION INTRAMUSCULAR; INTRAVENOUS at 17:40

## 2020-02-07 RX ADMIN — PHENYLEPHRINE HYDROCHLORIDE 40 MCG/MIN: 10 INJECTION INTRAVENOUS at 17:19

## 2020-02-07 RX ADMIN — METRONIDAZOLE 500 MG: 500 TABLET ORAL at 06:27

## 2020-02-07 RX ADMIN — ENOXAPARIN SODIUM 40 MG: 40 INJECTION SUBCUTANEOUS at 08:04

## 2020-02-07 RX ADMIN — SODIUM CHLORIDE, SODIUM LACTATE, POTASSIUM CHLORIDE, AND CALCIUM CHLORIDE: .6; .31; .03; .02 INJECTION, SOLUTION INTRAVENOUS at 16:45

## 2020-02-07 RX ADMIN — FENTANYL CITRATE 50 MCG: 50 INJECTION, SOLUTION INTRAMUSCULAR; INTRAVENOUS at 18:08

## 2020-02-07 RX ADMIN — ACETAMINOPHEN 975 MG: 650 SUSPENSION ORAL at 06:26

## 2020-02-07 RX ADMIN — PROPOFOL 30 MG: 10 INJECTION, EMULSION INTRAVENOUS at 17:34

## 2020-02-07 RX ADMIN — PHENYLEPHRINE HYDROCHLORIDE 200 MCG: 10 INJECTION INTRAVENOUS at 17:01

## 2020-02-07 RX ADMIN — SODIUM CHLORIDE, SODIUM LACTATE, POTASSIUM CHLORIDE, AND CALCIUM CHLORIDE 1000 ML: .6; .31; .03; .02 INJECTION, SOLUTION INTRAVENOUS at 07:48

## 2020-02-07 RX ADMIN — CHLORHEXIDINE GLUCONATE 0.12% ORAL RINSE 15 ML: 1.2 LIQUID ORAL at 21:01

## 2020-02-07 RX ADMIN — PHENYLEPHRINE HYDROCHLORIDE 100 MCG: 10 INJECTION INTRAVENOUS at 17:17

## 2020-02-07 RX ADMIN — GLYCOPYRROLATE 0.2 MG: 0.2 INJECTION, SOLUTION INTRAMUSCULAR; INTRAVENOUS at 17:10

## 2020-02-07 RX ADMIN — Medication 100 MG: at 16:50

## 2020-02-07 RX ADMIN — PROPOFOL 100 MG: 10 INJECTION, EMULSION INTRAVENOUS at 16:50

## 2020-02-07 RX ADMIN — Medication 10 MG: at 17:11

## 2020-02-07 RX ADMIN — ACETAMINOPHEN 975 MG: 650 SUSPENSION ORAL at 00:25

## 2020-02-07 RX ADMIN — DEXAMETHASONE SODIUM PHOSPHATE 10 MG: 10 INJECTION, SOLUTION INTRAMUSCULAR; INTRAVENOUS at 17:40

## 2020-02-07 RX ADMIN — GABAPENTIN 100 MG: 250 SUSPENSION ORAL at 15:14

## 2020-02-07 RX ADMIN — CEFEPIME HYDROCHLORIDE 2000 MG: 2 INJECTION, POWDER, FOR SOLUTION INTRAVENOUS at 00:27

## 2020-02-07 RX ADMIN — Medication 20 MG: at 08:05

## 2020-02-07 RX ADMIN — SODIUM CHLORIDE, SODIUM GLUCONATE, SODIUM ACETATE, POTASSIUM CHLORIDE, MAGNESIUM CHLORIDE, SODIUM PHOSPHATE, DIBASIC, AND POTASSIUM PHOSPHATE 1000 ML: .53; .5; .37; .037; .03; .012; .00082 INJECTION, SOLUTION INTRAVENOUS at 05:02

## 2020-02-07 NOTE — PROGRESS NOTES
Progress Note - Erick Diaz 71 y o  male MRN: [id number]    Unit/Bed#: [facility] 416-01 Encounter: [id number]      Assessment:  72 yo M s/p transhiatal esophagectomy 1/28 whose post op course has been complicated by left recurrent laryngeal nerve injury and thoracic duct injury  Left  cc out, serous, no AL  Right CT 1 42 L out, serosanguinous, no AL    Now s/p Ir thoracic duct embolization    Plan:  Continue chest tubes to suction  Continue Jtube feeds as tolerated  To OR with ENT for vocal cord medialization  Barrium swallow 2/8 for evaluation  Free water via Jtube if blood pressure issues arise  Monitor Urine output, keep johnson at this time as did have poor urine output and some retention    Insulin gtt      Subjective:   Patient with poor urine output overnight  Got fluids, making better urine at this time  Objective:     Vitals: Blood pressure (!) 85/58, pulse 92, temperature 98 1 °F (36 7 °C), temperature source Oral, resp  rate 15, height 5' 10" (1 778 m), weight 79 6 kg (175 lb 7 8 oz), SpO2 100 %  ,Body mass index is 25 18 kg/m²  I/O       02/04 0701 - 02/05 0700 02/05 0701 - 02/06 0700    I V  (mL/kg) 115 8 (1 4) 80 9 (1)    NG/ 370    IV Piggyback 350 200    Feedings 1484 1375    Total Intake(mL/kg) 2859 8 (35) 2025 9 (24 8)    Urine (mL/kg/hr) 1275 (0 7) 705 (0 4)    Emesis/NG output 0     Drains  0    Stool 0 0    Chest Tube 2340 2910    Total Output 3615 3615    Net -755 2 -1589 1          Unmeasured Stool Occurrence 5 x 1 x              Physical Exam: General: AAOx3  Head: normocephalic, atraumatic  Neck: supple, trachea midline   Chest: chest tubes as above in assessment  Abdomen: Soft, NT, no rebound/guarding   Incision c/d/i   Heart: RRR, S1s2  Ext: Warm no cyanosis   Pulse: 2+ radial          Scheduled Meds:    Current Facility-Administered Medications:  acetaminophen 975 mg Per J Tube Q6H [address] [place]    albuterol 2 5 mg Nebulization Q6H PRN Marianne Benavides [place] cefepime 2,000 mg Intravenous Q12H Laisha Isaacs PA-C Last Rate: 2,000 mg (02/07/20 0027)   chlorhexidine 15 mL Swish & Spit Q12H [address] [place]    enoxaparin 40 mg Subcutaneous Q24H [address] [place]    fentanyl citrate (PF) 25 mcg Intravenous Q2H PRN [place]ia [place]    gabapentin 100 mg Per J Tube TID [place]uvia Dunn [place]    insulin regular (HumuLIN R,NovoLIN R) infusion 0 3-21 Units/hr Intravenous Titrated [place] PA-C Last Rate: 4 Units/hr (02/07/20 0205)   lactated ringers 125 mL/hr Intravenous Continuous Cheyanne Chance MD    lidocaine (PF) 0 5 mL Infiltration Once PRN Cheyanne Chance MD    lipiodol ultra fluid   Code/Trauma/Sedation Med Juan C Mahmood MD    melatonin 3 mg Per J Tube HS [place] PA-C    methocarbamol 500 mg Per J Tube Q6H PRN [place]ia [place]    metroNIDAZOLE 500 mg Oral [facility] [place]    multi-electrolyte 100 mL/hr Intravenous Continuous Nuvia [place]Me Last Rate: 100 mL/hr (02/07/20 0020)   omeprazole (PRILOSEC) suspension 2 mg/mL 20 mg Per J Tube Daily [place]ia [place]    ondansetron 4 mg Intravenous Q6H PRN Nuvia Dunn [place]    oxyCODONE 2 5 mg Oral Q4H PRN Laisha Isaacs PA-C    oxyCODONE 5 mg Oral Q4H PRN Laisha Isaacs PA-C      Continuous Infusions:    insulin regular (HumuLIN R,NovoLIN R) infusion 0 3-21 Units/hr Last Rate: 4 Units/hr (02/07/20 0205)   lactated ringers 125 mL/hr    multi-electrolyte 100 mL/hr Last Rate: 100 mL/hr (02/07/20 0020)     PRN Meds: albuterol    fentanyl citrate (PF)    lidocaine (PF)    lipiodol ultra fluid    methocarbamol    ondansetron    oxyCODONE    oxyCODONE      Invasive Devices     Central Venous Catheter Line            Port A Cath 09/26/19 Left Chest 133 days    CVC Central Lines 02/02/20 Triple 16cm 5 days          Drain            Gastrostomy/Enterostomy Jejunostomy 14 Fr   days    Chest Tube 1 Left Pleural 4 days    Chest Tube 3 Right Pleural 4 days    Urethral Catheter Latex 16 Fr  less than 1 day                Lab, Imaging and other studies:   Recent Labs     02/05/20  0433 02/06/20  0425 02/06/20 2039   WBC 10 48* 11 55* 16 85*   HGB 11 1* 12 0 12 5    343 391*     Recent Labs     02/04/20  0407 02/05/20  0433 02/06/20 0425 02/06/20 2039   SODIUM 144 141 142 142   K 3 7 3 7 3 9 5 1   * 110* 112* 113*   CO2 25 25 25 22   BUN 34* 36* 42* 46*   CREATININE 1 05 1 06 1 08 1 37*   PHOS 2 1* 2 5  --   --    MG 2 4  --   --   --    CALCIUM 7 9* 8 0* 8 2* 8 2*           VTE Pharmacologic Prophylaxis: lovenox  VTE Mechanical Prophylaxis: sequential compression device

## 2020-02-07 NOTE — MALNUTRITION/BMI
This medical record reflects one or more clinical indicators suggestive of malnutrition     Malnutrition Findings:   Malnutrition type: Acute illness(r/t EN interruptions/medical condition, as evidenced by 8% wt loss within < 1 month (2/2/20: 192#, 2/7/20: 173#), and averaging <50-75% estimated needs x 1 week  Treatment: resume EN when medically able )  Degree of Malnutrition: Malnutrition of mild degree  Malnutrition Characteristics: Inadequate energy, Weight loss       Body mass index is 24 93 kg/m²  See Nutrition note dated 2/7/2020 for additional details  Completed nutrition assessment is viewable in the nutrition documentation      See progress note 2/7/2020 for TF recs

## 2020-02-07 NOTE — PROGRESS NOTES
Progress Note - Bertram Port 71 y o  male MRN: [id number]    Unit/Bed#: [facility] 416-01 Encounter: [id number]      Assessment:  70 yo M s/p transhiatal esophagectomy 1/28 whose post op course has been complicated by left recurrent laryngeal nerve injury and thoracic duct injury  Left  cc out, serous, no AL  Right CT 1 42 L out, serosanguinous, no AL    Now s/p Ir thoracic duct embolization    Plan:  Continue chest tubes to suction  Continue Jtube feeds as tolerated  To OR with ENT for vocal cord medialization  Barrium swallow 2/8 for evaluation  Free water via Jtube if blood pressure issues arise  Monitor Urine output, keep johnson at this time as did have poor urine output and some retention    Insulin gtt      Subjective: Had retention yesterday, went to IR for embolization    Objective:     Vitals: Blood pressure (!) 85/58, pulse 92, temperature 98 1 °F (36 7 °C), temperature source Oral, resp  rate 15, height 5' 10" (1 778 m), weight 79 6 kg (175 lb 7 8 oz), SpO2 100 %  ,Body mass index is 25 18 kg/m²  I/O       02/04 0701 - 02/05 0700 02/05 0701 - 02/06 0700    I V  (mL/kg) 115 8 (1 4) 80 9 (1)    NG/ 370    IV Piggyback 350 200    Feedings 1484 1375    Total Intake(mL/kg) 2859 8 (35) 2025 9 (24 8)    Urine (mL/kg/hr) 1275 (0 7) 705 (0 4)    Emesis/NG output 0     Drains  0    Stool 0 0    Chest Tube 2340 2910    Total Output 3615 3615    Net -755 2 -1589 1          Unmeasured Stool Occurrence 5 x 1 x              Physical Exam: General: AAOx3  Head: normocephalic, atraumatic  Neck: supple, trachea midline  Respiratory: chest tubes as above  Abdomen: Soft, NT, no rebound/guarding   Incision c/d/i, no drainage  Heart: RRR, S1s2  Ext: Warm no cyanosis   Pulse: 2+ radial          Scheduled Meds:    Current Facility-Administered Medications:  acetaminophen 975 mg Per J Tube Q6H [address] [place]    albuterol 2 5 mg Nebulization Q6H PRN Caroline Cradle [place]    cefepime 2,000 mg Intravenous Q12H Valery Crowley PA-C Last Rate: 2,000 mg (02/07/20 0027)   chlorhexidine 15 mL Swish & Spit Q12H [address] [place]    enoxaparin 40 mg Subcutaneous Q24H [address] Kim Guevara [place]    fentanyl citrate (PF) 25 mcg Intravenous Q2H PRN Kim Guevara [place]    gabapentin 100 mg Per J Tube TID Kim Guevara [place]    insulin regular (HumuLIN R,NovoLIN R) infusion 0 3-21 Units/hr Intravenous Titrated Mariia Diaz PA-C Last Rate: 4 Units/hr (02/07/20 0205)   lactated ringers 125 mL/hr Intravenous Continuous Rajesh Forrest MD    lidocaine (PF) 0 5 mL Infiltration Once PRN Rajesh Forrest MD    lipiodol ultra fluid   Code/Trauma/Sedation Med Marianne Villanueva MD    melatonin 3 mg Per J Tube HS Mariia Diaz PA-C    methocarbamol 500 mg Per J Tube Q6H PRN Kim Guevara [place]    metroNIDAZOLE 500 mg Oral [facility] Silvano Cates    multi-electrolyte 100 mL/hr Intravenous Continuous Kim Shaylaelysia [place] Last Rate: 100 mL/hr (02/07/20 0020)   omeprazole (PRILOSEC) suspension 2 mg/mL 20 mg Per J Tube Daily Kim Guevara [place]    ondansetron 4 mg Intravenous Q6H PRN Kim Guevara [place]    oxyCODONE 2 5 mg Oral Q4H PRN Valery Crowley PA-C    oxyCODONE 5 mg Oral Q4H PRN Valery Crowley PA-C      Continuous Infusions:    insulin regular (HumuLIN R,NovoLIN R) infusion 0 3-21 Units/hr Last Rate: 4 Units/hr (02/07/20 0205)   lactated ringers 125 mL/hr    multi-electrolyte 100 mL/hr Last Rate: 100 mL/hr (02/07/20 0020)     PRN Meds: albuterol    fentanyl citrate (PF)    lidocaine (PF)    lipiodol ultra fluid    methocarbamol    ondansetron    oxyCODONE    oxyCODONE      Invasive Devices     Central Venous Catheter Line            Port A Cath 09/26/19 Left Chest 133 days    CVC Central Lines 02/02/20 Triple 16cm 5 days          Drain            Gastrostomy/Enterostomy Jejunostomy 14 Fr   days    Chest Tube 1 Left Pleural 4 days    Chest Tube 3 Right Pleural 4 days    Urethral Catheter Latex 16 Fr  less than 1 day                Lab, Imaging and other studies:   Recent Labs     02/05/20  0433 02/06/20  0425 02/06/20 2039   WBC 10 48* 11 55* 16 85*   HGB 11 1* 12 0 12 5    343 391*     Recent Labs     02/04/20  0407 02/05/20  0433 02/06/20 0425 02/06/20 2039   SODIUM 144 141 142 142   K 3 7 3 7 3 9 5 1   * 110* 112* 113*   CO2 25 25 25 22   BUN 34* 36* 42* 46*   CREATININE 1 05 1 06 1 08 1 37*   PHOS 2 1* 2 5  --   --    MG 2 4  --   --   --    CALCIUM 7 9* 8 0* 8 2* 8 2*           VTE Pharmacologic Prophylaxis: lovenox  VTE Mechanical Prophylaxis: sequential compression device

## 2020-02-07 NOTE — PLAN OF CARE
Problem: OCCUPATIONAL THERAPY ADULT  Goal: Performs self-care activities at highest level of function for planned discharge setting  See evaluation for individualized goals  Description  Treatment Interventions: ADL retraining, Functional transfer training, UE strengthening/ROM, Endurance training, Patient/family training, Equipment evaluation/education, Compensatory technique education, Activityengagement, Energy conservation          See flowsheet documentation for full assessment, interventions and recommendations  Outcome: Progressing  Note:   Limitation: Decreased ADL status, Decreased Safe judgement during ADL, Decreased endurance, Decreased self-care trans, Decreased high-level ADLs  Prognosis: Good  Assessment: Pt is a seen for OT re-reval s/p CODE BLUE ON 2/1  Pt initially admitted for transhiatal esophagectomy 1/28  Pt presents with continued vocal cord paralysis and decreased activity tolerance since initial evaluation  Pt currently performing UB ADLs w/ Min A and functional mobility and Mod A for LB ADLs  Pt remains limited 2* decreased activity tolerance, decreased safety awareness, and decreased ADL status    Continue to recommend STR vs home upon D/C  Pt continues to benefit from immediate inpatient skilled OT services   Will continue to follow to address goals states below:     OT Discharge Recommendation: (STR VS HOME PENDING PROGRESS)  OT - OK to Discharge: Yes(when medically cleared)

## 2020-02-07 NOTE — QUICK NOTE
Post Op Check:    Interval History: Pt taken to IR for thoracic duct embolization  Per report, no complications during procedure  Patient arrives awake and alert, no pain at this time  No other complaints  Exam:  Constitutional: Well-developed  No distress  HENT:   Head: Normocephalic  Right Ear: External ear normal    Left Ear: External ear normal    Nose: Nose normal    Mouth/Throat: Oropharynx is clear and moist    Eyes: Conjunctivae and EOM are normal  Right eye exhibits no discharge  Left eye exhibits no discharge  Neck: Neck supple  No tracheal deviation  Cardiovascular: Normal rate, normal heart sounds   Pulmonary/Chest: Breath sounds normal  No respiratory distress  No wheezes, rales, stridor  BL chest tubes to suction  Abdominal: Soft  Bowel sounds are normal  No distension  No tenderness  No rebound or guarding  Musculoskeletal: No edema, tenderness or deformity  2+ Radial and DP pulses BL  Neurological: Alert  No cranial nerve deficit or sensory deficit  Normal muscle tone  Skin: Skin is warm and dry  Capillary refill takes less than 2 seconds  No rash noted  No diaphoresis  Wound dressings hemostatic  Psychiatric: Behavior is normal    Nursing note and vitals reviewed      Assessment and Plan:  - 1 L IVF post-procedure  - post-op cbc, bmp  - analgesia PRN, pain controlled on current regiment  - monitor hemodynamics  - cont chest tubes to suction  - plan for OR  2/7 with ENT for laryngeal nerve injury  - rest per plan in daily progress note    Dispo: cont ICU level care

## 2020-02-07 NOTE — PROGRESS NOTES
Daily Progress Note - Critical Care   Javon Elliott 71 y o  male MRN: [id number]  Unit/Bed#: [facility] 416-01 Encounter: [id number]        ----------------------------------------------------------------------------------------  HPI/24hr events:  Patient went for thoracic duct embolization with IR yesterday, reportedly successful per IR note  Creatinine increased and postop labs and this morning's labs, likely secondary to contrast during procedure and dehydration  Otherwise no acute events  Supposed to go to OR with ENT today for vocal cord medialization      ---------------------------------------------------------------------------------------  SUBJECTIVE  Patient very frustrated and demanding a drink of water  Reports he had been sneaking drinks water and swallowing them without issue, and tone nursing caught him now and not let him have water or swabs in the room  Review of Systems   Constitutional: Negative  HENT: Negative  Eyes: Negative  Respiratory: Positive for cough  Negative for chest tightness and shortness of breath  Cardiovascular: Negative  Negative for chest pain, palpitations and leg swelling  Gastrointestinal: Negative  Endocrine: Negative  Genitourinary: Negative for difficulty urinating and dysuria  Musculoskeletal: Negative  Negative for arthralgias, back pain, joint swelling, neck pain and neck stiffness  Allergic/Immunologic: Negative  Neurological: Negative for dizziness, speech difficulty, weakness, light-headedness and headaches  Hematological: Negative  Psychiatric/Behavioral: Negative  Review of systems was reviewed and negative unless stated above in HPI/24-hour events   ---------------------------------------------------------------------------------------  Assessment and Plan:    Plan:    Neuro:  Diagnosis: No active issues   Plan:  · Management of PAD  ? Analgesia - scheduled Tylenol and gabapentin, p r n  Oxycodone  ?  Delirium monitoring/management  ? Regulate Sleep-wake cycle/CAM-ICU daily  § Melatonin 3 mg q h s  · Serial Neuro Exams     Cardiac  Diagnosis:  Hypotension, s/p bradycardic hypoxic arrest 2/1 with ROSC,   Plan:  · Nursing performed ECG that showed ST changes, troponin negative, patient not complaining of chest pain at this time  · Few episodes of transient hypotension yesterday and overnight, lowest SBP in 80s  Tube feeds have been on hold for multiple procedures and diarrhea, started on maintence fluids yesterday for hydration  ? Will also give free water via J-tube for hypotension  · Levophed off since 2/4 evening  ? Map goal >70     Pulmonary  Diagnosis:  Acute hypoxic respiratory failure, resolved  B/l chylothorax/pleural effusions with b/l chest tubes status post IR thoracic duct embolization 2/6,  aspiration pneumonia, vocal cord dysfunction  Plan:  · Thoracic duct embolization yesterday, 2/6, reportedly successful per IR note  · Continue CTs to suction, per Thoracics  ? R CT: 1 4 L/24 hrs, to suction, -AL, serosanguineous fluid  ? L CT: 110 mL/24 hrs, to suction, -AL, serosanguineous/milky fluid   · Cefepime/Flagyl for aspiration pneumonia, end date today to complete 7 day course of antibiotic  · On OR schedule for today for left vocal cord injection medialization, NPO until that time, plan for barium swallow likely tomorrow     Gastrointestinal  Diagnosis:  S/p transhiatal esophagectomy 1/28, previously placed J-tube, diarrhea  Plan:  · Thoracics and Surgical Oncology following  · Tube feeds were switched to medium chain fatty acids for chylothorax, now currently on hold secondary to diarrhea new since changing feeds as well as multiple procedures  ? Diarrhea frequency has improved since holding feeds   ? Bowel regimen currently on hold with diarrhea  · P r n   Zofran for nausea, stress ulcer prophylaxis with Prilosec  · Barium swallow 2/8 for evaluation     FEN  · Isolate at 100 mL/hr  · Nutrition/diet plan: Tube feeds on hold secondary to diarrhea and now procedure today, NPO until ENT and swallow evaluation  · Replete electrolytes with goals: K >4 0, Mag >2 0, and Phos >3 0     Genitourinary  Diagnosis:  MARTHA  Plan:  · Doshi replaced overnight secondary to low urine output during the day  · Doshi bag with red tinged urine, urine in Doshi tubing yellow  · Fluids currently running  · Creatinine increased on postop labs last night to 1 37, improved to 1 30 this morning after fluid  Previously was 1 08  · MARTHA likely secondary to episodes of hypotension yesterday as well as contrast from IR procedure  · Continue fluids  · Trend UOP and BUN/creat  · Strict I and O     Infectious Diease  Diagnosis:  Aspiration pneumonia  Plan:  · Cefepime and Flagyl, started 2/1  End date today to complete 7 day course of antibiotic  · Vanco/fluconazole 2/1-2/4  · Blood cultures negative x 4 days, MRSA swab negative, pleural fluid Gram stain negative  · Trend temps and WBC count  ? Has remained afebrile     Heme:   Diagnosis:  Mild anemia  Plan:  · Hemoglobin 10 9 from 12 5 after IR procedure fluid resuscitation  · Trend hgb and plts  ?  Transfuse as needed for goal hgb >7     Endo:   Diagnosis: Type 2 DM  Plan:  · Insulin drip 0 5 U/hr, algorithm 2  started on 2/4 for blood glucose over 400  · Sugars have been controlled on insulin drip  · Goal -180mg/dL     MSK/Skin:  · PT/OT  · Turn and position patient Q2 hours  · Off load pressure points  · Allevyn preventative per protocol  · Local wound care to midline incision in underlying fascia dehiscence     Lines:   TLC  J tube     Disposition: Continue Critical Care  consider transferring to step-down 2 after ENT procedure  Code Status: Level 1 - Full Code  ---------------------------------------------------------------------------------------  ICU CORE MEASURES    Prophylaxis   VTE Pharmacologic Prophylaxis: Enoxaparin (Lovenox)  VTE Mechanical Prophylaxis: sequential compression device  Stress Ulcer Prophylaxis: Pantoprazole IV     ABCDE Protocol (if indicated)  Plan to perform spontaneous awakening trial today? Not applicable  Plan to perform spontaneous breathing trial today? Not applicable  Obvious barriers to extubation? Not applicable  CAM-ICU: Negative    Invasive Devices Review  Invasive Devices     Central Venous Catheter Line            Port A Cath 19 Left Chest 133 days    CVC Central Lines 20 Triple 16cm 5 days          Drain            Gastrostomy/Enterostomy Jejunostomy 14 Fr   days    Chest Tube 1 Left Pleural 4 days    Chest Tube 3 Right Pleural 4 days    Urethral Catheter Latex 16 Fr  less than 1 day              Can any invasive devices be discontinued today? No  ---------------------------------------------------------------------------------------  OBJECTIVE    Vitals   Vitals:    20 0100 20 0200 20 0300 20 0400   BP: (!) 81/56 97/51 (!) 85/58 95/62   BP Location:    Left arm   Pulse: 102 96 92 90   Resp: 20 18 15 14   Temp:    97 8 °F (36 6 °C)   TempSrc:    Oral   SpO2: 97% 100% 100% 100%   Weight:       Height:         Temp (24hrs), Av 8 °F (36 6 °C), Min:97 3 °F (36 3 °C), Max:98 3 °F (36 8 °C)  Current: Temperature: 97 8 °F (36 6 °C)  HR: 88  BP: 122/60  RR: 22  SpO2: 100%    Physical Exam   Constitutional: He is oriented to person, place, and time  He appears well-developed and well-nourished  No distress  HENT:   Head: Normocephalic and atraumatic  Eyes: Pupils are equal, round, and reactive to light  Neck: Normal range of motion  Neck supple  Cardiovascular: Normal rate, regular rhythm, normal heart sounds and intact distal pulses  Exam reveals no gallop and no friction rub  No murmur heard  Pulmonary/Chest: Effort normal and breath sounds normal  He has no wheezes  He has no rales  He exhibits no tenderness     Bilateral chest tubes to suction  right tube draining serosanguineous/bloody fluid  Left chest tube draining serosanguineous fluid  -AL in both tubes   Abdominal: Soft  He exhibits distension  There is no tenderness  There is no rebound  Abdomen mildly distended but soft, nontender to palpation Midline incision closed with staples, C/D/I without drainage  J-tube site C/D/I   Genitourinary:   Genitourinary Comments: Doshi catheter in place   Musculoskeletal: Normal range of motion  He exhibits no edema, tenderness or deformity  Neurological: He is alert and oriented to person, place, and time  No cranial nerve deficit or sensory deficit  Skin: Skin is warm and dry  Capillary refill takes less than 2 seconds  He is not diaphoretic  No erythema  Psychiatric:   Patient very angry and frustrated as he wants to have a drink of water   Vitals reviewed  Invasive/non-invasive ventilation settings   Respiratory    Lab Data (Last 4 hours)    None         O2/Vent Data (Last 4 hours)    None                Laboratory and Diagnostics:  Results from last 7 days   Lab Units 02/07/20 0412 02/06/20 2039 02/06/20 0425 02/05/20  0433 02/04/20  0407 02/03/20  0447 02/02/20  1251 02/02/20  0430  02/01/20  0601   WBC Thousand/uL 12 87* 16 85* 11 55* 10 48* 8 46 6 59  --  11 78*   < > 10 80*   HEMOGLOBIN g/dL 10 9* 12 5 12 0 11 1* 9 9* 9 7* 10 0* 10 0*   < > 13 9   I STAT HEMOGLOBIN   --   --   --   --   --   --   --   --    < >  --    HEMATOCRIT % 36 9 41 6 40 1 37 9 33 1* 32 2*  --  31 8*   < > 45 5   HEMATOCRIT, ISTAT   --   --   --   --   --   --   --   --    < >  --    PLATELETS Thousands/uL 332 391* 343 261 229 211  --  257   < > 398*   NEUTROS PCT % 87*  --  76*  --   --  78*  --  84*  --  87*   MONOS PCT % 5  --  6  --   --  9  --  8  --  6    < > = values in this interval not displayed       Results from last 7 days   Lab Units 02/07/20 0412 02/06/20 2039 02/06/20 0425 02/05/20  0433 02/04/20  0407 02/03/20  0447 02/02/20  0430   SODIUM mmol/L 145 142 142 141 144 145 140   POTASSIUM mmol/L 4 2 5 1 3 9 3 7 3 7 4 0 4 0   CHLORIDE mmol/L 115* 113* 112* 110* 114* 112* 106   CO2 mmol/L 26 22 25 25 25 27 27   ANION GAP mmol/L 4 7 5 6 5 6 7   BUN mg/dL 49* 46* 42* 36* 34* 45* 57*   CREATININE mg/dL 1 30 1 37* 1 08 1 06 1 05 1 28 1 63*   CALCIUM mg/dL 7 9* 8 2* 8 2* 8 0* 7 9* 8 4 9 2   GLUCOSE RANDOM mg/dL 82 121 96 301* 263* 179* 213*     Results from last 7 days   Lab Units 02/05/20  0433 02/04/20  0407 02/03/20  0447 02/02/20  0430 02/01/20  2304 02/01/20  0825   MAGNESIUM mg/dL  --  2 4 2 8* 3 1* 3 7* 3 2*   PHOSPHORUS mg/dL 2 5 2 1* 3 2  --  9 2*  --            Results from last 7 days   Lab Units 02/03/20  0004 02/02/20  1617 02/02/20  1251   TROPONIN I ng/mL 0 23* 0 38* 0 57*     Results from last 7 days   Lab Units 02/02/20  0431 02/01/20  2304   LACTIC ACID mmol/L 1 1 6 7*     ABG:  Results from last 7 days   Lab Units 02/03/20  0447   PH ART  7 434   PCO2 ART mm Hg 38 0   PO2 ART mm Hg 110 2   HCO3 ART mmol/L 24 9   BASE EXC ART mmol/L 0 7   ABG SOURCE  Line, Arterial     VBG:  Results from last 7 days   Lab Units 02/03/20  0447  02/02/20  0022   PH DEEP   --   --  7 311   PCO2 DEEP mm Hg  --   --  54 6*   PO2 DEEP mm Hg  --   --  44 1   HCO3 DEEP mmol/L  --   --  26 9   BASE EXC DEEP mmol/L  --   --  0 0   ABG SOURCE  Line, Arterial   < >  --     < > = values in this interval not displayed  Micro  Results from last 7 days   Lab Units 02/03/20  1225 02/02/20  0023 02/02/20  0021   BLOOD CULTURE   --  No Growth After 4 Days  No Growth After 4 Days     MRSA CULTURE ONLY  No Methicillin Resistant Staphlyococcus aureus (MRSA) isolated  --   --      Imaging:   CXR this morning, unable to view images PACS system is down, will review when system is running again    Intake and Output  I/O       02/05 0701 - 02/06 0700 02/06 0701 - 02/07 0700    I V  (mL/kg) 93 8 (1 2) 3416 1 (42 9)    NG/ 340    IV Piggyback 200     Feedings 1375 244    Total Intake(mL/kg) 2038 8 (25 6) 4000 1 (50 3)    Urine (mL/kg/hr) 705 (0 4) 730 (0 4)    Emesis/NG output  0    Drains 0     Stool 0     Blood  0    Chest Tube 3330 1530    Total Output 4035 2260    Net -1996 3 +1740 1          Unmeasured Stool Occurrence 1 x         UOP: 36 ml/hr     Height and Weights   Height: 5' 10" (177 8 cm)  IBW: 73 kg  Body mass index is 25 18 kg/m²    Weight (last 2 days)     Date/Time   Weight    02/06/20 0533   79 6 (175 49)    02/05/20 0600   81 6 (179 9)                Nutrition       Diet Orders   (From admission, onward)             Start     Ordered    02/07/20 0001  Diet NPO  Diet effective midnight     Question:  Diet Type  Answer:  NPO    02/06/20 2128              TF currently on hold    Active Medications  Scheduled Meds:  Current Facility-Administered Medications:  acetaminophen 975 mg Per J Tube Q6H [facility]    albuterol 2 5 mg Nebulization Q6H PRN [facility]    cefepime 2,000 mg Intravenous Q12H Harsh Velasquez PA-C Last Rate: 2,000 mg (02/07/20 0027)   chlorhexidine 15 mL Swish & Spit Q12H [facility]    enoxaparin 40 mg Subcutaneous Q24H [facility]    fentanyl citrate (PF) 25 mcg Intravenous Q2H PRN Enio Angel [facility]    gabapentin 100 mg Per J Tube TID [facility]    insulin regular (HumuLIN R,NovoLIN R) infusion 0 3-21 Units/hr Intravenous Titrated Tracie Koehler PA-C Last Rate: 0 5 Units/hr (02/07/20 0411)   lactated ringers 125 mL/hr Intravenous Continuous Geroge Lundborg, MD    lidocaine (PF) 0 5 mL Infiltration Once PRN Geroge Lundborg, MD    lipiodol ultra fluid   Code/Trauma/Sedation Med Payton Oconnor MD    melatonin 3 mg Per J Tube HS Tracie Koehler PA-C    methocarbamol 500 mg Per J Tube Q6H PRN [facility]    metroNIDAZOLE 500 mg Oral [facility] Harsh Velasquez PA-C    multi-electrolyte 100 mL/hr Intravenous Continuous Enio Ortiz [facility] Last Rate: 100 mL/hr (02/07/20 0020)   omeprazole (PRILOSEC) suspension 2 mg/mL 20 mg Per J Tube Daily Enio Ortiz [facility]    ondansetron 4 mg Intravenous Q6H PRN Sergey Stevens [facility]    oxyCODONE 2 5 mg Oral Q4H PRN Ryley Allen PA-C    oxyCODONE 5 mg Oral Q4H PRN Ryley Allen PA-C      Continuous Infusions:    insulin regular (HumuLIN R,NovoLIN R) infusion 0 3-21 Units/hr Last Rate: 0 5 Units/hr (02/07/20 0411)   lactated ringers 125 mL/hr    multi-electrolyte 100 mL/hr Last Rate: 100 mL/hr (02/07/20 0020)     PRN Meds:     albuterol 2 5 mg Q6H PRN   fentanyl citrate (PF) 25 mcg Q2H PRN   lidocaine (PF) 0 5 mL Once PRN   lipiodol ultra fluid  Code/Trauma/Sedation Med   methocarbamol 500 mg Q6H PRN   ondansetron 4 mg Q6H PRN   oxyCODONE 2 5 mg Q4H PRN   oxyCODONE 5 mg Q4H PRN       Allergies   Allergies   Allergen Reactions    Penicillins Itching     ---------------------------------------------------------------------------------------  Advance Directive and Living Will:      Power of :    POLST:    ---------------------------------------------------------------------------------------  Counseling / Coordination of Care  0 minutes of critical care time  Nyla Dakins, PA-C      Portions of the record may have been created with voice recognition software  Occasional wrong word or "sound a like" substitutions may have occurred due to the inherent limitations of voice recognition software    Read the chart carefully and recognize, using context, where substitutions have occurred

## 2020-02-07 NOTE — ANESTHESIA POSTPROCEDURE EVALUATION
Post-Op Assessment Note    CV Status:  Stable  Pain Score: 0    Pain management: adequate     Mental Status:  Alert and awake   Hydration Status:  Euvolemic   PONV Controlled:  Controlled   Airway Patency:  Patent   Post Op Vitals Reviewed: Yes      Staff: CRNA     Post-op block assessment: n/a        BP  110/59   Temp 97   Pulse  102   Resp   12   SpO2   100

## 2020-02-07 NOTE — ANESTHESIA PREPROCEDURE EVALUATION
Review of Systems/Medical History  Patient summary reviewed  Chart reviewed  No history of anesthetic complications     Cardiovascular  Hyperlipidemia,    Pulmonary  COPD , Sleep apnea ,   Comment: Vocal cord paralysis     GI/Hepatic  Dysphagia,   Esophageal disease esophageal cancer,        Negative  ROS        Endo/Other  Diabetes type 2 ,      GYN       Hematology  Anemia ,     Musculoskeletal  Negative musculoskeletal ROS        Neurology    Headaches,    Psychology   Negative psychology ROS              Physical Exam    Airway    Mallampati score: II  TM Distance: >3 FB  Neck ROM: full     Dental   No notable dental hx     Cardiovascular  Rhythm: regular, Rate: normal, Cardiovascular exam normal    Pulmonary  Pulmonary exam normal Breath sounds clear to auscultation,     Other Findings        Anesthesia Plan  ASA Score- 3     Anesthesia Type- general with ASA Monitors  Additional Monitors:   Airway Plan: ETT  Plan Factors-    Induction- intravenous  Postoperative Plan- Plan for postoperative opioid use  Informed Consent- Anesthetic plan and risks discussed with patient  I personally reviewed this patient with the CRNA  Discussed and agreed on the Anesthesia Plan with the CRNA  Rudolph Blakely Recent labs personally reviewed:  Lab Results   Component Value Date    WBC 12 87 (H) 02/07/2020    HGB 10 9 (L) 02/07/2020     02/07/2020     Lab Results   Component Value Date    K 4 2 02/07/2020    BUN 49 (H) 02/07/2020    CREATININE 1 30 02/07/2020    GLUCOSE 339 (H) 02/01/2020     Lab Results   Component Value Date    PTT 34 01/16/2020      Lab Results   Component Value Date    INR 0 99 01/16/2020       Blood type O    Lab Results   Component Value Date    HGBA1C 5 4 01/16/2020       I, Timi Brown MD, have personally seen and evaluated the patient prior to anesthetic care  I have reviewed the pre-anesthetic record, and other medical records if appropriate to the anesthetic care    If a CRNA is involved in the case, I have reviewed the CRNA assessment, if present, and agree  Risks/benefits and alternatives discussed with patient including possible PONV, sore throat, and possibility of rare anesthetic and surgical emergencies

## 2020-02-07 NOTE — OP NOTE
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]  Glottic insufficiency  Dysphonia [R49 0]  Dysphagia    Post-Op Diagnosis Codes:  SAME    Procedure(s) (LRB):  MICRO DIRECT LARYNGOSCOPY WITH BILATERAL VOCAL FOLD INJECTION (Cymetra)    Specimen(s):  * No specimens in log *    Estimated Blood Loss:   Minimal    Drains:  Chest Tube 1 Left Pleural (Active)   Function -20 cm H2O 2020  4:00 PM   Chest Tube Air Leak No 2020  4:00 PM   Patency Intervention Tip/tilt 2020  4:00 PM   Drainage Description Serous 2020  4:00 PM   Dressing Status Clean;Dry; Intact 2020  4:00 PM   Site Assessment Unable to assess 2020  4:00 PM   Surrounding Skin Unable to view 2020  4:00 PM   Output (mL) 10 mL 2020  4:00 PM   Number of days: 5       Chest Tube 3 Right Pleural (Active)   Function -20 cm H2O 2020  4:00 PM   Chest Tube Air Leak Yes 2020  4:00 PM   Patency Intervention Tip/tilt 2020  4:00 PM   Drainage Description Serosanguineous 2020  4:00 PM   Dressing Status Clean;Dry; Intact 2020  4:00 PM   Site Assessment Unable to assess 2020  4:00 PM   Surrounding Skin Unable to view 2020  4:00 PM   Output (mL) 80 mL 2020  4:00 PM   Number of days: 5       Gastrostomy/Enterostomy Jejunostomy 14 Fr  LUQ (Active)   Surrounding Skin Dry; Intact 2020  4:00 PM   Drain Status Tube feed stopped or held 2020  4:00 PM   Drainage Appearance None 2020  4:00 PM   Site Description Healing 2020  4:00 PM   Dressing Status Open to air 2020  4:00 PM   Dressing Intervention Dressing changed 2020  4:00 AM   Dressing Type Dry dressing;Split gauze 2020  4:00 AM   Dressing Change Due 20  8:00 AM   Intake (mL) 30 mL 2020  4:00 PM   Output (mL) 0 mL 2020 4:00 PM   Number of days: 134       Urethral Catheter Latex 16 Fr  (Active)   Amt returned on insertion(mL) 600 mL 2/7/2020  1:00 AM   Reasons to continue Urinary Catheter  Accurate I&O assessment in critically ill patients (48 hr  max) 2/7/2020  4:00 PM   Goal for Removal Will consult urology 2/7/2020  7:45 AM   Site Assessment Clean;Skin intact 2/7/2020  4:00 PM   Collection Container Standard drainage bag 2/7/2020  4:00 PM   Securement Method Securing device (Describe) 2/7/2020  4:00 PM   Output (mL) 90 mL 2/7/2020  4:00 PM   Number of days: 0       Anesthesia Type:   General    Operative Indications:  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], glottic insufficiency  Dysphonia [R49 0]  Dysphagia with aspiration    Operative Findings: Moderate laryngeal/Medina's edema and erythema, good medialization on left with Cymetra, additional small amount was injected to the right vocal fold  Exposure was difficult requiring click at head of bed, anterior-posterior compression of the larynx, smaller sized laryngoscope  Only visualized posterior 1/2 of vocal folds    Complications:   None    Procedure and Technique:  After obtaining informed consent, patient was taken to the operating room by the anesthesia team   Patient was placed in the supine position on the operating room table  Time out was performed to confirm patient's name, ID, and procedure  General endotracheal anesthesia was induced and size 5-0 ETT was used  Patient was turned 90 degrees  Eyes were protected with tape, upper teeth/gingiva were protected with tooth guard  Small anterior commissure laryngoscope was used to obtain view of the larynx and was suspended  Views were obtained with microscope  Cymetra was injected just lateral to the vocalis muscle on the left side  Appropriate bulking and medialization were achieved  Excess was suctioned    A smaller amount of Cymetra was injected into the right vocal fold in similar fashion        The vocal folds were sprayed with 2% lidocaine and excess suctioned  The laryngoscope and tooth guard were then removed and care of patient was returned to anesthesia for extubation  Patient was then taken to the postoperative anesthesia care unit for recovery       I was present for the entire procedure    Patient Disposition:  PACU     SIGNATURE: Adán Cutler MD  DATE: February 7, 2020  TIME: 5:46 PM

## 2020-02-07 NOTE — PROGRESS NOTES
When able to resume TF, if low fat/MCT formula still appropriate rec NutriHep & advance to goal: 68ml/hr + 2 packs prosource  If diarrhea continues, consider adding banatrol BID  Provides 2364kcal, 90gPRO, 32gFat, 1137ml water

## 2020-02-07 NOTE — OCCUPATIONAL THERAPY NOTE
Occupational Therapy Re-Evaluation     Patient Name: Bertram Albarado  Today's Date: 2/7/2020  Problem List  Principal Problem:    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[facility] [place] [facility] )  Active Problems:    COPD (chronic obstructive pulmonary disease) (HCC)    High cholesterol    Type 2 diabetes mellitus with hyperglycemia, without long-term current use of insulin (HCC)    Paralysis of left vocal fold    Dysphonia    Chylothorax on right    Past Medical History  Past Medical History:   Diagnosis Date    COPD (chronic obstructive pulmonary disease) ([facility] [place] [facility] )     Diabetes mellitus (HCC)     Difficulty swallowing     Disease of thyroid gland     Esophageal mass     History of chemotherapy     History of transfusion     Malignant neoplasm of lower third of esophagus ([facility] [place] [facility] )     Port-A-Cath in place     LCW    Sleep apnea     no CPAP     Past Surgical History  Past Surgical History:   Procedure Laterality Date    BACK SURGERY      x2    DISC REMOVAL      ESOPHAGECTOMY N/A 1/28/2020    Procedure: ESOPHAGECTOMY, TRANSHIATAL;  Surgeon: Alejo Delatorre MD;  Location: [facility] MAIN OR;  Service: Surgical Oncology    ESOPHAGOGASTRODUODENOSCOPY N/A 1/28/2020    Procedure: ESOPHAGOGASTRODUODENOSCOPY (EGD); Surgeon: Alejo Delatorre MD;  Location: [facility] OR;  Service: Surgical Oncology    FL GUIDED CENTRAL VENOUS ACCESS DEVICE INSERTION  9/26/2019    GASTROJEJUNOSTOMY W/ JEJUNOSTOMY TUBE N/A 9/26/2019    Procedure: INSERTION JEJUNOSTOMY TUBE OPEN;  Surgeon: Alejo Delatorre MD;  Location: [facility] MAIN OR;  Service: Surgical Oncology    TONSILLECTOMY      TUNNELED VENOUS PORT PLACEMENT N/A 9/26/2019    Procedure: INSERTION VENOUS PORT (PORT-A-CATH);   Surgeon: Alejo Delatorre MD;  Location: [facility];  Service: Surgical Oncology           02/07/20 1155   Note Type   Note type Re-eval   Restrictions/Precautions   Weight Bearing Precautions Per Order No   Other Precautions Telemetry;Multiple lines   Pain Assessment   Pain Assessment No/denies pain   Pain Score No Pain   Home Living   Additional Comments SEE INTIAL EVAL    Prior Function   Comments SEE INTIAL EVAL    Lifestyle   Autonomy Pt reports being I w/ all ADLS and IADLS; (+) drives PTA   Reciprocal Relationships Pt lives w/ dtr, DYANA, and grandkids  Pt's dtr is home and in good health to provide A as needed  Service to Others Pt is retired   Intrinsic Gratification Pt reports enjoying spending time w/ his family   Psychosocial   Psychosocial (WDL) WDL   Subjective   Subjective "I'M OKAY"   ADL   Where Assessed Chair   Grooming Assistance 5  Supervision/Setup   UB Bathing Assistance 4  Minimal Assistance   LB Bathing Assistance 3  Moderate Assistance   UB Dressing Assistance 4  Minimal Assistance    [facility] [address] 3  Moderate [address]  4  Minimal Assistance   Transfers   Sit to Stand 4  Minimal assistance   Additional items Assist x 1   Stand to Sit 4  Minimal assistance   Additional items Assist x 1   Functional Mobility   Functional Mobility 4  Minimal assistance   Additional items Rolling walker   Balance   Static Sitting Fair +   Dynamic Sitting Fair +   Static Standing Fair -   Dynamic Standing Fair -   Ambulatory Poor +   Activity Tolerance   Activity Tolerance Patient limited by fatigue   Medical Staff Made Aware RESTORATIVE VLAD   Nurse Made Aware OKAY TO SEE PER RN   RUE Assessment   RUE Assessment WFL   LUE Assessment   LUE Assessment WFL   Hand Function   Gross Motor Coordination Functional   Fine Motor Coordination Functional   Cognition   Overall Cognitive Status WFL   Arousal/Participation Cooperative   Attention Attends with cues to redirect   Orientation Level Oriented X4   Memory Decreased recall of precautions   Following Commands Follows one step commands without difficulty   Assessment   Limitation Decreased ADL status; Decreased Safe judgement during ADL;Decreased endurance;Decreased self-care trans;Decreased high-level ADLs   Prognosis Good   Assessment Pt is a seen for OT re-reval s/p CODE BLUE ON 2/1  Pt initially admitted for transhiatal esophagectomy 1/28  Pt presents with continued vocal cord paralysis and decreased activity tolerance since initial evaluation  Pt currently performing UB ADLs w/ Min A and functional mobility and Mod A for LB ADLs  Pt remains limited 2* decreased activity tolerance, decreased safety awareness, and decreased ADL status    Continue to recommend STR vs home upon D/C  Pt continues to benefit from immediate inpatient skilled OT services  Will continue to follow to address goals states below:   Goals   Patient Goals WALK MORE   LTG Time Frame 7-10   Plan   Treatment Interventions ADL retraining;Functional transfer training; Endurance training;Patient/family training; Compensatory technique education   Goal Expiration Date 02/17/20   OT Frequency 3-5x/wk   Recommendation   OT Discharge Recommendation   (STR VS HOME PENDING PROGRESS)   Modified Stephens Scale   Modified Stephens Scale 4     GOALS    1) Pt will increase activity tolerance to G for 30 min txment sessions    2) Pt will complete UB/LB dressing/self care w/ mod I using adaptive device and DME as needed    3) Pt will complete bathing w/ Mod I w/ use of AE and DME as needed    4) Pt will complete toileting w/ mod I w/ G hygiene/thoroughness using DME as needed    5) Pt will improve functional transfers to Mod I on/off all surfaces using DME as needed w/ G balance/safety     6) Pt will improve functional mobility during ADL/IADL/leisure tasks to Mod I using DME as needed w/ G balance/safety     7) Pt will participate in simulated IADL management task to increase independence to  w/ G safety and endurance    8) Pt will demonstrate G carryover of pt/caregiver education and training as appropriate      9) Pt will demonstrate 100% carryover of energy conservation techniques t/o functional I/ADL/leisure tasks w/o cues s/p skilled education    10)  Pt will engage in ongoing cognitive assessment w/ G participation to assist w/ safe d/c planning/recommendations (as needeD)    Sally Montes MS, OTR/L

## 2020-02-07 NOTE — SPEECH THERAPY NOTE
Order for VBS received this am  Communicated with RN  Aware of plan for vocal cord injection 4pm today  Aware that Barium Swallow ordered to evaluate conduit & anastamosis (r/o leak) and plan VBS to follow that procedure (presume study or studies can be done on weekend)

## 2020-02-07 NOTE — ANESTHESIA POSTPROCEDURE EVALUATION
Post-Op Assessment Note    CV Status:  Stable  Pain Score: 0    Pain management: adequate     Mental Status:  Alert and awake   Hydration Status:  Euvolemic and stable   PONV Controlled:  None   Airway Patency:  Patent   Post Op Vitals Reviewed: Yes      Staff: CRNA     Post-op block assessment: n/a        /56 (02/06/20 1917)    Temp     Pulse 102 (02/06/20 1917)   Resp 20 (02/06/20 1917)    SpO2 100 % (02/06/20 1917)

## 2020-02-07 NOTE — UTILIZATION REVIEW
Continued Stay Review    Date: 02/05/2020                        s/p transhiatal esophagectomy 1/28 with routine post op course until code blue 2/1  Current Patient Class: Inpatient  Current Level of Care: Critical Care    HPI:64 y o  male initially admitted on 01/28/2020   Recurrent pleural effusions drained yesterday  Continued hoarseness,  Assessment/Plan: NPO w J Tube feedings - Nutrihep  Cont Chest Tubes to suction and trend output  Plan for IR thoracic duct embolization  Plan for ENT for cord injection  Do not open midline, monitor for drainage  Plan for Barium Swallow to evaluate conduit and anastomosis        Pertinent Labs/Diagnostic Results:   Results from last 7 days   Lab Units 02/05/20  0433 02/04/20  0407 02/03/20  0447   WBC Thousand/uL 10 48* 8 46 6 59   HEMOGLOBIN g/dL 11 1* 9 9* 9 7*   HEMATOCRIT % 37 9 33 1* 32 2*   PLATELETS Thousands/uL 261 229 211   NEUTROS ABS Thousands/µL  --   --  5 20     Results from last 7 days   Lab Units 02/05/20  0433 02/04/20  0407 02/03/20  0447 02/02/20  0430 02/01/20  2304 02/01/20  2303 02/01/20  2209 02/01/20  0825   SODIUM mmol/L 141 144 145 140 138  --   --  136   POTASSIUM mmol/L 3 7 3 7 4 0 4 0 4 2  --   --  4 4   CHLORIDE mmol/L 110* 114* 112* 106 103  --   --  105   CO2 mmol/L 25 25 27 27 26  --   --  25   ANION GAP mmol/L 6 5 6 7 9  --   --  6   BUN mg/dL 36* 34* 45* 57* 67*  --   --  54*   CREATININE mg/dL 1 06 1 05 1 28 1 63* 2 31*  --   --  1 95*   EGFR ml/min/1 73sq m 73 74 58 44 29  --   --  35   CALCIUM mg/dL 8 0* 7 9* 8 4 9 2 8 6  --   --  9 4   CALCIUM, IONIZED mmol/L  --   --  1 13 1 21  --  1 07*  --   --    CALCIUM, IONIZED, ISTAT mmol/L  --   --   --   --   --   --  1 19  --    MAGNESIUM mg/dL  --  2 4 2 8* 3 1* 3 7*  --   --  3 2*   PHOSPHORUS mg/dL 2 5 2 1* 3 2  --  9 2*  --   --   --      Results from last 7 days   Lab Units 02/05/20  0433 02/04/20  0407 02/03/20  0447 02/02/20  0430 02/01/20  2304 02/01/20  0825   GLUCOSE RANDOM mg/dL 301* 263* 179* 213* 336* 224*      Results from last 7 days   Lab Units 02/03/20  0447 02/02/20  0431 02/01/20  2338   PH ART  7 434 7 424 7 300*   PCO2 ART mm Hg 38 0 39 8 44 1*   PO2 ART mm Hg 110 2 73 4* 113 9   HCO3 ART mmol/L 24 9 25 5 21 2*   BASE EXC ART mmol/L 0 7 1 0 -5 0   O2 CONTENT ART mL/dL 14 8* 14 6* 15 3*   O2 HGB, ARTERIAL % 97 2* 94 3 97 0   ABG SOURCE  Line, Arterial Line, Arterial Line, Arterial     Results from last 7 days   Lab Units 02/02/20  0022   PH DEEP  7 311   PCO2 DEEP mm Hg 54 6*   PO2 DEEP mm Hg 44 1   HCO3 DEEP mmol/L 26 9   BASE EXC DEEP mmol/L 0 0   O2 CONTENT DEEP ml/dL 11 5   O2 HGB, VENOUS % 74 4     Results from last 7 days   Lab Units 02/01/20  2209   PH, DEEP I-STAT  7 071*   PCO2, DEEP ISTAT mm HG >103 0*   PO2, DEEP ISTAT mm HG 60 0*     Results from last 7 days   Lab Units 02/03/20  0004 02/02/20  1617 02/02/20  1251   TROPONIN I ng/mL 0 23* 0 38* 0 57*     Results from last 7 days   Lab Units 02/02/20  0431 02/01/20  2304   LACTIC ACID mmol/L 1 1 6 7*     Results from last 7 days   Lab Units 02/02/20  0023 02/02/20  0021   BLOOD CULTURE  No Growth After 4 Days  No Growth After 4 Days       Vital Signs:   Date/Time  Temp  Pulse  Resp  BP  MAP (mmHg)  Arterial Line BP  MAP  SpO2  O2 Device  Patient Position - Orthostatic VS   02/05/20 2300    104  19          92 %  None (Room air)     02/05/20 2200    94  13  100/61  74      90 %  None (Room air)  Lying   02/05/20 2100    96  20  98/58  68      98 %    Lying   02/05/20 2000  98 1 °F (36 7 °C)  104  18  80/56Abnormal   61      94 %    Lying   02/05/20 1811    108Abnormal   22  103/68  75      98 %       02/05/20 1711    98  29Abnormal   99/58  69      98 %       02/05/20 1611    102  16  88/61Abnormal   67      96 %       02/05/20 1511    108Abnormal   23Abnormal   104/65  72      98 %       02/05/20 1311    98  16  102/63  75      95 %       02/05/20 1211    102  24Abnormal   107/67  74     95 %       02/05/20 1111    100  25Abnormal   99/65  75      97 %       02/05/20 1011    100  26Abnormal   123/67  86      83 %Abnormal        02/05/20 1000    100  27Abnormal   105/66  74      76 %Abnormal        02/05/20 0940    102  23Abnormal   123/65  78             02/05/20 0900  98 6 °F (37 °C)  98  13  94/56  66             02/05/20 0800    96  22  101/67  76             02/05/20 0700  98 6 °F (37 °C)  98  25Abnormal   118/57  70      95 %  None (Room air)  Sitting   02/05/20 0600  98 6 °F (37 °C)  100  20  104/61  72  60/52  58 mmHg  95 %       02/05/20 0500  98 2 °F (36 8 °C)  102  26Abnormal       70/60  66 mmHg  95 %       02/05/20 0400  98 6 °F (37 °C)  96  20  100/63  74  102/52  70 mmHg  95 %  None (Room air)  Lying   02/05/20 0300  98 6 °F (37 °C)  100  20  122/75  89  100/76  88 mmHg  96 %       02/05/20 0200  98 6 °F (37 °C)  98  30Abnormal   96/59  67  132/56  80 mmHg  93 %       02/05/20 0100  99 °F (37 2 °C)  98  17  85/52Abnormal   60  100/44  60 mmHg  94 %       02/05/20 0000  99 3 °F (37 4 °C)  102  19  106/64  72  112/52  70 mmHg  96 %  None (Room air)  Lying       Medications:   acetaminophen 975 mg Per J Tube Q6H [address]   albuterol 2 5 mg Nebulization Q6H PRN   cefepime 2,000 mg Intravenous Q12H   chlorhexidine 15 mL Swish & Spit Q12H TANNER   enoxaparin 40 mg Subcutaneous Q24H TANNER   fentanyl citrate (PF) 25 mcg Intravenous Q2H PRN   gabapentin 100 mg Per J Tube TID   insulin lispro 1-5 Units Subcutaneous Q6H TANNER   iohexol 50 mL Oral 90 min pre-procedure   melatonin 3 mg Oral HS   methocarbamol 500 mg Per J Tube Q6H PRN   metroNIDAZOLE 500 mg Intravenous Q8H   norepinephrine 1-30 mcg/min Intravenous Titrated   omeprazole (PRILOSEC) suspension 2 mg/mL 20 mg Per J Tube Daily   ondansetron 4 mg Intravenous Q6H PRN   oxyCODONE 2 5 mg Oral Q4H PRN   oxyCODONE 5 mg Oral Q4H PRN      Continuous Infusions:   norepinephrine 1-30 mcg/min Last Rate: Stopped (02/04/20 2200)     Discharge Plan: TBD    Network Utilization Review Department  [email or web address]  ATTENTION: Please call with any questions or concerns to [phone] and carefully listen to the prompts so that you are directed to the right person  All voicemails are confidential   Demetrio Norman all requests for admission clinical reviews, approved or denied determinations and any other requests to dedicated fax number below belonging to the campus where the patient is receiving treatment   List of dedicated fax numbers for the Facilities:  [address] DENIALS (Administrative/Medical Necessity) [phone]   1000  [facility] (Maternity/NICU/Pediatrics) [phone]   Lizbet La [phone]   Quoc Garza [phone]   Tramaine Castro [phone]   145 [facility]  [phone]   [address] [place] [address] [phone]   [facility]  [phone]   [address] [facility] [address]  24[address] [facility] 1000 [address] [facility] [phone]

## 2020-02-07 NOTE — INTERIM OP NOTE
MICROLARYNGOSCOPY WITH INJECTION  Postoperative Note  PATIENT NAME: Vani Cason  : 1955  MRN: [id number]  [facility] OR ROOM 05    Surgery Date: 2020    Preop Diagnosis:  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]  Dysphonia [R49 0]  Dysphagia    Post-Op Diagnosis Codes:  SAME    Procedure(s) (LRB):  MICRO DIRECT LARYNGOSCOPY WITH BILATERAL VOCAL FOLD INJECTION (Cymetra)    Surgeon(s) and Role:     Vishal Reyes MD - Primary    Specimens:  * No specimens in log *    Estimated Blood Loss:   Minimal    Anesthesia Type:   General     Findings: Moderate laryngeal/Mednia's edema and erythema, good medialization on left with Cymetra, additional small amount was injected to the right vocal fold      Complications:   None    SIGNATURE: Adrián Desai MD   DATE: 2020   TIME: 5:44 PM

## 2020-02-08 ENCOUNTER — APPOINTMENT (INPATIENT)
Dept: RADIOLOGY | Facility: HOSPITAL | Age: 65
DRG: 326 | End: 2020-02-08
Payer: COMMERCIAL

## 2020-02-08 PROBLEM — E44.1 MILD PROTEIN-CALORIE MALNUTRITION (HCC): Status: ACTIVE | Noted: 2020-02-08

## 2020-02-08 PROBLEM — I48.91 ATRIAL FIBRILLATION (HCC): Status: ACTIVE | Noted: 2020-02-08

## 2020-02-08 LAB
ANION GAP SERPL CALCULATED.3IONS-SCNC: 6 MMOL/L (ref 4–13)
ANISOCYTOSIS BLD QL SMEAR: PRESENT
ATRIAL RATE: 143 BPM
ATRIAL RATE: 93 BPM
BASOPHILS # BLD MANUAL: 0 THOUSAND/UL (ref 0–0.1)
BASOPHILS NFR MAR MANUAL: 0 % (ref 0–1)
BUN SERPL-MCNC: 41 MG/DL (ref 5–25)
CALCIUM SERPL-MCNC: 7.7 MG/DL (ref 8.3–10.1)
CHLORIDE SERPL-SCNC: 112 MMOL/L (ref 100–108)
CO2 SERPL-SCNC: 25 MMOL/L (ref 21–32)
CORTIS SERPL-MCNC: 2.3 UG/DL
CREAT SERPL-MCNC: 1.06 MG/DL (ref 0.6–1.3)
EOSINOPHIL # BLD MANUAL: 0.07 THOUSAND/UL (ref 0–0.4)
EOSINOPHIL NFR BLD MANUAL: 1 % (ref 0–6)
ERYTHROCYTE [DISTWIDTH] IN BLOOD BY AUTOMATED COUNT: 17.1 % (ref 11.6–15.1)
GFR SERPL CREATININE-BSD FRML MDRD: 73 ML/MIN/1.73SQ M
GLUCOSE SERPL-MCNC: 198 MG/DL (ref 65–140)
GLUCOSE SERPL-MCNC: 218 MG/DL (ref 65–140)
GLUCOSE SERPL-MCNC: 226 MG/DL (ref 65–140)
GLUCOSE SERPL-MCNC: 240 MG/DL (ref 65–140)
GLUCOSE SERPL-MCNC: 257 MG/DL (ref 65–140)
HCT VFR BLD AUTO: 30.3 % (ref 36.5–49.3)
HGB BLD-MCNC: 9.1 G/DL (ref 12–17)
HGB BLD-MCNC: 9.1 G/DL (ref 12–17)
LYMPHOCYTES # BLD AUTO: 0.36 THOUSAND/UL (ref 0.6–4.47)
LYMPHOCYTES # BLD AUTO: 5 % (ref 14–44)
MAGNESIUM SERPL-MCNC: 2.8 MG/DL (ref 1.6–2.6)
MCH RBC QN AUTO: 26.1 PG (ref 26.8–34.3)
MCHC RBC AUTO-ENTMCNC: 30 G/DL (ref 31.4–37.4)
MCV RBC AUTO: 87 FL (ref 82–98)
MONOCYTES # BLD AUTO: 0.07 THOUSAND/UL (ref 0–1.22)
MONOCYTES NFR BLD: 1 % (ref 4–12)
NEUTROPHILS # BLD MANUAL: 6.72 THOUSAND/UL (ref 1.85–7.62)
NEUTS SEG NFR BLD AUTO: 93 % (ref 43–75)
NRBC BLD AUTO-RTO: 0 /100 WBCS
P AXIS: 61 DEGREES
PHOSPHATE SERPL-MCNC: 4.5 MG/DL (ref 2.3–4.1)
PLATELET # BLD AUTO: 287 THOUSANDS/UL (ref 149–390)
PLATELET BLD QL SMEAR: ADEQUATE
PMV BLD AUTO: 9.5 FL (ref 8.9–12.7)
POIKILOCYTOSIS BLD QL SMEAR: PRESENT
POTASSIUM SERPL-SCNC: 4.9 MMOL/L (ref 3.5–5.3)
PR INTERVAL: 154 MS
QRS AXIS: 43 DEGREES
QRS AXIS: 52 DEGREES
QRSD INTERVAL: 79 MS
QRSD INTERVAL: 79 MS
QT INTERVAL: 308 MS
QT INTERVAL: 363 MS
QTC INTERVAL: 452 MS
QTC INTERVAL: 475 MS
RBC # BLD AUTO: 3.49 MILLION/UL (ref 3.88–5.62)
RBC MORPH BLD: PRESENT
SODIUM SERPL-SCNC: 143 MMOL/L (ref 136–145)
T WAVE AXIS: 23 DEGREES
T WAVE AXIS: 31 DEGREES
VENTRICULAR RATE: 143 BPM
VENTRICULAR RATE: 93 BPM
WBC # BLD AUTO: 7.23 THOUSAND/UL (ref 4.31–10.16)

## 2020-02-08 PROCEDURE — 83735 ASSAY OF MAGNESIUM: CPT | Performed by: EMERGENCY MEDICINE

## 2020-02-08 PROCEDURE — 85018 HEMOGLOBIN: CPT | Performed by: PHYSICIAN ASSISTANT

## 2020-02-08 PROCEDURE — 82533 TOTAL CORTISOL: CPT | Performed by: PHYSICIAN ASSISTANT

## 2020-02-08 PROCEDURE — 85027 COMPLETE CBC AUTOMATED: CPT | Performed by: OTOLARYNGOLOGY

## 2020-02-08 PROCEDURE — 36569 INSJ PICC 5 YR+ W/O IMAGING: CPT

## 2020-02-08 PROCEDURE — 82948 REAGENT STRIP/BLOOD GLUCOSE: CPT

## 2020-02-08 PROCEDURE — 99291 CRITICAL CARE FIRST HOUR: CPT | Performed by: SURGERY

## 2020-02-08 PROCEDURE — 80048 BASIC METABOLIC PNL TOTAL CA: CPT | Performed by: OTOLARYNGOLOGY

## 2020-02-08 PROCEDURE — 74220 X-RAY XM ESOPHAGUS 1CNTRST: CPT

## 2020-02-08 PROCEDURE — 92611 MOTION FLUOROSCOPY/SWALLOW: CPT

## 2020-02-08 PROCEDURE — 85007 BL SMEAR W/DIFF WBC COUNT: CPT | Performed by: OTOLARYNGOLOGY

## 2020-02-08 PROCEDURE — 84100 ASSAY OF PHOSPHORUS: CPT | Performed by: EMERGENCY MEDICINE

## 2020-02-08 PROCEDURE — 93005 ELECTROCARDIOGRAM TRACING: CPT

## 2020-02-08 PROCEDURE — 93010 ELECTROCARDIOGRAM REPORT: CPT | Performed by: INTERNAL MEDICINE

## 2020-02-08 PROCEDURE — 74230 X-RAY XM SWLNG FUNCJ C+: CPT

## 2020-02-08 PROCEDURE — C1751 CATH, INF, PER/CENT/MIDLINE: HCPCS

## 2020-02-08 PROCEDURE — 99024 POSTOP FOLLOW-UP VISIT: CPT | Performed by: STUDENT IN AN ORGANIZED HEALTH CARE EDUCATION/TRAINING PROGRAM

## 2020-02-08 PROCEDURE — 99024 POSTOP FOLLOW-UP VISIT: CPT | Performed by: THORACIC SURGERY (CARDIOTHORACIC VASCULAR SURGERY)

## 2020-02-08 RX ORDER — COSYNTROPIN 0.25 MG/ML
0.25 INJECTION, POWDER, FOR SOLUTION INTRAMUSCULAR; INTRAVENOUS ONCE
Status: COMPLETED | OUTPATIENT
Start: 2020-02-09 | End: 2020-02-09

## 2020-02-08 RX ORDER — ALBUMIN, HUMAN INJ 5% 5 %
25 SOLUTION INTRAVENOUS ONCE
Status: COMPLETED | OUTPATIENT
Start: 2020-02-08 | End: 2020-02-08

## 2020-02-08 RX ORDER — LEVOTHYROXINE SODIUM 0.03 MG/1
25 TABLET ORAL
Status: DISCONTINUED | OUTPATIENT
Start: 2020-02-08 | End: 2020-02-12 | Stop reason: HOSPADM

## 2020-02-08 RX ORDER — ALBUMIN, HUMAN INJ 5% 5 %
SOLUTION INTRAVENOUS
Status: COMPLETED
Start: 2020-02-08 | End: 2020-02-08

## 2020-02-08 RX ORDER — OXYCODONE HCL 5 MG/5 ML
5 SOLUTION, ORAL ORAL EVERY 4 HOURS PRN
Status: DISCONTINUED | OUTPATIENT
Start: 2020-02-08 | End: 2020-02-12 | Stop reason: HOSPADM

## 2020-02-08 RX ORDER — OXYCODONE HCL 5 MG/5 ML
2.5 SOLUTION, ORAL ORAL EVERY 4 HOURS PRN
Status: DISCONTINUED | OUTPATIENT
Start: 2020-02-08 | End: 2020-02-12 | Stop reason: HOSPADM

## 2020-02-08 RX ORDER — AMIODARONE HYDROCHLORIDE 50 MG/ML
INJECTION, SOLUTION INTRAVENOUS
Status: COMPLETED
Start: 2020-02-08 | End: 2020-02-08

## 2020-02-08 RX ORDER — DIGOXIN 0.25 MG/ML
250 INJECTION INTRAMUSCULAR; INTRAVENOUS EVERY 6 HOURS
Status: DISCONTINUED | OUTPATIENT
Start: 2020-02-08 | End: 2020-02-08

## 2020-02-08 RX ORDER — COSYNTROPIN 0.25 MG/ML
0.25 INJECTION, POWDER, FOR SOLUTION INTRAMUSCULAR; INTRAVENOUS ONCE
Status: DISCONTINUED | OUTPATIENT
Start: 2020-02-08 | End: 2020-02-08

## 2020-02-08 RX ORDER — DIGOXIN 0.25 MG/ML
500 INJECTION INTRAMUSCULAR; INTRAVENOUS EVERY 6 HOURS
Status: DISCONTINUED | OUTPATIENT
Start: 2020-02-08 | End: 2020-02-08

## 2020-02-08 RX ORDER — AMOXICILLIN 250 MG
2 CAPSULE ORAL 2 TIMES DAILY
Status: DISCONTINUED | OUTPATIENT
Start: 2020-02-08 | End: 2020-02-09

## 2020-02-08 RX ADMIN — GABAPENTIN 100 MG: 250 SUSPENSION ORAL at 08:10

## 2020-02-08 RX ADMIN — SENNOSIDES AND DOCUSATE SODIUM 2 TABLET: 8.6; 5 TABLET ORAL at 17:39

## 2020-02-08 RX ADMIN — ACETAMINOPHEN 975 MG: 650 SUSPENSION ORAL at 00:19

## 2020-02-08 RX ADMIN — AMIODARONE HYDROCHLORIDE 150 MG: 50 INJECTION, SOLUTION INTRAVENOUS at 05:32

## 2020-02-08 RX ADMIN — ACETAMINOPHEN 975 MG: 650 SUSPENSION ORAL at 05:15

## 2020-02-08 RX ADMIN — Medication 20 MG: at 08:09

## 2020-02-08 RX ADMIN — ALBUMIN (HUMAN) 25 G: 12.5 INJECTION, SOLUTION INTRAVENOUS at 05:42

## 2020-02-08 RX ADMIN — GABAPENTIN 100 MG: 250 SUSPENSION ORAL at 21:47

## 2020-02-08 RX ADMIN — GABAPENTIN 100 MG: 250 SUSPENSION ORAL at 17:39

## 2020-02-08 RX ADMIN — OXYCODONE HYDROCHLORIDE 5 MG: 5 SOLUTION ORAL at 04:38

## 2020-02-08 RX ADMIN — INSULIN LISPRO 2 UNITS: 100 INJECTION, SOLUTION INTRAVENOUS; SUBCUTANEOUS at 00:20

## 2020-02-08 RX ADMIN — ALBUMIN (HUMAN) 25 G: 12.5 INJECTION, SOLUTION INTRAVENOUS at 01:36

## 2020-02-08 RX ADMIN — ACETAMINOPHEN 975 MG: 650 SUSPENSION ORAL at 17:40

## 2020-02-08 RX ADMIN — INSULIN LISPRO 2 UNITS: 100 INJECTION, SOLUTION INTRAVENOUS; SUBCUTANEOUS at 05:16

## 2020-02-08 RX ADMIN — LEVOTHYROXINE SODIUM 25 MCG: 25 TABLET ORAL at 08:09

## 2020-02-08 RX ADMIN — FENTANYL CITRATE 25 MCG: 50 INJECTION, SOLUTION INTRAMUSCULAR; INTRAVENOUS at 00:21

## 2020-02-08 RX ADMIN — ENOXAPARIN SODIUM 40 MG: 40 INJECTION SUBCUTANEOUS at 08:10

## 2020-02-08 RX ADMIN — MELATONIN 3 MG: 3 TAB ORAL at 21:47

## 2020-02-08 RX ADMIN — AMIODARONE HYDROCHLORIDE 1 MG/MIN: 50 INJECTION, SOLUTION INTRAVENOUS at 06:00

## 2020-02-08 RX ADMIN — INSULIN LISPRO 2 UNITS: 100 INJECTION, SOLUTION INTRAVENOUS; SUBCUTANEOUS at 18:34

## 2020-02-08 RX ADMIN — ALBUMIN, HUMAN INJ 5% 25 G: 5 SOLUTION at 05:42

## 2020-02-08 RX ADMIN — INSULIN LISPRO 2 UNITS: 100 INJECTION, SOLUTION INTRAVENOUS; SUBCUTANEOUS at 12:12

## 2020-02-08 NOTE — PROCEDURES
Video Swallow Study      Patient Name: Carolyn Avila  Today's Date: 2/8/2020        Past Medical History  Past Medical History:   Diagnosis Date    [facility]     Diabetes mellitus [facility]     Difficulty swallowing     Disease of thyroid gland     Esophageal mass     History of chemotherapy     History of transfusion     Malignant neoplasm of lower third of esophagus ([facility]     Port-A-Cath in place     LCW    Sleep apnea     no CPAP        Past Surgical History  Past Surgical History:   Procedure Laterality Date    BACK SURGERY      x2    DISC REMOVAL      ESOPHAGECTOMY N/A 1/28/2020    Procedure: ESOPHAGECTOMY, TRANSHIATAL;  Surgeon: Ольга Varghese MD;  Location: [facility] OR;  Service: Surgical Oncology    ESOPHAGOGASTRODUODENOSCOPY N/A 1/28/2020    Procedure: ESOPHAGOGASTRODUODENOSCOPY (EGD); Surgeon: Ольга Varghese MD;  Location: [facility];  Service: Surgical Oncology    FL GUIDED CENTRAL VENOUS ACCESS DEVICE INSERTION  9/26/2019    GASTROJEJUNOSTOMY W/ JEJUNOSTOMY TUBE N/A 9/26/2019    Procedure: INSERTION JEJUNOSTOMY TUBE OPEN;  Surgeon: Ольга Varghese MD;  Location: [facility];  Service: Surgical Oncology    TONSILLECTOMY      TUNNELED VENOUS PORT PLACEMENT N/A 9/26/2019    Procedure: INSERTION VENOUS PORT (PORT-A-CATH); Surgeon: Ольга Varghese MD;  Location: [facility];  Service: Surgical Oncology       SLP present for barium swallow prior  Video Barium Swallow Study    Summary:  Oral stage was wnl for bolus formation, control, and transfer  Mastication of soft solid was WNL  Pt declined harder solid  Oral stage was WNLSwallows were prompt  No aspiration was visualized this study  Hyoid excursion was > anterior  than superior for several swallows but overall WFL  Pharyngeal constriction was WNL/mildy reduced  Epiglottic inversion was inconsistent   There was an intermittent trace coating in the valleculae, pyriforms, and along the ae folds and ppw  Per gross esophageal screen: Stasis w/ puree, cleared w/ liquid wash  There was retention of the <1/2 barium pill distally that did not clear despite additional trials of po  (last few esophageal images are not on PACS)  Images are on PACS for review  Recommendations:  Diet: per ent/sx  Pt tolerated puree, thin, and soft foods this study  Pill retention distal esophagus  Liquids: thin  Meds: crush, tube/iv  Strategies: alternate w/ liquids to clear esophagus  Crush meds or via J tube  Adjust j tube feedings w/ Po amount  Upright position  F/u ST tx:prn  Therapy Prognosis:favorable  Intermittent Supervision  Aspiration Precautions  Reflux Precautions  Consider consult with: nutrition  Results reviewed with: pt, nursing      Goals: 1  Pt will tolerate least restrictive diet w/out s/s aspiration or oral/pharyngeal difficulties  2 Pt will will effectively masticate and transfer solids (as allowed) w/out s/s dysphagia/aspiratrion  3 Pt will tolerate thin liquids w/out s/s aspiration  Ongoing education    Pt is a 64yom referred for VBS  See below for reason and history  Previous VBS:  None known, Pt had BS prior to VBS  Current Diet:  J tube  Premorbid diet:  regualr food until recently, not using tube   Oral mech:  Strength and ROM:  wnl  Vocal Quality/Speech: Mod dysphonia, also stating he is dry  Cognitive status:  A&O    Consistencies administered: Barium laden applesauce, banana, nectar thick, thin liquids, < 1/2 13mm barium pill  Liquids were administered by cup and straw  Pt was seated laterally at 90 degrees  Oral stage:   WNL for items offered  Lip closure:+  Mastication:+  Bolus formation:+  Bolus control:+  Transfer:+  Residue:-/trace    Pharyngeal stage:  WNL/WFL  Swallow promptness:prompt  Spill to valleculae:-  Spill to pyriforms:-  Epiglottic inversion:+/-  Laryngeal excursion:anterior movement > superior for several swallows but overall wfl  Pharyngeal constriction:min reduced/wfl  Vallecular retention:none/min/trace  Pyriform retention:trace/none  PPW coating:trace/none  CP prominence:not observed  Penetration:none  Aspiration:none  Strategies:none    Screening of Esophageal stage:  Dysmotility  Retention: cleared w/ liquids, except for pill  Per ST 2/7/20  Order for VBS received this am  Communicated with RN  Aware of plan for vocal cord injection 4pm today  Aware that Barium Swallow ordered to evaluate conduit & anastamosis (r/o leak) and plan VBS to follow that procedure (presume study or studies can be done on weekend)  Electronically signed by SONY Hammond at 2/7/2020  3:05 PM        Per ENT 2/7/20:  Operative Indications:  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], glottic insufficiency  Dysphonia [R49 0]  Dysphagia with aspiration  Operative Findings: Moderate laryngeal/Medina's edema and erythema, good medialization on left with Cymetra, additional small amount was injected to the right vocal fold  Exposure was difficult requiring click at head of bed, anterior-posterior compression of the larynx, smaller sized laryngoscope  Only visualized posterior 1/2 of vocal folds  Per sx oncology: 2/8/20  Assessment:  70 yo M s/p transhiatal esophagectomy 1/28 whose post op course has been complicated by left recurrent laryngeal nerve injury and thoracic duct injury  - now s/p IR thoracic duct embolization 2/6 and ENT MDL w/bilateral vocal cord injection 2/7  Left  cc out, serous, no AL, to suction  Right  out, serous, no AL, to suction  Plan:  Chest tubes to suction  Barium swallow today 2/8 for evaluation  Restart J tube feeds after swallow  Poss void trial today vs this week  Insulin gtt  Amio gtt for A fib with RVR    2/8/20 barium swallow  FINDINGS:  Examination demonstrates pre-existing radiopaque material within the right upper mediastinum related to thoracic duct embolization    There is no evidence of

## 2020-02-08 NOTE — QUICK NOTE
Post-Op Check    Interval History: Pt went to OR with ENT this evening for laryngoscopy and vocal fold injection for medialization  Per report, no complications, minimal EBL  Pt returns to ICU awake and alert  Denies pain or difficulty breathing  Would like a swab for his mouth  No other complaints  Exam:  Constitutional: Well-developed  No distress  HENT:   Head: Normocephalic  Right Ear: External ear normal    Left Ear: External ear normal    Nose: Nose normal    Mouth/Throat: Oropharynx is clear and moist    Eyes: Conjunctivae and EOM are normal  Right eye exhibits no discharge  Left eye exhibits no discharge  Neck: Neck supple  No tracheal deviation  Cardiovascular: Normal rate, normal heart sounds   Pulmonary/Chest: Breath sounds normal  No respiratory distress  No wheezes, rales, stridor  Chest tubes in place  Abdominal: Soft  Bowel sounds are normal  No distension  No tenderness  No rebound or guarding  Musculoskeletal: No edema, tenderness or deformity  2+ Radial and DP pulses BL  Neurological: Alert  No cranial nerve deficit or sensory deficit  Normal muscle tone  Skin: Skin is warm and dry  Capillary refill takes less than 2 seconds  No rash noted  No diaphoresis  Wound dressings hemostatic  Psychiatric: Behavior is normal    Nursing note and vitals reviewed      A&P  - post op cbc and bmp:   - Cr improved w/ fluids today   - Hgb stable post-op  - continue fluids @125/hr  - continue to monitor I's/O's, UOP adequate this evening  - Hold TFs tonight for barium swallow in AM  - chest tubes BL to suction  - rest of plan per daily progress note    Dispo: continue ICU care

## 2020-02-08 NOTE — PROGRESS NOTES
Progress Note - Navid Hernandez 71 y o  male MRN: [id number]    Unit/Bed#: [facility] 416-01 Encounter: [id number]      Assessment:  72 yo M s/p transhiatal esophagectomy 1/28 whose post op course has been complicated by left recurrent laryngeal nerve injury and thoracic duct injury  - now s/p IR thoracic duct embolization 2/6 and ENT MDL w/bilateral vocal cord injection 2/7    Left  cc out, serous, no AL, to suction  Right  out, serous, no AL, to suction      Plan:  Chest tubes to suction  Barium swallow today 2/8 for evaluation  Restart J tube feeds after swallow  Poss void trial today vs this week  Insulin gtt  Amio gtt for A fib with RVR      Subjective: Went to OR for MDL with injection, doing well this morning, thinks his voice is stronger  However did have some issues with soft BP overnight, got albumin boluses with adequate response  Then this morning went into A fib with RVR, got amio bolus followed by amio gtt  Objective:     Vitals: Blood pressure (!) 77/56, pulse (!) 122, temperature (!) 97 3 °F (36 3 °C), temperature source Oral, resp  rate 16, height 5' 10" (1 778 m), weight 80 6 kg (177 lb 11 1 oz), SpO2 100 %  ,Body mass index is 25 5 kg/m²      I/O       02/04 0701 - 02/05 0700 02/05 0701 - 02/06 0700    I V  (mL/kg) 115 8 (1 4) 80 9 (1)    NG/ 370    IV Piggyback 350 200    Feedings 1484 1375    Total Intake(mL/kg) 2859 8 (35) 2025 9 (24 8)    Urine (mL/kg/hr) 1275 (0 7) 705 (0 4)    Emesis/NG output 0     Drains  0    Stool 0 0    Chest Tube 2340 2910    Total Output 3615 3615    Net -755 2 -1589 1          Unmeasured Stool Occurrence 5 x 1 x              Physical Exam:   NAD, alert and oriented x3  Normocephalic, atraumatic  MMM, EOMI, PERRLA  Norm resp effort on NC O2  RRR  Abd soft, NT/ND  B/l CT in place with serous output  Doshi in oplace  Midline abd wound cdi w staples  Neck wound cdi  No calf tenderness or peripheral edema  Motor/sensation intact in distal extremities  CN grossly intact  -rash/lesions            Scheduled Meds:    Current Facility-Administered Medications:  acetaminophen 975 mg Per J Tube Q6H [address] Juliane Merino MD    albuterol 2 5 mg Nebulization Q6H PRN Juliane Merino MD    amiodarone 1 mg/min Intravenous Continuous Marco Antonio Koroma MD Last Rate: 1 mg/min (02/08/20 0600)   Followed by        amiodarone 0 5 mg/min Intravenous Continuous Marco Antonio Koroma MD    amiodarone (CORDARONE) IV bolus 150 mg Intravenous Once Marco Antonio Koroma MD    enoxaparin 40 mg Subcutaneous Q24H [address] Juliane Merino MD    gabapentin 100 mg Per J Tube TID Juliane Merino MD    insulin lispro 1-5 Units Subcutaneous Q6H [address] Juliane Merino MD    levothyroxine 25 mcg Oral Early Morning Aster Degroot PA-C    lipiodol ultra fluid   Code/Trauma/Sedation Med Tomás Wang MD    melatonin 3 mg Per J Tube HS Juliane Merino MD    methocarbamol 500 mg Per J Tube Q6H PRN Juliane Merino MD    multi-electrolyte 125 mL/hr Intravenous Continuous Anjali Keller MD Last Rate: 125 mL/hr (02/08/20 0139)   omeprazole (PRILOSEC) suspension 2 mg/mL 20 mg Per J Tube Daily Juliane Merino MD    ondansetron 4 mg Intravenous Q6H PRN Juliane Merino MD    oxyCODONE 2 5 mg Per J Tube Q4H PRN Aster Degroot PA-C    oxyCODONE 5 mg Per J Tube Q4H PRN Aster Degroot PA-C      Continuous Infusions:    amiodarone 1 mg/min Last Rate: 1 mg/min (02/08/20 0600)   Followed by     amiodarone 0 5 mg/min    multi-electrolyte 125 mL/hr Last Rate: 125 mL/hr (02/08/20 0139)     PRN Meds: albuterol    lipiodol ultra fluid    methocarbamol    ondansetron    oxyCODONE    oxyCODONE      Invasive Devices     Central Venous Catheter Line            Port A Cath 09/26/19 Left Chest 134 days    CVC Central Lines 02/02/20 Triple 16cm 6 days          Drain            Gastrostomy/Enterostomy Jejunostomy 14 Fr   days    Chest Tube 1 Left Pleural 5 days    Chest Tube 3 Right Pleural 5 days    Urethral Catheter Latex 16 Fr  1 day                Lab, Imaging and other studies:   Recent Labs     02/07/20  0412 02/07/20  1846 02/08/20  0438   WBC 12 87* 10 85* 7 23   HGB 10 9* 9 9* 9 1*    292 287     Recent Labs     02/07/20  0412 02/07/20  1846 02/08/20  0438   SODIUM 145 144 143   K 4 2 4 4 4 9   * 112* 112*   CO2 26 25 25   BUN 49* 43* 41*   CREATININE 1 30 1 09 1 06   PHOS  --   --  4 5*   MG  --   --  2 8*   CALCIUM 7 9* 7 6* 7 7*           VTE Pharmacologic Prophylaxis: lovenox  VTE Mechanical Prophylaxis: sequential compression device

## 2020-02-08 NOTE — PROGRESS NOTES
PGY2 Post-Op Check Note     S:    Pt feels well after surgery, believes his voice has improved greatly  Denies any complaints, including difficulty breathing, swallowing  O:     Vitals:    02/07/20 1845   BP: 95/55   Pulse: 102   Resp: 15   Temp:    SpO2: 99%        I/O       02/06 0701 - 02/07 0700 02/07 0701 - 02/08 0700    I V  (mL/kg) 4617 8 (58 6) 1858 6 (23 6)    NG/ 80    IV Piggyback  1000    Feedings 244     Total Intake(mL/kg) 5301 8 (67 3) 2938 6 (37 3)    Urine (mL/kg/hr) 880 (0 5) 990 (0 9)    Emesis/NG output 0 0    Drains      Stool      Blood 0     Chest Tube 1580 320    Total Output 2460 1310    Net +2841 8 +1628 6                PE:  GEN: NAD  HEENT: MMM  CV: RRR  Lung: normal effort  Ab: Soft, NT/ND  Extrem: No CCE  Neuro:  A+Ox3, motor and sensation grossly intact       Lab Results   Component Value Date    WBC 10 85 (H) 02/07/2020    HGB 9 9 (L) 02/07/2020    HCT 33 5 (L) 02/07/2020    MCV 87 02/07/2020     02/07/2020     Lab Results   Component Value Date    GLUCOSE 339 (H) 02/01/2020    CALCIUM 7 6 (L) 02/07/2020    K 4 4 02/07/2020    CO2 25 02/07/2020     (H) 02/07/2020    BUN 43 (H) 02/07/2020    CREATININE 1 09 02/07/2020           A/P:   72 y/o M s/p transhiatal esophagectomy on 1/28, thoracic duct embolization on 2/6, microdirect Laryngoscopy with bilateral vocal cord Injection on 2/7  --Barium swallow with speech tomorrow  --NPO  --IVF  --Pain control  --DVT ppx

## 2020-02-08 NOTE — PROCEDURES
Insert PICC line  Date/Time: 2/8/2020 1:00 PM  Performed by: John Cisneros RN  Authorized by: Kala Nelson PA-C     Patient location:  Bedside  Other Assisting Provider: Yes (comment) (Theresa Valdes)    Consent:     Consent obtained:  Written (consent obtained by physician)    Consent given by:  Patient  Universal protocol:     Procedure explained and questions answered to patient or proxy's satisfaction: yes      Relevant documents present and verified: yes      Test results available and properly labeled: yes      Radiology Images displayed and confirmed  If images not available, report reviewed: yes      Required blood products, implants, devices, and special equipment available: yes      Site/side marked: yes      Immediately prior to procedure, a time out was called: yes      Patient identity confirmed:  Verbally with patient and arm band  Pre-procedure details:     Hand hygiene: Hand hygiene performed prior to insertion      Skin preparation:  ChloraPrep    Skin preparation agent: Skin preparation agent completely dried prior to procedure    Indications:     PICC line indications: medications requiring central line    Anesthesia (see MAR for exact dosages):      Anesthesia method:  Local infiltration    Local anesthetic:  Lidocaine 1% w/o epi (3ml)  Procedure details:     Location:  Brachial    Vessel type: vein      Laterality:  Right    Approach: percutaneous technique used      Patient position:  Flat    Procedural supplies:  Triple lumen    Landmarks identified: yes      Ultrasound guidance: yes      Sterile ultrasound techniques: Sterile gel and sterile probe covers were used      Number of attempts:  2    Successful placement: yes      Vessel of catheter tip end:  Sherlock 3CG confirmed    Total catheter length (cm):  41    Catheter out on skin (cm):  0    Max flow rate:  999    Arm circumference:  34  Post-procedure details:     Post-procedure:  Dressing applied and securement device placed    Assessment:  Blood return through all ports and free fluid flow    Post-procedure complications: none      Patient tolerance of procedure:   Tolerated well, no immediate complications

## 2020-02-08 NOTE — PROGRESS NOTES
Progress Note - Justa Valenzuela 71 y o  male MRN: [id number]    Unit/Bed#: [facility] 416-01 Encounter: [id number]      Assessment:  72 yo M s/p transhiatal esophagectomy 1/28 whose post op course has been complicated by left recurrent laryngeal nerve injury and thoracic duct injury  - now s/p IR thoracic duct embolization 2/6 and ENT MDL w/bilateral vocal cord injection 2/7    Left  cc out, serous, no AL, to suction  Right  out, serous, no AL, to suction      Plan:  Chest tubes to suction  Barium swallow today 2/8 for evaluation  Restart J tube feeds after swallow  Poss void trial today vs this week  Insulin gtt  Amio gtt for A fib with RVR      Subjective: Went to OR for MDL with injection, doing well this morning, thinks his voice is stronger  However did have some issues with soft BP overnight, got albumin boluses with adequate response  Then this morning went into A fib with RVR, got amio bolus followed by amio gtt  Objective:     Vitals: Blood pressure (!) 77/56, pulse (!) 122, temperature (!) 97 3 °F (36 3 °C), temperature source Oral, resp  rate 16, height 5' 10" (1 778 m), weight 80 6 kg (177 lb 11 1 oz), SpO2 100 %  ,Body mass index is 25 5 kg/m²      I/O       02/04 0701 - 02/05 0700 02/05 0701 - 02/06 0700    I V  (mL/kg) 115 8 (1 4) 80 9 (1)    NG/ 370    IV Piggyback 350 200    Feedings 1484 1375    Total Intake(mL/kg) 2859 8 (35) 2025 9 (24 8)    Urine (mL/kg/hr) 1275 (0 7) 705 (0 4)    Emesis/NG output 0     Drains  0    Stool 0 0    Chest Tube 2340 2910    Total Output 3615 3615    Net -755 2 -1589 1          Unmeasured Stool Occurrence 5 x 1 x              Physical Exam:   NAD, alert and oriented x3  Normocephalic, atraumatic  MMM, EOMI, PERRLA  Norm resp effort on NC O2  RRR  Abd soft, NT/ND  B/l CT in place with serous output  Doshi in oplace  Midline abd wound cdi w staples  Neck wound cdi  No calf tenderness or peripheral edema  Motor/sensation intact in distal extremities  CN grossly intact  -rash/lesions            Scheduled Meds:    Current Facility-Administered Medications:  acetaminophen 975 mg Per J Tube Q6H [address] Morse Cockayne, MD    albuterol 2 5 mg Nebulization Q6H PRN Morse Cockayne, MD    amiodarone 1 mg/min Intravenous Continuous Adilia Otero MD Last Rate: 1 mg/min (02/08/20 0600)   Followed by        amiodarone 0 5 mg/min Intravenous Continuous Adilia Otero MD    amiodarone (CORDARONE) IV bolus 150 mg Intravenous Once Adilia Otero MD    enoxaparin 40 mg Subcutaneous Q24H [address] Morse Cockayne, MD    gabapentin 100 mg Per J Tube TID Morse Cockayne, MD    insulin lispro 1-5 Units Subcutaneous Q6H [address] Morse Cockayne, MD    levothyroxine 25 mcg Oral Early Morning Shay Fan PA-C    lipiodol ultra fluid   Code/Trauma/Sedation Med Axel Anguiano MD    melatonin 3 mg Per J Tube HS Morse Cockayne, MD    methocarbamol 500 mg Per J Tube Q6H PRN Morse Cockayne, MD    multi-electrolyte 125 mL/hr Intravenous Continuous Liyah Larson MD Last Rate: 125 mL/hr (02/08/20 0139)   omeprazole (PRILOSEC) suspension 2 mg/mL 20 mg Per J Tube Daily Morse Cockayne, MD    ondansetron 4 mg Intravenous Q6H PRN Morse Cockayne, MD    oxyCODONE 2 5 mg Per J Tube Q4H PRN Shay Fan PA-C    oxyCODONE 5 mg Per J Tube Q4H PRN Shay Fan PA-C      Continuous Infusions:    amiodarone 1 mg/min Last Rate: 1 mg/min (02/08/20 0600)   Followed by     amiodarone 0 5 mg/min    multi-electrolyte 125 mL/hr Last Rate: 125 mL/hr (02/08/20 0139)     PRN Meds: albuterol    lipiodol ultra fluid    methocarbamol    ondansetron    oxyCODONE    oxyCODONE      Invasive Devices     Central Venous Catheter Line            Port A Cath 09/26/19 Left Chest 134 days    CVC Central Lines 02/02/20 Triple 16cm 6 days          Drain            Gastrostomy/Enterostomy Jejunostomy 14 Fr   days    Chest Tube 1 Left Pleural 5 days    Chest Tube 3 Right Pleural 5 days    Urethral Catheter Latex 16 Fr  1 day                Lab, Imaging and other studies:   Recent Labs     02/07/20  0412 02/07/20  1846 02/08/20  0438   WBC 12 87* 10 85* 7 23   HGB 10 9* 9 9* 9 1*    292 287     Recent Labs     02/07/20  0412 02/07/20  1846 02/08/20  0438   SODIUM 145 144 143   K 4 2 4 4 4 9   * 112* 112*   CO2 26 25 25   BUN 49* 43* 41*   CREATININE 1 30 1 09 1 06   PHOS  --   --  4 5*   MG  --   --  2 8*   CALCIUM 7 9* 7 6* 7 7*           VTE Pharmacologic Prophylaxis: lovenox  VTE Mechanical Prophylaxis: sequential compression device

## 2020-02-08 NOTE — PROGRESS NOTES
Daily Progress Note - Critical Care   Kera Bill 71 y o  male MRN: [id number]  Unit/Bed#: [facility] 416-01 Encounter: [id number]        ----------------------------------------------------------------------------------------  HPI/24hr events: Underwent injection of vocal cords with ENT with improvement in dysphonia  Hypotensive overnight - received 500 ml albumin x 2  Rapid afib - amiodarone bolus 150 mg IV and drip started  ---------------------------------------------------------------------------------------  SUBJECTIVE  "I feel better"  Had "band-like" tightness across chest earlier  Improved with getting OOB  Denies any current CP/SOB/N/V    Review of Systems  Review of systems was reviewed and negative unless stated above in HPI/24-hour events   ---------------------------------------------------------------------------------------  Assessment and Plan:    Neuro:    Diagnosis: Post-op pain  o Plan: Tylenol 975 mg q6h, gabapentin 100 mg TID, fentanyl 25 mcg IV q2h PRN (4 doses/24h), oxycodone 2 5 mg q4h PRN (0 doses/24h), oxycodone 5 mg q4h PRN (2 doses/24h)    Regulate sleep/wake cycle/CAM-ICU daily   o Plan: Melatonin 3 mg qHS      CV:    Diagnosis: Hypotension  o Plan:  May be hypovolemic, repeat hgb later today, may need vasopressor support    Diagnosis: Afib with RVR, new onset  o Plan: Received amiodarone bolus, drip at 1 mg/min x 6h followed by 0 5 mg/min      Pulm:   Diagnosis: Acute hypoxic respiratory failure - resolved, Bilateral pleural effusion with right chylothorax s/p bilateral chest tubes and thoracic duct embolization on 2/6 by IR  o Plan: Bilateral chest tubes to suction, decreased output past 24h, maintain to monitor output once TFs restarted   Diagnosis: Vocal cord dysfunction   o Plan: s/p vocal cord injection 2/7 with ENT      GI:    Diagnosis: Esophageal cancer s/p transhiatal esophagectomy 1/28, J tube in place  o Plan: Thoracic surgery and surgical oncology primary, for barium swallow today, continue omeprazole, nutrition plan pending results of barium swallow    Diagnosis: Diarrhea - resolved   o Plan: Restart bowel regimen       :    Diagnosis: MARTHA  o Plan: Resolved with IVFs   Diagnosis: Urinary retention  o Plan: Consider urology consult       F/E/N:    Plan: NPO, barium swallow pending, restart TFs, Isolyte at 125 ml/hr      Heme/Onc:    Diagnosis: Anemia   o Plan: No active signs of bleeding, repeat hgb at noon given hypotension and 3g drop in hgb in 48h      Endo:    Diagnosis: DM type 2  o Plan: SSI coverage with q6h accuchecks    Diagnosis: Hypothyroidism  o Plan: Restart home synthroid     ID:    Diagnosis: Aspiration pneumonia   o Plan: Completed 7 day course of cefepime/flagyl, trend WBC/fever curve       MSK/Skin:    PT/OT   OOB to chair, ambulate     Consider PICC placement       Disposition: Continue Critical Care   Code Status: Level 1 - Full Code  ---------------------------------------------------------------------------------------  ICU CORE MEASURES    Prophylaxis   VTE Pharmacologic Prophylaxis: Enoxaparin (Lovenox)  VTE Mechanical Prophylaxis: sequential compression device  Stress Ulcer Prophylaxis: Omeprazole PO    Invasive Devices Review  Invasive Devices     Central Venous Catheter Line            Port A Cath 09/26/19 Left Chest 134 days    CVC Central Lines 02/02/20 Triple 16cm 6 days          Drain            Gastrostomy/Enterostomy Jejunostomy 14 Fr   days    Chest Tube 1 Left Pleural 5 days    Chest Tube 3 Right Pleural 5 days    Urethral Catheter Latex 16 Fr  1 day              Can any invasive devices be discontinued today?  No  ---------------------------------------------------------------------------------------  OBJECTIVE    Vitals   Vitals:    02/08/20 0538 02/08/20 0541 02/08/20 0546 02/08/20 0600   BP: (!) 74/42 (!) 83/47 (!) 75/45 (!) 77/56   BP Location:       Pulse: (!) 126 (!) 124 (!) 120 (!) 122   Resp: 20 17 16 16 Temp:       TempSrc:       SpO2: 100% 100% 100% 100%   Weight:    80 6 kg (177 lb 11 1 oz)   Height:         Temp (24hrs), Av 4 °F (36 3 °C), Min:97 °F (36 1 °C), Max:97 6 °F (36 4 °C)  Current: Temperature: (!) 97 3 °F (36 3 °C)    Physical Exam   Constitutional: He is oriented to person, place, and time  He appears well-developed and well-nourished  No distress  HENT:   Head: Normocephalic and atraumatic  Eyes: Pupils are equal, round, and reactive to light  EOM are normal    Neck: Neck supple  Cardiovascular: An irregularly irregular rhythm present  Tachycardia present  Pulses:       Radial pulses are 2+ on the right side, and 2+ on the left side  Dorsalis pedis pulses are 2+ on the right side, and 2+ on the left side  Pulmonary/Chest: Effort normal  He has no wheezes  He has no rhonchi  He has no rales  Right chest tube with serosanguinous drainage, no air leak  Left chest tube with serous drainage, no air leak   Abdominal: Soft  He exhibits distension  Bowel sounds are decreased  There is no tenderness  Midline abdominal incision with staples in place  J tube    Genitourinary:   Genitourinary Comments: + johnson   Musculoskeletal: He exhibits edema (2+)  Neurological: He is alert and oriented to person, place, and time  Grossly nonfocal neuro exam    Skin: Skin is warm and dry       Respiratory:  SpO2: SpO2: 100 %, SpO2 Device: Nasal cannula   O2 Flow Rate (L/min): 2 L/min    Laboratory and Diagnostics:  Results from last 7 days   Lab Units 20  0438 20  1846 20  0412 20  2039 20  0425 20  0433 20  0407 20  0447  20  0430   WBC Thousand/uL 7 23 10 85* 12 87* 16 85* 11 55* 10 48* 8 46 6 59  --  11 78*   HEMOGLOBIN g/dL 9 1* 9 9* 10 9* 12 5 12 0 11 1* 9 9* 9 7*   < > 10 0*   HEMATOCRIT % 30 3* 33 5* 36 9 41 6 40 1 37 9 33 1* 32 2*  --  31 8*   PLATELETS Thousands/uL 287 292 332 391* 343 261 229 211  --  257   NEUTROS PCT %  --  90* 87*  --  76*  --   --  78*  --  84*   MONOS PCT %  --  3* 5  --  6  --   --  9  --  8    < > = values in this interval not displayed  Results from last 7 days   Lab Units 02/08/20  0438 02/07/20  1846 02/07/20  0412 02/06/20  2039 02/06/20  0425 02/05/20  0433 02/04/20  0407   SODIUM mmol/L 143 144 145 142 142 141 144   POTASSIUM mmol/L 4 9 4 4 4 2 5 1 3 9 3 7 3 7   CHLORIDE mmol/L 112* 112* 115* 113* 112* 110* 114*   CO2 mmol/L 25 25 26 22 25 25 25   ANION GAP mmol/L 6 7 4 7 5 6 5   BUN mg/dL 41* 43* 49* 46* 42* 36* 34*   CREATININE mg/dL 1 06 1 09 1 30 1 37* 1 08 1 06 1 05   CALCIUM mg/dL 7 7* 7 6* 7 9* 8 2* 8 2* 8 0* 7 9*   GLUCOSE RANDOM mg/dL 240* 133 82 121 96 301* 263*     Results from last 7 days   Lab Units 02/05/20  0433 02/04/20  0407 02/03/20  0447 02/02/20  0430 02/01/20  2304 02/01/20  0825   MAGNESIUM mg/dL  --  2 4 2 8* 3 1* 3 7* 3 2*   PHOSPHORUS mg/dL 2 5 2 1* 3 2  --  9 2*  --       Micro  Results from last 7 days   Lab Units 02/03/20  1225 02/02/20  0023 02/02/20  0021   BLOOD CULTURE   --  No Growth After 5 Days  No Growth After 5 Days  MRSA CULTURE ONLY  No Methicillin Resistant Staphlyococcus aureus (MRSA) isolated  --   --        EKG: Afib with RVR    Intake and Output  I/O       02/06 0701 - 02/07 0700 02/07 0701 - 02/08 0700    I V  (mL/kg) 4617 8 (58 6) 2977 4 (37 8)    NG/ 380    IV Piggyback  1500    Feedings 244     Total Intake(mL/kg) 5301 8 (67 3) 4857 4 (61 6)    Urine (mL/kg/hr) 880 (0 5) 1730 (0 9)    Emesis/NG output 0 0    Blood 0     Chest Tube 1580 520    Total Output 2460 2250    Net +2841 8 +2607 4              UOP: 50 ml/hr   L chest tube: 170 ml/24h  R chest tube: 350 ml/24h    Height and Weights   Height: 5' 10" (177 8 cm)  IBW: 73 kg  Body mass index is 25 5 kg/m²    Weight (last 2 days)     Date/Time   Weight    02/08/20 0600   80 6 (177 69)    02/08/20 0115       Comment rows:    OBSERV: Spoke with Melisa regarding hypotension at 02/08/20 0115    02/07/20 0600   78 8 (173 72)    02/06/20 0533   79 6 (175 49)            Nutrition  NPO      Active Medications  Scheduled Meds:    Current Facility-Administered Medications:  acetaminophen 975 mg Per J Tube Q6H [address] Jennifer Calix MD    albuterol 2 5 mg Nebulization Q6H PRN Jennifer Calix MD    amiodarone 1 mg/min Intravenous Continuous Clarisse Alas MD Last Rate: 1 mg/min (02/08/20 0600)   Followed by        amiodarone 0 5 mg/min Intravenous Continuous Clarisse Alas MD    amiodarone (CORDARONE) IV bolus 150 mg Intravenous Once Clarisse Alas MD    chlorhexidine 15 mL Swish & Spit Q12H [address] Jennifer Calix MD    enoxaparin 40 mg Subcutaneous Q24H [address] Jennifer Calix MD    fentanyl citrate (PF) 25 mcg Intravenous Q2H PRN Jennifer Calix MD    gabapentin 100 mg Per J Tube TID Jennifer Calix MD    insulin lispro 1-5 Units Subcutaneous Q6H [address] Jennifer Calix MD    lipiodol ultra fluid   Code/Trauma/Sedation Med Kristin Urban MD    melatonin 3 mg Per J Tube HS Jennifer Calix MD    methocarbamol 500 mg Per J Tube Q6H PRN Jennifer Calix MD    multi-electrolyte 125 mL/hr Intravenous Continuous Bari Clemens MD Last Rate: 125 mL/hr (02/08/20 0139)   omeprazole (PRILOSEC) suspension 2 mg/mL 20 mg Per J Tube Daily Jennifer Calix MD    ondansetron 4 mg Intravenous Q6H PRN Jennifer Calix MD    oxyCODONE 2 5 mg Oral Q4H PRN Jennifer Calix MD    oxyCODONE 5 mg Oral Q4H PRN Jennifer Calix MD      Continuous Infusions:    amiodarone 1 mg/min Last Rate: 1 mg/min (02/08/20 0600)   Followed by     amiodarone 0 5 mg/min    multi-electrolyte 125 mL/hr Last Rate: 125 mL/hr (02/08/20 0139)     PRN Meds:     albuterol 2 5 mg Q6H PRN   fentanyl citrate (PF) 25 mcg Q2H PRN   lipiodol ultra fluid  Code/Trauma/Sedation Med   methocarbamol 500 mg Q6H PRN   ondansetron 4 mg Q6H PRN   oxyCODONE 2 5 mg Q4H PRN   oxyCODONE 5 mg Q4H PRN       Allergies   Allergies   Allergen Reactions    Penicillins Itching ---------------------------------------------------------------------------------------  Advance Directive and Living Will:      Power of :    POLST:    ---------------------------------------------------------------------------------------  Counseling / Coordination of Care  Total Critical Care time spent 32 minutes excluding procedures, teaching and family updates  Gracie Hawley PA-C      Portions of the record may have been created with voice recognition software  Occasional wrong word or "sound a like" substitutions may have occurred due to the inherent limitations of voice recognition software    Read the chart carefully and recognize, using context, where substitutions have occurred  s

## 2020-02-09 LAB
ALBUMIN SERPL BCP-MCNC: 1.8 G/DL (ref 3.5–5)
ALP SERPL-CCNC: 56 U/L (ref 46–116)
ALT SERPL W P-5'-P-CCNC: 11 U/L (ref 12–78)
ANION GAP SERPL CALCULATED.3IONS-SCNC: 8 MMOL/L (ref 4–13)
ANISOCYTOSIS BLD QL SMEAR: PRESENT
AST SERPL W P-5'-P-CCNC: 11 U/L (ref 5–45)
BASOPHILS # BLD MANUAL: 0 THOUSAND/UL (ref 0–0.1)
BASOPHILS NFR MAR MANUAL: 0 % (ref 0–1)
BILIRUB DIRECT SERPL-MCNC: 0.08 MG/DL (ref 0–0.2)
BILIRUB SERPL-MCNC: 0.21 MG/DL (ref 0.2–1)
BUN SERPL-MCNC: 32 MG/DL (ref 5–25)
CALCIUM SERPL-MCNC: 7.2 MG/DL (ref 8.3–10.1)
CHLORIDE SERPL-SCNC: 105 MMOL/L (ref 100–108)
CO2 SERPL-SCNC: 24 MMOL/L (ref 21–32)
CORTIS SERPL-MCNC: 22 UG/DL
CREAT SERPL-MCNC: 0.88 MG/DL (ref 0.6–1.3)
EOSINOPHIL # BLD MANUAL: 0 THOUSAND/UL (ref 0–0.4)
EOSINOPHIL NFR BLD MANUAL: 0 % (ref 0–6)
ERYTHROCYTE [DISTWIDTH] IN BLOOD BY AUTOMATED COUNT: 17 % (ref 11.6–15.1)
GFR SERPL CREATININE-BSD FRML MDRD: 90 ML/MIN/1.73SQ M
GLUCOSE SERPL-MCNC: 119 MG/DL (ref 65–140)
GLUCOSE SERPL-MCNC: 130 MG/DL (ref 65–140)
GLUCOSE SERPL-MCNC: 239 MG/DL (ref 65–140)
GLUCOSE SERPL-MCNC: 268 MG/DL (ref 65–140)
GLUCOSE SERPL-MCNC: 342 MG/DL (ref 65–140)
GLUCOSE SERPL-MCNC: 395 MG/DL (ref 65–140)
GLUCOSE SERPL-MCNC: 396 MG/DL (ref 65–140)
HCT VFR BLD AUTO: 30 % (ref 36.5–49.3)
HGB BLD-MCNC: 9.1 G/DL (ref 12–17)
LYMPHOCYTES # BLD AUTO: 0.51 THOUSAND/UL (ref 0.6–4.47)
LYMPHOCYTES # BLD AUTO: 5 % (ref 14–44)
MAGNESIUM SERPL-MCNC: 2.7 MG/DL (ref 1.6–2.6)
MCH RBC QN AUTO: 26.3 PG (ref 26.8–34.3)
MCHC RBC AUTO-ENTMCNC: 30.3 G/DL (ref 31.4–37.4)
MCV RBC AUTO: 87 FL (ref 82–98)
MONOCYTES # BLD AUTO: 0.1 THOUSAND/UL (ref 0–1.22)
MONOCYTES NFR BLD: 1 % (ref 4–12)
NEUTROPHILS # BLD MANUAL: 9.61 THOUSAND/UL (ref 1.85–7.62)
NEUTS SEG NFR BLD AUTO: 94 % (ref 43–75)
NRBC BLD AUTO-RTO: 0 /100 WBCS
PLATELET # BLD AUTO: 373 THOUSANDS/UL (ref 149–390)
PLATELET BLD QL SMEAR: ADEQUATE
PMV BLD AUTO: 9.7 FL (ref 8.9–12.7)
POIKILOCYTOSIS BLD QL SMEAR: PRESENT
POLYCHROMASIA BLD QL SMEAR: PRESENT
POTASSIUM SERPL-SCNC: 4.4 MMOL/L (ref 3.5–5.3)
PROT SERPL-MCNC: 4.9 G/DL (ref 6.4–8.2)
RBC # BLD AUTO: 3.46 MILLION/UL (ref 3.88–5.62)
RBC MORPH BLD: PRESENT
SODIUM SERPL-SCNC: 137 MMOL/L (ref 136–145)
WBC # BLD AUTO: 10.22 THOUSAND/UL (ref 4.31–10.16)

## 2020-02-09 PROCEDURE — 82948 REAGENT STRIP/BLOOD GLUCOSE: CPT

## 2020-02-09 PROCEDURE — 80076 HEPATIC FUNCTION PANEL: CPT | Performed by: PHYSICIAN ASSISTANT

## 2020-02-09 PROCEDURE — 99291 CRITICAL CARE FIRST HOUR: CPT | Performed by: SURGERY

## 2020-02-09 PROCEDURE — 99024 POSTOP FOLLOW-UP VISIT: CPT | Performed by: THORACIC SURGERY (CARDIOTHORACIC VASCULAR SURGERY)

## 2020-02-09 PROCEDURE — 94760 N-INVAS EAR/PLS OXIMETRY 1: CPT

## 2020-02-09 PROCEDURE — 99222 1ST HOSP IP/OBS MODERATE 55: CPT | Performed by: INTERNAL MEDICINE

## 2020-02-09 PROCEDURE — 99222 1ST HOSP IP/OBS MODERATE 55: CPT | Performed by: UROLOGY

## 2020-02-09 PROCEDURE — 85027 COMPLETE CBC AUTOMATED: CPT | Performed by: PHYSICIAN ASSISTANT

## 2020-02-09 PROCEDURE — 82533 TOTAL CORTISOL: CPT | Performed by: EMERGENCY MEDICINE

## 2020-02-09 PROCEDURE — 99024 POSTOP FOLLOW-UP VISIT: CPT | Performed by: STUDENT IN AN ORGANIZED HEALTH CARE EDUCATION/TRAINING PROGRAM

## 2020-02-09 PROCEDURE — 83735 ASSAY OF MAGNESIUM: CPT | Performed by: PHYSICIAN ASSISTANT

## 2020-02-09 PROCEDURE — 80048 BASIC METABOLIC PNL TOTAL CA: CPT | Performed by: PHYSICIAN ASSISTANT

## 2020-02-09 PROCEDURE — 85007 BL SMEAR W/DIFF WBC COUNT: CPT | Performed by: PHYSICIAN ASSISTANT

## 2020-02-09 RX ORDER — AMOXICILLIN 250 MG
2 CAPSULE ORAL 2 TIMES DAILY
Status: DISCONTINUED | OUTPATIENT
Start: 2020-02-09 | End: 2020-02-12 | Stop reason: HOSPADM

## 2020-02-09 RX ADMIN — OXYCODONE HYDROCHLORIDE 5 MG: 5 SOLUTION ORAL at 00:19

## 2020-02-09 RX ADMIN — INSULIN HUMAN 15 UNITS: 100 INJECTION, SUSPENSION SUBCUTANEOUS at 21:15

## 2020-02-09 RX ADMIN — AMIODARONE HYDROCHLORIDE 0.5 MG/MIN: 50 INJECTION, SOLUTION INTRAVENOUS at 18:04

## 2020-02-09 RX ADMIN — OXYCODONE HYDROCHLORIDE 5 MG: 5 SOLUTION ORAL at 21:04

## 2020-02-09 RX ADMIN — GABAPENTIN 100 MG: 250 SUSPENSION ORAL at 08:32

## 2020-02-09 RX ADMIN — INSULIN LISPRO 4 UNITS: 100 INJECTION, SOLUTION INTRAVENOUS; SUBCUTANEOUS at 11:19

## 2020-02-09 RX ADMIN — SENNOSIDES AND DOCUSATE SODIUM 2 TABLET: 8.6; 5 TABLET ORAL at 08:33

## 2020-02-09 RX ADMIN — INSULIN LISPRO 2 UNITS: 100 INJECTION, SOLUTION INTRAVENOUS; SUBCUTANEOUS at 00:05

## 2020-02-09 RX ADMIN — Medication 20 MG: at 08:33

## 2020-02-09 RX ADMIN — INSULIN LISPRO 5 UNITS: 100 INJECTION, SOLUTION INTRAVENOUS; SUBCUTANEOUS at 11:19

## 2020-02-09 RX ADMIN — COSYNTROPIN 0.25 MG: 0.25 INJECTION, POWDER, LYOPHILIZED, FOR SOLUTION INTRAMUSCULAR; INTRAVENOUS at 00:00

## 2020-02-09 RX ADMIN — INSULIN LISPRO 5 UNITS: 100 INJECTION, SOLUTION INTRAVENOUS; SUBCUTANEOUS at 18:03

## 2020-02-09 RX ADMIN — GABAPENTIN 100 MG: 250 SUSPENSION ORAL at 21:07

## 2020-02-09 RX ADMIN — INSULIN LISPRO 4 UNITS: 100 INJECTION, SOLUTION INTRAVENOUS; SUBCUTANEOUS at 05:10

## 2020-02-09 RX ADMIN — GABAPENTIN 100 MG: 250 SUSPENSION ORAL at 17:55

## 2020-02-09 RX ADMIN — ENOXAPARIN SODIUM 40 MG: 40 INJECTION SUBCUTANEOUS at 08:32

## 2020-02-09 RX ADMIN — LEVOTHYROXINE SODIUM 25 MCG: 25 TABLET ORAL at 05:10

## 2020-02-09 RX ADMIN — METHOCARBAMOL TABLETS 500 MG: 500 TABLET, COATED ORAL at 21:05

## 2020-02-09 RX ADMIN — SENNOSIDES AND DOCUSATE SODIUM 2 TABLET: 8.6; 5 TABLET ORAL at 17:55

## 2020-02-09 NOTE — CONSULTS
Consultation - Urology   Ricardo Churchill 71 y o  male MRN: [id number]  Unit/Bed#: [facility] 416-01 Encounter: [id number] 2/9/2020           Assessment/Plan      Assessment:  Urinary retention-likely multifactorial secondary to BPH, surgery, pain medication etc    Plan:  Would maintain Doshi catheter until patient is ambulating well and stronger  He did not have any pre hospitalization voiding difficulty  He cannot take tamsulosin as he cannot yet take pills  He had hypotension with Cardura  Would consider voiding trial prior to discharge  He should have outpatient urologic follow-up for check of postvoid residual, digital rectal examination and PSA testing if indicated  History of Present Illness   56-year-old male who underwent esophagectomy on January 29VN complicated by 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 as well as right chylothorax  Doshi catheter was placed at the time of surgery  Doshi catheter was removed on February 5th  After Doshi catheter removal the patient failed the urinary retention protocol  Doshi catheter is now in place  The patient reports he voided well at home  There is no prior urologic history  His stream was adequate he feels he empties his bladder well  He got up once a night to urinate  There was no gross hematuria, history of urinary tract infection or incontinence  He was satisfied with his voiding pattern  Urology is now consulted for the urinary retention       Attending: Nick Salazra MD  Reason for Consult / Principal Problem:  Patient Active Problem List   Diagnosis    COPD (chronic obstructive pulmonary disease) ([address] )    Headaches due to old head injury    High cholesterol    Obstructive sleep apnea syndrome    Type 2 diabetes mellitus with hyperglycemia, without long-term current use of insulin (HCC)    Malignant neoplasm of lower third of esophagus (HCC)    Esophageal obstruction    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[address] )    Chemotherapy induced neutropenia (HCC)    Anemia, unspecified    Insomnia due to medical condition    Encounter for care related to feeding tube    Paralysis of left vocal fold    Dysphonia    Chylothorax on right    Mild protein-calorie malnutrition ([facility] )    Atrial fibrillation [facility])       Inpatient consult to Urology  Consult performed by: Amilcar Osborne MD  Consult ordered by: Rupert Snider PA-C          Review of Systems   Constitutional: Negative for chills, diaphoresis, fatigue and fever  HENT: Negative  Eyes: Negative  Respiratory: Negative  Cardiovascular: Negative  Gastrointestinal:        J tube-to starting to eat   Endocrine: Negative  Genitourinary:        See HPI   Musculoskeletal: Negative  Skin: Negative  Allergic/Immunologic: Negative  Neurological: Negative  Hematological: Negative  Psychiatric/Behavioral: Negative  Historical Information   Allergies   Allergen Reactions    Penicillins Itching     Past Medical History:   Diagnosis Date    COPD (chronic obstructive pulmonary disease) ([facility] )     Diabetes mellitus (HCC)     Difficulty swallowing     Disease of thyroid gland     Esophageal mass     History of chemotherapy     History of transfusion     Malignant neoplasm of lower third of esophagus ([place] [address] )     Port-A-Cath in place     LCW    Sleep apnea     no CPAP     Past Surgical History:   Procedure Laterality Date    BACK SURGERY      x2    DISC REMOVAL      ESOPHAGECTOMY N/A 1/28/2020    Procedure: ESOPHAGECTOMY, TRANSHIATAL;  Surgeon: Angie Philippe MD;  [facility];  Service: Surgical Oncology    ESOPHAGOGASTRODUODENOSCOPY N/A 1/28/2020    Procedure: ESOPHAGOGASTRODUODENOSCOPY (EGD);   Surgeon: Angie Philippe MD;  [facility];  Service: Surgical Oncology    FL GUIDED CENTRAL VENOUS ACCESS DEVICE INSERTION  9/26/2019    GASTROJEJUNOSTOMY W/ JEJUNOSTOMY TUBE N/A 9/26/2019    Procedure: INSERTION JEJUNOSTOMY TUBE OPEN;  Surgeon: Tika Zhang MD;  [facility];  Service: Surgical Oncology    TONSILLECTOMY      TUNNELED VENOUS PORT PLACEMENT N/A 2019    Procedure: INSERTION VENOUS PORT (PORT-A-CATH);   Surgeon: Tika Zhang MD;  [facility];  Service: Surgical Oncology     Social History   Social History     Substance and Sexual Activity   Alcohol Use Not Currently    Alcohol/week: 0 0 standard drinks    Frequency: Never    Drinks per session: 1 or 2    Binge frequency: Never     Social History     Substance and Sexual Activity   Drug Use Never     Social History     Tobacco Use   Smoking Status Former Smoker    Packs/day: 0 50    Years: 50 00    Pack years: 25 00    Types: Cigarettes    Last attempt to quit: 2017    Years since quittin 1   Smokeless Tobacco Never Used     Family History: non-contributory    Meds/Allergies   all current active meds have been reviewed    Current Facility-Administered Medications:     acetaminophen (TYLENOL) oral suspension 975 mg, 975 mg, Per J Tube, Q6H [facility], Leslee Ribeiro MD, 975 mg at 20 1740    albuterol inhalation solution 2 5 mg, 2 5 mg, Nebulization, Q6H PRN, Leslee Ribeiro MD, 2 5 mg at 20 2102    [] amiodarone (CORDARONE) 900 mg in dextrose 5 % 500 mL infusion, 1 mg/min, Intravenous, Continuous, Stopped at 20 1307 **FOLLOWED BY** amiodarone (CORDARONE) 900 mg in dextrose 5 % 500 mL infusion, 0 5 mg/min, Intravenous, Continuous, Casey Kelsey MD, Last Rate: 16 7 mL/hr at 20 0815, 0 5 mg/min at 20 0815    enoxaparin (LOVENOX) subcutaneous injection 40 mg, 40 mg, Subcutaneous, Q24H [facility], Leslee Ribeiro MD, 40 mg at 20 4558    gabapentin (NEURONTIN) oral solution 100 mg, 100 mg, Per J Tube, TID, Leslee Ribeiro MD, 100 mg at 20 0832    insulin lispro (HumaLOG) 100 units/mL subcutaneous injection 2-12 Units, 2-12 Units, Subcutaneous, TID AC, 4 Units at 20 1119 **AND** Fingerstick Glucose (POCT), , , TID AC, Valentín Albarran PA-C    insulin lispro (HumaLOG) 100 units/mL subcutaneous injection 2-12 Units, 2-12 Units, Subcutaneous, HS, Valentín Albarran PA-C    insulin lispro (HumaLOG) 100 units/mL subcutaneous injection 5 Units, 5 Units, Subcutaneous, TID With Meals, Ekta Davis MD, 5 Units at 02/09/20 1119    insulin NPH (HumuLIN N,NovoLIN N) 100 Units/mL subcutaneous injection 15 Units, 15 Units, Subcutaneous, Q24H, Ekta Davis MD    levothyroxine tablet 25 mcg, 25 mcg, Oral, Early Morning, Valentín Albarran PA-C, 25 mcg at 02/09/20 0510    lipiodol ultra fluid 48%, , , Code/Trauma/Sedation Med, Kamlesh Jane MD, 50 mL at 02/06/20 1756    melatonin tablet 3 mg, 3 mg, Per J Tube, HS, Renetta Hernandez MD, 3 mg at 02/08/20 2147    methocarbamol (ROBAXIN) tablet 500 mg, 500 mg, Per Deborah Martines, Q6H PRN, Renetta Hernandez MD, 500 mg at 02/04/20 0049    omeprazole (PRILOSEC) suspension 2 mg/mL, 20 mg, Per Deborahblayne Martines, Daily, Renetta Hernandez MD, 20 mg at 02/09/20 0833    ondansetron [facility]) injection 4 mg, 4 mg, Intravenous, Q6H PRN, Renetta Hernandez MD, 4 mg at 02/07/20 1740    oxyCODONE (ROXICODONE) oral solution 2 5 mg, 2 5 mg, Per J Tube, Q4H PRN, Valentín Albarran PA-C    oxyCODONE (ROXICODONE) oral solution 5 mg, 5 mg, Per J Tube, Q4H PRN, Valentín Albarran PA-C, 5 mg at 02/09/20 0019    senna-docusate sodium (SENOKOT S) 8 6-50 mg per tablet 2 tablet, 2 tablet, Per J Tube, BID, Valentín Albarran PA-C, 2 tablet at 02/09/20 4738    Current Facility-Administered Medications:  acetaminophen 975 mg Per J Tube Q6H [address] Renetta Hernandez MD    albuterol 2 5 mg Nebulization Q6H PRN Renetta Hernandez MD    amiodarone 0 5 mg/min Intravenous Continuous Georgina Reed MD Last Rate: 0 5 mg/min (02/09/20 0815)   enoxaparin 40 mg Subcutaneous Q24H [address] Renetta Hernandez MD    gabapentin 100 mg Per J Tube TID Renetta Hernandez MD    insulin lispro 2-12 Units Subcutaneous TID ANGEL Albarran PA-C    insulin lispro 2-12 Units Subcutaneous HS Chetna Li PA-C    insulin lispro 5 Units Subcutaneous TID With Meals Naida Rodriguez MD    insulin NPH 15 Units Subcutaneous Q24H Naida Rodriguez MD    levothyroxine 25 mcg Oral Early Morning Chetna Li PA-C    lipiodol ultra fluid   Code/Trauma/Sedation Med Payton Oconnor MD    melatonin 3 mg Per J Tube HS Morris Curtis MD    methocarbamol 500 mg Per J Tube Q6H PRN Morris Curtis MD    omeprazole (PRILOSEC) suspension 2 mg/mL 20 mg Per J Tube Daily Morris Curtis MD    ondansetron 4 mg Intravenous Q6H PRN Morris Curtis MD    oxyCODONE 2 5 mg Per J Tube Q4H PRN Chetna Li PA-C    oxyCODONE 5 mg Per J Tube Q4H PRN Chetna Li PA-C    senna-docusate sodium 2 tablet Per J Tube BID Chetna Li PA-C      albuterol    lipiodol ultra fluid    methocarbamol    ondansetron    oxyCODONE    oxyCODONE    amiodarone 0 5 mg/min Last Rate: 0 5 mg/min (02/09/20 0815)       Objective   Vitals: Blood pressure 98/59, pulse 76, temperature 97 9 °F (36 6 °C), temperature source Oral, resp  rate (!) 24, height 5' 10" (1 778 m), weight 89 9 kg (198 lb 3 1 oz), SpO2 98 %  I/O last 24 hours: In: 3714 6 [I V :2449  6; NG/GT:305; Feedings:960]  Out: 2600 [Urine:2240; Chest Tube:360]    Invasive Devices     Peripherally Inserted Central Catheter Line            PICC Line 02/08/20 Right Brachial 1 day          Central Venous Catheter Line            Port A Cath 09/26/19 Left Chest 136 days          Drain            Gastrostomy/Enterostomy Jejunostomy 14 Fr   days    Chest Tube 1 Left Pleural 6 days    Chest Tube 3 Right Pleural 6 days    Urethral Catheter Latex 16 Fr  2 days                Physical Exam   Constitutional: He is oriented to person, place, and time  He appears well-developed and well-nourished  HENT:   Head: Normocephalic and atraumatic  Eyes: Conjunctivae are normal    Neck: Neck supple  Cardiovascular: Normal rate     Pulmonary/Chest: Effort normal  Abdominal: Soft  He exhibits no distension and no mass  There is no tenderness  There is no rebound and no guarding  No hernia  J tube   Genitourinary: Penis normal    Genitourinary Comments: Testes descended and normal to palpation  Doshi catheter in place draining clear urine   Neurological: He is alert and oriented to person, place, and time  Skin: Skin is warm and dry  Psychiatric: He has a normal mood and affect  His behavior is normal  Judgment and thought content normal    Vitals reviewed  Lab Results:   I have personally reviewed pertinent reports  CBC:   Lab Results   Component Value Date    WBC 10 22 (H) 02/09/2020    HGB 9 1 (L) 02/09/2020    HCT 30 0 (L) 02/09/2020    MCV 87 02/09/2020     02/09/2020    MCH 26 3 (L) 02/09/2020    MCHC 30 3 (L) 02/09/2020    RDW 17 0 (H) 02/09/2020    MPV 9 7 02/09/2020    NRBC 0 02/09/2020     CMP:   Lab Results   Component Value Date    SODIUM 137 02/09/2020     02/09/2020    CO2 24 02/09/2020    BUN 32 (H) 02/09/2020    CREATININE 0 88 02/09/2020    CALCIUM 7 2 (L) 02/09/2020    AST 11 02/09/2020    ALT 11 (L) 02/09/2020    ALKPHOS 56 02/09/2020    EGFR 90 02/09/2020     Urinalysis: No results found for: [place], SPECGRAV, PHUR, LEUKOCYTESUR, NITRITE, PROTEINUA, GLUCOSEU, KETONESU, BILIRUBINUR, BLOODU  PSA: No results found for: PSA    Imaging Studies: I have personally reviewed pertinent films in PACS          Result Date: 2/2/2020  Narrative: CT CHEST, ABDOMEN AND PELVIS WITH IV CONTRAST INDICATION:   Sepsis  COMPARISON:  Chest CT dated 9/6/2019  TECHNIQUE: CT examination of the chest, abdomen and pelvis was performed  Axial, sagittal, and coronal 2D reformatted images were created from the source data and submitted for interpretation  Radiation dose length product (DLP) for this visit:  1488 2 mGy-cm     This examination, like all CT scans performed in the [facility], was performed utilizing techniques to minimize radiation dose exposure, including the use of iterative  reconstruction and automated exposure control  IV Contrast:  100 mL of iodixanol (VISIPAQUE) Enteric Contrast: Enteric contrast was administered  FINDINGS: CHEST LUNGS:  Complete collapse of both lower lobes related to compression from effusions  No central obstructing lesion demonstrated in the lobar bronchi  No other acute consolidation  No interstitial thickening  No endobronchial masses  Endotracheal tube tip  terminates appropriately in the mid thoracic trachea  PLEURA:  Large bilateral pleural effusions  No grossly loculated components  HEART/GREAT VESSELS:  Borderline enlarged  Few coronary calcifications  No pericardial thickening or effusion  Central venous catheter tip terminates at the cavoatrial junction  Normal thoracic aorta  MEDIASTINUM AND SHEA:  Postsurgical changes after esophagectomy with gastric conduit  Anastomotic sutures just above the level of the gregg  Surgical drains in place  No suspicious mediastinal masses  CHEST WALL AND LOWER NECK:   Trace anasarca  ABDOMEN LIVER/BILIARY TREE:  Unremarkable  GALLBLADDER:  There are gallstone(s) within the gallbladder, without pericholecystic inflammatory changes  SPLEEN:  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  Less than a quarter of the splenic volume is involved  PANCREAS:  Unremarkable  ADRENAL GLANDS:  Unremarkable  KIDNEYS/URETERS:  Unremarkable  No hydronephrosis  STOMACH AND BOWEL:  Postsurgical changes from gastric conduit formation  Nasogastric tube tip terminates in the subdiaphragmatic distal gastric body  Percutaneous jejunostomy tube appears appropriately positioned  No dilated or inflamed loops of small bowel  Moderate stool burden in the colon without dilation or pericolonic inflammation  APPENDIX:  No findings to suggest appendicitis  ABDOMINOPELVIC CAVITY:  Mild perihepatic and perisplenic ascites   Mild to moderate amount of free pelvic fluid  There is gradient density within the pelvic fluid measuring up to 49 HU dependently suggesting small amount layering blood products  No evidence for active intraperitoneal hemorrhage  No encapsulated hematoma or abscess  VESSELS:  Aortoiliac atherosclerosis without aneurysm or significant occlusive atheromatous disease  PELVIS REPRODUCTIVE ORGANS:  Unremarkable for patient's age  URINARY BLADDER:  Doshi catheter in place  Bladder is decompressed  Iatrogenic at the dependent air in the lumen  ABDOMINAL WALL/INGUINAL REGIONS:  Mild anasarca  No encapsulated collections  Ventral incisional staples persist  No wound dehiscence  OSSEOUS STRUCTURES: L5-S1 discectomy with interbody fusion device  Partial osseous ankylosis  No evidence for loosening or infection  No acute fractures or focally destructive osseous lesions  Impression: 1  Large bilateral pleural effusions with compressive collapse of both lower lobes  2   Moderate-sized focal posterior splenic infarct  3   Mild ascites in the abdomen and pelvis  Gradient density in the pelvic fluid suggests settling postsurgical blood products  No evidence for active intra-abdominal hemorrhage or hematoma formation, though  4   Postsurgical changes after esophagectomy and gastric conduit formation with posterior mediastinal surgical drain in place  No encapsulated-appearing collection at the intrathoracic anastomotic site  5   Appropriately positioned central venous catheter, endotracheal tube, nasogastric tube, and percutaneous jejunostomy tube  Workstation performed: [id number]         EKG, Pathology, and Other Studies: I have personally reviewed pertinent reports          Code Status: Level 1 - Full Code     Bala Farrell MD

## 2020-02-09 NOTE — PROGRESS NOTES
Progress Note - John Gutierrez 71 y o  male MRN: [id number]    Unit/Bed#: [facility] 416-01 Encounter: [id number]      Assessment:  70 yo M s/p transhiatal esophagectomy 1/28 whose post op course has been complicated by left recurrent laryngeal nerve injury and thoracic duct injury  - now s/p IR thoracic duct embolization 2/6 and ENT MDL w/bilateral vocal cord injection 2/7    Left  cc out, serous, no AL, to H2O seal  Right  out, serous, no AL, to H2O seal      Plan:  Chest tubes to H2O seal, consider removing L CT today  Advance to soft diet  J tube feeds running cycled  Awaiting urology eval  Insulin sliding scale, increase algorithm, needs better blood glucose control  Amio gtt for A fib      Subjective:   Yesterday went for barium swallow with speech which was uneventful  Patient was advanced to clear diet and J tube feeds cycled overnight  Blood sugars consistently high off insulin gtt  Passing flatus    Objective:     Vitals: Blood pressure 113/54, pulse 74, temperature 97 9 °F (36 6 °C), temperature source Oral, resp  rate 19, height 5' 10" (1 778 m), weight 89 9 kg (198 lb 3 1 oz), SpO2 98 %  ,Body mass index is 28 44 kg/m²      I/O       02/04 0701 - 02/05 0700 02/05 0701 - 02/06 0700    I V  (mL/kg) 115 8 (1 4) 80 9 (1)    NG/ 370    IV Piggyback 350 200    Feedings 1484 1375    Total Intake(mL/kg) 2859 8 (35) 2025 9 (24 8)    Urine (mL/kg/hr) 1275 (0 7) 705 (0 4)    Emesis/NG output 0     Drains  0    Stool 0 0    Chest Tube 2340 2910    Total Output 3615 3615    Net -755 2 -1589 1          Unmeasured Stool Occurrence 5 x 1 x              Physical Exam:   NAD, alert and oriented x3  Normocephalic, atraumatic  MMM, EOMI, PERRLA  Norm resp effort on RA  RRR  Neck incision cdi closed with steris  Abd soft, NT/ND, abdominal wound cdi closed with staples with dressing cdi over superior aspect  B/l CT to h2o seal with no air leaks, with serous output  No calf tenderness or peripheral edema  Motor/sensation intact in distal extremities  CN grossly intact  -rash/lesions            Scheduled Meds:    Current Facility-Administered Medications:  acetaminophen 975 mg Per J Tube Q6H [facility] Bob Avendaño MD    albuterol 2 5 mg Nebulization Q6H PRN Bob Avendaño MD    amiodarone 0 5 mg/min Intravenous Continuous Jae Shaw MD Last Rate: 0 5 mg/min (02/08/20 1210)   enoxaparin 40 mg Subcutaneous Q24H [facility] Bob Avendaño MD    gabapentin 100 mg Per J Tube TID Bob Avendaño MD    insulin lispro 2-12 Units Subcutaneous TID AC Wiley Killian PA-C    insulin lispro 2-12 Units Subcutaneous HS Wiley Killian PA-C    levothyroxine 25 mcg Oral Early Morning Wiley Killian PA-C    lipiodol ultra fluid   Code/Trauma/Sedation Med Amelie Gustafson MD    melatonin 3 mg Per J Tube HS Bob Avendaño MD    methocarbamol 500 mg Per J Tube Q6H PRN Bob Avendaño MD    multi-electrolyte 50 mL/hr Intravenous Continuous Wiley Killian PA-C Last Rate: 50 mL/hr (02/08/20 1307)   omeprazole (PRILOSEC) suspension 2 mg/mL 20 mg Per J Tube Daily Bob Avendaño MD    ondansetron 4 mg Intravenous Q6H PRN Bob Avendaño MD    oxyCODONE 2 5 mg Per J Tube Q4H PRN Wiley Killian PA-C    oxyCODONE 5 mg Per J Tube Q4H PRN Wiley Killian PA-C    senna-docusate sodium 2 tablet Per J Tube BID Wiley Killian PA-C      Continuous Infusions:    amiodarone 0 5 mg/min Last Rate: 0 5 mg/min (02/08/20 1210)   multi-electrolyte 50 mL/hr Last Rate: 50 mL/hr (02/08/20 1307)     PRN Meds: albuterol    lipiodol ultra fluid    methocarbamol    ondansetron    oxyCODONE    oxyCODONE      Invasive Devices     Peripherally Inserted Central Catheter Line            PICC Line 02/08/20 Right Brachial less than 1 day          Central Venous Catheter Line            Port A Cath 09/26/19 Left Chest 135 days          Drain            Gastrostomy/Enterostomy Jejunostomy 14 Fr   days    Chest Tube 1 Left Pleural 6 days    Chest Tube 3 Right Pleural 6 days    Urethral Catheter Latex 16 Fr  2 days                Lab, Imaging and other studies:   Recent Labs     02/07/20  1846 02/08/20  0438 02/08/20  1209 02/09/20  0458   WBC 10 85* 7 23  --  10 22*   HGB 9 9* 9 1* 9 1* 9 1*    287  --  373     Recent Labs     02/07/20  1846 02/08/20  0438 02/09/20  0458   SODIUM 144 143 137   K 4 4 4 9 4 4   * 112* 105   CO2 25 25 24   BUN 43* 41* 32*   CREATININE 1 09 1 06 0 88   PHOS  --  4 5*  --    MG  --  2 8* 2 7*   CALCIUM 7 6* 7 7* 7 2*           VTE Pharmacologic Prophylaxis: lovenox  VTE Mechanical Prophylaxis: sequential compression device

## 2020-02-09 NOTE — PROGRESS NOTES
Progress Note - Lawence Crumb 71 y o  male MRN: [id number]    Unit/Bed#: [facility] 416-01 Encounter: [id number]      Assessment:  70 yo M s/p transhiatal esophagectomy 1/28 whose post op course has been complicated by left recurrent laryngeal nerve injury and thoracic duct injury  - now s/p IR thoracic duct embolization 2/6 and ENT MDL w/bilateral vocal cord injection 2/7    Left  cc out, serous, no AL, to H2O seal  Right  out, serous, no AL, to H2O seal      Plan:  Advance to soft diet  J tube feeds running cycled  Awaiting urology eval  Insulin sliding scale, increase algorithm, needs better blood glucose control  Amio gtt for A fib      Subjective:   Yesterday went for barium swallow with speech which was uneventful  Patient was advanced to clear diet and J tube feeds cycled overnight  Blood sugars consistently high off insulin gtt  Passing flatus    Objective:     Vitals: Blood pressure 113/54, pulse 74, temperature 97 9 °F (36 6 °C), temperature source Oral, resp  rate 19, height 5' 10" (1 778 m), weight 89 9 kg (198 lb 3 1 oz), SpO2 98 %  ,Body mass index is 28 44 kg/m²      I/O       02/04 0701 - 02/05 0700 02/05 0701 - 02/06 0700    I V  (mL/kg) 115 8 (1 4) 80 9 (1)    NG/ 370    IV Piggyback 350 200    Feedings 1484 1375    Total Intake(mL/kg) 2859 8 (35) 2025 9 (24 8)    Urine (mL/kg/hr) 1275 (0 7) 705 (0 4)    Emesis/NG output 0     Drains  0    Stool 0 0    Chest Tube 2340 2910    Total Output 3615 3615    Net -755 2 -1589 1          Unmeasured Stool Occurrence 5 x 1 x              Physical Exam:   NAD, alert and oriented x3  Normocephalic, atraumatic  MMM, EOMI, PERRLA  Norm resp effort on RA  RRR  Neck incision cdi closed with steris  Abd soft, NT/ND, abdominal wound cdi closed with staples with dressing cdi over superior aspect  B/l CT to h2o seal with no air leaks, with serous output  No calf tenderness or peripheral edema  Motor/sensation intact in distal extremities  CN grossly intact  -rash/lesions            Scheduled Meds:    Current Facility-Administered Medications:  acetaminophen 975 mg Per J Tube Q6H [facility] Krzysztof Lehman MD    albuterol 2 5 mg Nebulization Q6H PRN Krzysztof Lehman MD    amiodarone 0 5 mg/min Intravenous Continuous Brown Castro MD Last Rate: 0 5 mg/min (02/08/20 1210)   enoxaparin 40 mg Subcutaneous Q24H [facility] Krzysztof Lehman MD    gabapentin 100 mg Per J Tube TID Krzysztof Lehman MD    insulin lispro 2-12 Units Subcutaneous TID AC Briana Nicolas PA-C    insulin lispro 2-12 Units Subcutaneous HS Briana Nicolas PA-C    levothyroxine 25 mcg Oral Early Morning Briana Nicolas PA-C    lipiodol ultra fluid   Code/Trauma/Sedation Med Franklin Yu MD    melatonin 3 mg Per J Tube HS Krzysztof Lehman MD    methocarbamol 500 mg Per J Tube Q6H PRN Krzysztof Lehman MD    multi-electrolyte 50 mL/hr Intravenous Continuous Briana Nicolas PA-C Last Rate: 50 mL/hr (02/08/20 1307)   omeprazole (PRILOSEC) suspension 2 mg/mL 20 mg Per J Tube Daily Krzysztof Lehman MD    ondansetron 4 mg Intravenous Q6H PRN Krzysztof Lehman MD    oxyCODONE 2 5 mg Per J Tube Q4H PRN Briana Nicolas PA-C    oxyCODONE 5 mg Per J Tube Q4H PRN Briana Nicolas PA-C    senna-docusate sodium 2 tablet Per J Tube BID Briana Nicolas PA-C      Continuous Infusions:    amiodarone 0 5 mg/min Last Rate: 0 5 mg/min (02/08/20 1210)   multi-electrolyte 50 mL/hr Last Rate: 50 mL/hr (02/08/20 1307)     PRN Meds: albuterol    lipiodol ultra fluid    methocarbamol    ondansetron    oxyCODONE    oxyCODONE      Invasive Devices     Peripherally Inserted Central Catheter Line            PICC Line 02/08/20 Right Brachial less than 1 day          Central Venous Catheter Line            Port A Cath 09/26/19 Left Chest 135 days          Drain            Gastrostomy/Enterostomy Jejunostomy 14 Fr   days    Chest Tube 1 Left Pleural 6 days    Chest Tube 3 Right Pleural 6 days    Urethral Catheter Latex 16 Fr  2 days                Lab, Imaging and other studies:   Recent Labs     02/07/20  1846 02/08/20  0438 02/08/20  1209 02/09/20  0458   WBC 10 85* 7 23  --  10 22*   HGB 9 9* 9 1* 9 1* 9 1*    287  --  373     Recent Labs     02/07/20  1846 02/08/20  0438 02/09/20  0458   SODIUM 144 143 137   K 4 4 4 9 4 4   * 112* 105   CO2 25 25 24   BUN 43* 41* 32*   CREATININE 1 09 1 06 0 88   PHOS  --  4 5*  --    MG  --  2 8* 2 7*   CALCIUM 7 6* 7 7* 7 2*           VTE Pharmacologic Prophylaxis: lovenox  VTE Mechanical Prophylaxis: sequential compression device

## 2020-02-09 NOTE — PROGRESS NOTES
Daily Progress Note - Critical Care   Justa Valenzuela 71 y o  male MRN: [id number]  Unit/Bed#: [facility] 416-01 Encounter: [id number]        ----------------------------------------------------------------------------------------  HPI/24hr events: Converted to NSR during the day yesterday  Mild hypotension overnight - resolved without intervention  Random cortisol low yesterday  Received cosyntropin overnight - repeat cortisol pending      ---------------------------------------------------------------------------------------  SUBJECTIVE  "I feel good"  Didn't sleep well but breathing feels better  No chest pain  Mild bloating     Review of Systems  Review of systems was reviewed and negative unless stated above in HPI/24-hour events   ---------------------------------------------------------------------------------------  Assessment and Plan:    Neuro:   · Diagnosis: Post-op pain  ? Plan: Tylenol 975 mg q6h, gabapentin 100 mg TID, oxycodone 2 5 mg q4h PRN (0 doses/24h), oxycodone 5 mg q4h PRN (1 dose/24h)   · Regulate sleep/wake cycle/CAM-ICU daily   ? Plan: Melatonin 3 mg qHS      CV:    Diagnosis: Hypotension   o Plan: Possible adrenal insufficiency - f/u cortisol    Diagnosis: Afib with RVR - resolved   o Plan: Discontinue amiodarone infusion       Pulm:  · Diagnosis: Acute hypoxic respiratory failure - resolved, Bilateral pleural effusion with right chylothorax s/p bilateral chest tubes and thoracic duct embolization on 2/6 by IR  ? Plan: Bilateral chest tubes to water seal, management per thoracic surgery  · Diagnosis: Vocal cord dysfunction   ? Plan: s/p vocal cord injection 2/7 with ENT      GI:   · Diagnosis: Esophageal cancer s/p transhiatal esophagectomy 1/28, J tube in place  ? Plan: Thoracic surgery and surgical oncology primary, completed barium swallow - diet recommendations per surgical teams   · Diagnosis: Diarrhea - resolved   ? Plan:  Bowel regimen with senna/colace      :    Diagnosis: MARTHA - resolved  o Plan: Trend renal function, strict I/Os   Diagnosis: Urinary retention  o Plan: Maintain johnson catheter for now, urology consult pending       F/E/N:    Plan: Clear liquid diet with nocturnal tube feeds, advance diet per surgical team, isolyte at 50 ml/hr - discontinue       Heme/Onc:    Diagnosis: Anemia  o Plan: No active signs of bleeding, hgb stable x 24h      Endo:    Diagnosis: DM 2 without long term insulin use   o Plan: Increase SSI coverage to algorithm 4    Diagnosis: Hypothyroidism  o Plan: Continue synthroid       ID:   · Diagnosis: Aspiration pneumonia   ? Plan: Completed 7 day course of cefepime/flagyl on 2/7, trend WBC/fever curve       MSK/Skin:   · PT/OT  · OOB to chair, ambulate      Disposition: Transfer to Stepdown Level 2  Code Status: Level 1 - Full Code  ---------------------------------------------------------------------------------------  ICU CORE MEASURES    Prophylaxis   VTE Pharmacologic Prophylaxis: Enoxaparin (Lovenox)  VTE Mechanical Prophylaxis: sequential compression device  Stress Ulcer Prophylaxis: Omeprazole PO    Invasive Devices Review  Invasive Devices     Peripherally Inserted Central Catheter Line            PICC Line 02/08/20 Right Brachial less than 1 day          Central Venous Catheter Line            Port A Cath 09/26/19 Left Chest 135 days          Drain            Gastrostomy/Enterostomy Jejunostomy 14 Fr   days    Chest Tube 1 Left Pleural 6 days    Chest Tube 3 Right Pleural 6 days    Urethral Catheter Latex 16 Fr  2 days              Can any invasive devices be discontinued today?  No  ---------------------------------------------------------------------------------------  OBJECTIVE    Vitals   Vitals:    02/09/20 0200 02/09/20 0300 02/09/20 0400 02/09/20 0500   BP: (!) 86/46 102/53 91/51 116/53   BP Location:       Pulse: 78 74 70 78   Resp: 22 19 (!) 11 (!) 25   Temp:       TempSrc:       SpO2: 97% 97% 100% 97%   Weight:       Height: Temp (24hrs), Av 8 °F (36 6 °C), Min:97 6 °F (36 4 °C), Max:98 2 °F (36 8 °C)  Current: Temperature: 97 8 °F (36 6 °C)    Physical Exam   Constitutional: He is oriented to person, place, and time  He appears well-developed and well-nourished  No distress  HENT:   Head: Normocephalic and atraumatic  Eyes: Pupils are equal, round, and reactive to light  EOM are normal    Neck: Neck supple  Cardiovascular: Normal rate and regular rhythm  Pulmonary/Chest: Effort normal  He has no wheezes  He has rales (bases bilaterally)  Abdominal: Soft  He exhibits distension  There is no tenderness  S/p J tube   Musculoskeletal: Normal range of motion  He exhibits no edema  Neurological: He is alert and oriented to person, place, and time  Grossly nonfocal neuro exam    Skin: Skin is warm and dry  Respiratory:  SpO2: SpO2: 98 %, SpO2 Device: O2 Device: None (Room air)    Laboratory and Diagnostics:  Results from last 7 days   Lab Units 20  0458 20  1209 20  0438 20  1846 20  0412 20  20320  0425 20  0433  20  0447   WBC Thousand/uL 10 22*  --  7 23 10 85* 12 87* 16 85* 11 55* 10 48*   < > 6 59   HEMOGLOBIN g/dL 9 1* 9 1* 9 1* 9 9* 10 9* 12 5 12 0 11 1*   < > 9 7*   HEMATOCRIT % 30 0*  --  30 3* 33 5* 36 9 41 6 40 1 37 9   < > 32 2*   PLATELETS Thousands/uL 373  --  287 292 332 391* 343 261   < > 211   NEUTROS PCT %  --   --   --  90* 87*  --  76*  --   --  78*   MONOS PCT %  --   --   --  3* 5  --  6  --   --  9   MONO PCT %  --   --  1*  --   --   --   --   --   --   --     < > = values in this interval not displayed       Results from last 7 days   Lab Units 20  0458 20  0438 20  1846 20  0412 20  2039 20  0425 20  0433   SODIUM mmol/L 137 143 144 145 142 142 141   POTASSIUM mmol/L 4 4 4 9 4 4 4 2 5 1 3 9 3 7   CHLORIDE mmol/L 105 112* 112* 115* 113* 112* 110*   CO2 mmol/L 24 25 25 26 22 25 25   ANION GAP mmol/L 8 6 7 4 7 5 6   BUN mg/dL 32* 41* 43* 49* 46* 42* 36*   CREATININE mg/dL 0 88 1 06 1 09 1 30 1 37* 1 08 1 06   CALCIUM mg/dL 7 2* 7 7* 7 6* 7 9* 8 2* 8 2* 8 0*   GLUCOSE RANDOM mg/dL 396* 240* 133 82 121 96 301*   ALT U/L 11*  --   --   --   --   --   --    AST U/L 11  --   --   --   --   --   --    ALK PHOS U/L 56  --   --   --   --   --   --    ALBUMIN g/dL 1 8*  --   --   --   --   --   --    TOTAL BILIRUBIN mg/dL 0 21  --   --   --   --   --   --      Results from last 7 days   Lab Units 20  0458 20  0438 20  0433 20  0407 20  0447   MAGNESIUM mg/dL 2 7* 2 8*  --  2 4 2 8*   PHOSPHORUS mg/dL  --  4 5* 2 5 2 1* 3 2      Imagin/8 Barium swallow: Postoperative changes and changes status post thoracic duct embolization    Mild degree of laryngeal penetration was noted  Speech pathology followed this examination with further assessment of swallowing mechanism  No findings to suggest an anastomotic leak I have personally reviewed pertinent reports  and I have personally reviewed pertinent films in PACS    Intake and Output  I/O       701 -  0700 701 -  0700    I V  (mL/kg) 3227 4 (40) 2103 5 (26 1)    NG/ 245    IV Piggyback 2103     Feedings  640    Total Intake(mL/kg) 5710 4 (70 8) 2988 5 (37 1)    Urine (mL/kg/hr) 1790 (0 9) 1340 (0 7)    Emesis/NG output 0 0    Chest Tube 520 200    Total Output 2310 1540    Net +3400 4 +1448 5              UOP:  ml/hr   L chest tube: 130 ml/24h  R chest tube: 130 ml/24h    Height and Weights   Height: 5' 10" (177 8 cm)  IBW: 73 kg  Body mass index is 25 5 kg/m²    Weight (last 2 days)     Date/Time   Weight    20 0600   80 6 (177 69)    20 0115       Comment rows:    OBSERV: Spoke with Melisa regarding hypotension at 20 01120 0600   78 8 (173 72)                Nutrition       Diet Orders   (From admission, onward)             Start     Ordered    20 1218  Diet Enteral/Parenteral; Tube Feeding with Oral Diet; Nutri [id number]; Cyclic; 80; 12 hours; Prosource Protein Liquid - Two Packets; Clear Liquid  Diet effective now     Question Answer Comment   Diet Type Enteral/Parenteral    Enteral/Parenteral Tube Feeding with Oral Diet    Tube Feeding Formula: Nutri [id number]    Bolus/Cyclic/Continuous Cyclic    Tube Feeding Cyclic Rate (mL/hr): 80    Tube Feeding Cyclic: Administer over: 12 hours    Prosource Protein Liquid - No Carb Prosource Protein Liquid - Two Packets    Diet Type Clear Liquid    RD to adjust diet per protocol?  Yes        02/08/20 1219                Active Medications  Scheduled Meds:  Current Facility-Administered Medications:  acetaminophen 975 mg Per J Tube Q6H [facility] Carey Rosa MD    albuterol 2 5 mg Nebulization Q6H PRN Carey Rosa MD    amiodarone 0 5 mg/min Intravenous Continuous Kathy Garcia MD Last Rate: 0 5 mg/min (02/08/20 1210)   amiodarone (CORDARONE) IV bolus 150 mg Intravenous Once Kathy Garcia MD    enoxaparin 40 mg Subcutaneous Q24H [facility] Carey Rosa MD    gabapentin 100 mg Per J Tube TID Carey Rosa MD    insulin lispro 1-5 Units Subcutaneous Q6H [facility] Carey Rosa MD    levothyroxine 25 mcg Oral Early Morning Elmer Shows, PA-C    lipiodol ultra fluid   Code/Trauma/Sedation Med Angela Pemberton MD    melatonin 3 mg Per J Tube HS Carey Rosa MD    methocarbamol 500 mg Per J Tube Q6H PRN Carey Rosa MD    multi-electrolyte 50 mL/hr Intravenous Continuous Elmer Shows, PA-C Last Rate: 50 mL/hr (02/08/20 1307)   omeprazole (PRILOSEC) suspension 2 mg/mL 20 mg Per J Tube Daily Carey Rosa MD    ondansetron 4 mg Intravenous Q6H PRN Carey Rosa MD    oxyCODONE 2 5 mg Per J Tube Q4H PRN Elmer Shows, PA-C    oxyCODONE 5 mg Per J Tube Q4H PRN Elmer Shows, PA-C    senna-docusate sodium 2 tablet Oral BID Elmer Shows, PA-C      Continuous Infusions:    amiodarone 0 5 mg/min Last Rate: 0 5 mg/min (02/08/20 1210)   multi-electrolyte 50 mL/hr Last Rate: 50 mL/hr (02/08/20 1307)     PRN Meds:     albuterol 2 5 mg Q6H PRN   lipiodol ultra fluid  Code/Trauma/Sedation Med   methocarbamol 500 mg Q6H PRN   ondansetron 4 mg Q6H PRN   oxyCODONE 2 5 mg Q4H PRN   oxyCODONE 5 mg Q4H PRN       Allergies   Allergies   Allergen Reactions    Penicillins Itching     ---------------------------------------------------------------------------------------  Advance Directive and Living Will:      Power of :    POLST:    ---------------------------------------------------------------------------------------    Amrit Adan PA-C      Portions of the record may have been created with voice recognition software  Occasional wrong word or "sound a like" substitutions may have occurred due to the inherent limitations of voice recognition software    Read the chart carefully and recognize, using context, where substitutions have occurred

## 2020-02-09 NOTE — CONSULTS
Endocrine Initial Consultation           *Date*: 2/9/2020     *Time*: 9:27 AM       Medical Decision Making:      Impression  1  Low cortisol  2  DM2  3  Esophageal adenocarcinoma s/p resection, neoadjuvant chemo  4  Cardiac arrest  5  Atrial fibrillation new onset with RVR  6  Chylothorax s/p embolization  6  COPD  7  KIMMIE  8  HLD  9  Hypothyroidism    Recommendations:  ?  He did have exogenous steroids from Oct 2019-Dec 2019 related to neoadjuvant chemo- 6 doses in total  No recent steroid use  His cosyntropin stim testing was normal at 22  He likely does not have adrenal insufficiency  He remains on nocturnal 12hr TFs (total carbs given 278 4 grams over 12hrs)  He has a diet throughout the day  Appetite is improving  Start NPH 15 units at 8PM to cover 12hr nocturnal feeding and start lispro 5 units TID AC to cover mealtime hyperglycemia throughout the day  Sugar TELLEZ      ?         Reason for Endocrine Consult/Chief Complaint: low cortisol    History of Present Illness:   Mr Luis Eduardo Crowley is a 71year old male who has esophageal adenocarcinoma s/p transhiatal esophagectomy  His hospital stay has been complicated by cardiac arrest s/p ROSC  He was briefly intubated on pressors now extubated  He has required drainage of pleural effusions  Had chylothorax s/p embolization  New onset atrial fibrillation developed as well  He has had hypotension most recently with atrial fibrillation in RVR  Random cortisol was checked at that time 12PM was 2 3  Cosyntropin was given overnight and a cortisol was checked post administration which increased to 22  Albumin is 1 8  Adrenal glands normal on CT scan 2/2/20  BGs have been high after transition off insulin gtt  Only on SSI  BGs in 200-300s  At present time on TFs with oral diet  TFs are cycled for 12 hours  A1C 5 4% Jan 2020  On Invokana as outpatient    ?  PMH-DM2, COPD, KIMMIE, HLD, esophageal adenocarcinoma, hypothyroidism  PSH-tonsillectomy, back surgery  FHx-no adrenal disorders in family  SHx-prior smoker, nonworking              Reviewed and confirmed findings of H&P dated on admission  Review of Systems:     Review of Systems   Constitutional: Negative for appetite change, chills, diaphoresis, fatigue, fever and unexpected weight change  HENT: Negative for congestion, ear pain, hearing loss, rhinorrhea, sinus pressure, sinus pain, sore throat, trouble swallowing and voice change  Eyes: Negative for photophobia, redness and visual disturbance  Respiratory: Negative for apnea, cough, chest tightness, shortness of breath, wheezing and stridor  Cardiovascular: Negative for chest pain, palpitations and leg swelling  Gastrointestinal: Negative for abdominal distention, abdominal pain, constipation, diarrhea, nausea and vomiting  Endocrine: Negative for cold intolerance, heat intolerance, polydipsia, polyphagia and polyuria  Genitourinary: Negative for difficulty urinating, dysuria, flank pain, frequency, hematuria and urgency  Musculoskeletal: Negative for arthralgias, back pain, gait problem, joint swelling and myalgias  Skin: Negative for color change, pallor, rash and wound  Allergic/Immunologic: Negative for immunocompromised state  Neurological: Negative for dizziness, tremors, syncope, weakness, light-headedness and headaches  Hematological: Negative for adenopathy  Does not bruise/bleed easily  Psychiatric/Behavioral: Negative for confusion and sleep disturbance  The patient is not nervous/anxious  ?      Patient History:      Past Medical History:   Diagnosis Date    [facility]     Diabetes mellitus ([facility] )     Difficulty swallowing     Disease of thyroid gland     Esophageal mass     History of chemotherapy     History of transfusion     Malignant neoplasm of lower third of esophagus ([facility]     Port-A-Cath in place     LCW    Sleep apnea     no CPAP Past Surgical History:   Procedure Laterality Date    BACK SURGERY      x2    DISC REMOVAL      ESOPHAGECTOMY N/A 2020    Procedure: ESOPHAGECTOMY, TRANSHIATAL;  Surgeon: Martinez Mathur MD;  [facility];  Service: Surgical Oncology    ESOPHAGOGASTRODUODENOSCOPY N/A 2020    Procedure: ESOPHAGOGASTRODUODENOSCOPY (EGD); Surgeon: Martinez Mathur MD;  [facility];  Service: Surgical Oncology    FL GUIDED CENTRAL VENOUS ACCESS DEVICE INSERTION  2019    GASTROJEJUNOSTOMY W/ JEJUNOSTOMY TUBE N/A 2019    Procedure: INSERTION JEJUNOSTOMY TUBE OPEN;  Surgeon: Martinez Mathur MD;  [facility];  Service: Surgical Oncology    TONSILLECTOMY      TUNNELED VENOUS PORT PLACEMENT N/A 2019    Procedure: INSERTION VENOUS PORT (PORT-A-CATH);   Surgeon: Martinez Mathur MD;  [facility];  Service: Surgical Oncology     Social History     Socioeconomic History    Marital status: Single     Spouse name: Not on file    Number of children: Not on file    Years of education: Not on file    Highest education level: Not on file   Occupational History    Not on file   Social Needs    Financial resource strain: Not on file    Food insecurity:     Worry: Not on file     Inability: Not on file    Transportation needs:     Medical: Not on file     Non-medical: Not on file   Tobacco Use    Smoking status: Former Smoker     Packs/day: 0 50     Years: 50 00     Pack years: 25 00     Types: Cigarettes     Last attempt to quit: 2017     Years since quittin 1    Smokeless tobacco: Never Used   Substance and Sexual Activity    Alcohol use: Not Currently     Alcohol/week: 0 0 standard drinks     Frequency: Never     Drinks per session: 1 or 2     Binge frequency: Never    Drug use: Never    Sexual activity: Not on file   Lifestyle    Physical activity:     Days per week: Not on file     Minutes per session: Not on file    Stress: Not on file   Relationships    Social connections:     Talks on phone: Not on file     Gets together: Not on file     Attends Hoahaoism service: Not on file     Active member of club or organization: Not on file     Attends meetings of clubs or organizations: Not on file     Relationship status: Not on file    Intimate partner violence:     Fear of current or ex partner: Not on file     Emotionally abused: Not on file     Physically abused: Not on file     Forced sexual activity: Not on file   Other Topics Concern    Not on file   Social History Narrative    Not on file     Family History   Problem Relation Age of Onset    Diabetes Mother     No Known Problems Father          Current Medications: At the time this note was written these were the medications the patient was on       Current Facility-Administered Medications   Medication Dose Route Frequency Provider Last Rate Last Dose    acetaminophen (TYLENOL) oral suspension 975 mg  975 mg Per J Tube Q6H [facility] Mike Arias MD   975 mg at 02/08/20 1740    albuterol inhalation solution 2 5 mg  2 5 mg Nebulization Q6H PRN Mike Arias MD   2 5 mg at 02/01/20 2102    amiodarone (CORDARONE) 900 mg in dextrose 5 % 500 mL infusion  0 5 mg/min Intravenous Continuous Vani Medrano MD 16 7 mL/hr at 02/08/20 1210 0 5 mg/min at 02/08/20 1210    enoxaparin (LOVENOX) subcutaneous injection 40 mg  40 mg Subcutaneous Q24H [facility] Mike Arias MD   40 mg at 02/09/20 2636    gabapentin (NEURONTIN) oral solution 100 mg  100 mg Per J Tube TID Mike Arias MD   100 mg at 02/09/20 9610    insulin lispro (HumaLOG) 100 units/mL subcutaneous injection 2-12 Units  2-12 Units Subcutaneous TID AC Massie Ganser, PA-C        insulin lispro (HumaLOG) 100 units/mL subcutaneous injection 2-12 Units  2-12 Units Subcutaneous HS Massie Ganser, PA-C        levothyroxine tablet 25 mcg  25 mcg Oral Early Morning Massie Ganser, PA-C   25 mcg at 02/09/20 0510    lipiodol ultra fluid 48% Code/Trauma/Sedation Med Alia Evans MD   50 mL at 02/06/20 1756    melatonin tablet 3 mg  3 mg Per J Tube HS Serge Lawrence MD   3 mg at 02/08/20 2147    methocarbamol (ROBAXIN) tablet 500 mg  500 mg Per J Tube Q6H PRN Serge Lawrence MD   500 mg at 02/04/20 0049    omeprazole (PRILOSEC) suspension 2 mg/mL  20 mg Per J Tube Daily Serge Lawrence MD   20 mg at 02/09/20 0833    ondansetron (ZOFRAN) injection 4 mg  4 mg Intravenous Q6H PRN Serge Lawrence MD   4 mg at 02/07/20 1740    oxyCODONE (ROXICODONE) oral solution 2 5 mg  2 5 mg Per J Tube Q4H PRN [facility], PA-C        oxyCODONE (ROXICODONE) oral solution 5 mg  5 mg Per J Tube Q4H PRN [facility], PA-C   5 mg at 02/09/20 0019    senna-docusate sodium (SENOKOT S) 8 6-50 mg per tablet 2 tablet  2 tablet Per J Tube BID [facility], PA-C   2 tablet at 02/09/20 0699        Allergies: Penicillins        Physical Exam:      Vital Signs:   /54   Pulse 74   Temp 97 9 °F (36 6 °C) (Oral)   Resp 19   Ht 5' 10" (1 778 m)   Wt 89 9 kg (198 lb 3 1 oz)   SpO2 99%   BMI 28 44 kg/m²     Physical Exam   Constitutional: He is oriented to person, place, and time  He appears well-developed and well-nourished  HENT:   Head: Normocephalic and atraumatic  Nose: Nose normal    Mouth/Throat: Oropharynx is clear and moist  No oropharyngeal exudate  Eyes: Pupils are equal, round, and reactive to light  Conjunctivae and EOM are normal  No scleral icterus  Neck: Normal range of motion  Neck supple  Cardiovascular: Normal rate, regular rhythm and normal heart sounds  Exam reveals no gallop and no friction rub  No murmur heard  Pulmonary/Chest: Effort normal and breath sounds normal  No stridor  No respiratory distress  He has no wheezes  He has no rales  Abdominal: Soft  Bowel sounds are normal  He exhibits no distension and no mass  There is no tenderness  There is no rebound and no guarding  Musculoskeletal: Normal range of motion   He exhibits no edema  Lymphadenopathy:     He has no cervical adenopathy  Neurological: He is alert and oriented to person, place, and time  Skin: Skin is warm and dry  No rash noted  No erythema  No pallor  Psychiatric: He has a normal mood and affect   His behavior is normal  Judgment and thought content normal           Labs and Imaging:          Recent Results (from the past 24 hour(s))   Hemoglobin    Collection Time: 02/08/20 12:09 PM   Result Value Ref Range    Hemoglobin 9 1 (L) 12 0 - 17 0 g/dL   Cortisol    Collection Time: 02/08/20 12:09 PM   Result Value Ref Range    Cortisol, Random 2 3 (LL) ug/dL   Fingerstick Glucose (POCT)    Collection Time: 02/08/20 12:09 PM   Result Value Ref Range    POC Glucose 198 (H) 65 - 140 mg/dl   Fingerstick Glucose (POCT)    Collection Time: 02/08/20  6:34 PM   Result Value Ref Range    POC Glucose 226 (H) 65 - 140 mg/dl   Fingerstick Glucose (POCT)    Collection Time: 02/08/20 11:58 PM   Result Value Ref Range    POC Glucose 268 (H) 65 - 140 mg/dl   Basic metabolic panel    Collection Time: 02/09/20  4:58 AM   Result Value Ref Range    Sodium 137 136 - 145 mmol/L    Potassium 4 4 3 5 - 5 3 mmol/L    Chloride 105 100 - 108 mmol/L    CO2 24 21 - 32 mmol/L    ANION GAP 8 4 - 13 mmol/L    BUN 32 (H) 5 - 25 mg/dL    Creatinine 0 88 0 60 - 1 30 mg/dL    Glucose 396 (H) 65 - 140 mg/dL    Calcium 7 2 (L) 8 3 - 10 1 mg/dL    eGFR 90 ml/min/1 73sq m   CBC and differential    Collection Time: 02/09/20  4:58 AM   Result Value Ref Range    WBC 10 22 (H) 4 31 - 10 16 Thousand/uL    RBC 3 46 (L) 3 88 - 5 62 Million/uL    Hemoglobin 9 1 (L) 12 0 - 17 0 g/dL    Hematocrit 30 0 (L) 36 5 - 49 3 %    MCV 87 82 - 98 fL    MCH 26 3 (L) 26 8 - 34 3 pg    MCHC 30 3 (L) 31 4 - 37 4 g/dL    RDW 17 0 (H) 11 6 - 15 1 %    MPV 9 7 8 9 - 12 7 fL    Platelets 114 995 - 518 Thousands/uL   Magnesium    Collection Time: 02/09/20  4:58 AM   Result Value Ref Range    Magnesium 2 7 (H) 1 6 - 2 6 mg/dL Hepatic function panel    Collection Time: 02/09/20  4:58 AM   Result Value Ref Range    Total Bilirubin 0 21 0 20 - 1 00 mg/dL    Bilirubin, Direct 0 08 0 00 - 0 20 mg/dL    Alkaline Phosphatase 56 46 - 116 U/L    AST 11 5 - 45 U/L    ALT 11 (L) 12 - 78 U/L    Total Protein 4 9 (L) 6 4 - 8 2 g/dL    Albumin 1 8 (L) 3 5 - 5 0 g/dL   Fingerstick Glucose (POCT)    Collection Time: 02/09/20  5:06 AM   Result Value Ref Range    POC Glucose 395 (H) 65 - 140 mg/dl   Fingerstick Glucose (POCT)    Collection Time: 02/09/20  5:09 AM   Result Value Ref Range    POC Glucose 342 (H) 65 - 140 mg/dl

## 2020-02-10 ENCOUNTER — TELEPHONE (OUTPATIENT)
Dept: OTHER | Facility: HOSPITAL | Age: 65
End: 2020-02-10

## 2020-02-10 ENCOUNTER — APPOINTMENT (INPATIENT)
Dept: RADIOLOGY | Facility: HOSPITAL | Age: 65
DRG: 326 | End: 2020-02-10
Payer: COMMERCIAL

## 2020-02-10 LAB
ANION GAP SERPL CALCULATED.3IONS-SCNC: 5 MMOL/L (ref 4–13)
BUN SERPL-MCNC: 25 MG/DL (ref 5–25)
CALCIUM SERPL-MCNC: 7.4 MG/DL (ref 8.3–10.1)
CHLORIDE SERPL-SCNC: 104 MMOL/L (ref 100–108)
CO2 SERPL-SCNC: 26 MMOL/L (ref 21–32)
CREAT SERPL-MCNC: 0.85 MG/DL (ref 0.6–1.3)
ERYTHROCYTE [DISTWIDTH] IN BLOOD BY AUTOMATED COUNT: 16.8 % (ref 11.6–15.1)
GFR SERPL CREATININE-BSD FRML MDRD: 91 ML/MIN/1.73SQ M
GLUCOSE SERPL-MCNC: 110 MG/DL (ref 65–140)
GLUCOSE SERPL-MCNC: 125 MG/DL (ref 65–140)
GLUCOSE SERPL-MCNC: 214 MG/DL (ref 65–140)
GLUCOSE SERPL-MCNC: 320 MG/DL (ref 65–140)
GLUCOSE SERPL-MCNC: 75 MG/DL (ref 65–140)
HCT VFR BLD AUTO: 31.5 % (ref 36.5–49.3)
HGB BLD-MCNC: 9.3 G/DL (ref 12–17)
MCH RBC QN AUTO: 25.4 PG (ref 26.8–34.3)
MCHC RBC AUTO-ENTMCNC: 29.5 G/DL (ref 31.4–37.4)
MCV RBC AUTO: 86 FL (ref 82–98)
PLATELET # BLD AUTO: 354 THOUSANDS/UL (ref 149–390)
PMV BLD AUTO: 9.3 FL (ref 8.9–12.7)
POTASSIUM SERPL-SCNC: 4.1 MMOL/L (ref 3.5–5.3)
RBC # BLD AUTO: 3.66 MILLION/UL (ref 3.88–5.62)
SODIUM SERPL-SCNC: 135 MMOL/L (ref 136–145)
WBC # BLD AUTO: 7.45 THOUSAND/UL (ref 4.31–10.16)

## 2020-02-10 PROCEDURE — 80048 BASIC METABOLIC PNL TOTAL CA: CPT | Performed by: SURGERY

## 2020-02-10 PROCEDURE — 71046 X-RAY EXAM CHEST 2 VIEWS: CPT

## 2020-02-10 PROCEDURE — 97110 THERAPEUTIC EXERCISES: CPT

## 2020-02-10 PROCEDURE — 99231 SBSQ HOSP IP/OBS SF/LOW 25: CPT | Performed by: PHYSICIAN ASSISTANT

## 2020-02-10 PROCEDURE — 99232 SBSQ HOSP IP/OBS MODERATE 35: CPT | Performed by: INTERNAL MEDICINE

## 2020-02-10 PROCEDURE — 99024 POSTOP FOLLOW-UP VISIT: CPT | Performed by: SURGERY

## 2020-02-10 PROCEDURE — 97535 SELF CARE MNGMENT TRAINING: CPT

## 2020-02-10 PROCEDURE — 97530 THERAPEUTIC ACTIVITIES: CPT

## 2020-02-10 PROCEDURE — 85027 COMPLETE CBC AUTOMATED: CPT | Performed by: SURGERY

## 2020-02-10 PROCEDURE — 82948 REAGENT STRIP/BLOOD GLUCOSE: CPT

## 2020-02-10 PROCEDURE — 99232 SBSQ HOSP IP/OBS MODERATE 35: CPT | Performed by: PHYSICIAN ASSISTANT

## 2020-02-10 PROCEDURE — 99024 POSTOP FOLLOW-UP VISIT: CPT | Performed by: THORACIC SURGERY (CARDIOTHORACIC VASCULAR SURGERY)

## 2020-02-10 RX ORDER — TAMSULOSIN HYDROCHLORIDE 0.4 MG/1
0.4 CAPSULE ORAL
Status: DISCONTINUED | OUTPATIENT
Start: 2020-02-10 | End: 2020-02-12 | Stop reason: HOSPADM

## 2020-02-10 RX ORDER — AMIODARONE HYDROCHLORIDE 200 MG/1
200 TABLET ORAL
Status: DISCONTINUED | OUTPATIENT
Start: 2020-02-10 | End: 2020-02-12 | Stop reason: HOSPADM

## 2020-02-10 RX ADMIN — LEVOTHYROXINE SODIUM 25 MCG: 25 TABLET ORAL at 05:04

## 2020-02-10 RX ADMIN — AMIODARONE HYDROCHLORIDE 200 MG: 200 TABLET ORAL at 11:53

## 2020-02-10 RX ADMIN — TAMSULOSIN HYDROCHLORIDE 0.4 MG: 0.4 CAPSULE ORAL at 08:23

## 2020-02-10 RX ADMIN — GABAPENTIN 100 MG: 250 SUSPENSION ORAL at 09:40

## 2020-02-10 RX ADMIN — GABAPENTIN 100 MG: 250 SUSPENSION ORAL at 20:31

## 2020-02-10 RX ADMIN — Medication 20 MG: at 09:40

## 2020-02-10 RX ADMIN — INSULIN LISPRO 5 UNITS: 100 INJECTION, SOLUTION INTRAVENOUS; SUBCUTANEOUS at 11:41

## 2020-02-10 RX ADMIN — OXYCODONE HYDROCHLORIDE 5 MG: 5 SOLUTION ORAL at 23:37

## 2020-02-10 RX ADMIN — AMIODARONE HYDROCHLORIDE 200 MG: 200 TABLET ORAL at 08:23

## 2020-02-10 RX ADMIN — INSULIN HUMAN 15 UNITS: 100 INJECTION, SUSPENSION SUBCUTANEOUS at 20:53

## 2020-02-10 RX ADMIN — INSULIN LISPRO 4 UNITS: 100 INJECTION, SOLUTION INTRAVENOUS; SUBCUTANEOUS at 06:50

## 2020-02-10 RX ADMIN — METHOCARBAMOL TABLETS 500 MG: 500 TABLET, COATED ORAL at 18:32

## 2020-02-10 RX ADMIN — INSULIN LISPRO 5 UNITS: 100 INJECTION, SOLUTION INTRAVENOUS; SUBCUTANEOUS at 08:22

## 2020-02-10 RX ADMIN — ACETAMINOPHEN 975 MG: 650 SUSPENSION ORAL at 11:52

## 2020-02-10 RX ADMIN — OXYCODONE HYDROCHLORIDE 5 MG: 5 SOLUTION ORAL at 18:31

## 2020-02-10 RX ADMIN — AMIODARONE HYDROCHLORIDE 200 MG: 200 TABLET ORAL at 18:32

## 2020-02-10 RX ADMIN — OXYCODONE HYDROCHLORIDE 5 MG: 5 SOLUTION ORAL at 04:54

## 2020-02-10 RX ADMIN — OXYCODONE HYDROCHLORIDE 5 MG: 5 SOLUTION ORAL at 11:51

## 2020-02-10 RX ADMIN — GABAPENTIN 100 MG: 250 SUSPENSION ORAL at 18:32

## 2020-02-10 RX ADMIN — ENOXAPARIN SODIUM 40 MG: 40 INJECTION SUBCUTANEOUS at 09:48

## 2020-02-10 RX ADMIN — METHOCARBAMOL TABLETS 500 MG: 500 TABLET, COATED ORAL at 04:54

## 2020-02-10 NOTE — PROGRESS NOTES
Progress Note - Marion England 71 y o  male MRN: [id number]    Unit/Bed#: [facility] 428-01 Encounter: [id number]      Assessment:  72 yo M s/p transhiatal esophagectomy 1/28 whose post op course has been complicated by left recurrent laryngeal nerve injury and thoracic duct injury  - now s/p IR thoracic duct embolization 2/6 and ENT MDL w/bilateral vocal cord injection 2/7    Left CT 70 cc out, serous, no AL, to H2O seal  Right  out, serous, no AL, to H2O seal      Plan:  Small portions soft diet  J tube feeds running cycled  Appreciate urology- to void trial before d/c  Appreciate endocrinology- NPH 15 qHS, 5 lispro TID w/meals, SSI to follow  Amio gtt for A fib, will likely transition to oral amio today      Subjective:   No acute events, tolerating small meals of soft food although feels most comfortable when sitting upright  Passing flatus    Objective:     Vitals: Blood pressure (!) 89/51, pulse 82, temperature (!) 97 °F (36 1 °C), temperature source Oral, resp  rate 19, height 5' 10" (1 778 m), weight 89 9 kg (198 lb 3 1 oz), SpO2 97 %  ,Body mass index is 28 44 kg/m²      I/O       02/04 0701 - 02/05 0700 02/05 0701 - 02/06 0700    I V  (mL/kg) 115 8 (1 4) 80 9 (1)    NG/ 370    IV Piggyback 350 200    Feedings 1484 1375    Total Intake(mL/kg) 2859 8 (35) 2025 9 (24 8)    Urine (mL/kg/hr) 1275 (0 7) 705 (0 4)    Emesis/NG output 0     Drains  0    Stool 0 0    Chest Tube 2340 2910    Total Output 3615 3615    Net -755 2 -1589 1          Unmeasured Stool Occurrence 5 x 1 x              Physical Exam:   NAD, alert and oriented x3  Normocephalic, atraumatic  MMM, EOMI, PERRLA  Norm resp effort on RA  Neck incision cdi  RRR  Abd soft, NT/ND, abdominal wound cdi closed with staples  B/l CT to H2O seal with no AL, serous output in each atrium  No calf tenderness or peripheral edema  Motor/sensation intact in distal extremities  CN grossly intact  -rash/lesions              Scheduled Meds:    Current Facility-Administered Medications:  acetaminophen 975 mg Per J Tube Q6H [address] Mary Driscoll MD    albuterol 2 5 mg Nebulization Q6H PRN Mary Driscoll MD    amiodarone 0 5 mg/min Intravenous Continuous Mary Driscoll MD Last Rate: 0 5 mg/min (02/09/20 1804)   enoxaparin 40 mg Subcutaneous Q24H [address] Mary Driscoll MD    gabapentin 100 mg Per J Tube TID Mary Driscoll MD    insulin lispro 2-12 Units Subcutaneous TID [facility] Mary Driscoll MD    insulin lispro 2-12 Units Subcutaneous HS Mary Driscoll MD    insulin lispro 5 Units Subcutaneous TID With Meals Mary Driscoll MD    insulin NPH 15 Units Subcutaneous Q24H Mary Driscoll MD    levothyroxine 25 mcg Oral Early Morning Mary Driscoll MD    lipiodol ultra fluid   Code/Trauma/Sedation Med Alia Evans MD    melatonin 3 mg Per J Tube HS Mary Driscoll MD    methocarbamol 500 mg Per J Tube Q6H PRN Mary Driscoll MD    omeprazole (PRILOSEC) suspension 2 mg/mL 20 mg Per J Tube Daily Mary Driscoll MD    ondansetron 4 mg Intravenous Q6H PRN Mary Driscoll MD    oxyCODONE 2 5 mg Per J Tube Q4H PRN Mary Driscoll MD    oxyCODONE 5 mg Per J Tube Q4H PRN Mary Driscoll MD    senna-docusate sodium 2 tablet Per J Tube BID Mary Driscoll MD      Continuous Infusions:    amiodarone 0 5 mg/min Last Rate: 0 5 mg/min (02/09/20 1804)     PRN Meds: albuterol    lipiodol ultra fluid    methocarbamol    ondansetron    oxyCODONE    oxyCODONE      Invasive Devices     Peripherally Inserted Central Catheter Line            PICC Line 02/08/20 Right Brachial 1 day          Central Venous Catheter Line            Port A Cath 09/26/19 Left Chest 136 days          Drain            Gastrostomy/Enterostomy Jejunostomy 14 Fr   days    Chest Tube 1 Left Pleural 7 days    Chest Tube 3 Right Pleural 7 days    Urethral Catheter Latex 16 Fr  3 days                Lab, Imaging and other studies:   Recent Labs     02/08/20  0438 02/08/20  1209 02/09/20  0458 02/10/20  0448 WBC 7 23  --  10 22* 7 45   HGB 9 1* 9 1* 9 1* 9 3*     --  373 354     Recent Labs     02/08/20  0438 02/09/20  0458 02/10/20  0448   SODIUM 143 137 135*   K 4 9 4 4 4 1   * 105 104   CO2 25 24 26   BUN 41* 32* 25   CREATININE 1 06 0 88 0 85   PHOS 4 5*  --   --    MG 2 8* 2 7*  --    CALCIUM 7 7* 7 2* 7 4*           VTE Pharmacologic Prophylaxis: lovenox  VTE Mechanical Prophylaxis: sequential compression device

## 2020-02-10 NOTE — UTILIZATION REVIEW
Continued Stay Review    Date: 2/9/2020                        Current Patient Class:  IP  Current Level of Care: Critical Care    HPI:64 y o  male initially admitted on 1/28 for transhiatal esophagectomy 1/28 with routine post op course until code blue 2/1  Assessment/Plan: Converted to NSR during the day yesterday  Mild hypotension overnight resolved without intervention  Random cortisol low yesterday  Received cosyntropin overnight  Repeat cortisol pending  Amiodarone gtt d/c'd  Pt  States he does not feel "good", didn't sleep well but feels breathing better  Mild bloating  Pt still with b/l chest tubes to water seal, CTSx following,  Vocal cord dysfunction, s/p injection by ENT 2/7  J-tube in place  Clear liquid diet with nocturnal tube feeds  Advance diet per surgical team   D/C IVF's  Urinary retention  Continue johnson cath for now, trend renal function, strict I/O's  Urology consulted  OOB to chair, ambulate  PT/OT evals    Transfer for Stepdown level 2 unit    Pertinent Labs/Diagnostic Results:   Lab Units 02/09/20  0458 02/08/20  1209 02/08/20  0438 02/07/20  1846 02/07/20  0412 02/06/20  0425   WBC Thousand/uL 10 22*  --  7 23 10 85* 12 87* 11 55*   HEMOGLOBIN g/dL 9 1* 9 1* 9 1* 9 9* 10 9* 12 0   HEMATOCRIT % 30 0*  --  30 3* 33 5* 36 9 40 1   PLATELETS Thousands/uL 373  --  287 292 332 343   NEUTROS ABS Thousands/µL  --   --   --  9 75* 11 05* 8 76*         Results from last 7 days   Lab Units 02/09/20  0458 02/08/20  0438 02/07/20  1846 02/07/20  0412 02/05/20  0433 02/04/20  0407   SODIUM mmol/L 137 143 144 145 141 144   POTASSIUM mmol/L 4 4 4 9 4 4 4 2 3 7 3 7   CHLORIDE mmol/L 105 112* 112* 115* 110* 114*   CO2 mmol/L 24 25 25 26 25 25   ANION GAP mmol/L 8 6 7 4 6 5   BUN mg/dL 32* 41* 43* 49* 36* 34*   CREATININE mg/dL 0 88 1 06 1 09 1 30 1 06 1 05   EGFR ml/min/1 73sq m 90 73 71 57 73 74   CALCIUM mg/dL 7 2* 7 7* 7 6* 7 9* 8 0* 7 9*   MAGNESIUM mg/dL 2 7* 2 8*  --   --   --  2 4 PHOSPHORUS mg/dL  --  4 5*  --   --  2 5 2 1*     Results from last 7 days   Lab Units 02/09/20  0458   AST U/L 11   ALT U/L 11*   ALK PHOS U/L 56   TOTAL PROTEIN g/dL 4 9*   ALBUMIN g/dL 1 8*   TOTAL BILIRUBIN mg/dL 0 21   BILIRUBIN DIRECT mg/dL 0 08     Results from last 7 days   Lab Units 02/09/20  2057 02/09/20  1618 02/09/20  1116 02/09/20  0509 02/09/20  0506 02/08/20  2358 02/08/20  1834 02/08/20  1209   POC GLUCOSE mg/dl 130 119 239* 342* 395* 268* 226* 198*     Results from last 7 days   Lab Units 02/09/20  0458 02/08/20  0438 02/07/20  1846 02/07/20  0412 02/06/20  2039 02/06/20  0425 02/05/20  0433 02/04/20  0407   GLUCOSE RANDOM mg/dL 396* 240* 133 82 121 96 301* 263*     Results from last 7 days   Lab Units 02/07/20  0623   TROPONIN I ng/mL <0 02             Vital Signs:   Date/Time  Temp  Pulse  Resp  BP  MAP (mmHg)  SpO2  O2 Device   BP: Map 65 at 02/10/20 0323   02/09/20 2311  97 8 °F (36 6 °C)  95  18  97/53     96 %  None (Room air)   BP: Map 72 at 02/09/20 2311   02/09/20 1947  97 9 °F (36 6 °C)  82  20  110/58     97 %     BP: Map 77 at 02/09/20 1947   02/09/20 1800    80  25Abnormal   93/49Abnormal   67       02/09/20 1200  97 9 °F (36 6 °C)  86  20  97/60  76  98 %  None (Room air)   02/09/20 1000    72  19  110/66  80  100 %     02/09/20 0900            99 %  None (Room air)   02/09/20 0800  98 2 °F (36 8 °C)  78  23Abnormal   106/58  72  98 %  None (Room air)   02/09/20 0100    78  20  96/49Abnormal   71  98 %     02/09/20 0000  97 8 °F (36 6 °C)  76  21  102/49Abnormal   66  98 %  None (Room air)   02/08/20 2300    78  26Abnormal   96/45Abnormal   64  98 %     02/08/20 2200    80  24Abnormal   102/49Abnormal   74  98 %     02/08/20 2100    76  20  97/46Abnormal   66  98 %     02/08/20 2000  98 2 °F (36 8 °C)  74  19  133/69  93  99 %  None (Room air)   02/08/20 1905    74  21  117/53  78  99 %     02/08/20 1815    78  21  86/46Abnormal   63  99 %     02/08/20 1715   76  26Abnormal   120/57  87  98 %     02/08/20 1615  97 7 °F (36 5 °C)  82  20  103/55  77  98 %  None (Room air)   02/08/20 1515    76  24Abnormal   113/54  75  98 %     02/08/20 1415    84  22  128/63  84  99 %     02/08/20 1300    76  22  106/54  77  98 %     02/08/20 1200  97 6 °F (36 4 °C)  74  19  123/65  84  98 %     02/08/20 1000    84  16  92/50  59       02/08/20 0900    128Abnormal   22  93/63  75  97 %     02/08/20 0800  97 7 °F (36 5 °C)  124Abnormal   20  86/60Abnormal   76  98 %     02/08/20 0700    120Abnormal   19  98/61  79  100 %     02/08/20 0600    122Abnormal   16  77/56Abnormal   71  100 %     02/08/20 0546    120Abnormal   16  75/45Abnormal   50  100 %     02/08/20 0541    124Abnormal   17  83/47Abnormal   58  100 %     02/08/20 0538    126Abnormal   20  74/42Abnormal   51  100 %     02/08/20 0535    132Abnormal   21  60/41Abnormal   46  99 %     02/08/20 0520    132Abnormal   17  90/48Abnormal   72  98 %     02/08/20 0518    140Abnormal   16  83/50Abnormal   62  97 %     02/08/20 0514    144Abnormal   16  82/49Abnormal   61  98 %     02/08/20 0513    136Abnormal   16  84/49Abnormal   57  98 %     02/08/20 0400  97 3 °F (36 3 °C)Abnormal   80  12  99/55  69  98 %  None (Room air)   02/08/20 0300    80  10Abnormal   95/51  62  97 %     02/08/20 0215    82  10Abnormal   95/54  67  97 %     02/08/20 0200    82  10Abnormal   82/45Abnormal   54  98 %     02/08/20 0145    84  10Abnormal   75/44Abnormal   53  97 %     02/08/20 0130    84  12  80/47Abnormal   57  98 %     02/08/20 0115    88  10Abnormal   72/44Abnormal   53  98 %     Comment rows:   OBSERV: Spoke with Melisa regarding hypotension at 02/08/20 0115   02/08/20 0100    90  15  81/48Abnormal   58  98 %     02/08/20 0000  97 6 °F (36 4 °C)  90  20  100/52  67  98 %  None (Room air)         Medications:   Scheduled Medications:  Medications:  acetaminophen 975 mg Per J Tube Q6H [facility] amiodarone 200 mg Oral TID With Meals   enoxaparin 40 mg Subcutaneous Q24H TANNER   gabapentin 100 mg Per J Tube TID   insulin lispro 2-12 Units Subcutaneous TID AC   insulin lispro 2-12 Units Subcutaneous HS   insulin lispro 5 Units Subcutaneous TID With Meals   insulin NPH 20 Units Subcutaneous Q24H   levothyroxine 25 mcg Oral Early Morning   melatonin 3 mg Per J Tube HS   omeprazole (PRILOSEC) suspension 2 mg/mL 20 mg Per J Tube Daily   senna-docusate sodium 2 tablet Per J Tube BID   tamsulosin 0 4 mg Oral Daily With Dinner     Continuous IV Infusions: none     PRN Meds:  albuterol 2 5 mg Nebulization Q6H PRN   methocarbamol 500 mg Per J Tube Q6H PRN 2/9 x2   ondansetron 4 mg Intravenous Q6H PRN   oxyCODONE 2 5 mg Per J Tube Q4H PRN   oxyCODONE 5 mg Per J Tube Q4H PRN 2/10 x2       Discharge Plan: TBD    Network Utilization Review Department  [email or web address]  ATTENTION: Please call with any questions or concerns to [phone] and carefully listen to the prompts so that you are directed to the right person  All voicemails are confidential   Saad Miramontes all requests for admission clinical reviews, approved or denied determinations and any other requests to dedicated fax number below belonging to the campus where the patient is receiving treatment   List of dedicated fax numbers for the Facilities:  [address] DENIALS (Administrative/Medical Necessity) [phone]   [address] (Maternity/NICU/Pediatrics) [phone]   Amandeep Barreto [phone]   [facility] [phone]   Merrill Jones [phone]   [facility] [phone]   12[address] [facility] [address] [facility] [phone]   [facility]  [phone]   2205 [facility]  [address] [facility] [address] [facility] [phone]

## 2020-02-10 NOTE — PROGRESS NOTES
Progress Note - Philipp Mir 71 y o  male MRN: [id number]    Unit/Bed#: [facility] 428-01 Encounter: [id number]      Assessment:  72 yo M s/p transhiatal esophagectomy 1/28 whose post op course has been complicated by left recurrent laryngeal nerve injury and thoracic duct injury  - now s/p IR thoracic duct embolization 2/6 and ENT MDL w/bilateral vocal cord injection 2/7    Left CT 70 cc out, serous, no AL, to H2O seal  Right  out, serous, no AL, to H2O seal      Plan:  Chest tubes to H2O seal, consider removing L CT today  Small portions soft diet  J tube feeds running cycled  Appreciate urology- to void trial before d/c  Appreciate endocrinology- NPH 15 qHS, 5 lispro TID w/meals, SSI to follow  Amio gtt for A fib, will likely transition to oral amio today      Subjective:   No acute events, tolerating small meals of soft food although feels most comfortable when sitting upright  Passing flatus    Objective:     Vitals: Blood pressure (!) 89/51, pulse 82, temperature (!) 97 °F (36 1 °C), temperature source Oral, resp  rate 19, height 5' 10" (1 778 m), weight 89 9 kg (198 lb 3 1 oz), SpO2 97 %  ,Body mass index is 28 44 kg/m²      I/O       02/04 0701 - 02/05 0700 02/05 0701 - 02/06 0700    I V  (mL/kg) 115 8 (1 4) 80 9 (1)    NG/ 370    IV Piggyback 350 200    Feedings 1484 1375    Total Intake(mL/kg) 2859 8 (35) 2025 9 (24 8)    Urine (mL/kg/hr) 1275 (0 7) 705 (0 4)    Emesis/NG output 0     Drains  0    Stool 0 0    Chest Tube 2340 2910    Total Output 3615 3615    Net -755 2 -1589 1          Unmeasured Stool Occurrence 5 x 1 x              Physical Exam:   NAD, alert and oriented x3  Normocephalic, atraumatic  MMM, EOMI, PERRLA  Norm resp effort on RA  Neck incision cdi  RRR  Abd soft, NT/ND, abdominal wound cdi closed with staples  B/l CT to H2O seal with no AL, serous output in each atrium  No calf tenderness or peripheral edema  Motor/sensation intact in distal extremities  CN grossly intact  -rash/lesions              Scheduled Meds:    Current Facility-Administered Medications:  acetaminophen 975 mg Per J Tube Q6H [facility] Camryn Hu MD    albuterol 2 5 mg Nebulization Q6H PRN Camryn Hu MD    amiodarone 0 5 mg/min Intravenous Continuous Camryn Hu MD Last Rate: 0 5 mg/min (02/09/20 1804)   enoxaparin 40 mg Subcutaneous Q24H [facility] Camryn Hu MD    gabapentin 100 mg Per J Tube TID Camryn Hu MD    insulin lispro 2-12 Units Subcutaneous TID [facility] Camryn Hu MD    insulin lispro 2-12 Units Subcutaneous HS Camryn Hu MD    insulin lispro 5 Units Subcutaneous TID With Meals Cmaryn Hu MD    insulin NPH 15 Units Subcutaneous Q24H Camryn Hu MD    levothyroxine 25 mcg Oral Early Morning Camryn Hu MD    lipiodol ultra fluid   Code/Trauma/Sedation Med Karen Zarate MD    melatonin 3 mg Per J Tube HS Camryn Hu MD    methocarbamol 500 mg Per J Tube Q6H PRN Camryn Hu MD    omeprazole (PRILOSEC) suspension 2 mg/mL 20 mg Per J Tube Daily Camryn Hu MD    ondansetron 4 mg Intravenous Q6H PRN Camryn Hu MD    oxyCODONE 2 5 mg Per J Tube Q4H PRN Camryn Hu MD    oxyCODONE 5 mg Per J Tube Q4H PRN Camryn Hu MD    senna-docusate sodium 2 tablet Per J Tube BID Camryn Hu MD      Continuous Infusions:    amiodarone 0 5 mg/min Last Rate: 0 5 mg/min (02/09/20 1804)     PRN Meds: albuterol    lipiodol ultra fluid    methocarbamol    ondansetron    oxyCODONE    oxyCODONE      Invasive Devices     Peripherally Inserted Central Catheter Line            PICC Line 02/08/20 Right Brachial 1 day          Central Venous Catheter Line            Port A Cath 09/26/19 Left Chest 136 days          Drain            Gastrostomy/Enterostomy Jejunostomy 14 Fr   days    Chest Tube 1 Left Pleural 7 days    Chest Tube 3 Right Pleural 7 days    Urethral Catheter Latex 16 Fr  3 days                Lab, Imaging and other studies:   Recent Labs 02/08/20  0438 02/08/20  1209 02/09/20  0458 02/10/20  0448   WBC 7 23  --  10 22* 7 45   HGB 9 1* 9 1* 9 1* 9 3*     --  373 354     Recent Labs     02/08/20  0438 02/09/20  0458 02/10/20  0448   SODIUM 143 137 135*   K 4 9 4 4 4 1   * 105 104   CO2 25 24 26   BUN 41* 32* 25   CREATININE 1 06 0 88 0 85   PHOS 4 5*  --   --    MG 2 8* 2 7*  --    CALCIUM 7 7* 7 2* 7 4*           VTE Pharmacologic Prophylaxis: lovenox  VTE Mechanical Prophylaxis: sequential compression device

## 2020-02-10 NOTE — PROGRESS NOTES
Progress Note -Interventional Radiology KALEIGH Chowdarylaisha Poonamia 71 y o  male MRN: [id number]  Unit/Bed#: [facility] 428-01 Encounter: [id number]    Assessment:    71year old male status post transhiatal esophagectomy 1/69 complicated by postop left recurrent laryngeal nerve injury and thoracic injury, right chylothorax, s/p thoracic duct /embolization 2/6/20, s/p bilateral direct laryngoscopy with bilateral vocal fold injection 2/7/20    Right chest tube output 24 hours: 120 serosanguinous    Plan:    - f/u chest xray from today  Left chest tube was removed  Potential for right chest tube removal tomorrow  - monitor output from chest tube  - soft diet as tolerated  - please reach out to IR with any questions or concerns  Will sign off  - IS    Patient Active Problem List   Diagnosis    COPD (chronic obstructive pulmonary disease) (HCC)    Headaches due to old head injury    High cholesterol    Obstructive sleep apnea syndrome    Type 2 diabetes mellitus with hyperglycemia, without long-term current use of insulin (HCC)    Malignant neoplasm of lower third of esophagus (HCC)    Esophageal obstruction    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[address] )    Chemotherapy induced neutropenia (HCC)    Anemia, unspecified    Insomnia due to medical condition    Encounter for care related to feeding tube    Paralysis of left vocal fold    Dysphonia    Chylothorax on right    Mild protein-calorie malnutrition ([address] )    Atrial fibrillation (HCC)          Subjective: Pt sitting upright in chair eating lunch  Had left chest tube removed this morning  Voice sounds much more clear than when I saw him last week  Right chest tube output 120cc serosanguinous last 24 hours  Does state he has to cough whenever he takes a deep breath       Objective:    Vitals:  /68 (BP Location: Right arm)   Pulse 85   Temp 98 °F (36 7 °C) (Oral)   Resp 19   Ht 5' 10" (1 778 m)   Wt 90 6 kg (199 lb 11 8 oz)   SpO2 99%   BMI 28 66 kg/m²   Body mass index is 28 66 kg/m²  Weight (last 2 days)     Date/Time   Weight    02/10/20 0600   90 6 (199 74)    02/09/20 0600   89 9 (198 19)    02/08/20 0600   80 6 (177 69)    02/08/20 0115       Comment rows:    OBSERV: Spoke with Melisa regarding hypotension at 02/08/20 0115              I/Os:    Intake/Output Summary (Last 24 hours) at 2/10/2020 1331  Last data filed at 2/10/2020 1256  Gross per 24 hour   Intake 1030 68 ml   Output 1715 ml   Net -684 32 ml       Invasive Devices     Peripherally Inserted Central Catheter Line            PICC Line 02/08/20 Right Brachial 2 days          Central Venous Catheter Line            Port A Cath 09/26/19 Left Chest 137 days          Drain            Gastrostomy/Enterostomy Jejunostomy 14 Fr   days    Chest Tube 3 Right Pleural 7 days    Urethral Catheter Latex 16 Fr  3 days                Physical Exam:  General appearance: alert and oriented, in no acute distress  Lungs: clear to auscultation bilaterally  Heart: regular rate and rhythm, S1, S2 normal, no murmur, click, rub or gallop  Skin: right chest tube insertion site c/d/i, no crepitus felt                   Lab Results and Cultures:   CBC with diff:   Lab Results   Component Value Date    WBC 7 45 02/10/2020    HGB 9 3 (L) 02/10/2020    HCT 31 5 (L) 02/10/2020    MCV 86 02/10/2020     02/10/2020    MCH 25 4 (L) 02/10/2020    MCHC 29 5 (L) 02/10/2020    RDW 16 8 (H) 02/10/2020    MPV 9 3 02/10/2020    NRBC 0 02/09/2020      BMP/CMP:  Lab Results   Component Value Date    K 4 1 02/10/2020     02/10/2020    CO2 26 02/10/2020    CO2  02/01/2020      Comment:      Out of Reportable Range    BUN 25 02/10/2020    CREATININE 0 85 02/10/2020    GLUCOSE 339 (H) 02/01/2020    CALCIUM 7 4 (L) 02/10/2020    AST 11 02/09/2020    ALT 11 (L) 02/09/2020    ALKPHOS 56 02/09/2020    EGFR 91 02/10/2020   ,     Coags:   Lab Results   Component Value Date    PTT 34 01/16/2020    INR 0 99 01/16/2020   ,          Lipid Panel: No results found for: CHOL  Lab Results   Component Value Date    HDL 24 (L) 02/03/2020     Lab Results   Component Value Date    HDL 24 (L) 02/03/2020     Lab Results   Component Value Date    LDLCALC 57 02/03/2020     Lab Results   Component Value Date    TRIG 157 (H) 02/03/2020       HgbA1c:   Lab Results   Component Value Date    HGBA1C 5 4 01/16/2020    HGBA1C 7 6 08/15/2019       Blood Culture:   Lab Results   Component Value Date    BLOODCX No Growth After 5 Days  02/02/2020   ,   Urinalysis: No results found for: [place], SPECGRAV, PHUR, LEUKOCYTESUR, NITRITE, PROTEINUA, GLUCOSEU, KETONESU, BILIRUBINUR, BLOODU,   Urine Culture: No results found for: URINECX,   Wound Culure:  No results found for: WOUNDCULT    VTE Pharmacologic Prophylaxis: Enoxaparin (Lovenox)      Thank you for allowing me to participate in the care of Justa Valenzuela  Please don't hesitate to call, text, email, or TigerText with any questions  This text is generated with voice recognition software  There may be translation, syntax,  or grammatical errors  If you have any questions, please contact the dictating provider      Hiawatha Romberg, PA-C

## 2020-02-10 NOTE — PHYSICAL THERAPY NOTE
PHYSICAL THERAPY NOTE          Patient Name: Serjio BRYANT Date: 2/10/2020     02/10/20 1444   Pain Assessment   Pain Assessment No/denies pain   Restrictions/Precautions   Other Precautions Multiple lines;Telemetry; Fall Risk  ((R) CT in place)   General   Chart Reviewed Yes   Additional Pertinent History Pt underwent IR thoracic duct embolization on 2/6/20; ENT MDL w/ (B) vocal cord injection on 2/7; PT is re-consulted; pt is cleared for Tx session by nsg (multiple attempts earlier to see the pt; in AM: pt wished to finish his meal, later was sleeping and not arousable to verbal stim; earlier in PM, reported he recently walked and wished to rest at that time); Response to Previous Treatment Patient with no complaints from previous session  Cognition   Overall Cognitive Status WFL   Arousal/Participation Arousable; Cooperative   Attention Attends with cues to redirect   Orientation Level Oriented to person;Oriented to place;Oriented to situation   Memory Decreased recall of precautions;Decreased recall of recent events   Following Commands Follows one step commands without difficulty   Subjective   Subjective Pt is initially observed resting in the chair; slowly arousable; reports he developed blisters on his feet and the (R) one seemed to leak earlier when he ambulated w/ staff (nsg aware); somewhat reluctant to mobilize but agreeable to amb as armida;   Transfers   Sit to Stand 4  Minimal assistance  (1st trial; close (S) 2nd trial)   Additional items Assist x 1;Verbal cues   Stand to Sit 4  Minimal assistance  (1st trial; close (S) 2nd trial)   Additional items Assist x 1;Verbal cues   Additional Comments Pt was reclined in the chair w/ LE elevated post amb; pillows placed for UE and LE support; call bell w/in reach   Ambulation/Elevation   Gait pattern Excessively slow; Short stride   Gait Assistance 4  Minimal assist  (1st trial; close (S) 2nd trial)   Additional items Assist x 1;Verbal cues; Tactile cues  (chair follow to allows seated rest period b/in bouts of amb)   Assistive Device Rolling walker   Distance 2 x 100 ft w/ seated rest period in between   Balance   Static Sitting Fair +   Static Standing Fair -   Ambulatory Poor +   Activity Tolerance   Activity Tolerance Patient limited by fatigue   Nurse Made Aware spoke to Barb Cartwright RN   Exercises   Hip Abduction Sitting;10 reps;AAROM; Bilateral  (reclined in the chair; 2 sets)   Knee AROM Short Arc Quad Sitting;10 reps;AAROM;AROM; Bilateral  (reclined in the chair; 2 sets)   Ankle Pumps Sitting;10 reps;AROM; Bilateral  (reclined in the chair; 2 sets)   Equipment Use   Comments Pt remained reclined in the chair at the end of session;   Assessment   Prognosis Excellent   Problem List Decreased strength;Decreased endurance; Impaired balance;Decreased mobility   Assessment Functional mobility re-assessment performed; pt cont to gradually improve in functional endurance and mobility tolerance requiring close (S)/min (A) for transfers and amb w/ rw; pt still remains to be generally weak and deconditioned w/ associated balance and mobility deficits requiring (A)x1 to assure safety; overall functional status therefore remains to be below premorbid level --> cont to recommend rehab upon D/C when medically cleared; will cont to follow until then  Barriers to Discharge   (steps in the house)   Goals   Patient Goals to rest   LTG Expiration Date 02/19/20   PT Treatment Day 3   Plan   Treatment/Interventions LE strengthening/ROM; Functional transfer training;Elevations; Therapeutic exercise; Endurance training;Bed mobility;Gait training;Spoke to nursing   Progress Progressing toward goals   PT Frequency Other (Comment)  (3-5x/wk)   Recommendation   Recommendation Post acute IP rehab   Equipment Recommended Kendell Robles  (w/ (A))       Travon Blackmon, PT

## 2020-02-10 NOTE — PROGRESS NOTES
Assessment:  69-year-old male with the esophageal adenocarcinoma post transhiatal esophagectomy complicated by recurrent laryngeal nerve injury and thoracic duct injury both repaired  Endocrinology consultation was placed for hyperglycemia in context of tube feeding as well as concern for adrenal insufficiency which is now ruled out  Plan:  1  Hyperglycemia  In context of tube feeding  Note recent past steroid usage  His to feed cycle started at 8:00 p m  Will change NPH to 7:30 p m, increase dose to 20 units q h s  Continue Humalog 5 units t i d  A c       S:  Tube feeds were started at 8:00 p m  Last night  Tolerating diet well  O:  Patient seen and examined personally  /54 (BP Location: Right arm)   Pulse 79   Temp 98 2 °F (36 8 °C) (Oral)   Resp 18   Ht 5' 10" (1 778 m)   Wt 90 6 kg (199 lb 11 8 oz)   SpO2 97%   BMI 28 66 kg/m²     Physical Exam   Constitutional: He is oriented to person, place, and time  He appears well-developed  HENT:   Head: Normocephalic  Mouth/Throat: Oropharynx is clear and moist    Eyes: Pupils are equal, round, and reactive to light  Neck: Normal range of motion  No thyromegaly present  Cardiovascular: Normal rate and normal heart sounds  Pulmonary/Chest: Effort normal and breath sounds normal    Right-sided chest tube in place   Abdominal: Soft  Bowel sounds are normal    Musculoskeletal: He exhibits edema  He exhibits no deformity  Neurological: He is alert and oriented to person, place, and time  Skin: Capillary refill takes less than 2 seconds  No rash noted  There is pallor  Psychiatric: He has a normal mood and affect           Current Facility-Administered Medications:  acetaminophen 975 mg Per J Tube Q6H [address] Agapito Marsh MD   albuterol 2 5 mg Nebulization Q6H PRN Agapito Marsh MD   amiodarone 200 mg Oral TID With Meals Mehran Altamirano MD   enoxaparin 40 mg Subcutaneous Q24H [address] Agapito Marsh MD   gabapentin 100 mg Per J Tube TID Carmelina Smith MD   insulin lispro 2-12 Units Subcutaneous TID [facility] Carmelina Smith MD   insulin lispro 2-12 Units Subcutaneous HS Carmelina Smith MD   insulin lispro 5 Units Subcutaneous TID With Meals Carmelina Smith MD   insulin NPH 15 Units Subcutaneous Q24H Carmelina Smith MD   levothyroxine 25 mcg Oral Early Morning Carmelina Smith MD   lipiodol ultra fluid   Code/Trauma/Sedation Med Payton Oconnor MD   melatonin 3 mg Per J Tube HS Carmelina Smith MD   methocarbamol 500 mg Per J Tube Q6H PRN Carmelina Smith MD   omeprazole (PRILOSEC) suspension 2 mg/mL 20 mg Per J Tube Daily Carmelina Smith MD   ondansetron 4 mg Intravenous Q6H PRN Carmelina Smith MD   oxyCODONE 2 5 mg Per J Tube Q4H PRN Carmelina Smith MD   oxyCODONE 5 mg Per J Tube Q4H PRN Carmelina Smith MD   senna-docusate sodium 2 tablet Per J Tube BID Carmelina Smith MD   tamsulosin 0 4 mg Oral Daily With Vernadine MD Filiberto        Lab, Imaging and other studies:   POC Glucose (mg/dl)   Date Value   02/10/2020 214 (H)   02/09/2020 130   02/09/2020 119   02/09/2020 239 (H)   02/09/2020 342 (H)   02/09/2020 395 (H)   02/08/2020 268 (H)   02/08/2020 226 (H)   02/08/2020 198 (H)   02/08/2020 257 (H)

## 2020-02-10 NOTE — PLAN OF CARE
Problem: PHYSICAL THERAPY ADULT  Goal: Performs mobility at highest level of function for planned discharge setting  See evaluation for individualized goals  Description  Treatment/Interventions: Functional transfer training, LE strengthening/ROM, Elevations, Therapeutic exercise, Endurance training, Patient/family training, Equipment eval/education, Bed mobility, Gait training, Spoke to nursing  Equipment Recommended: Walker(RW)       See flowsheet documentation for full assessment, interventions and recommendations  Outcome: Progressing  Note:   Prognosis: Excellent  Problem List: Decreased strength, Decreased endurance, Impaired balance, Decreased mobility  Assessment: Functional mobility re-assessment performed; pt cont to gradually improve in functional endurance and mobility tolerance requiring close (S)/min (A) for transfers and amb w/ rw; pt still remains to be generally weak and deconditioned w/ associated balance and mobility deficits requiring (A)x1 to assure safety; overall functional status therefore remains to be below premorbid level --> cont to recommend rehab upon D/C when medically cleared; will cont to follow until then  Barriers to Discharge: (steps in the house)     Recommendation: Post acute IP rehab     PT - OK to Discharge: No    See flowsheet documentation for full assessment

## 2020-02-10 NOTE — PLAN OF CARE
Problem: OCCUPATIONAL THERAPY ADULT  Goal: Performs self-care activities at highest level of function for planned discharge setting  See evaluation for individualized goals  Description  Treatment Interventions: ADL retraining, Functional transfer training, UE strengthening/ROM, Endurance training, Patient/family training, Equipment evaluation/education, Compensatory technique education, Activityengagement, Energy conservation          See flowsheet documentation for full assessment, interventions and recommendations  Note:   Limitation: Decreased ADL status, Decreased Safe judgement during ADL, Decreased endurance, Decreased self-care trans, Decreased high-level ADLs  Prognosis: Good  Assessment: Patient participated in Skilled OT session this date with interventions consisting of ADL re training with the use of correct body mechnaics, safety awareness and fall prevention techniques, increase postural control, increase trunk control and increase OOB/ sitting tolerance   Patient agreeable to OT treatment session, upon arrival patient was found seated OOB to Chair  In comparison to previous session, patient with improvements in activity tolerance*   Patient requiring frequent rest periods and ocassional safety reminders  Patient continues to be functioning below baseline level, occupational performance remains limited secondary to factors listed above and increased risk for falls and injury  From OT standpoint, recommendation at time of d/c would be Short Term Rehab  Patient to benefit from continued Occupational Therapy treatment while in the hospital to address deficits as defined above and maximize level of functional independence with ADLs and functional mobility        OT Discharge Recommendation: Short Term Rehab  OT - OK to Discharge: Yes(when medically cleared)

## 2020-02-10 NOTE — TELEPHONE ENCOUNTER
Jeanne Joyner is a 69-year-old male seen in consultation at [facility] for urinary retention  Please contact patient with hospital follow-up non urgently in approximately 6--8 weeks

## 2020-02-10 NOTE — QUICK NOTE
Removed left chest tube at bedside without complication  Occlusive dressing placed  Do NOT remove or change dressing without asking the Thoracic Resident or PA  Post pull chest XR ordered  Continue to monitor right chest tube output  Tigertext with any questions or concerns      Saba Juarez MD  Surgery, PGY-3

## 2020-02-10 NOTE — PROGRESS NOTES
UROLOGY PROGRESS NOTE   Patient Identifiers: Waldo Bray (MRN [id number])  Date of Service: 2/10/2020    Subjective:     Awake and alert  Sitting out of bed in a chair  Denies any previous urologic problems  Patient has  no complaints        Objective:     VITALS:    Vitals:    02/10/20 1127   BP: 109/68   Pulse: 85   Resp: 19   Temp: 98 °F (36 7 °C)   SpO2: 99%           LABS:  Lab Results   Component Value Date    HGB 9 3 (L) 02/10/2020    HCT 31 5 (L) 02/10/2020    WBC 7 45 02/10/2020     02/10/2020   ]    Lab Results   Component Value Date    K 4 1 02/10/2020     02/10/2020    CO2 26 02/10/2020    BUN 25 02/10/2020    CREATININE 0 85 02/10/2020    CALCIUM 7 4 (L) 02/10/2020    GLUCOSE 339 (H) 02/01/2020   ]        INPATIENT MEDS:    Current Facility-Administered Medications:     acetaminophen (TYLENOL) oral suspension 975 mg, 975 mg, Per J Tube, Q6H [address], Kathy Anderson MD, 975 mg at 02/10/20 1152    albuterol inhalation solution 2 5 mg, 2 5 mg, Nebulization, Q6H PRN, Kathy Anderson MD, 2 5 mg at 02/01/20 2102    amiodarone tablet 200 mg, 200 mg, Oral, TID With Meals, Suni Solis MD, 200 mg at 02/10/20 1153    enoxaparin (LOVENOX) subcutaneous injection 40 mg, 40 mg, Subcutaneous, Q24H [address], Kathy Anderson MD, 40 mg at 02/10/20 0948    gabapentin (NEURONTIN) oral solution 100 mg, 100 mg, Per J Tube, TID, Kathy Anderson MD, 100 mg at 02/10/20 0940    insulin lispro (HumaLOG) 100 units/mL subcutaneous injection 2-12 Units, 2-12 Units, Subcutaneous, TID AC, 4 Units at 02/10/20 0650 **AND** Fingerstick Glucose (POCT), , , TID AC, Kathy Anderson MD    insulin lispro (HumaLOG) 100 units/mL subcutaneous injection 2-12 Units, 2-12 Units, Subcutaneous, HS, Kathy Anderson MD    insulin lispro (HumaLOG) 100 units/mL subcutaneous injection 5 Units, 5 Units, Subcutaneous, TID With Meals, Kathy Anderson MD, 5 Units at 02/10/20 1141    insulin NPH (HumuLIN N,NovoLIN N) 100 Units/mL subcutaneous injection 20 Units, 20 Units, Subcutaneous, Q24H, Pat Ochoa MD    levothyroxine tablet 25 mcg, 25 mcg, Oral, Early Morning, Sveta Marin MD, 25 mcg at 02/10/20 0504    lipiodol ultra fluid 48%, , , Code/Trauma/Sedation Med, Kristin Urban MD, 50 mL at 02/06/20 1756    melatonin tablet 3 mg, 3 mg, Per J Tube, HS, Sveta Marin MD, 3 mg at 02/08/20 2147    methocarbamol (ROBAXIN) tablet 500 mg, 500 mg, Per Mozella Sans, Q6H PRN, Sveta Marin MD, 500 mg at 02/10/20 0454    omeprazole (PRILOSEC) suspension 2 mg/mL, 20 mg, Per Mozella Sans, Daily, Sveta Marin MD, 20 mg at 02/10/20 0940    ondansetron [facility]) injection 4 mg, 4 mg, Intravenous, Q6H PRN, Sveta Marin MD, 4 mg at 02/07/20 1740    oxyCODONE (ROXICODONE) oral solution 2 5 mg, 2 5 mg, Per J Tube, Q4H PRN, Sveta Marin MD    oxyCODONE (ROXICODONE) oral solution 5 mg, 5 mg, Per J Tube, Q4H PRN, Sveta Marin MD, 5 mg at 02/10/20 1151    senna-docusate sodium (SENOKOT S) 8 6-50 mg per tablet 2 tablet, 2 tablet, Per J Tube, BID, Sveta Marin MD, 2 tablet at 02/09/20 1755    tamsulosin [facility]) capsule 0 4 mg, 0 4 mg, Oral, Daily With Yessenia Salazar MD, 0 4 mg at 02/10/20 1061      Physical Exam:   /68 (BP Location: Right arm)   Pulse 85   Temp 98 °F (36 7 °C) (Oral)   Resp 19   Ht 5' 10" (1 778 m)   Wt 90 6 kg (199 lb 11 8 oz)   SpO2 99%   BMI 28 66 kg/m²   GEN: no acute distress    RESP: breathing comfortably with no accessory muscle use    ABD: soft, non-tender, non-distended   INCISION:    EXT: no significant peripheral edema     BLACKBURN: in place draining clear yellow urine  no clots and       RADIOLOGY:     None     Assessment:    1  Postop urinary retention    2   Postop trans hiatal a soft ejecting me    Plan:   - tolerating tamsulosin  -  Trial of voiding when more ambulatory prior to discharge  -  -

## 2020-02-10 NOTE — OCCUPATIONAL THERAPY NOTE
[address] Progress Note     Patient Name: [facility]  CMJRW'B Date: 2/10/2020  Problem List  Principal Problem:    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[facility] )  Active Problems:    COPD (chronic obstructive pulmonary disease) ([facility] )    High cholesterol    Type 2 diabetes mellitus with hyperglycemia, without long-term current use of insulin (HCC)    Anemia, unspecified    Paralysis of left vocal fold    Dysphonia    Chylothorax on right    Mild protein-calorie malnutrition (HCC)    Atrial fibrillation ([facility] )          02/10/20 1435   Restrictions/Precautions   Weight Bearing Precautions Per Order No   Other Precautions Multiple lines;Telemetry; Fall Risk   General   Response to Previous Treatment Patient with no complaints from previous session   Lifestyle   Autonomy Pt reports being I w/ all ADLS and IADLS; (+) drives PTA   Reciprocal Relationships Pt lives w/ dtr, DYANA, and grandkids  Pt's dtr is home and in good health to provide A as needed  Service to Others Pt is retired   Intrinsic Gratification Pt reports enjoying spending time w/ his family   Pain Assessment   Pain Assessment No/denies pain   Pain Score No Pain   ADL   Where Assessed Chair   UB Dressing Assistance 4  Minimal Assistance   UB Dressing Deficit Thread RUE; Thread LUE   UB Dressing Comments don/doff gown   LB Dressing Assistance 2  Maximal Assistance   LB Dressing Deficit Don/doff R sock; Don/doff L sock   Transfers   Sit to Stand 4  Minimal assistance   Additional items Assist x 1   Stand to Sit 4  Minimal assistance   Additional items Assist x 1   Functional Mobility   Functional Mobility 4  Minimal assistance   Additional items Rolling walker   Cognition   Overall Cognitive Status WFL   Arousal/Participation Cooperative   Attention Within functional limits   Orientation Level Oriented X4   Memory Decreased recall of precautions   Following Commands Follows one step commands without difficulty   Activity Tolerance   Activity Tolerance Patient tolerated treatment well   Medical Staff Made Aware PT   Assessment   Assessment Patient participated in Skilled OT session this date with interventions consisting of ADL re training with the use of correct body mechnaics, safety awareness and fall prevention techniques, increase postural control, increase trunk control and increase OOB/ sitting tolerance   Patient agreeable to OT treatment session, upon arrival patient was found seated OOB to Chair  In comparison to previous session, patient with improvements in activity tolerance*   Patient requiring frequent rest periods and ocassional safety reminders  Patient continues to be functioning below baseline level, occupational performance remains limited secondary to factors listed above and increased risk for falls and injury  From OT standpoint, recommendation at time of d/c would be Short Term Rehab  Patient to benefit from continued Occupational Therapy treatment while in the hospital to address deficits as defined above and maximize level of functional independence with ADLs and functional mobility  Plan   Treatment Interventions ADL retraining;Functional transfer training; Endurance training;Patient/family training   Goal Expiration Date 02/17/20   OT Treatment Day 2   OT Frequency 3-5x/wk   Recommendation   OT Discharge Recommendation Short Term Rehab     Chayito Luna MS, OTR/L

## 2020-02-11 ENCOUNTER — APPOINTMENT (INPATIENT)
Dept: RADIOLOGY | Facility: HOSPITAL | Age: 65
DRG: 326 | End: 2020-02-11
Payer: COMMERCIAL

## 2020-02-11 LAB
ANION GAP SERPL CALCULATED.3IONS-SCNC: 4 MMOL/L (ref 4–13)
BUN SERPL-MCNC: 22 MG/DL (ref 5–25)
CALCIUM SERPL-MCNC: 7.8 MG/DL (ref 8.3–10.1)
CHLORIDE SERPL-SCNC: 105 MMOL/L (ref 100–108)
CO2 SERPL-SCNC: 27 MMOL/L (ref 21–32)
CREAT SERPL-MCNC: 0.72 MG/DL (ref 0.6–1.3)
GFR SERPL CREATININE-BSD FRML MDRD: 98 ML/MIN/1.73SQ M
GLUCOSE SERPL-MCNC: 108 MG/DL (ref 65–140)
GLUCOSE SERPL-MCNC: 124 MG/DL (ref 65–140)
GLUCOSE SERPL-MCNC: 127 MG/DL (ref 65–140)
GLUCOSE SERPL-MCNC: 171 MG/DL (ref 65–140)
GLUCOSE SERPL-MCNC: 183 MG/DL (ref 65–140)
POTASSIUM SERPL-SCNC: 4.4 MMOL/L (ref 3.5–5.3)
SODIUM SERPL-SCNC: 136 MMOL/L (ref 136–145)

## 2020-02-11 PROCEDURE — 71046 X-RAY EXAM CHEST 2 VIEWS: CPT

## 2020-02-11 PROCEDURE — 92526 ORAL FUNCTION THERAPY: CPT

## 2020-02-11 PROCEDURE — 99024 POSTOP FOLLOW-UP VISIT: CPT | Performed by: SURGERY

## 2020-02-11 PROCEDURE — 97530 THERAPEUTIC ACTIVITIES: CPT

## 2020-02-11 PROCEDURE — 99024 POSTOP FOLLOW-UP VISIT: CPT | Performed by: THORACIC SURGERY (CARDIOTHORACIC VASCULAR SURGERY)

## 2020-02-11 PROCEDURE — 82948 REAGENT STRIP/BLOOD GLUCOSE: CPT

## 2020-02-11 PROCEDURE — 99232 SBSQ HOSP IP/OBS MODERATE 35: CPT | Performed by: INTERNAL MEDICINE

## 2020-02-11 PROCEDURE — 94664 DEMO&/EVAL PT USE INHALER: CPT

## 2020-02-11 PROCEDURE — 80048 BASIC METABOLIC PNL TOTAL CA: CPT | Performed by: SURGERY

## 2020-02-11 RX ADMIN — INSULIN LISPRO 2 UNITS: 100 INJECTION, SOLUTION INTRAVENOUS; SUBCUTANEOUS at 22:01

## 2020-02-11 RX ADMIN — INSULIN LISPRO 5 UNITS: 100 INJECTION, SOLUTION INTRAVENOUS; SUBCUTANEOUS at 07:43

## 2020-02-11 RX ADMIN — GABAPENTIN 100 MG: 250 SUSPENSION ORAL at 09:13

## 2020-02-11 RX ADMIN — OXYCODONE HYDROCHLORIDE 5 MG: 5 SOLUTION ORAL at 22:05

## 2020-02-11 RX ADMIN — OXYCODONE HYDROCHLORIDE 5 MG: 5 SOLUTION ORAL at 09:12

## 2020-02-11 RX ADMIN — Medication 20 MG: at 09:13

## 2020-02-11 RX ADMIN — OXYCODONE HYDROCHLORIDE 5 MG: 5 SOLUTION ORAL at 03:50

## 2020-02-11 RX ADMIN — GABAPENTIN 100 MG: 250 SUSPENSION ORAL at 17:46

## 2020-02-11 RX ADMIN — GABAPENTIN 100 MG: 250 SUSPENSION ORAL at 22:02

## 2020-02-11 RX ADMIN — AMIODARONE HYDROCHLORIDE 200 MG: 200 TABLET ORAL at 17:47

## 2020-02-11 RX ADMIN — LEVOTHYROXINE SODIUM 25 MCG: 25 TABLET ORAL at 05:15

## 2020-02-11 RX ADMIN — OXYCODONE HYDROCHLORIDE 5 MG: 5 SOLUTION ORAL at 17:46

## 2020-02-11 RX ADMIN — AMIODARONE HYDROCHLORIDE 200 MG: 200 TABLET ORAL at 07:44

## 2020-02-11 RX ADMIN — INSULIN LISPRO 5 UNITS: 100 INJECTION, SOLUTION INTRAVENOUS; SUBCUTANEOUS at 11:49

## 2020-02-11 RX ADMIN — ENOXAPARIN SODIUM 40 MG: 40 INJECTION SUBCUTANEOUS at 09:13

## 2020-02-11 RX ADMIN — AMIODARONE HYDROCHLORIDE 200 MG: 200 TABLET ORAL at 11:50

## 2020-02-11 RX ADMIN — INSULIN LISPRO 2 UNITS: 100 INJECTION, SOLUTION INTRAVENOUS; SUBCUTANEOUS at 07:42

## 2020-02-11 NOTE — PROGRESS NOTES
Progress Note - Thoracic Surgery   Dallas Hidden 71 y o  male MRN: [id number]  Unit/Bed#: [facility] 428-01 Encounter: [id number]    Assessment:  72 y/o M p/w esophageal adenocarcinoma, s/p transhiatal esophagectomy on 3/31, complicated by L recurrent laryngeal nerve injury and thoracic duct injury  --s/p thoracic duct embolization by IR on 2/6  --s/p microdiect laryngoscopy with bilateral vocal cord injection on 2/7    --Right CT to water seal with 290 cc of serous output, no air leak    Plan:  --Possible removal of right chest tube today  --Surgical soft diet with small portions, with cycled tube feeds (7p-7a)  --Wean tube feeds gradually  --Continue PO Amiodarone for AFib (likely for a total of 6 weeks)  --Maintain johnson catheter for now per Urology recs, void trial prior to d/c  --Appreciate Endocrine recs for hyperglycemia  --PT/OT--recommending short term rehab, but pt declining  --Appreciate Case Management recs for placement     Subjective/Objective     Subjective:     No acute events overnight  Pt feels well this AM  Denies any complaints  Tolerating diet  Passing flatus and having bowel movements  Objective:     Blood pressure 110/55, pulse 69, temperature 97 9 °F (36 6 °C), temperature source Oral, resp  rate 18, height 5' 10" (1 778 m), weight 90 6 kg (199 lb 11 8 oz), SpO2 97 %  ,Body mass index is 28 66 kg/m²        Intake/Output Summary (Last 24 hours) at 2/11/2020 0522  Last data filed at 2/11/2020 0400  Gross per 24 hour   Intake 2093 48 ml   Output 1615 ml   Net 478 48 ml       Invasive Devices     Peripherally Inserted Central Catheter Line            PICC Line 02/08/20 Right Brachial 2 days          Central Venous Catheter Line            Port A Cath 09/26/19 Left Chest 137 days          Drain            Gastrostomy/Enterostomy Jejunostomy 14 Fr   days    Chest Tube 3 Right Pleural 8 days    Urethral Catheter Latex 16 Fr  4 days                Physical Exam:     GEN: NAD  HEENT: MMM  Chest: Right chest tube to water seal with 290 cc of serous output, no air leak  CV: RRR  Lung: normal effort  Ab: Soft, NT/ND, incisions c/d/i, J-tube in place  Extrem: No CCE  Neuro:  A+Ox3, motor and sensation grossly intact      Lab, Imaging and other studies:CBC: No results found for: WBC, HGB, HCT, MCV, PLT, ADJUSTEDWBC, MCH, MCHC, RDW, MPV, NRBC, CMP: No results found for: SODIUM, K, CL, CO2, ANIONGAP, BUN, CREATININE, GLUCOSE, CALCIUM, AST, ALT, ALKPHOS, PROT, BILITOT, EGFR, Coagulation: No results found for: PT, INR, APTT, Urinalysis: No results found for: COLORU, CLARITYU, SPECGRAV, PHUR, LEUKOCYTESUR, NITRITE, PROTEINUA, GLUCOSEU, KETONESU, BILIRUBINUR, BLOODU, Amylase: No results found for: AMYLASE, Lipase: No results found for: LIPASE  VTE Pharmacologic Prophylaxis: Enoxaparin (Lovenox)  VTE Mechanical Prophylaxis: sequential compression device

## 2020-02-11 NOTE — PROGRESS NOTES
Assessment:  69-year-old male with the esophageal adenocarcinoma post transhiatal esophagectomy complicated by recurrent laryngeal nerve injury and thoracic duct injury both repaired  Endocrinology consultation was placed for hyperglycemia in context of tube feeding as well as concern for adrenal insufficiency which is now ruled out      Plan:  1  Hyperglycemia  In context of tube feeding  Note recent past steroid usage  blood glucose over the last 24 hours has been stable  May continue NPH 15 units q h s  for tube feeding cycle  Will reduce mealtime insulin novolog 3u tidac  Patient is a known diabetic with recent past A1c of 5 4%  He was on Invokana 100 mg q day prior to presentation  Will continue NPH 15 units at bedtime for tube feeding cycle at discharge  He should hold Invokana at discharge  No need of mealtime coverage insulin at discharge  Close follow up with     S:  No complaints reported  Eating and drinking well  Chest tube on the right side was removed  O:  Patient seen and examined personally  /55 (BP Location: Left arm)   Pulse 80   Temp 97 9 °F (36 6 °C) (Oral)   Resp 18   Ht 5' 10" (1 778 m)   Wt 91 2 kg (201 lb 1 oz)   SpO2 97%   BMI 28 85 kg/m²     Physical Exam   Constitutional: He is oriented to person, place, and time  He appears well-developed  HENT:   Head: Normocephalic  Mouth/Throat: Oropharynx is clear and moist    Eyes: Pupils are equal, round, and reactive to light  Neck: Normal range of motion  No thyromegaly present  Cardiovascular: Normal rate and normal heart sounds  Pulmonary/Chest: Effort normal and breath sounds normal    Abdominal: Soft  Bowel sounds are normal    Musculoskeletal: He exhibits no edema or deformity  Neurological: He is alert and oriented to person, place, and time  Skin: Capillary refill takes less than 2 seconds  No rash noted  No pallor  Psychiatric: He has a normal mood and affect           Current Facility-Administered Medications:  acetaminophen 975 mg Per J Tube Q6H [address] Costa Soliz MD   amiodarone 200 mg Oral TID With Meals Feroz Sinclair MD   enoxaparin 40 mg Subcutaneous Q24H [address] Costa Soliz MD   gabapentin 100 mg Per J Tube TID Costa Soliz MD   insulin lispro 2-12 Units Subcutaneous TID [facility] Costa Soliz MD   insulin lispro 2-12 Units Subcutaneous HS Costa Soliz MD   insulin lispro 5 Units Subcutaneous TID With Meals Costa Soliz MD   insulin NPH 15 Units Subcutaneous Q24H Can Dale MD   levothyroxine 25 mcg Oral Early Morning Costa Soliz MD   lipiodol ultra fluid   Code/Trauma/Sedation Med Josselyn Jiménez MD   melatonin 3 mg Per J Tube HS Costa Soliz MD   methocarbamol 500 mg Per J Tube Q6H PRN Costa Soliz MD   omeprazole (PRILOSEC) suspension 2 mg/mL 20 mg Per J Tube Daily Costa Soliz MD   ondansetron 4 mg Intravenous Q6H PRN Costa Soliz MD   oxyCODONE 2 5 mg Per J Tube Q4H PRN Costa Soliz MD   oxyCODONE 5 mg Per J Tube Q4H PRN Costa Soliz MD   senna-docusate sodium 2 tablet Per J Tube BID Costa Soliz MD   tamsulosin 0 4 mg Oral Daily With Tana Givens MD        Lab, Imaging and other studies:   POC Glucose (mg/dl)   Date Value   02/11/2020 183 (H)   02/10/2020 110   02/10/2020 75   02/10/2020 125   02/10/2020 214 (H)   02/09/2020 130   02/09/2020 119   02/09/2020 239 (H)   02/09/2020 342 (H)   02/09/2020 395 (H)

## 2020-02-11 NOTE — PLAN OF CARE
Problem: PHYSICAL THERAPY ADULT  Goal: Performs mobility at highest level of function for planned discharge setting  See evaluation for individualized goals  Description  Treatment/Interventions: Functional transfer training, LE strengthening/ROM, Elevations, Therapeutic exercise, Endurance training, Patient/family training, Equipment eval/education, Bed mobility, Gait training, Spoke to nursing  Equipment Recommended: Walker(RW)       See flowsheet documentation for full assessment, interventions and recommendations  Outcome: Progressing  Note:   Prognosis: Good  Problem List: Decreased strength, Decreased endurance, Impaired balance, Decreased mobility  Assessment: Pt cont to gradually improve in functional mobility skills progressing to (S) level w/ transfers and amb w/ rw; SPC trial was initially performed as well (pt reports he walked to the stretcher earlier w/o AD) w/ close (S)/guarding required to assure safety; gait patterns/stability improved w/ use of rw --> recommend to cont using rw at this time; pt informed and nsg/CM notified; overall, pt's current functional mobility status still remains to be below premorbid level --> cont to recommend rehab upon D/C; pt however declines and wishes to return home w/ family --> in that case, home PT follow up is recommended; rw for amb at this time; cont w/ 1st floor set-up; will follow; Recommendation: Post acute IP rehab(pt however declines; home PT is then recommended)       See flowsheet documentation for full assessment

## 2020-02-11 NOTE — PHYSICAL THERAPY NOTE
PHYSICAL THERAPY NOTE          Patient Name: Tiffany Fields  [id number] Date: 2/11/2020 02/11/20 3976   Pain Assessment   Pain Assessment No/denies pain   Restrictions/Precautions   Other Precautions Fall Risk   General   Chart Reviewed Yes   Additional Pertinent History cleared for Tx session (spoke to nsg)   Response to Previous Treatment Patient with no complaints from previous session  Cognition   Overall Cognitive Status WFL   Arousal/Participation Alert; Cooperative   Attention Attends with cues to redirect   Orientation Level Oriented to person;Oriented to place;Oriented to situation   Memory Within functional limits   Following Commands Follows one step commands without difficulty   Comments While pt is reclined position, noted (R) sock felt moist (pt has been having some drainage issues from the blisters --> nsg are applying dressing); RN notified and arrived to re-dress the foot; new sock was also applied and pt was then ready to amb   Subjective   Subjective Pt is observed reclined in the chair; agreeable to mobilize; reports he does not need to go to rehab and will go home; denies the need to climb steps at home   Transfers   Sit to Stand 5  Supervision   Additional items Assist x 1;Verbal cues   Stand to Sit 5  Supervision   Additional items Assist x 1;Verbal cues   Additional Comments Pt was reclined in the chair w/ LE elevated at the end of session; pillows placed for UE support; call bell w/in reach   Ambulation/Elevation   Gait pattern Excessively slow; Short stride; Inconsistent lilo  (w/ SPC; gait patterns improved w/ rw)   Gait Assistance 5  Supervision  (w/ rw; min (A)/(S)x1 w/ SPC)   Additional items Assist x 1;Verbal cues   Assistive Device Rolling walker;SPC  (SPC -->rw (recommended to pt to cont using rw at this time))   Distance 70 ft w/ SPC; after standing rest period, another 210 ft w/ rw;   Stair Management Assistance   (pt denies the need to climb steps at home)   Balance   Static Sitting Fair +   [address]  (supported)   Ambulatory Fair -  (w/ rw)   Activity Tolerance   Activity Tolerance Patient tolerated treatment well   Nurse Made Aware spoke to SAMEERA Preciado   Assessment   Prognosis Good   Problem List Decreased strength;Decreased endurance; Impaired balance;Decreased mobility   Assessment Pt cont to gradually improve in functional mobility skills progressing to (S) level w/ transfers and amb w/ rw; SPC trial was initially performed as well (pt reports he walked to the stretcher earlier w/o AD) w/ close (S)/guarding required to assure safety; gait patterns/stability improved w/ use of rw --> recommend to cont using rw at this time; pt informed and nsg/CM notified; overall, pt's current functional mobility status still remains to be below premorbid level --> cont to recommend rehab upon D/C; pt however declines and wishes to return home w/ family --> in that case, home PT follow up is recommended; rw for amb at this time; cont w/ 1st floor set-up; will follow;   Goals   Patient Goals to go home   STG Expiration Date 02/19/20   LTG Expiration Date 02/19/20   PT Treatment Day 4   Plan   Treatment/Interventions Functional transfer training;LE strengthening/ROM; Therapeutic exercise; Endurance training;Bed mobility;Gait training;Spoke to nursing;Spoke to case management;Equipment eval/education   Progress Progressing toward goals   PT Frequency Other (Comment)  (3-5x/wk)   Recommendation   Recommendation Post acute IP rehab  (pt however declines; home PT is then recommended)   Equipment Recommended Shira Hem, PT

## 2020-02-11 NOTE — RESPIRATORY THERAPY NOTE
RT Protocol Note  Yajaira Carvalho 71 y o  male MRN: [id number]  Unit/Bed#: [facility] 428-01 Encounter: [id number]    Assessment    Principal Problem:    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[place] [address] )  Active Problems:    COPD (chronic obstructive pulmonary disease) (HCC)    High cholesterol    Type 2 diabetes mellitus with hyperglycemia, without long-term current use of insulin (HCC)    Anemia, unspecified    Paralysis of left vocal fold    Dysphonia    Chylothorax on right    Mild protein-calorie malnutrition (HCC)    Atrial fibrillation (HCC)      Home Pulmonary Medications:         Past Medical History:   Diagnosis Date    COPD (chronic obstructive pulmonary disease) ([place] [address] )     Diabetes mellitus ([place] [address] )     Difficulty swallowing     Disease of thyroid gland     Esophageal mass     History of chemotherapy     History of transfusion     Malignant neoplasm of lower third of esophagus ([place] [address] )     Port-A-Cath in place     LCW    Sleep apnea     no CPAP     Social History     Socioeconomic History    Marital status: Single     Spouse name: None    Number of children: None    Years of education: None    Highest education level: None   Occupational History    None   Social Needs    Financial resource strain: None    Food insecurity:     Worry: None     Inability: None    Transportation needs:     Medical: None     Non-medical: None   Tobacco Use    Smoking status: Former Smoker     Packs/day: 0 50     Years: 50 00     Pack years: 25 00     Types: Cigarettes     Last attempt to quit: 2017     Years since quittin 1    Smokeless tobacco: Never Used   Substance and Sexual Activity    Alcohol use: Not Currently     Alcohol/week: 0 0 standard drinks     Frequency: Never     Drinks per session: 1 or 2     Binge frequency: Never    Drug use: Never    Sexual activity: None   Lifestyle    Physical activity:     Days per week: None     Minutes per session: None    Stress: None   Relationships    Social connections:     Talks on phone: None     Gets together: None     Attends Jainism service: None     Active member of club or organization: None     Attends meetings of clubs or organizations: None     Relationship status: None    Intimate partner violence:     Fear of current or ex partner: None     Emotionally abused: None     Physically abused: None     Forced sexual activity: None   Other Topics Concern    None   Social History Narrative    None       Subjective         Objective    Physical Exam:   Assessment Type: (P) Assess only  General Appearance: (P) Alert, Awake  Respiratory Pattern: (P) Normal  Chest Assessment: (P) Chest expansion symmetrical  Bilateral Breath Sounds: (P) Clear, Diminished  R Breath Sounds: (P) Clear, Diminished  L Breath Sounds: (P) Clear, Diminished  Cough: (P) None  O2 Device: (P) RMA    Vitals:  Blood pressure 110/55, pulse (P) 80, temperature 97 9 °F (36 6 °C), temperature source Oral, resp  rate (P) 18, height 5' 10" (1 778 m), weight 91 2 kg (201 lb 1 oz), SpO2 (P) 97 %  Imaging and other studies: I have personally reviewed pertinent reports        O2 Device: (P) RMA     Plan    Respiratory Plan: No distress/Pulmonary history        Resp Comments: (P) pt out in chairand denies sob at this time, using IS well, 1250cc, no prn neb indicated at this time, will d/c from resp protocol

## 2020-02-11 NOTE — QUICK NOTE
Surgery   Quick note    R CT was removed at 8:50AM on full inspiration  Adaptic-guaze-tegaderm was applied over CT incision site  Pt tolerated procedure well  There were no complications  Stat PA/LAT cxr ordered       Negra Wesley MD  2/11/2020  9:00 AM

## 2020-02-11 NOTE — SPEECH THERAPY NOTE
F/U to VBS completed  Pt is tolerating soft food with no s/s pharyngeal dysphagia  He evidenced throat clearing episodes with thin liquid and complaints suggestive of slow transit  We discussed small frequent feedings, alternating bites/sips and upright positioning during and after oral intake as well as cutting/crushing large meds if able and indicated (pill cutter and  provided)  Pt plans for TF plus oral feedings  No additional recommendations or f/u indicated at this time

## 2020-02-11 NOTE — PROGRESS NOTES
Progress Note - Surgical Oncology   Aparna Dodson 71 y o  male MRN: [id number]  Unit/Bed#: [facility] 428-01 Encounter: [id number]    Assessment:  72 y/o M p/w esophageal adenocarcinoma, s/p transhiatal esophagectomy on 0/09, complicated by L recurrent laryngeal nerve injury and thoracic duct injury  --s/p thoracic duct embolization by IR on 2/6  --s/p microdiect laryngoscopy with bilateral vocal cord injection on 2/7    --Right CT to water seal with 290 cc of serous output, no air leak    Plan:  --Possible removal of right chest tube today  --Surgical soft diet with small portions, with cycled tube feeds (7p-7a)  --Wean tube feeds gradually  --Continue PO Amiodarone for AFib (likely for a total of 6 weeks)  --Appreciate Endocrine recs for hyperglycemia  --Maintain johnson catheter for now per Urology recs, void trial prior to d/c  --PT/OT--recommending short term rehab  --Appreciate Case Management recs for placement     Subjective/Objective     Subjective:     No acute events overnight  Pt feels well this AM  Denies any complaints  Tolerating diet  Passing flatus and having bowel movements  Objective:     Blood pressure 110/55, pulse 69, temperature 97 9 °F (36 6 °C), temperature source Oral, resp  rate 18, height 5' 10" (1 778 m), weight 90 6 kg (199 lb 11 8 oz), SpO2 97 %  ,Body mass index is 28 66 kg/m²        Intake/Output Summary (Last 24 hours) at 2/11/2020 0522  Last data filed at 2/11/2020 0400  Gross per 24 hour   Intake 2093 48 ml   Output 1615 ml   Net 478 48 ml       Invasive Devices     Peripherally Inserted Central Catheter Line            PICC Line 02/08/20 Right Brachial 2 days          Central Venous Catheter Line            Port A Cath 09/26/19 Left Chest 137 days          Drain            Gastrostomy/Enterostomy Jejunostomy 14 Fr   days    Chest Tube 3 Right Pleural 8 days    Urethral Catheter Latex 16 Fr  4 days                Physical Exam:     GEN: NAD  HEENT: MMM  Chest: Right chest tube to water seal with 290 cc of serous output, no air leak  CV: RRR  Lung: normal effort  Ab: Soft, NT/ND, incisions c/d/i, J-tube in place  Extrem: No CCE  Neuro:  A+Ox3, motor and sensation grossly intact    Lab, Imaging and other studies:CBC: No results found for: WBC, HGB, HCT, MCV, PLT, ADJUSTEDWBC, MCH, MCHC, RDW, MPV, NRBC, CMP: No results found for: SODIUM, K, CL, CO2, ANIONGAP, BUN, CREATININE, GLUCOSE, CALCIUM, AST, ALT, ALKPHOS, PROT, BILITOT, EGFR, Coagulation: No results found for: PT, INR, APTT, Urinalysis: No results found for: COLORU, CLARITYU, SPECGRAV, PHUR, LEUKOCYTESUR, NITRITE, PROTEINUA, GLUCOSEU, KETONESU, BILIRUBINUR, BLOODU, Amylase: No results found for: AMYLASE, Lipase: No results found for: LIPASE  VTE Pharmacologic Prophylaxis: Enoxaparin (Lovenox)  VTE Mechanical Prophylaxis: sequential compression device

## 2020-02-11 NOTE — SOCIAL WORK
Pt is recommended for short-term skilled rehab placement for his aftercare plan  Pt is declining placement and requests to be d/c'd home w/ [facility] services for his aftercare plan  CM held conversation w/ pt about risks of not following the recommended level of aftercare  After discussion, pt remains opposed to SNF rehab placement  Per pt's request, CM provided pt w/ freedom of choice for [facility] agencies  Pt requested referral to [facility]  Pt also will require in-home Tube Feeding equipment and supplies  CM provided pt w/ freedom of choice for DME suppliers  Pt requested referral to [facility]  CM made Ecin referrals to [facility] and [facility]ube Feeding equipment and supplies  CM to follow

## 2020-02-11 NOTE — PROGRESS NOTES
Recommend changing TF formula to Jevity 1 5, run 12 hr cyclic feeds @ 452IQ/GF (1200ml) with 150ml water flushes q 4hrs  Provides 1800kcal, 77gPRO, 60gFAT, 259gCHO, 1812ml free water  Continue to monitor electrolytes

## 2020-02-12 VITALS
HEART RATE: 88 BPM | SYSTOLIC BLOOD PRESSURE: 106 MMHG | OXYGEN SATURATION: 98 % | TEMPERATURE: 98.2 F | DIASTOLIC BLOOD PRESSURE: 52 MMHG | WEIGHT: 202.82 LBS | HEIGHT: 70 IN | RESPIRATION RATE: 22 BRPM | BODY MASS INDEX: 29.04 KG/M2

## 2020-02-12 LAB
ALBUMIN SERPL BCP-MCNC: 1.9 G/DL (ref 3.5–5)
ALP SERPL-CCNC: 74 U/L (ref 46–116)
ALT SERPL W P-5'-P-CCNC: 23 U/L (ref 12–78)
ANION GAP SERPL CALCULATED.3IONS-SCNC: 5 MMOL/L (ref 4–13)
AST SERPL W P-5'-P-CCNC: 25 U/L (ref 5–45)
BASOPHILS # BLD AUTO: 0.01 THOUSANDS/ΜL (ref 0–0.1)
BASOPHILS NFR BLD AUTO: 0 % (ref 0–1)
BILIRUB SERPL-MCNC: 0.26 MG/DL (ref 0.2–1)
BUN SERPL-MCNC: 21 MG/DL (ref 5–25)
CALCIUM SERPL-MCNC: 7.9 MG/DL (ref 8.3–10.1)
CHLORIDE SERPL-SCNC: 105 MMOL/L (ref 100–108)
CO2 SERPL-SCNC: 27 MMOL/L (ref 21–32)
CREAT SERPL-MCNC: 0.74 MG/DL (ref 0.6–1.3)
EOSINOPHIL # BLD AUTO: 0.3 THOUSAND/ΜL (ref 0–0.61)
EOSINOPHIL NFR BLD AUTO: 5 % (ref 0–6)
ERYTHROCYTE [DISTWIDTH] IN BLOOD BY AUTOMATED COUNT: 16.7 % (ref 11.6–15.1)
GFR SERPL CREATININE-BSD FRML MDRD: 97 ML/MIN/1.73SQ M
GLUCOSE SERPL-MCNC: 157 MG/DL (ref 65–140)
GLUCOSE SERPL-MCNC: 178 MG/DL (ref 65–140)
HCT VFR BLD AUTO: 29.4 % (ref 36.5–49.3)
HGB BLD-MCNC: 8.8 G/DL (ref 12–17)
IMM GRANULOCYTES # BLD AUTO: 0.02 THOUSAND/UL (ref 0–0.2)
IMM GRANULOCYTES NFR BLD AUTO: 0 % (ref 0–2)
LYMPHOCYTES # BLD AUTO: 0.87 THOUSANDS/ΜL (ref 0.6–4.47)
LYMPHOCYTES NFR BLD AUTO: 14 % (ref 14–44)
MAGNESIUM SERPL-MCNC: 2.2 MG/DL (ref 1.6–2.6)
MCH RBC QN AUTO: 25.5 PG (ref 26.8–34.3)
MCHC RBC AUTO-ENTMCNC: 29.9 G/DL (ref 31.4–37.4)
MCV RBC AUTO: 85 FL (ref 82–98)
MONOCYTES # BLD AUTO: 0.33 THOUSAND/ΜL (ref 0.17–1.22)
MONOCYTES NFR BLD AUTO: 5 % (ref 4–12)
NEUTROPHILS # BLD AUTO: 4.83 THOUSANDS/ΜL (ref 1.85–7.62)
NEUTS SEG NFR BLD AUTO: 76 % (ref 43–75)
NRBC BLD AUTO-RTO: 0 /100 WBCS
PHOSPHATE SERPL-MCNC: 2.2 MG/DL (ref 2.3–4.1)
PLATELET # BLD AUTO: 374 THOUSANDS/UL (ref 149–390)
PMV BLD AUTO: 9.2 FL (ref 8.9–12.7)
POTASSIUM SERPL-SCNC: 4.1 MMOL/L (ref 3.5–5.3)
PROT SERPL-MCNC: 5.3 G/DL (ref 6.4–8.2)
RBC # BLD AUTO: 3.45 MILLION/UL (ref 3.88–5.62)
SODIUM SERPL-SCNC: 137 MMOL/L (ref 136–145)
WBC # BLD AUTO: 6.36 THOUSAND/UL (ref 4.31–10.16)

## 2020-02-12 PROCEDURE — NC001 PR NO CHARGE: Performed by: SURGERY

## 2020-02-12 PROCEDURE — 99024 POSTOP FOLLOW-UP VISIT: CPT | Performed by: THORACIC SURGERY (CARDIOTHORACIC VASCULAR SURGERY)

## 2020-02-12 PROCEDURE — 82948 REAGENT STRIP/BLOOD GLUCOSE: CPT

## 2020-02-12 PROCEDURE — 99024 POSTOP FOLLOW-UP VISIT: CPT | Performed by: SURGERY

## 2020-02-12 PROCEDURE — 80053 COMPREHEN METABOLIC PANEL: CPT | Performed by: SURGERY

## 2020-02-12 PROCEDURE — 84100 ASSAY OF PHOSPHORUS: CPT | Performed by: SURGERY

## 2020-02-12 PROCEDURE — 85025 COMPLETE CBC W/AUTO DIFF WBC: CPT | Performed by: SURGERY

## 2020-02-12 PROCEDURE — 83735 ASSAY OF MAGNESIUM: CPT | Performed by: SURGERY

## 2020-02-12 RX ORDER — SENNOSIDES 8.6 MG
TABLET ORAL
Status: DISCONTINUED
Start: 2020-02-12 | End: 2020-02-12 | Stop reason: HOSPADM

## 2020-02-12 RX ORDER — LACTOSE-REDUCED FOOD/FIBER 0.06 G-1.5
60 LIQUID (ML) ORAL CONTINUOUS
Qty: 1000 ML | Refills: 0 | Status: ON HOLD | OUTPATIENT
Start: 2020-02-12 | End: 2020-05-24 | Stop reason: CLARIF

## 2020-02-12 RX ORDER — AMIODARONE HYDROCHLORIDE 200 MG/1
TABLET ORAL
Status: COMPLETED
Start: 2020-02-12 | End: 2020-02-12

## 2020-02-12 RX ORDER — AMOXICILLIN 250 MG
1 CAPSULE ORAL DAILY
Qty: 30 TABLET | Refills: 0 | COMMUNITY
Start: 2020-02-12 | End: 2020-06-29

## 2020-02-12 RX ORDER — OXYCODONE HYDROCHLORIDE 5 MG/1
5 TABLET ORAL EVERY 4 HOURS PRN
Qty: 20 TABLET | Refills: 0 | Status: SHIPPED | OUTPATIENT
Start: 2020-02-12 | End: 2020-02-20 | Stop reason: SDUPTHER

## 2020-02-12 RX ORDER — GABAPENTIN 100 MG/1
CAPSULE ORAL
Status: COMPLETED
Start: 2020-02-12 | End: 2020-02-12

## 2020-02-12 RX ORDER — AMIODARONE HYDROCHLORIDE 200 MG/1
200 TABLET ORAL
Qty: 90 TABLET | Refills: 0 | Status: SHIPPED | OUTPATIENT
Start: 2020-02-12 | End: 2020-06-23 | Stop reason: SDUPTHER

## 2020-02-12 RX ORDER — LORATADINE 10 MG/1
TABLET ORAL
Status: DISCONTINUED
Start: 2020-02-12 | End: 2020-02-12 | Stop reason: HOSPADM

## 2020-02-12 RX ORDER — LEVOTHYROXINE SODIUM 0.03 MG/1
TABLET ORAL
Status: COMPLETED
Start: 2020-02-12 | End: 2020-02-12

## 2020-02-12 RX ORDER — TAMSULOSIN HYDROCHLORIDE 0.4 MG/1
0.4 CAPSULE ORAL
Qty: 30 CAPSULE | Refills: 0 | Status: ON HOLD | OUTPATIENT
Start: 2020-02-12 | End: 2020-04-28 | Stop reason: ALTCHOICE

## 2020-02-12 RX ORDER — LANOLIN ALCOHOL/MO/W.PET/CERES
3 CREAM (GRAM) TOPICAL
Qty: 30 TABLET | Refills: 0 | COMMUNITY
Start: 2020-02-12 | End: 2020-05-23 | Stop reason: ALTCHOICE

## 2020-02-12 RX ADMIN — LEVOTHYROXINE SODIUM: 25 TABLET ORAL at 06:00

## 2020-02-12 RX ADMIN — AMIODARONE HYDROCHLORIDE 200 MG: 200 TABLET ORAL at 08:16

## 2020-02-12 RX ADMIN — INSULIN LISPRO 2 UNITS: 100 INJECTION, SOLUTION INTRAVENOUS; SUBCUTANEOUS at 08:18

## 2020-02-12 RX ADMIN — ENOXAPARIN SODIUM 40 MG: 40 INJECTION SUBCUTANEOUS at 08:14

## 2020-02-12 RX ADMIN — GABAPENTIN 100 MG: 250 SUSPENSION ORAL at 08:18

## 2020-02-12 RX ADMIN — Medication 20 MG: at 08:17

## 2020-02-12 RX ADMIN — LEVOTHYROXINE SODIUM 25 MCG: 25 TABLET ORAL at 05:57

## 2020-02-12 RX ADMIN — GABAPENTIN 100 MG: 100 CAPSULE ORAL at 08:16

## 2020-02-12 NOTE — PROGRESS NOTES
Progress Note - Eduarda Kitcarmina 71 y o  male MRN: [id number]    Unit/Bed#: [facility] 428-01 Encounter: [id number]      Assessment:  70 yo M s/p transhiatal esophagectomy 1/28 whose post op course has been complicated by left recurrent laryngeal nerve injury and thoracic duct injury  - now s/p IR thoracic duct embolization 2/6 and ENT MDL w/bilateral vocal cord injection 2/7  - b/l CT removed    Plan:  · Small portions soft diet  · J tube feeds running cycled  · Flomax for at least 2 weeks  · Appreciate endocrinology recs, will f/u with their team in regards to discharge planning  · Oral amio for A fib  · D/c PICC  · Plan for discharge today if VNA set up by [facility]      Subjective:   No acute events, passing flatus    Objective:     Vitals: Blood pressure 107/55, pulse 55, temperature 98 1 °F (36 7 °C), temperature source Oral, resp  rate 17, height 5' 10" (1 778 m), weight 91 2 kg (201 lb 1 oz), SpO2 90 %  ,Body mass index is 28 85 kg/m²      I/O       02/04 0701 - 02/05 0700 02/05 0701 - 02/06 0700    I V  (mL/kg) 115 8 (1 4) 80 9 (1)    NG/ 370    IV Piggyback 350 200    Feedings 1484 1375    Total Intake(mL/kg) 2859 8 (35) 2025 9 (24 8)    Urine (mL/kg/hr) 1275 (0 7) 705 (0 4)    Emesis/NG output 0     Drains  0    Stool 0 0    Chest Tube 2340 2910    Total Output 3615 3615    Net -755 2 -1589 1          Unmeasured Stool Occurrence 5 x 1 x              Physical Exam:   NAD, alert and oriented x3  Normocephalic, atraumatic  MMM, EOMI, PERRLA  Norm resp effort on RA  RRR  B/l chest dressings cdi  Abd soft, NT/ND, abdominal wound closed with staples  J tube in place  No calf tenderness or peripheral edema  Motor/sensation intact in distal extremities  CN grossly intact  -rash/lesions                Scheduled Meds:    Current Facility-Administered Medications:  acetaminophen 975 mg Per J Tube Q6H [address] Roberta Dewitt MD   amiodarone 200 mg Oral TID With Meals Nani Mendoza MD   enoxaparin 40 mg Subcutaneous Q24H [address] Tiffany Pope MD   gabapentin 100 mg Per J Tube TID Tiffany Pope MD   insulin lispro 2-12 Units Subcutaneous TID [facility] [place] Tiffany Pope MD   insulin lispro 2-12 Units Subcutaneous HS Tiffany Pope MD   insulin NPH 12 Units Subcutaneous Q24H [address] Yang Priest MD   levothyroxine 25 mcg Oral Early Morning Tiffany Pope MD   lipiodol ultra fluid   Code/Trauma/Sedation Med Qi Sky MD   melatonin 3 mg Per J Tube HS Tiffany Pope MD   methocarbamol 500 mg Per J Tube Q6H PRN Tiffany Pope MD   omeprazole (PRILOSEC) suspension 2 mg/mL 20 mg Per J Tube Daily Tiffany Pope MD   ondansetron 4 mg Intravenous Q6H PRN Tiffany Pope MD   oxyCODONE 2 5 mg Per J Tube Q4H PRN Tiffany Pope MD   oxyCODONE 5 mg Per J Tube Q4H PRN Tiffany Pope MD   senna-docusate sodium 2 tablet Per J Tube BID Tiffany Pope MD   tamsulosin 0 4 mg Oral Daily With Calin Parsons MD     Continuous Infusions:     PRN Meds: lipiodol ultra fluid    methocarbamol    ondansetron    oxyCODONE    oxyCODONE      Invasive Devices     Peripherally Inserted Central Catheter Line            PICC Line 02/08/20 Right Brachial 3 days          Central Venous Catheter Line            Port A Cath 09/26/19 Left Chest 138 days          Drain            Gastrostomy/Enterostomy Jejunostomy 14 Fr   days                Lab, Imaging and other studies:   Recent Labs     02/10/20  0448   WBC 7 45   HGB 9 3*        Recent Labs     02/10/20  0448 02/11/20  0512   SODIUM 135* 136   K 4 1 4 4    105   CO2 26 27   BUN 25 22   CREATININE 0 85 0 72   CALCIUM 7 4* 7 8*           VTE Pharmacologic Prophylaxis: lovenox  VTE Mechanical Prophylaxis: sequential compression device

## 2020-02-12 NOTE — PROGRESS NOTES
Progress Note - John Gutierrez 71 y o  male MRN: [id number]    Unit/Bed#: [facility] 428-01 Encounter: [id number]      Assessment:  70 yo M s/p transhiatal esophagectomy 1/28 whose post op course has been complicated by left recurrent laryngeal nerve injury and thoracic duct injury  - now s/p IR thoracic duct embolization 2/6 and ENT MDL w/bilateral vocal cord injection 2/7  - b/l CT removed    Plan:  · Small portions soft diet  · J tube feeds running cycled  · Flomax for at least 2 weeks  · Appreciate endocrinology recs, will f/u with their team in regards to discharge planning  · Oral amio for A fib  · D/c PICC  · Plan for discharge today if VNA set up by [facility]      Subjective:   No acute events, passing flatus    Objective:     Vitals: Blood pressure 107/55, pulse 55, temperature 98 1 °F (36 7 °C), temperature source Oral, resp  rate 17, height 5' 10" (1 778 m), weight 91 2 kg (201 lb 1 oz), SpO2 90 %  ,Body mass index is 28 85 kg/m²      I/O       02/04 0701 - 02/05 0700 02/05 0701 - 02/06 0700    I V  (mL/kg) 115 8 (1 4) 80 9 (1)    NG/ 370    IV Piggyback 350 200    Feedings 1484 1375    Total Intake(mL/kg) 2859 8 (35) 2025 9 (24 8)    Urine (mL/kg/hr) 1275 (0 7) 705 (0 4)    Emesis/NG output 0     Drains  0    Stool 0 0    Chest Tube 2340 2910    Total Output 3615 3615    Net -755 2 -1589 1          Unmeasured Stool Occurrence 5 x 1 x              Physical Exam:   NAD, alert and oriented x3  Normocephalic, atraumatic  MMM, EOMI, PERRLA  Norm resp effort on RA  RRR  B/l chest dressings cdi  Abd soft, NT/ND, abdominal wound closed with staples  J tube in place  No calf tenderness or peripheral edema  Motor/sensation intact in distal extremities  CN grossly intact  -rash/lesions                Scheduled Meds:    Current Facility-Administered Medications:  acetaminophen 975 mg Per J Tube Q6H [facility] Kj Thorpe MD   amiodarone 200 mg Oral TID With Meals Elizabeth Kelly MD   enoxaparin 40 mg Subcutaneous Q24H [facility] Alexander Recinos MD   gabapentin 100 mg Per J Tube TID Alexander Recinos MD   insulin lispro 2-12 Units Subcutaneous TID [facility] Alexander Recinos MD   insulin lispro 2-12 Units Subcutaneous HS Alexander Recinos MD   insulin NPH 12 Units Subcutaneous Q24H Herve Lombardi MD   levothyroxine 25 mcg Oral Early Morning Alexander Recinos MD   lipiodol ultra fluid   Code/Trauma/Sedation Med Jarrod Cobb MD   melatonin 3 mg Per J Tube HS Alexander Recinos MD   methocarbamol 500 mg Per J Tube Q6H PRN Alexander Recinos MD   omeprazole (PRILOSEC) suspension 2 mg/mL 20 mg Per J Tube Daily Alexander Recinos MD   ondansetron 4 mg Intravenous Q6H PRN Alexander Recinos MD   oxyCODONE 2 5 mg Per J Tube Q4H PRN Alexander Recinos MD   oxyCODONE 5 mg Per J Tube Q4H PRN Alexander Recinos MD   senna-docusate sodium 2 tablet Per J Tube BID Alexander Recinos MD   tamsulosin 0 4 mg Oral Daily With Socrates Dorsey MD     Continuous Infusions:     PRN Meds: lipiodol ultra fluid    methocarbamol    ondansetron    oxyCODONE    oxyCODONE      Invasive Devices     Peripherally Inserted Central Catheter Line            PICC Line 02/08/20 Right Brachial 3 days          Central Venous Catheter Line            Port A Cath 09/26/19 Left Chest 138 days          Drain            Gastrostomy/Enterostomy Jejunostomy 14 Fr   days                Lab, Imaging and other studies:   Recent Labs     02/10/20  0448   WBC 7 45   HGB 9 3*        Recent Labs     02/10/20  0448 02/11/20  0512   SODIUM 135* 136   K 4 1 4 4    105   CO2 26 27   BUN 25 22   CREATININE 0 85 0 72   CALCIUM 7 4* 7 8*           VTE Pharmacologic Prophylaxis: lovenox  VTE Mechanical Prophylaxis: sequential compression device

## 2020-02-12 NOTE — SOCIAL WORK
Pt is cleared for d/c by Surgical Oncology CLEMENTINA Rios Chadmar was notified of pt's d/c order  Pt is accepted for services by [facility] and also by [facility] [place] and supplies  The pt and his family were informed of d/c  Family will transport pt home later this day, pickup time TBD  IMM signed by pt on 2/10/20  No chart copy required  CM to follow

## 2020-02-12 NOTE — DISCHARGE SUMMARY
Discharge Summary - Surgical Oncology   Shelia Tejada 71 y o  male MRN: [id number]  Unit/Bed#: [facility] 428-01 Encounter: [id number]    Admission Date: 1/28/2020     Admitting Diagnosis: 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[address] ) [C16 0]    HPI: Janes Sethi is a 72 yo gentleman who initially started to have dysphagia with solid foods in August of 2019  EGD at that time revealed adenocarcinoma extending from 30-40 cm  There was significant wall thickening seen on CT extending to the level gastric cardia  PET-CT revealed FDG activity in the GE junction as well as a perigastric lymph node  There was also uptake in a left 6th rib and right lateral 2nd rib  There was FDG activity in the colon  Colonoscopy was negative for a colon cancer  He underwent neoadjuvant chemotherapy because of the questionable rib metastasis  Follow-up PET-CT from December 30, 2019 revealed an excellent response to therapy  There was minimal FDG uptake in the distal esophagus  There was no obvious metastasis  Patient had a jejunostomy tube placed earlier and was having tube feeds of Jevity 1 5 at 85 mL/hour for 6 being hour periods  Patient was gaining weight  He has been off the tube feeds now  And he is being admitted for surgical intervention    Surgery is planned with myself and the thoracic team      Procedures Performed:    January 28th, 2020 transhiatal esophagectomy - Dr Merrily Brunner Melvin Pert  January 29th, 2020 bilateral thoracentesis  February 1st, 2020 code blue with intubation  February 2nd, 2020 bilateral chest tube insertions  February 3rd, 2020 extubation  February 6th, 2020 thoracic duct embolization - IR  February 7th, 2020 micro direct laryngoscopy with bilateral vocal cord injections - Dr Jarrett Vazquez  February 10th, 2020 left chest tube removal  February 11th, 2020 right chest tube removal    Orders Placed This Encounter   Procedures    Central Line    Chest Tube Insertion    Chest Tube Insertion       Hospital Course:   Janes Sethi had a slightly difficult course postoperatively  The surgery went well  the initial days postoperatively  Patient disliked his nasogastric tube which we were going to leave until postop day 5 when we would perform the swallow  But the NG tube was adversely pulled out  Patient was drinking a large cup of water on postop day 3  Which was taken from him and further explained to him that he was not to be drinking anything due to the risk of aspiration  Patient became volume overloaded and on January 29th, 2020 thoracentesis was performed bilaterally with 1 4 L removed from the right lung and 1 5 L removed from the left lung  Patient was transferred to out of the unit and on to the floor where he subsequently aspirated and a code  blue was called  Patient was intubated and CPR was performed  This occurred on February 1st, 2020  Patient regained pulses and was able to be extubated on February 3rd, 2020  Patient did have dysphonia postoperatively an ENT was consulted  Since patient was intubated just after seen by ENT, they waited until he was extubated to do the laryngoscopy with vocal cord injection  Patient had bilateral chest tubes inserted after the code blue  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 with the right chest to having up to 3 L in a 24 hour     Interventional Radiology to was consulted of for Chyle leak  Triglyceride levels of the body fluid was 456  Patient then went to interventional Radiology where he had a successful thoracic duct embolization  Patient was evaluated with speech and swallow and was cleared in for surgical soft small portion meals  A barium swallow on 02/08 20 showed no leak  We did start patient on trickle tube feeds through his jejunostomy tube initially of Jevity 1 2 but then was switched to Nutrahep since it is the lowest in fats  Patient was able to be cycled on his tube feeds for 12 hours as he continued to tolerate a surgical soft diet    Patient's abdominal wound has stayed clean and dry  There has been no drainage at the present time  Patient was started on Amiodarone for periods of AFib  Endocrinology was also consulted for his rising blood sugars  Patient was on Januvia 100 mg daily prior to admission  He has been controlled with NPH insulin during admission  Patient subsequently had his chest tube was removed  Patient was seen by Physical therapy and Occupational therapy which recommended short-term rehab however the patient is absolutely refusing to go to rehab  He will be discharged to home with [facility]  He will be discharged home on cycled tube feeds of Jevity 1 5 at 60 mL/hour for 12 hours daily  He will continue the oral Amiodarone 100 mg t i d  For 6 weeks  He will follow with his family physician Dr Ten Espitia  He also does not want to follow with endocrinology admit to follow with his family physician for his diabetes  Patient will follow with Dr Dann Green on March 4th, 2020 at 3:15 pm In their [place] office  He will follow with Dr Kervin Boston on February 20th, 2020 10am at the [facility] office  A chest x-ray will be done prior to that visit  He is also to call Dr Birch [facility] office for follow-up his vocal cord injections  All scripts were sent to [address] [place] [address] here at [place]  Patient is to stop his Januvia 100mg daily and his Invokana 100 mg daily  He is to check his blood sugars TID and take 12 units NPH insulin SQ prior to initiating the tube feeds nightly  Patient is to stop the NPH insulin after the tube feeds are discontinued  Patient is to see Dr Ten Espitia within 1 week for his blood sugar control and further Amiodarone control  Pathology:   Final Diagnosis   A  Esophagastrectomy:     - Microscopic focus of residual adenocarcinoma in muscularis propria  - Ulceration and associated histologic changes compatible with prior neoadjuvant chemotherapy       - Adjacent betancur's esophagus with associated atypia indeterminate for dysplasia  - Seven lymph nodes identified; all negative for malignancy (0/7)  - Proximal and distal margins are negative for dysplasia and malignacny      B  Final esophageal margin:     - Negative or malignancy  Complications:  Aspiration, Chyle leak    Discharge Diagnosis:  Esophageal carcinoma    Discharge Date:  February 12th, 2020    Condition at Discharge:  Good     Discharge instructions/Information to patient and family:   See after visit summary for information provided to patient and family  Provisions for Follow-Up Care:  See after visit summary for information related to follow-up care and any pertinent home health orders  Disposition:  Home with [facility]NA    Planned Readmission:  No    Discharge Statement   I spent 45 minutes discharging the patient  This time was spent on the day of discharge  I had direct contact with the patient on the day of discharge  Additional documentation is required if more than 30 minutes were spent on discharge  Discharge Medications:  See after visit summary for reconciled discharge medications provided to patient and family

## 2020-02-17 ENCOUNTER — HOSPITAL ENCOUNTER (OUTPATIENT)
Dept: RADIOLOGY | Facility: HOSPITAL | Age: 65
Discharge: HOME | End: 2020-02-17
Payer: COMMERCIAL

## 2020-02-17 ENCOUNTER — TELEPHONE (OUTPATIENT)
Dept: HEMATOLOGY ONCOLOGY | Facility: HOSPITAL | Age: 65
End: 2020-02-17

## 2020-02-17 ENCOUNTER — TRANSCRIBE ORDERS (OUTPATIENT)
Dept: ADMINISTRATIVE | Facility: HOSPITAL | Age: 65
End: 2020-02-17

## 2020-02-17 DIAGNOSIS — C15.9 MALIGNANT NEOPLASM OF ESOPHAGUS, UNSPECIFIED LOCATION (HCC): Primary | ICD-10-CM

## 2020-02-17 DIAGNOSIS — C15.9 MALIGNANT NEOPLASM OF ESOPHAGUS, UNSPECIFIED LOCATION (HCC): ICD-10-CM

## 2020-02-17 PROCEDURE — 71046 X-RAY EXAM CHEST 2 VIEWS: CPT

## 2020-02-17 NOTE — TELEPHONE ENCOUNTER
Called and [facility] for pt to return our call  We want to know if the patient can come in earlier in the day to see Alexander Tapia

## 2020-02-17 NOTE — TELEPHONE ENCOUNTER
I called and spoke with patient to schedule him for a 6-8 week hospital follow up  Patient is scheduled on 03/30/2020 with MACHO Snow at [facility] office  I mailed patient appointment card

## 2020-02-18 ENCOUNTER — HOSPITAL ENCOUNTER (OUTPATIENT)
Dept: CT IMAGING | Facility: HOSPITAL | Age: 65
Discharge: HOME | End: 2020-02-18
Payer: COMMERCIAL

## 2020-02-18 ENCOUNTER — TELEPHONE (OUTPATIENT)
Dept: HEMATOLOGY ONCOLOGY | Facility: CLINIC | Age: 65
End: 2020-02-18

## 2020-02-18 ENCOUNTER — OFFICE VISIT (OUTPATIENT)
Dept: HEMATOLOGY ONCOLOGY | Facility: CLINIC | Age: 65
End: 2020-02-18

## 2020-02-18 VITALS
RESPIRATION RATE: 16 BRPM | SYSTOLIC BLOOD PRESSURE: 116 MMHG | DIASTOLIC BLOOD PRESSURE: 62 MMHG | OXYGEN SATURATION: 93 % | HEART RATE: 94 BPM | HEIGHT: 70 IN | WEIGHT: 201 LBS | BODY MASS INDEX: 28.77 KG/M2 | TEMPERATURE: 98.3 F

## 2020-02-18 DIAGNOSIS — C15.5 MALIGNANT NEOPLASM OF LOWER THIRD OF ESOPHAGUS (HCC): Primary | ICD-10-CM

## 2020-02-18 DIAGNOSIS — R06.02 SOB (SHORTNESS OF BREATH): ICD-10-CM

## 2020-02-18 DIAGNOSIS — R60.0 BILATERAL EDEMA OF LOWER EXTREMITY: ICD-10-CM

## 2020-02-18 DIAGNOSIS — C16.0 GE JUNCTION CARCINOMA (HCC): ICD-10-CM

## 2020-02-18 PROBLEM — Z95.828 PORT-A-CATH IN PLACE: Status: ACTIVE | Noted: 2020-02-18

## 2020-02-18 PROCEDURE — 99214 OFFICE O/P EST MOD 30 MIN: CPT | Performed by: NURSE PRACTITIONER

## 2020-02-18 PROCEDURE — 71275 CT ANGIOGRAPHY CHEST: CPT

## 2020-02-18 RX ADMIN — IOHEXOL 60 ML: 350 INJECTION, SOLUTION INTRAVENOUS at 13:59

## 2020-02-18 NOTE — PROGRESS NOTES
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    HPI:    Jose Luis Vences seen for initial consultation 9/24/2019 regarding  Newly Diagnosed locally advanced GE junction/gastric cancer which is HER-2/thea positive  The patient is a 79-year-old male who was originally referred to Gastroenterology with the complaint of dysphagia  Elmon Clayton then had an EGD performed on 08/24/2019 showing neoplastic changes and luminal narrowing in the distal 1/3 of the esophagus   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   Esophageal biopsy revealed at least 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 and high-grade dysplasia   He was seen by her colleagues in cardiothoracic surgery who recommended neoadjuvant therapy upfront  He is going to be presented at tumor board  PET/CT scan showed Extensive FDG uptake at the distal esophagus GE junction and proximal stomach compatible with known malignancy  There were FDG avid perigastric lymph node posterior to the proximal stomach suspicious for metastatic disease and possible gastrohepatic lymphadenopathy which was inseparable from the gastric mass  There is also some uptake at the sixth rib but the patient states he did have trauma to this area recently  Metastatic disease could not be ruled out  There is also some uptake in the sigmoid colon for which a colonoscopy was recommended down the road       Previous Hematologic/ Oncologic History:       Malignant neoplasm of lower third of esophagus ([facility] [address] )    8/24/2019 Initial Diagnosis     Malignant neoplasm of lower third of esophagus ([facility] [address] )  At least intramucosal carcinoma  Her2/THEA positive      10/8/2019 - 12/30/2019 Chemotherapy     palonosetron (ALOXI) injection 0 25 mg, 0 25 mg, Intravenous, Once, 6 of 6 cycles  Administration: 0 25 mg (10/8/2019), 0 25 mg (10/22/2019), 0 25 mg (11/5/2019), 0 25 mg (11/19/2019), 0 25 mg (12/3/2019), 0 25 mg (12/17/2019)  pegfilgrastim (NEULASTA ONPRO) subcutaneous injection kit 6 mg, 6 mg, Subcutaneous, Once, 6 of 6 cycles  Administration: 6 mg (10/9/2019), 6 mg (10/23/2019), 6 mg (11/6/2019), 6 mg (11/20/2019), 6 mg (12/4/2019), 6 mg (12/18/2019)  fosaprepitant (EMEND) 150 mg in sodium chloride 0 9 % 250 mL IVPB, 150 mg, Intravenous, Once, 6 of 6 cycles  Administration: 150 mg (10/8/2019), 150 mg (10/22/2019), 150 mg (11/5/2019), 150 mg (11/19/2019), 150 mg (12/3/2019), 150 mg (12/17/2019)  DOCEtaxel (TAXOTERE) 99 mg in sodium chloride 0 9 % 250 mL chemo infusion, 50 mg/m2 = 99 mg (83 3 % of original dose 60 mg/m2), Intravenous, Once, 6 of 6 cycles  Dose modification: 50 mg/m2 (original dose 60 mg/m2, Cycle 1, Reason: Other (See Comments))  Administration: 99 mg (10/8/2019), 99 mg (10/22/2019), 99 mg (11/5/2019), 99 mg (11/19/2019), 99 mg (12/3/2019), 99 mg (12/17/2019)  leucovorin 396 mg in dextrose 5 % 250 mL IVPB, 200 mg/m2 = 396 mg (50 % of original dose 400 mg/m2), Intravenous, Once, 6 of 6 cycles  Dose modification: 200 mg/m2 (original dose 400 mg/m2, Cycle 1, Reason: Other (See Comments), Comment: FLOT regimen standard)  Administration: 396 mg (10/8/2019), 396 mg (10/22/2019), 396 mg (11/5/2019), 396 mg (11/19/2019), 396 mg (12/3/2019), 396 mg (12/17/2019)  oxaliplatin (ELOXATIN) 168 3 mg in dextrose 5 % 250 mL chemo infusion, 85 mg/m2 = 168 3 mg, Intravenous, Once, 6 of 6 cycles  Administration: 168 3 mg (10/8/2019), 168 3 mg (10/22/2019), 168 3 mg (11/5/2019), 168 3 mg (11/19/2019), 168 3 mg (12/3/2019), 168 3 mg (12/17/2019)  trastuzumab (HERCEPTIN) 496 mg in sodium chloride 0 9 % 250 mL chemo infusion, 6 mg/kg = 496 mg, Intravenous, Once, 6 of 6 cycles  Administration: 496 mg (10/8/2019), 331 mg (10/22/2019), 331 mg (11/5/2019), 331 mg (11/19/2019), 331 mg (12/3/2019), 331 mg (12/17/2019)      1/28/2020 Surgery     Esophagogastrectomy  - Microscopic focus of residual adenocarcinoma in muscularis propria  - Ulceration and associated histologic changes compatible with prior neoadjuvant chemotherapy  - Adjacent betancur's esophagus with associated atypia indeterminate for dysplasia  - Seven lymph nodes identified; all negative for malignancy (0/7)          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[facility] [address] )    8/24/2019 Biopsy     Esophagus (biopsy):  - At least 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 background of Betancur's esophagus and high grade dysplasia       9/24/2019 Initial Diagnosis     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HCC)      10/8/2019 - 12/30/2019 Chemotherapy     palonosetron (ALOXI) injection 0 25 mg, 0 25 mg, Intravenous, Once, 6 of 6 cycles  Administration: 0 25 mg (10/8/2019), 0 25 mg (10/22/2019), 0 25 mg (11/5/2019), 0 25 mg (11/19/2019), 0 25 mg (12/3/2019), 0 25 mg (12/17/2019)  pegfilgrastim (NEULASTA ONPRO) subcutaneous injection kit 6 mg, 6 mg, Subcutaneous, Once, 6 of 6 cycles  Administration: 6 mg (10/9/2019), 6 mg (10/23/2019), 6 mg (11/6/2019), 6 mg (11/20/2019), 6 mg (12/4/2019), 6 mg (12/18/2019)  fosaprepitant (EMEND) 150 mg in sodium chloride 0 9 % 250 mL IVPB, 150 mg, Intravenous, Once, 6 of 6 cycles  Administration: 150 mg (10/8/2019), 150 mg (10/22/2019), 150 mg (11/5/2019), 150 mg (11/19/2019), 150 mg (12/3/2019), 150 mg (12/17/2019)  DOCEtaxel (TAXOTERE) 99 mg in sodium chloride 0 9 % 250 mL chemo infusion, 50 mg/m2 = 99 mg (83 3 % of original dose 60 mg/m2), Intravenous, Once, 6 of 6 cycles  Dose modification: 50 mg/m2 (original dose 60 mg/m2, Cycle 1, Reason: Other (See Comments))  Administration: 99 mg (10/8/2019), 99 mg (10/22/2019), 99 mg (11/5/2019), 99 mg (11/19/2019), 99 mg (12/3/2019), 99 mg (12/17/2019)  leucovorin 396 mg in dextrose 5 % 250 mL IVPB, 200 mg/m2 = 396 mg (50 % of original dose 400 mg/m2), Intravenous, Once, 6 of 6 cycles  Dose modification: 200 mg/m2 (original dose 400 mg/m2, Cycle 1, Reason: Other (See Comments), Comment: FLOT regimen standard)  Administration: 396 mg (10/8/2019), 396 mg (10/22/2019), 396 mg (11/5/2019), 396 mg (11/19/2019), 396 mg (12/3/2019), 396 mg (12/17/2019)  oxaliplatin (ELOXATIN) 168 3 mg in dextrose 5 % 250 mL chemo infusion, 85 mg/m2 = 168 3 mg, Intravenous, Once, 6 of 6 cycles  Administration: 168 3 mg (10/8/2019), 168 3 mg (10/22/2019), 168 3 mg (11/5/2019), 168 3 mg (11/19/2019), 168 3 mg (12/3/2019), 168 3 mg (12/17/2019)  trastuzumab (HERCEPTIN) 496 mg in sodium chloride 0 9 % 250 mL chemo infusion, 6 mg/kg = 496 mg, Intravenous, Once, 6 of 6 cycles  Administration: 496 mg (10/8/2019), 331 mg (10/22/2019), 331 mg (11/5/2019), 331 mg (11/19/2019), 331 mg (12/3/2019), 331 mg (12/17/2019)     Flat plus Herceptin every 2 weeks for 6 cycles  Completed on December 18, 2019  Surgery on 01/28/2020    Current Hematologic/ Oncologic Treatment:    Observation    Interval History:    Patient returns for follow-up visit  He is complaining of shortness of breath and chest tightness  His x-ray from yesterday does show a right effusion with atelectasis which is new from his study on the 11th  He is also status post surgery with bilateral lower extremity swelling and pain, worse in the left lower extremity  This is concerning for blood clot  Otherwise denies any fevers or infections, nausea/vomiting/diarrhea, and bleeding/bruising      Cancer Staging:  Cancer Staging  Malignant neoplasm of lower third of esophagus (HCC)  Staging form: Esophagus - Adenocarcinoma, AJCC 8th Edition  - Pathologic stage from 1/28/2020: Stage I (ypT2, pN0, cM0, G1) - Unsigned  Stage prefix: y  Neoadjuvant therapy: Yes  Response to neoadjuvant therapy: Partial response  Total positive nodes: 0  Total nodes examined: 7  Histologic grading system: 3 grade system    Test Results:    Imaging: Xr Chest Portable    Result Date: 2/5/2020  Narrative: CHEST INDICATION: f/u chest tubes, PNA, chylothorax  GE junction carcinoma, status post esophagectomy on 1/28/2020  COMPARISON:  Chest radiograph from 2/4/2020  EXAM PERFORMED/VIEWS:  XR CHEST PORTABLE FINDINGS:  Right jugular catheter in upper SVC  Left port at cavoatrial junction  Normal heart size  Gas-filled structure adjacent to the right heart border due to the gastric pull-up  Bilateral chest tubes with slight increase in small left pneumothorax  Persistent bibasilar aspiration  Osseous structures appear within normal limits for patient age  Impression: Bilateral chest tubes with slight increase in small left pneumothorax  Persistent bilateral aspiration  Esophagectomy  Workstation performed: [id number]     Xr Chest Portable    Result Date: 2/3/2020  Narrative: CHEST INDICATION:   s/p b/l CT placement  COMPARISON:  2/2/2020 EXAM PERFORMED/VIEWS:  XR CHEST PORTABLE FINDINGS:  There has been interval placement of bilateral chest tubes  Lines and tubes are otherwise unchanged from prior exam  Cardiomediastinal silhouette appears unremarkable  There is asymmetric opacity throughout the right hemithorax likely related to layering pleural effusion with right basilar atelectasis  The appearance on the right is similar to the prior study though the left lung has cleared likely secondary to decreased pleural effusion after chest tube placement  There is a small left apical pneumothorax  Osseous structures appear within normal limits for patient age  Impression: 1  Clearing of the left hemithorax likely secondary to decreased left pleural effusion status post chest tube placement  Persistent pleural effusion/atelectasis in the right lung despite chest tube  2   Small left apical pneumothorax  The study was marked in [facility] for immediate notification  Workstation performed: [id number]     Xr Chest Portable    Result Date: 2/3/2020  Narrative: CHEST INDICATION:   s/p R CT placement    GE junction cancer with esophagectomy on 1/28/2020  COMPARISON:  Chest radiograph and chest CT from 2/2/2020  EXAM PERFORMED/VIEWS:  XR CHEST PORTABLE FINDINGS:  Right jugular catheter in upper SVC  ET tube 4 cm above the gregg  NG tube in gastric pull up  Left port at cavoatrial junction  Cardiomediastinal silhouette appears unremarkable  Right chest tube insertion with drainage of right effusion  Redemonstration of left chest tube with no left effusion  Mild bibasilar atelectasis  No pneumothorax  Osseous structures appear within normal limits for patient age  Impression: Right chest tube insertion with drainage of right effusion with no pneumothorax  Recent esophagectomy  Workstation performed: [id number]     Xr Chest Portable    Result Date: 2/3/2020  Narrative: CHEST INDICATION:   Acute Resp Failure  GE junction carcinoma, status post esophagectomy 1/28/2020  COMPARISON:  Chest radiograph from 2/2/2020 and chest CT from 2/2/2020  EXAM PERFORMED/VIEWS:  XR CHEST PORTABLE FINDINGS:  ET tube 5 cm above the gregg  NG tube in the gastric pull-up with tip below the diaphragm  Left port at cavoatrial junction  Right jugular catheter in right brachycephalic vein  Cardiomediastinal silhouette appears unremarkable  Persistence of bilateral chest tubes with drainage of effusions with no pneumothorax  New bibasilar airspace consolidation, greater on the right  Osseous structures appear within normal limits for patient age  Impression: Esophagectomy  New bibasilar consolidation, greater on the right, likely aspiration  Retention pulmonary edema is also possible  The study was marked in [facility] for immediate notification  Workstation performed: [id number]     Xr Chest Portable    Result Date: 2/2/2020  Narrative: CHEST INDICATION:   s/p central line placement   COMPARISON:  Chest radiograph from prior day EXAM PERFORMED/VIEWS:  XR CHEST PORTABLE FINDINGS:  ET tube 3 3 cm above the gregg OG tube tip in stomach Esophageal temperature probe Left Tracy  Right IJ catheter tip at the mid SVC Heart shadow is obscured by adjacent opacity  Bilateral pleural effusions with adjacent basal airspace opacities  No pneumothorax  Osseous structures appear within normal limits for patient age  Impression: 1  Right IJ catheter tip at mid SVC 2  Additional lines and tubes unchanged 3  Bilateral pleural effusions with adjacent opacities, unchanged Workstation performed: [id number]     Xr Chest Portable    Result Date: 2/2/2020  Narrative: CHEST INDICATION:   Please assess ETT placement after episode of agitation  Thanks    COMPARISON:  February 1, 2020  EXAM PERFORMED/VIEWS:  XR CHEST PORTABLE FINDINGS: Endotracheal tube again demonstrates satisfactory position with its tip above the level of the gregg  Enteric tube has been repositioned with its tip in satisfactory position below left hemidiaphragm  Again noted is a left chest port  Moderate bilateral pleural effusions with overlying airspace opacities  No pneumothorax  Transcutaneous pacer pad is again noted  Osseous structures appear within normal limits for patient age  Impression: Satisfactory positioning of endotracheal tube above the level of the gregg  Satisfactory positioning of enteric tube with its tip below left hemidiaphragm  Bilateral pleural effusions with overlying airspace opacities  Workstation performed: [id number]     Xr Chest Portable    Result Date: 1/30/2020  Narrative: CHEST INDICATION:   s/p bilateral thoracentesis  COMPARISON:  Chest x-ray from January 29, 2020  EXAM PERFORMED/VIEWS:  XR CHEST PORTABLE FINDINGS:  Stable position of the orogastric tube with the tip at the approximate T11 level  The tip of the catheter is not clearly beyond the GE junction and diaphragm  The side port is identified at the approximate T8 level  A left chest wall Chemo-Port is identified with its tip in the superior vena cava   Surgical clips and a drain are again identified within the superior left mediastinum  The cardiomediastinal silhouette is slightly better defined with interval decrease in the bibasilar opacities thought to represent a combination of pleural effusion and atelectasis  There is no pneumothorax  Lower lobe pneumonia would be difficult to exclude in the setting of the bibasilar opacities  Osseous structures appear within normal limits for patient age  Impression: Interval bilateral thoracentesis with decrease in the right more than left pleural effusion  Persistent bibasilar pleural effusions and airspace densities thought to represent atelectasis  Underlying pneumonia not excluded  No pneumothorax  Orogastric catheter tip is at the GE junction with the side port at the approximate T8 vertebral level  Consider advancement prior to use  I personally discussed this study with Annie Wright on 1/30/2020 at 9:13 AM  Workstation performed: [id number]     Xr Chest Portable    Result Date: 1/28/2020  Narrative: CHEST INDICATION:   s/p esophagectomy  COMPARISON:  9/26/2019 EXAM PERFORMED/VIEWS:  XR CHEST PORTABLE FINDINGS:  Left chest port unchanged  NG tube in the gastric pull-through located within the chest  Cardiomediastinal silhouette appears stable  Mild vascular congestion with patchy bibasilar atelectasis  No pneumothorax  Surgical staples and surgical gauze in the left neck  Osseous structures appear within normal limits for patient age  Impression: Mild vascular congestion with patchy bibasilar atelectasis  No pneumothorax  Workstation performed: [id number]     Xr Chest Pa & Lateral    Result Date: 2/18/2020  Narrative: CHEST INDICATION:   C15 9: Malignant neoplasm of esophagus, unspecified  COMPARISON:  Chest radiograph from 2/11/2020 and chest CT from 2/2/2020  EXAM PERFORMED/VIEWS:  XR CHEST PA & LATERAL  DUAL ENERGY SUBTRACTION TECHNIQUE FINDINGS:  Left port at cavoatrial junction  Normal heart size  Esophagectomy and gastric pull-up   Hyperdensity in the mediastinum and right upper quadrant due to a lymphangiogram with thoracic duct embolization  Small right effusion with right base atelectasis, new since 2/11/2020  No change in trace left effusion  Osseous structures appear within normal limits for patient age  Sutures in the anterior abdominal wall  Impression: Small right effusion and right base atelectasis, new since 2/11/2020  Workstation performed: [id number]     Xr Chest Pa & Lateral    Result Date: 2/11/2020  Narrative: CHEST INDICATION:   post pull cxr for R CT removal  COMPARISON:  2/10/2020 EXAM PERFORMED/VIEWS:  XR CHEST PA & LATERAL FINDINGS:  The right-sided chest tube has been removed  Cardiomediastinal silhouette is stable  There is contrast material visible in the mediastinum which is within portions of the patient's gastric pull-through  Additional contrast material is seen outside of the GI tract contour and apparently this is related to a prior thoracic duct embolization procedure  Stable from prior  A right PICC is visible  A left-sided infusion port catheter is visible  Positioning seems satisfactory  There is a small right apical pneumothorax which is stable in size compared with the prior study  No interval increase after chest tube removal   There is also a small persistent left apical pneumothorax  There is minimal pleural effusion in the posterior costophrenic angles  No mediastinal shift  No gross evidence of pulmonary infiltrates  Probable minimal atelectasis left base  Osseous structures appear within normal limits for patient age  Impression: Stable small bilateral apical pneumothoraces  Right chest tube has been removed  Central line and infusion port catheter device appears satisfactory   Persistent contrast material in the mediastinum and GI tract Workstation performed: [id number]     Xr Chest Pa & Lateral    Result Date: 2/10/2020  Narrative: CHEST INDICATION:   f/u L CT removal  COMPARISON:  2/7/2020 EXAM PERFORMED/VIEWS:  XR CHEST PA & LATERAL Images: 2 FINDINGS:  Left chest wall Port-A-Cath unchanged in position  There is a right-sided PICC line present  The tip of the PICC line terminates at the level of the cavoatrial junction  Stable cardiomediastinal structures  Left basilar subsegmental atelectasis and effusion  No congestive failure  Small bore left-sided chest tube has been removed  Small pneumothorax at the left apex is minimally smaller than the prior study  There is a right-sided small bore chest tube which is unchanged in position  There is a small right apical pneumothorax which is slightly larger than the prior examination  A small amount of contrast partially outlines the right tracheobronchial tree  This is suggestive of aspiration from recent barium swallow  Osseous structures appear within normal limits for patient age  Impression: Status post left chest tube removal   Subsegmental atelectasis with small effusion at the base  There is a small apical pneumothorax present, slightly smaller than the prior exam  There is a small bore right-sided chest tube present  There is a small right apical pneumothorax which is slightly larger than the prior exam  There is a small collection of contrast identified within the posterior mediastinum as well as contrast present at the GE junction and within the large bowel  Contrast within the mediastinum is of unclear etiology  Possibility of surgical leak not excluded  Small amount of contrast is seen within the tracheobronchial tree suggesting aspiration during recently performed barium studies 2/8  I personally discussed this study with the surgical resident Dr Rohit Degroot on 2/10/2020 at 2:27 PM  Workstation performed: [id number]     Fl Barium Swallow    Result Date: 2/8/2020  Narrative: BARIUM SWALLOW-ESOPHAGRAM INDICATION:   esophagram--r/o leak  Patient is status post recent esophagectomy   COMPARISON:  Previous chest x-ray examinations IMAGES:  170  (this includes cine capture images) FLUOROSCOPY TIME:   1 40 minutes  TECHNIQUE: Due to risk of aspiration, and lesser concern for leak, thin liquid barium was chosen after discussion with clinical service  This was given via straw FINDINGS: Examination demonstrates pre-existing radiopaque material within the right upper mediastinum related to thoracic duct embolization  There is no evidence of proximal anastomotic leak  Anticipated changes status post soft subjectively and gastric pull-through noted  Very mild laryngeal penetration was noted  Distal portion of anastomosis appears grossly intact  Impression: Postoperative changes and changes status post thoracic duct embolization  Mild degree of laryngeal penetration was noted  Speech pathology followed this examination with further assessment of swallowing mechanism No findings to suggest an anastomotic leak Workstation performed: [id number]     Fl Barium Swallow Video W Speech    Result Date: 2/8/2020  Narrative: A video barium swallow study was performed by the Department of Speech Pathology  Please refer to the report for the official interpretation  The images are stored for archival purposes only  Study images were not formally reviewed by the Radiology Department  Ct Chest Abdomen Pelvis W Contrast    Result Date: 2/2/2020  Narrative: CT CHEST, ABDOMEN AND PELVIS WITH IV CONTRAST INDICATION:   Sepsis  COMPARISON:  Chest CT dated 9/6/2019  TECHNIQUE: CT examination of the chest, abdomen and pelvis was performed  Axial, sagittal, and coronal 2D reformatted images were created from the source data and submitted for interpretation  Radiation dose length product (DLP) for this visit:  1488 2 mGy-cm   This examination, like all CT scans performed in the [facility], was performed utilizing techniques to minimize radiation dose exposure, including the use of iterative  reconstruction and automated exposure control   IV Contrast:  100 mL of iodixanol (VISIPAQUE) Enteric Contrast: Enteric contrast was administered  FINDINGS: CHEST LUNGS:  Complete collapse of both lower lobes related to compression from effusions  No central obstructing lesion demonstrated in the lobar bronchi  No other acute consolidation  No interstitial thickening  No endobronchial masses  Endotracheal tube tip  terminates appropriately in the mid thoracic trachea  PLEURA:  Large bilateral pleural effusions  No grossly loculated components  HEART/GREAT VESSELS:  Borderline enlarged  Few coronary calcifications  No pericardial thickening or effusion  Central venous catheter tip terminates at the cavoatrial junction  Normal thoracic aorta  MEDIASTINUM AND SHEA:  Postsurgical changes after esophagectomy with gastric conduit  Anastomotic sutures just above the level of the gregg  Surgical drains in place  No suspicious mediastinal masses  CHEST WALL AND LOWER NECK:   Trace anasarca  ABDOMEN LIVER/BILIARY TREE:  Unremarkable  GALLBLADDER:  There are gallstone(s) within the gallbladder, without pericholecystic inflammatory changes  SPLEEN:  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  Less than a quarter of the splenic volume is involved  PANCREAS:  Unremarkable  ADRENAL GLANDS:  Unremarkable  KIDNEYS/URETERS:  Unremarkable  No hydronephrosis  STOMACH AND BOWEL:  Postsurgical changes from gastric conduit formation  Nasogastric tube tip terminates in the subdiaphragmatic distal gastric body  Percutaneous jejunostomy tube appears appropriately positioned  No dilated or inflamed loops of small bowel  Moderate stool burden in the colon without dilation or pericolonic inflammation  APPENDIX:  No findings to suggest appendicitis  ABDOMINOPELVIC CAVITY:  Mild perihepatic and perisplenic ascites  Mild to moderate amount of free pelvic fluid  There is gradient density within the pelvic fluid measuring up to 49 HU dependently suggesting small amount layering blood products   No evidence for active intraperitoneal hemorrhage  No encapsulated hematoma or abscess  VESSELS:  Aortoiliac atherosclerosis without aneurysm or significant occlusive atheromatous disease  PELVIS REPRODUCTIVE ORGANS:  Unremarkable for patient's age  URINARY BLADDER:  Doshi catheter in place  Bladder is decompressed  Iatrogenic at the dependent air in the lumen  ABDOMINAL WALL/INGUINAL REGIONS:  Mild anasarca  No encapsulated collections  Ventral incisional staples persist  No wound dehiscence  OSSEOUS STRUCTURES: L5-S1 discectomy with interbody fusion device  Partial osseous ankylosis  No evidence for loosening or infection  No acute fractures or focally destructive osseous lesions  Impression: 1  Large bilateral pleural effusions with compressive collapse of both lower lobes  2   Moderate-sized focal posterior splenic infarct  3   Mild ascites in the abdomen and pelvis  Gradient density in the pelvic fluid suggests settling postsurgical blood products  No evidence for active intra-abdominal hemorrhage or hematoma formation, though  4   Postsurgical changes after esophagectomy and gastric conduit formation with posterior mediastinal surgical drain in place  No encapsulated-appearing collection at the intrathoracic anastomotic site  5   Appropriately positioned central venous catheter, endotracheal tube, nasogastric tube, and percutaneous jejunostomy tube  Workstation performed: [id number]     Xr Chest Portable Icu    Result Date: 2/8/2020  Narrative: CHEST INDICATION:   f/u CTs  COMPARISON:  3/5/2020 EXAM PERFORMED/VIEWS:  XR CHEST PORTABLE ICU  AP semierect Images: 1 FINDINGS:  Right IJ catheter terminates in the SVC with a left subclavian Port-A-Cath at the caval atrial junction  Radiopaque material in the right upper mediastinum noted status post thoracic duct embolization  Left basilar chest tube in stable position with improvement of the small pneumothorax  Right-sided chest tube in stable position as well  Cardiomediastinal silhouette appears unremarkable  Small pneumomediastinum noted to the left of midline  Improved right lower lobe aeration with persistent left basilar subsegmental atelectasis  Osseous structures appear within normal limits for patient age  Impression: 1  Left basilar chest tube in stable position with improvement of small pneumothorax  Lines and tubes otherwise stable  2   Improved right lower lobe aeration with persistent left basilar atelectasis  3   Evidence of thoracic duct embolization  4   Small pneumomediastinum  Workstation performed: [id number]     Xr Chest Portable Icu    Result Date: 2/4/2020  Narrative: CHEST INDICATION:   chylothorax and PNA  GE junction carcinoma, 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  COMPARISON:  Chest radiograph from 2/3/2020 and chest CT from 2/2/2020  EXAM PERFORMED/VIEWS:  XR CHEST PORTABLE ICU FINDINGS:  ET tube and NG tube removed  Left port at cavoatrial junction  Right jugular catheter in upper SVC  Cardiomediastinal silhouette appears unremarkable  Persistent bilateral chest tubes  Improving right base pneumonia  Increased left base opacity  No effusion  New small left pneumothorax  Osseous structures appear within normal limits for patient age  Impression: Improving right base pneumonia  Increased left base opacity, likely aspiration and atelectasis  Persistent bilateral chest tubes with new small left pneumothorax  Workstation performed: [id number]     Xr Chest Portable Icu    Result Date: 2/2/2020  Narrative: CHEST INDICATION:   post code/ETT placement  COMPARISON:  January 30, 2020 EXAM PERFORMED/VIEWS:  XR CHEST PORTABLE ICU FINDINGS:  Endotracheal tube is present, in satisfactory position with its tip above the level of the gregg  Enteric tube is present coiling over the lower chest but with its tip below left hemidiaphragm     Left chest port is present  Moderate bilateral pleural effusions with overlying airspace opacities  No pneumothorax  Translocation  Noted  Osseous structures appear within normal limits for patient age  Impression: Satisfactory positioning of endotracheal tube above the level of the gregg  Enteric tube coils over the lower chest presumably within gastric pull-up but tip extends to left upper quadrant of the abdomen  Bilateral pleural effusions with overlying airspace opacities  Workstation performed: [id number]     Xr Chest Portable Icu    Result Date: 1/30/2020  Narrative: CHEST INDICATION:   R pleural effusion, worsening SOB  GE junction carcinoma, status post transhiatal esophagectomy  COMPARISON:  Chest radiograph from 1/29/2020 and chest CT from 9/16/2019  EXAM PERFORMED/VIEWS:  XR CHEST PORTABLE ICU FINDINGS:  NG tube below the diaphragm  Left port at cavoatrial junction  Clips and a drain over the left superior mediastinum  Cardiomediastinal silhouette appears unremarkable  Persistent moderate right effusion and bibasilar atelectasis  Osseous structures appear within normal limits for patient age  Impression: Persistent moderate right effusion and bibasilar atelectasis  Workstation performed: [id number]     Xr Chest Portable Icu    Result Date: 1/29/2020  Narrative: CHEST INDICATION:   SOB  GE junction carcinoma, status post transhiatal esophagectomy  COMPARISON:  Chest radiograph from 1/28/2020  Chest CT from 1/30/2019  EXAM PERFORMED/VIEWS:  XR CHEST PORTABLE ICU FINDINGS:  Left port at cavoatrial junction  NG tube at the level of the diaphragm  Cardiomediastinal silhouette appears unremarkable  Skin staples and a drain over the superior mediastinum/left lower neck  New moderate right effusion and moderate bibasilar atelectasis  No pneumothorax  Osseous structures appear within normal limits for patient age  Impression: New moderate right effusion and moderate bibasilar atelectasis  Workstation performed: [id number]     Ir Other    Result Date: 2/10/2020  Narrative: PROCEDURE: 1  Real-time ultrasound guided access of 4 separate groin lymph nodes 2  Lymphangiography of the pelvis and trunk 3  Coil and glue embolization of active lymphatic extravasation arising from the thoracic duct 4   82 modifier STAFF: HANG Ferrer  (primary), HANG Frias  (secondary) CONTRAST: 50 mL's of lipiodol intralymphatic  FLUOROSCOPY TIME: 30 4 minutes  NUMBER OF IMAGES: Multiple COMPLICATIONS: None  MEDICATIONS: Sedation was provided in the form of general anesthesia by the Anesthesia service  Please see their associated records  INDICATION: History of recent transhiatal esophagectomy complicated by high output right chylothorax  PROCEDURE: A qualified resident surgeon was not available for this procedure  Real-time ultrasound was used to access 4 separate bilateral groin lymph nodes using 25-gauge needles  Images were stored in PACS  Lipiodol was gently instilled into the bilateral groin lower extremity lymphatics  Lymphangiography of the pelvis and abdomen was performed  After lipiodol had extended into the cisterna chyli, a 22-gauge needle was used to access a lymphatic duct immediately inferior to the cisterna chyli  A microwire was extended into the thoracic duct, followed by a microcatheter  Further lymphangiography was performed  Based on the results of lymphangiography, the decision was made to embolize the thoracic duct  This was done using a combination of 1-4 mm x 7 cm Emanuel coil, 4-4 mm x 14 cm Emanuel microcoils, and an emulsion of 1:2 of nBCA with lipiodol  The microcatheter was removed  Needles in the groin were removed  FINDINGS: 1   Real-time ultrasound showed a needle to extend into a hypoechoic groin lymph nodes x4  2   Lymphangiography of both the abdomen and pelvis showed normal filling of lymphatic channels, without leak  3   Interval lymphangiography after accessing the cisterna chyli confirmed catheter positioning within the thoracic duct   4   Lymphangiography of the chest showed brisk lymphatic extravasation into the right hemithorax  There was complete transection of the thoracic duct, with no filling of the expected course of the more superior thoracic duct, likely postsurgical  5   Interval lymphangiography after coiling of the mid thoracic duct confirmed stasis of flow  6   Final  image showed good positioning of both coils and glue throughout the course of the thoracic duct and at the cisterna chyli  Impression: Thoracic duct embolization  Workstation performed: [id number]       Labs:   Lab Results   Component Value Date    WBC 6 36 02/12/2020    HGB 8 8 (L) 02/12/2020    HCT 29 4 (L) 02/12/2020    MCV 85 02/12/2020     02/12/2020     Lab Results   Component Value Date    K 4 1 02/12/2020     02/12/2020    CO2 27 02/12/2020    BUN 21 02/12/2020    CREATININE 0 74 02/12/2020    GLUCOSE 339 (H) 02/01/2020    GLUF 128 (H) 12/02/2019    CALCIUM 7 9 (L) 02/12/2020    AST 25 02/12/2020    ALT 23 02/12/2020    ALKPHOS 74 02/12/2020    EGFR 97 02/12/2020         No results found for: SPEP, UPEP    No results found for: PSA    No results found for: CEA    No results found for:     No results found for: AFP    No results found for: IRON, TIBC, FERRITIN    No results found for: [id number]      ROS: As stated in the history of present illness otherwise his 14 point review of systems today was negative        Active Problems:   Patient Active Problem List   Diagnosis    COPD (chronic obstructive pulmonary disease) (HCC)    Headaches due to old head injury    High cholesterol    Obstructive sleep apnea syndrome    Type 2 diabetes mellitus with hyperglycemia, without long-term current use of insulin (HCC)    Malignant neoplasm of lower third of esophagus (HCC)    Esophageal obstruction    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[facility] [address] )    Chemotherapy induced neutropenia (HCC)    Anemia, unspecified    Insomnia due to medical condition    Encounter for care related to feeding tube    Paralysis of left vocal fold    Dysphonia    Chylothorax on right    Mild protein-calorie malnutrition (HCC)    Atrial fibrillation (HCC)       Past Medical History:   Past Medical History:   Diagnosis Date    COPD (chronic obstructive pulmonary disease) (HCC)     Diabetes mellitus (HCC)     Difficulty swallowing     Disease of thyroid gland     Esophageal mass     History of chemotherapy     History of transfusion     Malignant neoplasm of lower third of esophagus ([address] )     Port-A-Cath in place     LCW    Sleep apnea     no CPAP       Surgical History:   Past Surgical History:   Procedure Laterality Date    BACK SURGERY      x2    DISC REMOVAL      ESOPHAGECTOMY N/A 1/28/2020    Procedure: ESOPHAGECTOMY, TRANSHIATAL;  Surgeon: Jobe Bloch, MD;  [facility];  Service: Surgical Oncology    ESOPHAGOGASTRODUODENOSCOPY N/A 1/28/2020    Procedure: ESOPHAGOGASTRODUODENOSCOPY (EGD); Surgeon: Jobe Bloch, MD;  [facility];  Service: Surgical Oncology    FL GUIDED CENTRAL VENOUS ACCESS DEVICE INSERTION  9/26/2019    GASTROJEJUNOSTOMY W/ JEJUNOSTOMY TUBE N/A 9/26/2019    Procedure: INSERTION JEJUNOSTOMY TUBE OPEN;  Surgeon: Jobe Bloch, MD;  [facility];  Service: Surgical Oncology    TONSILLECTOMY      TUNNELED VENOUS PORT PLACEMENT N/A 9/26/2019    Procedure: INSERTION VENOUS PORT (PORT-A-CATH); Surgeon: Jobe Bloch, MD;  [facility];  Service: Surgical Oncology    VOCAL CORD INJECTION N/A 2/7/2020    Procedure: MICROLARYNGOSCOPY WITH INJECTION;  Surgeon: Mitchell Saunders MD;  [facility];  Service: ENT       Family History:    Family History   Problem Relation Age of Onset    Diabetes Mother     No Known Problems Father        Cancer-related family history is not on file      Social History:   Social History     Socioeconomic History    Marital status: Single     Spouse name: Not on file    Number of children: Not on file  Years of education: Not on file    Highest education level: Not on file   Occupational History    Not on file   Social Needs    Financial resource strain: Not on file    Food insecurity:     Worry: Not on file     Inability: Not on file    Transportation needs:     Medical: Not on file     Non-medical: Not on file   Tobacco Use    Smoking status: Former Smoker     Packs/day: 0 50     Years: 50 00     Pack years: 25 00     Types: Cigarettes     Last attempt to quit: 2017     Years since quittin 1    Smokeless tobacco: Never Used   Substance and Sexual Activity    Alcohol use: Not Currently     Alcohol/week: 0 0 standard drinks     Frequency: Never     Drinks per session: 1 or 2     Binge frequency: Never    Drug use: Never    Sexual activity: Not on file   Lifestyle    Physical activity:     Days per week: Not on file     Minutes per session: Not on file    Stress: Not on file   Relationships    Social connections:     Talks on phone: Not on file     Gets together: Not on file     Attends Zoroastrian service: Not on file     Active member of club or organization: Not on file     Attends meetings of clubs or organizations: Not on file     Relationship status: Not on file    Intimate partner violence:     Fear of current or ex partner: Not on file     Emotionally abused: Not on file     Physically abused: Not on file     Forced sexual activity: Not on file   Other Topics Concern    Not on file   Social History Narrative    Not on file       Current Medications:   Current Outpatient Medications   Medication Sig Dispense Refill    amiodarone 200 mg tablet Take 1 tablet (200 mg total) by mouth 3 (three) times a day with meals 90 tablet 0    gabapentin (NEURONTIN) 300 mg capsule take 1 capsule by mouth daily at bedtime 30 capsule 0    insulin NPH (HumuLIN N,NovoLIN N) 100 Units/mL subcutaneous injection Inject 12 Units under the skin daily before dinner To be given prior to tube feeds nightly 3 mL 0    levothyroxine 25 mcg tablet Take 25 mcg by mouth daily in the early morning      melatonin 3 mg Take 1 tablet (3 mg total) by mouth daily at bedtime 30 tablet 0    Nutritional Supplements (JEVITY 1 5 SHUN) LIQD Take 60 mL by mouth continuous Jevity 1 5@ 60 ml/hr for cycles of 12 hours daily  (7pm-7am nightly) 1000 mL 0    pantoprazole (PROTONIX) 40 mg tablet Take 1 tablet (40 mg total) by mouth daily 30 tablet 1    rOPINIRole (REQUIP) 0 25 mg tablet take 1 tablet by mouth daily at bedtime 30 tablet 0    senna-docusate sodium (SENOKOT S) 8 6-50 mg per tablet Take 1 tablet by mouth daily 30 tablet 0    tamsulosin (FLOMAX) 0 4 mg Take 1 capsule (0 4 mg total) by mouth daily with dinner 30 capsule 0     No current facility-administered medications for this visit  Allergies: Allergies   Allergen Reactions    Penicillins Itching       Physical Exam:    There is no height or weight on file to calculate BSA  Wt Readings from Last 3 Encounters:   02/12/20 92 kg (202 lb 13 2 oz)   01/23/20 89 1 kg (196 lb 6 4 oz)   01/16/20 87 5 kg (193 lb)        Temp Readings from Last 3 Encounters:   02/12/20 98 2 °F (36 8 °C) (Oral)   01/23/20 98 °F (36 7 °C)   01/16/20 98 3 °F (36 8 °C)        BP Readings from Last 3 Encounters:   02/12/20 106/52   01/23/20 110/57   01/16/20 124/82         Pulse Readings from Last 3 Encounters:   02/12/20 88   01/23/20 100   01/16/20 86     @LASTSAO2(3)@      Physical Exam     Constitutional   General appearance: No acute distress, well appearing and well nourished  Eyes   Conjunctiva and lids: No swelling, erythema or discharge  Pupils and irises: Equal, round and reactive to light  Ears, Nose, Mouth, and Throat   External inspection of ears and nose: Normal     Nasal mucosa, septum, and turbinates: Normal without edema or erythema  Oropharynx: Normal with no erythema, edema, exudate or lesions      Pulmonary   Respiratory effort: No increased work of breathing or signs of respiratory distress  Auscultation of lungs: Clear to auscultation  Cardiovascular   Palpation of heart: Normal PMI, no thrills  Auscultation of heart: Normal rate and rhythm, normal S1 and S2, without murmurs  Examination of extremities for edema and/or varicosities: Normal     Carotid pulses: Normal     Abdomen   Abdomen: Non-tender, no masses  Liver and spleen: No hepatomegaly or splenomegaly  Lymphatic   Palpation of lymph nodes in neck: No lymphadenopathy  Musculoskeletal   Gait and station: Normal     Digits and nails: Normal without clubbing or cyanosis  Inspection/palpation of joints, bones, and muscles: Normal     Skin   Skin and subcutaneous tissue: Normal without rashes or lesions  Neurologic   Cranial nerves: Cranial nerves 2-12 intact  Sensation: No sensory loss  Psychiatric   Orientation to person, place, and time: Normal     Mood and affect: Normal         Assessment / Plan:    The patient is a 58-year-old male with 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   He has a reasonable amount of gastric involvement so it was decided that he would benefit from neoadjuvant chemotherapy prior to resection   He received neoadjuvant chemotherapy treatment with FLOT plus Herceptin for 6 cycles which ended December 18th 2019  He is now being evaluated for surgical intervention versus neoadjuvant concurrent chemoradiation  He did see Radiation Oncology who recommended surgical intervention  his surgery was completed on 01/28/2020  His hospitalization was extended due to aspiration pneumonia  The patient does report shortness of breath and chest tightness today as well as bilateral lower extremity edema and pain  Left leg is more edematous than his right leg    Based on the fact that he is status post surgery and does a have cancer he is at high risk for blood clots so we will complete a stat CT PE study in addition to bilateral lower extremity ultrasound  X-ray from yesterday did show atelectasis and effusion of the right lower lung which may be contributing to this as well  At this point the patient is on observation and we will plan to see him back in 2 months with a CT chest abdomen pelvis and blood work  He will go for his PE study and bilateral lower extremity ultrasound as soon as possible  If his symptoms worsen he will go to the emergency room  We will also set him up for port flushes every 2 months  The patient is in agreement with the plan and will call with any questions or concerns  Goals and Barriers:  Current Goal:  Prolong Survival from locally advanced GE junction malignancy  Barriers: None  Patient's Capacity to Self Care:  Patient able to self care  Portions of the record may have been created with voice recognition software   Occasional wrong word or "sound a like" substitutions may have occurred due to the inherent limitations of voice recognition software   Read the chart carefully and recognize, using context, where substitutions have occurred

## 2020-02-18 NOTE — TELEPHONE ENCOUNTER
Patient's daughter Victoriano Goodwin called stating she got patient 's appt time wrong  Victoriano Goodwin said she would be about 15 minutes late for appt  I confirmed with Tripp Barrientos and informed Victoriano Goodwin if patient is later then 11:45 the appt will have to be rescheduled  Victoriano Goodwin understood

## 2020-02-19 ENCOUNTER — HOSPITAL ENCOUNTER (OUTPATIENT)
Dept: ULTRASOUND IMAGING | Facility: HOSPITAL | Age: 65
Discharge: HOME | End: 2020-02-19
Payer: COMMERCIAL

## 2020-02-19 DIAGNOSIS — R60.0 BILATERAL EDEMA OF LOWER EXTREMITY: ICD-10-CM

## 2020-02-19 PROCEDURE — 93970 EXTREMITY STUDY: CPT

## 2020-02-19 PROCEDURE — 93970 EXTREMITY STUDY: CPT | Performed by: SURGERY

## 2020-02-20 ENCOUNTER — OFFICE VISIT (OUTPATIENT)
Dept: SURGICAL ONCOLOGY | Facility: CLINIC | Age: 65
End: 2020-02-20

## 2020-02-20 VITALS
HEIGHT: 70 IN | SYSTOLIC BLOOD PRESSURE: 100 MMHG | WEIGHT: 191 LBS | BODY MASS INDEX: 27.35 KG/M2 | DIASTOLIC BLOOD PRESSURE: 68 MMHG | HEART RATE: 88 BPM

## 2020-02-20 DIAGNOSIS — C16.0 GE JUNCTION CARCINOMA (HCC): ICD-10-CM

## 2020-02-20 DIAGNOSIS — C15.5 MALIGNANT NEOPLASM OF LOWER THIRD OF ESOPHAGUS (HCC): Primary | ICD-10-CM

## 2020-02-20 PROCEDURE — 1111F DSCHRG MED/CURRENT MED MERGE: CPT | Performed by: SURGERY

## 2020-02-20 PROCEDURE — 3008F BODY MASS INDEX DOCD: CPT | Performed by: SURGERY

## 2020-02-20 PROCEDURE — 99024 POSTOP FOLLOW-UP VISIT: CPT | Performed by: SURGERY

## 2020-02-20 RX ORDER — OXYCODONE HYDROCHLORIDE 5 MG/1
5 TABLET ORAL EVERY 4 HOURS PRN
Qty: 20 TABLET | Refills: 0 | Status: SHIPPED | OUTPATIENT
Start: 2020-02-20 | End: 2020-02-25

## 2020-02-20 NOTE — LETTER
February 20, 2020     [facility], [address]  [address]    Patient: Carolyn Avila   YOB: 1955   Date of Visit: 2/20/2020       Dear Dr Jessie White:    Thank you for referring Carolyn vAila to me for evaluation  Below are my notes for this consultation  If you have questions, please do not hesitate to call me  I look forward to following your patient along with you  Sincerely,        Ольга Varghese MD        CC: DO Chika Munizm MD Chirag Lozada MD Sharlie Scarce, MD  2/20/2020 10:35 AM  Sign at close encounter               Surgical Oncology Follow Up       [address]  5/[birth year]88  [id number]  [facility]  [facility]  [address]  [facility] [address]    Diagnoses and all orders for this visit:    Malignant neoplasm of lower third of esophagus ([address] )  -     XR chest pa & lateral; Future        Chief Complaint   Patient presents with    Post-op       Return in about 2 weeks (around 3/5/2020) for Office Visit           Malignant neoplasm of lower third of esophagus (HCC)    8/24/2019 Initial Diagnosis     Malignant neoplasm of lower third of esophagus (HCC)  At least intramucosal carcinoma  Her2/SHIMON positive      10/8/2019 - 12/30/2019 Chemotherapy     palonosetron (ALOXI) injection 0 25 mg, 0 25 mg, Intravenous, Once, 6 of 6 cycles  Administration: 0 25 mg (10/8/2019), 0 25 mg (10/22/2019), 0 25 mg (11/5/2019), 0 25 mg (11/19/2019), 0 25 mg (12/3/2019), 0 25 mg (12/17/2019)  pegfilgrastim (NEULASTA ONPRO) subcutaneous injection kit 6 mg, 6 mg, Subcutaneous, Once, 6 of 6 cycles  Administration: 6 mg (10/9/2019), 6 mg (10/23/2019), 6 mg (11/6/2019), 6 mg (11/20/2019), 6 mg (12/4/2019), 6 mg (12/18/2019)  fosaprepitant (EMEND) 150 mg in sodium chloride 0 9 % 250 mL IVPB, 150 mg, Intravenous, Once, 6 of 6 cycles  Administration: 150 mg (10/8/2019), 150 mg (10/22/2019), 150 mg (11/5/2019), 150 mg (11/19/2019), 150 mg (12/3/2019), 150 mg (12/17/2019)  DOCEtaxel (TAXOTERE) 99 mg in sodium chloride 0 9 % 250 mL chemo infusion, 50 mg/m2 = 99 mg (83 3 % of original dose 60 mg/m2), Intravenous, Once, 6 of 6 cycles  Dose modification: 50 mg/m2 (original dose 60 mg/m2, Cycle 1, Reason: Other (See Comments))  Administration: 99 mg (10/8/2019), 99 mg (10/22/2019), 99 mg (11/5/2019), 99 mg (11/19/2019), 99 mg (12/3/2019), 99 mg (12/17/2019)  leucovorin 396 mg in dextrose 5 % 250 mL IVPB, 200 mg/m2 = 396 mg (50 % of original dose 400 mg/m2), Intravenous, Once, 6 of 6 cycles  Dose modification: 200 mg/m2 (original dose 400 mg/m2, Cycle 1, Reason: Other (See Comments), Comment: FLOT regimen standard)  Administration: 396 mg (10/8/2019), 396 mg (10/22/2019), 396 mg (11/5/2019), 396 mg (11/19/2019), 396 mg (12/3/2019), 396 mg (12/17/2019)  oxaliplatin (ELOXATIN) 168 3 mg in dextrose 5 % 250 mL chemo infusion, 85 mg/m2 = 168 3 mg, Intravenous, Once, 6 of 6 cycles  Administration: 168 3 mg (10/8/2019), 168 3 mg (10/22/2019), 168 3 mg (11/5/2019), 168 3 mg (11/19/2019), 168 3 mg (12/3/2019), 168 3 mg (12/17/2019)  trastuzumab (HERCEPTIN) 496 mg in sodium chloride 0 9 % 250 mL chemo infusion, 6 mg/kg = 496 mg, Intravenous, Once, 6 of 6 cycles  Administration: 496 mg (10/8/2019), 331 mg (10/22/2019), 331 mg (11/5/2019), 331 mg (11/19/2019), 331 mg (12/3/2019), 331 mg (12/17/2019)      1/28/2020 Surgery     Esophagogastrectomy  - Microscopic focus of residual adenocarcinoma in muscularis propria  - Ulceration and associated histologic changes compatible with prior neoadjuvant chemotherapy  - Adjacent betancur's esophagus with associated atypia indeterminate for dysplasia  - Seven lymph nodes identified; all negative for malignancy (0/7)          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[facility] [address] )    8/24/2019 Biopsy Esophagus (biopsy):  - At 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       9/24/2019 Initial Diagnosis     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HCC)      10/8/2019 - 12/30/2019 Chemotherapy     palonosetron (ALOXI) injection 0 25 mg, 0 25 mg, Intravenous, Once, 6 of 6 cycles  Administration: 0 25 mg (10/8/2019), 0 25 mg (10/22/2019), 0 25 mg (11/5/2019), 0 25 mg (11/19/2019), 0 25 mg (12/3/2019), 0 25 mg (12/17/2019)  pegfilgrastim (NEULASTA ONPRO) subcutaneous injection kit 6 mg, 6 mg, Subcutaneous, Once, 6 of 6 cycles  Administration: 6 mg (10/9/2019), 6 mg (10/23/2019), 6 mg (11/6/2019), 6 mg (11/20/2019), 6 mg (12/4/2019), 6 mg (12/18/2019)  fosaprepitant (EMEND) 150 mg in sodium chloride 0 9 % 250 mL IVPB, 150 mg, Intravenous, Once, 6 of 6 cycles  Administration: 150 mg (10/8/2019), 150 mg (10/22/2019), 150 mg (11/5/2019), 150 mg (11/19/2019), 150 mg (12/3/2019), 150 mg (12/17/2019)  DOCEtaxel (TAXOTERE) 99 mg in sodium chloride 0 9 % 250 mL chemo infusion, 50 mg/m2 = 99 mg (83 3 % of original dose 60 mg/m2), Intravenous, Once, 6 of 6 cycles  Dose modification: 50 mg/m2 (original dose 60 mg/m2, Cycle 1, Reason: Other (See Comments))  Administration: 99 mg (10/8/2019), 99 mg (10/22/2019), 99 mg (11/5/2019), 99 mg (11/19/2019), 99 mg (12/3/2019), 99 mg (12/17/2019)  leucovorin 396 mg in dextrose 5 % 250 mL IVPB, 200 mg/m2 = 396 mg (50 % of original dose 400 mg/m2), Intravenous, Once, 6 of 6 cycles  Dose modification: 200 mg/m2 (original dose 400 mg/m2, Cycle 1, Reason: Other (See Comments), Comment: FLOT regimen standard)  Administration: 396 mg (10/8/2019), 396 mg (10/22/2019), 396 mg (11/5/2019), 396 mg (11/19/2019), 396 mg (12/3/2019), 396 mg (12/17/2019)  oxaliplatin (ELOXATIN) 168 3 mg in dextrose 5 % 250 mL chemo infusion, 85 mg/m2 = 168 3 mg, Intravenous, Once, 6 of 6 cycles  Administration: 168 3 mg (10/8/2019), 168 3 mg (10/22/2019), 168 3 mg (11/5/2019), 168 3 mg (11/19/2019), 168 3 mg (12/3/2019), 168 3 mg (12/17/2019)  trastuzumab (HERCEPTIN) 496 mg in sodium chloride 0 9 % 250 mL chemo infusion, 6 mg/kg = 496 mg, Intravenous, Once, 6 of 6 cycles  Administration: 496 mg (10/8/2019), 331 mg (10/22/2019), 331 mg (11/5/2019), 331 mg (11/19/2019), 331 mg (12/3/2019), 331 mg (12/17/2019)         Staging: mR2Y2Y2 esophageal adenocarcinoma, January 2020  Treatment history:  Neoadjuvant chemo radiation  Transhiatal esophagectomy, January 2020  Current treatment:  Observation  Disease status: HUNG    History of Present Illness:  Patient returns in follow-up of his transhiatal esophagectomy  He did undergo IR embolization of a thoracic duct leak as well as medialization of his left vocal cord  He is currently doing well  He did have a low-grade fever at home yesterday  He did not take his temperature but he did drenched  He is tolerating 3 cans of tube feeds without difficulty  He does still have some difficulty knowing how much to eat because of early satiety  No significant nausea or vomiting  He has lost approximately 10 lb, most of which he attributes to were weight since his leg swelling has decreased  He did have a CT to rule out a pulmonary embolism because of some shortness of breath earlier this week  This reveals severe right-sided atelectasis as well as some new pleural fluid  His chest x-ray shows new small right pleural effusion  Personally reviewed the films  He denies any shortness of breath when sitting up straight  No dysphagia or cough  Review of Systems  Complete ROS Surg Onc:   Complete ROS Surg Onc:   Constitutional: The patient denies new or recent history of general fatigue, no recent weight loss, no change in appetite  Eyes: No complaints of visual problems, no scleral icterus  ENT: no complaints of ear pain, no hoarseness, no difficulty swallowing,  no tinnitus and no new masses in head, oral cavity, or neck     Cardiovascular: No complaints of chest pain, no palpitations  There is ankle edema  Respiratory: No complaints of shortness of breath, no cough  Gastrointestinal: No complaints of jaundice, no bloody stools, no pale stools  Genitourinary: No complaints of dysuria, no hematuria, no nocturia, no frequent urination, no urethral discharge  Musculoskeletal: No complaints of weakness, paralysis, joint stiffness or arthralgias  Integumentary: No complaints of rash, no new lesions  Neurological: No complaints of convulsions, no seizures, no dizziness  Hematologic/Lymphatic: No complaints of easy bruising  Endocrine:  No hot or cold intolerance  No polydipsia, polyphagia, or polyuria  Allergy/immunology:  No environmental allergies  No food allergies  Not immunocompromised  Skin:  No pallor or rash  Surgical wound          Patient Active Problem List   Diagnosis    COPD (chronic obstructive pulmonary disease) (HCC)    Headaches due to old head injury    High cholesterol    Obstructive sleep apnea syndrome    Type 2 diabetes mellitus with hyperglycemia, without long-term current use of insulin (HCC)    Malignant neoplasm of lower third of esophagus (HCC)    Esophageal obstruction    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[facility] [address] )    Chemotherapy induced neutropenia (HCC)    Anemia, unspecified    Insomnia due to medical condition    Encounter for care related to feeding tube    Paralysis of left vocal fold    Dysphonia    Chylothorax on right    Mild protein-calorie malnutrition ([address] )    Atrial fibrillation ([address] )    Port-A-Cath in place     Past Medical History:   Diagnosis Date    COPD (chronic obstructive pulmonary disease) ([address] )     Diabetes mellitus ([address] )     Difficulty swallowing     Disease of thyroid gland     Esophageal mass     History of chemotherapy     History of transfusion     Malignant neoplasm of lower third of esophagus ([address] )     Port-A-Cath in place     LCW    Sleep apnea     no CPAP     Past Surgical History:   Procedure Laterality Date    BACK SURGERY      x2    DISC REMOVAL      ESOPHAGECTOMY N/A 2020    Procedure: ESOPHAGECTOMY, TRANSHIATAL;  Surgeon: Sandra Hernandez MD;  [facility];  Service: Surgical Oncology    ESOPHAGOGASTRODUODENOSCOPY N/A 2020    Procedure: ESOPHAGOGASTRODUODENOSCOPY (EGD); Surgeon: Sandra Hernandez MD;  [facility];  Service: Surgical Oncology    FL GUIDED CENTRAL VENOUS ACCESS DEVICE INSERTION  2019    GASTROJEJUNOSTOMY W/ JEJUNOSTOMY TUBE N/A 2019    Procedure: INSERTION JEJUNOSTOMY TUBE OPEN;  Surgeon: Sandra Hernandez MD;  [facility];  Service: Surgical Oncology    TONSILLECTOMY      TUNNELED VENOUS PORT PLACEMENT N/A 2019    Procedure: INSERTION VENOUS PORT (PORT-A-CATH);   Surgeon: Sandra Hernandez MD;  [facility];  Service: Surgical Oncology    VOCAL CORD INJECTION N/A 2020    Procedure: MICROLARYNGOSCOPY WITH INJECTION;  Surgeon: Radha Butt MD;  [facility];  Service: ENT     Family History   Problem Relation Age of Onset    Diabetes Mother     No Known Problems Father      Social History     Socioeconomic History    Marital status: Single     Spouse name: Not on file    Number of children: Not on file    Years of education: Not on file    Highest education level: Not on file   Occupational History    Not on file   Social Needs    Financial resource strain: Not on file    Food insecurity:     Worry: Not on file     Inability: Not on file    Transportation needs:     Medical: Not on file     Non-medical: Not on file   Tobacco Use    Smoking status: Former Smoker     Packs/day: 0 50     Years: 50 00     Pack years: 25 00     Types: Cigarettes     Last attempt to quit: 2017     Years since quittin 1    Smokeless tobacco: Never Used   Substance and Sexual Activity    Alcohol use: Not Currently     Alcohol/week: 0 0 standard drinks     Frequency: Never     Drinks per session: 1 or 2     Binge frequency: Never    Drug use: Never    Sexual activity: Not on file   Lifestyle    Physical activity:     Days per week: Not on file     Minutes per session: Not on file    Stress: Not on file   Relationships    Social connections:     Talks on phone: Not on file     Gets together: Not on file     Attends Taoism service: Not on file     Active member of club or organization: Not on file     Attends meetings of clubs or organizations: Not on file     Relationship status: Not on file    Intimate partner violence:     Fear of current or ex partner: Not on file     Emotionally abused: Not on file     Physically abused: Not on file     Forced sexual activity: Not on file   Other Topics Concern    Not on file   Social History Narrative    Not on file       Current Outpatient Medications:     amiodarone 200 mg tablet, Take 1 tablet (200 mg total) by mouth 3 (three) times a day with meals, Disp: 90 tablet, Rfl: 0    insulin NPH (HumuLIN N,NovoLIN N) 100 Units/mL subcutaneous injection, Inject 12 Units under the skin daily before dinner To be given prior to tube feeds nightly, Disp: 3 mL, Rfl: 0    levothyroxine 25 mcg tablet, Take 25 mcg by mouth daily in the early morning, Disp: , Rfl:     Nutritional Supplements (JEVITY 1 5 SHUN) LIQD, Take 60 mL by mouth continuous Jevity 1 5@ 60 ml/hr for cycles of 12 hours daily   (7pm-7am nightly), Disp: 1000 mL, Rfl: 0    gabapentin (NEURONTIN) 300 mg capsule, take 1 capsule by mouth daily at bedtime (Patient not taking: Reported on 2/20/2020), Disp: 30 capsule, Rfl: 0    melatonin 3 mg, Take 1 tablet (3 mg total) by mouth daily at bedtime (Patient not taking: Reported on 2/20/2020), Disp: 30 tablet, Rfl: 0    pantoprazole (PROTONIX) 40 mg tablet, Take 1 tablet (40 mg total) by mouth daily (Patient not taking: Reported on 2/20/2020), Disp: 30 tablet, Rfl: 1    rOPINIRole (REQUIP) 0 25 mg tablet, take 1 tablet by mouth daily at bedtime (Patient not taking: Reported on 2/20/2020), Disp: 30 tablet, Rfl: 0    senna-docusate sodium (SENOKOT S) 8 6-50 mg per tablet, Take 1 tablet by mouth daily (Patient not taking: Reported on 2/20/2020), Disp: 30 tablet, Rfl: 0    tamsulosin (FLOMAX) 0 4 mg, Take 1 capsule (0 4 mg total) by mouth daily with dinner (Patient not taking: Reported on 2/20/2020), Disp: 30 capsule, Rfl: 0  Allergies   Allergen Reactions    Penicillins Itching     Vitals:    02/20/20 0955   BP: 100/68   Pulse: 88       Physical Exam  Constitutional: General appearance: The Patient is well-developed and well-nourished who appears the stated age in no acute distress  Patient is pleasant and talkative  HEENT:  Normocephalic  Sclerae are anicteric  Mucous membranes are moist  Neck is supple without adenopathy  No JVD  Chest: The lungs are clear to auscultation, but there are decreased sounds at the right base  Cardiac: Heart is regular rate  Abdomen: Abdomen is soft, non-tender, non-distended and without masses  Incision is C/D/I  Extremities: There is no clubbing or cyanosis  There is no edema  Symmetric  Neuro: Grossly nonfocal  Gait is normal         Skin: Warm, anicteric  Psych:  Patient is pleasant and talkative  Breasts:        Pathology:  Final Diagnosis   A  Esophagastrectomy:     - Microscopic focus of residual adenocarcinoma in muscularis propria  - Ulceration and associated histologic changes compatible with prior neoadjuvant chemotherapy  - Adjacent betancur's esophagus with associated atypia indeterminate for dysplasia  - Seven lymph nodes identified; all negative for malignancy (0/7)  - Proximal and distal margins are negative for dysplasia and malignacny      B  Final esophageal margin:     - Negative or malignancy  Electronically signed by Morro Ko MD on 2/7/2020 at  2:50 PM   Comments: This is an appended report   These results have been appended to a previously preliminary verified report  Note  [facility]   The celiac lymph node shows extensive fibrosis associated with collections of histiocytes and scattered dystrophic calcifications  Focally, these calcifications are associated with epithelioid cells which on immunostaining are negative for pankeratin (AE1/AE3) but positive for CD68 which is consistent with histiocytic origin  Comment: This is an appended report  These results have been appended to a previously preliminary verified report  Additional Information  [facility]   All controls performed with the immunohistochemical stains reported above reacted appropriately  These tests were developed and their performance characteristics determined by [facility] or [facility]  They may not be cleared or approved by the [place]  Food and Drug Administration  The FDA has determined that such clearance or approval is not necessary  These tests are used for clinical purposes  They should not be regarded as investigational or for research  This laboratory has been approved by CLIA 88, designated as a high-complexity laboratory and is qualified to perform these tests      - Interpretation performed at [facility], [address] [place]: This is an appended report  These results have been appended to a previously preliminary verified report  Synoptic Checklist   ESOPHAGUS   Esophagus - All Specimens   SPECIMEN   Procedure  Esophagectomy    TUMOR   Tumor Site  Distal esophagus (low thoracic esophagus)    Relationship of Tumor to Esophagogastric Junction  Tumor is entirely located within the tubular esophagus and does not involve the esophagogastric junction    Distance of Tumor Center from Esophagogastric Junction in Centimeters (cm)  Cannot be determined: Extenstive ulceration of GEJ by neoadjuvant chemotherapy obscurs landmarks      Histologic Type  Adenocarcinoma    Histologic Grade  G1:  Well differentiated    Tumor Size  Greatest dimension in Centimeters (cm): 0 7 Centimeters (cm)   Tumor Extent (Note E)     Tumor Extension  Tumor invades the muscularis propria    Accessory Findings     Treatment Effect  Present      Single cells or rare small groups of cancer cells (near complete response, score 1)    Lymphovascular Invasion  Not identified    Perineural Invasion  Not identified    MARGINS   Margins  All margins are uninvolved by invasive carcinoma, dysplasia, and intestinal metaplasia    Margins Examined  Proximal      Distal      Radial    Distance of Invasive Carcinoma from Closest Margin  4 Millimeters (mm)   Closest Margin  Radial    LYMPH NODES   Number of Lymph Nodes Involved  0    Number of Lymph Nodes Examined  7    PATHOLOGIC STAGE CLASSIFICATION (pTNM, AJCC 8th Edition)   TNM Descriptors  y (post-treatment)    Primary Tumor (pT)  pT2    Regional Lymph Nodes (pN)  pN0    ADDITIONAL FINDINGS   Additional Pathologic Findings  Intestinal metaplasia (Duong's esophagus)    Comment(s)   Comment(s)  AJCC Prognostic Stage Group (8th ed ) Postneoadjuvant Therapy:  At least Stage 1 - ypT2, ypN0, MX         Intraoperative Consultation  [facility]   FSA1  Proximal esophageal margin:  Negative for malignancy  FSA2-9  Distal gastric margin:  Negative for malignancy  Reviewed by HANG Quiñones                Labs:      Imaging  Xr Chest Portable    Result Date: 2/5/2020  Narrative: CHEST INDICATION:   f/u chest tubes, PNA, chylothorax  GE junction carcinoma, status post esophagectomy on 1/28/2020  COMPARISON:  Chest radiograph from 2/4/2020  EXAM PERFORMED/VIEWS:  XR CHEST PORTABLE FINDINGS:  Right jugular catheter in upper SVC  Left port at cavoatrial junction  Normal heart size  Gas-filled structure adjacent to the right heart border due to the gastric pull-up  Bilateral chest tubes with slight increase in small left pneumothorax  Persistent bibasilar aspiration   Osseous structures appear within normal limits for patient age  Impression: Bilateral chest tubes with slight increase in small left pneumothorax  Persistent bilateral aspiration  Esophagectomy  Workstation performed: [id number]     Xr Chest Portable    Result Date: 2/3/2020  Narrative: CHEST INDICATION:   s/p b/l CT placement  COMPARISON:  2/2/2020 EXAM PERFORMED/VIEWS:  XR CHEST PORTABLE FINDINGS:  There has been interval placement of bilateral chest tubes  Lines and tubes are otherwise unchanged from prior exam  Cardiomediastinal silhouette appears unremarkable  There is asymmetric opacity throughout the right hemithorax likely related to layering pleural effusion with right basilar atelectasis  The appearance on the right is similar to the prior study though the left lung has cleared likely secondary to decreased pleural effusion after chest tube placement  There is a small left apical pneumothorax  Osseous structures appear within normal limits for patient age  Impression: 1  Clearing of the left hemithorax likely secondary to decreased left pleural effusion status post chest tube placement  Persistent pleural effusion/atelectasis in the right lung despite chest tube  2   Small left apical pneumothorax  The study was marked in [facility] for immediate notification  Workstation performed: [id number]     Xr Chest Portable    Result Date: 2/3/2020  Narrative: CHEST INDICATION:   s/p R CT placement  GE junction cancer with esophagectomy on 1/28/2020  COMPARISON:  Chest radiograph and chest CT from 2/2/2020  EXAM PERFORMED/VIEWS:  XR CHEST PORTABLE FINDINGS:  Right jugular catheter in upper SVC  ET tube 4 cm above the gregg  NG tube in gastric pull up  Left port at cavoatrial junction  Cardiomediastinal silhouette appears unremarkable  Right chest tube insertion with drainage of right effusion  Redemonstration of left chest tube with no left effusion  Mild bibasilar atelectasis  No pneumothorax   Osseous structures appear within normal limits for patient age  Impression: Right chest tube insertion with drainage of right effusion with no pneumothorax  Recent esophagectomy  Workstation performed: [id number]     Xr Chest Portable    Result Date: 2/3/2020  Narrative: CHEST INDICATION:   Acute Resp Failure  GE junction carcinoma, status post esophagectomy 1/28/2020  COMPARISON:  Chest radiograph from 2/2/2020 and chest CT from 2/2/2020  EXAM PERFORMED/VIEWS:  XR CHEST PORTABLE FINDINGS:  ET tube 5 cm above the gregg  NG tube in the gastric pull-up with tip below the diaphragm  Left port at cavoatrial junction  Right jugular catheter in right brachycephalic vein  Cardiomediastinal silhouette appears unremarkable  Persistence of bilateral chest tubes with drainage of effusions with no pneumothorax  New bibasilar airspace consolidation, greater on the right  Osseous structures appear within normal limits for patient age  Impression: Esophagectomy  New bibasilar consolidation, greater on the right, likely aspiration  Retention pulmonary edema is also possible  The study was marked in [facility] for immediate notification  Workstation performed: [id number]     Xr Chest Portable    Result Date: 2/2/2020  Narrative: CHEST INDICATION:   s/p central line placement  COMPARISON:  Chest radiograph from prior day EXAM PERFORMED/VIEWS:  XR CHEST PORTABLE FINDINGS:  ET tube 3 3 cm above the gregg OG tube tip in stomach Esophageal temperature probe Left Mediport  Right IJ catheter tip at the mid SVC Heart shadow is obscured by adjacent opacity  Bilateral pleural effusions with adjacent basal airspace opacities  No pneumothorax  Osseous structures appear within normal limits for patient age  Impression: 1  Right IJ catheter tip at mid SVC 2  Additional lines and tubes unchanged 3    Bilateral pleural effusions with adjacent opacities, unchanged Workstation performed: [id number]     Xr Chest Portable    Result Date: 2/2/2020  Narrative: CHEST INDICATION:   Please assess ETT placement after episode of agitation  Thanks    COMPARISON:  February 1, 2020  EXAM PERFORMED/VIEWS:  XR CHEST PORTABLE FINDINGS: Endotracheal tube again demonstrates satisfactory position with its tip above the level of the gregg  Enteric tube has been repositioned with its tip in satisfactory position below left hemidiaphragm  Again noted is a left chest port  Moderate bilateral pleural effusions with overlying airspace opacities  No pneumothorax  Transcutaneous pacer pad is again noted  Osseous structures appear within normal limits for patient age  Impression: Satisfactory positioning of endotracheal tube above the level of the gregg  Satisfactory positioning of enteric tube with its tip below left hemidiaphragm  Bilateral pleural effusions with overlying airspace opacities  Workstation performed: [id number]     Xr Chest Portable    Result Date: 1/30/2020  Narrative: CHEST INDICATION:   s/p bilateral thoracentesis  COMPARISON:  Chest x-ray from January 29, 2020  EXAM PERFORMED/VIEWS:  XR CHEST PORTABLE FINDINGS:  Stable position of the orogastric tube with the tip at the approximate T11 level  The tip of the catheter is not clearly beyond the GE junction and diaphragm  The side port is identified at the approximate T8 level  A left chest wall Chemo-Port is identified with its tip in the superior vena cava  Surgical clips and a drain are again identified within the superior left mediastinum  The cardiomediastinal silhouette is slightly better defined with interval decrease in the bibasilar opacities thought to represent a combination of pleural effusion and atelectasis  There is no pneumothorax  Lower lobe pneumonia would be difficult to exclude in the setting of the bibasilar opacities  Osseous structures appear within normal limits for patient age  Impression: Interval bilateral thoracentesis with decrease in the right more than left pleural effusion    Persistent bibasilar pleural effusions and airspace densities thought to represent atelectasis  Underlying pneumonia not excluded  No pneumothorax  Orogastric catheter tip is at the GE junction with the side port at the approximate T8 vertebral level  Consider advancement prior to use  I personally discussed this study with Raad Martinez on 1/30/2020 at 9:13 AM  Workstation performed: [id number]     Xr Chest Portable    Result Date: 1/28/2020  Narrative: CHEST INDICATION:   s/p esophagectomy  COMPARISON:  9/26/2019 EXAM PERFORMED/VIEWS:  XR CHEST PORTABLE FINDINGS:  Left chest port unchanged  NG tube in the gastric pull-through located within the chest  Cardiomediastinal silhouette appears stable  Mild vascular congestion with patchy bibasilar atelectasis  No pneumothorax  Surgical staples and surgical gauze in the left neck  Osseous structures appear within normal limits for patient age  Impression: Mild vascular congestion with patchy bibasilar atelectasis  No pneumothorax  Workstation performed: [id number]     Xr Chest Pa & Lateral    Result Date: 2/18/2020  Narrative: CHEST INDICATION:   C15 9: Malignant neoplasm of esophagus, unspecified  COMPARISON:  Chest radiograph from 2/11/2020 and chest CT from 2/2/2020  EXAM PERFORMED/VIEWS:  XR CHEST PA & LATERAL  DUAL ENERGY SUBTRACTION TECHNIQUE FINDINGS:  Left port at cavoatrial junction  Normal heart size  Esophagectomy and gastric pull-up  Hyperdensity in the mediastinum and right upper quadrant due to a lymphangiogram with thoracic duct embolization  Small right effusion with right base atelectasis, new since 2/11/2020  No change in trace left effusion  Osseous structures appear within normal limits for patient age  Sutures in the anterior abdominal wall  Impression: Small right effusion and right base atelectasis, new since 2/11/2020   Workstation performed: [id number]     Xr Chest Pa & Lateral    Result Date: 2/11/2020  Narrative: CHEST INDICATION:   post pull cxr for R CT removal  COMPARISON:  2/10/2020 EXAM PERFORMED/VIEWS:  XR CHEST PA & LATERAL FINDINGS:  The right-sided chest tube has been removed  Cardiomediastinal silhouette is stable  There is contrast material visible in the mediastinum which is within portions of the patient's gastric pull-through  Additional contrast material is seen outside of the GI tract contour and apparently this is related to a prior thoracic duct embolization procedure  Stable from prior  A right PICC is visible  A left-sided infusion port catheter is visible  Positioning seems satisfactory  There is a small right apical pneumothorax which is stable in size compared with the prior study  No interval increase after chest tube removal   There is also a small persistent left apical pneumothorax  There is minimal pleural effusion in the posterior costophrenic angles  No mediastinal shift  No gross evidence of pulmonary infiltrates  Probable minimal atelectasis left base  Osseous structures appear within normal limits for patient age  Impression: Stable small bilateral apical pneumothoraces  Right chest tube has been removed  Central line and infusion port catheter device appears satisfactory  Persistent contrast material in the mediastinum and GI tract Workstation performed: [id number]     Xr Chest Pa & Lateral    Result Date: 2/10/2020  Narrative: CHEST INDICATION:   f/u L CT removal  COMPARISON:  2/7/2020 EXAM PERFORMED/VIEWS:  XR CHEST PA & LATERAL Images: 2 FINDINGS:  Left chest wall Port-A-Cath unchanged in position  There is a right-sided PICC line present  The tip of the PICC line terminates at the level of the cavoatrial junction  Stable cardiomediastinal structures  Left basilar subsegmental atelectasis and effusion  No congestive failure  Small bore left-sided chest tube has been removed  Small pneumothorax at the left apex is minimally smaller than the prior study   There is a right-sided small bore chest tube which is unchanged in position  There is a small right apical pneumothorax which is slightly larger than the prior examination  A small amount of contrast partially outlines the right tracheobronchial tree  This is suggestive of aspiration from recent barium swallow  Osseous structures appear within normal limits for patient age  Impression: Status post left chest tube removal   Subsegmental atelectasis with small effusion at the base  There is a small apical pneumothorax present, slightly smaller than the prior exam  There is a small bore right-sided chest tube present  There is a small right apical pneumothorax which is slightly larger than the prior exam  There is a small collection of contrast identified within the posterior mediastinum as well as contrast present at the GE junction and within the large bowel  Contrast within the mediastinum is of unclear etiology  Possibility of surgical leak not excluded  Small amount of contrast is seen within the tracheobronchial tree suggesting aspiration during recently performed barium studies 2/8  I personally discussed this study with the surgical resident Dr Rohit Degroot on 2/10/2020 at 2:27 PM  Workstation performed: [id number]     Fl Barium Swallow    Result Date: 2/8/2020  Narrative: BARIUM SWALLOW-ESOPHAGRAM INDICATION:   esophagram--r/o leak  Patient is status post recent esophagectomy  COMPARISON:  Previous chest x-ray examinations IMAGES:  170  (this includes cine capture images) FLUOROSCOPY TIME:   1 40 minutes  TECHNIQUE: Due to risk of aspiration, and lesser concern for leak, thin liquid barium was chosen after discussion with clinical service  This was given via straw FINDINGS: Examination demonstrates pre-existing radiopaque material within the right upper mediastinum related to thoracic duct embolization  There is no evidence of proximal anastomotic leak  Anticipated changes status post soft subjectively and gastric pull-through noted    Very mild laryngeal penetration was noted  Distal portion of anastomosis appears grossly intact  Impression: Postoperative changes and changes status post thoracic duct embolization  Mild degree of laryngeal penetration was noted  Speech pathology followed this examination with further assessment of swallowing mechanism No findings to suggest an anastomotic leak Workstation performed: [id number]     Fl Barium Swallow Video W Speech    Result Date: 2/8/2020  Narrative: A video barium swallow study was performed by the Department of Speech Pathology  Please refer to the report for the official interpretation  The images are stored for archival purposes only  Study images were not formally reviewed by the Radiology Department  Ct Chest Abdomen Pelvis W Contrast    Result Date: 2/2/2020  Narrative: CT CHEST, ABDOMEN AND PELVIS WITH IV CONTRAST INDICATION:   Sepsis  COMPARISON:  Chest CT dated 9/6/2019  TECHNIQUE: CT examination of the chest, abdomen and pelvis was performed  Axial, sagittal, and coronal 2D reformatted images were created from the source data and submitted for interpretation  Radiation dose length product (DLP) for this visit:  1488 2 mGy-cm   This examination, like all CT scans performed in the [facility], was performed utilizing techniques to minimize radiation dose exposure, including the use of iterative  reconstruction and automated exposure control  IV Contrast:  100 mL of iodixanol (VISIPAQUE) Enteric Contrast: Enteric contrast was administered  FINDINGS: CHEST LUNGS:  Complete collapse of both lower lobes related to compression from effusions  No central obstructing lesion demonstrated in the lobar bronchi  No other acute consolidation  No interstitial thickening  No endobronchial masses  Endotracheal tube tip  terminates appropriately in the mid thoracic trachea  PLEURA:  Large bilateral pleural effusions  No grossly loculated components   HEART/GREAT VESSELS:  Borderline enlarged  Few coronary calcifications  No pericardial thickening or effusion  Central venous catheter tip terminates at the cavoatrial junction  Normal thoracic aorta  MEDIASTINUM AND SHEA:  Postsurgical changes after esophagectomy with gastric conduit  Anastomotic sutures just above the level of the gregg  Surgical drains in place  No suspicious mediastinal masses  CHEST WALL AND LOWER NECK:   Trace anasarca  ABDOMEN LIVER/BILIARY TREE:  Unremarkable  GALLBLADDER:  There are gallstone(s) within the gallbladder, without pericholecystic inflammatory changes  SPLEEN:  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  Less than a quarter of the splenic volume is involved  PANCREAS:  Unremarkable  ADRENAL GLANDS:  Unremarkable  KIDNEYS/URETERS:  Unremarkable  No hydronephrosis  STOMACH AND BOWEL:  Postsurgical changes from gastric conduit formation  Nasogastric tube tip terminates in the subdiaphragmatic distal gastric body  Percutaneous jejunostomy tube appears appropriately positioned  No dilated or inflamed loops of small bowel  Moderate stool burden in the colon without dilation or pericolonic inflammation  APPENDIX:  No findings to suggest appendicitis  ABDOMINOPELVIC CAVITY:  Mild perihepatic and perisplenic ascites  Mild to moderate amount of free pelvic fluid  There is gradient density within the pelvic fluid measuring up to 49 HU dependently suggesting small amount layering blood products  No evidence for active intraperitoneal hemorrhage  No encapsulated hematoma or abscess  VESSELS:  Aortoiliac atherosclerosis without aneurysm or significant occlusive atheromatous disease  PELVIS REPRODUCTIVE ORGANS:  Unremarkable for patient's age  URINARY BLADDER:  Doshi catheter in place  Bladder is decompressed  Iatrogenic at the dependent air in the lumen  ABDOMINAL WALL/INGUINAL REGIONS:  Mild anasarca  No encapsulated collections  Ventral incisional staples persist  No wound dehiscence   OSSEOUS STRUCTURES: L5-S1 discectomy with interbody fusion device  Partial osseous ankylosis  No evidence for loosening or infection  No acute fractures or focally destructive osseous lesions  Impression: 1  Large bilateral pleural effusions with compressive collapse of both lower lobes  2   Moderate-sized focal posterior splenic infarct  3   Mild ascites in the abdomen and pelvis  Gradient density in the pelvic fluid suggests settling postsurgical blood products  No evidence for active intra-abdominal hemorrhage or hematoma formation, though  4   Postsurgical changes after esophagectomy and gastric conduit formation with posterior mediastinal surgical drain in place  No encapsulated-appearing collection at the intrathoracic anastomotic site  5   Appropriately positioned central venous catheter, endotracheal tube, nasogastric tube, and percutaneous jejunostomy tube  Workstation performed: [id number]     Xr Chest Portable Icu    Result Date: 2/8/2020  Narrative: CHEST INDICATION:   f/u CTs  COMPARISON:  3/5/2020 EXAM PERFORMED/VIEWS:  XR CHEST PORTABLE ICU  AP semierect Images: 1 FINDINGS:  Right IJ catheter terminates in the SVC with a left subclavian Port-A-Cath at the caval atrial junction  Radiopaque material in the right upper mediastinum noted status post thoracic duct embolization  Left basilar chest tube in stable position with improvement of the small pneumothorax  Right-sided chest tube in stable position as well  Cardiomediastinal silhouette appears unremarkable  Small pneumomediastinum noted to the left of midline  Improved right lower lobe aeration with persistent left basilar subsegmental atelectasis  Osseous structures appear within normal limits for patient age  Impression: 1  Left basilar chest tube in stable position with improvement of small pneumothorax  Lines and tubes otherwise stable  2   Improved right lower lobe aeration with persistent left basilar atelectasis   3   Evidence of thoracic duct embolization  4   Small pneumomediastinum  Workstation performed: [id number]     Xr Chest Portable Icu    Result Date: 2/4/2020  Narrative: CHEST INDICATION:   chylothorax and PNA  GE junction carcinoma, 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  COMPARISON:  Chest radiograph from 2/3/2020 and chest CT from 2/2/2020  EXAM PERFORMED/VIEWS:  XR CHEST PORTABLE ICU FINDINGS:  ET tube and NG tube removed  Left port at cavoatrial junction  Right jugular catheter in upper SVC  Cardiomediastinal silhouette appears unremarkable  Persistent bilateral chest tubes  Improving right base pneumonia  Increased left base opacity  No effusion  New small left pneumothorax  Osseous structures appear within normal limits for patient age  Impression: Improving right base pneumonia  Increased left base opacity, likely aspiration and atelectasis  Persistent bilateral chest tubes with new small left pneumothorax  Workstation performed: [id number]     Xr Chest Portable Icu    Result Date: 2/2/2020  Narrative: CHEST INDICATION:   post code/ETT placement  COMPARISON:  January 30, 2020 EXAM PERFORMED/VIEWS:  XR CHEST PORTABLE ICU FINDINGS:  Endotracheal tube is present, in satisfactory position with its tip above the level of the gregg  Enteric tube is present coiling over the lower chest but with its tip below left hemidiaphragm     Left chest port is present  Moderate bilateral pleural effusions with overlying airspace opacities  No pneumothorax  Translocation  Noted  Osseous structures appear within normal limits for patient age  Impression: Satisfactory positioning of endotracheal tube above the level of the gregg  Enteric tube coils over the lower chest presumably within gastric pull-up but tip extends to left upper quadrant of the abdomen  Bilateral pleural effusions with overlying airspace opacities   Workstation performed: [id number]     Xr Chest Portable Icu    Result Date: 1/30/2020  Narrative: CHEST INDICATION: R pleural effusion, worsening SOB  GE junction carcinoma, status post transhiatal esophagectomy  COMPARISON:  Chest radiograph from 1/29/2020 and chest CT from 9/16/2019  EXAM PERFORMED/VIEWS:  XR CHEST PORTABLE ICU FINDINGS:  NG tube below the diaphragm  Left port at cavoatrial junction  Clips and a drain over the left superior mediastinum  Cardiomediastinal silhouette appears unremarkable  Persistent moderate right effusion and bibasilar atelectasis  Osseous structures appear within normal limits for patient age  Impression: Persistent moderate right effusion and bibasilar atelectasis  Workstation performed: [id number]     Xr Chest Portable Icu    Result Date: 1/29/2020  Narrative: CHEST INDICATION:   SOB  GE junction carcinoma, status post transhiatal esophagectomy  COMPARISON:  Chest radiograph from 1/28/2020  Chest CT from 1/30/2019  EXAM PERFORMED/VIEWS:  XR CHEST PORTABLE ICU FINDINGS:  Left port at cavoatrial junction  NG tube at the level of the diaphragm  Cardiomediastinal silhouette appears unremarkable  Skin staples and a drain over the superior mediastinum/left lower neck  New moderate right effusion and moderate bibasilar atelectasis  No pneumothorax  Osseous structures appear within normal limits for patient age  Impression: New moderate right effusion and moderate bibasilar atelectasis  Workstation performed: [id number]     Cta Chest Pe Study    Result Date: 2/18/2020  Narrative: CTA - CHEST WITH IV CONTRAST - PULMONARY ANGIOGRAM INDICATION:   R06 02: Shortness of breath  Chest tightness  Bilateral foot swelling  Esophageal cancer  COMPARISON: Multiple prior examinations including most recently CT of the chest, abdomen and pelvis performed February 2, 2020  TECHNIQUE: CTA examination of the chest was performed using angiographic technique according to a protocol specifically tailored to evaluate for pulmonary embolism    Axial, sagittal, and coronal 2D reformatted images were created from the source data and  submitted for interpretation  In addition, coronal 3D MIP postprocessing was performed on the acquisition scanner  Radiation dose length product (DLP) for this visit:  490 mGy-cm   This examination, like all CT scans performed in the [facility], was performed utilizing techniques to minimize radiation dose exposure, including the use of iterative reconstruction and automated exposure control  IV Contrast:  60 mL of iohexol (OMNIPAQUE)  FINDINGS: PULMONARY ARTERIAL TREE:  No pulmonary embolus is seen  LUNGS:  Compressive atelectasis is reidentified, most severe in right lung base and right mid chest with a small amount of compressive atelectasis at the left lung base  No suspicious pulmonary parenchymal mass  No consolidative opacity in mid or upper  lung zones  No tracheal or endobronchial mass noted  PLEURA:  There is moderate sized loculated right pleural effusion, decreased in size when compared to February 2, 2020  Some fluid is loculated in the major fissure  There is a small to moderate loculated left pleural effusion predominantly located in the left posterior costophrenic angle, significantly smaller when compared to February 2, 2020 examination  HEART/GREAT VESSELS:  Coronary artery calcification is noted  There is trace pericardial fluid  No thoracic aortic aneurysm  MEDIASTINUM AND SHEA:  Surgical changes of gastric pull-up procedure  High density embolization material is seen within the gastric pull-up  There is very high density within the thoracic duct and within lymphatic channels throughout the posterior mediastinum and right posterior hemithorax as well as along the right hemidiaphragm and posterior pleural surfaces related to thoracic duct embolization performed February 6, 2020  CHEST WALL AND LOWER NECK:   Left chest port is noted  VISUALIZED STRUCTURES IN THE UPPER ABDOMEN:  Material associated with thoracic duct embolization  Posterior subcapsular splenic fluid collection, similar to February 2, 2020  OSSEOUS STRUCTURES:  No acute fracture or destructive osseous lesion  Impression: No pulmonary embolus  Moderate loculated right pleural effusion, and small loculated left pleural effusion are both significantly decreased in size when compared to February 2, 2020 but are both new and slowly increasing when compared with chest x-ray performed February 10, 2020  High density embolization material is seen in the posterior mediastinum along the pleural Surgical changes of gastric pull-up procedure  Surface in the right hemithorax related to thoracic duct embolization performed February 10, 2020  Workstation performed: [id number]     Vas Lower Limb Venous Duplex Study, Complete Bilateral    Result Date: 2/19/2020  Narrative:  [facility] REPORT CLINICAL: Indications: Bilateral Localized edema [R60 0] L>R with large dorsal blisters  Patient had last chemotherapy in December, 2019  Risk Factors The patient has history of previous smoking (quit 1-5yrs ago)  He has no history of DVT or Recent Trauma  FINDINGS:  Segment  Right            Left              Impression       Impression       CFV      Normal (Patent)  Normal (Patent)     CONCLUSION:  Impression: RIGHT LOWER LIMB: No evidence of acute or chronic deep vein thrombosis  No evidence of superficial thrombophlebitis noted  Doppler evaluation shows a normal response to augmentation maneuvers  Popliteal, posterior tibial and anterior tibial arterial Doppler waveforms are triphasic  LEFT LOWER LIMB: No evidence of acute or chronic deep vein thrombosis  No evidence of superficial thrombophlebitis noted  Doppler evaluation shows a normal response to augmentation maneuvers  Popliteal, posterior tibial and anterior tibial arterial Doppler waveforms are triphasic    SIGNATURE: Electronically Signed by: Susy Salazar on 2020-02-19 02:29:58 PM    Ir Other    Result Date: 2/10/2020  Narrative: PROCEDURE: 1   Real-time ultrasound guided access of 4 separate groin lymph nodes 2  Lymphangiography of the pelvis and trunk 3  Coil and glue embolization of active lymphatic extravasation arising from the thoracic duct 4   82 modifier STAFF: HANG Ferrer  (primary), HANG Frias  (secondary) CONTRAST: 50 mL's of lipiodol intralymphatic  FLUOROSCOPY TIME: 30 4 minutes  NUMBER OF IMAGES: Multiple COMPLICATIONS: None  MEDICATIONS: Sedation was provided in the form of general anesthesia by the Anesthesia service  Please see their associated records  INDICATION: History of recent transhiatal esophagectomy complicated by high output right chylothorax  PROCEDURE: A qualified resident surgeon was not available for this procedure  Real-time ultrasound was used to access 4 separate bilateral groin lymph nodes using 25-gauge needles  Images were stored in PACS  Lipiodol was gently instilled into the bilateral groin lower extremity lymphatics  Lymphangiography of the pelvis and abdomen was performed  After lipiodol had extended into the cisterna chyli, a 22-gauge needle was used to access a lymphatic duct immediately inferior to the cisterna chyli  A microwire was extended into the thoracic duct, followed by a microcatheter  Further lymphangiography was performed  Based on the results of lymphangiography, the decision was made to embolize the thoracic duct  This was done using a combination of 1-4 mm x 7 cm Emanuel coil, 4-4 mm x 14 cm Emanuel microcoils, and an emulsion of 1:2 of nBCA with lipiodol  The microcatheter was removed  Needles in the groin were removed  FINDINGS: 1   Real-time ultrasound showed a needle to extend into a hypoechoic groin lymph nodes x4  2   Lymphangiography of both the abdomen and pelvis showed normal filling of lymphatic channels, without leak   3   Interval lymphangiography after accessing the cisterna chyli confirmed catheter positioning within the thoracic duct  4   Lymphangiography of the chest showed brisk lymphatic extravasation into the right hemithorax  There was complete transection of the thoracic duct, with no filling of the expected course of the more superior thoracic duct, likely postsurgical  5   Interval lymphangiography after coiling of the mid thoracic duct confirmed stasis of flow  6   Final  image showed good positioning of both coils and glue throughout the course of the thoracic duct and at the cisterna chyli  Impression: Thoracic duct embolization  Workstation performed: [id number]     I reviewed the above laboratory and imaging data  Discussion/Summary:  48-year-old male who underwent transhiatal esophagectomy for a T3 N1 M0 esophageal adenocarcinoma after neoadjuvant chemotherapy  He did have an excellent response to chemo radiation  His final pathology revealed this was a [id number] adenocarcinoma with only a 7 mm residual tumor  He should not require any further therapy  Discussed using nutritional supplements  He will continue using his cycle tube feeds at night, 3 cans daily  He is seeing Dr Amalia Crain next week  I have recommended that he obtain another chest x-ray prior to that visit  If there is a worsening pleural effusion, this may need to be tapped  I will see him again in 2 weeks  He is agreeable to this  All of his questions were answered

## 2020-02-23 ENCOUNTER — TELEPHONE (OUTPATIENT)
Dept: OTHER | Facility: OTHER | Age: 65
End: 2020-02-23

## 2020-02-23 ENCOUNTER — APPOINTMENT (EMERGENCY)
Dept: RADIOLOGY | Facility: HOSPITAL | Age: 65
End: 2020-02-23
Payer: COMMERCIAL

## 2020-02-23 ENCOUNTER — HOSPITAL ENCOUNTER (EMERGENCY)
Facility: HOSPITAL | Age: 65
Discharge: HOME | End: 2020-02-23
Attending: EMERGENCY MEDICINE | Admitting: EMERGENCY MEDICINE
Payer: COMMERCIAL

## 2020-02-23 VITALS
TEMPERATURE: 98.3 F | HEIGHT: 70 IN | DIASTOLIC BLOOD PRESSURE: 80 MMHG | WEIGHT: 191 LBS | OXYGEN SATURATION: 95 % | HEART RATE: 78 BPM | BODY MASS INDEX: 27.35 KG/M2 | RESPIRATION RATE: 20 BRPM | SYSTOLIC BLOOD PRESSURE: 139 MMHG

## 2020-02-23 DIAGNOSIS — J90 PLEURAL EFFUSION ON RIGHT: ICD-10-CM

## 2020-02-23 DIAGNOSIS — R60.9 EDEMA: ICD-10-CM

## 2020-02-23 DIAGNOSIS — Z51.89 VISIT FOR WOUND CHECK: Primary | ICD-10-CM

## 2020-02-23 LAB
ALBUMIN SERPL BCP-MCNC: 2.5 G/DL (ref 3.5–5)
ALP SERPL-CCNC: 88 U/L (ref 46–116)
ALT SERPL W P-5'-P-CCNC: 13 U/L (ref 12–78)
ANION GAP SERPL CALCULATED.3IONS-SCNC: 8 MMOL/L (ref 4–13)
AST SERPL W P-5'-P-CCNC: 19 U/L (ref 5–45)
BASOPHILS # BLD AUTO: 0.04 THOUSANDS/ΜL (ref 0–0.1)
BASOPHILS NFR BLD AUTO: 1 % (ref 0–1)
BILIRUB SERPL-MCNC: 0.5 MG/DL (ref 0.2–1)
BUN SERPL-MCNC: 13 MG/DL (ref 5–25)
CALCIUM SERPL-MCNC: 8.5 MG/DL (ref 8.3–10.1)
CHLORIDE SERPL-SCNC: 103 MMOL/L (ref 100–108)
CO2 SERPL-SCNC: 28 MMOL/L (ref 21–32)
CREAT SERPL-MCNC: 1.02 MG/DL (ref 0.6–1.3)
EOSINOPHIL # BLD AUTO: 0.27 THOUSAND/ΜL (ref 0–0.61)
EOSINOPHIL NFR BLD AUTO: 4 % (ref 0–6)
ERYTHROCYTE [DISTWIDTH] IN BLOOD BY AUTOMATED COUNT: 17.4 % (ref 11.6–15.1)
GFR SERPL CREATININE-BSD FRML MDRD: 77 ML/MIN/1.73SQ M
GLUCOSE SERPL-MCNC: 101 MG/DL (ref 65–140)
HCT VFR BLD AUTO: 36.7 % (ref 36.5–49.3)
HGB BLD-MCNC: 11 G/DL (ref 12–17)
IMM GRANULOCYTES # BLD AUTO: 0.02 THOUSAND/UL (ref 0–0.2)
IMM GRANULOCYTES NFR BLD AUTO: 0 % (ref 0–2)
LYMPHOCYTES # BLD AUTO: 0.97 THOUSANDS/ΜL (ref 0.6–4.47)
LYMPHOCYTES NFR BLD AUTO: 16 % (ref 14–44)
MCH RBC QN AUTO: 26.4 PG (ref 26.8–34.3)
MCHC RBC AUTO-ENTMCNC: 30 G/DL (ref 31.4–37.4)
MCV RBC AUTO: 88 FL (ref 82–98)
MONOCYTES # BLD AUTO: 0.44 THOUSAND/ΜL (ref 0.17–1.22)
MONOCYTES NFR BLD AUTO: 7 % (ref 4–12)
NEUTROPHILS # BLD AUTO: 4.46 THOUSANDS/ΜL (ref 1.85–7.62)
NEUTS SEG NFR BLD AUTO: 72 % (ref 43–75)
NRBC BLD AUTO-RTO: 0 /100 WBCS
NT-PROBNP SERPL-MCNC: 226 PG/ML
PLATELET # BLD AUTO: 314 THOUSANDS/UL (ref 149–390)
PMV BLD AUTO: 9 FL (ref 8.9–12.7)
POTASSIUM SERPL-SCNC: 4.4 MMOL/L (ref 3.5–5.3)
PROT SERPL-MCNC: 7.3 G/DL (ref 6.4–8.2)
RBC # BLD AUTO: 4.17 MILLION/UL (ref 3.88–5.62)
SODIUM SERPL-SCNC: 139 MMOL/L (ref 136–145)
TROPONIN I SERPL-MCNC: <0.02 NG/ML
WBC # BLD AUTO: 6.2 THOUSAND/UL (ref 4.31–10.16)

## 2020-02-23 PROCEDURE — 80053 COMPREHEN METABOLIC PANEL: CPT | Performed by: NURSE PRACTITIONER

## 2020-02-23 PROCEDURE — 36415 COLL VENOUS BLD VENIPUNCTURE: CPT | Performed by: NURSE PRACTITIONER

## 2020-02-23 PROCEDURE — 84484 ASSAY OF TROPONIN QUANT: CPT | Performed by: NURSE PRACTITIONER

## 2020-02-23 PROCEDURE — 99284 EMERGENCY DEPT VISIT MOD MDM: CPT

## 2020-02-23 PROCEDURE — 99284 EMERGENCY DEPT VISIT MOD MDM: CPT | Performed by: NURSE PRACTITIONER

## 2020-02-23 PROCEDURE — 93005 ELECTROCARDIOGRAM TRACING: CPT

## 2020-02-23 PROCEDURE — 83880 ASSAY OF NATRIURETIC PEPTIDE: CPT | Performed by: NURSE PRACTITIONER

## 2020-02-23 PROCEDURE — 85025 COMPLETE CBC W/AUTO DIFF WBC: CPT | Performed by: NURSE PRACTITIONER

## 2020-02-23 PROCEDURE — 71046 X-RAY EXAM CHEST 2 VIEWS: CPT

## 2020-02-23 RX ORDER — FUROSEMIDE 40 MG/1
40 TABLET ORAL ONCE
Status: COMPLETED | OUTPATIENT
Start: 2020-02-23 | End: 2020-02-23

## 2020-02-23 RX ADMIN — FUROSEMIDE 40 MG: 40 TABLET ORAL at 19:10

## 2020-02-23 NOTE — TELEPHONE ENCOUNTER
[phone] DARWIN-DAUGHTER PT KANDY CHAVEZ [birth year] DR CATALAN EMERG SWELLING IN LEFT FOOT AND VERY PAINFUL

## 2020-02-24 NOTE — ED PROVIDER NOTES
History  Chief Complaint   Patient presents with    Foot Swelling     Pt c/o bilateral feet swelling for about a week  Pt states swelling starts to go up his legs  This is a 69-year-old male patient presents here with complaints of wound over the dorsum of his foot  Based on his history appears to have had significant edema in his recent past   He developed blisters over the dorsum of his feet bilaterally  The blister on the right foot ruptured and then has dried up and is not bothering him  The blister on the left foot just ruptured recently and does have some raw tissue underneath  He does have some mild erythema but this is bilateral and is likely related to venous stasis and not so much infectious  He denies any fever chills  He reports shortness of breath and needing to sit up to sleep and to breathe efficiently  He has a known pleural effusion so we will re-evaluate this today but also evaluate for congestive heart failure  The patient really has no interest in being admitted  He denies any chest pain  He is mainly here for his foot to be evaluated but his son would like us to check some labs related to fluid overload  Prior to Admission Medications   Prescriptions Last Dose Informant Patient Reported? Taking? Nutritional Supplements (JEVITY 1 5 SHUN) LIQD   No Yes   Sig: Take 60 mL by mouth continuous Jevity 1 5@ 60 ml/hr for cycles of 12 hours daily   (7pm-7am nightly)   amiodarone 200 mg tablet   No Yes   Sig: Take 1 tablet (200 mg total) by mouth 3 (three) times a day with meals   gabapentin (NEURONTIN) 300 mg capsule Not Taking at Unknown time Self No No   Sig: take 1 capsule by mouth daily at bedtime   Patient not taking: Reported on 2/20/2020   insulin NPH (HumuLIN N,NovoLIN N) 100 Units/mL subcutaneous injection   No Yes   Sig: Inject 12 Units under the skin daily before dinner To be given prior to tube feeds nightly   levothyroxine 25 mcg tablet  Self Yes Yes   Sig: Take 25 mcg by mouth daily in the early morning   melatonin 3 mg Not Taking at Unknown time  No No   Sig: Take 1 tablet (3 mg total) by mouth daily at bedtime   Patient not taking: Reported on 2/20/2020   oxyCODONE (ROXICODONE) 5 mg immediate release tablet   No Yes   Sig: Take 1 tablet (5 mg total) by mouth every 4 (four) hours as needed for moderate pain or severe pain for up to 5 daysMax Daily Amount: 30 mg   pantoprazole (PROTONIX) 40 mg tablet Not Taking at Unknown time Self No No   Sig: Take 1 tablet (40 mg total) by mouth daily   Patient not taking: Reported on 2/20/2020   rOPINIRole (REQUIP) 0 25 mg tablet Not Taking at Unknown time Self No No   Sig: take 1 tablet by mouth daily at bedtime   Patient not taking: Reported on 2/20/2020   senna-docusate sodium (SENOKOT S) 8 6-50 mg per tablet Not Taking at Unknown time  No No   Sig: Take 1 tablet by mouth daily   Patient not taking: Reported on 2/20/2020   tamsulosin (FLOMAX) 0 4 mg Not Taking at Unknown time  No No   Sig: Take 1 capsule (0 4 mg total) by mouth daily with dinner   Patient not taking: Reported on 2/20/2020      Facility-Administered Medications: None       Past Medical History:   Diagnosis Date    [facility]     Diabetes mellitus ([facility]     Difficulty swallowing     Disease of thyroid gland     Esophageal mass     History of chemotherapy     History of transfusion     Malignant neoplasm of lower third of esophagus ([facility] )     Port-A-Cath in place     LCW    Sleep apnea     no CPAP       Past Surgical History:   Procedure Laterality Date    BACK SURGERY      x2    DISC REMOVAL      ESOPHAGECTOMY N/A 1/28/2020    Procedure: ESOPHAGECTOMY, TRANSHIATAL;  Surgeon: Dipak Umanzor MD;  [facility];  Service: Surgical Oncology    ESOPHAGOGASTRODUODENOSCOPY N/A 1/28/2020    Procedure: ESOPHAGOGASTRODUODENOSCOPY (EGD);   Surgeon: Dipak Umanzor MD;  [facility];  Service: Surgical Oncology   [facility] GUIDED CENTRAL VENOUS ACCESS DEVICE INSERTION  2019    GASTROJEJUNOSTOMY W/ JEJUNOSTOMY TUBE N/A 2019    Procedure: INSERTION JEJUNOSTOMY TUBE OPEN;  Surgeon: Flaquita Rojas MD;  [facility];  Service: Surgical Oncology    TONSILLECTOMY      TUNNELED VENOUS PORT PLACEMENT N/A 2019    Procedure: INSERTION VENOUS PORT (PORT-A-CATH); Surgeon: Flaquita Rojas MD;  [facility];  Service: Surgical Oncology    VOCAL CORD INJECTION N/A 2020    Procedure: MICROLARYNGOSCOPY WITH INJECTION;  Surgeon: Rosa Sepulveda MD;  [facility];  Service: ENT       Family History   Problem Relation Age of Onset    Diabetes Mother     No Known Problems Father      I have reviewed and agree with the history as documented  Social History     Tobacco Use    Smoking status: Former Smoker     Packs/day: 0 50     Years: 50 00     Pack years: 25 00     Types: Cigarettes     Last attempt to quit: 2017     Years since quittin 1    Smokeless tobacco: Never Used   Substance Use Topics    Alcohol use: Not Currently     Alcohol/week: 0 0 standard drinks     Frequency: Never     Drinks per session: 1 or 2     Binge frequency: Never    Drug use: Never       Review of Systems   Constitutional: Negative for diaphoresis, fatigue and fever  HENT: Negative for congestion, ear pain, nosebleeds and sore throat  Eyes: Negative for photophobia, pain, discharge and visual disturbance  Respiratory: Positive for shortness of breath  Negative for cough, choking, chest tightness and wheezing  Cardiovascular: Positive for leg swelling  Negative for chest pain and palpitations  Gastrointestinal: Negative for abdominal distention, abdominal pain, diarrhea and vomiting  Genitourinary: Negative for dysuria, flank pain and frequency  Musculoskeletal: Negative for back pain, gait problem and joint swelling  Skin: Positive for rash and wound  Negative for color change     Neurological: Negative for dizziness, syncope, weakness and headaches  Psychiatric/Behavioral: Negative for behavioral problems and confusion  The patient is not nervous/anxious  All other systems reviewed and are negative  Physical Exam  Physical Exam   Constitutional: He is oriented to person, place, and time  He appears well-developed and well-nourished  HENT:   Head: Normocephalic and atraumatic  Eyes: Pupils are equal, round, and reactive to light  Neck: Normal range of motion  Neck supple  Cardiovascular: Normal rate, regular rhythm, normal heart sounds and normal pulses  PMI is not displaced  Pulmonary/Chest: Effort normal and breath sounds normal  No respiratory distress  Abdominal: Soft  He exhibits no distension  There is no guarding  Musculoskeletal: Normal range of motion  He exhibits edema  Lymphadenopathy:     He has no cervical adenopathy  Neurological: He is alert and oriented to person, place, and time  Skin: Skin is warm and dry  No rash noted  He is not diaphoretic  There is erythema (Mildly bilateral to the lower extremities but this is baseline and improved)  No pallor  There are wounds on the feet bilaterally the left blister recently popped and there is raw tissue underneath this  The skin on the right dorsum of the foot is dry at this point  Psychiatric: He has a normal mood and affect  Vitals reviewed        Vital Signs  ED Triage Vitals [02/23/20 1652]   Temperature Pulse Respirations Blood Pressure SpO2   98 3 °F (36 8 °C) 84 19 129/74 98 %      Temp Source Heart Rate Source Patient Position - Orthostatic VS BP Location FiO2 (%)   Oral Monitor Sitting Right arm --      Pain Score       3           Vitals:    02/23/20 1652 02/23/20 1745 02/23/20 1900   BP: 129/74 139/80    Pulse: 84 76 78   Patient Position - Orthostatic VS: Sitting           Visual Acuity      ED Medications  Medications   furosemide (LASIX) tablet 40 mg (40 mg Oral Given 2/23/20 1910)       Diagnostic Studies  Results Reviewed     Procedure Component Value Units Date/Time    Comprehensive metabolic panel [[id number]]  (Abnormal) Collected:  02/23/20 1739    Lab Status:  Final result Specimen:  Blood from Arm, Right Updated:  02/23/20 1833     Sodium 139 mmol/L      Potassium 4 4 mmol/L      Chloride 103 mmol/L      CO2 28 mmol/L      ANION GAP 8 mmol/L      BUN 13 mg/dL      Creatinine 1 02 mg/dL      Glucose 101 mg/dL      Calcium 8 5 mg/dL      AST 19 U/L      ALT 13 U/L      Alkaline Phosphatase 88 U/L      Total Protein 7 3 g/dL      Albumin 2 5 g/dL      Total Bilirubin 0 50 mg/dL      eGFR 77 ml/min/1 73sq m     Narrative:       [place] guidelines for Chronic Kidney Disease (CKD):     Stage 1 with normal or high GFR (GFR > 90 mL/min/1 73 square meters)    Stage 2 Mild CKD (GFR = 60-89 mL/min/1 73 square meters)    Stage 3A Moderate CKD (GFR = 45-59 mL/min/1 73 square meters)    Stage 3B Moderate CKD (GFR = 30-44 mL/min/1 73 square meters)    Stage 4 Severe CKD (GFR = 15-29 mL/min/1 73 square meters)    Stage 5 End Stage CKD (GFR <15 mL/min/1 73 square meters)  Note: GFR calculation is accurate only with a steady state creatinine    NT-BNP PRO [[id number]]  (Abnormal) Collected:  02/23/20 1739    Lab Status:  Final result Specimen:  Blood from Arm, Right Updated:  02/23/20 1833     NT-proBNP 226 pg/mL     Troponin I [[id number]]  (Normal) Collected:  02/23/20 1739    Lab Status:  Final result Specimen:  Blood from Arm, Right Updated:  02/23/20 1825     Troponin I <0 02 ng/mL     CBC and differential [[id number]]  (Abnormal) Collected:  02/23/20 1739    Lab Status:  Final result Specimen:  Blood from Arm, Right Updated:  02/23/20 1802     WBC 6 20 Thousand/uL      RBC 4 17 Million/uL      Hemoglobin 11 0 g/dL      Hematocrit 36 7 %      MCV 88 fL      MCH 26 4 pg      MCHC 30 0 g/dL      RDW 17 4 %      MPV 9 0 fL      Platelets 108 Thousands/uL      nRBC 0 /100 WBCs Neutrophils Relative 72 %      Immat GRANS % 0 %      Lymphocytes Relative 16 %      Monocytes Relative 7 %      Eosinophils Relative 4 %      Basophils Relative 1 %      Neutrophils Absolute 4 46 Thousands/µL      Immature Grans Absolute 0 02 Thousand/uL      Lymphocytes Absolute 0 97 Thousands/µL      Monocytes Absolute 0 44 Thousand/µL      Eosinophils Absolute 0 27 Thousand/µL      Basophils Absolute 0 04 Thousands/µL                  XR chest 2 views    (Results Pending)              Procedures  Procedures         ED Course           Identification of Seniors at Risk      Most Recent Value   (ISAR) Identification of Seniors at Risk   Before the illness or injury that brought you to the Emergency, did you need someone to help you on a regular basis? 0 Filed at: 02/23/2020 1653   In the last 24 hours, have you needed more help than usual?  0 Filed at: 02/23/2020 1653   Have you been hospitalized for one or more nights during the past 6 months? 1 Filed at: 02/23/2020 1653   In general, do you see well?  0 Filed at: 02/23/2020 1653   In general, do you have serious problems with your memory? 0 Filed at: 02/23/2020 1653   Do you take more than three different medications every day? 1 Filed at: 02/23/2020 1653   ISAR Score  2 Filed at: 02/23/2020 1653                          MDM  Number of Diagnoses or Management Options  Edema: new and requires workup  Pleural effusion on right: new and requires workup  Visit for wound check: new and requires workup  Diagnosis management comments: The lower extremity wounds were dressed with sterile gauze and the patient was provided with dressings for tomorrow  The the recently ruptured left foot wound was dressed with semi moist gauze 1st and then a dry gauze and then a gauze wrap  Recommend the patient follow-up with his primary oncologist but also follow-up with Interventional Radiology to assess the need for pleurocentesis    Case management consult in contacted via e-mail to set up visiting nurses at home to ensure good wound healing  One dose of furosemide given for tomorrow to help diurese some fluid for the patient  Amount and/or Complexity of Data Reviewed  Clinical lab tests: reviewed and ordered  Tests in the radiology section of CPT®: reviewed and ordered  Tests in the medicine section of CPT®: reviewed and ordered  Review and summarize past medical records: yes  Independent visualization of images, tracings, or specimens: yes    Patient Progress  Patient progress: stable        Disposition  Final diagnoses:   Visit for wound check   Edema   Pleural effusion on right     Time reflects when diagnosis was documented in both MDM as applicable and the Disposition within this note     Time User Action Codes Description Comment    2/23/2020  7:07 PM Marlea Minus Add [Z51 89] Visit for wound check     2/23/2020  7:07 PM Marlea Minus Add [R60 9] Edema     2/23/2020  7:07 PM Marlea Minus Add [J90] Pleural effusion on right       ED Disposition     ED Disposition Condition Date/Time Comment    Discharge Stable Sun Feb 23, 2020  7:07 PM Gely Guevara discharge to home/self care              Follow-up Information     Follow up With Specialties Details Why Contact Info    Tania Grady MD Family Medicine Schedule an appointment as soon as possible for a visit  As needed, For Lorena Azar [address]  [phone]      Interventional radiology  Call in 1 day For Continued Evaluation of pleural effusion [phone] Call tomorrow to schedule follow up          Discharge Medication List as of 2/23/2020  7:19 PM      CONTINUE these medications which have NOT CHANGED    Details   amiodarone 200 mg tablet Take 1 tablet (200 mg total) by mouth 3 (three) times a day with meals, Starting Wed 2/12/2020, Until Fri 3/13/2020, Normal      insulin NPH (HumuLIN N,NovoLIN N) 100 Units/mL subcutaneous injection Inject 12 Units under the skin daily before dinner To be given prior to tube feeds nightly, Starting Wed 2/12/2020, Normal      levothyroxine 25 mcg tablet Take 25 mcg by mouth daily in the early morning, Starting Sat 8/24/2019, Historical Med      Nutritional Supplements (JEVITY 1 5 SHUN) LIQD Take 60 mL by mouth continuous Jevity 1 5@ 60 ml/hr for cycles of 12 hours daily   (7pm-7am nightly), Starting Wed 2/12/2020, Print      oxyCODONE (ROXICODONE) 5 mg immediate release tablet Take 1 tablet (5 mg total) by mouth every 4 (four) hours as needed for moderate pain or severe pain for up to 5 daysMax Daily Amount: 30 mg, Starting Thu 2/20/2020, Until Tue 2/25/2020, Normal      gabapentin (NEURONTIN) 300 mg capsule take 1 capsule by mouth daily at bedtime, Normal      melatonin 3 mg Take 1 tablet (3 mg total) by mouth daily at bedtime, Starting Wed 2/12/2020, OTC      pantoprazole (PROTONIX) 40 mg tablet Take 1 tablet (40 mg total) by mouth daily, Starting Wed 8/21/2019, Normal      rOPINIRole (REQUIP) 0 25 mg tablet take 1 tablet by mouth daily at bedtime, Normal      senna-docusate sodium (SENOKOT S) 8 6-50 mg per tablet Take 1 tablet by mouth daily, Starting Wed 2/12/2020, OTC      tamsulosin (FLOMAX) 0 4 mg Take 1 capsule (0 4 mg total) by mouth daily with dinner, Starting Wed 2/12/2020, Normal               PDMP Review       Value Time User    PDMP Reviewed  Yes 2/20/2020 10:37 AM Sandra Hernandez MD          ED Provider  Electronically Signed by           SARA Garcia  02/23/20 1310 [address], [address]res  02/23/20 5539

## 2020-02-24 NOTE — DISCHARGE INSTRUCTIONS
Change or wound dressing over her feet daily  Tomorrow you can switch to a non adherent dressing and then re-wrap  Return with any worsening redness or fever  Call the Interventional Radiology Department tomorrow morning to schedule pleural effusion drainage if they deem necessary  Contact your oncologist tomorrow as well to notify them that you where here in the department  You may also receive a phone call from the case management department to set up visiting nurses to check your wounds

## 2020-02-25 ENCOUNTER — HOSPITAL ENCOUNTER (OUTPATIENT)
Dept: INTERVENTIONAL RADIOLOGY/VASCULAR | Facility: HOSPITAL | Age: 65
Discharge: HOME | End: 2020-02-25
Admitting: STUDENT IN AN ORGANIZED HEALTH CARE EDUCATION/TRAINING PROGRAM
Payer: COMMERCIAL

## 2020-02-25 VITALS — OXYGEN SATURATION: 95 %

## 2020-02-25 DIAGNOSIS — J90 PLEURAL EFFUSION: ICD-10-CM

## 2020-02-25 LAB
AMYLASE FLD QL: 20 U/L
APPEARANCE FLD: ABNORMAL
ATRIAL RATE: 77 BPM
CHOLEST FLD-MCNC: 96 MG/DL
COLOR FLD: YELLOW
EOSINOPHIL NFR FLD MANUAL: 27 %
GLUCOSE FLD-MCNC: 85 MG/DL
LDH FLD L TO P-CCNC: 145 U/L
LYMPHOCYTES NFR BLD AUTO: 38 %
MONO+MESO NFR FLD MANUAL: 9 %
MONOCYTES NFR BLD AUTO: 7 %
NEUTS SEG NFR BLD AUTO: 19 %
P AXIS: 33 DEGREES
PH BODY FLUID: 7.7
PR INTERVAL: 172 MS
QRS AXIS: 109 DEGREES
QRSD INTERVAL: 88 MS
QT INTERVAL: 378 MS
QTC INTERVAL: 427 MS
SITE: ABNORMAL
T WAVE AXIS: 23 DEGREES
TOTAL CELLS COUNTED SPEC: 100
TRIGL FLD-MCNC: 88 MG/DL
VENTRICULAR RATE: 77 BPM
WBC # FLD MANUAL: 508 /UL

## 2020-02-25 PROCEDURE — 83986 ASSAY PH BODY FLUID NOS: CPT | Performed by: NURSE PRACTITIONER

## 2020-02-25 PROCEDURE — 93010 ELECTROCARDIOGRAM REPORT: CPT | Performed by: INTERNAL MEDICINE

## 2020-02-25 PROCEDURE — 87205 SMEAR GRAM STAIN: CPT | Performed by: NURSE PRACTITIONER

## 2020-02-25 PROCEDURE — 87070 CULTURE OTHR SPECIMN AEROBIC: CPT | Performed by: NURSE PRACTITIONER

## 2020-02-25 PROCEDURE — 84478 ASSAY OF TRIGLYCERIDES: CPT | Performed by: NURSE PRACTITIONER

## 2020-02-25 PROCEDURE — 88305 TISSUE EXAM BY PATHOLOGIST: CPT | Performed by: PATHOLOGY

## 2020-02-25 PROCEDURE — 82150 ASSAY OF AMYLASE: CPT | Performed by: NURSE PRACTITIONER

## 2020-02-25 PROCEDURE — 32555 ASPIRATE PLEURA W/ IMAGING: CPT

## 2020-02-25 PROCEDURE — 88112 CYTOPATH CELL ENHANCE TECH: CPT | Performed by: PATHOLOGY

## 2020-02-25 PROCEDURE — 83615 LACTATE (LD) (LDH) ENZYME: CPT | Performed by: NURSE PRACTITIONER

## 2020-02-25 PROCEDURE — 89051 BODY FLUID CELL COUNT: CPT | Performed by: NURSE PRACTITIONER

## 2020-02-25 PROCEDURE — 32555 ASPIRATE PLEURA W/ IMAGING: CPT | Performed by: STUDENT IN AN ORGANIZED HEALTH CARE EDUCATION/TRAINING PROGRAM

## 2020-02-25 PROCEDURE — 82465 ASSAY BLD/SERUM CHOLESTEROL: CPT | Performed by: NURSE PRACTITIONER

## 2020-02-25 PROCEDURE — 82945 GLUCOSE OTHER FLUID: CPT | Performed by: NURSE PRACTITIONER

## 2020-02-25 RX ORDER — LIDOCAINE HYDROCHLORIDE 10 MG/ML
INJECTION, SOLUTION EPIDURAL; INFILTRATION; INTRACAUDAL; PERINEURAL CODE/TRAUMA/SEDATION MEDICATION
Status: COMPLETED | OUTPATIENT
Start: 2020-02-25 | End: 2020-02-25

## 2020-02-25 RX ADMIN — LIDOCAINE HYDROCHLORIDE 10 ML: 10 INJECTION, SOLUTION EPIDURAL; INFILTRATION; INTRACAUDAL; PERINEURAL at 10:11

## 2020-02-25 NOTE — DISCHARGE INSTRUCTIONS
Thoracentesis   WHAT YOU NEED TO KNOW:   A thoracentesis is a procedure to remove extra fluid or air from between your lungs and your inner chest wall  Air or fluid buildup may make it hard for you to breathe  A thoracentesis allows your lungs to expand fully so you can breathe more easily  DISCHARGE INSTRUCTIONS:     Small amount of shoulder pain and bloody sputum is normal after a Thoracentesis  Rest:  Rest when you feel it is needed  Slowly start to do more each day  Return to your daily activities as directed  Resume your normal diet  Small sips of flat soda will help mild nausea  Do not smoke: If you smoke, it is never too late to quit  Ask for information about how to stop smoking if you need help  Contact Interventional Radiology at [phone] Chance PATIENTS: Contact Interventional Radiology at [phone]) Marina Tillman PATIENTS: Contact Interventional Radiology at [phone]) if:   · You have a fever  · Your puncture site is red, warm, swollen, or draining pus  · You have questions or concerns about your procedure, medicine, or care  Seek care immediately or call 911 if:   · Severe chest pain with inspiration and shortness of breath    · Large amounts of blood in your sputum    Follow up with your healthcare provider as directed

## 2020-02-25 NOTE — PROGRESS NOTES
Interventional Radiology Preprocedure Note    History/Indication for procedure:   Bertram Albarado is a 71 y o  male with a PMH of esohpagectomy who presents for therapeutic and/or diagnostic  right thoracentesis      SpO2 95%     Relevant past medical history:    Past Medical History:   Diagnosis Date    [facility]     Diabetes mellitus ([facility] )     Difficulty swallowing     Disease of thyroid gland     Esophageal mass     History of chemotherapy     History of transfusion     Malignant neoplasm of lower third of esophagus ([facility] )     Port-A-Cath in place     LCW    Sleep apnea     no CPAP     Patient Active Problem List   Diagnosis    COPD (chronic obstructive pulmonary disease) (HCC)    Headaches due to old head injury    High cholesterol    Obstructive sleep apnea syndrome    Type 2 diabetes mellitus with hyperglycemia, without long-term current use of insulin (HCC)    Malignant neoplasm of lower third of esophagus (HCC)    Esophageal obstruction    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HCC)    Chemotherapy induced neutropenia (HCC)    Anemia, unspecified    Insomnia due to medical condition    Encounter for care related to feeding tube    Paralysis of left vocal fold    Dysphonia    Chylothorax on right    Mild protein-calorie malnutrition (HCC)    Atrial fibrillation ([facility] )    Port-A-Cath in place       Medications:    Inpatient Medications:     Scheduled Medications:    Current Facility-Administered Medications:  lidocaine (PF)   Code/Trauma/Sedation Med Alia Evans MD       Infusions:       PRN:    lidocaine (PF)    Outpatient Medications:  Current Outpatient Medications on File Prior to Encounter   Medication Sig Dispense Refill    amiodarone 200 mg tablet Take 1 tablet (200 mg total) by mouth 3 (three) times a day with meals 90 tablet 0    gabapentin (NEURONTIN) 300 mg capsule take 1 capsule by mouth daily at bedtime (Patient not taking: Reported on 2/20/2020) 30 capsule 0    insulin NPH (HumuLIN N,NovoLIN N) 100 Units/mL subcutaneous injection Inject 12 Units under the skin daily before dinner To be given prior to tube feeds nightly 3 mL 0    levothyroxine 25 mcg tablet Take 25 mcg by mouth daily in the early morning      melatonin 3 mg Take 1 tablet (3 mg total) by mouth daily at bedtime (Patient not taking: Reported on 2/20/2020) 30 tablet 0    Nutritional Supplements (JEVITY 1 5 SHUN) LIQD Take 60 mL by mouth continuous Jevity 1 5@ 60 ml/hr for cycles of 12 hours daily  (7pm-7am nightly) 1000 mL 0    oxyCODONE (ROXICODONE) 5 mg immediate release tablet Take 1 tablet (5 mg total) by mouth every 4 (four) hours as needed for moderate pain or severe pain for up to 5 daysMax Daily Amount: 30 mg 20 tablet 0    pantoprazole (PROTONIX) 40 mg tablet Take 1 tablet (40 mg total) by mouth daily (Patient not taking: Reported on 2/20/2020) 30 tablet 1    rOPINIRole (REQUIP) 0 25 mg tablet take 1 tablet by mouth daily at bedtime (Patient not taking: Reported on 2/20/2020) 30 tablet 0    senna-docusate sodium (SENOKOT S) 8 6-50 mg per tablet Take 1 tablet by mouth daily (Patient not taking: Reported on 2/20/2020) 30 tablet 0    tamsulosin (FLOMAX) 0 4 mg Take 1 capsule (0 4 mg total) by mouth daily with dinner (Patient not taking: Reported on 2/20/2020) 30 capsule 0     No current facility-administered medications on file prior to encounter          Allergies   Allergen Reactions    Penicillins Itching       Anticoagulants: none    Labs:   CBC with diff: Lab Results   Component Value Date    WBC 6 20 02/23/2020    HGB 11 0 (L) 02/23/2020    HCT 36 7 02/23/2020    MCV 88 02/23/2020     02/23/2020    MCH 26 4 (L) 02/23/2020    MCHC 30 0 (L) 02/23/2020    RDW 17 4 (H) 02/23/2020    MPV 9 0 02/23/2020    NRBC 0 02/23/2020     BMP/CMP:  Lab Results   Component Value Date    K 4 4 02/23/2020     02/23/2020    CO2 28 02/23/2020    CO2  02/01/2020 Comment:      Out of Reportable Range    BUN 13 02/23/2020    CREATININE 1 02 02/23/2020    GLUCOSE 339 (H) 02/01/2020    CALCIUM 8 5 02/23/2020    AST 19 02/23/2020    ALT 13 02/23/2020    ALKPHOS 88 02/23/2020    EGFR 77 02/23/2020   ,     Coags:   Lab Results   Component Value Date    PTT 34 01/16/2020    INR 0 99 01/16/2020   ,          Relevant imaging studies:   Reviewed  Directed physical examination:  I agree with the physical exam performed on 2/20/20 and there are no additional changes  Assessment/Plan:   Therapeutic and/or diagnostic  right thoracentesis  Sedation/Anesthesia plan:  Local sedation will be used as needed for procedure      Consent with alternatives to the procedure, risks and benefits have been explained and discussed with the patient/patient's family: yes

## 2020-02-25 NOTE — BRIEF OP NOTE (RAD/CATH)
IR THORACENTESIS Procedure Note    PATIENT NAME: Nirmal Castelan  : 1955  MRN: [id number]    Pre-op Diagnosis:   1  Pleural effusion      Post-op Diagnosis:   1  Pleural effusion        Surgeon:   Radha Villanueva MD  Assistants:     No qualified resident was available, Resident is only observing    Estimated Blood Loss: 0 ml  Findings: WHITNEY almeida returned 1 6 L cloudy yellow pleural fluid  Specimens: None  Complications:  None      Anesthesia: Local    Radha Villanueva MD     Date: 2020  Time: 10:34 AM

## 2020-02-26 ENCOUNTER — TELEPHONE (OUTPATIENT)
Dept: HEMATOLOGY ONCOLOGY | Facility: CLINIC | Age: 65
End: 2020-02-26

## 2020-02-26 ENCOUNTER — TELEPHONE (OUTPATIENT)
Dept: CARDIAC SURGERY | Facility: CLINIC | Age: 65
End: 2020-02-26

## 2020-02-26 ENCOUNTER — TELEPHONE (OUTPATIENT)
Dept: INFUSION CENTER | Facility: HOSPITAL | Age: 65
End: 2020-02-26

## 2020-02-26 DIAGNOSIS — J90 PLEURAL EFFUSION, BILATERAL: Primary | ICD-10-CM

## 2020-02-26 NOTE — TELEPHONE ENCOUNTER
Spoke with patient's daughter, patient still feeling "very miserable"  I was unable to speak with patient, he had driven off  She states he felt better for a little bit following the thoracentesis yesterday, for 1600 mL of fluid  He had Thoracic duct embolization during his admission  He also has a foot infection he is dealing with  I will place order for a chest xray, since he did not have one following the tap to see if there is remaining fluid, recurrent effusion, etc  Pt's daughter aware and will call us after it is completed

## 2020-02-26 NOTE — SOCIAL WORK
On this date, 2/26/20, SOL received a vm from pt's daughter, Curt Leslie I)[phone] stating that the [facility] said that CM will set up VNA for pt but pt lives in [place] near [place]  CM sent referral to [facility] based on pt's location  [facility]  SOL contacted pt's daughter, Curt Leslie to inform her that [facility] accepted and should be reaching out to her soon  Curt Leslie agreeable to this

## 2020-02-26 NOTE — TELEPHONE ENCOUNTER
Call transferred from [place], [place]  From daughter Deepak Days stating patient was discharged from the ER on 2/23 for foot wound and has not heard back from the [facility] for set up  I placed a call out to [facility] .[phone] to check on status of the referral, spoke with Ino Marking (Intake)  Our [facility] does not cover that area, as far as Home Health  Per Ino Marking no referral was made from the ER  Contact information was given to call Christiano  Tashi Sher at [phone] to see if another agency can set up VNA services  I Instructed daughter to reach out to them and see VNA services can be set up since our [facility] does not cover that zip code  Also instructed to call back if any changes in condition

## 2020-02-26 NOTE — TELEPHONE ENCOUNTER
Pts daughter called in stating that pt is very uncomfortable and becoming angry  Pt has been discharged 2 weeks ago and has needed his lungs drained twice since  Yesterday pt had second draining done but is still very uncomfortable and cannot lay down  Pt is short of breath also  Pt and his daughter would like to know what else we can do  Kenia(daughter) can be reached back at [phone] to discuss

## 2020-02-27 ENCOUNTER — HOSPITAL ENCOUNTER (OUTPATIENT)
Dept: RADIOLOGY | Facility: HOSPITAL | Age: 65
Discharge: HOME | End: 2020-02-27
Payer: COMMERCIAL

## 2020-02-27 DIAGNOSIS — J90 PLEURAL EFFUSION, BILATERAL: ICD-10-CM

## 2020-02-27 PROCEDURE — 71046 X-RAY EXAM CHEST 2 VIEWS: CPT

## 2020-02-28 ENCOUNTER — TELEPHONE (OUTPATIENT)
Dept: CARDIAC SURGERY | Facility: CLINIC | Age: 65
End: 2020-02-28

## 2020-02-28 ENCOUNTER — HOSPITAL ENCOUNTER (OUTPATIENT)
Dept: INFUSION CENTER | Facility: CLINIC | Age: 65
Discharge: HOME | End: 2020-02-28
Payer: COMMERCIAL

## 2020-02-28 ENCOUNTER — TELEPHONE (OUTPATIENT)
Dept: CARDIOLOGY CLINIC | Facility: CLINIC | Age: 65
End: 2020-02-28

## 2020-02-28 DIAGNOSIS — Z95.828 PORT-A-CATH IN PLACE: Primary | ICD-10-CM

## 2020-02-28 LAB
BACTERIA SPEC BFLD CULT: NO GROWTH
GRAM STN SPEC: NORMAL
GRAM STN SPEC: NORMAL

## 2020-02-28 PROCEDURE — 96523 IRRIG DRUG DELIVERY DEVICE: CPT

## 2020-02-28 NOTE — TELEPHONE ENCOUNTER
Pts daughter called for results of the cxr that was done yesterday  I let her know that it is not final as of now but I will keep an eye on it and let Carri Ndiaye know when it is final  Kenia(daughter) can be reached back at [phone]

## 2020-02-28 NOTE — TELEPHONE ENCOUNTER
Spoke with [facility], daughter at 26  Cxr does not reveal a recurrent effusion  Pt is feeling better, from a pulmonary standpoint  No chest tightness or current shortness of breath  We will see him next week   Daughter aware

## 2020-02-28 NOTE — SOCIAL WORK
Lennox Wang requesting f/u on this pt per E-mail  Pt has required thoracentesis x 2 since his discharge, but is still c/o SOB  He is receiving wound care via [facility] who feel that his b/l  feet wounds are improving  Daughter Marly Zimmer wants to continue with [facility] services for wound care and does not want him to go to a wound center  He is establishing with a new PCP-Wm Elisa Mesa on 3/2/20 and he has a cardiothoracic appointment with Dr Kaveh Rodríguez on 3/4/20  Marly Goran is wondering if he should have been discharged on a diuretic  due to leg edema? Will await response from [facility]

## 2020-02-28 NOTE — PROGRESS NOTES
Port flush performed per protocol without incident  Pt discharged in stable condition and next port flush appointment scheduled in 6 weeks   Pt declined AVS

## 2020-03-02 ENCOUNTER — OFFICE VISIT (OUTPATIENT)
Dept: FAMILY MEDICINE CLINIC | Facility: CLINIC | Age: 65
End: 2020-03-02
Payer: COMMERCIAL

## 2020-03-02 VITALS
HEART RATE: 94 BPM | DIASTOLIC BLOOD PRESSURE: 70 MMHG | BODY MASS INDEX: 26.51 KG/M2 | TEMPERATURE: 97.7 F | SYSTOLIC BLOOD PRESSURE: 125 MMHG | RESPIRATION RATE: 15 BRPM | OXYGEN SATURATION: 97 % | HEIGHT: 70 IN | WEIGHT: 185.2 LBS

## 2020-03-02 DIAGNOSIS — F10.21 ALCOHOL DEPENDENCE, IN REMISSION (HCC): ICD-10-CM

## 2020-03-02 DIAGNOSIS — E11.65 TYPE 2 DIABETES MELLITUS WITH HYPERGLYCEMIA, WITHOUT LONG-TERM CURRENT USE OF INSULIN (HCC): Primary | ICD-10-CM

## 2020-03-02 DIAGNOSIS — D70.1 CHEMOTHERAPY INDUCED NEUTROPENIA (HCC): ICD-10-CM

## 2020-03-02 DIAGNOSIS — C80.1 NEUROPATHY ASSOCIATED WITH CANCER (HCC): ICD-10-CM

## 2020-03-02 DIAGNOSIS — J38.01 PARALYSIS OF LEFT VOCAL FOLD: ICD-10-CM

## 2020-03-02 DIAGNOSIS — I48.91 ATRIAL FIBRILLATION, UNSPECIFIED TYPE (HCC): ICD-10-CM

## 2020-03-02 DIAGNOSIS — T45.1X5A CHEMOTHERAPY INDUCED NEUTROPENIA (HCC): ICD-10-CM

## 2020-03-02 DIAGNOSIS — C15.5 MALIGNANT NEOPLASM OF LOWER THIRD OF ESOPHAGUS (HCC): ICD-10-CM

## 2020-03-02 DIAGNOSIS — G63 NEUROPATHY ASSOCIATED WITH CANCER (HCC): ICD-10-CM

## 2020-03-02 PROCEDURE — 99204 OFFICE O/P NEW MOD 45 MIN: CPT | Performed by: FAMILY MEDICINE

## 2020-03-02 PROCEDURE — 3044F HG A1C LEVEL LT 7.0%: CPT | Performed by: FAMILY MEDICINE

## 2020-03-02 PROCEDURE — 1111F DSCHRG MED/CURRENT MED MERGE: CPT | Performed by: FAMILY MEDICINE

## 2020-03-02 PROCEDURE — 1101F PT FALLS ASSESS-DOCD LE1/YR: CPT | Performed by: FAMILY MEDICINE

## 2020-03-02 PROCEDURE — 3288F FALL RISK ASSESSMENT DOCD: CPT | Performed by: FAMILY MEDICINE

## 2020-03-02 PROCEDURE — 1036F TOBACCO NON-USER: CPT | Performed by: FAMILY MEDICINE

## 2020-03-02 PROCEDURE — 3008F BODY MASS INDEX DOCD: CPT | Performed by: FAMILY MEDICINE

## 2020-03-02 NOTE — PROGRESS NOTES
Assessment/Plan:     Chronic Problems:  No problem-specific Assessment & Plan notes found for this encounter  Visit Diagnosis:  Diagnoses and all orders for this visit:    Type 2 diabetes mellitus with hyperglycemia, without long-term current use of insulin ([facility] )  -     Ambulatory referral to Podiatry; Future  -     CBC and differential; Future  -     Comprehensive metabolic panel; Future  -     Lipid panel; Future  -     Hemoglobin A1C; Future  -     Microalbumin / creatinine urine ratio; Future  -     TSH, 3rd generation; Future    Chemotherapy induced neutropenia (HCC)  -     CBC and differential; Future  -     Comprehensive metabolic panel; Future  -     Lipid panel; Future  -     Hemoglobin A1C; Future  -     Microalbumin / creatinine urine ratio; Future  -     TSH, 3rd generation; Future    Neuropathy associated with cancer (HCC)  -     CBC and differential; Future  -     Comprehensive metabolic panel; Future  -     Lipid panel; Future  -     Hemoglobin A1C; Future  -     Microalbumin / creatinine urine ratio; Future  -     TSH, 3rd generation; Future    Alcohol dependence, in remission ([facility] )  -     CBC and differential; Future  -     Comprehensive metabolic panel; Future  -     Lipid panel; Future  -     Hemoglobin A1C; Future  -     Microalbumin / creatinine urine ratio; Future  -     TSH, 3rd generation; Future    Atrial fibrillation, unspecified type [facility])  -     Ambulatory referral to Cardiology; Future  -     CBC and differential; Future  -     Comprehensive metabolic panel; Future  -     Lipid panel; Future  -     Hemoglobin A1C; Future  -     Microalbumin / creatinine urine ratio; Future  -     TSH, 3rd generation;  Future    Malignant neoplasm of lower third of esophagus (HCC)    Paralysis of left vocal fold      Diabetes   stable , to continue present therapy , will check labs , adjust according to updated labs and a1c,   We discussed the importance of controlling blood glucose (hemoglobin A1c less than 7  0)Premeal , even better <110  2hr after a meal <170, even better <140  A1C <7%, even better <6 5%  blood pressure control, and cholesterol to lower the risk for complications  Encouraged advise to check home blood sugars discussed goals in range of therapy  Reviewed recommendations of annual eye exams (ophthalmology) on long foot exam (Podiatry)  afib   stble to continue present meds , f/u with cardiology   etoh ,   In remission encouraged continued sobriety   Esophageal ca   Stable to maintain appt with onc  Vocal cord dys/ swallowing   Stable  ENT f/u   Stressed the importance of proper hydration , protein intake       Subjective:    Patient ID: Serjio Laboy is a 71 y o  male  establsih  Past primary md davidson   discharged from the hosp     Has s/fu with md [facility] , 12 mar  Onc= md morrison , 15 mar  Urology = md carlton 30 mar   onc = md garcia 28 April after ct cehest   Chf?/afib , = amiodorone  plueral effusion drained x2 , last cxr normal   Pot a cath , cw flushed today   g tube , flushed   Dm for the past 10yrs  bs today 144   meds   Developed blisters to the lower feet from the build up of fluids , has visiting rn , and wound care ,doing much better   Esophageal cancer , 9/19   Esophageal cancer , md morrison   Feeding tube , not using   Now eating , small amts , ensure     Soc hx  Smoke quit 2 yrs ago   etoh -  Retired crane     Lives with family , daughter live above  Grandchildren x5         The following portions of the patient's history were reviewed and updated as appropriate: allergies, current medications, past family history, past medical history, past social history, past surgical history and problem list     Review of Systems   Constitutional: Negative for appetite change, chills, fever and unexpected weight change     HENT: Negative for congestion, dental problem, ear pain, hearing loss, postnasal drip, rhinorrhea, sinus pressure, sinus pain, sneezing, sore throat, tinnitus and voice change  Eyes: Negative for visual disturbance  Respiratory: Negative for apnea, cough, chest tightness and shortness of breath  Cardiovascular: Negative for chest pain, palpitations and leg swelling  Gastrointestinal: Negative for abdominal pain, blood in stool, constipation, diarrhea, nausea and vomiting  Endocrine: Negative for cold intolerance, heat intolerance, polydipsia, polyphagia and polyuria  Genitourinary: Negative for decreased urine volume, difficulty urinating, dysuria, frequency and hematuria  Musculoskeletal: Negative for arthralgias, back pain, gait problem, joint swelling and myalgias  Skin: Negative for color change, rash and wound  Allergic/Immunologic: Negative for environmental allergies and food allergies  Neurological: Negative for dizziness, syncope, weakness, light-headedness, numbness and headaches  Hematological: Negative for adenopathy  Does not bruise/bleed easily  Psychiatric/Behavioral: Negative for sleep disturbance and suicidal ideas  The patient is not nervous/anxious            /70 (BP Location: Left arm, Patient Position: Sitting, Cuff Size: Adult)   Pulse 94   Temp 97 7 °F (36 5 °C)   Resp 15   Ht 5' 10" (1 778 m)   Wt 84 kg (185 lb 3 2 oz)   SpO2 97%   BMI 26 57 kg/m²   Social History     Socioeconomic History    Marital status: Single     Spouse name: Not on file    Number of children: Not on file    Years of education: Not on file    Highest education level: Not on file   Occupational History    Not on file   Social Needs    Financial resource strain: Not on file    Food insecurity:     Worry: Not on file     Inability: Not on file    Transportation needs:     Medical: Not on file     Non-medical: Not on file   Tobacco Use    Smoking status: Former Smoker     Packs/day: 0 50     Years: 50 00     Pack years: 25 00     Types: Cigarettes     Last attempt to quit: 2017     Years since quittin 1    Smokeless tobacco: Never Used   Substance and Sexual Activity    Alcohol use: Not Currently     Alcohol/week: 0 0 standard drinks     Frequency: Never     Drinks per session: 1 or 2     Binge frequency: Never    Drug use: Never    Sexual activity: Not on file   Lifestyle    Physical activity:     Days per week: Not on file     Minutes per session: Not on file    Stress: Not on file   Relationships    Social connections:     Talks on phone: Not on file     Gets together: Not on file     Attends Zoroastrian service: Not on file     Active member of club or organization: Not on file     Attends meetings of clubs or organizations: Not on file     Relationship status: Not on file    Intimate partner violence:     Fear of current or ex partner: Not on file     Emotionally abused: Not on file     Physically abused: Not on file     Forced sexual activity: Not on file   Other Topics Concern    Not on file   Social History Narrative    Not on file     Past Medical History:   Diagnosis Date    Bilateral pleural effusion     COPD (chronic obstructive pulmonary disease) ([facility] )     Diabetes mellitus ([facility] )     Difficulty swallowing     Disease of thyroid gland     Edema     Esophageal mass     History of chemotherapy     History of transfusion     Malignant neoplasm of lower third of esophagus ([facility] )     Port-A-Cath in place     LCW    Sleep apnea     no CPAP    SOB (shortness of breath)      Family History   Problem Relation Age of Onset    Diabetes Mother     No Known Problems Father      Past Surgical History:   Procedure Laterality Date    BACK SURGERY      x2    DISC REMOVAL      ESOPHAGECTOMY N/A 1/28/2020    Procedure: ESOPHAGECTOMY, TRANSHIATAL;  Surgeon: Ihsan Haywood MD;  [facility];  Service: Surgical Oncology    ESOPHAGOGASTRODUODENOSCOPY N/A 1/28/2020    Procedure: ESOPHAGOGASTRODUODENOSCOPY (EGD);   Surgeon: Ihsan Haywood MD;  [facility];  Service: Surgical Oncology   General acute hospital GUIDED CENTRAL VENOUS ACCESS DEVICE INSERTION  9/26/2019    GASTROJEJUNOSTOMY W/ JEJUNOSTOMY TUBE N/A 9/26/2019    Procedure: INSERTION JEJUNOSTOMY TUBE OPEN;  Surgeon: Flaquita Rojas MD;  [facility];  Service: Surgical Oncology    IR THORACENTESIS  2/25/2020    TONSILLECTOMY      TUNNELED VENOUS PORT PLACEMENT N/A 9/26/2019    Procedure: INSERTION VENOUS PORT (PORT-A-CATH); Surgeon: Flaquita Rojas MD;  [facility];  Service: Surgical Oncology    VOCAL CORD INJECTION N/A 2/7/2020    Procedure: MICROLARYNGOSCOPY WITH INJECTION;  Surgeon: Rosa Sepulveda MD;  [facility];  Service: ENT       Current Outpatient Medications:     amiodarone 200 mg tablet, Take 1 tablet (200 mg total) by mouth 3 (three) times a day with meals, Disp: 90 tablet, Rfl: 0    insulin NPH (HumuLIN N,NovoLIN N) 100 Units/mL subcutaneous injection, Inject 12 Units under the skin daily before dinner To be given prior to tube feeds nightly, Disp: 3 mL, Rfl: 0    levothyroxine 25 mcg tablet, Take 25 mcg by mouth daily in the early morning, Disp: , Rfl:     Nutritional Supplements (JEVITY 1 5 SHUN) LIQD, Take 60 mL by mouth continuous Jevity 1 5@ 60 ml/hr for cycles of 12 hours daily   (7pm-7am nightly), Disp: 1000 mL, Rfl: 0    gabapentin (NEURONTIN) 300 mg capsule, take 1 capsule by mouth daily at bedtime (Patient not taking: Reported on 2/20/2020), Disp: 30 capsule, Rfl: 0    melatonin 3 mg, Take 1 tablet (3 mg total) by mouth daily at bedtime (Patient not taking: Reported on 2/20/2020), Disp: 30 tablet, Rfl: 0    pantoprazole (PROTONIX) 40 mg tablet, Take 1 tablet (40 mg total) by mouth daily (Patient not taking: Reported on 2/20/2020), Disp: 30 tablet, Rfl: 1    rOPINIRole (REQUIP) 0 25 mg tablet, take 1 tablet by mouth daily at bedtime (Patient not taking: Reported on 2/20/2020), Disp: 30 tablet, Rfl: 0    senna-docusate sodium (SENOKOT S) 8 6-50 mg per tablet, Take 1 tablet by mouth daily (Patient not taking: Reported on 2/20/2020), Disp: 30 tablet, Rfl: 0    tamsulosin (FLOMAX) 0 4 mg, Take 1 capsule (0 4 mg total) by mouth daily with dinner (Patient not taking: Reported on 2/20/2020), Disp: 30 capsule, Rfl: 0    Allergies   Allergen Reactions    Penicillins Itching          Lab Review   Hospital Outpatient Visit on 02/25/2020   Component Date Value    Body Fluid Culture, Ster* 02/25/2020 No growth     Gram Stain Result 02/25/2020 2+ Polys     Gram Stain Result 02/25/2020 No bacteria seen     Site 02/25/2020 Paracentesis      Color, Fluid 02/25/2020 Yellow     Clarity, Fluid 02/25/2020 Cloudy*    WBC, Fluid 02/25/2020 508     PH BODY FLUID 02/25/2020 7 7     Glucose, Fluid 02/25/2020 85     LD, Fluid 02/25/2020 145     Cholesterol, Fluid 02/25/2020 96     Amylase, Fluid 02/25/2020 20     Triglycerides, Fluid 02/25/2020 88     Case Report 02/25/2020                      Value:Non-gynecologic Cytology                          Case: [id number]                                  Authorizing Provider:  SARA Pompa          Collected:           02/25/2020 1041              Ordering Location:     [facility] Received:            02/25/2020 [facility]                                     Interventional Radiology                                                     Pathologist:           Adrienne Neely MD                                                        Specimens:   A) - Pleural Fluid, right                                                                           B) - Pleural Fluid, right, cell block                                                      Final Diagnosis 02/25/2020                      Value: This result contains rich text formatting which cannot be displayed here  Ramon Awan Gross Description 02/25/2020                      Value: This result contains rich text formatting which cannot be displayed here      Clinical Information 02/25/2020                      Value:Pleural effusion    Additional Information 02/25/2020                      Value: This result contains rich text formatting which cannot be displayed here      Total Counted 02/25/2020 100     Neutrophils % (Fluid) 02/25/2020 19     Lymphs % (Fluid) 02/25/2020 38     Eosinophils % (Fluid) 02/25/2020 27     Mesothelial % (Fluid) 02/25/2020 9     Monocytes % (Fluid) 02/25/2020 7    Admission on 02/23/2020, Discharged on 02/23/2020   Component Date Value    WBC 02/23/2020 6 20     RBC 02/23/2020 4 17     Hemoglobin 02/23/2020 11 0*    Hematocrit 02/23/2020 36 7     MCV 02/23/2020 88     MCH 02/23/2020 26 4*    MCHC 02/23/2020 30 0*    RDW 02/23/2020 17 4*    MPV 02/23/2020 9 0     Platelets 74/70/7548 314     nRBC 02/23/2020 0     Neutrophils Relative 02/23/2020 72     Immat GRANS % 02/23/2020 0     Lymphocytes Relative 02/23/2020 16     Monocytes Relative 02/23/2020 7     Eosinophils Relative 02/23/2020 4     Basophils Relative 02/23/2020 1     Neutrophils Absolute 02/23/2020 4 46     Immature Grans Absolute 02/23/2020 0 02     Lymphocytes Absolute 02/23/2020 0 97     Monocytes Absolute 02/23/2020 0 44     Eosinophils Absolute 02/23/2020 0 27     Basophils Absolute 02/23/2020 0 04     Sodium 02/23/2020 139     Potassium 02/23/2020 4 4     Chloride 02/23/2020 103     CO2 02/23/2020 28     ANION GAP 02/23/2020 8     BUN 02/23/2020 13     Creatinine 02/23/2020 1 02     Glucose 02/23/2020 101     Calcium 02/23/2020 8 5     AST 02/23/2020 19     ALT 02/23/2020 13     Alkaline Phosphatase 02/23/2020 88     Total Protein 02/23/2020 7 3     Albumin 02/23/2020 2 5*    Total Bilirubin 02/23/2020 0 50     eGFR 02/23/2020 77     Troponin I 02/23/2020 <0 02     NT-proBNP 02/23/2020 226*    Ventricular Rate 02/23/2020 77     Atrial Rate 02/23/2020 77     ME Interval 02/23/2020 172     QRSD Interval 02/23/2020 88     QT Interval 02/23/2020 378     QTC Interval 02/23/2020 427     P Axis 02/23/2020 33     QRS Axis 02/23/2020 109     T Wave Axis 02/23/2020 23    No results displayed because visit has over 200 results        Orders Only on 01/17/2020   Component Date Value    ABO Grouping 01/16/2020 O     Rh Factor 01/16/2020 Positive     Antibody Screen 01/16/2020 Negative     Specimen Expiration Date 01/16/2020 [id number]    Appointment on 01/16/2020   Component Date Value    Sodium 01/16/2020 139     Potassium 01/16/2020 4 5     Chloride 01/16/2020 104     CO2 01/16/2020 26     ANION GAP 01/16/2020 9     BUN 01/16/2020 14     Creatinine 01/16/2020 0 83     Glucose 01/16/2020 135     Calcium 01/16/2020 9 0     AST 01/16/2020 12     ALT 01/16/2020 14     Alkaline Phosphatase 01/16/2020 63     Total Protein 01/16/2020 7 3     Albumin 01/16/2020 3 5     Total Bilirubin 01/16/2020 0 70     eGFR 01/16/2020 92     WBC 01/16/2020 5 69     RBC 01/16/2020 4 73     Hemoglobin 01/16/2020 12 5     Hematocrit 01/16/2020 40 6     MCV 01/16/2020 86     MCH 01/16/2020 26 4*    MCHC 01/16/2020 30 8*    RDW 01/16/2020 20 1*    MPV 01/16/2020 9 3     Platelets 14/22/3152 170     nRBC 01/16/2020 0     Neutrophils Relative 01/16/2020 51     Immat GRANS % 01/16/2020 1     Lymphocytes Relative 01/16/2020 19     Monocytes Relative 01/16/2020 9     Eosinophils Relative 01/16/2020 18*    Basophils Relative 01/16/2020 2*    Neutrophils Absolute 01/16/2020 3 00     Immature Grans Absolute 01/16/2020 0 03     Lymphocytes Absolute 01/16/2020 1 06     Monocytes Absolute 01/16/2020 0 50     Eosinophils Absolute 01/16/2020 1 01*    Basophils Absolute 01/16/2020 0 09     PTT 01/16/2020 34     Protime 01/16/2020 13 1     INR 01/16/2020 0 99     Hemoglobin A1C 01/16/2020 5 4     EAG 01/16/2020 108    Office Visit on 01/16/2020   Component Date Value    Ventricular Rate 01/16/2020 74     Atrial Rate 01/16/2020 74     OK Interval 01/16/2020 170     QRSD Interval 01/16/2020 80     QT Interval 01/16/2020 360     QTC Interval 01/16/2020 399     P Axis 01/16/2020 60     QRS Axis 01/16/2020 54     T Wave [place] 01/16/2020 33    Hospital Outpatient Visit on 12/30/2019   Component Date Value    POC Glucose 12/30/2019 [id number] Outpatient Visit on 12/17/2019   Component Date Value    Sodium 12/17/2019 142     Potassium 12/17/2019 3 5     Chloride 12/17/2019 107     CO2 12/17/2019 26     ANION GAP 12/17/2019 9     BUN 12/17/2019 12     Creatinine 12/17/2019 0 75     Glucose 12/17/2019 124     Calcium 12/17/2019 7 7*    AST 12/17/2019 15     ALT 12/17/2019 16     Alkaline Phosphatase 12/17/2019 94     Total Protein 12/17/2019 6 3*    Albumin 12/17/2019 3 1*    Total Bilirubin 12/17/2019 0 60     eGFR 12/17/2019 96    Appointment on 12/16/2019   Component Date Value    WBC 12/16/2019 10 80*    RBC 12/16/2019 4 14     Hemoglobin 12/16/2019 10 5*    Hematocrit 12/16/2019 35 4*    MCV 12/16/2019 86     MCH 12/16/2019 25 4*    MCHC 12/16/2019 29 7*    RDW 12/16/2019 20 4*    MPV 12/16/2019 11 1     Platelets 52/05/4941 78*    nRBC 12/16/2019 1     Segmented % 12/16/2019 62     Bands % 12/16/2019 4     Lymphocytes % 12/16/2019 21     Monocytes % 12/16/2019 4     Eosinophils, % 12/16/2019 1     Basophils % 12/16/2019 0     Myelocytes % 12/16/2019 1     Atypical Lymphocytes % 12/16/2019 7*    Absolute Neutrophils 12/16/2019 7 13     Lymphocytes Absolute 12/16/2019 2 27     Monocytes Absolute 12/16/2019 0 43     Eosinophils Absolute 12/16/2019 0 11     Basophils Absolute 12/16/2019 0 00     Toxic Granulation 12/16/2019 Present     RBC Morphology 12/16/2019 Present     Anisocytosis 12/16/2019 Present     Poikilocytes 12/16/2019 Present     Polychromasia 12/16/2019 Present     Platelet Estimate 70/22/9748 Decreased*   Appointment on 12/02/2019   Component Date Value    Sodium 12/02/2019 143     Potassium 12/02/2019 3 9     Chloride 12/02/2019 111*    CO2 12/02/2019 27     ANION GAP 12/02/2019 5     BUN 12/02/2019 13     Creatinine 12/02/2019 0 84     Glucose, Fasting 12/02/2019 128*    Calcium 12/02/2019 8 9     AST 12/02/2019 14     ALT 12/02/2019 14     Alkaline Phosphatase 12/02/2019 101     Total Protein 12/02/2019 6 5     Albumin 12/02/2019 3 6     Total Bilirubin 12/02/2019 0 57     eGFR 12/02/2019 92     WBC 12/02/2019 14 47*    RBC 12/02/2019 4 01     Hemoglobin 12/02/2019 10 0*    Hematocrit 12/02/2019 33 4*    MCV 12/02/2019 83     MCH 12/02/2019 24 9*    MCHC 12/02/2019 29 9*    RDW 12/02/2019 18 6*    MPV 12/02/2019 10 3     Platelets 37/59/1175 104*    nRBC 12/02/2019 0     Segmented % 12/02/2019 73     Bands % 12/02/2019 1     Lymphocytes % 12/02/2019 10*    Monocytes % 12/02/2019 5     Eosinophils, % 12/02/2019 4     Basophils % 12/02/2019 2*    Atypical Lymphocytes % 12/02/2019 5*    Absolute Neutrophils 12/02/2019 10 71*    Lymphocytes Absolute 12/02/2019 1 45     Monocytes Absolute 12/02/2019 0 72     Eosinophils Absolute 12/02/2019 0 58*    Basophils Absolute 12/02/2019 0 29*    nRBC 12/02/2019 3*    RBC Morphology 12/02/2019 Present     Anisocytosis 12/02/2019 Present     Poikilocytes 12/02/2019 Present     Polychromasia 12/02/2019 Present     Platelet Estimate 04/64/8286 Decreased*   There may be more visits with results that are not included  Imaging: Xr Chest Portable    Result Date: 2/5/2020  Narrative: CHEST INDICATION:   f/u chest tubes, PNA, chylothorax  GE junction carcinoma, status post esophagectomy on 1/28/2020  COMPARISON:  Chest radiograph from 2/4/2020  EXAM PERFORMED/VIEWS:  XR CHEST PORTABLE FINDINGS:  Right jugular catheter in upper SVC  Left port at cavoatrial junction  Normal heart size  Gas-filled structure adjacent to the right heart border due to the gastric pull-up  Bilateral chest tubes with slight increase in small left pneumothorax  Persistent bibasilar aspiration  Osseous structures appear within normal limits for patient age  Impression: Bilateral chest tubes with slight increase in small left pneumothorax  Persistent bilateral aspiration  Esophagectomy  Workstation performed: [id number]     Xr Chest Portable    Result Date: 2/3/2020  Narrative: CHEST INDICATION:   s/p b/l CT placement  COMPARISON:  2/2/2020 EXAM PERFORMED/VIEWS:  XR CHEST PORTABLE FINDINGS:  There has been interval placement of bilateral chest tubes  Lines and tubes are otherwise unchanged from prior exam  Cardiomediastinal silhouette appears unremarkable  There is asymmetric opacity throughout the right hemithorax likely related to layering pleural effusion with right basilar atelectasis  The appearance on the right is similar to the prior study though the left lung has cleared likely secondary to decreased pleural effusion after chest tube placement  There is a small left apical pneumothorax  Osseous structures appear within normal limits for patient age  Impression: 1  Clearing of the left hemithorax likely secondary to decreased left pleural effusion status post chest tube placement  Persistent pleural effusion/atelectasis in the right lung despite chest tube  2   Small left apical pneumothorax  The study was marked in [facility] for immediate notification  Workstation performed: [id number]     Xr Chest Portable    Result Date: 2/3/2020  Narrative: CHEST INDICATION:   s/p R CT placement  GE junction cancer with esophagectomy on 1/28/2020  COMPARISON:  Chest radiograph and chest CT from 2/2/2020  EXAM PERFORMED/VIEWS:  XR CHEST PORTABLE FINDINGS:  Right jugular catheter in upper SVC  ET tube 4 cm above the gregg  NG tube in gastric pull up  Left port at cavoatrial junction  Cardiomediastinal silhouette appears unremarkable  Right chest tube insertion with drainage of right effusion  Redemonstration of left chest tube with no left effusion  Mild bibasilar atelectasis  No pneumothorax   Osseous structures appear within normal limits for patient age  Impression: Right chest tube insertion with drainage of right effusion with no pneumothorax  Recent esophagectomy  Workstation performed: [id number]     Xr Chest Portable    Result Date: 2/3/2020  Narrative: CHEST INDICATION:   Acute Resp Failure  GE junction carcinoma, status post esophagectomy 1/28/2020  COMPARISON:  Chest radiograph from 2/2/2020 and chest CT from 2/2/2020  EXAM PERFORMED/VIEWS:  XR CHEST PORTABLE FINDINGS:  ET tube 5 cm above the gregg  NG tube in the gastric pull-up with tip below the diaphragm  Left port at cavoatrial junction  Right jugular catheter in right brachycephalic vein  Cardiomediastinal silhouette appears unremarkable  Persistence of bilateral chest tubes with drainage of effusions with no pneumothorax  New bibasilar airspace consolidation, greater on the right  Osseous structures appear within normal limits for patient age  Impression: Esophagectomy  New bibasilar consolidation, greater on the right, likely aspiration  Retention pulmonary edema is also possible  The study was marked in [facility] for immediate notification  Workstation performed: [id number]     Xr Chest Portable    Result Date: 2/2/2020  Narrative: CHEST INDICATION:   s/p central line placement  COMPARISON:  Chest radiograph from prior day EXAM PERFORMED/VIEWS:  XR CHEST PORTABLE FINDINGS:  ET tube 3 3 cm above the gregg OG tube tip in stomach Esophageal temperature probe Left Mediport  Right IJ catheter tip at the mid SVC Heart shadow is obscured by adjacent opacity  Bilateral pleural effusions with adjacent basal airspace opacities  No pneumothorax  Osseous structures appear within normal limits for patient age  Impression: 1  Right IJ catheter tip at mid SVC 2  Additional lines and tubes unchanged 3    Bilateral pleural effusions with adjacent opacities, unchanged Workstation performed: [id number]     Xr Chest Portable    Result Date: 2/2/2020  Narrative: CHEST INDICATION:   Please assess ETT placement after episode of agitation  Thanks    COMPARISON:  February 1, 2020  EXAM PERFORMED/VIEWS:  XR CHEST PORTABLE FINDINGS: Endotracheal tube again demonstrates satisfactory position with its tip above the level of the gregg  Enteric tube has been repositioned with its tip in satisfactory position below left hemidiaphragm  Again noted is a left chest port  Moderate bilateral pleural effusions with overlying airspace opacities  No pneumothorax  Transcutaneous pacer pad is again noted  Osseous structures appear within normal limits for patient age  Impression: Satisfactory positioning of endotracheal tube above the level of the gregg  Satisfactory positioning of enteric tube with its tip below left hemidiaphragm  Bilateral pleural effusions with overlying airspace opacities  Workstation performed: [id number]     Xr Chest Pa & Lateral    Result Date: 2/28/2020  Narrative: CHEST INDICATION:   J90: Pleural effusion, not elsewhere classified  Status post right thoracentesis: 2/25/2020  COMPARISON:  Chest radiograph from 2/23/2020  Chest CT from 2/18/2020  EXAM PERFORMED/VIEWS:  XR CHEST PA & LATERAL  DUAL ENERGY SUBTRACTION TECHNIQUE FINDINGS:  Left port at cavoatrial junction  Normal heart size  Hyperdense material in the mediastinum and right upper quadrant from a lymphangiogram with embolization of the thoracic duct  Esophagectomy and gastric pull-up  Resolution of right effusion  Trace left effusion  No pneumothorax  No acute pulmonary disease  Osseous structures appear within normal limits for patient age  Impression: Resolution of right effusion with no pneumothorax  Trace left effusion  Esophagectomy and gastric pull-up  Workstation performed: [id number]     Xr Chest 2 Views    Result Date: 2/24/2020  Narrative: CHEST INDICATION:   edema    History of gastric pull-through COMPARISON:  2/17/2020 EXAM PERFORMED/VIEWS:  XR CHEST PA & LATERAL FINDINGS:  Left chest wall port  Evidence of lymphangiogram with thoracic duct embolization  Cardiomediastinal silhouette appears unremarkable  Persistent right basilar infiltrate/ atelectasis with small bilateral pleural effusions  Osseous structures appear within normal limits for patient age  Impression: Persistent right basilar infiltrate/ atelectasis with small bilateral pleural effusions  Workstation performed: [id number]     Xr Chest Pa & Lateral    Result Date: 2/18/2020  Narrative: CHEST INDICATION:   C15 9: Malignant neoplasm of esophagus, unspecified  COMPARISON:  Chest radiograph from 2/11/2020 and chest CT from 2/2/2020  EXAM PERFORMED/VIEWS:  XR CHEST PA & LATERAL  DUAL ENERGY SUBTRACTION TECHNIQUE FINDINGS:  Left port at cavoatrial junction  Normal heart size  Esophagectomy and gastric pull-up  Hyperdensity in the mediastinum and right upper quadrant due to a lymphangiogram with thoracic duct embolization  Small right effusion with right base atelectasis, new since 2/11/2020  No change in trace left effusion  Osseous structures appear within normal limits for patient age  Sutures in the anterior abdominal wall  Impression: Small right effusion and right base atelectasis, new since 2/11/2020  Workstation performed: [id number]     Xr Chest Pa & Lateral    Result Date: 2/11/2020  Narrative: CHEST INDICATION:   post pull cxr for R CT removal  COMPARISON:  2/10/2020 EXAM PERFORMED/VIEWS:  XR CHEST PA & LATERAL FINDINGS:  The right-sided chest tube has been removed  Cardiomediastinal silhouette is stable  There is contrast material visible in the mediastinum which is within portions of the patient's gastric pull-through  Additional contrast material is seen outside of the GI tract contour and apparently this is related to a prior thoracic duct embolization procedure  Stable from prior  A right PICC is visible  A left-sided infusion port catheter is visible  Positioning seems satisfactory  There is a small right apical pneumothorax which is stable in size compared with the prior study  No interval increase after chest tube removal   There is also a small persistent left apical pneumothorax  There is minimal pleural effusion in the posterior costophrenic angles  No mediastinal shift  No gross evidence of pulmonary infiltrates  Probable minimal atelectasis left base  Osseous structures appear within normal limits for patient age  Impression: Stable small bilateral apical pneumothoraces  Right chest tube has been removed  Central line and infusion port catheter device appears satisfactory  Persistent contrast material in the mediastinum and GI tract Workstation performed: [id number]     Xr Chest Pa & Lateral    Result Date: 2/10/2020  Narrative: CHEST INDICATION:   f/u L CT removal  COMPARISON:  2/7/2020 EXAM PERFORMED/VIEWS:  XR CHEST PA & LATERAL Images: 2 FINDINGS:  Left chest wall Port-A-Cath unchanged in position  There is a right-sided PICC line present  The tip of the PICC line terminates at the level of the cavoatrial junction  Stable cardiomediastinal structures  Left basilar subsegmental atelectasis and effusion  No congestive failure  Small bore left-sided chest tube has been removed  Small pneumothorax at the left apex is minimally smaller than the prior study  There is a right-sided small bore chest tube which is unchanged in position  There is a small right apical pneumothorax which is slightly larger than the prior examination  A small amount of contrast partially outlines the right tracheobronchial tree  This is suggestive of aspiration from recent barium swallow  Osseous structures appear within normal limits for patient age  Impression: Status post left chest tube removal   Subsegmental atelectasis with small effusion at the base  There is a small apical pneumothorax present, slightly smaller than the prior exam  There is a small bore right-sided chest tube present  There is a small right apical pneumothorax which is slightly larger than the prior exam  There is a small collection of contrast identified within the posterior mediastinum as well as contrast present at the GE junction and within the large bowel  Contrast within the mediastinum is of unclear etiology  Possibility of surgical leak not excluded  Small amount of contrast is seen within the tracheobronchial tree suggesting aspiration during recently performed barium studies 2/8  I personally discussed this study with the surgical resident Dr Gisel Argueta on 2/10/2020 at 2:27 PM  Workstation performed: [id number]     Ir Thoracentesis    Result Date: 2/25/2020  Narrative: PROCEDURE: Therapeutic right thoracentesis STAFF: HANG Pachecoll: 1  COMPLICATIONS: None  INDICATION: Symptomatic right pleural effusion  PROCEDURE: Using ultrasound guidance, the right pleural cavity was punctured and a catheter placed into the pleural cavity  A total of 1600 milliliters of fluid was removed  The catheter was then removed  FINDINGS: Moderate right pleural effusion  Cloudy pleural fluid was removed  Impression: Therapeutic right thoracentesis  Workstation performed: [id number]     Fl Barium Swallow    Result Date: 2/8/2020  Narrative: BARIUM SWALLOW-ESOPHAGRAM INDICATION:   esophagram--r/o leak  Patient is status post recent esophagectomy  COMPARISON:  Previous chest x-ray examinations IMAGES:  170  (this includes cine capture images) FLUOROSCOPY TIME:   1 40 minutes  TECHNIQUE: Due to risk of aspiration, and lesser concern for leak, thin liquid barium was chosen after discussion with clinical service  This was given via straw FINDINGS: Examination demonstrates pre-existing radiopaque material within the right upper mediastinum related to thoracic duct embolization  There is no evidence of proximal anastomotic leak  Anticipated changes status post soft subjectively and gastric pull-through noted    Very mild laryngeal penetration was noted  Distal portion of anastomosis appears grossly intact  Impression: Postoperative changes and changes status post thoracic duct embolization  Mild degree of laryngeal penetration was noted  Speech pathology followed this examination with further assessment of swallowing mechanism No findings to suggest an anastomotic leak Workstation performed: [id number]     Fl Barium Swallow Video W Speech    Result Date: 2/8/2020  Narrative: A video barium swallow study was performed by the Department of Speech Pathology  Please refer to the report for the official interpretation  The images are stored for archival purposes only  Study images were not formally reviewed by the Radiology Department  Ct Chest Abdomen Pelvis W Contrast    Result Date: 2/2/2020  Narrative: CT CHEST, ABDOMEN AND PELVIS WITH IV CONTRAST INDICATION:   Sepsis  COMPARISON:  Chest CT dated 9/6/2019  TECHNIQUE: CT examination of the chest, abdomen and pelvis was performed  Axial, sagittal, and coronal 2D reformatted images were created from the source data and submitted for interpretation  Radiation dose length product (DLP) for this visit:  1488 2 mGy-cm   This examination, like all CT scans performed in the [facility], was performed utilizing techniques to minimize radiation dose exposure, including the use of iterative  reconstruction and automated exposure control  IV Contrast:  100 mL of iodixanol (VISIPAQUE) Enteric Contrast: Enteric contrast was administered  FINDINGS: CHEST LUNGS:  Complete collapse of both lower lobes related to compression from effusions  No central obstructing lesion demonstrated in the lobar bronchi  No other acute consolidation  No interstitial thickening  No endobronchial masses  Endotracheal tube tip  terminates appropriately in the mid thoracic trachea  PLEURA:  Large bilateral pleural effusions  No grossly loculated components   HEART/GREAT VESSELS:  Borderline enlarged  Few coronary calcifications  No pericardial thickening or effusion  Central venous catheter tip terminates at the cavoatrial junction  Normal thoracic aorta  MEDIASTINUM AND SHEA:  Postsurgical changes after esophagectomy with gastric conduit  Anastomotic sutures just above the level of the gregg  Surgical drains in place  No suspicious mediastinal masses  CHEST WALL AND LOWER NECK:   Trace anasarca  ABDOMEN LIVER/BILIARY TREE:  Unremarkable  GALLBLADDER:  There are gallstone(s) within the gallbladder, without pericholecystic inflammatory changes  SPLEEN:  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  Less than a quarter of the splenic volume is involved  PANCREAS:  Unremarkable  ADRENAL GLANDS:  Unremarkable  KIDNEYS/URETERS:  Unremarkable  No hydronephrosis  STOMACH AND BOWEL:  Postsurgical changes from gastric conduit formation  Nasogastric tube tip terminates in the subdiaphragmatic distal gastric body  Percutaneous jejunostomy tube appears appropriately positioned  No dilated or inflamed loops of small bowel  Moderate stool burden in the colon without dilation or pericolonic inflammation  APPENDIX:  No findings to suggest appendicitis  ABDOMINOPELVIC CAVITY:  Mild perihepatic and perisplenic ascites  Mild to moderate amount of free pelvic fluid  There is gradient density within the pelvic fluid measuring up to 49 HU dependently suggesting small amount layering blood products  No evidence for active intraperitoneal hemorrhage  No encapsulated hematoma or abscess  VESSELS:  Aortoiliac atherosclerosis without aneurysm or significant occlusive atheromatous disease  PELVIS REPRODUCTIVE ORGANS:  Unremarkable for patient's age  URINARY BLADDER:  Doshi catheter in place  Bladder is decompressed  Iatrogenic at the dependent air in the lumen  ABDOMINAL WALL/INGUINAL REGIONS:  Mild anasarca  No encapsulated collections  Ventral incisional staples persist  No wound dehiscence   OSSEOUS STRUCTURES: L5-S1 discectomy with interbody fusion device  Partial osseous ankylosis  No evidence for loosening or infection  No acute fractures or focally destructive osseous lesions  Impression: 1  Large bilateral pleural effusions with compressive collapse of both lower lobes  2   Moderate-sized focal posterior splenic infarct  3   Mild ascites in the abdomen and pelvis  Gradient density in the pelvic fluid suggests settling postsurgical blood products  No evidence for active intra-abdominal hemorrhage or hematoma formation, though  4   Postsurgical changes after esophagectomy and gastric conduit formation with posterior mediastinal surgical drain in place  No encapsulated-appearing collection at the intrathoracic anastomotic site  5   Appropriately positioned central venous catheter, endotracheal tube, nasogastric tube, and percutaneous jejunostomy tube  Workstation performed: [id number]     Xr Chest Portable Icu    Result Date: 2/8/2020  Narrative: CHEST INDICATION:   f/u CTs  COMPARISON:  3/5/2020 EXAM PERFORMED/VIEWS:  XR CHEST PORTABLE ICU  AP semierect Images: 1 FINDINGS:  Right IJ catheter terminates in the SVC with a left subclavian Port-A-Cath at the caval atrial junction  Radiopaque material in the right upper mediastinum noted status post thoracic duct embolization  Left basilar chest tube in stable position with improvement of the small pneumothorax  Right-sided chest tube in stable position as well  Cardiomediastinal silhouette appears unremarkable  Small pneumomediastinum noted to the left of midline  Improved right lower lobe aeration with persistent left basilar subsegmental atelectasis  Osseous structures appear within normal limits for patient age  Impression: 1  Left basilar chest tube in stable position with improvement of small pneumothorax  Lines and tubes otherwise stable  2   Improved right lower lobe aeration with persistent left basilar atelectasis   3   Evidence of thoracic duct embolization  4   Small pneumomediastinum  Workstation performed: [id number]     Xr Chest Portable Icu    Result Date: 2/4/2020  Narrative: CHEST INDICATION:   chylothorax and PNA  GE junction carcinoma, 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  COMPARISON:  Chest radiograph from 2/3/2020 and chest CT from 2/2/2020  EXAM PERFORMED/VIEWS:  XR CHEST PORTABLE ICU FINDINGS:  ET tube and NG tube removed  Left port at cavoatrial junction  Right jugular catheter in upper SVC  Cardiomediastinal silhouette appears unremarkable  Persistent bilateral chest tubes  Improving right base pneumonia  Increased left base opacity  No effusion  New small left pneumothorax  Osseous structures appear within normal limits for patient age  Impression: Improving right base pneumonia  Increased left base opacity, likely aspiration and atelectasis  Persistent bilateral chest tubes with new small left pneumothorax  Workstation performed: [id number]     Xr Chest Portable Icu    Result Date: 2/2/2020  Narrative: CHEST INDICATION:   post code/ETT placement  COMPARISON:  January 30, 2020 EXAM PERFORMED/VIEWS:  XR CHEST PORTABLE ICU FINDINGS:  Endotracheal tube is present, in satisfactory position with its tip above the level of the gregg  Enteric tube is present coiling over the lower chest but with its tip below left hemidiaphragm     Left chest port is present  Moderate bilateral pleural effusions with overlying airspace opacities  No pneumothorax  Translocation  Noted  Osseous structures appear within normal limits for patient age  Impression: Satisfactory positioning of endotracheal tube above the level of the gregg  Enteric tube coils over the lower chest presumably within gastric pull-up but tip extends to left upper quadrant of the abdomen  Bilateral pleural effusions with overlying airspace opacities   Workstation performed: [id number]     Cta Chest Pe Study    Result Date: 2/18/2020  Narrative: CTA - CHEST WITH IV CONTRAST - PULMONARY ANGIOGRAM INDICATION:   R06 02: Shortness of breath  Chest tightness  Bilateral foot swelling  Esophageal cancer  COMPARISON: Multiple prior examinations including most recently CT of the chest, abdomen and pelvis performed February 2, 2020  TECHNIQUE: CTA examination of the chest was performed using angiographic technique according to a protocol specifically tailored to evaluate for pulmonary embolism  Axial, sagittal, and coronal 2D reformatted images were created from the source data and  submitted for interpretation  In addition, coronal 3D MIP postprocessing was performed on the acquisition scanner  Radiation dose length product (DLP) for this visit:  490 mGy-cm   This examination, like all CT scans performed in the [facility], was performed utilizing techniques to minimize radiation dose exposure, including the use of iterative reconstruction and automated exposure control  IV Contrast:  60 mL of iohexol (OMNIPAQUE)  FINDINGS: PULMONARY ARTERIAL TREE:  No pulmonary embolus is seen  LUNGS:  Compressive atelectasis is reidentified, most severe in right lung base and right mid chest with a small amount of compressive atelectasis at the left lung base  No suspicious pulmonary parenchymal mass  No consolidative opacity in mid or upper  lung zones  No tracheal or endobronchial mass noted  PLEURA:  There is moderate sized loculated right pleural effusion, decreased in size when compared to February 2, 2020  Some fluid is loculated in the major fissure  There is a small to moderate loculated left pleural effusion predominantly located in the left posterior costophrenic angle, significantly smaller when compared to February 2, 2020 examination  HEART/GREAT VESSELS:  Coronary artery calcification is noted  There is trace pericardial fluid  No thoracic aortic aneurysm  MEDIASTINUM AND SHEA:  Surgical changes of gastric pull-up procedure    High density embolization material is seen within the gastric pull-up  There is very high density within the thoracic duct and within lymphatic channels throughout the posterior mediastinum and right posterior hemithorax as well as along the right hemidiaphragm and posterior pleural surfaces related to thoracic duct embolization performed February 6, 2020  CHEST WALL AND LOWER NECK:   Left chest port is noted  VISUALIZED STRUCTURES IN THE UPPER ABDOMEN:  Material associated with thoracic duct embolization  Posterior subcapsular splenic fluid collection, similar to February 2, 2020  OSSEOUS STRUCTURES:  No acute fracture or destructive osseous lesion  Impression: No pulmonary embolus  Moderate loculated right pleural effusion, and small loculated left pleural effusion are both significantly decreased in size when compared to February 2, 2020 but are both new and slowly increasing when compared with chest x-ray performed February 10, 2020  High density embolization material is seen in the posterior mediastinum along the pleural Surgical changes of gastric pull-up procedure  Surface in the right hemithorax related to thoracic duct embolization performed February 10, 2020  Workstation performed: [id number]     Vas Lower Limb Venous Duplex Study, Complete Bilateral    Result Date: 2/19/2020  Narrative:  [facility] REPORT CLINICAL: Indications: Bilateral Localized edema [R60 0] L>R with large dorsal blisters  Patient had last chemotherapy in December, 2019  Risk Factors The patient has history of previous smoking (quit 1-5yrs ago)  He has no history of DVT or Recent Trauma  FINDINGS:  Segment  Right            Left              Impression       Impression       CFV      Normal (Patent)  Normal (Patent)     CONCLUSION:  Impression: RIGHT LOWER LIMB: No evidence of acute or chronic deep vein thrombosis  No evidence of superficial thrombophlebitis noted  Doppler evaluation shows a normal response to augmentation maneuvers  Popliteal, posterior tibial and anterior tibial arterial Doppler waveforms are triphasic  LEFT LOWER LIMB: No evidence of acute or chronic deep vein thrombosis  No evidence of superficial thrombophlebitis noted  Doppler evaluation shows a normal response to augmentation maneuvers  Popliteal, posterior tibial and anterior tibial arterial Doppler waveforms are triphasic  SIGNATURE: Electronically Signed by: Alex Bennett on 2020-02-19 02:29:58 PM    Ir Other    Result Date: 2/10/2020  Narrative: PROCEDURE: 1   Real-time ultrasound guided access of 4 separate groin lymph nodes 2  Lymphangiography of the pelvis and trunk 3  Coil and glue embolization of active lymphatic extravasation arising from the thoracic duct 4   82 modifier STAFF: Georgena Boxer, M D  (primary), HANG Prajapati  (secondary) CONTRAST: 50 mL's of lipiodol intralymphatic  FLUOROSCOPY TIME: 30 4 minutes  NUMBER OF IMAGES: Multiple COMPLICATIONS: None  MEDICATIONS: Sedation was provided in the form of general anesthesia by the Anesthesia service  Please see their associated records  INDICATION: History of recent transhiatal esophagectomy complicated by high output right chylothorax  PROCEDURE: A qualified resident surgeon was not available for this procedure  Real-time ultrasound was used to access 4 separate bilateral groin lymph nodes using 25-gauge needles  Images were stored in PACS  Lipiodol was gently instilled into the bilateral groin lower extremity lymphatics  Lymphangiography of the pelvis and abdomen was performed  After lipiodol had extended into the cisterna chyli, a 22-gauge needle was used to access a lymphatic duct immediately inferior to the cisterna chyli  A microwire was extended into the thoracic duct, followed by a microcatheter  Further lymphangiography was performed  Based on the results of lymphangiography, the decision was made to embolize the thoracic duct    This was done using a combination of 1-4 mm x 7 cm Emanuel coil, 4-4 mm x 14 cm Emanuel microcoils, and an emulsion of 1:2 of nBCA with lipiodol  The microcatheter was removed  Needles in the groin were removed  FINDINGS: 1   Real-time ultrasound showed a needle to extend into a hypoechoic groin lymph nodes x4  2   Lymphangiography of both the abdomen and pelvis showed normal filling of lymphatic channels, without leak  3   Interval lymphangiography after accessing the cisterna chyli confirmed catheter positioning within the thoracic duct  4   Lymphangiography of the chest showed brisk lymphatic extravasation into the right hemithorax  There was complete transection of the thoracic duct, with no filling of the expected course of the more superior thoracic duct, likely postsurgical  5   Interval lymphangiography after coiling of the mid thoracic duct confirmed stasis of flow  6   Final  image showed good positioning of both coils and glue throughout the course of the thoracic duct and at the cisterna chyli  Impression: Thoracic duct embolization  Workstation performed: [id number]       Objective:     Physical Exam   Constitutional: He is oriented to person, place, and time  He appears well-developed and well-nourished  No distress  HENT:   Head: Normocephalic and atraumatic  Eyes: EOM are normal    Neck: Normal range of motion  Neck supple  Cardiovascular: Normal rate, regular rhythm and normal heart sounds  Pulses are no weak pulses  Pulses:       Dorsalis pedis pulses are 1+ on the right side, and 1+ on the left side  Posterior tibial pulses are 1+ on the right side, and 1+ on the left side  Pulmonary/Chest: Effort normal and breath sounds normal    Abdominal: Soft  Bowel sounds are normal    g- tube , site clean dry    Musculoskeletal: Normal range of motion  He exhibits edema          Feet:    Bilat+1 , non pitting    Sup / dorsum aspect feet , previous blistered are , rutured dry , without surrounding erythema    Feet:   Right Foot:   Skin Integrity: Positive for erythema  Left Foot:   Skin Integrity: Positive for erythema  Neurological: He is alert and oriented to person, place, and time  He has normal reflexes  Skin: Skin is warm and dry  Left chest wall port    Psychiatric: He has a normal mood and affect  His behavior is normal  Judgment and thought content normal        Patient's shoes and socks removed  Right Foot/Ankle   Right Foot Inspection  Skin Exam: erythema                          Toe Exam: swelling  Sensory       Monofilament testing: diminished  Vascular    The right DP pulse is 1+  The right PT pulse is 1+  Left Foot/Ankle  Left Foot Inspection  Skin Exam: erythema                         Toe Exam: swelling                   Sensory       Monofilament: diminished  Vascular    The left DP pulse is 1+  The left PT pulse is 1+  Assign Risk Category:  No deformity present; Loss of protective sensation; No weak pulses       Risk: 1      There are no Patient Instructions on file for this visit  SARA Cameron    Portions of the record may have been created with voice recognition software  Occasional wrong word or "sound a like" substitutions may have occurred due to the inherent limitations of voice recognition software  Read the chart carefully and recognize, using context, where substitutions have occurred

## 2020-03-04 ENCOUNTER — OFFICE VISIT (OUTPATIENT)
Dept: CARDIAC SURGERY | Facility: CLINIC | Age: 65
End: 2020-03-04

## 2020-03-04 VITALS
HEIGHT: 70 IN | TEMPERATURE: 95.7 F | HEART RATE: 92 BPM | SYSTOLIC BLOOD PRESSURE: 128 MMHG | DIASTOLIC BLOOD PRESSURE: 60 MMHG | BODY MASS INDEX: 27.11 KG/M2 | OXYGEN SATURATION: 94 % | WEIGHT: 189.4 LBS

## 2020-03-04 DIAGNOSIS — C15.5 MALIGNANT NEOPLASM OF LOWER THIRD OF ESOPHAGUS (HCC): Primary | ICD-10-CM

## 2020-03-04 PROCEDURE — 99024 POSTOP FOLLOW-UP VISIT: CPT | Performed by: PHYSICIAN ASSISTANT

## 2020-03-04 PROCEDURE — 3008F BODY MASS INDEX DOCD: CPT | Performed by: PHYSICIAN ASSISTANT

## 2020-03-04 PROCEDURE — 1111F DSCHRG MED/CURRENT MED MERGE: CPT | Performed by: PHYSICIAN ASSISTANT

## 2020-03-04 RX ORDER — OXYCODONE HYDROCHLORIDE 5 MG/1
5 CAPSULE ORAL EVERY 4 HOURS PRN
COMMUNITY
End: 2020-06-09 | Stop reason: HOSPADM

## 2020-03-04 NOTE — LETTER
March 4, 2020     [address]al[address], [address] [address]    Patient: Gonzalo Beth   YOB: 1955   Date of Visit: 3/4/2020       Dear Dr Carl Mcdonald: Thank you for referring Gonzalo Beth to me for evaluation  Below are my notes for this consultation  If you have questions, please do not hesitate to call me  I look forward to following your patient along with you  Sincerely,        Carine Pérez MD        CC: MD Jessica Shanks PA-C  3/4/2020  3:54 PM  Sign at close encounter  Thoracic Follow-Up  Assessment/Plan:    Malignant neoplasm of lower third of esophagus [facility])  Mr Esteban Bermeo is progressing well from a thoracic surgery standpoint  His neck incision is well healed  His most recent CXR does not reveal a pleural effusion  His shortness of breath and fatigue are normal post operative symptoms  He should improve over time  He will be undergoing surveillance with Dr Aleida Bhagat so he will only need to return to our office on as needed basis  Diagnoses and all orders for this visit:    Malignant neoplasm of lower third of esophagus ([address] )    Other orders  -     oxyCODONE (OXY-IR) 5 MG capsule; Take 5 mg by mouth every 4 (four) hours as needed for moderate pain             Thoracic History       Diagnosis: Clinical stage T3N1M0 esophageal carcinoma  Procedure: EGD, transhiatal esophagectomy performed on 1/28/20  Pathology: esophagogastrectomy reveals microscopic focus of residual adenocarcinoma in muscularis propria measuring 0 7 cm, adjacent Duong's esophagus with associated atypia indeterminate for dysplasia  7 lymph nodes negative for carcinoma  Proximal and distal margins negative for carcinoma  8th edition AJCC tumor stage I (ypT2, ypN0)  Therapy:  Neoadjuvant FLOT x 6 chemotherapy and Herceptin, completed 12/17/19       Patient ID: Gonzalo eBth is a 71 y o  male      HPI   Mr Esteban Bermeo is a 72 yo gentleman who was found to have adenocarcinoma of the distal esophagus  He had neoadjuvant therapy from 10/8/19-12/17/19  He was admitted on 1/28/20 and underwent an EGD, transhiatal esophagectomy in conjunction with Dr Joellen Muniz  His hospital course was complicated by volume overload, bilateral pleural effusions treated with chest tubes and subsequent embolization of thoracic duct due to a right chylothorax  He also had a code blue called when he aspirated and coded on 2/1/20  ENT performed a laryngoscopy and vocal cord injection  His tube feeds were eventually cycled and he started on a surgical soft diet  He was started on Amiodarone for Atrial Fibrillation  He eventually had his chest tubes removed and was able to be discharged on 2/12/20 with cycle TF and oral Amiodarone for 6 weeks  His PCP was going to handle him diabetic medications  After discharge, his post op course has been further complicated by a left foot infection and right pleural effusion, treated with a thoracentesis for 1600 mL of fluid on February 25, 2020  His daughter called our office on 2/25/20 due to her dad's recurrent shortness of breath  He obtained a cxr on 2/26/20 which did not reveal a recurrent effusion  I called her to see how he was doing and his breathing had improved  He presents today for his first post op visit  On discussion, his foot infection is improving  His voice had improved after the vocal cord injection, but is becoming hoarse again  He is eating eggs, chicken salad, sausage, forrester, pb&j, without significant limitation  He stopped his cycle tf about 1 5 weeks ago and holding his weight around 190  He also has increased mucous production  His next appt with Dr Oriana Martel is 3/12/20  He is seeing Dr Joellen Muniz tomorrow for his 2nd post op visit         The following portions of the patient's history were reviewed and updated as appropriate: allergies, current medications, past family history, past medical history, past social history, past surgical history and problem list     Review of Systems      Objective:   Physical Exam   Constitutional: He is oriented to person, place, and time  He appears well-developed and well-nourished  HENT:   Head: Normocephalic and atraumatic  Neck: Neck supple  Incision well healed    Pulmonary/Chest: Effort normal and breath sounds normal  No respiratory distress  He has no wheezes  Abdominal: Soft  Bowel sounds are normal    Midline incision healing well, no evidence of infection  PEG site without any abnormality   Musculoskeletal:   Trace edema b/l LE    Neurological: He is alert and oriented to person, place, and time  Skin: Skin is warm and dry  Psychiatric: He has a normal mood and affect  His behavior is normal    Nursing note and vitals reviewed  /60 (BP Location: Left arm, Patient Position: Sitting, Cuff Size: Large)   Pulse 92   Temp (!) 95 7 °F (35 4 °C)   Ht 5' 10" (1 778 m)   Wt 85 9 kg (189 lb 6 4 oz)   SpO2 94%   BMI 27 18 kg/m²      Xr Chest Pa & Lateral    Result Date: 2/28/2020  Impression Resolution of right effusion with no pneumothorax  Trace left effusion  Esophagectomy and gastric pull-up   Workstation performed: [id number]

## 2020-03-04 NOTE — ASSESSMENT & PLAN NOTE
Mr Sanya Maldonado is progressing well from a thoracic surgery standpoint  His neck incision is well healed  His most recent CXR does not reveal a pleural effusion  His shortness of breath and fatigue are normal post operative symptoms  He should improve over time  He will be undergoing surveillance with Dr Trever Mcknight so he will only need to return to our office on as needed basis

## 2020-03-04 NOTE — PROGRESS NOTES
Thoracic Follow-Up  Assessment/Plan:    Malignant neoplasm of lower third of esophagus [facility])  Mr Sanya Maldonado is progressing well from a thoracic surgery standpoint  His neck incision is well healed  His most recent CXR does not reveal a pleural effusion  His shortness of breath and fatigue are normal post operative symptoms  He should improve over time  He will be undergoing surveillance with Dr Trever Mcknight so he will only need to return to our office on as needed basis  Diagnoses and all orders for this visit:    Malignant neoplasm of lower third of esophagus ([address] )    Other orders  -     oxyCODONE (OXY-IR) 5 MG capsule; Take 5 mg by mouth every 4 (four) hours as needed for moderate pain             Thoracic History       Diagnosis: Clinical stage T3N1M0 esophageal carcinoma  Procedure: EGD, transhiatal esophagectomy performed on 1/28/20  Pathology: esophagogastrectomy reveals microscopic focus of residual adenocarcinoma in muscularis propria measuring 0 7 cm, adjacent Duong's esophagus with associated atypia indeterminate for dysplasia  7 lymph nodes negative for carcinoma  Proximal and distal margins negative for carcinoma  8th edition AJCC tumor stage I (ypT2, ypN0)  Therapy:  Neoadjuvant FLOT x 6 chemotherapy and Herceptin, completed 12/17/19       Patient ID: Ricardo Churchill is a 71 y o  male  HPI   Mr Sanya Maldonado is a 70 yo gentleman who was found to have adenocarcinoma of the distal esophagus  He had neoadjuvant therapy from 10/8/19-12/17/19  He was admitted on 1/28/20 and underwent an EGD, transhiatal esophagectomy in conjunction with Dr Trever Mcknight  His hospital course was complicated by volume overload, bilateral pleural effusions treated with chest tubes and subsequent embolization of thoracic duct due to a right chylothorax  He also had a code blue called when he aspirated and coded on 2/1/20  ENT performed a laryngoscopy and vocal cord injection   His tube feeds were eventually cycled and he started on a surgical soft diet  He was started on Amiodarone for Atrial Fibrillation  He eventually had his chest tubes removed and was able to be discharged on 2/12/20 with cycle TF and oral Amiodarone for 6 weeks  His PCP was going to handle him diabetic medications  After discharge, his post op course has been further complicated by a left foot infection and right pleural effusion, treated with a thoracentesis for 1600 mL of fluid on February 25, 2020  His daughter called our office on 2/25/20 due to her dad's recurrent shortness of breath  He obtained a cxr on 2/26/20 which did not reveal a recurrent effusion  I called her to see how he was doing and his breathing had improved  He presents today for his first post op visit  On discussion, his foot infection is improving  His voice had improved after the vocal cord injection, but is becoming hoarse again  He is eating eggs, chicken salad, sausage, forrester, pb&j, without significant limitation  He stopped his cycle tf about 1 5 weeks ago and holding his weight around 190  He also has increased mucous production  His next appt with Dr Ashley Parisi is 3/12/20  He is seeing Dr Shannon Lake tomorrow for his 2nd post op visit  The following portions of the patient's history were reviewed and updated as appropriate: allergies, current medications, past family history, past medical history, past social history, past surgical history and problem list     Review of Systems      Objective:   Physical Exam   Constitutional: He is oriented to person, place, and time  He appears well-developed and well-nourished  HENT:   Head: Normocephalic and atraumatic  Neck: Neck supple  Incision well healed    Pulmonary/Chest: Effort normal and breath sounds normal  No respiratory distress  He has no wheezes  Abdominal: Soft  Bowel sounds are normal    Midline incision healing well, no evidence of infection   PEG site without any abnormality   Musculoskeletal:   Trace edema b/l LE Neurological: He is alert and oriented to person, place, and time  Skin: Skin is warm and dry  Psychiatric: He has a normal mood and affect  His behavior is normal    Nursing note and vitals reviewed  /60 (BP Location: Left arm, Patient Position: Sitting, Cuff Size: Large)   Pulse 92   Temp (!) 95 7 °F (35 4 °C)   Ht 5' 10" (1 778 m)   Wt 85 9 kg (189 lb 6 4 oz)   SpO2 94%   BMI 27 18 kg/m²     Xr Chest Pa & Lateral    Result Date: 2/28/2020  Impression Resolution of right effusion with no pneumothorax  Trace left effusion  Esophagectomy and gastric pull-up   Workstation performed: [id number]

## 2020-03-05 ENCOUNTER — OFFICE VISIT (OUTPATIENT)
Dept: SURGICAL ONCOLOGY | Facility: CLINIC | Age: 65
End: 2020-03-05

## 2020-03-05 VITALS
BODY MASS INDEX: 27.56 KG/M2 | HEART RATE: 88 BPM | DIASTOLIC BLOOD PRESSURE: 100 MMHG | RESPIRATION RATE: 18 BRPM | WEIGHT: 192.5 LBS | HEIGHT: 70 IN | SYSTOLIC BLOOD PRESSURE: 160 MMHG | TEMPERATURE: 98.7 F

## 2020-03-05 DIAGNOSIS — C15.5 MALIGNANT NEOPLASM OF LOWER THIRD OF ESOPHAGUS (HCC): Primary | ICD-10-CM

## 2020-03-05 PROCEDURE — 1111F DSCHRG MED/CURRENT MED MERGE: CPT | Performed by: SURGERY

## 2020-03-05 PROCEDURE — 99024 POSTOP FOLLOW-UP VISIT: CPT | Performed by: SURGERY

## 2020-03-05 PROCEDURE — 3008F BODY MASS INDEX DOCD: CPT | Performed by: SURGERY

## 2020-03-05 NOTE — LETTER
March 5, 2020     Leah Wild, [address] [address]    Patient: Justa Valenzuela   YOB: 1955   Date of Visit: 3/5/2020       Dear Dr Lexx Law: Thank you for referring Justa Valenzuela to me for evaluation  Below are my notes for this consultation  If you have questions, please do not hesitate to call me  I look forward to following your patient along with you  Sincerely,        Naldo Zuniga MD        CC: Teodoro Prom, DO Zane Heimlich, MD Anton Kurk, MD Asa Felts, MD Jhonny Cords, MD  3/5/2020  9:43 AM  Sign at close encounter               Surgical Oncology Follow Up       [address]  9/34/4021  [id number]  [address] [facility]  [facility]  [address]  [facility] [address]    Diagnoses and all orders for this visit:    Malignant neoplasm of lower third of esophagus ([address] )  -     CT chest abdomen pelvis w contrast; Future  -     BUN; Future  -     Creatinine, serum; Future  -     Ambulatory referral to Hematology / Oncology; Future        Chief Complaint   Patient presents with    Esophageal Cancer     2 week follow up        Return in about 4 months (around 7/5/2020) for Office Visit, Imaging - See orders           Malignant neoplasm of lower third of esophagus (HCC)    8/24/2019 Initial Diagnosis     Malignant neoplasm of lower third of esophagus (HCC)  At least intramucosal carcinoma  Her2/SHIMON positive      10/8/2019 - 12/30/2019 Chemotherapy     palonosetron (ALOXI) injection 0 25 mg, 0 25 mg, Intravenous, Once, 6 of 6 cycles  Administration: 0 25 mg (10/8/2019), 0 25 mg (10/22/2019), 0 25 mg (11/5/2019), 0 25 mg (11/19/2019), 0 25 mg (12/3/2019), 0 25 mg (12/17/2019)  pegfilgrastim (NEULASTA ONPRO) subcutaneous injection kit 6 mg, 6 mg, Subcutaneous, Once, 6 of 6 cycles  Administration: 6 mg (10/9/2019), 6 mg (10/23/2019), 6 mg (11/6/2019), 6 mg (11/20/2019), 6 mg (12/4/2019), 6 mg (12/18/2019)  fosaprepitant (EMEND) 150 mg in sodium chloride 0 9 % 250 mL IVPB, 150 mg, Intravenous, Once, 6 of 6 cycles  Administration: 150 mg (10/8/2019), 150 mg (10/22/2019), 150 mg (11/5/2019), 150 mg (11/19/2019), 150 mg (12/3/2019), 150 mg (12/17/2019)  DOCEtaxel (TAXOTERE) 99 mg in sodium chloride 0 9 % 250 mL chemo infusion, 50 mg/m2 = 99 mg (83 3 % of original dose 60 mg/m2), Intravenous, Once, 6 of 6 cycles  Dose modification: 50 mg/m2 (original dose 60 mg/m2, Cycle 1, Reason: Other (See Comments))  Administration: 99 mg (10/8/2019), 99 mg (10/22/2019), 99 mg (11/5/2019), 99 mg (11/19/2019), 99 mg (12/3/2019), 99 mg (12/17/2019)  leucovorin 396 mg in dextrose 5 % 250 mL IVPB, 200 mg/m2 = 396 mg (50 % of original dose 400 mg/m2), Intravenous, Once, 6 of 6 cycles  Dose modification: 200 mg/m2 (original dose 400 mg/m2, Cycle 1, Reason: Other (See Comments), Comment: FLOT regimen standard)  Administration: 396 mg (10/8/2019), 396 mg (10/22/2019), 396 mg (11/5/2019), 396 mg (11/19/2019), 396 mg (12/3/2019), 396 mg (12/17/2019)  oxaliplatin (ELOXATIN) 168 3 mg in dextrose 5 % 250 mL chemo infusion, 85 mg/m2 = 168 3 mg, Intravenous, Once, 6 of 6 cycles  Administration: 168 3 mg (10/8/2019), 168 3 mg (10/22/2019), 168 3 mg (11/5/2019), 168 3 mg (11/19/2019), 168 3 mg (12/3/2019), 168 3 mg (12/17/2019)  trastuzumab (HERCEPTIN) 496 mg in sodium chloride 0 9 % 250 mL chemo infusion, 6 mg/kg = 496 mg, Intravenous, Once, 6 of 6 cycles  Administration: 496 mg (10/8/2019), 331 mg (10/22/2019), 331 mg (11/5/2019), 331 mg (11/19/2019), 331 mg (12/3/2019), 331 mg (12/17/2019)      1/28/2020 Surgery     Esophagogastrectomy  - Microscopic focus of residual adenocarcinoma in muscularis propria  - Ulceration and associated histologic changes compatible with prior neoadjuvant chemotherapy    - Adjacent betancur's esophagus with associated atypia indeterminate for dysplasia  - Seven lymph nodes identified; all negative for malignancy (0/7)          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[address] )    8/24/2019 Biopsy     Esophagus (biopsy):  - At least 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 background of Betancur's esophagus and high grade dysplasia       9/24/2019 Initial Diagnosis     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HCC)      10/8/2019 - 12/30/2019 Chemotherapy     palonosetron (ALOXI) injection 0 25 mg, 0 25 mg, Intravenous, Once, 6 of 6 cycles  Administration: 0 25 mg (10/8/2019), 0 25 mg (10/22/2019), 0 25 mg (11/5/2019), 0 25 mg (11/19/2019), 0 25 mg (12/3/2019), 0 25 mg (12/17/2019)  pegfilgrastim (NEULASTA ONPRO) subcutaneous injection kit 6 mg, 6 mg, Subcutaneous, Once, 6 of 6 cycles  Administration: 6 mg (10/9/2019), 6 mg (10/23/2019), 6 mg (11/6/2019), 6 mg (11/20/2019), 6 mg (12/4/2019), 6 mg (12/18/2019)  fosaprepitant (EMEND) 150 mg in sodium chloride 0 9 % 250 mL IVPB, 150 mg, Intravenous, Once, 6 of 6 cycles  Administration: 150 mg (10/8/2019), 150 mg (10/22/2019), 150 mg (11/5/2019), 150 mg (11/19/2019), 150 mg (12/3/2019), 150 mg (12/17/2019)  DOCEtaxel (TAXOTERE) 99 mg in sodium chloride 0 9 % 250 mL chemo infusion, 50 mg/m2 = 99 mg (83 3 % of original dose 60 mg/m2), Intravenous, Once, 6 of 6 cycles  Dose modification: 50 mg/m2 (original dose 60 mg/m2, Cycle 1, Reason: Other (See Comments))  Administration: 99 mg (10/8/2019), 99 mg (10/22/2019), 99 mg (11/5/2019), 99 mg (11/19/2019), 99 mg (12/3/2019), 99 mg (12/17/2019)  leucovorin 396 mg in dextrose 5 % 250 mL IVPB, 200 mg/m2 = 396 mg (50 % of original dose 400 mg/m2), Intravenous, Once, 6 of 6 cycles  Dose modification: 200 mg/m2 (original dose 400 mg/m2, Cycle 1, Reason: Other (See Comments), Comment: FLOT regimen standard)  Administration: 396 mg (10/8/2019), 396 mg (10/22/2019), 396 mg (11/5/2019), 396 mg (11/19/2019), 396 mg (12/3/2019), 396 mg (12/17/2019)  oxaliplatin (ELOXATIN) 168 3 mg in dextrose 5 % 250 mL chemo infusion, 85 mg/m2 = 168 3 mg, Intravenous, Once, 6 of 6 cycles  Administration: 168 3 mg (10/8/2019), 168 3 mg (10/22/2019), 168 3 mg (11/5/2019), 168 3 mg (11/19/2019), 168 3 mg (12/3/2019), 168 3 mg (12/17/2019)  trastuzumab (HERCEPTIN) 496 mg in sodium chloride 0 9 % 250 mL chemo infusion, 6 mg/kg = 496 mg, Intravenous, Once, 6 of 6 cycles  Administration: 496 mg (10/8/2019), 331 mg (10/22/2019), 331 mg (11/5/2019), 331 mg (11/19/2019), 331 mg (12/3/2019), 331 mg (12/17/2019)         Staging: [id number] esophageal adenocarcinoma, January 2020  Treatment history:  Neoadjuvant chemo radiation, FLOT plus Herceptin  Transhiatal esophagectomy, January 2020  Current treatment:  Observation  Disease status: HUNG    History of Present Illness:  Patient returns in follow-up  At this time, he is doing well  He is eating much better  He is eating all consistencies of food without any significant dysphagia  He stopped his tube feeds approximately 2 weeks ago  He has been actually gaining weight  He has no real nausea unless he overeats  His leg swelling has improved  His follow-up chest x-ray revealed no recurrence of pleural fluid  I personally reviewed the films  Review of Systems  Complete ROS Surg Onc:   Complete ROS Surg Onc:   Constitutional: The patient denies new or recent history of general fatigue, no recent weight loss, no change in appetite  Eyes: No complaints of visual problems, no scleral icterus  ENT: no complaints of ear pain, no hoarseness, no difficulty swallowing,  no tinnitus and no new masses in head, oral cavity, or neck  Cardiovascular: No complaints of chest pain, no palpitations, no ankle edema  Respiratory: No complaints of shortness of breath, no cough  Gastrointestinal: No complaints of jaundice, no bloody stools, no pale stools     Genitourinary: No complaints of dysuria, no hematuria, no nocturia, no frequent urination, no urethral discharge  Musculoskeletal: No complaints of weakness, paralysis, joint stiffness or arthralgias  Integumentary: No complaints of rash, no new lesions  Neurological: No complaints of convulsions, no seizures, no dizziness  Hematologic/Lymphatic: No complaints of easy bruising  Endocrine:  No hot or cold intolerance  No polydipsia, polyphagia, or polyuria  Allergy/immunology:  No environmental allergies  No food allergies  Not immunocompromised  Skin:  No pallor or rash  No wound          Patient Active Problem List   Diagnosis    COPD (chronic obstructive pulmonary disease) (HCC)    Headaches due to old head injury    High cholesterol    Obstructive sleep apnea syndrome    Type 2 diabetes mellitus with hyperglycemia, without long-term current use of insulin (HCC)    Malignant neoplasm of lower third of esophagus (HCC)    Esophageal obstruction    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[facility] )    Chemotherapy induced neutropenia (HCC)    Anemia, unspecified    Insomnia due to medical condition    Encounter for care related to feeding tube    Paralysis of left vocal fold    Dysphonia    Chylothorax on right    Mild protein-calorie malnutrition ([facility] )    Atrial fibrillation ([facility] )    Port-A-Cath in place    Neuropathy associated with cancer ([facility] )    Alcohol dependence, in remission ([facility] )     Past Medical History:   Diagnosis Date    Bilateral pleural effusion     COPD (chronic obstructive pulmonary disease) (HCC)     Diabetes mellitus (HCC)     Difficulty swallowing     Disease of thyroid gland     Edema     Esophageal mass     History of chemotherapy     History of transfusion     Malignant neoplasm of lower third of esophagus ([facility] )     Port-A-Cath in place     LCW    Sleep apnea     no CPAP    SOB (shortness of breath)      Past Surgical History:   Procedure Laterality Date    BACK SURGERY      x2    DISC REMOVAL      ESOPHAGECTOMY N/A 1/28/2020    Procedure: ESOPHAGECTOMY, TRANSHIATAL;  Surgeon: Zahra Zamora MD;  [facility];  Service: Surgical Oncology    ESOPHAGOGASTRODUODENOSCOPY N/A 2020    Procedure: ESOPHAGOGASTRODUODENOSCOPY (EGD); Surgeon: Zahra Zamora MD;  [facility];  Service: Surgical Oncology    FL GUIDED CENTRAL VENOUS ACCESS DEVICE INSERTION  2019    GASTROJEJUNOSTOMY W/ JEJUNOSTOMY TUBE N/A 2019    Procedure: INSERTION JEJUNOSTOMY TUBE OPEN;  Surgeon: Zahra Zamora MD;  [facility];  Service: Surgical Oncology    IR THORACENTESIS  2020    TONSILLECTOMY      TUNNELED VENOUS PORT PLACEMENT N/A 2019    Procedure: INSERTION VENOUS PORT (PORT-A-CATH);   Surgeon: Zahra Zamora MD;  [facility];  Service: Surgical Oncology    VOCAL CORD INJECTION N/A 2020    Procedure: MICROLARYNGOSCOPY WITH INJECTION;  Surgeon: Holger Vincent MD;  [facility];  Service: ENT     Family History   Problem Relation Age of Onset    Diabetes Mother     No Known Problems Father      Social History     Socioeconomic History    Marital status: Single     Spouse name: Not on file    Number of children: Not on file    Years of education: Not on file    Highest education level: Not on file   Occupational History    Not on file   Social Needs    Financial resource strain: Not on file    Food insecurity:     Worry: Not on file     Inability: Not on file    Transportation needs:     Medical: Not on file     Non-medical: Not on file   Tobacco Use    Smoking status: Former Smoker     Packs/day: 0 50     Years: 50 00     Pack years: 25 00     Types: Cigarettes     Last attempt to quit: 2017     Years since quittin 1    Smokeless tobacco: Never Used   Substance and Sexual Activity    Alcohol use: Not Currently     Alcohol/week: 0 0 standard drinks     Frequency: Never     Drinks per session: 1 or 2     Binge frequency: Never    Drug use: Never    Sexual activity: Not on file   Lifestyle    Physical activity:     Days per week: Not on file     Minutes per session: Not on file    Stress: Not on file   Relationships    Social connections:     Talks on phone: Not on file     Gets together: Not on file     Attends Holiness service: Not on file     Active member of club or organization: Not on file     Attends meetings of clubs or organizations: Not on file     Relationship status: Not on file    Intimate partner violence:     Fear of current or ex partner: Not on file     Emotionally abused: Not on file     Physically abused: Not on file     Forced sexual activity: Not on file   Other Topics Concern    Not on file   Social History Narrative    Not on file       Current Outpatient Medications:     amiodarone 200 mg tablet, Take 1 tablet (200 mg total) by mouth 3 (three) times a day with meals, Disp: 90 tablet, Rfl: 0    insulin NPH (HumuLIN N,NovoLIN N) 100 Units/mL subcutaneous injection, Inject 12 Units under the skin daily before dinner To be given prior to tube feeds nightly, Disp: 3 mL, Rfl: 0    levothyroxine 25 mcg tablet, Take 25 mcg by mouth daily in the early morning, Disp: , Rfl:     Nutritional Supplements (JEVITY 1 5 SHUN) LIQD, Take 60 mL by mouth continuous Jevity 1 5@ 60 ml/hr for cycles of 12 hours daily   (7pm-7am nightly), Disp: 1000 mL, Rfl: 0    oxyCODONE (OXY-IR) 5 MG capsule, Take 5 mg by mouth every 4 (four) hours as needed for moderate pain, Disp: , Rfl:     pantoprazole (PROTONIX) 40 mg tablet, Take 1 tablet (40 mg total) by mouth daily, Disp: 30 tablet, Rfl: 1    rOPINIRole (REQUIP) 0 25 mg tablet, take 1 tablet by mouth daily at bedtime, Disp: 30 tablet, Rfl: 0    senna-docusate sodium (SENOKOT S) 8 6-50 mg per tablet, Take 1 tablet by mouth daily, Disp: 30 tablet, Rfl: 0    tamsulosin (FLOMAX) 0 4 mg, Take 1 capsule (0 4 mg total) by mouth daily with dinner, Disp: 30 capsule, Rfl: 0    gabapentin (NEURONTIN) 300 mg capsule, take 1 capsule by mouth daily at bedtime (Patient not taking: Reported on 2/20/2020), Disp: 30 capsule, Rfl: 0    melatonin 3 mg, Take 1 tablet (3 mg total) by mouth daily at bedtime (Patient not taking: Reported on 2/20/2020), Disp: 30 tablet, Rfl: 0  Allergies   Allergen Reactions    Penicillins Itching     Vitals:    03/05/20 0915   BP: 160/100   Pulse: 88   Resp: 18   Temp: 98 7 °F (37 1 °C)       Physical Exam  Constitutional: General appearance: The Patient is well-developed and well-nourished who appears the stated age in no acute distress  Patient is pleasant and talkative  HEENT:  Normocephalic  Sclerae are anicteric  Mucous membranes are moist   Voice is still hoarse  Abdomen: Abdomen is soft, non-tender, non-distended and without masses  Feeding tube was removed  Extremities: There is no clubbing or cyanosis  There is 2+ edema the feet  Symmetric  Neuro: Grossly nonfocal  Gait is normal      Lymphatic: No evidence of cervical adenopathy bilaterally  No evidence of axillary adenopathy bilaterally  No evidence of inguinal adenopathy bilaterally  Skin: Warm, anicteric  Psych:  Patient is pleasant and talkative  Breasts:        Pathology:  [unfilled]    Labs:      Imaging  Xr Chest Portable    Result Date: 2/5/2020  Narrative: CHEST INDICATION:   f/u chest tubes, PNA, chylothorax  GE junction carcinoma, status post esophagectomy on 1/28/2020  COMPARISON:  Chest radiograph from 2/4/2020  EXAM PERFORMED/VIEWS:  XR CHEST PORTABLE FINDINGS:  Right jugular catheter in upper SVC  Left port at cavoatrial junction  Normal heart size  Gas-filled structure adjacent to the right heart border due to the gastric pull-up  Bilateral chest tubes with slight increase in small left pneumothorax  Persistent bibasilar aspiration  Osseous structures appear within normal limits for patient age  Impression: Bilateral chest tubes with slight increase in small left pneumothorax  Persistent bilateral aspiration  Esophagectomy   Workstation performed: [id number]     Xr Chest Pa & Lateral    Result Date: 2/28/2020  Narrative: CHEST INDICATION:   J90: Pleural effusion, not elsewhere classified  Status post right thoracentesis: 2/25/2020  COMPARISON:  Chest radiograph from 2/23/2020  Chest CT from 2/18/2020  EXAM PERFORMED/VIEWS:  XR CHEST PA & LATERAL  DUAL ENERGY SUBTRACTION TECHNIQUE FINDINGS:  Left port at cavoatrial junction  Normal heart size  Hyperdense material in the mediastinum and right upper quadrant from a lymphangiogram with embolization of the thoracic duct  Esophagectomy and gastric pull-up  Resolution of right effusion  Trace left effusion  No pneumothorax  No acute pulmonary disease  Osseous structures appear within normal limits for patient age  Impression: Resolution of right effusion with no pneumothorax  Trace left effusion  Esophagectomy and gastric pull-up  Workstation performed: [id number]     Xr Chest 2 Views    Result Date: 2/24/2020  Narrative: CHEST INDICATION:   edema  History of gastric pull-through COMPARISON:  2/17/2020 EXAM PERFORMED/VIEWS:  XR CHEST PA & LATERAL FINDINGS:  Left chest wall port  Evidence of lymphangiogram with thoracic duct embolization  Cardiomediastinal silhouette appears unremarkable  Persistent right basilar infiltrate/ atelectasis with small bilateral pleural effusions  Osseous structures appear within normal limits for patient age  Impression: Persistent right basilar infiltrate/ atelectasis with small bilateral pleural effusions  Workstation performed: [id number]     Xr Chest Pa & Lateral    Result Date: 2/18/2020  Narrative: CHEST INDICATION:   C15 9: Malignant neoplasm of esophagus, unspecified  COMPARISON:  Chest radiograph from 2/11/2020 and chest CT from 2/2/2020  EXAM PERFORMED/VIEWS:  XR CHEST PA & LATERAL  DUAL ENERGY SUBTRACTION TECHNIQUE FINDINGS:  Left port at cavoatrial junction  Normal heart size  Esophagectomy and gastric pull-up   Hyperdensity in the mediastinum and right upper quadrant due to a lymphangiogram with thoracic duct embolization  Small right effusion with right base atelectasis, new since 2/11/2020  No change in trace left effusion  Osseous structures appear within normal limits for patient age  Sutures in the anterior abdominal wall  Impression: Small right effusion and right base atelectasis, new since 2/11/2020  Workstation performed: [id number]     Xr Chest Pa & Lateral    Result Date: 2/11/2020  Narrative: CHEST INDICATION:   post pull cxr for R CT removal  COMPARISON:  2/10/2020 EXAM PERFORMED/VIEWS:  XR CHEST PA & LATERAL FINDINGS:  The right-sided chest tube has been removed  Cardiomediastinal silhouette is stable  There is contrast material visible in the mediastinum which is within portions of the patient's gastric pull-through  Additional contrast material is seen outside of the GI tract contour and apparently this is related to a prior thoracic duct embolization procedure  Stable from prior  A right PICC is visible  A left-sided infusion port catheter is visible  Positioning seems satisfactory  There is a small right apical pneumothorax which is stable in size compared with the prior study  No interval increase after chest tube removal   There is also a small persistent left apical pneumothorax  There is minimal pleural effusion in the posterior costophrenic angles  No mediastinal shift  No gross evidence of pulmonary infiltrates  Probable minimal atelectasis left base  Osseous structures appear within normal limits for patient age  Impression: Stable small bilateral apical pneumothoraces  Right chest tube has been removed  Central line and infusion port catheter device appears satisfactory   Persistent contrast material in the mediastinum and GI tract Workstation performed: [id number]     Xr Chest Pa & Lateral    Result Date: 2/10/2020  Narrative: CHEST INDICATION:   f/u L CT removal  COMPARISON:  2/7/2020 EXAM PERFORMED/VIEWS:  XR CHEST PA & LATERAL Images: 2 FINDINGS:  Left chest wall Port-A-Cath unchanged in position  There is a right-sided PICC line present  The tip of the PICC line terminates at the level of the cavoatrial junction  Stable cardiomediastinal structures  Left basilar subsegmental atelectasis and effusion  No congestive failure  Small bore left-sided chest tube has been removed  Small pneumothorax at the left apex is minimally smaller than the prior study  There is a right-sided small bore chest tube which is unchanged in position  There is a small right apical pneumothorax which is slightly larger than the prior examination  A small amount of contrast partially outlines the right tracheobronchial tree  This is suggestive of aspiration from recent barium swallow  Osseous structures appear within normal limits for patient age  Impression: Status post left chest tube removal   Subsegmental atelectasis with small effusion at the base  There is a small apical pneumothorax present, slightly smaller than the prior exam  There is a small bore right-sided chest tube present  There is a small right apical pneumothorax which is slightly larger than the prior exam  There is a small collection of contrast identified within the posterior mediastinum as well as contrast present at the GE junction and within the large bowel  Contrast within the mediastinum is of unclear etiology  Possibility of surgical leak not excluded  Small amount of contrast is seen within the tracheobronchial tree suggesting aspiration during recently performed barium studies 2/8  I personally discussed this study with the surgical resident Dr Brayan Sosa on 2/10/2020 at 2:27 PM  Workstation performed: [id number]     Ir Thoracentesis    Result Date: 2/25/2020  Narrative: PROCEDURE: Therapeutic right thoracentesis STAFF: HANG Wolfed: 1  COMPLICATIONS: None  INDICATION: Symptomatic right pleural effusion   PROCEDURE: Using ultrasound guidance, the right pleural cavity was punctured and a catheter placed into the pleural cavity  A total of 1600 milliliters of fluid was removed  The catheter was then removed  FINDINGS: Moderate right pleural effusion  Cloudy pleural fluid was removed  Impression: Therapeutic right thoracentesis  Workstation performed: [id number]     Fl Barium Swallow    Result Date: 2/8/2020  Narrative: BARIUM SWALLOW-ESOPHAGRAM INDICATION:   esophagram--r/o leak  Patient is status post recent esophagectomy  COMPARISON:  Previous chest x-ray examinations IMAGES:  170  (this includes cine capture images) FLUOROSCOPY TIME:   1 40 minutes  TECHNIQUE: Due to risk of aspiration, and lesser concern for leak, thin liquid barium was chosen after discussion with clinical service  This was given via straw FINDINGS: Examination demonstrates pre-existing radiopaque material within the right upper mediastinum related to thoracic duct embolization  There is no evidence of proximal anastomotic leak  Anticipated changes status post soft subjectively and gastric pull-through noted  Very mild laryngeal penetration was noted  Distal portion of anastomosis appears grossly intact  Impression: Postoperative changes and changes status post thoracic duct embolization  Mild degree of laryngeal penetration was noted  Speech pathology followed this examination with further assessment of swallowing mechanism No findings to suggest an anastomotic leak Workstation performed: [id number]     Fl Barium Swallow Video W Speech    Result Date: 2/8/2020  Narrative: A video barium swallow study was performed by the Department of Speech Pathology  Please refer to the report for the official interpretation  The images are stored for archival purposes only  Study images were not formally reviewed by the Radiology Department  Xr Chest Portable Icu    Result Date: 2/8/2020  Narrative: CHEST INDICATION:   f/u CTs   COMPARISON:  3/5/2020 EXAM PERFORMED/VIEWS:  XR CHEST PORTABLE ICU  AP semierect Images: 1 FINDINGS:  Right IJ catheter terminates in the SVC with a left subclavian Port-A-Cath at the caval atrial junction  Radiopaque material in the right upper mediastinum noted status post thoracic duct embolization  Left basilar chest tube in stable position with improvement of the small pneumothorax  Right-sided chest tube in stable position as well  Cardiomediastinal silhouette appears unremarkable  Small pneumomediastinum noted to the left of midline  Improved right lower lobe aeration with persistent left basilar subsegmental atelectasis  Osseous structures appear within normal limits for patient age  Impression: 1  Left basilar chest tube in stable position with improvement of small pneumothorax  Lines and tubes otherwise stable  2   Improved right lower lobe aeration with persistent left basilar atelectasis  3   Evidence of thoracic duct embolization  4   Small pneumomediastinum  Workstation performed: [id number]     Cta Chest Pe Study    Result Date: 2/18/2020  Narrative: CTA - CHEST WITH IV CONTRAST - PULMONARY ANGIOGRAM INDICATION:   R06 02: Shortness of breath  Chest tightness  Bilateral foot swelling  Esophageal cancer  COMPARISON: Multiple prior examinations including most recently CT of the chest, abdomen and pelvis performed February 2, 2020  TECHNIQUE: CTA examination of the chest was performed using angiographic technique according to a protocol specifically tailored to evaluate for pulmonary embolism  Axial, sagittal, and coronal 2D reformatted images were created from the source data and  submitted for interpretation  In addition, coronal 3D MIP postprocessing was performed on the acquisition scanner  Radiation dose length product (DLP) for this visit:  490 mGy-cm     This examination, like all CT scans performed in the [facility], was performed utilizing techniques to minimize radiation dose exposure, including the use of iterative reconstruction and automated exposure control  IV Contrast:  60 mL of iohexol (OMNIPAQUE)  FINDINGS: PULMONARY ARTERIAL TREE:  No pulmonary embolus is seen  LUNGS:  Compressive atelectasis is reidentified, most severe in right lung base and right mid chest with a small amount of compressive atelectasis at the left lung base  No suspicious pulmonary parenchymal mass  No consolidative opacity in mid or upper  lung zones  No tracheal or endobronchial mass noted  PLEURA:  There is moderate sized loculated right pleural effusion, decreased in size when compared to February 2, 2020  Some fluid is loculated in the major fissure  There is a small to moderate loculated left pleural effusion predominantly located in the left posterior costophrenic angle, significantly smaller when compared to February 2, 2020 examination  HEART/GREAT VESSELS:  Coronary artery calcification is noted  There is trace pericardial fluid  No thoracic aortic aneurysm  MEDIASTINUM AND SHEA:  Surgical changes of gastric pull-up procedure  High density embolization material is seen within the gastric pull-up  There is very high density within the thoracic duct and within lymphatic channels throughout the posterior mediastinum and right posterior hemithorax as well as along the right hemidiaphragm and posterior pleural surfaces related to thoracic duct embolization performed February 6, 2020  CHEST WALL AND LOWER NECK:   Left chest port is noted  VISUALIZED STRUCTURES IN THE UPPER ABDOMEN:  Material associated with thoracic duct embolization  Posterior subcapsular splenic fluid collection, similar to February 2, 2020  OSSEOUS STRUCTURES:  No acute fracture or destructive osseous lesion  Impression: No pulmonary embolus   Moderate loculated right pleural effusion, and small loculated left pleural effusion are both significantly decreased in size when compared to February 2, 2020 but are both new and slowly increasing when compared with chest x-ray performed February 10, 2020  High density embolization material is seen in the posterior mediastinum along the pleural Surgical changes of gastric pull-up procedure  Surface in the right hemithorax related to thoracic duct embolization performed February 10, 2020  Workstation performed: [id number]     Vas Lower Limb Venous Duplex Study, Complete Bilateral    Result Date: 2/19/2020  Narrative:  [facility] REPORT [facility]INICAL: Indications: Bilateral Localized edema [R60 0] L>R with large dorsal blisters  Patient had last chemotherapy in December, 2019  Risk Factors The patient has history of previous smoking (quit 1-5yrs ago)  He has no history of DVT or Recent Trauma  FINDINGS:  Segment  Right            Left              Impression       Impression       CFV      Normal (Patent)  Normal (Patent)     CONCLUSION:  Impression: RIGHT LOWER LIMB: No evidence of acute or chronic deep vein thrombosis  No evidence of superficial thrombophlebitis noted  Doppler evaluation shows a normal response to augmentation maneuvers  Popliteal, posterior tibial and anterior tibial arterial Doppler waveforms are triphasic  LEFT LOWER LIMB: No evidence of acute or chronic deep vein thrombosis  No evidence of superficial thrombophlebitis noted  Doppler evaluation shows a normal response to augmentation maneuvers  Popliteal, posterior tibial and anterior tibial arterial Doppler waveforms are triphasic  SIGNATURE: Electronically Signed by: Madhav Oseguera on 2020-02-19 02:29:58 PM    Ir Other    Result Date: 2/10/2020  Narrative: PROCEDURE: 1   Real-time ultrasound guided access of 4 separate groin lymph nodes 2  Lymphangiography of the pelvis and trunk 3  Coil and glue embolization of active lymphatic extravasation arising from the thoracic duct 4   82 modifier STAFF: HANG Lim  (primary), HANG Castillo  (secondary) CONTRAST: 50 mL's of lipiodol intralymphatic  FLUOROSCOPY TIME: 30 4 minutes  NUMBER OF IMAGES: Multiple COMPLICATIONS: None  MEDICATIONS: Sedation was provided in the form of general anesthesia by the Anesthesia service  Please see their associated records  INDICATION: History of recent transhiatal esophagectomy complicated by high output right chylothorax  PROCEDURE: A qualified resident surgeon was not available for this procedure  Real-time ultrasound was used to access 4 separate bilateral groin lymph nodes using 25-gauge needles  Images were stored in PACS  Lipiodol was gently instilled into the bilateral groin lower extremity lymphatics  Lymphangiography of the pelvis and abdomen was performed  After lipiodol had extended into the cisterna chyli, a 22-gauge needle was used to access a lymphatic duct immediately inferior to the cisterna chyli  A microwire was extended into the thoracic duct, followed by a microcatheter  Further lymphangiography was performed  Based on the results of lymphangiography, the decision was made to embolize the thoracic duct  This was done using a combination of 1-4 mm x 7 cm Emanuel coil, 4-4 mm x 14 cm Emanuel microcoils, and an emulsion of 1:2 of nBCA with lipiodol  The microcatheter was removed  Needles in the groin were removed  FINDINGS: 1   Real-time ultrasound showed a needle to extend into a hypoechoic groin lymph nodes x4  2   Lymphangiography of both the abdomen and pelvis showed normal filling of lymphatic channels, without leak  3   Interval lymphangiography after accessing the cisterna chyli confirmed catheter positioning within the thoracic duct  4   Lymphangiography of the chest showed brisk lymphatic extravasation into the right hemithorax  There was complete transection of the thoracic duct, with no filling of the expected course of the more superior thoracic duct, likely postsurgical  5   Interval lymphangiography after coiling of the mid thoracic duct confirmed stasis of flow   6   Final  image showed good positioning of both coils and glue throughout the course of the thoracic duct and at the cisterna chyli  Impression: Thoracic duct embolization  Workstation performed: [id number]     I reviewed the above laboratory and imaging data  Discussion/Summary: 57-year-old male who underwent transhiatal esophagectomy for a T3N1M0 esophageal adenocarcinoma after neoadjuvant chemotherapy  He did have an excellent response to chemo radiation  His final pathology revealed this was a [id number] adenocarcinoma with only a 7 mm residual tumor  He should not require any further therapy  His feeding tube was removed, since he is maintaining his weight, eating well and has not used his tube feeds for nearly 2 weeks  I will set him up with Medical Oncology again to discuss any further adjuvant therapy, but given his 7 mm residual tumor, I suspect no further treatment should be required  I will see him again in July with repeat imaging  He will monitor his leg swelling  He is agreeable to this    All of his questions were answered

## 2020-03-05 NOTE — PROGRESS NOTES
Surgical Oncology Follow Up       [address] [facility]  [address]  [place] [address]  6/[birth year]22  [id number]  [address] [facility]  [address]  [place] [id number]    Diagnoses and all orders for this visit:    Malignant neoplasm of lower third of esophagus ([address] )  -     CT chest abdomen pelvis w contrast; Future  -     BUN; Future  -     Creatinine, serum; Future  -     Ambulatory referral to Hematology / Oncology; Future        Chief Complaint   Patient presents with    Esophageal Cancer     2 week follow up        Return in about 4 months (around 7/5/2020) for Office Visit, Imaging - See orders           Malignant neoplasm of lower third of esophagus (HCC)    8/24/2019 Initial Diagnosis     Malignant neoplasm of lower third of esophagus (HCC)  At least intramucosal carcinoma  Her2/SHIMON positive      10/8/2019 - 12/30/2019 Chemotherapy     palonosetron (ALOXI) injection 0 25 mg, 0 25 mg, Intravenous, Once, 6 of 6 cycles  Administration: 0 25 mg (10/8/2019), 0 25 mg (10/22/2019), 0 25 mg (11/5/2019), 0 25 mg (11/19/2019), 0 25 mg (12/3/2019), 0 25 mg (12/17/2019)  pegfilgrastim (NEULASTA ONPRO) subcutaneous injection kit 6 mg, 6 mg, Subcutaneous, Once, 6 of 6 cycles  Administration: 6 mg (10/9/2019), 6 mg (10/23/2019), 6 mg (11/6/2019), 6 mg (11/20/2019), 6 mg (12/4/2019), 6 mg (12/18/2019)  fosaprepitant (EMEND) 150 mg in sodium chloride 0 9 % 250 mL IVPB, 150 mg, Intravenous, Once, 6 of 6 cycles  Administration: 150 mg (10/8/2019), 150 mg (10/22/2019), 150 mg (11/5/2019), 150 mg (11/19/2019), 150 mg (12/3/2019), 150 mg (12/17/2019)  DOCEtaxel (TAXOTERE) 99 mg in sodium chloride 0 9 % 250 mL chemo infusion, 50 mg/m2 = 99 mg (83 3 % of original dose 60 mg/m2), Intravenous, Once, 6 of 6 cycles  Dose modification: 50 mg/m2 (original dose 60 mg/m2, Cycle 1, Reason: Other (See Comments))  Administration: 99 mg (10/8/2019), 99 mg (10/22/2019), 99 mg (11/5/2019), 99 mg (11/19/2019), 99 mg (12/3/2019), 99 mg (12/17/2019)  leucovorin 396 mg in dextrose 5 % 250 mL IVPB, 200 mg/m2 = 396 mg (50 % of original dose 400 mg/m2), Intravenous, Once, 6 of 6 cycles  Dose modification: 200 mg/m2 (original dose 400 mg/m2, Cycle 1, Reason: Other (See Comments), Comment: FLOT regimen standard)  Administration: 396 mg (10/8/2019), 396 mg (10/22/2019), 396 mg (11/5/2019), 396 mg (11/19/2019), 396 mg (12/3/2019), 396 mg (12/17/2019)  oxaliplatin (ELOXATIN) 168 3 mg in dextrose 5 % 250 mL chemo infusion, 85 mg/m2 = 168 3 mg, Intravenous, Once, 6 of 6 cycles  Administration: 168 3 mg (10/8/2019), 168 3 mg (10/22/2019), 168 3 mg (11/5/2019), 168 3 mg (11/19/2019), 168 3 mg (12/3/2019), 168 3 mg (12/17/2019)  trastuzumab (HERCEPTIN) 496 mg in sodium chloride 0 9 % 250 mL chemo infusion, 6 mg/kg = 496 mg, Intravenous, Once, 6 of 6 cycles  Administration: 496 mg (10/8/2019), 331 mg (10/22/2019), 331 mg (11/5/2019), 331 mg (11/19/2019), 331 mg (12/3/2019), 331 mg (12/17/2019)      1/28/2020 Surgery     Esophagogastrectomy  - Microscopic focus of residual adenocarcinoma in muscularis propria  - Ulceration and associated histologic changes compatible with prior neoadjuvant chemotherapy  - Adjacent betancur's esophagus with associated atypia indeterminate for dysplasia  - Seven lymph nodes identified; all negative for malignancy (0/7)          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[address] )    8/24/2019 Biopsy     Esophagus (biopsy):  - At least 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 background of Betancur's esophagus and high grade dysplasia       9/24/2019 Initial Diagnosis     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[facility] )      10/8/2019 - 12/30/2019 Chemotherapy     palonosetron (ALOXI) injection 0 25 mg, 0 25 mg, Intravenous, Once, 6 of 6 cycles  Administration: 0 25 mg (10/8/2019), 0 25 mg (10/22/2019), 0 25 mg (11/5/2019), 0 25 mg (11/19/2019), 0 25 mg (12/3/2019), 0 25 mg (12/17/2019)  pegfilgrastim (NEULASTA ONPRO) subcutaneous injection kit 6 mg, 6 mg, Subcutaneous, Once, 6 of 6 cycles  Administration: 6 mg (10/9/2019), 6 mg (10/23/2019), 6 mg (11/6/2019), 6 mg (11/20/2019), 6 mg (12/4/2019), 6 mg (12/18/2019)  fosaprepitant (EMEND) 150 mg in sodium chloride 0 9 % 250 mL IVPB, 150 mg, Intravenous, Once, 6 of 6 cycles  Administration: 150 mg (10/8/2019), 150 mg (10/22/2019), 150 mg (11/5/2019), 150 mg (11/19/2019), 150 mg (12/3/2019), 150 mg (12/17/2019)  DOCEtaxel (TAXOTERE) 99 mg in sodium chloride 0 9 % 250 mL chemo infusion, 50 mg/m2 = 99 mg (83 3 % of original dose 60 mg/m2), Intravenous, Once, 6 of 6 cycles  Dose modification: 50 mg/m2 (original dose 60 mg/m2, Cycle 1, Reason: Other (See Comments))  Administration: 99 mg (10/8/2019), 99 mg (10/22/2019), 99 mg (11/5/2019), 99 mg (11/19/2019), 99 mg (12/3/2019), 99 mg (12/17/2019)  leucovorin 396 mg in dextrose 5 % 250 mL IVPB, 200 mg/m2 = 396 mg (50 % of original dose 400 mg/m2), Intravenous, Once, 6 of 6 cycles  Dose modification: 200 mg/m2 (original dose 400 mg/m2, Cycle 1, Reason: Other (See Comments), Comment: FLOT regimen standard)  Administration: 396 mg (10/8/2019), 396 mg (10/22/2019), 396 mg (11/5/2019), 396 mg (11/19/2019), 396 mg (12/3/2019), 396 mg (12/17/2019)  oxaliplatin (ELOXATIN) 168 3 mg in dextrose 5 % 250 mL chemo infusion, 85 mg/m2 = 168 3 mg, Intravenous, Once, 6 of 6 cycles  Administration: 168 3 mg (10/8/2019), 168 3 mg (10/22/2019), 168 3 mg (11/5/2019), 168 3 mg (11/19/2019), 168 3 mg (12/3/2019), 168 3 mg (12/17/2019)  trastuzumab (HERCEPTIN) 496 mg in sodium chloride 0 9 % 250 mL chemo infusion, 6 mg/kg = 496 mg, Intravenous, Once, 6 of 6 cycles  Administration: 496 mg (10/8/2019), 331 mg (10/22/2019), 331 mg (11/5/2019), 331 mg (11/19/2019), 331 mg (12/3/2019), 331 mg (12/17/2019)         Staging: [id number] esophageal adenocarcinoma, January 2020  Treatment history:  Neoadjuvant chemo radiation, FLOT plus Herceptin  Transhiatal esophagectomy, January 2020  Current treatment:  Observation  Disease status: HUNG    History of Present Illness:  Patient returns in follow-up  At this time, he is doing well  He is eating much better  He is eating all consistencies of food without any significant dysphagia  He stopped his tube feeds approximately 2 weeks ago  He has been actually gaining weight  He has no real nausea unless he overeats  His leg swelling has improved  His follow-up chest x-ray revealed no recurrence of pleural fluid  I personally reviewed the films  Review of Systems  Complete ROS Surg Onc:   Complete ROS Surg Onc:   Constitutional: The patient denies new or recent history of general fatigue, no recent weight loss, no change in appetite  Eyes: No complaints of visual problems, no scleral icterus  ENT: no complaints of ear pain, no hoarseness, no difficulty swallowing,  no tinnitus and no new masses in head, oral cavity, or neck  Cardiovascular: No complaints of chest pain, no palpitations, no ankle edema  Respiratory: No complaints of shortness of breath, no cough  Gastrointestinal: No complaints of jaundice, no bloody stools, no pale stools  Genitourinary: No complaints of dysuria, no hematuria, no nocturia, no frequent urination, no urethral discharge  Musculoskeletal: No complaints of weakness, paralysis, joint stiffness or arthralgias  Integumentary: No complaints of rash, no new lesions  Neurological: No complaints of convulsions, no seizures, no dizziness  Hematologic/Lymphatic: No complaints of easy bruising  Endocrine:  No hot or cold intolerance  No polydipsia, polyphagia, or polyuria  Allergy/immunology:  No environmental allergies  No food allergies  Not immunocompromised  Skin:  No pallor or rash  No wound          Patient Active Problem List   Diagnosis    COPD (chronic obstructive pulmonary disease) ([facility] )    Headaches due to old head injury    High cholesterol    Obstructive sleep apnea syndrome    Type 2 diabetes mellitus with hyperglycemia, without long-term current use of insulin (HCC)    Malignant neoplasm of lower third of esophagus (HCC)    Esophageal obstruction    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HCC)    Chemotherapy induced neutropenia (HCC)    Anemia, unspecified    Insomnia due to medical condition    Encounter for care related to feeding tube    Paralysis of left vocal fold    Dysphonia    Chylothorax on right    Mild protein-calorie malnutrition (HCC)    Atrial fibrillation ([facility] )    Port-A-Cath in place    [facility] with cancer [facility] )    Alcohol dependence, in remission [facility])     Past Medical History:   Diagnosis Date    Bilateral pleural effusion     COPD (chronic obstructive pulmonary disease) (HCC)     Diabetes mellitus (HCC)     Difficulty swallowing     Disease of thyroid gland     Edema     Esophageal mass     History of chemotherapy     History of transfusion     Malignant neoplasm of lower third of esophagus ([facility] )     Port-A-Cath in place     LCW    Sleep apnea     no CPAP    SOB (shortness of breath)      Past Surgical History:   Procedure Laterality Date    BACK SURGERY      x2    DISC REMOVAL      ESOPHAGECTOMY N/A 1/28/2020    Procedure: ESOPHAGECTOMY, TRANSHIATAL;  Surgeon: Modesto Lira MD;  [facility];  Service: Surgical Oncology    ESOPHAGOGASTRODUODENOSCOPY N/A 1/28/2020    Procedure: ESOPHAGOGASTRODUODENOSCOPY (EGD);   Surgeon: Modesto Lira MD;  [facility];  Service: Surgical Oncology    FL GUIDED CENTRAL VENOUS ACCESS DEVICE INSERTION  9/26/2019    GASTROJEJUNOSTOMY W/ JEJUNOSTOMY TUBE N/A 9/26/2019    Procedure: INSERTION JEJUNOSTOMY TUBE OPEN;  Surgeon: Modesto Lira MD;  [facility];  Service: Surgical Oncology    IR THORACENTESIS  2/25/2020    TONSILLECTOMY      TUNNELED VENOUS PORT PLACEMENT N/A 9/26/2019 Procedure: INSERTION VENOUS PORT (PORT-A-CATH);   Surgeon: Luke Montana MD;  [facility];  Service: Surgical Oncology    VOCAL CORD INJECTION N/A 2020    Procedure: MICROLARYNGOSCOPY WITH INJECTION;  Surgeon: Oscar Grimes MD;  [facility];  Service: ENT     Family History   Problem Relation Age of Onset    Diabetes Mother     No Known Problems Father      Social History     Socioeconomic History    Marital status: Single     Spouse name: Not on file    Number of children: Not on file    Years of education: Not on file    Highest education level: Not on file   Occupational History    Not on file   Social Needs    Financial resource strain: Not on file    Food insecurity:     Worry: Not on file     Inability: Not on file    Transportation needs:     Medical: Not on file     Non-medical: Not on file   Tobacco Use    Smoking status: Former Smoker     Packs/day: 0 50     Years: 50 00     Pack years: 25 00     Types: Cigarettes     Last attempt to quit: 2017     Years since quittin 1    Smokeless tobacco: Never Used   Substance and Sexual Activity    Alcohol use: Not Currently     Alcohol/week: 0 0 standard drinks     Frequency: Never     Drinks per session: 1 or 2     Binge frequency: Never    Drug use: Never    Sexual activity: Not on file   Lifestyle    Physical activity:     Days per week: Not on file     Minutes per session: Not on file    Stress: Not on file   Relationships    Social connections:     Talks on phone: Not on file     Gets together: Not on file     Attends [employer] service: Not on file     Active member of club or organization: Not on file     Attends meetings of clubs or organizations: Not on file     Relationship status: Not on file    Intimate partner violence:     Fear of current or ex partner: Not on file     Emotionally abused: Not on file     Physically abused: Not on file     Forced sexual activity: Not on file   Other Topics Concern    Not on file   Social History Narrative    Not on file       Current Outpatient Medications:     amiodarone 200 mg tablet, Take 1 tablet (200 mg total) by mouth 3 (three) times a day with meals, Disp: 90 tablet, Rfl: 0    insulin NPH (HumuLIN N,NovoLIN N) 100 Units/mL subcutaneous injection, Inject 12 Units under the skin daily before dinner To be given prior to tube feeds nightly, Disp: 3 mL, Rfl: 0    levothyroxine 25 mcg tablet, Take 25 mcg by mouth daily in the early morning, Disp: , Rfl:     Nutritional Supplements (JEVITY 1 5 SHUN) LIQD, Take 60 mL by mouth continuous Jevity 1 5@ 60 ml/hr for cycles of 12 hours daily  (7pm-7am nightly), Disp: 1000 mL, Rfl: 0    oxyCODONE (OXY-IR) 5 MG capsule, Take 5 mg by mouth every 4 (four) hours as needed for moderate pain, Disp: , Rfl:     pantoprazole (PROTONIX) 40 mg tablet, Take 1 tablet (40 mg total) by mouth daily, Disp: 30 tablet, Rfl: 1    rOPINIRole (REQUIP) 0 25 mg tablet, take 1 tablet by mouth daily at bedtime, Disp: 30 tablet, Rfl: 0    senna-docusate sodium (SENOKOT S) 8 6-50 mg per tablet, Take 1 tablet by mouth daily, Disp: 30 tablet, Rfl: 0    tamsulosin (FLOMAX) 0 4 mg, Take 1 capsule (0 4 mg total) by mouth daily with dinner, Disp: 30 capsule, Rfl: 0    gabapentin (NEURONTIN) 300 mg capsule, take 1 capsule by mouth daily at bedtime (Patient not taking: Reported on 2/20/2020), Disp: 30 capsule, Rfl: 0    melatonin 3 mg, Take 1 tablet (3 mg total) by mouth daily at bedtime (Patient not taking: Reported on 2/20/2020), Disp: 30 tablet, Rfl: 0  Allergies   Allergen Reactions    Penicillins Itching     Vitals:    03/05/20 0915   BP: 160/100   Pulse: 88   Resp: 18   Temp: 98 7 °F (37 1 °C)       Physical Exam  Constitutional: General appearance: The Patient is well-developed and well-nourished who appears the stated age in no acute distress  Patient is pleasant and talkative  HEENT:  Normocephalic  Sclerae are anicteric   Mucous membranes are moist   Voice is still hoarse  Abdomen: Abdomen is soft, non-tender, non-distended and without masses  Feeding tube was removed  Extremities: There is no clubbing or cyanosis  There is 2+ edema the feet  Symmetric  Neuro: Grossly nonfocal  Gait is normal      Lymphatic: No evidence of cervical adenopathy bilaterally  No evidence of axillary adenopathy bilaterally  No evidence of inguinal adenopathy bilaterally  Skin: Warm, anicteric  Psych:  Patient is pleasant and talkative  Breasts:        Pathology:  [unfilled]    Labs:      Imaging  Xr Chest Portable    Result Date: 2/5/2020  Narrative: CHEST INDICATION:   f/u chest tubes, PNA, chylothorax  GE junction carcinoma, status post esophagectomy on 1/28/2020  COMPARISON:  Chest radiograph from 2/4/2020  EXAM PERFORMED/VIEWS:  XR CHEST PORTABLE FINDINGS:  Right jugular catheter in upper SVC  Left port at cavoatrial junction  Normal heart size  Gas-filled structure adjacent to the right heart border due to the gastric pull-up  Bilateral chest tubes with slight increase in small left pneumothorax  Persistent bibasilar aspiration  Osseous structures appear within normal limits for patient age  Impression: Bilateral chest tubes with slight increase in small left pneumothorax  Persistent bilateral aspiration  Esophagectomy  Workstation performed: [id number]     Xr Chest Pa & Lateral    Result Date: 2/28/2020  Narrative: CHEST INDICATION:   J90: Pleural effusion, not elsewhere classified  Status post right thoracentesis: 2/25/2020  COMPARISON:  Chest radiograph from 2/23/2020  Chest CT from 2/18/2020  EXAM PERFORMED/VIEWS:  XR CHEST PA & LATERAL  DUAL ENERGY SUBTRACTION TECHNIQUE FINDINGS:  Left port at cavoatrial junction  Normal heart size  Hyperdense material in the mediastinum and right upper quadrant from a lymphangiogram with embolization of the thoracic duct  Esophagectomy and gastric pull-up  Resolution of right effusion   Trace left effusion  No pneumothorax  No acute pulmonary disease  Osseous structures appear within normal limits for patient age  Impression: Resolution of right effusion with no pneumothorax  Trace left effusion  Esophagectomy and gastric pull-up  Workstation performed: [id number]     Xr Chest 2 Views    Result Date: 2/24/2020  Narrative: CHEST INDICATION:   edema  History of gastric pull-through COMPARISON:  2/17/2020 EXAM PERFORMED/VIEWS:  XR CHEST PA & LATERAL FINDINGS:  Left chest wall port  Evidence of lymphangiogram with thoracic duct embolization  Cardiomediastinal silhouette appears unremarkable  Persistent right basilar infiltrate/ atelectasis with small bilateral pleural effusions  Osseous structures appear within normal limits for patient age  Impression: Persistent right basilar infiltrate/ atelectasis with small bilateral pleural effusions  Workstation performed: [id number]     Xr Chest Pa & Lateral    Result Date: 2/18/2020  Narrative: CHEST INDICATION:   C15 9: Malignant neoplasm of esophagus, unspecified  COMPARISON:  Chest radiograph from 2/11/2020 and chest CT from 2/2/2020  EXAM PERFORMED/VIEWS:  XR CHEST PA & LATERAL  DUAL ENERGY SUBTRACTION TECHNIQUE FINDINGS:  Left port at cavoatrial junction  Normal heart size  Esophagectomy and gastric pull-up  Hyperdensity in the mediastinum and right upper quadrant due to a lymphangiogram with thoracic duct embolization  Small right effusion with right base atelectasis, new since 2/11/2020  No change in trace left effusion  Osseous structures appear within normal limits for patient age  Sutures in the anterior abdominal wall  Impression: Small right effusion and right base atelectasis, new since 2/11/2020   Workstation performed: [id number]     Xr Chest Pa & Lateral    Result Date: 2/11/2020  Narrative: CHEST INDICATION:   post pull cxr for R CT removal  COMPARISON:  2/10/2020 EXAM PERFORMED/VIEWS:  XR CHEST PA & LATERAL FINDINGS:  The right-sided chest tube has been removed  Cardiomediastinal silhouette is stable  There is contrast material visible in the mediastinum which is within portions of the patient's gastric pull-through  Additional contrast material is seen outside of the GI tract contour and apparently this is related to a prior thoracic duct embolization procedure  Stable from prior  A right PICC is visible  A left-sided infusion port catheter is visible  Positioning seems satisfactory  There is a small right apical pneumothorax which is stable in size compared with the prior study  No interval increase after chest tube removal   There is also a small persistent left apical pneumothorax  There is minimal pleural effusion in the posterior costophrenic angles  No mediastinal shift  No gross evidence of pulmonary infiltrates  Probable minimal atelectasis left base  Osseous structures appear within normal limits for patient age  Impression: Stable small bilateral apical pneumothoraces  Right chest tube has been removed  Central line and infusion port catheter device appears satisfactory  Persistent contrast material in the mediastinum and GI tract Workstation performed: [id number]     Xr Chest Pa & Lateral    Result Date: 2/10/2020  Narrative: CHEST INDICATION:   f/u L CT removal  COMPARISON:  2/7/2020 EXAM PERFORMED/VIEWS:  XR CHEST PA & LATERAL Images: 2 FINDINGS:  Left chest wall Port-A-Cath unchanged in position  There is a right-sided PICC line present  The tip of the PICC line terminates at the level of the cavoatrial junction  Stable cardiomediastinal structures  Left basilar subsegmental atelectasis and effusion  No congestive failure  Small bore left-sided chest tube has been removed  Small pneumothorax at the left apex is minimally smaller than the prior study  There is a right-sided small bore chest tube which is unchanged in position    There is a small right apical pneumothorax which is slightly larger than the prior examination  A small amount of contrast partially outlines the right tracheobronchial tree  This is suggestive of aspiration from recent barium swallow  Osseous structures appear within normal limits for patient age  Impression: Status post left chest tube removal   Subsegmental atelectasis with small effusion at the base  There is a small apical pneumothorax present, slightly smaller than the prior exam  There is a small bore right-sided chest tube present  There is a small right apical pneumothorax which is slightly larger than the prior exam  There is a small collection of contrast identified within the posterior mediastinum as well as contrast present at the GE junction and within the large bowel  Contrast within the mediastinum is of unclear etiology  Possibility of surgical leak not excluded  Small amount of contrast is seen within the tracheobronchial tree suggesting aspiration during recently performed barium studies 2/8  I personally discussed this study with the surgical resident Dr Liana Lundberg on 2/10/2020 at 2:27 PM  Workstation performed: [id number]     Ir Thoracentesis    Result Date: 2/25/2020  Narrative: PROCEDURE: Therapeutic right thoracentesis STAFF: HANG Henley Goon: 1  COMPLICATIONS: None  INDICATION: Symptomatic right pleural effusion  PROCEDURE: Using ultrasound guidance, the right pleural cavity was punctured and a catheter placed into the pleural cavity  A total of 1600 milliliters of fluid was removed  The catheter was then removed  FINDINGS: Moderate right pleural effusion  Cloudy pleural fluid was removed  Impression: Therapeutic right thoracentesis  Workstation performed: [id number]     Fl Barium Swallow    Result Date: 2/8/2020  Narrative: BARIUM SWALLOW-ESOPHAGRAM INDICATION:   esophagram--r/o leak  Patient is status post recent esophagectomy   COMPARISON:  Previous chest x-ray examinations IMAGES:  170  (this includes cine capture images) FLUOROSCOPY TIME:   1 40 minutes  TECHNIQUE: Due to risk of aspiration, and lesser concern for leak, thin liquid barium was chosen after discussion with clinical service  This was given via straw FINDINGS: Examination demonstrates pre-existing radiopaque material within the right upper mediastinum related to thoracic duct embolization  There is no evidence of proximal anastomotic leak  Anticipated changes status post soft subjectively and gastric pull-through noted  Very mild laryngeal penetration was noted  Distal portion of anastomosis appears grossly intact  Impression: Postoperative changes and changes status post thoracic duct embolization  Mild degree of laryngeal penetration was noted  Speech pathology followed this examination with further assessment of swallowing mechanism No findings to suggest an anastomotic leak Workstation performed: [id number]     Fl Barium Swallow Video W Speech    Result Date: 2/8/2020  Narrative: A video barium swallow study was performed by the Department of Speech Pathology  Please refer to the report for the official interpretation  The images are stored for archival purposes only  Study images were not formally reviewed by the Radiology Department  Xr Chest Portable Icu    Result Date: 2/8/2020  Narrative: CHEST INDICATION:   f/u CTs  COMPARISON:  3/5/2020 EXAM PERFORMED/VIEWS:  XR CHEST PORTABLE ICU  AP semierect Images: 1 FINDINGS:  Right IJ catheter terminates in the SVC with a left subclavian Port-A-Cath at the caval atrial junction  Radiopaque material in the right upper mediastinum noted status post thoracic duct embolization  Left basilar chest tube in stable position with improvement of the small pneumothorax  Right-sided chest tube in stable position as well  Cardiomediastinal silhouette appears unremarkable  Small pneumomediastinum noted to the left of midline  Improved right lower lobe aeration with persistent left basilar subsegmental atelectasis   Osseous structures appear within normal limits for patient age  Impression: 1  Left basilar chest tube in stable position with improvement of small pneumothorax  Lines and tubes otherwise stable  2   Improved right lower lobe aeration with persistent left basilar atelectasis  3   Evidence of thoracic duct embolization  4   Small pneumomediastinum  Workstation performed: [id number]     Cta Chest Pe Study    Result Date: 2/18/2020  Narrative: CTA - CHEST WITH IV CONTRAST - PULMONARY ANGIOGRAM INDICATION:   R06 02: Shortness of breath  Chest tightness  Bilateral foot swelling  Esophageal cancer  COMPARISON: Multiple prior examinations including most recently CT of the chest, abdomen and pelvis performed February 2, 2020  TECHNIQUE: CTA examination of the chest was performed using angiographic technique according to a protocol specifically tailored to evaluate for pulmonary embolism  Axial, sagittal, and coronal 2D reformatted images were created from the source data and  submitted for interpretation  In addition, coronal 3D MIP postprocessing was performed on the acquisition scanner  Radiation dose length product (DLP) for this visit:  490 mGy-cm   This examination, like all CT scans performed in the [facility], was performed utilizing techniques to minimize radiation dose exposure, including the use of iterative reconstruction and automated exposure control  IV Contrast:  60 mL of iohexol (OMNIPAQUE)  FINDINGS: PULMONARY ARTERIAL TREE:  No pulmonary embolus is seen  LUNGS:  Compressive atelectasis is reidentified, most severe in right lung base and right mid chest with a small amount of compressive atelectasis at the left lung base  No suspicious pulmonary parenchymal mass  No consolidative opacity in mid or upper  lung zones  No tracheal or endobronchial mass noted  PLEURA:  There is moderate sized loculated right pleural effusion, decreased in size when compared to February 2, 2020    Some fluid is loculated in the major fissure  There is a small to moderate loculated left pleural effusion predominantly located in the left posterior costophrenic angle, significantly smaller when compared to February 2, 2020 examination  HEART/GREAT VESSELS:  Coronary artery calcification is noted  There is trace pericardial fluid  No thoracic aortic aneurysm  MEDIASTINUM AND SHEA:  Surgical changes of gastric pull-up procedure  High density embolization material is seen within the gastric pull-up  There is very high density within the thoracic duct and within lymphatic channels throughout the posterior mediastinum and right posterior hemithorax as well as along the right hemidiaphragm and posterior pleural surfaces related to thoracic duct embolization performed February 6, 2020  CHEST WALL AND LOWER NECK:   Left chest port is noted  VISUALIZED STRUCTURES IN THE UPPER ABDOMEN:  Material associated with thoracic duct embolization  Posterior subcapsular splenic fluid collection, similar to February 2, 2020  OSSEOUS STRUCTURES:  No acute fracture or destructive osseous lesion  Impression: No pulmonary embolus  Moderate loculated right pleural effusion, and small loculated left pleural effusion are both significantly decreased in size when compared to February 2, 2020 but are both new and slowly increasing when compared with chest x-ray performed February 10, 2020  High density embolization material is seen in the posterior mediastinum along the pleural Surgical changes of gastric pull-up procedure  Surface in the right hemithorax related to thoracic duct embolization performed February 10, 2020  Workstation performed: [id number]     Vas Lower Limb Venous Duplex Study, Complete Bilateral    Result Date: 2/19/2020  Narrative:  [facility] REPORT CLINICAL: Indications: Bilateral Localized edema [R60 0] L>R with large dorsal blisters  Patient had last chemotherapy in December, 2019   Risk Factors The patient has history of previous smoking (quit 1-5yrs ago)  He has no history of DVT or Recent Trauma  FINDINGS:  Segment  Right            Left              Impression       Impression       CFV      Normal (Patent)  Normal (Patent)     CONCLUSION:  Impression: RIGHT LOWER LIMB: No evidence of acute or chronic deep vein thrombosis  No evidence of superficial thrombophlebitis noted  Doppler evaluation shows a normal response to augmentation maneuvers  Popliteal, posterior tibial and anterior tibial arterial Doppler waveforms are triphasic  LEFT LOWER LIMB: No evidence of acute or chronic deep vein thrombosis  No evidence of superficial thrombophlebitis noted  Doppler evaluation shows a normal response to augmentation maneuvers  Popliteal, posterior tibial and anterior tibial arterial Doppler waveforms are triphasic  SIGNATURE: Electronically Signed by: Harriett Huizar on 2020-02-19 02:29:58 PM    Ir Other    Result Date: 2/10/2020  Narrative: PROCEDURE: 1   Real-time ultrasound guided access of 4 separate groin lymph nodes 2  Lymphangiography of the pelvis and trunk 3  Coil and glue embolization of active lymphatic extravasation arising from the thoracic duct 4   82 modifier STAFF: Leellen Mcburney, M D  (primary), HANG Fuller  (secondary) CONTRAST: 50 mL's of lipiodol intralymphatic  FLUOROSCOPY TIME: 30 4 minutes  NUMBER OF IMAGES: Multiple COMPLICATIONS: None  MEDICATIONS: Sedation was provided in the form of general anesthesia by the Anesthesia service  Please see their associated records  INDICATION: History of recent transhiatal esophagectomy complicated by high output right chylothorax  PROCEDURE: A qualified resident surgeon was not available for this procedure  Real-time ultrasound was used to access 4 separate bilateral groin lymph nodes using 25-gauge needles  Images were stored in PACS  Lipiodol was gently instilled into the bilateral groin lower extremity lymphatics  Lymphangiography of the pelvis and abdomen was performed  After lipiodol had extended into the cisterna chyli, a 22-gauge needle was used to access a lymphatic duct immediately inferior to the cisterna chyli  A microwire was extended into the thoracic duct, followed by a microcatheter  Further lymphangiography was performed  Based on the results of lymphangiography, the decision was made to embolize the thoracic duct  This was done using a combination of 1-4 mm x 7 cm Emanuel coil, 4-4 mm x 14 cm Emanuel microcoils, and an emulsion of 1:2 of nBCA with lipiodol  The microcatheter was removed  Needles in the groin were removed  FINDINGS: 1   Real-time ultrasound showed a needle to extend into a hypoechoic groin lymph nodes x4  2   Lymphangiography of both the abdomen and pelvis showed normal filling of lymphatic channels, without leak  3   Interval lymphangiography after accessing the cisterna chyli confirmed catheter positioning within the thoracic duct  4   Lymphangiography of the chest showed brisk lymphatic extravasation into the right hemithorax  There was complete transection of the thoracic duct, with no filling of the expected course of the more superior thoracic duct, likely postsurgical  5   Interval lymphangiography after coiling of the mid thoracic duct confirmed stasis of flow  6   Final  image showed good positioning of both coils and glue throughout the course of the thoracic duct and at the cisterna chyli  Impression: Thoracic duct embolization  Workstation performed: [id number]     I reviewed the above laboratory and imaging data  Discussion/Summary: 21-year-old male who underwent transhiatal esophagectomy for a T3N1M0 esophageal adenocarcinoma after neoadjuvant chemotherapy  He did have an excellent response to chemo radiation  His final pathology revealed this was a [id number] adenocarcinoma with only a 7 mm residual tumor  He should not require any further therapy    His feeding tube was removed, since he is maintaining his weight, eating well and has not used his tube feeds for nearly 2 weeks  I will set him up with Medical Oncology again to discuss any further adjuvant therapy, but given his 7 mm residual tumor, I suspect no further treatment should be required  I will see him again in July with repeat imaging  Urged him to keep his ENT given his persistent hoarseness  He will monitor his leg swelling  He is agreeable to this    All of his questions were answered

## 2020-03-23 DIAGNOSIS — E11.65 TYPE 2 DIABETES MELLITUS WITH HYPERGLYCEMIA, WITHOUT LONG-TERM CURRENT USE OF INSULIN (HCC): Primary | ICD-10-CM

## 2020-03-23 RX ORDER — BLOOD-GLUCOSE METER
EACH MISCELLANEOUS
COMMUNITY
End: 2020-03-23 | Stop reason: SDUPTHER

## 2020-03-23 RX ORDER — BLOOD-GLUCOSE METER
EACH MISCELLANEOUS
Qty: 1 EACH | Refills: 0 | Status: SHIPPED | OUTPATIENT
Start: 2020-03-23

## 2020-03-23 RX ORDER — LANCETS
1 EACH MISCELLANEOUS
COMMUNITY
End: 2020-03-23 | Stop reason: SDUPTHER

## 2020-03-23 RX ORDER — LANCETS
EACH MISCELLANEOUS
Qty: 100 EACH | Refills: 5 | Status: SHIPPED | OUTPATIENT
Start: 2020-03-23

## 2020-03-30 ENCOUNTER — PATIENT OUTREACH (OUTPATIENT)
Dept: HEMATOLOGY ONCOLOGY | Facility: CLINIC | Age: 65
End: 2020-03-30

## 2020-03-30 ENCOUNTER — TELEMEDICINE (OUTPATIENT)
Dept: UROLOGY | Facility: CLINIC | Age: 65
End: 2020-03-30
Payer: COMMERCIAL

## 2020-03-30 DIAGNOSIS — N99.89 POSTOPERATIVE URINARY RETENTION: Primary | ICD-10-CM

## 2020-03-30 DIAGNOSIS — R33.8 POSTOPERATIVE URINARY RETENTION: Primary | ICD-10-CM

## 2020-03-30 PROCEDURE — G2012 BRIEF CHECK IN BY MD/QHP: HCPCS | Performed by: PHYSICIAN ASSISTANT

## 2020-03-30 NOTE — PROGRESS NOTES
Virtual Brief Visit    Problem List Items Addressed This Visit     None        Impression:  1  Postop urinary retention  2  Esophageal cancer    Plan:  -  His symptoms have resolved he no longer has any voiding issues and feels empty    Reason for visit is  Postop urinary retention follow-up    Encounter provider Chey Saul PA-C    Provider located at [address] [place] [address] DR Britt [phone]      Recent Visits  No visits were found meeting these conditions  Showing recent visits within past 7 days and meeting all other requirements     Today's Visits  Date Type Provider Dept   03/30/20 Telemedicine Chey Saul PA-C  [facility] [facility]   Showing today's visits and meeting all other requirements     Future Appointments  No visits were found meeting these conditions  Showing future appointments within next 150 days and meeting all other requirements        After connecting through telephone, the patient was identified by name and date of birth  Carolyn Avila was informed that this is a telemedicine visit and that the visit is being conducted through telephone  My office door was closed  No one else was in the room  He acknowledged consent and understanding of privacy and security of the video platform  The patient has agreed to participate and understands they can discontinue the visit at any time  Patient is aware this is a billable service  Subjective  Carolyn Avila is a 71 y o  male   Male with a history of esophageal cancer  He underwent an esophagectomy on January 28th  He had postop urinary retention  He was unable to take any alpha blockers due to his swallowing disorder at the time  He has never seen a urologist in the past and denies any prior urologic problems  His symptoms have now resolved  He has no frequency nocturia or dribbling  He is not on any urologic medications        Past Medical History:   Diagnosis Date    Bilateral pleural effusion     COPD (chronic obstructive pulmonary disease) (HCC)     Diabetes mellitus (HCC)     Difficulty swallowing     Disease of thyroid gland     Edema     Esophageal mass     History of chemotherapy     History of transfusion     Malignant neoplasm of lower third of esophagus ([address] )     Port-A-Cath in place     LCW    Sleep apnea     no CPAP    SOB (shortness of breath)        Past Surgical History:   Procedure Laterality Date    BACK SURGERY      x2    DISC REMOVAL      ESOPHAGECTOMY N/A 1/28/2020    Procedure: ESOPHAGECTOMY, TRANSHIATAL;  Surgeon: Martinez Mathur MD;  [facility];  Service: Surgical Oncology    ESOPHAGOGASTRODUODENOSCOPY N/A 1/28/2020    Procedure: ESOPHAGOGASTRODUODENOSCOPY (EGD); Surgeon: Martinez Mathur MD;  [facility];  Service: Surgical Oncology    FL GUIDED CENTRAL VENOUS ACCESS DEVICE INSERTION  9/26/2019    GASTROJEJUNOSTOMY W/ JEJUNOSTOMY TUBE N/A 9/26/2019    Procedure: INSERTION JEJUNOSTOMY TUBE OPEN;  Surgeon: Martinez Mathur MD;  [facility];  Service: Surgical Oncology    IR THORACENTESIS  2/25/2020    TONSILLECTOMY      TUNNELED VENOUS PORT PLACEMENT N/A 9/26/2019    Procedure: INSERTION VENOUS PORT (PORT-A-CATH);   Surgeon: Martinez Mathur MD;  [facility];  Service: Surgical Oncology    VOCAL CORD INJECTION N/A 2/7/2020    Procedure: MICROLARYNGOSCOPY WITH INJECTION;  Surgeon: Ninoska Pastrana MD;  [facility];  Service: ENT       Current Outpatient Medications   Medication Sig Dispense Refill    Blood Glucose Monitoring Suppl (ONE TOUCH ULTRA 2) w/Device KIT Check bs , q fasting and 2 hr after meals 1 each 0    gabapentin (NEURONTIN) 300 mg capsule take 1 capsule by mouth daily at bedtime 30 capsule 0    insulin NPH (HumuLIN N,NovoLIN N) 100 Units/mL subcutaneous injection Inject 12 Units under the skin daily before dinner To be given prior to tube feeds nightly 3 mL 0    Lancets (ONETOUCH ULTRASOFT) lancets Check bs , q fasting and 2 hr after meals 100 each 5    levothyroxine 25 mcg tablet Take 25 mcg by mouth daily in the early morning      melatonin 3 mg Take 1 tablet (3 mg total) by mouth daily at bedtime 30 tablet 0    Nutritional Supplements (JEVITY 1 5 SHUN) LIQD Take 60 mL by mouth continuous Jevity 1 5@ 60 ml/hr for cycles of 12 hours daily  (7pm-7am nightly) 1000 mL 0    pantoprazole (PROTONIX) 40 mg tablet Take 1 tablet (40 mg total) by mouth daily 30 tablet 1    senna-docusate sodium (SENOKOT S) 8 6-50 mg per tablet Take 1 tablet by mouth daily 30 tablet 0    tamsulosin (FLOMAX) 0 4 mg Take 1 capsule (0 4 mg total) by mouth daily with dinner 30 capsule 0    amiodarone 200 mg tablet Take 1 tablet (200 mg total) by mouth 3 (three) times a day with meals 90 tablet 0    oxyCODONE (OXY-IR) 5 MG capsule Take 5 mg by mouth every 4 (four) hours as needed for moderate pain      rOPINIRole (REQUIP) 0 25 mg tablet take 1 tablet by mouth daily at bedtime (Patient not taking: Reported on 3/30/2020) 30 tablet 0     No current facility-administered medications for this visit  Allergies   Allergen Reactions    Penicillins Itching       Review of Systems Genito-Urinary ROS: no dysuria, trouble voiding, or hematuria      I spent 15 minutes with the patient during this visit

## 2020-04-01 PROBLEM — R13.14 PHARYNGOESOPHAGEAL DYSPHAGIA: Status: ACTIVE | Noted: 2020-04-01

## 2020-04-01 PROBLEM — J38.3 GLOTTIC INSUFFICIENCY: Status: ACTIVE | Noted: 2020-04-01

## 2020-04-01 PROBLEM — E03.9 ACQUIRED HYPOTHYROIDISM: Status: ACTIVE | Noted: 2020-04-01

## 2020-04-01 PROBLEM — Z87.891 HX OF SMOKING: Status: ACTIVE | Noted: 2020-04-01

## 2020-04-03 ENCOUNTER — TELEMEDICINE (OUTPATIENT)
Dept: FAMILY MEDICINE CLINIC | Facility: CLINIC | Age: 65
End: 2020-04-03
Payer: COMMERCIAL

## 2020-04-03 VITALS
SYSTOLIC BLOOD PRESSURE: 124 MMHG | WEIGHT: 190 LBS | DIASTOLIC BLOOD PRESSURE: 70 MMHG | BODY MASS INDEX: 27.2 KG/M2 | HEIGHT: 70 IN

## 2020-04-03 DIAGNOSIS — Z12.5 SCREENING FOR PROSTATE CANCER: ICD-10-CM

## 2020-04-03 DIAGNOSIS — Z00.00 MEDICARE ANNUAL WELLNESS VISIT, SUBSEQUENT: ICD-10-CM

## 2020-04-03 DIAGNOSIS — Z11.59 NEED FOR HEPATITIS C SCREENING TEST: ICD-10-CM

## 2020-04-03 DIAGNOSIS — E11.65 TYPE 2 DIABETES MELLITUS WITH HYPERGLYCEMIA, WITHOUT LONG-TERM CURRENT USE OF INSULIN (HCC): Primary | ICD-10-CM

## 2020-04-03 PROCEDURE — G0439 PPPS, SUBSEQ VISIT: HCPCS | Performed by: NURSE PRACTITIONER

## 2020-04-03 PROCEDURE — 3044F HG A1C LEVEL LT 7.0%: CPT | Performed by: NURSE PRACTITIONER

## 2020-04-03 PROCEDURE — 1170F FXNL STATUS ASSESSED: CPT | Performed by: NURSE PRACTITIONER

## 2020-04-03 PROCEDURE — 3008F BODY MASS INDEX DOCD: CPT | Performed by: NURSE PRACTITIONER

## 2020-04-03 PROCEDURE — 4040F PNEUMOC VAC/ADMIN/RCVD: CPT | Performed by: NURSE PRACTITIONER

## 2020-04-03 PROCEDURE — 1111F DSCHRG MED/CURRENT MED MERGE: CPT | Performed by: NURSE PRACTITIONER

## 2020-04-03 PROCEDURE — 1036F TOBACCO NON-USER: CPT | Performed by: NURSE PRACTITIONER

## 2020-04-03 PROCEDURE — 99212 OFFICE O/P EST SF 10 MIN: CPT | Performed by: NURSE PRACTITIONER

## 2020-04-03 PROCEDURE — 1125F AMNT PAIN NOTED PAIN PRSNT: CPT | Performed by: NURSE PRACTITIONER

## 2020-04-06 ENCOUNTER — TELEPHONE (OUTPATIENT)
Dept: HEMATOLOGY ONCOLOGY | Facility: CLINIC | Age: 65
End: 2020-04-06

## 2020-04-07 ENCOUNTER — TELEPHONE (OUTPATIENT)
Dept: HEMATOLOGY ONCOLOGY | Facility: CLINIC | Age: 65
End: 2020-04-07

## 2020-04-10 ENCOUNTER — HOSPITAL ENCOUNTER (OUTPATIENT)
Dept: INFUSION CENTER | Facility: CLINIC | Age: 65
Discharge: HOME | End: 2020-04-10
Payer: COMMERCIAL

## 2020-04-10 DIAGNOSIS — Z95.828 PORT-A-CATH IN PLACE: Primary | ICD-10-CM

## 2020-04-10 PROCEDURE — 96523 IRRIG DRUG DELIVERY DEVICE: CPT

## 2020-04-17 DIAGNOSIS — E11.65 TYPE 2 DIABETES MELLITUS WITH HYPERGLYCEMIA, WITHOUT LONG-TERM CURRENT USE OF INSULIN (HCC): ICD-10-CM

## 2020-04-21 ENCOUNTER — TELEPHONE (OUTPATIENT)
Dept: HEMATOLOGY ONCOLOGY | Facility: CLINIC | Age: 65
End: 2020-04-21

## 2020-04-23 ENCOUNTER — TELEPHONE (OUTPATIENT)
Dept: CARDIAC SURGERY | Facility: CLINIC | Age: 65
End: 2020-04-23

## 2020-04-23 ENCOUNTER — TELEPHONE (OUTPATIENT)
Dept: SURGICAL ONCOLOGY | Facility: CLINIC | Age: 65
End: 2020-04-23

## 2020-04-23 DIAGNOSIS — C15.5 MALIGNANT NEOPLASM OF LOWER THIRD OF ESOPHAGUS (HCC): ICD-10-CM

## 2020-04-23 DIAGNOSIS — R13.14 PHARYNGOESOPHAGEAL DYSPHAGIA: Primary | ICD-10-CM

## 2020-04-24 ENCOUNTER — HOSPITAL ENCOUNTER (OUTPATIENT)
Dept: RADIOLOGY | Facility: HOSPITAL | Age: 65
Discharge: HOME | End: 2020-04-24
Attending: THORACIC SURGERY (CARDIOTHORACIC VASCULAR SURGERY)
Payer: COMMERCIAL

## 2020-04-24 ENCOUNTER — TELEPHONE (OUTPATIENT)
Dept: CARDIAC SURGERY | Facility: CLINIC | Age: 65
End: 2020-04-24

## 2020-04-24 DIAGNOSIS — C15.5 MALIGNANT NEOPLASM OF LOWER THIRD OF ESOPHAGUS (HCC): ICD-10-CM

## 2020-04-24 DIAGNOSIS — R13.14 PHARYNGOESOPHAGEAL DYSPHAGIA: ICD-10-CM

## 2020-04-24 PROCEDURE — 74220 X-RAY XM ESOPHAGUS 1CNTRST: CPT

## 2020-04-27 ENCOUNTER — DOCUMENTATION (OUTPATIENT)
Dept: HEMATOLOGY ONCOLOGY | Facility: MEDICAL CENTER | Age: 65
End: 2020-04-27

## 2020-04-27 ENCOUNTER — ANESTHESIA EVENT (OUTPATIENT)
Dept: PERIOP | Facility: HOSPITAL | Age: 65
End: 2020-04-27
Payer: COMMERCIAL

## 2020-04-27 ENCOUNTER — OFFICE VISIT (OUTPATIENT)
Dept: CARDIAC SURGERY | Facility: CLINIC | Age: 65
End: 2020-04-27

## 2020-04-27 VITALS
BODY MASS INDEX: 24.48 KG/M2 | HEIGHT: 70 IN | HEART RATE: 72 BPM | WEIGHT: 171 LBS | RESPIRATION RATE: 16 BRPM | TEMPERATURE: 97.6 F | DIASTOLIC BLOOD PRESSURE: 60 MMHG | SYSTOLIC BLOOD PRESSURE: 110 MMHG

## 2020-04-27 DIAGNOSIS — K22.2 ESOPHAGEAL STRICTURE: Primary | ICD-10-CM

## 2020-04-27 PROCEDURE — 1036F TOBACCO NON-USER: CPT | Performed by: PHYSICIAN ASSISTANT

## 2020-04-27 PROCEDURE — PREOP: Performed by: PHYSICIAN ASSISTANT

## 2020-04-27 PROCEDURE — 3044F HG A1C LEVEL LT 7.0%: CPT | Performed by: PHYSICIAN ASSISTANT

## 2020-04-27 PROCEDURE — 4040F PNEUMOC VAC/ADMIN/RCVD: CPT | Performed by: PHYSICIAN ASSISTANT

## 2020-04-27 PROCEDURE — 3008F BODY MASS INDEX DOCD: CPT | Performed by: PHYSICIAN ASSISTANT

## 2020-04-28 ENCOUNTER — ANESTHESIA (OUTPATIENT)
Dept: PERIOP | Facility: HOSPITAL | Age: 65
End: 2020-04-28
Payer: COMMERCIAL

## 2020-04-28 ENCOUNTER — HOSPITAL ENCOUNTER (OUTPATIENT)
Facility: HOSPITAL | Age: 65
Setting detail: OUTPATIENT SURGERY
Discharge: HOME | End: 2020-04-28
Attending: THORACIC SURGERY (CARDIOTHORACIC VASCULAR SURGERY) | Admitting: THORACIC SURGERY (CARDIOTHORACIC VASCULAR SURGERY)
Payer: COMMERCIAL

## 2020-04-28 ENCOUNTER — HOSPITAL ENCOUNTER (OUTPATIENT)
Dept: RADIOLOGY | Facility: HOSPITAL | Age: 65
Setting detail: OUTPATIENT SURGERY
Discharge: HOME | End: 2020-04-28
Payer: COMMERCIAL

## 2020-04-28 VITALS
RESPIRATION RATE: 16 BRPM | HEART RATE: 93 BPM | SYSTOLIC BLOOD PRESSURE: 96 MMHG | BODY MASS INDEX: 24.48 KG/M2 | OXYGEN SATURATION: 97 % | TEMPERATURE: 96.1 F | DIASTOLIC BLOOD PRESSURE: 58 MMHG | WEIGHT: 171 LBS | HEIGHT: 70 IN

## 2020-04-28 DIAGNOSIS — R13.14 DYSPHAGIA, PHARYNGOESOPHAGEAL PHASE: ICD-10-CM

## 2020-04-28 LAB
GLUCOSE SERPL-MCNC: 111 MG/DL (ref 65–140)
GLUCOSE SERPL-MCNC: 118 MG/DL (ref 65–140)

## 2020-04-28 PROCEDURE — 74360 X-RAY GUIDE GI DILATION: CPT | Performed by: THORACIC SURGERY (CARDIOTHORACIC VASCULAR SURGERY)

## 2020-04-28 PROCEDURE — 82948 REAGENT STRIP/BLOOD GLUCOSE: CPT

## 2020-04-28 PROCEDURE — C1769 GUIDE WIRE: HCPCS | Performed by: THORACIC SURGERY (CARDIOTHORACIC VASCULAR SURGERY)

## 2020-04-28 PROCEDURE — 71045 X-RAY EXAM CHEST 1 VIEW: CPT

## 2020-04-28 PROCEDURE — 43248 EGD GUIDE WIRE INSERTION: CPT | Performed by: THORACIC SURGERY (CARDIOTHORACIC VASCULAR SURGERY)

## 2020-04-28 RX ORDER — PROPOFOL 10 MG/ML
INJECTION, EMULSION INTRAVENOUS AS NEEDED
Status: DISCONTINUED | OUTPATIENT
Start: 2020-04-28 | End: 2020-04-28 | Stop reason: SURG

## 2020-04-28 RX ORDER — SODIUM CHLORIDE, SODIUM LACTATE, POTASSIUM CHLORIDE, CALCIUM CHLORIDE 600; 310; 30; 20 MG/100ML; MG/100ML; MG/100ML; MG/100ML
125 INJECTION, SOLUTION INTRAVENOUS CONTINUOUS
Status: DISCONTINUED | OUTPATIENT
Start: 2020-04-28 | End: 2020-04-28 | Stop reason: HOSPADM

## 2020-04-28 RX ORDER — ONDANSETRON 2 MG/ML
4 INJECTION INTRAMUSCULAR; INTRAVENOUS ONCE AS NEEDED
Status: DISCONTINUED | OUTPATIENT
Start: 2020-04-28 | End: 2020-04-28 | Stop reason: HOSPADM

## 2020-04-28 RX ORDER — PROPOFOL 10 MG/ML
INJECTION, EMULSION INTRAVENOUS CONTINUOUS PRN
Status: DISCONTINUED | OUTPATIENT
Start: 2020-04-28 | End: 2020-04-28 | Stop reason: SURG

## 2020-04-28 RX ORDER — FENTANYL CITRATE/PF 50 MCG/ML
25 SYRINGE (ML) INJECTION
Status: DISCONTINUED | OUTPATIENT
Start: 2020-04-28 | End: 2020-04-28 | Stop reason: HOSPADM

## 2020-04-28 RX ORDER — FENTANYL CITRATE 50 UG/ML
INJECTION, SOLUTION INTRAMUSCULAR; INTRAVENOUS AS NEEDED
Status: DISCONTINUED | OUTPATIENT
Start: 2020-04-28 | End: 2020-04-28 | Stop reason: SURG

## 2020-04-28 RX ORDER — METOCLOPRAMIDE HYDROCHLORIDE 5 MG/ML
INJECTION INTRAMUSCULAR; INTRAVENOUS AS NEEDED
Status: DISCONTINUED | OUTPATIENT
Start: 2020-04-28 | End: 2020-04-28 | Stop reason: SURG

## 2020-04-28 RX ORDER — LIDOCAINE HYDROCHLORIDE 10 MG/ML
INJECTION, SOLUTION EPIDURAL; INFILTRATION; INTRACAUDAL; PERINEURAL AS NEEDED
Status: DISCONTINUED | OUTPATIENT
Start: 2020-04-28 | End: 2020-04-28 | Stop reason: SURG

## 2020-04-28 RX ORDER — LIDOCAINE HYDROCHLORIDE 10 MG/ML
0.5 INJECTION, SOLUTION EPIDURAL; INFILTRATION; INTRACAUDAL; PERINEURAL ONCE AS NEEDED
Status: DISCONTINUED | OUTPATIENT
Start: 2020-04-28 | End: 2020-04-28 | Stop reason: HOSPADM

## 2020-04-28 RX ORDER — SUCCINYLCHOLINE/SOD CL,ISO/PF 100 MG/5ML
SYRINGE (ML) INTRAVENOUS AS NEEDED
Status: DISCONTINUED | OUTPATIENT
Start: 2020-04-28 | End: 2020-04-28 | Stop reason: SURG

## 2020-04-28 RX ORDER — GLYCOPYRROLATE 0.2 MG/ML
INJECTION INTRAMUSCULAR; INTRAVENOUS AS NEEDED
Status: DISCONTINUED | OUTPATIENT
Start: 2020-04-28 | End: 2020-04-28 | Stop reason: SURG

## 2020-04-28 RX ORDER — EPHEDRINE SULFATE 50 MG/ML
INJECTION INTRAVENOUS AS NEEDED
Status: DISCONTINUED | OUTPATIENT
Start: 2020-04-28 | End: 2020-04-28 | Stop reason: SURG

## 2020-04-28 RX ORDER — ONDANSETRON 2 MG/ML
INJECTION INTRAMUSCULAR; INTRAVENOUS AS NEEDED
Status: DISCONTINUED | OUTPATIENT
Start: 2020-04-28 | End: 2020-04-28 | Stop reason: SURG

## 2020-04-28 RX ORDER — ROCURONIUM BROMIDE 10 MG/ML
INJECTION, SOLUTION INTRAVENOUS AS NEEDED
Status: DISCONTINUED | OUTPATIENT
Start: 2020-04-28 | End: 2020-04-28 | Stop reason: SURG

## 2020-04-28 RX ADMIN — SODIUM CHLORIDE, SODIUM LACTATE, POTASSIUM CHLORIDE, AND CALCIUM CHLORIDE: .6; .31; .03; .02 INJECTION, SOLUTION INTRAVENOUS at 10:34

## 2020-04-28 RX ADMIN — FENTANYL CITRATE 50 MCG: 50 INJECTION, SOLUTION INTRAMUSCULAR; INTRAVENOUS at 11:17

## 2020-04-28 RX ADMIN — PHENYLEPHRINE HYDROCHLORIDE 100 MCG: 10 INJECTION INTRAVENOUS at 11:01

## 2020-04-28 RX ADMIN — SUGAMMADEX 155 MG: 100 INJECTION, SOLUTION INTRAVENOUS at 11:48

## 2020-04-28 RX ADMIN — PHENYLEPHRINE HYDROCHLORIDE 100 MCG: 10 INJECTION INTRAVENOUS at 11:23

## 2020-04-28 RX ADMIN — EPHEDRINE SULFATE 5 MG: 50 INJECTION, SOLUTION INTRAVENOUS at 11:23

## 2020-04-28 RX ADMIN — Medication 120 MG: at 11:00

## 2020-04-28 RX ADMIN — ROCURONIUM BROMIDE 20 MG: 10 INJECTION, SOLUTION INTRAVENOUS at 11:10

## 2020-04-28 RX ADMIN — PROPOFOL 120 MG: 10 INJECTION, EMULSION INTRAVENOUS at 11:00

## 2020-04-28 RX ADMIN — GLYCOPYRROLATE 0.2 MG: 0.2 INJECTION, SOLUTION INTRAMUSCULAR; INTRAVENOUS at 11:00

## 2020-04-28 RX ADMIN — FENTANYL CITRATE 50 MCG: 50 INJECTION, SOLUTION INTRAMUSCULAR; INTRAVENOUS at 10:59

## 2020-04-28 RX ADMIN — PHENYLEPHRINE HYDROCHLORIDE 100 MCG: 10 INJECTION INTRAVENOUS at 11:04

## 2020-04-28 RX ADMIN — PROPOFOL 50 MCG/KG/MIN: 10 INJECTION, EMULSION INTRAVENOUS at 11:02

## 2020-04-28 RX ADMIN — ROCURONIUM BROMIDE 10 MG: 10 INJECTION, SOLUTION INTRAVENOUS at 11:17

## 2020-04-28 RX ADMIN — METOCLOPRAMIDE 10 MG: 5 INJECTION, SOLUTION INTRAMUSCULAR; INTRAVENOUS at 12:02

## 2020-04-28 RX ADMIN — PHENYLEPHRINE HYDROCHLORIDE 100 MCG: 10 INJECTION INTRAVENOUS at 11:20

## 2020-04-28 RX ADMIN — SODIUM CHLORIDE, SODIUM LACTATE, POTASSIUM CHLORIDE, AND CALCIUM CHLORIDE 125 ML/HR: .6; .31; .03; .02 INJECTION, SOLUTION INTRAVENOUS at 12:30

## 2020-04-28 RX ADMIN — EPHEDRINE SULFATE 5 MG: 50 INJECTION, SOLUTION INTRAVENOUS at 11:39

## 2020-04-28 RX ADMIN — ONDANSETRON 4 MG: 2 INJECTION INTRAMUSCULAR; INTRAVENOUS at 11:40

## 2020-04-28 RX ADMIN — LIDOCAINE HYDROCHLORIDE 50 MG: 10 INJECTION, SOLUTION EPIDURAL; INFILTRATION; INTRACAUDAL; PERINEURAL at 10:59

## 2020-04-28 RX ADMIN — PHENYLEPHRINE HYDROCHLORIDE 100 MCG: 10 INJECTION INTRAVENOUS at 11:12

## 2020-04-30 ENCOUNTER — TELEPHONE (OUTPATIENT)
Dept: HEMATOLOGY ONCOLOGY | Facility: CLINIC | Age: 65
End: 2020-04-30

## 2020-05-05 ENCOUNTER — TELEPHONE (OUTPATIENT)
Dept: CARDIAC SURGERY | Facility: CLINIC | Age: 65
End: 2020-05-05

## 2020-05-06 ENCOUNTER — TRANSCRIBE ORDERS (OUTPATIENT)
Dept: HEMATOLOGY ONCOLOGY | Facility: MEDICAL CENTER | Age: 65
End: 2020-05-06

## 2020-05-06 DIAGNOSIS — C15.5 MALIGNANT NEOPLASM OF LOWER THIRD OF ESOPHAGUS (HCC): Primary | ICD-10-CM

## 2020-05-08 ENCOUNTER — OFFICE VISIT (OUTPATIENT)
Dept: CARDIAC SURGERY | Facility: CLINIC | Age: 65
End: 2020-05-08
Payer: COMMERCIAL

## 2020-05-08 VITALS
SYSTOLIC BLOOD PRESSURE: 100 MMHG | HEART RATE: 88 BPM | WEIGHT: 174 LBS | DIASTOLIC BLOOD PRESSURE: 58 MMHG | HEIGHT: 70 IN | TEMPERATURE: 97.6 F | BODY MASS INDEX: 24.91 KG/M2

## 2020-05-08 DIAGNOSIS — K22.2 ESOPHAGEAL STRICTURE: Primary | ICD-10-CM

## 2020-05-08 PROCEDURE — 3044F HG A1C LEVEL LT 7.0%: CPT | Performed by: PHYSICIAN ASSISTANT

## 2020-05-08 PROCEDURE — 99213 OFFICE O/P EST LOW 20 MIN: CPT | Performed by: PHYSICIAN ASSISTANT

## 2020-05-08 PROCEDURE — 4040F PNEUMOC VAC/ADMIN/RCVD: CPT | Performed by: PHYSICIAN ASSISTANT

## 2020-05-08 PROCEDURE — 3008F BODY MASS INDEX DOCD: CPT | Performed by: PHYSICIAN ASSISTANT

## 2020-05-08 PROCEDURE — 1036F TOBACCO NON-USER: CPT | Performed by: PHYSICIAN ASSISTANT

## 2020-05-12 ENCOUNTER — OFFICE VISIT (OUTPATIENT)
Dept: HEMATOLOGY ONCOLOGY | Facility: CLINIC | Age: 65
End: 2020-05-12
Payer: COMMERCIAL

## 2020-05-12 ENCOUNTER — APPOINTMENT (OUTPATIENT)
Dept: LAB | Facility: HOSPITAL | Age: 65
End: 2020-05-12
Payer: COMMERCIAL

## 2020-05-12 VITALS
WEIGHT: 170 LBS | HEART RATE: 84 BPM | BODY MASS INDEX: 24.34 KG/M2 | DIASTOLIC BLOOD PRESSURE: 80 MMHG | SYSTOLIC BLOOD PRESSURE: 134 MMHG | OXYGEN SATURATION: 94 % | TEMPERATURE: 97.2 F | RESPIRATION RATE: 18 BRPM | HEIGHT: 70 IN

## 2020-05-12 DIAGNOSIS — C15.5 MALIGNANT NEOPLASM OF LOWER THIRD OF ESOPHAGUS (HCC): ICD-10-CM

## 2020-05-12 DIAGNOSIS — C15.5 MALIGNANT NEOPLASM OF LOWER THIRD OF ESOPHAGUS (HCC): Primary | ICD-10-CM

## 2020-05-12 LAB
ALBUMIN SERPL BCP-MCNC: 2.7 G/DL (ref 3.5–5)
ALP SERPL-CCNC: 74 U/L (ref 46–116)
ALT SERPL W P-5'-P-CCNC: 17 U/L (ref 12–78)
ANION GAP SERPL CALCULATED.3IONS-SCNC: 10 MMOL/L (ref 4–13)
AST SERPL W P-5'-P-CCNC: 10 U/L (ref 5–45)
BASOPHILS # BLD AUTO: 0.04 THOUSANDS/ΜL (ref 0–0.1)
BASOPHILS NFR BLD AUTO: 1 % (ref 0–1)
BILIRUB SERPL-MCNC: 0.6 MG/DL (ref 0.2–1)
BUN SERPL-MCNC: 10 MG/DL (ref 5–25)
CALCIUM SERPL-MCNC: 9.6 MG/DL (ref 8.3–10.1)
CEA SERPL-MCNC: 1.8 NG/ML (ref 0–3)
CHLORIDE SERPL-SCNC: 101 MMOL/L (ref 100–108)
CO2 SERPL-SCNC: 31 MMOL/L (ref 21–32)
CREAT SERPL-MCNC: 1.14 MG/DL (ref 0.6–1.3)
EOSINOPHIL # BLD AUTO: 0.09 THOUSAND/ΜL (ref 0–0.61)
EOSINOPHIL NFR BLD AUTO: 1 % (ref 0–6)
ERYTHROCYTE [DISTWIDTH] IN BLOOD BY AUTOMATED COUNT: 14.6 % (ref 11.6–15.1)
GFR SERPL CREATININE-BSD FRML MDRD: 67 ML/MIN/1.73SQ M
GLUCOSE P FAST SERPL-MCNC: 115 MG/DL (ref 65–99)
HCT VFR BLD AUTO: 36.2 % (ref 36.5–49.3)
HGB BLD-MCNC: 11.1 G/DL (ref 12–17)
IMM GRANULOCYTES # BLD AUTO: 0.03 THOUSAND/UL (ref 0–0.2)
IMM GRANULOCYTES NFR BLD AUTO: 0 % (ref 0–2)
LYMPHOCYTES # BLD AUTO: 0.93 THOUSANDS/ΜL (ref 0.6–4.47)
LYMPHOCYTES NFR BLD AUTO: 12 % (ref 14–44)
MCH RBC QN AUTO: 25.6 PG (ref 26.8–34.3)
MCHC RBC AUTO-ENTMCNC: 30.7 G/DL (ref 31.4–37.4)
MCV RBC AUTO: 83 FL (ref 82–98)
MONOCYTES # BLD AUTO: 0.66 THOUSAND/ΜL (ref 0.17–1.22)
MONOCYTES NFR BLD AUTO: 8 % (ref 4–12)
NEUTROPHILS # BLD AUTO: 6.13 THOUSANDS/ΜL (ref 1.85–7.62)
NEUTS SEG NFR BLD AUTO: 78 % (ref 43–75)
NRBC BLD AUTO-RTO: 0 /100 WBCS
PLATELET # BLD AUTO: 412 THOUSANDS/UL (ref 149–390)
PMV BLD AUTO: 8.1 FL (ref 8.9–12.7)
POTASSIUM SERPL-SCNC: 4.9 MMOL/L (ref 3.5–5.3)
PROT SERPL-MCNC: 8.6 G/DL (ref 6.4–8.2)
RBC # BLD AUTO: 4.34 MILLION/UL (ref 3.88–5.62)
SODIUM SERPL-SCNC: 142 MMOL/L (ref 136–145)
WBC # BLD AUTO: 7.88 THOUSAND/UL (ref 4.31–10.16)

## 2020-05-12 PROCEDURE — 82378 CARCINOEMBRYONIC ANTIGEN: CPT

## 2020-05-12 PROCEDURE — 99214 OFFICE O/P EST MOD 30 MIN: CPT | Performed by: INTERNAL MEDICINE

## 2020-05-12 PROCEDURE — 85025 COMPLETE CBC W/AUTO DIFF WBC: CPT

## 2020-05-12 PROCEDURE — 4040F PNEUMOC VAC/ADMIN/RCVD: CPT | Performed by: INTERNAL MEDICINE

## 2020-05-12 PROCEDURE — 80053 COMPREHEN METABOLIC PANEL: CPT

## 2020-05-12 PROCEDURE — 3044F HG A1C LEVEL LT 7.0%: CPT | Performed by: INTERNAL MEDICINE

## 2020-05-12 PROCEDURE — 36415 COLL VENOUS BLD VENIPUNCTURE: CPT

## 2020-05-12 PROCEDURE — 1036F TOBACCO NON-USER: CPT | Performed by: INTERNAL MEDICINE

## 2020-05-12 PROCEDURE — 3008F BODY MASS INDEX DOCD: CPT | Performed by: INTERNAL MEDICINE

## 2020-05-16 ENCOUNTER — TELEPHONE (OUTPATIENT)
Dept: OTHER | Facility: OTHER | Age: 65
End: 2020-05-16

## 2020-05-17 DIAGNOSIS — G25.81 RESTLESS LEG SYNDROME: ICD-10-CM

## 2020-05-17 RX ORDER — ROPINIROLE 0.25 MG/1
TABLET, FILM COATED ORAL
Qty: 30 TABLET | Refills: 0 | Status: SHIPPED | OUTPATIENT
Start: 2020-05-17 | End: 2020-10-26

## 2020-05-18 ENCOUNTER — TELEMEDICINE (OUTPATIENT)
Dept: FAMILY MEDICINE CLINIC | Facility: CLINIC | Age: 65
End: 2020-05-18
Payer: COMMERCIAL

## 2020-05-18 ENCOUNTER — PREP FOR PROCEDURE (OUTPATIENT)
Dept: GASTROENTEROLOGY | Facility: CLINIC | Age: 65
End: 2020-05-18

## 2020-05-18 ENCOUNTER — TELEPHONE (OUTPATIENT)
Dept: GASTROENTEROLOGY | Facility: CLINIC | Age: 65
End: 2020-05-18

## 2020-05-18 VITALS — BODY MASS INDEX: 22.9 KG/M2 | HEIGHT: 70 IN | WEIGHT: 160 LBS

## 2020-05-18 DIAGNOSIS — R13.14 PHARYNGOESOPHAGEAL DYSPHAGIA: Primary | ICD-10-CM

## 2020-05-18 DIAGNOSIS — K22.2 ESOPHAGEAL OBSTRUCTION: ICD-10-CM

## 2020-05-18 DIAGNOSIS — R13.19 ESOPHAGEAL DYSPHAGIA: Primary | ICD-10-CM

## 2020-05-18 DIAGNOSIS — Z20.822 ENCOUNTER FOR LABORATORY TESTING FOR COVID-19 VIRUS: ICD-10-CM

## 2020-05-18 DIAGNOSIS — G47.00 INSOMNIA, UNSPECIFIED TYPE: ICD-10-CM

## 2020-05-18 PROCEDURE — 99442 PR PHYS/QHP TELEPHONE EVALUATION 11-20 MIN: CPT | Performed by: FAMILY MEDICINE

## 2020-05-18 RX ORDER — TRAZODONE HYDROCHLORIDE 50 MG/1
50 TABLET ORAL
Qty: 30 TABLET | Refills: 5 | Status: ON HOLD | OUTPATIENT
Start: 2020-05-18 | End: 2020-06-09 | Stop reason: SDUPTHER

## 2020-05-19 DIAGNOSIS — Z20.822 ENCOUNTER FOR LABORATORY TESTING FOR COVID-19 VIRUS: ICD-10-CM

## 2020-05-19 DIAGNOSIS — C80.1 NEUROPATHY ASSOCIATED WITH CANCER (HCC): ICD-10-CM

## 2020-05-19 DIAGNOSIS — G63 NEUROPATHY ASSOCIATED WITH CANCER (HCC): ICD-10-CM

## 2020-05-19 PROCEDURE — U0003 INFECTIOUS AGENT DETECTION BY NUCLEIC ACID (DNA OR RNA); SEVERE ACUTE RESPIRATORY SYNDROME CORONAVIRUS 2 (SARS-COV-2) (CORONAVIRUS DISEASE [COVID-19]), AMPLIFIED PROBE TECHNIQUE, MAKING USE OF HIGH THROUGHPUT TECHNOLOGIES AS DESCRIBED BY CMS-2020-01-R: HCPCS

## 2020-05-20 RX ORDER — GABAPENTIN 300 MG/1
CAPSULE ORAL
Qty: 30 CAPSULE | Refills: 0 | Status: SHIPPED | OUTPATIENT
Start: 2020-05-20 | End: 2020-06-29

## 2020-05-21 LAB — SARS-COV-2 RNA SPEC QL NAA+PROBE: NOT DETECTED

## 2020-05-22 ENCOUNTER — HOSPITAL ENCOUNTER (OUTPATIENT)
Dept: INFUSION CENTER | Facility: CLINIC | Age: 65
Discharge: HOME | End: 2020-05-22
Payer: COMMERCIAL

## 2020-05-22 ENCOUNTER — ANESTHESIA EVENT (OUTPATIENT)
Dept: GASTROENTEROLOGY | Facility: HOSPITAL | Age: 65
End: 2020-05-22

## 2020-05-22 DIAGNOSIS — Z95.828 PORT-A-CATH IN PLACE: Primary | ICD-10-CM

## 2020-05-22 PROCEDURE — 96523 IRRIG DRUG DELIVERY DEVICE: CPT

## 2020-05-23 ENCOUNTER — ANESTHESIA (OUTPATIENT)
Dept: GASTROENTEROLOGY | Facility: HOSPITAL | Age: 65
End: 2020-05-23

## 2020-05-23 ENCOUNTER — TELEPHONE (OUTPATIENT)
Dept: OTHER | Facility: OTHER | Age: 65
End: 2020-05-23

## 2020-05-23 ENCOUNTER — APPOINTMENT (EMERGENCY)
Dept: CT IMAGING | Facility: HOSPITAL | Age: 65
End: 2020-05-23
Payer: COMMERCIAL

## 2020-05-23 ENCOUNTER — HOSPITAL ENCOUNTER (OUTPATIENT)
Dept: GASTROENTEROLOGY | Facility: HOSPITAL | Age: 65
Setting detail: OUTPATIENT SURGERY
Discharge: HOME | End: 2020-05-23
Attending: INTERNAL MEDICINE
Payer: COMMERCIAL

## 2020-05-23 ENCOUNTER — HOSPITAL ENCOUNTER (INPATIENT)
Facility: HOSPITAL | Age: 65
LOS: 17 days | Discharge: HOME HEALTH | DRG: 987 | End: 2020-06-09
Attending: INTERNAL MEDICINE | Admitting: INTERNAL MEDICINE
Payer: COMMERCIAL

## 2020-05-23 ENCOUNTER — HOSPITAL ENCOUNTER (EMERGENCY)
Facility: HOSPITAL | Age: 65
End: 2020-05-23
Attending: EMERGENCY MEDICINE | Admitting: EMERGENCY MEDICINE
Payer: COMMERCIAL

## 2020-05-23 VITALS
BODY MASS INDEX: 23.58 KG/M2 | HEIGHT: 70 IN | WEIGHT: 164.68 LBS | RESPIRATION RATE: 14 BRPM | HEART RATE: 80 BPM | SYSTOLIC BLOOD PRESSURE: 111 MMHG | OXYGEN SATURATION: 98 % | TEMPERATURE: 98.4 F | DIASTOLIC BLOOD PRESSURE: 59 MMHG

## 2020-05-23 VITALS
OXYGEN SATURATION: 93 % | TEMPERATURE: 98.9 F | RESPIRATION RATE: 18 BRPM | DIASTOLIC BLOOD PRESSURE: 69 MMHG | HEART RATE: 91 BPM | SYSTOLIC BLOOD PRESSURE: 120 MMHG

## 2020-05-23 DIAGNOSIS — G93.9 BRAIN LESION: ICD-10-CM

## 2020-05-23 DIAGNOSIS — M79.89 BILATERAL SWELLING OF FEET: ICD-10-CM

## 2020-05-23 DIAGNOSIS — C16.0 GE JUNCTION CARCINOMA (HCC): ICD-10-CM

## 2020-05-23 DIAGNOSIS — M46.20 PARASPINAL ABSCESS (HCC): Primary | ICD-10-CM

## 2020-05-23 DIAGNOSIS — J90 LOCULATED PLEURAL EFFUSION: ICD-10-CM

## 2020-05-23 DIAGNOSIS — I65.29 CAROTID STENOSIS, ASYMPTOMATIC: ICD-10-CM

## 2020-05-23 DIAGNOSIS — M86.18 OTHER ACUTE OSTEOMYELITIS, OTHER SITE (HCC): ICD-10-CM

## 2020-05-23 DIAGNOSIS — J44.9 CHRONIC OBSTRUCTIVE PULMONARY DISEASE, UNSPECIFIED COPD TYPE (HCC): Primary | ICD-10-CM

## 2020-05-23 DIAGNOSIS — G47.00 INSOMNIA, UNSPECIFIED TYPE: ICD-10-CM

## 2020-05-23 DIAGNOSIS — M46.20 PARASPINAL ABSCESS (HCC): ICD-10-CM

## 2020-05-23 DIAGNOSIS — C15.5 MALIGNANT NEOPLASM OF LOWER THIRD OF ESOPHAGUS (HCC): ICD-10-CM

## 2020-05-23 DIAGNOSIS — S22.000A COMPRESSION FRACTURE OF THORACIC VERTEBRA, INITIAL ENCOUNTER, UNSPECIFIED THORACIC VERTEBRAL LEVEL: ICD-10-CM

## 2020-05-23 DIAGNOSIS — K22.2 ESOPHAGEAL STRICTURE: ICD-10-CM

## 2020-05-23 DIAGNOSIS — R13.19 ESOPHAGEAL DYSPHAGIA: ICD-10-CM

## 2020-05-23 PROBLEM — E78.00 HIGH CHOLESTEROL: Chronic | Status: ACTIVE | Noted: 2019-09-17

## 2020-05-23 LAB
ABO GROUP BLD: NORMAL
ALBUMIN SERPL BCP-MCNC: 2.5 G/DL (ref 3.5–5)
ALP SERPL-CCNC: 79 U/L (ref 46–116)
ALT SERPL W P-5'-P-CCNC: 16 U/L (ref 12–78)
ANION GAP SERPL CALCULATED.3IONS-SCNC: 9 MMOL/L (ref 4–13)
AST SERPL W P-5'-P-CCNC: 15 U/L (ref 5–45)
BASOPHILS # BLD AUTO: 0.03 THOUSANDS/ΜL (ref 0–0.1)
BASOPHILS NFR BLD AUTO: 0 % (ref 0–1)
BILIRUB DIRECT SERPL-MCNC: 0.23 MG/DL (ref 0–0.2)
BILIRUB SERPL-MCNC: 0.7 MG/DL (ref 0.2–1)
BLD GP AB SCN SERPL QL: NEGATIVE
BUN SERPL-MCNC: 21 MG/DL (ref 5–25)
CALCIUM SERPL-MCNC: 9.2 MG/DL (ref 8.3–10.1)
CHLORIDE SERPL-SCNC: 97 MMOL/L (ref 100–108)
CO2 SERPL-SCNC: 30 MMOL/L (ref 21–32)
CREAT SERPL-MCNC: 1.21 MG/DL (ref 0.6–1.3)
EOSINOPHIL # BLD AUTO: 0.03 THOUSAND/ΜL (ref 0–0.61)
EOSINOPHIL NFR BLD AUTO: 0 % (ref 0–6)
ERYTHROCYTE [DISTWIDTH] IN BLOOD BY AUTOMATED COUNT: 14.8 % (ref 11.6–15.1)
GFR SERPL CREATININE-BSD FRML MDRD: 62 ML/MIN/1.73SQ M
GLUCOSE SERPL-MCNC: 124 MG/DL (ref 65–140)
GLUCOSE SERPL-MCNC: 129 MG/DL (ref 65–140)
GLUCOSE SERPL-MCNC: 87 MG/DL (ref 65–140)
HCT VFR BLD AUTO: 33.8 % (ref 36.5–49.3)
HGB BLD-MCNC: 10.5 G/DL (ref 12–17)
IMM GRANULOCYTES # BLD AUTO: 0.03 THOUSAND/UL (ref 0–0.2)
IMM GRANULOCYTES NFR BLD AUTO: 0 % (ref 0–2)
INR PPP: 1.1 (ref 0.84–1.19)
LYMPHOCYTES # BLD AUTO: 0.42 THOUSANDS/ΜL (ref 0.6–4.47)
LYMPHOCYTES NFR BLD AUTO: 5 % (ref 14–44)
MAGNESIUM SERPL-MCNC: 1.9 MG/DL (ref 1.6–2.6)
MCH RBC QN AUTO: 25.2 PG (ref 26.8–34.3)
MCHC RBC AUTO-ENTMCNC: 31.1 G/DL (ref 31.4–37.4)
MCV RBC AUTO: 81 FL (ref 82–98)
MONOCYTES # BLD AUTO: 0.6 THOUSAND/ΜL (ref 0.17–1.22)
MONOCYTES NFR BLD AUTO: 7 % (ref 4–12)
NEUTROPHILS # BLD AUTO: 7.57 THOUSANDS/ΜL (ref 1.85–7.62)
NEUTS SEG NFR BLD AUTO: 88 % (ref 43–75)
NRBC BLD AUTO-RTO: 0 /100 WBCS
PLATELET # BLD AUTO: 357 THOUSANDS/UL (ref 149–390)
PMV BLD AUTO: 8.1 FL (ref 8.9–12.7)
POTASSIUM SERPL-SCNC: 3.9 MMOL/L (ref 3.5–5.3)
PROT SERPL-MCNC: 8.9 G/DL (ref 6.4–8.2)
PROTHROMBIN TIME: 14.3 SECONDS (ref 11.6–14.5)
RBC # BLD AUTO: 4.17 MILLION/UL (ref 3.88–5.62)
RH BLD: POSITIVE
SODIUM SERPL-SCNC: 136 MMOL/L (ref 136–145)
SPECIMEN EXPIRATION DATE: NORMAL
WBC # BLD AUTO: 8.68 THOUSAND/UL (ref 4.31–10.16)

## 2020-05-23 PROCEDURE — 70496 CT ANGIOGRAPHY HEAD: CPT

## 2020-05-23 PROCEDURE — 86850 RBC ANTIBODY SCREEN: CPT | Performed by: EMERGENCY MEDICINE

## 2020-05-23 PROCEDURE — 86900 BLOOD TYPING SEROLOGIC ABO: CPT | Performed by: EMERGENCY MEDICINE

## 2020-05-23 PROCEDURE — 96361 HYDRATE IV INFUSION ADD-ON: CPT

## 2020-05-23 PROCEDURE — C1726 CATH, BAL DIL, NON-VASCULAR: HCPCS

## 2020-05-23 PROCEDURE — 99285 EMERGENCY DEPT VISIT HI MDM: CPT

## 2020-05-23 PROCEDURE — NC001 PR NO CHARGE: Performed by: NEUROLOGICAL SURGERY

## 2020-05-23 PROCEDURE — 80048 BASIC METABOLIC PNL TOTAL CA: CPT | Performed by: EMERGENCY MEDICINE

## 2020-05-23 PROCEDURE — 43220 ESOPHAGOSCOPY BALLOON <30MM: CPT | Performed by: INTERNAL MEDICINE

## 2020-05-23 PROCEDURE — 96375 TX/PRO/DX INJ NEW DRUG ADDON: CPT

## 2020-05-23 PROCEDURE — 96365 THER/PROPH/DIAG IV INF INIT: CPT

## 2020-05-23 PROCEDURE — 86901 BLOOD TYPING SEROLOGIC RH(D): CPT | Performed by: EMERGENCY MEDICINE

## 2020-05-23 PROCEDURE — 70498 CT ANGIOGRAPHY NECK: CPT

## 2020-05-23 PROCEDURE — 99222 1ST HOSP IP/OBS MODERATE 55: CPT | Performed by: INTERNAL MEDICINE

## 2020-05-23 PROCEDURE — 71275 CT ANGIOGRAPHY CHEST: CPT

## 2020-05-23 PROCEDURE — 80076 HEPATIC FUNCTION PANEL: CPT | Performed by: EMERGENCY MEDICINE

## 2020-05-23 PROCEDURE — 96376 TX/PRO/DX INJ SAME DRUG ADON: CPT

## 2020-05-23 PROCEDURE — 85610 PROTHROMBIN TIME: CPT | Performed by: EMERGENCY MEDICINE

## 2020-05-23 PROCEDURE — 99285 EMERGENCY DEPT VISIT HI MDM: CPT | Performed by: EMERGENCY MEDICINE

## 2020-05-23 PROCEDURE — 82948 REAGENT STRIP/BLOOD GLUCOSE: CPT

## 2020-05-23 PROCEDURE — 85025 COMPLETE CBC W/AUTO DIFF WBC: CPT | Performed by: EMERGENCY MEDICINE

## 2020-05-23 PROCEDURE — 83735 ASSAY OF MAGNESIUM: CPT | Performed by: EMERGENCY MEDICINE

## 2020-05-23 PROCEDURE — 36415 COLL VENOUS BLD VENIPUNCTURE: CPT | Performed by: EMERGENCY MEDICINE

## 2020-05-23 RX ORDER — AMOXICILLIN 250 MG
1 CAPSULE ORAL DAILY
Status: DISCONTINUED | OUTPATIENT
Start: 2020-05-24 | End: 2020-06-09 | Stop reason: HOSPADM

## 2020-05-23 RX ORDER — GABAPENTIN 300 MG/1
300 CAPSULE ORAL
Status: DISCONTINUED | OUTPATIENT
Start: 2020-05-23 | End: 2020-06-09 | Stop reason: HOSPADM

## 2020-05-23 RX ORDER — PROPOFOL 10 MG/ML
INJECTION, EMULSION INTRAVENOUS AS NEEDED
Status: DISCONTINUED | OUTPATIENT
Start: 2020-05-23 | End: 2020-05-23 | Stop reason: SURG

## 2020-05-23 RX ORDER — LACTOSE-REDUCED FOOD/FIBER 0.06 G-1.5
60 LIQUID (ML) ORAL CONTINUOUS
Status: DISCONTINUED | OUTPATIENT
Start: 2020-05-23 | End: 2020-05-23

## 2020-05-23 RX ORDER — OXYCODONE HYDROCHLORIDE 5 MG/1
5 TABLET ORAL EVERY 4 HOURS PRN
Status: DISCONTINUED | OUTPATIENT
Start: 2020-05-23 | End: 2020-05-25

## 2020-05-23 RX ORDER — OXYCODONE HCL 5 MG/5 ML
2.5 SOLUTION, ORAL ORAL
Status: DISCONTINUED | OUTPATIENT
Start: 2020-05-23 | End: 2020-05-25

## 2020-05-23 RX ORDER — AMIODARONE HYDROCHLORIDE 200 MG/1
200 TABLET ORAL
Status: DISCONTINUED | OUTPATIENT
Start: 2020-05-24 | End: 2020-06-09 | Stop reason: HOSPADM

## 2020-05-23 RX ORDER — TRAZODONE HYDROCHLORIDE 50 MG/1
50 TABLET ORAL
Status: DISCONTINUED | OUTPATIENT
Start: 2020-05-23 | End: 2020-05-25

## 2020-05-23 RX ORDER — VANCOMYCIN HYDROCHLORIDE 1 G/200ML
15 INJECTION, SOLUTION INTRAVENOUS ONCE
Status: DISCONTINUED | OUTPATIENT
Start: 2020-05-23 | End: 2020-05-23 | Stop reason: HOSPADM

## 2020-05-23 RX ORDER — ONDANSETRON 2 MG/ML
4 INJECTION INTRAMUSCULAR; INTRAVENOUS EVERY 6 HOURS PRN
Status: DISCONTINUED | OUTPATIENT
Start: 2020-05-23 | End: 2020-06-09 | Stop reason: HOSPADM

## 2020-05-23 RX ORDER — FENTANYL CITRATE 50 UG/ML
50 INJECTION, SOLUTION INTRAMUSCULAR; INTRAVENOUS ONCE
Status: COMPLETED | OUTPATIENT
Start: 2020-05-23 | End: 2020-05-23

## 2020-05-23 RX ORDER — LIDOCAINE HYDROCHLORIDE 10 MG/ML
INJECTION, SOLUTION EPIDURAL; INFILTRATION; INTRACAUDAL; PERINEURAL AS NEEDED
Status: DISCONTINUED | OUTPATIENT
Start: 2020-05-23 | End: 2020-05-23 | Stop reason: SURG

## 2020-05-23 RX ORDER — HYDROMORPHONE HCL/PF 1 MG/ML
0.6 SYRINGE (ML) INJECTION ONCE
Status: COMPLETED | OUTPATIENT
Start: 2020-05-23 | End: 2020-05-23

## 2020-05-23 RX ORDER — ACETAMINOPHEN 325 MG/1
650 TABLET ORAL EVERY 6 HOURS PRN
Status: DISCONTINUED | OUTPATIENT
Start: 2020-05-23 | End: 2020-05-25

## 2020-05-23 RX ORDER — LEVOTHYROXINE SODIUM 0.03 MG/1
25 TABLET ORAL
Status: DISCONTINUED | OUTPATIENT
Start: 2020-05-24 | End: 2020-06-09 | Stop reason: HOSPADM

## 2020-05-23 RX ORDER — AMIODARONE HYDROCHLORIDE 200 MG/1
200 TABLET ORAL
Status: DISCONTINUED | OUTPATIENT
Start: 2020-05-24 | End: 2020-05-23

## 2020-05-23 RX ORDER — SODIUM CHLORIDE, SODIUM LACTATE, POTASSIUM CHLORIDE, CALCIUM CHLORIDE 600; 310; 30; 20 MG/100ML; MG/100ML; MG/100ML; MG/100ML
125 INJECTION, SOLUTION INTRAVENOUS CONTINUOUS
Status: DISCONTINUED | OUTPATIENT
Start: 2020-05-23 | End: 2020-05-27 | Stop reason: HOSPADM

## 2020-05-23 RX ORDER — ROPINIROLE 0.25 MG/1
0.25 TABLET, FILM COATED ORAL
Status: DISCONTINUED | OUTPATIENT
Start: 2020-05-23 | End: 2020-06-09 | Stop reason: HOSPADM

## 2020-05-23 RX ORDER — DIPHENHYDRAMINE HYDROCHLORIDE 50 MG/ML
50 INJECTION INTRAMUSCULAR; INTRAVENOUS ONCE
Status: COMPLETED | OUTPATIENT
Start: 2020-05-23 | End: 2020-05-23

## 2020-05-23 RX ORDER — PANTOPRAZOLE SODIUM 40 MG/1
40 TABLET, DELAYED RELEASE ORAL
Status: DISCONTINUED | OUTPATIENT
Start: 2020-05-24 | End: 2020-06-09 | Stop reason: HOSPADM

## 2020-05-23 RX ADMIN — HYDROMORPHONE HYDROCHLORIDE 0.6 MG: 1 INJECTION, SOLUTION INTRAMUSCULAR; INTRAVENOUS; SUBCUTANEOUS at 18:05

## 2020-05-23 RX ADMIN — OXYCODONE HYDROCHLORIDE 5 MG: 5 TABLET ORAL at 22:27

## 2020-05-23 RX ADMIN — PROPOFOL 20 MG: 10 INJECTION, EMULSION INTRAVENOUS at 07:36

## 2020-05-23 RX ADMIN — CEFTRIAXONE SODIUM 1000 MG: 10 INJECTION, POWDER, FOR SOLUTION INTRAVENOUS at 13:53

## 2020-05-23 RX ADMIN — IOHEXOL 120 ML: 350 INJECTION, SOLUTION INTRAVENOUS at 12:38

## 2020-05-23 RX ADMIN — PROPOFOL 20 MG: 10 INJECTION, EMULSION INTRAVENOUS at 07:30

## 2020-05-23 RX ADMIN — LIDOCAINE HYDROCHLORIDE 50 MG: 10 INJECTION, SOLUTION EPIDURAL; INFILTRATION; INTRACAUDAL; PERINEURAL at 07:28

## 2020-05-23 RX ADMIN — PROPOFOL 20 MG: 10 INJECTION, EMULSION INTRAVENOUS at 07:32

## 2020-05-23 RX ADMIN — GABAPENTIN 300 MG: 300 CAPSULE ORAL at 22:06

## 2020-05-23 RX ADMIN — PROPOFOL 20 MG: 10 INJECTION, EMULSION INTRAVENOUS at 07:34

## 2020-05-23 RX ADMIN — SODIUM CHLORIDE 1000 ML: 0.9 INJECTION, SOLUTION INTRAVENOUS at 14:33

## 2020-05-23 RX ADMIN — FENTANYL CITRATE 50 MCG: 50 INJECTION INTRAMUSCULAR; INTRAVENOUS at 15:07

## 2020-05-23 RX ADMIN — ROPINIROLE HYDROCHLORIDE 0.25 MG: 0.25 TABLET, FILM COATED ORAL at 22:07

## 2020-05-23 RX ADMIN — SODIUM CHLORIDE 1000 ML: 0.9 INJECTION, SOLUTION INTRAVENOUS at 16:16

## 2020-05-23 RX ADMIN — SODIUM CHLORIDE 1000 ML: 0.9 INJECTION, SOLUTION INTRAVENOUS at 11:47

## 2020-05-23 RX ADMIN — SODIUM CHLORIDE, SODIUM LACTATE, POTASSIUM CHLORIDE, AND CALCIUM CHLORIDE: .6; .31; .03; .02 INJECTION, SOLUTION INTRAVENOUS at 07:08

## 2020-05-23 RX ADMIN — TRAZODONE HYDROCHLORIDE 50 MG: 50 TABLET ORAL at 22:06

## 2020-05-23 RX ADMIN — FENTANYL CITRATE 50 MCG: 50 INJECTION INTRAMUSCULAR; INTRAVENOUS at 16:57

## 2020-05-23 RX ADMIN — PHENYLEPHRINE HYDROCHLORIDE 200 MCG: 10 INJECTION INTRAVENOUS at 07:36

## 2020-05-23 RX ADMIN — DIPHENHYDRAMINE HYDROCHLORIDE 50 MG: 50 INJECTION, SOLUTION INTRAMUSCULAR; INTRAVENOUS at 15:07

## 2020-05-23 RX ADMIN — PROPOFOL 20 MG: 10 INJECTION, EMULSION INTRAVENOUS at 07:38

## 2020-05-23 RX ADMIN — PROPOFOL 100 MG: 10 INJECTION, EMULSION INTRAVENOUS at 07:28

## 2020-05-24 ENCOUNTER — APPOINTMENT (INPATIENT)
Dept: RADIOLOGY | Facility: HOSPITAL | Age: 65
DRG: 987 | End: 2020-05-24
Payer: COMMERCIAL

## 2020-05-24 LAB
ALBUMIN SERPL BCP-MCNC: 2 G/DL (ref 3.5–5)
ALP SERPL-CCNC: 66 U/L (ref 46–116)
ALT SERPL W P-5'-P-CCNC: 12 U/L (ref 12–78)
ANION GAP SERPL CALCULATED.3IONS-SCNC: 6 MMOL/L (ref 4–13)
AST SERPL W P-5'-P-CCNC: 9 U/L (ref 5–45)
BASOPHILS # BLD AUTO: 0.02 THOUSANDS/ΜL (ref 0–0.1)
BASOPHILS NFR BLD AUTO: 0 % (ref 0–1)
BILIRUB SERPL-MCNC: 0.51 MG/DL (ref 0.2–1)
BUN SERPL-MCNC: 15 MG/DL (ref 5–25)
CALCIUM SERPL-MCNC: 8.9 MG/DL (ref 8.3–10.1)
CHLORIDE SERPL-SCNC: 104 MMOL/L (ref 100–108)
CO2 SERPL-SCNC: 26 MMOL/L (ref 21–32)
CREAT SERPL-MCNC: 0.93 MG/DL (ref 0.6–1.3)
EOSINOPHIL # BLD AUTO: 0.04 THOUSAND/ΜL (ref 0–0.61)
EOSINOPHIL NFR BLD AUTO: 1 % (ref 0–6)
ERYTHROCYTE [DISTWIDTH] IN BLOOD BY AUTOMATED COUNT: 15 % (ref 11.6–15.1)
GFR SERPL CREATININE-BSD FRML MDRD: 86 ML/MIN/1.73SQ M
GLUCOSE SERPL-MCNC: 103 MG/DL (ref 65–140)
GLUCOSE SERPL-MCNC: 112 MG/DL (ref 65–140)
GLUCOSE SERPL-MCNC: 114 MG/DL (ref 65–140)
GLUCOSE SERPL-MCNC: 120 MG/DL (ref 65–140)
GLUCOSE SERPL-MCNC: 148 MG/DL (ref 65–140)
GLUCOSE SERPL-MCNC: 174 MG/DL (ref 65–140)
HCT VFR BLD AUTO: 27.9 % (ref 36.5–49.3)
HGB BLD-MCNC: 8.5 G/DL (ref 12–17)
IMM GRANULOCYTES # BLD AUTO: 0.03 THOUSAND/UL (ref 0–0.2)
IMM GRANULOCYTES NFR BLD AUTO: 1 % (ref 0–2)
LYMPHOCYTES # BLD AUTO: 0.72 THOUSANDS/ΜL (ref 0.6–4.47)
LYMPHOCYTES NFR BLD AUTO: 11 % (ref 14–44)
MAGNESIUM SERPL-MCNC: 2 MG/DL (ref 1.6–2.6)
MCH RBC QN AUTO: 25.1 PG (ref 26.8–34.3)
MCHC RBC AUTO-ENTMCNC: 30.5 G/DL (ref 31.4–37.4)
MCV RBC AUTO: 83 FL (ref 82–98)
MONOCYTES # BLD AUTO: 0.56 THOUSAND/ΜL (ref 0.17–1.22)
MONOCYTES NFR BLD AUTO: 9 % (ref 4–12)
NEUTROPHILS # BLD AUTO: 5.24 THOUSANDS/ΜL (ref 1.85–7.62)
NEUTS SEG NFR BLD AUTO: 78 % (ref 43–75)
NRBC BLD AUTO-RTO: 0 /100 WBCS
PHOSPHATE SERPL-MCNC: 3.7 MG/DL (ref 2.3–4.1)
PLATELET # BLD AUTO: 333 THOUSANDS/UL (ref 149–390)
PLATELET # BLD AUTO: 334 THOUSANDS/UL (ref 149–390)
PMV BLD AUTO: 8.6 FL (ref 8.9–12.7)
PMV BLD AUTO: 8.8 FL (ref 8.9–12.7)
POTASSIUM SERPL-SCNC: 3.6 MMOL/L (ref 3.5–5.3)
PROT SERPL-MCNC: 7.3 G/DL (ref 6.4–8.2)
RBC # BLD AUTO: 3.38 MILLION/UL (ref 3.88–5.62)
SODIUM SERPL-SCNC: 136 MMOL/L (ref 136–145)
WBC # BLD AUTO: 6.61 THOUSAND/UL (ref 4.31–10.16)

## 2020-05-24 PROCEDURE — 80053 COMPREHEN METABOLIC PANEL: CPT | Performed by: INTERNAL MEDICINE

## 2020-05-24 PROCEDURE — 99222 1ST HOSP IP/OBS MODERATE 55: CPT | Performed by: INTERNAL MEDICINE

## 2020-05-24 PROCEDURE — 70553 MRI BRAIN STEM W/O & W/DYE: CPT

## 2020-05-24 PROCEDURE — 85049 AUTOMATED PLATELET COUNT: CPT | Performed by: INTERNAL MEDICINE

## 2020-05-24 PROCEDURE — 99223 1ST HOSP IP/OBS HIGH 75: CPT | Performed by: PHYSICIAN ASSISTANT

## 2020-05-24 PROCEDURE — 85025 COMPLETE CBC W/AUTO DIFF WBC: CPT | Performed by: INTERNAL MEDICINE

## 2020-05-24 PROCEDURE — 99223 1ST HOSP IP/OBS HIGH 75: CPT | Performed by: INTERNAL MEDICINE

## 2020-05-24 PROCEDURE — 83735 ASSAY OF MAGNESIUM: CPT | Performed by: INTERNAL MEDICINE

## 2020-05-24 PROCEDURE — A9585 GADOBUTROL INJECTION: HCPCS | Performed by: NURSE PRACTITIONER

## 2020-05-24 PROCEDURE — 82948 REAGENT STRIP/BLOOD GLUCOSE: CPT

## 2020-05-24 PROCEDURE — 99232 SBSQ HOSP IP/OBS MODERATE 35: CPT | Performed by: NURSE PRACTITIONER

## 2020-05-24 PROCEDURE — 84100 ASSAY OF PHOSPHORUS: CPT | Performed by: INTERNAL MEDICINE

## 2020-05-24 PROCEDURE — 92610 EVALUATE SWALLOWING FUNCTION: CPT

## 2020-05-24 RX ORDER — HYDROMORPHONE HCL/PF 1 MG/ML
0.5 SYRINGE (ML) INJECTION ONCE
Status: DISCONTINUED | OUTPATIENT
Start: 2020-05-24 | End: 2020-05-25

## 2020-05-24 RX ORDER — HYDROMORPHONE HCL/PF 1 MG/ML
0.5 SYRINGE (ML) INJECTION ONCE
Status: COMPLETED | OUTPATIENT
Start: 2020-05-24 | End: 2020-05-24

## 2020-05-24 RX ADMIN — HYDROMORPHONE HYDROCHLORIDE 0.5 MG: 1 INJECTION, SOLUTION INTRAMUSCULAR; INTRAVENOUS; SUBCUTANEOUS at 15:04

## 2020-05-24 RX ADMIN — LEVOTHYROXINE SODIUM 25 MCG: 25 TABLET ORAL at 06:03

## 2020-05-24 RX ADMIN — ROPINIROLE HYDROCHLORIDE 0.25 MG: 0.25 TABLET, FILM COATED ORAL at 21:13

## 2020-05-24 RX ADMIN — AMIODARONE HYDROCHLORIDE 200 MG: 200 TABLET ORAL at 08:38

## 2020-05-24 RX ADMIN — GABAPENTIN 300 MG: 300 CAPSULE ORAL at 21:13

## 2020-05-24 RX ADMIN — SENNOSIDES AND DOCUSATE SODIUM 1 TABLET: 8.6; 5 TABLET ORAL at 08:38

## 2020-05-24 RX ADMIN — OXYCODONE HYDROCHLORIDE 5 MG: 5 TABLET ORAL at 11:48

## 2020-05-24 RX ADMIN — OXYCODONE HYDROCHLORIDE 5 MG: 5 TABLET ORAL at 20:23

## 2020-05-24 RX ADMIN — GADOBUTROL 7 ML: 604.72 INJECTION INTRAVENOUS at 15:33

## 2020-05-24 RX ADMIN — TRAZODONE HYDROCHLORIDE 50 MG: 50 TABLET ORAL at 21:13

## 2020-05-24 RX ADMIN — INSULIN LISPRO 1 UNITS: 100 INJECTION, SOLUTION INTRAVENOUS; SUBCUTANEOUS at 17:39

## 2020-05-24 RX ADMIN — ENOXAPARIN SODIUM 40 MG: 40 INJECTION SUBCUTANEOUS at 08:38

## 2020-05-24 RX ADMIN — PANTOPRAZOLE SODIUM 40 MG: 40 TABLET, DELAYED RELEASE ORAL at 06:03

## 2020-05-25 ENCOUNTER — APPOINTMENT (INPATIENT)
Dept: RADIOLOGY | Facility: HOSPITAL | Age: 65
DRG: 987 | End: 2020-05-25
Payer: COMMERCIAL

## 2020-05-25 PROBLEM — G93.9 BRAIN LESION: Status: ACTIVE | Noted: 2020-05-25

## 2020-05-25 PROBLEM — I65.29 CAROTID STENOSIS, ASYMPTOMATIC: Status: ACTIVE | Noted: 2020-05-25

## 2020-05-25 LAB
ANION GAP SERPL CALCULATED.3IONS-SCNC: 4 MMOL/L (ref 4–13)
BASOPHILS # BLD AUTO: 0.02 THOUSANDS/ΜL (ref 0–0.1)
BASOPHILS NFR BLD AUTO: 0 % (ref 0–1)
BUN SERPL-MCNC: 15 MG/DL (ref 5–25)
CALCIUM SERPL-MCNC: 9.5 MG/DL (ref 8.3–10.1)
CHLORIDE SERPL-SCNC: 102 MMOL/L (ref 100–108)
CO2 SERPL-SCNC: 28 MMOL/L (ref 21–32)
CREAT SERPL-MCNC: 0.97 MG/DL (ref 0.6–1.3)
CRP SERPL QL: 141 MG/L
EOSINOPHIL # BLD AUTO: 0.11 THOUSAND/ΜL (ref 0–0.61)
EOSINOPHIL NFR BLD AUTO: 2 % (ref 0–6)
ERYTHROCYTE [DISTWIDTH] IN BLOOD BY AUTOMATED COUNT: 14.9 % (ref 11.6–15.1)
ERYTHROCYTE [SEDIMENTATION RATE] IN BLOOD: 119 MM/HOUR (ref 0–10)
GFR SERPL CREATININE-BSD FRML MDRD: 82 ML/MIN/1.73SQ M
GLUCOSE SERPL-MCNC: 114 MG/DL (ref 65–140)
GLUCOSE SERPL-MCNC: 115 MG/DL (ref 65–140)
GLUCOSE SERPL-MCNC: 124 MG/DL (ref 65–140)
GLUCOSE SERPL-MCNC: 125 MG/DL (ref 65–140)
GLUCOSE SERPL-MCNC: 130 MG/DL (ref 65–140)
HCT VFR BLD AUTO: 30.4 % (ref 36.5–49.3)
HGB BLD-MCNC: 9.4 G/DL (ref 12–17)
IMM GRANULOCYTES # BLD AUTO: 0.03 THOUSAND/UL (ref 0–0.2)
IMM GRANULOCYTES NFR BLD AUTO: 1 % (ref 0–2)
LYMPHOCYTES # BLD AUTO: 0.73 THOUSANDS/ΜL (ref 0.6–4.47)
LYMPHOCYTES NFR BLD AUTO: 12 % (ref 14–44)
MCH RBC QN AUTO: 25.4 PG (ref 26.8–34.3)
MCHC RBC AUTO-ENTMCNC: 30.9 G/DL (ref 31.4–37.4)
MCV RBC AUTO: 82 FL (ref 82–98)
MONOCYTES # BLD AUTO: 0.44 THOUSAND/ΜL (ref 0.17–1.22)
MONOCYTES NFR BLD AUTO: 7 % (ref 4–12)
NEUTROPHILS # BLD AUTO: 4.95 THOUSANDS/ΜL (ref 1.85–7.62)
NEUTS SEG NFR BLD AUTO: 78 % (ref 43–75)
NRBC BLD AUTO-RTO: 0 /100 WBCS
PLATELET # BLD AUTO: 340 THOUSANDS/UL (ref 149–390)
PMV BLD AUTO: 8.4 FL (ref 8.9–12.7)
POTASSIUM SERPL-SCNC: 4.1 MMOL/L (ref 3.5–5.3)
RBC # BLD AUTO: 3.7 MILLION/UL (ref 3.88–5.62)
SODIUM SERPL-SCNC: 134 MMOL/L (ref 136–145)
WBC # BLD AUTO: 6.28 THOUSAND/UL (ref 4.31–10.16)

## 2020-05-25 PROCEDURE — 86140 C-REACTIVE PROTEIN: CPT | Performed by: NURSE PRACTITIONER

## 2020-05-25 PROCEDURE — 97166 OT EVAL MOD COMPLEX 45 MIN: CPT

## 2020-05-25 PROCEDURE — 85025 COMPLETE CBC W/AUTO DIFF WBC: CPT | Performed by: NURSE PRACTITIONER

## 2020-05-25 PROCEDURE — 97163 PT EVAL HIGH COMPLEX 45 MIN: CPT

## 2020-05-25 PROCEDURE — A9585 GADOBUTROL INJECTION: HCPCS | Performed by: INTERNAL MEDICINE

## 2020-05-25 PROCEDURE — 87040 BLOOD CULTURE FOR BACTERIA: CPT | Performed by: NURSE PRACTITIONER

## 2020-05-25 PROCEDURE — 72158 MRI LUMBAR SPINE W/O & W/DYE: CPT

## 2020-05-25 PROCEDURE — 85652 RBC SED RATE AUTOMATED: CPT | Performed by: NURSE PRACTITIONER

## 2020-05-25 PROCEDURE — 99232 SBSQ HOSP IP/OBS MODERATE 35: CPT | Performed by: INTERNAL MEDICINE

## 2020-05-25 PROCEDURE — 99223 1ST HOSP IP/OBS HIGH 75: CPT | Performed by: NURSE PRACTITIONER

## 2020-05-25 PROCEDURE — 99233 SBSQ HOSP IP/OBS HIGH 50: CPT | Performed by: PHYSICIAN ASSISTANT

## 2020-05-25 PROCEDURE — 82948 REAGENT STRIP/BLOOD GLUCOSE: CPT

## 2020-05-25 PROCEDURE — 80048 BASIC METABOLIC PNL TOTAL CA: CPT | Performed by: NURSE PRACTITIONER

## 2020-05-25 PROCEDURE — 72072 X-RAY EXAM THORAC SPINE 3VWS: CPT

## 2020-05-25 PROCEDURE — 99232 SBSQ HOSP IP/OBS MODERATE 35: CPT | Performed by: NURSE PRACTITIONER

## 2020-05-25 PROCEDURE — 72157 MRI CHEST SPINE W/O & W/DYE: CPT

## 2020-05-25 RX ORDER — LANOLIN ALCOHOL/MO/W.PET/CERES
6 CREAM (GRAM) TOPICAL
Status: DISCONTINUED | OUTPATIENT
Start: 2020-05-25 | End: 2020-06-09 | Stop reason: HOSPADM

## 2020-05-25 RX ORDER — OXYCODONE HCL 5 MG/5 ML
5 SOLUTION, ORAL ORAL EVERY 4 HOURS PRN
Status: DISCONTINUED | OUTPATIENT
Start: 2020-05-25 | End: 2020-05-30

## 2020-05-25 RX ORDER — HYDROMORPHONE HCL/PF 1 MG/ML
0.5 SYRINGE (ML) INJECTION ONCE AS NEEDED
Status: COMPLETED | OUTPATIENT
Start: 2020-05-25 | End: 2020-05-25

## 2020-05-25 RX ORDER — TRAZODONE HYDROCHLORIDE 100 MG/1
100 TABLET ORAL
Status: DISCONTINUED | OUTPATIENT
Start: 2020-05-25 | End: 2020-06-09 | Stop reason: HOSPADM

## 2020-05-25 RX ORDER — DIAZEPAM 2 MG/1
2 TABLET ORAL EVERY 6 HOURS PRN
Status: DISCONTINUED | OUTPATIENT
Start: 2020-05-25 | End: 2020-06-09 | Stop reason: HOSPADM

## 2020-05-25 RX ORDER — NALOXONE HYDROCHLORIDE 0.4 MG/ML
0.1 INJECTION, SOLUTION INTRAMUSCULAR; INTRAVENOUS; SUBCUTANEOUS
Status: DISCONTINUED | OUTPATIENT
Start: 2020-05-25 | End: 2020-06-09 | Stop reason: HOSPADM

## 2020-05-25 RX ORDER — ATORVASTATIN CALCIUM 40 MG/1
40 TABLET, FILM COATED ORAL
Status: DISCONTINUED | OUTPATIENT
Start: 2020-05-25 | End: 2020-06-09 | Stop reason: HOSPADM

## 2020-05-25 RX ORDER — ACETAMINOPHEN 325 MG/1
975 TABLET ORAL EVERY 8 HOURS SCHEDULED
Status: DISCONTINUED | OUTPATIENT
Start: 2020-05-25 | End: 2020-06-09 | Stop reason: HOSPADM

## 2020-05-25 RX ORDER — LIDOCAINE 50 MG/G
1 PATCH TOPICAL DAILY
Status: DISCONTINUED | OUTPATIENT
Start: 2020-05-25 | End: 2020-06-09 | Stop reason: HOSPADM

## 2020-05-25 RX ORDER — OXYCODONE HYDROCHLORIDE 5 MG/1
5 TABLET ORAL EVERY 4 HOURS PRN
Status: DISCONTINUED | OUTPATIENT
Start: 2020-05-25 | End: 2020-05-25

## 2020-05-25 RX ORDER — HYDROMORPHONE HCL/PF 1 MG/ML
0.2 SYRINGE (ML) INJECTION EVERY 4 HOURS PRN
Status: DISCONTINUED | OUTPATIENT
Start: 2020-05-25 | End: 2020-05-30

## 2020-05-25 RX ORDER — OXYCODONE HCL 5 MG/5 ML
10 SOLUTION, ORAL ORAL
Status: DISCONTINUED | OUTPATIENT
Start: 2020-05-25 | End: 2020-05-30

## 2020-05-25 RX ADMIN — GABAPENTIN 300 MG: 300 CAPSULE ORAL at 21:16

## 2020-05-25 RX ADMIN — GADOBUTROL 7 ML: 604.72 INJECTION INTRAVENOUS at 23:37

## 2020-05-25 RX ADMIN — ROPINIROLE HYDROCHLORIDE 0.25 MG: 0.25 TABLET, FILM COATED ORAL at 21:16

## 2020-05-25 RX ADMIN — OXYCODONE HYDROCHLORIDE 10 MG: 5 SOLUTION ORAL at 11:50

## 2020-05-25 RX ADMIN — OXYCODONE HYDROCHLORIDE 5 MG: 5 TABLET ORAL at 04:24

## 2020-05-25 RX ADMIN — OXYCODONE HYDROCHLORIDE 5 MG: 5 TABLET ORAL at 00:24

## 2020-05-25 RX ADMIN — LIDOCAINE 1 PATCH: 50 PATCH CUTANEOUS at 07:58

## 2020-05-25 RX ADMIN — ATORVASTATIN CALCIUM 40 MG: 40 TABLET, FILM COATED ORAL at 16:53

## 2020-05-25 RX ADMIN — OXYCODONE HYDROCHLORIDE 10 MG: 5 SOLUTION ORAL at 20:09

## 2020-05-25 RX ADMIN — AMIODARONE HYDROCHLORIDE 200 MG: 200 TABLET ORAL at 07:59

## 2020-05-25 RX ADMIN — PANTOPRAZOLE SODIUM 40 MG: 40 TABLET, DELAYED RELEASE ORAL at 06:08

## 2020-05-25 RX ADMIN — ACETAMINOPHEN 975 MG: 325 TABLET ORAL at 15:00

## 2020-05-25 RX ADMIN — ENOXAPARIN SODIUM 40 MG: 40 INJECTION SUBCUTANEOUS at 08:07

## 2020-05-25 RX ADMIN — LEVOTHYROXINE SODIUM 25 MCG: 25 TABLET ORAL at 06:08

## 2020-05-25 RX ADMIN — ACETAMINOPHEN 975 MG: 325 TABLET ORAL at 21:15

## 2020-05-25 RX ADMIN — OXYCODONE HYDROCHLORIDE 5 MG: 5 TABLET ORAL at 07:59

## 2020-05-25 RX ADMIN — SENNOSIDES AND DOCUSATE SODIUM 1 TABLET: 8.6; 5 TABLET ORAL at 08:17

## 2020-05-25 RX ADMIN — HYDROMORPHONE HYDROCHLORIDE 0.5 MG: 1 INJECTION, SOLUTION INTRAMUSCULAR; INTRAVENOUS; SUBCUTANEOUS at 22:11

## 2020-05-26 ENCOUNTER — APPOINTMENT (INPATIENT)
Dept: RADIOLOGY | Facility: HOSPITAL | Age: 65
DRG: 987 | End: 2020-05-26
Payer: COMMERCIAL

## 2020-05-26 PROBLEM — E87.1 HYPONATREMIA: Status: ACTIVE | Noted: 2020-05-26

## 2020-05-26 PROBLEM — E43 SEVERE PROTEIN-CALORIE MALNUTRITION (HCC): Status: ACTIVE | Noted: 2020-05-26

## 2020-05-26 LAB
ANION GAP SERPL CALCULATED.3IONS-SCNC: 4 MMOL/L (ref 4–13)
BASOPHILS # BLD AUTO: 0.03 THOUSANDS/ΜL (ref 0–0.1)
BASOPHILS NFR BLD AUTO: 1 % (ref 0–1)
BUN SERPL-MCNC: 17 MG/DL (ref 5–25)
CALCIUM SERPL-MCNC: 9.1 MG/DL (ref 8.3–10.1)
CHLORIDE SERPL-SCNC: 97 MMOL/L (ref 100–108)
CO2 SERPL-SCNC: 29 MMOL/L (ref 21–32)
CREAT SERPL-MCNC: 0.99 MG/DL (ref 0.6–1.3)
EOSINOPHIL # BLD AUTO: 0.22 THOUSAND/ΜL (ref 0–0.61)
EOSINOPHIL NFR BLD AUTO: 4 % (ref 0–6)
ERYTHROCYTE [DISTWIDTH] IN BLOOD BY AUTOMATED COUNT: 15.1 % (ref 11.6–15.1)
GFR SERPL CREATININE-BSD FRML MDRD: 80 ML/MIN/1.73SQ M
GLUCOSE SERPL-MCNC: 109 MG/DL (ref 65–140)
GLUCOSE SERPL-MCNC: 113 MG/DL (ref 65–140)
GLUCOSE SERPL-MCNC: 136 MG/DL (ref 65–140)
GLUCOSE SERPL-MCNC: 146 MG/DL (ref 65–140)
GLUCOSE SERPL-MCNC: 88 MG/DL (ref 65–140)
HCT VFR BLD AUTO: 32 % (ref 36.5–49.3)
HGB BLD-MCNC: 9.6 G/DL (ref 12–17)
IMM GRANULOCYTES # BLD AUTO: 0.01 THOUSAND/UL (ref 0–0.2)
IMM GRANULOCYTES NFR BLD AUTO: 0 % (ref 0–2)
LYMPHOCYTES # BLD AUTO: 0.95 THOUSANDS/ΜL (ref 0.6–4.47)
LYMPHOCYTES NFR BLD AUTO: 15 % (ref 14–44)
MCH RBC QN AUTO: 24.7 PG (ref 26.8–34.3)
MCHC RBC AUTO-ENTMCNC: 30 G/DL (ref 31.4–37.4)
MCV RBC AUTO: 82 FL (ref 82–98)
MONOCYTES # BLD AUTO: 0.59 THOUSAND/ΜL (ref 0.17–1.22)
MONOCYTES NFR BLD AUTO: 9 % (ref 4–12)
NEUTROPHILS # BLD AUTO: 4.52 THOUSANDS/ΜL (ref 1.85–7.62)
NEUTS SEG NFR BLD AUTO: 71 % (ref 43–75)
NRBC BLD AUTO-RTO: 0 /100 WBCS
OSMOLALITY UR: 732 MMOL/KG
PLATELET # BLD AUTO: 358 THOUSANDS/UL (ref 149–390)
PMV BLD AUTO: 8.6 FL (ref 8.9–12.7)
POTASSIUM SERPL-SCNC: 4.1 MMOL/L (ref 3.5–5.3)
RBC # BLD AUTO: 3.89 MILLION/UL (ref 3.88–5.62)
SODIUM 24H UR-SCNC: 49 MOL/L
SODIUM SERPL-SCNC: 130 MMOL/L (ref 136–145)
WBC # BLD AUTO: 6.32 THOUSAND/UL (ref 4.31–10.16)

## 2020-05-26 PROCEDURE — 87205 SMEAR GRAM STAIN: CPT | Performed by: NURSE PRACTITIONER

## 2020-05-26 PROCEDURE — 88305 TISSUE EXAM BY PATHOLOGIST: CPT | Performed by: PATHOLOGY

## 2020-05-26 PROCEDURE — 99232 SBSQ HOSP IP/OBS MODERATE 35: CPT | Performed by: INTERNAL MEDICINE

## 2020-05-26 PROCEDURE — 88112 CYTOPATH CELL ENHANCE TECH: CPT | Performed by: PATHOLOGY

## 2020-05-26 PROCEDURE — 99232 SBSQ HOSP IP/OBS MODERATE 35: CPT | Performed by: NURSE PRACTITIONER

## 2020-05-26 PROCEDURE — 0W9930Z DRAINAGE OF RIGHT PLEURAL CAVITY WITH DRAINAGE DEVICE, PERCUTANEOUS APPROACH: ICD-10-PCS | Performed by: RADIOLOGY

## 2020-05-26 PROCEDURE — 87070 CULTURE OTHR SPECIMN AEROBIC: CPT | Performed by: NURSE PRACTITIONER

## 2020-05-26 PROCEDURE — 89051 BODY FLUID CELL COUNT: CPT | Performed by: NURSE PRACTITIONER

## 2020-05-26 PROCEDURE — C1729 CATH, DRAINAGE: HCPCS

## 2020-05-26 PROCEDURE — 80048 BASIC METABOLIC PNL TOTAL CA: CPT | Performed by: NURSE PRACTITIONER

## 2020-05-26 PROCEDURE — 85025 COMPLETE CBC W/AUTO DIFF WBC: CPT | Performed by: NURSE PRACTITIONER

## 2020-05-26 PROCEDURE — 84300 ASSAY OF URINE SODIUM: CPT | Performed by: NURSE PRACTITIONER

## 2020-05-26 PROCEDURE — 82948 REAGENT STRIP/BLOOD GLUCOSE: CPT

## 2020-05-26 PROCEDURE — 99233 SBSQ HOSP IP/OBS HIGH 50: CPT | Performed by: PHYSICIAN ASSISTANT

## 2020-05-26 PROCEDURE — C1769 GUIDE WIRE: HCPCS

## 2020-05-26 PROCEDURE — 74220 X-RAY XM ESOPHAGUS 1CNTRST: CPT

## 2020-05-26 PROCEDURE — 99223 1ST HOSP IP/OBS HIGH 75: CPT | Performed by: THORACIC SURGERY (CARDIOTHORACIC VASCULAR SURGERY)

## 2020-05-26 PROCEDURE — 32557 INSERT CATH PLEURA W/ IMAGE: CPT | Performed by: RADIOLOGY

## 2020-05-26 PROCEDURE — 83935 ASSAY OF URINE OSMOLALITY: CPT | Performed by: NURSE PRACTITIONER

## 2020-05-26 RX ORDER — LIDOCAINE WITH 8.4% SOD BICARB 0.9%(10ML)
SYRINGE (ML) INJECTION CODE/TRAUMA/SEDATION MEDICATION
Status: COMPLETED | OUTPATIENT
Start: 2020-05-26 | End: 2020-05-26

## 2020-05-26 RX ADMIN — AMIODARONE HYDROCHLORIDE 200 MG: 200 TABLET ORAL at 09:39

## 2020-05-26 RX ADMIN — ACETAMINOPHEN 975 MG: 325 TABLET ORAL at 21:15

## 2020-05-26 RX ADMIN — OXYCODONE HYDROCHLORIDE 10 MG: 5 SOLUTION ORAL at 04:34

## 2020-05-26 RX ADMIN — PANTOPRAZOLE SODIUM 40 MG: 40 TABLET, DELAYED RELEASE ORAL at 06:24

## 2020-05-26 RX ADMIN — SENNOSIDES AND DOCUSATE SODIUM 1 TABLET: 8.6; 5 TABLET ORAL at 09:39

## 2020-05-26 RX ADMIN — ACETAMINOPHEN 975 MG: 325 TABLET ORAL at 06:24

## 2020-05-26 RX ADMIN — TRAZODONE HYDROCHLORIDE 100 MG: 100 TABLET ORAL at 21:18

## 2020-05-26 RX ADMIN — Medication 5 ML: at 16:16

## 2020-05-26 RX ADMIN — ROPINIROLE HYDROCHLORIDE 0.25 MG: 0.25 TABLET, FILM COATED ORAL at 21:18

## 2020-05-26 RX ADMIN — ATORVASTATIN CALCIUM 40 MG: 40 TABLET, FILM COATED ORAL at 17:47

## 2020-05-26 RX ADMIN — LIDOCAINE 1 PATCH: 50 PATCH CUTANEOUS at 09:39

## 2020-05-26 RX ADMIN — OXYCODONE HYDROCHLORIDE 10 MG: 5 SOLUTION ORAL at 17:47

## 2020-05-26 RX ADMIN — IOHEXOL 50 ML: 350 INJECTION, SOLUTION INTRAVENOUS at 15:40

## 2020-05-26 RX ADMIN — LEVOTHYROXINE SODIUM 25 MCG: 25 TABLET ORAL at 06:23

## 2020-05-26 RX ADMIN — ENOXAPARIN SODIUM 40 MG: 40 INJECTION SUBCUTANEOUS at 09:39

## 2020-05-26 RX ADMIN — GABAPENTIN 300 MG: 300 CAPSULE ORAL at 21:15

## 2020-05-27 ENCOUNTER — APPOINTMENT (INPATIENT)
Dept: RADIOLOGY | Facility: HOSPITAL | Age: 65
DRG: 987 | End: 2020-05-27
Attending: RADIOLOGY
Payer: COMMERCIAL

## 2020-05-27 ENCOUNTER — ANESTHESIA (INPATIENT)
Dept: RADIOLOGY | Facility: HOSPITAL | Age: 65
DRG: 987 | End: 2020-05-27
Payer: COMMERCIAL

## 2020-05-27 ENCOUNTER — ANESTHESIA EVENT (INPATIENT)
Dept: RADIOLOGY | Facility: HOSPITAL | Age: 65
DRG: 987 | End: 2020-05-27
Payer: COMMERCIAL

## 2020-05-27 LAB
ANION GAP SERPL CALCULATED.3IONS-SCNC: 6 MMOL/L (ref 4–13)
BASOPHILS # BLD AUTO: 0.02 THOUSANDS/ΜL (ref 0–0.1)
BASOPHILS NFR BLD AUTO: 0 % (ref 0–1)
BUN SERPL-MCNC: 15 MG/DL (ref 5–25)
CALCIUM SERPL-MCNC: 9.2 MG/DL (ref 8.3–10.1)
CHLORIDE SERPL-SCNC: 101 MMOL/L (ref 100–108)
CO2 SERPL-SCNC: 28 MMOL/L (ref 21–32)
CREAT SERPL-MCNC: 0.93 MG/DL (ref 0.6–1.3)
EOSINOPHIL # BLD AUTO: 0.16 THOUSAND/ΜL (ref 0–0.61)
EOSINOPHIL NFR BLD AUTO: 3 % (ref 0–6)
ERYTHROCYTE [DISTWIDTH] IN BLOOD BY AUTOMATED COUNT: 14.7 % (ref 11.6–15.1)
GFR SERPL CREATININE-BSD FRML MDRD: 86 ML/MIN/1.73SQ M
GLUCOSE SERPL-MCNC: 100 MG/DL (ref 65–140)
GLUCOSE SERPL-MCNC: 103 MG/DL (ref 65–140)
GLUCOSE SERPL-MCNC: 113 MG/DL (ref 65–140)
GLUCOSE SERPL-MCNC: 81 MG/DL (ref 65–140)
GLUCOSE SERPL-MCNC: 82 MG/DL (ref 65–140)
GLUCOSE SERPL-MCNC: 94 MG/DL (ref 65–140)
HCT VFR BLD AUTO: 27.2 % (ref 36.5–49.3)
HGB BLD-MCNC: 8.6 G/DL (ref 12–17)
HISTIOCYTES NFR FLD: 1 %
IMM GRANULOCYTES # BLD AUTO: 0.01 THOUSAND/UL (ref 0–0.2)
IMM GRANULOCYTES NFR BLD AUTO: 0 % (ref 0–2)
LYMPHOCYTES # BLD AUTO: 0.93 THOUSANDS/ΜL (ref 0.6–4.47)
LYMPHOCYTES NFR BLD AUTO: 18 % (ref 14–44)
LYMPHOCYTES NFR BLD AUTO: 20 %
MCH RBC QN AUTO: 25.7 PG (ref 26.8–34.3)
MCHC RBC AUTO-ENTMCNC: 31.6 G/DL (ref 31.4–37.4)
MCV RBC AUTO: 81 FL (ref 82–98)
MONOCYTES # BLD AUTO: 0.38 THOUSAND/ΜL (ref 0.17–1.22)
MONOCYTES NFR BLD AUTO: 7 % (ref 4–12)
MONOCYTES NFR BLD AUTO: 8 %
NEUTROPHILS # BLD AUTO: 3.63 THOUSANDS/ΜL (ref 1.85–7.62)
NEUTS BAND NFR FLD MANUAL: 2 %
NEUTS SEG NFR BLD AUTO: 68 %
NEUTS SEG NFR BLD AUTO: 72 % (ref 43–75)
NRBC BLD AUTO-RTO: 0 /100 WBCS
PATHOLOGIST INTERPRETATION: NORMAL
PATHOLOGY REVIEW: YES
PLATELET # BLD AUTO: 333 THOUSANDS/UL (ref 149–390)
PMV BLD AUTO: 8.6 FL (ref 8.9–12.7)
POTASSIUM SERPL-SCNC: 3.9 MMOL/L (ref 3.5–5.3)
RBC # BLD AUTO: 3.35 MILLION/UL (ref 3.88–5.62)
SITE: NORMAL
SODIUM SERPL-SCNC: 135 MMOL/L (ref 136–145)
TOTAL CELLS COUNTED SPEC: 100
WBC # BLD AUTO: 5.13 THOUSAND/UL (ref 4.31–10.16)
WBC # FLD MANUAL: 1125 /UL
WBC OTHER NFR FLD MANUAL: 1 %

## 2020-05-27 PROCEDURE — 99232 SBSQ HOSP IP/OBS MODERATE 35: CPT | Performed by: THORACIC SURGERY (CARDIOTHORACIC VASCULAR SURGERY)

## 2020-05-27 PROCEDURE — 80048 BASIC METABOLIC PNL TOTAL CA: CPT | Performed by: NURSE PRACTITIONER

## 2020-05-27 PROCEDURE — 88342 IMHCHEM/IMCYTCHM 1ST ANTB: CPT | Performed by: PATHOLOGY

## 2020-05-27 PROCEDURE — 97116 GAIT TRAINING THERAPY: CPT

## 2020-05-27 PROCEDURE — 88307 TISSUE EXAM BY PATHOLOGIST: CPT | Performed by: PATHOLOGY

## 2020-05-27 PROCEDURE — 20225 BONE BIOPSY TROCAR/NDL DEEP: CPT

## 2020-05-27 PROCEDURE — 82948 REAGENT STRIP/BLOOD GLUCOSE: CPT

## 2020-05-27 PROCEDURE — 87075 CULTR BACTERIA EXCEPT BLOOD: CPT | Performed by: RADIOLOGY

## 2020-05-27 PROCEDURE — 87206 SMEAR FLUORESCENT/ACID STAI: CPT | Performed by: RADIOLOGY

## 2020-05-27 PROCEDURE — 87116 MYCOBACTERIA CULTURE: CPT | Performed by: RADIOLOGY

## 2020-05-27 PROCEDURE — 88311 DECALCIFY TISSUE: CPT | Performed by: PATHOLOGY

## 2020-05-27 PROCEDURE — 87205 SMEAR GRAM STAIN: CPT | Performed by: RADIOLOGY

## 2020-05-27 PROCEDURE — 85025 COMPLETE CBC W/AUTO DIFF WBC: CPT | Performed by: NURSE PRACTITIONER

## 2020-05-27 PROCEDURE — 77002 NEEDLE LOCALIZATION BY XRAY: CPT | Performed by: RADIOLOGY

## 2020-05-27 PROCEDURE — 99233 SBSQ HOSP IP/OBS HIGH 50: CPT | Performed by: INTERNAL MEDICINE

## 2020-05-27 PROCEDURE — 99232 SBSQ HOSP IP/OBS MODERATE 35: CPT | Performed by: NURSE PRACTITIONER

## 2020-05-27 PROCEDURE — 97530 THERAPEUTIC ACTIVITIES: CPT

## 2020-05-27 PROCEDURE — 87070 CULTURE OTHR SPECIMN AEROBIC: CPT | Performed by: RADIOLOGY

## 2020-05-27 PROCEDURE — 87102 FUNGUS ISOLATION CULTURE: CPT | Performed by: RADIOLOGY

## 2020-05-27 PROCEDURE — 0PB43ZX EXCISION OF THORACIC VERTEBRA, PERCUTANEOUS APPROACH, DIAGNOSTIC: ICD-10-PCS | Performed by: RADIOLOGY

## 2020-05-27 PROCEDURE — 20225 BONE BIOPSY TROCAR/NDL DEEP: CPT | Performed by: RADIOLOGY

## 2020-05-27 RX ORDER — ALBUMIN, HUMAN INJ 5% 5 %
SOLUTION INTRAVENOUS CONTINUOUS PRN
Status: DISCONTINUED | OUTPATIENT
Start: 2020-05-27 | End: 2020-05-27 | Stop reason: SURG

## 2020-05-27 RX ORDER — HYDROMORPHONE HCL/PF 1 MG/ML
0.2 SYRINGE (ML) INJECTION
Status: DISCONTINUED | OUTPATIENT
Start: 2020-05-27 | End: 2020-05-27 | Stop reason: HOSPADM

## 2020-05-27 RX ORDER — PROPOFOL 10 MG/ML
INJECTION, EMULSION INTRAVENOUS CONTINUOUS PRN
Status: DISCONTINUED | OUTPATIENT
Start: 2020-05-27 | End: 2020-05-27 | Stop reason: SURG

## 2020-05-27 RX ORDER — LIDOCAINE HYDROCHLORIDE 10 MG/ML
INJECTION, SOLUTION EPIDURAL; INFILTRATION; INTRACAUDAL; PERINEURAL AS NEEDED
Status: DISCONTINUED | OUTPATIENT
Start: 2020-05-27 | End: 2020-05-27 | Stop reason: SURG

## 2020-05-27 RX ORDER — MIDAZOLAM HYDROCHLORIDE 2 MG/2ML
INJECTION, SOLUTION INTRAMUSCULAR; INTRAVENOUS AS NEEDED
Status: DISCONTINUED | OUTPATIENT
Start: 2020-05-27 | End: 2020-05-27 | Stop reason: SURG

## 2020-05-27 RX ORDER — FENTANYL CITRATE 50 UG/ML
INJECTION, SOLUTION INTRAMUSCULAR; INTRAVENOUS AS NEEDED
Status: DISCONTINUED | OUTPATIENT
Start: 2020-05-27 | End: 2020-05-27 | Stop reason: SURG

## 2020-05-27 RX ORDER — KETAMINE HYDROCHLORIDE 50 MG/ML
INJECTION, SOLUTION, CONCENTRATE INTRAMUSCULAR; INTRAVENOUS AS NEEDED
Status: DISCONTINUED | OUTPATIENT
Start: 2020-05-27 | End: 2020-05-27 | Stop reason: SURG

## 2020-05-27 RX ORDER — SODIUM CHLORIDE 9 MG/ML
INJECTION, SOLUTION INTRAVENOUS CONTINUOUS PRN
Status: DISCONTINUED | OUTPATIENT
Start: 2020-05-27 | End: 2020-05-27 | Stop reason: SURG

## 2020-05-27 RX ADMIN — AMIODARONE HYDROCHLORIDE 200 MG: 200 TABLET ORAL at 08:28

## 2020-05-27 RX ADMIN — KETAMINE HYDROCHLORIDE 10 MG: 50 INJECTION, SOLUTION INTRAMUSCULAR; INTRAVENOUS at 15:32

## 2020-05-27 RX ADMIN — PHENYLEPHRINE HYDROCHLORIDE 20 MCG/MIN: 10 INJECTION INTRAVENOUS at 15:41

## 2020-05-27 RX ADMIN — KETAMINE HYDROCHLORIDE 10 MG: 50 INJECTION, SOLUTION INTRAMUSCULAR; INTRAVENOUS at 15:16

## 2020-05-27 RX ADMIN — PANTOPRAZOLE SODIUM 40 MG: 40 TABLET, DELAYED RELEASE ORAL at 06:02

## 2020-05-27 RX ADMIN — OXYCODONE HYDROCHLORIDE 10 MG: 5 SOLUTION ORAL at 01:22

## 2020-05-27 RX ADMIN — ATORVASTATIN CALCIUM 40 MG: 40 TABLET, FILM COATED ORAL at 17:31

## 2020-05-27 RX ADMIN — KETAMINE HYDROCHLORIDE 10 MG: 50 INJECTION, SOLUTION INTRAMUSCULAR; INTRAVENOUS at 15:24

## 2020-05-27 RX ADMIN — FENTANYL CITRATE 25 MCG: 50 INJECTION, SOLUTION INTRAMUSCULAR; INTRAVENOUS at 15:07

## 2020-05-27 RX ADMIN — MIDAZOLAM 1 MG: 1 INJECTION INTRAMUSCULAR; INTRAVENOUS at 15:14

## 2020-05-27 RX ADMIN — ACETAMINOPHEN 975 MG: 325 TABLET ORAL at 17:31

## 2020-05-27 RX ADMIN — SENNOSIDES AND DOCUSATE SODIUM 1 TABLET: 8.6; 5 TABLET ORAL at 08:28

## 2020-05-27 RX ADMIN — PHENYLEPHRINE HYDROCHLORIDE 100 MCG: 10 INJECTION INTRAVENOUS at 15:32

## 2020-05-27 RX ADMIN — LIDOCAINE 1 PATCH: 50 PATCH CUTANEOUS at 08:28

## 2020-05-27 RX ADMIN — LIDOCAINE HYDROCHLORIDE 7 ML: 10 INJECTION, SOLUTION EPIDURAL; INFILTRATION; INTRACAUDAL; PERINEURAL at 15:18

## 2020-05-27 RX ADMIN — ALBUMIN (HUMAN): 12.5 SOLUTION INTRAVENOUS at 15:28

## 2020-05-27 RX ADMIN — KETAMINE HYDROCHLORIDE 10 MG: 50 INJECTION, SOLUTION INTRAMUSCULAR; INTRAVENOUS at 15:14

## 2020-05-27 RX ADMIN — DEXMEDETOMIDINE 0.1 MCG/KG/HR: 100 INJECTION, SOLUTION, CONCENTRATE INTRAVENOUS at 15:11

## 2020-05-27 RX ADMIN — OXYCODONE HYDROCHLORIDE 10 MG: 5 SOLUTION ORAL at 17:31

## 2020-05-27 RX ADMIN — SODIUM CHLORIDE: 0.9 INJECTION, SOLUTION INTRAVENOUS at 15:11

## 2020-05-27 RX ADMIN — MIDAZOLAM 1 MG: 1 INJECTION INTRAMUSCULAR; INTRAVENOUS at 15:24

## 2020-05-27 RX ADMIN — ACETAMINOPHEN 975 MG: 325 TABLET ORAL at 06:03

## 2020-05-27 RX ADMIN — KETAMINE HYDROCHLORIDE 10 MG: 50 INJECTION, SOLUTION INTRAMUSCULAR; INTRAVENOUS at 15:39

## 2020-05-27 RX ADMIN — LEVOTHYROXINE SODIUM 25 MCG: 25 TABLET ORAL at 06:01

## 2020-05-27 RX ADMIN — PROPOFOL 50 MCG/KG/MIN: 10 INJECTION, EMULSION INTRAVENOUS at 15:11

## 2020-05-28 LAB
ANION GAP SERPL CALCULATED.3IONS-SCNC: 5 MMOL/L (ref 4–13)
BASOPHILS # BLD AUTO: 0.03 THOUSANDS/ΜL (ref 0–0.1)
BASOPHILS NFR BLD AUTO: 1 % (ref 0–1)
BUN SERPL-MCNC: 14 MG/DL (ref 5–25)
CALCIUM SERPL-MCNC: 8.6 MG/DL (ref 8.3–10.1)
CHLORIDE SERPL-SCNC: 103 MMOL/L (ref 100–108)
CO2 SERPL-SCNC: 24 MMOL/L (ref 21–32)
CREAT SERPL-MCNC: 0.81 MG/DL (ref 0.6–1.3)
EOSINOPHIL # BLD AUTO: 0.14 THOUSAND/ΜL (ref 0–0.61)
EOSINOPHIL NFR BLD AUTO: 2 % (ref 0–6)
ERYTHROCYTE [DISTWIDTH] IN BLOOD BY AUTOMATED COUNT: 15 % (ref 11.6–15.1)
GFR SERPL CREATININE-BSD FRML MDRD: 93 ML/MIN/1.73SQ M
GLUCOSE SERPL-MCNC: 104 MG/DL (ref 65–140)
GLUCOSE SERPL-MCNC: 112 MG/DL (ref 65–140)
GLUCOSE SERPL-MCNC: 149 MG/DL (ref 65–140)
GLUCOSE SERPL-MCNC: 155 MG/DL (ref 65–140)
GLUCOSE SERPL-MCNC: 165 MG/DL (ref 65–140)
HCT VFR BLD AUTO: 30.6 % (ref 36.5–49.3)
HGB BLD-MCNC: 9 G/DL (ref 12–17)
IMM GRANULOCYTES # BLD AUTO: 0.02 THOUSAND/UL (ref 0–0.2)
IMM GRANULOCYTES NFR BLD AUTO: 0 % (ref 0–2)
LYMPHOCYTES # BLD AUTO: 1.07 THOUSANDS/ΜL (ref 0.6–4.47)
LYMPHOCYTES NFR BLD AUTO: 17 % (ref 14–44)
MCH RBC QN AUTO: 25.1 PG (ref 26.8–34.3)
MCHC RBC AUTO-ENTMCNC: 29.4 G/DL (ref 31.4–37.4)
MCV RBC AUTO: 85 FL (ref 82–98)
MONOCYTES # BLD AUTO: 0.49 THOUSAND/ΜL (ref 0.17–1.22)
MONOCYTES NFR BLD AUTO: 8 % (ref 4–12)
NEUTROPHILS # BLD AUTO: 4.59 THOUSANDS/ΜL (ref 1.85–7.62)
NEUTS SEG NFR BLD AUTO: 72 % (ref 43–75)
NRBC BLD AUTO-RTO: 0 /100 WBCS
PLATELET # BLD AUTO: 337 THOUSANDS/UL (ref 149–390)
PMV BLD AUTO: 8.2 FL (ref 8.9–12.7)
POTASSIUM SERPL-SCNC: 4.7 MMOL/L (ref 3.5–5.3)
RBC # BLD AUTO: 3.59 MILLION/UL (ref 3.88–5.62)
SODIUM SERPL-SCNC: 132 MMOL/L (ref 136–145)
WBC # BLD AUTO: 6.34 THOUSAND/UL (ref 4.31–10.16)

## 2020-05-28 PROCEDURE — 80048 BASIC METABOLIC PNL TOTAL CA: CPT | Performed by: NURSE PRACTITIONER

## 2020-05-28 PROCEDURE — 99233 SBSQ HOSP IP/OBS HIGH 50: CPT | Performed by: INTERNAL MEDICINE

## 2020-05-28 PROCEDURE — 85025 COMPLETE CBC W/AUTO DIFF WBC: CPT | Performed by: NURSE PRACTITIONER

## 2020-05-28 PROCEDURE — 99232 SBSQ HOSP IP/OBS MODERATE 35: CPT | Performed by: INTERNAL MEDICINE

## 2020-05-28 PROCEDURE — 82948 REAGENT STRIP/BLOOD GLUCOSE: CPT

## 2020-05-28 PROCEDURE — 99232 SBSQ HOSP IP/OBS MODERATE 35: CPT | Performed by: THORACIC SURGERY (CARDIOTHORACIC VASCULAR SURGERY)

## 2020-05-28 RX ADMIN — GABAPENTIN 300 MG: 300 CAPSULE ORAL at 21:27

## 2020-05-28 RX ADMIN — OXYCODONE HYDROCHLORIDE 10 MG: 5 SOLUTION ORAL at 03:54

## 2020-05-28 RX ADMIN — INSULIN LISPRO 1 UNITS: 100 INJECTION, SOLUTION INTRAVENOUS; SUBCUTANEOUS at 21:27

## 2020-05-28 RX ADMIN — PANTOPRAZOLE SODIUM 40 MG: 40 TABLET, DELAYED RELEASE ORAL at 06:25

## 2020-05-28 RX ADMIN — SENNOSIDES AND DOCUSATE SODIUM 1 TABLET: 8.6; 5 TABLET ORAL at 08:06

## 2020-05-28 RX ADMIN — OXYCODONE HYDROCHLORIDE 10 MG: 5 SOLUTION ORAL at 00:21

## 2020-05-28 RX ADMIN — OXYCODONE HYDROCHLORIDE 10 MG: 5 SOLUTION ORAL at 17:33

## 2020-05-28 RX ADMIN — ROPINIROLE HYDROCHLORIDE 0.25 MG: 0.25 TABLET, FILM COATED ORAL at 21:27

## 2020-05-28 RX ADMIN — ACETAMINOPHEN 325 MG: 325 TABLET ORAL at 21:27

## 2020-05-28 RX ADMIN — AMIODARONE HYDROCHLORIDE 200 MG: 200 TABLET ORAL at 08:05

## 2020-05-28 RX ADMIN — LEVOTHYROXINE SODIUM 25 MCG: 25 TABLET ORAL at 06:25

## 2020-05-28 RX ADMIN — ATORVASTATIN CALCIUM 40 MG: 40 TABLET, FILM COATED ORAL at 17:33

## 2020-05-28 RX ADMIN — INSULIN LISPRO 1 UNITS: 100 INJECTION, SOLUTION INTRAVENOUS; SUBCUTANEOUS at 08:02

## 2020-05-28 RX ADMIN — OXYCODONE HYDROCHLORIDE 10 MG: 5 SOLUTION ORAL at 22:17

## 2020-05-28 RX ADMIN — OXYCODONE HYDROCHLORIDE 10 MG: 5 SOLUTION ORAL at 14:28

## 2020-05-28 RX ADMIN — LIDOCAINE 1 PATCH: 50 PATCH CUTANEOUS at 08:06

## 2020-05-28 RX ADMIN — ACETAMINOPHEN 325 MG: 325 TABLET ORAL at 14:29

## 2020-05-28 RX ADMIN — ACETAMINOPHEN 325 MG: 325 TABLET ORAL at 06:25

## 2020-05-28 RX ADMIN — TRAZODONE HYDROCHLORIDE 100 MG: 100 TABLET ORAL at 21:27

## 2020-05-29 ENCOUNTER — APPOINTMENT (INPATIENT)
Dept: RADIOLOGY | Facility: HOSPITAL | Age: 65
DRG: 987 | End: 2020-05-29
Payer: COMMERCIAL

## 2020-05-29 LAB
BACTERIA SPEC BFLD CULT: NO GROWTH
GLUCOSE SERPL-MCNC: 101 MG/DL (ref 65–140)
GLUCOSE SERPL-MCNC: 115 MG/DL (ref 65–140)
GLUCOSE SERPL-MCNC: 162 MG/DL (ref 65–140)
GLUCOSE SERPL-MCNC: 98 MG/DL (ref 65–140)
GRAM STN SPEC: NORMAL
GRAM STN SPEC: NORMAL

## 2020-05-29 PROCEDURE — 82948 REAGENT STRIP/BLOOD GLUCOSE: CPT

## 2020-05-29 PROCEDURE — 99232 SBSQ HOSP IP/OBS MODERATE 35: CPT | Performed by: THORACIC SURGERY (CARDIOTHORACIC VASCULAR SURGERY)

## 2020-05-29 PROCEDURE — 99233 SBSQ HOSP IP/OBS HIGH 50: CPT | Performed by: INTERNAL MEDICINE

## 2020-05-29 PROCEDURE — 99233 SBSQ HOSP IP/OBS HIGH 50: CPT | Performed by: PHYSICIAN ASSISTANT

## 2020-05-29 PROCEDURE — 71045 X-RAY EXAM CHEST 1 VIEW: CPT

## 2020-05-29 PROCEDURE — NC001 PR NO CHARGE: Performed by: PHYSICIAN ASSISTANT

## 2020-05-29 PROCEDURE — 71046 X-RAY EXAM CHEST 2 VIEWS: CPT

## 2020-05-29 RX ORDER — METRONIDAZOLE 500 MG/1
500 TABLET ORAL EVERY 8 HOURS SCHEDULED
Status: DISCONTINUED | OUTPATIENT
Start: 2020-05-29 | End: 2020-06-02

## 2020-05-29 RX ADMIN — ACETAMINOPHEN 325 MG: 325 TABLET ORAL at 21:45

## 2020-05-29 RX ADMIN — METRONIDAZOLE 500 MG: 500 TABLET, FILM COATED ORAL at 21:45

## 2020-05-29 RX ADMIN — OXYCODONE HYDROCHLORIDE 10 MG: 5 SOLUTION ORAL at 13:30

## 2020-05-29 RX ADMIN — ENOXAPARIN SODIUM 40 MG: 40 INJECTION SUBCUTANEOUS at 13:30

## 2020-05-29 RX ADMIN — ROPINIROLE HYDROCHLORIDE 0.25 MG: 0.25 TABLET, FILM COATED ORAL at 21:45

## 2020-05-29 RX ADMIN — AMIODARONE HYDROCHLORIDE 200 MG: 200 TABLET ORAL at 08:34

## 2020-05-29 RX ADMIN — ATORVASTATIN CALCIUM 40 MG: 40 TABLET, FILM COATED ORAL at 17:33

## 2020-05-29 RX ADMIN — CEFEPIME HYDROCHLORIDE 2000 MG: 2 INJECTION, POWDER, FOR SOLUTION INTRAVENOUS at 14:45

## 2020-05-29 RX ADMIN — INSULIN LISPRO 1 UNITS: 100 INJECTION, SOLUTION INTRAVENOUS; SUBCUTANEOUS at 21:48

## 2020-05-29 RX ADMIN — PANTOPRAZOLE SODIUM 40 MG: 40 TABLET, DELAYED RELEASE ORAL at 06:21

## 2020-05-29 RX ADMIN — GABAPENTIN 300 MG: 300 CAPSULE ORAL at 21:45

## 2020-05-29 RX ADMIN — OXYCODONE HYDROCHLORIDE 10 MG: 5 SOLUTION ORAL at 20:33

## 2020-05-29 RX ADMIN — METRONIDAZOLE 500 MG: 500 TABLET, FILM COATED ORAL at 14:45

## 2020-05-29 RX ADMIN — TRAZODONE HYDROCHLORIDE 100 MG: 100 TABLET ORAL at 21:45

## 2020-05-29 RX ADMIN — ACETAMINOPHEN 975 MG: 325 TABLET ORAL at 06:21

## 2020-05-29 RX ADMIN — ACETAMINOPHEN 975 MG: 325 TABLET ORAL at 13:30

## 2020-05-29 RX ADMIN — ONDANSETRON 4 MG: 2 INJECTION INTRAMUSCULAR; INTRAVENOUS at 22:43

## 2020-05-29 RX ADMIN — LIDOCAINE 1 PATCH: 50 PATCH CUTANEOUS at 08:35

## 2020-05-29 RX ADMIN — LEVOTHYROXINE SODIUM 25 MCG: 25 TABLET ORAL at 06:21

## 2020-05-29 RX ADMIN — OXYCODONE HYDROCHLORIDE 10 MG: 5 SOLUTION ORAL at 08:35

## 2020-05-29 RX ADMIN — SENNOSIDES AND DOCUSATE SODIUM 1 TABLET: 8.6; 5 TABLET ORAL at 08:35

## 2020-05-30 LAB
BACTERIA BLD CULT: NORMAL
BACTERIA SPEC BFLD CULT: NO GROWTH
GLUCOSE SERPL-MCNC: 117 MG/DL (ref 65–140)
GLUCOSE SERPL-MCNC: 118 MG/DL (ref 65–140)
GLUCOSE SERPL-MCNC: 127 MG/DL (ref 65–140)
GLUCOSE SERPL-MCNC: 131 MG/DL (ref 65–140)
GRAM STN SPEC: NORMAL
GRAM STN SPEC: NORMAL

## 2020-05-30 PROCEDURE — 99232 SBSQ HOSP IP/OBS MODERATE 35: CPT | Performed by: INTERNAL MEDICINE

## 2020-05-30 PROCEDURE — 99232 SBSQ HOSP IP/OBS MODERATE 35: CPT | Performed by: NURSE PRACTITIONER

## 2020-05-30 PROCEDURE — 99231 SBSQ HOSP IP/OBS SF/LOW 25: CPT | Performed by: THORACIC SURGERY (CARDIOTHORACIC VASCULAR SURGERY)

## 2020-05-30 PROCEDURE — 82948 REAGENT STRIP/BLOOD GLUCOSE: CPT

## 2020-05-30 RX ORDER — HYDROMORPHONE HCL/PF 1 MG/ML
0.2 SYRINGE (ML) INJECTION EVERY 4 HOURS PRN
Status: DISCONTINUED | OUTPATIENT
Start: 2020-05-30 | End: 2020-06-01

## 2020-05-30 RX ORDER — HYDROMORPHONE HCL/PF 1 MG/ML
0.4 SYRINGE (ML) INJECTION EVERY 4 HOURS PRN
Status: DISCONTINUED | OUTPATIENT
Start: 2020-05-30 | End: 2020-06-01

## 2020-05-30 RX ORDER — SODIUM CHLORIDE 9 MG/ML
50 INJECTION, SOLUTION INTRAVENOUS CONTINUOUS
Status: DISCONTINUED | OUTPATIENT
Start: 2020-05-30 | End: 2020-05-31

## 2020-05-30 RX ADMIN — OXYCODONE HYDROCHLORIDE 10 MG: 5 SOLUTION ORAL at 14:40

## 2020-05-30 RX ADMIN — HYDROMORPHONE HYDROCHLORIDE 0.4 MG: 1 INJECTION, SOLUTION INTRAMUSCULAR; INTRAVENOUS; SUBCUTANEOUS at 21:45

## 2020-05-30 RX ADMIN — SODIUM CHLORIDE 50 ML/HR: 0.9 INJECTION, SOLUTION INTRAVENOUS at 17:23

## 2020-05-30 RX ADMIN — HYDROMORPHONE HYDROCHLORIDE 0.4 MG: 1 INJECTION, SOLUTION INTRAMUSCULAR; INTRAVENOUS; SUBCUTANEOUS at 17:24

## 2020-05-30 RX ADMIN — LEVOTHYROXINE SODIUM 25 MCG: 25 TABLET ORAL at 05:06

## 2020-05-30 RX ADMIN — ENOXAPARIN SODIUM 40 MG: 40 INJECTION SUBCUTANEOUS at 14:42

## 2020-05-30 RX ADMIN — ACETAMINOPHEN 650 MG: 325 TABLET ORAL at 05:06

## 2020-05-30 RX ADMIN — OXYCODONE HYDROCHLORIDE 10 MG: 5 SOLUTION ORAL at 05:07

## 2020-05-30 RX ADMIN — METRONIDAZOLE 500 MG: 500 TABLET, FILM COATED ORAL at 06:14

## 2020-05-30 RX ADMIN — LIDOCAINE 1 PATCH: 50 PATCH CUTANEOUS at 09:38

## 2020-05-30 RX ADMIN — CEFEPIME HYDROCHLORIDE 2000 MG: 2 INJECTION, POWDER, FOR SOLUTION INTRAVENOUS at 02:15

## 2020-05-30 RX ADMIN — CEFTRIAXONE SODIUM 2000 MG: 2 INJECTION, POWDER, FOR SOLUTION INTRAMUSCULAR; INTRAVENOUS at 14:42

## 2020-05-30 RX ADMIN — PANTOPRAZOLE SODIUM 40 MG: 40 TABLET, DELAYED RELEASE ORAL at 06:14

## 2020-05-31 PROBLEM — R13.10 DYSPHAGIA: Status: ACTIVE | Noted: 2020-04-01

## 2020-05-31 PROBLEM — R13.10 DYSPHAGIA: Status: ACTIVE | Noted: 2020-05-31

## 2020-05-31 LAB
ANION GAP SERPL CALCULATED.3IONS-SCNC: 5 MMOL/L (ref 4–13)
BACTERIA SPEC ANAEROBE CULT: NO GROWTH
BASOPHILS # BLD AUTO: 0.03 THOUSANDS/ΜL (ref 0–0.1)
BASOPHILS NFR BLD AUTO: 1 % (ref 0–1)
BUN SERPL-MCNC: 15 MG/DL (ref 5–25)
CALCIUM SERPL-MCNC: 8.5 MG/DL (ref 8.3–10.1)
CHLORIDE SERPL-SCNC: 101 MMOL/L (ref 100–108)
CO2 SERPL-SCNC: 29 MMOL/L (ref 21–32)
CREAT SERPL-MCNC: 0.85 MG/DL (ref 0.6–1.3)
EOSINOPHIL # BLD AUTO: 0.18 THOUSAND/ΜL (ref 0–0.61)
EOSINOPHIL NFR BLD AUTO: 3 % (ref 0–6)
ERYTHROCYTE [DISTWIDTH] IN BLOOD BY AUTOMATED COUNT: 15.2 % (ref 11.6–15.1)
GFR SERPL CREATININE-BSD FRML MDRD: 91 ML/MIN/1.73SQ M
GLUCOSE SERPL-MCNC: 110 MG/DL (ref 65–140)
GLUCOSE SERPL-MCNC: 135 MG/DL (ref 65–140)
GLUCOSE SERPL-MCNC: 135 MG/DL (ref 65–140)
GLUCOSE SERPL-MCNC: 94 MG/DL (ref 65–140)
GLUCOSE SERPL-MCNC: 98 MG/DL (ref 65–140)
HCT VFR BLD AUTO: 28.3 % (ref 36.5–49.3)
HGB BLD-MCNC: 8.7 G/DL (ref 12–17)
IMM GRANULOCYTES # BLD AUTO: 0.02 THOUSAND/UL (ref 0–0.2)
IMM GRANULOCYTES NFR BLD AUTO: 0 % (ref 0–2)
LYMPHOCYTES # BLD AUTO: 0.81 THOUSANDS/ΜL (ref 0.6–4.47)
LYMPHOCYTES NFR BLD AUTO: 15 % (ref 14–44)
MCH RBC QN AUTO: 25 PG (ref 26.8–34.3)
MCHC RBC AUTO-ENTMCNC: 30.7 G/DL (ref 31.4–37.4)
MCV RBC AUTO: 81 FL (ref 82–98)
MONOCYTES # BLD AUTO: 0.35 THOUSAND/ΜL (ref 0.17–1.22)
MONOCYTES NFR BLD AUTO: 7 % (ref 4–12)
NEUTROPHILS # BLD AUTO: 3.89 THOUSANDS/ΜL (ref 1.85–7.62)
NEUTS SEG NFR BLD AUTO: 74 % (ref 43–75)
NRBC BLD AUTO-RTO: 0 /100 WBCS
PLATELET # BLD AUTO: 335 THOUSANDS/UL (ref 149–390)
PMV BLD AUTO: 8 FL (ref 8.9–12.7)
POTASSIUM SERPL-SCNC: 3.7 MMOL/L (ref 3.5–5.3)
RBC # BLD AUTO: 3.48 MILLION/UL (ref 3.88–5.62)
SODIUM SERPL-SCNC: 135 MMOL/L (ref 136–145)
WBC # BLD AUTO: 5.28 THOUSAND/UL (ref 4.31–10.16)

## 2020-05-31 PROCEDURE — 99233 SBSQ HOSP IP/OBS HIGH 50: CPT | Performed by: INTERNAL MEDICINE

## 2020-05-31 PROCEDURE — 80048 BASIC METABOLIC PNL TOTAL CA: CPT | Performed by: NURSE PRACTITIONER

## 2020-05-31 PROCEDURE — 99232 SBSQ HOSP IP/OBS MODERATE 35: CPT | Performed by: THORACIC SURGERY (CARDIOTHORACIC VASCULAR SURGERY)

## 2020-05-31 PROCEDURE — 99232 SBSQ HOSP IP/OBS MODERATE 35: CPT | Performed by: NURSE PRACTITIONER

## 2020-05-31 PROCEDURE — 82948 REAGENT STRIP/BLOOD GLUCOSE: CPT

## 2020-05-31 PROCEDURE — 85025 COMPLETE CBC W/AUTO DIFF WBC: CPT | Performed by: NURSE PRACTITIONER

## 2020-05-31 RX ORDER — LACTULOSE 20 G/30ML
30 SOLUTION ORAL ONCE AS NEEDED
Status: COMPLETED | OUTPATIENT
Start: 2020-05-31 | End: 2020-05-31

## 2020-05-31 RX ORDER — BISACODYL 10 MG
10 SUPPOSITORY, RECTAL RECTAL ONCE
Status: COMPLETED | OUTPATIENT
Start: 2020-05-31 | End: 2020-05-31

## 2020-05-31 RX ADMIN — METRONIDAZOLE 500 MG: 500 TABLET, FILM COATED ORAL at 14:18

## 2020-05-31 RX ADMIN — CEFTRIAXONE SODIUM 2000 MG: 2 INJECTION, POWDER, FOR SOLUTION INTRAMUSCULAR; INTRAVENOUS at 12:02

## 2020-05-31 RX ADMIN — HYDROMORPHONE HYDROCHLORIDE 0.2 MG: 1 INJECTION, SOLUTION INTRAMUSCULAR; INTRAVENOUS; SUBCUTANEOUS at 23:00

## 2020-05-31 RX ADMIN — ATORVASTATIN CALCIUM 40 MG: 40 TABLET, FILM COATED ORAL at 17:37

## 2020-05-31 RX ADMIN — LACTULOSE 30 G: 10 SOLUTION ORAL at 14:18

## 2020-05-31 RX ADMIN — ENOXAPARIN SODIUM 40 MG: 40 INJECTION SUBCUTANEOUS at 14:17

## 2020-05-31 RX ADMIN — HYDROMORPHONE HYDROCHLORIDE 0.4 MG: 1 INJECTION, SOLUTION INTRAMUSCULAR; INTRAVENOUS; SUBCUTANEOUS at 02:18

## 2020-05-31 RX ADMIN — GABAPENTIN 300 MG: 300 CAPSULE ORAL at 22:54

## 2020-05-31 RX ADMIN — HYDROMORPHONE HYDROCHLORIDE 0.4 MG: 1 INJECTION, SOLUTION INTRAMUSCULAR; INTRAVENOUS; SUBCUTANEOUS at 14:15

## 2020-05-31 RX ADMIN — TRAZODONE HYDROCHLORIDE 100 MG: 100 TABLET ORAL at 22:54

## 2020-05-31 RX ADMIN — ACETAMINOPHEN 975 MG: 325 TABLET ORAL at 22:54

## 2020-05-31 RX ADMIN — HYDROMORPHONE HYDROCHLORIDE 0.4 MG: 1 INJECTION, SOLUTION INTRAMUSCULAR; INTRAVENOUS; SUBCUTANEOUS at 08:46

## 2020-05-31 RX ADMIN — ROPINIROLE HYDROCHLORIDE 0.25 MG: 0.25 TABLET, FILM COATED ORAL at 22:55

## 2020-05-31 RX ADMIN — BISACODYL 10 MG: 10 SUPPOSITORY RECTAL at 17:37

## 2020-05-31 RX ADMIN — HYDROMORPHONE HYDROCHLORIDE 0.4 MG: 1 INJECTION, SOLUTION INTRAMUSCULAR; INTRAVENOUS; SUBCUTANEOUS at 19:36

## 2020-05-31 RX ADMIN — SODIUM CHLORIDE 50 ML/HR: 0.9 INJECTION, SOLUTION INTRAVENOUS at 15:40

## 2020-05-31 RX ADMIN — LIDOCAINE 1 PATCH: 50 PATCH CUTANEOUS at 08:46

## 2020-05-31 RX ADMIN — METRONIDAZOLE 500 MG: 500 TABLET, FILM COATED ORAL at 22:54

## 2020-06-01 ENCOUNTER — TELEPHONE (OUTPATIENT)
Dept: NEUROSURGERY | Facility: CLINIC | Age: 65
End: 2020-06-01

## 2020-06-01 LAB
GLUCOSE SERPL-MCNC: 120 MG/DL (ref 65–140)
GLUCOSE SERPL-MCNC: 134 MG/DL (ref 65–140)
GLUCOSE SERPL-MCNC: 179 MG/DL (ref 65–140)
GLUCOSE SERPL-MCNC: 95 MG/DL (ref 65–140)

## 2020-06-01 PROCEDURE — 82948 REAGENT STRIP/BLOOD GLUCOSE: CPT

## 2020-06-01 PROCEDURE — 99232 SBSQ HOSP IP/OBS MODERATE 35: CPT | Performed by: NURSE PRACTITIONER

## 2020-06-01 PROCEDURE — 99232 SBSQ HOSP IP/OBS MODERATE 35: CPT | Performed by: THORACIC SURGERY (CARDIOTHORACIC VASCULAR SURGERY)

## 2020-06-01 PROCEDURE — 99233 SBSQ HOSP IP/OBS HIGH 50: CPT | Performed by: INTERNAL MEDICINE

## 2020-06-01 RX ORDER — OXYCODONE HCL 5 MG/5 ML
10 SOLUTION, ORAL ORAL EVERY 4 HOURS PRN
Status: DISCONTINUED | OUTPATIENT
Start: 2020-06-01 | End: 2020-06-09 | Stop reason: HOSPADM

## 2020-06-01 RX ORDER — OXYCODONE HCL 5 MG/5 ML
5 SOLUTION, ORAL ORAL EVERY 4 HOURS PRN
Status: DISCONTINUED | OUTPATIENT
Start: 2020-06-01 | End: 2020-06-09 | Stop reason: HOSPADM

## 2020-06-01 RX ORDER — ASPIRIN 81 MG/1
81 TABLET, CHEWABLE ORAL DAILY
Status: DISCONTINUED | OUTPATIENT
Start: 2020-06-02 | End: 2020-06-09 | Stop reason: HOSPADM

## 2020-06-01 RX ORDER — HYDROMORPHONE HCL/PF 1 MG/ML
0.2 SYRINGE (ML) INJECTION EVERY 4 HOURS PRN
Status: DISCONTINUED | OUTPATIENT
Start: 2020-06-01 | End: 2020-06-09

## 2020-06-01 RX ORDER — OXYCODONE HYDROCHLORIDE 10 MG/1
10 TABLET ORAL EVERY 4 HOURS PRN
Status: DISCONTINUED | OUTPATIENT
Start: 2020-06-01 | End: 2020-06-01

## 2020-06-01 RX ADMIN — OXYCODONE HYDROCHLORIDE 10 MG: 5 SOLUTION ORAL at 12:28

## 2020-06-01 RX ADMIN — ENOXAPARIN SODIUM 40 MG: 40 INJECTION SUBCUTANEOUS at 12:28

## 2020-06-01 RX ADMIN — METRONIDAZOLE 500 MG: 500 TABLET, FILM COATED ORAL at 21:38

## 2020-06-01 RX ADMIN — DIAZEPAM 2 MG: 2 TABLET ORAL at 09:05

## 2020-06-01 RX ADMIN — HYDROMORPHONE HYDROCHLORIDE 0.4 MG: 1 INJECTION, SOLUTION INTRAMUSCULAR; INTRAVENOUS; SUBCUTANEOUS at 05:11

## 2020-06-01 RX ADMIN — ACETAMINOPHEN 975 MG: 325 TABLET ORAL at 05:11

## 2020-06-01 RX ADMIN — HYDROMORPHONE HYDROCHLORIDE 0.4 MG: 1 INJECTION, SOLUTION INTRAMUSCULAR; INTRAVENOUS; SUBCUTANEOUS at 00:22

## 2020-06-01 RX ADMIN — ATORVASTATIN CALCIUM 40 MG: 40 TABLET, FILM COATED ORAL at 17:35

## 2020-06-01 RX ADMIN — OXYCODONE HYDROCHLORIDE 10 MG: 5 SOLUTION ORAL at 21:38

## 2020-06-01 RX ADMIN — SENNOSIDES AND DOCUSATE SODIUM 1 TABLET: 8.6; 5 TABLET ORAL at 09:05

## 2020-06-01 RX ADMIN — ROPINIROLE HYDROCHLORIDE 0.25 MG: 0.25 TABLET, FILM COATED ORAL at 21:38

## 2020-06-01 RX ADMIN — GABAPENTIN 300 MG: 300 CAPSULE ORAL at 21:38

## 2020-06-01 RX ADMIN — ACETAMINOPHEN 975 MG: 325 TABLET ORAL at 14:43

## 2020-06-01 RX ADMIN — METRONIDAZOLE 500 MG: 500 TABLET, FILM COATED ORAL at 14:43

## 2020-06-01 RX ADMIN — LEVOTHYROXINE SODIUM 25 MCG: 25 TABLET ORAL at 05:11

## 2020-06-01 RX ADMIN — TRAZODONE HYDROCHLORIDE 100 MG: 100 TABLET ORAL at 21:38

## 2020-06-01 RX ADMIN — CEFTRIAXONE SODIUM 2000 MG: 2 INJECTION, POWDER, FOR SOLUTION INTRAMUSCULAR; INTRAVENOUS at 12:38

## 2020-06-01 RX ADMIN — OXYCODONE HYDROCHLORIDE 10 MG: 5 SOLUTION ORAL at 17:35

## 2020-06-01 RX ADMIN — LIDOCAINE 1 PATCH: 50 PATCH CUTANEOUS at 09:06

## 2020-06-01 RX ADMIN — METRONIDAZOLE 500 MG: 500 TABLET, FILM COATED ORAL at 05:11

## 2020-06-01 RX ADMIN — PANTOPRAZOLE SODIUM 40 MG: 40 TABLET, DELAYED RELEASE ORAL at 06:28

## 2020-06-01 RX ADMIN — INSULIN LISPRO 1 UNITS: 100 INJECTION, SOLUTION INTRAVENOUS; SUBCUTANEOUS at 09:11

## 2020-06-01 RX ADMIN — AMIODARONE HYDROCHLORIDE 200 MG: 200 TABLET ORAL at 09:05

## 2020-06-02 LAB
ANION GAP SERPL CALCULATED.3IONS-SCNC: 5 MMOL/L (ref 4–13)
BASOPHILS # BLD AUTO: 0.03 THOUSANDS/ΜL (ref 0–0.1)
BASOPHILS NFR BLD AUTO: 1 % (ref 0–1)
BUN SERPL-MCNC: 10 MG/DL (ref 5–25)
CALCIUM SERPL-MCNC: 8.3 MG/DL (ref 8.3–10.1)
CHLORIDE SERPL-SCNC: 101 MMOL/L (ref 100–108)
CO2 SERPL-SCNC: 29 MMOL/L (ref 21–32)
CREAT SERPL-MCNC: 0.76 MG/DL (ref 0.6–1.3)
EOSINOPHIL # BLD AUTO: 0.24 THOUSAND/ΜL (ref 0–0.61)
EOSINOPHIL NFR BLD AUTO: 5 % (ref 0–6)
ERYTHROCYTE [DISTWIDTH] IN BLOOD BY AUTOMATED COUNT: 15.4 % (ref 11.6–15.1)
GFR SERPL CREATININE-BSD FRML MDRD: 96 ML/MIN/1.73SQ M
GLUCOSE SERPL-MCNC: 114 MG/DL (ref 65–140)
GLUCOSE SERPL-MCNC: 120 MG/DL (ref 65–140)
GLUCOSE SERPL-MCNC: 87 MG/DL (ref 65–140)
GLUCOSE SERPL-MCNC: 94 MG/DL (ref 65–140)
GLUCOSE SERPL-MCNC: 95 MG/DL (ref 65–140)
HCT VFR BLD AUTO: 26.9 % (ref 36.5–49.3)
HGB BLD-MCNC: 8.3 G/DL (ref 12–17)
IMM GRANULOCYTES # BLD AUTO: 0.01 THOUSAND/UL (ref 0–0.2)
IMM GRANULOCYTES NFR BLD AUTO: 0 % (ref 0–2)
LYMPHOCYTES # BLD AUTO: 1.04 THOUSANDS/ΜL (ref 0.6–4.47)
LYMPHOCYTES NFR BLD AUTO: 23 % (ref 14–44)
MCH RBC QN AUTO: 25.2 PG (ref 26.8–34.3)
MCHC RBC AUTO-ENTMCNC: 30.9 G/DL (ref 31.4–37.4)
MCV RBC AUTO: 82 FL (ref 82–98)
MONOCYTES # BLD AUTO: 0.41 THOUSAND/ΜL (ref 0.17–1.22)
MONOCYTES NFR BLD AUTO: 9 % (ref 4–12)
NEUTROPHILS # BLD AUTO: 2.74 THOUSANDS/ΜL (ref 1.85–7.62)
NEUTS SEG NFR BLD AUTO: 62 % (ref 43–75)
NRBC BLD AUTO-RTO: 0 /100 WBCS
PLATELET # BLD AUTO: 354 THOUSANDS/UL (ref 149–390)
PMV BLD AUTO: 8.4 FL (ref 8.9–12.7)
POTASSIUM SERPL-SCNC: 3.9 MMOL/L (ref 3.5–5.3)
RBC # BLD AUTO: 3.3 MILLION/UL (ref 3.88–5.62)
SODIUM SERPL-SCNC: 135 MMOL/L (ref 136–145)
WBC # BLD AUTO: 4.47 THOUSAND/UL (ref 4.31–10.16)

## 2020-06-02 PROCEDURE — 80048 BASIC METABOLIC PNL TOTAL CA: CPT | Performed by: NURSE PRACTITIONER

## 2020-06-02 PROCEDURE — 99232 SBSQ HOSP IP/OBS MODERATE 35: CPT | Performed by: NURSE PRACTITIONER

## 2020-06-02 PROCEDURE — 97530 THERAPEUTIC ACTIVITIES: CPT

## 2020-06-02 PROCEDURE — 82948 REAGENT STRIP/BLOOD GLUCOSE: CPT

## 2020-06-02 PROCEDURE — 99232 SBSQ HOSP IP/OBS MODERATE 35: CPT | Performed by: THORACIC SURGERY (CARDIOTHORACIC VASCULAR SURGERY)

## 2020-06-02 PROCEDURE — 97116 GAIT TRAINING THERAPY: CPT

## 2020-06-02 PROCEDURE — 99232 SBSQ HOSP IP/OBS MODERATE 35: CPT | Performed by: INTERNAL MEDICINE

## 2020-06-02 PROCEDURE — 85025 COMPLETE CBC W/AUTO DIFF WBC: CPT | Performed by: NURSE PRACTITIONER

## 2020-06-02 RX ADMIN — GABAPENTIN 300 MG: 300 CAPSULE ORAL at 21:51

## 2020-06-02 RX ADMIN — CEFTRIAXONE SODIUM 2000 MG: 2 INJECTION, POWDER, FOR SOLUTION INTRAMUSCULAR; INTRAVENOUS at 12:12

## 2020-06-02 RX ADMIN — LEVOTHYROXINE SODIUM 25 MCG: 25 TABLET ORAL at 05:37

## 2020-06-02 RX ADMIN — ACETAMINOPHEN 650 MG: 325 TABLET ORAL at 00:11

## 2020-06-02 RX ADMIN — AMIODARONE HYDROCHLORIDE 200 MG: 200 TABLET ORAL at 09:54

## 2020-06-02 RX ADMIN — HYDROMORPHONE HYDROCHLORIDE 0.2 MG: 1 INJECTION, SOLUTION INTRAMUSCULAR; INTRAVENOUS; SUBCUTANEOUS at 15:52

## 2020-06-02 RX ADMIN — OXYCODONE HYDROCHLORIDE 10 MG: 5 SOLUTION ORAL at 18:37

## 2020-06-02 RX ADMIN — ACETAMINOPHEN 975 MG: 325 TABLET ORAL at 21:51

## 2020-06-02 RX ADMIN — OXYCODONE HYDROCHLORIDE 10 MG: 5 SOLUTION ORAL at 01:31

## 2020-06-02 RX ADMIN — PANTOPRAZOLE SODIUM 40 MG: 40 TABLET, DELAYED RELEASE ORAL at 06:20

## 2020-06-02 RX ADMIN — HYDROMORPHONE HYDROCHLORIDE 0.2 MG: 1 INJECTION, SOLUTION INTRAMUSCULAR; INTRAVENOUS; SUBCUTANEOUS at 04:04

## 2020-06-02 RX ADMIN — ENOXAPARIN SODIUM 40 MG: 40 INJECTION SUBCUTANEOUS at 12:14

## 2020-06-02 RX ADMIN — ACETAMINOPHEN 325 MG: 325 TABLET ORAL at 05:37

## 2020-06-02 RX ADMIN — LIDOCAINE 1 PATCH: 50 PATCH CUTANEOUS at 09:56

## 2020-06-02 RX ADMIN — OXYCODONE HYDROCHLORIDE 10 MG: 5 SOLUTION ORAL at 12:13

## 2020-06-02 RX ADMIN — OXYCODONE HYDROCHLORIDE 10 MG: 5 SOLUTION ORAL at 05:46

## 2020-06-02 RX ADMIN — HYDROMORPHONE HYDROCHLORIDE 0.2 MG: 1 INJECTION, SOLUTION INTRAMUSCULAR; INTRAVENOUS; SUBCUTANEOUS at 20:17

## 2020-06-02 RX ADMIN — METRONIDAZOLE 500 MG: 500 INJECTION, SOLUTION INTRAVENOUS at 16:44

## 2020-06-02 RX ADMIN — METRONIDAZOLE 500 MG: 500 TABLET, FILM COATED ORAL at 05:37

## 2020-06-02 RX ADMIN — SENNOSIDES AND DOCUSATE SODIUM 1 TABLET: 8.6; 5 TABLET ORAL at 09:54

## 2020-06-03 PROBLEM — M79.89 BILATERAL SWELLING OF FEET: Status: ACTIVE | Noted: 2020-06-03

## 2020-06-03 LAB
GLUCOSE SERPL-MCNC: 102 MG/DL (ref 65–140)
GLUCOSE SERPL-MCNC: 115 MG/DL (ref 65–140)
GLUCOSE SERPL-MCNC: 75 MG/DL (ref 65–140)
GLUCOSE SERPL-MCNC: 83 MG/DL (ref 65–140)

## 2020-06-03 PROCEDURE — NC001 PR NO CHARGE: Performed by: PHYSICIAN ASSISTANT

## 2020-06-03 PROCEDURE — 99232 SBSQ HOSP IP/OBS MODERATE 35: CPT | Performed by: INTERNAL MEDICINE

## 2020-06-03 PROCEDURE — 99223 1ST HOSP IP/OBS HIGH 75: CPT | Performed by: SURGERY

## 2020-06-03 PROCEDURE — 82948 REAGENT STRIP/BLOOD GLUCOSE: CPT

## 2020-06-03 PROCEDURE — 99232 SBSQ HOSP IP/OBS MODERATE 35: CPT | Performed by: NURSE PRACTITIONER

## 2020-06-03 RX ADMIN — ATORVASTATIN CALCIUM 40 MG: 40 TABLET, FILM COATED ORAL at 16:52

## 2020-06-03 RX ADMIN — METRONIDAZOLE 500 MG: 500 INJECTION, SOLUTION INTRAVENOUS at 23:36

## 2020-06-03 RX ADMIN — TRAZODONE HYDROCHLORIDE 100 MG: 100 TABLET ORAL at 21:31

## 2020-06-03 RX ADMIN — AMIODARONE HYDROCHLORIDE 200 MG: 200 TABLET ORAL at 08:52

## 2020-06-03 RX ADMIN — METRONIDAZOLE 500 MG: 500 INJECTION, SOLUTION INTRAVENOUS at 00:31

## 2020-06-03 RX ADMIN — ROPINIROLE HYDROCHLORIDE 0.25 MG: 0.25 TABLET, FILM COATED ORAL at 21:31

## 2020-06-03 RX ADMIN — ASPIRIN 81 MG 81 MG: 81 TABLET ORAL at 08:52

## 2020-06-03 RX ADMIN — LEVOTHYROXINE SODIUM 25 MCG: 25 TABLET ORAL at 05:53

## 2020-06-03 RX ADMIN — SENNOSIDES AND DOCUSATE SODIUM 1 TABLET: 8.6; 5 TABLET ORAL at 08:52

## 2020-06-03 RX ADMIN — OXYCODONE HYDROCHLORIDE 10 MG: 5 SOLUTION ORAL at 01:16

## 2020-06-03 RX ADMIN — GABAPENTIN 300 MG: 300 CAPSULE ORAL at 21:31

## 2020-06-03 RX ADMIN — ACETAMINOPHEN 650 MG: 325 TABLET ORAL at 21:31

## 2020-06-03 RX ADMIN — LIDOCAINE 1 PATCH: 50 PATCH CUTANEOUS at 08:52

## 2020-06-03 RX ADMIN — OXYCODONE HYDROCHLORIDE 10 MG: 5 SOLUTION ORAL at 05:53

## 2020-06-03 RX ADMIN — OXYCODONE HYDROCHLORIDE 10 MG: 5 SOLUTION ORAL at 18:44

## 2020-06-03 RX ADMIN — CEFTRIAXONE SODIUM 2000 MG: 2 INJECTION, POWDER, FOR SOLUTION INTRAMUSCULAR; INTRAVENOUS at 12:01

## 2020-06-03 RX ADMIN — METRONIDAZOLE 500 MG: 500 INJECTION, SOLUTION INTRAVENOUS at 16:52

## 2020-06-03 RX ADMIN — METRONIDAZOLE 500 MG: 500 INJECTION, SOLUTION INTRAVENOUS at 08:53

## 2020-06-03 RX ADMIN — OXYCODONE HYDROCHLORIDE 10 MG: 5 SOLUTION ORAL at 11:57

## 2020-06-03 RX ADMIN — HYDROMORPHONE HYDROCHLORIDE 0.2 MG: 1 INJECTION, SOLUTION INTRAMUSCULAR; INTRAVENOUS; SUBCUTANEOUS at 20:26

## 2020-06-03 RX ADMIN — ENOXAPARIN SODIUM 40 MG: 40 INJECTION SUBCUTANEOUS at 12:12

## 2020-06-04 LAB
ABO GROUP BLD: NORMAL
ALBUMIN SERPL BCP-MCNC: 2.2 G/DL (ref 3.5–5)
ALP SERPL-CCNC: 66 U/L (ref 46–116)
ALT SERPL W P-5'-P-CCNC: 11 U/L (ref 12–78)
ANION GAP SERPL CALCULATED.3IONS-SCNC: 6 MMOL/L (ref 4–13)
AST SERPL W P-5'-P-CCNC: 13 U/L (ref 5–45)
BASOPHILS # BLD AUTO: 0.02 THOUSANDS/ΜL (ref 0–0.1)
BASOPHILS NFR BLD AUTO: 1 % (ref 0–1)
BILIRUB SERPL-MCNC: 0.38 MG/DL (ref 0.2–1)
BLD GP AB SCN SERPL QL: NEGATIVE
BUN SERPL-MCNC: 11 MG/DL (ref 5–25)
CALCIUM SERPL-MCNC: 8.4 MG/DL (ref 8.3–10.1)
CHLORIDE SERPL-SCNC: 100 MMOL/L (ref 100–108)
CO2 SERPL-SCNC: 29 MMOL/L (ref 21–32)
CREAT SERPL-MCNC: 0.84 MG/DL (ref 0.6–1.3)
EOSINOPHIL # BLD AUTO: 0.23 THOUSAND/ΜL (ref 0–0.61)
EOSINOPHIL NFR BLD AUTO: 6 % (ref 0–6)
ERYTHROCYTE [DISTWIDTH] IN BLOOD BY AUTOMATED COUNT: 15.7 % (ref 11.6–15.1)
GFR SERPL CREATININE-BSD FRML MDRD: 92 ML/MIN/1.73SQ M
GLUCOSE SERPL-MCNC: 101 MG/DL (ref 65–140)
GLUCOSE SERPL-MCNC: 117 MG/DL (ref 65–140)
GLUCOSE SERPL-MCNC: 128 MG/DL (ref 65–140)
GLUCOSE SERPL-MCNC: 136 MG/DL (ref 65–140)
GLUCOSE SERPL-MCNC: 167 MG/DL (ref 65–140)
GLUCOSE SERPL-MCNC: 79 MG/DL (ref 65–140)
HCT VFR BLD AUTO: 27.7 % (ref 36.5–49.3)
HGB BLD-MCNC: 8.4 G/DL (ref 12–17)
IMM GRANULOCYTES # BLD AUTO: 0.01 THOUSAND/UL (ref 0–0.2)
IMM GRANULOCYTES NFR BLD AUTO: 0 % (ref 0–2)
INR PPP: 1.1 (ref 0.84–1.19)
LYMPHOCYTES # BLD AUTO: 1.07 THOUSANDS/ΜL (ref 0.6–4.47)
LYMPHOCYTES NFR BLD AUTO: 26 % (ref 14–44)
MCH RBC QN AUTO: 25.2 PG (ref 26.8–34.3)
MCHC RBC AUTO-ENTMCNC: 30.3 G/DL (ref 31.4–37.4)
MCV RBC AUTO: 83 FL (ref 82–98)
MONOCYTES # BLD AUTO: 0.39 THOUSAND/ΜL (ref 0.17–1.22)
MONOCYTES NFR BLD AUTO: 10 % (ref 4–12)
NEUTROPHILS # BLD AUTO: 2.39 THOUSANDS/ΜL (ref 1.85–7.62)
NEUTS SEG NFR BLD AUTO: 57 % (ref 43–75)
NRBC BLD AUTO-RTO: 0 /100 WBCS
PLATELET # BLD AUTO: 322 THOUSANDS/UL (ref 149–390)
PMV BLD AUTO: 8.2 FL (ref 8.9–12.7)
POTASSIUM SERPL-SCNC: 3.8 MMOL/L (ref 3.5–5.3)
PROT SERPL-MCNC: 7.2 G/DL (ref 6.4–8.2)
PROTHROMBIN TIME: 13.8 SECONDS (ref 11.6–14.5)
RBC # BLD AUTO: 3.33 MILLION/UL (ref 3.88–5.62)
RH BLD: POSITIVE
SODIUM SERPL-SCNC: 135 MMOL/L (ref 136–145)
SPECIMEN EXPIRATION DATE: NORMAL
WBC # BLD AUTO: 4.11 THOUSAND/UL (ref 4.31–10.16)

## 2020-06-04 PROCEDURE — 99232 SBSQ HOSP IP/OBS MODERATE 35: CPT | Performed by: INTERNAL MEDICINE

## 2020-06-04 PROCEDURE — 97116 GAIT TRAINING THERAPY: CPT

## 2020-06-04 PROCEDURE — 85025 COMPLETE CBC W/AUTO DIFF WBC: CPT | Performed by: NURSE PRACTITIONER

## 2020-06-04 PROCEDURE — 99232 SBSQ HOSP IP/OBS MODERATE 35: CPT | Performed by: SURGERY

## 2020-06-04 PROCEDURE — 86900 BLOOD TYPING SEROLOGIC ABO: CPT | Performed by: STUDENT IN AN ORGANIZED HEALTH CARE EDUCATION/TRAINING PROGRAM

## 2020-06-04 PROCEDURE — 86901 BLOOD TYPING SEROLOGIC RH(D): CPT | Performed by: STUDENT IN AN ORGANIZED HEALTH CARE EDUCATION/TRAINING PROGRAM

## 2020-06-04 PROCEDURE — 80053 COMPREHEN METABOLIC PANEL: CPT | Performed by: NURSE PRACTITIONER

## 2020-06-04 PROCEDURE — 85610 PROTHROMBIN TIME: CPT | Performed by: NURSE PRACTITIONER

## 2020-06-04 PROCEDURE — 86850 RBC ANTIBODY SCREEN: CPT | Performed by: STUDENT IN AN ORGANIZED HEALTH CARE EDUCATION/TRAINING PROGRAM

## 2020-06-04 PROCEDURE — 82948 REAGENT STRIP/BLOOD GLUCOSE: CPT

## 2020-06-04 PROCEDURE — 99232 SBSQ HOSP IP/OBS MODERATE 35: CPT | Performed by: NURSE PRACTITIONER

## 2020-06-04 RX ORDER — DEXTROSE AND SODIUM CHLORIDE 5; .9 G/100ML; G/100ML
50 INJECTION, SOLUTION INTRAVENOUS CONTINUOUS
Status: DISCONTINUED | OUTPATIENT
Start: 2020-06-04 | End: 2020-06-08

## 2020-06-04 RX ADMIN — ASPIRIN 81 MG 81 MG: 81 TABLET ORAL at 12:29

## 2020-06-04 RX ADMIN — HYDROMORPHONE HYDROCHLORIDE 0.2 MG: 1 INJECTION, SOLUTION INTRAMUSCULAR; INTRAVENOUS; SUBCUTANEOUS at 16:00

## 2020-06-04 RX ADMIN — AMIODARONE HYDROCHLORIDE 200 MG: 200 TABLET ORAL at 12:29

## 2020-06-04 RX ADMIN — ACETAMINOPHEN 650 MG: 325 TABLET ORAL at 23:54

## 2020-06-04 RX ADMIN — OXYCODONE HYDROCHLORIDE 10 MG: 5 SOLUTION ORAL at 00:22

## 2020-06-04 RX ADMIN — METRONIDAZOLE 500 MG: 500 INJECTION, SOLUTION INTRAVENOUS at 17:47

## 2020-06-04 RX ADMIN — OXYCODONE HYDROCHLORIDE 10 MG: 5 SOLUTION ORAL at 14:06

## 2020-06-04 RX ADMIN — OXYCODONE HYDROCHLORIDE 10 MG: 5 SOLUTION ORAL at 05:14

## 2020-06-04 RX ADMIN — LEVOTHYROXINE SODIUM 25 MCG: 25 TABLET ORAL at 05:13

## 2020-06-04 RX ADMIN — LIDOCAINE 1 PATCH: 50 PATCH CUTANEOUS at 09:24

## 2020-06-04 RX ADMIN — DEXTROSE AND SODIUM CHLORIDE 50 ML/HR: 5; .9 INJECTION, SOLUTION INTRAVENOUS at 10:25

## 2020-06-04 RX ADMIN — ACETAMINOPHEN 325 MG: 325 TABLET ORAL at 05:13

## 2020-06-04 RX ADMIN — PANTOPRAZOLE SODIUM 40 MG: 40 TABLET, DELAYED RELEASE ORAL at 09:06

## 2020-06-04 RX ADMIN — METRONIDAZOLE 500 MG: 500 INJECTION, SOLUTION INTRAVENOUS at 09:24

## 2020-06-04 RX ADMIN — OXYCODONE HYDROCHLORIDE 10 MG: 5 SOLUTION ORAL at 19:22

## 2020-06-04 RX ADMIN — INSULIN LISPRO 1 UNITS: 100 INJECTION, SOLUTION INTRAVENOUS; SUBCUTANEOUS at 17:45

## 2020-06-04 RX ADMIN — OXYCODONE HYDROCHLORIDE 10 MG: 5 SOLUTION ORAL at 23:47

## 2020-06-04 RX ADMIN — OXYCODONE HYDROCHLORIDE 10 MG: 5 SOLUTION ORAL at 09:16

## 2020-06-04 RX ADMIN — ACETAMINOPHEN 325 MG: 325 TABLET ORAL at 14:06

## 2020-06-04 RX ADMIN — METRONIDAZOLE 500 MG: 500 INJECTION, SOLUTION INTRAVENOUS at 23:50

## 2020-06-04 RX ADMIN — CEFTRIAXONE SODIUM 2000 MG: 2 INJECTION, POWDER, FOR SOLUTION INTRAMUSCULAR; INTRAVENOUS at 12:28

## 2020-06-05 ENCOUNTER — ANESTHESIA EVENT (INPATIENT)
Dept: PERIOP | Facility: HOSPITAL | Age: 65
DRG: 987 | End: 2020-06-05
Payer: COMMERCIAL

## 2020-06-05 ENCOUNTER — ANESTHESIA (INPATIENT)
Dept: PERIOP | Facility: HOSPITAL | Age: 65
DRG: 987 | End: 2020-06-05
Payer: COMMERCIAL

## 2020-06-05 LAB
GLUCOSE SERPL-MCNC: 100 MG/DL (ref 65–140)
GLUCOSE SERPL-MCNC: 119 MG/DL (ref 65–140)
GLUCOSE SERPL-MCNC: 161 MG/DL (ref 65–140)
GLUCOSE SERPL-MCNC: 190 MG/DL (ref 65–140)
GLUCOSE SERPL-MCNC: 191 MG/DL (ref 65–140)
GLUCOSE SERPL-MCNC: 213 MG/DL (ref 65–140)

## 2020-06-05 PROCEDURE — 44300 OPEN BOWEL TO SKIN: CPT | Performed by: PHYSICIAN ASSISTANT

## 2020-06-05 PROCEDURE — 0DHA0UZ INSERTION OF FEEDING DEVICE INTO JEJUNUM, OPEN APPROACH: ICD-10-PCS | Performed by: SURGERY

## 2020-06-05 PROCEDURE — NC001 PR NO CHARGE: Performed by: PHYSICIAN ASSISTANT

## 2020-06-05 PROCEDURE — 99232 SBSQ HOSP IP/OBS MODERATE 35: CPT | Performed by: INTERNAL MEDICINE

## 2020-06-05 PROCEDURE — 99024 POSTOP FOLLOW-UP VISIT: CPT | Performed by: SURGERY

## 2020-06-05 PROCEDURE — 82948 REAGENT STRIP/BLOOD GLUCOSE: CPT

## 2020-06-05 PROCEDURE — 44300 OPEN BOWEL TO SKIN: CPT | Performed by: SURGERY

## 2020-06-05 PROCEDURE — 99232 SBSQ HOSP IP/OBS MODERATE 35: CPT | Performed by: NURSE PRACTITIONER

## 2020-06-05 RX ORDER — ONDANSETRON 2 MG/ML
4 INJECTION INTRAMUSCULAR; INTRAVENOUS ONCE AS NEEDED
Status: DISCONTINUED | OUTPATIENT
Start: 2020-06-05 | End: 2020-06-05 | Stop reason: HOSPADM

## 2020-06-05 RX ORDER — PROPOFOL 10 MG/ML
INJECTION, EMULSION INTRAVENOUS AS NEEDED
Status: DISCONTINUED | OUTPATIENT
Start: 2020-06-05 | End: 2020-06-05 | Stop reason: SURG

## 2020-06-05 RX ORDER — SODIUM CHLORIDE, SODIUM LACTATE, POTASSIUM CHLORIDE, CALCIUM CHLORIDE 600; 310; 30; 20 MG/100ML; MG/100ML; MG/100ML; MG/100ML
125 INJECTION, SOLUTION INTRAVENOUS CONTINUOUS
Status: DISCONTINUED | OUTPATIENT
Start: 2020-06-05 | End: 2020-06-05

## 2020-06-05 RX ORDER — METOCLOPRAMIDE HYDROCHLORIDE 5 MG/ML
10 INJECTION INTRAMUSCULAR; INTRAVENOUS ONCE AS NEEDED
Status: DISCONTINUED | OUTPATIENT
Start: 2020-06-05 | End: 2020-06-05 | Stop reason: HOSPADM

## 2020-06-05 RX ORDER — HYDROMORPHONE HCL/PF 1 MG/ML
0.2 SYRINGE (ML) INJECTION
Status: DISCONTINUED | OUTPATIENT
Start: 2020-06-05 | End: 2020-06-05 | Stop reason: HOSPADM

## 2020-06-05 RX ORDER — NEOSTIGMINE METHYLSULFATE 1 MG/ML
INJECTION INTRAVENOUS AS NEEDED
Status: DISCONTINUED | OUTPATIENT
Start: 2020-06-05 | End: 2020-06-05 | Stop reason: SURG

## 2020-06-05 RX ORDER — HYDROMORPHONE HCL/PF 1 MG/ML
0.5 SYRINGE (ML) INJECTION
Status: DISCONTINUED | OUTPATIENT
Start: 2020-06-05 | End: 2020-06-05 | Stop reason: HOSPADM

## 2020-06-05 RX ORDER — DIPHENHYDRAMINE HYDROCHLORIDE 50 MG/ML
12.5 INJECTION INTRAMUSCULAR; INTRAVENOUS ONCE AS NEEDED
Status: DISCONTINUED | OUTPATIENT
Start: 2020-06-05 | End: 2020-06-05 | Stop reason: HOSPADM

## 2020-06-05 RX ORDER — GABAPENTIN 300 MG/1
300 CAPSULE ORAL ONCE
Status: COMPLETED | OUTPATIENT
Start: 2020-06-05 | End: 2020-06-05

## 2020-06-05 RX ORDER — EPHEDRINE SULFATE 50 MG/ML
INJECTION INTRAVENOUS AS NEEDED
Status: DISCONTINUED | OUTPATIENT
Start: 2020-06-05 | End: 2020-06-05 | Stop reason: SURG

## 2020-06-05 RX ORDER — ACETAMINOPHEN 325 MG/1
975 TABLET ORAL ONCE
Status: COMPLETED | OUTPATIENT
Start: 2020-06-05 | End: 2020-06-05

## 2020-06-05 RX ORDER — CEFAZOLIN SODIUM 1 G/50ML
1000 SOLUTION INTRAVENOUS
Status: DISCONTINUED | OUTPATIENT
Start: 2020-06-05 | End: 2020-06-05 | Stop reason: HOSPADM

## 2020-06-05 RX ORDER — MIDAZOLAM HYDROCHLORIDE 2 MG/2ML
INJECTION, SOLUTION INTRAMUSCULAR; INTRAVENOUS AS NEEDED
Status: DISCONTINUED | OUTPATIENT
Start: 2020-06-05 | End: 2020-06-05 | Stop reason: SURG

## 2020-06-05 RX ORDER — ONDANSETRON 2 MG/ML
INJECTION INTRAMUSCULAR; INTRAVENOUS AS NEEDED
Status: DISCONTINUED | OUTPATIENT
Start: 2020-06-05 | End: 2020-06-05 | Stop reason: SURG

## 2020-06-05 RX ORDER — GLYCOPYRROLATE 0.2 MG/ML
INJECTION INTRAMUSCULAR; INTRAVENOUS AS NEEDED
Status: DISCONTINUED | OUTPATIENT
Start: 2020-06-05 | End: 2020-06-05 | Stop reason: SURG

## 2020-06-05 RX ORDER — DEXAMETHASONE SODIUM PHOSPHATE 10 MG/ML
INJECTION, SOLUTION INTRAMUSCULAR; INTRAVENOUS AS NEEDED
Status: DISCONTINUED | OUTPATIENT
Start: 2020-06-05 | End: 2020-06-05 | Stop reason: SURG

## 2020-06-05 RX ORDER — FENTANYL CITRATE/PF 50 MCG/ML
50 SYRINGE (ML) INJECTION
Status: COMPLETED | OUTPATIENT
Start: 2020-06-05 | End: 2020-06-05

## 2020-06-05 RX ORDER — LIDOCAINE HYDROCHLORIDE 10 MG/ML
INJECTION, SOLUTION EPIDURAL; INFILTRATION; INTRACAUDAL; PERINEURAL AS NEEDED
Status: DISCONTINUED | OUTPATIENT
Start: 2020-06-05 | End: 2020-06-05 | Stop reason: SURG

## 2020-06-05 RX ORDER — FENTANYL CITRATE 50 UG/ML
INJECTION, SOLUTION INTRAMUSCULAR; INTRAVENOUS AS NEEDED
Status: DISCONTINUED | OUTPATIENT
Start: 2020-06-05 | End: 2020-06-05 | Stop reason: SURG

## 2020-06-05 RX ORDER — MAGNESIUM HYDROXIDE 1200 MG/15ML
LIQUID ORAL AS NEEDED
Status: DISCONTINUED | OUTPATIENT
Start: 2020-06-05 | End: 2020-06-05 | Stop reason: HOSPADM

## 2020-06-05 RX ORDER — LIDOCAINE HYDROCHLORIDE 10 MG/ML
0.5 INJECTION, SOLUTION EPIDURAL; INFILTRATION; INTRACAUDAL; PERINEURAL ONCE AS NEEDED
Status: DISCONTINUED | OUTPATIENT
Start: 2020-06-05 | End: 2020-06-05 | Stop reason: HOSPADM

## 2020-06-05 RX ORDER — ROCURONIUM BROMIDE 10 MG/ML
INJECTION, SOLUTION INTRAVENOUS AS NEEDED
Status: DISCONTINUED | OUTPATIENT
Start: 2020-06-05 | End: 2020-06-05 | Stop reason: SURG

## 2020-06-05 RX ORDER — SODIUM CHLORIDE 9 MG/ML
INJECTION, SOLUTION INTRAVENOUS CONTINUOUS PRN
Status: DISCONTINUED | OUTPATIENT
Start: 2020-06-05 | End: 2020-06-05 | Stop reason: SURG

## 2020-06-05 RX ORDER — OXYCODONE HYDROCHLORIDE 10 MG/1
10 TABLET ORAL ONCE
Status: COMPLETED | OUTPATIENT
Start: 2020-06-05 | End: 2020-06-05

## 2020-06-05 RX ADMIN — CEFAZOLIN SODIUM 1000 MG: 1 SOLUTION INTRAVENOUS at 08:00

## 2020-06-05 RX ADMIN — SODIUM CHLORIDE, SODIUM LACTATE, POTASSIUM CHLORIDE, AND CALCIUM CHLORIDE: .6; .31; .03; .02 INJECTION, SOLUTION INTRAVENOUS at 09:17

## 2020-06-05 RX ADMIN — MIDAZOLAM 1 MG: 1 INJECTION INTRAMUSCULAR; INTRAVENOUS at 08:06

## 2020-06-05 RX ADMIN — DEXTROSE AND SODIUM CHLORIDE 50 ML/HR: 5; .9 INJECTION, SOLUTION INTRAVENOUS at 09:59

## 2020-06-05 RX ADMIN — OXYCODONE HYDROCHLORIDE 10 MG: 5 SOLUTION ORAL at 04:10

## 2020-06-05 RX ADMIN — ROCURONIUM BROMIDE 40 MG: 10 INJECTION, SOLUTION INTRAVENOUS at 08:08

## 2020-06-05 RX ADMIN — ATORVASTATIN CALCIUM 40 MG: 40 TABLET, FILM COATED ORAL at 17:43

## 2020-06-05 RX ADMIN — LEVOTHYROXINE SODIUM 25 MCG: 25 TABLET ORAL at 05:56

## 2020-06-05 RX ADMIN — LIDOCAINE HYDROCHLORIDE 50 MG: 10 INJECTION, SOLUTION EPIDURAL; INFILTRATION; INTRACAUDAL; PERINEURAL at 08:08

## 2020-06-05 RX ADMIN — SENNOSIDES AND DOCUSATE SODIUM 1 TABLET: 8.6; 5 TABLET ORAL at 11:33

## 2020-06-05 RX ADMIN — MELATONIN 6 MG: at 21:20

## 2020-06-05 RX ADMIN — ONDANSETRON 4 MG: 2 INJECTION INTRAMUSCULAR; INTRAVENOUS at 08:12

## 2020-06-05 RX ADMIN — OXYCODONE HYDROCHLORIDE 10 MG: 5 SOLUTION ORAL at 15:06

## 2020-06-05 RX ADMIN — GLYCOPYRROLATE 0.4 MG: 0.2 INJECTION, SOLUTION INTRAMUSCULAR; INTRAVENOUS at 09:09

## 2020-06-05 RX ADMIN — INSULIN LISPRO 2 UNITS: 100 INJECTION, SOLUTION INTRAVENOUS; SUBCUTANEOUS at 21:19

## 2020-06-05 RX ADMIN — FENTANYL CITRATE 100 MCG: 50 INJECTION, SOLUTION INTRAMUSCULAR; INTRAVENOUS at 08:08

## 2020-06-05 RX ADMIN — NEOSTIGMINE METHYLSULFATE 3 MG: 1 INJECTION, SOLUTION INTRAVENOUS at 09:09

## 2020-06-05 RX ADMIN — DEXAMETHASONE SODIUM PHOSPHATE 10 MG: 10 INJECTION, SOLUTION INTRAMUSCULAR; INTRAVENOUS at 08:12

## 2020-06-05 RX ADMIN — Medication 500 MG: at 08:32

## 2020-06-05 RX ADMIN — OXYCODONE HYDROCHLORIDE 10 MG: 10 TABLET ORAL at 07:56

## 2020-06-05 RX ADMIN — INSULIN LISPRO 1 UNITS: 100 INJECTION, SOLUTION INTRAVENOUS; SUBCUTANEOUS at 11:34

## 2020-06-05 RX ADMIN — OXYCODONE HYDROCHLORIDE 10 MG: 5 SOLUTION ORAL at 21:26

## 2020-06-05 RX ADMIN — INSULIN LISPRO 2 UNITS: 100 INJECTION, SOLUTION INTRAVENOUS; SUBCUTANEOUS at 17:42

## 2020-06-05 RX ADMIN — AMIODARONE HYDROCHLORIDE 200 MG: 200 TABLET ORAL at 11:32

## 2020-06-05 RX ADMIN — GABAPENTIN 300 MG: 300 CAPSULE ORAL at 21:20

## 2020-06-05 RX ADMIN — PROPOFOL 150 MG: 10 INJECTION, EMULSION INTRAVENOUS at 08:08

## 2020-06-05 RX ADMIN — FENTANYL CITRATE 50 MCG: 50 INJECTION, SOLUTION INTRAMUSCULAR; INTRAVENOUS at 09:58

## 2020-06-05 RX ADMIN — ROPINIROLE HYDROCHLORIDE 0.25 MG: 0.25 TABLET, FILM COATED ORAL at 21:20

## 2020-06-05 RX ADMIN — SODIUM CHLORIDE, SODIUM LACTATE, POTASSIUM CHLORIDE, AND CALCIUM CHLORIDE 125 ML/HR: .6; .31; .03; .02 INJECTION, SOLUTION INTRAVENOUS at 01:52

## 2020-06-05 RX ADMIN — EPHEDRINE SULFATE 10 MG: 50 INJECTION, SOLUTION INTRAVENOUS at 08:20

## 2020-06-05 RX ADMIN — ACETAMINOPHEN 975 MG: 325 TABLET ORAL at 07:54

## 2020-06-05 RX ADMIN — ONDANSETRON 4 MG: 2 INJECTION INTRAMUSCULAR; INTRAVENOUS at 16:25

## 2020-06-05 RX ADMIN — METRONIDAZOLE 500 MG: 500 INJECTION, SOLUTION INTRAVENOUS at 16:25

## 2020-06-05 RX ADMIN — TRAZODONE HYDROCHLORIDE 100 MG: 100 TABLET ORAL at 21:20

## 2020-06-05 RX ADMIN — FENTANYL CITRATE 50 MCG: 50 INJECTION, SOLUTION INTRAMUSCULAR; INTRAVENOUS at 10:10

## 2020-06-05 RX ADMIN — MIDAZOLAM 1 MG: 1 INJECTION INTRAMUSCULAR; INTRAVENOUS at 08:02

## 2020-06-05 RX ADMIN — SODIUM CHLORIDE: 9 INJECTION, SOLUTION INTRAVENOUS at 08:12

## 2020-06-05 RX ADMIN — EPHEDRINE SULFATE 10 MG: 50 INJECTION, SOLUTION INTRAVENOUS at 08:17

## 2020-06-05 RX ADMIN — GABAPENTIN 300 MG: 300 CAPSULE ORAL at 07:56

## 2020-06-05 RX ADMIN — FENTANYL CITRATE 50 MCG: 50 INJECTION, SOLUTION INTRAMUSCULAR; INTRAVENOUS at 09:51

## 2020-06-05 RX ADMIN — EPHEDRINE SULFATE 10 MG: 50 INJECTION, SOLUTION INTRAVENOUS at 08:37

## 2020-06-05 RX ADMIN — PANTOPRAZOLE SODIUM 40 MG: 40 TABLET, DELAYED RELEASE ORAL at 06:00

## 2020-06-05 RX ADMIN — CEFTRIAXONE SODIUM 2000 MG: 2 INJECTION, POWDER, FOR SOLUTION INTRAMUSCULAR; INTRAVENOUS at 12:00

## 2020-06-06 LAB
ANION GAP SERPL CALCULATED.3IONS-SCNC: 5 MMOL/L (ref 4–13)
BASOPHILS # BLD AUTO: 0.01 THOUSANDS/ΜL (ref 0–0.1)
BASOPHILS NFR BLD AUTO: 0 % (ref 0–1)
BUN SERPL-MCNC: 8 MG/DL (ref 5–25)
CALCIUM SERPL-MCNC: 8.6 MG/DL (ref 8.3–10.1)
CHLORIDE SERPL-SCNC: 104 MMOL/L (ref 100–108)
CO2 SERPL-SCNC: 28 MMOL/L (ref 21–32)
CREAT SERPL-MCNC: 0.87 MG/DL (ref 0.6–1.3)
EOSINOPHIL # BLD AUTO: 0 THOUSAND/ΜL (ref 0–0.61)
EOSINOPHIL NFR BLD AUTO: 0 % (ref 0–6)
ERYTHROCYTE [DISTWIDTH] IN BLOOD BY AUTOMATED COUNT: 16.8 % (ref 11.6–15.1)
GFR SERPL CREATININE-BSD FRML MDRD: 91 ML/MIN/1.73SQ M
GLUCOSE SERPL-MCNC: 118 MG/DL (ref 65–140)
GLUCOSE SERPL-MCNC: 129 MG/DL (ref 65–140)
GLUCOSE SERPL-MCNC: 129 MG/DL (ref 65–140)
GLUCOSE SERPL-MCNC: 132 MG/DL (ref 65–140)
GLUCOSE SERPL-MCNC: 237 MG/DL (ref 65–140)
HCT VFR BLD AUTO: 40.6 % (ref 36.5–49.3)
HGB BLD-MCNC: 12.5 G/DL (ref 12–17)
IMM GRANULOCYTES # BLD AUTO: 0.01 THOUSAND/UL (ref 0–0.2)
IMM GRANULOCYTES NFR BLD AUTO: 0 % (ref 0–2)
LYMPHOCYTES # BLD AUTO: 0.51 THOUSANDS/ΜL (ref 0.6–4.47)
LYMPHOCYTES NFR BLD AUTO: 12 % (ref 14–44)
MCH RBC QN AUTO: 25.4 PG (ref 26.8–34.3)
MCHC RBC AUTO-ENTMCNC: 30.8 G/DL (ref 31.4–37.4)
MCV RBC AUTO: 82 FL (ref 82–98)
MONOCYTES # BLD AUTO: 0.21 THOUSAND/ΜL (ref 0.17–1.22)
MONOCYTES NFR BLD AUTO: 5 % (ref 4–12)
NEUTROPHILS # BLD AUTO: 3.46 THOUSANDS/ΜL (ref 1.85–7.62)
NEUTS SEG NFR BLD AUTO: 83 % (ref 43–75)
NRBC BLD AUTO-RTO: 0 /100 WBCS
PLATELET # BLD AUTO: 231 THOUSANDS/UL (ref 149–390)
PMV BLD AUTO: 8.4 FL (ref 8.9–12.7)
POTASSIUM SERPL-SCNC: 4.1 MMOL/L (ref 3.5–5.3)
RBC # BLD AUTO: 4.93 MILLION/UL (ref 3.88–5.62)
SODIUM SERPL-SCNC: 137 MMOL/L (ref 136–145)
WBC # BLD AUTO: 4.2 THOUSAND/UL (ref 4.31–10.16)

## 2020-06-06 PROCEDURE — 80048 BASIC METABOLIC PNL TOTAL CA: CPT | Performed by: STUDENT IN AN ORGANIZED HEALTH CARE EDUCATION/TRAINING PROGRAM

## 2020-06-06 PROCEDURE — 85025 COMPLETE CBC W/AUTO DIFF WBC: CPT | Performed by: STUDENT IN AN ORGANIZED HEALTH CARE EDUCATION/TRAINING PROGRAM

## 2020-06-06 PROCEDURE — 82948 REAGENT STRIP/BLOOD GLUCOSE: CPT

## 2020-06-06 PROCEDURE — 99232 SBSQ HOSP IP/OBS MODERATE 35: CPT | Performed by: NURSE PRACTITIONER

## 2020-06-06 PROCEDURE — 99024 POSTOP FOLLOW-UP VISIT: CPT | Performed by: SURGERY

## 2020-06-06 RX ADMIN — LEVOTHYROXINE SODIUM 25 MCG: 25 TABLET ORAL at 05:26

## 2020-06-06 RX ADMIN — MELATONIN 6 MG: at 21:41

## 2020-06-06 RX ADMIN — AMIODARONE HYDROCHLORIDE 200 MG: 200 TABLET ORAL at 09:18

## 2020-06-06 RX ADMIN — METRONIDAZOLE 500 MG: 500 INJECTION, SOLUTION INTRAVENOUS at 17:08

## 2020-06-06 RX ADMIN — OXYCODONE HYDROCHLORIDE 10 MG: 5 SOLUTION ORAL at 11:30

## 2020-06-06 RX ADMIN — OXYCODONE HYDROCHLORIDE 10 MG: 5 SOLUTION ORAL at 05:24

## 2020-06-06 RX ADMIN — ACETAMINOPHEN 975 MG: 325 TABLET ORAL at 01:14

## 2020-06-06 RX ADMIN — PANTOPRAZOLE SODIUM 40 MG: 40 TABLET, DELAYED RELEASE ORAL at 06:00

## 2020-06-06 RX ADMIN — CEFTRIAXONE SODIUM 2000 MG: 2 INJECTION, POWDER, FOR SOLUTION INTRAMUSCULAR; INTRAVENOUS at 13:16

## 2020-06-06 RX ADMIN — METRONIDAZOLE 500 MG: 500 INJECTION, SOLUTION INTRAVENOUS at 01:15

## 2020-06-06 RX ADMIN — GABAPENTIN 300 MG: 300 CAPSULE ORAL at 21:30

## 2020-06-06 RX ADMIN — LIDOCAINE 1 PATCH: 50 PATCH CUTANEOUS at 09:18

## 2020-06-06 RX ADMIN — SENNOSIDES AND DOCUSATE SODIUM 1 TABLET: 8.6; 5 TABLET ORAL at 09:18

## 2020-06-06 RX ADMIN — DEXTROSE AND SODIUM CHLORIDE 50 ML/HR: 5; .9 INJECTION, SOLUTION INTRAVENOUS at 07:11

## 2020-06-06 RX ADMIN — ENOXAPARIN SODIUM 40 MG: 40 INJECTION SUBCUTANEOUS at 01:14

## 2020-06-06 RX ADMIN — ASPIRIN 81 MG 81 MG: 81 TABLET ORAL at 09:18

## 2020-06-06 RX ADMIN — OXYCODONE HYDROCHLORIDE 10 MG: 5 SOLUTION ORAL at 21:28

## 2020-06-06 RX ADMIN — METRONIDAZOLE 500 MG: 500 INJECTION, SOLUTION INTRAVENOUS at 09:14

## 2020-06-06 RX ADMIN — ACETAMINOPHEN 975 MG: 325 TABLET ORAL at 09:18

## 2020-06-06 RX ADMIN — OXYCODONE HYDROCHLORIDE 10 MG: 5 SOLUTION ORAL at 17:08

## 2020-06-06 RX ADMIN — ACETAMINOPHEN 975 MG: 325 TABLET ORAL at 17:08

## 2020-06-06 RX ADMIN — ATORVASTATIN CALCIUM 40 MG: 40 TABLET, FILM COATED ORAL at 17:08

## 2020-06-06 RX ADMIN — OXYCODONE HYDROCHLORIDE 10 MG: 5 SOLUTION ORAL at 01:24

## 2020-06-06 RX ADMIN — ROPINIROLE HYDROCHLORIDE 0.25 MG: 0.25 TABLET, FILM COATED ORAL at 21:28

## 2020-06-06 RX ADMIN — TRAZODONE HYDROCHLORIDE 100 MG: 100 TABLET ORAL at 21:28

## 2020-06-07 LAB
GLUCOSE SERPL-MCNC: 103 MG/DL (ref 65–140)
GLUCOSE SERPL-MCNC: 120 MG/DL (ref 65–140)
GLUCOSE SERPL-MCNC: 127 MG/DL (ref 65–140)
GLUCOSE SERPL-MCNC: 132 MG/DL (ref 65–140)

## 2020-06-07 PROCEDURE — 99232 SBSQ HOSP IP/OBS MODERATE 35: CPT | Performed by: NURSE PRACTITIONER

## 2020-06-07 PROCEDURE — 82948 REAGENT STRIP/BLOOD GLUCOSE: CPT

## 2020-06-07 PROCEDURE — 99024 POSTOP FOLLOW-UP VISIT: CPT | Performed by: SURGERY

## 2020-06-07 RX ADMIN — METRONIDAZOLE 500 MG: 500 INJECTION, SOLUTION INTRAVENOUS at 17:22

## 2020-06-07 RX ADMIN — OXYCODONE HYDROCHLORIDE 10 MG: 5 SOLUTION ORAL at 22:31

## 2020-06-07 RX ADMIN — PANTOPRAZOLE SODIUM 40 MG: 40 TABLET, DELAYED RELEASE ORAL at 06:24

## 2020-06-07 RX ADMIN — HYDROMORPHONE HYDROCHLORIDE 0.2 MG: 1 INJECTION, SOLUTION INTRAMUSCULAR; INTRAVENOUS; SUBCUTANEOUS at 20:27

## 2020-06-07 RX ADMIN — OXYCODONE HYDROCHLORIDE 10 MG: 5 SOLUTION ORAL at 18:31

## 2020-06-07 RX ADMIN — TRAZODONE HYDROCHLORIDE 100 MG: 100 TABLET ORAL at 22:26

## 2020-06-07 RX ADMIN — LIDOCAINE 1 PATCH: 50 PATCH CUTANEOUS at 08:35

## 2020-06-07 RX ADMIN — ENOXAPARIN SODIUM 40 MG: 40 INJECTION SUBCUTANEOUS at 00:58

## 2020-06-07 RX ADMIN — CEFTRIAXONE SODIUM 2000 MG: 2 INJECTION, POWDER, FOR SOLUTION INTRAMUSCULAR; INTRAVENOUS at 13:08

## 2020-06-07 RX ADMIN — METRONIDAZOLE 500 MG: 500 INJECTION, SOLUTION INTRAVENOUS at 00:57

## 2020-06-07 RX ADMIN — GABAPENTIN 300 MG: 300 CAPSULE ORAL at 22:26

## 2020-06-07 RX ADMIN — METRONIDAZOLE 500 MG: 500 INJECTION, SOLUTION INTRAVENOUS at 08:35

## 2020-06-07 RX ADMIN — SENNOSIDES AND DOCUSATE SODIUM 1 TABLET: 8.6; 5 TABLET ORAL at 08:35

## 2020-06-07 RX ADMIN — ACETAMINOPHEN 975 MG: 325 TABLET ORAL at 17:05

## 2020-06-07 RX ADMIN — ACETAMINOPHEN 975 MG: 325 TABLET ORAL at 00:57

## 2020-06-07 RX ADMIN — OXYCODONE HYDROCHLORIDE 10 MG: 5 SOLUTION ORAL at 06:25

## 2020-06-07 RX ADMIN — METRONIDAZOLE 500 MG: 500 INJECTION, SOLUTION INTRAVENOUS at 22:39

## 2020-06-07 RX ADMIN — ASPIRIN 81 MG 81 MG: 81 TABLET ORAL at 08:34

## 2020-06-07 RX ADMIN — ENOXAPARIN SODIUM 40 MG: 40 INJECTION SUBCUTANEOUS at 22:39

## 2020-06-07 RX ADMIN — LEVOTHYROXINE SODIUM 25 MCG: 25 TABLET ORAL at 06:24

## 2020-06-07 RX ADMIN — ACETAMINOPHEN 975 MG: 325 TABLET ORAL at 08:35

## 2020-06-07 RX ADMIN — ACETAMINOPHEN 975 MG: 325 TABLET ORAL at 22:27

## 2020-06-07 RX ADMIN — ROPINIROLE HYDROCHLORIDE 0.25 MG: 0.25 TABLET, FILM COATED ORAL at 22:31

## 2020-06-07 RX ADMIN — OXYCODONE HYDROCHLORIDE 10 MG: 5 SOLUTION ORAL at 11:16

## 2020-06-07 RX ADMIN — ATORVASTATIN CALCIUM 40 MG: 40 TABLET, FILM COATED ORAL at 17:05

## 2020-06-07 RX ADMIN — AMIODARONE HYDROCHLORIDE 200 MG: 200 TABLET ORAL at 08:35

## 2020-06-08 LAB
GLUCOSE SERPL-MCNC: 104 MG/DL (ref 65–140)
GLUCOSE SERPL-MCNC: 106 MG/DL (ref 65–140)
GLUCOSE SERPL-MCNC: 120 MG/DL (ref 65–140)
GLUCOSE SERPL-MCNC: 121 MG/DL (ref 65–140)

## 2020-06-08 PROCEDURE — 99222 1ST HOSP IP/OBS MODERATE 55: CPT | Performed by: PODIATRIST

## 2020-06-08 PROCEDURE — 99024 POSTOP FOLLOW-UP VISIT: CPT | Performed by: SURGERY

## 2020-06-08 PROCEDURE — 99232 SBSQ HOSP IP/OBS MODERATE 35: CPT | Performed by: NURSE PRACTITIONER

## 2020-06-08 PROCEDURE — 82948 REAGENT STRIP/BLOOD GLUCOSE: CPT

## 2020-06-08 PROCEDURE — 99232 SBSQ HOSP IP/OBS MODERATE 35: CPT | Performed by: INTERNAL MEDICINE

## 2020-06-08 RX ORDER — LACTULOSE 20 G/30ML
30 SOLUTION ORAL ONCE
Status: COMPLETED | OUTPATIENT
Start: 2020-06-08 | End: 2020-06-08

## 2020-06-08 RX ADMIN — LEVOTHYROXINE SODIUM 25 MCG: 25 TABLET ORAL at 05:06

## 2020-06-08 RX ADMIN — ACETAMINOPHEN 975 MG: 325 TABLET ORAL at 15:58

## 2020-06-08 RX ADMIN — METRONIDAZOLE 500 MG: 500 INJECTION, SOLUTION INTRAVENOUS at 08:16

## 2020-06-08 RX ADMIN — CEFTRIAXONE SODIUM 2000 MG: 2 INJECTION, POWDER, FOR SOLUTION INTRAMUSCULAR; INTRAVENOUS at 11:54

## 2020-06-08 RX ADMIN — ROPINIROLE HYDROCHLORIDE 0.25 MG: 0.25 TABLET, FILM COATED ORAL at 22:03

## 2020-06-08 RX ADMIN — ACETAMINOPHEN 975 MG: 325 TABLET ORAL at 22:04

## 2020-06-08 RX ADMIN — OXYCODONE HYDROCHLORIDE 10 MG: 5 SOLUTION ORAL at 22:06

## 2020-06-08 RX ADMIN — AMIODARONE HYDROCHLORIDE 200 MG: 200 TABLET ORAL at 08:14

## 2020-06-08 RX ADMIN — PANTOPRAZOLE SODIUM 40 MG: 40 TABLET, DELAYED RELEASE ORAL at 05:06

## 2020-06-08 RX ADMIN — LACTULOSE 30 G: 10 SOLUTION ORAL at 15:58

## 2020-06-08 RX ADMIN — ATORVASTATIN CALCIUM 40 MG: 40 TABLET, FILM COATED ORAL at 15:58

## 2020-06-08 RX ADMIN — ASPIRIN 81 MG 81 MG: 81 TABLET ORAL at 08:14

## 2020-06-08 RX ADMIN — ACETAMINOPHEN 975 MG: 325 TABLET ORAL at 08:14

## 2020-06-08 RX ADMIN — SENNOSIDES AND DOCUSATE SODIUM 1 TABLET: 8.6; 5 TABLET ORAL at 08:14

## 2020-06-08 RX ADMIN — TRAZODONE HYDROCHLORIDE 100 MG: 100 TABLET ORAL at 22:03

## 2020-06-08 RX ADMIN — OXYCODONE HYDROCHLORIDE 10 MG: 5 SOLUTION ORAL at 15:57

## 2020-06-08 RX ADMIN — METRONIDAZOLE 500 MG: 500 INJECTION, SOLUTION INTRAVENOUS at 22:18

## 2020-06-08 RX ADMIN — OXYCODONE HYDROCHLORIDE 10 MG: 5 SOLUTION ORAL at 08:20

## 2020-06-08 RX ADMIN — DEXTROSE AND SODIUM CHLORIDE 50 ML/HR: 5; .9 INJECTION, SOLUTION INTRAVENOUS at 03:21

## 2020-06-08 RX ADMIN — ENOXAPARIN SODIUM 40 MG: 40 INJECTION SUBCUTANEOUS at 22:04

## 2020-06-08 RX ADMIN — METRONIDAZOLE 500 MG: 500 INJECTION, SOLUTION INTRAVENOUS at 15:59

## 2020-06-08 RX ADMIN — GABAPENTIN 300 MG: 300 CAPSULE ORAL at 22:02

## 2020-06-09 VITALS
WEIGHT: 187.61 LBS | OXYGEN SATURATION: 97 % | BODY MASS INDEX: 26.86 KG/M2 | HEART RATE: 76 BPM | RESPIRATION RATE: 18 BRPM | DIASTOLIC BLOOD PRESSURE: 52 MMHG | SYSTOLIC BLOOD PRESSURE: 99 MMHG | HEIGHT: 70 IN | TEMPERATURE: 98.3 F

## 2020-06-09 LAB
ALBUMIN SERPL BCP-MCNC: 2.1 G/DL (ref 3.5–5)
ALP SERPL-CCNC: 61 U/L (ref 46–116)
ALT SERPL W P-5'-P-CCNC: 9 U/L (ref 12–78)
ANION GAP SERPL CALCULATED.3IONS-SCNC: 2 MMOL/L (ref 4–13)
AST SERPL W P-5'-P-CCNC: 8 U/L (ref 5–45)
BASOPHILS # BLD AUTO: 0.02 THOUSANDS/ΜL (ref 0–0.1)
BASOPHILS NFR BLD AUTO: 0 % (ref 0–1)
BILIRUB SERPL-MCNC: 0.36 MG/DL (ref 0.2–1)
BUN SERPL-MCNC: 10 MG/DL (ref 5–25)
CALCIUM SERPL-MCNC: 8.3 MG/DL (ref 8.3–10.1)
CHLORIDE SERPL-SCNC: 106 MMOL/L (ref 100–108)
CO2 SERPL-SCNC: 29 MMOL/L (ref 21–32)
CREAT SERPL-MCNC: 0.81 MG/DL (ref 0.6–1.3)
EOSINOPHIL # BLD AUTO: 0.19 THOUSAND/ΜL (ref 0–0.61)
EOSINOPHIL NFR BLD AUTO: 4 % (ref 0–6)
ERYTHROCYTE [DISTWIDTH] IN BLOOD BY AUTOMATED COUNT: 17.4 % (ref 11.6–15.1)
GFR SERPL CREATININE-BSD FRML MDRD: 93 ML/MIN/1.73SQ M
GLUCOSE SERPL-MCNC: 113 MG/DL (ref 65–140)
GLUCOSE SERPL-MCNC: 123 MG/DL (ref 65–140)
GLUCOSE SERPL-MCNC: 143 MG/DL (ref 65–140)
HCT VFR BLD AUTO: 26.3 % (ref 36.5–49.3)
HGB BLD-MCNC: 7.9 G/DL (ref 12–17)
IMM GRANULOCYTES # BLD AUTO: 0.01 THOUSAND/UL (ref 0–0.2)
IMM GRANULOCYTES NFR BLD AUTO: 0 % (ref 0–2)
LYMPHOCYTES # BLD AUTO: 0.89 THOUSANDS/ΜL (ref 0.6–4.47)
LYMPHOCYTES NFR BLD AUTO: 19 % (ref 14–44)
MCH RBC QN AUTO: 25.4 PG (ref 26.8–34.3)
MCHC RBC AUTO-ENTMCNC: 30 G/DL (ref 31.4–37.4)
MCV RBC AUTO: 85 FL (ref 82–98)
MONOCYTES # BLD AUTO: 0.4 THOUSAND/ΜL (ref 0.17–1.22)
MONOCYTES NFR BLD AUTO: 9 % (ref 4–12)
NEUTROPHILS # BLD AUTO: 3.2 THOUSANDS/ΜL (ref 1.85–7.62)
NEUTS SEG NFR BLD AUTO: 68 % (ref 43–75)
NRBC BLD AUTO-RTO: 0 /100 WBCS
PLATELET # BLD AUTO: 249 THOUSANDS/UL (ref 149–390)
PMV BLD AUTO: 8.3 FL (ref 8.9–12.7)
POTASSIUM SERPL-SCNC: 3.4 MMOL/L (ref 3.5–5.3)
PROT SERPL-MCNC: 6.1 G/DL (ref 6.4–8.2)
RBC # BLD AUTO: 3.11 MILLION/UL (ref 3.88–5.62)
SODIUM SERPL-SCNC: 137 MMOL/L (ref 136–145)
WBC # BLD AUTO: 4.71 THOUSAND/UL (ref 4.31–10.16)

## 2020-06-09 PROCEDURE — 85025 COMPLETE CBC W/AUTO DIFF WBC: CPT | Performed by: NURSE PRACTITIONER

## 2020-06-09 PROCEDURE — 80053 COMPREHEN METABOLIC PANEL: CPT | Performed by: NURSE PRACTITIONER

## 2020-06-09 PROCEDURE — 99024 POSTOP FOLLOW-UP VISIT: CPT | Performed by: SURGERY

## 2020-06-09 PROCEDURE — 82948 REAGENT STRIP/BLOOD GLUCOSE: CPT

## 2020-06-09 PROCEDURE — 99239 HOSP IP/OBS DSCHRG MGMT >30: CPT | Performed by: NURSE PRACTITIONER

## 2020-06-09 RX ORDER — LANOLIN ALCOHOL/MO/W.PET/CERES
6 CREAM (GRAM) TOPICAL
Refills: 0
Start: 2020-06-09 | End: 2020-06-29

## 2020-06-09 RX ORDER — ATORVASTATIN CALCIUM 40 MG/1
40 TABLET, FILM COATED ORAL
Qty: 30 TABLET | Refills: 0 | Status: SHIPPED | OUTPATIENT
Start: 2020-06-09 | End: 2020-08-10 | Stop reason: SDUPTHER

## 2020-06-09 RX ORDER — TRAZODONE HYDROCHLORIDE 100 MG/1
100 TABLET ORAL
Qty: 30 TABLET | Refills: 0 | Status: SHIPPED | OUTPATIENT
Start: 2020-06-09 | End: 2020-08-10 | Stop reason: SDUPTHER

## 2020-06-09 RX ORDER — OXYCODONE HCL 5 MG/5 ML
SOLUTION, ORAL ORAL
Qty: 500 ML | Refills: 0 | Status: ON HOLD | OUTPATIENT
Start: 2020-06-09 | End: 2020-07-04 | Stop reason: SDUPTHER

## 2020-06-09 RX ORDER — ONDANSETRON 4 MG/1
4 TABLET, FILM COATED ORAL EVERY 6 HOURS PRN
Qty: 30 TABLET | Refills: 0 | Status: SHIPPED | OUTPATIENT
Start: 2020-06-09 | End: 2020-10-26

## 2020-06-09 RX ORDER — METRONIDAZOLE 500 MG/1
500 TABLET ORAL EVERY 8 HOURS SCHEDULED
Qty: 90 TABLET | Refills: 0 | Status: SHIPPED | OUTPATIENT
Start: 2020-06-09 | End: 2020-07-08

## 2020-06-09 RX ORDER — ASPIRIN 81 MG/1
81 TABLET, CHEWABLE ORAL DAILY
Qty: 30 TABLET | Refills: 0 | Status: ON HOLD | OUTPATIENT
Start: 2020-06-10 | End: 2020-08-31

## 2020-06-09 RX ORDER — POTASSIUM CHLORIDE 20MEQ/15ML
40 LIQUID (ML) ORAL ONCE
Status: COMPLETED | OUTPATIENT
Start: 2020-06-09 | End: 2020-06-09

## 2020-06-09 RX ORDER — ACETAMINOPHEN 325 MG/1
975 TABLET ORAL EVERY 8 HOURS SCHEDULED
Qty: 30 TABLET | Refills: 0
Start: 2020-06-09 | End: 2020-06-29

## 2020-06-09 RX ADMIN — OXYCODONE HYDROCHLORIDE 10 MG: 5 SOLUTION ORAL at 02:47

## 2020-06-09 RX ADMIN — METRONIDAZOLE 500 MG: 500 INJECTION, SOLUTION INTRAVENOUS at 09:06

## 2020-06-09 RX ADMIN — POTASSIUM CHLORIDE 40 MEQ: 1.5 SOLUTION ORAL at 09:04

## 2020-06-09 RX ADMIN — CEFTRIAXONE SODIUM 2000 MG: 2 INJECTION, POWDER, FOR SOLUTION INTRAMUSCULAR; INTRAVENOUS at 11:45

## 2020-06-09 RX ADMIN — AMIODARONE HYDROCHLORIDE 200 MG: 200 TABLET ORAL at 09:04

## 2020-06-09 RX ADMIN — HYDROMORPHONE HYDROCHLORIDE 0.2 MG: 1 INJECTION, SOLUTION INTRAMUSCULAR; INTRAVENOUS; SUBCUTANEOUS at 05:07

## 2020-06-09 RX ADMIN — ONDANSETRON 4 MG: 2 INJECTION INTRAMUSCULAR; INTRAVENOUS at 04:31

## 2020-06-09 RX ADMIN — OXYCODONE HYDROCHLORIDE 10 MG: 5 SOLUTION ORAL at 11:45

## 2020-06-09 RX ADMIN — OXYCODONE HYDROCHLORIDE 10 MG: 5 SOLUTION ORAL at 07:33

## 2020-06-15 ENCOUNTER — DOCUMENTATION (OUTPATIENT)
Dept: HEMATOLOGY ONCOLOGY | Facility: MEDICAL CENTER | Age: 65
End: 2020-06-15

## 2020-06-16 ENCOUNTER — TRANSITIONAL CARE MANAGEMENT (OUTPATIENT)
Dept: FAMILY MEDICINE CLINIC | Facility: CLINIC | Age: 65
End: 2020-06-16

## 2020-06-16 ENCOUNTER — TELEPHONE (OUTPATIENT)
Dept: SURGICAL ONCOLOGY | Facility: CLINIC | Age: 65
End: 2020-06-16

## 2020-06-18 ENCOUNTER — OFFICE VISIT (OUTPATIENT)
Dept: SURGICAL ONCOLOGY | Facility: CLINIC | Age: 65
End: 2020-06-18
Payer: COMMERCIAL

## 2020-06-18 VITALS
HEART RATE: 73 BPM | TEMPERATURE: 97.5 F | HEIGHT: 70 IN | DIASTOLIC BLOOD PRESSURE: 72 MMHG | SYSTOLIC BLOOD PRESSURE: 124 MMHG | WEIGHT: 165 LBS | BODY MASS INDEX: 23.62 KG/M2

## 2020-06-18 DIAGNOSIS — Z46.59 ENCOUNTER FOR CARE RELATED TO FEEDING TUBE: ICD-10-CM

## 2020-06-18 DIAGNOSIS — C16.0 GE JUNCTION CARCINOMA (HCC): Primary | ICD-10-CM

## 2020-06-18 PROCEDURE — 4040F PNEUMOC VAC/ADMIN/RCVD: CPT | Performed by: SURGERY

## 2020-06-18 PROCEDURE — 3044F HG A1C LEVEL LT 7.0%: CPT | Performed by: SURGERY

## 2020-06-18 PROCEDURE — 1036F TOBACCO NON-USER: CPT | Performed by: SURGERY

## 2020-06-18 PROCEDURE — 99214 OFFICE O/P EST MOD 30 MIN: CPT | Performed by: SURGERY

## 2020-06-18 PROCEDURE — 3008F BODY MASS INDEX DOCD: CPT | Performed by: SURGERY

## 2020-06-18 PROCEDURE — 1111F DSCHRG MED/CURRENT MED MERGE: CPT | Performed by: SURGERY

## 2020-06-22 ENCOUNTER — TELEPHONE (OUTPATIENT)
Dept: INFECTIOUS DISEASES | Facility: CLINIC | Age: 65
End: 2020-06-22

## 2020-06-22 DIAGNOSIS — M46.20 PARASPINAL ABSCESS (HCC): Primary | ICD-10-CM

## 2020-06-23 ENCOUNTER — OFFICE VISIT (OUTPATIENT)
Dept: FAMILY MEDICINE CLINIC | Facility: CLINIC | Age: 65
End: 2020-06-23
Payer: COMMERCIAL

## 2020-06-23 VITALS
HEART RATE: 87 BPM | OXYGEN SATURATION: 99 % | HEIGHT: 70 IN | DIASTOLIC BLOOD PRESSURE: 70 MMHG | WEIGHT: 159.2 LBS | RESPIRATION RATE: 16 BRPM | SYSTOLIC BLOOD PRESSURE: 130 MMHG | BODY MASS INDEX: 22.79 KG/M2 | TEMPERATURE: 97.3 F

## 2020-06-23 DIAGNOSIS — M86.18 OTHER ACUTE OSTEOMYELITIS, OTHER SITE (HCC): ICD-10-CM

## 2020-06-23 DIAGNOSIS — C15.5 MALIGNANT NEOPLASM OF LOWER THIRD OF ESOPHAGUS (HCC): Primary | ICD-10-CM

## 2020-06-23 DIAGNOSIS — C16.0 GE JUNCTION CARCINOMA (HCC): ICD-10-CM

## 2020-06-23 DIAGNOSIS — I48.91 ATRIAL FIBRILLATION, UNSPECIFIED TYPE (HCC): ICD-10-CM

## 2020-06-23 PROCEDURE — 3044F HG A1C LEVEL LT 7.0%: CPT | Performed by: FAMILY MEDICINE

## 2020-06-23 PROCEDURE — 99214 OFFICE O/P EST MOD 30 MIN: CPT | Performed by: FAMILY MEDICINE

## 2020-06-23 PROCEDURE — 1111F DSCHRG MED/CURRENT MED MERGE: CPT | Performed by: FAMILY MEDICINE

## 2020-06-23 PROCEDURE — 3008F BODY MASS INDEX DOCD: CPT | Performed by: FAMILY MEDICINE

## 2020-06-23 PROCEDURE — 1036F TOBACCO NON-USER: CPT | Performed by: FAMILY MEDICINE

## 2020-06-23 PROCEDURE — 4040F PNEUMOC VAC/ADMIN/RCVD: CPT | Performed by: FAMILY MEDICINE

## 2020-06-23 RX ORDER — OXYCODONE HCL 5 MG/5 ML
5 SOLUTION, ORAL ORAL EVERY 4 HOURS PRN
Qty: 200 ML | Refills: 0 | Status: SHIPPED | OUTPATIENT
Start: 2020-06-23 | End: 2020-07-04 | Stop reason: HOSPADM

## 2020-06-23 RX ORDER — AMIODARONE HYDROCHLORIDE 200 MG/1
200 TABLET ORAL
Qty: 90 TABLET | Refills: 0 | Status: SHIPPED | OUTPATIENT
Start: 2020-06-23 | End: 2020-07-04 | Stop reason: HOSPADM

## 2020-06-24 ENCOUNTER — OFFICE VISIT (OUTPATIENT)
Dept: CARDIAC SURGERY | Facility: CLINIC | Age: 65
End: 2020-06-24
Payer: COMMERCIAL

## 2020-06-24 ENCOUNTER — TELEPHONE (OUTPATIENT)
Dept: PALLIATIVE MEDICINE | Facility: CLINIC | Age: 65
End: 2020-06-24

## 2020-06-24 VITALS
HEART RATE: 77 BPM | TEMPERATURE: 95.9 F | WEIGHT: 161.2 LBS | SYSTOLIC BLOOD PRESSURE: 111 MMHG | DIASTOLIC BLOOD PRESSURE: 59 MMHG | BODY MASS INDEX: 23.08 KG/M2 | HEIGHT: 70 IN | OXYGEN SATURATION: 98 %

## 2020-06-24 DIAGNOSIS — M46.20 PARASPINAL ABSCESS (HCC): ICD-10-CM

## 2020-06-24 DIAGNOSIS — C16.0 GE JUNCTION CARCINOMA (HCC): Primary | ICD-10-CM

## 2020-06-24 DIAGNOSIS — K22.2 ESOPHAGEAL STRICTURE: ICD-10-CM

## 2020-06-24 DIAGNOSIS — R49.0 HOARSENESS OF VOICE: ICD-10-CM

## 2020-06-24 PROCEDURE — 4040F PNEUMOC VAC/ADMIN/RCVD: CPT | Performed by: PHYSICIAN ASSISTANT

## 2020-06-24 PROCEDURE — 1111F DSCHRG MED/CURRENT MED MERGE: CPT | Performed by: PHYSICIAN ASSISTANT

## 2020-06-24 PROCEDURE — 99214 OFFICE O/P EST MOD 30 MIN: CPT | Performed by: PHYSICIAN ASSISTANT

## 2020-06-24 PROCEDURE — 1036F TOBACCO NON-USER: CPT | Performed by: PHYSICIAN ASSISTANT

## 2020-06-24 PROCEDURE — 3008F BODY MASS INDEX DOCD: CPT | Performed by: PHYSICIAN ASSISTANT

## 2020-06-24 PROCEDURE — 3044F HG A1C LEVEL LT 7.0%: CPT | Performed by: PHYSICIAN ASSISTANT

## 2020-06-24 NOTE — H&P (VIEW-ONLY)
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[address] )  He is scheduled for a CT scan of the CAP with po and IV contrast on 7/6/20  This will help evaluate whether this is a paraspinal collection vs osteo  We will see him back in the middle of July  We will not schedule an EGD with dilt at this time, since he is not having any overt dysphagia  He is to call us if he develops any dysphagia  Diagnoses and all orders for this visit:    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[address] )    Esophageal stricture    Hoarseness of voice  -     Ambulatory Referral to Otolaryngology; Future    Paraspinal abscess (HCC)  -     Cancel: CT chest w contrast; Future          Thoracic History       Diagnosis: Clinical stage T3N1M0 esophageal carcinoma  Procedure: 1   EGD, transhiatal esophagectomy performed on 1/28/20 2  EGD with dilation up to 42F performed on 4/28/20  Pathology: esophagogastrectomy reveals microscopic focus of residual adenocarcinoma in muscularis propria measuring 0 7 cm, adjacent Duong's esophagus with associated atypia indeterminate for dysplasia  7 lymph nodes negative for carcinoma  Proximal and distal margins negative for carcinoma  8th edition AJCC tumor stage I (ypT2, ypN0)  Therapy:  Neoadjuvant FLOT x 6 chemotherapy and Herceptin, completed 12/17/19         Patient ID: Eleno Ospina is a 71 y o  male  HPI    Mr Hardeep Bonner is a 70 yo gentleman who underwent a THE on January 28, 2020 for Stage I Esophageal carcinoma  He was experiencing some progressive dysphagia and therefore underwent an EGD with dilation up to 42F on 4/27/20  He was last seen on 5/8/20, at which time he was not having any complications  He returns today, 5 months out of resection   He was supposed to undergo a CT scan on 6/22/20, but it was denied by his insurance  His last CT is a CTA from 5/23/20 for post op chest pain  There was no PE, a stable right pleural effusion, decreased left pleural effusion, sclerotic lesion within in the mediastinum: healing fx vs mets  There was also a new anterior paraspinal fluid collection with erosion into T4-T6 consistent with paraspinal abscess and osteo  This is being treated with antibiotics, in conjunction with ID  Brain MRI from 5/24/20 revealed a 4 mm focus of enhancement paramedian left posterior front lobe, possible mets  H    He underwent j tube placement by Dr Brandan Wilder on 6/5/20 for esophageal stricture  On discussion, he is having some fatigue, hoarseness, mid back pain, and occasional regurgitation  The regurgitation occurs a couple of times a week  He is eating every day, such as pineapple, water, cereal, apples, and mac and cheese  The following portions of the patient's history were reviewed and updated as appropriate: allergies, current medications, past family history, past medical history, past social history, past surgical history and problem list     Review of Systems   Constitutional: Positive for fatigue  Negative for fever and unexpected weight change  HENT: Positive for voice change  Respiratory: Negative for shortness of breath  Cardiovascular: Negative for chest pain  Gastrointestinal: Negative for abdominal pain and nausea  Musculoskeletal: Positive for back pain  Negative for gait problem  Neurological: Negative for syncope and headaches  Psychiatric/Behavioral: Negative for agitation and behavioral problems  Objective:   Physical Exam   Constitutional: He is oriented to person, place, and time  He appears well-developed and well-nourished  HENT:   Head: Normocephalic and atraumatic    +masked   Eyes: EOM are normal  No scleral icterus  Neck: Normal range of motion  Neck supple  Incision well healed      Cardiovascular: Normal rate and regular rhythm  Pulmonary/Chest: Effort normal and breath sounds normal  No respiratory distress  He has no wheezes  Abdominal: Soft  Bowel sounds are normal  He exhibits no distension  Incision well healed J tube site clean/dry/intact  Musculoskeletal: Normal range of motion  Neurological: He is alert and oriented to person, place, and time  Skin: Skin is warm and dry  Psychiatric: He has a normal mood and affect  His behavior is normal    Vitals reviewed      /59 (BP Location: Left arm, Patient Position: Sitting, Cuff Size: Large)   Pulse 77   Temp (!) 95 9 °F (35 5 °C)   Ht 5' 10" (1 778 m)   Wt 73 1 kg (161 lb 3 2 oz)   SpO2 98%   BMI 23 13 kg/m²

## 2020-06-24 NOTE — TELEPHONE ENCOUNTER
Daughter Luis Minor called to schedule office appointment with Dr Sinan Rutherford  States soon as possible as pt is in a lot of pain  Recent hospitalization [facility]  Last office visit 10/2019  Daughter states this office has been trying to schedule but pt had been busy with other visits  Willing to have virtual visit with another physician until he can be seen by Dr Sinan Rutherford in office  Please call daughter to arrange visit  Pain management/assessment    PCP currently ordering opioids    Assessed pt pain level and management with daughter  Per dgt pt does not use the Oxycodone solution often  When he uses states it does not last past 4 hours BUT will not take additional doses  Concerned about " getting addicted "  This nurse instructed on use of oxycodone as part of treatment plan  Instructed pt to take every HS to improve sleep  Take dose in am to start day or midday if beginning to feel discomfort  "don't bassam the pain "    Re esophageal cancer, pt sometimes unable to swallow liquid, and may choke on his saliva  Pt has J tube  Daughter administered last dose 1200 today via tube  Pain med effective  Pt rates pain 5/10  Can she continue administering via J tube       Thank you

## 2020-06-25 ENCOUNTER — HOSPITAL ENCOUNTER (OUTPATIENT)
Dept: RADIOLOGY | Facility: HOSPITAL | Age: 65
Discharge: HOME | End: 2020-06-25
Payer: COMMERCIAL

## 2020-06-25 ENCOUNTER — OFFICE VISIT (OUTPATIENT)
Dept: NEUROSURGERY | Facility: CLINIC | Age: 65
End: 2020-06-25
Payer: COMMERCIAL

## 2020-06-25 VITALS
SYSTOLIC BLOOD PRESSURE: 89 MMHG | HEIGHT: 70 IN | TEMPERATURE: 96.6 F | RESPIRATION RATE: 16 BRPM | DIASTOLIC BLOOD PRESSURE: 52 MMHG | WEIGHT: 161 LBS | HEART RATE: 96 BPM | BODY MASS INDEX: 23.05 KG/M2

## 2020-06-25 DIAGNOSIS — S22.000A COMPRESSION FRACTURE OF THORACIC VERTEBRA, INITIAL ENCOUNTER, UNSPECIFIED THORACIC VERTEBRAL LEVEL: ICD-10-CM

## 2020-06-25 DIAGNOSIS — M46.20 PARASPINAL ABSCESS (HCC): ICD-10-CM

## 2020-06-25 DIAGNOSIS — M86.18 OTHER ACUTE OSTEOMYELITIS, OTHER SITE (HCC): ICD-10-CM

## 2020-06-25 DIAGNOSIS — M46.20 OSTEOMYELITIS OF SPINE (HCC): Primary | ICD-10-CM

## 2020-06-25 PROCEDURE — 3044F HG A1C LEVEL LT 7.0%: CPT | Performed by: PHYSICIAN ASSISTANT

## 2020-06-25 PROCEDURE — 3008F BODY MASS INDEX DOCD: CPT | Performed by: PHYSICIAN ASSISTANT

## 2020-06-25 PROCEDURE — 4040F PNEUMOC VAC/ADMIN/RCVD: CPT | Performed by: PHYSICIAN ASSISTANT

## 2020-06-25 PROCEDURE — 1111F DSCHRG MED/CURRENT MED MERGE: CPT | Performed by: PHYSICIAN ASSISTANT

## 2020-06-25 PROCEDURE — 99214 OFFICE O/P EST MOD 30 MIN: CPT | Performed by: PHYSICIAN ASSISTANT

## 2020-06-25 PROCEDURE — 72072 X-RAY EXAM THORAC SPINE 3VWS: CPT

## 2020-06-25 PROCEDURE — 1036F TOBACCO NON-USER: CPT | Performed by: PHYSICIAN ASSISTANT

## 2020-06-29 LAB — FUNGUS SPEC CULT: NORMAL

## 2020-06-30 ENCOUNTER — OFFICE VISIT (OUTPATIENT)
Dept: HEMATOLOGY ONCOLOGY | Facility: CLINIC | Age: 65
End: 2020-06-30
Payer: COMMERCIAL

## 2020-06-30 VITALS
DIASTOLIC BLOOD PRESSURE: 74 MMHG | SYSTOLIC BLOOD PRESSURE: 118 MMHG | WEIGHT: 153 LBS | OXYGEN SATURATION: 99 % | HEART RATE: 83 BPM | BODY MASS INDEX: 22.66 KG/M2 | RESPIRATION RATE: 18 BRPM | HEIGHT: 69 IN | TEMPERATURE: 96.9 F

## 2020-06-30 DIAGNOSIS — C15.5 MALIGNANT NEOPLASM OF LOWER THIRD OF ESOPHAGUS (HCC): Primary | ICD-10-CM

## 2020-06-30 PROCEDURE — 99215 OFFICE O/P EST HI 40 MIN: CPT | Performed by: INTERNAL MEDICINE

## 2020-06-30 PROCEDURE — 3044F HG A1C LEVEL LT 7.0%: CPT | Performed by: INTERNAL MEDICINE

## 2020-06-30 PROCEDURE — 1036F TOBACCO NON-USER: CPT | Performed by: INTERNAL MEDICINE

## 2020-06-30 PROCEDURE — 4040F PNEUMOC VAC/ADMIN/RCVD: CPT | Performed by: INTERNAL MEDICINE

## 2020-06-30 PROCEDURE — 1111F DSCHRG MED/CURRENT MED MERGE: CPT | Performed by: INTERNAL MEDICINE

## 2020-06-30 PROCEDURE — 3008F BODY MASS INDEX DOCD: CPT | Performed by: INTERNAL MEDICINE

## 2020-07-01 ENCOUNTER — NUTRITION (OUTPATIENT)
Dept: NUTRITION | Facility: CLINIC | Age: 65
End: 2020-07-01

## 2020-07-01 ENCOUNTER — TELEPHONE (OUTPATIENT)
Dept: SURGICAL ONCOLOGY | Facility: CLINIC | Age: 65
End: 2020-07-01

## 2020-07-01 ENCOUNTER — HOSPITAL ENCOUNTER (OUTPATIENT)
Dept: MRI IMAGING | Facility: HOSPITAL | Age: 65
Discharge: HOME | End: 2020-07-01
Payer: COMMERCIAL

## 2020-07-01 ENCOUNTER — HOSPITAL ENCOUNTER (OUTPATIENT)
Dept: MRI IMAGING | Facility: HOSPITAL | Age: 65
Discharge: HOME | End: 2020-07-01
Attending: INTERNAL MEDICINE
Payer: COMMERCIAL

## 2020-07-01 DIAGNOSIS — M46.20 PARASPINAL ABSCESS (HCC): ICD-10-CM

## 2020-07-01 DIAGNOSIS — Z71.3 NUTRITIONAL COUNSELING: Primary | ICD-10-CM

## 2020-07-01 DIAGNOSIS — G93.9 BRAIN LESION: ICD-10-CM

## 2020-07-01 PROCEDURE — A9585 GADOBUTROL INJECTION: HCPCS | Performed by: INTERNAL MEDICINE

## 2020-07-01 PROCEDURE — 72157 MRI CHEST SPINE W/O & W/DYE: CPT

## 2020-07-01 PROCEDURE — 70553 MRI BRAIN STEM W/O & W/DYE: CPT

## 2020-07-01 RX ADMIN — GADOBUTROL 6 ML: 604.72 INJECTION INTRAVENOUS at 16:36

## 2020-07-01 NOTE — TELEPHONE ENCOUNTER
Pt's daughter called with concerns about her father  She states he is continuing to lose weight, and would like to discuss changing the tube feed formula to something more calorie-dense  She also mentioned that he has not been able to take anything by mouth in the last 3 days, including water  Per Dr Dietz Notice, I told her I would reach out to nutrition and thoracic surgery with these concerns  She was agreeable to the plan

## 2020-07-01 NOTE — PATIENT INSTRUCTIONS
Nutrition Rx & Recommendations: Your syringes are each 60 mL or 2 fl oz  Always flush your feeding tube with 60 mL room-temp water 1-2 times daily while feeding tube is not in use  Follow proper oral care; Try baking soda/salt water rinse recipe (mix 3/4 tsp salt + 1 tsp baking soda + 1 qt water; rinse with pain water after using) in Eating Hints book (pg 18)  Brush your teeth before/after meals & before bed  Weigh yourself regularly  If you notice weight loss, make an effort to increase your daily food/calorie intake  If you continue to notice loss after these efforts, reach out to your dietitian to establish a plan to stabilize weight  Tube Feeding Plan: See Below     Tube Feeding Recommendations:   TF Goal: Jevity 1 5 at 99 mL/hr via J-tube cycled over ~15 hours daily (until 6 5 cartons infused)  Water Flushin mL free water flush at the beginning and end of TF infusion (250 mL total) plus 125 mL free water flush 8 times per day (1000 mL total) (total of 1250 mL/day in flushes; flushes should be spread throughout the day and every 2-3 hours during TF infusion; will need to adjust flushes prn for IV fluids)  Enteral Nutrition goal to provide: 1540 mL volume (6 5 cartons day), 2312 kcal, 98 grams protein, 1170 mL free water, 2420 mL total water daily        Follow Up Plan: 20 phone follow up 11:30am      Recommend Referral to Other Providers: none at this time

## 2020-07-02 ENCOUNTER — TELEPHONE (OUTPATIENT)
Dept: NEUROSURGERY | Facility: CLINIC | Age: 65
End: 2020-07-02

## 2020-07-02 ENCOUNTER — HOSPITAL ENCOUNTER (INPATIENT)
Facility: HOSPITAL | Age: 65
LOS: 2 days | Discharge: HOME HEALTH | DRG: 094 | End: 2020-07-04
Attending: EMERGENCY MEDICINE | Admitting: GENERAL PRACTICE
Payer: COMMERCIAL

## 2020-07-02 ENCOUNTER — OFFICE VISIT (OUTPATIENT)
Dept: CARDIAC SURGERY | Facility: CLINIC | Age: 65
End: 2020-07-02
Payer: COMMERCIAL

## 2020-07-02 VITALS
HEIGHT: 70 IN | BODY MASS INDEX: 21.99 KG/M2 | SYSTOLIC BLOOD PRESSURE: 109 MMHG | OXYGEN SATURATION: 98 % | HEART RATE: 86 BPM | WEIGHT: 153.6 LBS | TEMPERATURE: 96.3 F | DIASTOLIC BLOOD PRESSURE: 53 MMHG

## 2020-07-02 DIAGNOSIS — C16.0 GE JUNCTION CARCINOMA (HCC): ICD-10-CM

## 2020-07-02 DIAGNOSIS — M46.20 PARASPINAL ABSCESS (HCC): ICD-10-CM

## 2020-07-02 DIAGNOSIS — S22.050S COMPRESSION FRACTURE OF T6 VERTEBRA, SEQUELA: Primary | ICD-10-CM

## 2020-07-02 DIAGNOSIS — K22.2 ESOPHAGEAL STRICTURE: Primary | ICD-10-CM

## 2020-07-02 DIAGNOSIS — M46.20 PARASPINAL ABSCESS (HCC): Primary | ICD-10-CM

## 2020-07-02 DIAGNOSIS — G89.29 CHRONIC PAIN: ICD-10-CM

## 2020-07-02 DIAGNOSIS — K22.2 ESOPHAGEAL OBSTRUCTION: ICD-10-CM

## 2020-07-02 DIAGNOSIS — G89.3 CANCER-RELATED PAIN: Chronic | ICD-10-CM

## 2020-07-02 DIAGNOSIS — M86.18 OTHER ACUTE OSTEOMYELITIS, OTHER SITE (HCC): ICD-10-CM

## 2020-07-02 DIAGNOSIS — C15.5 MALIGNANT NEOPLASM OF LOWER THIRD OF ESOPHAGUS (HCC): ICD-10-CM

## 2020-07-02 DIAGNOSIS — I48.91 ATRIAL FIBRILLATION, UNSPECIFIED TYPE (HCC): ICD-10-CM

## 2020-07-02 LAB
ABO GROUP BLD: NORMAL
ALBUMIN SERPL BCP-MCNC: 3.4 G/DL (ref 3.5–5)
ALP SERPL-CCNC: 66 U/L (ref 46–116)
ALT SERPL W P-5'-P-CCNC: 25 U/L (ref 12–78)
ANION GAP SERPL CALCULATED.3IONS-SCNC: 5 MMOL/L (ref 4–13)
AST SERPL W P-5'-P-CCNC: 13 U/L (ref 5–45)
BASOPHILS # BLD AUTO: 0.05 THOUSANDS/ΜL (ref 0–0.1)
BASOPHILS NFR BLD AUTO: 1 % (ref 0–1)
BILIRUB SERPL-MCNC: 0.89 MG/DL (ref 0.2–1)
BLD GP AB SCN SERPL QL: NEGATIVE
BUN SERPL-MCNC: 17 MG/DL (ref 5–25)
CALCIUM SERPL-MCNC: 8.9 MG/DL (ref 8.3–10.1)
CHLORIDE SERPL-SCNC: 105 MMOL/L (ref 100–108)
CO2 SERPL-SCNC: 28 MMOL/L (ref 21–32)
CREAT SERPL-MCNC: 0.84 MG/DL (ref 0.6–1.3)
EOSINOPHIL # BLD AUTO: 0.29 THOUSAND/ΜL (ref 0–0.61)
EOSINOPHIL NFR BLD AUTO: 6 % (ref 0–6)
ERYTHROCYTE [DISTWIDTH] IN BLOOD BY AUTOMATED COUNT: 19.7 % (ref 11.6–15.1)
GFR SERPL CREATININE-BSD FRML MDRD: 92 ML/MIN/1.73SQ M
GLUCOSE SERPL-MCNC: 79 MG/DL (ref 65–140)
GLUCOSE SERPL-MCNC: 96 MG/DL (ref 65–140)
HCT VFR BLD AUTO: 34.6 % (ref 36.5–49.3)
HGB BLD-MCNC: 10.9 G/DL (ref 12–17)
IMM GRANULOCYTES # BLD AUTO: 0.02 THOUSAND/UL (ref 0–0.2)
IMM GRANULOCYTES NFR BLD AUTO: 0 % (ref 0–2)
LYMPHOCYTES # BLD AUTO: 1.3 THOUSANDS/ΜL (ref 0.6–4.47)
LYMPHOCYTES NFR BLD AUTO: 25 % (ref 14–44)
MCH RBC QN AUTO: 27.2 PG (ref 26.8–34.3)
MCHC RBC AUTO-ENTMCNC: 31.5 G/DL (ref 31.4–37.4)
MCV RBC AUTO: 86 FL (ref 82–98)
MONOCYTES # BLD AUTO: 0.4 THOUSAND/ΜL (ref 0.17–1.22)
MONOCYTES NFR BLD AUTO: 8 % (ref 4–12)
NEUTROPHILS # BLD AUTO: 3.14 THOUSANDS/ΜL (ref 1.85–7.62)
NEUTS SEG NFR BLD AUTO: 60 % (ref 43–75)
NRBC BLD AUTO-RTO: 0 /100 WBCS
PLATELET # BLD AUTO: 240 THOUSANDS/UL (ref 149–390)
PMV BLD AUTO: 9.5 FL (ref 8.9–12.7)
POTASSIUM SERPL-SCNC: 3.8 MMOL/L (ref 3.5–5.3)
PROT SERPL-MCNC: 7.8 G/DL (ref 6.4–8.2)
RBC # BLD AUTO: 4.01 MILLION/UL (ref 3.88–5.62)
RH BLD: POSITIVE
SODIUM SERPL-SCNC: 138 MMOL/L (ref 136–145)
SPECIMEN EXPIRATION DATE: NORMAL
WBC # BLD AUTO: 5.2 THOUSAND/UL (ref 4.31–10.16)

## 2020-07-02 PROCEDURE — 99222 1ST HOSP IP/OBS MODERATE 55: CPT | Performed by: GENERAL PRACTICE

## 2020-07-02 PROCEDURE — 86900 BLOOD TYPING SEROLOGIC ABO: CPT | Performed by: STUDENT IN AN ORGANIZED HEALTH CARE EDUCATION/TRAINING PROGRAM

## 2020-07-02 PROCEDURE — 36415 COLL VENOUS BLD VENIPUNCTURE: CPT | Performed by: STUDENT IN AN ORGANIZED HEALTH CARE EDUCATION/TRAINING PROGRAM

## 2020-07-02 PROCEDURE — 86850 RBC ANTIBODY SCREEN: CPT | Performed by: STUDENT IN AN ORGANIZED HEALTH CARE EDUCATION/TRAINING PROGRAM

## 2020-07-02 PROCEDURE — 99214 OFFICE O/P EST MOD 30 MIN: CPT | Performed by: PHYSICIAN ASSISTANT

## 2020-07-02 PROCEDURE — 85025 COMPLETE CBC W/AUTO DIFF WBC: CPT | Performed by: STUDENT IN AN ORGANIZED HEALTH CARE EDUCATION/TRAINING PROGRAM

## 2020-07-02 PROCEDURE — 4040F PNEUMOC VAC/ADMIN/RCVD: CPT | Performed by: PHYSICIAN ASSISTANT

## 2020-07-02 PROCEDURE — 1036F TOBACCO NON-USER: CPT | Performed by: PHYSICIAN ASSISTANT

## 2020-07-02 PROCEDURE — 80053 COMPREHEN METABOLIC PANEL: CPT | Performed by: STUDENT IN AN ORGANIZED HEALTH CARE EDUCATION/TRAINING PROGRAM

## 2020-07-02 PROCEDURE — 82948 REAGENT STRIP/BLOOD GLUCOSE: CPT

## 2020-07-02 PROCEDURE — 86901 BLOOD TYPING SEROLOGIC RH(D): CPT | Performed by: STUDENT IN AN ORGANIZED HEALTH CARE EDUCATION/TRAINING PROGRAM

## 2020-07-02 PROCEDURE — 99284 EMERGENCY DEPT VISIT MOD MDM: CPT | Performed by: EMERGENCY MEDICINE

## 2020-07-02 PROCEDURE — 99284 EMERGENCY DEPT VISIT MOD MDM: CPT

## 2020-07-02 PROCEDURE — 1111F DSCHRG MED/CURRENT MED MERGE: CPT | Performed by: PHYSICIAN ASSISTANT

## 2020-07-02 PROCEDURE — 3044F HG A1C LEVEL LT 7.0%: CPT | Performed by: PHYSICIAN ASSISTANT

## 2020-07-02 PROCEDURE — 3008F BODY MASS INDEX DOCD: CPT | Performed by: PHYSICIAN ASSISTANT

## 2020-07-02 RX ORDER — OXYCODONE HCL 5 MG/5 ML
5 SOLUTION, ORAL ORAL ONCE
Status: COMPLETED | OUTPATIENT
Start: 2020-07-02 | End: 2020-07-02

## 2020-07-02 RX ORDER — ACETAMINOPHEN 160 MG/5ML
650 SUSPENSION, ORAL (FINAL DOSE FORM) ORAL EVERY 6 HOURS PRN
Status: DISCONTINUED | OUTPATIENT
Start: 2020-07-02 | End: 2020-07-04 | Stop reason: HOSPADM

## 2020-07-02 RX ORDER — ONDANSETRON 2 MG/ML
4 INJECTION INTRAMUSCULAR; INTRAVENOUS EVERY 6 HOURS PRN
Status: DISCONTINUED | OUTPATIENT
Start: 2020-07-02 | End: 2020-07-04 | Stop reason: HOSPADM

## 2020-07-02 RX ORDER — LEVOTHYROXINE SODIUM 0.03 MG/1
25 TABLET ORAL
Status: DISCONTINUED | OUTPATIENT
Start: 2020-07-03 | End: 2020-07-04 | Stop reason: HOSPADM

## 2020-07-02 RX ORDER — OXYCODONE HCL 5 MG/5 ML
5 SOLUTION, ORAL ORAL EVERY 4 HOURS PRN
Status: DISCONTINUED | OUTPATIENT
Start: 2020-07-02 | End: 2020-07-03

## 2020-07-02 RX ORDER — ATORVASTATIN CALCIUM 40 MG/1
40 TABLET, FILM COATED ORAL
Status: DISCONTINUED | OUTPATIENT
Start: 2020-07-03 | End: 2020-07-04 | Stop reason: HOSPADM

## 2020-07-02 RX ORDER — METRONIDAZOLE 500 MG/1
500 TABLET ORAL EVERY 8 HOURS SCHEDULED
Status: DISCONTINUED | OUTPATIENT
Start: 2020-07-02 | End: 2020-07-04 | Stop reason: HOSPADM

## 2020-07-02 RX ORDER — ASPIRIN 81 MG/1
81 TABLET, CHEWABLE ORAL DAILY
Status: DISCONTINUED | OUTPATIENT
Start: 2020-07-03 | End: 2020-07-04 | Stop reason: HOSPADM

## 2020-07-02 RX ORDER — AMIODARONE HYDROCHLORIDE 200 MG/1
200 TABLET ORAL
Status: DISCONTINUED | OUTPATIENT
Start: 2020-07-03 | End: 2020-07-04 | Stop reason: HOSPADM

## 2020-07-02 RX ORDER — TRAZODONE HYDROCHLORIDE 100 MG/1
100 TABLET ORAL
Status: DISCONTINUED | OUTPATIENT
Start: 2020-07-02 | End: 2020-07-04 | Stop reason: HOSPADM

## 2020-07-02 RX ORDER — ROPINIROLE 0.25 MG/1
0.25 TABLET, FILM COATED ORAL
Status: DISCONTINUED | OUTPATIENT
Start: 2020-07-02 | End: 2020-07-04 | Stop reason: HOSPADM

## 2020-07-02 RX ADMIN — TRAZODONE HYDROCHLORIDE 100 MG: 100 TABLET ORAL at 23:22

## 2020-07-02 RX ADMIN — OXYCODONE HYDROCHLORIDE 5 MG: 5 SOLUTION ORAL at 19:38

## 2020-07-02 RX ADMIN — METRONIDAZOLE 500 MG: 500 TABLET ORAL at 23:22

## 2020-07-02 RX ADMIN — CEFTRIAXONE SODIUM 2000 MG: 2 INJECTION, POWDER, FOR SOLUTION INTRAMUSCULAR; INTRAVENOUS at 23:22

## 2020-07-02 NOTE — ASSESSMENT & PLAN NOTE
Mr Kalee Ryan last dilation was 4/28/20 and he has developed progressive dysphagia  Therefore, Dr Saundra Nguyen is going to perform an EGD with esophageal dilation on 7/7/20  Consent was signed today and he will undergo COVID testing, but no other blood work is necessary

## 2020-07-02 NOTE — TELEPHONE ENCOUNTER
Received a call from Mery Harrington reporting significant findings on patients recent MRI  Will [facility] provider as BRAULIO  FUP in office planned for 7/10/2020

## 2020-07-02 NOTE — ED PROVIDER NOTES
History  Chief Complaint   Patient presents with    Abscess - Complicated     pt's family reports recent spinal abscess on abx, recent MRI showed increased size of abscess  Mr Rita Saint is a 72 YO male with a PMHx of esophageal cancer S/P esophagectomy complicated by sticture who presents to the ED for a thoracic paraspinal abscess  Per patient, about 1 month ago he developed hoarsness, dysphagia and odynophagia so an MRI was performed which incidentally showed "an infection near his spinal cord"  He was subsequently started on antibiotics  Patient states that he had another MRI done yesterday  Today, he received a call from Dr Lily Bach office who told him that the infection is increasing in size and that he needed to come to the ED for admission  Patient states he currently has pain between the middle of his scapula, 5/10, that improves with oxycontin, last dose at 4pm  Patient denies any fever, chest pain, shortness of breath, saddle anesthesia, weakess of his extremities, or bowel or bladder incontinenuce  History provided by:  Patient and relative   used: No        Prior to Admission Medications   Prescriptions Last Dose Informant Patient Reported? Taking?    Blood Glucose Monitoring Suppl (ONE TOUCH ULTRA 2) w/Device KIT  Self No No   Sig: Check bs , q fasting and 2 hr after meals   Lancets (ONETOUCH ULTRASOFT) lancets  Self No No   Sig: Check bs , q fasting and 2 hr after meals   amiodarone 200 mg tablet  Self No No   Sig: Take 1 tablet (200 mg total) by mouth 3 (three) times a day with meals   aspirin 81 mg chewable tablet  Self No No   Sig: Chew 1 tablet (81 mg total) daily   atorvastatin (LIPITOR) 40 mg tablet  Self No No   Sig: Take 1 tablet (40 mg total) by mouth daily with dinner   canagliflozin (Invokana) 100 mg  Self Yes No   Sig: Take 100 mg by mouth daily before breakfast   cefTRIAXone 2,000 mg in dextrose 5 % 50 mL IVPB  Self No No   Sig: Infuse 2,000 mg into a venous catheter every 24 hours   glucose blood test strip  Self No No   Sig: Check blood sugar 3 times daily   levothyroxine 25 mcg tablet  Self Yes No   Sig: Take 25 mcg by mouth daily in the early morning   metroNIDAZOLE (FLAGYL) 500 mg tablet  Self No No   Sig: Take 1 tablet (500 mg total) by mouth every 8 (eight) hours   ondansetron (ZOFRAN) 4 mg tablet  Self No No   Sig: Take 1 tablet (4 mg total) by mouth every 6 (six) hours as needed for nausea or vomiting   oxyCODONE (ROXICODONE) 5 mg/5 mL solution  Self No Yes   Sig: Take 5 mg (5 ML) every 4 hours as needed for moderate pain or 10 mg (10 ML) every 4 hours as needed for severe pain   oxyCODONE (ROXICODONE) 5 mg/5 mL solution  Self No Yes   Sig: Take 5 mL (5 mg total) by mouth every 4 (four) hours as needed for moderate painMax Daily Amount: 30 mg   rOPINIRole (REQUIP) 0 25 mg tablet  Self No No   Sig: take 1 tablet by mouth at bedtime   traZODone (DESYREL) 100 mg tablet  Self No No   Sig: Take 1 tablet (100 mg total) by mouth daily at bedtime      Facility-Administered Medications: None       Past Medical History:   Diagnosis Date    Bilateral pleural effusion     COPD (chronic obstructive pulmonary disease) (HCC)     Diabetes mellitus (HCC)     Difficulty swallowing     Disease of thyroid gland     Edema     Esophageal mass     History of chemotherapy     History of transfusion     Malignant neoplasm of lower third of esophagus ([address] )     Port-A-Cath in place     LCW    Sleep apnea     no CPAP    SOB (shortness of breath)        Past Surgical History:   Procedure Laterality Date    BACK SURGERY      x2    DISC REMOVAL      ESOPHAGECTOMY N/A 1/28/2020    Procedure: ESOPHAGECTOMY, TRANSHIATAL;  Surgeon: Jaylyn Givens MD;  [facility];  Service: Surgical Oncology    ESOPHAGOGASTRODUODENOSCOPY N/A 1/28/2020    Procedure: ESOPHAGOGASTRODUODENOSCOPY (EGD);   Surgeon: Jaylyn Givens MD;  [facility];  Service: Surgical Oncology    [place] GUIDED CENTRAL VENOUS ACCESS DEVICE INSERTION  2019    GASTROJEJUNOSTOMY W/ JEJUNOSTOMY TUBE N/A 2019    Procedure: INSERTION JEJUNOSTOMY TUBE OPEN;  Surgeon: Pallavi Womack MD;  [facility];  Service: Surgical Oncology    GASTROJEJUNOSTOMY W/ JEJUNOSTOMY TUBE N/A 2020    Procedure: INSERTION JEJUNOSTOMY TUBE OPEN;  Surgeon: Pallavi Womack MD;  [facility];  Service: Surgical Oncology    IR CHEST TUBE  2020    IR SPINE PROCEDURE  2020    IR THORACENTESIS  2020    OK ESOPHAGOGASTRODUODENOSCOPY TRANSORAL DIAGNOSTIC N/A 2020    Procedure: ESOPHAGOGASTRODUODENOSCOPY (EGD); Surgeon: Mandy Copeland MD;  [facility];  Service: Thoracic    OK ESOPHAGOSCOPY FLEX BALLOON DILAT <30 MM DIAM N/A 2020    Procedure: DILATATION ESOPHAGEAL;  Surgeon: Mandy Copeland MD;  [facility];  Service: Thoracic    TONSILLECTOMY      TUNNELED VENOUS PORT PLACEMENT N/A 2019    Procedure: INSERTION VENOUS PORT (PORT-A-CATH); Surgeon: Pallavi Womack MD;  [facility];  Service: Surgical Oncology    VOCAL CORD INJECTION N/A 2020    Procedure: MICROLARYNGOSCOPY WITH INJECTION;  Surgeon: Yolanda Casper MD;  [facility];  Service: ENT       Family History   Problem Relation Age of Onset    Diabetes Mother     No Known Problems Father      I have reviewed and agree with the history as documented      E-Cigarette/Vaping    E-Cigarette Use Never User      E-Cigarette/Vaping Substances    Nicotine No     THC No     CBD No     Flavoring No     Other No     Unknown No      Social History     Tobacco Use    Smoking status: Former Smoker     Packs/day: 0 50     Years: 50 00     Pack years: 25 00     Types: Cigarettes     Last attempt to quit: 2017     Years since quittin 5    Smokeless tobacco: Never Used   Substance Use Topics    Alcohol use: Not Currently     Alcohol/week: 0 0 standard drinks Frequency: Never     Drinks per session: 1 or 2     Binge frequency: Never    Drug use: Never        Review of Systems   Constitutional: Negative for chills and diaphoresis  HENT: Positive for trouble swallowing and voice change  Respiratory: Negative for chest tightness, shortness of breath and wheezing  Cardiovascular: Negative for palpitations and leg swelling  Gastrointestinal: Negative for abdominal distention  Genitourinary: Negative for difficulty urinating  Musculoskeletal: Positive for back pain  Negative for gait problem, joint swelling, neck pain and neck stiffness  Skin: Negative for color change, rash and wound  Neurological: Negative for dizziness, syncope, weakness and numbness  Physical Exam  ED Triage Vitals   Temperature Pulse Respirations Blood Pressure SpO2   07/02/20 1840 07/02/20 1840 07/02/20 1840 07/02/20 1840 07/02/20 1840   98 °F (36 7 °C) 87 18 131/79 99 %      Temp Source Heart Rate Source Patient Position - Orthostatic VS BP Location FiO2 (%)   07/02/20 1840 07/02/20 1845 07/02/20 1845 -- --   Oral Monitor Lying        Pain Score       07/02/20 1840       5             Orthostatic Vital Signs  Vitals:    07/02/20 1840 07/02/20 1845   BP: 131/79 133/83   Pulse: 87 80   Patient Position - Orthostatic VS:  Lying       Physical Exam   Constitutional: He is oriented to person, place, and time  He appears well-developed and well-nourished  HENT:   Head: Normocephalic and atraumatic  Nose: Nose normal    Eyes: Conjunctivae and EOM are normal    Neck: Normal range of motion  Neck supple  Cardiovascular: Normal rate, regular rhythm and normal heart sounds  Exam reveals no gallop and no friction rub  No murmur heard  Pulmonary/Chest: Effort normal and breath sounds normal  No respiratory distress  He has no wheezes  He has no rales  Abdominal: Soft  Bowel sounds are normal    Musculoskeletal: Normal range of motion  He exhibits no edema or deformity     No pain to palpation over cervical, thoracic, or lumbar spine   Neurological: He is alert and oriented to person, place, and time  He displays abnormal reflex (+1 L Patella reflex )  No cranial nerve deficit or sensory deficit  Coordination normal    Skin: Skin is warm and dry  No rash noted  No erythema  Psychiatric: He has a normal mood and affect   His behavior is normal        ED Medications  Medications   oxyCODONE (ROXICODONE) oral solution 5 mg (5 mg Oral Given 7/2/20 1938)       Diagnostic Studies  Results Reviewed     Procedure Component Value Units Date/Time    Comprehensive metabolic panel [[id number]]  (Abnormal) Collected:  07/02/20 1917    Lab Status:  Final result Specimen:  Blood from Central Venous Line Updated:  07/02/20 1949     Sodium 138 mmol/L      Potassium 3 8 mmol/L      Chloride 105 mmol/L      CO2 28 mmol/L      ANION GAP 5 mmol/L      BUN 17 mg/dL      Creatinine 0 84 mg/dL      Glucose 96 mg/dL      Calcium 8 9 mg/dL      AST 13 U/L      ALT 25 U/L      Alkaline Phosphatase 66 U/L      Total Protein 7 8 g/dL      Albumin 3 4 g/dL      Total Bilirubin 0 89 mg/dL      eGFR 92 ml/min/1 73sq m     Narrative:       [place] guidelines for Chronic Kidney Disease (CKD):     Stage 1 with normal or high GFR (GFR > 90 mL/min/1 73 square meters)    Stage 2 Mild CKD (GFR = 60-89 mL/min/1 73 square meters)    Stage 3A Moderate CKD (GFR = 45-59 mL/min/1 73 square meters)    Stage 3B Moderate CKD (GFR = 30-44 mL/min/1 73 square meters)    Stage 4 Severe CKD (GFR = 15-29 mL/min/1 73 square meters)    Stage 5 End Stage CKD (GFR <15 mL/min/1 73 square meters)  Note: GFR calculation is accurate only with a steady state creatinine    CBC and differential [[id number]]  (Abnormal) Collected:  07/02/20 1917    Lab Status:  Final result Specimen:  Blood from Line Updated:  07/02/20 1926     WBC 5 20 Thousand/uL      RBC 4 01 Million/uL      Hemoglobin 10 9 g/dL      Hematocrit 34 6 % MCV 86 fL      MCH 27 2 pg      MCHC 31 5 g/dL      RDW 19 7 %      MPV 9 5 fL      Platelets 314 Thousands/uL      nRBC 0 /100 WBCs      Neutrophils Relative 60 %      Immat GRANS % 0 %      Lymphocytes Relative 25 %      Monocytes Relative 8 %      Eosinophils Relative 6 %      Basophils Relative 1 %      Neutrophils Absolute 3 14 Thousands/µL      Immature Grans Absolute 0 02 Thousand/uL      Lymphocytes Absolute 1 30 Thousands/µL      Monocytes Absolute 0 40 Thousand/µL      Eosinophils Absolute 0 29 Thousand/µL      Basophils Absolute 0 05 Thousands/µL                  No orders to display         Procedures  Procedures      ED Course  ED Course as of Jul 02 2020   Thu Jul 02, 2020   1945 CBC and differential(!)   2000 Discussed admission and plan for the upcoming days  Patient understands and agrees with admission      2007 Comprehensive metabolic panel(!)               Identification of Seniors at Risk      Most Recent Value   (ISAR) Identification of Seniors at Risk   Before the illness or injury that brought you to the Emergency, did you need someone to help you on a regular basis? 0 Filed at: 07/02/2020 1841   In the last 24 hours, have you needed more help than usual?  0 Filed at: 07/02/2020 0938   Have you been hospitalized for one or more nights during the past 6 months? 1 Filed at: 07/02/2020 1841   In general, do you see well?  0 Filed at: 07/02/2020 1841   In general, do you have serious problems with your memory? 0 Filed at: 07/02/2020 1841   Do you take more than three different medications every day?   1 Filed at: 07/02/2020 1841   ISAR Score  2 Filed at: 07/02/2020 1841                                  MDM  Number of Diagnoses or Management Options  Paraspinal abscess [facility]): established and worsening  Diagnosis management comments: Mr Katherine Denton is a 70 YO male with a PMHx of esophageal cancer S/P esophagectomy complicated by strictures and possible metastasis who presents to the ED for a thoracic parapspinal abscess that was discovered on an outpatient MRI  Patient was told by his thoracic surgeon's office, Dr Theo Hughes, to come to the ED for admission  Patient arrived stable and non-toxic appearing with only complaint of 5/10 back pain  Oxycodone was ordered and patient was admitted for further management  Amount and/or Complexity of Data Reviewed  Clinical lab tests: ordered and reviewed    Risk of Complications, Morbidity, and/or Mortality  Presenting problems: moderate  Diagnostic procedures: moderate  Management options: moderate    Patient Progress  Patient progress: stable        Disposition  Final diagnoses:   Paraspinal abscess ([facility] )     Time reflects when diagnosis was documented in both MDM as applicable and the Disposition within this note     Time User Action Codes Description Comment    7/2/2020  7:40 PM Floreen Marc Add [M46 20] [facility] )     7/2/2020  8:06 PM Dorrine Brinks Add [C16 0] 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[facility] )     7/2/2020  8:06 PM Dorrine Brinks Add [K22 2] Esophageal obstruction       ED Disposition     ED Disposition Condition Date/Time Comment    Admit Stable Thu Jul 2, 2020  7:40 PM Case was discussed with Dr Lenny Rodriguez and the patient's admission status was agreed to be Admission Status: inpatient status to the service of Dr Lenny Rodriguez   Follow-up Information    None         Current Discharge Medication List      CONTINUE these medications which have NOT CHANGED    Details   !! oxyCODONE (ROXICODONE) 5 mg/5 mL solution Take 5 mg (5 ML) every 4 hours as needed for moderate pain or 10 mg (10 ML) every 4 hours as needed for severe pain  Qty: 500 mL, Refills: 0    Associated Diagnoses: Other acute osteomyelitis, other site [facility])      ! ! oxyCODONE (ROXICODONE) 5 mg/5 mL solution Take 5 mL (5 mg total) by mouth every 4 (four) hours as needed for moderate painMax Daily Amount: 30 mg  Qty: 200 mL, Refills: 0    Associated Diagnoses: Malignant neoplasm of lower third of esophagus ([facility] ); 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HCC)      amiodarone 200 mg tablet Take 1 tablet (200 mg total) by mouth 3 (three) times a day with meals  Qty: 90 tablet, Refills: 0    Comments: Further refills per primary care physician  Associated Diagnoses: Atrial fibrillation, unspecified type (HCC)      aspirin 81 mg chewable tablet Chew 1 tablet (81 mg total) daily  Qty: 30 tablet, Refills: 0    Associated Diagnoses: Carotid stenosis, asymptomatic      atorvastatin (LIPITOR) 40 mg tablet Take 1 tablet (40 mg total) by mouth daily with dinner  Qty: 30 tablet, Refills: 0    Associated Diagnoses: Carotid stenosis, asymptomatic      Blood Glucose Monitoring Suppl (ONE TOUCH ULTRA 2) w/Device KIT Check bs , q fasting and 2 hr after meals  Qty: 1 each, Refills: 0    Associated Diagnoses: Type 2 diabetes mellitus with hyperglycemia, without long-term current use of insulin ([facility])      canagliflozin (Invokana) 100 mg Take 100 mg by mouth daily before breakfast      cefTRIAXone 2,000 mg in dextrose 5 % 50 mL IVPB Infuse 2,000 mg into a venous catheter every 24 hours  Refills: 0    Associated Diagnoses: Other acute osteomyelitis, other site ([facility])      glucose blood test strip Check blood sugar 3 times daily  Qty: 100 each, Refills: 3    Associated Diagnoses: Type 2 diabetes mellitus with hyperglycemia, without long-term current use of insulin ([facility])      Lancets (ONETOUCH ULTRASOFT) lancets Check bs , q fasting and 2 hr after meals  Qty: 100 each, Refills: 5    Associated Diagnoses: Type 2 diabetes mellitus with hyperglycemia, without long-term current use of insulin ([facility])      levothyroxine 25 mcg tablet Take 25 mcg by mouth daily in the early morning      metroNIDAZOLE (FLAGYL) 500 mg tablet Take 1 tablet (500 mg total) by mouth every 8 (eight) hours  Qty: 90 tablet, Refills: 0    Associated Diagnoses: Other acute osteomyelitis, other site ([facility])      ondansetron (ZOFRAN) 4 mg tablet Take 1 tablet (4 mg total) by mouth every 6 (six) hours as needed for nausea or vomiting  Qty: 30 tablet, Refills: 0    Associated Diagnoses: 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HCC)      rOPINIRole (REQUIP) 0 25 mg tablet take 1 tablet by mouth at bedtime  Qty: 30 tablet, Refills: 0    Associated Diagnoses: Restless leg syndrome      traZODone (DESYREL) 100 mg tablet Take 1 tablet (100 mg total) by mouth daily at bedtime  Qty: 30 tablet, Refills: 0    Associated Diagnoses: Insomnia, unspecified type       !! - Potential duplicate medications found  Please discuss with provider  No discharge procedures on file  PDMP Review       Value Time User    PDMP Reviewed  Yes 6/3/2020  7:56 AM Marva Babinski, MD           ED Provider  Attending physically available and evaluated Denisse Waqas JAMIL managed the patient along with the ED Attending      Electronically Signed by         Kurtis Arias DO  07/02/20 2025

## 2020-07-02 NOTE — ASSESSMENT & PLAN NOTE
MRI of the spine was just read by the radiologist and reviewed by Neurosurgery  Both ID and neurosurgery are suggesting admission to [facility] for further management  He is already on IV antibiotics  He is clinically stable in the office today, without any fever, chills, or worsening back pain  After speaking with Dr Edison Xie and Dr Jimbo Sam, I advised the patient to be evaluated in the ER to admission onto the medicine service  We can dilate his esophagus while he is admitted and possibly talk to IR about where they can drain any aspect of this paraspinal collection  Dr Jimbo Sam has already spoken to him about his surgical option, which proposes significant risks  This would involve a thoracotomy, muscle flap interposition to separate the conduit from the spine and attempting to debride the osteo  Therefore, we would like to continue conservative management for now

## 2020-07-02 NOTE — PROGRESS NOTES
Thoracic Follow-Up  Assessment/Plan:    Esophageal stricture  Mr Yennifer Welch last dilation was 4/28/20 and he has developed progressive dysphagia  Therefore, Dr Sol Mathews is going to perform an EGD with esophageal dilation on 7/7/20  Consent was signed today and he will undergo COVID testing, but no other blood work is necessary  Paraspinal abscess ([address] )  MRI of the spine was just read by the radiologist and reviewed by Neurosurgery  Both ID and neurosurgery are suggesting admission to [facility] for further management  He is already on IV antibiotics  He is clinically stable in the office today, without any fever, chills, or worsening back pain  After speaking with Dr Jovan Isaac and Dr Sol Mathews, I advised the patient to be evaluated in the ER to admission onto the medicine service  We can dilate his esophagus while he is admitted and possibly talk to IR about where they can drain any aspect of this paraspinal collection  Dr Sol Mathews has already spoken to him about his surgical option, which proposes significant risks  This would involve a thoracotomy, muscle flap interposition to separate the conduit from the spine and attempting to debride the osteo  Therefore, we would like to continue conservative management for now  Diagnoses and all orders for this visit:    Esophageal stricture  -     Case request operating room: ESOPHAGOGASTRODUODENOSCOPY (EGD) with esophageal dilation; Standing  -     PAT Covid Screening; Future  -     Case request operating room: ESOPHAGOGASTRODUODENOSCOPY (EGD) with esophageal dilation    Paraspinal abscess ([address] )    Other orders  -     Diet NPO; Sips with meds; Standing  -     Void on call to OR; Standing  -     Insert peripheral IV; Standing  -     Place sequential compression device; Standing          Thoracic History           Diagnosis: Clinical stage T3N1M0 esophageal carcinoma  Procedure: 1   EGD, transhiatal esophagectomy performed on 1/28/20 2   EGD with dilation up to 42F performed on 4/28/20  Pathology: esophagogastrectomy reveals microscopic focus of residual adenocarcinoma in muscularis propria measuring 0 7 cm, adjacent Duong's esophagus with associated atypia indeterminate for dysplasia  7 lymph nodes negative for carcinoma  Proximal and distal margins negative for carcinoma  8th edition AJCC tumor stage I (ypT2, ypN0)  Therapy:  Neoadjuvant FLOT x 6 chemotherapy and Herceptin, completed 12/17/19       Patient ID: Sheeba Kidd is a 71 y o  male  HPI    Mr Felicia Rhodes is a 72 yo gentleman who underwent a THE on 1/28/20 for stage I esophageal carcinoma  He was underwent an EGD with dilation on 4/27/20 up to a 42F  He underwent a jtube placement by Dr Tish Farah on 6/5/20  He was seen in our office last week, but was not having notable dysphagia  He was previously diagnosed with a paraspinal fluid collection consistent with abscess with associated osteo at T5/T6  This is being treated with antibiotics in conjunction with ID  On discussion today, he is having severe dysphagia  He was only able to get a half of an egg down the past 2 days  He is using his Jtube  He is not having any fever or chills  He has some mid/low back pain, which is stable for him  He is still on IV abx for his paraspinal abscess  He had a MRI of the spine yesterday  This reveals increasing size of midline anterior paraspinal fluid collection at T5-T8, measuring 5 x 2 2 x 0 9 cm, consistent with abscess  There is adjacent inflammation and phlegmon in the right paraspinal region, post to the esophagus, again concerning for esophageal leak  There is persistent osteomyelitis from T3-T8, with increasing T6 wedge deformity         The following portions of the patient's history were reviewed and updated as appropriate: allergies, current medications, past family history, past medical history, past social history, past surgical history and problem list     Review of Systems      Objective:   Physical Exam Constitutional: He is oriented to person, place, and time  He appears well-developed and well-nourished  HENT:   Head: Normocephalic and atraumatic    +masked   Eyes: EOM are normal  No scleral icterus  Neck: Normal range of motion  Neck supple  Incision well healed  Cardiovascular: Normal rate and regular rhythm  Pulmonary/Chest: Effort normal and breath sounds normal  No respiratory distress  He has no wheezes  Abdominal: Soft  Bowel sounds are normal  He exhibits no distension  Musculoskeletal: Normal range of motion  Neurological: He is alert and oriented to person, place, and time  Skin: Skin is warm and dry  Psychiatric: He has a normal mood and affect  His behavior is normal    Vitals reviewed    /53 (BP Location: Left arm, Patient Position: Sitting, Cuff Size: Large)   Pulse 86   Temp (!) 96 3 °F (35 7 °C)   Ht 5' 10" (1 778 m)   Wt 69 7 kg (153 lb 9 6 oz)   SpO2 98%   BMI 22 04 kg/m²

## 2020-07-02 NOTE — TELEPHONE ENCOUNTER
Again MRI was reviewed by Dr Alvaro Aaron and Dr Bo Page  This suggested the patient should be seen by thoracic surgeon at the collection is and mediastinum not involving the epidural space  I called his daughter and informed that patient should see his thoracic surgeon  I also called thoracic surgery office and informed the staff  From Neurosurgery perspective, patient can keep his regular follow-up with additional  brace and upright thoracic spine x-rays for the next visit

## 2020-07-03 ENCOUNTER — APPOINTMENT (INPATIENT)
Dept: RADIOLOGY | Facility: HOSPITAL | Age: 65
DRG: 094 | End: 2020-07-03
Payer: COMMERCIAL

## 2020-07-03 PROBLEM — E43 SEVERE PROTEIN-CALORIE MALNUTRITION (GOMEZ: LESS THAN 60% OF STANDARD WEIGHT) (HCC): Status: ACTIVE | Noted: 2020-07-03

## 2020-07-03 LAB
ANION GAP SERPL CALCULATED.3IONS-SCNC: 7 MMOL/L (ref 4–13)
BUN SERPL-MCNC: 22 MG/DL (ref 5–25)
CALCIUM SERPL-MCNC: 9.3 MG/DL (ref 8.3–10.1)
CHLORIDE SERPL-SCNC: 102 MMOL/L (ref 100–108)
CO2 SERPL-SCNC: 29 MMOL/L (ref 21–32)
CREAT SERPL-MCNC: 0.98 MG/DL (ref 0.6–1.3)
ERYTHROCYTE [DISTWIDTH] IN BLOOD BY AUTOMATED COUNT: 19.9 % (ref 11.6–15.1)
EST. AVERAGE GLUCOSE BLD GHB EST-MCNC: 111 MG/DL
GFR SERPL CREATININE-BSD FRML MDRD: 81 ML/MIN/1.73SQ M
GLUCOSE SERPL-MCNC: 101 MG/DL (ref 65–140)
GLUCOSE SERPL-MCNC: 136 MG/DL (ref 65–140)
GLUCOSE SERPL-MCNC: 149 MG/DL (ref 65–140)
GLUCOSE SERPL-MCNC: 153 MG/DL (ref 65–140)
GLUCOSE SERPL-MCNC: 172 MG/DL (ref 65–140)
HBA1C MFR BLD: 5.5 %
HCT VFR BLD AUTO: 34 % (ref 36.5–49.3)
HGB BLD-MCNC: 10.6 G/DL (ref 12–17)
MAGNESIUM SERPL-MCNC: 2.4 MG/DL (ref 1.6–2.6)
MCH RBC QN AUTO: 27.5 PG (ref 26.8–34.3)
MCHC RBC AUTO-ENTMCNC: 31.2 G/DL (ref 31.4–37.4)
MCV RBC AUTO: 88 FL (ref 82–98)
PHOSPHATE SERPL-MCNC: 3.8 MG/DL (ref 2.3–4.1)
PLATELET # BLD AUTO: 234 THOUSANDS/UL (ref 149–390)
PMV BLD AUTO: 9.7 FL (ref 8.9–12.7)
POTASSIUM SERPL-SCNC: 3.7 MMOL/L (ref 3.5–5.3)
RBC # BLD AUTO: 3.86 MILLION/UL (ref 3.88–5.62)
SARS-COV-2 RNA RESP QL NAA+PROBE: NEGATIVE
SODIUM SERPL-SCNC: 138 MMOL/L (ref 136–145)
WBC # BLD AUTO: 3.71 THOUSAND/UL (ref 4.31–10.16)

## 2020-07-03 PROCEDURE — 85027 COMPLETE CBC AUTOMATED: CPT | Performed by: GENERAL PRACTICE

## 2020-07-03 PROCEDURE — 83036 HEMOGLOBIN GLYCOSYLATED A1C: CPT | Performed by: GENERAL PRACTICE

## 2020-07-03 PROCEDURE — U0003 INFECTIOUS AGENT DETECTION BY NUCLEIC ACID (DNA OR RNA); SEVERE ACUTE RESPIRATORY SYNDROME CORONAVIRUS 2 (SARS-COV-2) (CORONAVIRUS DISEASE [COVID-19]), AMPLIFIED PROBE TECHNIQUE, MAKING USE OF HIGH THROUGHPUT TECHNOLOGIES AS DESCRIBED BY CMS-2020-01-R: HCPCS | Performed by: SURGERY

## 2020-07-03 PROCEDURE — 99222 1ST HOSP IP/OBS MODERATE 55: CPT | Performed by: THORACIC SURGERY (CARDIOTHORACIC VASCULAR SURGERY)

## 2020-07-03 PROCEDURE — 82948 REAGENT STRIP/BLOOD GLUCOSE: CPT

## 2020-07-03 PROCEDURE — 99223 1ST HOSP IP/OBS HIGH 75: CPT | Performed by: INTERNAL MEDICINE

## 2020-07-03 PROCEDURE — 83735 ASSAY OF MAGNESIUM: CPT | Performed by: GENERAL PRACTICE

## 2020-07-03 PROCEDURE — 84100 ASSAY OF PHOSPHORUS: CPT | Performed by: GENERAL PRACTICE

## 2020-07-03 PROCEDURE — 99223 1ST HOSP IP/OBS HIGH 75: CPT | Performed by: PHYSICIAN ASSISTANT

## 2020-07-03 PROCEDURE — 3044F HG A1C LEVEL LT 7.0%: CPT | Performed by: PHYSICIAN ASSISTANT

## 2020-07-03 PROCEDURE — 80048 BASIC METABOLIC PNL TOTAL CA: CPT | Performed by: GENERAL PRACTICE

## 2020-07-03 PROCEDURE — 71260 CT THORAX DX C+: CPT

## 2020-07-03 PROCEDURE — 99232 SBSQ HOSP IP/OBS MODERATE 35: CPT | Performed by: NURSE PRACTITIONER

## 2020-07-03 RX ORDER — OXYCODONE HCL 5 MG/5 ML
10 SOLUTION, ORAL ORAL
Status: DISCONTINUED | OUTPATIENT
Start: 2020-07-03 | End: 2020-07-04 | Stop reason: HOSPADM

## 2020-07-03 RX ORDER — OXYCODONE HCL 5 MG/5 ML
5 SOLUTION, ORAL ORAL
Status: DISCONTINUED | OUTPATIENT
Start: 2020-07-03 | End: 2020-07-04 | Stop reason: HOSPADM

## 2020-07-03 RX ADMIN — OXYCODONE HYDROCHLORIDE 10 MG: 5 SOLUTION ORAL at 22:07

## 2020-07-03 RX ADMIN — LEVOTHYROXINE SODIUM 25 MCG: 25 TABLET ORAL at 06:15

## 2020-07-03 RX ADMIN — METRONIDAZOLE 500 MG: 500 TABLET ORAL at 22:02

## 2020-07-03 RX ADMIN — ROPINIROLE HYDROCHLORIDE 0.25 MG: 0.25 TABLET, FILM COATED ORAL at 22:02

## 2020-07-03 RX ADMIN — ROPINIROLE HYDROCHLORIDE 0.25 MG: 0.25 TABLET, FILM COATED ORAL at 00:33

## 2020-07-03 RX ADMIN — METRONIDAZOLE 500 MG: 500 TABLET ORAL at 06:15

## 2020-07-03 RX ADMIN — CEFTRIAXONE SODIUM 2000 MG: 2 INJECTION, POWDER, FOR SOLUTION INTRAMUSCULAR; INTRAVENOUS at 22:02

## 2020-07-03 RX ADMIN — ATORVASTATIN CALCIUM 40 MG: 40 TABLET, FILM COATED ORAL at 18:10

## 2020-07-03 RX ADMIN — IOHEXOL 85 ML: 350 INJECTION, SOLUTION INTRAVENOUS at 13:46

## 2020-07-03 RX ADMIN — OXYCODONE HYDROCHLORIDE 5 MG: 5 SOLUTION ORAL at 06:41

## 2020-07-03 RX ADMIN — OXYCODONE HYDROCHLORIDE 5 MG: 5 SOLUTION ORAL at 13:16

## 2020-07-03 RX ADMIN — INSULIN LISPRO 1 UNITS: 100 INJECTION, SOLUTION INTRAVENOUS; SUBCUTANEOUS at 22:03

## 2020-07-03 RX ADMIN — TRAZODONE HYDROCHLORIDE 100 MG: 100 TABLET ORAL at 22:02

## 2020-07-03 RX ADMIN — OXYCODONE HYDROCHLORIDE 10 MG: 5 SOLUTION ORAL at 18:09

## 2020-07-03 NOTE — PROGRESS NOTES
Pt refusing blood cultures and taking meds orally   Dorys Atkins aware, new order for med admin received, will continue to monitor

## 2020-07-03 NOTE — PROGRESS NOTES
Progress Note - Igor Hatch 1955, 71 y o  male MRN: [id number]    Unit/Bed#: [facility] 603-01 Encounter: [id number]    Primary Care Provider: Wandra Schilder, CRNP   Date and time admitted to hospital: 7/2/2020  6:35 PM    Discussed with thoracic sx plan to get ct scan review for further plan   Paraspinal abscess [facility])  Assessment & Plan  Pt recently here for extended period of time and discharged on iv abx from prior admission  · On prior admission patient had undergone biopsy on 05/27 in IR with Dr Olga Seay  · Pathology at that time was consistent with acute osteomyelitis however cultures remain negative  · At that time patient was recommended to utilize CASH brace  · Extensive conversation was had with infectious disease and thoracic surgery however options at that point were to attempted conservative treatment with antibiotics versus major surgery/reconstruction  · - at the time it was determined patient would continue with IV antibiotics through 07/09/2020  · C/w Rocephin and Flagyl for now  · MRI thoracic with and without contrast 07/01/2020:  1   Increasing size of midline anterior paraspinal fluid collection at the T5-T8 levels, measuring 5 x 2 2 x 0 9 cm, consistent with an abscess   Adjacent inflammation and phlegmon in the right paraspinal region, posterior to the esophagus, again   concerning for esophageal leak  2   Persistent osteomyelitis from T3 to T8   Increasing T6 wedge deformity   Minimal T6-7 discitis  3   No evidence of epidural abscess or inflammation  * Esophageal stricture  Assessment & Plan  Transferred pt for thoracic surgery evaluation   Maintain NPO at this time   Per speech evaluation recommendations continue to keep patient NPO at this time and continue J-tube feeds    For surgery, continue antibiotics ID consulted  IR consulted for possible drainage  Will likely require EGD per thoracic surgery for possible dilatation      Brain lesion  Assessment & Plan  Noted on prior admission initial CT scan  · MRI of the brain at that time demonstrated 4 mm nodular focus of enhancement paramedian left posterior frontal lobe potentially metastatic lesion  · At that time reviewed by Neurology not suspected to be infarct and no further workup recommended at that time  · Recommendations were to have follow-up repeat MRI with gi in 6 weeks  · However patient now admitted  · MRI brain on 07/02/2020 now demonstrates:Slight interval enlargement of the small juxtacortical enhancing nodule seen on the prior examination within the left posterior medial frontal lobe, suspicious for metastasis   This measures 5 mm in size   No new enhancing lesions are identified  Stable right frontal lobe encephalomalacia, gliosis and mild chronic hemosiderin deposition compared to the prior examination suggesting remote vascular or traumatic injury  Atrial fibrillation ([facility] )  Assessment & Plan  Pt was only supposed to be on amio tid for 6 weeks  decreased to daily    Type 2 diabetes mellitus with hyperglycemia, without long-term current use of insulin [facility])  Assessment & Plan  Lab Results   Component Value Date    HGBA1C 5 5 07/03/2020       Recent Labs     07/02/20  2116 07/03/20  0726   POCGLU 79 153*       Blood Sugar Average: Last 72 hrs:    A1C  ISS  (P) 116   Monitor for hypoglycemia, patient currently NPO  However can have tube feeds via J-tube  Will discuss with IR, question intervention incomplete NPO status at this time until procedures performed  May need to run low-dose D5 normal saline    Severe protein-calorie malnutrition Zaheer Duster: less than 60% of standard weight) ([facility] [address] )  Assessment & Plan  Malnutrition Findings:   Malnutrition type: Chronic illness  Degree of Malnutrition: Other severe protein calorie malnutrition(r/t Chronic Illness as evidenced by significant weight loss 11 7# (7 1%) x 1 month, loss of 37 0# (19 5%)x 3 months and inadequate PO and EN intake>1 month   Treat with EN via J Tube as sole source of nutrition & cont  Outpatient Nutrition Onoclogy f/u )    BMI Findings: Body mass index is 21 95 kg/m²  Patient currently unable to take p o  Patient is status post J-tube placement on 06/05/2020  Initially started on Jevity at 10 cc an increased to goal at discharge  GE junction carcinoma [facility])  Assessment & Plan  Appreciate thoracic surgery, patient well known to Dr Vish Austin  · Patient with known history of GE junction adeno carcinoma diagnosed in 08/24/2019  · Patient is status post transhiatal esophagectomy as well as neoadjuvant chemotherapy and radiation  · Previously noted to have good response and had been on surveillance  · However, more recent CT chest showed probable esophageal tumor recurrence in the chest with reactive pleural disease  · Patient has had on and off bouts with dysphagia and odynophagia noted to have esophageal stricture on video barium swallow status post EGD for dilatation on 05/23 by GI  · Patient sees Dr Agustin Gaffney, recommendations were to complete antibiotics and monitor response before further decisions regarding tumor recurrence and plan  · J-tube was placed on 06/05/2020, with planned follow-up with Dr Angy Doe      VTE Pharmacologic Prophylaxis:   Pharmacologic: Enoxaparin (Lovenox)  Mechanical VTE Prophylaxis in Place: Yes    Patient Centered Rounds: I have performed bedside rounds with nursing staff today  Discussions with Specialists or Other Care Team Provider: nursing     Education and Discussions with Family / Patient: patient Edmond Gallego update daughter status post CT scan and discussion with thoracic surgery in regard to plan of care  Later today     Time Spent for Care: 45 minutes  More than 50% of total time spent on counseling and coordination of care as described above      Current Length of Stay: 1 day(s)    Current Patient Status: Inpatient   Certification Statement: The patient will continue to require additional inpatient hospital stay due to ongoing recommendations per thoracic surgery     Discharge Plan: no plan at this time not in next 72 hrs     Code Status: Level 1 - Full Code      Subjective:   Patient well known to myself  Upon entry to room patient significant weight loss visibly noted  Patient reports that he just wants to know what is going on  He only went in for routine visit with imaging noting worsening collection  I have discussed with patient plan that we are waiting for CT scan and thoracic surgery will then determine plan of care  At this time I informed patient that IR cannot proceed with any procedure at this time based on note place this morning  Patient does report having pain across the top of his shoulder blades for which he is taking pain medication he prefers to sit up in the chair and lean over table on a pillow  Denies shortness of breath cough with nausea vomiting  He does report that he feels full when he has tube feeds for period of time  Objective:     Vitals:   Temp (24hrs), Av 5 °F (36 4 °C), Min:96 3 °F (35 7 °C), Max:98 1 °F (36 7 °C)    Temp:  [96 3 °F (35 7 °C)-98 1 °F (36 7 °C)] 98 1 °F (36 7 °C)  HR:  [77-87] 77  Resp:  [17-18] 18  BP: ()/(53-91) 98/63  SpO2:  [98 %-100 %] 99 %  Body mass index is 21 95 kg/m²  Input and Output Summary (last 24 hours): Intake/Output Summary (Last 24 hours) at 7/3/2020 1116  Last data filed at 7/3/2020 0843  Gross per 24 hour   Intake 0 ml   Output    Net 0 ml       Physical Exam:     Physical Exam   Constitutional: He is oriented to person, place, and time  No distress  HENT:   Head: Normocephalic and atraumatic  Mouth/Throat: No oropharyngeal exudate  Eyes: Right eye exhibits no discharge  Left eye exhibits no discharge  No scleral icterus  Neck: No tracheal deviation present  No thyromegaly present  Cardiovascular: Normal rate  Exam reveals no gallop and no friction rub  No murmur heard  Pulmonary/Chest: No stridor   No respiratory distress  He has no wheezes  He has no rales  He exhibits no tenderness  Poor effort   Abdominal: He exhibits no distension and no mass  There is no tenderness  There is no rebound and no guarding  No hernia  Musculoskeletal: He exhibits no edema, tenderness or deformity  Lymphadenopathy:     He has no cervical adenopathy  Neurological: He is oriented to person, place, and time  Skin: No rash noted  He is not diaphoretic  No erythema  No pallor  Psychiatric: He has a normal mood and affect  Additional Data:     Labs:    Results from last 7 days   Lab Units 07/03/20  0724 07/02/20  1917   WBC Thousand/uL 3 71* 5 20   HEMOGLOBIN g/dL 10 6* 10 9*   HEMATOCRIT % 34 0* 34 6*   PLATELETS Thousands/uL 234 240   NEUTROS PCT %  --  60   LYMPHS PCT %  --  25   MONOS PCT %  --  8   EOS PCT %  --  6     Results from last 7 days   Lab Units 07/03/20  0724 07/02/20  1917   SODIUM mmol/L 138 138   POTASSIUM mmol/L 3 7 3 8   CHLORIDE mmol/L 102 105   CO2 mmol/L 29 28   BUN mg/dL 22 17   CREATININE mg/dL 0 98 0 84   ANION GAP mmol/L 7 5   CALCIUM mg/dL 9 3 8 9   ALBUMIN g/dL  --  3 4*   TOTAL BILIRUBIN mg/dL  --  0 89   ALK PHOS U/L  --  66   ALT U/L  --  25   AST U/L  --  13   GLUCOSE RANDOM mg/dL 149* 96         Results from last 7 days   Lab Units 07/03/20  0726 07/02/20  2116   POC GLUCOSE mg/dl 153* 79     Results from last 7 days   Lab Units 07/03/20  0724   HEMOGLOBIN A1C % 5 5               * I Have Reviewed All Lab Data Listed Above  * Additional Pertinent Lab Tests Reviewed:  All Labs Within Last 24 Hours Reviewed    Imaging:    Imaging Reports Reviewed Today Include: reviewed   Recent Cultures (last 7 days):           Last 24 Hours Medication List:     Current Facility-Administered Medications:  acetaminophen 650 mg Oral Q6H PRN Patricia , DO    amiodarone 200 mg Oral Daily With Breakfast Patricia , DO    aspirin 81 mg Oral Daily Patricia , DO    atorvastatin 40 mg Oral Daily With [facility], DO    cefTRIAXone (ROCEPHIN) 2g in 50 mL IVPB 2,000 mg Intravenous Q24H Rendell Richmond, DO Last Rate: 2,000 mg (07/02/20 0542)   enoxaparin 40 mg Subcutaneous Daily Rendell Richmond, DO    insulin lispro 1-5 Units Subcutaneous TID AC Raúl Love, DO    insulin lispro 1-5 Units Subcutaneous HS Rendell Richmond, DO    levothyroxine 25 mcg Oral Early Morning Rendell Richmond, DO    metroNIDAZOLE 500 mg Oral [facility] Rendell Richmond, DO    ondansetron 4 mg Intravenous Q6H PRN Rendell Richmond, DO    oxyCODONE 5 mg Oral Q4H PRN Rendell Richmond, DO    rOPINIRole 0 25 mg Oral HS Rendell Richmond, DO    traZODone 100 mg Oral HS Rendell Richmond, DO         Today, Patient Was Seen By: SARA Mendoza    ** Please Note: Dictation voice to text software may have been used in the creation of this document   **

## 2020-07-03 NOTE — SPEECH THERAPY NOTE
Speech Language/Pathology  Speech/Language Pathology  Assessment    Patient Name: Rita Saint  Today's Date: 7/3/2020     Problem List  Principal Problem:    Esophageal stricture  Active Problems:    Type 2 diabetes mellitus with hyperglycemia, without long-term current use of insulin ([facility] )    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HCC)    Atrial fibrillation ([facility] )    Paraspinal abscess ([facility] )    Past Medical History  Past Medical History:   Diagnosis Date    Bilateral pleural effusion     COPD (chronic obstructive pulmonary disease) [facility]     Diabetes mellitus ([facility]     Difficulty swallowing     Disease of thyroid gland     Edema     Esophageal mass     History of chemotherapy     History of transfusion     Malignant neoplasm of lower third of esophagus ([facility] )     Port-A-Cath in place     LCW    Sleep apnea     no CPAP    SOB (shortness of breath)      Past Surgical History  Past Surgical History:   Procedure Laterality Date    BACK SURGERY      x2    DISC REMOVAL      ESOPHAGECTOMY N/A 1/28/2020    Procedure: ESOPHAGECTOMY, TRANSHIATAL;  Surgeon: Elmer Butler MD;  [facility];  Service: Surgical Oncology    ESOPHAGOGASTRODUODENOSCOPY N/A 1/28/2020    Procedure: ESOPHAGOGASTRODUODENOSCOPY (EGD);   Surgeon: Elmer Butler MD;  [facility];  Service: Surgical Oncology    FL GUIDED CENTRAL VENOUS ACCESS DEVICE INSERTION  9/26/2019    GASTROJEJUNOSTOMY W/ JEJUNOSTOMY TUBE N/A 9/26/2019    Procedure: INSERTION JEJUNOSTOMY TUBE OPEN;  Surgeon: Elmer Butler MD;  [facility];  Service: Surgical Oncology    GASTROJEJUNOSTOMY W/ JEJUNOSTOMY TUBE N/A 6/5/2020    Procedure: INSERTION JEJUNOSTOMY TUBE OPEN;  Surgeon: Elmer Butler MD;  [facility];  Service: Surgical Oncology    IR CHEST TUBE  5/26/2020    IR SPINE PROCEDURE  5/27/2020    IR THORACENTESIS  2/25/2020    NV ESOPHAGOGASTRODUODENOSCOPY TRANSORAL DIAGNOSTIC N/A 4/28/2020    Procedure: ESOPHAGOGASTRODUODENOSCOPY (EGD); Surgeon: Duane Hazel, MD;  [facility];  Service: Thoracic    ND ESOPHAGOSCOPY FLEX BALLOON DILAT <30 MM DIAM N/A 4/28/2020    Procedure: DILATATION ESOPHAGEAL;  Surgeon: Duane Hazel, MD;  [facility];  Service: Thoracic    TONSILLECTOMY      TUNNELED VENOUS PORT PLACEMENT N/A 9/26/2019    Procedure: INSERTION VENOUS PORT (PORT-A-CATH); Surgeon: Michel Koroma MD;  [facility];  Service: Surgical Oncology    VOCAL CORD INJECTION N/A 2/7/2020    Procedure: MICROLARYNGOSCOPY WITH INJECTION;  Surgeon: Sharon Chi MD;  [facility];  Service: ENT        Received consult  Surgery recommending NPO  Pt was last seen by ST on 5/26/20 at which time dysphagia 2 mechanical and thin liquids were recommended  Charting at that time indicated his last vocal injection was 2/7/20  He had no s/s pharyngeal dysphagia  VBS 2/8/20  pharyngeal stage was wnl/wfl  mech soft or purees w/ thin liquids was recommended  Pt needed liquids to clear the esophagus, and had distal pill retention  Pt also had a J tube at that time  Surgery recommended NPO, charted this morning 7/3 at 10:57  Pt's dysphagia is esophageal in nature  ? If he is able to pass anything thru esophagus  No h/o pharyngeal dysphagia    Recommending further GI/sx related w/u, may want to consider an esophagram, as ST does not assess the esophageal stage in imaging  ST does only a gross sweep  ST will sign off for now  If there is an esophageal obstriction, the pt will need to remain NPO  Reconsult if appropriate             Assessment/Plan per sx 7/2  Assessment:  71 M with history of esophageal cancer status post transhiatal esophagectomy, recurrent esophageal stricture s/p J tube placement, now with enlarging paraspinal abscess concerning for leak  Plan:  Keep NPO, continue J tube feeds  Continue antibiotics, ID consulted  IR consult placed for possible drainage  Will likely require at least EGD, possibly dilation next week    History of Present Illness  HPI:  Magalys Randhawa is a 71 y o  male who is known to the thoracic surgery service  He has a history of esophageal cancer and underwent a transhiatal esophagectomy  He has had issues with recurrent dysphagia secondary to esophageal stricture, for which he has undergone endoscopic dilatation  He reports continued intermittent dysphagia  For this, a jejunostomy tube was placed last month and he has been supplemented with tube feeds  He had an MRI done yesterday and was seen in the office today, where it was noted that he had an increasing size of a paraspinal abscess  He was beng treated with antibiotics for this, but due to the increasing size, was recommended to come to the hospital for evaluation  He currently endorses dysphagia  He tolerates tube feeds but does endorse some bloating with his feeds  He otherwise states he feels well  Denies fevers, chills,   nausea, or vomiting  No chest pain or shortness of breath       Per SLIM earlier on 7/2:  Per SLIM earlier on 7/2:

## 2020-07-03 NOTE — ASSESSMENT & PLAN NOTE
Lab Results   Component Value Date    HGBA1C 5 4 01/16/2020       No results for input(s): POCGLU in the last 72 hours      Blood Sugar Average: Last 72 hrs:    A1C  ISS

## 2020-07-03 NOTE — ASSESSMENT & PLAN NOTE
Malnutrition Findings:   Malnutrition type: Chronic illness  Degree of Malnutrition: Other severe protein calorie malnutrition(r/t Chronic Illness as evidenced by significant weight loss 11 7# (7 1%) x 1 month, loss of 37 0# (19 5%)x 3 months and inadequate PO and EN intake>1 month  Treat with EN via J Tube as sole source of nutrition & cont  Outpatient Nutrition Onoclogy f/u )    BMI Findings: Body mass index is 21 95 kg/m²  Patient currently unable to take p o  Patient is status post J-tube placement on 06/05/2020  Initially started on Jevity at 10 cc an increased to goal at discharge

## 2020-07-03 NOTE — ASSESSMENT & PLAN NOTE
Appreciate thoracic surgery, patient well known to Dr Aristides Unger  · Patient with known history of GE junction adeno carcinoma diagnosed in 08/24/2019    · Patient is status post transhiatal esophagectomy as well as neoadjuvant chemotherapy and radiation  · Previously noted to have good response and had been on surveillance  · However, more recent CT chest showed probable esophageal tumor recurrence in the chest with reactive pleural disease  · Patient has had on and off bouts with dysphagia and odynophagia noted to have esophageal stricture on video barium swallow status post EGD for dilatation on 05/23 by GI  · Patient sees Dr Aleida Hogue, recommendations were to complete antibiotics and monitor response before further decisions regarding tumor recurrence and plan  · J-tube was placed on 06/05/2020, with planned follow-up with Dr Poonam Freeman

## 2020-07-03 NOTE — CONSULTS
Consultation - Palliative and Supportive Care   Sylvia Amos 71 y o  male [id number]    Assessment:    -   Patient Active Problem List   Diagnosis    COPD (chronic obstructive pulmonary disease) ([facility] )    Headaches due to old head injury    High cholesterol    Obstructive sleep apnea syndrome    Type 2 diabetes mellitus with hyperglycemia, without long-term current use of insulin (HCC)    Malignant neoplasm of lower third of esophagus (HCC)    Esophageal obstruction    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HCC)    Chemotherapy induced neutropenia (HCC)    Anemia, unspecified    Insomnia due to medical condition    Encounter for care related to feeding tube    Paralysis of left vocal fold    Dysphonia    Chylothorax on right    Mild protein-calorie malnutrition (HCC)    Atrial fibrillation ([facility] )    Port-A-Cath in place    Neuropathy associated with cancer ([place] [address] )    Alcohol dependence, in remission ([place] [address] )    Glottic insufficiency    Hx of smoking    Acquired hypothyroidism    Medicare annual wellness visit, subsequent    Esophageal stricture    Paraspinal abscess ([place] [address] )    Loculated pleural effusion    Brain lesion    Carotid stenosis, asymptomatic    Hyponatremia    Severe protein-calorie malnutrition (HCC)    Dysphagia    Bilateral swelling of feet    Hoarseness of voice    Severe protein-calorie malnutrition Darien Lenz: less than 60% of standard weight) ([place] [address] )       Plan:  1  Symptom management  Pain  · Oxycodone 5 mg Q 4 hours prn mod pain  · Acetaminophen 650 mg Q 6 hours prn mild pain  · Neurontin  · lidoderm    2  Goals - ***   -     Code Status: *** - Level ***   Decisional apparatus:  Patient {IS NOT is:54435::"is not"} competent on my exam today  If competence is lost, patient's substitute decision maker would default to *** by [place] 169     Advance Directive / Living Will / POLST:  None on file   I have reviewed the patient's controlled substance dispensing history in the Prescription Drug Monitoring Program in compliance with the [facility] regulations before prescribing any controlled substances  We appreciate the invitation to be involved in this patient's care  We will ***  Please do not hesitate to reach our on call provider through our clinic answering service at  should you have acute symptom control concerns  SARA Carrasco  Palliative and Supportive Care  Clinic/Answering Service: [phone]  You can find me on [employer]! IDENTIFICATION:  Consults  Physician Requesting Consult: Baljit Stark DO  Reason for Consult / Principal Problem: acute on chronic pain  Hx and PE limited by: ***    HISTORY OF PRESENT ILLNESS:       Claire Valles is a 71 y o  male who presents with a palliative diagnosis of GE junction carcinoma originally diagnosed in 8/2019S/P neoadjuvant chemotherapy via chest wall port and transhiatal esophagectomy on 01/28/20  In May of 2020 he had a prolonged hospital stay from 5/23 to 6/9 for perispinal abscess and osteomyelitis requiring 6 weeks of antibiotics  During his stay, MRI of his head revealed a 4 mm nodular lesion which is scheduled for MRI follow up in 6 months, there was also CT scan findings which were concerning for carcinoma recurrence at the GE junction  He has been proceeding with outpatient follow up with oncology  Yesterday, he presented to the emergency department with thoracic paraspinal abscess which was found on outpatient CT scan  He has been having significant pain in the middle of his scapula which improves with his current pain regimen of oxycodone 5 mg Q 4 hours as needed  He also takes requip and trazadone for sleep disturbance related to restless leg sydrome        ***       ROS    Past Medical History:   Diagnosis Date    Bilateral pleural effusion     COPD (chronic obstructive pulmonary disease) (HCC)     Diabetes mellitus (HCC)     Difficulty swallowing     Disease of thyroid gland     Edema     Esophageal mass     History of chemotherapy     History of transfusion     Malignant neoplasm of lower third of esophagus (HCC)     Port-A-Cath in place     LCW    Sleep apnea     no CPAP    SOB (shortness of breath)      Past Surgical History:   Procedure Laterality Date    BACK SURGERY      x2    DISC REMOVAL      ESOPHAGECTOMY N/A 1/28/2020    Procedure: ESOPHAGECTOMY, TRANSHIATAL;  Surgeon: Sueellen Habermann, MD;  [facility];  Service: Surgical Oncology    ESOPHAGOGASTRODUODENOSCOPY N/A 1/28/2020    Procedure: ESOPHAGOGASTRODUODENOSCOPY (EGD); Surgeon: Sueellen Habermann, MD;  [facility];  Service: Surgical Oncology    FL GUIDED CENTRAL VENOUS ACCESS DEVICE INSERTION  9/26/2019    GASTROJEJUNOSTOMY W/ JEJUNOSTOMY TUBE N/A 9/26/2019    Procedure: INSERTION JEJUNOSTOMY TUBE OPEN;  Surgeon: Sueellen Habermann, MD;  [facility];  Service: Surgical Oncology    GASTROJEJUNOSTOMY W/ JEJUNOSTOMY TUBE N/A 6/5/2020    Procedure: INSERTION JEJUNOSTOMY TUBE OPEN;  Surgeon: Sueellen Habermann, MD;  [facility];  Service: Surgical Oncology    IR CHEST TUBE  5/26/2020    IR SPINE PROCEDURE  5/27/2020    IR THORACENTESIS  2/25/2020    [place] ESOPHAGOGASTRODUODENOSCOPY TRANSORAL DIAGNOSTIC N/A 4/28/2020    Procedure: ESOPHAGOGASTRODUODENOSCOPY (EGD); Surgeon: Keny Mercado MD;  [facility];  Service: Thoracic    [place] ESOPHAGOSCOPY FLEX BALLOON DILAT <30 MM DIAM N/A 4/28/2020    Procedure: DILATATION ESOPHAGEAL;  Surgeon: Keny Mercado MD;  [facility];  Service: Thoracic    TONSILLECTOMY      TUNNELED VENOUS PORT PLACEMENT N/A 9/26/2019    Procedure: INSERTION VENOUS PORT (PORT-A-CATH);   Surgeon: Sueellen Habermann, MD;  [facility];  Service: Surgical Oncology    VOCAL CORD INJECTION N/A 2/7/2020    Procedure: MICROLARYNGOSCOPY WITH INJECTION;  Surgeon: Page Dorantes MD;  [facility];  Service: ENT     Social History     Socioeconomic History    Marital status: Single     Spouse name: Not on file    Number of children: Not on file    Years of education: Not on file    Highest education level: Not on file   Occupational History    Not on file   Social Needs    Financial resource strain: Not on file    Food insecurity:     Worry: Not on file     Inability: Not on file    Transportation needs:     Medical: Not on file     Non-medical: Not on file   Tobacco Use    Smoking status: Former Smoker     Packs/day: 0 50     Years: 50 00     Pack years: 25 00     Types: Cigarettes     Last attempt to quit: 2017     Years since quittin 5    Smokeless tobacco: Never Used   Substance and Sexual Activity    Alcohol use: Not Currently     Alcohol/week: 0 0 standard drinks     Frequency: Never     Drinks per session: 1 or 2     Binge frequency: Never    Drug use: Never    Sexual activity: Not on file   Lifestyle    Physical activity:     Days per week: Not on file     Minutes per session: Not on file    Stress: Not on file   Relationships    Social connections:     Talks on phone: Not on file     Gets together: Not on file     Attends Bahai service: Not on file     Active member of club or organization: Not on file     Attends meetings of clubs or organizations: Not on file     Relationship status: Not on file    Intimate partner violence:     Fear of current or ex partner: Not on file     Emotionally abused: Not on file     Physically abused: Not on file     Forced sexual activity: Not on file   Other Topics Concern    Not on file   Social History Narrative    Not on file     Family History   Problem Relation Age of Onset    Diabetes Mother     No Known Problems Father        MEDICATIONS / ALLERGIES:    current meds:   Current Facility-Administered Medications   Medication Dose Route Frequency    acetaminophen (TYLENOL) oral suspension 650 mg  650 mg Oral Q6H PRN    amiodarone tablet 200 mg  200 mg Oral Daily With Breakfast    aspirin chewable tablet 81 mg  81 mg Oral Daily    atorvastatin (LIPITOR) tablet 40 mg  40 mg Oral Daily With Dinner    cefTRIAXone (ROCEPHIN) 2,000 mg in dextrose 5 % 50 mL IVPB  2,000 mg Intravenous Q24H    enoxaparin (LOVENOX) subcutaneous injection 40 mg  40 mg Subcutaneous Daily    insulin lispro (HumaLOG) 100 units/mL subcutaneous injection 1-5 Units  1-5 Units Subcutaneous TID AC    insulin lispro (HumaLOG) 100 units/mL subcutaneous injection 1-5 Units  1-5 Units Subcutaneous HS    levothyroxine tablet 25 mcg  25 mcg Oral Early Morning    metroNIDAZOLE (FLAGYL) tablet 500 mg  500 mg Oral Q8H [facility]    ondansetron (ZOFRAN) injection 4 mg  4 mg Intravenous Q6H PRN    oxyCODONE (ROXICODONE) oral solution 5 mg  5 mg Oral Q4H PRN    rOPINIRole (REQUIP) tablet 0 25 mg  0 25 mg Oral HS    traZODone (DESYREL) tablet 100 mg  100 mg Oral HS       Allergies   Allergen Reactions    Penicillins Itching       OBJECTIVE:    Physical Exam  Physical Exam    Lab Results:   I have personally reviewed pertinent labs  , CBC:   Lab Results   Component Value Date    WBC 3 71 (L) 07/03/2020    HGB 10 6 (L) 07/03/2020    HCT 34 0 (L) 07/03/2020    MCV 88 07/03/2020     07/03/2020    MCH 27 5 07/03/2020    MCHC 31 2 (L) 07/03/2020    RDW 19 9 (H) 07/03/2020    MPV 9 7 07/03/2020    NRBC 0 07/02/2020   , CMP:   Lab Results   Component Value Date    SODIUM 138 07/03/2020    K 3 7 07/03/2020     07/03/2020    CO2 29 07/03/2020    BUN 22 07/03/2020    CREATININE 0 98 07/03/2020    CALCIUM 9 3 07/03/2020    AST 13 07/02/2020    ALT 25 07/02/2020    ALKPHOS 66 07/02/2020    EGFR 81 07/03/2020   , PT/PTT:No results found for: PT, PTT     Imaging Studies: CT chest pending  MRI  Thoracic spine:  Increasing size of midline anterior paraspinal fluid collection at the T5-T8 levels, measuring 5 x 2 2 x 0 9 cm, consistent with an abscess    Adjacent inflammation and phlegmon in the right paraspinal region, posterior to the esophagus, again concerning for esophageal leak  Persistent osteomyelitis from T3 to T8  Increasing T6 wedge deformity  Minimal T6-7 discitis  Counseling / Coordination of Care    Total floor / unit time spent today *** minutes  Greater than 50% of total time was spent with the patient and / or family counseling and / or coordination of care  A description of the counseling / coordination of care: ***

## 2020-07-03 NOTE — NURSING NOTE
This PCA entered pt's room to obtain blood cultures  Pt refused blood cultures, states the only way we can get blood from him is if we take it from his port  Primary RN aware

## 2020-07-03 NOTE — SOCIAL WORK
In response to communication from [facility] regarding Pt's d/c back home with the resumption of care of iv abx and [address] services  CM spoke to Pt who reported being open to [facility]  which he would like to resume care with and still having iv abx at home  CM made the requested [address] referral to [facility]  for resumption of care  SOL made Geovanna aware of the above  Bisi Mckenna reported the Pt will remain on the same iv abx with no changes with a possible d/c tomorrow  CM will continue to follow

## 2020-07-03 NOTE — CONSULTS
Consult- Gurmeet Altamirano 1955, 71 y o  male MRN: [id number]    Unit/Bed#: [facility] 603-01 Encounter: [id number]    Primary Care Provider: SARA Villalobos   Date and time admitted to hospital: 7/2/2020  6:35 PM      Inpatient consult to Neurosurgery  Consult performed by: Marylu Young PA-C  Consult ordered by: Sheri Donnelly DO      Consult completed 7/3/2020 at 9:15am    Paraspinal abscess [facility])  Assessment & Plan  T4-T6 osteomyelitis and anterior paraspinal fluid collection  · Finished 6 weeks IV antibiotics 7/2/2020  · Repeat MRI thoracic spine w/wo showed enlarging abscess/fluid collection  · Patient sent to ED for further evaluation by thoracic surgery team    Imaging:   · MRI thoracic spine w/wo, 7/1/2020: Increasing size of midline anterior paraspinal fluid collection at the T5-T8 levels, measuring 5 x 2 2 x 0 9 cm, consistent with an abscess  Adjacent inflammation and phlegmon in the right paraspinal region, posterior to the esophagus, again concerning for esophageal leak  Persistent osteomyelitis from T3 to T8  Increasing T6 wedge deformity  Minimal T6-7 discitis  No evidence of epidural abscess or inflammation  Plan:   · Continue to monitor neurological exam  No focal weakness appreciated  · Thoracic surgery following; recommending possible IR drainage and repeat EGD  · ID consulted for antibiotic recommendations  · Pain control per primary team   · Mobilize with PT/OT  · DVT PPX: SCDs  · Continue brace when out of bed if possible due to port and J tube for spinal stabilization  · No indication at this time for neurosurgical intervention    Neurosurgery will follow from the periphery  Please don't hesitate to contact us with any questions or concerns  Patient should keep current outpatient follow up with xrays to be completed prior to appointment        GE junction carcinoma [facility])  Assessment & Plan  Diagnosis: Clinical stage T3N1M0 esophageal carcinoma  Procedure: EGD, transhiatal esophagectomy performed on 1/28/20  Pathology: esophagogastrectomy reveals microscopic focus of residual adenocarcinoma in muscularis propria measuring 0 7 cm, adjacent Duong's esophagus with associated atypia indeterminate for dysplasia  7 lymph nodes negative for carcinoma  Proximal and distal margins negative for carcinoma  8th edition AJCC tumor stage I (ypT2, ypN0)  Therapy:  Neoadjuvant FLOT x 6 chemotherapy and Herceptin, completed 12/17/19    * Esophageal stricture  Assessment & Plan  Thoracic surgery following    Speech pathology evaluation pending  Possible EGD Monday      History of Present Illness     HPI: Magalie Collier is a 71y o  year old male with PMH including DM, COPD, GE junction adenocarcinoma s/p neoadjuvant chemotherapy and surgery, T4-7 prevertebral collection, osteomyelitis/discitis who presents for evaluation of his prevertebral collection  He had his follow up MRI thoracic spine on 7/1/2020 which showed an enlarging prevertebral collection  He is supposed to complete IV antibiotics (ceftriaxone and flagyl) on 7/9/2020  MRI is concerning for an esophageal leak  He also has a small but enlarging left brain lesion concerning for metastasis; he remains asymptomatic and we are following this with serial imaging  Currently he has no complaints other than fatigue and intermittent back pain  He denies CP, SOB, fevers, chills, difficulty swallowing, loss of appetite, gait difficulty, UI/BI  He denies falls  Review of Systems   Constitutional: Negative  HENT: Negative  Eyes: Negative  Negative for photophobia and visual disturbance  Respiratory: Negative  Negative for chest tightness and shortness of breath  Cardiovascular: Negative  Negative for chest pain  Gastrointestinal: Negative  Endocrine: Negative  Genitourinary: Negative  Musculoskeletal: Positive for back pain  Negative for arthralgias, neck pain and neck stiffness  Skin: Negative      Neurological: Negative  Negative for dizziness, weakness, light-headedness, numbness and headaches  Hematological: Negative  Psychiatric/Behavioral: Negative  Historical Information   Past Medical History:   Diagnosis Date    Bilateral pleural effusion     COPD (chronic obstructive pulmonary disease) (HCC)     Diabetes mellitus (HCC)     Difficulty swallowing     Disease of thyroid gland     Edema     Esophageal mass     History of chemotherapy     History of transfusion     Malignant neoplasm of lower third of esophagus ([address] )     Port-A-Cath in place     LCW    Sleep apnea     no CPAP    SOB (shortness of breath)      Past Surgical History:   Procedure Laterality Date    BACK SURGERY      x2    DISC REMOVAL      ESOPHAGECTOMY N/A 1/28/2020    Procedure: ESOPHAGECTOMY, TRANSHIATAL;  Surgeon: Akil Alcala MD;  [facility];  Service: Surgical Oncology    ESOPHAGOGASTRODUODENOSCOPY N/A 1/28/2020    Procedure: ESOPHAGOGASTRODUODENOSCOPY (EGD); Surgeon: Akil Alcala MD;  [facility];  Service: Surgical Oncology    FL GUIDED CENTRAL VENOUS ACCESS DEVICE INSERTION  9/26/2019    GASTROJEJUNOSTOMY W/ JEJUNOSTOMY TUBE N/A 9/26/2019    Procedure: INSERTION JEJUNOSTOMY TUBE OPEN;  Surgeon: Akil Alcala MD;  [facility];  Service: Surgical Oncology    GASTROJEJUNOSTOMY W/ JEJUNOSTOMY TUBE N/A 6/5/2020    Procedure: INSERTION JEJUNOSTOMY TUBE OPEN;  Surgeon: Akil Alcala MD;  [facility];  Service: Surgical Oncology    IR CHEST TUBE  5/26/2020    IR SPINE PROCEDURE  5/27/2020    IR THORACENTESIS  2/25/2020    MS ESOPHAGOGASTRODUODENOSCOPY TRANSORAL DIAGNOSTIC N/A 4/28/2020    Procedure: ESOPHAGOGASTRODUODENOSCOPY (EGD);   Surgeon: Asif Sen MD;  [facility];  Service: Thoracic    MS ESOPHAGOSCOPY FLEX BALLOON DILAT <30 MM DIAM N/A 4/28/2020    Procedure: DILATATION ESOPHAGEAL;  Surgeon: Asif Sen MD;  [facility];  Service: Thoracic    TONSILLECTOMY      TUNNELED VENOUS PORT PLACEMENT N/A 2019    Procedure: INSERTION VENOUS PORT (PORT-A-CATH);   Surgeon: Leanna Silva MD;  [facility];  Service: Surgical Oncology    VOCAL CORD INJECTION N/A 2020    Procedure: MICROLARYNGOSCOPY WITH INJECTION;  Surgeon: Nhi Monahan MD;  [facility];  Service: ENT     Social History     Substance and Sexual Activity   Alcohol Use Not Currently    Alcohol/week: 0 0 standard drinks    Frequency: Never    Drinks per session: 1 or 2    Binge frequency: Never     Social History     Substance and Sexual Activity   Drug Use Never     Social History     Tobacco Use   Smoking Status Former Smoker    Packs/day: 0 50    Years: 50 00    Pack years: 25 00    Types: Cigarettes    Last attempt to quit: 2017    Years since quittin 5   Smokeless Tobacco Never Used     Family History   Problem Relation Age of Onset    Diabetes Mother     No Known Problems Father        Meds/Allergies   all current active meds have been reviewed, current meds:   Current Facility-Administered Medications   Medication Dose Route Frequency    acetaminophen (TYLENOL) oral suspension 650 mg  650 mg Oral Q6H PRN    amiodarone tablet 200 mg  200 mg Oral Daily With Breakfast    aspirin chewable tablet 81 mg  81 mg Oral Daily    atorvastatin (LIPITOR) tablet 40 mg  40 mg Oral Daily With Dinner    cefTRIAXone (ROCEPHIN) 2,000 mg in dextrose 5 % 50 mL IVPB  2,000 mg Intravenous Q24H    enoxaparin (LOVENOX) subcutaneous injection 40 mg  40 mg Subcutaneous Daily    insulin lispro (HumaLOG) 100 units/mL subcutaneous injection 1-5 Units  1-5 Units Subcutaneous TID AC    insulin lispro (HumaLOG) 100 units/mL subcutaneous injection 1-5 Units  1-5 Units Subcutaneous HS    levothyroxine tablet 25 mcg  25 mcg Oral Early Morning    metroNIDAZOLE (FLAGYL) tablet 500 mg  500 mg Oral Q8H [address]    ondansetron (ZOFRAN) injection 4 mg  4 mg Intravenous Q6H PRN    oxyCODONE (ROXICODONE) oral solution 5 mg  5 mg Oral Q4H PRN    rOPINIRole (REQUIP) tablet 0 25 mg  0 25 mg Oral HS    traZODone (DESYREL) tablet 100 mg  100 mg Oral HS    and PTA meds:   Prior to Admission Medications   Prescriptions Last Dose Informant Patient Reported? Taking?    Blood Glucose Monitoring Suppl (ONE TOUCH ULTRA 2) w/Device KIT  Self No No   Sig: Check bs , q fasting and 2 hr after meals   Lancets (ONETOUCH ULTRASOFT) lancets  Self No No   Sig: Check bs , q fasting and 2 hr after meals   amiodarone 200 mg tablet  Self No No   Sig: Take 1 tablet (200 mg total) by mouth 3 (three) times a day with meals   aspirin 81 mg chewable tablet  Self No No   Sig: Chew 1 tablet (81 mg total) daily   atorvastatin (LIPITOR) 40 mg tablet  Self No No   Sig: Take 1 tablet (40 mg total) by mouth daily with dinner   canagliflozin (Invokana) 100 mg  Self Yes No   Sig: Take 100 mg by mouth daily before breakfast   cefTRIAXone 2,000 mg in dextrose 5 % 50 mL IVPB  Self No No   Sig: Infuse 2,000 mg into a venous catheter every 24 hours   glucose blood test strip  Self No No   Sig: Check blood sugar 3 times daily   levothyroxine 25 mcg tablet  Self Yes No   Sig: Take 25 mcg by mouth daily in the early morning   metroNIDAZOLE (FLAGYL) 500 mg tablet  Self No No   Sig: Take 1 tablet (500 mg total) by mouth every 8 (eight) hours   ondansetron (ZOFRAN) 4 mg tablet  Self No No   Sig: Take 1 tablet (4 mg total) by mouth every 6 (six) hours as needed for nausea or vomiting   oxyCODONE (ROXICODONE) 5 mg/5 mL solution  Self No Yes   Sig: Take 5 mg (5 ML) every 4 hours as needed for moderate pain or 10 mg (10 ML) every 4 hours as needed for severe pain   oxyCODONE (ROXICODONE) 5 mg/5 mL solution  Self No Yes   Sig: Take 5 mL (5 mg total) by mouth every 4 (four) hours as needed for moderate painMax Daily Amount: 30 mg   rOPINIRole (REQUIP) 0 25 mg tablet  Self No No   Sig: take 1 tablet by mouth at bedtime   traZODone (DESYREL) 100 mg tablet  Self No No   Sig: Take 1 tablet (100 mg total) by mouth daily at bedtime      Facility-Administered Medications: None     Allergies   Allergen Reactions    Penicillins Itching       Objective   I/O       07/01 0701 - 07/02 0700 07/02 0701 - 07/03 0700 07/03 0701 - 07/04 0700    P  O    0    Total Intake(mL/kg)   0 (0)    Net   0                 Physical Exam   Constitutional: He is oriented to person, place, and time  He appears well-developed  HENT:   Head: Normocephalic and atraumatic  Eyes: Pupils are equal, round, and reactive to light  EOM are normal    Neck: Normal range of motion  Cardiovascular: Normal rate  Pulmonary/Chest: Effort normal  No respiratory distress  Abdominal: Soft  Musculoskeletal: Normal range of motion  Neurological: He is alert and oriented to person, place, and time  He has normal strength  Reflex Scores:       Tricep reflexes are 2+ on the right side and 2+ on the left side  Bicep reflexes are 2+ on the right side and 2+ on the left side  Brachioradialis reflexes are 2+ on the right side and 2+ on the left side  Patellar reflexes are 2+ on the right side and 2+ on the left side  Achilles reflexes are 2+ on the right side and 2+ on the left side  Skin: Skin is warm and dry  Psychiatric: He has a normal mood and affect  His speech is normal and behavior is normal  Judgment and thought content normal    Nursing note and vitals reviewed  Neurologic Exam     Mental Status   Oriented to person, place, and time  Follows 2 step commands  Attention: normal  Concentration: normal    Speech: speech is normal   Level of consciousness: alert  Knowledge: good  Able to repeat  Normal comprehension  Cranial Nerves   Cranial nerves II through XII intact  CN III, IV, VI   Pupils are equal, round, and reactive to light    Extraocular motions are normal      Motor Exam   Muscle bulk: decreased  Overall muscle tone: normal    Strength   Strength 5/5 throughout  Sensory Exam   Light touch normal    DST and JPS intact bilaterally     Gait, Coordination, and Reflexes     Tremor   Resting tremor: absent    Reflexes   Right brachioradialis: 2+  Left brachioradialis: 2+  Right biceps: 2+  Left biceps: 2+  Right triceps: 2+  Left triceps: 2+  Right patellar: 2+  Left patellar: 2+  Right achilles: 2+  Left achilles: 2+  Right Ny: absent  Left Ny: absent  Right ankle clonus: absent  Left ankle clonus: absent        Vitals:Blood pressure 98/63, pulse 77, temperature 98 1 °F (36 7 °C), resp  rate 18, height 5' 10" (1 778 m), weight 69 4 kg (153 lb), SpO2 99 %  ,Body mass index is 21 95 kg/m²  Lab Results:   Results from last 7 days   Lab Units 07/03/20  0724 07/02/20  1917   WBC Thousand/uL 3 71* 5 20   HEMOGLOBIN g/dL 10 6* 10 9*   HEMATOCRIT % 34 0* 34 6*   PLATELETS Thousands/uL 234 240   NEUTROS PCT %  --  60   MONOS PCT %  --  8     Results from last 7 days   Lab Units 07/03/20  0724 07/02/20  1917   POTASSIUM mmol/L 3 7 3 8   CHLORIDE mmol/L 102 105   CO2 mmol/L 29 28   BUN mg/dL 22 17   CREATININE mg/dL 0 98 0 84   CALCIUM mg/dL 9 3 8 9   ALK PHOS U/L  --  66   ALT U/L  --  25   AST U/L  --  13     Results from last 7 days   Lab Units 07/03/20  0724   MAGNESIUM mg/dL 2 4     Results from last 7 days   Lab Units 07/03/20  0724   PHOSPHORUS mg/dL 3 8         No results found for: TROPONINT  ABG:  Lab Results   Component Value Date    PHART 7 434 02/03/2020    KFI3DDP 38 0 02/03/2020    PO2ART 110 2 02/03/2020    MRQ0NSI 24 9 02/03/2020    BEART 0 7 02/03/2020    SOURCE Line, Arterial 02/03/2020       Imaging Studies: I have personally reviewed pertinent reports  and I have personally reviewed pertinent films in PACS     Xr Spine Thoracic 3 Vw    Result Date: 6/29/2020  Narrative: THORACIC SPINE INDICATION:   M46 20: Osteomyelitis of vertebra, site unspecified M86 18:  Other acute osteomyelitis, other site S22 000A: Wedge compression fracture of unspecified thoracic vertebra, initial encounter for closed fracture  COMPARISON:  MRI thoracolumbar spine 5/25/2020 VIEWS:  XR SPINE THORACIC 3 VW FINDINGS: Partially imaged left subclavian port catheter with tip at the superior cavoatrial junction  Coils again noted in the posterior mediastinum  There is no acute fracture or pathologic bone lesion  Redemonstrated is mild anterior wedge deformity at T6  Thoracic vertebral alignment is within normal limits  No significant degenerative changes  There is no displacement of the paraspinal line  The pedicles appear intact  Impression: Stable mild anterior wedge deformity of T6  No new osseous erosion  Workstation performed: [id number]     Mri Brain W Wo Contrast    Result Date: 7/2/2020  Narrative: MRI BRAIN WITH AND WITHOUT CONTRAST INDICATION: G93 9: Disorder of brain, unspecified  GE junction adenocarcinoma  Follow-up suspected metastasis  COMPARISON:  5/24/2020 TECHNIQUE: Sagittal T1, axial T2, axial FLAIR, axial T1, axial [place], axial diffusion  Sagittal, axial T1 postcontrast   Axial bravo postcontrast with coronal reconstructions  IV Contrast:  6 mL of gadobutrol injection (MULTI-DOSE)  IMAGE QUALITY:   Diagnostic  FINDINGS: BRAIN PARENCHYMA:  Once again identified is a small enhancing nodule within the left posterior medial frontal lobe, series 5 image 20 measuring approximately 5 mm in size  There is a corresponding area of abnormal hyperintense signal on T2 and FLAIR imaging  This is slightly larger in size when compared with the prior examination  Stable focal encephalomalacia and chronic hemosiderin deposition within the right anterior medial frontal lobe with adjacent gliosis  A few punctate white matter hyperintensities are seen within the cerebral hemispheres, likely early chronic microangiopathic change  Normal signal within the basal ganglia, brainstem and cerebellar hemispheres   Postcontrast imaging of the remainder of the brain parenchyma is unremarkable  VENTRICLES:  Normal for the patient's age  SELLA AND PITUITARY GLAND:  Normal  ORBITS:  Normal  PARANASAL SINUSES:  Mild mucosal thickening of the maxillary sinuses inferiorly  VASCULATURE:  Evaluation of the major intracranial vasculature demonstrates appropriate flow voids  CALVARIUM AND SKULL BASE:  Normal  EXTRACRANIAL SOFT TISSUES:  Normal      Impression: Slight interval enlargement of the small juxtacortical enhancing nodule seen on the prior examination within the left posterior medial frontal lobe, suspicious for metastasis  This measures 5 mm in size  No new enhancing lesions are identified  Stable right frontal lobe encephalomalacia, gliosis and mild chronic hemosiderin deposition compared to the prior examination suggesting remote vascular or traumatic injury  The study was marked in [facility] for immediate notification  Workstation performed: [id number]     Mri Thoracic Spine W Wo Contrast    Result Date: 7/1/2020  Narrative: MRI THORACIC SPINE WITH AND WITHOUT CONTRAST INDICATION: M46 20: Osteomyelitis of vertebra, site unspecified  COMPARISON:  5/25/2020  TECHNIQUE:  Sagittal T1, sagittal T2, sagittal inversion recovery, axial T2,  axial 2D MERGE  Sagittal and axial T1 postcontrast  IV Contrast:  6 mL of gadobutrol injection (MULTI-DOSE) IMAGE QUALITY:  Diagnostic  FINDINGS: ALIGNMENT:  Mild interval increase in wedge deformity of T6 and corresponding thoracic kyphosis  MARROW SIGNAL:  Marrow edema and enhancement in the anterior aspects of the T3-T8 vertebral bodies and throughout most of the T6 vertebral body is not significantly changed  T6 hemangioma noted  Minimal signal hyperintensity in the T6-7 disc without abnormal enhancement of the disc  THORACIC CORD:  Normal signal and morphology of the thoracic cord and conus   PREVERTEBRAL AND PARASPINAL SOFT TISSUES:   Enlarging midline anterior paraspinal collection from T5 to T8 measuring 5 cm superoinferior by 2 2 cm mediolateral by 0 9 cm anteroposterior  Surrounding anterior and lateral paraspinal inflammation  Phlegmon again noted in the right paraspinal region posterior to the neoesophagus measuring 2 7 x 2 2 x 4 8 cm cyst  Right pleural effusion again identified  THORACIC DEGENERATIVE CHANGE:  Mild disc degenerative change, stable  POSTCONTRAST:  No abnormal enhancement  Impression: 1  Increasing size of midline anterior paraspinal fluid collection at the T5-T8 levels, measuring 5 x 2 2 x 0 9 cm, consistent with an abscess  Adjacent inflammation and phlegmon in the right paraspinal region, posterior to the esophagus, again concerning for esophageal leak  2   Persistent osteomyelitis from T3 to T8  Increasing T6 wedge deformity  Minimal T6-7 discitis  3   No evidence of epidural abscess or inflammation  The study was marked in [facility] for immediate notification  Workstation performed: [id number]     EKG, Pathology, and Other Studies: I have personally reviewed pertinent reports  VTE Prophylaxis: Sequential compression device (Venodyne)  and Enoxaparin (Lovenox)    Code Status: Level 1 - Full Code  Advance Directive and Living Will:      Power of :    POLST:      Counseling / Coordination of Care  I spent 45 minutes with the patient

## 2020-07-03 NOTE — ASSESSMENT & PLAN NOTE
MRI today showed thsi is worsening  Nsurg and ID consults  No signs of sepsis  C/w Rocephin and Flagyl for now

## 2020-07-03 NOTE — MALNUTRITION/BMI
This medical record reflects one or more clinical indicators suggestive of Malnutrition   Malnutrition Findings:   Malnutrition type: Chronic illness  Degree of Malnutrition: Other severe protein calorie malnutrition(r/t Chronic Illness as evidenced by significant weight loss 11 7# (7 1%) x 1 month, loss of 37 0# (19 5%)x 3 months and inadequate PO and EN intake>1 month  Treat with EN via J Tube as sole source of nutrition & cont  Outpatient Nutrition Onoclogy f/u )  Malnutrition Characteristics: Inadequate energy, Weight loss    BMI Findings: Body mass index is 21 95 kg/m²  See Nutrition note dated 7/3/2020 for additional details  Completed nutrition assessment is viewable in the nutrition documentation

## 2020-07-03 NOTE — CONSULTS
Consultation - Thoracic Surgery  Mandy Thakur 71 y o  male MRN: [id number]  Unit/Bed#: [facility] 603-01 Encounter: [id number]        Assessment/Plan     Assessment:  71 M with history of esophageal cancer status post transhiatal esophagectomy, recurrent esophageal stricture s/p J tube placement, now with enlarging paraspinal abscess concerning for leak    Plan:  Keep NPO, continue J tube feeds  Continue antibiotics, ID consulted  IR consult placed for possible drainage  Will likely require at least EGD, possibly dilation next week    History of Present Illness     HPI:  Mandy Thakur is a 71 y o  male who is known to the thoracic surgery service  He has a history of esophageal cancer and underwent a transhiatal esophagectomy  He has had issues with recurrent dysphagia secondary to esophageal stricture, for which he has undergone endoscopic dilatation  He reports continued intermittent dysphagia  For this, a jejunostomy tube was placed last month and he has been supplemented with tube feeds  He had an MRI done yesterday and was seen in the office today, where it was noted that he had an increasing size of a paraspinal abscess  He was beng treated with antibiotics for this, but due to the increasing size, was recommended to come to the hospital for evaluation  He currently endorses dysphagia  He tolerates tube feeds but does endorse some bloating with his feeds  He otherwise states he feels well  Denies fevers, chills, nausea, or vomiting  No chest pain or shortness of breath  Review of Systems   Constitutional: Positive for unexpected weight change  HENT: Negative  Eyes: Negative  Respiratory: Negative for cough, chest tightness and shortness of breath  Cardiovascular: Negative  Gastrointestinal: Negative  Endocrine: Negative  Genitourinary: Negative  Musculoskeletal: Negative  Skin: Negative  Allergic/Immunologic: Negative  Neurological: Negative  Hematological: Negative  Psychiatric/Behavioral: Negative  Historical Information   Past Medical History:   Diagnosis Date    Bilateral pleural effusion     COPD (chronic obstructive pulmonary disease) (HCC)     Diabetes mellitus (HCC)     Difficulty swallowing     Disease of thyroid gland     Edema     Esophageal mass     History of chemotherapy     History of transfusion     Malignant neoplasm of lower third of esophagus ([address] )     Port-A-Cath in place     LCW    Sleep apnea     no CPAP    SOB (shortness of breath)      Past Surgical History:   Procedure Laterality Date    BACK SURGERY      x2    DISC REMOVAL      ESOPHAGECTOMY N/A 1/28/2020    Procedure: ESOPHAGECTOMY, TRANSHIATAL;  Surgeon: Keli Hodges MD;  [facility];  Service: Surgical Oncology    ESOPHAGOGASTRODUODENOSCOPY N/A 1/28/2020    Procedure: ESOPHAGOGASTRODUODENOSCOPY (EGD); Surgeon: Keli Hodges MD;  [facility];  Service: Surgical Oncology    FL GUIDED CENTRAL VENOUS ACCESS DEVICE INSERTION  9/26/2019    GASTROJEJUNOSTOMY W/ JEJUNOSTOMY TUBE N/A 9/26/2019    Procedure: INSERTION JEJUNOSTOMY TUBE OPEN;  Surgeon: Keli Hodges MD;  [facility];  Service: Surgical Oncology    GASTROJEJUNOSTOMY W/ JEJUNOSTOMY TUBE N/A 6/5/2020    Procedure: INSERTION JEJUNOSTOMY TUBE OPEN;  Surgeon: Keli Hodges MD;  [facility];  Service: Surgical Oncology    IR CHEST TUBE  5/26/2020    IR SPINE PROCEDURE  5/27/2020    IR THORACENTESIS  2/25/2020    UT ESOPHAGOGASTRODUODENOSCOPY TRANSORAL DIAGNOSTIC N/A 4/28/2020    Procedure: ESOPHAGOGASTRODUODENOSCOPY (EGD);   Surgeon: Gilberto Kellogg MD;  [facility];  Service: Thoracic    UT ESOPHAGOSCOPY FLEX BALLOON DILAT <30 MM DIAM N/A 4/28/2020    Procedure: DILATATION ESOPHAGEAL;  Surgeon: Gilberto Kellogg MD;  [facility];  Service: Thoracic    TONSILLECTOMY      TUNNELED VENOUS PORT PLACEMENT N/A 9/26/2019    Procedure: INSERTION VENOUS PORT (PORT-A-CATH); Surgeon: Moises Schaffer MD;  [facility];  Service: Surgical Oncology    VOCAL CORD INJECTION N/A 2020    Procedure: MICROLARYNGOSCOPY WITH INJECTION;  Surgeon: Caryle Billings, MD;  [facility];  Service: ENT     Social History   Social History     Substance and Sexual Activity   Alcohol Use Not Currently    Alcohol/week: 0 0 standard drinks    Frequency: Never    Drinks per session: 1 or 2    Binge frequency: Never     Social History     Substance and Sexual Activity   Drug Use Never     Social History     Tobacco Use   Smoking Status Former Smoker    Packs/day: 0 50    Years: 50 00    Pack years: 25 00    Types: Cigarettes    Last attempt to quit: 2017    Years since quittin 5   Smokeless Tobacco Never Used     Family History:   Family History   Problem Relation Age of Onset    Diabetes Mother     No Known Problems Father        Meds/Allergies   PTA meds:   Prior to Admission Medications   Prescriptions Last Dose Informant Patient Reported? Taking?    Blood Glucose Monitoring Suppl (ONE TOUCH ULTRA 2) w/Device KIT  Self No No   Sig: Check bs , q fasting and 2 hr after meals   Lancets (ONETOUCH ULTRASOFT) lancets  Self No No   Sig: Check bs , q fasting and 2 hr after meals   amiodarone 200 mg tablet  Self No No   Sig: Take 1 tablet (200 mg total) by mouth 3 (three) times a day with meals   aspirin 81 mg chewable tablet  Self No No   Sig: Chew 1 tablet (81 mg total) daily   atorvastatin (LIPITOR) 40 mg tablet  Self No No   Sig: Take 1 tablet (40 mg total) by mouth daily with dinner   canagliflozin (Invokana) 100 mg  Self Yes No   Sig: Take 100 mg by mouth daily before breakfast   cefTRIAXone 2,000 mg in dextrose 5 % 50 mL IVPB  Self No No   Sig: Infuse 2,000 mg into a venous catheter every 24 hours   glucose blood test strip  Self No No   Sig: Check blood sugar 3 times daily   levothyroxine 25 mcg tablet  Self Yes No   Sig: Take 25 mcg by mouth daily in the early morning   metroNIDAZOLE (FLAGYL) 500 mg tablet  Self No No   Sig: Take 1 tablet (500 mg total) by mouth every 8 (eight) hours   ondansetron (ZOFRAN) 4 mg tablet  Self No No   Sig: Take 1 tablet (4 mg total) by mouth every 6 (six) hours as needed for nausea or vomiting   oxyCODONE (ROXICODONE) 5 mg/5 mL solution  Self No Yes   Sig: Take 5 mg (5 ML) every 4 hours as needed for moderate pain or 10 mg (10 ML) every 4 hours as needed for severe pain   oxyCODONE (ROXICODONE) 5 mg/5 mL solution  Self No Yes   Sig: Take 5 mL (5 mg total) by mouth every 4 (four) hours as needed for moderate painMax Daily Amount: 30 mg   rOPINIRole (REQUIP) 0 25 mg tablet  Self No No   Sig: take 1 tablet by mouth at bedtime   traZODone (DESYREL) 100 mg tablet  Self No No   Sig: Take 1 tablet (100 mg total) by mouth daily at bedtime      Facility-Administered Medications: None     Allergies   Allergen Reactions    Penicillins Itching       Objective   First Vitals:   Blood Pressure: 131/79 (07/02/20 1840)  Pulse: 87 (07/02/20 1840)  Temperature: 98 °F (36 7 °C) (07/02/20 1840)  Temp Source: Oral (07/02/20 1840)  Respirations: 18 (07/02/20 1840)  Height: 5' 10" (177 8 cm) (07/02/20 2131)  Weight - Scale: 69 4 kg (153 lb) (07/02/20 1840)  SpO2: 99 % (07/02/20 1840)    Current Vitals:   Blood Pressure: 144/91 (07/02/20 2030)  Pulse: 77 (07/02/20 2030)  Temperature: 98 1 °F (36 7 °C) (07/02/20 2030)  Temp Source: Oral (07/02/20 1840)  Respirations: 18 (07/02/20 2030)  Height: 5' 10" (177 8 cm) (07/02/20 2131)  Weight - Scale: 69 4 kg (153 lb) (07/02/20 2131)  SpO2: 99 % (07/02/20 2030)    No intake or output data in the 24 hours ending 07/02/20 2213    Invasive Devices     Central Venous Catheter Line            Port A Cath 09/26/19 Left Chest 280 days          Peripheral Intravenous Line            Peripheral IV 06/05/20 Left;Proximal;Ventral (anterior) Forearm 27 days          Drain            Gastrostomy/Enterostomy Jejunostomy 14 Fr   days    Gastrostomy/Enterostomy Jejunostomy 14 Fr  LUQ 27 days                Physical Exam   Constitutional: He is oriented to person, place, and time  He appears well-developed  HENT:   Head: Normocephalic  Eyes: Pupils are equal, round, and reactive to light  Neck: Normal range of motion  Cardiovascular: Normal rate  Pulmonary/Chest: Effort normal  No respiratory distress  He exhibits no tenderness  Abdominal: Soft  He exhibits no distension  There is no tenderness  J tube in place   Musculoskeletal: He exhibits no deformity  Neurological: He is alert and oriented to person, place, and time  Skin: Skin is warm and dry  Capillary refill takes less than 2 seconds  Psychiatric: He has a normal mood and affect  His behavior is normal        Lab Results:   CBC:   Lab Results   Component Value Date    WBC 5 20 07/02/2020    HGB 10 9 (L) 07/02/2020    HCT 34 6 (L) 07/02/2020    MCV 86 07/02/2020     07/02/2020    MCH 27 2 07/02/2020    MCHC 31 5 07/02/2020    RDW 19 7 (H) 07/02/2020    MPV 9 5 07/02/2020    NRBC 0 07/02/2020   , CMP:   Lab Results   Component Value Date    SODIUM 138 07/02/2020    K 3 8 07/02/2020     07/02/2020    CO2 28 07/02/2020    BUN 17 07/02/2020    CREATININE 0 84 07/02/2020    CALCIUM 8 9 07/02/2020    AST 13 07/02/2020    ALT 25 07/02/2020    ALKPHOS 66 07/02/2020    EGFR 92 07/02/2020     Imaging: I have personally reviewed pertinent reports  EKG, Pathology, and Other Studies: I have personally reviewed pertinent reports        Code Status: Level 1 - Full Code  Advance Directive and Living Will:      Power of :    POLST:

## 2020-07-03 NOTE — ASSESSMENT & PLAN NOTE
Diagnosis: Clinical stage T3N1M0 esophageal carcinoma  Procedure: EGD, transhiatal esophagectomy performed on 1/28/20  Pathology: esophagogastrectomy reveals microscopic focus of residual adenocarcinoma in muscularis propria measuring 0 7 cm, adjacent Duong's esophagus with associated atypia indeterminate for dysplasia  7 lymph nodes negative for carcinoma  Proximal and distal margins negative for carcinoma   8th edition AJCC tumor stage I (ypT2, ypN0)  Therapy:  Neoadjuvant FLOT x 6 chemotherapy and Herceptin, completed 12/17/19

## 2020-07-03 NOTE — NUTRITION
07/03/20 0951   Assessment   Timepoint Initial  (Consult TF, Triggers weight loss, decreased PO, Home EN)   Labs   List Completed Labs Other (Comment)  (7/3 Glu 149 ,HgBA1C 5 5    Meds reviewed)   Feeding Route   Tube Feeding J-tube  (Placed June 2020)   Adequacy of Intake   Nutrition Modality NPO   Nutrition Prognosis   Nutrition Concerns Dysphagia; Other (Comment)  (malnutrition, weight loss,Esophageal Obstruction  No edema or open areas per Nursing assessment)   Comorbid Concerns Other (Comment)  (T2DM, CA,COPD,Sleep Apnea)   Nutrition Considerations Other (Comment)  (Received nutrition education 7/1/2020 OP Nutrition Oncology  Follow up scheduled July 16,2020)   PES Statement   Problem Intake   Oral or Nutritional Support Intake (2) Inadequate oral intake NI-2 1   Related to Inability for PO   As evidenced by: NPO Status   Patient Nutrition Goals   Goal initiate enteral feed   Recommendations/Interventions   Summary Consult recieved for nutrition support recommendations  Patient seen Outpatient Nutrition Oncology  7/1/2020 for same  Noted per chart review, esophageal stricture last dilation was 4/28/20 current r/t progressive dysphagia  Difficulty tolerating his J tube feeds at home reports "gets full quickly"  Loss of 11 7# (7 1%) in 1 month and loss of 37 0# (19 5%) in 3 months r/t inadequte PO intake and then Jtube placed June 2020 with inadequate home tube feedings infused  Malnutrition/BMI Present Yes   Malnutrition type Chronic illness   Degree of Malnutrition Other severe protein calorie malnutrition  (r/t Chronic Illness as evidenced by significant weight loss 11 7# (7 1%) x 1 month, loss of 37 0# (19 5%)x 3 months and inadequate PO and EN intake>1 month  Treat with EN via J Tube as sole source of nutrition & cont   Outpatient Nutrition Onoclogy f/u )   Malnutrition Characteristics Inadequate energy;Weight loss   Interventions EN initiate   Nutrition Recommendations Initiate EN/PN;Tube Feeding Recommendation provided  (Initiate Jevity 1 5 [id number] via J Tube  Increase 10ml/hr q4hr to Goal 99ml/hr x 15hour (1485mlTV in 15hr)  Flush with 200ml FW q 4hr  Provides 2228kcal, 95grm protein,[id number]   This will be patients home tube feeding regimen)   Nutrition Complexity Risk   Nutrition complexity level Moderate risk   Follow up date 07/08/20  (chk EN tolerance/GLu)

## 2020-07-03 NOTE — PLAN OF CARE
Problem: Nutrition/Hydration-ADULT  Goal: Nutrient/Hydration intake appropriate for improving, restoring or maintaining nutritional needs  Description  Monitor and assess patient's nutrition/hydration status for malnutrition  Collaborate with interdisciplinary team and initiate plan and interventions as ordered  Monitor patient's weight and dietary intake as ordered or per policy  Utilize nutrition screening tool and intervene as necessary  Determine patient's food preferences and provide high-protein, high-caloric foods as appropriate       INTERVENTIONS:  - Monitor oral intake, urinary output, labs, and treatment plans  - Assess nutrition and hydration status and recommend course of action  - Evaluate amount of meals eaten  - Assist patient with eating if necessary   - Allow adequate time for meals  - Recommend/ encourage appropriate diets, oral nutritional supplements, and vitamin/mineral supplements  - Order, calculate, and assess calorie counts as needed  - Recommend, monitor, and adjust tube feedings and TPN/PPN based on assessed needs  - Assess need for intravenous fluids  - Provide specific nutrition/hydration education as appropriate  - Include patient/family/caregiver in decisions related to nutrition  Outcome: Progressing     Problem: PAIN - ADULT  Goal: Verbalizes/displays adequate comfort level or baseline comfort level  Description  Interventions:  - Encourage patient to monitor pain and request assistance  - Assess pain using appropriate pain scale  - Administer analgesics based on type and severity of pain and evaluate response  - Implement non-pharmacological measures as appropriate and evaluate response  - Notify physician/advanced practitioner if interventions unsuccessful or patient reports new pain   Outcome: Progressing     Problem: INFECTION - ADULT  Goal: Absence or prevention of progression during hospitalization  Description  INTERVENTIONS:  - Assess and monitor for signs and symptoms of infection  - Monitor lab/diagnostic results  - Monitor all insertion sites, i e  indwelling lines, tubes, and drains  - Circle appropriate cooling/warming therapies per order  - Administer medications as ordered  - Instruct and encourage patient and family to use good hand hygiene technique  - Identify and instruct in appropriate isolation precautions for identified infection/condition   Outcome: Progressing  Goal: Absence of fever/infection during neutropenic period  Description  INTERVENTIONS:  - Monitor WBC    Outcome: Progressing     Problem: SAFETY ADULT  Goal: Patient will remain free of falls  Description  INTERVENTIONS:  - Assess patient frequently for physical needs  -  Identify cognitive and physical deficits and behaviors that affect risk of falls    -  Circle fall precautions as indicated by assessment   - Educate patient/family on patient safety including physical limitations  - Instruct patient to call for assistance with activity based on assessment  - Modify environment to reduce risk of injury  - Consider OT/PT consult to assist with strengthening/mobility  Outcome: Progressing  Goal: Maintain or return to baseline ADL function  Description  INTERVENTIONS:  -  Assess patient's ability to carry out ADLs; assess patient's baseline for ADL function and identify physical deficits which impact ability to perform ADLs (bathing, care of mouth/teeth, toileting, grooming, dressing, etc )  - Assess/evaluate cause of self-care deficits   - Assess range of motion  - Assess patient's mobility; develop plan if impaired  - Assess patient's need for assistive devices and provide as appropriate  - Encourage maximum independence but intervene and supervise when necessary  - Involve family in performance of ADLs  - Assess for home care needs following discharge   - Consider OT consult to assist with ADL evaluation and planning for discharge  - Provide patient education as appropriate  Outcome: Progressing  Goal: Maintain or return mobility status to optimal level  Description  INTERVENTIONS:  - Assess patient's baseline mobility status (ambulation, transfers, stairs, etc )    - Identify cognitive and physical deficits and behaviors that affect mobility  - Identify mobility aids required to assist with transfers and/or ambulation (gait belt, sit-to-stand, lift, walker, cane, etc )  - [place] fall precautions as indicated by assessment  - Record patient progress and toleration of activity level on Mobility SBAR; progress patient to next Phase/Stage  - Instruct patient to call for assistance with activity based on assessment  - Consider rehabilitation consult to assist with strengthening/weightbearing, etc   Outcome: Progressing     Problem: DISCHARGE PLANNING  Goal: Discharge to home or other facility with appropriate resources  Description  INTERVENTIONS:  - Identify barriers to discharge w/patient and caregiver  - Arrange for needed discharge resources and transportation as appropriate  - Identify discharge learning needs (meds, wound care, etc )  - Refer to Case Management Department for coordinating discharge planning if the patient needs post-hospital services based on physician/advanced practitioner order or complex needs related to functional status, cognitive ability, or social support system   Outcome: Progressing     Problem: Knowledge Deficit  Goal: Patient/family/caregiver demonstrates understanding of disease process, treatment plan, medications, and discharge instructions  Description  Complete learning assessment and assess knowledge base    Interventions:  - Provide teaching at level of understanding  - Provide teaching via preferred learning methods  Outcome: Progressing     Problem: GASTROINTESTINAL - ADULT  Goal: Minimal or absence of nausea and/or vomiting  Description  INTERVENTIONS:  - Administer IV fluids if ordered to ensure adequate hydration  - Maintain NPO status until nausea and vomiting are resolved  - Nasogastric tube if ordered  - Administer ordered antiemetic medications as needed  - Provide nonpharmacologic comfort measures as appropriate  - Advance diet as tolerated, if ordered  - Consider nutrition services referral to assist patient with adequate nutrition and appropriate food choices  Outcome: Progressing  Goal: Maintains or returns to baseline bowel function  Description  INTERVENTIONS:  - Assess bowel function  - Encourage oral fluids to ensure adequate hydration  - Administer IV fluids if ordered to ensure adequate hydration  - Administer ordered medications as needed  - Encourage mobilization and activity  - Consider nutritional services referral to assist patient with adequate nutrition and appropriate food choices  Outcome: Progressing  Goal: Maintains adequate nutritional intake  Description  INTERVENTIONS:  - Monitor percentage of each meal consumed  - Identify factors contributing to decreased intake, treat as appropriate  - Assist with meals as needed  - Monitor I&O, weight, and lab values if indicated  - Obtain nutrition services referral as needed  Outcome: Progressing  Goal: Establish and maintain optimal ostomy function  Description  INTERVENTIONS:  - Assess bowel function  - Encourage oral fluids to ensure adequate hydration  - Administer IV fluids if ordered to ensure adequate hydration   - Administer ordered medications as needed  - Encourage mobilization and activity  - Nutrition services referral to assist patient with appropriate food choices  - Assess stoma site  - Consider wound care consult   Outcome: Progressing

## 2020-07-03 NOTE — ASSESSMENT & PLAN NOTE
T4-T6 osteomyelitis and anterior paraspinal fluid collection  · Finished 6 weeks IV antibiotics 7/2/2020  · Repeat MRI thoracic spine w/wo showed enlarging abscess/fluid collection  · Patient sent to ED for further evaluation by thoracic surgery team    Imaging:   · MRI thoracic spine w/wo, 7/1/2020: Increasing size of midline anterior paraspinal fluid collection at the T5-T8 levels, measuring 5 x 2 2 x 0 9 cm, consistent with an abscess  Adjacent inflammation and phlegmon in the right paraspinal region, posterior to the esophagus, again concerning for esophageal leak  Persistent osteomyelitis from T3 to T8  Increasing T6 wedge deformity  Minimal T6-7 discitis  No evidence of epidural abscess or inflammation  Plan:   · Continue to monitor neurological exam  No focal weakness appreciated  · Thoracic surgery following; recommending possible IR drainage and repeat EGD  · ID consulted for antibiotic recommendations  · Pain control per primary team   · Mobilize with PT/OT  · DVT PPX: SCDs  · Continue brace when out of bed if possible due to port and J tube for spinal stabilization  · No indication at this time for neurosurgical intervention    Neurosurgery will follow from the periphery  Please don't hesitate to contact us with any questions or concerns  Patient should keep current outpatient follow up with xrays to be completed prior to appointment

## 2020-07-03 NOTE — ASSESSMENT & PLAN NOTE
Pt recently here for extended period of time and discharged on iv abx from prior admission  · On prior admission patient had undergone biopsy on 05/27 in IR with Dr Brii Coburn  · Pathology at that time was consistent with acute osteomyelitis however cultures remain negative  · At that time patient was recommended to utilize CASH brace  · Extensive conversation was had with infectious disease and thoracic surgery however options at that point were to attempted conservative treatment with antibiotics versus major surgery/reconstruction  · - at the time it was determined patient would continue with IV antibiotics through 07/09/2020  · C/w Rocephin and Flagyl for now  · MRI thoracic with and without contrast 07/01/2020:  1   Increasing size of midline anterior paraspinal fluid collection at the T5-T8 levels, measuring 5 x 2 2 x 0 9 cm, consistent with an abscess   Adjacent inflammation and phlegmon in the right paraspinal region, posterior to the esophagus, again   concerning for esophageal leak  2   Persistent osteomyelitis from T3 to T8   Increasing T6 wedge deformity   Minimal T6-7 discitis  3   No evidence of epidural abscess or inflammation

## 2020-07-03 NOTE — ASSESSMENT & PLAN NOTE
Lab Results   Component Value Date    HGBA1C 5 5 07/03/2020       Recent Labs     07/02/20  2116 07/03/20  0726   POCGLU 79 153*       Blood Sugar Average: Last 72 hrs:    A1C  ISS  (P) 116   Monitor for hypoglycemia, patient currently NPO  However can have tube feeds via J-tube  Will discuss with IR, question intervention incomplete NPO status at this time until procedures performed  May need to run low-dose D5 normal saline

## 2020-07-03 NOTE — ASSESSMENT & PLAN NOTE
Transferred pt for thoracic surgery evaluation   Maintain NPO at this time   Per speech evaluation recommendations continue to keep patient NPO at this time and continue J-tube feeds    For surgery, continue antibiotics ID consulted  IR consulted for possible drainage  Will likely require EGD per thoracic surgery for possible dilatation

## 2020-07-03 NOTE — QUICK NOTE
MRI from July 1, 2020 [facility] reviewed showing a paraspinal abscess immediately anterior and midline to the mid thoracic spine  The location cannot be access percutaneously  IR has been consulted to evaluate the patient, determine the appropriate procedure, and whether or not a procedure can and should be performed  Thank you for allowing me to participate in the care of Parish Sleigh  Please don't hesitate to call, text, email, or TigerText with any questions  This text is generated with voice recognition software  There may be translation, syntax,  or grammatical errors  If you have any questions, please contact the dictating provider

## 2020-07-03 NOTE — CONSULTS
Consultation - Infectious Disease   Rut Vincent 71 y o  male MRN: [id number]  Unit/Bed#: [facility] 603-01 Encounter: [id number]      IMPRESSION & RECOMMENDATIONS:   Impression/Recommendations:  1  Paraspinal abscess and vertebral osteomyelitis  Follow-up MRI T-spine shows increasing size paraspinal abscess at T5-8 with persistent osteomyelitis at T3-8  No epidural abscess  Suspect secondary to contiguous spread in setting of #2  ESR from 07/01 is 53, much improved from prior ESR of 119 on 05/25  IR input noted and collection is inaccessible percutaneously  Consider sterile collection as patient is otherwise afebrile with improving ESR and no new symptoms  Consider possible leak  Prior swallow study was negative     -check blood cultures x2 sets for completeness  -continue IV ceftriaxone  -continue oral Flagyl  -follow-up CT chest  -followup further recommendations by thoracic surgery service  -if blood cultures are negative and no intervention anticipated, will plan to complete 6 weeks total of IV antibiotic, through 07/09/2020   -transition to oral cefdinir/Flagyl thereafter until ESR plateaus/normalizes  -outpatient ID follow-up    2  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  Status post neoadjuvant chemotherapy via chest wall port, transhiatal esophagectomy on 01/28/2020  Repeat MRI brain is concerning for metastatic lesion  3  Dysphagia with recurrent esophageal stricture  In the setting of #2  Status post J-tube placement  Remains on tube feeds  Thoracic surgery evaluation noted with plan for at least EGD, possible dilation next week     -thoracic surgery follow-up ongoing  -plan for OR for EGD and dilation next week    4  COPD  No evidence of acute exacerbation  Antibiotics:  Ceftriaxone/Flagyl #36    I discussed above plan with patient, and with Angel Cordova from primary service  I personally reviewed recent medical records in detail  Thank you for this consultation    We will follow along with you  Will plan to formally re-evaluate patient again on 07/06  Please call us with any new questions in the interim  HISTORY OF PRESENT ILLNESS:  Reason for Consult:  Paraspinal collection, vertebral osteomyelitis    HPI: Sylvia Amos is a 71 y o  male with GE junction adenocarcinoma status post neoadjuvant chemotherapy via chest wall port, transhiatal esophagectomy on 01/28/2020, subsequent development of esophageal stricture at anastomotic site requiring EGD with dilation, recent admission in May to June 2020 secondary to neck pain and bilateral lower extremity weakness  CTA chest had revealed new paraspinal fluid collection anterior to the spine with erosion of anterior vertebral bodies at T4-6 highly suspicious for paraspinal abscess and adjacent osteomyelitis  MRI thoracic spine confirmed diskitis/osteomyelitis at T4-7 with paraspinal collection  There was concern for esophageal leak, but swallow study was negative  Patient underwent T6 biopsy with negative cultures  Pathology was suggestive of osteomyelitis  The decision was made to attempt conservative treatment with prolonged course of antibiotics as major surgery/reconstruction would come with significant risks  Patient was discharged on ceftriaxone and Flagyl due to be completed on 07/09  He underwent follow-up MRI on 07/02 which showed increasing size of midline anterior paraspinal fluid collection at T5-8 consistent with abscess  Also persistent osteomyelitis from T3-8  For was also adjacent inflammation in the right paraspinal region concerning for esophageal leak  No epidural abscess  Patient was admitted for further workup  He denies fevers, chills, worsening neck/back pain  He does endorse ongoing dysphagia and overall difficulty maintaining oral intake  REVIEW OF SYSTEMS:  A complete system-based review of systems is otherwise negative      PAST MEDICAL HISTORY:  Past Medical History:   Diagnosis Date    Bilateral pleural effusion     COPD (chronic obstructive pulmonary disease) (HCC)     Diabetes mellitus (HCC)     Difficulty swallowing     Disease of thyroid gland     Edema     Esophageal mass     History of chemotherapy     History of transfusion     Malignant neoplasm of lower third of esophagus ([address] )     Port-A-Cath in place     LCW    Sleep apnea     no CPAP    SOB (shortness of breath)      Past Surgical History:   Procedure Laterality Date    BACK SURGERY      x2    DISC REMOVAL      ESOPHAGECTOMY N/A 1/28/2020    Procedure: ESOPHAGECTOMY, TRANSHIATAL;  Surgeon: Manish Levin MD;  [facility];  Service: Surgical Oncology    ESOPHAGOGASTRODUODENOSCOPY N/A 1/28/2020    Procedure: ESOPHAGOGASTRODUODENOSCOPY (EGD); Surgeon: Manish Levin MD;  [facility];  Service: Surgical Oncology    FL GUIDED CENTRAL VENOUS ACCESS DEVICE INSERTION  9/26/2019    GASTROJEJUNOSTOMY W/ JEJUNOSTOMY TUBE N/A 9/26/2019    Procedure: INSERTION JEJUNOSTOMY TUBE OPEN;  Surgeon: Manish Levin MD;  [facility];  Service: Surgical Oncology    GASTROJEJUNOSTOMY W/ JEJUNOSTOMY TUBE N/A 6/5/2020    Procedure: INSERTION JEJUNOSTOMY TUBE OPEN;  Surgeon: Manish Levin MD;  [facility];  Service: Surgical Oncology    IR CHEST TUBE  5/26/2020    IR SPINE PROCEDURE  5/27/2020    IR THORACENTESIS  2/25/2020    OK ESOPHAGOGASTRODUODENOSCOPY TRANSORAL DIAGNOSTIC N/A 4/28/2020    Procedure: ESOPHAGOGASTRODUODENOSCOPY (EGD); Surgeon: Rowan Sterling MD;  [facility];  Service: Thoracic    OK ESOPHAGOSCOPY FLEX BALLOON DILAT <30 MM DIAM N/A 4/28/2020    Procedure: DILATATION ESOPHAGEAL;  Surgeon: Rowan Sterling MD;  [facility];  Service: Thoracic    TONSILLECTOMY      TUNNELED VENOUS PORT PLACEMENT N/A 9/26/2019    Procedure: INSERTION VENOUS PORT (PORT-A-CATH);   Surgeon: Manish Levin MD;  [facility];  Service: Surgical Oncology    VOCAL CORD INJECTION N/A 2020    Procedure: MICROLARYNGOSCOPY WITH INJECTION;  Surgeon: Daniella Randall MD;  [facility];  Service: ENT       FAMILY HISTORY:  Non-contributory    SOCIAL HISTORY:  Social History     Substance and Sexual Activity   Alcohol Use Not Currently    Alcohol/week: 0 0 standard drinks    Frequency: Never    Drinks per session: 1 or 2    Binge frequency: Never     Social History     Substance and Sexual Activity   Drug Use Never     Social History     Tobacco Use   Smoking Status Former Smoker    Packs/day: 0 50    Years: 50 00    Pack years: 25 00    Types: Cigarettes    Last attempt to quit: 2017    Years since quittin 5   Smokeless Tobacco Never Used       ALLERGIES:  Allergies   Allergen Reactions    Penicillins Itching       MEDICATIONS:  All current active medications have been reviewed  PHYSICAL EXAM:  Vitals:  Temp:  [96 3 °F (35 7 °C)-98 1 °F (36 7 °C)] 98 1 °F (36 7 °C)  HR:  [77-87] 77  Resp:  [17-18] 18  BP: ()/(53-91) 98/63  SpO2:  [98 %-100 %] 99 %  Temp (24hrs), Av 5 °F (36 4 °C), Min:96 3 °F (35 7 °C), Max:98 1 °F (36 7 °C)  Current: Temperature: 98 1 °F (36 7 °C)     Physical Exam:  General:  Well-nourished, well-developed, in no acute distress  Eyes:  Conjunctive clear with no hemorrhages or effusions  Oropharynx:  No ulcers, no lesions  Neck:  Supple, no lymphadenopathy  Lungs:  Clear to auscultation bilaterally, no accessory muscle use  Cardiac:  Regular rate and rhythm, no murmurs  Chest wall port in place with no surrounding erythema, fluctuance, tenderness  Abdomen:  Soft, non-tender, non-distended  Extremities:  No peripheral cyanosis, clubbing, or edema  Skin:  No rashes, no ulcers  Neurological:  Moves all four extremities spontaneously    LABS, IMAGING, & OTHER STUDIES:  Lab Results:  I have personally reviewed pertinent labs    Results from last 7 days   Lab Units 20  0724 20  1917   POTASSIUM mmol/L 3 7 3 8   CHLORIDE mmol/L 102 105   CO2 mmol/L 29 28   BUN mg/dL 22 17   CREATININE mg/dL 0 98 0 84   EGFR ml/min/1 73sq m 81 92   CALCIUM mg/dL 9 3 8 9   AST U/L  --  13   ALT U/L  --  25   ALK PHOS U/L  --  66     Results from last 7 days   Lab Units 07/03/20  0724 07/02/20  1917   WBC Thousand/uL 3 71* 5 20   HEMOGLOBIN g/dL 10 6* 10 9*   PLATELETS Thousands/uL 234 240       Imaging Studies:   I have personally reviewed pertinent imaging study reports and images in PACS  MRI brain shows slight interval enlargement of enhancing nodule within left posterior medial frontal lobe suspicious for metastases  No new enhancing lesions  MRI thoracic spine shows increasing size of midline anterior paraspinal fluid collection at T5-T8 measuring 5 x 2 2 by 0 9 cm consistent with abscess  Adjacent inflammation and phlegmon in the right paraspinal region posterior to the esophagus concerning for esophageal leak  Persistent osteomyelitis from T3-T8  Minimal T6-7 diskitis  No evidence of epidural abscess or inflammation  EKG, Pathology, and Other Studies:   I have personally reviewed pertinent reports

## 2020-07-03 NOTE — ASSESSMENT & PLAN NOTE
Noted on prior admission initial CT scan  · MRI of the brain at that time demonstrated 4 mm nodular focus of enhancement paramedian left posterior frontal lobe potentially metastatic lesion  · At that time reviewed by Neurology not suspected to be infarct and no further workup recommended at that time  · Recommendations were to have follow-up repeat MRI with gi in 6 weeks  · However patient now admitted  · MRI brain on 07/02/2020 now demonstrates:Slight interval enlargement of the small juxtacortical enhancing nodule seen on the prior examination within the left posterior medial frontal lobe, suspicious for metastasis   This measures 5 mm in size   No new enhancing lesions are identified  Stable right frontal lobe encephalomalacia, gliosis and mild chronic hemosiderin deposition compared to the prior examination suggesting remote vascular or traumatic injury

## 2020-07-03 NOTE — UTILIZATION REVIEW
Initial Clinical Review    Admission: Date/Time/Statement: Admission Orders (From admission, onward)     Ordered        07/02/20 1953  Inpatient Admission  Once                   Orders Placed This Encounter   Procedures    Inpatient Admission     Standing Status:   Standing     Number of Occurrences:   1     Order Specific Question:   Admitting Physician     Answer:   Raghu Agee [[id number]]     Order Specific Question:   Level of Care     Answer:   Med Surg [[id number]]     Order Specific Question:   Estimated length of stay     Answer:   More than 2 Midnights     Order Specific Question:   Certification     Answer:   I certify that inpatient services are medically necessary for this patient for a duration of greater than two midnights  See H&P and MD Progress Notes for additional information about the patient's course of treatment  ED Arrival Information     Expected Arrival Acuity Means of Arrival Escorted By Service Admission Type    - 7/2/2020 18:32 Urgent [facility] General Medicine Urgent    Arrival Complaint    abcess        Chief Complaint   Patient presents with    Abscess - Complicated     pt's family reports recent spinal abscess on abx, recent MRI showed increased size of abscess  Assessment/Plan:  70 y/o male with h/o GE carcinoma who presents to ED from home via EMS admitted inpatient with dysphagia  Pt states he is unable to take po  Sometimes able to take pills  Pt had been dx/'d with paraspinal thoracic abscess last month and MRI yesterday showed this is worsening  Pt c/o pain at site and gets relief from oxycodone  Pt on tube feeding via J-tube tube but states difficulty tolerating d/t getting full quickly  Plan: Npo, consult thoracic surgery, neuro surgery and ID  Speech consult  TF ordered  IV abx        Thoracic surgery consult 7/2 -- A: esophageal cancer s/p transhiatal esophagectomy, recurrent esophageal stricture s/p J tube placement, now with enlarging paraspinal abscess concerning for leak  Plan:  Keep npo, continue tube feeds  Continue IV abx  IR consult placed for possible drainage  Will likely require at least EGD for dilation  Await CT chest for further recommendations  NSx recommends no additional tx's  Continue brace when OOB if possible d/t port and J tube for spinal stabilization  Per IR consult --- MRI from July 1, 2020 [facility] reviewed showing a paraspinal abscess immediately anterior and midline to the mid thoracic spine  The location cannot be access percutaneously  ID consult 7/3 -- A: Paraspinal abscess and vertebral osteomyelitis  check blood cultures x2 sets for completeness  -continue IV ceftriaxone  -continue oral Flagyl  -follow-up CT chest  -followup further recommendations by thoracic surgery service  -if blood cultures are negative and no intervention anticipated, will plan to complete 6 weeks total of IV antibiotic, through 07/09/2020   -transition to oral cefdinir/Flagyl thereafter until ESR plateaus/normalizes      7/3 --- progress note: Patient reports having pain across the top of his shoulder blades for which he is taking pain medication he prefers to sit up in the chair and lean over table on a pillow  Reports feeling full when he has TF's for a period of time  Per speech therapy eval -- keep npo  Continue J-tube feeds  Nutrition recommends Jevity 1 5 20 ml/hr x 8h, increase 10 ml/hr q4h to goal of 99 ml/hr with 200 ml free water q4h  Continue IV abx         ED Triage Vitals   Temperature Pulse Respirations Blood Pressure SpO2   07/02/20 1840 07/02/20 1840 07/02/20 1840 07/02/20 1840 07/02/20 1840   98 °F (36 7 °C) 87 18 131/79 99 %      Temp Source Heart Rate Source Patient Position - Orthostatic VS BP Location FiO2 (%)   07/02/20 1840 07/02/20 1845 07/02/20 1845 -- --   Oral Monitor Lying        Pain Score       07/02/20 1840       5        Wt Readings from Last 1 Encounters:   07/02/20 69 4 kg (153 lb)     Additional Vital Signs: Date/Time  Temp  Pulse  Resp  BP  MAP (mmHg)  SpO2  O2 Device   07/03/20 14:40:41    78  16  127/71  90  99 %     07/03/20 07:29:18      18  98/63  75       07/03/20 03:48:31      18  120/75  90       07/02/20 22:44:31      17  92/53  66       07/02/20 20:30:45  98 1 °F (36 7 °C)  77  18  144/91  109  99 %     07/02/20 2012              None (Room air)   07/02/20 1845    80  18  133/83  103  100 %  None (Room air)   07/02/20 1840  98 °F (36 7 °C)  87  18  131/79    99 %  None (Room air)       Pertinent Labs/Diagnostic Test Results:   MRI thoracic spine w/wo, 7/1/2020: Increasing size of midline anterior paraspinal fluid collection at the T5-T8 levels, measuring 5 x 2 2 x 0 9 cm, consistent with an abscess   Adjacent inflammation and phlegmon in the right paraspinal region, posterior to the esophagus, again concerning for esophageal leak  Persistent osteomyelitis from T3 to T8  Increasing T6 wedge deformity   Minimal T6-7 discitis  No evidence of epidural abscess or inflammation    CT chest 7/3 -- Redemonstration of a posterior mediastinal/prevertebral abscess extending from the level of T4-T5 through T6-T7, as described above   Findings are overall stable in size since recent MRI    Redemonstration of discitis osteomyelitis involving the thoracic spine, as described above   Erosive changes are overall progressed since the CT from 5/23/2020 as well as moderate loss of height involving the T6 vertebral body     Small right-sided pleural fusion   No new airspace consolidation identified      Results from last 7 days   Lab Units 07/03/20  0724 07/02/20  1917   WBC Thousand/uL 3 71* 5 20   HEMOGLOBIN g/dL 10 6* 10 9*   HEMATOCRIT % 34 0* 34 6*   PLATELETS Thousands/uL 234 240   NEUTROS ABS Thousands/µL  --  3 14     Results from last 7 days   Lab Units 07/03/20  0724 07/02/20 1917   SODIUM mmol/L 138 138   POTASSIUM mmol/L 3 7 3 8   CHLORIDE mmol/L 102 105   CO2 mmol/L 29 28   ANION GAP mmol/L 7 5   BUN mg/dL 22 17   CREATININE mg/dL 0 98 0 84   EGFR ml/min/1 73sq m 81 92   CALCIUM mg/dL 9 3 8 9   MAGNESIUM mg/dL 2 4  --    PHOSPHORUS mg/dL 3 8  --      Results from last 7 days   Lab Units 07/02/20  1917   AST U/L 13   ALT U/L 25   ALK PHOS U/L 66   TOTAL PROTEIN g/dL 7 8   ALBUMIN g/dL 3 4*   TOTAL BILIRUBIN mg/dL 0 89     Results from last 7 days   Lab Units 07/03/20  1115 07/03/20  0726 07/02/20  2116   POC GLUCOSE mg/dl 101 153* 79     Results from last 7 days   Lab Units 07/03/20  0724 07/02/20  1917   GLUCOSE RANDOM mg/dL 149* 96     Results from last 7 days   Lab Units 07/03/20  0724   HEMOGLOBIN A1C % 5 5   EAG mg/dl 111         ED Treatment:   Medication Administration from 07/02/2020 1831 to 07/02/2020 2012       Date/Time Order Dose Route Action     07/02/2020 1938 oxyCODONE (ROXICODONE) oral solution 5 mg 5 mg Oral Given     Past Medical History:   Diagnosis Date    Bilateral pleural effusion     [facility]     Diabetes mellitus ([facility] )     Difficulty swallowing     Disease of thyroid gland     Edema     Esophageal mass     History of chemotherapy     History of transfusion     Malignant neoplasm of lower third of esophagus ([facility] )     Port-A-Cath in place     LCW    Sleep apnea     no CPAP    SOB (shortness of breath)      Present on Admission:   Esophageal stricture   Atrial fibrillation ([facility] )   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HCC)   Type 2 diabetes mellitus with hyperglycemia, without long-term current use of insulin ([facility] )   Paraspinal abscess ([facility] )   Brain lesion      Admitting Diagnosis: Esophageal obstruction [K22 2]  Paraspinal abscess ([facility] ) [M46 20]  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[facility] ) [C16 0]  Skin problem [L98 9]  Age/Sex: 71 y o  male  Admission Orders:  Scheduled Medications:  Medications:  amiodarone 200 mg Oral Daily With Breakfast   aspirin 81 mg Oral Daily   atorvastatin 40 mg Oral Daily With Dinner   cefTRIAXone (ROCEPHIN) 2g in 50 mL IVPB 2,000 mg Intravenous Q24H   enoxaparin 40 mg Subcutaneous Daily   insulin lispro 1-5 Units Subcutaneous TID AC   insulin lispro 1-5 Units Subcutaneous HS   levothyroxine 25 mcg Oral Early Morning   metroNIDAZOLE 500 mg Oral Q8H TANNER   rOPINIRole 0 25 mg Oral HS   traZODone 100 mg Oral HS        PRN Meds:  acetaminophen 650 mg Oral Q6H PRN   ondansetron 4 mg Intravenous Q6H PRN   oxyCODONE 5 mg Oral Q4H PRN 7/3 x2       IP CONSULT TO THORACIC SURGERY  IP CONSULT TO NEUROSURGERY  IP CONSULT TO INFECTIOUS DISEASES  IP CONSULT TO NUTRITION SERVICES  IP CONSULT TO PALLIATIVE CARE    Network Utilization Review Department  [email or web address]  ATTENTION: Please call with any questions or concerns to [phone] and carefully listen to the prompts so that you are directed to the right person  All voicemails are confidential   Supa Wilkinson all requests for admission clinical reviews, approved or denied determinations and any other requests to dedicated fax number below belonging to the campus where the patient is receiving treatment   List of dedicated fax numbers for the Facilities:  [address] DENIALS (Administrative/Medical Necessity) [phone]   [address] (Maternity/NICU/Pediatrics) [phone]   [place] [phone]   [place] Ros [phone]   [facility] [phone]   [place] [phone]   Geri Pathak [address] [facility] [phone]   [place] [phone] [address] [facility]  [facility] [facility][note 3] [address] [facility] [phone]

## 2020-07-04 VITALS
OXYGEN SATURATION: 97 % | SYSTOLIC BLOOD PRESSURE: 87 MMHG | BODY MASS INDEX: 21.9 KG/M2 | HEIGHT: 70 IN | TEMPERATURE: 97.6 F | WEIGHT: 153 LBS | HEART RATE: 80 BPM | DIASTOLIC BLOOD PRESSURE: 59 MMHG | RESPIRATION RATE: 18 BRPM

## 2020-07-04 PROBLEM — G89.3 CANCER-RELATED PAIN: Chronic | Status: ACTIVE | Noted: 2020-07-04

## 2020-07-04 LAB
GLUCOSE SERPL-MCNC: 121 MG/DL (ref 65–140)
GLUCOSE SERPL-MCNC: 123 MG/DL (ref 65–140)

## 2020-07-04 PROCEDURE — 99232 SBSQ HOSP IP/OBS MODERATE 35: CPT | Performed by: THORACIC SURGERY (CARDIOTHORACIC VASCULAR SURGERY)

## 2020-07-04 PROCEDURE — 82948 REAGENT STRIP/BLOOD GLUCOSE: CPT

## 2020-07-04 PROCEDURE — 99239 HOSP IP/OBS DSCHRG MGMT >30: CPT | Performed by: NURSE PRACTITIONER

## 2020-07-04 RX ORDER — OXYCODONE HCL 5 MG/5 ML
SOLUTION, ORAL ORAL
Qty: 473 ML | Refills: 0 | Status: SHIPPED | OUTPATIENT
Start: 2020-07-04 | End: 2020-07-04 | Stop reason: SDUPTHER

## 2020-07-04 RX ORDER — ACETAMINOPHEN 160 MG/5ML
650 SUSPENSION, ORAL (FINAL DOSE FORM) ORAL EVERY 6 HOURS PRN
Qty: 118 ML | Refills: 0 | Status: SHIPPED | OUTPATIENT
Start: 2020-07-04 | End: 2020-07-23 | Stop reason: ALTCHOICE

## 2020-07-04 RX ORDER — OXYCODONE HCL 5 MG/5 ML
SOLUTION, ORAL ORAL
Qty: 473 ML | Refills: 0 | Status: SHIPPED | OUTPATIENT
Start: 2020-07-04 | End: 2020-07-22 | Stop reason: SDUPTHER

## 2020-07-04 RX ORDER — AMIODARONE HYDROCHLORIDE 200 MG/1
200 TABLET ORAL
Qty: 30 TABLET | Refills: 0 | Status: SHIPPED | OUTPATIENT
Start: 2020-07-05 | End: 2020-11-20 | Stop reason: SDUPTHER

## 2020-07-04 RX ADMIN — OXYCODONE HYDROCHLORIDE 10 MG: 5 SOLUTION ORAL at 11:15

## 2020-07-04 RX ADMIN — METRONIDAZOLE 500 MG: 500 TABLET ORAL at 06:13

## 2020-07-04 RX ADMIN — AMIODARONE HYDROCHLORIDE 200 MG: 200 TABLET ORAL at 08:23

## 2020-07-04 RX ADMIN — OXYCODONE HYDROCHLORIDE 10 MG: 5 SOLUTION ORAL at 06:20

## 2020-07-04 RX ADMIN — LEVOTHYROXINE SODIUM 25 MCG: 25 TABLET ORAL at 06:13

## 2020-07-04 RX ADMIN — ASPIRIN 81 MG 81 MG: 81 TABLET ORAL at 08:23

## 2020-07-04 RX ADMIN — ENOXAPARIN SODIUM 40 MG: 40 INJECTION SUBCUTANEOUS at 08:23

## 2020-07-04 NOTE — UTILIZATION REVIEW
Notification of Inpatient Admission/Inpatient Authorization Request   This is a Notification of Inpatient Admission for 5 Juanito Bassett  Be advised that this patient was admitted to our facility under Inpatient Status  Contact Eladia Pagan at [phone] for additional admission information  [address] [place] [address] DEPT  DEDICATED [phone]  Patient Name:   Mandy Thakur   YOB: 1955       [address]   [address]:   [place] [address]  Tax ID: [id number]  NPI: [id number] Attending Provider/NPI:  Phone:  Address: Mildred Gottron, [address] [[id number]]  [phone]  Same as Facility   Place of Service Code: 24 [facility]   Start Date: 7/2/20 1942 Discharge Date & Time: No discharge date for patient encounter  Type of Admission: Inpatient Status Discharge Disposition (if discharged): Home with [address] [place] [address] [facility]   Patient Diagnoses: Esophageal obstruction [K22 2]  Paraspinal abscess ([facility] ) [M46 20]  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[address] ) [C16 0]  Skin problem [L98 9]     Orders: Admission Orders (From admission, onward)     Ordered        07/02/20 1953  Inpatient Admission  Once                    Assigned Utilization Review Contact: Eladia Pagan  Utilization   Network Utilization Review Department  Phone: [phone]; Fax [phone]  Email: [email or web address]   ATTENTION PAYERS: Please call the assigned Utilization  directly with any questions or concerns ALL voicemails in the department are confidential  Send all requests for admission clinical reviews, approved or denied determinations and any other requests to dedicated fax number belonging to the campus where the patient is receiving treatment

## 2020-07-04 NOTE — DISCHARGE SUMMARY
Discharge Summary - [facility] Internal Medicine    Patient Information: Jorge A Granda 71 y o  male MRN: [id number]  Unit/Bed#: [facility] 603-01 Encounter: [id number]    Discharging Physician / Practitioner: Christophe Camejo  PCP: Christophe Rose  Admission Date: 7/2/2020  Discharge Date: 07/04/20    Disposition:     Home with VNA Services (Reminder: Complete face to face encounter) revolutionary as previously scheduled     Reason for Admission: sent in by provider due to increasing paraspinal collection noted on imaging , pt with hx of vertebral osteomyelitis and asymptomatic at this time  Discharge Diagnoses:     Principal Problem:    Esophageal stricture  Active Problems:    Paraspinal abscess ([facility] [address] )    Brain lesion    Type 2 diabetes mellitus with hyperglycemia, without long-term current use of insulin (HCC)    Atrial fibrillation (HCC)    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[facility] [address] )    Severe protein-calorie malnutrition Moises Rumple: less than 60% of standard weight) ([facility] [address] )  Resolved Problems:    * No resolved hospital problems  *      Consultations During Hospital Stay:  · Dr Rajani Hare infectious disease   · Dr Kimberly Aranda neurosurgery   · Dr Kelley Holstein Thoracic surgery     Procedures Performed:     · MRI thoracic spine w and won contrast 7/1: 1   Increasing size of midline anterior paraspinal fluid collection at the T5-T8 levels, measuring 5 x 2 2 x 0 9 cm, consistent with an abscess   Adjacent inflammation and phlegmon in the right paraspinal region, posterior to the esophagus, again   concerning for esophageal leak  2   Persistent osteomyelitis from T3 to T8   Increasing T6 wedge deformity   Minimal T6-7 discitis  3   No evidence of epidural abscess or inflammation    · MRI brain w wo contrast: Slight interval enlargement of the small juxtacortical enhancing nodule seen on the prior examination within the left posterior medial frontal lobe, suspicious for metastasis   This measures 5 mm in size   No new enhancing lesions are identified  Stable right frontal lobe encephalomalacia, gliosis and mild chronic hemosiderin deposition compared to the prior examination suggesting remote vascular or traumatic injury  · Ct chest w contrast 7/3/20: Redemonstration of a posterior mediastinal/prevertebral abscess extending from the level of T4-T5 through T6-T7, as described above   Findings are overall stable in size since recent MRI  Redemonstration of discitis osteomyelitis involving the thoracic spine, as described above   Erosive changes are overall progressed since the CT from 5/23/2020 as well as moderate loss of height involving the T6 vertebral body     Small right-sided pleural fusion   No new airspace consolidation identified  Significant Findings / Test Results:     · See above     Incidental Findings:   · none     Test Results Pending at Discharge (will require follow up): · None pending   · Pt refused blood cultures on admission   · Follow up with neurosurgery in office will need xrays 2-3 days prior to that appointment   · Will have home health care resume services      Outpatient Tests Requested:  · Follow up with pcp in one week     Complications:  None     Hospital Course:     Magalie Collier is a 71 y o  male patient who originally presented to the hospital on 7/2/2020 due to increasing paraspinal collection noted on imaging  Patient had previously been admitted to hospital on 05/23/2020 due to back pain was found at that time to have probable paraspinal abscess and vertebral osteomyelitis  At that time CT scan was concerning for paraspinal collection with erosion of anterior bodies from T4-T6 suggestive of abscess with adjacent osteomyelitis  An MRI at the time was performed of the thoracic spine found to also have posterior mediastinum and vertebral space collection which was concerning for anastomotic leak given recent dilation of esophagus    During that admission patient was seen by thoracic surgery to whom patient is well known  He they recommended chest tube given loculated pleural effusion cultures came back negative  Drain was placed in IR and subsequently removed  Patient was also seen by Neurosurgery recommending at that time biopsy which was consistent with osteomyelitis however cultures remain negative  During that admission infectious disease sore patient recommending 6 weeks IV antibiotics of ceftriaxone and p o  Flagyl to be completed on 07/09/2020  These have been prescribed as conservative treatment at the time as patient would ultimately require major surgery/reconstruction as alternative  At discharge from that admission patient was to review receive an outpatient repeat CT scan of the chest on 06/22 with follow-up with thoracic surgery on 06/24  Patient did follow-up with repeat imaging outpatient, with noted increase in size of paraspinal collection and patient was recommended to be transferred to [facility] for further thoracic surgery evaluation  On prior admission patient also underwent J-tube placement by Surgical Oncology on 06/05 he was discharged on supplemental oral feedings as tolerated  Administrative instructions provided on discharge instructions  There was also discussion of repeat dilatation however per Dr Saundra Nguyen procedures risky and risk for perforation was as high this was deferred  Patient was scheduled for this coming Tuesday as an outpatient  Patient was seen by Neurosurgery with no further recommendations at this time to continue follow-up outpatient  He was seen by thoracic surgery who recommended IR consultation and after review of MRI from July 1st per IR it was noted the paraspinal abscess was immediately anterior midline to the midthoracic spine they did not feel at this location could be accessed percutaneously  At this point discussion was had with thoracic surgery who recommended CT scan    After further imaging of repeat CT scan yesterday 07/03/2020 and discussion with infectious disease and Dr Manda Fajardo from thoracic surgery  It was determined after total evaluation that patient is asymptomatic, afebrile, no leukocytosis and down trending ESR  That risk outweighed benefit at this time  After discussion with patient and his daughter over phone it was determined that patient would prefer to go home and continue his follow-up with thoracic surgery appointment on Tuesday for dilatation of esophagus  At that time they will have further discussion in regards to any further procedures moving forward  Patient is to complete IV antibiotic treatment scheduled to end on 07/09/2020 after which patient is to follow-up with infectious disease  And finally patient will follow-up with oncology discharge home and further plan or treatment moving forward  Patient was noted to have slight interval enlargement of the small juxtacortical enhancing nodule noted on prior examination suspicious for metastasis noted on brain MRI  This will be re- addressed after antibiotic plan has been completed  Pt has remained stable and there is no reason at this time for pt to remain inpt at this time  Will have appointment on Tuesday for procedure outpt and and follow up with pcp , oncology and neurosurgery as scheduled   Also noted was pt was admitted on tid amiodarone that has been tapered to daily dosing and scripts have been sent to pts pharmacy  Condition at Discharge: fair     Discharge Day Visit / Exam:     Subjective:  Pt is doing well, he is eager to return home family to pick him up around 1:00 p m   Still ongoing lower lumbar pain  No nausea no vomiting no diarrhea  He does report that he gets frustrated with tube feeds on occasionally feels really fall and prefers not to have them running  We discussed, the need to have adequate input whether oral or tube feeds as pt is feeding base on how he feels  Has lost significant weight       Vitals: Blood Pressure: (!) 87/59 (07/04/20 1505)  Pulse: 80 (07/03/20 2320)  Temperature: 97 8 °F (36 6 °C) (07/03/20 2320)  Temp Source: Oral (07/03/20 2320)  Respirations: 18 (07/03/20 2320)  Height: 5' 10" (177 8 cm) (07/03/20 1019)  Weight - Scale: 69 4 kg (153 lb) (07/02/20 2131)  SpO2: 97 % (07/03/20 2320)  Exam:   Physical Exam   Constitutional: He is oriented to person, place, and time  No distress  HENT:   Head: Normocephalic and atraumatic  Mouth/Throat: No oropharyngeal exudate  Eyes: Conjunctivae are normal  Right eye exhibits no discharge  Left eye exhibits no discharge  No scleral icterus  Neck: No tracheal deviation present  No thyromegaly present  Cardiovascular: Normal rate  Exam reveals no gallop and no friction rub  No murmur heard  Pulmonary/Chest: No stridor  No respiratory distress  He has no wheezes  He has no rales  He exhibits no tenderness  Abdominal: Soft  He exhibits no distension and no mass  There is no tenderness  There is no rebound and no guarding  No hernia    jtube intact no drainage noted    Musculoskeletal: He exhibits no edema, tenderness or deformity  Lymphadenopathy:     He has no cervical adenopathy  Neurological: He is oriented to person, place, and time  Skin: No rash noted  He is not diaphoretic  No erythema  No pallor  Psychiatric: He has a normal mood and affect  Discussion with Family: called pts daughter to update her and discuss resumption of outpt services     Discharge instructions/Information to patient and family:   See after visit summary for information provided to patient and family  Provisions for Follow-Up Care:  See after visit summary for information related to follow-up care and any pertinent home health orders  Planned Readmission: no plan for readmission     Discharge Statement:  I spent 50 minutes discharging the patient  This time was spent on the day of discharge  I had direct contact with the patient on the day of discharge   Greater than 50% of the total time was spent examining patient, answering all patient questions, arranging and discussing plan of care with patient as well as directly providing post-discharge instructions  Additional time then spent on discharge activities  Discharge Medications:  See after visit summary for reconciled discharge medications provided to patient and family        ** Please Note: This note has been constructed using a voice recognition system **

## 2020-07-04 NOTE — PROGRESS NOTES
Progress Note - General Surgery   Erleen Screen 71 y o  male MRN: [id number]  Unit/Bed#: [facility] 603-01 Encounter: [id number]    Assessment:  71 M with history of esophageal cancer status post transhiatal esophagectomy, recurrent esophageal stricture s/p J tube placement, now with enlarging paraspinal abscess concerning for leak     Plan:  NPO, continue J tube feeds  Rocephin/Flagyl  IR NTD  Nsgy NTD  Possible EGD      Subjective/Objective       Subjective: Patient feels well over night  Frustated and wants to go home  Denies fevers, chills, night sweats, chest pain, n/v    Objective:  Physical Exam   Constitutional: He is oriented to person, place, and time  He appears well-developed  No distress  HENT:   Head: Normocephalic and atraumatic  Eyes: No scleral icterus  Cardiovascular: Normal rate and regular rhythm  Pulmonary/Chest: Effort normal and breath sounds normal    Abdominal: Soft  He exhibits no distension  There is no tenderness  Neurological: He is alert and oriented to person, place, and time  Skin: Skin is warm and dry  Nursing note and vitals reviewed  Blood pressure 94/58, pulse 80, temperature 97 8 °F (36 6 °C), temperature source Oral, resp  rate 18, height 5' 10" (1 778 m), weight 69 4 kg (153 lb), SpO2 97 %  ,Body mass index is 21 95 kg/m²        Intake/Output Summary (Last 24 hours) at 7/4/2020 0655  Last data filed at 7/4/2020 5109  Gross per 24 hour   Intake 710 ml   Output    Net 710 ml       Invasive Devices     Central Venous Catheter Line            Port A Cath 09/26/19 Left Chest 281 days          Peripheral Intravenous Line            Peripheral IV 06/05/20 Left;Proximal;Ventral (anterior) Forearm 29 days          Drain            Gastrostomy/Enterostomy Jejunostomy 14 Fr   days    Gastrostomy/Enterostomy Jejunostomy 14 Fr  LUQ 28 days                Scheduled Meds:  Current Facility-Administered Medications:  acetaminophen 650 mg Oral Q6H PRN Steve Hemp, DO amiodarone 200 mg Oral Daily With Breakfast Quiana Flores, DO    aspirin 81 mg Oral Daily Quiana Flores, DO    atorvastatin 40 mg Oral Daily With [facility], DO    cefTRIAXone (ROCEPHIN) 2g in 50 mL IVPB 2,000 mg Intravenous Q24H Quiana Perfect, DO Last Rate: 2,000 mg (07/03/20 2202)   enoxaparin 40 mg Subcutaneous Daily Quiana Perfect, DO    insulin lispro 1-5 Units Subcutaneous TID AC Raúl Love, DO    insulin lispro 1-5 Units Subcutaneous HS Quiana Perfect, DO    levothyroxine 25 mcg Oral Early Morning Quiana Perfect, DO    metroNIDAZOLE 500 mg Oral [facility] Quiana Perfect, DO    ondansetron 4 mg Intravenous Q6H PRN Quiana Flores, DO    oxyCODONE 10 mg Per J Tube Q3H PRN Beny Irvin MD    oxyCODONE 5 mg Per Voncile Latus Tube Q3H PRN Beny Irvin MD    rOPINIRole 0 25 mg Oral HS Quiana Perfect, DO    traZODone 100 mg Oral HS Quiana Perfect, DO      Continuous Infusions:   PRN Meds:   acetaminophen    ondansetron    oxyCODONE    oxyCODONE    Lab, Imaging and other studies:I have personally reviewed pertinent lab results      VTE Pharmacologic Prophylaxis: Enoxaparin (Lovenox)  VTE Mechanical Prophylaxis: sequential compression device

## 2020-07-04 NOTE — DISCHARGE INSTRUCTIONS
Discharge Diet    Tube feedings over night 7PM to 7AM  Jevity 1 5 at 60 ML per hours  100 ML of water every 6 hours through tube for hydration  Oral diet as tolerated  Diet Type:    Diabetic  Dysphasia/Modified Consistency   Dysphagia/Modified Consistency:    Dysphagia 3-Dental Soft   Liquid Modifier: Thin Liquid       Continue iv antibiotics to complete on 7/9/20 as previously scheduled   Port o cath should be de-accessed after antibiotic completion     Continue to wear TLSO brace when out of bed or sitting up more than a 45 degree angle

## 2020-07-05 NOTE — SOCIAL WORK
In response to a call from Pt's daughter Atul Roemro [phone] regarding the Pt's [facility] service agency  CM reached out to Atul Romero reported she had already been in contact with [facility] on call and the matter was address  Atul Romero denied having any needs at this time that CM can assist with  CM was in contact with [facility]  on call Tatiana Oneill [phone] who confirmed their RN have been in communication with Pt's daughter Atul Romero with no current concerns  Tatiana Oneill denied having any needs for [facility] to address that would facilitate them meeting the Pt's needs

## 2020-07-06 ENCOUNTER — TELEPHONE (OUTPATIENT)
Dept: NEUROSURGERY | Facility: CLINIC | Age: 65
End: 2020-07-06

## 2020-07-06 ENCOUNTER — ANESTHESIA EVENT (OUTPATIENT)
Dept: PERIOP | Facility: HOSPITAL | Age: 65
End: 2020-07-06
Payer: COMMERCIAL

## 2020-07-06 NOTE — ANESTHESIA PREPROCEDURE EVALUATION
Review of Systems/Medical History  Patient summary reviewed  Chart reviewed  No history of anesthetic complications     Cardiovascular  EKG reviewed, Exercise tolerance (METS): <4,  Hyperlipidemia, Dysrhythmias , atrial fibrillation,   Comment: H/o cardiac arrest 2/2020 in postop period after esophagectomy and vocal fold injection, unclear etiology but probable aspiration event,  Pulmonary  Smoker ex-smoker  , COPD , Sleep apnea ,   Comment: Loculated pleural effusion with CT insitu     GI/Hepatic  Dysphagia,   Esophageal disease esophageal cancer, GI malignancy,   Comment: S/p esophagectomy c/b probable cancer recurrence and severe protein malnutrition 2/2 dysphagia     Negative  ROS        Endo/Other  Diabetes type 2 , History of thyroid disease , hypothyroidism,   Comment: Left vocal cord paralysis    GYN       Hematology  Anemia ,     Musculoskeletal  Negative musculoskeletal ROS        Neurology    Motor deficit , bilateral lower extremity weakness, Headaches,   Comment: 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;  T4-6 paraspinal abscess c/f esophageal leak   Psychology   Negative psychology ROS          Previous anesthetic:  RSI, Mac3, g1v, 7 5 ETT    CT Chest 7/3/2020:  Redemonstration of a posterior mediastinal/prevertebral abscess extending from the level of T4-T5 through T6-T7, as described above  Findings are overall stable in size since recent MRI      Redemonstration of discitis osteomyelitis involving the thoracic spine, as described above  Erosive changes are overall progressed since the CT from 5/23/2020 as well as moderate loss of height involving the T6 vertebral body        Small right-sided pleural fusion  No new airspace consolidation identified  EKG 2/23/2020:  Normal sinus rhythm  Rightward axis  Low voltage QRS  Cannot rule out Anterior infarct , age undetermined    TTE 2/2/2020:  LEFT VENTRICLE:  Systolic function was normal  Ejection fraction was estimated to be 55 %    This study was inadequate for the evaluation of regional wall motion  Wall thickness was increased  RIGHT VENTRICLE: The size was normal  Systolic function was normal     PERICARDIUM:  There was a left pleural effusion  Physical Exam    Airway    Mallampati score: II  TM Distance: >3 FB  Neck ROM: full     Dental   No notable dental hx     Cardiovascular  Cardiovascular exam normal    Pulmonary  Pulmonary exam normal     Other Findings        Anesthesia Plan  ASA Score- 4     Anesthesia Type- general with ASA Monitors  Additional Monitors:   Airway Plan: ETT  Plan Factors-    Induction- intravenous and rapid sequence induction  Postoperative Plan-   Planned trial extubation    Informed Consent- Anesthetic plan and risks discussed with patient  I personally reviewed this patient with the CRNA  Discussed and agreed on the Anesthesia Plan with the CRNA  Karrie Nissen

## 2020-07-06 NOTE — TELEPHONE ENCOUNTER
07/08/2020-CALLED PT, CONFIRMED 07/10/2020 APT IN [facility] AND TO HAVE XRAY COMPLETED PRIOR TO APT      07/07/2020-PT BACK IN [facility]    07/06/2020-PT DISCHARGED TO HOME   07/10/2020 APT Nicholas Valentin IN [facility] W/XRAY    07/05/2020-(DESSIREE)SIGN OFF, KEEP SCHEDULED FOLLOW UP IN 2 WEEKS WITH XRAYS PRIOR

## 2020-07-06 NOTE — PROGRESS NOTES
Progress Note - Infectious Disease   Sheeba Kidd 71 y o  male MRN: [id number]  Unit/Bed#:  Encounter: [id number]      Impression/Plan:  1  Paraspinal abscess and vertebral osteomyelitis  Follow-up MRI T-spine shows increasing size paraspinal abscess at T5-8 with persistent osteomyelitis at T3-8  No epidural abscess  Suspect secondary to contiguous spread in setting of #2  ESR from 07/01 is 53, much improved from prior ESR of 119 on 05/25  IR input noted and collection is inaccessible percutaneously  Consider sterile collection as patient is otherwise afebrile with improving ESR and no new symptoms  Consider possible leak  Prior swallow study was negative  Repeat CT chest done 07/03/2020 showing redemonstration of  posterior mediastinal/prevertebral abscess extending from the level of T4-T5 through T6-T7     -check blood cultures x2 sets for completeness as mentioned in most recent ID noted  -continue IV ceftriaxone  -continue oral Flagyl  -followup further recommendations by thoracic surgery service; patient planned for thoracic surgery evaluation today 07/07/2020  -if blood cultures are negative and no intervention anticipated, will plan to complete 6 weeks total of IV antibiotic, through 07/09/2020   -transition to oral cefdinir/Flagyl thereafter until ESR plateaus/normalizes  -ID follow-up in 4 weeks; repeat CBC, creatinine, ESR CRP prior to next appointment with ID     2  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  Status post neoadjuvant chemotherapy via chest wall port, transhiatal esophagectomy on 01/28/2020  Repeat MRI brain is concerning for metastatic lesion      3  Dysphagia with recurrent esophageal stricture  In the setting of #2  Status post J-tube placement  Remains on tube feeds  Thoracic surgery evaluation noted with plan for at least EGD, possible dilation next week      -thoracic surgery follow-up ongoing  -plan for OR for EGD and dilation next week     4  COPD    No evidence of acute exacerbation  Antibiotics:  Ceftriaxone IV and Flagyl p o  Day 39    Subjective:  Patient has no fever, chills, sweats; no nausea, vomiting, diarrhea; no cough, shortness of breath; no pain  No new symptoms  ROS: A complete 12 point ROS is negative other than that noted in the HPI    Followup portions patient history reviewed and updated as: Allergies, current medications, past medical history, past social history, past surgical history, and the problem list    Objective:  Vitals: There were no vitals filed for this visit  Physical Exam:   General Appearance:  Alert, interactive, appearing well, nontoxic, no acute distress  Throat: Oropharynx moist without lesions  Lungs:   Clear to auscultation bilaterally; no audible wheezes, rhonchi or rales; respirations unlabored   Heart:  RRR; no murmur, rub or gallop   Abdomen:   Soft, non-tender, non-distended, positive bowel sounds  Extremities: No clubbing, cyanosis or edema   Skin: No new rashes or lesions  No new draining wounds         Labs, Imaging, & Other studies:   All pertinent labs and imaging studies were personally reviewed    Lab Results   Component Value Date    K 3 7 07/03/2020     07/03/2020    CO2 29 07/03/2020    BUN 22 07/03/2020    CREATININE 0 98 07/03/2020    GLUCOSE 339 (H) 02/01/2020    GLUF 115 (H) 05/12/2020    CALCIUM 9 3 07/03/2020    AST 13 07/02/2020    ALT 25 07/02/2020    ALKPHOS 66 07/02/2020    EGFR 81 07/03/2020     Lab Results   Component Value Date    WBC 3 71 (L) 07/03/2020    HGB 10 6 (L) 07/03/2020    HCT 34 0 (L) 07/03/2020    MCV 88 07/03/2020     07/03/2020   No results found for: Elton Hong

## 2020-07-07 ENCOUNTER — ANESTHESIA (OUTPATIENT)
Dept: PERIOP | Facility: HOSPITAL | Age: 65
End: 2020-07-07
Payer: COMMERCIAL

## 2020-07-07 ENCOUNTER — HOSPITAL ENCOUNTER (OUTPATIENT)
Facility: HOSPITAL | Age: 65
Setting detail: OUTPATIENT SURGERY
Discharge: HOME | End: 2020-07-07
Attending: THORACIC SURGERY (CARDIOTHORACIC VASCULAR SURGERY) | Admitting: THORACIC SURGERY (CARDIOTHORACIC VASCULAR SURGERY)
Payer: COMMERCIAL

## 2020-07-07 ENCOUNTER — APPOINTMENT (OUTPATIENT)
Dept: RADIOLOGY | Facility: HOSPITAL | Age: 65
End: 2020-07-07
Payer: COMMERCIAL

## 2020-07-07 ENCOUNTER — TELEMEDICINE (OUTPATIENT)
Dept: INFECTIOUS DISEASES | Facility: CLINIC | Age: 65
End: 2020-07-07
Payer: COMMERCIAL

## 2020-07-07 ENCOUNTER — TRANSITIONAL CARE MANAGEMENT (OUTPATIENT)
Dept: FAMILY MEDICINE CLINIC | Facility: CLINIC | Age: 65
End: 2020-07-07

## 2020-07-07 VITALS
HEART RATE: 80 BPM | SYSTOLIC BLOOD PRESSURE: 138 MMHG | OXYGEN SATURATION: 98 % | DIASTOLIC BLOOD PRESSURE: 75 MMHG | TEMPERATURE: 96.8 F | HEIGHT: 70 IN | BODY MASS INDEX: 21.9 KG/M2 | WEIGHT: 153 LBS | RESPIRATION RATE: 16 BRPM

## 2020-07-07 DIAGNOSIS — C16.0 GE JUNCTION CARCINOMA (HCC): ICD-10-CM

## 2020-07-07 DIAGNOSIS — K22.2 ESOPHAGEAL STRICTURE: ICD-10-CM

## 2020-07-07 DIAGNOSIS — R13.10 DYSPHAGIA: ICD-10-CM

## 2020-07-07 DIAGNOSIS — M46.20 PARASPINAL ABSCESS (HCC): Primary | ICD-10-CM

## 2020-07-07 LAB
GLUCOSE SERPL-MCNC: 113 MG/DL (ref 65–140)
GLUCOSE SERPL-MCNC: 98 MG/DL (ref 65–140)

## 2020-07-07 PROCEDURE — 88342 IMHCHEM/IMCYTCHM 1ST ANTB: CPT | Performed by: PATHOLOGY

## 2020-07-07 PROCEDURE — 74360 X-RAY GUIDE GI DILATION: CPT | Performed by: THORACIC SURGERY (CARDIOTHORACIC VASCULAR SURGERY)

## 2020-07-07 PROCEDURE — 82948 REAGENT STRIP/BLOOD GLUCOSE: CPT

## 2020-07-07 PROCEDURE — 88312 SPECIAL STAINS GROUP 1: CPT | Performed by: PATHOLOGY

## 2020-07-07 PROCEDURE — 4040F PNEUMOC VAC/ADMIN/RCVD: CPT | Performed by: INTERNAL MEDICINE

## 2020-07-07 PROCEDURE — 3044F HG A1C LEVEL LT 7.0%: CPT | Performed by: INTERNAL MEDICINE

## 2020-07-07 PROCEDURE — 1036F TOBACCO NON-USER: CPT | Performed by: INTERNAL MEDICINE

## 2020-07-07 PROCEDURE — 99214 OFFICE O/P EST MOD 30 MIN: CPT | Performed by: INTERNAL MEDICINE

## 2020-07-07 PROCEDURE — 43248 EGD GUIDE WIRE INSERTION: CPT | Performed by: THORACIC SURGERY (CARDIOTHORACIC VASCULAR SURGERY)

## 2020-07-07 PROCEDURE — C1769 GUIDE WIRE: HCPCS | Performed by: THORACIC SURGERY (CARDIOTHORACIC VASCULAR SURGERY)

## 2020-07-07 PROCEDURE — 88341 IMHCHEM/IMCYTCHM EA ADD ANTB: CPT | Performed by: PATHOLOGY

## 2020-07-07 PROCEDURE — 71045 X-RAY EXAM CHEST 1 VIEW: CPT

## 2020-07-07 PROCEDURE — 1111F DSCHRG MED/CURRENT MED MERGE: CPT | Performed by: INTERNAL MEDICINE

## 2020-07-07 PROCEDURE — 88305 TISSUE EXAM BY PATHOLOGIST: CPT | Performed by: PATHOLOGY

## 2020-07-07 RX ORDER — ONDANSETRON 2 MG/ML
INJECTION INTRAMUSCULAR; INTRAVENOUS AS NEEDED
Status: DISCONTINUED | OUTPATIENT
Start: 2020-07-07 | End: 2020-07-07 | Stop reason: SURG

## 2020-07-07 RX ORDER — ROCURONIUM BROMIDE 10 MG/ML
INJECTION, SOLUTION INTRAVENOUS AS NEEDED
Status: DISCONTINUED | OUTPATIENT
Start: 2020-07-07 | End: 2020-07-07

## 2020-07-07 RX ORDER — ONDANSETRON 2 MG/ML
4 INJECTION INTRAMUSCULAR; INTRAVENOUS ONCE AS NEEDED
Status: COMPLETED | OUTPATIENT
Start: 2020-07-07 | End: 2020-07-07

## 2020-07-07 RX ORDER — SUCCINYLCHOLINE/SOD CL,ISO/PF 100 MG/5ML
SYRINGE (ML) INTRAVENOUS AS NEEDED
Status: DISCONTINUED | OUTPATIENT
Start: 2020-07-07 | End: 2020-07-07 | Stop reason: SURG

## 2020-07-07 RX ORDER — LIDOCAINE HYDROCHLORIDE 10 MG/ML
0.5 INJECTION, SOLUTION EPIDURAL; INFILTRATION; INTRACAUDAL; PERINEURAL ONCE AS NEEDED
Status: DISCONTINUED | OUTPATIENT
Start: 2020-07-07 | End: 2020-07-07 | Stop reason: HOSPADM

## 2020-07-07 RX ORDER — FENTANYL CITRATE 50 UG/ML
INJECTION, SOLUTION INTRAMUSCULAR; INTRAVENOUS AS NEEDED
Status: DISCONTINUED | OUTPATIENT
Start: 2020-07-07 | End: 2020-07-07 | Stop reason: SURG

## 2020-07-07 RX ORDER — MIDAZOLAM HYDROCHLORIDE 2 MG/2ML
INJECTION, SOLUTION INTRAMUSCULAR; INTRAVENOUS AS NEEDED
Status: DISCONTINUED | OUTPATIENT
Start: 2020-07-07 | End: 2020-07-07 | Stop reason: SURG

## 2020-07-07 RX ORDER — MAGNESIUM HYDROXIDE 1200 MG/15ML
LIQUID ORAL AS NEEDED
Status: DISCONTINUED | OUTPATIENT
Start: 2020-07-07 | End: 2020-07-07 | Stop reason: HOSPADM

## 2020-07-07 RX ORDER — MIDAZOLAM HYDROCHLORIDE 2 MG/2ML
INJECTION, SOLUTION INTRAMUSCULAR; INTRAVENOUS AS NEEDED
Status: DISCONTINUED | OUTPATIENT
Start: 2020-07-07 | End: 2020-07-07

## 2020-07-07 RX ORDER — SODIUM CHLORIDE, SODIUM LACTATE, POTASSIUM CHLORIDE, CALCIUM CHLORIDE 600; 310; 30; 20 MG/100ML; MG/100ML; MG/100ML; MG/100ML
125 INJECTION, SOLUTION INTRAVENOUS CONTINUOUS
Status: DISCONTINUED | OUTPATIENT
Start: 2020-07-07 | End: 2020-07-07 | Stop reason: HOSPADM

## 2020-07-07 RX ORDER — GLYCOPYRROLATE 0.2 MG/ML
INJECTION INTRAMUSCULAR; INTRAVENOUS AS NEEDED
Status: DISCONTINUED | OUTPATIENT
Start: 2020-07-07 | End: 2020-07-07 | Stop reason: SURG

## 2020-07-07 RX ORDER — ROCURONIUM BROMIDE 10 MG/ML
INJECTION, SOLUTION INTRAVENOUS AS NEEDED
Status: DISCONTINUED | OUTPATIENT
Start: 2020-07-07 | End: 2020-07-07 | Stop reason: SURG

## 2020-07-07 RX ORDER — FENTANYL CITRATE/PF 50 MCG/ML
25 SYRINGE (ML) INJECTION
Status: DISCONTINUED | OUTPATIENT
Start: 2020-07-07 | End: 2020-07-07 | Stop reason: HOSPADM

## 2020-07-07 RX ORDER — PROPOFOL 10 MG/ML
INJECTION, EMULSION INTRAVENOUS AS NEEDED
Status: DISCONTINUED | OUTPATIENT
Start: 2020-07-07 | End: 2020-07-07 | Stop reason: SURG

## 2020-07-07 RX ADMIN — Medication 100 MG: at 13:15

## 2020-07-07 RX ADMIN — PROPOFOL 30 MG: 10 INJECTION, EMULSION INTRAVENOUS at 13:18

## 2020-07-07 RX ADMIN — PHENYLEPHRINE HYDROCHLORIDE 100 MCG: 10 INJECTION INTRAVENOUS at 13:35

## 2020-07-07 RX ADMIN — MIDAZOLAM 1 MG: 1 INJECTION INTRAMUSCULAR; INTRAVENOUS at 13:15

## 2020-07-07 RX ADMIN — PHENYLEPHRINE HYDROCHLORIDE 200 MCG: 10 INJECTION INTRAVENOUS at 13:53

## 2020-07-07 RX ADMIN — PHENYLEPHRINE HYDROCHLORIDE 100 MCG: 10 INJECTION INTRAVENOUS at 13:47

## 2020-07-07 RX ADMIN — ONDANSETRON 4 MG: 2 INJECTION INTRAMUSCULAR; INTRAVENOUS at 16:29

## 2020-07-07 RX ADMIN — PROPOFOL 30 MG: 10 INJECTION, EMULSION INTRAVENOUS at 13:25

## 2020-07-07 RX ADMIN — PROPOFOL 100 MG: 10 INJECTION, EMULSION INTRAVENOUS at 13:15

## 2020-07-07 RX ADMIN — SODIUM CHLORIDE, SODIUM LACTATE, POTASSIUM CHLORIDE, AND CALCIUM CHLORIDE 125 ML/HR: .6; .31; .03; .02 INJECTION, SOLUTION INTRAVENOUS at 11:29

## 2020-07-07 RX ADMIN — ONDANSETRON 4 MG: 2 INJECTION INTRAMUSCULAR; INTRAVENOUS at 13:26

## 2020-07-07 RX ADMIN — PHENYLEPHRINE HYDROCHLORIDE 200 MCG: 10 INJECTION INTRAVENOUS at 13:34

## 2020-07-07 RX ADMIN — FENTANYL CITRATE 50 MCG: 50 INJECTION, SOLUTION INTRAMUSCULAR; INTRAVENOUS at 13:15

## 2020-07-07 RX ADMIN — PHENYLEPHRINE HYDROCHLORIDE 50 MCG: 10 INJECTION INTRAVENOUS at 13:43

## 2020-07-07 RX ADMIN — GLYCOPYRROLATE 0.1 MG: 0.2 INJECTION, SOLUTION INTRAMUSCULAR; INTRAVENOUS at 13:26

## 2020-07-07 RX ADMIN — SUGAMMADEX 139 MG: 100 INJECTION, SOLUTION INTRAVENOUS at 13:59

## 2020-07-07 RX ADMIN — ROCURONIUM BROMIDE 10 MG: 10 INJECTION, SOLUTION INTRAVENOUS at 13:24

## 2020-07-07 RX ADMIN — PHENYLEPHRINE HYDROCHLORIDE 100 MCG: 10 INJECTION INTRAVENOUS at 13:22

## 2020-07-07 RX ADMIN — PHENYLEPHRINE HYDROCHLORIDE 100 MCG: 10 INJECTION INTRAVENOUS at 13:23

## 2020-07-07 NOTE — ANESTHESIA POSTPROCEDURE EVALUATION
Post-Op Assessment Note    CV Status:  Stable  Pain Score: 0    Pain management: adequate     Mental Status:  Alert, awake and sleepy   Hydration Status:  Euvolemic   PONV Controlled:  Controlled   Airway Patency:  Patent   Post Op Vitals Reviewed: Yes      Staff: CRNA           BP   119/73   Temp (!) (P) 97 3 °F (36 3 °C) (07/07/20 1412)    Pulse (P) 89 (07/07/20 1412)   Resp (P) 16 (07/07/20 1412)    SpO2 (P) 100 % (07/07/20 1412)

## 2020-07-07 NOTE — DISCHARGE INSTRUCTIONS
Upper Endoscopy   WHAT YOU NEED TO KNOW:   An upper endoscopy is also called an upper gastrointestinal (GI) endoscopy, or an esophagogastroduodenoscopy (EGD)  You may feel bloated, gassy, or have some abdominal discomfort after your procedure  Your throat may be sore for 24 to 36 hours  You may burp or pass gas from air that is still inside your body  DISCHARGE INSTRUCTIONS:   Call 911 if:   · You have sudden chest pain or trouble breathing  Seek care immediately if:   · You feel dizzy or faint  · You have trouble swallowing  · You have severe throat pain  · Your bowel movements are very dark or black  · Your abdomen is hard and firm and you have severe pain  · You vomit blood  Contact your healthcare provider if:   · You feel full or bloated and cannot burp or pass gas  · You have not had a bowel movement for 3 days after your procedure  · You have neck pain  · You have a fever or chills  · You have nausea or are vomiting  · You have a rash or hives  · You have questions or concerns about your endoscopy  Relieve a sore throat:  Suck on throat lozenges or crushed ice  Gargle with a small amount of warm salt water  Mix 1 teaspoon of salt and 1 cup of warm water to make salt water  Relieve gas and discomfort from bloating:  Lie on your right side with a heating pad on your abdomen  Take short walks to help pass gas  Eat small meals until bloating is relieved  Rest after your procedure:  Do not drive or make important decisions until the day after your procedure  Return to your normal activity as directed  You can usually return to work the day after your procedure  Follow up with your healthcare provider as directed:  Write down your questions so you remember to ask them during your visits  © 2017 [employer]  Information is for End User's use only and may not be sold, redistributed or otherwise used for commercial purposes   All illustrations and images included in [employer] are the copyrighted property of [employer] , [employer]  or Doroteo Clarke  The above information is an  only  It is not intended as medical advice for individual conditions or treatments  Talk to your doctor, nurse or pharmacist before following any medical regimen to see if it is safe and effective for you

## 2020-07-07 NOTE — OP NOTE
OPERATIVE REPORT  PATIENT NAME: Sylvia Amos    :  1955  MRN: [id number]  Pt Location: [facility] OR ROOM 03    SURGERY DATE: 2020    Surgeon(s) and Role:     * Shayan Sung MD - Primary     * Lisa Munoz MD - Assisting    Preop Diagnosis:  Esophageal stricture [K22 2]    Post-Op Diagnosis Codes:     * Esophageal stricture [K22 2]    Procedure(s) (LRB):  ESOPHAGOGASTRODUODENOSCOPY (EGD) with esophageal dilation under fluro with biopsy (N/A)    Specimen(s):  ID Type Source Tests Collected by Time Destination   1 : Gastroesophageal anastamosis Tissue Other TISSUE EXAM Shayan Sung MD 2020 1342        Estimated Blood Loss:   Minimal    Drains:  Gastrostomy/Enterostomy Jejunostomy 14 Fr  LUQ (Active)   Dressing Status Clean;Dry; Intact 2020 11:08 AM   Number of days: 285       Gastrostomy/Enterostomy Jejunostomy 14 Fr  LUQ (Active)   Number of days: 32       Anesthesia Type:   General    Operative Indications:  Esophageal stricture [K252]  56-year-old male with history of a transhiatal esophagectomy with dysphagia and anastomotic stricture    Operative Findings: Moderate stricture at the anastomosis with impacted/retained food particles  Slight mucosal irregularity but no definite signs of malignancy  Complications:   None    Procedure and Technique:  After obtaining informed consent the patient was brought back to the operating room placed supine on the OR table  General anesthesia was induced without incident  A formal time-out was performed at this time verifying patient, date of birth, procedure, fire risk score, ASA score, antibiotic usage, need for blood products, anticipate specimens, beta-blocker usage, imaging and any other questions or concerns  At this point in adult endoscope was inserted into the patient's oropharynx and directed down the esophagus  Here we encountered a moderate amount of food particles in the proximal esophagus    The anastomosis itself was strictured over to just smaller than the size of the adult endoscope  There is no definite masses here  There is slight mucosal irregularity as expected with an anastomosis  The anastomosis was located at 25 cm from the incisors  We then performed biopsies of this area to rule out malignancy  These were sent for permanent pathology     Next a 0 038 Jagwire was placed down the EGD and directed into the gastric conduit under direct visualization  We used fluoroscopy to verify placement and maintain positioning  The EGD scope was then removed at this time  We then began serial dilation over a wire using fluoro  This was placed over the wire and passed down the oropharynx into the esophagus under direct visualization  We dilated from 33 Fr to 39 Fr  After every 3rd dilator the wire was removed and we reinspected the esophagus and conduit with the scope  Afterwards repeat endoscopy showed mild trauma to the esophagus and anastomosis but no perforation  We were able to get passed the anastomosis at that time and into the stomach and duodenum  The remainder of the conduit appeared normal       Patient tolerated this portion of procedure well without complication  Total fluoroscopy time was 1min 57 sec         I was present for the entire procedure     I was present for the entire procedure    Patient Disposition:  PACU     SIGNATURE: Yoandy Sanchez MD  DATE: July 7, 2020  TIME: 2:05 PM

## 2020-07-07 NOTE — PROGRESS NOTES
Virtual Brief Visit    Assessment/Plan:    1  Paraspinal abscess and vertebral osteomyelitis:   MRI T-spine shows  paraspinal abscess at T5-8 with persistent osteomyelitis at T3-8  No epidural abscess  Suspect secondary to contiguous spread in setting of GE junction carcinoma  ESR from 07/01 is 53, much improved from prior ESR of 119 on 05/25  collection is inaccessible percutaneously  possible sterile collection as patient is otherwise afebrile with improving ESR and no new symptoms  possible leak  Prior swallow study was negative  Repeat CT chest done 07/03/2020 showing redemonstration of  posterior mediastinal/prevertebral abscess extending from the level of T4-T5 through T6-T7; during recent hospital admission; case was discussed between ID and neurosurgery; no surgical intervention planned as patient is asymptomatic       -check blood cultures x2 sets  as mentioned in most recent ID noted  -continue IV ceftriaxone and  oral Flagyl  -followup further recommendations by thoracic surgery service; patient planned for thoracic surgery evaluation today 07/07/2020  -if blood cultures are negative and no intervention anticipated, will plan to complete 6 weeks total of IV antibiotic, through 07/09/2020   -transition to oral cefdinir/Flagyl thereafter until ESR plateaus/normalizes  -ID follow-up in 4 weeks; repeat CBC, creatinine, ESR and  CRP prior to next appointment with ID     2  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  Status post neoadjuvant chemotherapy via chest wall port, transhiatal esophagectomy on 01/28/2020  MRI brain is concerning for metastatic lesion      3  Dysphagia with recurrent esophageal stricture: In the setting of #2  Status post J-tube placement  Remains on tube feeds   Thoracic surgery evaluation noted with plan for at least EGD and  possible dilation  -thoracic surgery follow-up   -plan for OR for EGD and dilation today          Antibiotics:  Ceftriaxone IV and Flagyl p o  Day 39      Problem List Items Addressed This Visit     None                Reason for visit is   Chief Complaint   Patient presents with    Virtual Brief Visit        Encounter provider Izabela Peacock MD    Provider located at [address] [place] [id number] [phone]    Recent Visits  No visits were found meeting these conditions  Showing recent visits within past 7 days and meeting all other requirements     Today's Visits  Date Type Provider Dept   07/07/20 Telemedicine Izabela Peacock MD [facility]   Showing today's visits and meeting all other requirements     Future Appointments  Date Type Provider Dept   07/07/20 Telemedicine Izabela Peacock MD [facility]   Showing future appointments within next 150 days and meeting all other requirements        After connecting through telephone, the patient was identified by name and date of birth  Sheeba Kidd was informed that this is a telemedicine visit and that the visit is being conducted through telephone  My office door was closed  No one else was in the room  He acknowledged consent and understanding of privacy and security of the platform  The patient has agreed to participate and understands he can discontinue the visit at any time  Patient is aware this is a billable service  Subjective    Sheeba Kidd is a 71 y o  male who has GE junction carcinoma, s/p esophagectomy 01/2020 aftre neoadjuvant chemo  This was complicated by esophageal stricture  05/2020 he was admitted to the hospital for back pain, diagnosed to have paraspinal fluid collection anterior to the spine with erosion of anterior vertebral bodies at T4-6 highly suspicious for paraspinal abscess and adjacent osteomyelitis  MRI thoracic spine confirmed diskitis/osteomyelitis at T4-7 with paraspinal collection    There was concern for esophageal leak, but swallow study was negative  Patient underwent T6 biopsy with negative cultures  He remains on ceftriaxone and flagyl, which he is tolerating well with no apparent issues  He is still complaining of back pain that as per patient has not significantly improved, but also has not been worsening over the past few weeks  He denies fever or chills  HPI     Past Medical History:   Diagnosis Date    Bilateral pleural effusion     COPD (chronic obstructive pulmonary disease) (HCC)     Diabetes mellitus (HCC)     Difficulty swallowing     Disease of thyroid gland     Edema     Esophageal mass     History of chemotherapy     History of transfusion     Malignant neoplasm of lower third of esophagus ([address] )     Port-A-Cath in place     LCW    Sleep apnea     no CPAP    SOB (shortness of breath)        Past Surgical History:   Procedure Laterality Date    BACK SURGERY      x2    DISC REMOVAL      ESOPHAGECTOMY N/A 1/28/2020    Procedure: ESOPHAGECTOMY, TRANSHIATAL;  Surgeon: Nicholas Escudero MD;  [facility];  Service: Surgical Oncology    ESOPHAGOGASTRODUODENOSCOPY N/A 1/28/2020    Procedure: ESOPHAGOGASTRODUODENOSCOPY (EGD); Surgeon: Nicholas Escudero MD;  [facility];  Service: Surgical Oncology    FL GUIDED CENTRAL VENOUS ACCESS DEVICE INSERTION  9/26/2019    GASTROJEJUNOSTOMY W/ JEJUNOSTOMY TUBE N/A 9/26/2019    Procedure: INSERTION JEJUNOSTOMY TUBE OPEN;  Surgeon: Nicholas Escudero MD;  [facility];  Service: Surgical Oncology    GASTROJEJUNOSTOMY W/ JEJUNOSTOMY TUBE N/A 6/5/2020    Procedure: INSERTION JEJUNOSTOMY TUBE OPEN;  Surgeon: Nicholas Escudero MD;  [facility];  Service: Surgical Oncology    IR CHEST TUBE  5/26/2020    IR SPINE PROCEDURE  5/27/2020    IR THORACENTESIS  2/25/2020    VT ESOPHAGOGASTRODUODENOSCOPY TRANSORAL DIAGNOSTIC N/A 4/28/2020    Procedure: ESOPHAGOGASTRODUODENOSCOPY (EGD);   Surgeon: Shayan Sung MD;  [facility]; Service: Thoracic    NC ESOPHAGOSCOPY FLEX BALLOON DILAT <30 MM DIAM N/A 4/28/2020    Procedure: DILATATION ESOPHAGEAL;  Surgeon: Lolly Young MD;  [facility];  Service: Thoracic    TONSILLECTOMY      TUNNELED VENOUS PORT PLACEMENT N/A 9/26/2019    Procedure: INSERTION VENOUS PORT (PORT-A-CATH);   Surgeon: Laura Hodges MD;  [facility];  Service: Surgical Oncology    VOCAL CORD INJECTION N/A 2/7/2020    Procedure: MICROLARYNGOSCOPY WITH INJECTION;  Surgeon: Brad Rodriguez MD;  [facility];  Service: ENT       Current Outpatient Medications   Medication Sig Dispense Refill    acetaminophen (TYLENOL) 160 mg/5 mL suspension Take 20 3 mL (650 mg total) by mouth every 6 (six) hours as needed (mild pain,headaches,fever) 118 mL 0    amiodarone 200 mg tablet Take 1 tablet (200 mg total) by mouth daily with breakfast 30 tablet 0    aspirin 81 mg chewable tablet Chew 1 tablet (81 mg total) daily 30 tablet 0    atorvastatin (LIPITOR) 40 mg tablet Take 1 tablet (40 mg total) by mouth daily with dinner 30 tablet 0    Blood Glucose Monitoring Suppl (ONE TOUCH ULTRA 2) w/Device KIT Check bs , q fasting and 2 hr after meals 1 each 0    canagliflozin (Invokana) 100 mg Take 100 mg by mouth daily before breakfast      cefTRIAXone 2,000 mg in dextrose 5 % 50 mL IVPB Infuse 2,000 mg into a venous catheter every 24 hours  0    glucose blood test strip Check blood sugar 3 times daily 100 each 3    Lancets (ONETOUCH ULTRASOFT) lancets Check bs , q fasting and 2 hr after meals 100 each 5    levothyroxine 25 mcg tablet Take 25 mcg by mouth daily in the early morning      metroNIDAZOLE (FLAGYL) 500 mg tablet Take 1 tablet (500 mg total) by mouth every 8 (eight) hours 90 tablet 0    ondansetron (ZOFRAN) 4 mg tablet Take 1 tablet (4 mg total) by mouth every 6 (six) hours as needed for nausea or vomiting 30 tablet 0    oxyCODONE (ROXICODONE) 5 mg/5 mL solution Take 5 mg (5 ML) every 4 hours as needed for moderate pain or 10 mg (10 ML) every 4 hours as needed for severe pain 473 mL 0    rOPINIRole (REQUIP) 0 25 mg tablet take 1 tablet by mouth at bedtime 30 tablet 0    traZODone (DESYREL) 100 mg tablet Take 1 tablet (100 mg total) by mouth daily at bedtime 30 tablet 0     No current facility-administered medications for this visit  Allergies   Allergen Reactions    Penicillins Itching       Review of Systems   Constitutional: Negative for chills and fever  Respiratory: Negative for cough and shortness of breath  Cardiovascular: Negative for chest pain  Gastrointestinal: Negative for vomiting  Musculoskeletal: Positive for back pain    endorses dysphagia    There were no vitals filed for this visit  Physical exam:  Patient answered only simple questions regarding his back pain and then handed the phone to his daughter  Patient is awake and alert  As a result of this visit, I have not referred the patient for further respiratory evaluation  I spent 10 minutes directly with the patient during this visit    VIRTUAL VISIT DISCLAIMER    Brittni Andrew acknowledges that he has consented to an online visit or consultation  He understands that the online visit is based solely on information provided by him, and that, in the absence of a face-to-face physical evaluation by the physician, the diagnosis he receives is both limited and provisional in terms of accuracy and completeness  This is not intended to replace a full medical face-to-face evaluation by the physician  Brittni Andrew understands and accepts these terms

## 2020-07-07 NOTE — PATIENT INSTRUCTIONS
Please have blood cultures done today x2 sets  Complete ceftriaxone 7/9 as scheduled (unless blood cultures come back positive, we will call), we will then switch to oral cefdinir/flagyl  Please have labs drawn in about 4 weeks - attached  Please follow up in one month as scheduled

## 2020-07-08 ENCOUNTER — DOCUMENTATION (OUTPATIENT)
Dept: INFECTIOUS DISEASES | Facility: CLINIC | Age: 65
End: 2020-07-08

## 2020-07-08 DIAGNOSIS — M46.20 PARASPINAL ABSCESS (HCC): Primary | ICD-10-CM

## 2020-07-08 DIAGNOSIS — M86.18 OTHER ACUTE OSTEOMYELITIS, OTHER SITE (HCC): ICD-10-CM

## 2020-07-08 RX ORDER — AMOXICILLIN AND CLAVULANATE POTASSIUM 875; 125 MG/1; MG/1
1 TABLET, FILM COATED ORAL EVERY 12 HOURS SCHEDULED
Qty: 60 TABLET | Refills: 1 | Status: SHIPPED | OUTPATIENT
Start: 2020-07-08 | End: 2020-07-23 | Stop reason: ALTCHOICE

## 2020-07-08 NOTE — PROGRESS NOTES
At patient's tele visit yesterday, we discussed patient obtaining his blood cultures and depending on results, would switch to PO after IV abx complete 7/9  Pt did not have those cx obtained and per Dr Jesu Castano, refused while hospitalized as well  We will proceed with initial plan to switch over to cefdinir/flagyl starting 7/10  Will review orders with Dr Jesu Castano and send po treatment to pt's preferred pharmacy  Pt called and made aware of this and that they would start Friday  After speaking to patient, Dr Jesu Castano would actually prefer for patient to STOP flagyl after last dose 7/9 STOP ceftriaxone after final dose 7/9 and then on 7/10, start AUGMENTIN 875 Q12  I did attempt to call patient to inform of this change however he did not answer  LM requesting patient call our office back to discuss  Spoke with patient's daughter  Confirmed all above information and patient is happy with that  She verbalizes understanding and that he will stop both flagyl and ceftriaxone after today  He will start augmentin tomorrow

## 2020-07-09 ENCOUNTER — TELEPHONE (OUTPATIENT)
Dept: NEUROSURGERY | Facility: CLINIC | Age: 65
End: 2020-07-09

## 2020-07-09 NOTE — TELEPHONE ENCOUNTER
Spoke with daughter re: appt/xray reminder  1  Do you presently have a fever or flu-like symptoms? No  2  Do you have symptoms of an upper respiratory infection like runny nose, sore throat, or cough? No  3  Are you suffering from new headache that you have not had in the past?  No  4  Do you have/have you experienced any new shortness of breath recently? No  5  Do you have any new diarrhea, nausea or vomiting? No  6  Have you been in contact with anyone who has been sick or diagnosed with COVID-19?  no

## 2020-07-10 ENCOUNTER — TELEPHONE (OUTPATIENT)
Dept: NEUROSURGERY | Facility: CLINIC | Age: 65
End: 2020-07-10

## 2020-07-10 ENCOUNTER — TELEPHONE (OUTPATIENT)
Dept: CARDIAC SURGERY | Facility: MEDICAL CENTER | Age: 65
End: 2020-07-10

## 2020-07-10 NOTE — TELEPHONE ENCOUNTER
I called and spoke with his daughter Suyapa Doty about biopsy results  She understands that we did a biopsy during the procedure of the gastroesophageal anastomosis  This showed no evidence of malignancy but some atypical cells  I would do a repeat biopsy at our next dilation  Overall she states that he is eating better at this time  He is still somewhat scared to eat more  Still on tube feeds  I having urged him to chew his food well and eat softer foods  Otherwise have liquids on hand  We will move our next follow-up appointment to Wednesday July 22nd

## 2020-07-10 NOTE — TELEPHONE ENCOUNTER
Spoke with patient's daughter after speaking with Altagracia Veronica regarding today's virtual visit  Patient did not have x-ray completed for today's visit  She states that he is doing well overall and does not really complain of much pain  She said that he is eating softer foods  They will keep their appt scheduled on the 23rd of this month and she will have him go for the x-ray a few days prior to that appt

## 2020-07-11 PROBLEM — K21.9 GASTROESOPHAGEAL REFLUX DISEASE: Status: ACTIVE | Noted: 2020-07-11

## 2020-07-11 PROBLEM — Z93.4 JEJUNOSTOMY TUBE PRESENT (HCC): Status: ACTIVE | Noted: 2020-07-11

## 2020-07-11 NOTE — ED ATTENDING ATTESTATION
7/2/2020  IGoldie MD, saw and evaluated the patient  I have discussed the patient with the resident/non-physician practitioner and agree with the resident's/non-physician practitioner's findings, Plan of Care, and MDM as documented in the resident's/non-physician practitioner's note, except where noted  All available labs and Radiology studies were reviewed  I was present for key portions of any procedure(s) performed by the resident/non-physician practitioner and I was immediately available to provide assistance  At this point I agree with the current assessment done in the Emergency Department  I have conducted an independent evaluation of this patient a history and physical is as follows:    ED Course     Patient presents for evaluation after having an outpatient MRI yesterday which showed that he had and increased collection next to his spine  Patient has a history of esophageal cancer and underwent esophagectomy which was complicated by a stricture and ultimately by a collection that was near his spine  Patient was started on antibiotics and had a repeat MRI yesterday  Patient does report having some mid back pain  No additional complaints  ROS:   Constitutional: No fevers, chills  HENT: Negative for trouble swallowing and voice change  Eyes: Negative for pain, discharge and itching  Respiratory: Negative for apnea, choking, chest tightness, shortness of breath and wheezing  Cardiovascular: Negative for leg swelling  Negative for chest pain and palpitations  Gastrointestinal: Negative for abdominal pain, constipation, diarrhea, nausea and vomiting  Genitourinary: Negative for difficulty urinating, dysuria, flank pain and frequency  Musculoskeletal: Negative for back pain, neck pain and neck stiffness  Skin: Negative for color change  Neurological: Negative for dizziness, seizures, weakness, light-headedness, numbness and headaches       Exam: AAOx3, NAD, RRR, CTA, S/NT/ND, no motor/sensory deficits  A/P:  Intrathoracic abscess  Will check labs plan to admit      Critical Care Time  Procedures

## 2020-07-13 ENCOUNTER — TELEPHONE (OUTPATIENT)
Dept: CARDIAC SURGERY | Facility: CLINIC | Age: 65
End: 2020-07-13

## 2020-07-13 NOTE — TELEPHONE ENCOUNTER
Spoke to patient's daughter at 35  No BM at all since 7/7  He is not drinking many fluids so she has been trying to encourage them  Has tried prune juice and Colace  I recommended OTC Miralax over the next 2 days  If no BM, then can try magnesium citrate  If not even then, can try saline enema  She will call us back if recommendations do not help

## 2020-07-13 NOTE — TELEPHONE ENCOUNTER
Patient's daughter Tyron Aponte called looking for advice  Patient had an EGD dilation on 7/7 with Dr Charli Mayo and  has not had a bowel movement since surgery  Patient's daughter would like a call back at [phone]

## 2020-07-13 NOTE — PATIENT INSTRUCTIONS
PRESCRIPTION REFILL REMINDER:  All medication refills should be requested prior to [facility] on Friday  Any refill requests after noon on Friday would be addressed the following Monday  Please protect yourself from the novel Coronavirus (COVID-19)! Even though we STILL do not have a vaccine or good antiviral drugs for this infection, the following strategies can help you stay healthy:    = Wash your hands with soap and water, or hand  with at least 60% alcohol, often  = Avoid touching your face!   = Avoid close contact with others, even if they seem healthy  Avoid gatherings of more than 10 people, and don't travel unnecessarily  = Stay home, except to get medical care or other essentials  If you can eat and drink and breathe and sleep, please consider calling your doctor's office instead of visiting in person, even if you are ill   = Cover your cough with a tissue, or your elbow  = Clean frequently touched surfaces and objects daily (e g , tables, countertops, light switches, doorknobs, and cabinet handles)  Regular household detergent and water are sufficient  Numbers of cases of Coronavirus are spiking in many [place] States  This is not a more dangerous virus, but a sign that more people in a community are spreading the virus  Please check the local disease reports near you if you consider travelling this summer  We do NOT advise travel to any community or State with a rising viral caseload  Check out [employer] for English data that are updated daily  Factor 14 cy   Global Epidemics  Org, from [facility] will give you Hlpqjf-ly-Evszly information on virus cases  Https://globalepidemics  org/

## 2020-07-14 ENCOUNTER — TELEPHONE (OUTPATIENT)
Dept: CARDIAC SURGERY | Facility: CLINIC | Age: 65
End: 2020-07-14

## 2020-07-14 LAB
MYCOBACTERIUM SPEC CULT: NORMAL
RHODAMINE-AURAMINE STN SPEC: NORMAL

## 2020-07-14 NOTE — TELEPHONE ENCOUNTER
Patient's daughter calling back they tried over the counter miralax and he still has not have a BM  He is very uncomfortable  Patient's daughter requesting magnesium citrate to be called into the pharmacy  Patient uses [facility] in [place]  Please call patient's daughter if you are able to call in for patient   CB# [phone]

## 2020-07-16 ENCOUNTER — OFFICE VISIT (OUTPATIENT)
Dept: PALLIATIVE MEDICINE | Facility: CLINIC | Age: 65
End: 2020-07-16
Payer: COMMERCIAL

## 2020-07-16 ENCOUNTER — NUTRITION (OUTPATIENT)
Dept: NUTRITION | Facility: CLINIC | Age: 65
End: 2020-07-16

## 2020-07-16 VITALS
SYSTOLIC BLOOD PRESSURE: 100 MMHG | DIASTOLIC BLOOD PRESSURE: 60 MMHG | WEIGHT: 153 LBS | RESPIRATION RATE: 20 BRPM | BODY MASS INDEX: 21.95 KG/M2 | HEART RATE: 78 BPM | TEMPERATURE: 98.3 F

## 2020-07-16 DIAGNOSIS — T40.2X5A THERAPEUTIC OPIOID-INDUCED CONSTIPATION (OIC): ICD-10-CM

## 2020-07-16 DIAGNOSIS — G89.3 CANCER-RELATED PAIN: Chronic | ICD-10-CM

## 2020-07-16 DIAGNOSIS — K59.03 THERAPEUTIC OPIOID-INDUCED CONSTIPATION (OIC): ICD-10-CM

## 2020-07-16 DIAGNOSIS — Z71.3 NUTRITIONAL COUNSELING: Primary | ICD-10-CM

## 2020-07-16 DIAGNOSIS — G47.01 INSOMNIA DUE TO MEDICAL CONDITION: ICD-10-CM

## 2020-07-16 DIAGNOSIS — C15.5 MALIGNANT NEOPLASM OF LOWER THIRD OF ESOPHAGUS (HCC): Primary | ICD-10-CM

## 2020-07-16 PROCEDURE — 4040F PNEUMOC VAC/ADMIN/RCVD: CPT | Performed by: INTERNAL MEDICINE

## 2020-07-16 PROCEDURE — 3044F HG A1C LEVEL LT 7.0%: CPT | Performed by: INTERNAL MEDICINE

## 2020-07-16 PROCEDURE — 1036F TOBACCO NON-USER: CPT | Performed by: INTERNAL MEDICINE

## 2020-07-16 PROCEDURE — 1111F DSCHRG MED/CURRENT MED MERGE: CPT | Performed by: INTERNAL MEDICINE

## 2020-07-16 PROCEDURE — 99213 OFFICE O/P EST LOW 20 MIN: CPT | Performed by: INTERNAL MEDICINE

## 2020-07-16 PROCEDURE — 1160F RVW MEDS BY RX/DR IN RCRD: CPT | Performed by: INTERNAL MEDICINE

## 2020-07-16 NOTE — PROGRESS NOTES
Outpatient Oncology Nutrition Consult     Type of Consult: Follow Up  Care Location: Telephone Call; with daughter Adam Madden     Reason for referral: Franki RN: Esophageal CA dx and TF (Date of referral: 9/26/19)  -referral today 7/1/20 from [facility] "Pt's daughter called with concerns about her father  She states he is continuing to lose weight, and would like to discuss changing the tube feed formula to something more calorie-dense  She also mentioned that he has not been able to take anything by mouth in the last 3 days, including water "     Nutrition Assessment:     PMH: COPD, DM, Jejunostomy 9/26/19  Oncology Diagnosis & Treatments: Malignant neoplasm of lower third of esophagus diagnosed 8/24/19  Chemotherapy (neulasta, taxotere, oxaliplatin, trastuzumab) started 10/8/19, EOT 12/30/19  S/p sophagogastrectomy 1/28/20        Malignant neoplasm of lower third of esophagus (HCC)    8/24/2019 Initial Diagnosis     Malignant neoplasm of lower third of esophagus ([address] )  At least intramucosal carcinoma  Her2/SHIMON positive      10/8/2019 - 12/30/2019 Chemotherapy     palonosetron (ALOXI) injection 0 25 mg, 0 25 mg, Intravenous, Once, 6 of 6 cycles  Administration: 0 25 mg (10/8/2019), 0 25 mg (10/22/2019), 0 25 mg (11/5/2019), 0 25 mg (11/19/2019), 0 25 mg (12/3/2019), 0 25 mg (12/17/2019)  pegfilgrastim (NEULASTA ONPRO) subcutaneous injection kit 6 mg, 6 mg, Subcutaneous, Once, 6 of 6 cycles  Administration: 6 mg (10/9/2019), 6 mg (10/23/2019), 6 mg (11/6/2019), 6 mg (11/20/2019), 6 mg (12/4/2019), 6 mg (12/18/2019)  fosaprepitant (EMEND) 150 mg in sodium chloride 0 9 % 250 mL IVPB, 150 mg, Intravenous, Once, 6 of 6 cycles  Administration: 150 mg (10/8/2019), 150 mg (10/22/2019), 150 mg (11/5/2019), 150 mg (11/19/2019), 150 mg (12/3/2019), 150 mg (12/17/2019)  DOCEtaxel (TAXOTERE) 99 mg in sodium chloride 0 9 % 250 mL chemo infusion, 50 mg/m2 = 99 mg (83 3 % of original dose 60 mg/m2), Intravenous, Once, 6 of 6 cycles  Dose modification: 50 mg/m2 (original dose 60 mg/m2, Cycle 1, Reason: Other (See Comments))  Administration: 99 mg (10/8/2019), 99 mg (10/22/2019), 99 mg (11/5/2019), 99 mg (11/19/2019), 99 mg (12/3/2019), 99 mg (12/17/2019)  leucovorin 396 mg in dextrose 5 % 250 mL IVPB, 200 mg/m2 = 396 mg (50 % of original dose 400 mg/m2), Intravenous, Once, 6 of 6 cycles  Dose modification: 200 mg/m2 (original dose 400 mg/m2, Cycle 1, Reason: Other (See Comments), Comment: FLOT regimen standard)  Administration: 396 mg (10/8/2019), 396 mg (10/22/2019), 396 mg (11/5/2019), 396 mg (11/19/2019), 396 mg (12/3/2019), 396 mg (12/17/2019)  oxaliplatin (ELOXATIN) 168 3 mg in dextrose 5 % 250 mL chemo infusion, 85 mg/m2 = 168 3 mg, Intravenous, Once, 6 of 6 cycles  Administration: 168 3 mg (10/8/2019), 168 3 mg (10/22/2019), 168 3 mg (11/5/2019), 168 3 mg (11/19/2019), 168 3 mg (12/3/2019), 168 3 mg (12/17/2019)  trastuzumab (HERCEPTIN) 496 mg in sodium chloride 0 9 % 250 mL chemo infusion, 6 mg/kg = 496 mg, Intravenous, Once, 6 of 6 cycles  Administration: 496 mg (10/8/2019), 331 mg (10/22/2019), 331 mg (11/5/2019), 331 mg (11/19/2019), 331 mg (12/3/2019), 331 mg (12/17/2019)      1/28/2020 Surgery     Esophagogastrectomy  - Microscopic focus of residual adenocarcinoma in muscularis propria  - Ulceration and associated histologic changes compatible with prior neoadjuvant chemotherapy  - Adjacent betancur's esophagus with associated atypia indeterminate for dysplasia  - Seven lymph nodes identified; all negative for malignancy (0/7)          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[address] )    8/24/2019 Biopsy     Esophagus (biopsy):  - At least 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 background of Betancur's esophagus and high grade dysplasia       9/24/2019 Initial Diagnosis     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[facility] [address] )      10/8/2019 - 12/30/2019 Chemotherapy     palonosetron (ALOXI) injection 0 25 mg, 0 25 mg, Intravenous, Once, 6 of 6 cycles  Administration: 0 25 mg (10/8/2019), 0 25 mg (10/22/2019), 0 25 mg (11/5/2019), 0 25 mg (11/19/2019), 0 25 mg (12/3/2019), 0 25 mg (12/17/2019)  pegfilgrastim (NEULASTA ONPRO) subcutaneous injection kit 6 mg, 6 mg, Subcutaneous, Once, 6 of 6 cycles  Administration: 6 mg (10/9/2019), 6 mg (10/23/2019), 6 mg (11/6/2019), 6 mg (11/20/2019), 6 mg (12/4/2019), 6 mg (12/18/2019)  fosaprepitant (EMEND) 150 mg in sodium chloride 0 9 % 250 mL IVPB, 150 mg, Intravenous, Once, 6 of 6 cycles  Administration: 150 mg (10/8/2019), 150 mg (10/22/2019), 150 mg (11/5/2019), 150 mg (11/19/2019), 150 mg (12/3/2019), 150 mg (12/17/2019)  DOCEtaxel (TAXOTERE) 99 mg in sodium chloride 0 9 % 250 mL chemo infusion, 50 mg/m2 = 99 mg (83 3 % of original dose 60 mg/m2), Intravenous, Once, 6 of 6 cycles  Dose modification: 50 mg/m2 (original dose 60 mg/m2, Cycle 1, Reason: Other (See Comments))  Administration: 99 mg (10/8/2019), 99 mg (10/22/2019), 99 mg (11/5/2019), 99 mg (11/19/2019), 99 mg (12/3/2019), 99 mg (12/17/2019)  leucovorin 396 mg in dextrose 5 % 250 mL IVPB, 200 mg/m2 = 396 mg (50 % of original dose 400 mg/m2), Intravenous, Once, 6 of 6 cycles  Dose modification: 200 mg/m2 (original dose 400 mg/m2, Cycle 1, Reason: Other (See Comments), Comment: FLOT regimen standard)  Administration: 396 mg (10/8/2019), 396 mg (10/22/2019), 396 mg (11/5/2019), 396 mg (11/19/2019), 396 mg (12/3/2019), 396 mg (12/17/2019)  oxaliplatin (ELOXATIN) 168 3 mg in dextrose 5 % 250 mL chemo infusion, 85 mg/m2 = 168 3 mg, Intravenous, Once, 6 of 6 cycles  Administration: 168 3 mg (10/8/2019), 168 3 mg (10/22/2019), 168 3 mg (11/5/2019), 168 3 mg (11/19/2019), 168 3 mg (12/3/2019), 168 3 mg (12/17/2019)  trastuzumab (HERCEPTIN) 496 mg in sodium chloride 0 9 % 250 mL chemo infusion, 6 mg/kg = 496 mg, Intravenous, Once, 6 of 6 cycles  Administration: 496 mg (10/8/2019), 331 mg (10/22/2019), 331 mg (11/5/2019), 331 mg (11/19/2019), 331 mg (12/3/2019), 331 mg (12/17/2019)       Past Medical History:   Diagnosis Date    Bilateral pleural effusion     COPD (chronic obstructive pulmonary disease) (HCC)     Diabetes mellitus (HCC)     Difficulty swallowing     Disease of thyroid gland     Edema     Esophageal mass     History of chemotherapy     History of transfusion     Malignant neoplasm of lower third of esophagus ([address] )     Port-A-Cath in place     LCW    Sleep apnea     no CPAP    SOB (shortness of breath)      Past Surgical History:   Procedure Laterality Date    BACK SURGERY      x2    DISC REMOVAL      ESOPHAGECTOMY N/A 1/28/2020    Procedure: ESOPHAGECTOMY, TRANSHIATAL;  Surgeon: Bennett Juarez MD;  [facility];  Service: Surgical Oncology    ESOPHAGOGASTRODUODENOSCOPY N/A 1/28/2020    Procedure: ESOPHAGOGASTRODUODENOSCOPY (EGD); Surgeon: Bennett Juarez MD;  [facility];  Service: Surgical Oncology    FL GUIDED CENTRAL VENOUS ACCESS DEVICE INSERTION  9/26/2019    GASTROJEJUNOSTOMY W/ JEJUNOSTOMY TUBE N/A 9/26/2019    Procedure: INSERTION JEJUNOSTOMY TUBE OPEN;  Surgeon: Bennett Juarez MD;  [facility];  Service: Surgical Oncology    GASTROJEJUNOSTOMY W/ JEJUNOSTOMY TUBE N/A 6/5/2020    Procedure: INSERTION JEJUNOSTOMY TUBE OPEN;  Surgeon: Bennett Juarez MD;  [facility];  Service: Surgical Oncology    IR CHEST TUBE  5/26/2020    IR SPINE PROCEDURE  5/27/2020    IR THORACENTESIS  2/25/2020    IL EGD INSERT GUIDE WIRE DILATOR PASSAGE ESOPHAGUS N/A 7/7/2020    Procedure: ESOPHAGOGASTRODUODENOSCOPY (EGD) with esophageal dilation under fluro with biopsy;  Surgeon: Nikki Stone MD;  [facility];  Service: Thoracic    IL ESOPHAGOGASTRODUODENOSCOPY TRANSORAL DIAGNOSTIC N/A 4/28/2020    Procedure: ESOPHAGOGASTRODUODENOSCOPY (EGD);   Surgeon: Nikki Stone MD;  [facility];  Service: Thoracic    IL ESOPHAGOSCOPY FLEX BALLOON DILAT <30 MM DIAM N/A 4/28/2020    Procedure: DILATATION ESOPHAGEAL;  Surgeon: Yuki Caldwell MD Fei;  [facility];  Service: Thoracic    TONSILLECTOMY      TUNNELED VENOUS PORT PLACEMENT N/A 9/26/2019    Procedure: INSERTION VENOUS PORT (PORT-A-CATH);   Surgeon: Martha Thrasher MD;  [facility];  Service: Surgical Oncology    VOCAL CORD INJECTION N/A 2/7/2020    Procedure: MICROLARYNGOSCOPY WITH INJECTION;  Surgeon: Mindy Guzman MD;  [facility];  Service: ENT       Review of Medications:   Vitamins, Supplements and Herbals: no    Current Outpatient Medications:     acetaminophen (TYLENOL) 160 mg/5 mL suspension, Take 20 3 mL (650 mg total) by mouth every 6 (six) hours as needed (mild pain,headaches,fever), Disp: 118 mL, Rfl: 0    amiodarone 200 mg tablet, Take 1 tablet (200 mg total) by mouth daily with breakfast, Disp: 30 tablet, Rfl: 0    amoxicillin-clavulanate (AUGMENTIN) 875-125 mg per tablet, Take 1 tablet by mouth every 12 (twelve) hours, Disp: 60 tablet, Rfl: 1    aspirin 81 mg chewable tablet, Chew 1 tablet (81 mg total) daily, Disp: 30 tablet, Rfl: 0    atorvastatin (LIPITOR) 40 mg tablet, Take 1 tablet (40 mg total) by mouth daily with dinner, Disp: 30 tablet, Rfl: 0    Blood Glucose Monitoring Suppl (ONE TOUCH ULTRA 2) w/Device KIT, Check bs , q fasting and 2 hr after meals, Disp: 1 each, Rfl: 0    canagliflozin (Invokana) 100 mg, Take 100 mg by mouth daily before breakfast, Disp: , Rfl:     glucose blood test strip, Check blood sugar 3 times daily, Disp: 100 each, Rfl: 3    Lancets (ONETOUCH ULTRASOFT) lancets, Check bs , q fasting and 2 hr after meals, Disp: 100 each, Rfl: 5    levothyroxine 25 mcg tablet, Take 25 mcg by mouth daily in the early morning, Disp: , Rfl:     ondansetron (ZOFRAN) 4 mg tablet, Take 1 tablet (4 mg total) by mouth every 6 (six) hours as needed for nausea or vomiting, Disp: 30 tablet, Rfl: 0    oxyCODONE (ROXICODONE) 5 mg/5 mL solution, Take 5 mg (5 ML) every 4 hours as needed for moderate pain or 10 mg (10 ML) every 4 hours as needed for severe pain, Disp: 473 mL, Rfl: 0    rOPINIRole (REQUIP) 0 25 mg tablet, take 1 tablet by mouth at bedtime, Disp: 30 tablet, Rfl: 0    traZODone (DESYREL) 100 mg tablet, Take 1 tablet (100 mg total) by mouth daily at bedtime, Disp: 30 tablet, Rfl: 0    Most Recent Lab Results:   Lab Results   Component Value Date    WBC 3 71 (L) 07/03/2020    CHOLESTEROL 112 02/03/2020    TRIG 157 (H) 02/03/2020    HDL 24 (L) 02/03/2020    LDLCALC 57 02/03/2020    ALT 25 07/02/2020    AST 13 07/02/2020    ALB 3 4 (L) 07/02/2020    SODIUM 138 07/03/2020    SODIUM 138 07/02/2020    K 3 7 07/03/2020    K 3 8 07/02/2020     07/03/2020    BUN 22 07/03/2020    BUN 17 07/02/2020    CREATININE 0 98 07/03/2020    CREATININE 0 84 07/02/2020    EGFR 81 07/03/2020    PHOS 3 8 07/03/2020    PHOS 3 7 05/24/2020    GLUCOSE 339 (H) 02/01/2020    POCGLU 98 07/07/2020    GLUF 115 (H) 05/12/2020    GLUF 128 (H) 12/02/2019    HGBA1C 5 5 07/03/2020    HGBA1C 5 4 01/16/2020    HGBA1C 7 6 08/15/2019    CALCIUM 9 3 07/03/2020    MG 2 4 07/03/2020       Anthropometric Measurements:   Height: 69"  Ht Readings from Last 1 Encounters:   07/07/20 5' 10" (1 778 m)     -Weight History:   Usual Weight: 205#  Ideal Body Weight: 160#  Wt Readings from Last 20 Encounters:   07/07/20 69 4 kg (153 lb)   07/02/20 69 4 kg (153 lb)   07/02/20 69 7 kg (153 lb 9 6 oz)   06/30/20 69 4 kg (153 lb)   06/29/20 73 kg (161 lb)   06/25/20 73 kg (161 lb)   06/24/20 73 1 kg (161 lb 3 2 oz)   06/23/20 72 2 kg (159 lb 3 2 oz)   06/18/20 74 8 kg (165 lb)   06/08/20 85 1 kg (187 lb 9 8 oz)   05/23/20 74 7 kg (164 lb 10 9 oz)   05/18/20 72 6 kg (160 lb)   05/12/20 77 1 kg (170 lb)   05/08/20 78 9 kg (174 lb)   04/28/20 77 6 kg (171 lb)   04/27/20 77 6 kg (171 lb)   04/03/20 86 2 kg (190 lb)   03/12/20 87 3 kg (192 lb 8 oz)   03/05/20 87 3 kg (192 lb 8 oz)   03/04/20 85 9 kg (189 lb 6 4 oz)     Varian: None  Weight Changes: Stable in 1 week  and loss of 12# (7 3%) in 1 month (significant)  -home weight: 160# 3-4 days ago, 158# today     Estimated body mass index is 21 95 kg/m² as calculated from the following:    Height as of 7/7/20: 5' 10" (1 778 m)  Weight as of 7/7/20: 69 4 kg (153 lb)  Nutrition-Focused Physical Findings: n/a-phone visit      Food/Nutrition-Related History & Client/Social History:    Current Nutrition Impact Symptoms:  [] Nausea  [] Reduced Appetite  [] Acid Reflux    [] Vomiting  [x] Unintended Wt Loss  [] Malabsorption    [] Diarrhea  [] Unintended Wt Gain  [] Dumping Syndrome    [x] Constipation -when he started oxycodone, magnesium citrate helped relieve  [] Thick Mucous/Secretions  [] Abdominal Pain    [x] Dysgeusia (Altered Taste) -has had altered taste and smell for years, per pts sister at initial visit [] Xerostomia (Dry Mouth)  [] Gas    [x] Dysosmia (Altered Smell)  [] Thrush  [] Difficulty Chewing    [] Oral Mucositis (Sore Mouth)  [x] Fatigue    [] Other:    [] Odynophagia   [] Esophagitis  [] Other:    [x] Dysphagia-   -EGD with dilation 7/7/20   -improved after dilation, but has started increasing again    [] Early Satiety  [x] No Problems Eating      Food Allergies: no  Food Intolerances: no    Current Diet: Soft/Moist, Full Liquids and Tube Feeding  Current Nutrition Intake: More than usual  Appetite: Poor   Nutrition Route: PO and J-Tube  Meal planning/preparation mainly done by: Self and Daughter De Seen)   Oral Care: brushes QD  Activity level: Mainly sedentary, ADL  Fatigue is significant  Social Hx: His daughter Mery Harrington helps take care of him  Hx of smoking    Diet Recall:   Able to tolerate softer foods in small amounts: soup, noodles, rice   Also sips on water     Supplements:    Ensure Enlive (8 oz, 350 kcal, 20 g pro) will sip on, sometimes will finish entire bottle      Enteral Nutrition recall: DME: [facility]   TF Infused: Jevity 1 5 at a rate of 80, overnight for 12hrs until a total of 4 cartons is infused  FlushinmL pre and 60mL post free water flushes  · Enteral Nutrition provides: 960 mL volume (4 1 containers/day), 1440 kcal, 61 grams protein, 730 mL free water, 850 mL total water daily  · Gris Elizabeth states that sometimes Shane Moses will infuse for longer amount of time and receive a total of 5 cartons that day  Estimated Nutrition Needs: (based on 69 5kg/ 153# actual wt)  Energy Needs: 2281-3955 kcal/day (35-40 kcal/kg)  Protein Needs: 104-139 grams/day (1 5-2 g/kg)  Fluid Needs: 5183-8891 mL/day (1 mL/kcal)    Discussion/Summary: Shane Moses and his daughter Gris Johnson were contacted for oncology nutrition follow up  Gris Elizabeth reports that Shane Moses received an esophageal dilation on 20 which has helped his ability to swallow increase, she did say that he is beginning to have trouble swallowing again at times  His PO intakes continue to remain inadequate, although they have increased slightly  He is able to tolerate small bites of soup, noodles, and rice  Reviewed soft foods to try in addition to these foods as tolerated, and as able to safely swallow  Gris Elizabeth reports that he is able to tolerate swallowing liquids without any difficulty  He will sip on Ensure Enlive, and sometimes will finish an entire bottle in one day  Suggested that Gris Johnson encourage him to consume at least 1 bottle everyday in addition to his tube feeding  His tube feeding continues to be his main source of nutrition  He has increased his infusion rate/hr since his last nutrition consultation, but he is not at his goal rate yet  Gris Johnson states the reason that he is not at his goal is that sometimes he feels full and will shut off his tube feeding without resuming again  Reviewed TF goals, hydration goals, and PO intake goals  TF at goal rate + 1 Ensure Enlive daily will meet 100% of his estimated calorie and protein needs  Gris Johnson reports that Fabiano's pain medication (oxycodone) was increased, and he started to experience constipation at that time   Magnesium citrate helped relieve his constipation  Reviewed the importance of hydration to also help constipation  Discussed/Reviewed:   · weight management  · indications & use of oral nutrition supplements  · ways to increase overall calorie intake  · adequate hydation & tips to increase overall fluid intake  · MNT for: enteral nutrition, unintentional weight loss, dysphagia, constipation   · individualized calorie, protein and fluid daily goals  · individualized enteral nutrition recommendations & plan: (see below)     In the event enteral nutrition is needed for Fabiano's sole source of nutrition, recommend:  TF Goal: Jevity 1 5 at 99 mL/hr via J-tube cycled over ~15 hours daily (until 6 5 cartons infused)  Water Flushin mL free water flush at the beginning and end of TF infusion (250 mL total) plus 125 mL free water flush 8 times per day (1000 mL total) (total of 1250 mL/day in flushes; flushes should be spread throughout the day and every 2-3 hours during TF infusion; will need to adjust flushes prn for IV fluids)  Enteral Nutrition goal to provide: 1540 mL volume (6 5 cartons day), 2312 kcal, 98 grams protein, 1170 mL free water, 2420 mL total water daily  *Pt will require an enteral feeding pump to administer TF due to J-Tube due to esophageal cancer diagnosis  All questions and concerns addressed during todays visit  Unique Mathur has RD contact information  Nutrition Diagnosis:     · Inadequate Energy Intake related to physiological causes, disease state and treatment related issues as evidenced by food recall, wt loss and discussion with pt and/or family  · Increased Nutrient Needs (kcal & pro) related to increased demand for nutrients and disease state as evidenced by recovering from cancer tx    · Patient has clinical indicators (or ASPEN criteria) consistent with severe protein-calorie malnutrition in the context of Chronic Illness as evidenced by >5% wt loss in 1 month and </=75% energy intake vs  Estimated needs for >/=1 month  Intervention & Recommendations:     Topics addressed: Nutrition education, Nutrition Symptom Management, Adequate Hydration, Medical Food Supplements, Enteral Nutrition and Weight Management    Barriers: None  Readiness to change: preparation  Comprehension: verbalizes understanding  Expected Compliance: good    Materials Provided: not applicable  Monitoring & Evaluation:     Dietitian to Monitor: Nutrtion Impact Symptoms, Body Weight, Enteral and/or Parenteral Intake and Biochemical Data     Goals:  · weight maintenance/stabilization  · adequate nutrition impact symptom management  · pt to meet >/=75% estimated nutrition needs daily  · achieve tube feeding goal rate    · Progress Towards Goals: Progressing    Nutrition Rx & Recommendations:    Diet: Tube feeding and liquids/soft solids by mouth as tolerated   Nutrition Supplements: Ensure Enlive at least 1 (8oz) bottle by mouth daily  May use homemade shakes/smoothies as desired  If using a pre-made shake/bar, choose ones with >300 calories and >10 grams protein (ex  Ensure Enlive, Ensure Plus, Boost Plus, Boost Very High Calorie, etc )  Stay hydrated by sipping fluids of choice/tolerance throughout the day  Liquid nutrition may be better tolerated than solids at times  Alter food choices and eating patterns to accommodate changing needs  Soft Foods to try: cream soups, casseroles, cottage cheese   Continue to eat by mouth, as appropriate/tolerated, as much as possible  Your syringes are each 60 mL or 2 fl oz  Always flush your feeding tube with 60 mL room-temp water 1-2 times daily while feeding tube is not in use  Follow proper oral care; Try baking soda/salt water rinse recipe (mix 3/4 tsp salt + 1 tsp baking soda + 1 qt water; rinse with pain water after using) in Eating Hints book (pg 18)  Brush your teeth before/after meals & before bed  Weigh yourself regularly   If you notice weight loss, make an effort to increase your daily food/calorie intake  If you continue to notice loss after these efforts, reach out to your dietitian to establish a plan to stabilize weight  Tube Feeding Plan: See Below     Tube Feeding Recommendations:   TF Goal: Jevity 1 5 at 99 mL/hr via J-tube cycled over ~15 hours daily (until 6 5 cartons infused)  Water Flushin mL free water flush at the beginning and end of TF infusion (250 mL total) plus 125 mL free water flush 8 times per day (1000 mL total) (total of 1250 mL/day in flushes; flushes should be spread throughout the day and every 2-3 hours during TF infusion; will need to adjust flushes prn for IV fluids)  Enteral Nutrition goal to provide: 1540 mL volume (6 5 cartons day), 2312 kcal, 98 grams protein, 1170 mL free water, 2420 mL total water daily  · Call [facility] when you have 10 days left of TF supplies: [phone], option 3 (customer support)        Follow Up Plan: 20 phone follow up 11am       Recommend Referral to Other Providers: none at this time

## 2020-07-16 NOTE — PATIENT INSTRUCTIONS
Nutrition Rx & Recommendations:    Diet: Tube feeding and liquids/soft solids by mouth as tolerated   Nutrition Supplements: Ensure Enlive at least 1 (8oz) bottle by mouth daily  May use homemade shakes/smoothies as desired  If using a pre-made shake/bar, choose ones with >300 calories and >10 grams protein (ex  Ensure Enlive, Ensure Plus, Boost Plus, Boost Very High Calorie, etc )  Stay hydrated by sipping fluids of choice/tolerance throughout the day  Liquid nutrition may be better tolerated than solids at times  Alter food choices and eating patterns to accommodate changing needs  Soft Foods to try: cream soups, casseroles, cottage cheese   Continue to eat by mouth, as appropriate/tolerated, as much as possible  Your syringes are each 60 mL or 2 fl oz  Always flush your feeding tube with 60 mL room-temp water 1-2 times daily while feeding tube is not in use  Follow proper oral care; Try baking soda/salt water rinse recipe (mix 3/4 tsp salt + 1 tsp baking soda + 1 qt water; rinse with pain water after using) in Eating Hints book (pg 18)  Brush your teeth before/after meals & before bed  Weigh yourself regularly  If you notice weight loss, make an effort to increase your daily food/calorie intake  If you continue to notice loss after these efforts, reach out to your dietitian to establish a plan to stabilize weight  Tube Feeding Plan: See Below     Tube Feeding Recommendations:   TF Goal: Jevity 1 5 at 99 mL/hr via J-tube cycled over ~15 hours daily (until 6 5 cartons infused)  Water Flushin mL free water flush at the beginning and end of TF infusion (250 mL total) plus 125 mL free water flush 8 times per day (1000 mL total) (total of 1250 mL/day in flushes; flushes should be spread throughout the day and every 2-3 hours during TF infusion; will need to adjust flushes prn for IV fluids)     Enteral Nutrition goal to provide: 1540 mL volume (6 5 cartons day), 2312 kcal, 98 grams protein, 1170 mL free water, 2420 mL total water daily  · Call [facility] when you have 10 days left of TF supplies: [phone], option 3 (customer support)        Follow Up Plan: 7/30/20 phone follow up 11am       Recommend Referral to Other Providers: none at this time

## 2020-07-16 NOTE — PROGRESS NOTES
Palliative and Supportive Care   Jorge A Granda 71 y o  male [id number]    Assessment/Plan:  1  Malignant neoplasm of lower third of esophagus (HCC)    2  Insomnia due to medical condition    3  Cancer-related pain    4  Therapeutic opioid-induced constipation (OIC)      Jodie Coburn request no changes for now, though there are a variety of topical agents or long-acting opioids that can be trialed should he continue to have ongoing pain  Follow up in 2-3 months or sooner  His daughter will call for refills  Requested Prescriptions      No prescriptions requested or ordered in this encounter     There are no discontinued medications  Representatives have queried the patient's controlled substance dispensing history in the Prescription Drug Monitoring Program in compliance with regulations before I have prescribed any controlled substances  The prescription history is consistent with prescribed therapy and our practice policies  15 + minutes were spent face to face with Jorge A Granda with greater than 50% of the time spent in counseling or coordination of care including discussions of treatment instructions   All of the patient's questions were answered during this discussion  No follow-ups on file  Subjective:   Chief Complaint  Follow up visit for:  symptom management  HPI     Jorge A Granda is a 71 y o  male who presented with difficulty swallowing in August   Unfortunately he has now been diagnosed with esophageal cancer and was recently hospitalized for 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  Furhter imaging suggests metastatic disease including brain mets  He did receive opioids through our office in early July and was encouraged to follow up as an outpaitent  Jodie Coburn presents today reporting ongoing significant back pain  His pain is responsive to oxycodone 10 mg, though he tries to use this sparingly  He is not taking Tylenol    He had tried topical agents in the hospital without success  He is not interested in a tens unit  His daughter manages his medications and he is unable to provide much more history  He is not sleeping well secondary to his discomfort, though this has been improved with sleeping in a chair  He states he has gained some weight back, and is tolerating his tube feeds  He did have some opioid induced constipation which improved with with sounds to a been either MiraLax or milk of magnesia  The following portions of the medical history were reviewed: past medical history, problem list, medication list, and social history      Current Outpatient Medications:     amoxicillin-clavulanate (AUGMENTIN) 875-125 mg per tablet, Take 1 tablet by mouth every 12 (twelve) hours, Disp: 60 tablet, Rfl: 1    oxyCODONE (ROXICODONE) 5 mg/5 mL solution, Take 5 mg (5 ML) every 4 hours as needed for moderate pain or 10 mg (10 ML) every 4 hours as needed for severe pain, Disp: 473 mL, Rfl: 0    acetaminophen (TYLENOL) 160 mg/5 mL suspension, Take 20 3 mL (650 mg total) by mouth every 6 (six) hours as needed (mild pain,headaches,fever), Disp: 118 mL, Rfl: 0    amiodarone 200 mg tablet, Take 1 tablet (200 mg total) by mouth daily with breakfast, Disp: 30 tablet, Rfl: 0    aspirin 81 mg chewable tablet, Chew 1 tablet (81 mg total) daily, Disp: 30 tablet, Rfl: 0    atorvastatin (LIPITOR) 40 mg tablet, Take 1 tablet (40 mg total) by mouth daily with dinner, Disp: 30 tablet, Rfl: 0    Blood Glucose Monitoring Suppl (ONE TOUCH ULTRA 2) w/Device KIT, Check bs , q fasting and 2 hr after meals, Disp: 1 each, Rfl: 0    canagliflozin (Invokana) 100 mg, Take 100 mg by mouth daily before breakfast, Disp: , Rfl:     glucose blood test strip, Check blood sugar 3 times daily, Disp: 100 each, Rfl: 3    Lancets (ONETOUCH ULTRASOFT) lancets, Check bs , q fasting and 2 hr after meals, Disp: 100 each, Rfl: 5    levothyroxine 25 mcg tablet, Take 25 mcg by mouth daily in the early morning, Disp: , Rfl:     ondansetron (ZOFRAN) 4 mg tablet, Take 1 tablet (4 mg total) by mouth every 6 (six) hours as needed for nausea or vomiting, Disp: 30 tablet, Rfl: 0    rOPINIRole (REQUIP) 0 25 mg tablet, take 1 tablet by mouth at bedtime, Disp: 30 tablet, Rfl: 0    traZODone (DESYREL) 100 mg tablet, Take 1 tablet (100 mg total) by mouth daily at bedtime, Disp: 30 tablet, Rfl: 0  Review of Systems    All other systems negative    Objective:  Vital Signs  /60 (BP Location: Left arm, Cuff Size: Standard)   Pulse 78   Temp 98 3 °F (36 8 °C) (Oral)   Resp 20   Wt 69 4 kg (153 lb)   BMI 21 95 kg/m²    Physical Exam    Constitutional: Appears thin, but well-developed and well-nourished  In no acute distress  Head: Normocephalic and atraumatic  Eyes: EOM are normal  Right eye exhibits no discharge  Left eye exhibits no discharge  No scleral icterus  Pulmonary/Chest: Effort normal  No stridor  No respiratory distress  Abdominal: No distension  Musculoskeletal: No edema  Neurological: Alert, oriented and appropriately conversant  Skin: Skin is dry, not diaphoretic  Psychiatric: Displays a normal mood and affect  Behavior, judgement and thought content appear normal    Vitals reviewed

## 2020-07-20 DIAGNOSIS — M86.18 OTHER ACUTE OSTEOMYELITIS, OTHER SITE (HCC): ICD-10-CM

## 2020-07-21 ENCOUNTER — HOSPITAL ENCOUNTER (OUTPATIENT)
Dept: RADIOLOGY | Facility: HOSPITAL | Age: 65
Discharge: HOME | End: 2020-07-21
Payer: COMMERCIAL

## 2020-07-21 DIAGNOSIS — S22.050S COMPRESSION FRACTURE OF T6 VERTEBRA, SEQUELA: ICD-10-CM

## 2020-07-21 PROCEDURE — 72070 X-RAY EXAM THORAC SPINE 2VWS: CPT

## 2020-07-22 ENCOUNTER — OFFICE VISIT (OUTPATIENT)
Dept: CARDIAC SURGERY | Facility: CLINIC | Age: 65
End: 2020-07-22
Payer: COMMERCIAL

## 2020-07-22 VITALS
TEMPERATURE: 96.7 F | SYSTOLIC BLOOD PRESSURE: 126 MMHG | DIASTOLIC BLOOD PRESSURE: 61 MMHG | WEIGHT: 151.6 LBS | OXYGEN SATURATION: 98 % | HEART RATE: 81 BPM | BODY MASS INDEX: 21.7 KG/M2 | HEIGHT: 70 IN

## 2020-07-22 DIAGNOSIS — Z85.01 PERSONAL HISTORY OF ESOPHAGEAL CANCER: ICD-10-CM

## 2020-07-22 DIAGNOSIS — Z93.4 JEJUNOSTOMY TUBE PRESENT (HCC): ICD-10-CM

## 2020-07-22 DIAGNOSIS — M46.20 PARASPINAL ABSCESS (HCC): ICD-10-CM

## 2020-07-22 DIAGNOSIS — G93.9 BRAIN LESION: ICD-10-CM

## 2020-07-22 DIAGNOSIS — K22.2 ESOPHAGEAL STRICTURE: Primary | ICD-10-CM

## 2020-07-22 DIAGNOSIS — M86.18 OTHER ACUTE OSTEOMYELITIS, OTHER SITE (HCC): ICD-10-CM

## 2020-07-22 PROBLEM — C15.5 MALIGNANT NEOPLASM OF LOWER THIRD OF ESOPHAGUS (HCC): Status: RESOLVED | Noted: 2019-09-17 | Resolved: 2020-07-22

## 2020-07-22 PROBLEM — C16.0 GE JUNCTION CARCINOMA (HCC): Status: RESOLVED | Noted: 2019-09-24 | Resolved: 2020-07-22

## 2020-07-22 PROCEDURE — 1036F TOBACCO NON-USER: CPT | Performed by: PHYSICIAN ASSISTANT

## 2020-07-22 PROCEDURE — 99215 OFFICE O/P EST HI 40 MIN: CPT | Performed by: PHYSICIAN ASSISTANT

## 2020-07-22 PROCEDURE — 4040F PNEUMOC VAC/ADMIN/RCVD: CPT | Performed by: PHYSICIAN ASSISTANT

## 2020-07-22 PROCEDURE — 3008F BODY MASS INDEX DOCD: CPT | Performed by: PHYSICIAN ASSISTANT

## 2020-07-22 PROCEDURE — 1111F DSCHRG MED/CURRENT MED MERGE: CPT | Performed by: PHYSICIAN ASSISTANT

## 2020-07-22 PROCEDURE — 1160F RVW MEDS BY RX/DR IN RCRD: CPT | Performed by: PHYSICIAN ASSISTANT

## 2020-07-22 PROCEDURE — 3044F HG A1C LEVEL LT 7.0%: CPT | Performed by: PHYSICIAN ASSISTANT

## 2020-07-22 RX ORDER — OXYCODONE HCL 5 MG/5 ML
SOLUTION, ORAL ORAL
Qty: 473 ML | OUTPATIENT
Start: 2020-07-22

## 2020-07-22 RX ORDER — OXYCODONE HCL 5 MG/5 ML
SOLUTION, ORAL ORAL
Qty: 473 ML | Refills: 0 | Status: SHIPPED | OUTPATIENT
Start: 2020-07-22 | End: 2020-07-23

## 2020-07-22 NOTE — ASSESSMENT & PLAN NOTE
Mr Felicia Rhodes presents with worsening dysphagia and weight loss  At this point he is unable to tolerate solid foods  Dr Terrance De Los Santos is recommending an EGD with dilation to help with his swallowing and expand his stricture  This was discussed, risks and benefits presented  Consent obtained  All questions addressed  Will send note to nutrition regarding increasing tube feeds

## 2020-07-22 NOTE — PROGRESS NOTES
Thoracic Follow-Up  Assessment/Plan:    Esophageal stricture  Mr Eric Frye presents with worsening dysphagia and weight loss  At this point he is unable to tolerate solid foods  Dr Eloina Kay is recommending an EGD with dilation to help with his swallowing and expand his stricture  This was discussed, risks and benefits presented  Consent obtained  All questions addressed  Will send note to nutrition regarding increasing tube feeds  Paraspinal abscess ([facility] )  This appears stable on recent chest CT  Patient is not experiencing fevers  He has some back pain associated with this  We will continue to monitor this radiographically  Surgery was discussed, and would be a complex, high risk procedure  For now, will observe and discuss surgery as appropriate  Patient in agreement  He is not taking his antibiotics due to difficulty swallowing tablets  Will discuss with Dr Poncho Alas  Brain lesion  Recommending continued follow up per Dr Rita Coleman  Diagnoses and all orders for this visit:    Esophageal stricture  -     Case request operating room: ESOPHAGOGASTRODUODENOSCOPY (EGD) with dilation, possible stent; Standing  -     Case request operating room: ESOPHAGOGASTRODUODENOSCOPY (EGD) with dilation, possible stent    Paraspinal abscess ([facility] )    Brain lesion    Personal history of esophageal cancer    Jejunostomy tube present ([facility] )    Other orders  -     Diet NPO; Sips with meds; Standing  -     Void on call to OR; Standing  -     Insert peripheral IV; Standing  -     Place sequential compression device; Standing          Thoracic History   Diagnosis: Clinical stage T3N1M0 esophageal carcinoma  Procedure: 1   EGD, transhiatal esophagectomy performed on 1/28/20 2  EGD with dilation up to 42F performed on 4/28/20  Pathology: esophagogastrectomy reveals microscopic focus of residual adenocarcinoma in muscularis propria measuring 0 7 cm, adjacent Duong's esophagus with associated atypia indeterminate for dysplasia   7 lymph nodes negative for carcinoma  Proximal and distal margins negative for carcinoma  8th edition AJCC tumor stage I (ypT2, ypN0)  Therapy:  Neoadjuvant FLOT x 6 chemotherapy and Herceptin, completed 12/17/19       Subjective:    Patient ID: Alexander Wall is a 76 y o  male  HPI   Mr Devora Boeck is a 72 yo gentleman who underwent a THE on 1/28/20 for stage I esophageal carcinoma  He was underwent an EGD with dilation on 4/27/20 up to a 42F  He underwent a jtube placement by Dr Oskar Mckenna on 6/5/20  H began to experience severe dysphagia and underwent an EGD dilation 7/7/20 up to 45Fr  Biopsy was taken at GE anastomosis and there was no evidence of malignancy, but atypical cells were seen  He was previously diagnosed with a paraspinal fluid collection consistent with abscess with associated osteo at T5/T6  This was being treated with antibiotics in conjunction with ID  MRI of the spine 7/1/20 revealed increasing size of midline anterior paraspinal fluid collection at T5-T8, measuring 5 x 2 2 x 0 9 cm, consistent with abscess  There was adjacent inflammation and phlegmon in the right paraspinal region, post to the esophagus, again concerning for esophageal leak  There was persistent osteomyelitis from T3-T8, with increasing T6 wedge deformity  Recommended ER evaluation by neurosurgery and ID  He was admitted 7/2 and evaluated  CTC 7/3 was stable compared to MRI  He was discharged 7/4, to complete IV antibiotics 7/9/0  Brain MRI 7/1/20 reveals slight interval enlargement of left posterior medial frontal lobe nodule, now 5mm  On discussion, he has lost 10 pounds over a month  He uses tube feeds at night  He is experiencing dysphagia worsened since last dilation  He had partial relief after dilation with ability to swallow liquids easier  However, not liquids are even a problem  He regurgitates liquid and undigested food  He denies fevers or chills  He is experiencing persistent pain in his back between his scapula   He is not taking Augmentin as he cannot swallow it  Will discuss with Dr Victorina Molina  The following portions of the patient's history were reviewed and updated as appropriate: allergies, current medications, past family history, past medical history, past social history, past surgical history and problem list   Past Medical History:   Diagnosis Date    Bilateral pleural effusion     COPD (chronic obstructive pulmonary disease) ([facility] )     Diabetes mellitus ([place] [address] )     Difficulty swallowing     Disease of thyroid gland     Edema     Esophageal mass     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    History of chemotherapy     History of transfusion     Malignant neoplasm of lower third of esophagus ([facility] )     Malignant neoplasm of lower third of esophagus ([place] [address] ) 9/17/2019    Diagnosis: clinical stage T3N1M0 esophageal carcinoma Procedure: EGD, transhiatal esophagectomy performed on 1/28/20 Pathology: esophagogastrectomy reveals microscopic focus of residual adenocarcinoma in muscularis propria measuring 0 7 cm, adjacent Duong's esophagus with associated atypia indeterminate for dysplasia  7 lymph nodes negative for carcinoma  Proximal and distal margins negative for    Port-A-Cath in place     LCW    Sleep apnea     no CPAP    SOB (shortness of breath)      Past Surgical History:   Procedure Laterality Date    BACK SURGERY      x2    DISC REMOVAL      ESOPHAGECTOMY N/A 1/28/2020    Procedure: ESOPHAGECTOMY, TRANSHIATAL;  Surgeon: Bennett Juarez MD;  [facility];  Service: Surgical Oncology    ESOPHAGOGASTRODUODENOSCOPY N/A 1/28/2020    Procedure: ESOPHAGOGASTRODUODENOSCOPY (EGD);   Surgeon: Bennett Juarez MD;  [facility];  Service: Surgical Oncology    FL GUIDED CENTRAL VENOUS ACCESS DEVICE INSERTION  9/26/2019    GASTROJEJUNOSTOMY W/ JEJUNOSTOMY TUBE N/A 9/26/2019    Procedure: INSERTION JEJUNOSTOMY TUBE OPEN;  Surgeon: Bennett Juarez MD;  [facility];  Service: Surgical Oncology    GASTROJEJUNOSTOMY W/ JEJUNOSTOMY TUBE N/A 6/5/2020    Procedure: INSERTION JEJUNOSTOMY TUBE OPEN;  Surgeon: Maryuri Serra MD;  [facility];  Service: Surgical Oncology    IR CHEST TUBE  5/26/2020    IR SPINE PROCEDURE  5/27/2020    IR THORACENTESIS  2/25/2020    [place] EGD INSERT GUIDE WIRE DILATOR PASSAGE ESOPHAGUS N/A 7/7/2020    Procedure: ESOPHAGOGASTRODUODENOSCOPY (EGD) with esophageal dilation under fluro with biopsy;  Surgeon: Maddy Damon MD;  [facility];  Service: Thoracic    [place] ESOPHAGOGASTRODUODENOSCOPY TRANSORAL DIAGNOSTIC N/A 4/28/2020    Procedure: ESOPHAGOGASTRODUODENOSCOPY (EGD); Surgeon: Maddy Damon MD;  [facility];  Service: Thoracic    [place] ESOPHAGOSCOPY FLEX BALLOON DILAT <30 MM DIAM N/A 4/28/2020    Procedure: DILATATION ESOPHAGEAL;  Surgeon: Maddy Damon MD;  [facility];  Service: Thoracic    TONSILLECTOMY      TUNNELED VENOUS PORT PLACEMENT N/A 9/26/2019    Procedure: INSERTION VENOUS PORT (PORT-A-CATH);   Surgeon: Maryuri Serra MD;  [facility];  Service: Surgical Oncology    VOCAL CORD INJECTION N/A 2/7/2020    Procedure: MICROLARYNGOSCOPY WITH INJECTION;  Surgeon: Veronica Vela MD;  [facility];  Service: ENT       Current Outpatient Medications:     amiodarone 200 mg tablet, Take 1 tablet (200 mg total) by mouth daily with breakfast, Disp: 30 tablet, Rfl: 0    aspirin 81 mg chewable tablet, Chew 1 tablet (81 mg total) daily, Disp: 30 tablet, Rfl: 0    atorvastatin (LIPITOR) 40 mg tablet, Take 1 tablet (40 mg total) by mouth daily with dinner, Disp: 30 tablet, Rfl: 0    Blood Glucose Monitoring Suppl (ONE TOUCH ULTRA 2) w/Device KIT, Check bs , q fasting and 2 hr after meals, Disp: 1 each, Rfl: 0    canagliflozin (Invokana) 100 mg, Take 100 mg by mouth daily before breakfast, Disp: , Rfl:     glucose blood test strip, Check blood sugar 3 times daily, Disp: 100 each, Rfl: 3    Lancets (ONETOUCH ULTRASOFT) lancets, Check bs , q fasting and 2 hr after meals, Disp: 100 each, Rfl: 5    levothyroxine 25 mcg tablet, Take 25 mcg by mouth daily in the early morning, Disp: , Rfl:     ondansetron (ZOFRAN) 4 mg tablet, Take 1 tablet (4 mg total) by mouth every 6 (six) hours as needed for nausea or vomiting, Disp: 30 tablet, Rfl: 0    oxyCODONE (ROXICODONE) 5 mg/5 mL solution, Take 5 mg (5 ML) every 4 hours as needed for moderate pain or 10 mg (10 ML) every 4 hours as needed for severe pain, Disp: 473 mL, Rfl: 0    rOPINIRole (REQUIP) 0 25 mg tablet, take 1 tablet by mouth at bedtime, Disp: 30 tablet, Rfl: 0    traZODone (DESYREL) 100 mg tablet, Take 1 tablet (100 mg total) by mouth daily at bedtime, Disp: 30 tablet, Rfl: 0    acetaminophen (TYLENOL) 160 mg/5 mL suspension, Take 20 3 mL (650 mg total) by mouth every 6 (six) hours as needed (mild pain,headaches,fever) (Patient not taking: Reported on 7/22/2020), Disp: 118 mL, Rfl: 0    amoxicillin-clavulanate (AUGMENTIN) 875-125 mg per tablet, Take 1 tablet by mouth every 12 (twelve) hours (Patient not taking: Reported on 7/22/2020), Disp: 60 tablet, Rfl: 1  Allergies   Allergen Reactions    Penicillins Itching       Review of Systems   Constitutional: Positive for appetite change and fatigue  Negative for fever  HENT: Positive for voice change  Respiratory: Negative  Cardiovascular: Negative  Gastrointestinal: Positive for constipation  Negative for vomiting  Musculoskeletal: Positive for back pain  Skin: Negative for wound  Hematological: Negative  All other systems reviewed and are negative  Objective:   Physical Exam   Constitutional: He is oriented to person, place, and time  He appears well-developed  thin   HENT:   Head: Normocephalic and atraumatic  Eyes: Conjunctivae are normal  No scleral icterus  Neck: Neck supple  No tracheal deviation present  Pulmonary/Chest: Effort normal and breath sounds normal  No respiratory distress  Abdominal: Soft  Bowel sounds are normal  He exhibits no distension  J tube   Lymphadenopathy:     He has no cervical adenopathy  Neurological: He is alert and oriented to person, place, and time  Skin: Skin is warm and dry  Psychiatric: He has a normal mood and affect  His behavior is normal  Judgment and thought content normal    /61 (BP Location: Left arm, Patient Position: Sitting, Cuff Size: Large)   Pulse 81   Temp (!) 96 7 °F (35 9 °C)   Ht 5' 10" (1 778 m)   Wt 68 8 kg (151 lb 9 6 oz)   SpO2 98%   BMI 21 75 kg/m²     Xr Chest Pa & Lateral    Result Date: 5/29/2020  Impression No acute cardiopulmonary disease  Workstation performed: [id number]     Xr Chest Pa & Lateral    Result Date: 2/28/2020  Impression Resolution of right effusion with no pneumothorax  Trace left effusion  Esophagectomy and gastric pull-up  Workstation performed: [id number]     Xr Chest 2 Views    Result Date: 2/24/2020  Impression Persistent right basilar infiltrate/ atelectasis with small bilateral pleural effusions  Workstation performed: [id number]     Xr Chest Pa & Lateral    Result Date: 2/18/2020  Impression Small right effusion and right base atelectasis, new since 2/11/2020  Workstation performed: [id number]     Xr Chest Pa & Lateral    Result Date: 2/11/2020  Impression Stable small bilateral apical pneumothoraces  Right chest tube has been removed  Central line and infusion port catheter device appears satisfactory  Persistent contrast material in the mediastinum and GI tract Workstation performed: [id number]     Xr Chest Pa & Lateral    Result Date: 2/10/2020  Impression Status post left chest tube removal   Subsegmental atelectasis with small effusion at the base  There is a small apical pneumothorax present, slightly smaller than the prior exam  There is a small bore right-sided chest tube present    There is a small right apical pneumothorax which is slightly larger than the prior exam  There is a small collection of contrast identified within the posterior mediastinum as well as contrast present at the GE junction and within the large bowel  Contrast within the mediastinum is of unclear etiology  Possibility of surgical leak not excluded  Small amount of contrast is seen within the tracheobronchial tree suggesting aspiration during recently performed barium studies 2/8  I personally discussed this study with the surgical resident Dr Flako Lopez on 2/10/2020 at 2:27 PM  Workstation performed: [id number]      Ct Chest W Contrast    Result Date: 7/3/2020  Narrative CT CHEST WITH IV CONTRAST INDICATION:   rule out chest abscess  COMPARISON:  MRI from 7/1/2020 and CT from 5/23/2020 TECHNIQUE: CT examination of the chest was performed  Axial, sagittal, and coronal 2D reformatted images were created from the source data and submitted for interpretation  Radiation dose length product (DLP) for this visit:  320 3 mGy-cm   This examination, like all CT scans [facility] in the [facility], was performed utilizing techniques to minimize radiation dose exposure, including the use of iterative reconstruction and automated exposure control  IV Contrast:  85 mL of iohexol (OMNIPAQUE) FINDINGS: LUNGS:  There is a small right pleural effusion, decreased in size since the prior CT  There is no pneumothorax  There is no focal airspace consolidation identified  Central airways are patent  PLEURA:  Please see above  HEART/GREAT VESSELS:  The heart is normal in size  There is coronary artery calcification  The thoracic aorta and main pulmonary artery are normal in caliber  MEDIASTINUM AND SHEA:  The patient is status post gastric pull-through  There is redemonstration of a peripherally enhancing abscess within the posterior mediastinum extending from the level of T5-T8 this measures approximately 1 9 x 1 9 x 6 3 cm, similar to the recent thoracic spine MRI allowing for the differences in technique    There is regional right paraspinal phlegmonous change again noted to be inseparable from the gastric pull-through /neoesophagus  CHEST WALL AND LOWER NECK:   Unremarkable  VISUALIZED STRUCTURES IN THE UPPER ABDOMEN:  There is cholelithiasis  There is a partially imaged catheter within a bowel loop in the left upper quadrant  OSSEOUS STRUCTURES:  There is erosive change extending from the levels of T4-T5 through T6-T7 and most pronounced at T6-T7 as seen on recent MRI  There is also sclerosis noted involving the T3 vertebral body and T8 vertebral body  Findings correspond to areas of discitis osteomyelitis on recent MRI  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 with moderate anterior wedging of the T6 vertebral body, similar to recent MRI and overall progressed since CT from 5/23/2020  Impression Redemonstration of a posterior mediastinal/prevertebral abscess extending from the level of T4-T5 through T6-T7, as described above  Findings are overall stable in size since recent MRI  Redemonstration of discitis osteomyelitis involving the thoracic spine, as described above  Erosive changes are overall progressed since the CT from 5/23/2020 as well as moderate loss of height involving the T6 vertebral body  Small right-sided pleural fusion  No new airspace consolidation identified  Workstation performed: [id number]     Ct Chest Abdomen Pelvis W Contrast    Result Date: 2/2/2020  Narrative CT CHEST, ABDOMEN AND PELVIS WITH IV CONTRAST INDICATION:   Sepsis  COMPARISON:  Chest CT dated 9/6/2019  TECHNIQUE: CT examination of the chest, abdomen and pelvis was performed  Axial, sagittal, and coronal 2D reformatted images were created from the source data and submitted for interpretation  Radiation dose length product (DLP) for this visit:  1488 2 mGy-cm     This examination, like all CT scans performed in the [facility], was performed utilizing techniques to minimize radiation dose exposure, including the use of iterative reconstruction and automated exposure control  IV Contrast:  100 mL of iodixanol (VISIPAQUE) Enteric Contrast: Enteric contrast was administered  FINDINGS: CHEST LUNGS:  Complete collapse of both lower lobes related to compression from effusions  No central obstructing lesion demonstrated in the lobar bronchi  No other acute consolidation  No interstitial thickening  No endobronchial masses  Endotracheal tube tip  terminates appropriately in the mid thoracic trachea  PLEURA:  Large bilateral pleural effusions  No grossly loculated components  HEART/GREAT VESSELS:  Borderline enlarged  Few coronary calcifications  No pericardial thickening or effusion  Central venous catheter tip terminates at the cavoatrial junction  Normal thoracic aorta  MEDIASTINUM AND SHEA:  Postsurgical changes after esophagectomy with gastric conduit  Anastomotic sutures just above the level of the gregg  Surgical drains in place  No suspicious mediastinal masses  CHEST WALL AND LOWER NECK:   Trace anasarca  ABDOMEN LIVER/BILIARY TREE:  Unremarkable  GALLBLADDER:  There are gallstone(s) within the gallbladder, without pericholecystic inflammatory changes  SPLEEN:  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  Less than a quarter of the splenic volume is involved  PANCREAS:  Unremarkable  ADRENAL GLANDS:  Unremarkable  KIDNEYS/URETERS:  Unremarkable  No hydronephrosis  STOMACH AND BOWEL:  Postsurgical changes from gastric conduit formation  Nasogastric tube tip terminates in the subdiaphragmatic distal gastric body  Percutaneous jejunostomy tube appears appropriately positioned  No dilated or inflamed loops of small bowel  Moderate stool burden in the colon without dilation or pericolonic inflammation  APPENDIX:  No findings to suggest appendicitis  ABDOMINOPELVIC CAVITY:  Mild perihepatic and perisplenic ascites  Mild to moderate amount of free pelvic fluid   There is gradient density within the pelvic fluid measuring up to 49 HU dependently suggesting small amount layering blood products  No evidence for active intraperitoneal hemorrhage  No encapsulated hematoma or abscess  VESSELS:  Aortoiliac atherosclerosis without aneurysm or significant occlusive atheromatous disease  PELVIS REPRODUCTIVE ORGANS:  Unremarkable for patient's age  URINARY BLADDER:  Doshi catheter in place  Bladder is decompressed  Iatrogenic at the dependent air in the lumen  ABDOMINAL WALL/INGUINAL REGIONS:  Mild anasarca  No encapsulated collections  Ventral incisional staples persist  No wound dehiscence  OSSEOUS STRUCTURES: L5-S1 discectomy with interbody fusion device  Partial osseous ankylosis  No evidence for loosening or infection  No acute fractures or focally destructive osseous lesions  Impression 1  Large bilateral pleural effusions with compressive collapse of both lower lobes  2   Moderate-sized focal posterior splenic infarct  3   Mild ascites in the abdomen and pelvis  Gradient density in the pelvic fluid suggests settling postsurgical blood products  No evidence for active intra-abdominal hemorrhage or hematoma formation, though  4   Postsurgical changes after esophagectomy and gastric conduit formation with posterior mediastinal surgical drain in place  No encapsulated-appearing collection at the intrathoracic anastomotic site  5   Appropriately positioned central venous catheter, endotracheal tube, nasogastric tube, and percutaneous jejunostomy tube  Workstation performed: [id number]     Ct Chest Abdomen Pelvis W Contrast    Result Date: 9/17/2019  Narrative CT CHEST, ABDOMEN AND PELVIS WITH IV CONTRAST INDICATION:   dysphagia  Patient recently diagnosed with adenocarcinoma of the lower 3rd of the esophagus  Dysphagia, "feels like there is a bubble in my throat",  blood in vomit  History of COPD, diabetes, hypertension  Prior history of tobacco use  COMPARISON:  September 6, 2019   TECHNIQUE: CT examination of the chest, abdomen and pelvis was performed  Axial, sagittal, and coronal 2D reformatted images were created from the source data and submitted for interpretation  Radiation dose length product (DLP) for this visit:  650 mGy-cm   This examination, like all CT scans performed in the [facility], was performed utilizing techniques to minimize radiation dose exposure, including the use of iterative reconstruction and automated exposure control  IV Contrast:  100 mL of iohexol (OMNIPAQUE) Enteric Contrast: Enteric contrast was not administered  FINDINGS: CHEST LUNGS:  A focal area of opacity within the anterior aspect of the left upper lobe of the lung measuring approximately 7 x 8 mm is again evident, presently best demonstrated on series 3 image 28  Once again, this appears somewhat more linear on the coronal images  This is similar in appearance to September 6, 2019  Continued follow-up is recommended  Minimal atelectasis in the inferior left lingula is unchanged  PLEURA:  Unremarkable  HEART/GREAT VESSELS:  Coronary artery calcifications are present  There is mild atherosclerosis of the thoracic aorta  MEDIASTINUM AND SHEA:  There is irregular wall thickening and some mucosal enhancement evident involving the distal esophagus and extending to the left hand aspect of the upper stomach  There is a masslike area of abnormal soft tissue density along the left hand aspect of the upper stomach which measures approximately 5 5 x 3 6 cm  There appears to be some infiltration of the fat adjacent to this masslike area which is suspicious for local extension of disease  Several mildly prominent nodes are present between this mass and the liver, measuring up to 1 2 x 0 9 cm  A left para esophageal node on series 2 image 43 presently measures 1 x 0 6 cm whereas it measured 0 8 x 0 6 cm on the prior study  There is some fluid within the esophagus near the  level of the gregg    A subcarinal node measures approximately 8 mm short axis, similar to the prior study  A right paratracheal node measures approximately 9 mm, similar to the prior study  Another right paratracheal node measures approximately 10 mm, also similar to the prior study  CHEST WALL AND LOWER NECK:   Unremarkable  ABDOMEN LIVER/BILIARY TREE:  There is a small area of focal fatty infiltration adjacent to the falciform ligament, similar to the previous examination  GALLBLADDER:  There are gallstone(s) within the gallbladder, without pericholecystic inflammatory changes  SPLEEN:  Unremarkable  PANCREAS:  Unremarkable  ADRENAL GLANDS:  There is an approximately 1 5 x 1 cm right adrenal nodule  This appears similar to slightly larger compared to the prior examination  There is a questionable 0 6 x 0 5 cm left adrenal nodule, not clearly demonstrated on the prior study  Follow-up of both of these nodules is suggested  KIDNEYS/URETERS:  Unremarkable  No hydronephrosis  STOMACH AND BOWEL:  As described above  There is no evidence of small bowel obstruction  There is no evidence of acute diverticulitis  APPENDIX:  A normal appendix was visualized  ABDOMINOPELVIC CAVITY:  As described above  No pneumoperitoneum  No significant ascites  VESSELS:  There is atherosclerosis  There is no abdominal aortic aneurysm  PELVIS REPRODUCTIVE ORGANS AND URINARY BLADDER:  There is a soft tissue density nodule within the posterior aspect of the urinary bladder near the midline which measures approximately 1 2 cm  This appears similar to the prior study  This could be caused by indentation of the prostate, however, a small bladder mass cannot be entirely excluded  Urology consultation and follow-up is recommended  ABDOMINAL WALL/INGUINAL REGIONS:  Small fat-containing left inguinal hernia  OSSEOUS STRUCTURES:  No acute fracture or destructive osseous lesion  Disc spacers are present at L5-S1       Impression There is irregular wall thickening involving the distal esophagus and extending to the left hand aspect of the upper stomach  There is a masslike area of abnormal soft tissue density along the left hand aspect of the upper stomach which measures approximately 5 5 x 3 6 cm, and there appears to be some infiltration of the adjacent fat  This is concerning for local extension of the patient's known neoplasm  Several mildly prominent nodes are present between this mass and the liver  There are also some mildly prominent mediastinal nodes  Please see discussion  There is some fluid within the esophagus near level of the gregg  There is an approximately 1 5 x 1 cm right adrenal nodule  This appears similar to slightly larger compared to the prior examination  There is a questionable tiny left adrenal nodule  Follow-up of both of these adrenal findings is suggested  There is an approximately 1 2 cm soft tissue density nodule within the posterior aspect of the urinary bladder  Although this may be related to indentation of the prostate, a small bladder mass cannot be entirely excluded  Urology consultation and follow-up is recommended  A focal area of opacity within the anterior aspect of the left upper lobe of the lung appears similar to September 6, 2019  Please see discussion  Continued follow-up is recommended  This could be reassessed with chest CT in 3 months  PET/CT could also be considered, however, it is of borderline size for accurate characterization with PET/ CT  If more remote CTs of the chest exist, direct comparison would be helpful  PET/CT should be considered for further evaluation of the extent of adenopathy and extent of disease  PET/CT or MRI could be utilized to evaluate the adrenal findings, if there are no contraindications  Cholelithiasis  No CT evidence of acute cholecystitis  Atherosclerosis  Coronary artery disease  Other findings as described above  Please see discussion  The study was marked in [facility] for immediate notification   Workstation performed: [id number]     Ct Chest Abdomen Pelvis W Contrast    Result Date: 9/12/2019  Narrative CT CHEST, ABDOMEN AND PELVIS WITH IV CONTRAST INDICATION:   C15 5: Malignant neoplasm of lower third of esophagus  Recent diagnosis of esophageal carcinoma (adenocarcinoma)  COMPARISON:  None  TECHNIQUE: CT examination of the chest, abdomen and pelvis was performed  Axial, sagittal, and coronal 2D reformatted images were created from the source data and submitted for interpretation  Radiation dose length product (DLP) for this visit:  818 mGy-cm   This examination, like all CT scans performed in the [facility], was performed utilizing techniques to minimize radiation dose exposure, including the use of iterative reconstruction and automated exposure control  IV Contrast:  100 mL of iohexol (OMNIPAQUE) Enteric Contrast: Enteric contrast was administered  FINDINGS: CHEST LUNGS:  Solitary focal opacity noted within the anterior left upper lobe, on image 205/25 measuring 7 x 8 mm  On coronal imaging appearing somewhat more elongated although with central nodular aspect about 6 mm  PLEURA:  Unremarkable  HEART/GREAT VESSELS:  Coronary artery calcification  Otherwise unremarkable MEDIASTINUM AND SHEA:  As already known, abnormal appearance of the distal esophagus, beginning just below level of the inferior pulmonary veins and extending to the gastric cardia  Irregular wall thickening and mucosal enhancement noted  Along the left margin of the abnormal esophagus image 201/41 there is a paraesophageal lymph node measuring 8 x 6 mm  A subcarinal lymph node demonstrates short axis diameter 8 mm  CHEST WALL AND LOWER NECK:   Unremarkable  ABDOMEN LIVER/BILIARY TREE:  Unremarkable  GALLBLADDER:  There are gallstone(s) within the gallbladder, without pericholecystic inflammatory changes  SPLEEN:  Unremarkable  PANCREAS:  Unremarkable   ADRENAL GLANDS:  There is a right adrenal nodule measuring 1 2 x 0 7 cm in size   It is somewhat small for accurate characterization  As best as can be told, density is well above fluid on this enhanced only exam KIDNEYS/URETERS:  Unremarkable  No hydronephrosis  STOMACH AND BOWEL:  As above, abnormal appearance of the distal esophagus, involving the gastric cardia as well  Moderately increased stool within the colon  Small bowel unremarkable  APPENDIX:  No findings to suggest appendicitis  ABDOMINOPELVIC CAVITY:  Posterior to the gastric cardia there is an ovoid soft tissue nodule best measured on coronal image 79, measuring 1 7 x 1 0 cm  Along the right margin of the gastric cardia on coronal image 71 there is a soft tissue nodule measuring 1 3 x 1 1 cm, and there is also an ill-defined right lateral border of the gastric cardia for example image 201/54 and coronal image 68  There is a trace amount of free fluid present within the pelvis  VESSELS:  Unremarkable for patient's age  PELVIS REPRODUCTIVE ORGANS:  See below URINARY BLADDER:  Soft tissue nodular density associated with the posterior inferior aspect of the urinary bladder image 201/114 or coronal image 84 measuring approximately 9 mm  This is favored to be enlargement of the median lobe of the prostate gland, less likely bladder polyp ABDOMINAL WALL/INGUINAL REGIONS:  Unremarkable  OSSEOUS STRUCTURES:  No acute fracture or destructive osseous lesion  Impression 1  Irregular wall thickening involving the distal esophagus beginning just below the inferior pulmonary veins, extending through the gastric cardia compatible with biopsy-proven adenocarcinoma  As above, there is involvement of the gastric cardia, and possible extra luminal extension of tumor along its right lateral margin 2  There is mild adenopathy identified adjacent to the gastric cardia and distal esophagus  Additional borderline prominent mediastinal lymph nodes 3  There is a left upper lobe opacity identified    Although potentially representing an area of inflammation or scarring, an irregularly marginated solitary pulmonary metastasis should be excluded  This could either be reassessed with chest CT in 3 months time or further evaluated with PET CT (it is of borderline size for accurate characterization with PET) 4  Hypertrophy of the median lobe of the prostate (favored) or less likely 9 mm bladder polyp 5  Nonspecific 1 2 cm right adrenal nodule  Overall, PET/CT may be helpful to determine exact extent of adenopathy within the lower chest and upper abdomen, evaluation for any extraluminal extension of tumor, and further characterization of left upper lobe density and right adrenal nodule Workstation performed: [id number]      Nm Pet Ct Skull Base To Mid Thigh    Result Date: 12/30/2019  Narrative PET/CT SCAN INDICATION: Restaging of esophageal/gastric cancer postchemotherapy  C16 0: Malignant neoplasm of cardia MODIFIER: PS COMPARISON: PET CT 9/19/2019 CELL TYPE:  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, biopsy esophagus 8/24/2019 TECHNIQUE:   8 33 mCi F-18-FDG administered IV  Multiplanar attenuation corrected and non attenuation corrected PET images were acquired 60 minutes post injection  Contiguous, low dose, axial CT sections were obtained from the skull vertex through the femurs   Intravenous contrast material was not utilized  This examination, like all CT scans performed in the [facility], was performed utilizing techniques to minimize radiation dose exposure, including the use of iterative reconstruction and automated exposure control  Fasting serum glucose: 102 mg/dl FINDINGS: VISUALIZED BRAIN:   No acute abnormalities are seen  Now more conspicuous focal photopenia in the right frontal lobe anteriorly, question related to prior infarct  Questionable encephalomalacia  here on CT  HEAD/NECK:   Diffuse thyroid gland activity again noted, SUV max of 5 0, previously 6 2    This may be related to underlying thyroiditis  There is a physiologic distribution of FDG  No FDG avid cervical adenopathy is seen  CT images: Unremarkable  CHEST:   Mild residual FDG uptake at the distal esophagus and gastroesophageal junction, SUV max of 2 6, previously 21 0  Persistent circumferential wall thickening suggested here on CT  No FDG avid lymph nodes  CT images: Left-sided Mediport line tip terminates at the SVC  Scattered coronary artery calcifications  Stable 5 mm irregular nodular density in the left upper lobe anteriorly image 106 series 3, not FDG avid  Calcified granuloma in the left upper lobe  ABDOMEN:   No FDG avid soft tissue lesions are seen  No residual FDG uptake at the proximal stomach, SUV max of 1 8 in this region  No FDG avid lymph nodes  Previously there was a FDG avid lymph node posterior to the proximal stomach which is no longer seen  New splenomegaly with mild FDG uptake, SUV max of 3 0, higher than the liver, SUV max of 2 6  Spleen measures 16 8 cm in AP dimension  CT images: Cholelithiasis  Stable nodularity of the right adrenal gland, not FDG avid  Jejunostomy tube in position  Mild ascites, new  PELVIS: No FDG avid soft tissue lesions are seen  Previously noted colonic foci of FDG uptake are no longer seen  CT images: Mildly prominent prostate  Tiny fat-containing left inguinal hernia  OSSEOUS STRUCTURES: New diffuse FDG uptake in the osseous structures likely representing response to therapy  Potentially this does limit evaluation for the previously seen small rib foci of FDG uptake and other underlying lesions  CT images: Lumbosacral fusion device  Impression 1  Findings compatible with response to therapy  2  Significant interval improvement in abnormal FDG uptake at the distal esophagus, gastroesophageal junction and the proximal stomach  Mild residual FDG uptake suggested at the distal esophagus and gastroesophageal junction, residual disease not excluded   3  No findings for hypermetabolic metastasis  4  New splenomegaly with mild diffuse FDG uptake, question related to hematopoietic response to therapy  5   New diffuse FDG uptake in the osseous structures likely representing marrow response to therapy  Potentially this does limit evaluation for underlying lesions  6   Now more conspicuous focal photopenia in the right frontal lobe of the brain anteriorly, may be related to prior infarct  Questionable encephalomalacia here on CT  Correlate with dedicated brain imaging, MRI or CT if not previously evaluated  Workstation performed: [id number]     Nm Pet Ct Skull Base To Mid Thigh    Result Date: 9/19/2019  Narrative PET/CT SCAN INDICATION: Newly diagnosed esophageal cancer  Initial staging  C15 5: Malignant neoplasm of lower third of esophagus MODIFIER: PI COMPARISON: CT chest abdomen pelvis 9/16/2019 and 9/6/2019 CELL TYPE:  Intramucosal carcinoma, 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 TECHNIQUE:   8 8 mCi F-18-FDG administered IV  Multiplanar attenuation corrected and non attenuation corrected PET images are available for interpretation, and contiguous, low dose, axial CT sections were obtained from the skull base through the femurs   Intravenous contrast material was not utilized  This examination, like all CT scans performed in the [facility], was performed utilizing techniques to minimize radiation dose exposure, including the use of iterative reconstruction and automated exposure control  Fasting serum glucose: 82 mg/dl FINDINGS: VISUALIZED BRAIN:   No acute abnormalities are seen  HEAD/NECK:   Fairly diffuse FDG uptake at the thyroid gland, SUV max of 6 2 may be related to thyroiditis  No FDG avid cervical adenopathy is seen  CT images: Scattered mucosal thickening noted in the paranasal sinuses  CHEST:   Extensive FDG uptake at the distal esophagus leading to the GE junction and proximal stomach, SUV max of 21 0    Associated wall thickening noted here on CT  No FDG avid lymph nodes  CT images: Scattered coronary calcifications noted  Stable subcentimeter irregular density in the left upper lobe anteriorly, image 110 series 3  This is not FDG avid  ABDOMEN:   Extensive FDG uptake at the distal esophagus leading to the GE junction and proximal stomach, SUV max of 21 0  Associated wall thickening noted here on CT  Soft tissue density at the level of the proximal stomach extending to the gastrohepatic region measures 6 0 x 4 7 cm  There may be contiguous FDG avid lymphadenopathy in the gastrohepatic region where is there is soft tissue fullness here on CT inseparable from the gastric wall thickening  Small focus of FDG uptake noted posterior to the proximal stomach, SUV max of 3 7  There is a 2 1 x 1 5 cm perigastric lymph node here on CT image 164 series 3  No suspicious FDG uptake at the adrenal glands  CT images: There is cholelithiasis  Stable nodularity of the right adrenal gland  PELVIS: Focal FDG uptake at the distal sigmoid colon, SUV max of 7 8  Possible eccentric wall thickening on CT  Additional focus of FDG uptake at the level of the proximal sigmoid colon, SUV max of 6 6  No obvious findings here on CT  CT images: Prostate is mildly prominent  Tiny fat-containing left inguinal hernia  OSSEOUS STRUCTURES: Focal FDG uptake at the left anterior 6th rib demonstrates SUV max of 3 7  No obvious findings here on CT  Subtle focus of FDG uptake at the right lateral 2nd rib, SUV max of 2 2  No obvious findings here on CT  CT images: Prior lumbosacral fusion  Impression 1  Extensive FDG uptake at the distal esophagus, GE junction and proximal stomach compatible with known malignancy  2  FDG avid perigastric lymph node posterior to the proximal stomach suspicious for metastasis  There may be FDG avid gastrohepatic lymphadenopathy but this is inseparable from the gastric mass  3   Focal FDG uptake at the left anterior 6th rib    Possible subtle focus of FDG uptake at the right lateral 2nd rib  No obvious findings here on the limited low-dose CT images  Metastasis is not excluded  Distribution is not typical for prior trauma  Consider follow-up with MRI of the chest  4   Foci of FDG uptake at the sigmoid colon for which underlying lesions should be excluded  Recommend follow-up with colonoscopy  5   Fairly diffuse FDG uptake at the thyroid gland, may be related to thyroiditis  The study was marked in [facility] for significant notification  Workstation performed: [id number]      Fl Barium Swallow    Result Date: 5/26/2020  Narrative BARIUM SWALLOW - ESOPHAGRAM INDICATION:   with gastrografin to rule out leak s/p esophageal dilitation    COMPARISON:  CTA chest 5/23/2020 IMAGES:  22 FLUOROSCOPY TIME:   1min 10 sec (1 17)  TECHNIQUE:  The patient was given Omnipaque 350 by mouth and images of the esophagus were obtained  FINDINGS: Postsurgical changes of gastric pull-through with mild stenosis and transient contrast hang-up at the proximal anastomosis  Contrast passes freely into the remainder of the conduit and visualized proximal small bowel  No evidence of a leak  Impression Postsurgical changes of gastric pull-through with mild stenosis at the anastomosis  No evidence of a leak  Workstation performed: [id number]     Fl Barium Swallow    Result Date: 4/24/2020  Narrative BARIUM SWALLOW-ESOPHAGRAM INDICATION:   C15 5: Malignant neoplasm of lower third of esophagus R13 14: Dysphagia, pharyngoesophageal phase  Weight loss and difficulty swallowing solid food  Previous history pertinent for esophagectomy COMPARISON:  CT examination 2/18/2020 IMAGES:  431  (this includes cine capture images) FLUOROSCOPY TIME:   2 2 mft  TECHNIQUE: The patient was given barium by mouth and images of the esophagus were obtained   FINDINGS: Radiopaque opacities identified in the thorax noted compatible with prior embolization procedure Examination demonstrates focal narrowing of the proximal anastomotic site located just below the level of the clavicular heads with delayed passage of liquid barium across this level  Below this level, the gastric pull-through is identified, difficult to completely opacify the luminal contour secondary to the proximal dilatation and concern for inducing aspiration  There is no evidence of gastric outlet obstruction  Patient was unable to tolerate lying completely supine for imaging  Impression Examination demonstrates a stricture at the anastomotic site located just below level of the clavicular heads  The examination demonstrates a significant  finding and was documented as such in [facility] for liaison and referring practitioner notification  Workstation performed: [id number]     Fl Barium Swallow    Result Date: 2/8/2020  Narrative BARIUM SWALLOW-ESOPHAGRAM INDICATION:   esophagram--r/o leak  Patient is status post recent esophagectomy  COMPARISON:  Previous chest x-ray examinations IMAGES:  170  (this includes cine capture images) FLUOROSCOPY TIME:   1 40 minutes  TECHNIQUE: Due to risk of aspiration, and lesser concern for leak, thin liquid barium was chosen after discussion with clinical service  This was given via straw FINDINGS: Examination demonstrates pre-existing radiopaque material within the right upper mediastinum related to thoracic duct embolization  There is no evidence of proximal anastomotic leak  Anticipated changes status post soft subjectively and gastric pull-through noted  Very mild laryngeal penetration was noted  Distal portion of anastomosis appears grossly intact  Impression Postoperative changes and changes status post thoracic duct embolization  Mild degree of laryngeal penetration was noted    Speech pathology followed this examination with further assessment of swallowing mechanism No findings to suggest an anastomotic leak Workstation performed: [id number]     Fl Barium Swallow Video W Speech    Result Date: 2/8/2020  Narrative A video barium swallow study was performed by the Department of Speech Pathology  Please refer to the report for the official interpretation  The images are stored for archival purposes only  Study images were not formally reviewed by the Radiology Department

## 2020-07-22 NOTE — ASSESSMENT & PLAN NOTE
This appears stable on recent chest CT  Patient is not experiencing fevers  He has some back pain associated with this  We will continue to monitor this radiographically  Surgery was discussed, and would be a complex, high risk procedure  For now, will observe and discuss surgery as appropriate  Patient in agreement  He is not taking his antibiotics due to difficulty swallowing tablets  Will discuss with Dr Jett Olvera

## 2020-07-23 ENCOUNTER — TELEPHONE (OUTPATIENT)
Dept: PALLIATIVE MEDICINE | Facility: CLINIC | Age: 65
End: 2020-07-23

## 2020-07-23 ENCOUNTER — TELEPHONE (OUTPATIENT)
Dept: NUTRITION | Facility: CLINIC | Age: 65
End: 2020-07-23

## 2020-07-23 ENCOUNTER — OFFICE VISIT (OUTPATIENT)
Dept: NEUROSURGERY | Facility: CLINIC | Age: 65
End: 2020-07-23
Payer: COMMERCIAL

## 2020-07-23 VITALS
WEIGHT: 152 LBS | RESPIRATION RATE: 18 BRPM | TEMPERATURE: 97.7 F | DIASTOLIC BLOOD PRESSURE: 78 MMHG | BODY MASS INDEX: 21.76 KG/M2 | SYSTOLIC BLOOD PRESSURE: 122 MMHG | HEIGHT: 70 IN

## 2020-07-23 DIAGNOSIS — M46.24 OSTEOMYELITIS OF THORACIC SPINE (HCC): Primary | ICD-10-CM

## 2020-07-23 DIAGNOSIS — M86.18 OTHER ACUTE OSTEOMYELITIS, OTHER SITE (HCC): ICD-10-CM

## 2020-07-23 PROCEDURE — 4040F PNEUMOC VAC/ADMIN/RCVD: CPT | Performed by: PHYSICIAN ASSISTANT

## 2020-07-23 PROCEDURE — 1111F DSCHRG MED/CURRENT MED MERGE: CPT | Performed by: PHYSICIAN ASSISTANT

## 2020-07-23 PROCEDURE — 1160F RVW MEDS BY RX/DR IN RCRD: CPT | Performed by: PHYSICIAN ASSISTANT

## 2020-07-23 PROCEDURE — 3008F BODY MASS INDEX DOCD: CPT | Performed by: PHYSICIAN ASSISTANT

## 2020-07-23 PROCEDURE — 1036F TOBACCO NON-USER: CPT | Performed by: PHYSICIAN ASSISTANT

## 2020-07-23 PROCEDURE — 99214 OFFICE O/P EST MOD 30 MIN: CPT | Performed by: PHYSICIAN ASSISTANT

## 2020-07-23 PROCEDURE — 3044F HG A1C LEVEL LT 7.0%: CPT | Performed by: PHYSICIAN ASSISTANT

## 2020-07-23 RX ORDER — OXYCODONE HCL 5 MG/5 ML
SOLUTION, ORAL ORAL
Qty: 473 ML | Refills: 0 | Status: SHIPPED | OUTPATIENT
Start: 2020-07-23 | End: 2020-08-03 | Stop reason: SDUPTHER

## 2020-07-23 RX ORDER — OXYCODONE HCL 5 MG/5 ML
SOLUTION, ORAL ORAL
Qty: 473 ML | Refills: 0 | Status: SHIPPED | OUTPATIENT
Start: 2020-07-23 | End: 2020-07-23 | Stop reason: SDUPTHER

## 2020-07-23 NOTE — PRE-PROCEDURE INSTRUCTIONS
Pre-Surgery Instructions:   Medication Instructions    amiodarone 200 mg tablet Instructed patient per Anesthesia Guidelines   aspirin 81 mg chewable tablet Instructed patient per Anesthesia Guidelines   atorvastatin (LIPITOR) 40 mg tablet Instructed patient per Anesthesia Guidelines   canagliflozin (Invokana) 100 mg Instructed patient per Anesthesia Guidelines   levothyroxine 25 mcg tablet Instructed patient per Anesthesia Guidelines   ondansetron (ZOFRAN) 4 mg tablet Instructed patient per Anesthesia Guidelines   oxyCODONE (ROXICODONE) 5 mg/5 mL solution Instructed patient per Anesthesia Guidelines   rOPINIRole (REQUIP) 0 25 mg tablet Instructed patient per Anesthesia Guidelines   traZODone (DESYREL) 100 mg tablet Instructed patient per Anesthesia Guidelines  WILL TAKE AMIOD/LEVOTHYR DOS SM SIP H20  INSTR  ON ASC LOC  ,BRING PHOTO ID/MED LIST/INS  INFO , SHOWER REV , NO ASA/NSAIDS/VIT 1 WEEK PREOP  Pre Procedure Consult Calls    1  Are you currently experiencing symptoms of fever >100 4, cough, or shortness of breath or sore throat? ______YES    __X___NO   If yes, then please call your PCP immediately for further direction  If you are completing this form on site, please find the safest and most direct route to the nearest exit and avoid close contact with others until you can get further advice from your PCP  2  Have you recently traveled to any foreign country or area within the [place] that has reported cases of COVID-19? ______YES      _X____NO   IF YES, LIST LOCATION(S): __________________________   3  Have you recently been in contact with someone who is a suspected or confirmed case of COVID-19?   ______ YES   ____X__ No   INSTRUCTED ON NEW COVID VISITOR POLICY- ONLY 1 VISITOR, ALL MUST WEAR MASKS, TEMP WILL BE TAKEN @ DOOR  Med Instructions Troubleshoot   5HT3 Antagonists Med Class     Continue to take this medication on your normal schedule    If this is an oral medication and you take it in the morning, then you may take this medicine with a sip of water  Alpha adrenergic antagonist Med Class     Continue to take this medication on your normal schedule  If this is an oral medication and you take it in the morning, then you may take this medicine with a sip of water  Antidepressant Med Class     Continue to take this medication on your normal schedule  If this is an oral medication and you take it in the morning, then you may take this medicine with a sip of water  Antiparkinsons Med Class     Continue to take this medication on your normal schedule  If this is an oral medication and you take it in the morning, then you may take this medicine with a sip of water  ASA Med Class: Aspirin     Should be discontinued at least one week prior to planned operation, unless specifically stated otherwise by surgical service  Your Surgeon may have patient stop taking aspirin up to a week before surgery if having intracranial, middle ear, posterior eye, spine surgery or prostate surgery  [Patients taking aspirin for coronary stents should be reviewed by an anesthesiologist in the optimization clinic  Please do not discontinue aspirin in patients with coronary stents unless given specific permission to do so by the cardiologist who prescribed medication ]   If your surgeon approves please continue to take this medication on your normal schedule  You may take this medication on the morning of your surgery with a sip of water  Insulin Med Class     Pre-Surgery/Procedure Instructions for Adult Patients who Take Medicine for Diabetes or to Control their Blood Sugar     Day Before Surgery/Procedure  Use the directions based on the type of medicine you take for your diabetes  1  If you are having a procedure that does not require a bowel prep:  ? Pre-Mixed Insulin (Intermediate Acting: Humalog 75/25, Humulin 70/30   Novolog 70/30, Regular Insulin)  § Take ½ your regular dose the evening before your procedure  ? Rapid/Fast Acting Insulin/Long Acting Insulin (Humalog U200, NovoLog, Apidra, Lantus, Levemir, Myrene Pancoast, Thomson)  § Take your FULL regular dose the day before procedure  ? Oral Diabetic Medicines including Glipizide/Glimepiride/Glucotrol (sulfonylurea)  § Take your regular dose with dinner the evening before your procedure  2  If you are having a procedure (e g  Colonoscopy) that requires a bowel prep and you are allowed to have at least a clear liquid diet:  ? Pre-Mixed Insulin (Intermediate Acting: Humalog 75/25, Humulin 70/30, Novolog 70/30, Regular Insulin)  § Take ½ your regular dose the evening before your procedure  ? Rapid/Fast Acting Insulin (Humalog U200, NovoLog, Apidra, Fiasp)  § Take ½ your regular dose the evening before your procedure  ? Long Acting Insulin (Lantus, Levemir, Myrene Pancoast)  § Take your FULL regular dose the day before procedure  ? Oral Glipizide/Glimepiride/Glucotrol (sulfonylurea)  § Take ½ your regular dose the evening before your procedure  ? Oral Diabetic Medicines that are NOT Glipizide/Glimepiride/Glucotrol  § Take your regular dose with dinner in the evening before your procedure      Day of Surgery/Procedure  · Long Acting Insulin (Lantus, Levemir, Myrene Pancoast)  ? If you usually take your Long-Acting Insulin in the morning, take the full dose as scheduled  · With the exception of the morning Long-Acting Insulin noted above, DO NOT take ANY diabetic medicine on the day of your procedure unless you were instructed by the doctor who manages your diabetic medicines  · Continue to check your blood sugars  · If you have an insulin pump then consult with your Endocrinologist for instructions  · If you cannot see your Endocrinologist, on the day of the procedure set your insulin pump to your basal rate only   Please bring your insulin pump supplies to the hospital      This Educational material has been approved by the Patient Education Advisory Committee  Date prepared: 1/17/2018          Expiration date: 1/17/2019        Approval Number:                     Opioid Med Class     Continue to take this medication on your normal schedule  If this is an oral medication and you take it in the morning, then you may take this medicine with a sip of water  Statin Med Class     Continue to take this medication on your normal schedule  If this is an oral medication and you take it in the morning, then you may take this medicine with a sip of water  Thyroxine Med Class     Continue to take this medication on your normal schedule  If this is an oral medication and you take it in the morning, then you may take this medicine with a sip of water

## 2020-07-23 NOTE — TELEPHONE ENCOUNTER
If they are looking for an increase I suggest he go to 15mg  He will definitely need close follow up    Sergey Gregg

## 2020-07-23 NOTE — PROGRESS NOTES
Office Note - Neurosurgery   Claire Valles 76 y o  male MRN: [id number]      Assessment:  Patient is a 76years old gentleman with history of status post by esophagogastric junction resection for tumor, stricture, dysphagia, type 2 diabetes mellitus, AFib, paraspinal abscess collection, here for 6 weeks follow-up of T6 osteomyelitis with compression deformities, complicated after esophagogastric junction procedure for cancer treatment  Patient on ileal tube feeding, history of stricture with dilation  Has difficulty swallowing  Reports central back pain relieved by scratching  Reports his back pain is severe taking oxycodone  Denies any numbness, weakness or paresthesia in the extremities  Occasional wobbling gait, otherwise denies history of fall  MRI of thoracic spine done on 07/01/2020 demonstrate collection in his paraspinal region without involvement of epidural   Patient saw his thoracic surgeon and he was told the collection is deep and inaccessible so is being treated conservatively with antibiotics  Currently following up with ID  His ESR is improving  Had follow-up thoracic spine x-rays which demonstrate grossly stable T6 vertebral bone wedge deformity  Neuro exam-patient moves all extremities, strength is 5/5 bilaterally  Sensation to light touch intact throughout  DTR 2+ without clonus  Tenderness at T6 spine region on palpation  Gait slightly wobbling, otherwise not using any cane or walker  Note wearing brace because of feeding tube technical difficulty  Hx, PE, and image reviewed with the patient and his son  Management plan and prognosis discussed  Advised to continue with ID  Follow-up with upright thoracic spine x-rays  Advised to take Tylenol and scheduled mode, and oxycodone p r n  fall precaution, avoid strenuous activities, excessive bending or twisting your back  Questions and concerns were answered    Patient and his son expressed their understanding is and agreed with the plan  Plan:  1  Upright thoracic spine x-rays AP and lateral, 2-3 views in 4 weeks  2  Continue Tylenol and scheduled does 2 tabs oral t i d  and oxycodone p r n   3  Continue follow-up with ID and thoracic surgeon  4  Fall precaution, avoid strenuous activities, excessive bending/twisting, lifting heavy objects  5  Follow up in 4 weeks  6  Call 4 question or concern      Subjective/Objective     Chief Complaint " my back is still hurting"/6 weeks follow-up of T6 osteomyelitis with wedge deformity  HPI    Patient is a 76years old gentleman with history of status post by esophagogastric junction resection for tumor, stricture, dysphagia, type 2 diabetes mellitus, AFib, paraspinal abscess collection, here for 6 weeks follow-up of T6 osteomyelitis with compression deformities, complicated after esophagogastric junction procedure for cancer treatment  Patient on ileal tube feeding, history of stricture with dilation  Has difficulty swallowing  Complains moderate to severe central thoracic back pain that occasionally relieved by scratching  Is taking oxycodone p r n     Is following up with Infectious Disease and on antibiotics  Denies history of fever, chills, rigors, or cough  Reports no or radiculopathy pain, numbness, weakness or paresthesia and extremities  Denies bowel/bladder dysfunction  Has occasional wobbling gait otherwise denies history of tendency to fall  ROS  Personally reviewed and updated  Review of Systems   Constitutional: Positive for fatigue  HENT: Positive for trouble swallowing  Eyes: Negative  Respiratory: Negative  Cardiovascular: Negative  Gastrointestinal: Negative  Endocrine: Negative  Genitourinary: Negative  Musculoskeletal: Positive for back pain (across shoulder blades)  Skin: Negative  Allergic/Immunologic: Negative  Neurological: Positive for weakness  Numbness: b/l feet  Hematological: Negative      Psychiatric/Behavioral: Positive for sleep disturbance         Family History    Family History   Problem Relation Age of Onset    Diabetes Mother     No Known Problems Father        Social History    Social History     Socioeconomic History    Marital status: Single     Spouse name: Not on file    Number of children: Not on file    Years of education: Not on file    Highest education level: Not on file   Occupational History    Not on file   Social Needs    Financial resource strain: Not on file    Food insecurity:     Worry: Not on file     Inability: Not on file    Transportation needs:     Medical: Not on file     Non-medical: Not on file   Tobacco Use    Smoking status: Former Smoker     Packs/day: 0 50     Years: 50 00     Pack years: 25 00     Types: Cigarettes     Last attempt to quit: 2017     Years since quittin 5    Smokeless tobacco: Never Used   Substance and Sexual Activity    Alcohol use: Not Currently     Alcohol/week: 0 0 standard drinks     Frequency: Never     Drinks per session: 1 or 2     Binge frequency: Never    Drug use: Never    Sexual activity: Not on file   Lifestyle    Physical activity:     Days per week: Not on file     Minutes per session: Not on file    Stress: Not on file   Relationships    Social connections:     Talks on phone: Not on file     Gets together: Not on file     Attends Yazidi service: Not on file     Active member of club or organization: Not on file     Attends meetings of clubs or organizations: Not on file     Relationship status: Not on file    Intimate partner violence:     Fear of current or ex partner: Not on file     Emotionally abused: Not on file     Physically abused: Not on file     Forced sexual activity: Not on file   Other Topics Concern    Not on file   Social History Narrative    Not on file       Past Medical History    Past Medical History:   Diagnosis Date    Bilateral pleural effusion     [facility]     Diabetes mellitus [facility]     Difficulty swallowing     Disease of thyroid gland     Edema     Esophageal mass     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    History of chemotherapy     History of transfusion     Malignant neoplasm of lower third of esophagus (HCC)     Malignant neoplasm of lower third of esophagus ([facility] ) 9/17/2019    Diagnosis: clinical stage T3N1M0 esophageal carcinoma Procedure: EGD, transhiatal esophagectomy performed on 1/28/20 Pathology: esophagogastrectomy reveals microscopic focus of residual adenocarcinoma in muscularis propria measuring 0 7 cm, adjacent Duong's esophagus with associated atypia indeterminate for dysplasia  7 lymph nodes negative for carcinoma  Proximal and distal margins negative for    Port-A-Cath in place     LCW    Sleep apnea     no CPAP    SOB (shortness of breath)        Surgical History    Past Surgical History:   Procedure Laterality Date    BACK SURGERY      x2    DISC REMOVAL      ESOPHAGECTOMY N/A 1/28/2020    Procedure: ESOPHAGECTOMY, TRANSHIATAL;  Surgeon: Aliyah Butterfield MD;  [facility];  Service: Surgical Oncology    ESOPHAGOGASTRODUODENOSCOPY N/A 1/28/2020    Procedure: ESOPHAGOGASTRODUODENOSCOPY (EGD);   Surgeon: Aliyah Butterfield MD;  [facility];  Service: Surgical Oncology    FL GUIDED CENTRAL VENOUS ACCESS DEVICE INSERTION  9/26/2019    GASTROJEJUNOSTOMY W/ JEJUNOSTOMY TUBE N/A 9/26/2019    Procedure: INSERTION JEJUNOSTOMY TUBE OPEN;  Surgeon: Aliyah Butterfield MD;  [facility];  Service: Surgical Oncology    GASTROJEJUNOSTOMY W/ JEJUNOSTOMY TUBE N/A 6/5/2020    Procedure: INSERTION JEJUNOSTOMY TUBE OPEN;  Surgeon: Aliyah Butterfield MD;  [facility];  Service: Surgical Oncology    IR CHEST TUBE  5/26/2020    IR SPINE PROCEDURE  5/27/2020    IR THORACENTESIS  2/25/2020    [place] EGD INSERT GUIDE WIRE DILATOR PASSAGE ESOPHAGUS N/A 7/7/2020    Procedure: ESOPHAGOGASTRODUODENOSCOPY (EGD) with esophageal dilation under fluro with biopsy;  Surgeon: Yas Nagy MD;  [facility];  Service: Thoracic    AR ESOPHAGOGASTRODUODENOSCOPY TRANSORAL DIAGNOSTIC N/A 4/28/2020    Procedure: ESOPHAGOGASTRODUODENOSCOPY (EGD); Surgeon: Yas Nagy MD;  [facility];  Service: Thoracic    AR ESOPHAGOSCOPY FLEX BALLOON DILAT <30 MM DIAM N/A 4/28/2020    Procedure: DILATATION ESOPHAGEAL;  Surgeon: Yas Nagy MD;  [facility];  Service: Thoracic    TONSILLECTOMY      TUNNELED VENOUS PORT PLACEMENT N/A 9/26/2019    Procedure: INSERTION VENOUS PORT (PORT-A-CATH);   Surgeon: Hardeep Peacock MD;  [facility];  Service: Surgical Oncology    VOCAL CORD INJECTION N/A 2/7/2020    Procedure: MICROLARYNGOSCOPY WITH INJECTION;  Surgeon: Iris Alcala MD;  [facility];  Service: ENT       Medications      Current Outpatient Medications:     amiodarone 200 mg tablet, Take 1 tablet (200 mg total) by mouth daily with breakfast, Disp: 30 tablet, Rfl: 0    aspirin 81 mg chewable tablet, Chew 1 tablet (81 mg total) daily, Disp: 30 tablet, Rfl: 0    atorvastatin (LIPITOR) 40 mg tablet, Take 1 tablet (40 mg total) by mouth daily with dinner, Disp: 30 tablet, Rfl: 0    Blood Glucose Monitoring Suppl (ONE TOUCH ULTRA 2) w/Device KIT, Check bs , q fasting and 2 hr after meals, Disp: 1 each, Rfl: 0    canagliflozin (Invokana) 100 mg, Take 100 mg by mouth daily before breakfast, Disp: , Rfl:     glucose blood test strip, Check blood sugar 3 times daily, Disp: 100 each, Rfl: 3    Lancets (ONETOUCH ULTRASOFT) lancets, Check bs , q fasting and 2 hr after meals, Disp: 100 each, Rfl: 5    levothyroxine 25 mcg tablet, Take 25 mcg by mouth daily in the early morning, Disp: , Rfl:     ondansetron (ZOFRAN) 4 mg tablet, Take 1 tablet (4 mg total) by mouth every 6 (six) hours as needed for nausea or vomiting, Disp: 30 tablet, Rfl: 0    oxyCODONE (ROXICODONE) 5 mg/5 mL solution, Take 5 mg (5 ML) every 4 hours as needed for moderate pain or 10 mg (10 ML) every 4 hours as needed for severe pain, Disp: 473 mL, Rfl: 0    rOPINIRole (REQUIP) 0 25 mg tablet, take 1 tablet by mouth at bedtime, Disp: 30 tablet, Rfl: 0    traZODone (DESYREL) 100 mg tablet, Take 1 tablet (100 mg total) by mouth daily at bedtime, Disp: 30 tablet, Rfl: 0    acetaminophen (TYLENOL) 160 mg/5 mL suspension, Take 20 3 mL (650 mg total) by mouth every 6 (six) hours as needed (mild pain,headaches,fever) (Patient not taking: Reported on 7/22/2020), Disp: 118 mL, Rfl: 0    amoxicillin-clavulanate (AUGMENTIN) 875-125 mg per tablet, Take 1 tablet by mouth every 12 (twelve) hours (Patient not taking: Reported on 7/22/2020), Disp: 60 tablet, Rfl: 1    Allergies    Allergies   Allergen Reactions    Penicillins Itching       The following portions of the patient's history were reviewed and updated as appropriate: allergies, current medications, past family history, past medical history, past social history, past surgical history and problem list     Investigations    I personally reviewed the XRAY results with the patient:    Findings as described in the assessment section above    Physical Exam    Vitals:    07/23/20 1043   BP: 122/78   Resp: 18   Temp: 97 7 °F (36 5 °C)       Physical Exam   Constitutional: He is oriented to person, place, and time  He appears well-developed and well-nourished  HENT:   Head: Normocephalic and atraumatic  Eyes: EOM are normal    Neck: Normal range of motion  Cardiovascular: Normal rate  Pulmonary/Chest: Effort normal    Musculoskeletal: He exhibits tenderness  Neurological: He is alert and oriented to person, place, and time  He has normal strength and normal reflexes  No cranial nerve deficit or sensory deficit  GCS eye subscore is 4  GCS verbal subscore is 5  GCS motor subscore is 6  Reflex Scores:       Tricep reflexes are 2+ on the right side and 2+ on the left side         Bicep reflexes are 2+ on the right side and 2+ on the left side  Brachioradialis reflexes are 2+ on the right side and 2+ on the left side  Patellar reflexes are 2+ on the right side and 2+ on the left side  Achilles reflexes are 2+ on the right side and 2+ on the left side  Skin: Skin is warm  Psychiatric: His speech is normal      Neurologic Exam     Mental Status   Oriented to person, place, and time  Speech: speech is normal   Level of consciousness: alert    Cranial Nerves     CN III, IV, VI   Extraocular motions are normal    Nystagmus: none     CN XI   CN XI normal      Motor Exam   Muscle bulk: normal  Overall muscle tone: normal  Right arm tone: normal  Left arm tone: normal  Right arm pronator drift: absent  Left arm pronator drift: absent  Right leg tone: normal  Left leg tone: normal    Strength   Strength 5/5 throughout       Sensory Exam   Light touch normal      Gait, Coordination, and Reflexes     Reflexes   Right brachioradialis: 2+  Left brachioradialis: 2+  Right biceps: 2+  Left biceps: 2+  Right triceps: 2+  Left triceps: 2+  Right patellar: 2+  Left patellar: 2+  Right achilles: 2+  Left achilles: 2+  Right : 2+  Left : 2+  Right Ny: absent  Left Ny: absent  Right ankle clonus: absent  Left ankle clonus: absent

## 2020-07-23 NOTE — TELEPHONE ENCOUNTER
Britton Forrest left a message on refill line requesting refill for Oxycodone  Britton Forrest mentioned pt's Thoracic surgeon advised pt to contact Palliative Care to discuss possibly increasing Oxycodone use  It does not appear pt has a follow up appt scheduled  Timi New, please reach out to pt to schedule

## 2020-07-23 NOTE — TELEPHONE ENCOUNTER
Phone call received from patients daughter  [facility] in [place] does  not have any liquid oxycodone     Phone call made to another [facility] in [place] with the pharmacist  who states they can fill prescription     Called daughter back and she is ok with sending prescription to that location

## 2020-07-23 NOTE — TELEPHONE ENCOUNTER
Attempted to contact Chery Lentz to discuss his tube feeding after receiving notification by Dr Georgette Rutherford that Chery Lentz is having difficulty with his tube feed pump and continues to lose weight  No answer, left message with reason for today's call and RD contact number asking that Chery Lentz call back to discuss tube feeding  Also attempted to call pts daughter Atul Romero with no answer  Will await call back from Chery Lentz and/or Atul Romero, and attempt again x1day if no call back

## 2020-07-23 NOTE — TELEPHONE ENCOUNTER
Called daughter Matt Levine and gave her instructions to increase dosage to 15 mg every 4 hours for unrelieved pain as needed   Informed her refill was put in yesterday  Also scheduled a phone follow up for Aug 5th   Instructed her to call anytime with any other issues

## 2020-07-24 NOTE — TELEPHONE ENCOUNTER
Attempted to contact Theresa Hill again to discuss tube feeding problem, no call back from yesterday, spoke to Reginaldo  She states that Fabiano's tube feeding pump is turning off overnight before all formula is infused  Suggested ensuring that pump has power source, adequate formula supply/rate settings, tube is not clogged/clamped  Also suggested calling [facility] for troubleshooting if pump continues to turn off overnight  Reginaldo plans to call [facility] as Theresa Hill will be needing more formula soon, so she will inquire about pump not working properly  Reginaldo stated that Theresa Hill is infusing Jevity 1 5 at 70mL/hr for 12hrs (when pump does not turn off overnight), this provides a total of 3 5 cartons, 1260kcal, 54g protein  Reviewed tube feeding goals (see below) to meet 100% of Fabiano's estimated needs as he is having significant difficulty tolerating solid foods and even liquids PO at this time  Reginaldo states that she will try to increase tube feeding rate greater than 70mL/hr, but Theresa Hill sometimes feels too full to increase rate  Explained that in this case the tube feeding will have to infuse for a longer amount of time in order for Theresa Hill to receive adequate calories/protein  Reginaldo verbalized understanding  Enteral Nutrition Goal:    · TF Goal: Jevity 1 5 at 99 mL/hr via J-tube cycled over ~15 hours daily (until 6 5 cartons infused)  · Water Flushin mL free water flush at the beginning and end of TF infusion (250 mL total) plus 125 mL free water flush 8 times per day (1000 mL total) (total of 1250 mL/day in flushes; flushes should be spread throughout the day and every 2-3 hours during TF infusion; will need to adjust flushes prn for IV fluids)  · Enteral Nutrition goal to provide: 1540 mL volume (6 5 cartons day), 2312 kcal, 98 grams protein, 1170 mL free water, 2420 mL total water daily      If Theresa Hill is unable to tolerate 99mL/hr suggest increasing length of infusion:  · TF Goal: Jevity 1 5 at 75 mL/hr via J-tube cycled over 20 5 hours daily (until 6 5 cartons infused)  · Water Flushin mL free water flush at the beginning and end of TF infusion (250 mL total) plus 125 mL free water flush 8 times per day (1000 mL total) (total of 1250 mL/day in flushes; flushes should be spread throughout the day and every 2-3 hours during TF infusion; will need to adjust flushes prn for IV fluids)  · Enteral Nutrition goal to provide: 1540 mL volume (6 5 cartons day), 2312 kcal, 98 grams protein, 1170 mL free water, 2420 mL total water daily  OR  · TF Goal: Jevity 1 5 at 80 mL/hr via J-tube cycled over ~19 hours daily (until 6 5 cartons infused)  · Water Flushin mL free water flush at the beginning and end of TF infusion (250 mL total) plus 125 mL free water flush 8 times per day (1000 mL total) (total of 1250 mL/day in flushes; flushes should be spread throughout the day and every 2-3 hours during TF infusion; will need to adjust flushes prn for IV fluids)  · Enteral Nutrition goal to provide: 1540 mL volume (6 5 cartons day), 2312 kcal, 98 grams protein, 1170 mL free water, 2420 mL total water daily        Follow up nutrition appointment scheduled for: 20 at 11am

## 2020-07-26 ENCOUNTER — ANESTHESIA EVENT (OUTPATIENT)
Dept: PERIOP | Facility: HOSPITAL | Age: 65
End: 2020-07-26
Payer: COMMERCIAL

## 2020-07-27 ENCOUNTER — TELEPHONE (OUTPATIENT)
Dept: INFECTIOUS DISEASES | Facility: CLINIC | Age: 65
End: 2020-07-27

## 2020-07-27 ENCOUNTER — HOSPITAL ENCOUNTER (OUTPATIENT)
Facility: HOSPITAL | Age: 65
Setting detail: OUTPATIENT SURGERY
Discharge: HOME | End: 2020-07-27
Attending: THORACIC SURGERY (CARDIOTHORACIC VASCULAR SURGERY) | Admitting: THORACIC SURGERY (CARDIOTHORACIC VASCULAR SURGERY)
Payer: COMMERCIAL

## 2020-07-27 ENCOUNTER — HOSPITAL ENCOUNTER (OUTPATIENT)
Dept: RADIOLOGY | Facility: HOSPITAL | Age: 65
Setting detail: OUTPATIENT SURGERY
Discharge: HOME | End: 2020-07-27
Payer: COMMERCIAL

## 2020-07-27 ENCOUNTER — ANESTHESIA (OUTPATIENT)
Dept: PERIOP | Facility: HOSPITAL | Age: 65
End: 2020-07-27
Payer: COMMERCIAL

## 2020-07-27 VITALS
SYSTOLIC BLOOD PRESSURE: 131 MMHG | TEMPERATURE: 95.4 F | HEIGHT: 70 IN | HEART RATE: 71 BPM | DIASTOLIC BLOOD PRESSURE: 72 MMHG | BODY MASS INDEX: 21.9 KG/M2 | RESPIRATION RATE: 16 BRPM | OXYGEN SATURATION: 99 % | WEIGHT: 153 LBS

## 2020-07-27 DIAGNOSIS — K22.2 ESOPHAGEAL STRICTURE: ICD-10-CM

## 2020-07-27 DIAGNOSIS — B37.81 ESOPHAGEAL CANDIDIASIS (HCC): Primary | ICD-10-CM

## 2020-07-27 DIAGNOSIS — M46.20 PARASPINAL ABSCESS (HCC): Primary | ICD-10-CM

## 2020-07-27 LAB
GLUCOSE SERPL-MCNC: 88 MG/DL (ref 65–140)
GLUCOSE SERPL-MCNC: 95 MG/DL (ref 65–140)

## 2020-07-27 PROCEDURE — 71045 X-RAY EXAM CHEST 1 VIEW: CPT

## 2020-07-27 PROCEDURE — 43248 EGD GUIDE WIRE INSERTION: CPT | Performed by: THORACIC SURGERY (CARDIOTHORACIC VASCULAR SURGERY)

## 2020-07-27 PROCEDURE — 74360 X-RAY GUIDE GI DILATION: CPT | Performed by: THORACIC SURGERY (CARDIOTHORACIC VASCULAR SURGERY)

## 2020-07-27 PROCEDURE — 82948 REAGENT STRIP/BLOOD GLUCOSE: CPT

## 2020-07-27 PROCEDURE — C1769 GUIDE WIRE: HCPCS | Performed by: THORACIC SURGERY (CARDIOTHORACIC VASCULAR SURGERY)

## 2020-07-27 RX ORDER — SODIUM CHLORIDE, SODIUM LACTATE, POTASSIUM CHLORIDE, CALCIUM CHLORIDE 600; 310; 30; 20 MG/100ML; MG/100ML; MG/100ML; MG/100ML
75 INJECTION, SOLUTION INTRAVENOUS CONTINUOUS
Status: DISCONTINUED | OUTPATIENT
Start: 2020-07-27 | End: 2020-07-27 | Stop reason: HOSPADM

## 2020-07-27 RX ORDER — ONDANSETRON 2 MG/ML
4 INJECTION INTRAMUSCULAR; INTRAVENOUS ONCE AS NEEDED
Status: DISCONTINUED | OUTPATIENT
Start: 2020-07-27 | End: 2020-07-27 | Stop reason: HOSPADM

## 2020-07-27 RX ORDER — METRONIDAZOLE 500 MG/1
500 TABLET ORAL EVERY 8 HOURS SCHEDULED
Qty: 90 TABLET | Refills: 0 | Status: SHIPPED | OUTPATIENT
Start: 2020-07-27 | End: 2020-08-26

## 2020-07-27 RX ORDER — FENTANYL CITRATE 50 UG/ML
INJECTION, SOLUTION INTRAMUSCULAR; INTRAVENOUS AS NEEDED
Status: DISCONTINUED | OUTPATIENT
Start: 2020-07-27 | End: 2020-07-27 | Stop reason: SURG

## 2020-07-27 RX ORDER — SUCCINYLCHOLINE/SOD CL,ISO/PF 100 MG/5ML
SYRINGE (ML) INTRAVENOUS AS NEEDED
Status: DISCONTINUED | OUTPATIENT
Start: 2020-07-27 | End: 2020-07-27 | Stop reason: SURG

## 2020-07-27 RX ORDER — PROPOFOL 10 MG/ML
INJECTION, EMULSION INTRAVENOUS AS NEEDED
Status: DISCONTINUED | OUTPATIENT
Start: 2020-07-27 | End: 2020-07-27 | Stop reason: SURG

## 2020-07-27 RX ORDER — AMOXICILLIN AND CLAVULANATE POTASSIUM 400; 57 MG/5ML; MG/5ML
400 POWDER, FOR SUSPENSION ORAL 2 TIMES DAILY
Qty: 100 ML | Refills: 0 | Status: SHIPPED | OUTPATIENT
Start: 2020-07-27 | End: 2020-08-05 | Stop reason: SDUPTHER

## 2020-07-27 RX ORDER — CEFDINIR 300 MG/1
300 CAPSULE ORAL EVERY 12 HOURS SCHEDULED
Qty: 60 CAPSULE | Refills: 0 | Status: SHIPPED | OUTPATIENT
Start: 2020-07-27 | End: 2020-08-26

## 2020-07-27 RX ORDER — LIDOCAINE HYDROCHLORIDE 10 MG/ML
0.5 INJECTION, SOLUTION EPIDURAL; INFILTRATION; INTRACAUDAL; PERINEURAL ONCE AS NEEDED
Status: DISCONTINUED | OUTPATIENT
Start: 2020-07-27 | End: 2020-07-27 | Stop reason: HOSPADM

## 2020-07-27 RX ORDER — ONDANSETRON 2 MG/ML
INJECTION INTRAMUSCULAR; INTRAVENOUS AS NEEDED
Status: DISCONTINUED | OUTPATIENT
Start: 2020-07-27 | End: 2020-07-27 | Stop reason: SURG

## 2020-07-27 RX ORDER — DEXAMETHASONE SODIUM PHOSPHATE 10 MG/ML
INJECTION, SOLUTION INTRAMUSCULAR; INTRAVENOUS AS NEEDED
Status: DISCONTINUED | OUTPATIENT
Start: 2020-07-27 | End: 2020-07-27 | Stop reason: SURG

## 2020-07-27 RX ORDER — FLUCONAZOLE 10 MG/ML
100 POWDER, FOR SUSPENSION ORAL DAILY
Qty: 50 ML | Refills: 0 | Status: SHIPPED | OUTPATIENT
Start: 2020-07-27 | End: 2020-08-01

## 2020-07-27 RX ORDER — SODIUM CHLORIDE, SODIUM LACTATE, POTASSIUM CHLORIDE, CALCIUM CHLORIDE 600; 310; 30; 20 MG/100ML; MG/100ML; MG/100ML; MG/100ML
INJECTION, SOLUTION INTRAVENOUS CONTINUOUS PRN
Status: DISCONTINUED | OUTPATIENT
Start: 2020-07-27 | End: 2020-07-27 | Stop reason: SURG

## 2020-07-27 RX ORDER — MIDAZOLAM HYDROCHLORIDE 2 MG/2ML
INJECTION, SOLUTION INTRAMUSCULAR; INTRAVENOUS AS NEEDED
Status: DISCONTINUED | OUTPATIENT
Start: 2020-07-27 | End: 2020-07-27 | Stop reason: SURG

## 2020-07-27 RX ORDER — LIDOCAINE HYDROCHLORIDE 10 MG/ML
INJECTION, SOLUTION EPIDURAL; INFILTRATION; INTRACAUDAL; PERINEURAL AS NEEDED
Status: DISCONTINUED | OUTPATIENT
Start: 2020-07-27 | End: 2020-07-27 | Stop reason: SURG

## 2020-07-27 RX ORDER — SCOLOPAMINE TRANSDERMAL SYSTEM 1 MG/1
1 PATCH, EXTENDED RELEASE TRANSDERMAL ONCE
Status: DISCONTINUED | OUTPATIENT
Start: 2020-07-27 | End: 2020-07-27 | Stop reason: HOSPADM

## 2020-07-27 RX ORDER — FENTANYL CITRATE/PF 50 MCG/ML
12.5 SYRINGE (ML) INJECTION
Status: DISCONTINUED | OUTPATIENT
Start: 2020-07-27 | End: 2020-07-27 | Stop reason: HOSPADM

## 2020-07-27 RX ADMIN — PHENYLEPHRINE HYDROCHLORIDE 100 MCG: 10 INJECTION INTRAVENOUS at 10:13

## 2020-07-27 RX ADMIN — PHENYLEPHRINE HYDROCHLORIDE 100 MCG: 10 INJECTION INTRAVENOUS at 10:02

## 2020-07-27 RX ADMIN — PHENYLEPHRINE HYDROCHLORIDE 100 MCG: 10 INJECTION INTRAVENOUS at 10:30

## 2020-07-27 RX ADMIN — ONDANSETRON 4 MG: 2 INJECTION INTRAMUSCULAR; INTRAVENOUS at 09:55

## 2020-07-27 RX ADMIN — PROPOFOL 150 MG: 10 INJECTION, EMULSION INTRAVENOUS at 09:54

## 2020-07-27 RX ADMIN — PHENYLEPHRINE HYDROCHLORIDE 100 MCG: 10 INJECTION INTRAVENOUS at 10:22

## 2020-07-27 RX ADMIN — MIDAZOLAM 2 MG: 1 INJECTION INTRAMUSCULAR; INTRAVENOUS at 09:47

## 2020-07-27 RX ADMIN — PHENYLEPHRINE HYDROCHLORIDE 100 MCG: 10 INJECTION INTRAVENOUS at 10:14

## 2020-07-27 RX ADMIN — SODIUM CHLORIDE, SODIUM LACTATE, POTASSIUM CHLORIDE, AND CALCIUM CHLORIDE: .6; .31; .03; .02 INJECTION, SOLUTION INTRAVENOUS at 09:47

## 2020-07-27 RX ADMIN — LIDOCAINE HYDROCHLORIDE 50 MG: 10 INJECTION, SOLUTION EPIDURAL; INFILTRATION; INTRACAUDAL; PERINEURAL at 09:54

## 2020-07-27 RX ADMIN — FENTANYL CITRATE 50 MCG: 50 INJECTION, SOLUTION INTRAMUSCULAR; INTRAVENOUS at 09:54

## 2020-07-27 RX ADMIN — SCOPALAMINE 1 PATCH: 1 PATCH, EXTENDED RELEASE TRANSDERMAL at 08:07

## 2020-07-27 RX ADMIN — DEXAMETHASONE SODIUM PHOSPHATE 5 MG: 10 INJECTION, SOLUTION INTRAMUSCULAR; INTRAVENOUS at 09:55

## 2020-07-27 RX ADMIN — Medication 100 MG: at 09:55

## 2020-07-27 NOTE — ANESTHESIA PREPROCEDURE EVALUATION
Review of Systems/Medical History  Patient summary reviewed  Chart reviewed  History of anesthetic complications (h/o hypotension with emergence, and shivers) PONV    Cardiovascular  EKG reviewed, Exercise tolerance (METS): <4,  Hyperlipidemia, Dysrhythmias , atrial fibrillation,   Comment: H/o cardiac arrest 2/2020 in postop period after esophagectomy and vocal fold injection, unclear etiology but probable aspiration event ,  Pulmonary  Smoker ex-smoker  , COPD , Sleep apnea ,   Comment: Pleural effusion     GI/Hepatic  Dysphagia,   Esophageal disease esophageal cancer, GI malignancy,   Comment: S/p esophagectomy with probable cancer recurrence and severe protein malnutrition secondary to dysphagia          Endo/Other  Diabetes type 2 , History of thyroid disease , hypothyroidism,   Comment: Left vocal cord paralysis    GYN       Hematology  Anemia ,     Musculoskeletal  Negative musculoskeletal ROS        Neurology    Motor deficit , bilateral lower extremity weakness, Headaches,   Comment: 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;  T4-6 paraspinal abscess due to esophageal leak Psychology   Negative psychology ROS            Physical Exam    Airway    Mallampati score: II  TM Distance: >3 FB  Neck ROM: full     Dental   No notable dental hx     Cardiovascular  Rhythm: regular, Rate: normal, Cardiovascular exam normal    Pulmonary  Pulmonary exam normal Breath sounds clear to auscultation,     Other Findings        Anesthesia Plan  ASA Score- 4     Anesthesia Type- general with ASA Monitors  Additional Monitors:   Airway Plan: ETT  Plan Factors-    Induction- intravenous and rapid sequence induction  Postoperative Plan-   Planned trial extubation    Informed Consent- Anesthetic plan and risks discussed with patient  I personally reviewed this patient with the CRNA  Discussed and agreed on the Anesthesia Plan with the CRNA  Anamika German

## 2020-07-27 NOTE — DISCHARGE INSTRUCTIONS
Esophageal Dilation   WHAT YOU NEED TO KNOW:   Esophageal dilation is a procedure to widen a narrow part of your esophagus  Your healthcare provider will use a dilator (inflatable balloon or another tool that expands) to make the area wider  He may also do an endoscopy before or during your esophageal dilation  During an endoscopy, your healthcare provider will use a scope to see inside your esophagus  DISCHARGE INSTRUCTIONS:   Medicines:   · Medicines  may be given to decrease stomach acid that can irritate your esophagus  · Take your medicine as directed  Contact your healthcare provider if you think your medicine is not helping or if you have side effects  Tell him if you are allergic to any medicine  Keep a list of the medicines, vitamins, and herbs you take  Include the amounts, and when and why you take them  Bring the list or the pill bottles to follow-up visits  Carry your medicine list with you in case of an emergency  Follow up with your healthcare provider as directed:  Write down your questions so you remember to ask them during your visits  Nutrition:  You may eat foods you normally eat  Chew your food well  Eat soft foods if you still have problems swallowing  Soft foods include applesauce, bananas, cooked cereal, cottage cheese, eggs, pudding, and yogurt  Ask for more information on what types of food to eat  Contact your healthcare provider if:   · You have a fever  · You feel very full or bloated  · You have more problems swallowing food  · You have nausea or are vomiting  · You have questions or concerns about your condition or care  Seek care immediately or call 911 if:   · You vomit blood  · You are not able to swallow any food  · You have a fast heartbeat, chest pain, or sudden trouble breathing  · Your abdomen suddenly becomes tender and hard    © 2017 [employer] Information is for End User's use only and may not be sold, redistributed or otherwise used for commercial purposes  All illustrations and images included in CareNotes® are the copyrighted property of A D A M , Inc  or Doroteo Clarke  The above information is an  only  It is not intended as medical advice for individual conditions or treatments  Talk to your doctor, nurse or pharmacist before following any medical regimen to see if it is safe and effective for you

## 2020-07-27 NOTE — TELEPHONE ENCOUNTER
Received a call from pt's dtr as he has been off augmentin x at least two days as he was not tolerating and felt very sick while on it  He self discontinued  Dtr would like to know if we can switch to an alternate plan  Dr Adair Logan notified of call  Pt is in hospital for a procedure at this time

## 2020-07-27 NOTE — OP NOTE
OPERATIVE REPORT  PATIENT NAME: Jayant Patel    :  1955  MRN: [id number]  Pt Location: [facility] OR ROOM 08    SURGERY DATE: 2020    Surgeon(s) and Role:     * Becky Diop MD - Primary     * Tank Quevedo MD - Assisting    Preop Diagnosis:  Esophageal stricture [K22 2]    Post-Op Diagnosis Codes:     * Esophageal stricture [K22 2]    Procedure(s) (LRB):  ESOPHAGOGASTRODUODENOSCOPY (EGD) (N/A)  DILATATION ESOPHAGEAL with Savary over the wire dilitation 33FR to 45FR (N/A)   1  EGD with dilation    Specimen(s):  * No specimens in log *    Estimated Blood Loss:   Minimal    Drains:  Gastrostomy/Enterostomy Jejunostomy 14 Fr  LUQ (Active)   Surrounding Skin Dry; Intact 2020  8:44 AM   Drain Status Clamped 2020  8:44 AM   Drainage Appearance None 2020  8:44 AM   Dressing Status Open to air 2020  8:44 AM   Number of days: 305       Gastrostomy/Enterostomy Jejunostomy 14 Fr  LUQ (Active)   Number of days: 52       Anesthesia Type:   General    Operative Indications:  Esophageal stricture [K252]  59-year-old male with history of esophageal adenocarcinoma status post 2020 transhiatal esophagectomy with continued dysphagia    Operative Findings:  Stricture at gastroesophageal anastomosis located at 25 cm from the incisors  No evidence of recurrent disease  Some mild Candida esophagitis in the proximal esophagus  Unable to initially pass a adult scope through this tight stricture  Used a pediatric bronchoscope to place a wire  Then dilated from 34-40 [place]  Complications:   None    Procedure and Technique:  After obtaining informed consent the patient was brought back to the operating room placed supine on the OR table  General anesthesia was induced without incident    A formal time-out was performed at this time verifying patient, date of birth, procedure, fire risk score, ASA score, antibiotic usage, need for blood products, anticipate specimens, beta-blocker usage, imaging and any other questions or concerns  At this point in adult endoscope was inserted into the patient's oropharynx and directed down the esophagus  Here we encountered mild Candida esophagitis  This was able to be irrigated off and did not appear to be a mass  The anastomosis was located at 25 cm from the incisors  This was tightly strictured again  I could not get adult endoscope through this  We switched out to a pediatric upper endoscope and were able to pass this lesion  Next a 0 038 Jagwire was placed down the EGD and directed into the gastric conduit under direct visualization  We used fluoroscopy to verify placement and maintain positioning  The EGD scope was then removed at this time  We then began serial dilation over a wire using fluoro  This was placed over the wire and passed down the oropharynx into the esophagus under direct visualization  We dilated from 33 Fr to 39 Fr  After every 3rd dilator the wire was removed and we reinspected the esophagus and conduit with the scope  Afterwards repeat endoscopy showed mild trauma to the esophagus and anastomosis but no perforation  We were able to get passed the anastomosis at that time and into the stomach and duodenum  The remainder of the conduit appeared normal       Patient tolerated this portion of procedure well without complication          I was present for the entire procedure     I was present for the entire procedure    Patient Disposition:  PACU     SIGNATURE: Gabino Gil MD  DATE: July 27, 2020  TIME: 10:48 AM

## 2020-07-27 NOTE — ANESTHESIA POSTPROCEDURE EVALUATION
Post-Op Assessment Note    CV Status:  Stable    Pain management: adequate     Mental Status:  Awake and sleepy   Hydration Status:  Euvolemic   PONV Controlled:  Controlled   Airway Patency:  Patent   Post Op Vitals Reviewed: Yes      Staff: CRNA           BP (P) 108/60 (07/27/20 1053)    Temp (!) (P) 96 9 °F (36 1 °C) (07/27/20 1053)    Pulse (P) 72 (07/27/20 1053)   Resp (P) 14 (07/27/20 1053)    SpO2   99 RA

## 2020-07-27 NOTE — PROGRESS NOTES
Assumed care of patient at 1200 hours  Report received from [facility] in [address]  Patient in supine position with HOB elevated to 60 degrees  In NAD  Pt's daughter Gertrude Sandifer at bedside

## 2020-07-27 NOTE — TELEPHONE ENCOUNTER
Dr Denise Chan would like the patient to start augmentin 400ml/5ml to be taken every twelve hours  It appears it was just the pill he was unable to take, he can take the liquid  This has been ordered and sent to patient's preferred pharmacy  Notified scottie Hair understanding

## 2020-07-29 ENCOUNTER — PATIENT OUTREACH (OUTPATIENT)
Dept: HEMATOLOGY ONCOLOGY | Facility: CLINIC | Age: 65
End: 2020-07-29

## 2020-07-29 NOTE — PROGRESS NOTES
Attempted phone outreach to patient today at the request of Dr Georgette Rutherford  Left a voicemail for Chery Lentz to call me back at his convenience  Contact information provided

## 2020-07-30 ENCOUNTER — NUTRITION (OUTPATIENT)
Dept: NUTRITION | Facility: CLINIC | Age: 65
End: 2020-07-30

## 2020-07-30 DIAGNOSIS — Z71.3 NUTRITIONAL COUNSELING: Primary | ICD-10-CM

## 2020-07-30 NOTE — PATIENT INSTRUCTIONS
Nutrition Rx & Recommendations:    Diet: Tube feeding and liquids/soft solids by mouth as tolerated   Nutrition Supplements: Ensure Enlive at least 1 (8oz) bottle by mouth daily  May use homemade shakes/smoothies as desired  If using a pre-made shake/bar, choose ones with >300 calories and >10 grams protein (ex  Ensure Enlive, Ensure Plus, Boost Plus, Boost Very High Calorie, etc )  Stay hydrated by sipping fluids of choice/tolerance throughout the day  Liquid nutrition may be better tolerated than solids at times  Alter food choices and eating patterns to accommodate changing needs  Soft Foods to try: cream soups, casseroles, cottage cheese   Continue to eat by mouth, as appropriate/tolerated, as much as possible  Your syringes are each 60 mL or 2 fl oz  Always flush your feeding tube with 60 mL room-temp water 1-2 times daily while feeding tube is not in use  Follow proper oral care; Try baking soda/salt water rinse recipe (mix 3/4 tsp salt + 1 tsp baking soda + 1 qt water; rinse with pain water after using) in Eating Hints book (pg 18)  Brush your teeth before/after meals & before bed  Weigh yourself regularly  If you notice weight loss, make an effort to increase your daily food/calorie intake  If you continue to notice loss after these efforts, reach out to your dietitian to establish a plan to stabilize weight  Tube Feeding Plan: See Below     Tube Feeding Recommendations:   TF Goal: Jevity 1 5 at 99 mL/hr via J-tube cycled over 15 5 hours daily (until 6 5 cartons infused)  Water Flushin mL free water flush at the beginning and end of TF infusion (250 mL total) plus 125 mL free water flush 8 times per day (1000 mL total) (total of 1250 mL/day in flushes; flushes should be spread throughout the day and every 2-3 hours during TF infusion; will need to adjust flushes prn for IV fluids)     Enteral Nutrition goal to provide: 1540 mL volume (6 5 cartons day), 2312 kcal, 98 grams protein, 1170 mL free water, 2420 mL total water daily  If Marlene Boston is unable to tolerate 99mL/hr suggest increasing length of infusion:  · TF Goal: Jevity 1 5 at 75 mL/hr via J-tube cycled over 20 5 hours daily (until 6 5 cartons infused)  · Water Flushin mL free water flush at the beginning and end of TF infusion (250 mL total) plus 125 mL free water flush 8 times per day (1000 mL total) (total of 1250 mL/day in flushes; flushes should be spread throughout the day and every 2-3 hours during TF infusion; will need to adjust flushes prn for IV fluids)  · Enteral Nutrition goal to provide: 1540 mL volume (6 5 cartons day), 2312 kcal, 98 grams protein, 1170 mL free water, 2420 mL total water daily  OR  · TF Goal: Jevity 1 5 at 80 mL/hr via J-tube cycled over ~19 hours daily (until 6 5 cartons infused)  · Water Flushin mL free water flush at the beginning and end of TF infusion (250 mL total) plus 125 mL free water flush 8 times per day (1000 mL total) (total of 1250 mL/day in flushes; flushes should be spread throughout the day and every 2-3 hours during TF infusion; will need to adjust flushes prn for IV fluids)  · Enteral Nutrition goal to provide: 1540 mL volume (6 5 cartons day), 2312 kcal, 98 grams protein, 1170 mL free water, 2420 mL total water daily  · Call [facility] when you have 10 days left of TF supplies: [phone], option 3 (customer support)        Follow Up Plan: 8/10/20 11am  Phone follow up     Recommend Referral to Other Providers: none at this time

## 2020-07-30 NOTE — PROGRESS NOTES
Outpatient Oncology Nutrition Consult     Type of Consult: Follow Up  Care Location: Telephone Call; with daughter Tory Langley     Reason for referral: Franki RN: Esophageal CA dx and TF (Date of referral: 9/26/19)  -referral today 7/1/20 from [facility] "Pt's daughter called with concerns about her father  She states he is continuing to lose weight, and would like to discuss changing the tube feed formula to something more calorie-dense  She also mentioned that he has not been able to take anything by mouth in the last 3 days, including water "     Nutrition Assessment:     PMH: COPD, DM, Jejunostomy 9/26/19  Oncology Diagnosis & Treatments: Malignant neoplasm of lower third of esophagus diagnosed 8/24/19  Chemotherapy (neulasta, taxotere, oxaliplatin, trastuzumab) started 10/8/19, EOT 12/30/19  S/p sophagogastrectomy 1/28/20        Malignant neoplasm of lower third of esophagus (HCC) (Resolved)    8/24/2019 Initial Diagnosis     Malignant neoplasm of lower third of esophagus ([facility] [address] )  At least intramucosal carcinoma  Her2/SHIMON positive      10/8/2019 - 12/30/2019 Chemotherapy     palonosetron (ALOXI) injection 0 25 mg, 0 25 mg, Intravenous, Once, 6 of 6 cycles  Administration: 0 25 mg (10/8/2019), 0 25 mg (10/22/2019), 0 25 mg (11/5/2019), 0 25 mg (11/19/2019), 0 25 mg (12/3/2019), 0 25 mg (12/17/2019)  pegfilgrastim (NEULASTA ONPRO) subcutaneous injection kit 6 mg, 6 mg, Subcutaneous, Once, 6 of 6 cycles  Administration: 6 mg (10/9/2019), 6 mg (10/23/2019), 6 mg (11/6/2019), 6 mg (11/20/2019), 6 mg (12/4/2019), 6 mg (12/18/2019)  fosaprepitant (EMEND) 150 mg in sodium chloride 0 9 % 250 mL IVPB, 150 mg, Intravenous, Once, 6 of 6 cycles  Administration: 150 mg (10/8/2019), 150 mg (10/22/2019), 150 mg (11/5/2019), 150 mg (11/19/2019), 150 mg (12/3/2019), 150 mg (12/17/2019)  DOCEtaxel (TAXOTERE) 99 mg in sodium chloride 0 9 % 250 mL chemo infusion, 50 mg/m2 = 99 mg (83 3 % of original dose 60 mg/m2), Intravenous, Once, 6 of 6 cycles  Dose modification: 50 mg/m2 (original dose 60 mg/m2, Cycle 1, Reason: Other (See Comments))  Administration: 99 mg (10/8/2019), 99 mg (10/22/2019), 99 mg (11/5/2019), 99 mg (11/19/2019), 99 mg (12/3/2019), 99 mg (12/17/2019)  leucovorin 396 mg in dextrose 5 % 250 mL IVPB, 200 mg/m2 = 396 mg (50 % of original dose 400 mg/m2), Intravenous, Once, 6 of 6 cycles  Dose modification: 200 mg/m2 (original dose 400 mg/m2, Cycle 1, Reason: Other (See Comments), Comment: FLOT regimen standard)  Administration: 396 mg (10/8/2019), 396 mg (10/22/2019), 396 mg (11/5/2019), 396 mg (11/19/2019), 396 mg (12/3/2019), 396 mg (12/17/2019)  oxaliplatin (ELOXATIN) 168 3 mg in dextrose 5 % 250 mL chemo infusion, 85 mg/m2 = 168 3 mg, Intravenous, Once, 6 of 6 cycles  Administration: 168 3 mg (10/8/2019), 168 3 mg (10/22/2019), 168 3 mg (11/5/2019), 168 3 mg (11/19/2019), 168 3 mg (12/3/2019), 168 3 mg (12/17/2019)  trastuzumab (HERCEPTIN) 496 mg in sodium chloride 0 9 % 250 mL chemo infusion, 6 mg/kg = 496 mg, Intravenous, Once, 6 of 6 cycles  Administration: 496 mg (10/8/2019), 331 mg (10/22/2019), 331 mg (11/5/2019), 331 mg (11/19/2019), 331 mg (12/3/2019), 331 mg (12/17/2019)      1/28/2020 Surgery     Esophagogastrectomy  - Microscopic focus of residual adenocarcinoma in muscularis propria  - Ulceration and associated histologic changes compatible with prior neoadjuvant chemotherapy  - Adjacent betancur's esophagus with associated atypia indeterminate for dysplasia  - Seven lymph nodes identified; all negative for malignancy (0/7)          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[address] ) (Resolved)    8/24/2019 Biopsy     Esophagus (biopsy):  - At least 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 background of Betancur's esophagus and high grade dysplasia       9/24/2019 Initial Diagnosis     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HCC)      10/8/2019 - 12/30/2019 Chemotherapy     palonosetron (ALOXI) injection 0 25 mg, 0 25 mg, Intravenous, Once, 6 of 6 cycles  Administration: 0 25 mg (10/8/2019), 0 25 mg (10/22/2019), 0 25 mg (11/5/2019), 0 25 mg (11/19/2019), 0 25 mg (12/3/2019), 0 25 mg (12/17/2019)  pegfilgrastim (NEULASTA ONPRO) subcutaneous injection kit 6 mg, 6 mg, Subcutaneous, Once, 6 of 6 cycles  Administration: 6 mg (10/9/2019), 6 mg (10/23/2019), 6 mg (11/6/2019), 6 mg (11/20/2019), 6 mg (12/4/2019), 6 mg (12/18/2019)  fosaprepitant (EMEND) 150 mg in sodium chloride 0 9 % 250 mL IVPB, 150 mg, Intravenous, Once, 6 of 6 cycles  Administration: 150 mg (10/8/2019), 150 mg (10/22/2019), 150 mg (11/5/2019), 150 mg (11/19/2019), 150 mg (12/3/2019), 150 mg (12/17/2019)  DOCEtaxel (TAXOTERE) 99 mg in sodium chloride 0 9 % 250 mL chemo infusion, 50 mg/m2 = 99 mg (83 3 % of original dose 60 mg/m2), Intravenous, Once, 6 of 6 cycles  Dose modification: 50 mg/m2 (original dose 60 mg/m2, Cycle 1, Reason: Other (See Comments))  Administration: 99 mg (10/8/2019), 99 mg (10/22/2019), 99 mg (11/5/2019), 99 mg (11/19/2019), 99 mg (12/3/2019), 99 mg (12/17/2019)  leucovorin 396 mg in dextrose 5 % 250 mL IVPB, 200 mg/m2 = 396 mg (50 % of original dose 400 mg/m2), Intravenous, Once, 6 of 6 cycles  Dose modification: 200 mg/m2 (original dose 400 mg/m2, Cycle 1, Reason: Other (See Comments), Comment: FLOT regimen standard)  Administration: 396 mg (10/8/2019), 396 mg (10/22/2019), 396 mg (11/5/2019), 396 mg (11/19/2019), 396 mg (12/3/2019), 396 mg (12/17/2019)  oxaliplatin (ELOXATIN) 168 3 mg in dextrose 5 % 250 mL chemo infusion, 85 mg/m2 = 168 3 mg, Intravenous, Once, 6 of 6 cycles  Administration: 168 3 mg (10/8/2019), 168 3 mg (10/22/2019), 168 3 mg (11/5/2019), 168 3 mg (11/19/2019), 168 3 mg (12/3/2019), 168 3 mg (12/17/2019)  trastuzumab (HERCEPTIN) 496 mg in sodium chloride 0 9 % 250 mL chemo infusion, 6 mg/kg = 496 mg, Intravenous, Once, 6 of 6 cycles  Administration: 496 mg (10/8/2019), 331 mg (10/22/2019), 331 mg (11/5/2019), 331 mg (11/19/2019), 331 mg (12/3/2019), 331 mg (12/17/2019)       Past Medical History:   Diagnosis Date    Bilateral pleural effusion     COPD (chronic obstructive pulmonary disease) (HCC)     Diabetes mellitus (HCC)     Difficulty swallowing     Disease of thyroid gland     Edema     Esophageal mass     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[facility] [address] ) 9/24/2019    History of chemotherapy     History of transfusion     Malignant neoplasm of lower third of esophagus (HCC)     Malignant neoplasm of lower third of esophagus ([facility] [address] ) 9/17/2019    Diagnosis: clinical stage [id number] esophageal carcinoma Procedure: EGD, transhiatal esophagectomy performed on 1/28/20 Pathology: esophagogastrectomy reveals microscopic focus of residual adenocarcinoma in muscularis propria measuring 0 7 cm, adjacent Duong's esophagus with associated atypia indeterminate for dysplasia  7 lymph nodes negative for carcinoma  Proximal and distal margins negative for    PONV (postoperative nausea and vomiting)     Port-A-Cath in place     LCW    Sleep apnea     no CPAP    SOB (shortness of breath)      Past Surgical History:   Procedure Laterality Date    BACK SURGERY      x2    DISC REMOVAL      ESOPHAGECTOMY N/A 1/28/2020    Procedure: ESOPHAGECTOMY, TRANSHIATAL;  Surgeon: Dempsey Fabry, MD;  [facility];  Service: Surgical Oncology    ESOPHAGOGASTRODUODENOSCOPY N/A 1/28/2020    Procedure: ESOPHAGOGASTRODUODENOSCOPY (EGD);   Surgeon: Dempsey Fabry, MD;  [facility];  Service: Surgical Oncology    FL GUIDED CENTRAL VENOUS ACCESS DEVICE INSERTION  9/26/2019    GASTROJEJUNOSTOMY W/ JEJUNOSTOMY TUBE N/A 9/26/2019    Procedure: INSERTION JEJUNOSTOMY TUBE OPEN;  Surgeon: Dempsey Fabry, MD;  [facility];  Service: Surgical Oncology    GASTROJEJUNOSTOMY W/ JEJUNOSTOMY TUBE N/A 6/5/2020    Procedure: INSERTION JEJUNOSTOMY TUBE OPEN;  Surgeon: Dempsey Fabry, MD;  [facility];  Service: Surgical Oncology    IR CHEST TUBE  5/26/2020    IR SPINE PROCEDURE 5/27/2020    IR THORACENTESIS  2/25/2020    OK EGD INSERT GUIDE WIRE DILATOR PASSAGE ESOPHAGUS N/A 7/7/2020    Procedure: ESOPHAGOGASTRODUODENOSCOPY (EGD) with esophageal dilation under fluro with biopsy;  Surgeon: Sally Dubois MD;  [facility];  Service: Thoracic    OK ESOPHAGOGASTRODUODENOSCOPY TRANSORAL DIAGNOSTIC N/A 4/28/2020    Procedure: ESOPHAGOGASTRODUODENOSCOPY (EGD); Surgeon: Sally Dubois MD;  [facility];  Service: Thoracic    OK ESOPHAGOGASTRODUODENOSCOPY TRANSORAL DIAGNOSTIC N/A 7/27/2020    Procedure: ESOPHAGOGASTRODUODENOSCOPY (EGD); Surgeon: Sally Dubois MD;  [facility];  Service: Thoracic    OK ESOPHAGOSCOPY FLEX BALLOON DILAT <30 MM DIAM N/A 4/28/2020    Procedure: DILATATION ESOPHAGEAL;  Surgeon: Sally Dubois MD;  [facility];  Service: Thoracic    OK ESOPHAGOSCOPY FLEX BALLOON DILAT <30 MM DIAM N/A 7/27/2020    Procedure: DILATATION ESOPHAGEAL with Savary over the wire dilitation 33FR to 45FR; Surgeon: Sally Dubois MD;  [facility];  Service: Thoracic    TONSILLECTOMY      TUNNELED VENOUS PORT PLACEMENT N/A 9/26/2019    Procedure: INSERTION VENOUS PORT (PORT-A-CATH);   Surgeon: Live Garcia MD;  [facility];  Service: Surgical Oncology    VOCAL CORD INJECTION N/A 2/7/2020    Procedure: MICROLARYNGOSCOPY WITH INJECTION;  Surgeon: Antony Grant MD;  [facility];  Service: ENT       Review of Medications:   Vitamins, Supplements and Herbals: no    Current Outpatient Medications:     amiodarone 200 mg tablet, Take 1 tablet (200 mg total) by mouth daily with breakfast, Disp: 30 tablet, Rfl: 0    amoxicillin-clavulanate (AUGMENTIN) 400-57 mg/5 mL suspension, Take 5 mL (400 mg total) by mouth 2 (two) times a day, Disp: 100 mL, Rfl: 0    aspirin 81 mg chewable tablet, Chew 1 tablet (81 mg total) daily, Disp: 30 tablet, Rfl: 0    atorvastatin (LIPITOR) 40 mg tablet, Take 1 tablet (40 mg total) by mouth daily with dinner, Disp: 30 tablet, Rfl: 0    Blood Glucose Monitoring Suppl (ONE TOUCH ULTRA 2) w/Device KIT, Check bs , q fasting and 2 hr after meals, Disp: 1 each, Rfl: 0    canagliflozin (Invokana) 100 mg, Take 100 mg by mouth daily before breakfast, Disp: , Rfl:     cefdinir (OMNICEF) 300 mg capsule, Take 1 capsule (300 mg total) by mouth every 12 (twelve) hours, Disp: 60 capsule, Rfl: 0    fluconazole (DIFLUCAN) 10 MG/ML suspension, Take 10 mL (100 mg total) by mouth daily for 5 days, Disp: 50 mL, Rfl: 0    glucose blood test strip, Check blood sugar 3 times daily, Disp: 100 each, Rfl: 3    Lancets (ONETOUCH ULTRASOFT) lancets, Check bs , q fasting and 2 hr after meals, Disp: 100 each, Rfl: 5    levothyroxine 25 mcg tablet, Take 25 mcg by mouth daily in the early morning, Disp: , Rfl:     metroNIDAZOLE (FLAGYL) 500 mg tablet, Take 1 tablet (500 mg total) by mouth every 8 (eight) hours, Disp: 90 tablet, Rfl: 0    ondansetron (ZOFRAN) 4 mg tablet, Take 1 tablet (4 mg total) by mouth every 6 (six) hours as needed for nausea or vomiting, Disp: 30 tablet, Rfl: 0    oxyCODONE (ROXICODONE) 5 mg/5 mL solution, Take 10 mg (10 ML) every 4 hours as needed for moderate pain or 15 mg (15 ML) every 4 hours as needed for severe pain (Patient taking differently: 15 mg every 4 (four) hours as needed Take 10 mg (10 ML) every 4 hours as needed for moderate pain or 15 mg (15 ML) every 4 hours as needed for severe pain), Disp: 473 mL, Rfl: 0    rOPINIRole (REQUIP) 0 25 mg tablet, take 1 tablet by mouth at bedtime, Disp: 30 tablet, Rfl: 0    traZODone (DESYREL) 100 mg tablet, Take 1 tablet (100 mg total) by mouth daily at bedtime, Disp: 30 tablet, Rfl: 0    Most Recent Lab Results:   Lab Results   Component Value Date    WBC 3 71 (L) 07/03/2020    CHOLESTEROL 112 02/03/2020    TRIG 157 (H) 02/03/2020    HDL 24 (L) 02/03/2020    LDLCALC 57 02/03/2020    ALT 25 07/02/2020    AST 13 07/02/2020    ALB 3 4 (L) 07/02/2020    SODIUM 138 07/03/2020    SODIUM 138 07/02/2020    K 3 7 07/03/2020    K 3 8 07/02/2020     07/03/2020    BUN 22 07/03/2020    BUN 17 07/02/2020    CREATININE 0 98 07/03/2020    CREATININE 0 84 07/02/2020    EGFR 81 07/03/2020    PHOS 3 8 07/03/2020    PHOS 3 7 05/24/2020    GLUCOSE 339 (H) 02/01/2020    POCGLU 88 07/27/2020    GLUF 115 (H) 05/12/2020    GLUF 128 (H) 12/02/2019    HGBA1C 5 5 07/03/2020    HGBA1C 5 4 01/16/2020    HGBA1C 7 6 08/15/2019    CALCIUM 9 3 07/03/2020    MG 2 4 07/03/2020       Anthropometric Measurements:   Height: 69"  Ht Readings from Last 1 Encounters:   07/27/20 5' 10" (1 778 m)     -Weight History:   Usual Weight: 205#  Ideal Body Weight: 160#  Wt Readings from Last 20 Encounters:   07/27/20 69 4 kg (153 lb)   07/23/20 68 9 kg (152 lb)   07/22/20 68 8 kg (151 lb 9 6 oz)   07/16/20 69 4 kg (153 lb)   07/07/20 69 4 kg (153 lb)   07/02/20 69 4 kg (153 lb)   07/02/20 69 7 kg (153 lb 9 6 oz)   06/30/20 69 4 kg (153 lb)   06/29/20 73 kg (161 lb)   06/25/20 73 kg (161 lb)   06/24/20 73 1 kg (161 lb 3 2 oz)   06/23/20 72 2 kg (159 lb 3 2 oz)   06/18/20 74 8 kg (165 lb)   06/08/20 85 1 kg (187 lb 9 8 oz)   05/23/20 74 7 kg (164 lb 10 9 oz)   05/18/20 72 6 kg (160 lb)   05/12/20 77 1 kg (170 lb)   05/08/20 78 9 kg (174 lb)   04/28/20 77 6 kg (171 lb)   04/27/20 77 6 kg (171 lb)     Varian: None  Weight Changes: Stable 1 5 weeks and loss of 8 0# (5 0%) in 1 month (significant) and loss of 18# (10 5%) in 3 months (significant)     -home weight: (7/12/20) 160#, (7/16/20) 158#    Estimated body mass index is 21 95 kg/m² as calculated from the following:    Height as of 7/27/20: 5' 10" (1 778 m)  Weight as of 7/27/20: 69 4 kg (153 lb)      Nutrition-Focused Physical Findings: n/a-phone visit      Food/Nutrition-Related History & Client/Social History:    Current Nutrition Impact Symptoms:  [] Nausea  [] Reduced Appetite  [] Acid Reflux    [] Vomiting  [x] Unintended Wt Loss  [] Malabsorption    [] Diarrhea  [] Unintended Wt Gain  [] Dumping Syndrome    [] Constipation -when he started oxycodone, magnesium citrate helped relieve  [] Thick Mucous/Secretions  [] Abdominal Pain    [x] Dysgeusia (Altered Taste) -has had altered taste and smell for years, per pts sister at initial visit [] Xerostomia (Dry Mouth)  [] Gas    [x] Dysosmia (Altered Smell)  [] Veleta Weatherford  [] Difficulty Chewing    [] Oral Mucositis (Sore Mouth)  [x] Fatigue    [] Other:    [] Odynophagia   [] Esophagitis  [] Other:    [x] Dysphagia-   -s/p esophageal dilation 20   [] Early Satiety  [x] No Problems Eating      Food Allergies: no  Food Intolerances: no    Current Diet: Soft/Moist, Full Liquids and Tube Feeding  Current Nutrition Intake: Unchanged from last visit  Appetite: Poor   Nutrition Route: PO and J-Tube  Meal planning/preparation mainly done by: Self and Daughter Herve Pearson)   Oral Care: brushes QD  Activity level: Mainly sedentary, ADL  Energy is improving, mowed lawn with push mower 1x this week  Social Hx: His daughter Britton Forrest helps take care of him  Hx of smoking    Diet Recall:   Able to tolerate softer foods in very small amounts: soup, noodles, rice  Not receiving any significant calories/protein from foods eaten PO  Also sips on water     Supplements:    Ensure Enlive (8 oz, 350 kcal, 20 g pro) 1x daily      Enteral Nutrition recall: DME: [facility]   TF Infused: Jevity 1 5 at a rate of 80, overnight for 12-15hrs until a total of 4-5 cartons is infused  FlushinmL pre and 60mL post free water flushes  · Enteral Nutrition provides:    -960 mL volume (4 1 containers/day), 1440 kcal, 61 grams protein, 730 mL free water, 850 mL total water daily  -1185 mL volume (5 containers/day), 1777 kcal, 76 g pro, 901 mL free water, 1021 mL total water daily      -Enteral nutrition intake + Ensure = 4569-9294 kcal, 81-96 g pro, 3536-6114 mL total water   (72-85% estimated calorie needs, 74-87% estimated protein needs, 41-48% estimated fluid needs)  Estimated Nutrition Needs: (based on 69 5kg/ 153# actual wt)  Energy Needs: 5357-3217 kcal/day (35-40 kcal/kg)  Protein Needs: 104-139 grams/day (1 5-2 g/kg)   Fluid Needs: 5537-9488 mL/day (1 mL/kcal)     Discussion/Summary: Jaida Nunez and his daughter Fe Sinha were contacted for oncology nutrition follow up  Jaida Nunez recently underwent esophageal dilation on 7/27/20, but at this time Fe Sinha states that his oral intake of solid foods continues to be insignificant  He continues to rely on his tube feeding as his main source of nutrition  He has been infusing 4-5 cartons of Jevity 1 5 at night  In addition to Jevity 1 5 via PEG, Jaida Nunez has been consuming at least 1 Ensure Enlive PO daily  Reviewed nutrition needs and how to increase intakes to meet needs  Until Jaida Nunez is able to consume adequate amounts of foods PO, he will continue to rely on Jevity 1 5 and Ensure to meet his nutrition needs  Fabiano's enteral nutrition goal is 6 5 cartons of Jevity 1 5 daily, this in addition to 1 Ensure Enlive will meet 100% of his estimated calorie and protein needs  If he continues to only infuse 4-5 cartons of Jevity 1 5, he will need to increase the amount of Ensure that he is consuming (4 cartons Jevity 1 5 + 3 bottles Ensure Enlive OR 5 cartons Jevity 1 5 + 2 bottles Ensure Enlive)  Fe Sinha states that she spoke to [address] D[facility] yesterday 7/29/20 to request more tube feeding supplies and [facility] informed that they need a new tube feeding order because 3 or more months have passed since the last time Jaida Nunze requested tube feeding supplies  This RD will touch base with [facility] to send new tube feeding order to [address] [facility]        Discussed/Reviewed:   · weight management  · indications & use of oral nutrition supplements  · how to modify foods for anticipated nutrition impact symptoms pt may experience during CA tx  · ways to increase overall calorie intake  · adequate hydation & tips to increase overall fluid intake  · MNT for: enteral nutrition, unintentional weight loss, dysphagia   · a soft diet & food choices  · individualized calorie, protein and fluid daily goals  · individualized enteral nutrition recommendations & plan: (see below)     Enteral nutrition is needed for Fabiano's sole source of nutrition, recommend:  TF Goal: Jevity 1 5 at 99 mL/hr via J-tube cycled over 15 5 hours daily (until 6 5 cartons infused)  Water Flushin mL free water flush at the beginning and end of TF infusion (250 mL total) plus 125 mL free water flush 8 times per day (1000 mL total) (total of 1250 mL/day in flushes; flushes should be spread throughout the day and every 2-3 hours during TF infusion)  Enteral Nutrition goal to provide: 1540 mL volume (6 5 cartons day), 2312 kcal, 98 grams protein, 1170 mL free water, 2420 mL total water daily  *Pt will require an enteral feeding pump to administer TF due to J-Tube due to esophageal cancer diagnosis  If Bethene Leather is unable to tolerate 99mL/hr suggest increasing length of infusion:  · TF Goal: Jevity 1 5 at 75 mL/hr via J-tube cycled over 20 5 hours daily (until 6 5 cartons infused)  · Water Flushin mL free water flush at the beginning and end of TF infusion (250 mL total) plus 125 mL free water flush 8 times per day (1000 mL total) (total of 1250 mL/day in flushes; flushes should be spread throughout the day and every 2-3 hours during TF infusion; will need to adjust flushes prn for IV fluids)  · Enteral Nutrition goal to provide: 1540 mL volume (6 5 cartons day), 2312 kcal, 98 grams protein, 1170 mL free water, 2420 mL total water daily  OR  · TF Goal: Jevity 1 5 at 80 mL/hr via J-tube cycled over ~19 hours daily (until 6 5 cartons infused)    · Water Flushin mL free water flush at the beginning and end of TF infusion (250 mL total) plus 125 mL free water flush 8 times per day (1000 mL total) (total of 1250 mL/day in flushes; flushes should be spread throughout the day and every 2-3 hours during TF infusion; will need to adjust flushes prn for IV fluids)  · Enteral Nutrition goal to provide: 1540 mL volume (6 5 cartons day), 2312 kcal, 98 grams protein, 1170 mL free water, 2420 mL total water daily  All questions and concerns addressed during todays visit  Shay Heard has RD contact information  Nutrition Diagnosis:     · Inadequate Energy Intake related to physiological causes, disease state and treatment related issues as evidenced by food recall, wt loss and discussion with pt and/or family  · Increased Nutrient Needs (kcal & pro) related to increased demand for nutrients and disease state as evidenced by recovering from cancer tx  · Patient has clinical indicators (or ASPEN criteria) consistent with severe protein-calorie malnutrition in the context of Chronic Illness as evidenced by >5% wt loss in 1 month and </=75% energy intake vs  Estimated needs for >/=1 month  Intervention & Recommendations:     Topics addressed: Nutrition education, Nutrition Symptom Management, Adequate Hydration, Medical Food Supplements, Enteral Nutrition and Weight Management    Barriers: None  Readiness to change: preparation  Comprehension: verbalizes understanding  Expected Compliance: good    Materials Provided: not applicable  Monitoring & Evaluation:     Dietitian to Monitor: Nutrtion Impact Symptoms, Body Weight, Enteral and/or Parenteral Intake and Biochemical Data     Goals:  · weight maintenance/stabilization  · adequate nutrition impact symptom management  · pt to meet >/=75% estimated nutrition needs daily  · achieve tube feeding goal rate    · Progress Towards Goals: Progressing    Nutrition Rx & Recommendations:    Diet: Tube feeding and liquids/soft solids by mouth as tolerated   Nutrition Supplements: Ensure Enlive at least 1 (8oz) bottle by mouth daily  May use homemade shakes/smoothies as desired    If using a pre-made shake/bar, choose ones with >300 calories and >10 grams protein (ex  Ensure Enlive, Ensure Plus, Boost Plus, Boost Very High Calorie, etc )  Stay hydrated by sipping fluids of choice/tolerance throughout the day  Liquid nutrition may be better tolerated than solids at times  Alter food choices and eating patterns to accommodate changing needs  Soft Foods to try: cream soups, casseroles, cottage cheese   Continue to eat by mouth, as appropriate/tolerated, as much as possible  Your syringes are each 60 mL or 2 fl oz  Always flush your feeding tube with 60 mL room-temp water 1-2 times daily while feeding tube is not in use  Follow proper oral care; Try baking soda/salt water rinse recipe (mix 3/4 tsp salt + 1 tsp baking soda + 1 qt water; rinse with pain water after using) in Eating Hints book (pg 18)  Brush your teeth before/after meals & before bed  Weigh yourself regularly  If you notice weight loss, make an effort to increase your daily food/calorie intake  If you continue to notice loss after these efforts, reach out to your dietitian to establish a plan to stabilize weight  Tube Feeding Plan: See Below     Tube Feeding Recommendations:   TF Goal: Jevity 1 5 at 99 mL/hr via J-tube cycled over 15 5 hours daily (until 6 5 cartons infused)  Water Flushin mL free water flush at the beginning and end of TF infusion (250 mL total) plus 125 mL free water flush 8 times per day (1000 mL total) (total of 1250 mL/day in flushes; flushes should be spread throughout the day and every 2-3 hours during TF infusion; will need to adjust flushes prn for IV fluids)  Enteral Nutrition goal to provide: 1540 mL volume (6 5 cartons day), 2312 kcal, 98 grams protein, 1170 mL free water, 2420 mL total water daily  If Juvencio Fernando is unable to tolerate 99mL/hr suggest increasing length of infusion:  · TF Goal: Jevity 1 5 at 75 mL/hr via J-tube cycled over 20 5 hours daily (until 6 5 cartons infused)    · Water Flushin mL free water flush at the beginning and end of TF infusion (250 mL total) plus 125 mL free water flush 8 times per day (1000 mL total) (total of 1250 mL/day in flushes; flushes should be spread throughout the day and every 2-3 hours during TF infusion; will need to adjust flushes prn for IV fluids)  · Enteral Nutrition goal to provide: 1540 mL volume (6 5 cartons day), 2312 kcal, 98 grams protein, 1170 mL free water, 2420 mL total water daily  OR  · TF Goal: Jevity 1 5 at 80 mL/hr via J-tube cycled over ~19 hours daily (until 6 5 cartons infused)  · Water Flushin mL free water flush at the beginning and end of TF infusion (250 mL total) plus 125 mL free water flush 8 times per day (1000 mL total) (total of 1250 mL/day in flushes; flushes should be spread throughout the day and every 2-3 hours during TF infusion; will need to adjust flushes prn for IV fluids)  · Enteral Nutrition goal to provide: 1540 mL volume (6 5 cartons day), 2312 kcal, 98 grams protein, 1170 mL free water, 2420 mL total water daily  · Call [facility] when you have 10 days left of TF supplies: [phone], option 3 (customer support)        Follow Up Plan: 8/10/20  11am  Phone follow up     Recommend Referral to Other Providers: none at this time

## 2020-08-03 ENCOUNTER — TELEPHONE (OUTPATIENT)
Dept: NEUROSURGERY | Facility: CLINIC | Age: 65
End: 2020-08-03

## 2020-08-03 ENCOUNTER — TELEPHONE (OUTPATIENT)
Dept: INFECTIOUS DISEASES | Facility: CLINIC | Age: 65
End: 2020-08-03

## 2020-08-03 ENCOUNTER — TELEPHONE (OUTPATIENT)
Dept: HEMATOLOGY ONCOLOGY | Facility: CLINIC | Age: 65
End: 2020-08-03

## 2020-08-03 DIAGNOSIS — G93.9 BRAIN LESION: Primary | ICD-10-CM

## 2020-08-03 DIAGNOSIS — M86.18 OTHER ACUTE OSTEOMYELITIS, OTHER SITE (HCC): ICD-10-CM

## 2020-08-03 DIAGNOSIS — S22.000A COMPRESSION FRACTURE OF BODY OF THORACIC VERTEBRA (HCC): ICD-10-CM

## 2020-08-03 RX ORDER — OXYCODONE HCL 5 MG/5 ML
SOLUTION, ORAL ORAL
Qty: 473 ML | Refills: 0 | Status: SHIPPED | OUTPATIENT
Start: 2020-08-03 | End: 2020-08-04 | Stop reason: SDUPTHER

## 2020-08-03 NOTE — TELEPHONE ENCOUNTER
Bianca Corral called patient's daughter and spoke with her on the phone  He ordered the MRI brain and t-spine  Attempted to call her to see if she would like assistance scheduling those tests with no answer  Provided her central scheduling's phone number but also my direct number in case she would like assistance

## 2020-08-03 NOTE — TELEPHONE ENCOUNTER
LM for pt to cb    Pt has an appt next week with us and needs labs drawn prior  Intend to ask whether he could either go have those drawn or if he has visiting nurses who we can send the order to have drawn this week

## 2020-08-03 NOTE — TELEPHONE ENCOUNTER
Patient's daughter called asking if patient should have an MRI of his brain and thoracic spine? I guess they spoke with Dr Terrance De Los Santos who suggested she calls our office  Your last office note just mentions an x-ray in 4 weeks  Please advise

## 2020-08-04 ENCOUNTER — TELEMEDICINE (OUTPATIENT)
Dept: HEMATOLOGY ONCOLOGY | Facility: CLINIC | Age: 65
End: 2020-08-04
Payer: COMMERCIAL

## 2020-08-04 DIAGNOSIS — M86.18 OTHER ACUTE OSTEOMYELITIS, OTHER SITE (HCC): ICD-10-CM

## 2020-08-04 DIAGNOSIS — Z85.01 PERSONAL HISTORY OF ESOPHAGEAL CANCER: Primary | ICD-10-CM

## 2020-08-04 PROCEDURE — 99441 PR PHYS/QHP TELEPHONE EVALUATION 5-10 MIN: CPT | Performed by: INTERNAL MEDICINE

## 2020-08-04 RX ORDER — OXYCODONE HCL 5 MG/5 ML
SOLUTION, ORAL ORAL
Qty: 500 ML | Refills: 0 | Status: SHIPPED | OUTPATIENT
Start: 2020-08-04 | End: 2020-08-10 | Stop reason: SDUPTHER

## 2020-08-04 RX ORDER — OXYCODONE HCL 5 MG/5 ML
SOLUTION, ORAL ORAL
Qty: 500 ML | Refills: 0 | Status: SHIPPED | OUTPATIENT
Start: 2020-08-04 | End: 2020-08-04 | Stop reason: SDUPTHER

## 2020-08-04 NOTE — TELEPHONE ENCOUNTER
Per telephone caqll from pharmacy and daughter Ignacia Ch, they need a script written for the 500ml of oxycodone solution instead of 473 ml  They have a 500ml bottle at pharmacy and would like to use that to fill  I cancelled the script from yesterday and we just need new one written for the 500ml quantity  Thank you

## 2020-08-04 NOTE — PROGRESS NOTES
Virtual Brief Visit    Assessment/Plan:    9-FLNQ-SWM male with complicated cancer history  Issues:    J-tube, bloating in the morning  Patient has spoken with Surgical Oncology, feedings are being manipulated  Patient is also being followed by GI; underwent esophageal stretching recently  The eventual plan is to get patient back on complete oral supplementation  Raspy voice  This is thought to be due to trauma at the time of surgery in January 2020  Patient will follow up with ENT as directed  Paraspinal abscess/T6 osteomyelitis  Patient is completing antibiotics this week  Patient will follow up with infectious disease as directed  Eventually scans will be needed (see below)  Prior MRI/brain findings  A May 2020 MRI brain demonstrated a 4 mm lesion (obvious concern has been metastatic disease)  Patient continues to feel well from a CNS issue, daughter agrees mental status and activities are good  Surveillance continues; a repeat MRI will eventually be needed  GE junction adenocarcinoma  Patient received neoadjuvant chemotherapy and then resection  Patient will follow-up with Surgical Oncology as directed  Eventually scans will need to be repeated (surveillance)  Above discussed with patient and daughter; all questions answered  Patient is to return in 6 weeks with blood work before  Patient goals and areas of care:  Multiple physician follow-ups  Barriers to care:  Complications from the cancer/treatments  Patient is able to self-care with help of family members      Problem List Items Addressed This Visit        Other    Personal history of esophageal cancer - Primary      Reason for visit is GE junction adenocarcinoma    Chief Complaint   Patient presents with    Virtual Brief Visit      Encounter provider Osiris Ornelas MD    Provider located at [address] [address]    Recent Visits  Date Type Provider Dept   08/03/20 Telephone Preeti Valle [facility]   Showing recent visits within past 7 days and meeting all other requirements     Today's Visits  Date Type Provider Dept   08/04/20 Telemedicine Sylvia Garcia MD [facility]   Showing today's visits and meeting all other requirements     Future Appointments  No visits were found meeting these conditions  Showing future appointments within next 150 days and meeting all other requirements      After connecting through telephone, the patient was identified by name and date of birth  Yodit Leyva was informed that this is a telemedicine visit and that the visit is being conducted through telephone  My office door was closed  No one else was in the room  He acknowledged consent and understanding of privacy and security of the platform  The patient has agreed to participate and understands he can discontinue the visit at any time  Patient is aware this is a billable service  Subjective    Yodit Leyva is a 76 y o  male with a history of GE junction adenocarcinoma  HPI 65-year-old male with a complicated cancer history  Patient was diagnosed with locally advanced GE junction adenocarcinoma, HER2 Yara positive and received neoadjuvant chemotherapy  Patient tolerated the treatments well  Mr Shahram Valerio then went on to resection; surgery was completed in January 2020  Patient had a number of complications afterwards  More recently, patient has had worsening issues swallowing, weight loss, back pain and persistent raspy voice  Mr Shahram Valerio was admitted to [address] for a number of weeks and treated for paraspinal abscess  Patient's last dose of antibiotics is scheduled for today  Presently Mr Shahram Valerio states feeling okay, slightly better than before  Still with trouble swallowing  Patient has a J-tube - feels bloated after the morning administration    Raspy voice is the same as before  No fevers, chills or sweats  Back pain is controlled  Activities are limited but better than when patient was admitted to the hospital   No headaches, blurred vision or dizziness  No chest pain or pressure  No  issues  Past Medical History:   Diagnosis Date    Bilateral pleural effusion     COPD (chronic obstructive pulmonary disease) (HCC)     Diabetes mellitus (HCC)     Difficulty swallowing     Disease of thyroid gland     Edema     Esophageal mass     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 9/24/2019    History of chemotherapy     History of transfusion     Malignant neoplasm of lower third of esophagus (HCC)     Malignant neoplasm of lower third of esophagus [facility] ) 9/17/2019    Diagnosis: clinical stage T3N1M0 esophageal carcinoma Procedure: EGD, transhiatal esophagectomy performed on 1/28/20 Pathology: esophagogastrectomy reveals microscopic focus of residual adenocarcinoma in muscularis propria measuring 0 7 cm, adjacent Duong's esophagus with associated atypia indeterminate for dysplasia  7 lymph nodes negative for carcinoma  Proximal and distal margins negative for    PONV (postoperative nausea and vomiting)     Port-A-Cath in place     LCW    Sleep apnea     no CPAP    SOB (shortness of breath)        Past Surgical History:   Procedure Laterality Date    BACK SURGERY      x2    DISC REMOVAL      ESOPHAGECTOMY N/A 1/28/2020    Procedure: ESOPHAGECTOMY, TRANSHIATAL;  Surgeon: Hardeep Peacock MD;  [facility];  Service: Surgical Oncology    ESOPHAGOGASTRODUODENOSCOPY N/A 1/28/2020    Procedure: ESOPHAGOGASTRODUODENOSCOPY (EGD);   Surgeon: Hardeep Peacock MD;  [facility];  Service: Surgical Oncology    FL GUIDED CENTRAL VENOUS ACCESS DEVICE INSERTION  9/26/2019    GASTROJEJUNOSTOMY W/ JEJUNOSTOMY TUBE N/A 9/26/2019    Procedure: INSERTION JEJUNOSTOMY TUBE OPEN;  Surgeon: Hardeep Peacock MD;  [facility];  Service: Surgical Oncology    GASTROJEJUNOSTOMY W/ JEJUNOSTOMY TUBE N/A 6/5/2020    Procedure: INSERTION JEJUNOSTOMY TUBE OPEN;  Surgeon: Lynsey Veliz MD;  [facility];  Service: Surgical Oncology    IR CHEST TUBE  5/26/2020    IR SPINE PROCEDURE  5/27/2020    IR THORACENTESIS  2/25/2020    AK EGD INSERT GUIDE WIRE DILATOR PASSAGE ESOPHAGUS N/A 7/7/2020    Procedure: ESOPHAGOGASTRODUODENOSCOPY (EGD) with esophageal dilation under fluro with biopsy;  Surgeon: Luis Antonio Dorado MD;  [facility];  Service: Thoracic    AK ESOPHAGOGASTRODUODENOSCOPY TRANSORAL DIAGNOSTIC N/A 4/28/2020    Procedure: ESOPHAGOGASTRODUODENOSCOPY (EGD); Surgeon: Luis Antonio Dorado MD;  [facility];  Service: Thoracic    AK ESOPHAGOGASTRODUODENOSCOPY TRANSORAL DIAGNOSTIC N/A 7/27/2020    Procedure: ESOPHAGOGASTRODUODENOSCOPY (EGD); Surgeon: Luis Antonio Dorado MD;  [facility];  Service: Thoracic    AK ESOPHAGOSCOPY FLEX BALLOON DILAT <30 MM DIAM N/A 4/28/2020    Procedure: DILATATION ESOPHAGEAL;  Surgeon: Luis Antonio Dorado MD;  [facility];  Service: Thoracic    AK ESOPHAGOSCOPY FLEX BALLOON DILAT <30 MM DIAM N/A 7/27/2020    Procedure: DILATATION ESOPHAGEAL with Savary over the wire dilitation 33FR to 45FR; Surgeon: Luis Antonio Dorado MD;  [facility];  Service: Thoracic    TONSILLECTOMY      TUNNELED VENOUS PORT PLACEMENT N/A 9/26/2019    Procedure: INSERTION VENOUS PORT (PORT-A-CATH);   Surgeon: Lynsey Veliz MD;  [facility];  Service: Surgical Oncology    VOCAL CORD INJECTION N/A 2/7/2020    Procedure: MICROLARYNGOSCOPY WITH INJECTION;  Surgeon: Caryl Romo MD;  [facility];  Service: ENT       Current Outpatient Medications   Medication Sig Dispense Refill    amiodarone 200 mg tablet Take 1 tablet (200 mg total) by mouth daily with breakfast 30 tablet 0    amoxicillin-clavulanate (AUGMENTIN) 400-57 mg/5 mL suspension Take 5 mL (400 mg total) by mouth 2 (two) times a day 100 mL 0    aspirin 81 mg chewable tablet Chew 1 tablet (81 mg total) daily 30 tablet 0    atorvastatin (LIPITOR) 40 mg tablet Take 1 tablet (40 mg total) by mouth daily with dinner 30 tablet 0    Blood Glucose Monitoring Suppl (ONE TOUCH ULTRA 2) w/Device KIT Check bs , q fasting and 2 hr after meals 1 each 0    canagliflozin (Invokana) 100 mg Take 100 mg by mouth daily before breakfast      cefdinir (OMNICEF) 300 mg capsule Take 1 capsule (300 mg total) by mouth every 12 (twelve) hours 60 capsule 0    glucose blood test strip Check blood sugar 3 times daily 100 each 3    Lancets (ONETOUCH ULTRASOFT) lancets Check bs , q fasting and 2 hr after meals 100 each 5    levothyroxine 25 mcg tablet Take 25 mcg by mouth daily in the early morning      metroNIDAZOLE (FLAGYL) 500 mg tablet Take 1 tablet (500 mg total) by mouth every 8 (eight) hours 90 tablet 0    ondansetron (ZOFRAN) 4 mg tablet Take 1 tablet (4 mg total) by mouth every 6 (six) hours as needed for nausea or vomiting 30 tablet 0    oxyCODONE (ROXICODONE) 5 mg/5 mL solution Take 10 mg (10 ML) every 4 hours as needed for moderate pain or 15 mg (15 ML) every 4 hours as needed for severe pain 500 mL 0    rOPINIRole (REQUIP) 0 25 mg tablet take 1 tablet by mouth at bedtime 30 tablet 0    traZODone (DESYREL) 100 mg tablet Take 1 tablet (100 mg total) by mouth daily at bedtime 30 tablet 0     No current facility-administered medications for this visit  Allergies   Allergen Reactions    Penicillins Itching     Review of Systems   Constitutional: Positive for fatigue  HENT: Positive for voice change  Eyes: Negative  Respiratory: Negative  Cardiovascular: Negative  Gastrointestinal: Negative  Endocrine: Negative  Genitourinary: Negative  Musculoskeletal: Negative  Skin: Negative  Allergic/Immunologic: Negative  Neurological: Negative  Hematological: Negative  Psychiatric/Behavioral: Negative      All other systems reviewed and are negative  There were no vitals filed for this visit  Laboratory    07/03/2020 WBC = 3 7 hemoglobin = 10 6 hematocrit = 34 MCV = 88 platelet = 953 BUN = 22 creatinine = 0 98 calcium = 9 3      VIRTUAL VISIT DISCLAIMER    Brittni Andrew acknowledges that he has consented to an online visit or consultation  He understands that the online visit is based solely on information provided by him, and that, in the absence of a face-to-face physical evaluation by the physician, the diagnosis he receives is both limited and provisional in terms of accuracy and completeness  This is not intended to replace a full medical face-to-face evaluation by the physician  Brittni Andrew understands and accepts these terms

## 2020-08-05 ENCOUNTER — HOSPITAL ENCOUNTER (OUTPATIENT)
Dept: CT IMAGING | Facility: HOSPITAL | Age: 65
Discharge: HOME | End: 2020-08-05
Attending: SURGERY
Payer: COMMERCIAL

## 2020-08-05 ENCOUNTER — TELEPHONE (OUTPATIENT)
Dept: HEMATOLOGY ONCOLOGY | Facility: CLINIC | Age: 65
End: 2020-08-05

## 2020-08-05 DIAGNOSIS — C15.5 MALIGNANT NEOPLASM OF LOWER THIRD OF ESOPHAGUS (HCC): ICD-10-CM

## 2020-08-05 DIAGNOSIS — M46.20 PARASPINAL ABSCESS (HCC): ICD-10-CM

## 2020-08-05 PROCEDURE — 74177 CT ABD & PELVIS W/CONTRAST: CPT

## 2020-08-05 PROCEDURE — 71260 CT THORAX DX C+: CPT

## 2020-08-05 RX ORDER — AMOXICILLIN AND CLAVULANATE POTASSIUM 400; 57 MG/5ML; MG/5ML
400 POWDER, FOR SUSPENSION ORAL 2 TIMES DAILY
Qty: 100 ML | Refills: 0 | Status: SHIPPED | OUTPATIENT
Start: 2020-08-05 | End: 2020-08-17 | Stop reason: SDUPTHER

## 2020-08-05 RX ADMIN — IOHEXOL 100 ML: 350 INJECTION, SOLUTION INTRAVENOUS at 08:37

## 2020-08-05 NOTE — TELEPHONE ENCOUNTER
Called patient as a follow up to his virtual visit with Dr Dmitriy Yu  Left message that Dr Dmitriy Yu would like to see patient back in 6 weeks  Appt has been scheduled for 9/22/20 @ 11am at the [facility]  Labs will need to be done at least 1 week prior to this appt

## 2020-08-10 ENCOUNTER — TELEPHONE (OUTPATIENT)
Dept: FAMILY MEDICINE CLINIC | Facility: CLINIC | Age: 65
End: 2020-08-10

## 2020-08-10 ENCOUNTER — TELEPHONE (OUTPATIENT)
Dept: INFECTIOUS DISEASES | Facility: CLINIC | Age: 65
End: 2020-08-10

## 2020-08-10 ENCOUNTER — TELEPHONE (OUTPATIENT)
Dept: CARDIAC SURGERY | Facility: CLINIC | Age: 65
End: 2020-08-10

## 2020-08-10 ENCOUNTER — TELEPHONE (OUTPATIENT)
Dept: NUTRITION | Facility: CLINIC | Age: 65
End: 2020-08-10

## 2020-08-10 DIAGNOSIS — E11.65 TYPE 2 DIABETES MELLITUS WITH HYPERGLYCEMIA, WITHOUT LONG-TERM CURRENT USE OF INSULIN (HCC): Primary | ICD-10-CM

## 2020-08-10 DIAGNOSIS — I65.29 CAROTID STENOSIS, ASYMPTOMATIC: ICD-10-CM

## 2020-08-10 DIAGNOSIS — G47.00 INSOMNIA, UNSPECIFIED TYPE: ICD-10-CM

## 2020-08-10 DIAGNOSIS — M86.18 OTHER ACUTE OSTEOMYELITIS, OTHER SITE (HCC): ICD-10-CM

## 2020-08-10 DIAGNOSIS — E11.65 TYPE 2 DIABETES MELLITUS WITH HYPERGLYCEMIA, WITHOUT LONG-TERM CURRENT USE OF INSULIN (HCC): ICD-10-CM

## 2020-08-10 RX ORDER — EMPAGLIFLOZIN 10 MG/1
TABLET, FILM COATED ORAL
Refills: 0 | OUTPATIENT
Start: 2020-08-10

## 2020-08-10 RX ORDER — OXYCODONE HCL 5 MG/5 ML
SOLUTION, ORAL ORAL
Qty: 500 ML | Refills: 0 | Status: SHIPPED | OUTPATIENT
Start: 2020-08-11 | End: 2020-08-20

## 2020-08-10 RX ORDER — TRAZODONE HYDROCHLORIDE 100 MG/1
100 TABLET ORAL
Qty: 30 TABLET | Refills: 3 | Status: SHIPPED | OUTPATIENT
Start: 2020-08-10 | End: 2020-10-05

## 2020-08-10 RX ORDER — ATORVASTATIN CALCIUM 40 MG/1
40 TABLET, FILM COATED ORAL
Qty: 30 TABLET | Refills: 3 | Status: SHIPPED | OUTPATIENT
Start: 2020-08-10 | End: 2020-10-05

## 2020-08-10 NOTE — TELEPHONE ENCOUNTER
Nils Renepastorfreddy was scheduled for an oncology nutrition phone follow up today (8/10/2020) at [address] [facility] phone number as previously agreed upon at prior nutrition follow up  No answer, left message for Nils Carrasco and/or Jasen Gagnon to contact RD for phone follow up and/or to reschedule his appointment at a more convenient time for him  The contact information for RD was provided

## 2020-08-10 NOTE — TELEPHONE ENCOUNTER
I s/w Dr Gary Barrow, he ok'd patient's appt on wed  To be virtual  I called patient's daughter, Edmund Maravilla, and left  to call back to set up the virtual visit

## 2020-08-10 NOTE — TELEPHONE ENCOUNTER
Patient is being proactive in asking for a refill of his 8 day supply since it seems to be difficult to get it from pharmacy  /requests that this next refill be sent to [facility] on [address]     PDMP last filled 8 4   500 ml/8 days     Could you pre date it for the 8 11th and send to [address] [facility] ?     Thank you

## 2020-08-10 NOTE — TELEPHONE ENCOUNTER
Lmom - Called pt with reminder for him to have his lab work completed before his appt tomorrow  Let him know we would need to reschedule if he did not have completed

## 2020-08-10 NOTE — TELEPHONE ENCOUNTER
Rx is being changed for the (Invokana) 100 mg to the [facility], missing the directions, refills, and quantity

## 2020-08-11 ENCOUNTER — HOSPITAL ENCOUNTER (OUTPATIENT)
Dept: INFUSION CENTER | Facility: CLINIC | Age: 65
Discharge: HOME | End: 2020-08-11
Payer: COMMERCIAL

## 2020-08-11 ENCOUNTER — OFFICE VISIT (OUTPATIENT)
Dept: INFECTIOUS DISEASES | Facility: CLINIC | Age: 65
End: 2020-08-11
Payer: COMMERCIAL

## 2020-08-11 ENCOUNTER — APPOINTMENT (OUTPATIENT)
Dept: LAB | Facility: HOSPITAL | Age: 65
End: 2020-08-11
Attending: INTERNAL MEDICINE
Payer: COMMERCIAL

## 2020-08-11 VITALS
DIASTOLIC BLOOD PRESSURE: 64 MMHG | TEMPERATURE: 98.1 F | WEIGHT: 143 LBS | SYSTOLIC BLOOD PRESSURE: 128 MMHG | BODY MASS INDEX: 20.52 KG/M2 | HEART RATE: 74 BPM

## 2020-08-11 DIAGNOSIS — Z93.4 JEJUNOSTOMY TUBE PRESENT (HCC): ICD-10-CM

## 2020-08-11 DIAGNOSIS — M86.18 OTHER ACUTE OSTEOMYELITIS, OTHER SITE (HCC): ICD-10-CM

## 2020-08-11 DIAGNOSIS — M46.20 PARASPINAL ABSCESS (HCC): Primary | ICD-10-CM

## 2020-08-11 DIAGNOSIS — Z85.01 PERSONAL HISTORY OF ESOPHAGEAL CANCER: ICD-10-CM

## 2020-08-11 DIAGNOSIS — Z95.828 PORT-A-CATH IN PLACE: Primary | ICD-10-CM

## 2020-08-11 DIAGNOSIS — Z01.818 PRE-OP TESTING: ICD-10-CM

## 2020-08-11 DIAGNOSIS — M46.20 PARASPINAL ABSCESS (HCC): ICD-10-CM

## 2020-08-11 DIAGNOSIS — Z95.828 PORT-A-CATH IN PLACE: ICD-10-CM

## 2020-08-11 LAB
ALBUMIN SERPL BCP-MCNC: 3.4 G/DL (ref 3.5–5)
ALP SERPL-CCNC: 63 U/L (ref 46–116)
ALT SERPL W P-5'-P-CCNC: 26 U/L (ref 12–78)
ANION GAP SERPL CALCULATED.3IONS-SCNC: 11 MMOL/L (ref 4–13)
AST SERPL W P-5'-P-CCNC: 18 U/L (ref 5–45)
BASOPHILS # BLD AUTO: 0.04 THOUSANDS/ΜL (ref 0–0.1)
BASOPHILS NFR BLD AUTO: 1 % (ref 0–1)
BILIRUB SERPL-MCNC: 1 MG/DL (ref 0.2–1)
BUN SERPL-MCNC: 27 MG/DL (ref 5–25)
CALCIUM SERPL-MCNC: 9.1 MG/DL (ref 8.3–10.1)
CHLORIDE SERPL-SCNC: 101 MMOL/L (ref 100–108)
CO2 SERPL-SCNC: 27 MMOL/L (ref 21–32)
CREAT SERPL-MCNC: 1.06 MG/DL (ref 0.6–1.3)
CRP SERPL QL: 12.9 MG/L
EOSINOPHIL # BLD AUTO: 0.12 THOUSAND/ΜL (ref 0–0.61)
EOSINOPHIL NFR BLD AUTO: 2 % (ref 0–6)
ERYTHROCYTE [DISTWIDTH] IN BLOOD BY AUTOMATED COUNT: 16.5 % (ref 11.6–15.1)
ERYTHROCYTE [SEDIMENTATION RATE] IN BLOOD: 43 MM/HOUR (ref 0–19)
GFR SERPL CREATININE-BSD FRML MDRD: 73 ML/MIN/1.73SQ M
GLUCOSE SERPL-MCNC: 83 MG/DL (ref 65–140)
HCT VFR BLD AUTO: 32.8 % (ref 36.5–49.3)
HGB BLD-MCNC: 10.6 G/DL (ref 12–17)
IMM GRANULOCYTES # BLD AUTO: 0.01 THOUSAND/UL (ref 0–0.2)
IMM GRANULOCYTES NFR BLD AUTO: 0 % (ref 0–2)
LYMPHOCYTES # BLD AUTO: 1.1 THOUSANDS/ΜL (ref 0.6–4.47)
LYMPHOCYTES NFR BLD AUTO: 20 % (ref 14–44)
MCH RBC QN AUTO: 28 PG (ref 26.8–34.3)
MCHC RBC AUTO-ENTMCNC: 32.3 G/DL (ref 31.4–37.4)
MCV RBC AUTO: 87 FL (ref 82–98)
MONOCYTES # BLD AUTO: 0.36 THOUSAND/ΜL (ref 0.17–1.22)
MONOCYTES NFR BLD AUTO: 7 % (ref 4–12)
NEUTROPHILS # BLD AUTO: 3.78 THOUSANDS/ΜL (ref 1.85–7.62)
NEUTS SEG NFR BLD AUTO: 70 % (ref 43–75)
NRBC BLD AUTO-RTO: 0 /100 WBCS
PLATELET # BLD AUTO: 259 THOUSANDS/UL (ref 149–390)
PMV BLD AUTO: 9.5 FL (ref 8.9–12.7)
POTASSIUM SERPL-SCNC: 4.2 MMOL/L (ref 3.5–5.3)
PROT SERPL-MCNC: 7.5 G/DL (ref 6.4–8.2)
RBC # BLD AUTO: 3.79 MILLION/UL (ref 3.88–5.62)
SODIUM SERPL-SCNC: 139 MMOL/L (ref 136–145)
WBC # BLD AUTO: 5.41 THOUSAND/UL (ref 4.31–10.16)

## 2020-08-11 PROCEDURE — 36415 COLL VENOUS BLD VENIPUNCTURE: CPT

## 2020-08-11 PROCEDURE — 80053 COMPREHEN METABOLIC PANEL: CPT

## 2020-08-11 PROCEDURE — 85652 RBC SED RATE AUTOMATED: CPT

## 2020-08-11 PROCEDURE — 1160F RVW MEDS BY RX/DR IN RCRD: CPT | Performed by: PHYSICIAN ASSISTANT

## 2020-08-11 PROCEDURE — 1036F TOBACCO NON-USER: CPT | Performed by: PHYSICIAN ASSISTANT

## 2020-08-11 PROCEDURE — 86140 C-REACTIVE PROTEIN: CPT

## 2020-08-11 PROCEDURE — 99213 OFFICE O/P EST LOW 20 MIN: CPT | Performed by: PHYSICIAN ASSISTANT

## 2020-08-11 PROCEDURE — U0003 INFECTIOUS AGENT DETECTION BY NUCLEIC ACID (DNA OR RNA); SEVERE ACUTE RESPIRATORY SYNDROME CORONAVIRUS 2 (SARS-COV-2) (CORONAVIRUS DISEASE [COVID-19]), AMPLIFIED PROBE TECHNIQUE, MAKING USE OF HIGH THROUGHPUT TECHNOLOGIES AS DESCRIBED BY CMS-2020-01-R: HCPCS

## 2020-08-11 PROCEDURE — 4040F PNEUMOC VAC/ADMIN/RCVD: CPT | Performed by: PHYSICIAN ASSISTANT

## 2020-08-11 PROCEDURE — 85025 COMPLETE CBC W/AUTO DIFF WBC: CPT

## 2020-08-11 PROCEDURE — 3044F HG A1C LEVEL LT 7.0%: CPT | Performed by: PHYSICIAN ASSISTANT

## 2020-08-11 PROCEDURE — 1111F DSCHRG MED/CURRENT MED MERGE: CPT | Performed by: PHYSICIAN ASSISTANT

## 2020-08-11 RX ORDER — EMPAGLIFLOZIN 10 MG/1
TABLET, FILM COATED ORAL
Refills: 0 | OUTPATIENT
Start: 2020-08-11

## 2020-08-11 NOTE — PROGRESS NOTES
Assessment/Plan:    Paraspinal abscess (HCC)  MRI T-spine shows  paraspinal abscess at T5-8 with persistent osteomyelitis at T3-8   No epidural abscess   Suspect secondary to contiguous spread in setting of GE junction carcinoma  Biopsy done consistent with osteomyelitis  Patient was seen by neurosurgery, no intervention planned at this time  He has completed a 6 week course of IV ceftriaxone with flagyl on 7/9/2020 and was transitioned to po augmentin  He still c/o back pain but his ESR and CRP are improving  Plan:   - continue augmentin as ordered for another 4-6 weeks at least  - CBC with diff, CMP, ESR and CRP in 4 weeks  - patient to have a follow up MRI of the Tspine and brain later this month with neurosurgery follow up   - RTO in 6 weeks  Diagnoses and all orders for this visit:    Paraspinal abscess [facility])    Personal history of esophageal cancer    Jejunostomy tube present ([facility] )    Port-A-Cath in place          Subjective:      Patient ID: Saúl Garrido is a 76 y o  male  HPI  30-year-old male with a history of GE junction carcinoma presents for office follow-up today regarding paraspinal abscess with vertebral osteomyelitis  Patient had an MRI of the T-spine which showed a paraspinal abscess at T5-8 with persistent osteomyelitis at T3 through 8  There is no epidural abscess noted  There was suspicion this was secondary to 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  Patient collection was in accessible percutaneously  A repeat CT chest on 07/03/2020 showing redemonstration of posterior mediastinal/prevertebral abscess extending from the level of T4-2 5 through T6-7  Patient was evaluated by Neurosurgery with no intervention plan  He did have a biopsy done which was showed possible osteomyelitis  Patient completed a 6 week course of IV ceftriaxone and Flagyl on 07/09/2020 and was then transitioned over to Augmentin p o  Until plateau/normalization of ESR and CRP  Patient is still complaining of back pains  Otherwise he states he is tolerating the Augmentin without difficulty  He denies any fevers chills rash or diarrhea at this time  The following portions of the patient's history were reviewed and updated as appropriate: allergies, current medications, past family history, past medical history, past social history, past surgical history and problem list     Review of Systems   Constitutional: Negative for chills and fever  Respiratory: Negative for cough and shortness of breath  Cardiovascular: Negative for chest pain  Gastrointestinal: Negative for abdominal pain, diarrhea, nausea and vomiting  Musculoskeletal: Positive for back pain  Skin: Negative for rash and wound  Neurological: Negative for weakness and numbness  Psychiatric/Behavioral: Negative for behavioral problems and confusion  Objective:      /64   Pulse 74   Temp 98 1 °F (36 7 °C)   Wt 64 9 kg (143 lb)   BMI 20 52 kg/m²          Physical Exam  Vitals signs and nursing note reviewed  Constitutional:       General: He is not in acute distress  Appearance: He is normal weight  He is not ill-appearing, toxic-appearing or diaphoretic  HENT:      Head: Normocephalic and atraumatic  Eyes:      General: No scleral icterus  Right eye: No discharge  Left eye: No discharge  Conjunctiva/sclera: Conjunctivae normal    Cardiovascular:      Rate and Rhythm: Normal rate and regular rhythm  Pulmonary:      Effort: Pulmonary effort is normal  No respiratory distress  Breath sounds: Normal breath sounds  No stridor  No wheezing, rhonchi or rales  Chest:      Chest wall: No tenderness  Abdominal:      General: Abdomen is flat  Bowel sounds are normal  There is no distension  Palpations: Abdomen is soft  Tenderness: There is no abdominal tenderness     Musculoskeletal:      Comments: Back nontender to palpation   Skin:     General: Skin is warm and dry  Findings: No erythema or rash  Comments: PORT nontender   Neurological:      General: No focal deficit present  Mental Status: He is alert and oriented to person, place, and time        Comments: Strength intact   Psychiatric:         Mood and Affect: Mood normal          Behavior: Behavior normal            Labs:   8/11/2020  Wbc: 5 41  Hgb: 10 6  Plt: 259  Cr: 1 06  LFTs: WNL  ESR: 43 (119)  CRP 12 9 (141 0)

## 2020-08-11 NOTE — ASSESSMENT & PLAN NOTE
MRI T-spine shows  paraspinal abscess at T5-8 with persistent osteomyelitis at T3-8   No epidural abscess   Suspect secondary to contiguous spread in setting of GE junction carcinoma  Biopsy done consistent with osteomyelitis  Patient was seen by neurosurgery, no intervention planned at this time  He has completed a 6 week course of IV ceftriaxone with flagyl on 7/9/2020 and was transitioned to po augmentin  He still c/o back pain but his ESR and CRP are improving  Plan:   - continue augmentin as ordered for another 4-6 weeks at least  - CBC with diff, CMP, ESR and CRP in 4 weeks  - patient to have a follow up MRI of the Tspine and brain later this month with neurosurgery follow up   - RTO in 6 weeks

## 2020-08-11 NOTE — PATIENT INSTRUCTIONS
Please continue your augmentin as ordered  Non-fasting labs are due in 4 weeks  Follow up in our office in 6 weeks as scheduled

## 2020-08-11 NOTE — PROGRESS NOTES
Pt presents for lab specimen collection via port a cath  Port accessed, labs drawn, saline locked and de accessed without difficulty  Declined AVS, no future appointments scheduled

## 2020-08-12 ENCOUNTER — TELEMEDICINE (OUTPATIENT)
Dept: CARDIAC SURGERY | Facility: CLINIC | Age: 65
End: 2020-08-12
Payer: COMMERCIAL

## 2020-08-12 DIAGNOSIS — K22.2 ESOPHAGEAL STRICTURE: Primary | ICD-10-CM

## 2020-08-12 LAB — SARS-COV-2 RNA SPEC QL NAA+PROBE: NOT DETECTED

## 2020-08-12 PROCEDURE — 3044F HG A1C LEVEL LT 7.0%: CPT | Performed by: THORACIC SURGERY (CARDIOTHORACIC VASCULAR SURGERY)

## 2020-08-12 PROCEDURE — 1160F RVW MEDS BY RX/DR IN RCRD: CPT | Performed by: THORACIC SURGERY (CARDIOTHORACIC VASCULAR SURGERY)

## 2020-08-12 PROCEDURE — 99213 OFFICE O/P EST LOW 20 MIN: CPT | Performed by: THORACIC SURGERY (CARDIOTHORACIC VASCULAR SURGERY)

## 2020-08-12 PROCEDURE — 1036F TOBACCO NON-USER: CPT | Performed by: THORACIC SURGERY (CARDIOTHORACIC VASCULAR SURGERY)

## 2020-08-12 PROCEDURE — 4040F PNEUMOC VAC/ADMIN/RCVD: CPT | Performed by: THORACIC SURGERY (CARDIOTHORACIC VASCULAR SURGERY)

## 2020-08-12 PROCEDURE — 1111F DSCHRG MED/CURRENT MED MERGE: CPT | Performed by: THORACIC SURGERY (CARDIOTHORACIC VASCULAR SURGERY)

## 2020-08-12 NOTE — LETTER
August 12, 2020     Veronica Baltazar, Deanna [address] [address]    Patient: Zana Olivo   YOB: 1955   Date of Visit: 8/12/2020       Dear Dr Lamont Schroeder: Thank you for referring Zana Olivo to me for evaluation  Below are my notes for this consultation  If you have questions, please do not hesitate to call me  I look forward to following your patient along with you  Sincerely,        Duane Hazel, MD        CC: Jaye Hill, DO Bynum Justice, MD Duane Hazel, MD  8/12/2020  1:26 PM  Sign when Signing Visit    Virtual Regular Visit      Assessment/Plan:    Problem List Items Addressed This Visit     None               Reason for visit is   Chief Complaint   Patient presents with    Virtual Regular Visit        Encounter provider Duane Hazel, MD    Provider located at [address] [place] [id number]  [phone]      Recent Visits  Date Type Provider Dept   08/10/20 Telephone Kavon Jeffery    08/10/20 Telephone Srikanth Ceron [facility]   Showing recent visits within past 7 days and meeting all other requirements     Today's Visits  Date Type Provider Dept   08/12/20 Telemedicine Duane Hazel, MD [facility]   Showing today's visits and meeting all other requirements     Future Appointments  No visits were found meeting these conditions  Showing future appointments within next 150 days and meeting all other requirements        The patient was identified by name and date of birth  Zana Olivo was informed that this is a telemedicine visit and that the visit is being conducted through [facility] and patient was informed that this is a secure, HIPAA-compliant platform  He agrees to proceed     My office door was closed  No one else was in the room    He acknowledged consent and understanding of privacy and security of the video platform  The patient has agreed to participate and understands they can discontinue the visit at any time  Patient is aware this is a billable service  Subjective  Laurence Tracy is a 76 y o  male with history of esophageal adenocarcinoma status post transhiatal esophagectomy on 35/10/5066 complicated by recurrent laryngeal nerve injury, anastomotic stricture, and posterior mediastinal collection now status post 7/27/20 EGD and dilation  Since that time he did okay eating for a few weeks  More recently has worsening problems with dysphagia  He has been losing weight  He can not tolerate boost occasionally and macaroni and cheese with sauce  He can tolerate liquids at this time  He denies any fevers or chills currently  His back pain is been stable but in no better  He saw Infectious Disease yesterday which kept him on his oral antibiotics due to an elevated ESR  He is scheduled to undergo an MRI of the brain in 2 weeks  HPI     Past Medical History:   Diagnosis Date    Bilateral pleural effusion     Brain lesion     COPD (chronic obstructive pulmonary disease) (HCC)     Diabetes mellitus (HCC)     Difficulty swallowing     Disease of thyroid gland     Edema     Esophageal mass     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    History of chemotherapy     History of transfusion     Malignant neoplasm of lower third of esophagus ([facility] ) 9/17/2019    Diagnosis: clinical stage [id number] esophageal carcinoma Procedure: EGD, transhiatal esophagectomy performed on 1/28/20 Pathology: esophagogastrectomy reveals microscopic focus of residual adenocarcinoma in muscularis propria measuring 0 7 cm, adjacent Duong's esophagus with associated atypia indeterminate for dysplasia  7 lymph nodes negative for carcinoma   Proximal and distal margins negative for    Paraspinal abscess (HCC)     PONV (postoperative nausea and vomiting)     Port-A-Cath in place     LCW    Sleep apnea     no CPAP    SOB (shortness of breath)        Past Surgical History:   Procedure Laterality Date    BACK SURGERY      x2    DISC REMOVAL      ESOPHAGECTOMY N/A 1/28/2020    Procedure: ESOPHAGECTOMY, TRANSHIATAL;  Surgeon: Jaylyn Givens MD;  [facility];  Service: Surgical Oncology    ESOPHAGOGASTRODUODENOSCOPY N/A 1/28/2020    Procedure: ESOPHAGOGASTRODUODENOSCOPY (EGD); Surgeon: Jaylyn Givens MD;  [facility];  Service: Surgical Oncology    FL GUIDED CENTRAL VENOUS ACCESS DEVICE INSERTION  9/26/2019    GASTROJEJUNOSTOMY W/ JEJUNOSTOMY TUBE N/A 9/26/2019    Procedure: INSERTION JEJUNOSTOMY TUBE OPEN;  Surgeon: Jaylyn Givens MD;  [facility];  Service: Surgical Oncology    GASTROJEJUNOSTOMY W/ JEJUNOSTOMY TUBE N/A 6/5/2020    Procedure: INSERTION JEJUNOSTOMY TUBE OPEN;  Surgeon: Jaylyn Givens MD;  [facility];  Service: Surgical Oncology    IR CHEST TUBE  5/26/2020    IR SPINE PROCEDURE  5/27/2020    IR THORACENTESIS  2/25/2020    CT EGD INSERT GUIDE WIRE DILATOR PASSAGE ESOPHAGUS N/A 7/7/2020    Procedure: ESOPHAGOGASTRODUODENOSCOPY (EGD) with esophageal dilation under fluro with biopsy;  Surgeon: Silvia Talley MD;  [facility];  Service: Thoracic    CT ESOPHAGOGASTRODUODENOSCOPY TRANSORAL DIAGNOSTIC N/A 4/28/2020    Procedure: ESOPHAGOGASTRODUODENOSCOPY (EGD); Surgeon: Silvia Talley MD;  [facility];  Service: Thoracic    CT ESOPHAGOGASTRODUODENOSCOPY TRANSORAL DIAGNOSTIC N/A 7/27/2020    Procedure: ESOPHAGOGASTRODUODENOSCOPY (EGD);   Surgeon: Silvia Talley MD;  [facility];  Service: Thoracic    CT ESOPHAGOSCOPY FLEX BALLOON DILAT <30 MM DIAM N/A 4/28/2020    Procedure: DILATATION ESOPHAGEAL;  Surgeon: Silvia Talley MD;  [facility];  Service: Thoracic    CT ESOPHAGOSCOPY FLEX BALLOON DILAT <30 MM DIAM N/A 7/27/2020    Procedure: DILATATION ESOPHAGEAL with Savary over the wire dilitation 33FR to 45FR; Surgeon: Judy Carter MD;  [facility];  Service: Thoracic    TONSILLECTOMY      TUNNELED VENOUS PORT PLACEMENT N/A 9/26/2019    Procedure: INSERTION VENOUS PORT (PORT-A-CATH);   Surgeon: Moises Schaffer MD;  [facility];  Service: Surgical Oncology    VOCAL CORD INJECTION N/A 2/7/2020    Procedure: MICROLARYNGOSCOPY WITH INJECTION;  Surgeon: Caryle Billings, MD;  [facility];  Service: ENT       Current Outpatient Medications   Medication Sig Dispense Refill    amiodarone 200 mg tablet Take 1 tablet (200 mg total) by mouth daily with breakfast 30 tablet 0    amoxicillin-clavulanate (AUGMENTIN) 400-57 mg/5 mL suspension Take 5 mL (400 mg total) by mouth 2 (two) times a day 100 mL 0    aspirin 81 mg chewable tablet Chew 1 tablet (81 mg total) daily 30 tablet 0    atorvastatin (LIPITOR) 40 mg tablet Take 1 tablet (40 mg total) by mouth daily with dinner 30 tablet 3    Blood Glucose Monitoring Suppl (ONE TOUCH ULTRA 2) w/Device KIT Check bs , q fasting and 2 hr after meals 1 each 0    canagliflozin (Invokana) 100 mg Take 1 tablet (100 mg total) by mouth daily before breakfast 30 tablet 3    cefdinir (OMNICEF) 300 mg capsule Take 1 capsule (300 mg total) by mouth every 12 (twelve) hours 60 capsule 0    glucose blood test strip Check blood sugar 3 times daily 100 each 3    Lancets (ONETOUCH ULTRASOFT) lancets Check bs , q fasting and 2 hr after meals 100 each 5    levothyroxine 25 mcg tablet Take 25 mcg by mouth daily in the early morning      metroNIDAZOLE (FLAGYL) 500 mg tablet Take 1 tablet (500 mg total) by mouth every 8 (eight) hours 90 tablet 0    ondansetron (ZOFRAN) 4 mg tablet Take 1 tablet (4 mg total) by mouth every 6 (six) hours as needed for nausea or vomiting 30 tablet 0    oxyCODONE (ROXICODONE) 5 mg/5 mL solution Take 10 mg (10 ML) every 4 hours as needed for moderate pain or 15 mg (15 ML) every 4 hours as needed for severe pain 500 mL 0    rOPINIRole (REQUIP) 0 25 mg tablet take 1 tablet by mouth at bedtime 30 tablet 0    traZODone (DESYREL) 100 mg tablet Take 1 tablet (100 mg total) by mouth daily at bedtime 30 tablet 3     No current facility-administered medications for this visit  Allergies   Allergen Reactions    Penicillins Itching       Review of Systems   Constitutional: Positive for activity change, appetite change, fatigue and unexpected weight change  Negative for chills, diaphoresis and fever  HENT: Positive for trouble swallowing and voice change  Eyes: Negative  Respiratory: Negative for apnea, cough, chest tightness, shortness of breath, wheezing and stridor  Cardiovascular: Negative for chest pain, palpitations and leg swelling  Gastrointestinal: Negative for abdominal distention, abdominal pain, blood in stool, constipation, diarrhea, nausea and vomiting  Endocrine: Negative  Genitourinary: Negative  Musculoskeletal: Positive for back pain  Skin: Negative  Allergic/Immunologic: Negative  Neurological: Negative  Hematological: Negative  Psychiatric/Behavioral: Negative  Video Exam    There were no vitals filed for this visit  Physical Exam  Constitutional:       Appearance: He is ill-appearing  Comments: Ill appearing male  Appears older than stated age   HENT:      Head:      Comments: Temporal wasting present  Nose: Nose normal    Eyes:      General: No scleral icterus  Extraocular Movements: Extraocular movements intact  Pupils: Pupils are equal, round, and reactive to light  Pulmonary:      Breath sounds: Normal breath sounds  Musculoskeletal: Normal range of motion  Skin:     General: Skin is warm and dry  Neurological:      Mental Status: He is oriented to person, place, and time  Psychiatric:         Mood and Affect: Mood normal          Behavior: Behavior normal          Thought Content:  Thought content normal          Judgment: Judgment normal        I reviewed his most recent CT imaging  There appears to be a persistent posterior mediastinal collection next to the midthoracic spine  There is evidence of diskitis/osteomyelitis with this  This is been stable  He is scheduled for repeat brain MRI in 2 weeks  Assessment - 14-year-old male with history of esophageal adenocarcinoma status post January 2020 transhiatal esophagectomy with an anastomotic stricture, posterior mediastinal collection and brain lesion concerning for metastatic disease    Plan  I had a long conversation with Diane Odom and his daughter about his current situation  He understands he has 3 major issues  1  He has ongoing dysphagia from an anastomotic stricture  There is no evidence of recurrent disease at this area based on recent EGD  We previously offered to place a stent here but he had refused  I have again urging him to reconsider a repeat EGD with dilation  The patient has defer this until after next MRI of the brain  In the interim I have asked him to continue to consume is main calories as possible  He will try any nutrition shakes that he can tolerate  If he loses weight further I have asked him to call us and to move up a potential EGD and dilation  2  We discussed the posterior mediastinal collection  He understands that this shows evidence of bony involvement and would require a large surgery such as thoracotomy with spinal debridement, intercostal muscle flap and hardware to potentially cure this  He understands as a big undertaking that he may not survive in his current condition  We will continue on prolonged antibiotics for now and defer a larger surgery at this time  3  Brain lesion concerning for metastatic disease  He is undergoing a repeat brain MRI in 2 weeks  Based on that we will have a better understanding whether this represents metastatic disease and determined direction/goals of care from there  We will touch base after his next brain MRI  In the interim he will still try and take as many calories as possible  He will call me with any ongoing weight loss and we would strongly recommended repeat EGD, dilation and stent placement    Gabino Gil MD           I spent 14 minutes directly with the patient during this visit      VIRTUAL VISIT DISCLAIMER    Willa López acknowledges that he has consented to an online visit or consultation  He understands that the online visit is based solely on information provided by him, and that, in the absence of a face-to-face physical evaluation by the physician, the diagnosis he receives is both limited and provisional in terms of accuracy and completeness  This is not intended to replace a full medical face-to-face evaluation by the physician  Willa López understands and accepts these terms

## 2020-08-12 NOTE — PROGRESS NOTES
Virtual Regular Visit      Assessment/Plan:    Problem List Items Addressed This Visit     None               Reason for visit is   Chief Complaint   Patient presents with    Virtual Regular Visit        Encounter provider Gilberto Kellogg MD    Provider located at [address] [place] [id number] [phone]      Recent Visits  Date Type Provider Dept   08/10/20 Telephone Shea Jeffery 50   08/10/20 Telephone 40 [facility] recent visits within past 7 days and meeting all other requirements     Today's Visits  Date Type Provider Dept   08/12/20 Telemedicine Gilberto Kellogg MD [facility]   Showing today's visits and meeting all other requirements     Future Appointments  No visits were found meeting these conditions  Showing future appointments within next 150 days and meeting all other requirements        The patient was identified by name and date of birth  Laurence Tracy was informed that this is a telemedicine visit and that the visit is being conducted through [facility] and patient was informed that this is a secure, HIPAA-compliant platform  He agrees to proceed     My office door was closed  No one else was in the room  He acknowledged consent and understanding of privacy and security of the video platform  The patient has agreed to participate and understands they can discontinue the visit at any time  Patient is aware this is a billable service  Subjective  Laurence Tracy is a 76 y o  male with history of esophageal adenocarcinoma status post transhiatal esophagectomy on 42/26/3732 complicated by recurrent laryngeal nerve injury, anastomotic stricture, and posterior mediastinal collection now status post 7/27/20 EGD and dilation  Since that time he did okay eating for a few weeks    More recently has worsening problems with dysphagia  He has been losing weight  He can not tolerate boost occasionally and macaroni and cheese with sauce  He can tolerate liquids at this time  He denies any fevers or chills currently  His back pain is been stable but in no better  He saw Infectious Disease yesterday which kept him on his oral antibiotics due to an elevated ESR  He is scheduled to undergo an MRI of the brain in 2 weeks  HPI     Past Medical History:   Diagnosis Date    Bilateral pleural effusion     Brain lesion     COPD (chronic obstructive pulmonary disease) (HCC)     Diabetes mellitus (HCC)     Difficulty swallowing     Disease of thyroid gland     Edema     Esophageal mass     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    History of chemotherapy     History of transfusion     Malignant neoplasm of lower third of esophagus ([facility] [address] ) 9/17/2019    Diagnosis: clinical stage T3N1M0 esophageal carcinoma Procedure: EGD, transhiatal esophagectomy performed on 1/28/20 Pathology: esophagogastrectomy reveals microscopic focus of residual adenocarcinoma in muscularis propria measuring 0 7 cm, adjacent Duong's esophagus with associated atypia indeterminate for dysplasia  7 lymph nodes negative for carcinoma  Proximal and distal margins negative for    Paraspinal abscess (HCC)     PONV (postoperative nausea and vomiting)     Port-A-Cath in place     LCW    Sleep apnea     no CPAP    SOB (shortness of breath)        Past Surgical History:   Procedure Laterality Date    BACK SURGERY      x2    DISC REMOVAL      ESOPHAGECTOMY N/A 1/28/2020    Procedure: ESOPHAGECTOMY, TRANSHIATAL;  Surgeon: Maryuri Serra MD;  [facility];  Service: Surgical Oncology    ESOPHAGOGASTRODUODENOSCOPY N/A 1/28/2020    Procedure: ESOPHAGOGASTRODUODENOSCOPY (EGD);   Surgeon: Maryuri Serra MD;  [facility];  Service: Surgical Oncology    FL GUIDED CENTRAL VENOUS ACCESS DEVICE INSERTION  9/26/2019    GASTROJEJUNOSTOMY W/ JEJUNOSTOMY TUBE N/A 9/26/2019    Procedure: INSERTION JEJUNOSTOMY TUBE OPEN;  Surgeon: Michel Koroma MD;  [facility];  Service: Surgical Oncology    GASTROJEJUNOSTOMY W/ JEJUNOSTOMY TUBE N/A 6/5/2020    Procedure: INSERTION JEJUNOSTOMY TUBE OPEN;  Surgeon: Michel Koroma MD;  [facility];  Service: Surgical Oncology    IR CHEST TUBE  5/26/2020    IR SPINE PROCEDURE  5/27/2020    IR THORACENTESIS  2/25/2020    TN EGD INSERT GUIDE WIRE DILATOR PASSAGE ESOPHAGUS N/A 7/7/2020    Procedure: ESOPHAGOGASTRODUODENOSCOPY (EGD) with esophageal dilation under fluro with biopsy;  Surgeon: Duane Hazel, MD;  [facility];  Service: Thoracic    TN ESOPHAGOGASTRODUODENOSCOPY TRANSORAL DIAGNOSTIC N/A 4/28/2020    Procedure: ESOPHAGOGASTRODUODENOSCOPY (EGD); Surgeon: Duane Hazel, MD;  [facility];  Service: Thoracic    TN ESOPHAGOGASTRODUODENOSCOPY TRANSORAL DIAGNOSTIC N/A 7/27/2020    Procedure: ESOPHAGOGASTRODUODENOSCOPY (EGD); Surgeon: Duane Hazel, MD;  [facility];  Service: Thoracic    TN ESOPHAGOSCOPY FLEX BALLOON DILAT <30 MM DIAM N/A 4/28/2020    Procedure: DILATATION ESOPHAGEAL;  Surgeon: Duane Hazel, MD;  [facility];  Service: Thoracic    TN ESOPHAGOSCOPY FLEX BALLOON DILAT <30 MM DIAM N/A 7/27/2020    Procedure: DILATATION ESOPHAGEAL with Savary over the wire dilitation 33FR to 45FR; Surgeon: Duane Hazel, MD;  [facility];  Service: Thoracic    TONSILLECTOMY      TUNNELED VENOUS PORT PLACEMENT N/A 9/26/2019    Procedure: INSERTION VENOUS PORT (PORT-A-CATH);   Surgeon: Michel Koroma MD;  [facility];  Service: Surgical Oncology    VOCAL CORD INJECTION N/A 2/7/2020    Procedure: MICROLARYNGOSCOPY WITH INJECTION;  Surgeon: Sharon Chi MD;  [facility];  Service: ENT       Current Outpatient Medications   Medication Sig Dispense Refill    amiodarone 200 mg tablet Take 1 tablet (200 mg total) by mouth daily with breakfast 30 tablet 0    amoxicillin-clavulanate (AUGMENTIN) 400-57 mg/5 mL suspension Take 5 mL (400 mg total) by mouth 2 (two) times a day 100 mL 0    aspirin 81 mg chewable tablet Chew 1 tablet (81 mg total) daily 30 tablet 0    atorvastatin (LIPITOR) 40 mg tablet Take 1 tablet (40 mg total) by mouth daily with dinner 30 tablet 3    Blood Glucose Monitoring Suppl (ONE TOUCH ULTRA 2) w/Device KIT Check bs , q fasting and 2 hr after meals 1 each 0    canagliflozin (Invokana) 100 mg Take 1 tablet (100 mg total) by mouth daily before breakfast 30 tablet 3    cefdinir (OMNICEF) 300 mg capsule Take 1 capsule (300 mg total) by mouth every 12 (twelve) hours 60 capsule 0    glucose blood test strip Check blood sugar 3 times daily 100 each 3    Lancets (ONETOUCH ULTRASOFT) lancets Check bs , q fasting and 2 hr after meals 100 each 5    levothyroxine 25 mcg tablet Take 25 mcg by mouth daily in the early morning      metroNIDAZOLE (FLAGYL) 500 mg tablet Take 1 tablet (500 mg total) by mouth every 8 (eight) hours 90 tablet 0    ondansetron (ZOFRAN) 4 mg tablet Take 1 tablet (4 mg total) by mouth every 6 (six) hours as needed for nausea or vomiting 30 tablet 0    oxyCODONE (ROXICODONE) 5 mg/5 mL solution Take 10 mg (10 ML) every 4 hours as needed for moderate pain or 15 mg (15 ML) every 4 hours as needed for severe pain 500 mL 0    rOPINIRole (REQUIP) 0 25 mg tablet take 1 tablet by mouth at bedtime 30 tablet 0    traZODone (DESYREL) 100 mg tablet Take 1 tablet (100 mg total) by mouth daily at bedtime 30 tablet 3     No current facility-administered medications for this visit  Allergies   Allergen Reactions    Penicillins Itching       Review of Systems   Constitutional: Positive for activity change, appetite change, fatigue and unexpected weight change  Negative for chills, diaphoresis and fever  HENT: Positive for trouble swallowing and voice change  Eyes: Negative  Respiratory: Negative for apnea, cough, chest tightness, shortness of breath, wheezing and stridor  Cardiovascular: Negative for chest pain, palpitations and leg swelling  Gastrointestinal: Negative for abdominal distention, abdominal pain, blood in stool, constipation, diarrhea, nausea and vomiting  Endocrine: Negative  Genitourinary: Negative  Musculoskeletal: Positive for back pain  Skin: Negative  Allergic/Immunologic: Negative  Neurological: Negative  Hematological: Negative  Psychiatric/Behavioral: Negative  Video Exam    There were no vitals filed for this visit  Physical Exam  Constitutional:       Appearance: He is ill-appearing  Comments: Ill appearing male  Appears older than stated age   HENT:      Head:      Comments: Temporal wasting present  Nose: Nose normal    Eyes:      General: No scleral icterus  Extraocular Movements: Extraocular movements intact  Pupils: Pupils are equal, round, and reactive to light  Pulmonary:      Breath sounds: Normal breath sounds  Musculoskeletal: Normal range of motion  Skin:     General: Skin is warm and dry  Neurological:      Mental Status: He is oriented to person, place, and time  Psychiatric:         Mood and Affect: Mood normal          Behavior: Behavior normal          Thought Content: Thought content normal          Judgment: Judgment normal        I reviewed his most recent CT imaging  There appears to be a persistent posterior mediastinal collection next to the midthoracic spine  There is evidence of diskitis/osteomyelitis with this  This is been stable  He is scheduled for repeat brain MRI in 2 weeks      Assessment - 58-year-old male with history of esophageal adenocarcinoma status post January 2020 transhiatal esophagectomy with an anastomotic stricture, posterior mediastinal collection and brain lesion concerning for metastatic disease    Plan  I had a long conversation with Fabiano Miranda and his daughter about his current situation  He understands he has 3 major issues  1  He has ongoing dysphagia from an anastomotic stricture  There is no evidence of recurrent disease at this area based on recent EGD  We previously offered to place a stent here but he had refused  I have again urging him to reconsider a repeat EGD with dilation  The patient has defer this until after next MRI of the brain  In the interim I have asked him to continue to consume is main calories as possible  He will try any nutrition shakes that he can tolerate  If he loses weight further I have asked him to call us and to move up a potential EGD and dilation  2  We discussed the posterior mediastinal collection  He understands that this shows evidence of bony involvement and would require a large surgery such as thoracotomy with spinal debridement, intercostal muscle flap and hardware to potentially cure this  He understands as a big undertaking that he may not survive in his current condition  We will continue on prolonged antibiotics for now and defer a larger surgery at this time  3  Brain lesion concerning for metastatic disease  He is undergoing a repeat brain MRI in 2 weeks  Based on that we will have a better understanding whether this represents metastatic disease and determined direction/goals of care from there  We will touch base after his next brain MRI  In the interim he will still try and take as many calories as possible  He will call me with any ongoing weight loss and we would strongly recommended repeat EGD, dilation and stent placement    Dion Mahoney MD           I spent 14 minutes directly with the patient during this visit      VIRTUAL VISIT DISCLAIMER    Sheeba Kidd acknowledges that he has consented to an online visit or consultation   He understands that the online visit is based solely on information provided by him, and that, in the absence of a face-to-face physical evaluation by the physician, the diagnosis he receives is both limited and provisional in terms of accuracy and completeness  This is not intended to replace a full medical face-to-face evaluation by the physician  Mahi Diaz understands and accepts these terms

## 2020-08-12 NOTE — LETTER
August 12, 2020     Shad Monet, [address] [address]    Patient: Magalys Randhawa   YOB: 1955   Date of Visit: 8/12/2020       Dear Dr Ta Kidd: Thank you for referring Magalys Randhawa to me for evaluation  Below are my notes for this consultation  If you have questions, please do not hesitate to call me  I look forward to following your patient along with you  Sincerely,        Mandy Copeland MD        CC: DO Pallavi Grijalva MD Rande Costa, MD  8/12/2020  1:25 PM  Sign when Signing Visit    Virtual Regular Visit      Assessment/Plan:    Problem List Items Addressed This Visit     None               Reason for visit is   Chief Complaint   Patient presents with    Virtual Regular Visit        Encounter provider Mandy Copeland MD    Provider located at [address] [place] [id number]  [phone]      Recent Visits  Date Type Provider Dept   08/10/20 Telephone [facility]   08/10/20 Telephone Keena Novoa [facility]   Showing recent visits within past 7 days and meeting all other requirements     Today's Visits  Date Type Provider Dept   08/12/20 Telemedicine Mandy Copeland MD [facility]   Showing today's visits and meeting all other requirements     Future Appointments  No visits were found meeting these conditions  Showing future appointments within next 150 days and meeting all other requirements        The patient was identified by name and date of birth  Magalys Randhawa was informed that this is a telemedicine visit and that the visit is being conducted through [facility] and patient was informed that this is a secure, HIPAA-compliant platform  He agrees to proceed     My office door was closed  No one else was in the room    He acknowledged consent and understanding of privacy and security of the video platform  The patient has agreed to participate and understands they can discontinue the visit at any time  Patient is aware this is a billable service  Subjective  Elmer Gandara is a 76 y o  male with history of esophageal adenocarcinoma status post transhiatal esophagectomy on 34/97/0112 complicated by recurrent laryngeal nerve injury, anastomotic stricture, and posterior mediastinal collection now status post 7/27/20 EGD and dilation  Since that time he did okay eating for a few weeks  More recently has worsening problems with dysphagia  He has been losing weight  He can not tolerate boost occasionally and macaroni and cheese with sauce  He can tolerate liquids at this time  He denies any fevers or chills currently  His back pain is been stable but in no better  He saw Infectious Disease yesterday which kept him on his oral antibiotics due to an elevated ESR  He is scheduled to undergo an MRI of the brain in 2 weeks  HPI     Past Medical History:   Diagnosis Date    Bilateral pleural effusion     Brain lesion     COPD (chronic obstructive pulmonary disease) (HCC)     Diabetes mellitus (HCC)     Difficulty swallowing     Disease of thyroid gland     Edema     Esophageal mass     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    History of chemotherapy     History of transfusion     Malignant neoplasm of lower third of esophagus ([facility] [address] ) 9/17/2019    Diagnosis: clinical stage T3N1M0 esophageal carcinoma Procedure: EGD, transhiatal esophagectomy performed on 1/28/20 Pathology: esophagogastrectomy reveals microscopic focus of residual adenocarcinoma in muscularis propria measuring 0 7 cm, adjacent Duong's esophagus with associated atypia indeterminate for dysplasia  7 lymph nodes negative for carcinoma   Proximal and distal margins negative for    Paraspinal abscess (HCC)     PONV (postoperative nausea and vomiting)     Port-A-Cath in place     LCW    Sleep apnea     no CPAP    SOB (shortness of breath)        Past Surgical History:   Procedure Laterality Date    BACK SURGERY      x2    DISC REMOVAL      ESOPHAGECTOMY N/A 1/28/2020    Procedure: ESOPHAGECTOMY, TRANSHIATAL;  Surgeon: Hardeep Peacock MD;  [facility];  Service: Surgical Oncology    ESOPHAGOGASTRODUODENOSCOPY N/A 1/28/2020    Procedure: ESOPHAGOGASTRODUODENOSCOPY (EGD); Surgeon: Hardeep Peacock MD;  [facility];  Service: Surgical Oncology    FL GUIDED CENTRAL VENOUS ACCESS DEVICE INSERTION  9/26/2019    GASTROJEJUNOSTOMY W/ JEJUNOSTOMY TUBE N/A 9/26/2019    Procedure: INSERTION JEJUNOSTOMY TUBE OPEN;  Surgeon: Hardeep Peacock MD;  [facility];  Service: Surgical Oncology    GASTROJEJUNOSTOMY W/ JEJUNOSTOMY TUBE N/A 6/5/2020    Procedure: INSERTION JEJUNOSTOMY TUBE OPEN;  Surgeon: Hardeep Peacock MD;  [facility];  Service: Surgical Oncology    IR CHEST TUBE  5/26/2020    IR SPINE PROCEDURE  5/27/2020    IR THORACENTESIS  2/25/2020    MD EGD INSERT GUIDE WIRE DILATOR PASSAGE ESOPHAGUS N/A 7/7/2020    Procedure: ESOPHAGOGASTRODUODENOSCOPY (EGD) with esophageal dilation under fluro with biopsy;  Surgeon: Yas Nagy MD;  [facility];  Service: Thoracic    MD ESOPHAGOGASTRODUODENOSCOPY TRANSORAL DIAGNOSTIC N/A 4/28/2020    Procedure: ESOPHAGOGASTRODUODENOSCOPY (EGD); Surgeon: Yas Nagy MD;  [facility];  Service: Thoracic    MD ESOPHAGOGASTRODUODENOSCOPY TRANSORAL DIAGNOSTIC N/A 7/27/2020    Procedure: ESOPHAGOGASTRODUODENOSCOPY (EGD);   Surgeon: Yas Nagy MD;  [facility];  Service: Thoracic    MD ESOPHAGOSCOPY FLEX BALLOON DILAT <30 MM DIAM N/A 4/28/2020    Procedure: DILATATION ESOPHAGEAL;  Surgeon: Yas Nagy MD;  [facility];  Service: Thoracic    MD ESOPHAGOSCOPY FLEX BALLOON DILAT <30 MM DIAM N/A 7/27/2020    Procedure: DILATATION ESOPHAGEAL with Savary over the wire dilitation 33FR to 45FR; Surgeon: Sally Dubois MD;  [facility];  Service: Thoracic    TONSILLECTOMY      TUNNELED VENOUS PORT PLACEMENT N/A 9/26/2019    Procedure: INSERTION VENOUS PORT (PORT-A-CATH);   Surgeon: Live Garcia MD;  [facility];  Service: Surgical Oncology    VOCAL CORD INJECTION N/A 2/7/2020    Procedure: MICROLARYNGOSCOPY WITH INJECTION;  Surgeon: Antony Grant MD;  [facility];  Service: ENT       Current Outpatient Medications   Medication Sig Dispense Refill    amiodarone 200 mg tablet Take 1 tablet (200 mg total) by mouth daily with breakfast 30 tablet 0    amoxicillin-clavulanate (AUGMENTIN) 400-57 mg/5 mL suspension Take 5 mL (400 mg total) by mouth 2 (two) times a day 100 mL 0    aspirin 81 mg chewable tablet Chew 1 tablet (81 mg total) daily 30 tablet 0    atorvastatin (LIPITOR) 40 mg tablet Take 1 tablet (40 mg total) by mouth daily with dinner 30 tablet 3    Blood Glucose Monitoring Suppl (ONE TOUCH ULTRA 2) w/Device KIT Check bs , q fasting and 2 hr after meals 1 each 0    canagliflozin (Invokana) 100 mg Take 1 tablet (100 mg total) by mouth daily before breakfast 30 tablet 3    cefdinir (OMNICEF) 300 mg capsule Take 1 capsule (300 mg total) by mouth every 12 (twelve) hours 60 capsule 0    glucose blood test strip Check blood sugar 3 times daily 100 each 3    Lancets (ONETOUCH ULTRASOFT) lancets Check bs , q fasting and 2 hr after meals 100 each 5    levothyroxine 25 mcg tablet Take 25 mcg by mouth daily in the early morning      metroNIDAZOLE (FLAGYL) 500 mg tablet Take 1 tablet (500 mg total) by mouth every 8 (eight) hours 90 tablet 0    ondansetron (ZOFRAN) 4 mg tablet Take 1 tablet (4 mg total) by mouth every 6 (six) hours as needed for nausea or vomiting 30 tablet 0    oxyCODONE (ROXICODONE) 5 mg/5 mL solution Take 10 mg (10 ML) every 4 hours as needed for moderate pain or 15 mg (15 ML) every 4 hours as needed for severe pain 500 mL 0    rOPINIRole (REQUIP) 0 25 mg tablet take 1 tablet by mouth at bedtime 30 tablet 0    traZODone (DESYREL) 100 mg tablet Take 1 tablet (100 mg total) by mouth daily at bedtime 30 tablet 3     No current facility-administered medications for this visit  Allergies   Allergen Reactions    Penicillins Itching       Review of Systems   Constitutional: Positive for activity change, appetite change, fatigue and unexpected weight change  Negative for chills, diaphoresis and fever  HENT: Positive for trouble swallowing and voice change  Eyes: Negative  Respiratory: Negative for apnea, cough, chest tightness, shortness of breath, wheezing and stridor  Cardiovascular: Negative for chest pain, palpitations and leg swelling  Gastrointestinal: Negative for abdominal distention, abdominal pain, blood in stool, constipation, diarrhea, nausea and vomiting  Endocrine: Negative  Genitourinary: Negative  Musculoskeletal: Positive for back pain  Skin: Negative  Allergic/Immunologic: Negative  Neurological: Negative  Hematological: Negative  Psychiatric/Behavioral: Negative  Video Exam    There were no vitals filed for this visit  Physical Exam  Constitutional:       Appearance: He is ill-appearing  Comments: Ill appearing male  Appears older than stated age   HENT:      Head:      Comments: Temporal wasting present  Nose: Nose normal    Eyes:      General: No scleral icterus  Extraocular Movements: Extraocular movements intact  Pupils: Pupils are equal, round, and reactive to light  Pulmonary:      Breath sounds: Normal breath sounds  Musculoskeletal: Normal range of motion  Skin:     General: Skin is warm and dry  Neurological:      Mental Status: He is oriented to person, place, and time  Psychiatric:         Mood and Affect: Mood normal          Behavior: Behavior normal          Thought Content:  Thought content normal          Judgment: Judgment normal        I reviewed his most recent CT imaging  There appears to be a persistent posterior mediastinal collection next to the midthoracic spine  There is evidence of diskitis/osteomyelitis with this  This is been stable  He is scheduled for repeat brain MRI in 2 weeks  Assessment - 50-year-old male with history of esophageal adenocarcinoma status post January 2020 transhiatal esophagectomy with an anastomotic stricture, posterior mediastinal collection and brain lesion concerning for metastatic disease    Plan  I had a long conversation with Chery Lentz and his daughter about his current situation  He understands he has 3 major issues  1  He has ongoing dysphagia from an anastomotic stricture  There is no evidence of recurrent disease at this area based on recent EGD  We previously offered to place a stent here but he had refused  I have again urging him to reconsider a repeat EGD with dilation  The patient has defer this until after next MRI of the brain  In the interim I have asked him to continue to consume is main calories as possible  He will try any nutrition shakes that he can tolerate  If he loses weight further I have asked him to call us and to move up a potential EGD and dilation  2  We discussed the posterior mediastinal collection  He understands that this shows evidence of bony involvement and would require a large surgery such as thoracotomy with spinal debridement, intercostal muscle flap and hardware to potentially cure this  He understands as a big undertaking that he may not survive in his current condition  We will continue on prolonged antibiotics for now and defer a larger surgery at this time  3  Brain lesion concerning for metastatic disease  He is undergoing a repeat brain MRI in 2 weeks  Based on that we will have a better understanding whether this represents metastatic disease and determined direction/goals of care from there  We will touch base after his next brain MRI  In the interim he will still try and take as many calories as possible  He will call me with any ongoing weight loss and we would strongly recommended repeat EGD, dilation and stent placement    Jeannette Toribio MD           {covid time spent:87229}      VIRTUAL VISIT DISCLAIMER    Eula Jacobsona acknowledges that he has consented to an online visit or consultation  He understands that the online visit is based solely on information provided by him, and that, in the absence of a face-to-face physical evaluation by the physician, the diagnosis he receives is both limited and provisional in terms of accuracy and completeness  This is not intended to replace a full medical face-to-face evaluation by the physician  Eula Poe understands and accepts these terms

## 2020-08-17 DIAGNOSIS — M46.20 PARASPINAL ABSCESS (HCC): ICD-10-CM

## 2020-08-18 RX ORDER — AMOXICILLIN AND CLAVULANATE POTASSIUM 400; 57 MG/5ML; MG/5ML
400 POWDER, FOR SUSPENSION ORAL 2 TIMES DAILY
Qty: 100 ML | Refills: 0 | Status: SHIPPED | OUTPATIENT
Start: 2020-08-18 | End: 2020-09-04

## 2020-08-20 ENCOUNTER — TELEPHONE (OUTPATIENT)
Dept: PALLIATIVE MEDICINE | Facility: CLINIC | Age: 65
End: 2020-08-20

## 2020-08-20 DIAGNOSIS — G89.3 CANCER-RELATED PAIN: Primary | ICD-10-CM

## 2020-08-20 RX ORDER — OXYCODONE HYDROCHLORIDE 10 MG/1
10 TABLET ORAL EVERY 4 HOURS PRN
Qty: 90 TABLET | Refills: 0 | Status: SHIPPED | OUTPATIENT
Start: 2020-08-20 | End: 2020-09-04 | Stop reason: SDUPTHER

## 2020-08-20 NOTE — TELEPHONE ENCOUNTER
Phone call made to patients pharmacy liquid forms of opiods can only be ordered by certain pharmacists and that will be next week     Phone call made to patients daughter and per daughter all forms of medications are given through his tube     Phone call made to cassie per pharmacist oxycodone tablet is crushable     Phone jamal made to daughter to inform that oxycodone tablets are mor readily available and can be crushed and put through tube she verbalized understanding

## 2020-08-24 ENCOUNTER — TELEPHONE (OUTPATIENT)
Dept: CARDIAC SURGERY | Facility: CLINIC | Age: 65
End: 2020-08-24

## 2020-08-26 ENCOUNTER — OFFICE VISIT (OUTPATIENT)
Dept: CARDIAC SURGERY | Facility: CLINIC | Age: 65
End: 2020-08-26
Payer: COMMERCIAL

## 2020-08-26 ENCOUNTER — HOSPITAL ENCOUNTER (OUTPATIENT)
Dept: MRI IMAGING | Facility: HOSPITAL | Age: 65
Discharge: HOME | End: 2020-08-26

## 2020-08-26 VITALS
SYSTOLIC BLOOD PRESSURE: 109 MMHG | HEIGHT: 70 IN | WEIGHT: 143.4 LBS | BODY MASS INDEX: 20.53 KG/M2 | DIASTOLIC BLOOD PRESSURE: 62 MMHG | TEMPERATURE: 94.2 F | OXYGEN SATURATION: 96 % | HEART RATE: 77 BPM

## 2020-08-26 DIAGNOSIS — G93.9 BRAIN LESION: ICD-10-CM

## 2020-08-26 DIAGNOSIS — S22.000A COMPRESSION FRACTURE OF BODY OF THORACIC VERTEBRA (HCC): ICD-10-CM

## 2020-08-26 DIAGNOSIS — K22.2 ESOPHAGEAL STRICTURE: Primary | ICD-10-CM

## 2020-08-26 PROCEDURE — 1111F DSCHRG MED/CURRENT MED MERGE: CPT | Performed by: PHYSICIAN ASSISTANT

## 2020-08-26 PROCEDURE — 1160F RVW MEDS BY RX/DR IN RCRD: CPT | Performed by: PHYSICIAN ASSISTANT

## 2020-08-26 PROCEDURE — 1036F TOBACCO NON-USER: CPT | Performed by: PHYSICIAN ASSISTANT

## 2020-08-26 PROCEDURE — 3008F BODY MASS INDEX DOCD: CPT | Performed by: PHYSICIAN ASSISTANT

## 2020-08-26 PROCEDURE — 99213 OFFICE O/P EST LOW 20 MIN: CPT | Performed by: PHYSICIAN ASSISTANT

## 2020-08-26 PROCEDURE — 4040F PNEUMOC VAC/ADMIN/RCVD: CPT | Performed by: PHYSICIAN ASSISTANT

## 2020-08-26 PROCEDURE — 3044F HG A1C LEVEL LT 7.0%: CPT | Performed by: PHYSICIAN ASSISTANT

## 2020-08-26 NOTE — ASSESSMENT & PLAN NOTE
Mr Devora Boeck presents to discuss EGD, dilation and stent placement  He continues to have dysphagia with any solid foods  He is tolerating liquids and nightly tube feeds, though he forgets to set up the tube feeds at night sometimes  This procedure was fully described  Stent would remain for 10-12 weeks  Risks and benefits discussed  He does not need lab work, and we will proceed with 8/31  Consent obtained

## 2020-08-26 NOTE — PROGRESS NOTES
Thoracic Follow-Up  Assessment/Plan:    Esophageal stricture  Mr Mery Ngo presents to discuss EGD, dilation and stent placement  He continues to have dysphagia with any solid foods  He is tolerating liquids and nightly tube feeds, though he forgets to set up the tube feeds at night sometimes  This procedure was fully described  Stent would remain for 10-12 weeks  Risks and benefits discussed  He does not need lab work, and we will proceed with 8/31  Consent obtained  Diagnoses and all orders for this visit:    Esophageal stricture  -     Case request operating room: ESOPHAGOGASTRODUODENOSCOPY (EGD) dilation over guidewire, stent; Standing  -     Case request operating room: ESOPHAGOGASTRODUODENOSCOPY (EGD) dilation over guidewire, stent    Other orders  -     Diet NPO; Sips with meds; Standing  -     Void on call to OR; Standing  -     Insert peripheral IV; Standing  -     Place sequential compression device; Standing          Thoracic History   Diagnosis: Clinical stage T3N1M0 esophageal carcinoma  Procedure: 1   EGD, transhiatal esophagectomy performed on 1/28/20 2  EGD with dilation up to 42F performed on 4/28/20  Pathology: esophagogastrectomy reveals microscopic focus of residual adenocarcinoma in muscularis propria measuring 0 7 cm, adjacent Duong's esophagus with associated atypia indeterminate for dysplasia  7 lymph nodes negative for carcinoma  Proximal and distal margins negative for carcinoma  8th edition AJCC tumor stage I (ypT2, ypN0)  Therapy:  Neoadjuvant FLOT x 6 chemotherapy and Herceptin, completed 12/17/19         Subjective:    Patient ID: Mahi Diaz is a 76 y o  male  HPI  Mr Mery Ngo is a 76year old man with history of esophageal adenocarcinoma status post transhiatal esophagectomy on 51/45/1332 complicated by recurrent laryngeal nerve injury, anastomotic stricture, and posterior mediastinal collection now status post 7/27/20 EGD and dilation to 45Fr   He was last evaluated via telemedicine by Dr Joseph Macario 8/12/20 at which time he did well initially after dilation, but had begun to develop worsening dysphagia at that time  He continues to take antibiotics for the posterior mediastinal collection  Brain MRI is scheduled for today  He returns to the office today to discuss possibility of esophageal stent placement  On discussion, he reports continued solid food dysphagia  He tried scrambled eggs which caused him issues  He mostly drinks Boost and liquefied meals  He lost 8 pounds over the past month  The following portions of the patient's history were reviewed and updated as appropriate: allergies, current medications, past family history, past medical history, past social history, past surgical history and problem list   Past Medical History:   Diagnosis Date    Bilateral pleural effusion     Brain lesion     [facility]     Diabetes mellitus [facility]     Difficulty swallowing     Disease of thyroid gland     Edema     Esophageal mass     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    History of chemotherapy     History of transfusion     Malignant neoplasm of lower third of esophagus ([facility] ) 9/17/2019    Diagnosis: clinical stage T3N1M0 esophageal carcinoma Procedure: EGD, transhiatal esophagectomy performed on 1/28/20 Pathology: esophagogastrectomy reveals microscopic focus of residual adenocarcinoma in muscularis propria measuring 0 7 cm, adjacent Duong's esophagus with associated atypia indeterminate for dysplasia  7 lymph nodes negative for carcinoma   Proximal and distal margins negative for    Paraspinal abscess (HCC)     PONV (postoperative nausea and vomiting)     Port-A-Cath in place     LCW    Sleep apnea     no CPAP    SOB (shortness of breath)      Past Surgical History:   Procedure Laterality Date    BACK SURGERY      x2    DISC REMOVAL      ESOPHAGECTOMY N/A 1/28/2020    Procedure: ESOPHAGECTOMY, TRANSHIATAL; Surgeon: Wiliam Florentino MD;  [facility];  Service: Surgical Oncology    ESOPHAGOGASTRODUODENOSCOPY N/A 1/28/2020    Procedure: ESOPHAGOGASTRODUODENOSCOPY (EGD); Surgeon: Wiliam Florentino MD;  [facility];  Service: Surgical Oncology    FL GUIDED CENTRAL VENOUS ACCESS DEVICE INSERTION  9/26/2019    GASTROJEJUNOSTOMY W/ JEJUNOSTOMY TUBE N/A 9/26/2019    Procedure: INSERTION JEJUNOSTOMY TUBE OPEN;  Surgeon: Wiliam Florentino MD;  [facility];  Service: Surgical Oncology    GASTROJEJUNOSTOMY W/ JEJUNOSTOMY TUBE N/A 6/5/2020    Procedure: INSERTION JEJUNOSTOMY TUBE OPEN;  Surgeon: Wiliam Florentino MD;  [facility];  Service: Surgical Oncology    IR CHEST TUBE PLACEMENT  5/26/2020    IR SPINE PROCEDURE  5/27/2020    IR THORACENTESIS  2/25/2020    VT EGD INSERT GUIDE WIRE DILATOR PASSAGE ESOPHAGUS N/A 7/7/2020    Procedure: ESOPHAGOGASTRODUODENOSCOPY (EGD) with esophageal dilation under fluro with biopsy;  Surgeon: Jeannette Toribio MD;  [facility];  Service: Thoracic    VT ESOPHAGOGASTRODUODENOSCOPY TRANSORAL DIAGNOSTIC N/A 4/28/2020    Procedure: ESOPHAGOGASTRODUODENOSCOPY (EGD); Surgeon: Jeannette Toribio MD;  [facility];  Service: Thoracic    VT ESOPHAGOGASTRODUODENOSCOPY TRANSORAL DIAGNOSTIC N/A 7/27/2020    Procedure: ESOPHAGOGASTRODUODENOSCOPY (EGD); Surgeon: Jeannette Toribio MD;  [facility];  Service: Thoracic    VT ESOPHAGOSCOPY FLEX BALLOON DILAT <30 MM DIAM N/A 4/28/2020    Procedure: DILATATION ESOPHAGEAL;  Surgeon: Jeannette Toribio MD;  [facility];  Service: Thoracic    VT ESOPHAGOSCOPY FLEX BALLOON DILAT <30 MM DIAM N/A 7/27/2020    Procedure: DILATATION ESOPHAGEAL with Savary over the wire dilitation 33FR to 45FR; Surgeon: Jeannette Toribio MD;  [facility];  Service: Thoracic    TONSILLECTOMY      TUNNELED VENOUS PORT PLACEMENT N/A 9/26/2019    Procedure: INSERTION VENOUS PORT (PORT-A-CATH);   Surgeon: Marsha Mendieta Piotr Guillory MD;  [facility];  Service: Surgical Oncology    VOCAL CORD INJECTION N/A 2/7/2020    Procedure: MICROLARYNGOSCOPY WITH INJECTION;  Surgeon: Rita Cortes MD;  [facility];  Service: ENT     Current Outpatient Medications   Medication Instructions    amiodarone 200 mg, Oral, Daily with breakfast    amoxicillin-clavulanate (AUGMENTIN) 400-57 mg/5 mL suspension 400 mg, Oral, 2 times daily    aspirin 81 mg, Oral, Daily    atorvastatin (LIPITOR) 40 mg, Oral, Daily with dinner    Blood Glucose Monitoring Suppl (ONE TOUCH ULTRA 2) w/Device KIT Check bs , q fasting and 2 hr after meals    canagliflozin (INVOKANA) 100 mg, Oral, Daily before breakfast    cefdinir (OMNICEF) 300 mg, Oral, Every 12 hours scheduled    glucose blood test strip Check blood sugar 3 times daily    Lancets (ONETOUCH ULTRASOFT) lancets Check bs , q fasting and 2 hr after meals    levothyroxine 25 mcg, Oral, Daily (early morning)    metroNIDAZOLE (FLAGYL) 500 mg, Oral, Every 8 hours scheduled    ondansetron (ZOFRAN) 4 mg, Oral, Every 6 hours PRN    oxyCODONE (ROXICODONE) 10 mg, Oral, Every 4 hours PRN    rOPINIRole (REQUIP) 0 25 mg tablet take 1 tablet by mouth at bedtime    traZODone (DESYREL) 100 mg, Oral, Daily at bedtime     Allergies   Allergen Reactions    Penicillins Itching       Review of Systems   Constitutional: Positive for appetite change and unexpected weight change (loss)  Negative for chills and fever  HENT: Positive for trouble swallowing (solid foods) and voice change (hoarse)  Respiratory: Negative  Cardiovascular: Negative  Gastrointestinal: Negative  Musculoskeletal: Positive for back pain  Skin: Negative  Neurological: Negative  Hematological: Negative  Psychiatric/Behavioral: Negative for confusion  Objective:   Physical Exam  Constitutional:       Appearance: Normal appearance  Comments: thin   HENT:      Head: Normocephalic and atraumatic     Eyes: General: No scleral icterus  Conjunctiva/sclera: Conjunctivae normal    Cardiovascular:      Rate and Rhythm: Normal rate and regular rhythm  Pulmonary:      Effort: Pulmonary effort is normal  No respiratory distress  Breath sounds: Normal breath sounds  Abdominal:      General: There is no distension  Palpations: Abdomen is soft  Comments: J tube   Skin:     General: Skin is warm and dry  Neurological:      General: No focal deficit present  Mental Status: He is alert and oriented to person, place, and time  Psychiatric:         Mood and Affect: Mood normal          Behavior: Behavior normal          Thought Content: Thought content normal          Judgment: Judgment normal      /62 (BP Location: Left arm, Patient Position: Sitting, Cuff Size: Large)   Pulse 77   Temp (!) 94 2 °F (34 6 °C)   Ht 5' 10" (1 778 m)   Wt 65 kg (143 lb 6 4 oz)   SpO2 96%   BMI 20 58 kg/m²       Ct Chest W Contrast    Result Date: 7/3/2020  Narrative CT CHEST WITH IV CONTRAST INDICATION:   rule out chest abscess  COMPARISON:  MRI from 7/1/2020 and CT from 5/23/2020 TECHNIQUE: CT examination of the chest was performed  Axial, sagittal, and coronal 2D reformatted images were created from the source data and submitted for interpretation  Radiation dose length product (DLP) for this visit:  320 3 mGy-cm   This examination, like all CT scans performed in the [facility], was performed utilizing techniques to minimize radiation dose exposure, including the use of iterative reconstruction and automated exposure control  IV Contrast:  85 mL of iohexol (OMNIPAQUE) FINDINGS: LUNGS:  There is a small right pleural effusion, decreased in size since the prior CT  There is no pneumothorax  There is no focal airspace consolidation identified  Central airways are patent  PLEURA:  Please see above  HEART/GREAT VESSELS:  The heart is normal in size    There is coronary artery calcification  The thoracic aorta and main pulmonary artery are normal in caliber  MEDIASTINUM AND SHEA:  The patient is status post gastric pull-through  There is redemonstration of a peripherally enhancing abscess within the posterior mediastinum extending from the level of T5-T8 this measures approximately 1 9 x 1 9 x 6 3 cm, similar to the recent thoracic spine MRI allowing for the differences in technique  There is regional right paraspinal phlegmonous change again noted to be inseparable from the gastric pull-through /neoesophagus  CHEST WALL AND LOWER NECK:   Unremarkable  VISUALIZED STRUCTURES IN THE UPPER ABDOMEN:  There is cholelithiasis  There is a partially imaged catheter within a bowel loop in the left upper quadrant  OSSEOUS STRUCTURES:  There is erosive change extending from the levels of T4-T5 through T6-T7 and most pronounced at T6-T7 as seen on recent MRI  There is also sclerosis noted involving the T3 vertebral body and T8 vertebral body  Findings correspond to areas of discitis osteomyelitis on recent MRI  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 with moderate anterior wedging of the T6 vertebral body, similar to recent MRI and overall progressed since CT from 5/23/2020  Impression Redemonstration of a posterior mediastinal/prevertebral abscess extending from the level of T4-T5 through T6-T7, as described above  Findings are overall stable in size since recent MRI  Redemonstration of discitis osteomyelitis involving the thoracic spine, as described above  Erosive changes are overall progressed since the CT from 5/23/2020 as well as moderate loss of height involving the T6 vertebral body  Small right-sided pleural fusion  No new airspace consolidation identified  Workstation performed: [id number]     Ct Chest Abdomen Pelvis W Contrast    Result Date: 8/8/2020  Narrative CT CHEST, ABDOMEN AND PELVIS WITH IV CONTRAST INDICATION:   C15 5: Malignant neoplasm of lower third of esophagus   COMPARISON: July 3, 2020 TECHNIQUE: CT examination of the chest, abdomen and pelvis was performed  Axial, sagittal, and coronal 2D reformatted images were created from the source data and submitted for interpretation  Radiation dose length product (DLP) for this visit:  604 mGy-cm   This examination, like all [place] scans performed in the [place], was performed utilizing techniques to minimize radiation dose exposure, including the use of iterative reconstruction and automated exposure control  IV Contrast:  100 mL of iohexol (OMNIPAQUE) Enteric Contrast: Enteric contrast was administered  FINDINGS: CHEST LUNGS:  No acute consolidation Trachea and central bronchi are patent PLEURA:  Right pleural effusion seen Linear hyperdensity seen along the right pleura HEART/GREAT VESSELS:  Unremarkable for patient's age  MEDIASTINUM AND SHEA:  There are postsurgical changes from prior esophageal surgery  Contrast is seen opacifying the gastric conduit  Again noted is a mediastinal fluid collection with enhancing margins in the middle mediastinum extending from T4 through T7 vertebra  This collection demonstrates fluid and air within it  And abuts the adjacent to the vertebrae  There are erosive changes in the anterior margin of the T4, T5, T6 vertebrae  These are also noted on the previous study of July 3, 2020  This collection measures 6 cm x 2 9 cm  This is unchanged from the previous study CHEST WALL AND LOWER NECK:   Unremarkable  ABDOMEN LIVER/BILIARY TREE:  Unremarkable  GALLBLADDER: Cholelithiasis is seen SPLEEN:  Unremarkable  PANCREAS:  Unremarkable  ADRENAL GLANDS:  Nodularity of the both adrenal gland seen KIDNEYS/URETERS:  Remain unchanged STOMACH AND BOWEL:  A jejunostomy tube is seen There is no evidence of bowel obstruction APPENDIX:  No findings to suggest appendicitis  ABDOMINOPELVIC CAVITY:  No retroperitoneal lymph node enlargement seen No significant mesenteric lymph node enlargement seen  VESSELS:  Soft tissue noted around the celiac trunk with encasement of the celiac trunk and origin of the hepatic artery, splenic artery, unchanged from the recent previous study of July 3, 2020 but new from the previous study of May 23, 2020, this is suspicious for recurrence Expected changes from embolization of the lymphatic system Rounded hypodense areas in the both inguinal region in the limits for size sequela of  embolization of the lymphatic system PELVIS REPRODUCTIVE ORGANS:  Unremarkable for patient's age  URINARY BLADDER:  Unremarkable  ABDOMINAL WALL/INGUINAL REGIONS:  Unremarkable  OSSEOUS STRUCTURES:  Bone noted are erosive changes in the thoracic spine at the level of extending from C4 through T7 with associated active sclerosis in the anterior aspect of the vertebrae  Endplate erosive changes along the inferior aspect of the T4  vertebra, T5 vertebra, T6 vertebra  There has been progression of sclerosis and erosive changes since the previous study of May 23, 2020 Mild sclerosis in the mid body of the sternum, stable Sclerosis seen within the right iliac bone, stable1    Impression Middle mediastinal collection adjacent to the gastric conduit and abutting the thoracic spine extending from T4 through T7 remains unchanged Sclerosis, erosive changes in the thoracic spine vertebrae, more pronounced from the previous study of May 23, 2020 There is a increasing soft tissue which is seen encasing the celiac trunk and the origin of the hepatic artery and splenic artery, this is suspicious for recurrence or progression No new liver lesion No new abdominopelvic lymphadenopathy Workstation performed: [id number]       Fl Barium Swallow    Result Date: 5/26/2020  Narrative BARIUM SWALLOW - ESOPHAGRAM INDICATION:   with gastrografin to rule out leak s/p esophageal dilitation    COMPARISON:  CTA chest 5/23/2020 IMAGES:  22 FLUOROSCOPY TIME:   1min 10 sec (1 17)  TECHNIQUE:  The patient was given Omnipaque 350 by mouth and images of the esophagus were obtained  FINDINGS: Postsurgical changes of gastric pull-through with mild stenosis and transient contrast hang-up at the proximal anastomosis  Contrast passes freely into the remainder of the conduit and visualized proximal small bowel  No evidence of a leak  Impression Postsurgical changes of gastric pull-through with mild stenosis at the anastomosis  No evidence of a leak   Workstation performed: [id number]

## 2020-08-26 NOTE — H&P (VIEW-ONLY)
Thoracic Follow-Up  Assessment/Plan:    Esophageal stricture  Mr Mary Hunt presents to discuss EGD, dilation and stent placement  He continues to have dysphagia with any solid foods  He is tolerating liquids and nightly tube feeds, though he forgets to set up the tube feeds at night sometimes  This procedure was fully described  Stent would remain for 10-12 weeks  Risks and benefits discussed  He does not need lab work, and we will proceed with 8/31  Consent obtained  Diagnoses and all orders for this visit:    Esophageal stricture  -     Case request operating room: ESOPHAGOGASTRODUODENOSCOPY (EGD) dilation over guidewire, stent; Standing  -     Case request operating room: ESOPHAGOGASTRODUODENOSCOPY (EGD) dilation over guidewire, stent    Other orders  -     Diet NPO; Sips with meds; Standing  -     Void on call to OR; Standing  -     Insert peripheral IV; Standing  -     Place sequential compression device; Standing          Thoracic History   Diagnosis: Clinical stage T3N1M0 esophageal carcinoma  Procedure: 1   EGD, transhiatal esophagectomy performed on 1/28/20 2  EGD with dilation up to 42F performed on 4/28/20  Pathology: esophagogastrectomy reveals microscopic focus of residual adenocarcinoma in muscularis propria measuring 0 7 cm, adjacent Duong's esophagus with associated atypia indeterminate for dysplasia  7 lymph nodes negative for carcinoma  Proximal and distal margins negative for carcinoma  8th edition AJCC tumor stage I (ypT2, ypN0)  Therapy:  Neoadjuvant FLOT x 6 chemotherapy and Herceptin, completed 12/17/19         Subjective:    Patient ID: Hernán Gil is a 76 y o  male  HPI  Mr Mary Hunt is a 76year old man with history of esophageal adenocarcinoma status post transhiatal esophagectomy on 29/06/3687 complicated by recurrent laryngeal nerve injury, anastomotic stricture, and posterior mediastinal collection now status post 7/27/20 EGD and dilation to 45Fr   He was last evaluated via telemedicine by Dr Georgette Rutherford 8/12/20 at which time he did well initially after dilation, but had begun to develop worsening dysphagia at that time  He continues to take antibiotics for the posterior mediastinal collection  Brain MRI is scheduled for today  He returns to the office today to discuss possibility of esophageal stent placement  On discussion, he reports continued solid food dysphagia  He tried scrambled eggs which caused him issues  He mostly drinks Boost and liquefied meals  He lost 8 pounds over the past month  The following portions of the patient's history were reviewed and updated as appropriate: allergies, current medications, past family history, past medical history, past social history, past surgical history and problem list   Past Medical History:   Diagnosis Date    Bilateral pleural effusion     Brain lesion     [facility]     [facility]     Difficulty swallowing     Disease of thyroid gland     Edema     Esophageal mass     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    History of chemotherapy     History of transfusion     Malignant neoplasm of lower third of esophagus ([facility] ) 9/17/2019    Diagnosis: clinical stage T3N1M0 esophageal carcinoma Procedure: EGD, transhiatal esophagectomy performed on 1/28/20 Pathology: esophagogastrectomy reveals microscopic focus of residual adenocarcinoma in muscularis propria measuring 0 7 cm, adjacent Duong's esophagus with associated atypia indeterminate for dysplasia  7 lymph nodes negative for carcinoma   Proximal and distal margins negative for    Paraspinal abscess (HCC)     PONV (postoperative nausea and vomiting)     Port-A-Cath in place     LCW    Sleep apnea     no CPAP    SOB (shortness of breath)      Past Surgical History:   Procedure Laterality Date    BACK SURGERY      x2    DISC REMOVAL      ESOPHAGECTOMY N/A 1/28/2020    Procedure: ESOPHAGECTOMY, TRANSHIATAL; Surgeon: Cassi Johnson MD;  [facility];  Service: Surgical Oncology    ESOPHAGOGASTRODUODENOSCOPY N/A 1/28/2020    Procedure: ESOPHAGOGASTRODUODENOSCOPY (EGD); Surgeon: Cassi Johnson MD;  [facility];  Service: Surgical Oncology    FL GUIDED CENTRAL VENOUS ACCESS DEVICE INSERTION  9/26/2019    GASTROJEJUNOSTOMY W/ JEJUNOSTOMY TUBE N/A 9/26/2019    Procedure: INSERTION JEJUNOSTOMY TUBE OPEN;  Surgeon: Cassi Johnson MD;  [facility];  Service: Surgical Oncology    GASTROJEJUNOSTOMY W/ JEJUNOSTOMY TUBE N/A 6/5/2020    Procedure: INSERTION JEJUNOSTOMY TUBE OPEN;  Surgeon: Cassi Johnson MD;  [facility];  Service: Surgical Oncology    IR CHEST TUBE PLACEMENT  5/26/2020    IR SPINE PROCEDURE  5/27/2020    IR THORACENTESIS  2/25/2020    TN EGD INSERT GUIDE WIRE DILATOR PASSAGE ESOPHAGUS N/A 7/7/2020    Procedure: ESOPHAGOGASTRODUODENOSCOPY (EGD) with esophageal dilation under fluro with biopsy;  Surgeon: Sapna Topete MD;  [facility];  Service: Thoracic    TN ESOPHAGOGASTRODUODENOSCOPY TRANSORAL DIAGNOSTIC N/A 4/28/2020    Procedure: ESOPHAGOGASTRODUODENOSCOPY (EGD); Surgeon: Sapna Topete MD;  [facility];  Service: Thoracic    TN ESOPHAGOGASTRODUODENOSCOPY TRANSORAL DIAGNOSTIC N/A 7/27/2020    Procedure: ESOPHAGOGASTRODUODENOSCOPY (EGD); Surgeon: Sapna Topete MD;  [facility];  Service: Thoracic    TN ESOPHAGOSCOPY FLEX BALLOON DILAT <30 MM DIAM N/A 4/28/2020    Procedure: DILATATION ESOPHAGEAL;  Surgeon: Sapna Topete MD;  [facility];  Service: Thoracic    TN ESOPHAGOSCOPY FLEX BALLOON DILAT <30 MM DIAM N/A 7/27/2020    Procedure: DILATATION ESOPHAGEAL with Savary over the wire dilitation 33FR to 45FR; Surgeon: Sapna Topete MD;  [facility];  Service: Thoracic    TONSILLECTOMY      TUNNELED VENOUS PORT PLACEMENT N/A 9/26/2019    Procedure: INSERTION VENOUS PORT (PORT-A-CATH);   Surgeon: Jessica Schirmer Poonam Freeman MD;  [facility];  Service: Surgical Oncology    VOCAL CORD INJECTION N/A 2/7/2020    Procedure: MICROLARYNGOSCOPY WITH INJECTION;  Surgeon: Molly Mendez MD;  [facility];  Service: ENT     Current Outpatient Medications   Medication Instructions    amiodarone 200 mg, Oral, Daily with breakfast    amoxicillin-clavulanate (AUGMENTIN) 400-57 mg/5 mL suspension 400 mg, Oral, 2 times daily    aspirin 81 mg, Oral, Daily    atorvastatin (LIPITOR) 40 mg, Oral, Daily with dinner    Blood Glucose Monitoring Suppl (ONE TOUCH ULTRA 2) w/Device KIT Check bs , q fasting and 2 hr after meals    canagliflozin (INVOKANA) 100 mg, Oral, Daily before breakfast    cefdinir (OMNICEF) 300 mg, Oral, Every 12 hours scheduled    glucose blood test strip Check blood sugar 3 times daily    Lancets (ONETOUCH ULTRASOFT) lancets Check bs , q fasting and 2 hr after meals    levothyroxine 25 mcg, Oral, Daily (early morning)    metroNIDAZOLE (FLAGYL) 500 mg, Oral, Every 8 hours scheduled    ondansetron (ZOFRAN) 4 mg, Oral, Every 6 hours PRN    oxyCODONE (ROXICODONE) 10 mg, Oral, Every 4 hours PRN    rOPINIRole (REQUIP) 0 25 mg tablet take 1 tablet by mouth at bedtime    traZODone (DESYREL) 100 mg, Oral, Daily at bedtime     Allergies   Allergen Reactions    Penicillins Itching       Review of Systems   Constitutional: Positive for appetite change and unexpected weight change (loss)  Negative for chills and fever  HENT: Positive for trouble swallowing (solid foods) and voice change (hoarse)  Respiratory: Negative  Cardiovascular: Negative  Gastrointestinal: Negative  Musculoskeletal: Positive for back pain  Skin: Negative  Neurological: Negative  Hematological: Negative  Psychiatric/Behavioral: Negative for confusion  Objective:   Physical Exam  Constitutional:       Appearance: Normal appearance  Comments: thin   HENT:      Head: Normocephalic and atraumatic     Eyes: General: No scleral icterus  Conjunctiva/sclera: Conjunctivae normal    Cardiovascular:      Rate and Rhythm: Normal rate and regular rhythm  Pulmonary:      Effort: Pulmonary effort is normal  No respiratory distress  Breath sounds: Normal breath sounds  Abdominal:      General: There is no distension  Palpations: Abdomen is soft  Comments: J tube   Skin:     General: Skin is warm and dry  Neurological:      General: No focal deficit present  Mental Status: He is alert and oriented to person, place, and time  Psychiatric:         Mood and Affect: Mood normal          Behavior: Behavior normal          Thought Content: Thought content normal          Judgment: Judgment normal      /62 (BP Location: Left arm, Patient Position: Sitting, Cuff Size: Large)   Pulse 77   Temp (!) 94 2 °F (34 6 °C)   Ht 5' 10" (1 778 m)   Wt 65 kg (143 lb 6 4 oz)   SpO2 96%   BMI 20 58 kg/m²       Ct Chest W Contrast    Result Date: 7/3/2020  Narrative CT CHEST WITH IV CONTRAST INDICATION:   rule out chest abscess  COMPARISON:  MRI from 7/1/2020 and CT from 5/23/2020 TECHNIQUE: CT examination of the chest was performed  Axial, sagittal, and coronal 2D reformatted images were created from the source data and submitted for interpretation  Radiation dose length product (DLP) for this visit:  320 3 mGy-cm   This examination, like all CT scans performed in the [facility], was performed utilizing techniques to minimize radiation dose exposure, including the use of iterative reconstruction and automated exposure control  IV Contrast:  85 mL of iohexol (OMNIPAQUE) FINDINGS: LUNGS:  There is a small right pleural effusion, decreased in size since the prior CT  There is no pneumothorax  There is no focal airspace consolidation identified  Central airways are patent  PLEURA:  Please see above  HEART/GREAT VESSELS:  The heart is normal in size    There is coronary artery calcification  The thoracic aorta and main pulmonary artery are normal in caliber  MEDIASTINUM AND SHEA:  The patient is status post gastric pull-through  There is redemonstration of a peripherally enhancing abscess within the posterior mediastinum extending from the level of T5-T8 this measures approximately 1 9 x 1 9 x 6 3 cm, similar to the recent thoracic spine MRI allowing for the differences in technique  There is regional right paraspinal phlegmonous change again noted to be inseparable from the gastric pull-through /neoesophagus  CHEST WALL AND LOWER NECK:   Unremarkable  VISUALIZED STRUCTURES IN THE UPPER ABDOMEN:  There is cholelithiasis  There is a partially imaged catheter within a bowel loop in the left upper quadrant  OSSEOUS STRUCTURES:  There is erosive change extending from the levels of T4-T5 through T6-T7 and most pronounced at T6-T7 as seen on recent MRI  There is also sclerosis noted involving the T3 vertebral body and T8 vertebral body  Findings correspond to areas of discitis osteomyelitis on recent MRI  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 with moderate anterior wedging of the T6 vertebral body, similar to recent MRI and overall progressed since CT from 5/23/2020  Impression Redemonstration of a posterior mediastinal/prevertebral abscess extending from the level of T4-T5 through T6-T7, as described above  Findings are overall stable in size since recent MRI  Redemonstration of discitis osteomyelitis involving the thoracic spine, as described above  Erosive changes are overall progressed since the CT from 5/23/2020 as well as moderate loss of height involving the T6 vertebral body  Small right-sided pleural fusion  No new airspace consolidation identified  Workstation performed: [id number]     Ct Chest Abdomen Pelvis W Contrast    Result Date: 8/8/2020  Narrative CT CHEST, ABDOMEN AND PELVIS WITH IV CONTRAST INDICATION:   C15 5: Malignant neoplasm of lower third of esophagus   COMPARISON: July 3, 2020 TECHNIQUE: CT examination of the chest, abdomen and pelvis was performed  Axial, sagittal, and coronal 2D reformatted images were created from the source data and submitted for interpretation  Radiation dose length product (DLP) for this visit:  604 mGy-cm   This examination, like all CT scans performed in the [facility], was performed utilizing techniques to minimize radiation dose exposure, including the use of iterative reconstruction and automated exposure control  IV Contrast:  100 mL of iohexol (OMNIPAQUE) Enteric Contrast: Enteric contrast was administered  FINDINGS: CHEST LUNGS:  No acute consolidation Trachea and central bronchi are patent PLEURA:  Right pleural effusion seen Linear hyperdensity seen along the right pleura HEART/GREAT VESSELS:  Unremarkable for patient's age  MEDIASTINUM AND SHEA:  There are postsurgical changes from prior esophageal surgery  Contrast is seen opacifying the gastric conduit  Again noted is a mediastinal fluid collection with enhancing margins in the middle mediastinum extending from T4 through T7 vertebra  This collection demonstrates fluid and air within it  And abuts the adjacent to the vertebrae  There are erosive changes in the anterior margin of the T4, T5, T6 vertebrae  These are also noted on the previous study of July 3, 2020  This collection measures 6 cm x 2 9 cm  This is unchanged from the previous study CHEST WALL AND LOWER NECK:   Unremarkable  ABDOMEN LIVER/BILIARY TREE:  Unremarkable  GALLBLADDER: Cholelithiasis is seen SPLEEN:  Unremarkable  PANCREAS:  Unremarkable  ADRENAL GLANDS:  Nodularity of the both adrenal gland seen KIDNEYS/URETERS:  Remain unchanged STOMACH AND BOWEL:  A jejunostomy tube is seen There is no evidence of bowel obstruction APPENDIX:  No findings to suggest appendicitis  ABDOMINOPELVIC CAVITY:  No retroperitoneal lymph node enlargement seen No significant mesenteric lymph node enlargement seen  VESSELS:  Soft tissue noted around the celiac trunk with encasement of the celiac trunk and origin of the hepatic artery, splenic artery, unchanged from the recent previous study of July 3, 2020 but new from the previous study of May 23, 2020, this is suspicious for recurrence Expected changes from embolization of the lymphatic system Rounded hypodense areas in the both inguinal region in the limits for size sequela of  embolization of the lymphatic system PELVIS REPRODUCTIVE ORGANS:  Unremarkable for patient's age  URINARY BLADDER:  Unremarkable  ABDOMINAL WALL/INGUINAL REGIONS:  Unremarkable  OSSEOUS STRUCTURES:  Bone noted are erosive changes in the thoracic spine at the level of extending from C4 through T7 with associated active sclerosis in the anterior aspect of the vertebrae  Endplate erosive changes along the inferior aspect of the T4  vertebra, T5 vertebra, T6 vertebra  There has been progression of sclerosis and erosive changes since the previous study of May 23, 2020 Mild sclerosis in the mid body of the sternum, stable Sclerosis seen within the right iliac bone, stable1    Impression Middle mediastinal collection adjacent to the gastric conduit and abutting the thoracic spine extending from T4 through T7 remains unchanged Sclerosis, erosive changes in the thoracic spine vertebrae, more pronounced from the previous study of May 23, 2020 There is a increasing soft tissue which is seen encasing the celiac trunk and the origin of the hepatic artery and splenic artery, this is suspicious for recurrence or progression No new liver lesion No new abdominopelvic lymphadenopathy Workstation performed: [id number]       Fl Barium Swallow    Result Date: 5/26/2020  Narrative BARIUM SWALLOW - ESOPHAGRAM INDICATION:   with gastrografin to rule out leak s/p esophageal dilitation    COMPARISON:  CTA chest 5/23/2020 IMAGES:  22 FLUOROSCOPY TIME:   1min 10 sec (1 17)  TECHNIQUE:  The patient was given Omnipaque 350 by mouth and images of the esophagus were obtained  FINDINGS: Postsurgical changes of gastric pull-through with mild stenosis and transient contrast hang-up at the proximal anastomosis  Contrast passes freely into the remainder of the conduit and visualized proximal small bowel  No evidence of a leak  Impression Postsurgical changes of gastric pull-through with mild stenosis at the anastomosis  No evidence of a leak   Workstation performed: [id number]

## 2020-08-27 ENCOUNTER — TELEPHONE (OUTPATIENT)
Dept: NEUROSURGERY | Facility: CLINIC | Age: 65
End: 2020-08-27

## 2020-08-27 DIAGNOSIS — F40.240 CLAUSTROPHOBIA: Primary | ICD-10-CM

## 2020-08-27 RX ORDER — LORAZEPAM 1 MG/1
1 TABLET ORAL
Qty: 2 TABLET | Refills: 0 | Status: SHIPPED | OUTPATIENT
Start: 2020-08-27 | End: 2020-10-26

## 2020-08-27 NOTE — TELEPHONE ENCOUNTER
Spoke with patient's daughter, Mery Harrington regarding his appointment and having to reschedule appointment due to not having MRI's or x-ray done  She stated he became claustrophobic he was not able to get MRI's done  Made Mario aware, he ordered medication for patient which was sent to his pharmacy on file ([facility]), also reschedule MRI's and patient's follow up appointment      MRI's scheduled for Wednesday, Sept 23 at [facility] at 4:30 pm and 5:15 pm    Patient's follow up appointment with Patrick Fermin is scheduled for Friday, Sept 25 at 1 pm    Also made aware to have x-ray done prior to appointment    Patient and patient's daughter, Mery Harrington aware and appreciative

## 2020-08-28 ENCOUNTER — ANESTHESIA EVENT (OUTPATIENT)
Dept: PERIOP | Facility: HOSPITAL | Age: 65
End: 2020-08-28
Payer: COMMERCIAL

## 2020-08-28 NOTE — PRE-PROCEDURE INSTRUCTIONS
No outpatient medications have been marked as taking for the 8/31/20 encounter [facility] Encounter)  Review with patient's daughter Gris Snide medications and showering instructions  Advice ASC call  Verbalized understanding

## 2020-08-29 ENCOUNTER — APPOINTMENT (EMERGENCY)
Dept: CT IMAGING | Facility: HOSPITAL | Age: 65
End: 2020-08-29
Payer: COMMERCIAL

## 2020-08-29 ENCOUNTER — HOSPITAL ENCOUNTER (EMERGENCY)
Facility: HOSPITAL | Age: 65
Discharge: HOME | End: 2020-08-29
Attending: EMERGENCY MEDICINE | Admitting: EMERGENCY MEDICINE
Payer: COMMERCIAL

## 2020-08-29 VITALS
RESPIRATION RATE: 16 BRPM | BODY MASS INDEX: 20.64 KG/M2 | SYSTOLIC BLOOD PRESSURE: 148 MMHG | HEIGHT: 70 IN | DIASTOLIC BLOOD PRESSURE: 71 MMHG | WEIGHT: 144.18 LBS | OXYGEN SATURATION: 99 % | TEMPERATURE: 97.4 F | HEART RATE: 73 BPM

## 2020-08-29 DIAGNOSIS — J44.9 COPD (CHRONIC OBSTRUCTIVE PULMONARY DISEASE) (HCC): Primary | ICD-10-CM

## 2020-08-29 DIAGNOSIS — R10.10 UPPER ABDOMINAL PAIN: ICD-10-CM

## 2020-08-29 DIAGNOSIS — R06.00 DYSPNEA: ICD-10-CM

## 2020-08-29 DIAGNOSIS — M79.9: ICD-10-CM

## 2020-08-29 LAB
ALBUMIN SERPL BCP-MCNC: 3.3 G/DL (ref 3.5–5)
ALP SERPL-CCNC: 58 U/L (ref 46–116)
ALT SERPL W P-5'-P-CCNC: 28 U/L (ref 12–78)
ANION GAP SERPL CALCULATED.3IONS-SCNC: 10 MMOL/L (ref 4–13)
APTT PPP: 35 SECONDS (ref 23–37)
AST SERPL W P-5'-P-CCNC: 18 U/L (ref 5–45)
BASOPHILS # BLD AUTO: 0.03 THOUSANDS/ΜL (ref 0–0.1)
BASOPHILS NFR BLD AUTO: 1 % (ref 0–1)
BILIRUB DIRECT SERPL-MCNC: 0.26 MG/DL (ref 0–0.2)
BILIRUB SERPL-MCNC: 1.2 MG/DL (ref 0.2–1)
BILIRUB UR QL STRIP: NEGATIVE
BUN SERPL-MCNC: 18 MG/DL (ref 5–25)
CALCIUM SERPL-MCNC: 8.8 MG/DL (ref 8.3–10.1)
CHLORIDE SERPL-SCNC: 100 MMOL/L (ref 100–108)
CLARITY UR: CLEAR
CO2 SERPL-SCNC: 30 MMOL/L (ref 21–32)
COLOR UR: YELLOW
CREAT SERPL-MCNC: 0.92 MG/DL (ref 0.6–1.3)
EOSINOPHIL # BLD AUTO: 0.09 THOUSAND/ΜL (ref 0–0.61)
EOSINOPHIL NFR BLD AUTO: 2 % (ref 0–6)
ERYTHROCYTE [DISTWIDTH] IN BLOOD BY AUTOMATED COUNT: 15.8 % (ref 11.6–15.1)
GFR SERPL CREATININE-BSD FRML MDRD: 86 ML/MIN/1.73SQ M
GLUCOSE SERPL-MCNC: 78 MG/DL (ref 65–140)
GLUCOSE UR STRIP-MCNC: NEGATIVE MG/DL
HCT VFR BLD AUTO: 33.5 % (ref 36.5–49.3)
HGB BLD-MCNC: 10.8 G/DL (ref 12–17)
HGB UR QL STRIP.AUTO: NEGATIVE
IMM GRANULOCYTES # BLD AUTO: 0.01 THOUSAND/UL (ref 0–0.2)
IMM GRANULOCYTES NFR BLD AUTO: 0 % (ref 0–2)
INR PPP: 1.07 (ref 0.84–1.19)
KETONES UR STRIP-MCNC: ABNORMAL MG/DL
LACTATE SERPL-SCNC: 0.6 MMOL/L (ref 0.5–2)
LEUKOCYTE ESTERASE UR QL STRIP: NEGATIVE
LIPASE SERPL-CCNC: 99 U/L (ref 73–393)
LYMPHOCYTES # BLD AUTO: 0.99 THOUSANDS/ΜL (ref 0.6–4.47)
LYMPHOCYTES NFR BLD AUTO: 25 % (ref 14–44)
MAGNESIUM SERPL-MCNC: 2.2 MG/DL (ref 1.6–2.6)
MCH RBC QN AUTO: 28 PG (ref 26.8–34.3)
MCHC RBC AUTO-ENTMCNC: 32.2 G/DL (ref 31.4–37.4)
MCV RBC AUTO: 87 FL (ref 82–98)
MONOCYTES # BLD AUTO: 0.31 THOUSAND/ΜL (ref 0.17–1.22)
MONOCYTES NFR BLD AUTO: 8 % (ref 4–12)
NEUTROPHILS # BLD AUTO: 2.52 THOUSANDS/ΜL (ref 1.85–7.62)
NEUTS SEG NFR BLD AUTO: 64 % (ref 43–75)
NITRITE UR QL STRIP: NEGATIVE
NRBC BLD AUTO-RTO: 0 /100 WBCS
NT-PROBNP SERPL-MCNC: 75 PG/ML
PH UR STRIP.AUTO: 7.5 [PH]
PLATELET # BLD AUTO: 199 THOUSANDS/UL (ref 149–390)
PMV BLD AUTO: 9.4 FL (ref 8.9–12.7)
POTASSIUM SERPL-SCNC: 3.7 MMOL/L (ref 3.5–5.3)
PROT SERPL-MCNC: 7 G/DL (ref 6.4–8.2)
PROT UR STRIP-MCNC: NEGATIVE MG/DL
PROTHROMBIN TIME: 13.4 SECONDS (ref 11.6–14.5)
RBC # BLD AUTO: 3.86 MILLION/UL (ref 3.88–5.62)
SODIUM SERPL-SCNC: 140 MMOL/L (ref 136–145)
SP GR UR STRIP.AUTO: 1.01 (ref 1–1.03)
TROPONIN I SERPL-MCNC: <0.02 NG/ML
UROBILINOGEN UR QL STRIP.AUTO: 0.2 E.U./DL
WBC # BLD AUTO: 3.95 THOUSAND/UL (ref 4.31–10.16)

## 2020-08-29 PROCEDURE — 81003 URINALYSIS AUTO W/O SCOPE: CPT | Performed by: EMERGENCY MEDICINE

## 2020-08-29 PROCEDURE — 94640 AIRWAY INHALATION TREATMENT: CPT

## 2020-08-29 PROCEDURE — 93005 ELECTROCARDIOGRAM TRACING: CPT

## 2020-08-29 PROCEDURE — 96374 THER/PROPH/DIAG INJ IV PUSH: CPT

## 2020-08-29 PROCEDURE — 83735 ASSAY OF MAGNESIUM: CPT | Performed by: EMERGENCY MEDICINE

## 2020-08-29 PROCEDURE — 83690 ASSAY OF LIPASE: CPT | Performed by: EMERGENCY MEDICINE

## 2020-08-29 PROCEDURE — 99285 EMERGENCY DEPT VISIT HI MDM: CPT | Performed by: EMERGENCY MEDICINE

## 2020-08-29 PROCEDURE — 85730 THROMBOPLASTIN TIME PARTIAL: CPT | Performed by: EMERGENCY MEDICINE

## 2020-08-29 PROCEDURE — 36415 COLL VENOUS BLD VENIPUNCTURE: CPT | Performed by: EMERGENCY MEDICINE

## 2020-08-29 PROCEDURE — 80076 HEPATIC FUNCTION PANEL: CPT | Performed by: EMERGENCY MEDICINE

## 2020-08-29 PROCEDURE — 96375 TX/PRO/DX INJ NEW DRUG ADDON: CPT

## 2020-08-29 PROCEDURE — G1004 CDSM NDSC: HCPCS

## 2020-08-29 PROCEDURE — 74177 CT ABD & PELVIS W/CONTRAST: CPT

## 2020-08-29 PROCEDURE — 96361 HYDRATE IV INFUSION ADD-ON: CPT

## 2020-08-29 PROCEDURE — 99285 EMERGENCY DEPT VISIT HI MDM: CPT

## 2020-08-29 PROCEDURE — 71275 CT ANGIOGRAPHY CHEST: CPT

## 2020-08-29 PROCEDURE — 85610 PROTHROMBIN TIME: CPT | Performed by: EMERGENCY MEDICINE

## 2020-08-29 PROCEDURE — 83880 ASSAY OF NATRIURETIC PEPTIDE: CPT | Performed by: EMERGENCY MEDICINE

## 2020-08-29 PROCEDURE — 80048 BASIC METABOLIC PNL TOTAL CA: CPT | Performed by: EMERGENCY MEDICINE

## 2020-08-29 PROCEDURE — 83605 ASSAY OF LACTIC ACID: CPT | Performed by: EMERGENCY MEDICINE

## 2020-08-29 PROCEDURE — 85025 COMPLETE CBC W/AUTO DIFF WBC: CPT | Performed by: EMERGENCY MEDICINE

## 2020-08-29 PROCEDURE — 84484 ASSAY OF TROPONIN QUANT: CPT | Performed by: EMERGENCY MEDICINE

## 2020-08-29 RX ORDER — ALBUTEROL SULFATE 2.5 MG/3ML
2.5 SOLUTION RESPIRATORY (INHALATION) EVERY 6 HOURS PRN
Qty: 75 ML | Refills: 0 | Status: SHIPPED | OUTPATIENT
Start: 2020-08-29 | End: 2020-09-01 | Stop reason: SDUPTHER

## 2020-08-29 RX ORDER — MORPHINE SULFATE 10 MG/ML
6 INJECTION, SOLUTION INTRAMUSCULAR; INTRAVENOUS ONCE
Status: COMPLETED | OUTPATIENT
Start: 2020-08-29 | End: 2020-08-29

## 2020-08-29 RX ORDER — DICYCLOMINE HCL 20 MG
20 TABLET ORAL EVERY 8 HOURS PRN
Qty: 12 TABLET | Refills: 0 | Status: SHIPPED | OUTPATIENT
Start: 2020-08-29 | End: 2020-09-21 | Stop reason: SDUPTHER

## 2020-08-29 RX ADMIN — MORPHINE SULFATE 2 MG: 2 INJECTION, SOLUTION INTRAMUSCULAR; INTRAVENOUS at 15:11

## 2020-08-29 RX ADMIN — ALBUTEROL SULFATE 5 MG: 2.5 SOLUTION RESPIRATORY (INHALATION) at 16:45

## 2020-08-29 RX ADMIN — IPRATROPIUM BROMIDE 0.5 MG: 0.5 SOLUTION RESPIRATORY (INHALATION) at 16:45

## 2020-08-29 RX ADMIN — IOHEXOL 100 ML: 350 INJECTION, SOLUTION INTRAVENOUS at 16:05

## 2020-08-29 RX ADMIN — MORPHINE SULFATE 6 MG: 10 INJECTION INTRAVENOUS at 16:45

## 2020-08-29 RX ADMIN — SODIUM CHLORIDE 1000 ML: 0.9 INJECTION, SOLUTION INTRAVENOUS at 15:11

## 2020-08-29 NOTE — DISCHARGE INSTRUCTIONS
COPD (Chronic Obstructive Pulmonary Disease)   WHAT YOU NEED TO KNOW:   Chronic obstructive pulmonary disease (COPD) is a lung disease that makes it hard for you to breathe  It is usually a result of lung damage caused by years of irritation and inflammation in your lungs  DISCHARGE INSTRUCTIONS:   Call 911 if:   · You feel lightheaded, short of breath, and have chest pain  Seek care immediately if:   · You are confused, dizzy, or feel faint  · Your arm or leg feels warm, tender, and painful  It may look swollen and red  · You cough up blood  Contact your healthcare provider if:   · You have more shortness of breath than usual      · You need more medicine than usual to control your symptoms  · You are coughing or wheezing more than usual      · You are coughing up more mucus, or it is a different color or has a different odor  · You gain more than 3 pounds in a week  · You have a fever, a runny or stuffy nose, and a sore throat, or other cold or flu symptoms  · Your skin, lips, or nails start to turn blue  · You have swelling in your legs or ankles  · You are very tired or weak for more than a day  · You notice changes in your mood, or changes in your ability to think or concentrate  · You have questions or concerns about your condition or care  Medicines:   · Medicines  may be used to open your airways, decrease swelling and inflammation in your lungs, or treat an infection  You may need 2 or more medicines  A short-acting medicine relieves symptoms quickly  Long-acting medicines will control or prevent symptoms  Ask for more information about the medicines you are given and how to use them safely  · Take your medicine as directed  Contact your healthcare provider if you think your medicine is not helping or if you have side effects  Tell him or her if you are allergic to any medicine  Keep a list of the medicines, vitamins, and herbs you take  Include the amounts, and when and why you take them  Bring the list or the pill bottles to follow-up visits  Carry your medicine list with you in case of an emergency  Help make breathing easier:   · Use pursed-lip breathing any time you feel short of breath  Take a deep breath in through your nose  Slowly breathe out through your mouth with your lips pursed for twice as long as you inhaled  You can also practice this breathing pattern while you bend, lift, climb stairs, or exercise  It slows down your breathing and helps move more air in and out of your lungs  · Do not smoke, and avoid others who smoke  Nicotine and other substances can cause lung irritation or damage and make it harder for you to breathe  Do not use e-cigarettes or smokeless tobacco  They still contain nicotine  Ask your healthcare provider for information if you currently smoke and need help to quit  For support and more information:  ¨ [employer]  Phone: 7- 204 - 829-3767  Web Address: [employer]      · Be aware of and avoid anything that makes your symptoms worse  Stay out of high altitudes and places with high humidity  Stay inside, or cover your mouth and nose with a scarf when you are outside during cold weather  Stay inside on days when air pollution or pollen counts are high  Do not use aerosol sprays such as deodorant, bug spray, and hair spray  Manage COPD and help prevent exacerbations:  COPD is a serious condition that gets worse over time  A COPD exacerbation means your symptoms suddenly get worse  It is important to prevent exacerbations  An exacerbation can cause more lung damage  COPD cannot be cured, but you can take action to feel better and prevent COPD exacerbations:  · Protect yourself from germs  Germs can get into your lungs and cause an infection  An infection in your lungs can create more mucus and make it harder to breathe   An infection can also create swelling in your airways and prevent air from getting in  You can decrease your risk for infection by doing the following:     [facility] your hands often with soap and water  Carry germ-killing gel with you  You can use the gel to clean your hands when soap and water are not available  ¨ Do not touch your eyes, nose, or mouth unless you have washed your hands first      ¨ Always cover your mouth when you cough  Cough into a tissue or your shirtsleeve so you do not spread germs from your hands  ¨ Try to avoid people who have a cold or the flu  If you are sick, stay away from others as much as possible  · Drink more liquids  This will help to keep your air passages moist and help you cough up mucus  Ask how much liquid to drink each day and which liquids are best for you  · Exercise daily  Exercise for at least 20 minutes each day to help increase your energy and decrease shortness of breath  Walking or riding a bike are good ways to exercise  Talk to your healthcare provider about the best exercise plan for you  · Ask about vaccines  Your healthcare provider may recommend that you get regular flu and pneumonia vaccines  Pneumonia can become life-threatening for a person who has COPD  Ask about other vaccines you may need  Ask your healthcare provider about the flu and pneumonia vaccines  All adults should get the flu (influenza) vaccine every year as soon as it becomes available  The pneumonia vaccine is given to adults aged 72 or older to prevent pneumococcal disease, such as pneumonia  Adults aged 23 to 59 years who are at high risk for pneumococcal disease also should get the pneumococcal vaccine  It may need to be repeated 1 or 5 years later  Pulmonary rehabilitation:  Your healthcare provider may recommend a program to help you manage your symptoms and improve your quality of life  It may include nutritional counseling and exercise to strengthen your lungs     Make decisions about your choices for future treatment:  Ask for information about advanced medical directives and living forrester  These documents help you decide and write down your choices for treatment and end-of-life care  It is best to complete them when you feel well and can think clearly about your wishes  The information can then be kept for future use if you are in the hospital or become very ill  Follow up with your healthcare provider as directed: You may need more tests  Your healthcare provider may refer you to a pulmonary (lung) specialist  Write down your questions so you remember to ask them during your visits  © 2017 [facility] [facility] Information is for End User's use only and may not be sold, redistributed or otherwise used for commercial purposes  All illustrations and images included in CareNotes® are the copyrighted property of [employer]  or [employer]  The above information is an  only  It is not intended as medical advice for individual conditions or treatments  Talk to your doctor, nurse or pharmacist before following any medical regimen to see if it is safe and effective for you  Abdominal Pain   WHAT YOU NEED TO KNOW:   Abdominal pain can be dull, achy, or sharp  You may have pain in one area of your abdomen, or in your entire abdomen  Your pain may be caused by a condition such as constipation, food sensitivity or poisoning, infection, or a blockage  Abdominal pain can also be from a hernia, appendicitis, or an ulcer  Liver, gallbladder, or kidney conditions can also cause abdominal pain  The cause of your abdominal pain may be unknown  DISCHARGE INSTRUCTIONS:   Return to the emergency department if:   · You have new chest pain or shortness of breath  · You have pulsing pain in your upper abdomen or lower back that suddenly becomes constant  · Your pain is in the right lower abdominal area and worsens with movement  · You have a fever over 100 4°F (38°C) or shaking chills       · You are vomiting and cannot keep food or liquids down  · Your pain does not improve or gets worse over the next 8 to 12 hours  · You see blood in your vomit or bowel movements, or they look black and tarry  · Your skin or the whites of your eyes turn yellow  · You are a woman and have a large amount of vaginal bleeding that is not your monthly period  Contact your healthcare provider if:   · You have pain in your lower back  · You are a man and have pain in your testicles  · You have pain when you urinate  · You have questions or concerns about your condition or care  Follow up with your healthcare provider within 24 hours or as directed:  Write down your questions so you remember to ask them during your visits  Medicines:   · Medicines  may be given to calm your stomach and prevent vomiting or to decrease pain  Ask how to take pain medicine safely  · Take your medicine as directed  Contact your healthcare provider if you think your medicine is not helping or if you have side effects  Tell him of her if you are allergic to any medicine  Keep a list of the medicines, vitamins, and herbs you take  Include the amounts, and when and why you take them  Bring the list or the pill bottles to follow-up visits  Carry your medicine list with you in case of an emergency  © 2017 [address] [facility] Information is for End User's use only and may not be sold, redistributed or otherwise used for commercial purposes  All illustrations and images included in CareNotes® are the copyrighted property of [employer]  or [employer] Clarke  The above information is an  only  It is not intended as medical advice for individual conditions or treatments  Talk to your doctor, nurse or pharmacist before following any medical regimen to see if it is safe and effective for you

## 2020-08-29 NOTE — ED PROVIDER NOTES
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, chemotherapy (last chemo in Dec 2019), and J-tube placement, paraspinal abscess and T6 osteomyelitis currently receiving oral antibiotics, COPD, diabetes, hypothyroidism, hyperlipidemia, sleep apnea, presents to the emergency department complaining of both abdominal pain and dyspnea  Patient reports for the past several days he has had worsening chronic abdominal pain mostly in his upper abdomen bilaterally  He states it is crampy, comes and goes and is worse after food intake  He states after he eats he feels his stomach gets very hard and bloated and he has been unable to tolerate much food or fluids by mouth  He does feel as though he may be getting dehydrated  He has had intermittent lightheadedness and feels generally weak  Patient also reports significant weight loss  Patient also has been feeling progressively short of breath, mostly exertional   He has had a dry cough that is occasionally productive of sputum  He denies any fever, shaking chills, headaches, vertigo, change in vision or hearing, neck pain or stiffness, hemoptysis, URI symptoms, chest pain, palpitations, abdominal distension, nausea, vomiting, diarrhea, constipation, blood per rectum or melena, dysuria, change in urinary frequency, hematuria, flank pain, skin rash or color change, extremity swelling or pain, lateralizing extremity weakness or paresthesia or other focal neurologic deficits        History provided by:  Patient, relative and medical records   used: No    Shortness of Breath   Associated symptoms: abdominal pain and cough    Associated symptoms: no chest pain, no ear pain, no fever, no headaches, no neck pain, no rash, no sore throat, no vomiting and no wheezing        Prior to Admission Medications   Prescriptions Last Dose Informant Patient Reported? Taking?    Blood Glucose Monitoring Suppl (ONE TOUCH ULTRA 2) w/Device KIT  Self No No   Sig: Check bs , q fasting and 2 hr after meals   LORazepam (ATIVAN) 1 mg tablet   No No   Sig: Take 1 tablet (1 mg total) by mouth once in imaging for anxiety for up to 1 dose Take 1 tab (1 mg) 20-30 minutes before the procedure, can repeat 1 tab if the first not helping   Lancets (ONETOUCH ULTRASOFT) lancets  Self No No   Sig: Check bs , q fasting and 2 hr after meals   amiodarone 200 mg tablet   No No   Sig: Take 1 tablet (200 mg total) by mouth daily with breakfast   amoxicillin-clavulanate (AUGMENTIN) 400-57 mg/5 mL suspension   No No   Sig: Take 5 mL (400 mg total) by mouth 2 (two) times a day   aspirin 81 mg chewable tablet  Self No No   Sig: Chew 1 tablet (81 mg total) daily   atorvastatin (LIPITOR) 40 mg tablet   No No   Sig: Take 1 tablet (40 mg total) by mouth daily with dinner   canagliflozin (Invokana) 100 mg   No No   Sig: Take 1 tablet (100 mg total) by mouth daily before breakfast   glucose blood test strip  Self No No   Sig: Check blood sugar 3 times daily   levothyroxine 25 mcg tablet  Self Yes No   Sig: Take 25 mcg by mouth daily in the early morning   ondansetron (ZOFRAN) 4 mg tablet  Self No No   Sig: Take 1 tablet (4 mg total) by mouth every 6 (six) hours as needed for nausea or vomiting   oxyCODONE (ROXICODONE) 10 MG TABS   No No   Sig: Take 1 tablet (10 mg total) by mouth every 4 (four) hours as needed for moderate painMax Daily Amount: 60 mg   rOPINIRole (REQUIP) 0 25 mg tablet  Self No No   Sig: take 1 tablet by mouth at bedtime   traZODone (DESYREL) 100 mg tablet No No   Sig: Take 1 tablet (100 mg total) by mouth daily at bedtime      Facility-Administered Medications: None       Past Medical History:   Diagnosis Date    Bilateral pleural effusion     Brain lesion     COPD (chronic obstructive pulmonary disease) (HCC)     Diabetes mellitus (HCC)     Difficulty swallowing     Disease of thyroid gland     Edema     Esophageal mass     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[facility] ) 9/24/2019    History of chemotherapy     History of transfusion     Malignant neoplasm of lower third of esophagus ([facility] ) 9/17/2019    Diagnosis: clinical stage T3N1M0 esophageal carcinoma Procedure: EGD, transhiatal esophagectomy performed on 1/28/20 Pathology: esophagogastrectomy reveals microscopic focus of residual adenocarcinoma in muscularis propria measuring 0 7 cm, adjacent Duong's esophagus with associated atypia indeterminate for dysplasia  7 lymph nodes negative for carcinoma  Proximal and distal margins negative for    Paraspinal abscess (HCC)     PONV (postoperative nausea and vomiting)     Port-A-Cath in place     LCW    Sleep apnea     no CPAP    SOB (shortness of breath)        Past Surgical History:   Procedure Laterality Date    BACK SURGERY      x2    DISC REMOVAL      ESOPHAGECTOMY N/A 1/28/2020    Procedure: ESOPHAGECTOMY, TRANSHIATAL;  Surgeon: Michel Koroma MD;  [facility];  Service: Surgical Oncology    ESOPHAGOGASTRODUODENOSCOPY N/A 1/28/2020    Procedure: ESOPHAGOGASTRODUODENOSCOPY (EGD);   Surgeon: Michel Koroma MD;  [facility];  Service: Surgical Oncology    FL GUIDED CENTRAL VENOUS ACCESS DEVICE INSERTION  9/26/2019    GASTROJEJUNOSTOMY W/ JEJUNOSTOMY TUBE N/A 9/26/2019    Procedure: INSERTION JEJUNOSTOMY TUBE OPEN;  Surgeon: Michel Koroma MD;  [facility];  Service: Surgical Oncology    GASTROJEJUNOSTOMY W/ JEJUNOSTOMY TUBE N/A 6/5/2020    Procedure: INSERTION JEJUNOSTOMY TUBE OPEN;  Surgeon: Michel Koroma MD;  [facility]; Service: Surgical Oncology    IR CHEST TUBE PLACEMENT  5/26/2020    IR SPINE PROCEDURE  5/27/2020    IR THORACENTESIS  2/25/2020    NC EGD INSERT GUIDE WIRE DILATOR PASSAGE ESOPHAGUS N/A 7/7/2020    Procedure: ESOPHAGOGASTRODUODENOSCOPY (EGD) with esophageal dilation under fluro with biopsy;  Surgeon: Kristina Orr MD;  [facility];  Service: Thoracic    NC ESOPHAGOGASTRODUODENOSCOPY TRANSORAL DIAGNOSTIC N/A 4/28/2020    Procedure: ESOPHAGOGASTRODUODENOSCOPY (EGD); Surgeon: Kristina Orr MD;  [facility];  Service: Thoracic    NC ESOPHAGOGASTRODUODENOSCOPY TRANSORAL DIAGNOSTIC N/A 7/27/2020    Procedure: ESOPHAGOGASTRODUODENOSCOPY (EGD); Surgeon: Kristina Orr MD;  [facility];  Service: Thoracic    NC ESOPHAGOSCOPY FLEX BALLOON DILAT <30 MM DIAM N/A 4/28/2020    Procedure: DILATATION ESOPHAGEAL;  Surgeon: Kristina Orr MD;  [facility];  Service: Thoracic    NC ESOPHAGOSCOPY FLEX BALLOON DILAT <30 MM DIAM N/A 7/27/2020    Procedure: DILATATION ESOPHAGEAL with Savary over the wire dilitation 33FR to 45FR; Surgeon: Kristina Orr MD;  [facility];  Service: Thoracic    TONSILLECTOMY      TUNNELED VENOUS PORT PLACEMENT N/A 9/26/2019    Procedure: INSERTION VENOUS PORT (PORT-A-CATH); Surgeon: Kaci Ureña MD;  [facility];  Service: Surgical Oncology    VOCAL CORD INJECTION N/A 2/7/2020    Procedure: MICROLARYNGOSCOPY WITH INJECTION;  Surgeon: Parmjit Wright MD;  [facility];  Service: ENT       Family History   Problem Relation Age of Onset    Diabetes Mother     No Known Problems Father      I have reviewed and agree with the history as documented      E-Cigarette/Vaping    E-Cigarette Use Never User      E-Cigarette/Vaping Substances    Nicotine No     THC No     CBD No     Flavoring No     Other No     Unknown No      Social History     Tobacco Use    Smoking status: Former Smoker     Packs/day: 0 50 Years: 50 00     Pack years: 25 00     Types: Cigarettes     Last attempt to quit: 2017     Years since quittin 6    Smokeless tobacco: Never Used   Substance Use Topics    Alcohol use: Not Currently     Alcohol/week: 0 0 standard drinks     Frequency: Never     Drinks per session: 1 or 2     Binge frequency: Never    Drug use: Never       Review of Systems   Constitutional: Positive for appetite change and unexpected weight change  Negative for chills and fever  HENT: Negative for congestion, ear pain, rhinorrhea and sore throat  Eyes: Negative for photophobia, pain and visual disturbance  Respiratory: Positive for cough and shortness of breath  Negative for chest tightness and wheezing  Cardiovascular: Negative for chest pain, palpitations and leg swelling  Gastrointestinal: Positive for abdominal pain  Negative for abdominal distention, blood in stool, constipation, diarrhea, nausea and vomiting  Genitourinary: Negative for dysuria, flank pain, frequency and hematuria  Musculoskeletal: Negative for back pain, neck pain and neck stiffness  Skin: Negative for color change, pallor, rash and wound  Allergic/Immunologic: Negative for immunocompromised state  Neurological: Positive for weakness and light-headedness  Negative for dizziness, syncope, numbness and headaches  Hematological: Negative for adenopathy  Psychiatric/Behavioral: Negative for confusion and decreased concentration  All other systems reviewed and are negative  Physical Exam  Physical Exam  Vitals signs and nursing note reviewed  Constitutional:       General: He is not in acute distress  Appearance: He is well-developed  He is ill-appearing  He is not toxic-appearing or diaphoretic  Comments: Patient appears chronically ill  He is frail  HENT:      Head: Normocephalic and atraumatic        Right Ear: External ear normal       Left Ear: External ear normal       Mouth/Throat:      Comments: Orpharyngeal exam deferred at this time due to risk of exposure to COVID-19 during current pandemic  Patient has no oropharyngeal complaints  Eyes:      Extraocular Movements: Extraocular movements intact  Pupils: Pupils are equal, round, and reactive to light  Neck:      Musculoskeletal: Normal range of motion and neck supple  Vascular: No JVD  Cardiovascular:      Rate and Rhythm: Normal rate and regular rhythm  Pulses: Normal pulses  Heart sounds: Normal heart sounds  No murmur  No friction rub  No gallop  Pulmonary:      Effort: Pulmonary effort is normal  No respiratory distress  Breath sounds: Normal breath sounds  No wheezing, rhonchi or rales  Chest:      Chest wall: No tenderness  Abdominal:      General: Bowel sounds are normal  There is no distension  Palpations: Abdomen is soft  Tenderness: There is abdominal tenderness  There is no guarding or rebound  Comments: J-tube in place  No surrounding erythema or warmth  There is + tenderness in the epigastrium  Musculoskeletal: Normal range of motion  General: No swelling or tenderness  Lymphadenopathy:      Cervical: No cervical adenopathy  Skin:     General: Skin is warm and dry  Coloration: Skin is not pale  Findings: No erythema or rash  Neurological:      General: No focal deficit present  Mental Status: He is alert and oriented to person, place, and time  Sensory: No sensory deficit  Motor: No weakness  Comments: 4/5 strength throughout     Psychiatric:         Mood and Affect: Mood normal          Behavior: Behavior normal          Vital Signs  ED Triage Vitals [08/29/20 1355]   Temperature Pulse Respirations Blood Pressure SpO2   (!) 97 4 °F (36 3 °C) 79 21 143/69 100 %      Temp Source Heart Rate Source Patient Position - Orthostatic VS BP Location FiO2 (%)   Oral Monitor Sitting Right arm --      Pain Score       3         Vitals:    08/29/20 1355 08/29/20 1515 08/29/20 1530 08/29/20 1700   BP: 143/69 141/69 135/70 148/71   BP Location: Right arm      Pulse: 79 68 81 73   Resp: 21 17 19 16   Temp: (!) 97 4 °F (36 3 °C)      TempSrc: Oral      SpO2: 100% 100% 100% 99%   Weight: 65 4 kg (144 lb 2 9 oz)      Height: 5' 10" (1 778 m)          Visual Acuity      ED Medications  Medications   sodium chloride 0 9 % bolus 1,000 mL (0 mL Intravenous Stopped 8/29/20 1611)   morphine injection 2 mg (2 mg Intravenous Given 8/29/20 1511)   iohexol (OMNIPAQUE) 350 MG/ML injection (SINGLE-DOSE) 100 mL (100 mL Intravenous Given 8/29/20 1605)   morphine (PF) 10 mg/mL injection 6 mg (6 mg Intravenous Given 8/29/20 1645)   ipratropium (ATROVENT) 0 02 % inhalation solution 0 5 mg (0 5 mg Nebulization Given 8/29/20 1645)   albuterol inhalation solution 5 mg (5 mg Nebulization Given 8/29/20 1645)       Diagnostic Studies  Results Reviewed     Procedure Component Value Units Date/Time    UA (URINE) with reflex to Scope [[id number]]  (Abnormal) Collected:  08/29/20 1719    Lab Status:  Final result Specimen:  Urine, Clean Catch Updated:  08/29/20 1724     Color, UA Yellow     Clarity, UA Clear     Specific Gravity, UA 1 010     pH, UA 7 5     Leukocytes, UA Negative     Nitrite, UA Negative     Protein, UA Negative mg/dl      Glucose, UA Negative mg/dl      Ketones, UA 15 (1+) mg/dl      Urobilinogen, UA 0 2 E U /dl      Bilirubin, UA Negative     Blood, UA Negative    Hepatic function panel [[id number]]  (Abnormal) Collected:  08/29/20 1501    Lab Status:  Final result Specimen:  Blood from Line Updated:  08/29/20 1550     Total Bilirubin 1 20 mg/dL      Bilirubin, Direct 0 26 mg/dL      Alkaline Phosphatase 58 U/L      AST 18 U/L      ALT 28 U/L      Total Protein 7 0 g/dL      Albumin 3 3 g/dL     Lipase [[id number]]  (Normal) Collected:  08/29/20 1501    Lab Status:  Final result Specimen:  Blood from Line Updated:  08/29/20 1550     Lipase 99 u/L     NT-BNP PRO [[id number]] (Normal) Collected:  08/29/20 1501    Lab Status:  Final result Specimen:  Blood from Line Updated:  08/29/20 1550     NT-proBNP 75 pg/mL     Magnesium [[id number]]  (Normal) Collected:  08/29/20 1501    Lab Status:  Final result Specimen:  Blood from Line Updated:  08/29/20 1550     Magnesium 2 2 mg/dL     Lactic acid [[id number]]  (Normal) Collected:  08/29/20 1501    Lab Status:  Final result Specimen:  Blood from Line Updated:  08/29/20 1543     LACTIC ACID 0 6 mmol/L     Narrative:       Result may be elevated if tourniquet was used during collection      Troponin I [[id number]]  (Normal) Collected:  08/29/20 1501    Lab Status:  Final result Specimen:  Blood from Line Updated:  08/29/20 1543     Troponin I <0 02 ng/mL     Basic metabolic panel [[id number]] Collected:  08/29/20 1501    Lab Status:  Final result Specimen:  Blood from Line Updated:  08/29/20 1541     Sodium 140 mmol/L      Potassium 3 7 mmol/L      Chloride 100 mmol/L      CO2 30 mmol/L      ANION GAP 10 mmol/L      BUN 18 mg/dL      Creatinine 0 92 mg/dL      Glucose 78 mg/dL      Calcium 8 8 mg/dL      eGFR 86 ml/min/1 73sq m     Narrative:       [place] guidelines for Chronic Kidney Disease (CKD):     Stage 1 with normal or high GFR (GFR > 90 mL/min/1 73 square meters)    Stage 2 Mild CKD (GFR = 60-89 mL/min/1 73 square meters)    Stage 3A Moderate CKD (GFR = 45-59 mL/min/1 73 square meters)    Stage 3B Moderate CKD (GFR = 30-44 mL/min/1 73 square meters)    Stage 4 Severe CKD (GFR = 15-29 mL/min/1 73 square meters)    Stage 5 End Stage CKD (GFR <15 mL/min/1 73 square meters)  Note: GFR calculation is accurate only with a steady state creatinine    Protime-INR [[id number]]  (Normal) Collected:  08/29/20 1501    Lab Status:  Final result Specimen:  Blood from Line Updated:  08/29/20 1539     Protime 13 4 seconds      INR 1 07    APTT [[id number]]  (Normal) Collected:  08/29/20 1501    Lab Status:  Final result Specimen:  Blood from Line Updated:  08/29/20 1539     PTT 35 seconds     CBC and differential [[id number]]  (Abnormal) Collected:  08/29/20 1501    Lab Status:  Final result Specimen:  Blood from Line Updated:  08/29/20 1510     WBC 3 95 Thousand/uL      RBC 3 86 Million/uL      Hemoglobin 10 8 g/dL      Hematocrit 33 5 %      MCV 87 fL      MCH 28 0 pg      MCHC 32 2 g/dL      RDW 15 8 %      MPV 9 4 fL      Platelets 431 Thousands/uL      nRBC 0 /100 WBCs      Neutrophils Relative 64 %      Immat GRANS % 0 %      Lymphocytes Relative 25 %      Monocytes Relative 8 %      Eosinophils Relative 2 %      Basophils Relative 1 %      Neutrophils Absolute 2 52 Thousands/µL      Immature Grans Absolute 0 01 Thousand/uL      Lymphocytes Absolute 0 99 Thousands/µL      Monocytes Absolute 0 31 Thousand/µL      Eosinophils Absolute 0 09 Thousand/µL      Basophils Absolute 0 03 Thousands/µL                  PE Study with CT Abdomen and Pelvis with contrast   Final Result by Nicolas Smith MD (08/29 1642)      Some interval decrease in size of prevertebral abscess  Stable changes related to osteomyelitis involving T4, T5, and T6  No evidence of pulmonary embolus  Persistent soft tissue abnormality about the celiac and SMA axes suspicious for neoplastic involvement  Cholelithiasis           Workstation performed: [id number]                    Procedures  ECG 12 Lead Documentation Only    Date/Time: 8/29/2020 2:43 PM  Performed by: Bobby Villa DO  Authorized by: Bobby Villa DO     ECG reviewed by me, the ED Provider: yes    Patient location:  ED  Previous ECG:     Previous ECG:  Compared to current    Comparison ECG info:  2-; PACs are now present  Rate:     ECG rate:  72    ECG rate assessment: normal    Rhythm:     Rhythm: sinus rhythm    Ectopy:     Ectopy: PAC    QRS:     QRS axis:  Normal    QRS intervals:  Normal  Conduction:     Conduction: normal    ST segments:     ST segments:  Normal  T waves:     T waves: normal               ED Course  ED Course as of Aug 30 0702   Sat Aug 29, 2020   1516 WBC(!): 3 95   1516 Fairly stable from recent labs  Hemoglobin(!): 10 8   1602 Labs reviewed in all unremarkable  [id number] Patient reassessed and updated of essentially normal blood work and workup thus far  CT scans are pending  Patient still having pain and will give additional dose of morphine  Will give 6 mg at this time as he is on oxycodone 10 mg at home  Will also give a neb treatment at his request   I did offer admission even if CT scan is negative however the patient would prefer to go home  He has nebulizer and pain medicine at home and will likely prescribe Bentyl for cramping  I did recommend that he also take a stool softener while taking the Bentyl and oxycodone  1657 Updated patient about CT findings being essentially normal/stable  There is soft tissue inflammation around his upper abdomen concerning for neoplastic process which I did discuss however this was seen on prior imaging and likely not acute  This could be causing his abdominal pain  Once again I did offer admission for IV fluids and pain control however patient refused admission  I advised him to call his doctors 1st thing on Monday  Discussed ED return parameters  56 Sign the patient out to Dr Kevin Monson, who will f/u patient's condition after neb tx, morphine and f/u UA  US AUDIT      Most Recent Value   Initial Alcohol Screen: US AUDIT-C    1  How often do you have a drink containing alcohol? 1 Filed at: 08/29/2020 1356   2  How many drinks containing alcohol do you have on a typical day you are drinking? 0 Filed at: 08/29/2020 1356   3a  Male UNDER 65: How often do you have five or more drinks on one occasion?   1 Filed at: 08/29/2020 1356   Audit-C Score  2 Filed at: 08/29/2020 1356              Identification of Seniors at Risk      Most Recent Value   (ISAR) Identification of Seniors at Risk   Before the illness or injury that brought you to the Emergency, did you need someone to help you on a regular basis? 0 Filed at: 08/29/2020 1354   In the last 24 hours, have you needed more help than usual?  1 Filed at: 08/29/2020 1357   Have you been hospitalized for one or more nights during the past 6 months? 0 Filed at: 08/29/2020 1357   In general, do you see well?  0 Filed at: 08/29/2020 1357   In general, do you have serious problems with your memory? 0 Filed at: 08/29/2020 1357   Do you take more than three different medications every day? 1 Filed at: 08/29/2020 1357   ISAR Score  2 Filed at: 08/29/2020 1357          CHRISTOPHER/DAST-10      Most Recent Value   How many times in the past year have you    Used an illegal drug or used a prescription medication for non-medical reasons? Never Filed at: 08/29/2020 1356                                MDM  Number of Diagnoses or Management Options  Diagnosis management comments: 50-year-old male with significant history of esophageal adenocarcinoma status post surgery, chemotherapy, recent diagnosis of thoracic osteomyelitis/paraspinal abscess on antibiotics, presents to the ED complaining of dyspnea as well as abdominal pain  Differential is vast and includes progression of patient's cancer, pulmonary embolism, COPD exacerbation, pneumonia, acute coronary syndrome, congestive heart failure, recurrent or worsening esophageal stricture, pancreatitis, bowel obstruction  Will workup with abdominal and cardiac labs, CTA chest rule out PE with CT abdomen and pelvis  Will give IV fluids, morphine for pain control         Amount and/or Complexity of Data Reviewed  Clinical lab tests: ordered and reviewed  Tests in the radiology section of CPT®: ordered and reviewed  Tests in the medicine section of CPT®: ordered and reviewed  Review and summarize past medical records: yes  Independent visualization of images, tracings, or specimens: yes          Disposition  Final diagnoses:   COPD (chronic obstructive pulmonary disease) ([facility] )   Dyspnea   Upper abdominal pain   Disorder of soft tissue of abdominal cavity - Infiltrative soft tissue concerning for neoplastic process  Time reflects when diagnosis was documented in both MDM as applicable and the Disposition within this note     Time User Action Codes Description Comment    8/29/2020  4:53 PM Patrick November E Add [J44 9] COPD (chronic obstructive pulmonary disease) ([facility] )     8/29/2020  4:53 PM Patrick November E Add [R06 00] Dyspnea     8/29/2020  4:53 PM Patrick November E Add [R10 10] Upper abdominal pain     8/29/2020  4:53 PM Patrick November E Add [M79 9] Disorder of soft tissue of abdominal cavity     8/29/2020  4:53 PM Patrick November E Modify [M79 9] Disorder of soft tissue of abdominal cavity Infiltrative soft tissue concerning for neoplastic process  ED Disposition     ED Disposition Condition Date/Time Comment    Discharge Stable Sat Aug 29, 2020  4:53 PM Elmer Gandara discharge to home/self care              Follow-up Information     Follow up With Specialties Details Why Contact Info Additional Information    Sammi Ambriz, [address], Nurse Practitioner Schedule an appointment as soon as possible for a visit   [address] [address]  [phone]       Oncologist  Call on 8/31/2020       Pain management specialist  Call on 8/31/2020       [facility] Emergency Department Emergency Medicine Go to  If symptoms worsen [address] [address] ED, [address], [place], [address]          Discharge Medication List as of 8/29/2020  4:57 PM      START taking these medications    Details   albuterol (2 5 mg/3 mL) 0 083 % nebulizer solution Take 1 vial (2 5 mg total) by nebulization every 6 (six) hours as needed for wheezing or shortness of breath, Starting Sat 8/29/2020, Normal      dicyclomine (BENTYL) 20 mg tablet Take 1 tablet (20 mg total) by mouth every 8 (eight) hours as needed (abdominal cramping), Starting Sat 8/29/2020, Normal         CONTINUE these medications which have NOT CHANGED    Details   amiodarone 200 mg tablet Take 1 tablet (200 mg total) by mouth daily with breakfast, Starting Sun 7/5/2020, Normal      amoxicillin-clavulanate (AUGMENTIN) 400-57 mg/5 mL suspension Take 5 mL (400 mg total) by mouth 2 (two) times a day, Starting Tue 8/18/2020, Until Thu 9/17/2020, Normal      aspirin 81 mg chewable tablet Chew 1 tablet (81 mg total) daily, Starting Wed 6/10/2020, Normal      atorvastatin (LIPITOR) 40 mg tablet Take 1 tablet (40 mg total) by mouth daily with dinner, Starting Mon 8/10/2020, Normal      Blood Glucose Monitoring Suppl (ONE TOUCH ULTRA 2) w/Device KIT Check bs , q fasting and 2 hr after meals, Normal      canagliflozin (Invokana) 100 mg Take 1 tablet (100 mg total) by mouth daily before breakfast, Starting Mon 8/10/2020, Normal      glucose blood test strip Check blood sugar 3 times daily, Normal      Lancets (ONETOUCH ULTRASOFT) lancets Check bs , q fasting and 2 hr after meals, Normal      levothyroxine 25 mcg tablet Take 25 mcg by mouth daily in the early morning, Starting Sat 8/24/2019, Historical Med      LORazepam (ATIVAN) 1 mg tablet Take 1 tablet (1 mg total) by mouth once in imaging for anxiety for up to 1 dose Take 1 tab (1 mg) 20-30 minutes before the procedure, can repeat 1 tab if the first not helping, Starting Thu 8/27/2020, Normal      ondansetron (ZOFRAN) 4 mg tablet Take 1 tablet (4 mg total) by mouth every 6 (six) hours as needed for nausea or vomiting, Starting Tue 6/9/2020, Normal      oxyCODONE (ROXICODONE) 10 MG TABS Take 1 tablet (10 mg total) by mouth every 4 (four) hours as needed for moderate painMax Daily Amount: 60 mg, Starting Thu 8/20/2020, Normal      rOPINIRole (REQUIP) 0 25 mg tablet take 1 tablet by mouth at bedtime, Normal      traZODone (DESYREL) 100 mg tablet Take 1 tablet (100 mg total) by mouth daily at bedtime, Starting Mon 8/10/2020, Normal           No discharge procedures on file      PDMP Review       Value Time User    PDMP Reviewed  Yes 8/20/2020  2:10 PM Gianna Roger MD          ED Provider  Electronically Signed by           Bobby Villa DO  08/30/20 4206

## 2020-08-31 ENCOUNTER — HOSPITAL ENCOUNTER (OUTPATIENT)
Dept: RADIOLOGY | Facility: HOSPITAL | Age: 65
Setting detail: OUTPATIENT SURGERY
Discharge: HOME | End: 2020-08-31
Payer: COMMERCIAL

## 2020-08-31 ENCOUNTER — ANESTHESIA (OUTPATIENT)
Dept: PERIOP | Facility: HOSPITAL | Age: 65
End: 2020-08-31
Payer: COMMERCIAL

## 2020-08-31 ENCOUNTER — HOSPITAL ENCOUNTER (OUTPATIENT)
Facility: HOSPITAL | Age: 65
Setting detail: OUTPATIENT SURGERY
Discharge: HOME | End: 2020-08-31
Attending: THORACIC SURGERY (CARDIOTHORACIC VASCULAR SURGERY) | Admitting: THORACIC SURGERY (CARDIOTHORACIC VASCULAR SURGERY)
Payer: COMMERCIAL

## 2020-08-31 VITALS
BODY MASS INDEX: 20.04 KG/M2 | RESPIRATION RATE: 20 BRPM | DIASTOLIC BLOOD PRESSURE: 80 MMHG | SYSTOLIC BLOOD PRESSURE: 162 MMHG | OXYGEN SATURATION: 100 % | TEMPERATURE: 98.2 F | HEART RATE: 70 BPM | HEIGHT: 70 IN | WEIGHT: 140 LBS

## 2020-08-31 DIAGNOSIS — K22.2 ESOPHAGEAL STRICTURE: ICD-10-CM

## 2020-08-31 PROBLEM — F10.21 ALCOHOL DEPENDENCE, IN REMISSION (HCC): Status: RESOLVED | Noted: 2020-03-02 | Resolved: 2020-08-31

## 2020-08-31 PROBLEM — Z98.890 PONV (POSTOPERATIVE NAUSEA AND VOMITING): Status: ACTIVE | Noted: 2020-08-31

## 2020-08-31 PROBLEM — R11.2 PONV (POSTOPERATIVE NAUSEA AND VOMITING): Status: ACTIVE | Noted: 2020-08-31

## 2020-08-31 LAB
GLUCOSE SERPL-MCNC: 108 MG/DL (ref 65–140)
GLUCOSE SERPL-MCNC: 66 MG/DL (ref 65–140)
GLUCOSE SERPL-MCNC: 87 MG/DL (ref 65–140)

## 2020-08-31 PROCEDURE — C1769 GUIDE WIRE: HCPCS | Performed by: THORACIC SURGERY (CARDIOTHORACIC VASCULAR SURGERY)

## 2020-08-31 PROCEDURE — 82948 REAGENT STRIP/BLOOD GLUCOSE: CPT

## 2020-08-31 PROCEDURE — 43266 EGD ENDOSCOPIC STENT PLACE: CPT | Performed by: THORACIC SURGERY (CARDIOTHORACIC VASCULAR SURGERY)

## 2020-08-31 PROCEDURE — 71045 X-RAY EXAM CHEST 1 VIEW: CPT

## 2020-08-31 PROCEDURE — C1874 STENT, COATED/COV W/DEL SYS: HCPCS | Performed by: THORACIC SURGERY (CARDIOTHORACIC VASCULAR SURGERY)

## 2020-08-31 PROCEDURE — 74360 X-RAY GUIDE GI DILATION: CPT | Performed by: THORACIC SURGERY (CARDIOTHORACIC VASCULAR SURGERY)

## 2020-08-31 DEVICE — STENT SYSTEM
Type: IMPLANTABLE DEVICE | Site: ESOPHAGUS | Status: FUNCTIONAL
Brand: WALLFLEX™ ESOPHAGEAL

## 2020-08-31 RX ORDER — PROPOFOL 10 MG/ML
INJECTION, EMULSION INTRAVENOUS AS NEEDED
Status: DISCONTINUED | OUTPATIENT
Start: 2020-08-31 | End: 2020-08-31

## 2020-08-31 RX ORDER — SUCCINYLCHOLINE/SOD CL,ISO/PF 100 MG/5ML
SYRINGE (ML) INTRAVENOUS AS NEEDED
Status: DISCONTINUED | OUTPATIENT
Start: 2020-08-31 | End: 2020-08-31

## 2020-08-31 RX ORDER — FENTANYL CITRATE/PF 50 MCG/ML
25 SYRINGE (ML) INJECTION
Status: DISCONTINUED | OUTPATIENT
Start: 2020-08-31 | End: 2020-08-31 | Stop reason: HOSPADM

## 2020-08-31 RX ORDER — LIDOCAINE HYDROCHLORIDE 10 MG/ML
INJECTION, SOLUTION EPIDURAL; INFILTRATION; INTRACAUDAL; PERINEURAL AS NEEDED
Status: DISCONTINUED | OUTPATIENT
Start: 2020-08-31 | End: 2020-08-31

## 2020-08-31 RX ORDER — DEXTROSE MONOHYDRATE 25 G/50ML
25 INJECTION, SOLUTION INTRAVENOUS ONCE
Status: COMPLETED | OUTPATIENT
Start: 2020-08-31 | End: 2020-08-31

## 2020-08-31 RX ORDER — ALBUTEROL SULFATE 2.5 MG/3ML
2.5 SOLUTION RESPIRATORY (INHALATION) ONCE AS NEEDED
Status: DISCONTINUED | OUTPATIENT
Start: 2020-08-31 | End: 2020-08-31 | Stop reason: HOSPADM

## 2020-08-31 RX ORDER — SODIUM CHLORIDE, SODIUM LACTATE, POTASSIUM CHLORIDE, CALCIUM CHLORIDE 600; 310; 30; 20 MG/100ML; MG/100ML; MG/100ML; MG/100ML
50 INJECTION, SOLUTION INTRAVENOUS CONTINUOUS
Status: DISCONTINUED | OUTPATIENT
Start: 2020-08-31 | End: 2020-08-31 | Stop reason: HOSPADM

## 2020-08-31 RX ORDER — FENTANYL CITRATE 50 UG/ML
INJECTION, SOLUTION INTRAMUSCULAR; INTRAVENOUS AS NEEDED
Status: DISCONTINUED | OUTPATIENT
Start: 2020-08-31 | End: 2020-08-31

## 2020-08-31 RX ORDER — DEXAMETHASONE SODIUM PHOSPHATE 10 MG/ML
INJECTION, SOLUTION INTRAMUSCULAR; INTRAVENOUS AS NEEDED
Status: DISCONTINUED | OUTPATIENT
Start: 2020-08-31 | End: 2020-08-31

## 2020-08-31 RX ORDER — ONDANSETRON 2 MG/ML
INJECTION INTRAMUSCULAR; INTRAVENOUS AS NEEDED
Status: DISCONTINUED | OUTPATIENT
Start: 2020-08-31 | End: 2020-08-31

## 2020-08-31 RX ORDER — ONDANSETRON 2 MG/ML
4 INJECTION INTRAMUSCULAR; INTRAVENOUS ONCE AS NEEDED
Status: DISCONTINUED | OUTPATIENT
Start: 2020-08-31 | End: 2020-08-31 | Stop reason: HOSPADM

## 2020-08-31 RX ORDER — MIDAZOLAM HYDROCHLORIDE 2 MG/2ML
INJECTION, SOLUTION INTRAMUSCULAR; INTRAVENOUS AS NEEDED
Status: DISCONTINUED | OUTPATIENT
Start: 2020-08-31 | End: 2020-08-31

## 2020-08-31 RX ADMIN — PHENYLEPHRINE HYDROCHLORIDE 100 MCG: 10 INJECTION INTRAVENOUS at 11:30

## 2020-08-31 RX ADMIN — FENTANYL CITRATE 50 MCG: 50 INJECTION, SOLUTION INTRAMUSCULAR; INTRAVENOUS at 11:29

## 2020-08-31 RX ADMIN — MIDAZOLAM 1 MG: 1 INJECTION INTRAMUSCULAR; INTRAVENOUS at 11:14

## 2020-08-31 RX ADMIN — Medication 80 MG: at 11:29

## 2020-08-31 RX ADMIN — SODIUM CHLORIDE, SODIUM LACTATE, POTASSIUM CHLORIDE, AND CALCIUM CHLORIDE 50 ML/HR: .6; .31; .03; .02 INJECTION, SOLUTION INTRAVENOUS at 10:16

## 2020-08-31 RX ADMIN — PROPOFOL 150 MG: 10 INJECTION, EMULSION INTRAVENOUS at 11:29

## 2020-08-31 RX ADMIN — DEXTROSE MONOHYDRATE 25 ML: 25 INJECTION, SOLUTION INTRAVENOUS at 12:33

## 2020-08-31 RX ADMIN — DEXAMETHASONE SODIUM PHOSPHATE 10 MG: 10 INJECTION, SOLUTION INTRAMUSCULAR; INTRAVENOUS at 11:34

## 2020-08-31 RX ADMIN — LIDOCAINE HYDROCHLORIDE 50 MG: 10 INJECTION, SOLUTION EPIDURAL; INFILTRATION; INTRACAUDAL; PERINEURAL at 11:29

## 2020-08-31 RX ADMIN — PHENYLEPHRINE HYDROCHLORIDE 200 MCG: 10 INJECTION INTRAVENOUS at 11:39

## 2020-08-31 RX ADMIN — MIDAZOLAM 1 MG: 1 INJECTION INTRAMUSCULAR; INTRAVENOUS at 11:22

## 2020-08-31 RX ADMIN — ONDANSETRON 4 MG: 2 INJECTION INTRAMUSCULAR; INTRAVENOUS at 11:34

## 2020-08-31 NOTE — ANESTHESIA POSTPROCEDURE EVALUATION
Post-Op Assessment Note    CV Status:  Stable  Pain Score: 0    Pain management: adequate     Mental Status:  Alert and awake   Hydration Status:  Euvolemic   PONV Controlled:  Controlled   Airway Patency:  Patent      Post Op Vitals Reviewed: Yes      Staff: CRNA         No complications documented      /76 (08/31/20 1213)    Temp 97 9 °F (36 6 °C) (08/31/20 1213)    Pulse 68 (08/31/20 1213)   Resp 18 (08/31/20 1213)    SpO2 100 % (08/31/20 1213)

## 2020-08-31 NOTE — OP NOTE
OPERATIVE REPORT  PATIENT NAME: Mahi Diaz    :  1955  MRN: [id number]  Pt Location:  OR ROOM 08    SURGERY DATE: 2020    Surgeon(s) and Role:     * Sally Dubois MD - Primary     * Melissa Infante MD - Assisting    Preop Diagnosis:  Esophageal stricture [K22 2]    Post-Op Diagnosis Codes:     * Esophageal stricture [K22 2]    Procedure(s) (LRB):  ESOPHAGOGASTRODUODENOSCOPY (EGD) dilation over guidewire 36-45 Fr , stent placement (N/A)  DILATATION ESOPHAGEAL (N/A)  INSERTION STENT ESOPHAGEAL (N/A)    Specimen(s):  * No specimens in log *    Estimated Blood Loss:   Minimal    Drains:  Gastrostomy/Enterostomy Jejunostomy 14 Fr  LUQ (Active)   Number of days: 340       Gastrostomy/Enterostomy Jejunostomy 14 Fr  LUQ (Active)   Number of days: 87       Anesthesia Type:   General    Operative Indications:  Esophageal stricture [K252]  70-year-old male with history of esophagectomy complicated by dysphagia status post serial dilation who has recurrent dysphagia  Operative Findings:  Mild stricture at 26 cm from the incisors  Dilated from [facility] to [address] [place] [address]  Placed in an 18mm esophageal stent    Complications:   None    Procedure and Technique:  After obtaining informed consent the patient was brought back to the operating room placed supine on the OR table  General anesthesia was induced without incident  A formal time-out was performed at this time verifying patient, date of birth, procedure, fire risk score, ASA score, antibiotic usage, need for blood products, anticipate specimens, beta-blocker usage, imaging and any other questions or concerns  At this point in adult endoscope was inserted into the patient's oropharynx and directed down the esophagus  Here we encountered a mild stricture at the gastroesophageal anastomosis  The anastomosis was located at 26 cm from the incisors    There is no masses seen at this area     Next a 0 038 Jagwire was placed down the EGD and directed into the gastric conduit under direct visualization  We used fluoroscopy to verify placement and maintain positioning  The EGD scope was then removed at this time  We then began serial dilation over a wire using fluoro  This was placed over the wire and passed down the oropharynx into the esophagus under direct visualization  We dilated from 35 Fr to 39 Fr  After every 3rd dilator the wire was removed and we reinspected the esophagus and conduit with the scope  Afterwards repeat endoscopy showed mild trauma to the esophagus and anastomosis but no perforation  We were able to get passed the anastomosis at that time and into the stomach and duodenum  The remainder of the conduit appeared normal       We then placed percutaneous in markers at the anastomosis and at the cricopharyngeus  Based on his dilation diameter we chose a 18 mm wide and 103 mm long esophageal stent  Using fluoro guidance we placed this stent over a wire ensuring that we did not cover the cricopharyngeus with the proximal aspect of the stent  This was largely centered upon the stricture  The stent was placed in good position  We pulled back the low but to help seat it better  Otherwise we are satisfied with the final stent placement    Patient tolerated this portion of procedure well without complication  Total fluoroscopy time was 170 sec         I was present for the entire procedure     I was present for the entire procedure    Patient Disposition:  PACU     SIGNATURE: Carmen Contreras MD  DATE: August 31, 2020  TIME: 12:12 PM

## 2020-08-31 NOTE — ANESTHESIA PREPROCEDURE EVALUATION
Procedure:  ESOPHAGOGASTRODUODENOSCOPY (EGD) dilation over guidewire, stent (N/A Esophagus)  DILATATION ESOPHAGEAL (N/A Esophagus)  INSERTION STENT ESOPHAGEAL (N/A Esophagus)    Relevant Problems   ANESTHESIA   (+) PONV (postoperative nausea and vomiting)      CARDIO   (+) Atrial fibrillation (HCC)   (+) High cholesterol      ENDO   (+) Acquired hypothyroidism   (+) Type 2 diabetes mellitus with hyperglycemia, without long-term current use of insulin (HCC)      GI/HEPATIC   (+) Dysphagia   (+) Gastroesophageal reflux disease      HEMATOLOGY   (+) Anemia, unspecified      NEURO/PSYCH   (+) Headaches due to old head injury   (+) Personal history of esophageal cancer      PULMONARY   (+) COPD (chronic obstructive pulmonary disease) (HCC)   (+) Chylothorax on right   (+) Loculated pleural effusion   (+) Obstructive sleep apnea syndrome      Other   (+) Cancer-related pain   (+) Esophageal obstruction     Past Medical History:   Diagnosis Date    Bilateral pleural effusion     Brain lesion     COPD (chronic obstructive pulmonary disease) (HCC)     Diabetes mellitus (HCC)     Difficulty swallowing     Disease of thyroid gland     Edema     Esophageal mass     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    History of chemotherapy     History of transfusion     Malignant neoplasm of lower third of esophagus [facility] ) 9/17/2019    Diagnosis: clinical stage [id number] esophageal carcinoma Procedure: EGD, transhiatal esophagectomy performed on 1/28/20 Pathology: esophagogastrectomy reveals microscopic focus of residual adenocarcinoma in muscularis propria measuring 0 7 cm, adjacent Duong's esophagus with associated atypia indeterminate for dysplasia  7 lymph nodes negative for carcinoma   Proximal and distal margins negative for    Paraspinal abscess (HCC)     PONV (postoperative nausea and vomiting)     Port-A-Cath in place     LCW    Sleep apnea     no CPAP    SOB (shortness of breath)      Past Surgical History: Procedure Laterality Date    BACK SURGERY      x2    DISC REMOVAL      ESOPHAGECTOMY N/A 1/28/2020    Procedure: ESOPHAGECTOMY, TRANSHIATAL;  Surgeon: Live Garcia MD;  [facility];  Service: Surgical Oncology    ESOPHAGOGASTRODUODENOSCOPY N/A 1/28/2020    Procedure: ESOPHAGOGASTRODUODENOSCOPY (EGD); Surgeon: Live Garcia MD;  [facility];  Service: Surgical Oncology    FL GUIDED CENTRAL VENOUS ACCESS DEVICE INSERTION  9/26/2019    GASTROJEJUNOSTOMY W/ JEJUNOSTOMY TUBE N/A 9/26/2019    Procedure: INSERTION JEJUNOSTOMY TUBE OPEN;  Surgeon: Live Garcia MD;  [facility];  Service: Surgical Oncology    GASTROJEJUNOSTOMY W/ JEJUNOSTOMY TUBE N/A 6/5/2020    Procedure: INSERTION JEJUNOSTOMY TUBE OPEN;  Surgeon: Live Garcia MD;  [facility];  Service: Surgical Oncology    IR CHEST TUBE PLACEMENT  5/26/2020    IR SPINE PROCEDURE  5/27/2020    IR THORACENTESIS  2/25/2020    GA EGD INSERT GUIDE WIRE DILATOR PASSAGE ESOPHAGUS N/A 7/7/2020    Procedure: ESOPHAGOGASTRODUODENOSCOPY (EGD) with esophageal dilation under fluro with biopsy;  Surgeon: Sally Dubois MD;  [facility];  Service: Thoracic    GA ESOPHAGOGASTRODUODENOSCOPY TRANSORAL DIAGNOSTIC N/A 4/28/2020    Procedure: ESOPHAGOGASTRODUODENOSCOPY (EGD); Surgeon: Sally Dubois MD;  [facility];  Service: Thoracic    GA ESOPHAGOGASTRODUODENOSCOPY TRANSORAL DIAGNOSTIC N/A 7/27/2020    Procedure: ESOPHAGOGASTRODUODENOSCOPY (EGD); Surgeon: Sally Dubois MD;  [facility];  Service: Thoracic    GA ESOPHAGOSCOPY FLEX BALLOON DILAT <30 MM DIAM N/A 4/28/2020    Procedure: DILATATION ESOPHAGEAL;  Surgeon: Sally Dubois MD;  [facility];  Service: Thoracic    GA ESOPHAGOSCOPY FLEX BALLOON DILAT <30 MM DIAM N/A 7/27/2020    Procedure: DILATATION ESOPHAGEAL with Savary over the wire dilitation 33FR to 45FR;   Surgeon: Sally Dubois MD;  [facility];  Service: Thoracic    TONSILLECTOMY      TUNNELED VENOUS PORT PLACEMENT N/A 2019    Procedure: INSERTION VENOUS PORT (PORT-A-CATH); Surgeon: Durga Altamirano MD;  [facility];  Service: Surgical Oncology    VOCAL CORD INJECTION N/A 2020    Procedure: MICROLARYNGOSCOPY WITH INJECTION;  Surgeon: Jessica Fitzgerald MD;  [facility];  Service: ENT            Physical Exam    Airway      TM Distance: >3 FB  Neck ROM: full     Dental       Cardiovascular      Pulmonary      Other Findings    Transthoracic Echocardiogram  2D, M-mode, Doppler, and Color Doppler     Study date:  2020     Patient: Lakisha Soni  MR number: [id number]  Account number: [de-identified]  : 1955  Age: 71 years  Gender: Male  Status: Inpatient  Location: Bedside  Height: 70 in  Weight: 190 5 lb  BP: 112/ 63 mmHg     Indications: Cardiac Arrest     Diagnoses: I46 9 - Cardiac arrest, cause unspecified     Sonographer:  ANNAMARIE Mcclendon  Primary Physician:  Durga Altamirano MD  Group:  [facility]  Interpreting Physician:  Germania Alcala MD     SUMMARY     LEFT VENTRICLE:  Systolic function was normal  Ejection fraction was estimated to be 55 %  This study was inadequate for the evaluation of regional wall motion    Wall thickness was increased      PERICARDIUM:  There was a left pleural effusion      HISTORY: PRIOR HISTORY: COPD,HLD,DM2,GE Junctional Carcinoma,Former Smoker    Lab Results   Component Value Date    WBC 3 95 (L) 2020    HGB 10 8 (L) 2020     2020     Lab Results   Component Value Date    SODIUM 140 2020    K 3 7 2020    BUN 18 2020    CREATININE 0 92 2020    EGFR 86 2020    GLUCOSE 339 (H) 2020     Lab Results   Component Value Date    PTT 35 2020      Lab Results   Component Value Date    INR 1 07 2020       Lab Results   Component Value Date    HGBA1C 5 5 2020     Anesthesia Plan  ASA Score- 3     Anesthesia Type- general with ASA Monitors  Additional Monitors:   Airway Plan: ETT  Plan Factors-    Chart reviewed  EKG reviewed  Patient summary reviewed  Induction-     Postoperative Plan-     Informed Consent- Anesthetic plan and risks discussed with patient  I personally reviewed this patient with the CRNA  Discussed and agreed on the Anesthesia Plan with the CRNA  Eagle Wilkinson

## 2020-08-31 NOTE — DISCHARGE INSTRUCTIONS
Esophageal Dilation   WHAT YOU NEED TO KNOW:   Esophageal dilation is a procedure to widen a narrow part of your esophagus  Your healthcare provider will use a dilator (inflatable balloon or another tool that expands) to make the area wider  He may also do an endoscopy before or during your esophageal dilation  During an endoscopy, your healthcare provider will use a scope to see inside your esophagus  DISCHARGE INSTRUCTIONS:   Medicines:   · Medicines  No change  · Diet: please keep on soft diet  · Take your medicine as directed  Contact your healthcare provider if you think your medicine is not helping or if you have side effects  Tell him if you are allergic to any medicine  Keep a list of the medicines, vitamins, and herbs you take  Include the amounts, and when and why you take them  Bring the list or the pill bottles to follow-up visits  Carry your medicine list with you in case of an emergency  Follow up with your healthcare provider as directed:  Write down your questions so you remember to ask them during your visits  Nutrition:  You may eat foods you normally eat  Chew your food well  Eat soft foods if you still have problems swallowing  Soft foods include applesauce, bananas, cooked cereal, cottage cheese, eggs, pudding, and yogurt  Ask for more information on what types of food to eat  Contact your healthcare provider if:   · You have a fever  · You feel very full or bloated  · You have more problems swallowing food  · You have nausea or are vomiting  · You have questions or concerns about your condition or care  Seek care immediately or call 911 if:   · You vomit blood  · You are not able to swallow any food  · You have a fast heartbeat, chest pain, or sudden trouble breathing  · Your abdomen suddenly becomes tender and hard  © 2017 [employer] Information is for End User's use only and may not be sold, redistributed or otherwise used for commercial purposes  All illustrations and images included in CareNotes® are the copyrighted property of A [employer]  or [employer] Clarke  The above information is an  only  It is not intended as medical advice for individual conditions or treatments  Talk to your doctor, nurse or pharmacist before following any medical regimen to see if it is safe and effective for you

## 2020-08-31 NOTE — INTERVAL H&P NOTE
H&P reviewed  After examining the patient I find no changes in the patients condition since the H&P had been written  Vitals:    08/31/20 0851   BP: 94/65   Pulse: 77   Resp: 20   Temp: (!) 96 7 °F (35 9 °C)   SpO2: 100%       Patient went to the ER this weekend for abdominal pain  CT was non revealing  Showed actual interval decrease and posterior thoracic/mediastinal fluid collection/abscess  Also showed some to iliac an SMA adenopathy that we will follow as an outpatient    Okay to proceed with surgery as scheduled

## 2020-09-01 ENCOUNTER — OFFICE VISIT (OUTPATIENT)
Dept: FAMILY MEDICINE CLINIC | Facility: CLINIC | Age: 65
End: 2020-09-01
Payer: COMMERCIAL

## 2020-09-01 VITALS
DIASTOLIC BLOOD PRESSURE: 40 MMHG | TEMPERATURE: 97.6 F | RESPIRATION RATE: 12 BRPM | HEART RATE: 78 BPM | WEIGHT: 141.8 LBS | SYSTOLIC BLOOD PRESSURE: 95 MMHG | HEIGHT: 70 IN | OXYGEN SATURATION: 98 % | BODY MASS INDEX: 20.3 KG/M2

## 2020-09-01 DIAGNOSIS — J44.9 CHRONIC OBSTRUCTIVE PULMONARY DISEASE, UNSPECIFIED COPD TYPE (HCC): ICD-10-CM

## 2020-09-01 DIAGNOSIS — E11.65 TYPE 2 DIABETES MELLITUS WITH HYPERGLYCEMIA, WITHOUT LONG-TERM CURRENT USE OF INSULIN (HCC): Primary | ICD-10-CM

## 2020-09-01 DIAGNOSIS — M46.20 PARASPINAL ABSCESS (HCC): ICD-10-CM

## 2020-09-01 DIAGNOSIS — J44.9 COPD (CHRONIC OBSTRUCTIVE PULMONARY DISEASE) (HCC): ICD-10-CM

## 2020-09-01 DIAGNOSIS — C16.0 GE JUNCTION CARCINOMA (HCC): ICD-10-CM

## 2020-09-01 LAB — SL AMB POCT HEMOGLOBIN AIC: 5 (ref ?–6.5)

## 2020-09-01 PROCEDURE — 3044F HG A1C LEVEL LT 7.0%: CPT | Performed by: FAMILY MEDICINE

## 2020-09-01 PROCEDURE — 83036 HEMOGLOBIN GLYCOSYLATED A1C: CPT | Performed by: FAMILY MEDICINE

## 2020-09-01 PROCEDURE — 99214 OFFICE O/P EST MOD 30 MIN: CPT | Performed by: FAMILY MEDICINE

## 2020-09-01 RX ORDER — ALBUTEROL SULFATE 2.5 MG/3ML
2.5 SOLUTION RESPIRATORY (INHALATION) EVERY 6 HOURS PRN
Qty: 75 ML | Refills: 2 | Status: SHIPPED | OUTPATIENT
Start: 2020-09-01 | End: 2020-10-26

## 2020-09-01 NOTE — PROGRESS NOTES
Assessment/Plan:     Chronic Problems:  No problem-specific Assessment & Plan notes found for this encounter  Visit Diagnosis:  Diagnoses and all orders for this visit:    Type 2 diabetes mellitus with hyperglycemia, without long-term current use of insulin (HCC)  -     POCT hemoglobin A1c    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HCC)    Chronic obstructive pulmonary disease, unspecified COPD type (HCC)    COPD (chronic obstructive pulmonary disease) (HCC)  -     albuterol (2 5 mg/3 mL) 0 083 % nebulizer solution; Take 1 vial (2 5 mg total) by nebulization every 6 (six) hours as needed for wheezing or shortness of breath      ge carcinoma with  Stable, maintain scheduled follow-up with Oncology  Diabetes  Hemoglobin A1c 5 0, advised to discontinue Invokana  Will continue to monitor  COPD  Stable, denies any shortness of breath wheezing, med refill per request      Subjective:    Patient ID: Hernán Gil is a 76 y o  male  Better wasin the ER , stent placed for esophageal stricture , also j tube is primary source of nutritiion   Has f/u with Hematology-Oncology also has follow-up with GI  Copd , will need refill on meds , denies any shortness of breath difficulty breathing, negative cough  Would like refills on nebulizer, just in case  Diabetes numbers have not been checking denies any episodes of hypoglycemia, continues take Invokana with never advised differently        The following portions of the patient's history were reviewed and updated as appropriate: allergies, current medications, past family history, past medical history, past social history, past surgical history and problem list     Review of Systems   Constitutional: Negative for appetite change, chills, fever and unexpected weight change  HENT: Negative for congestion, dental problem, ear pain, hearing loss, postnasal drip, rhinorrhea, sinus pressure, sinus pain, sneezing, sore throat, tinnitus and voice change      Eyes: Negative for visual disturbance  Respiratory: Negative for apnea, cough, chest tightness and shortness of breath  Cardiovascular: Negative for chest pain, palpitations and leg swelling  Gastrointestinal: Negative for abdominal pain, blood in stool, constipation, diarrhea, nausea and vomiting  Endocrine: Negative for cold intolerance, heat intolerance, polydipsia, polyphagia and polyuria  Genitourinary: Negative for decreased urine volume, difficulty urinating, dysuria, frequency and hematuria  Musculoskeletal: Negative for arthralgias, back pain, gait problem, joint swelling and myalgias  Skin: Negative for color change, rash and wound  Allergic/Immunologic: Negative for environmental allergies and food allergies  Neurological: Negative for dizziness, syncope, weakness, light-headedness, numbness and headaches  Hematological: Negative for adenopathy  Does not bruise/bleed easily  Psychiatric/Behavioral: Negative for sleep disturbance and suicidal ideas  The patient is not nervous/anxious            BP (!) 95/40 (BP Location: Left arm, Patient Position: Sitting, Cuff Size: Adult)   Pulse 78   Temp 97 6 °F (36 4 °C)   Resp 12   Ht 5' 10" (1 778 m)   Wt 64 3 kg (141 lb 12 8 oz)   SpO2 98%   BMI 20 35 kg/m²   Social History     Socioeconomic History    Marital status: Single     Spouse name: Not on file    Number of children: Not on file    Years of education: Not on file    Highest education level: Not on file   Occupational History    Not on file   Social Needs    Financial resource strain: Not on file    Food insecurity     Worry: Not on file     Inability: Not on file    Transportation needs     Medical: Not on file     Non-medical: Not on file   Tobacco Use    Smoking status: Former Smoker     Packs/day: 0 50     Years: 50 00     Pack years: 25 00     Types: Cigarettes     Last attempt to quit: 2017     Years since quittin 6    Smokeless tobacco: Never Used   Substance and Sexual Activity    Alcohol use: Not Currently     Alcohol/week: 0 0 standard drinks     Frequency: Never     Drinks per session: 1 or 2     Binge frequency: Never    Drug use: Never    Sexual activity: Not on file   Lifestyle    Physical activity     Days per week: Not on file     Minutes per session: Not on file    Stress: Not on file   Relationships    Social connections     Talks on phone: Not on file     Gets together: Not on file     Attends Yazidi service: Not on file     Active member of club or organization: Not on file     Attends meetings of clubs or organizations: Not on file     Relationship status: Not on file    Intimate partner violence     Fear of current or ex partner: Not on file     Emotionally abused: Not on file     Physically abused: Not on file     Forced sexual activity: Not on file   Other Topics Concern    Not on file   Social History Narrative    Not on file     Past Medical History:   Diagnosis Date    Bilateral pleural effusion     Brain lesion     COPD [facility]chronic obstructive pulmonary disease) ([facility] )     Diabetes mellitus ([facility] )     Difficulty swallowing     Disease of thyroid gland     Edema     Esophageal mass     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[facility] ) 9/24/2019    History of chemotherapy     History of transfusion     Malignant neoplasm of lower third of esophagus ([facility] ) 9/17/2019    Diagnosis: clinical stage [id number] esophageal carcinoma Procedure: EGD, transhiatal esophagectomy performed on 1/28/20 Pathology: esophagogastrectomy reveals microscopic focus of residual adenocarcinoma in muscularis propria measuring 0 7 cm, adjacent Duong's esophagus with associated atypia indeterminate for dysplasia  7 lymph nodes negative for carcinoma   Proximal and distal margins negative for    Paraspinal abscess (HCC)     PONV (postoperative nausea and vomiting)     Port-A-Cath in place     LCW    Sleep apnea     no CPAP    SOB (shortness of breath)      Family History   Problem Relation Age of Onset    Diabetes Mother     No Known Problems Father      Past Surgical History:   Procedure Laterality Date    BACK SURGERY      x2    DISC REMOVAL      ESOPHAGECTOMY N/A 1/28/2020    Procedure: ESOPHAGECTOMY, TRANSHIATAL;  Surgeon: Pallavi Womack MD;  [facility];  Service: Surgical Oncology    ESOPHAGOGASTRODUODENOSCOPY N/A 1/28/2020    Procedure: ESOPHAGOGASTRODUODENOSCOPY (EGD); Surgeon: Pallavi Womack MD;  [facility];  Service: Surgical Oncology    FL GUIDED CENTRAL VENOUS ACCESS DEVICE INSERTION  9/26/2019    GASTROJEJUNOSTOMY W/ JEJUNOSTOMY TUBE N/A 9/26/2019    Procedure: INSERTION JEJUNOSTOMY TUBE OPEN;  Surgeon: Pallavi Womack MD;  [facility];  Service: Surgical Oncology    GASTROJEJUNOSTOMY W/ JEJUNOSTOMY TUBE N/A 6/5/2020    Procedure: INSERTION JEJUNOSTOMY TUBE OPEN;  Surgeon: Pallavi Womack MD;  [facility];  Service: Surgical Oncology    IR CHEST TUBE PLACEMENT  5/26/2020    IR SPINE PROCEDURE  5/27/2020    IR THORACENTESIS  2/25/2020    NH EGD ENDOSCOPIC STENT PLACEMENT W/WIRE& DILATION N/A 8/31/2020    Procedure: INSERTION STENT ESOPHAGEAL;  Surgeon: Mandy Copeland MD;  [facility];  Service: Thoracic    NH EGD INSERT GUIDE WIRE DILATOR PASSAGE ESOPHAGUS N/A 7/7/2020    Procedure: ESOPHAGOGASTRODUODENOSCOPY (EGD) with esophageal dilation under fluro with biopsy;  Surgeon: Mandy Copeland MD;  [facility];  Service: Thoracic    NH ESOPHAGOGASTRODUODENOSCOPY TRANSORAL DIAGNOSTIC N/A 4/28/2020    Procedure: ESOPHAGOGASTRODUODENOSCOPY (EGD); Surgeon: Mandy Copeland MD;  [facility];  Service: Thoracic    NH ESOPHAGOGASTRODUODENOSCOPY TRANSORAL DIAGNOSTIC N/A 7/27/2020    Procedure: ESOPHAGOGASTRODUODENOSCOPY (EGD);   Surgeon: Mandy Copeland MD;  [facility];  Service: Thoracic    NH ESOPHAGOGASTRODUODENOSCOPY TRANSORAL DIAGNOSTIC N/A 8/31/2020    Procedure: ESOPHAGOGASTRODUODENOSCOPY (EGD) dilation over guidewire 36-45 Fr , stent placement;  Surgeon: Stefano Henao MD;  [facility];  Service: Thoracic    AL ESOPHAGOSCOPY FLEX BALLOON DILAT <30 MM DIAM N/A 4/28/2020    Procedure: DILATATION ESOPHAGEAL;  Surgeon: Stefano Henao MD;  [facility];  Service: Thoracic    AL ESOPHAGOSCOPY FLEX BALLOON DILAT <30 MM DIAM N/A 7/27/2020    Procedure: DILATATION ESOPHAGEAL with Savary over the wire dilitation 33FR to 45FR; Surgeon: Stefano Henao MD;  [facility];  Service: Thoracic    AL ESOPHAGOSCOPY FLEX BALLOON DILAT <30 MM DIAM N/A 8/31/2020    Procedure: DILATATION ESOPHAGEAL;  Surgeon: Stefano Henao MD;  [facility];  Service: Thoracic    TONSILLECTOMY      TUNNELED VENOUS PORT PLACEMENT N/A 9/26/2019    Procedure: INSERTION VENOUS PORT (PORT-A-CATH);   Surgeon: Renny Waters MD;  [facility];  Service: Surgical Oncology    VOCAL CORD INJECTION N/A 2/7/2020    Procedure: MICROLARYNGOSCOPY WITH INJECTION;  Surgeon: Daniel King MD;  [facility];  Service: ENT       Current Outpatient Medications:     albuterol (2 5 mg/3 mL) 0 083 % nebulizer solution, Take 1 vial (2 5 mg total) by nebulization every 6 (six) hours as needed for wheezing or shortness of breath, Disp: 75 mL, Rfl: 2    amiodarone 200 mg tablet, Take 1 tablet (200 mg total) by mouth daily with breakfast, Disp: 30 tablet, Rfl: 0    amoxicillin-clavulanate (AUGMENTIN) 400-57 mg/5 mL suspension, Take 5 mL (400 mg total) by mouth 2 (two) times a day, Disp: 100 mL, Rfl: 0    atorvastatin (LIPITOR) 40 mg tablet, Take 1 tablet (40 mg total) by mouth daily with dinner, Disp: 30 tablet, Rfl: 3    Blood Glucose Monitoring Suppl (ONE TOUCH ULTRA 2) w/Device KIT, Check bs , q fasting and 2 hr after meals, Disp: 1 each, Rfl: 0    dicyclomine (BENTYL) 20 mg tablet, Take 1 tablet (20 mg total) by mouth every 8 (eight) hours as needed (abdominal cramping), Disp: 12 tablet, Rfl: 0    glucose blood test strip, Check blood sugar 3 times daily, Disp: 100 each, Rfl: 3    Lancets (ONETOUCH ULTRASOFT) lancets, Check bs , q fasting and 2 hr after meals, Disp: 100 each, Rfl: 5    levothyroxine 25 mcg tablet, Take 25 mcg by mouth daily in the early morning, Disp: , Rfl:     LORazepam (ATIVAN) 1 mg tablet, Take 1 tablet (1 mg total) by mouth once in imaging for anxiety for up to 1 dose Take 1 tab (1 mg) 20-30 minutes before the procedure, can repeat 1 tab if the first not helping, Disp: 2 tablet, Rfl: 0    ondansetron (ZOFRAN) 4 mg tablet, Take 1 tablet (4 mg total) by mouth every 6 (six) hours as needed for nausea or vomiting, Disp: 30 tablet, Rfl: 0    oxyCODONE (ROXICODONE) 10 MG TABS, Take 1 tablet (10 mg total) by mouth every 4 (four) hours as needed for moderate painMax Daily Amount: 60 mg, Disp: 90 tablet, Rfl: 0    rOPINIRole (REQUIP) 0 25 mg tablet, take 1 tablet by mouth at bedtime, Disp: 30 tablet, Rfl: 0    traZODone (DESYREL) 100 mg tablet, Take 1 tablet (100 mg total) by mouth daily at bedtime, Disp: 30 tablet, Rfl: 3  No current facility-administered medications for this visit       Allergies   Allergen Reactions    Penicillins Itching          Lab Review   Admission on 08/31/2020, Discharged on 08/31/2020   Component Date Value    POC Glucose 08/31/2020 87     POC Glucose 08/31/2020 66     POC Glucose 08/31/2020 108    Admission on 08/29/2020, Discharged on 08/29/2020   Component Date Value    WBC 08/29/2020 3 95*    RBC 08/29/2020 3 86*    Hemoglobin 08/29/2020 10 8*    Hematocrit 08/29/2020 33 5*    MCV 08/29/2020 87     MCH 08/29/2020 28 0     MCHC 08/29/2020 32 2     RDW 08/29/2020 15 8*    MPV 08/29/2020 9 4     Platelets 67/28/8536 199     nRBC 08/29/2020 0     Neutrophils Relative 08/29/2020 64     Immat GRANS % 08/29/2020 0     Lymphocytes Relative 08/29/2020 25     Monocytes Relative 08/29/2020 8     Eosinophils Relative 08/29/2020 2     Basophils Relative 08/29/2020 1     Neutrophils Absolute 08/29/2020 2 52     Immature Grans Absolute 08/29/2020 0 01     Lymphocytes Absolute 08/29/2020 0 99     Monocytes Absolute 08/29/2020 0 31     Eosinophils Absolute 08/29/2020 0 09     Basophils Absolute 08/29/2020 0 03     Protime 08/29/2020 13 4     INR 08/29/2020 1 07     PTT 08/29/2020 35     Sodium 08/29/2020 140     Potassium 08/29/2020 3 7     Chloride 08/29/2020 100     CO2 08/29/2020 30     ANION GAP 08/29/2020 10     BUN 08/29/2020 18     Creatinine 08/29/2020 0 92     Glucose 08/29/2020 78     Calcium 08/29/2020 8 8     eGFR 08/29/2020 86     Total Bilirubin 08/29/2020 1 20*    Bilirubin, Direct 08/29/2020 0 26*    Alkaline Phosphatase 08/29/2020 58     AST 08/29/2020 18     ALT 08/29/2020 28     Total Protein 08/29/2020 7 0     Albumin 08/29/2020 3 3*    LACTIC ACID 08/29/2020 0 6     Magnesium 08/29/2020 2 2     Lipase 08/29/2020 99     Troponin I 08/29/2020 <0 02     NT-proBNP 08/29/2020 75     Color, UA 08/29/2020 Yellow     Clarity, UA 08/29/2020 Clear     Specific Gravity, UA 08/29/2020 1 010     pH, UA 08/29/2020 7 5     Leukocytes, UA 08/29/2020 Negative     Nitrite, UA 08/29/2020 Negative     Protein, UA 08/29/2020 Negative     Glucose, UA 08/29/2020 Negative     Ketones, UA 08/29/2020 15 (1+)*    Urobilinogen, UA 08/29/2020 0 2     Bilirubin, UA 08/29/2020 Negative     Blood, UA 08/29/2020 Negative    Orders Only on 08/11/2020   Component Date Value    SARS-CoV-2  08/11/2020 Not Detected    Appointment on 08/11/2020   Component Date Value    Sed Rate 08/11/2020 43*    WBC 08/11/2020 5 41     RBC 08/11/2020 3 79*    Hemoglobin 08/11/2020 10 6*    Hematocrit 08/11/2020 32 8*    MCV 08/11/2020 87     MCH 08/11/2020 28 0     MCHC 08/11/2020 32 3     RDW 08/11/2020 16 5*    MPV 08/11/2020 9 5     Platelets 29/20/5397 259     nRBC 08/11/2020 0     Neutrophils Relative 08/11/2020 70     Amberat LEX % 08/11/2020 0     Lymphocytes Relative 08/11/2020 20     Monocytes Relative 08/11/2020 7     Eosinophils Relative 08/11/2020 2     Basophils Relative 08/11/2020 1     Neutrophils Absolute 08/11/2020 3 78     Immature Grans Absolute 08/11/2020 0 01     Lymphocytes Absolute 08/11/2020 1 10     Monocytes Absolute 08/11/2020 0 36     Eosinophils Absolute 08/11/2020 0 12     Basophils Absolute 08/11/2020 0 04     CRP 08/11/2020 12 9*    Sodium 08/11/2020 139     Potassium 08/11/2020 4 2     Chloride 08/11/2020 101     CO2 08/11/2020 27     ANION GAP 08/11/2020 11     BUN 08/11/2020 27*    Creatinine 08/11/2020 1 06     Glucose 08/11/2020 83     Calcium 08/11/2020 9 1     AST 08/11/2020 18     ALT 08/11/2020 26     Alkaline Phosphatase 08/11/2020 63     Total Protein 08/11/2020 7 5     Albumin 08/11/2020 3 4*    Total Bilirubin 08/11/2020 1 00     eGFR 08/11/2020 73    Admission on 07/27/2020, Discharged on 07/27/2020   Component Date Value    POC Glucose 07/27/2020 95     POC Glucose 07/27/2020 88    Admission on 07/07/2020, Discharged on 07/07/2020   Component Date Value    POC Glucose 07/07/2020 113     Case Report 07/07/2020                      Value:Surgical Pathology Report                         Case: [id number]                                   Authorizing Provider:  Shana Gustafson MD      Collected:           07/07/2020 1342              Ordering Location:     [address] [place] [address]      Received:            07/07/2020 [address] Operating Room                                                      Pathologist:           Isauro Mclaughlin MD                                                              Specimen:    Esophagogastric junction, Gastroesophageal anastamosis                                     Final Diagnosis 07/07/2020                      Value: This result contains rich text formatting which cannot be displayed here      Note 07/07/2020                      Value: This result contains rich text formatting which cannot be displayed here   Additional Information 07/07/2020                      Value: This result contains rich text formatting which cannot be displayed here  Brittanie Schneider Gross Description 07/07/2020                      Value: This result contains rich text formatting which cannot be displayed here      POC Glucose 07/07/2020 98    Admission on 07/02/2020, Discharged on 07/04/2020   Component Date Value    WBC 07/02/2020 5 20     RBC 07/02/2020 4 01     Hemoglobin 07/02/2020 10 9*    Hematocrit 07/02/2020 34 6*    MCV 07/02/2020 86     MCH 07/02/2020 27 2     MCHC 07/02/2020 31 5     RDW 07/02/2020 19 7*    MPV 07/02/2020 9 5     Platelets 29/15/9555 240     nRBC 07/02/2020 0     Neutrophils Relative 07/02/2020 60     Immat GRANS % 07/02/2020 0     Lymphocytes Relative 07/02/2020 25     Monocytes Relative 07/02/2020 8     Eosinophils Relative 07/02/2020 6     Basophils Relative 07/02/2020 1     Neutrophils Absolute 07/02/2020 3 14     Immature Grans Absolute 07/02/2020 0 02     Lymphocytes Absolute 07/02/2020 1 30     Monocytes Absolute 07/02/2020 0 40     Eosinophils Absolute 07/02/2020 0 29     Basophils Absolute 07/02/2020 0 05     Sodium 07/02/2020 138     Potassium 07/02/2020 3 8     Chloride 07/02/2020 105     CO2 07/02/2020 28     ANION GAP 07/02/2020 5     BUN 07/02/2020 17     Creatinine 07/02/2020 0 84     Glucose 07/02/2020 96     Calcium 07/02/2020 8 9     AST 07/02/2020 13     ALT 07/02/2020 25     Alkaline Phosphatase 07/02/2020 66     Total Protein 07/02/2020 7 8     Albumin 07/02/2020 3 4*    Total Bilirubin 07/02/2020 0 89     eGFR 07/02/2020 92     ABO Grouping 07/02/2020 O     Rh Factor 07/02/2020 Positive     Antibody Screen 07/02/2020 Negative     Specimen Expiration Date 07/02/2020 [id number]     POC Glucose 07/02/2020 79     Hemoglobin A1C 07/03/2020 5 5     EAG 07/03/2020 111     Sodium 07/03/2020 138     Potassium 07/03/2020 3 7     Chloride 07/03/2020 102     CO2 07/03/2020 29     ANION GAP 07/03/2020 7     BUN 07/03/2020 22     Creatinine 07/03/2020 0 98     Glucose 07/03/2020 149*    Calcium 07/03/2020 9 3     eGFR 07/03/2020 81     WBC 07/03/2020 3 71*    RBC 07/03/2020 3 86*    Hemoglobin 07/03/2020 10 6*    Hematocrit 07/03/2020 34 0*    MCV 07/03/2020 88     MCH 07/03/2020 27 5     MCHC 07/03/2020 31 2*    RDW 07/03/2020 19 9*    Platelets 68/32/8038 234     MPV 07/03/2020 9 7     Magnesium 07/03/2020 2 4     Phosphorus 07/03/2020 3 8     POC Glucose 07/03/2020 153*    POC Glucose 07/03/2020 101     POC Glucose 07/03/2020 136     SARS-CoV-2 07/03/2020 Negative     POC Glucose 07/03/2020 172*    POC Glucose 07/04/2020 121     POC Glucose 07/04/2020 123    No results displayed because visit has over 200 results        Admission on 05/23/2020, Discharged on 05/23/2020   Component Date Value    WBC 05/23/2020 8 68     RBC 05/23/2020 4 17     Hemoglobin 05/23/2020 10 5*    Hematocrit 05/23/2020 33 8*    MCV 05/23/2020 81*    MCH 05/23/2020 25 2*    MCHC 05/23/2020 31 1*    RDW 05/23/2020 14 8     MPV 05/23/2020 8 1*    Platelets 81/82/2694 357     nRBC 05/23/2020 0     Neutrophils Relative 05/23/2020 88*    Immat GRANS % 05/23/2020 0     Lymphocytes Relative 05/23/2020 5*    Monocytes Relative 05/23/2020 7     Eosinophils Relative 05/23/2020 0     Basophils Relative 05/23/2020 0     Neutrophils Absolute 05/23/2020 7 57     Immature Grans Absolute 05/23/2020 0 03     Lymphocytes Absolute 05/23/2020 0 42*    Monocytes Absolute 05/23/2020 0 60     Eosinophils Absolute 05/23/2020 0 03     Basophils Absolute 05/23/2020 0 03     Sodium 05/23/2020 136     Potassium 05/23/2020 3 9     Chloride 05/23/2020 97*    CO2 05/23/2020 30     ANION GAP 05/23/2020 9     BUN 05/23/2020 21     Creatinine 05/23/2020 1 21     Glucose 05/23/2020 129     Calcium 05/23/2020 9 2     eGFR 05/23/2020 62     Total Bilirubin 05/23/2020 0 70     Bilirubin, Direct 05/23/2020 0 23*    Alkaline Phosphatase 05/23/2020 79     AST 05/23/2020 15     ALT 05/23/2020 16     Total Protein 05/23/2020 8 9*    Albumin 05/23/2020 2 5*    Magnesium 05/23/2020 1 9     Protime 05/23/2020 14 3     INR 05/23/2020 1 10     ABO Grouping 05/23/2020 O     Rh Factor 05/23/2020 Positive     Antibody Screen 05/23/2020 Negative     Specimen Expiration Date 05/23/2020 [id number]    Hospital Outpatient Visit on 05/23/2020   Component Date Value    POC Glucose 05/23/2020 124    There may be more visits with results that are not included  Imaging: Ct Chest Abdomen Pelvis W Contrast    Result Date: 8/8/2020  Narrative: CT CHEST, ABDOMEN AND PELVIS WITH IV CONTRAST INDICATION:   C15 5: Malignant neoplasm of lower third of esophagus  COMPARISON:  July 3, 2020 TECHNIQUE: CT examination of the chest, abdomen and pelvis was performed  Axial, sagittal, and coronal 2D reformatted images were created from the source data and submitted for interpretation  Radiation dose length product (DLP) for this visit:  604 mGy-cm   This examination, like all CT scans performed in the [facility], was performed utilizing techniques to minimize radiation dose exposure, including the use of iterative reconstruction and automated exposure control  IV Contrast:  100 mL of iohexol (OMNIPAQUE) Enteric Contrast: Enteric contrast was administered  FINDINGS: CHEST LUNGS:  No acute consolidation Trachea and central bronchi are patent PLEURA:  Right pleural effusion seen Linear hyperdensity seen along the right pleura HEART/GREAT VESSELS:  Unremarkable for patient's age  MEDIASTINUM AND SHEA:  There are postsurgical changes from prior esophageal surgery  Contrast is seen opacifying the gastric conduit    Again noted is a mediastinal fluid collection with enhancing margins in the middle mediastinum extending from T4 through T7 vertebra  This collection demonstrates fluid and air within it  And abuts the adjacent to the vertebrae  There are erosive changes in the anterior margin of the T4, T5, T6 vertebrae  These are also noted on the previous study of July 3, 2020  This collection measures 6 cm x 2 9 cm  This is unchanged from the previous study CHEST WALL AND LOWER NECK:   Unremarkable  ABDOMEN LIVER/BILIARY TREE:  Unremarkable  GALLBLADDER: Cholelithiasis is seen SPLEEN:  Unremarkable  PANCREAS:  Unremarkable  ADRENAL GLANDS:  Nodularity of the both adrenal gland seen KIDNEYS/URETERS:  Remain unchanged STOMACH AND BOWEL:  A jejunostomy tube is seen There is no evidence of bowel obstruction APPENDIX:  No findings to suggest appendicitis  ABDOMINOPELVIC CAVITY:  No retroperitoneal lymph node enlargement seen No significant mesenteric lymph node enlargement seen  VESSELS:  Soft tissue noted around the celiac trunk with encasement of the celiac trunk and origin of the hepatic artery, splenic artery, unchanged from the recent previous study of July 3, 2020 but new from the previous study of May 23, 2020, this is suspicious for recurrence Expected changes from embolization of the lymphatic system Rounded hypodense areas in the both inguinal region in the limits for size sequela of  embolization of the lymphatic system PELVIS REPRODUCTIVE ORGANS:  Unremarkable for patient's age  URINARY BLADDER:  Unremarkable  ABDOMINAL WALL/INGUINAL REGIONS:  Unremarkable  OSSEOUS STRUCTURES:  Bone noted are erosive changes in the thoracic spine at the level of extending from C4 through T7 with associated active sclerosis in the anterior aspect of the vertebrae  Endplate erosive changes along the inferior aspect of the T4  vertebra, T5 vertebra, T6 vertebra    There has been progression of sclerosis and erosive changes since the previous study of May 23, 2020 Mild sclerosis in the mid body of the sternum, stable Sclerosis seen within the right iliac bone, stable1    Impression: Middle mediastinal collection adjacent to the gastric conduit and abutting the thoracic spine extending from T4 through T7 remains unchanged Sclerosis, erosive changes in the thoracic spine vertebrae, more pronounced from the previous study of May 23, 2020 There is a increasing soft tissue which is seen encasing the celiac trunk and the origin of the hepatic artery and splenic artery, this is suspicious for recurrence or progression No new liver lesion No new abdominopelvic lymphadenopathy Workstation performed: [id number]     Pe Study With Ct Abdomen And Pelvis With Contrast    Result Date: 8/29/2020  Narrative: CT PULMONARY ANGIOGRAM OF THE CHEST AND CT ABDOMEN AND PELVIS WITH INTRAVENOUS CONTRAST INDICATION:   Shortness of breath for several weeks, patient has history of esophageal cancer  Patient also having several days of upper abdominal pain, worse after eating  Patient has J-tube in place  COMPARISON:  8/5/2020  TECHNIQUE:  CT examination of the chest, abdomen and pelvis was performed  Thin section CT angiographic technique was used in the chest in order to evaluate for pulmonary embolus and coronal 3D MIP postprocessing was performed on the acquisition scanner  Axial, sagittal, and coronal 2D reformatted images were created from the source data and submitted for interpretation  Radiation dose length product (DLP) for this visit:  376 mGy-cm   This examination, like all CT scans performed in the [facility], was performed utilizing techniques to minimize radiation dose exposure, including the use of iterative reconstruction and automated exposure control  IV Contrast:  100 mL of iohexol (OMNIPAQUE) Enteric Contrast:  Enteric contrast was not administered  FINDINGS: CHEST PULMONARY ARTERIAL TREE:  No pulmonary embolus is seen  LUNGS:  Calcified right upper lobe granuloma is present    There is no tracheal or endobronchial lesion  PLEURA:  Small right pleural effusion is present  HEART/AORTA:  Unremarkable for patient's age  MEDIASTINUM AND SHEA:  Patient is again status post esophagectomy and gastric pull-through  There has been some interval decrease in size of the posterior mediastinal prevertebral collection now measuring 1 7 x 1 7 x 3 7 cm    CHEST WALL AND LOWER NECK:   Unremarkable  ABDOMEN LIVER/BILIARY TREE:  Unremarkable  GALLBLADDER:  There are gallstone(s) within the gallbladder, without pericholecystic inflammatory changes  SPLEEN:  Unremarkable  PANCREAS:  Unremarkable  ADRENAL GLANDS:  Unremarkable  KIDNEYS/URETERS:  Unremarkable  No hydronephrosis  STOMACH AND BOWEL:  Jejunostomy tube is identified  APPENDIX:  No findings to suggest appendicitis  ABDOMINOPELVIC CAVITY: Infiltrative appearing soft tissue is again seen about the celiac and SMA axes suspicious for neoplastic involvement  No ascites  No pneumoperitoneum  No lymphadenopathy  VESSELS:  Unremarkable for patient's age  PELVIS REPRODUCTIVE ORGANS:  Unremarkable for patient's age  URINARY BLADDER:  Unremarkable  ABDOMINAL WALL/INGUINAL REGIONS:  Unremarkable  OSSEOUS STRUCTURES:  There is again sclerosis and erosion of the T4, T5, and T6 vertebral bodies in keeping with osteomyelitis  Impression: Some interval decrease in size of prevertebral abscess  Stable changes related to osteomyelitis involving T4, T5, and T6  No evidence of pulmonary embolus  Persistent soft tissue abnormality about the celiac and SMA axes suspicious for neoplastic involvement  Cholelithiasis  Workstation performed: [id number]       Objective:     Physical Exam  Constitutional:       General: He is not in acute distress  Comments: Chronically ill in appearance   HENT:      Head: Normocephalic and atraumatic  Eyes:      General: No scleral icterus  Conjunctiva/sclera: Conjunctivae normal    Neck:      Musculoskeletal: Normal range of motion  No neck rigidity  Cardiovascular:      Rate and Rhythm: Normal rate  Pulses: Normal pulses  Pulmonary:      Effort: Pulmonary effort is normal  No respiratory distress  Breath sounds: No wheezing  Neurological:      Mental Status: He is alert  There are no Patient Instructions on file for this visit  SARA Wilson    Portions of the record may have been created with voice recognition software  Occasional wrong word or "sound a like" substitutions may have occurred due to the inherent limitations of voice recognition software  Read the chart carefully and recognize, using context, where substitutions have occurred

## 2020-09-02 LAB
ATRIAL RATE: 72 BPM
P AXIS: 73 DEGREES
PR INTERVAL: 172 MS
QRS AXIS: 21 DEGREES
QRSD INTERVAL: 84 MS
QT INTERVAL: 384 MS
QTC INTERVAL: 420 MS
T WAVE AXIS: 55 DEGREES
VENTRICULAR RATE: 72 BPM

## 2020-09-02 PROCEDURE — 93010 ELECTROCARDIOGRAM REPORT: CPT | Performed by: INTERNAL MEDICINE

## 2020-09-04 DIAGNOSIS — G89.3 CANCER-RELATED PAIN: ICD-10-CM

## 2020-09-04 RX ORDER — AMOXICILLIN AND CLAVULANATE POTASSIUM 400; 57 MG/5ML; MG/5ML
400 POWDER, FOR SUSPENSION ORAL 2 TIMES DAILY
Qty: 100 ML | Refills: 0 | Status: SHIPPED | OUTPATIENT
Start: 2020-09-04 | End: 2020-09-21 | Stop reason: SDUPTHER

## 2020-09-08 RX ORDER — OXYCODONE HYDROCHLORIDE 10 MG/1
10 TABLET ORAL EVERY 4 HOURS PRN
Qty: 90 TABLET | Refills: 0 | Status: SHIPPED | OUTPATIENT
Start: 2020-09-08 | End: 2020-10-07 | Stop reason: SDUPTHER

## 2020-09-16 ENCOUNTER — HOSPITAL ENCOUNTER (EMERGENCY)
Facility: HOSPITAL | Age: 65
Discharge: HOME | End: 2020-09-16
Attending: EMERGENCY MEDICINE | Admitting: EMERGENCY MEDICINE
Payer: COMMERCIAL

## 2020-09-16 ENCOUNTER — APPOINTMENT (EMERGENCY)
Dept: CT IMAGING | Facility: HOSPITAL | Age: 65
End: 2020-09-16
Payer: COMMERCIAL

## 2020-09-16 ENCOUNTER — APPOINTMENT (EMERGENCY)
Dept: RADIOLOGY | Facility: HOSPITAL | Age: 65
End: 2020-09-16
Payer: COMMERCIAL

## 2020-09-16 VITALS
DIASTOLIC BLOOD PRESSURE: 76 MMHG | WEIGHT: 142.64 LBS | HEIGHT: 70 IN | HEART RATE: 91 BPM | OXYGEN SATURATION: 100 % | SYSTOLIC BLOOD PRESSURE: 157 MMHG | BODY MASS INDEX: 20.42 KG/M2 | TEMPERATURE: 97.7 F | RESPIRATION RATE: 15 BRPM

## 2020-09-16 DIAGNOSIS — R10.9 ABDOMINAL PAIN: Primary | ICD-10-CM

## 2020-09-16 DIAGNOSIS — M46.20 PARASPINAL ABSCESS (HCC): Primary | ICD-10-CM

## 2020-09-16 LAB
ALBUMIN SERPL BCP-MCNC: 3.1 G/DL (ref 3.5–5)
ALP SERPL-CCNC: 58 U/L (ref 46–116)
ALT SERPL W P-5'-P-CCNC: 22 U/L (ref 12–78)
ANION GAP SERPL CALCULATED.3IONS-SCNC: 7 MMOL/L (ref 4–13)
AST SERPL W P-5'-P-CCNC: 14 U/L (ref 5–45)
BASOPHILS # BLD AUTO: 0.03 THOUSANDS/ΜL (ref 0–0.1)
BASOPHILS NFR BLD AUTO: 1 % (ref 0–1)
BILIRUB DIRECT SERPL-MCNC: 0.26 MG/DL (ref 0–0.2)
BILIRUB SERPL-MCNC: 1.1 MG/DL (ref 0.2–1)
BILIRUB UR QL STRIP: NEGATIVE
BUN SERPL-MCNC: 18 MG/DL (ref 5–25)
CALCIUM SERPL-MCNC: 9 MG/DL (ref 8.3–10.1)
CHLORIDE SERPL-SCNC: 103 MMOL/L (ref 100–108)
CLARITY UR: CLEAR
CO2 SERPL-SCNC: 28 MMOL/L (ref 21–32)
COLOR UR: YELLOW
CREAT SERPL-MCNC: 0.76 MG/DL (ref 0.6–1.3)
CRP SERPL QL: 22.8 MG/L
EOSINOPHIL # BLD AUTO: 0.05 THOUSAND/ΜL (ref 0–0.61)
EOSINOPHIL NFR BLD AUTO: 1 % (ref 0–6)
ERYTHROCYTE [DISTWIDTH] IN BLOOD BY AUTOMATED COUNT: 14.8 % (ref 11.6–15.1)
ERYTHROCYTE [SEDIMENTATION RATE] IN BLOOD: 46 MM/HOUR (ref 0–19)
GFR SERPL CREATININE-BSD FRML MDRD: 95 ML/MIN/1.73SQ M
GLUCOSE SERPL-MCNC: 80 MG/DL (ref 65–140)
GLUCOSE UR STRIP-MCNC: NEGATIVE MG/DL
HCT VFR BLD AUTO: 33.2 % (ref 36.5–49.3)
HGB BLD-MCNC: 10.5 G/DL (ref 12–17)
HGB UR QL STRIP.AUTO: NEGATIVE
IMM GRANULOCYTES # BLD AUTO: 0.01 THOUSAND/UL (ref 0–0.2)
IMM GRANULOCYTES NFR BLD AUTO: 0 % (ref 0–2)
KETONES UR STRIP-MCNC: ABNORMAL MG/DL
LACTATE SERPL-SCNC: 0.5 MMOL/L (ref 0.5–2)
LEUKOCYTE ESTERASE UR QL STRIP: NEGATIVE
LIPASE SERPL-CCNC: 101 U/L (ref 73–393)
LYMPHOCYTES # BLD AUTO: 0.82 THOUSANDS/ΜL (ref 0.6–4.47)
LYMPHOCYTES NFR BLD AUTO: 19 % (ref 14–44)
MCH RBC QN AUTO: 27.6 PG (ref 26.8–34.3)
MCHC RBC AUTO-ENTMCNC: 31.6 G/DL (ref 31.4–37.4)
MCV RBC AUTO: 87 FL (ref 82–98)
MONOCYTES # BLD AUTO: 0.35 THOUSAND/ΜL (ref 0.17–1.22)
MONOCYTES NFR BLD AUTO: 8 % (ref 4–12)
NEUTROPHILS # BLD AUTO: 3.12 THOUSANDS/ΜL (ref 1.85–7.62)
NEUTS SEG NFR BLD AUTO: 71 % (ref 43–75)
NITRITE UR QL STRIP: NEGATIVE
NRBC BLD AUTO-RTO: 0 /100 WBCS
PH UR STRIP.AUTO: 7 [PH]
PLATELET # BLD AUTO: 208 THOUSANDS/UL (ref 149–390)
PMV BLD AUTO: 9 FL (ref 8.9–12.7)
POTASSIUM SERPL-SCNC: 3.7 MMOL/L (ref 3.5–5.3)
PROT SERPL-MCNC: 7 G/DL (ref 6.4–8.2)
PROT UR STRIP-MCNC: NEGATIVE MG/DL
RBC # BLD AUTO: 3.81 MILLION/UL (ref 3.88–5.62)
SODIUM SERPL-SCNC: 138 MMOL/L (ref 136–145)
SP GR UR STRIP.AUTO: 1.01 (ref 1–1.03)
UROBILINOGEN UR QL STRIP.AUTO: 0.2 E.U./DL
WBC # BLD AUTO: 4.38 THOUSAND/UL (ref 4.31–10.16)

## 2020-09-16 PROCEDURE — 83690 ASSAY OF LIPASE: CPT | Performed by: PHYSICIAN ASSISTANT

## 2020-09-16 PROCEDURE — 96366 THER/PROPH/DIAG IV INF ADDON: CPT

## 2020-09-16 PROCEDURE — 85652 RBC SED RATE AUTOMATED: CPT

## 2020-09-16 PROCEDURE — 99285 EMERGENCY DEPT VISIT HI MDM: CPT | Performed by: PHYSICIAN ASSISTANT

## 2020-09-16 PROCEDURE — 96375 TX/PRO/DX INJ NEW DRUG ADDON: CPT

## 2020-09-16 PROCEDURE — 71045 X-RAY EXAM CHEST 1 VIEW: CPT

## 2020-09-16 PROCEDURE — 99285 EMERGENCY DEPT VISIT HI MDM: CPT

## 2020-09-16 PROCEDURE — 96365 THER/PROPH/DIAG IV INF INIT: CPT

## 2020-09-16 PROCEDURE — 83605 ASSAY OF LACTIC ACID: CPT | Performed by: PHYSICIAN ASSISTANT

## 2020-09-16 PROCEDURE — 81003 URINALYSIS AUTO W/O SCOPE: CPT | Performed by: PHYSICIAN ASSISTANT

## 2020-09-16 PROCEDURE — G1004 CDSM NDSC: HCPCS

## 2020-09-16 PROCEDURE — 80048 BASIC METABOLIC PNL TOTAL CA: CPT | Performed by: PHYSICIAN ASSISTANT

## 2020-09-16 PROCEDURE — 86140 C-REACTIVE PROTEIN: CPT

## 2020-09-16 PROCEDURE — 36415 COLL VENOUS BLD VENIPUNCTURE: CPT | Performed by: PHYSICIAN ASSISTANT

## 2020-09-16 PROCEDURE — 85025 COMPLETE CBC W/AUTO DIFF WBC: CPT | Performed by: PHYSICIAN ASSISTANT

## 2020-09-16 PROCEDURE — 74177 CT ABD & PELVIS W/CONTRAST: CPT

## 2020-09-16 PROCEDURE — 80076 HEPATIC FUNCTION PANEL: CPT | Performed by: PHYSICIAN ASSISTANT

## 2020-09-16 RX ORDER — MORPHINE SULFATE 4 MG/ML
4 INJECTION, SOLUTION INTRAMUSCULAR; INTRAVENOUS ONCE
Status: COMPLETED | OUTPATIENT
Start: 2020-09-16 | End: 2020-09-16

## 2020-09-16 RX ADMIN — IOHEXOL 100 ML: 350 INJECTION, SOLUTION INTRAVENOUS at 10:55

## 2020-09-16 RX ADMIN — SODIUM CHLORIDE, SODIUM LACTATE, POTASSIUM CHLORIDE, AND CALCIUM CHLORIDE 1000 ML: .6; .31; .03; .02 INJECTION, SOLUTION INTRAVENOUS at 10:18

## 2020-09-16 RX ADMIN — MORPHINE SULFATE 4 MG: 4 INJECTION INTRAVENOUS at 10:18

## 2020-09-16 NOTE — DISCHARGE INSTRUCTIONS
Please call the office of Dr José Iglesias to schedule a follow up appointment to discuss treatment options for pain and appetite stimulation    Return to the ED immediately for any new or worsening symptoms as discussed

## 2020-09-16 NOTE — ED ATTENDING ATTESTATION
9/16/2020  I, Kadie Winter MD, saw and evaluated the patient  I have discussed the patient with the resident/non-physician practitioner and agree with the resident's/non-physician practitioner's findings, Plan of Care, and MDM as documented in the resident's/non-physician practitioner's note, except where noted  All available labs and Radiology studies were reviewed  I was present for key portions of any procedure(s) performed by the resident/non-physician practitioner and I was immediately available to provide assistance  At this point I agree with the current assessment done in the Emergency Department  I have conducted an independent evaluation of this patient a history and physical is as follows: 72yo male with h/o esophageal cancer s/p surgery and no recent chemo/radiation, did recent have a dilation and stent put in is coming in with early satiety and poor appetite and weight loss along with general malaise and fatigue, along with some abd pain intermittent and sharp, more on the left  No fever/vomiting/diarrhea  PE: NAD, appears somewhat cachetic and mildly chronically ill, HRR, lungs clear, left chest wall port in place, LUQ with with LEANDRO in place, abd soft and NT  Plan: eval and check labs for dehydration and poor po, check CT of abd/pelvis  ED Course      Labs and CT without acute finding  Is tolerating some po here  Weight loss and po intake continuing of chronic problem  Had night Jtube feeds  Feels good enough to go home and agreeable to f/u with his doctors outpatient         Critical Care Time  Procedures

## 2020-09-16 NOTE — ED NOTES
Provider made aware there is no drainage around j-tube, will reasses and send wound culture if drainage appears       Deuce SAMEERA Davis  09/16/20 [id number]

## 2020-09-16 NOTE — ED PROVIDER NOTES
History  Chief Complaint   Patient presents with    Abdominal Pain     generalized abd pain for last few days  decreased appetite  has feeding tube  denies n/v/d  denies urinary symptoms     Magalie Collier is a 76year-old male with past medical history including diabetes mellitus, COPD, T6 osteomyelitis currently on oral Augmentin, GE junction adenocarcinoma s/p transhiatal esophagectomy on 34/63/1353 complicated by recurrent laryngeal nerve injury, anastomotic stricture, posterior mediastinal collection, and recurrent dysphagia  He underwent esophageal dilation and stent placement on 8/31 with Dr John Fountain  He presents to the emergency department today for evaluation of intermittent episodes of generalized non-radiating abdominal pain which he describes as sharp/stabbing x 1 week  He denies identifiable provoking or exacerbating factors  He has been controlling pain at home with prescribed oxycodone, noting that the pain has not changed or worsened in quality since onset  He also complains of early satiety, noting that he feels full after minimal J tube feedings or ensure orally  He endorses feeling as if he is "wasting away," and admits to ongoing weight loss  He states that his back pain has also been well controlled with current pain medication regimen  He denies any nausea or vomiting, urinary symptoms, fevers, chills, cough, diarrhea, constipation  He states his last BM was yesterday which was normal for him, without any noticeable blood or black tarry appearance  He denies any chest pain, palpitations, shortness of breath, calf pain or swelling, noting that he developed lower extremity swelling during his last hospital admission and the appearance of his legs seem improved to him  He denies sick contact or cold symptoms  The patient had last received chemotherapy in December 2019            History provided by:  Patient  Abdominal Pain   Pain location:  Generalized  Pain radiates to:  Does not radiate  Timing:  Intermittent  Progression:  Unchanged  Chronicity:  New  Context: not recent illness and not sick contacts    Worsened by:  Nothing  Associated symptoms: anorexia    Associated symptoms: no chest pain, no constipation, no diarrhea, no dysuria, no fever, no melena, no nausea, no shortness of breath and no vomiting        Prior to Admission Medications   Prescriptions Last Dose Informant Patient Reported? Taking?    Blood Glucose Monitoring Suppl (ONE TOUCH ULTRA 2) w/Device KIT  Self No No   Sig: Check bs , q fasting and 2 hr after meals   LORazepam (ATIVAN) 1 mg tablet   No No   Sig: Take 1 tablet (1 mg total) by mouth once in imaging for anxiety for up to 1 dose Take 1 tab (1 mg) 20-30 minutes before the procedure, can repeat 1 tab if the first not helping   Lancets (ONETOUCH ULTRASOFT) lancets  Self No No   Sig: Check bs , q fasting and 2 hr after meals   albuterol (2 5 mg/3 mL) 0 083 % nebulizer solution   No No   Sig: Take 1 vial (2 5 mg total) by nebulization every 6 (six) hours as needed for wheezing or shortness of breath   amiodarone 200 mg tablet   No No   Sig: Take 1 tablet (200 mg total) by mouth daily with breakfast   amoxicillin-clavulanate (AUGMENTIN) 400-57 mg/5 mL suspension   No No   Sig: TAKE 5 ML (400 MG TOTAL) BY MOUTH 2 (TWO) TIMES A DAY   atorvastatin (LIPITOR) 40 mg tablet   No No   Sig: Take 1 tablet (40 mg total) by mouth daily with dinner   dicyclomine (BENTYL) 20 mg tablet   No No   Sig: Take 1 tablet (20 mg total) by mouth every 8 (eight) hours as needed (abdominal cramping)   glucose blood test strip  Self No No   Sig: Check blood sugar 3 times daily   levothyroxine 25 mcg tablet  Self Yes No   Sig: Take 25 mcg by mouth daily in the early morning   ondansetron (ZOFRAN) 4 mg tablet  Self No No   Sig: Take 1 tablet (4 mg total) by mouth every 6 (six) hours as needed for nausea or vomiting   oxyCODONE (ROXICODONE) 10 MG TABS   No No   Sig: Take 1 tablet (10 mg total) by mouth every 4 (four) hours as needed for moderate painMax Daily Amount: 60 mg   rOPINIRole (REQUIP) 0 25 mg tablet  Self No No   Sig: take 1 tablet by mouth at bedtime   traZODone (DESYREL) 100 mg tablet   No No   Sig: Take 1 tablet (100 mg total) by mouth daily at bedtime      Facility-Administered Medications: None       Past Medical History:   Diagnosis Date    Bilateral pleural effusion     Brain lesion     COPD (chronic obstructive pulmonary disease) (HCC)     Diabetes mellitus (HCC)     Difficulty swallowing     Disease of thyroid gland     Edema     Esophageal mass     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24/2019    History of chemotherapy     History of transfusion     Malignant neoplasm of lower third of esophagus ([facility] ) 9/17/2019    Diagnosis: clinical stage T3N1M0 esophageal carcinoma Procedure: EGD, transhiatal esophagectomy performed on 1/28/20 Pathology: esophagogastrectomy reveals microscopic focus of residual adenocarcinoma in muscularis propria measuring 0 7 cm, adjacent Duong's esophagus with associated atypia indeterminate for dysplasia  7 lymph nodes negative for carcinoma  Proximal and distal margins negative for    Paraspinal abscess (HCC)     PONV (postoperative nausea and vomiting)     Port-A-Cath in place     LCW    Sleep apnea     no CPAP    SOB (shortness of breath)        Past Surgical History:   Procedure Laterality Date    BACK SURGERY      x2    DISC REMOVAL      ESOPHAGECTOMY N/A 1/28/2020    Procedure: ESOPHAGECTOMY, TRANSHIATAL;  Surgeon: Pallavi Womack MD;  [facility];  Service: Surgical Oncology    ESOPHAGOGASTRODUODENOSCOPY N/A 1/28/2020    Procedure: ESOPHAGOGASTRODUODENOSCOPY (EGD);   Surgeon: Pallavi Womack MD;  [facility];  Service: Surgical Oncology    FL GUIDED CENTRAL VENOUS ACCESS DEVICE INSERTION  9/26/2019    GASTROJEJUNOSTOMY W/ JEJUNOSTOMY TUBE N/A 9/26/2019    Procedure: INSERTION JEJUNOSTOMY TUBE OPEN;  Surgeon: Pallavi Womack MD; [facility];  Service: Surgical Oncology    GASTROJEJUNOSTOMY W/ JEJUNOSTOMY TUBE N/A 6/5/2020    Procedure: INSERTION JEJUNOSTOMY TUBE OPEN;  Surgeon: Maryuri Serra MD;  [facility];  Service: Surgical Oncology    IR CHEST TUBE PLACEMENT  5/26/2020    IR SPINE PROCEDURE  5/27/2020    IR THORACENTESIS  2/25/2020    WI EGD ENDOSCOPIC STENT PLACEMENT W/WIRE& DILATION N/A 8/31/2020    Procedure: INSERTION STENT ESOPHAGEAL;  Surgeon: Maddy Damon MD;  [facility];  Service: Thoracic    WI EGD INSERT GUIDE WIRE DILATOR PASSAGE ESOPHAGUS N/A 7/7/2020    Procedure: ESOPHAGOGASTRODUODENOSCOPY (EGD) with esophageal dilation under fluro with biopsy;  Surgeon: Maddy Damon MD;  [facility];  Service: Thoracic    WI ESOPHAGOGASTRODUODENOSCOPY TRANSORAL DIAGNOSTIC N/A 4/28/2020    Procedure: ESOPHAGOGASTRODUODENOSCOPY (EGD); Surgeon: Maddy Damon MD;  [facility];  Service: Thoracic    WI ESOPHAGOGASTRODUODENOSCOPY TRANSORAL DIAGNOSTIC N/A 7/27/2020    Procedure: ESOPHAGOGASTRODUODENOSCOPY (EGD); Surgeon: Maddy Damon MD;  [facility];  Service: Thoracic    WI ESOPHAGOGASTRODUODENOSCOPY TRANSORAL DIAGNOSTIC N/A 8/31/2020    Procedure: ESOPHAGOGASTRODUODENOSCOPY (EGD) dilation over guidewire 36-45 Fr , stent placement;  Surgeon: Maddy Damon MD;  [facility];  Service: Thoracic    WI ESOPHAGOSCOPY FLEX Dannial Him <30 MM DIAM N/A 4/28/2020    Procedure: DILATATION ESOPHAGEAL;  Surgeon: Maddy Damon MD;  [facility];  Service: Thoracic    WI ESOPHAGOSCOPY FLEX BALLOON DILAT <30 MM DIAM N/A 7/27/2020    Procedure: DILATATION ESOPHAGEAL with Savary over the wire dilitation 33FR to 45FR;   Surgeon: Maddy Damon MD;  [facility];  Service: Thoracic    WI ESOPHAGOSCOPY FLEX BALLOON DILAT <30 MM DIAM N/A 8/31/2020    Procedure: DILATATION ESOPHAGEAL;  Surgeon: Maddy Damon MD;  [facility];  Service: Thoracic    TONSILLECTOMY      TUNNELED VENOUS PORT PLACEMENT N/A 2019    Procedure: INSERTION VENOUS PORT (PORT-A-CATH); Surgeon: Aliyah Butterfield MD;  [facility];  Service: Surgical Oncology    VOCAL CORD INJECTION N/A 2020    Procedure: MICROLARYNGOSCOPY WITH INJECTION;  Surgeon: Marvin Schroeder MD;  [facility];  Service: ENT       Family History   Problem Relation Age of Onset    Diabetes Mother     No Known Problems Father      I have reviewed and agree with the history as documented  E-Cigarette/Vaping    E-Cigarette Use Never User      E-Cigarette/Vaping Substances    Nicotine No     THC No     CBD No     Flavoring No     Other No     Unknown No      Social History     Tobacco Use    Smoking status: Former Smoker     Packs/day: 0 50     Years: 50 00     Pack years: 25 00     Types: Cigarettes     Last attempt to quit: 2017     Years since quittin 7    Smokeless tobacco: Never Used   Substance Use Topics    Alcohol use: Not Currently     Alcohol/week: 0 0 standard drinks     Frequency: Never     Drinks per session: 1 or 2     Binge frequency: Never    Drug use: Never       Review of Systems   Constitutional: Positive for appetite change and unexpected weight change  Negative for fever  Respiratory: Negative for shortness of breath  Cardiovascular: Negative for chest pain  Gastrointestinal: Positive for abdominal pain and anorexia  Negative for constipation, diarrhea, melena, nausea and vomiting  Genitourinary: Negative for dysuria  Musculoskeletal: Negative for back pain and neck pain  Skin: Negative for rash  Neurological: Negative for weakness and light-headedness  All other systems reviewed and are negative  Physical Exam  Physical Exam  Vitals signs and nursing note reviewed  Constitutional:       General: He is not in acute distress  Appearance: He is well-developed  He is not toxic-appearing        Comments: 76year-old male lying on exam bed in no significant distress  The patient is frail and appears older than stated age  Chronically ill-appearing  HENT:      Head: Normocephalic and atraumatic  Eyes:      Conjunctiva/sclera: Conjunctivae normal       Pupils: Pupils are equal, round, and reactive to light  Neck:      Musculoskeletal: Normal range of motion and neck supple  Cardiovascular:      Rate and Rhythm: Normal rate and regular rhythm  Heart sounds: Normal heart sounds  Pulmonary:      Effort: Pulmonary effort is normal  No respiratory distress  Breath sounds: Normal breath sounds  No wheezing  Comments: No evidence of respiratory distress, satting well on room air  Speaking in full sentences  Abdominal:      General: Bowel sounds are normal  There is no distension  Palpations: Abdomen is soft  Tenderness: There is generalized abdominal tenderness  There is no guarding or rebound  Comments: Abdomen soft, with minimal generalized tenderness to palpation  No guarding, rebound, or peritoneal signs  J tube in position, surrounding skin does not appear cellulitic without any warmth or erythema  No active drainage  No peritoneal signs   Musculoskeletal: Normal range of motion  General: No tenderness  Skin:     General: Skin is warm and dry  Capillary Refill: Capillary refill takes less than 2 seconds  Neurological:      General: No focal deficit present  Mental Status: He is alert  Sensory: No sensory deficit        Comments: Moving all extremities with symmetric 4/5 strength         Vital Signs  ED Triage Vitals [09/16/20 0902]   Temperature Pulse Respirations Blood Pressure SpO2   97 7 °F (36 5 °C) 83 16 120/69 99 %      Temp Source Heart Rate Source Patient Position - Orthostatic VS BP Location FiO2 (%)   Oral Monitor -- Right arm --      Pain Score       5           Vitals:    09/16/20 0930 09/16/20 1020 09/16/20 1115 09/16/20 1230   BP: 129/75 142/76 166/81 157/76   Pulse: 78 78 80 91         Visual Acuity      ED Medications  Medications   morphine (PF) 4 mg/mL injection 4 mg (4 mg Intravenous Given 9/16/20 1018)   lactated ringers bolus 1,000 mL (0 mL Intravenous Stopped 9/16/20 1150)   iohexol (OMNIPAQUE) 350 MG/ML injection (MULTI-DOSE) 100 mL (100 mL Intravenous Given 9/16/20 1055)       Diagnostic Studies  Results Reviewed     Procedure Component Value Units Date/Time    UA w Reflex to Microscopic w Reflex to Culture [[id number]]  (Abnormal) Collected:  09/16/20 1204    Lab Status:  Final result Specimen:  Urine, Clean Catch Updated:  09/16/20 1212     Color, UA Yellow     Clarity, UA Clear     Specific Gravity, UA 1 010     pH, UA 7 0     Leukocytes, UA Negative     Nitrite, UA Negative     Protein, UA Negative mg/dl      Glucose, UA Negative mg/dl      Ketones, UA 15 (1+) mg/dl      Urobilinogen, UA 0 2 E U /dl      Bilirubin, UA Negative     Blood, UA Negative    Lactic acid [[id number]]  (Normal) Collected:  09/16/20 1010    Lab Status:  Final result Specimen:  Blood from Central Venous Line Updated:  09/16/20 1046     LACTIC ACID 0 5 mmol/L     Narrative:       Result may be elevated if tourniquet was used during collection      Lipase [[id number]]  (Normal) Collected:  09/16/20 1010    Lab Status:  Final result Specimen:  Blood from Central Venous Line Updated:  09/16/20 1038     Lipase 101 u/L     Basic metabolic panel [[id number]] Collected:  09/16/20 1010    Lab Status:  Final result Specimen:  Blood from Central Venous Line Updated:  09/16/20 1038     Sodium 138 mmol/L      Potassium 3 7 mmol/L      Chloride 103 mmol/L      CO2 28 mmol/L      ANION GAP 7 mmol/L      BUN 18 mg/dL      Creatinine 0 76 mg/dL      Glucose 80 mg/dL      Calcium 9 0 mg/dL      eGFR 95 ml/min/1 73sq m     Narrative:       [place] guidelines for Chronic Kidney Disease (CKD):     Stage 1 with normal or high GFR (GFR > 90 mL/min/1 73 square meters)    Stage 2 Mild CKD (GFR = 60-89 mL/min/1 73 square meters)    Stage 3A Moderate CKD (GFR = 45-59 mL/min/1 73 square meters)    Stage 3B Moderate CKD (GFR = 30-44 mL/min/1 73 square meters)    Stage 4 Severe CKD (GFR = 15-29 mL/min/1 73 square meters)    Stage 5 End Stage CKD (GFR <15 mL/min/1 73 square meters)  Note: GFR calculation is accurate only with a steady state creatinine    Hepatic function panel [[id number]]  (Abnormal) Collected:  09/16/20 1010    Lab Status:  Final result Specimen:  Blood from Central Venous Line Updated:  09/16/20 1038     Total Bilirubin 1 10 mg/dL      Bilirubin, Direct 0 26 mg/dL      Alkaline Phosphatase 58 U/L      AST 14 U/L      ALT 22 U/L      Total Protein 7 0 g/dL      Albumin 3 1 g/dL     CBC and differential [[id number]]  (Abnormal) Collected:  09/16/20 1010    Lab Status:  Final result Specimen:  Blood from Central Venous Line Updated:  09/16/20 1018     WBC 4 38 Thousand/uL      RBC 3 81 Million/uL      Hemoglobin 10 5 g/dL      Hematocrit 33 2 %      MCV 87 fL      MCH 27 6 pg      MCHC 31 6 g/dL      RDW 14 8 %      MPV 9 0 fL      Platelets 786 Thousands/uL      nRBC 0 /100 WBCs      Neutrophils Relative 71 %      Immat GRANS % 0 %      Lymphocytes Relative 19 %      Monocytes Relative 8 %      Eosinophils Relative 1 %      Basophils Relative 1 %      Neutrophils Absolute 3 12 Thousands/µL      Immature Grans Absolute 0 01 Thousand/uL      Lymphocytes Absolute 0 82 Thousands/µL      Monocytes Absolute 0 35 Thousand/µL      Eosinophils Absolute 0 05 Thousand/µL      Basophils Absolute 0 03 Thousands/µL     Wound culture and Gram stain [[id number]]     Lab Status:  No result Specimen:  Wound                  CT Abdomen pelvis with contrast   Final Result by Kimberly Horn MD (09/16 1143)      Postoperative changes of distal esophagectomy and gastric pull-through with J-tube in place  No evidence of bowel obstruction        Mild thickening of the rectal wall and sigmoid colon with nondistention of the rectum  Mild colitis not excluded  No intra-abdominal or pelvic fluid collection/abscess  Partially visualized right pleural effusion, not significantly changed when comparing to the prior studies  Persistent soft tissue fullness about the celiac axis, again concerning for neoplastic disease as noted on multiple prior studies  Cholelithiasis without cholecystitis  Workstation performed: [id number]         XR chest 1 view portable   Final Result by Kyle Bearden MD (09/16 9929)      No acute cardiopulmonary disease  Workstation performed: [id number]                    Procedures  Procedures         ED Course      ED Course as of Sep 16 1809   Wed Sep 16, 2020   1025 Stable   Hemoglobin(!): 10 5   1027 CXR IMPRESSION:     No acute cardiopulmonary disease  1051 LACTIC ACID: 0 5   1140 Patient reassessed, offers no new complaints  Patient notes that pain has been controlled in the ED after receiving morphine  1151 CT IMPRESSION:     Postoperative changes of distal esophagectomy and gastric pull-through with J-tube in place  No evidence of bowel obstruction      Mild thickening of the rectal wall and sigmoid colon with nondistention of the rectum  Mild colitis not excluded      No intra-abdominal or pelvic fluid collection/abscess        Partially visualized right pleural effusion, not significantly changed when comparing to the prior studies      Persistent soft tissue fullness about the celiac axis, again concerning for neoplastic disease as noted on multiple prior studies      Cholelithiasis without cholecystitis  1212 Will trial po intake and reassess      1230 Patient tolerated pudding and tea in the ED  Offers no complaints at this time, noting pain is adequately controlled at home with current regimen  Labs and CT reviewed with the patient with no acute findings   Discussed treatment options with the patient including hospital admission for pain control and observation vs discharge home with outpatient follow up and the patient relates he would like to be discharged home  Encouraged Palliative Care follow up to discuss options for pain control/appetite stimulation  He is understanding and agreeable  He was witnessed ambulating independently to use the restroom without issue  Patient is stable for discharge home  Identification of Seniors at Risk      Most Recent Value   (ISAR) Identification of Seniors at Risk   Before the illness or injury that brought you to the Emergency, did you need someone to help you on a regular basis? 0 Filed at: 09/16/2020 0904   In the last 24 hours, have you needed more help than usual?  0 Filed at: 09/16/2020 5132   Have you been hospitalized for one or more nights during the past 6 months? 0 Filed at: 09/16/2020 0904   In general, do you see well? 1 Filed at: 09/16/2020 0904   In general, do you have serious problems with your memory? 0 Filed at: 09/16/2020 0293   Do you take more than three different medications every day?  0 Filed at: 09/16/2020 4750   ISAR Score  1 Filed at: 09/16/2020 8561                    SBIRT 20yo+      Most Recent Value   SBIRT (25 yo +)   In order to provide better care to our patients, we are screening all of our patients for alcohol and drug use  Would it be okay to ask you these screening questions? Yes Filed at: 09/16/2020 0909   Initial Alcohol Screen: US AUDIT-C    1  How often do you have a drink containing alcohol?  0 Filed at: 09/16/2020 0909   2  How many drinks containing alcohol do you have on a typical day you are drinking? 0 Filed at: 09/16/2020 0909   3a  Male UNDER 65: How often do you have five or more drinks on one occasion? 0 Filed at: 09/16/2020 0909   3b  FEMALE Any Age, or MALE 65+: How often do you have 4 or more drinks on one occassion?   0 Filed at: 09/16/2020 0909   Audit-C Score  0 Filed at: 09/16/2020 6645   CHRISTOPHER: How many times in the past year have you    Used an illegal drug or used a prescription medication for non-medical reasons? Never Filed at: 09/16/2020 0909                  [facility]  Number of Diagnoses or Management Options  Abdominal pain: new and requires workup  Diagnosis management comments: This is a 76year-old male with history of GE adenocarcinoma s/p esophagectomy not currently receiving chemotherapy or radiation, arriving with complaint of generalized abdominal pain x 1 week  There are no provoking or exacerbating factors that are identifiable to the patient  He additionally reports early satiety stating that he is noticing gradual progressive weight loss and only tolerates minimal po intake and J tube feedings before feeling full  He denies any nausea, vomiting, diarrhea, fevers, chills, cough, chest pain, shortness or breath, weakness, sick contact  He notes that he was seen in the ED a couple weeks prior for similar symptoms but his work up was unrevealing  He endorses concern for an obstruction given previous surgery and radiation  Differential diagnosis includes but not limited to: esophagitis, pancreatitis, colitis, dehydration, electrolyte derangement, MARTHA, ARF, cancer-related pain, malignancy progression; rule out obstruction or acute intraabdominal process    Initial ED plan: fluids, morphine, labs, CT, disposition pending lab and imaging results    Final ED Assessment: Vital signs stable on arrival, examination as documented above  Patient's exam notable for generalized abdominal tenderness without peritoneal signs  He was given fluids and morphine with adequate symptom control  There were no significant lab or imaging findings  Patient had tolerated tea and pudding in the ED without issue  He was advised of all lab and imaging results, and there was shared medical decision making regarding this patient's disposition   Patient was offered admission for pain control and observation given this is his second ED visit for similar symptoms, but he would like to be discharged home to follow up closely with outpatient specialists  Patient was encouraged to follow up with Palliative Care to discuss alternative pain control measures and appetite stimulants, and follow up was provided in his discharge paperwork  He is comfortable and agreeable with this plan  Patient was monitored in the ED for just under four hours without any new or worsening symptoms, and remained hemodynamically stable  His pain has been well-controlled  He is stable for discharge home  All questions answered and concerns addressed, patient is understanding and agreeable with return parameters that were discussed with him  Amount and/or Complexity of Data Reviewed  Clinical lab tests: ordered and reviewed  Tests in the radiology section of CPT®: ordered and reviewed  Review and summarize past medical records: yes  Discuss the patient with other providers: yes  Independent visualization of images, tracings, or specimens: yes    Risk of Complications, Morbidity, and/or Mortality  Presenting problems: moderate  Diagnostic procedures: moderate  Management options: moderate    Patient Progress  Patient progress: stable      Disposition  Final diagnoses:   Abdominal pain     Time reflects when diagnosis was documented in both MDM as applicable and the Disposition within this note     Time User Action Codes Description Comment    9/16/2020 12:32 PM Morris Ralph Add [R10 9] Abdominal pain       ED Disposition     ED Disposition Condition Date/Time Comment    Discharge Stable Wed Sep 16, 2020 12:32 PM Pamela Spivey discharge to home/self care              Follow-up Information     Follow up With Specialties Details Why Contact Info Additional Information    Srikanth Eaton, [address], Nurse Practitioner  As needed [address] Emergency Department Emergency Medicine If symptoms worsen [address] [facility] [address] [phone] [place] [facility], [address] [place], [place], Nathalie Pollard MD Palliative Care Schedule an appointment as soon as possible for a visit   [address]  [address]  [phone]             Discharge Medication List as of 9/16/2020 12:34 PM      CONTINUE these medications which have NOT CHANGED    Details   albuterol (2 5 mg/3 mL) 0 083 % nebulizer solution Take 1 vial (2 5 mg total) by nebulization every 6 (six) hours as needed for wheezing or shortness of breath, Starting Tue 9/1/2020, Normal      amiodarone 200 mg tablet Take 1 tablet (200 mg total) by mouth daily with breakfast, Starting Sun 7/5/2020, Normal      amoxicillin-clavulanate (AUGMENTIN) 400-57 mg/5 mL suspension TAKE 5 ML (400 MG TOTAL) BY MOUTH 2 (TWO) TIMES A DAY, Starting Fri 9/4/2020, Until Sun 10/4/2020, Normal      atorvastatin (LIPITOR) 40 mg tablet Take 1 tablet (40 mg total) by mouth daily with dinner, Starting Mon 8/10/2020, Normal      Blood Glucose Monitoring Suppl (ONE TOUCH ULTRA 2) w/Device KIT Check bs , q fasting and 2 hr after meals, Normal      dicyclomine (BENTYL) 20 mg tablet Take 1 tablet (20 mg total) by mouth every 8 (eight) hours as needed (abdominal cramping), Starting Sat 8/29/2020, Normal      glucose blood test strip Check blood sugar 3 times daily, Normal      Lancets (ONETOUCH ULTRASOFT) lancets Check bs , q fasting and 2 hr after meals, Normal      levothyroxine 25 mcg tablet Take 25 mcg by mouth daily in the early morning, Starting Sat 8/24/2019, Historical Med      LORazepam (ATIVAN) 1 mg tablet Take 1 tablet (1 mg total) by mouth once in imaging for anxiety for up to 1 dose Take 1 tab (1 mg) 20-30 minutes before the procedure, can repeat 1 tab if the first not helping, Starting Thu 8/27/2020, Normal      ondansetron (ZOFRAN) 4 mg tablet Take 1 tablet (4 mg total) by mouth every 6 (six) hours as needed for nausea or vomiting, Starting Tue 6/9/2020, Normal      oxyCODONE (ROXICODONE) 10 MG TABS Take 1 tablet (10 mg total) by mouth every 4 (four) hours as needed for moderate painMax Daily Amount: 60 mg, Starting Tue 9/8/2020, Normal      rOPINIRole (REQUIP) 0 25 mg tablet take 1 tablet by mouth at bedtime, Normal      traZODone (DESYREL) 100 mg tablet Take 1 tablet (100 mg total) by mouth daily at bedtime, Starting Mon 8/10/2020, Normal           No discharge procedures on file      PDMP Review       Value Time User    PDMP Reviewed  Yes 8/20/2020  2:10 PM Carlos Eduardo Mg MD          ED Provider  Electronically Signed by           Korina Mancera PA-C  09/18/20 [id number]

## 2020-09-21 DIAGNOSIS — M46.20 PARASPINAL ABSCESS (HCC): ICD-10-CM

## 2020-09-21 DIAGNOSIS — R10.10 UPPER ABDOMINAL PAIN: ICD-10-CM

## 2020-09-21 RX ORDER — AMOXICILLIN AND CLAVULANATE POTASSIUM 400; 57 MG/5ML; MG/5ML
400 POWDER, FOR SUSPENSION ORAL 2 TIMES DAILY
Qty: 300 ML | Refills: 0 | Status: SHIPPED | OUTPATIENT
Start: 2020-09-21 | End: 2020-10-07 | Stop reason: SDUPTHER

## 2020-09-21 RX ORDER — DICYCLOMINE HCL 20 MG
20 TABLET ORAL EVERY 8 HOURS PRN
Qty: 60 TABLET | Refills: 3 | Status: SHIPPED | OUTPATIENT
Start: 2020-09-21 | End: 2020-10-26

## 2020-09-22 ENCOUNTER — TELEPHONE (OUTPATIENT)
Dept: INFECTIOUS DISEASES | Facility: CLINIC | Age: 65
End: 2020-09-22

## 2020-09-22 ENCOUNTER — OFFICE VISIT (OUTPATIENT)
Dept: HEMATOLOGY ONCOLOGY | Facility: CLINIC | Age: 65
End: 2020-09-22
Payer: COMMERCIAL

## 2020-09-22 VITALS
BODY MASS INDEX: 19.9 KG/M2 | WEIGHT: 139 LBS | SYSTOLIC BLOOD PRESSURE: 120 MMHG | RESPIRATION RATE: 16 BRPM | HEART RATE: 87 BPM | DIASTOLIC BLOOD PRESSURE: 72 MMHG | HEIGHT: 70 IN | TEMPERATURE: 98.8 F | OXYGEN SATURATION: 100 %

## 2020-09-22 DIAGNOSIS — Z85.01 PERSONAL HISTORY OF ESOPHAGEAL CANCER: Primary | ICD-10-CM

## 2020-09-22 PROCEDURE — 99214 OFFICE O/P EST MOD 30 MIN: CPT | Performed by: INTERNAL MEDICINE

## 2020-09-22 NOTE — PROGRESS NOTES
Denisse Bounds  1955  [address]  MOB  [facility] [address]  [place] [id number]    DISCUSSION/SUMMARY:    79-year-old male recently diagnosed with GE junction adenocarcinoma status post neoadjuvant chemotherapy and then surgery  Patient has a complicated cancer history  Issues  GE junction adenocarcinoma  The plan had been surveillance after surgery  Unfortunately, patient began to have issues with dysphagia, odynophagia and a raspy voice  Patient wound up being admitted to [place] and found to have a paraspinal abscess/vertebral body osteomyelitis -  see below  Patient feels +/-; Mr  Louana Grade is clearly not back to baseline but feels better than before  Recent CT scans did not demonstrate evidence for recurrence although there is a soft tissue fullness around the celiac access concerning for recurrent/metastatic disease  We discussed this abnormality specifically  Patient understands that the mass may be early indication of recurrent / metastases  The plan is to continue with surveillance  Scans will eventually need to be repeated  Spinal abscess  Recent follow-up CT scan demonstrated a smaller prevertebral abscess with osteomyelitis of T4, 5 and 6  Patient will follow-up with Infectious Disease as directed  Raspy voice - better than before  Etiology is not clear - likely associated with the recent diagnosis/surgery etc   ENT follow-ups are ongoing  Dysphagia  Specific etiology is also not clear  Patient will follow up with GI as directed  Mr  Louana Grade has a feeding tube in place  Patient will continue to monitor his weight  Family has been diligent about the feedings  MRI/brain findings  A May 2020 MRI/brain demonstrated a 4 mm lesion  Differential diagnosis included metastases verses a subacute infarct  Mental status is good, no concern neurologic findings  Follow-up MRI will eventually be needed  Patient is aware of the fact that the lesion could be evidence of metastatic disease  Mr No Carlson is to return in 2 months with blood work before  Patient knows to call the hematology/oncology office if there are any other questions or concerns  Carefully review your medication list and verify that the list is accurate and up-to-date  Please call the hematology/oncology office if there are medications missing from the list, medications on the list that you are not currently taking or if there is a dosage or instruction that is different from how you're taking that medication      Patient goals and areas of care:  Multiple physician follow-ups, GE junction carcinoma surveillance  Barriers to care:  Complications from the cancer/treatments  Patient is able to self-care   _____________________________________________________________________________________    Chief Complaint   Patient presents with    Follow-up      GE junction adenocarcinoma     Oncology History   Malignant neoplasm of lower third of esophagus ([facility] [address] ) (Resolved)   8/24/2019 Initial Diagnosis    Malignant neoplasm of lower third of esophagus ([facility] [address] )  At least intramucosal carcinoma  Her2/SHIMON positive     10/8/2019 - 12/30/2019 Chemotherapy    palonosetron (ALOXI) injection 0 25 mg, 0 25 mg, Intravenous, Once, 6 of 6 cycles  Administration: 0 25 mg (10/8/2019), 0 25 mg (10/22/2019), 0 25 mg (11/5/2019), 0 25 mg (11/19/2019), 0 25 mg (12/3/2019), 0 25 mg (12/17/2019)  pegfilgrastim (NEULASTA ONPRO) subcutaneous injection kit 6 mg, 6 mg, Subcutaneous, Once, 6 of 6 cycles  Administration: 6 mg (10/9/2019), 6 mg (10/23/2019), 6 mg (11/6/2019), 6 mg (11/20/2019), 6 mg (12/4/2019), 6 mg (12/18/2019)  fosaprepitant (EMEND) 150 mg in sodium chloride 0 9 % 250 mL IVPB, 150 mg, Intravenous, Once, 6 of 6 cycles  Administration: 150 mg (10/8/2019), 150 mg (10/22/2019), 150 mg (11/5/2019), 150 mg (11/19/2019), 150 mg (12/3/2019), 150 mg (12/17/2019)  DOCEtaxel (TAXOTERE) 99 mg in sodium chloride 0 9 % 250 mL chemo infusion, 50 mg/m2 = 99 mg (83 3 % of original dose 60 mg/m2), Intravenous, Once, 6 of 6 cycles  Dose modification: 50 mg/m2 (original dose 60 mg/m2, Cycle 1, Reason: Other (See Comments))  Administration: 99 mg (10/8/2019), 99 mg (10/22/2019), 99 mg (11/5/2019), 99 mg (11/19/2019), 99 mg (12/3/2019), 99 mg (12/17/2019)  leucovorin 396 mg in dextrose 5 % 250 mL IVPB, 200 mg/m2 = 396 mg (50 % of original dose 400 mg/m2), Intravenous, Once, 6 of 6 cycles  Dose modification: 200 mg/m2 (original dose 400 mg/m2, Cycle 1, Reason: Other (See Comments), Comment: FLOT regimen standard)  Administration: 396 mg (10/8/2019), 396 mg (10/22/2019), 396 mg (11/5/2019), 396 mg (11/19/2019), 396 mg (12/3/2019), 396 mg (12/17/2019)  oxaliplatin (ELOXATIN) 168 3 mg in dextrose 5 % 250 mL chemo infusion, 85 mg/m2 = 168 3 mg, Intravenous, Once, 6 of 6 cycles  Administration: 168 3 mg (10/8/2019), 168 3 mg (10/22/2019), 168 3 mg (11/5/2019), 168 3 mg (11/19/2019), 168 3 mg (12/3/2019), 168 3 mg (12/17/2019)  trastuzumab (HERCEPTIN) 496 mg in sodium chloride 0 9 % 250 mL chemo infusion, 6 mg/kg = 496 mg, Intravenous, Once, 6 of 6 cycles  Administration: 496 mg (10/8/2019), 331 mg (10/22/2019), 331 mg (11/5/2019), 331 mg (11/19/2019), 331 mg (12/3/2019), 331 mg (12/17/2019)     1/28/2020 Surgery    Esophagogastrectomy  - Microscopic focus of residual adenocarcinoma in muscularis propria  - Ulceration and associated histologic changes compatible with prior neoadjuvant chemotherapy  - Adjacent betancur's esophagus with associated atypia indeterminate for dysplasia  - Seven lymph nodes identified; all negative for malignancy (0/7)       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[address] ) (Resolved)   8/24/2019 Biopsy    Esophagus (biopsy):  - At least 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 background of Betancur's esophagus and high grade dysplasia      9/24/2019 Initial Diagnosis    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[address] ) 10/8/2019 - 12/30/2019 Chemotherapy    palonosetron (ALOXI) injection 0 25 mg, 0 25 mg, Intravenous, Once, 6 of 6 cycles  Administration: 0 25 mg (10/8/2019), 0 25 mg (10/22/2019), 0 25 mg (11/5/2019), 0 25 mg (11/19/2019), 0 25 mg (12/3/2019), 0 25 mg (12/17/2019)  pegfilgrastim (NEULASTA ONPRO) subcutaneous injection kit 6 mg, 6 mg, Subcutaneous, Once, 6 of 6 cycles  Administration: 6 mg (10/9/2019), 6 mg (10/23/2019), 6 mg (11/6/2019), 6 mg (11/20/2019), 6 mg (12/4/2019), 6 mg (12/18/2019)  fosaprepitant (EMEND) 150 mg in sodium chloride 0 9 % 250 mL IVPB, 150 mg, Intravenous, Once, 6 of 6 cycles  Administration: 150 mg (10/8/2019), 150 mg (10/22/2019), 150 mg (11/5/2019), 150 mg (11/19/2019), 150 mg (12/3/2019), 150 mg (12/17/2019)  DOCEtaxel (TAXOTERE) 99 mg in sodium chloride 0 9 % 250 mL chemo infusion, 50 mg/m2 = 99 mg (83 3 % of original dose 60 mg/m2), Intravenous, Once, 6 of 6 cycles  Dose modification: 50 mg/m2 (original dose 60 mg/m2, Cycle 1, Reason: Other (See Comments))  Administration: 99 mg (10/8/2019), 99 mg (10/22/2019), 99 mg (11/5/2019), 99 mg (11/19/2019), 99 mg (12/3/2019), 99 mg (12/17/2019)  leucovorin 396 mg in dextrose 5 % 250 mL IVPB, 200 mg/m2 = 396 mg (50 % of original dose 400 mg/m2), Intravenous, Once, 6 of 6 cycles  Dose modification: 200 mg/m2 (original dose 400 mg/m2, Cycle 1, Reason: Other (See Comments), Comment: FLOT regimen standard)  Administration: 396 mg (10/8/2019), 396 mg (10/22/2019), 396 mg (11/5/2019), 396 mg (11/19/2019), 396 mg (12/3/2019), 396 mg (12/17/2019)  oxaliplatin (ELOXATIN) 168 3 mg in dextrose 5 % 250 mL chemo infusion, 85 mg/m2 = 168 3 mg, Intravenous, Once, 6 of 6 cycles  Administration: 168 3 mg (10/8/2019), 168 3 mg (10/22/2019), 168 3 mg (11/5/2019), 168 3 mg (11/19/2019), 168 3 mg (12/3/2019), 168 3 mg (12/17/2019)  trastuzumab (HERCEPTIN) 496 mg in sodium chloride 0 9 % 250 mL chemo infusion, 6 mg/kg = 496 mg, Intravenous, Once, 6 of 6 cycles  Administration: 496 mg (10/8/2019), 331 mg (10/22/2019), 331 mg (11/5/2019), 331 mg (11/19/2019), 331 mg (12/3/2019), 331 mg (12/17/2019)       History of Present Illness:  69-year-old male previously diagnosed with locally advanced GE junction adenocarcinoma, HER2 positive  Patient received neoadjuvant chemotherapy, tolerated the treatments well  Mr Britta Roa then underwent resection - surgery was completed in late January 2020  Hospitalization was extended due to aspiration pneumonia  The plan had been observation but more recently, patient began to have other issues (raspy voice, dysphagia, odynophagia, weight loss)  Mr Britta Roa was recently admitted to the hospital in [place] because of progressive weakness and fatigue  Workup demonstrated a paraspinal abscess/T6 vertebral osteomyelitis  Patient was on long-term antibiotics, now completed  Patient returns for follow-up  Mr Britta Roa states feeling +/-, possibly slightly better than before  No back pain, chronic shoulder pain is the same as before  Patient states that he gets approximately 75% of his nutrition from the feeding tube, able to swallow liquids and soft food otherwise  Bowel movements are relatively normal   No  issues  No fevers, chills or sweats  Voice seems to be stronger  No other pain control issues  Activities are limited but somewhat better than before  Fatigue is about the same as before  Review of Systems   Constitutional: Positive for fatigue  HENT: Positive for voice change  Eyes: Negative  Respiratory: Negative  Cardiovascular: Negative  Gastrointestinal: Negative  Endocrine: Negative  Genitourinary: Negative  Musculoskeletal: Positive for arthralgias  Skin: Negative  Allergic/Immunologic: Negative  Neurological: Negative  Hematological: Negative  Psychiatric/Behavioral: Negative  All other systems reviewed and are negative      Patient Active Problem List   Diagnosis  COPD (chronic obstructive pulmonary disease) (HCC)    Headaches due to old head injury    High cholesterol    Obstructive sleep apnea syndrome    Type 2 diabetes mellitus with hyperglycemia, without long-term current use of insulin (HCC)    Esophageal obstruction    Chemotherapy induced neutropenia (HCC)    Anemia, unspecified    Insomnia due to medical condition    Encounter for care related to feeding tube    Paralysis of left vocal fold    Dysphonia    Chylothorax on right    Mild protein-calorie malnutrition ([facility] [address] )    Atrial fibrillation ([facility] [address] )    Port-A-Cath in place    Neuropathy associated with cancer ([facility] [address] )    Glottic insufficiency    Hx of smoking    Acquired hypothyroidism    Medicare annual wellness visit, subsequent    Esophageal stricture    [facility]    Loculated pleural effusion    Brain lesion    Carotid stenosis, asymptomatic    Hyponatremia    Severe protein-calorie malnutrition (HCC)    Dysphagia    Bilateral swelling of feet    Severe protein-calorie malnutrition (Victorino Juanito: less than 60% of standard weight) (HCC)    Cancer-related pain    Gastroesophageal reflux disease    Jejunostomy tube present (HCC)    Therapeutic opioid-induced constipation (OIC)    Personal history of esophageal cancer    PONV (postoperative nausea and vomiting)     Past Medical History:   Diagnosis Date    Bilateral pleural effusion     Brain lesion     COPD (chronic obstructive pulmonary disease) (HCC)     Diabetes mellitus (HCC)     Difficulty swallowing     Disease of thyroid gland     Edema     Esophageal mass     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[facility] [address] ) 9/24/2019    History of chemotherapy     History of transfusion     Malignant neoplasm of lower third of esophagus ([facility] ) 9/17/2019    Diagnosis: clinical stage [id number] esophageal carcinoma Procedure: EGD, transhiatal esophagectomy performed on 1/28/20 Pathology: esophagogastrectomy reveals microscopic focus of residual adenocarcinoma in muscularis propria measuring 0 7 cm, adjacent Duong's esophagus with associated atypia indeterminate for dysplasia  7 lymph nodes negative for carcinoma  Proximal and distal margins negative for    Paraspinal abscess (HCC)     PONV (postoperative nausea and vomiting)     Port-A-Cath in place     LCW    Sleep apnea     no CPAP    SOB (shortness of breath)      Past Surgical History:   Procedure Laterality Date    BACK SURGERY      x2    DISC REMOVAL      ESOPHAGECTOMY N/A 1/28/2020    Procedure: ESOPHAGECTOMY, TRANSHIATAL;  Surgeon: Pallavi Womack MD;  [facility];  Service: Surgical Oncology    ESOPHAGOGASTRODUODENOSCOPY N/A 1/28/2020    Procedure: ESOPHAGOGASTRODUODENOSCOPY (EGD); Surgeon: Pallavi Womack MD;  [facility];  Service: Surgical Oncology    FL GUIDED CENTRAL VENOUS ACCESS DEVICE INSERTION  9/26/2019    GASTROJEJUNOSTOMY W/ JEJUNOSTOMY TUBE N/A 9/26/2019    Procedure: INSERTION JEJUNOSTOMY TUBE OPEN;  Surgeon: Pallavi Womack MD;  [facility];  Service: Surgical Oncology    GASTROJEJUNOSTOMY W/ JEJUNOSTOMY TUBE N/A 6/5/2020    Procedure: INSERTION JEJUNOSTOMY TUBE OPEN;  Surgeon: Pallavi Womack MD;  [facility];  Service: Surgical Oncology    IR CHEST TUBE PLACEMENT  5/26/2020    IR SPINE PROCEDURE  5/27/2020    IR THORACENTESIS  2/25/2020    VT EGD ENDOSCOPIC STENT PLACEMENT W/WIRE& DILATION N/A 8/31/2020    Procedure: INSERTION STENT ESOPHAGEAL;  Surgeon: Mandy Copeland MD;  [facility];  Service: Thoracic    VT EGD INSERT GUIDE WIRE DILATOR PASSAGE ESOPHAGUS N/A 7/7/2020    Procedure: ESOPHAGOGASTRODUODENOSCOPY (EGD) with esophageal dilation under fluro with biopsy;  Surgeon: Mandy Copeland MD;  [facility];  Service: Thoracic    VT ESOPHAGOGASTRODUODENOSCOPY TRANSORAL DIAGNOSTIC N/A 4/28/2020    Procedure: ESOPHAGOGASTRODUODENOSCOPY (EGD);   Surgeon: Mandy Copeland MD;  [facility];  Service: Thoracic    DC ESOPHAGOGASTRODUODENOSCOPY TRANSORAL DIAGNOSTIC N/A 7/27/2020    Procedure: ESOPHAGOGASTRODUODENOSCOPY (EGD); Surgeon: Luis Antonio Dorado MD;  [facility];  Service: Thoracic    DC ESOPHAGOGASTRODUODENOSCOPY TRANSORAL DIAGNOSTIC N/A 8/31/2020    Procedure: ESOPHAGOGASTRODUODENOSCOPY (EGD) dilation over guidewire 36-45 Fr , stent placement;  Surgeon: Luis Antonio Dorado MD;  [facility];  Service: Thoracic    DC ESOPHAGOSCOPY FLEX [place] <30 MM DIAM N/A 4/28/2020    Procedure: DILATATION ESOPHAGEAL;  Surgeon: Luis Antonio Dorado MD;  [facility];  Service: Thoracic    DC ESOPHAGOSCOPY FLEX BALLOON DILAT <30 MM DIAM N/A 7/27/2020    Procedure: DILATATION ESOPHAGEAL with Savary over the wire dilitation 33FR to 45FR; Surgeon: Luis Antonio Dorado MD;  [facility];  Service: Thoracic    DC ESOPHAGOSCOPY FLEX BALLOON DILAT <30 MM DIAM N/A 8/31/2020    Procedure: DILATATION ESOPHAGEAL;  Surgeon: Luis Antonio Dorado MD;  [facility];  Service: Thoracic    TONSILLECTOMY      TUNNELED VENOUS PORT PLACEMENT N/A 9/26/2019    Procedure: INSERTION VENOUS PORT (PORT-A-CATH);   Surgeon: Lynsey Veliz MD;  [facility];  Service: Surgical Oncology    VOCAL CORD INJECTION N/A 2/7/2020    Procedure: MICROLARYNGOSCOPY WITH INJECTION;  Surgeon: Caryl Romo MD;  [facility];  Service: ENT     Family History   Problem Relation Age of Onset    Diabetes Mother     No Known Problems Father      Social History     Socioeconomic History    Marital status: Single     Spouse name: Not on file    Number of children: Not on file    Years of education: Not on file    Highest education level: Not on file   Occupational History    Not on file   Social Needs    Financial resource strain: Not on file    Food insecurity     Worry: Not on file     Inability: Not on file    Transportation needs     Medical: Not on file     Non-medical: Not on file Tobacco Use    Smoking status: Former Smoker     Packs/day: 0 50     Years: 50 00     Pack years: 25 00     Types: Cigarettes     Last attempt to quit: 2017     Years since quittin 7    Smokeless tobacco: Never Used   Substance and Sexual Activity    Alcohol use: Not Currently     Alcohol/week: 0 0 standard drinks     Frequency: Never     Drinks per session: 1 or 2     Binge frequency: Never    Drug use: Never    Sexual activity: Not on file   Lifestyle    Physical activity     Days per week: Not on file     Minutes per session: Not on file    Stress: Not on file   Relationships    Social connections     Talks on phone: Not on file     Gets together: Not on file     Attends Zoroastrianism service: Not on file     Active member of club or organization: Not on file     Attends meetings of clubs or organizations: Not on file     Relationship status: Not on file    Intimate partner violence     Fear of current or ex partner: Not on file     Emotionally abused: Not on file     Physically abused: Not on file     Forced sexual activity: Not on file   Other Topics Concern    Not on file   Social History Narrative    Not on file       Current Outpatient Medications:     albuterol (2 5 mg/3 mL) 0 083 % nebulizer solution, Take 1 vial (2 5 mg total) by nebulization every 6 (six) hours as needed for wheezing or shortness of breath, Disp: 75 mL, Rfl: 2    amiodarone 200 mg tablet, Take 1 tablet (200 mg total) by mouth daily with breakfast, Disp: 30 tablet, Rfl: 0    amoxicillin-clavulanate (AUGMENTIN) 400-57 mg/5 mL suspension, Take 5 mL (400 mg total) by mouth 2 (two) times a day, Disp: 300 mL, Rfl: 0    atorvastatin (LIPITOR) 40 mg tablet, Take 1 tablet (40 mg total) by mouth daily with dinner, Disp: 30 tablet, Rfl: 3    Blood Glucose Monitoring Suppl (ONE TOUCH ULTRA 2) w/Device KIT, Check bs , q fasting and 2 hr after meals, Disp: 1 each, Rfl: 0    dicyclomine (BENTYL) 20 mg tablet, Take 1 tablet (20 mg total) by mouth every 8 (eight) hours as needed (abdominal cramping), Disp: 60 tablet, Rfl: 3    glucose blood test strip, Check blood sugar 3 times daily, Disp: 100 each, Rfl: 3    Lancets (ONETOUCH ULTRASOFT) lancets, Check bs , q fasting and 2 hr after meals, Disp: 100 each, Rfl: 5    levothyroxine 25 mcg tablet, Take 25 mcg by mouth daily in the early morning, Disp: , Rfl:     LORazepam (ATIVAN) 1 mg tablet, Take 1 tablet (1 mg total) by mouth once in imaging for anxiety for up to 1 dose Take 1 tab (1 mg) 20-30 minutes before the procedure, can repeat 1 tab if the first not helping, Disp: 2 tablet, Rfl: 0    ondansetron (ZOFRAN) 4 mg tablet, Take 1 tablet (4 mg total) by mouth every 6 (six) hours as needed for nausea or vomiting, Disp: 30 tablet, Rfl: 0    oxyCODONE (ROXICODONE) 10 MG TABS, Take 1 tablet (10 mg total) by mouth every 4 (four) hours as needed for moderate painMax Daily Amount: 60 mg, Disp: 90 tablet, Rfl: 0    rOPINIRole (REQUIP) 0 25 mg tablet, take 1 tablet by mouth at bedtime, Disp: 30 tablet, Rfl: 0    traZODone (DESYREL) 100 mg tablet, Take 1 tablet (100 mg total) by mouth daily at bedtime, Disp: 30 tablet, Rfl: 3    Allergies   Allergen Reactions    Penicillins Itching       Vitals:    09/22/20 1127   BP: 120/72   Pulse: 87   Resp: 16   Temp: 98 8 °F (37 1 °C)   SpO2: 100%     Physical Exam  Constitutional:       Appearance: He is well-developed  Comments: Relatively well-nourished male, minimal overall distress, no respiratory distress   HENT:      Head: Normocephalic and atraumatic  Right Ear: External ear normal       Left Ear: External ear normal    Eyes:      Pupils: Pupils are equal, round, and reactive to light  Comments: Conjunctiva pale pink, anicteric   Neck:      Musculoskeletal: Normal range of motion and neck supple  Cardiovascular:      Rate and Rhythm: Normal rate and regular rhythm  Heart sounds: Normal heart sounds     Pulmonary:      Effort: Pulmonary effort is normal       Breath sounds: Normal breath sounds  Abdominal:      General: Bowel sounds are normal       Palpations: Abdomen is soft  Comments: Soft, nontender, feeding tube in place, cannot palpate liver or spleen   Musculoskeletal: Normal range of motion  Skin:     General: Skin is warm  Comments: Relatively good color, warm, moist, scattered purpura   Neurological:      Mental Status: He is alert and oriented to person, place, and time  Deep Tendon Reflexes: Reflexes are normal and symmetric  Psychiatric:         Behavior: Behavior normal          Thought Content: Thought content normal          Judgment: Judgment normal      Extremities:  No lower extremity edema BL, no cords, pulses are 1+  Lymphatics:  No adenopathy in the neck, supraclavicular region, axilla and groin bilaterally    Performance Status: 2 - Symptomatic, <50% confined to bed    Labs    09/16/2020 WBC = 4 3 hemoglobin = 10 5 hematocrit = 33 2 MCV = 87 platelet = 072 neutrophil = 71% BUN = 18 creatinine = 0 76 calcium = 9 0 total bilirubin = 1 10 alkaline phosphatase = 58 AST = 14 ALT = 22 albumin = 7 0    06/24/2020 WBC = 4 4 hemoglobin = 10 hematocrit = 30 4 platelet = 166 neutrophil = 59% BUN = 25 creatinine = 0 74 LFTs WNL calcium = 8 3  05/12/2020 WBC = 7 8 hemoglobin = 11 1 hematocrit = 36 2 MCV = 83 platelet = 696 neutrophil = 70% lymphocytes = 12% BUN = 10 creatinine = 1 14 calcium = 9 6 AST = 10 ALT = 17 alkaline phosphatase = 74 total protein = 8 6 albumin = 2 7 total bilirubin = 0 60 CEA level pending    Imaging    09/16/2020 CT scan abdomen pelvis    Postoperative changes of distal esophagectomy and gastric pull-through with J-tube in place  No evidence of bowel obstruction  Mild thickening of the rectal wall and sigmoid colon with nondistention of the rectum  Mild colitis not excluded  No intra-abdominal or pelvic fluid collection/abscess      Partially visualized right pleural effusion, not significantly changed when comparing to the prior studies  Persistent soft tissue fullness about the celiac axis, again concerning for neoplastic disease as noted on multiple prior studies  Cholelithiasis without cholecystitis  08/29/2020 PE study with CT scan abdomen pelvis    Some interval decrease in size of prevertebral abscess  Stable changes related to osteomyelitis involving T4, T5, and T6  No evidence of pulmonary embolus  Persistent soft tissue abnormality about the celiac and SMA axes suspicious for neoplastic involvement  Cholelithiasis  05/24/2020 MRI brain    4 mm nodular focus of enhancement paramedian left posterior frontal lobe potentially a metastatic lesion  Subacute infarct not excluded  Short-term interval follow-up MRI with gadolinium recommended in 6-12 weeks  04/24/2020 FL barium swallow    FINDINGS:  Radiopaque opacities identified in the thorax noted compatible with prior embolization procedure     Examination demonstrates focal narrowing of the proximal anastomotic site located just below the level of the clavicular heads with delayed passage of liquid barium across this level  Below this level, the gastric pull-through is identified, difficult to completely opacify the luminal contour secondary to the proximal dilatation and concern for inducing aspiration  There is no evidence of gastric outlet obstruction  Patient was unable to tolerate lying completely supine for imaging        IMPRESSION:     Examination demonstrates a stricture at the anastomotic site located just below level of the clavicular heads       The examination demonstrates a significant  finding and was documented as such in [facility] for liaison and referring practitioner notification        Pathology    Case Report   Surgical Pathology Report                         Case: [id number]                                    Authorizing Provider: Daphne Grey MD           Collected:           01/28/2020 1127               Ordering Location:     [facility]      Received:            01/28/2020 1137                                      Hospital Operating Room                                                       Pathologist:           Nichole Sheppard MD                                                          Intraop:               Nichole Sheppard MD                                                          Specimens:   A) - Esophagus, esophagastrectomy stitch on celiac lymph node                                        B) - Anastomatic site, final esophageal margin                                             Final Diagnosis   A  Esophagastrectomy:     - Microscopic focus of residual adenocarcinoma in muscularis propria  - Ulceration and associated histologic changes compatible with prior neoadjuvant chemotherapy  - Adjacent betancur's esophagus with associated atypia indeterminate for dysplasia  - Seven lymph nodes identified; all negative for malignancy (0/7)  - Proximal and distal margins are negative for dysplasia and malignacny      B  Final esophageal margin:     - Negative or malignancy  Electronically signed by Nichole Sheppard MD on 2/7/2020 at  2:50 PM   Comments: This is an appended report  These results have been appended to a previously preliminary verified report  Note  [facility] LAB   The celiac lymph node shows extensive fibrosis associated with collections of histiocytes and scattered dystrophic calcifications  Focally, these calcifications are associated with epithelioid cells which on immunostaining are negative for pankeratin (AE1/AE3) but positive for CD68 which is consistent with histiocytic origin  Comment: This is an appended report  These results have been appended to a previously preliminary verified report  Additional Information  BE [facility]   All controls performed with the immunohistochemical stains reported above reacted appropriately    These tests were developed and their performance characteristics determined by [facility] or [facility]  They may not be cleared or approved by the [place]  Food and Drug Administration  The FDA has determined that such clearance or approval is not necessary  These tests are used for clinical purposes  They should not be regarded as investigational or for research  This laboratory has been approved by CLIA 88, designated as a high-complexity laboratory and is qualified to perform these tests      - Interpretation performed at [facility], [facility]: This is an appended report  These results have been appended to a previously preliminary verified report  Synoptic Checklist   ESOPHAGUS   Esophagus - All Specimens   SPECIMEN   Procedure  Esophagectomy    TUMOR   Tumor Site  Distal esophagus (low thoracic esophagus)    Relationship of Tumor to Esophagogastric Junction  Tumor is entirely located within the tubular esophagus and does not involve the esophagogastric junction    Distance of Tumor Center from Esophagogastric Junction in Centimeters (cm)  Cannot be determined: Extenstive ulceration of GEJ by neoadjuvant chemotherapy obscurs landmarks      Histologic Type  Adenocarcinoma    Histologic Grade  G1:  Well differentiated    Tumor Size  Greatest dimension in Centimeters (cm): 0 7 Centimeters (cm)   Tumor Extent (Note E)     Tumor Extension  Tumor invades the muscularis propria    Accessory Findings     Treatment Effect  Present      Single cells or rare small groups of cancer cells (near complete response, score 1)    Lymphovascular Invasion  Not identified    Perineural Invasion  Not identified    MARGINS   Margins  All margins are uninvolved by invasive carcinoma, dysplasia, and intestinal metaplasia    Margins Examined  Proximal      Distal      Radial    Distance of Invasive Carcinoma from Closest Margin  4 Millimeters (mm)   Closest Margin  Radial LYMPH NODES   Number of Lymph Nodes Involved  0    Number of Lymph Nodes Examined  7    PATHOLOGIC STAGE CLASSIFICATION (pTNM, AJCC 8th Edition)   TNM Descriptors  y (post-treatment)    Primary Tumor (pT)  pT2    Regional Lymph Nodes (pN)  pN0    ADDITIONAL FINDINGS   Additional Pathologic Findings  Intestinal metaplasia (Duong's esophagus)    Comment(s)   Comment(s)  AJCC Prognostic Stage Group (8th ed ) Postneoadjuvant Therapy:  At least Stage 1 - ypT2, ypN0, MX         Intraoperative Consultation  BE LAB   FSA1  Proximal esophageal margin:  Negative for malignancy  FSA2-9   Distal gastric margin:  Negative for malignancy  Reviewed by HNAG Ricks

## 2020-09-23 ENCOUNTER — APPOINTMENT (EMERGENCY)
Dept: RADIOLOGY | Facility: HOSPITAL | Age: 65
End: 2020-09-23
Payer: COMMERCIAL

## 2020-09-23 ENCOUNTER — APPOINTMENT (EMERGENCY)
Dept: CT IMAGING | Facility: HOSPITAL | Age: 65
End: 2020-09-23
Payer: COMMERCIAL

## 2020-09-23 ENCOUNTER — HOSPITAL ENCOUNTER (OUTPATIENT)
Facility: HOSPITAL | Age: 65
Setting detail: OBSERVATION
Discharge: HOME | End: 2020-09-24
Attending: EMERGENCY MEDICINE | Admitting: INTERNAL MEDICINE
Payer: COMMERCIAL

## 2020-09-23 ENCOUNTER — OFFICE VISIT (OUTPATIENT)
Dept: INFECTIOUS DISEASES | Facility: CLINIC | Age: 65
End: 2020-09-23
Payer: COMMERCIAL

## 2020-09-23 VITALS
RESPIRATION RATE: 15 BRPM | SYSTOLIC BLOOD PRESSURE: 116 MMHG | WEIGHT: 135.2 LBS | BODY MASS INDEX: 19.36 KG/M2 | DIASTOLIC BLOOD PRESSURE: 64 MMHG | TEMPERATURE: 97.6 F | HEART RATE: 72 BPM | HEIGHT: 70 IN

## 2020-09-23 DIAGNOSIS — R41.82 ALTERED MENTAL STATUS: Primary | ICD-10-CM

## 2020-09-23 DIAGNOSIS — M46.20 VERTEBRAL OSTEOMYELITIS (HCC): ICD-10-CM

## 2020-09-23 DIAGNOSIS — R13.14 PHARYNGOESOPHAGEAL DYSPHAGIA: ICD-10-CM

## 2020-09-23 DIAGNOSIS — K59.00 CONSTIPATION: ICD-10-CM

## 2020-09-23 DIAGNOSIS — M46.20 PARASPINAL ABSCESS (HCC): Primary | ICD-10-CM

## 2020-09-23 PROBLEM — R93.7 ABNORMAL CT OF THORACIC SPINE: Status: ACTIVE | Noted: 2020-09-23

## 2020-09-23 LAB
ALBUMIN SERPL BCP-MCNC: 2.9 G/DL (ref 3.5–5)
ALP SERPL-CCNC: 53 U/L (ref 46–116)
ALT SERPL W P-5'-P-CCNC: 23 U/L (ref 12–78)
ANION GAP SERPL CALCULATED.3IONS-SCNC: 5 MMOL/L (ref 4–13)
APTT PPP: 34 SECONDS (ref 23–37)
AST SERPL W P-5'-P-CCNC: 14 U/L (ref 5–45)
ATRIAL RATE: 81 BPM
BASOPHILS # BLD AUTO: 0.02 THOUSANDS/ΜL (ref 0–0.1)
BASOPHILS NFR BLD AUTO: 0 % (ref 0–1)
BILIRUB SERPL-MCNC: 0.8 MG/DL (ref 0.2–1)
BUN SERPL-MCNC: 17 MG/DL (ref 5–25)
CALCIUM ALBUM COR SERPL-MCNC: 9.6 MG/DL (ref 8.3–10.1)
CALCIUM SERPL-MCNC: 8.7 MG/DL (ref 8.3–10.1)
CHLORIDE SERPL-SCNC: 105 MMOL/L (ref 100–108)
CO2 SERPL-SCNC: 30 MMOL/L (ref 21–32)
CREAT SERPL-MCNC: 0.81 MG/DL (ref 0.6–1.3)
EOSINOPHIL # BLD AUTO: 0.14 THOUSAND/ΜL (ref 0–0.61)
EOSINOPHIL NFR BLD AUTO: 3 % (ref 0–6)
ERYTHROCYTE [DISTWIDTH] IN BLOOD BY AUTOMATED COUNT: 14.9 % (ref 11.6–15.1)
GFR SERPL CREATININE-BSD FRML MDRD: 93 ML/MIN/1.73SQ M
GLUCOSE SERPL-MCNC: 78 MG/DL (ref 65–140)
HCT VFR BLD AUTO: 32.7 % (ref 36.5–49.3)
HGB BLD-MCNC: 10.5 G/DL (ref 12–17)
IMM GRANULOCYTES # BLD AUTO: 0.03 THOUSAND/UL (ref 0–0.2)
IMM GRANULOCYTES NFR BLD AUTO: 1 % (ref 0–2)
INR PPP: 1.06 (ref 0.84–1.19)
LYMPHOCYTES # BLD AUTO: 1.2 THOUSANDS/ΜL (ref 0.6–4.47)
LYMPHOCYTES NFR BLD AUTO: 24 % (ref 14–44)
MCH RBC QN AUTO: 27.9 PG (ref 26.8–34.3)
MCHC RBC AUTO-ENTMCNC: 32.1 G/DL (ref 31.4–37.4)
MCV RBC AUTO: 87 FL (ref 82–98)
MONOCYTES # BLD AUTO: 0.31 THOUSAND/ΜL (ref 0.17–1.22)
MONOCYTES NFR BLD AUTO: 6 % (ref 4–12)
NEUTROPHILS # BLD AUTO: 3.23 THOUSANDS/ΜL (ref 1.85–7.62)
NEUTS SEG NFR BLD AUTO: 66 % (ref 43–75)
NRBC BLD AUTO-RTO: 0 /100 WBCS
P AXIS: 79 DEGREES
PLATELET # BLD AUTO: 217 THOUSANDS/UL (ref 149–390)
PMV BLD AUTO: 8.8 FL (ref 8.9–12.7)
POTASSIUM SERPL-SCNC: 3.7 MMOL/L (ref 3.5–5.3)
PR INTERVAL: 188 MS
PROT SERPL-MCNC: 6.8 G/DL (ref 6.4–8.2)
PROTHROMBIN TIME: 13.3 SECONDS (ref 11.6–14.5)
QRS AXIS: 95 DEGREES
QRSD INTERVAL: 86 MS
QT INTERVAL: 362 MS
QTC INTERVAL: 420 MS
RBC # BLD AUTO: 3.76 MILLION/UL (ref 3.88–5.62)
SODIUM SERPL-SCNC: 140 MMOL/L (ref 136–145)
T WAVE AXIS: 79 DEGREES
TROPONIN I SERPL-MCNC: <0.02 NG/ML
VENTRICULAR RATE: 81 BPM
WBC # BLD AUTO: 4.93 THOUSAND/UL (ref 4.31–10.16)

## 2020-09-23 PROCEDURE — 80053 COMPREHEN METABOLIC PANEL: CPT | Performed by: EMERGENCY MEDICINE

## 2020-09-23 PROCEDURE — 71045 X-RAY EXAM CHEST 1 VIEW: CPT

## 2020-09-23 PROCEDURE — 85730 THROMBOPLASTIN TIME PARTIAL: CPT | Performed by: EMERGENCY MEDICINE

## 2020-09-23 PROCEDURE — 99285 EMERGENCY DEPT VISIT HI MDM: CPT

## 2020-09-23 PROCEDURE — 93010 ELECTROCARDIOGRAM REPORT: CPT | Performed by: INTERNAL MEDICINE

## 2020-09-23 PROCEDURE — 36415 COLL VENOUS BLD VENIPUNCTURE: CPT | Performed by: EMERGENCY MEDICINE

## 2020-09-23 PROCEDURE — G1004 CDSM NDSC: HCPCS

## 2020-09-23 PROCEDURE — 70498 CT ANGIOGRAPHY NECK: CPT

## 2020-09-23 PROCEDURE — 99214 OFFICE O/P EST MOD 30 MIN: CPT | Performed by: INTERNAL MEDICINE

## 2020-09-23 PROCEDURE — 70496 CT ANGIOGRAPHY HEAD: CPT

## 2020-09-23 PROCEDURE — 85610 PROTHROMBIN TIME: CPT | Performed by: EMERGENCY MEDICINE

## 2020-09-23 PROCEDURE — 85025 COMPLETE CBC W/AUTO DIFF WBC: CPT | Performed by: EMERGENCY MEDICINE

## 2020-09-23 PROCEDURE — 96360 HYDRATION IV INFUSION INIT: CPT

## 2020-09-23 PROCEDURE — 84484 ASSAY OF TROPONIN QUANT: CPT | Performed by: EMERGENCY MEDICINE

## 2020-09-23 PROCEDURE — 93005 ELECTROCARDIOGRAM TRACING: CPT

## 2020-09-23 PROCEDURE — 99285 EMERGENCY DEPT VISIT HI MDM: CPT | Performed by: EMERGENCY MEDICINE

## 2020-09-23 RX ORDER — ASPIRIN 81 MG/1
81 TABLET, CHEWABLE ORAL DAILY
COMMUNITY
End: 2020-10-26

## 2020-09-23 RX ORDER — OXYCODONE HCL 10 MG/1
5 TABLET, FILM COATED, EXTENDED RELEASE ORAL EVERY 12 HOURS SCHEDULED
COMMUNITY
End: 2020-10-26

## 2020-09-23 RX ORDER — ROPINIROLE 0.25 MG/1
0.25 TABLET, FILM COATED ORAL
COMMUNITY
End: 2020-10-26

## 2020-09-23 RX ORDER — DICYCLOMINE HCL 20 MG
20 TABLET ORAL EVERY 6 HOURS
COMMUNITY
End: 2020-10-26

## 2020-09-23 RX ORDER — AMIODARONE HYDROCHLORIDE 200 MG/1
200 TABLET ORAL
COMMUNITY
End: 2020-11-06 | Stop reason: SDUPTHER

## 2020-09-23 RX ORDER — TRAZODONE HYDROCHLORIDE 100 MG/1
100 TABLET ORAL
COMMUNITY
End: 2020-10-26

## 2020-09-23 RX ORDER — LEVOTHYROXINE SODIUM 0.03 MG/1
25 TABLET ORAL DAILY
COMMUNITY
End: 2020-10-27 | Stop reason: SDUPTHER

## 2020-09-23 RX ORDER — ATORVASTATIN CALCIUM 40 MG/1
40 TABLET, FILM COATED ORAL
COMMUNITY
End: 2020-10-26

## 2020-09-23 RX ORDER — ONDANSETRON 4 MG/1
4 TABLET, FILM COATED ORAL EVERY 8 HOURS PRN
COMMUNITY
End: 2020-10-26

## 2020-09-23 RX ADMIN — IOHEXOL 100 ML: 350 INJECTION, SOLUTION INTRAVENOUS at 21:06

## 2020-09-23 RX ADMIN — SODIUM CHLORIDE 1000 ML: 0.9 INJECTION, SOLUTION INTRAVENOUS at 19:37

## 2020-09-23 NOTE — ED NOTES
Pts son at bedside reports pt took 2mg of ativan at 1600hrs and approx 1 hour later he became confused  Son reports pt was hallucinating, thinking there were bugs  Pt was also confused to place and did not recognize family  Per son pt is more with it now and "he just seems sleepy"        [facility]  09/23/20 4655

## 2020-09-23 NOTE — ED PROVIDER NOTES
History  Chief Complaint   Patient presents with    Altered Mental Status     Pt presents via EMS with reports of AMS  Pt was seen at his PCPs office today and perscribed ativan 2mg to take before his MRI  Family reported the pt became confused, unable to recognize them and having visual hallucinations of seeing things on his feet  62-year-old male presents with altered mental status  Patient is brought in by EMS  He was at his primary care office today, he received a prescription for Ativan 1mg which is a new prescription for him  This prescription is intended to help the patient with anxiety / claustrophobia control as he was getting MRI of his back preformed  He took Ativan 1mg 30 min prior to the procedure as instructed  Patient indicated to his son that it did not seem to be helping his anxiety and prior to going for MRI he took a 2nd dose of 1mg of Ativan  Patient was unfortunately unable to have the MRI preformed  He was intending to get MRI preformed of thoracic spine as follow up for known discitis for which he has been taking ABX for months  His son drove him home, and he seemed to get more and more altered  He told his son that he had to kill the insects in the car, but there weren't any  He was speaking clearly, but was confused - incorrectly identified his two children  He then asked his son if he was tripping, as in on drugs  Son has never seen him act like this - has also never seen him after taking a benzodiazapine  Patient presents to the ED with no apparent acute focal neuro deficit  No weakness or numbness in UE or LE  No slurred speech  No facial droop  None       Past Medical History:   Diagnosis Date    [facility] [address] )     [facility]        History reviewed  No pertinent surgical history  History reviewed  No pertinent family history  I have reviewed and agree with the history as documented      E-Cigarette/Vaping    E-Cigarette Use Never User      E-Cigarette/Vaping Substances     Social History     Tobacco Use    Smoking status: Former Smoker    Smokeless tobacco: Never Used   Substance Use Topics    Alcohol use: Never     Frequency: Never    Drug use: Never       Review of Systems   Reason unable to perform ROS: Review of systems comes from the patient's son as patient is altered mental status  Constitutional: Negative for chills, fatigue and fever  Respiratory: Negative for cough, chest tightness and shortness of breath  Cardiovascular: Negative for chest pain and palpitations  Gastrointestinal: Negative for abdominal pain, constipation, diarrhea, nausea and vomiting  Genitourinary: Negative for dysuria and flank pain  Musculoskeletal: Positive for back pain (Baseline, thoracic, being treated for diskitis  )  Negative for neck pain  Skin: Negative for color change and rash  Allergic/Immunologic: Negative for immunocompromised state  Neurological: Negative for dizziness, syncope, facial asymmetry, weakness, light-headedness, numbness and headaches  Hematological: Does not bruise/bleed easily  Psychiatric/Behavioral: Positive for confusion and hallucinations  Physical Exam  Physical Exam  Vitals signs reviewed  Constitutional:       General: He is not in acute distress  Appearance: He is well-developed  He is not ill-appearing, toxic-appearing or diaphoretic  HENT:      Head: Normocephalic and atraumatic  Eyes:      General: No scleral icterus  Right eye: No discharge  Left eye: No discharge  Extraocular Movements: Extraocular movements intact  Right eye: Normal extraocular motion and no nystagmus  Left eye: Normal extraocular motion and no nystagmus  Conjunctiva/sclera: Conjunctivae normal       Pupils: Pupils are equal, round, and reactive to light  Neck:      Musculoskeletal: Normal range of motion and neck supple  No neck rigidity  Vascular: No JVD  Cardiovascular:      Rate and Rhythm: Normal rate and regular rhythm  Heart sounds: Normal heart sounds  No murmur  No friction rub  No gallop  Comments: Port in left chest  Pulmonary:      Effort: Pulmonary effort is normal  No respiratory distress  Breath sounds: Normal breath sounds  No stridor  No wheezing, rhonchi or rales  Chest:      Chest wall: No tenderness  Abdominal:      General: Bowel sounds are normal  There is no distension  Palpations: Abdomen is soft  Tenderness: There is no abdominal tenderness  There is no guarding or rebound  Musculoskeletal: Normal range of motion  General: No tenderness or deformity  Lymphadenopathy:      Cervical: No cervical adenopathy  Skin:     General: Skin is warm and dry  Coloration: Skin is not pale  Findings: No erythema or rash  Neurological:      Mental Status: He is lethargic and confused  GCS: GCS eye subscore is 3  GCS verbal subscore is 4  GCS motor subscore is 6  Cranial Nerves: No cranial nerve deficit  Sensory: No sensory deficit  Motor: No weakness  Comments: Oriented to person but not place or time  Normal rectal exam with normal rectal tone     Psychiatric:      Comments: Unable to assess secondary to altered mental status         Vital Signs  ED Triage Vitals [09/23/20 1843]   Temperature Pulse Respirations Blood Pressure SpO2   98 1 °F (36 7 °C) 81 15 139/80 98 %      Temp Source Heart Rate Source Patient Position - Orthostatic VS BP Location FiO2 (%)   Oral Monitor -- Right arm --      Pain Score       --           Vitals:    09/23/20 1843 09/23/20 2000 09/23/20 2130   BP: 139/80 159/81 167/100   Pulse: 81 77 86         Visual Acuity      ED Medications  Medications   sodium chloride 0 9 % bolus 1,000 mL (0 mL Intravenous Stopped 9/23/20 2051)   iohexol (OMNIPAQUE) 350 MG/ML injection (MULTI-DOSE) 100 mL (100 mL Intravenous Given 9/23/20 2106)       Diagnostic Studies  Results Reviewed     Procedure Component Value Units Date/Time    Protime-INR [[id number]]  (Normal) Collected:  09/23/20 1937    Lab Status:  Final result Specimen:  Blood from Line Updated:  09/23/20 2010     Protime 13 3 seconds      INR 1 06    APTT [[id number]]  (Normal) Collected:  09/23/20 1937    Lab Status:  Final result Specimen:  Blood from Line Updated:  09/23/20 2010     PTT 34 seconds     Troponin I [[id number]]  (Normal) Collected:  09/23/20 1937    Lab Status:  Final result Specimen:  Blood from Line Updated:  09/23/20 2002     Troponin I <0 02 ng/mL     Comprehensive metabolic panel [[id number]]  (Abnormal) Collected:  09/23/20 1937    Lab Status:  Final result Specimen:  Blood from Line Updated:  09/23/20 2000     Sodium 140 mmol/L      Potassium 3 7 mmol/L      Chloride 105 mmol/L      CO2 30 mmol/L      ANION GAP 5 mmol/L      BUN 17 mg/dL      Creatinine 0 81 mg/dL      Glucose 78 mg/dL      Calcium 8 7 mg/dL      Corrected Calcium 9 6 mg/dL      AST 14 U/L      ALT 23 U/L      Alkaline Phosphatase 53 U/L      Total Protein 6 8 g/dL      Albumin 2 9 g/dL      Total Bilirubin 0 80 mg/dL      eGFR 93 ml/min/1 73sq m     Narrative:       [place] guidelines for Chronic Kidney Disease (CKD):     Stage 1 with normal or high GFR (GFR > 90 mL/min/1 73 square meters)    Stage 2 Mild CKD (GFR = 60-89 mL/min/1 73 square meters)    Stage 3A Moderate CKD (GFR = 45-59 mL/min/1 73 square meters)    Stage 3B Moderate CKD (GFR = 30-44 mL/min/1 73 square meters)    Stage 4 Severe CKD (GFR = 15-29 mL/min/1 73 square meters)    Stage 5 End Stage CKD (GFR <15 mL/min/1 73 square meters)  Note: GFR calculation is accurate only with a steady state creatinine    CBC and differential [[id number]]  (Abnormal) Collected:  09/23/20 1937    Lab Status:  Final result Specimen:  Blood from Line Updated:  09/23/20 1944     WBC 4 93 Thousand/uL      RBC 3 76 Million/uL      Hemoglobin 10 5 g/dL      Hematocrit 32 7 %      MCV 87 fL      MCH 27 9 pg      MCHC 32 1 g/dL      RDW 14 9 %      MPV 8 8 fL      Platelets 713 Thousands/uL      nRBC 0 /100 WBCs      Neutrophils Relative 66 %      Immat GRANS % 1 %      Lymphocytes Relative 24 %      Monocytes Relative 6 %      Eosinophils Relative 3 %      Basophils Relative 0 %      Neutrophils Absolute 3 23 Thousands/µL      Immature Grans Absolute 0 03 Thousand/uL      Lymphocytes Absolute 1 20 Thousands/µL      Monocytes Absolute 0 31 Thousand/µL      Eosinophils Absolute 0 14 Thousand/µL      Basophils Absolute 0 02 Thousands/µL     UA w Reflex to Microscopic w Reflex to Culture [[id number]]     Lab Status:  No result Specimen:  Urine                  CTA head and neck with and without contrast   ED Interpretation by Rolanda Hoang DO (09/23 2215)   Nonvisualization of the right vertebral artery the neck with intermittent visualization intracranially likely via retrograde or collateral pathway  This is of uncertain chronicity potentially chronic  Basilar artery widely patent  Left vertebral   artery widely patent      Atherosclerotic plaque at the carotid bifurcation bilaterally results in approximately 40% stenosis on the right and no significant stenosis on the left      No focal intracranial stenosis or aneurysm      Infiltrating soft tissue in the mediastinum surrounding the patient's esophageal stent extending to the level of the descending thoracic aorta as well as the vertebral column  Endplates are irregular at T4-5, T5-6, and T6-7 with bony sclerosis present   suggesting either direct extension of tumor from the patient's known esophageal cancer versus discitis and osteomyelitis  Thoracic spine MRI with gadolinium recommended to further evaluate        Final Result by Cindy Arenas DO (09/23 2146)      Nonvisualization of the right vertebral artery the neck with intermittent visualization intracranially likely via retrograde or collateral pathway  This is of uncertain chronicity potentially chronic  Basilar artery widely patent  Left vertebral    artery widely patent  Atherosclerotic plaque at the carotid bifurcation bilaterally results in approximately 40% stenosis on the right and no significant stenosis on the left  No focal intracranial stenosis or aneurysm  Infiltrating soft tissue in the mediastinum surrounding the patient's esophageal stent extending to the level of the descending thoracic aorta as well as the vertebral column  Endplates are irregular at T4-5, T5-6, and T6-7 with bony sclerosis present    suggesting either direct extension of tumor from the patient's known esophageal cancer versus discitis and osteomyelitis  Thoracic spine MRI with gadolinium recommended to further evaluate  I personally discussed this study with Ritu Zimmerman on 9/23/2020 at 9:45 PM                            Workstation performed: [id number]         XR chest 1 view portable   ED Interpretation by Reggie Barrios DO (09/23 1940)   No acute cardiopulmonary abnormalities noted  Final Result by Oswaldo Cross MD (09/23 2030)      No acute cardiopulmonary disease              Workstation performed: [id number]                    Procedures  ECG 12 Lead Documentation Only    Date/Time: 9/23/2020 7:18 PM  Performed by: Reggie Barrios DO  Authorized by: Reggie Barrios DO     Indications / Diagnosis:  AMS  ECG reviewed by me, the ED Provider: yes    Patient location:  ED  Previous ECG:     Previous ECG:  Unavailable    Comparison to cardiac monitor: Yes    Interpretation:     Interpretation: normal    Rate:     ECG rate:  81    ECG rate assessment: normal    Rhythm:     Rhythm: sinus rhythm    Ectopy:     Ectopy: none    QRS:     QRS axis:  Normal    QRS intervals:  Normal  Conduction:     Conduction: normal    ST segments:     ST segments:  Normal  T waves:     T waves: normal ED Course  ED Course as of Sep 23 2256   Wed Sep 23, 2020   2108 No stroke alert is called as symptoms do not sound most consistent w stroke, and no focal neuro deficit  Patient would not be a TPA candidate as NIH = 0      2221 Patient is still altered  Oriented x2  Patient is agreeable to admission, his son agrees that admission the best course of action for patient at this time  2225 TT sent to [facility], for admission  Stroke Assessment     Row Name 09/23/20 2107             NIH Stroke Scale    Interval  Baseline      Level of Consciousness (1a )  0      LOC Questions (1b )  0      LOC Commands (1c )  0      Best Gaze (2 )  0      Visual (3 )  0      Facial Palsy (4 )  0      Motor Arm, Left (5a )  0      Motor Arm, Right (5b )  0      Motor Leg, Left (6a )  0      Motor Leg, Right (6b )  0      Limb Ataxia (7 )  0      Sensory (8 )  0      Best Language (9 )  0      Dysarthria (10 )  0      Extinction and Inattention (11 ) (Formerly Neglect)  0      Total  0          First Filed Value   TPA Decision  Patient not a TPA candidate  SBIRT 22yo+      Most Recent Value   SBIRT (22 yo +)   In order to provide better care to our patients, we are screening all of our patients for alcohol and drug use  Would it be okay to ask you these screening questions? Yes Filed at: 09/23/2020 1847   Initial Alcohol Screen: US AUDIT-C    1  How often do you have a drink containing alcohol?  0 Filed at: 09/23/2020 1847   2  How many drinks containing alcohol do you have on a typical day you are drinking? 0 Filed at: 09/23/2020 1847   3a  Male UNDER 65: How often do you have five or more drinks on one occasion? 0 Filed at: 09/23/2020 1847   3b  FEMALE Any Age, or MALE 65+: How often do you have 4 or more drinks on one occassion? 0 Filed at: 09/23/2020 1847   Audit-C Score  0 Filed at: 09/23/2020 1847   CHRISTOPHER: How many times in the past year have you       Used an illegal drug or used a prescription medication for non-medical reasons? Never Filed at: 09/23/2020 1847                  [facility]  Number of Diagnoses or Management Options  Altered mental status:   Diagnosis management comments: Altered mental status, likely secondary to Ativan use for MRI as this was a new medication  Patient has stroke work up performed here, which is normal with regards to stroke workup  However patient still after period of observation here has not return to baseline  Patient's son agrees that it is best for him to stay in the hospital until he returns to his baseline mental status  On the CTA head neck, thoracic spine is identified to have diskitis, which is known, this has been treated with antibiotics over the past few months  Will get post void residual to rule out cauda equina  Amount and/or Complexity of Data Reviewed  Clinical lab tests: ordered and reviewed  Tests in the radiology section of CPT®: ordered and reviewed        Disposition  Final diagnoses: Altered mental status     Time reflects when diagnosis was documented in both MDM as applicable and the Disposition within this note     Time User Action Codes Description Comment    9/23/2020  8:53 PM Jose Glez Add [R41 [id number]] Altered mental status       ED Disposition     ED Disposition Condition Date/Time Comment    Admit Stable Wed Sep 23, 2020 10:43 PM Discussed with Grace Setting AP  Will admit obs, to SLIM service        Follow-up Information    None         Patient's Medications    No medications on file     No discharge procedures on file      PDMP Review     None          ED Provider  Electronically Signed by           Hanane Weems,   09/23/20 [address], DO  09/23/20 4504

## 2020-09-23 NOTE — PROGRESS NOTES
Progress Note - Infectious Disease   Laurence Tracy 76 y o  male MRN: [id number]  Unit/Bed#:  Encounter: [id number]      IMPRESSION & RECOMMENDATIONS:   Impression/Recommendations:  1  Paraspinal abscess and vertebral osteomyelitis  Suspect secondary to contiguous spread in setting of #2  Negative biopsy  Consider sterile collection as patient is otherwise afebrile with improving ESR and no new symptoms  Consider possible leak  Prior swallow study was negative  S/p 6 weeks of IV ceftriaxone/oral Flagyl with improvement in ESR  Now remains on prolonged oral Augmentin, which he is tolerating  Last ESR is stable at 46  Remains afebrile  Most recent CT from 8/29/20 showed decrease in size of collection  Will plan to leave on antibiotic and continue to trend ESR to ensure it continues to improve or has plateaued       -continue oral Augmentin for now  -check monthly CBC with diff, CMP, ESR  -followup pending MRI brain and thoracic spine scheduled for today  -neurosurgery followup     2  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  Status post neoadjuvant chemotherapy via chest wall port, transhiatal esophagectomy on 01/28/2020  Repeat MRI brain is concerning for metastatic lesion  Patient follows with Dr Joseph Macario of thoracic surgery and Dr Tano Machado of oncology      3  Dysphagia with recurrent esophageal stricture  In the setting of #2  Status post J-tube placement  Remains on tube feeds  Follows with Dr Joseph Macario  Last had EGD with dilation and stent placement on 8/31      4  COPD  No evidence of acute exacerbation      Followup in 2 months  Subjective:  Patient is here for followup  Recently in ER on 9/16 due to intermittent abd pain  CT abdomen showed mild colitis, persistent fullness of celiac axis  His pain has gotten somewhat better  Stools are loose at times but no diarrhea  Last BM was yesterday  No fevers, chills  He continues to struggle with oral intake  Remains on tube feeds as well  Recently underwent EGD with dilation and stent placement on 8/31  He feels like he is tolerating the antibiotic well  He is going for MRI this afternoon  Personally reviewed and updated as appropriate: past med hx, past surgical hx, current meds, allergies, problem list     Objective:  Vitals:  [unfilled]  [email or web address] com: Temperature: 97 6 °F (36 4 °C)    Physical Exam:   General:  No acute distress, nontoxic, thin and chronically ill appearing  HEENT: AT/NC  Psychiatric:  Awake and alert  Pulmonary:  Normal respiratory excursion without accessory muscle use  Abdomen:  Soft, nontender  Extremities:  No edema  Skin:  No rashes  Neuro: no focal neuro deficits    Lab Results:  I have personally reviewed pertinent labs  Labs from 9/16/20 reviewed  Imaging Studies:   I have personally reviewed pertinent imaging study reports and images in PACS  CT A/P from 9/16/20 showed mild colitis  No abscess  Persistent fullness of celiac axis  CTA C/A/P from 8/29/20 showed interval decrease in size of prevertebral abscess  EKG, Pathology, and Other Studies:   I have personally reviewed pertinent reports

## 2020-09-24 VITALS
RESPIRATION RATE: 15 BRPM | HEIGHT: 70 IN | DIASTOLIC BLOOD PRESSURE: 68 MMHG | OXYGEN SATURATION: 100 % | TEMPERATURE: 98.2 F | SYSTOLIC BLOOD PRESSURE: 116 MMHG | BODY MASS INDEX: 19.54 KG/M2 | HEART RATE: 77 BPM | WEIGHT: 136.47 LBS

## 2020-09-24 DIAGNOSIS — C15.9 ADENOCARCINOMA OF ESOPHAGUS (HCC): Primary | ICD-10-CM

## 2020-09-24 PROBLEM — R90.89 ABNORMAL BRAIN MRI: Status: ACTIVE | Noted: 2020-09-24

## 2020-09-24 PROBLEM — I10 HYPERTENSION: Status: ACTIVE | Noted: 2020-09-24

## 2020-09-24 PROBLEM — E03.9 HYPOTHYROID: Status: ACTIVE | Noted: 2020-09-24

## 2020-09-24 PROBLEM — R33.9 URINARY RETENTION: Status: ACTIVE | Noted: 2020-09-24

## 2020-09-24 LAB
AMPHETAMINES SERPL QL SCN: NEGATIVE
ANION GAP SERPL CALCULATED.3IONS-SCNC: 8 MMOL/L (ref 4–13)
APAP SERPL-MCNC: <2 UG/ML (ref 10–20)
BARBITURATES UR QL: NEGATIVE
BASOPHILS # BLD AUTO: 0.03 THOUSANDS/ΜL (ref 0–0.1)
BASOPHILS NFR BLD AUTO: 1 % (ref 0–1)
BENZODIAZ UR QL: NEGATIVE
BILIRUB UR QL STRIP: NEGATIVE
BUN SERPL-MCNC: 13 MG/DL (ref 5–25)
CALCIUM SERPL-MCNC: 9 MG/DL (ref 8.3–10.1)
CHLORIDE SERPL-SCNC: 103 MMOL/L (ref 100–108)
CLARITY UR: CLEAR
CO2 SERPL-SCNC: 28 MMOL/L (ref 21–32)
COCAINE UR QL: NEGATIVE
COLOR UR: YELLOW
CREAT SERPL-MCNC: 0.72 MG/DL (ref 0.6–1.3)
EOSINOPHIL # BLD AUTO: 0.1 THOUSAND/ΜL (ref 0–0.61)
EOSINOPHIL NFR BLD AUTO: 2 % (ref 0–6)
ERYTHROCYTE [DISTWIDTH] IN BLOOD BY AUTOMATED COUNT: 14.6 % (ref 11.6–15.1)
ETHANOL SERPL-MCNC: <3 MG/DL (ref 0–3)
GFR SERPL CREATININE-BSD FRML MDRD: 97 ML/MIN/1.73SQ M
GLUCOSE P FAST SERPL-MCNC: 92 MG/DL (ref 65–99)
GLUCOSE SERPL-MCNC: 92 MG/DL (ref 65–140)
GLUCOSE UR STRIP-MCNC: NEGATIVE MG/DL
HCT VFR BLD AUTO: 36.5 % (ref 36.5–49.3)
HGB BLD-MCNC: 11.8 G/DL (ref 12–17)
HGB UR QL STRIP.AUTO: NEGATIVE
IMM GRANULOCYTES # BLD AUTO: 0.01 THOUSAND/UL (ref 0–0.2)
IMM GRANULOCYTES NFR BLD AUTO: 0 % (ref 0–2)
KETONES UR STRIP-MCNC: NEGATIVE MG/DL
LEUKOCYTE ESTERASE UR QL STRIP: NEGATIVE
LYMPHOCYTES # BLD AUTO: 0.91 THOUSANDS/ΜL (ref 0.6–4.47)
LYMPHOCYTES NFR BLD AUTO: 15 % (ref 14–44)
MCH RBC QN AUTO: 27.6 PG (ref 26.8–34.3)
MCHC RBC AUTO-ENTMCNC: 32.3 G/DL (ref 31.4–37.4)
MCV RBC AUTO: 85 FL (ref 82–98)
METHADONE UR QL: NEGATIVE
MONOCYTES # BLD AUTO: 0.37 THOUSAND/ΜL (ref 0.17–1.22)
MONOCYTES NFR BLD AUTO: 6 % (ref 4–12)
NEUTROPHILS # BLD AUTO: 4.61 THOUSANDS/ΜL (ref 1.85–7.62)
NEUTS SEG NFR BLD AUTO: 76 % (ref 43–75)
NITRITE UR QL STRIP: NEGATIVE
NRBC BLD AUTO-RTO: 0 /100 WBCS
OPIATES UR QL SCN: NEGATIVE
OXYCODONE+OXYMORPHONE UR QL SCN: POSITIVE
PCP UR QL: NEGATIVE
PH UR STRIP.AUTO: 7 [PH]
PLATELET # BLD AUTO: 252 THOUSANDS/UL (ref 149–390)
PLATELET # BLD AUTO: 253 THOUSANDS/UL (ref 149–390)
PMV BLD AUTO: 8.6 FL (ref 8.9–12.7)
PMV BLD AUTO: 8.7 FL (ref 8.9–12.7)
POTASSIUM SERPL-SCNC: 3.6 MMOL/L (ref 3.5–5.3)
PROT UR STRIP-MCNC: NEGATIVE MG/DL
RBC # BLD AUTO: 4.28 MILLION/UL (ref 3.88–5.62)
SALICYLATES SERPL-MCNC: <3 MG/DL (ref 3–20)
SODIUM SERPL-SCNC: 139 MMOL/L (ref 136–145)
SP GR UR STRIP.AUTO: 1.01 (ref 1–1.03)
THC UR QL: NEGATIVE
UROBILINOGEN UR QL STRIP.AUTO: 0.2 E.U./DL
WBC # BLD AUTO: 6.03 THOUSAND/UL (ref 4.31–10.16)

## 2020-09-24 PROCEDURE — 85049 AUTOMATED PLATELET COUNT: CPT | Performed by: PHYSICIAN ASSISTANT

## 2020-09-24 PROCEDURE — 99220 PR INITIAL OBSERVATION CARE/DAY 70 MINUTES: CPT | Performed by: PHYSICIAN ASSISTANT

## 2020-09-24 PROCEDURE — 99204 OFFICE O/P NEW MOD 45 MIN: CPT | Performed by: PSYCHIATRY & NEUROLOGY

## 2020-09-24 PROCEDURE — 87147 CULTURE TYPE IMMUNOLOGIC: CPT | Performed by: PHYSICIAN ASSISTANT

## 2020-09-24 PROCEDURE — 80329 ANALGESICS NON-OPIOID 1 OR 2: CPT | Performed by: PHYSICIAN ASSISTANT

## 2020-09-24 PROCEDURE — 85025 COMPLETE CBC W/AUTO DIFF WBC: CPT | Performed by: PHYSICIAN ASSISTANT

## 2020-09-24 PROCEDURE — 81003 URINALYSIS AUTO W/O SCOPE: CPT | Performed by: EMERGENCY MEDICINE

## 2020-09-24 PROCEDURE — 36415 COLL VENOUS BLD VENIPUNCTURE: CPT | Performed by: PHYSICIAN ASSISTANT

## 2020-09-24 PROCEDURE — 87040 BLOOD CULTURE FOR BACTERIA: CPT | Performed by: PHYSICIAN ASSISTANT

## 2020-09-24 PROCEDURE — 80320 DRUG SCREEN QUANTALCOHOLS: CPT | Performed by: PHYSICIAN ASSISTANT

## 2020-09-24 PROCEDURE — 80048 BASIC METABOLIC PNL TOTAL CA: CPT | Performed by: PHYSICIAN ASSISTANT

## 2020-09-24 PROCEDURE — 80307 DRUG TEST PRSMV CHEM ANLYZR: CPT | Performed by: PHYSICIAN ASSISTANT

## 2020-09-24 RX ORDER — AMIODARONE HYDROCHLORIDE 200 MG/1
200 TABLET ORAL
Status: DISCONTINUED | OUTPATIENT
Start: 2020-09-24 | End: 2020-09-24 | Stop reason: HOSPADM

## 2020-09-24 RX ORDER — ATORVASTATIN CALCIUM 40 MG/1
40 TABLET, FILM COATED ORAL
Status: DISCONTINUED | OUTPATIENT
Start: 2020-09-24 | End: 2020-09-24 | Stop reason: HOSPADM

## 2020-09-24 RX ORDER — ONDANSETRON 2 MG/ML
4 INJECTION INTRAMUSCULAR; INTRAVENOUS EVERY 6 HOURS PRN
Status: DISCONTINUED | OUTPATIENT
Start: 2020-09-24 | End: 2020-09-24 | Stop reason: HOSPADM

## 2020-09-24 RX ORDER — LEVOTHYROXINE SODIUM 0.03 MG/1
25 TABLET ORAL DAILY
Status: DISCONTINUED | OUTPATIENT
Start: 2020-09-24 | End: 2020-09-24 | Stop reason: HOSPADM

## 2020-09-24 RX ORDER — DOCUSATE SODIUM 100 MG/1
100 CAPSULE, LIQUID FILLED ORAL 2 TIMES DAILY PRN
Qty: 10 CAPSULE | Refills: 0 | Status: SHIPPED | OUTPATIENT
Start: 2020-09-24 | End: 2020-10-26

## 2020-09-24 RX ORDER — ROPINIROLE 0.25 MG/1
0.25 TABLET, FILM COATED ORAL
Status: DISCONTINUED | OUTPATIENT
Start: 2020-09-24 | End: 2020-09-24 | Stop reason: HOSPADM

## 2020-09-24 RX ORDER — ACETAMINOPHEN 325 MG/1
650 TABLET ORAL EVERY 6 HOURS PRN
Status: DISCONTINUED | OUTPATIENT
Start: 2020-09-24 | End: 2020-09-24 | Stop reason: HOSPADM

## 2020-09-24 RX ORDER — DOCUSATE SODIUM 100 MG/1
100 CAPSULE, LIQUID FILLED ORAL 2 TIMES DAILY
Status: DISCONTINUED | OUTPATIENT
Start: 2020-09-24 | End: 2020-09-24 | Stop reason: HOSPADM

## 2020-09-24 RX ORDER — DICYCLOMINE HCL 20 MG
20 TABLET ORAL EVERY 6 HOURS
Status: DISCONTINUED | OUTPATIENT
Start: 2020-09-24 | End: 2020-09-24 | Stop reason: HOSPADM

## 2020-09-24 RX ADMIN — DOCUSATE SODIUM 100 MG: 100 CAPSULE, LIQUID FILLED ORAL at 08:27

## 2020-09-24 RX ADMIN — DICYCLOMINE HYDROCHLORIDE 20 MG: 20 TABLET ORAL at 08:26

## 2020-09-24 RX ADMIN — ENOXAPARIN SODIUM 40 MG: 40 INJECTION SUBCUTANEOUS at 08:26

## 2020-09-24 RX ADMIN — AMIODARONE HYDROCHLORIDE 200 MG: 200 TABLET ORAL at 08:26

## 2020-09-24 RX ADMIN — ROPINIROLE HYDROCHLORIDE 0.25 MG: 0.25 TABLET, FILM COATED ORAL at 01:19

## 2020-09-24 RX ADMIN — DICYCLOMINE HYDROCHLORIDE 20 MG: 20 TABLET ORAL at 01:19

## 2020-09-24 RX ADMIN — ACETAMINOPHEN 650 MG: 325 TABLET, FILM COATED ORAL at 13:32

## 2020-09-24 RX ADMIN — LEVOTHYROXINE SODIUM 25 MCG: 25 TABLET ORAL at 06:31

## 2020-09-24 NOTE — ED NOTES
Patient eating lunchh---attempted to reach daughter, Adriana Coe, to pick patient up as he is up for dischage      Miroslava Felix RN  09/24/20 7226

## 2020-09-24 NOTE — ASSESSMENT & PLAN NOTE
- Patient presented with AMS, hallucinations of bugs crawling on his skin, and saying things that did not make sense after taking 2 mg of Ativan for MRI thoracic spine imaging  Patient is now back to baseline  - Will defer further neuroimaging at this time due to returning back to baseline, severe claustrophobia, and significant back pain  If patient needs MRI brain or thoracic imaging in the future, may need to be conducted under general anesthesia  - Labs:  · Urine oxycodone positive (excpected due to taking oxycodone at home)  · WNL: UDS, UA, acetaminophen, and salicylate levels, CBC, and CMP  - Continue to monitor and notify neurology with any changes  - Medical management and supportive care per primary team  Correction of any metabolic or infectious disturbances  - No further inpatient Neuro recommendations  Please call with questions  Imaging:  - CTA head/neck:  · Nonvisualization of the right vertebral artery the neck with intermittent visualization intracranially likely via retrograde or collateral pathway  This is of uncertain chronicity potentially chronic  Basilar artery widely patent  Left vertebral artery widely patent  · Atherosclerotic plaque at the carotid bifurcation bilaterally results in approximately 40% stenosis on the right and no significant stenosis on the left  · No focal intracranial stenosis or aneurysm  · Infiltrating soft tissue in the mediastinum surrounding the patient's esophageal stent extending to the level of the descending thoracic aorta as well as the vertebral column  Endplates are irregular at T4-5, T5-6, and T6-7 with bony sclerosis present suggesting either direct extension of tumor from the patient's known esophageal cancer versus discitis and osteomyelitis  Thoracic spine MRI with gadolinium recommended to further evaluate

## 2020-09-24 NOTE — ASSESSMENT & PLAN NOTE
- Patient has been following with ID, Neurosurgery, Thoracic surgery, and Heme Onc outpatient for paraspinal abscess and T6 osteomyelitis  - CTA head/neck 9/23/2020:  · Infiltrating soft tissue in the mediastinum surrounding the patient's esophageal stent extending to the level of the descending thoracic aorta as well as the vertebral column  Endplates are irregular at T4-5, T5-6, and T6-7 with bony sclerosis present suggesting either direct extension of tumor from the patient's known esophageal cancer versus discitis and osteomyelitis  Thoracic spine MRI with gadolinium recommended to further evaluate    - Patient finished 6 weeks of IV ceftriaxone, PO Flagyl, and is currently on PO Augmentin  - Patient was to get MRI thoracic spine w/wo contrast yesterday, but due to significant claustrophobia and back pain, was unable to complete the study  - Will defer further imaging to outpatient ID and neurosurgery

## 2020-09-24 NOTE — ASSESSMENT & PLAN NOTE
Patient reports he has a history of "infection in his back", reports he has been on antibiotics for months  Patient and son are unsure of what medication he is on, but on medication list it shows Augmentin, however patient is listed to have an anaphylactic reaction to penicillins and neither patient or son is sure if he has really been taking Augmentin or a different antibiotic  Stu Manifold Reports he was scheduled to have an MRI of his thoracic spine to further evaluate this today but was unable to get it  · On CT scan taken in ED today, it showed: Infiltrating soft tissue in the mediastinum surrounding the patient's esophageal stent extending to the level of the descending thoracic aorta as well as the vertebral column  Endplates are irregular at T4-5, T5-6, and T6-7 with bony sclerosis present suggesting either direct extension of tumor from the patient's known esophageal cancer versus discitis and osteomyelitis  Thoracic spine MRI with gadolinium recommended to further evaluate  · Patient without fever, no white blood cell count  · Continue patient on antibiotics he has been taking as an outpatient  · At this time not concern for cauda equina, patient is without neurologic deficits, no saddle anesthesia  Patient does have urinary retention, which per the patient's son is new  Urinary retention protocol  · Neurochecks q 4   · Blood cultures ordered  · Will not order antibiotics at this time as it is unclear what antibiotic patient is on at home    Recommend calling pharmacy in a m  to get full medication list  · Neurology consulted, appreciate recommendations

## 2020-09-24 NOTE — CONSULTS
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) s/p neoadjuvant chemotherapy and surgery with concern for recurrent/metastatic disease and possible brain metastasis (May 2020), dysphagia s/p dilatation and J-tube placement, paraspinal abscess and T6 osteomyelitis (s/p 6 weeks IV ceftriaxone, oral Flagyl, and now on Augmentin), DM2, COPD, hypothyroidism, sleep apnea, atrial fibrillation not on anticoagulation, and former smoker who presented to [facility] ED on 9/23/2020 for AMS after taking Ativan 2 mg for MRI thoracic spine  Patient has returned back to baseline  Etiology likely Ativan delirium  * AMS (altered mental status)  Assessment & Plan  - Patient presented with AMS, hallucinations of bugs crawling on his skin, and saying things that did not make sense after taking 2 mg of Ativan for MRI thoracic spine imaging  Patient is now back to baseline  - Will defer further neuroimaging at this time due to returning back to baseline, severe claustrophobia, and significant back pain  If patient needs MRI brain or thoracic imaging in the future, may need to be conducted under general anesthesia  - Labs:  · Urine oxycodone positive (excpected due to taking oxycodone at home)  · WNL: UDS, UA, acetaminophen, and salicylate levels, CBC, and CMP  - Continue to monitor and notify neurology with any changes  - Medical management and supportive care per primary team  Correction of any metabolic or infectious disturbances  - No further inpatient Neuro recommendations  Please call with questions  Imaging:  - CTA head/neck:  · Nonvisualization of the right vertebral artery the neck with intermittent visualization intracranially likely via retrograde or collateral pathway  This is of uncertain chronicity potentially chronic    Basilar artery widely patent  Left vertebral artery widely patent  · Atherosclerotic plaque at the carotid bifurcation bilaterally results in approximately 40% stenosis on the right and no significant stenosis on the left  · No focal intracranial stenosis or aneurysm  · Infiltrating soft tissue in the mediastinum surrounding the patient's esophageal stent extending to the level of the descending thoracic aorta as well as the vertebral column  Endplates are irregular at T4-5, T5-6, and T6-7 with bony sclerosis present suggesting either direct extension of tumor from the patient's known esophageal cancer versus discitis and osteomyelitis  Thoracic spine MRI with gadolinium recommended to further evaluate  Esophageal cancer [facility])  Assessment & Plan  - Diagnosed with GE junction adenocarcinoma in August 2019 s/p chemotherapy and surgery with concern for recurrent/metatstic disease  - Continue to follow with outpatient Heme Onc    Abnormal CT of thoracic spine  Assessment & Plan  - Patient has been following with ID, Neurosurgery, Thoracic surgery, and Heme Onc outpatient for paraspinal abscess and T6 osteomyelitis  - CTA head/neck 9/23/2020:  · Infiltrating soft tissue in the mediastinum surrounding the patient's esophageal stent extending to the level of the descending thoracic aorta as well as the vertebral column  Endplates are irregular at T4-5, T5-6, and T6-7 with bony sclerosis present suggesting either direct extension of tumor from the patient's known esophageal cancer versus discitis and osteomyelitis  Thoracic spine MRI with gadolinium recommended to further evaluate    - Patient finished 6 weeks of IV ceftriaxone, PO Flagyl, and is currently on PO Augmentin  - Patient was to get MRI thoracic spine w/wo contrast yesterday, but due to significant claustrophobia and back pain, was unable to complete the study  - Will defer further imaging to outpatient ID and neurosurgery    Abnormal brain MRI  Assessment & Plan  - MRI brain w/wo contrast in May 2020 revealed a 4 mm nodular focus of enhancement in L posterior frontal lobe  - Repeat MRI on 7/1/2020:  · Slight interval enlargement of the small juxtacortical enhancing nodule seen on the prior examination within the left posterior medial frontal lobe, suspicious for metastasis  This measures 5 mm in size  No new enhancing lesions are identified  · Stable right frontal lobe encephalomalacia, gliosis and mild chronic hemosiderin deposition compared to the prior examination suggesting remote vascular or traumatic injury  - Patient has repeat MRI brain w/wo contrast scheduled for the future to continue monitoring the lesion  - It is unlikely that this was the source for the AMS due to return to baseline and the lesion is in the motor cortex    Hypothyroid  Assessment & Plan  - Continue home Levothyroxine      Paul Romo will not need outpatient follow up with Neurology  He will not require outpatient neurological testing  History of Present Illness     Reason for Consult / Principal Problem: AMS  Hx and PE limited by: N/A  HPI: Paul Romo is a 76 y o  male with 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) s/p neoadjuvant chemotherapy and surgery with concern for recurrent/metastatic disease and possible brain metastasis (May 2020), dysphagia s/p dilatation and J-tube placement, paraspinal abscess and T6 osteomyelitis (s/p 6 weeks IV ceftriaxone, oral Flagyl, and now on Augmentin), DM2, COPD, hypothyroidism, sleep apnea, atrial fibrillation not on anticoagulation, and former smoker who presented to [facility] ED on 9/23/2020 for AMS after taking Ativan 2 mg for MRI thoracic spine  Patient follows with Heme Onc, ID, Neurosurgery, Thoracic surgery, and Palliative medicine outpatient for his multiple medical comorbidities  Per chart review, patient was diagnosed with esophageal cancer in August 2019    In May 2020, MRI brain revealed concern for new brain mets due to 4 mm nodular focus of enhancement in L posterior frontal lobe  In May, patient also began having back pain and was diagnosed with T6 osteomyelitis and paraspinal abscess with compression deformities  He saw his thoracic surgeon and was told the collection is deep and inaccessible, so was treated conservatively with antibiotics  Patient has been following with ID and they suspect that the paraspinal abscess is secondary to contiguous spread in setting of esophageal cancer, however biopsy was negative (thought to be sterile collection)  Patient completed 6 weeks of IV ceftriaxone/oral Flagyl with improvement in ESR  He is now on prolonged Augmentin  Patient saw ID yesterday and was to continue oral Augmentin and obtain MRI thoracic spine to further evaluate the spinal lesion  Patient took 2 mg prior to MRI imaging due to anxiety  Unfortunately patient could not tolerate lying flat for the MRI due to significant back pain  Patient's son was driving him home after the study, and was initially in his normal state  Approximately 20 minutes later, the patient began "acting strangely "  Per son, patient was saying things that were not making sense, called his children by the wrong name, and appeared to be hallucinating  Patient was swatting at his arms as if "bugs were crawling on him "  No speech disturbances, facial droop, poor coordination, or difficulty ambulating  Upon arrival to the ED, vitals stable, but overnight became hypertensive with SBP in the 170-180s  Urine oxycodone positive, but UDS, UA, acetaminophen, and salicylate levels unremarkable  Slightly anemic (hemoglobin 3 76), but remainder of CBC and CMP WNL  CTA head/neck revealed nonvisualization of the R vertebral artery with intermittent visualization intracranially, likely via retrograde or collateral pathway, but basilar artery patent    Also showed atherosclerotic plaque of bilateral carotid bifurcations (40% stenosis on R and no significant stenosis on L)  No intracranial stenosis or aneurysm  CTA also showed infiltrating soft tissue in the mediastinum surrounding the patient's esophageal stent and bony sclerosis of T4-T7 concerning for tumor extension vs discitis vs osteomyelitis  On exam today, patient is lying in bed in no acute distress  States that he is tired and did not sleep well last night  He does not know why he is here in the hospital   States he could not handle the MRI yesterday due to back pain and claustrophobia  He remembers being in the car, but does not recall saying anything strange or hallucinating  Currently has back pain rated as a 3/10  He also has chronic generalized weakness and has lost "some weight" recently  Denies any headache, visual disturbances, numbness, tingling, worsening arm/leg weakness, chest pain, shortness of breath, abdominal pain, and hallucinations  He has chronic hypophonia, but does not seem worse today than usual   He lives with his daughter, but he still drives, is able to do his own cooking, cleaning, and shopping  Inpatient consult to Neurology  Consult performed by: Maria A Rojo PA-C  Consult ordered by: Josue Shaikh PA-C          Review of Systems   Constitutional: Positive for appetite change, fatigue and unexpected weight change  Negative for fever  HENT: Positive for trouble swallowing and voice change  Eyes: Negative for photophobia and visual disturbance  Respiratory: Negative for chest tightness and shortness of breath  Cardiovascular: Negative for chest pain  Gastrointestinal: Negative for abdominal pain  Genitourinary: Negative for difficulty urinating  Musculoskeletal: Positive for back pain  Negative for gait problem  Neurological: Positive for speech difficulty and weakness  Negative for dizziness, tremors, facial asymmetry, light-headedness, numbness and headaches     Psychiatric/Behavioral: Negative for behavioral problems, confusion and decreased concentration  Historical Information   Past Medical History:   Diagnosis Date    Brain tumor ([facility] [address] )     Cancer of esophagus ([facility] [address] )      History reviewed  No pertinent surgical history  Social History   Social History     Substance and Sexual Activity   Alcohol Use Never    Frequency: Never     Social History     Substance and Sexual Activity   Drug Use Never     E-Cigarette/Vaping    E-Cigarette Use Never User      E-Cigarette/Vaping Substances     Social History     Tobacco Use   Smoking Status Former Smoker   Smokeless Tobacco Never Used     Family History: History reviewed  No pertinent family history  Review of previous medical records was completed  Reviewed prior notes, labs, prior MRI brain, CTA head/neck    Meds/Allergies   all current active meds have been reviewed, current meds:   Current Facility-Administered Medications   Medication Dose Route Frequency    acetaminophen (TYLENOL) tablet 650 mg  650 mg Oral Q6H PRN    amiodarone tablet 200 mg  200 mg Oral Daily With Breakfast    atorvastatin (LIPITOR) tablet 40 mg  40 mg Oral Daily With Dinner    dicyclomine (BENTYL) tablet 20 mg  20 mg Oral Q6H    docusate sodium (COLACE) capsule 100 mg  100 mg Oral BID    enoxaparin (LOVENOX) subcutaneous injection 40 mg  40 mg Subcutaneous Daily    levothyroxine tablet 25 mcg  25 mcg Oral Daily    ondansetron (ZOFRAN) injection 4 mg  4 mg Intravenous Q6H PRN    rOPINIRole (REQUIP) tablet 0 25 mg  0 25 mg Oral HS    and PTA meds:   Prior to Admission Medications   Prescriptions Last Dose Informant Patient Reported?  Taking?   amiodarone 200 mg tablet   Yes Yes   Sig: Take 200 mg by mouth daily with breakfast   atorvastatin (LIPITOR) 40 mg tablet   Yes Yes   Sig: Take 40 mg by mouth daily with dinner   dicyclomine (BENTYL) 20 mg tablet   Yes Yes   Sig: Take 20 mg by mouth every 6 (six) hours   levothyroxine 25 mcg tablet   Yes Yes   Sig: Take 25 mcg by mouth daily ondansetron (ZOFRAN) 4 mg tablet   Yes Yes   Sig: Take 4 mg by mouth every 8 (eight) hours as needed for nausea or vomiting   oxyCODONE (OxyCONTIN) 10 mg 12 hr tablet   Yes Yes   Sig: Take 5 mg by mouth every 12 (twelve) hours   rOPINIRole (REQUIP) 0 25 mg tablet   Yes Yes   Sig: Take 0 25 mg by mouth daily at bedtime   traZODone (DESYREL) 100 mg tablet   Yes Yes   Sig: Take 100 mg by mouth daily at bedtime      Facility-Administered Medications: None       Allergies   Allergen Reactions    Penicillins Anaphylaxis       Objective   Vitals:Blood pressure 153/87, pulse 94, temperature 98 2 °F (36 8 °C), temperature source Oral, resp  rate 18, height 5' 10" (1 778 m), weight 61 9 kg (136 lb 7 4 oz), SpO2 100 %  ,Body mass index is 19 58 kg/m²  Intake/Output Summary (Last 24 hours) at 9/24/2020 0815  Last data filed at 9/24/2020 0658  Gross per 24 hour   Intake    Output 1275 ml   Net -1275 ml       Invasive Devices: Invasive Devices     Central Venous Catheter Line            Port A Cath 10/01/19 Left Chest 359 days                Physical Exam  Vitals signs and nursing note reviewed  Constitutional:       Comments: Elderly, frail, and chronically ill-appearing male lying in bed in no acute distress  HENT:      Head: Normocephalic and atraumatic  Nose: Nose normal       Mouth/Throat:      Mouth: Mucous membranes are moist       Pharynx: Oropharynx is clear  Eyes:      Extraocular Movements: Extraocular movements intact  Conjunctiva/sclera: Conjunctivae normal       Pupils: Pupils are equal, round, and reactive to light  Neck:      Musculoskeletal: Normal range of motion  Pulmonary:      Effort: Pulmonary effort is normal    Musculoskeletal: Normal range of motion  Skin:     General: Skin is warm and dry  Capillary Refill: Capillary refill takes less than 2 seconds  Neurological:      Mental Status: He is alert and oriented to person, place, and time        Deep Tendon Reflexes: Reflex Scores:       Bicep reflexes are 2+ on the right side and 2+ on the left side  Brachioradialis reflexes are 2+ on the right side and 2+ on the left side  Patellar reflexes are 2+ on the right side and 3+ on the left side  Achilles reflexes are 2+ on the right side and 2+ on the left side  Comments: See full neuro exam below   Psychiatric:         Mood and Affect: Mood normal          Behavior: Behavior normal        Neurologic Exam     Mental Status   Oriented to person, place, and time  Patient appears slightly lethargic, but oriented to person, place, city, month, and year  No dysarthria or aphasia, but speech is hypophonic  Able to follow simple and cross over commands  Normal attention and concentration  Cranial Nerves     CN II   Visual fields full to confrontation  CN III, IV, VI   Pupils are equal, round, and reactive to light  Right pupil: Size: 3 mm  Shape: regular  Reactivity: brisk  Left pupil: Size: 3 mm  Shape: regular  Reactivity: brisk  Nystagmus: bilateral   Nystagmus type: horizontal and rapid    CN V   Facial sensation intact  CN VII   Facial expression full, symmetric  CN VIII   Hearing: intact    CN IX, X   Palate: symmetric    CN XI   Right sternocleidomastoid strength: normal  Left sternocleidomastoid strength: normal  Right trapezius strength: normal  Left trapezius strength: normal    CN XII   Tongue: not atrophic  Fasciculations: absent  Tongue deviation: none    Motor Exam   Decreased bulk throughout  No pronator drift  5/5 strength in bilateral upper and lower extremities     Sensory Exam   Sensation to light touch, temperature, and vibration intact throughout, except slightly diminished vibratory sense in bilateral great toes       Gait, Coordination, and Reflexes     Gait  Gait: (deferred)    Reflexes   Right brachioradialis: 2+  Left brachioradialis: 2+  Right biceps: 2+  Left biceps: 2+  Right patellar: 2+  Left patellar: 3+  Right achilles: 2+  Left achilles: 2+  No ataxia with finger to nose bilaterally  No ankle clonus  Mute toes bilaterally  No resting or action tremor  Lab Results:   I have personally reviewed pertinent reports  , CBC:   Results from last 7 days   Lab Units 09/24/20  0639 09/24/20  0115 09/23/20 1937   WBC Thousand/uL 6 03  --  4 93   RBC Million/uL 4 28  --  3 76*   HEMOGLOBIN g/dL 11 8*  --  10 5*   HEMATOCRIT % 36 5  --  32 7*   MCV fL 85  --  87   PLATELETS Thousands/uL 253 252 217   , BMP/CMP:   Results from last 7 days   Lab Units 09/24/20  0639 09/23/20 1937   SODIUM mmol/L 139 140   POTASSIUM mmol/L 3 6 3 7   CHLORIDE mmol/L 103 105   CO2 mmol/L 28 30   BUN mg/dL 13 17   CREATININE mg/dL 0 72 0 81   CALCIUM mg/dL 9 0 8 7   AST U/L  --  14   ALT U/L  --  23   ALK PHOS U/L  --  53   EGFR ml/min/1 73sq m 97 93   , Vitamin B12:   , HgBA1C:   , TSH:   , Coagulation:   Results from last 7 days   Lab Units 09/23/20 1937   INR  1 06   , Lipid Profile:   , Ammonia:   , Urinalysis:   Results from last 7 days   Lab Units 09/24/20  0009   COLOR UA  Yellow   CLARITY UA  Clear   SPEC GRAV UA  1 010   PH UA  7 0   LEUKOCYTES UA  Negative   NITRITE UA  Negative   GLUCOSE UA mg/dl Negative   KETONES UA mg/dl Negative   BILIRUBIN UA  Negative   BLOOD UA  Negative   , Drug Screen:   Results from last 7 days   Lab Units 09/24/20  0009   BARBITURATE UR  Negative   BENZODIAZEPINE UR  Negative   THC UR  Negative   COCAINE UR  Negative   METHADONE URINE  Negative   OPIATE UR  Negative   PCP UR  Negative     Imaging Studies: I have personally reviewed pertinent reports  and I have personally reviewed pertinent films in PACS  EKG, Pathology, and Other Studies: I have personally reviewed pertinent reports     and I have personally reviewed pertinent films in PACS  VTE Prophylaxis: Sequential compression device (Venodyne)  and Enoxaparin (Lovenox)    Code Status: Level 1 - Full Code    Counseling / Coordination of Care  Total time spent today 60 minutes  Greater than 50% of total time was spent with the patient and/or family counseling and/or coordination of care  A description of the counseling/coordination of care:  Patient was seen and evaluated  Discussed with attending  Chart reviewed thoroughly including laboratory and imaging studies    Plan of care discussed with patient and primary team

## 2020-09-24 NOTE — ASSESSMENT & PLAN NOTE
Patient presents with altered mental status for several hours after taking 2 mg Ativan prior to an MRI due to his claustrophobia  · Currently patient is oriented x3 but still somewhat confused, per patient's son he has improved since earlier today but is not at his baseline  · Labs unremarkable, no infectious etiology found, no electrolyte abnormalities  Awaiting UA  ·  urine drug screen pending  · Will hold patient's trazodone, avoid benzodiazepines in the future  · CTA showing: Nonvisualization of the right vertebral artery the neck with intermittent visualization intracranially likely via retrograde or collateral pathway  This is of uncertain chronicity potentially chronic  Basilar artery widely patent  Left vertebral artery widely patent  · Atherosclerotic plaque at the carotid bifurcation bilaterally results in approximately 40% stenosis on the right and no significant stenosis on the left  No focal intracranial stenosis or aneurysm  · Infiltrating soft tissue in the mediastinum surrounding the patient's esophageal stent extending to the level of the descending thoracic aorta as well as the vertebral column  Endplates are irregular at T4-5, T5-6, and T6-7 with bony sclerosis present suggesting either direct extension of tumor from the patient's known esophageal cancer versus discitis and osteomyelitis  Thoracic spine MRI with gadolinium recommended to further evaluate    · No neurologic deficits, continue with neuro checks overnight  · Neurology consulted, appreciate recommendations

## 2020-09-24 NOTE — INCIDENTAL FINDINGS
The following findings require follow up:  Radiographic finding   Finding:       Nonvisualization of the right vertebral artery the neck with intermittent visualization intracranially likely via retrograde or collateral pathway  This is of uncertain chronicity potentially chronic  Basilar artery widely patent  Left vertebral   artery widely patent      Atherosclerotic plaque at the carotid bifurcation bilaterally results in approximately 40% stenosis on the right and no significant stenosis on the left      No focal intracranial stenosis or aneurysm      Infiltrating soft tissue in the mediastinum surrounding the patient's esophageal stent extending to the level of the descending thoracic aorta as well as the vertebral column  Endplates are irregular at T4-5, T5-6, and T6-7 with bony sclerosis present suggesting either direct extension of tumor from the patient's known esophageal cancer versus discitis and osteomyelitis  Follow up required: Thoracic spine MRI with gadolinium recommended to further evaluate         Follow up should be done within 1-2 week(s)    Please notify the following clinician to assist with the follow up:   Dr Scarlet Guillermo

## 2020-09-24 NOTE — PROGRESS NOTES
Progress Note Varsha Hinojosa 1955, 76 y o  male MRN: [id number]    Unit/Bed#: ED 07 Encounter: [id number]    Primary Care Provider: SARA Gaitan   Date and time admitted to hospital: 9/23/2020  6:38 PM        Esophageal cancer [facility])  Assessment & Plan  Patient with a history of esophageal cancer, per son patient last had chemo done last December  Patient currently has an esophageal stent in place  · CTA showing: Infiltrating soft tissue in the mediastinum surrounding the patient's esophageal stent extending to the level of the descending thoracic aorta as well as the vertebral column  Endplates are irregular at T4-5, T5-6, and T6-7 with bony sclerosis present suggesting either direct extension of tumor from the patient's known esophageal cancer versus discitis and osteomyelitis  Plan  Advised to follow-up with oncologist as outpatient    Hypothyroid  Assessment & Plan  · Continue levothyroxine    Urinary retention  Assessment & Plan  · Bladder scan showing approximately 500 mL in patient's bladder after voiding, previously patient with 6 and 81 mL in bladder  · Urinary retention protocol  · Urinary retention has resolved  Patient is voiding without difficulty at the time of discharge  Abnormal CT of thoracic spine  Assessment & Plan  Patient reports he has a history of "infection in his back", reports he has been on antibiotics for months  Patient and son are unsure of what medication he is on, but on medication list it shows Augmentin, however patient is listed to have an anaphylactic reaction to penicillins and neither patient or son is sure if he has really been taking Augmentin or a different antibiotic  [place] Side Reports he was scheduled to have an MRI of his thoracic spine to further evaluate this today but was unable to get it  · On CT scan taken in ED today, it showed:  Infiltrating soft tissue in the mediastinum surrounding the patient's esophageal stent extending to the level of the descending thoracic aorta as well as the vertebral column  Endplates are irregular at T4-5, T5-6, and T6-7 with bony sclerosis present suggesting either direct extension of tumor from the patient's known esophageal cancer versus discitis and osteomyelitis  Thoracic spine MRI with gadolinium recommended to further evaluate  · Patient without fever, no white blood cell count  · At this time not concern for cauda equina, patient is without neurologic deficits, no saddle anesthesia  Patient does have urinary retention, which per the patient's son is new  Urinary retention protocol    Plan  · No neurologic exam change overnight  · No issue with urinary retention  No neurologic findings  · Patient advised to follow-up with his oncologist/infectious disease doctor as outpatient    * AMS (altered mental status)  Assessment & Plan  Patient presents with altered mental status for several hours after taking 2 mg Ativan prior to an MRI due to his claustrophobia  · Currently patient is oriented x3 but still somewhat confused, per patient's son he has improved since earlier today but is not at his baseline  · Labs unremarkable, no infectious etiology found, no electrolyte abnormalities  Awaiting UA  ·  urine drug screen pending  · Will hold patient's trazodone, avoid benzodiazepines in the future  · CTA showing: Nonvisualization of the right vertebral artery the neck with intermittent visualization intracranially likely via retrograde or collateral pathway  This is of uncertain chronicity potentially chronic  Basilar artery widely patent  Left vertebral artery widely patent  · Atherosclerotic plaque at the carotid bifurcation bilaterally results in approximately 40% stenosis on the right and no significant stenosis on the left  No focal intracranial stenosis or aneurysm     · Infiltrating soft tissue in the mediastinum surrounding the patient's esophageal stent extending to the level of the descending thoracic aorta as well as the vertebral column  Endplates are irregular at T4-5, T5-6, and T6-7 with bony sclerosis present suggesting either direct extension of tumor from the patient's known esophageal cancer versus discitis and osteomyelitis  Thoracic spine MRI with gadolinium recommended to further evaluate  Plan  · No neurologic deficits, mental status has improved overnight  This morning the patient is alert and oriented to time place person  · Neurology evaluated patient  It was felt Ativan  Cause for altered mental status  Advised to avoid benzodiazepines in the future  Patient is cleared for discharge per Neurology and primary team   This has been discussed with family and patient  Discharging Physician / Practitioner: Jose Francisco Rosario MD  PCP: Natali Kellogg, [address] [place] [address]  Admission Date:   Admission Orders (From admission, onward)     Ordered        09/23/20 2243  Place in Observation  Once                   Discharge Date: 09/24/20    Resolved Problems  Date Reviewed: 9/24/2020    None          Consultations During Hospital Stay:  · Neurology    Procedures Performed:   · None    Significant Findings / Test Results:   Findings on CT head and neck as mentioned below    Incidental Findings:   CT  Head and neck   Nonvisualization of the right vertebral artery the neck with intermittent visualization intracranially likely via retrograde or collateral pathway  This is of uncertain chronicity potentially chronic  Basilar artery widely patent  Left vertebral   artery widely patent      Atherosclerotic plaque at the carotid bifurcation bilaterally results in approximately 40% stenosis on the right and no significant stenosis on the left      No focal intracranial stenosis or aneurysm      Infiltrating soft tissue in the mediastinum surrounding the patient's esophageal stent extending to the level of the descending thoracic aorta as well as the vertebral column    Endplates are irregular at T4-5, T5-6, and T6-7 with bony sclerosis present   suggesting either direct extension of tumor from the patient's known esophageal cancer versus discitis and osteomyelitis  Thoracic spine MRI with gadolinium recommended to further evaluate  Test Results Pending at Discharge (will require follow up): · Blood culture     Outpatient Tests Requested:  · None    Complications:  None    Reason for Admission:  Altered mental status,    HPI    Franca Steiner is a 76 y o  male with a past medical history of esophageal cancer, hypothyroidism who presents with altered mental status  [de-identified] of history is obtained from patient's son who was at bedside  Per son, patient has been being treated for months , since Labor Day, for an infection in his back [place]  Patient's son is not sure if this infection is diskitis or osteomyelitis or something else  He reports that they have been trying to figure out for several months, and no one has been sure what it is  He reports it started out with pain in his dad's back  He reports that today he was scheduled to get an MRI of his thoracic spine to further evaluate this, and reports he was given Ativan by his PCP to use if he needed for anxiety and his claustrophobia  He reports his dad took 1 mg before going to the office, and reports right before the MRI he took another mg because he was still feeling anxious  He reports he was unable to get the MRI because he was not able to lie flat on his back due to pain  He reports he still was acting normally, but then on the road home about 20 minutes later his father was acting very strangely  He reports he was saying things that were making sense and seemed to be hallucinating  He reports that he was swatting at what he said were bugs crawling around [place], but there were no bugs in the car    Per the son his dad was speaking clearly, never had a facial droop, and had no trouble with coordination or walking, but the things he was saying did not make sense and he called his children by the wrong names  It was then that the son decided to take his father into the ED  Currently the patient is oriented x3 but still is not at baseline per his son  The patient denies any pain  He does report that he feels like he has to urinate, but he has tried twice and has only been able to urinate a small amount and has had residual urine left on bladder scan  Patient's son reports that he believes is a new issue  Patient denies any recent fevers or chills, cough, or recent sick contacts  He denies any burning with urination or blood in his urine  He denies any drug or alcohol use  Patient's son denies any known psychiatric history in his father  Hospital Course:     Summary of Hospital Course:     Oriana Bennett is a 76 y o  male patient who originally presented to the hospital on 9/23/2020 due to AMS  Patient observed overnight in the hospital   Altered mental status resolved gradually  This morning patient is alert oriented to place person and time  Neurology team has evaluated patient and it was felt that Ativan is the cause for confusion  It is advised to avoid Ativan in the future  Patient is advised to follow-up with his primary care physician oncologist infectious disease physician and other care team members upon discharge regarding recent hospitalization and further care plan  Patient is advised to undergo planned MRI as outpatient as previously planned  Patient has been voiding without difficulty  Patient is cleared for discharge by Neurology and primary care team   Family has been contacted agree with the plan  Please see above list of diagnoses and related plan for additional information       Condition at Discharge: fair     Discharge Day Visit / Exam:     * Please refer to separate progress note for these details *    Discussion with Family: yes, daughter Santiago Earing    Discharge instructions/Information to patient and family:   See after visit summary for information provided to patient and family  Provisions for Follow-Up Care:  See after visit summary for information related to follow-up care and any pertinent home health orders  Disposition:     Home    For Discharges to [facility]:   · Not Applicable to this Patient - Not Applicable to this Patient    Planned Readmission: NO     Discharge Statement:  I spent 45 minutes discharging the patient  This time was spent on the day of discharge  I had direct contact with the patient on the day of discharge  Greater than 50% of the total time was spent examining patient, answering all patient questions, arranging and discussing plan of care with patient as well as directly providing post-discharge instructions  Additional time then spent on discharge activities  Discharge Medications:  See after visit summary for reconciled discharge medications provided to patient and family        ** Please Note: This note has been constructed using a voice recognition system **

## 2020-09-24 NOTE — ASSESSMENT & PLAN NOTE
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

## 2020-09-24 NOTE — ASSESSMENT & PLAN NOTE
Patient presents with altered mental status for several hours after taking 2 mg Ativan prior to an MRI due to his claustrophobia  · Currently patient is oriented x3 but still somewhat confused, per patient's son he has improved since earlier today but is not at his baseline  · Labs unremarkable, no infectious etiology found, no electrolyte abnormalities  Awaiting UA  ·  urine drug screen pending  · Will hold patient's trazodone, avoid benzodiazepines in the future  · CTA showing: Nonvisualization of the right vertebral artery the neck with intermittent visualization intracranially likely via retrograde or collateral pathway  This is of uncertain chronicity potentially chronic  Basilar artery widely patent  Left vertebral artery widely patent  · Atherosclerotic plaque at the carotid bifurcation bilaterally results in approximately 40% stenosis on the right and no significant stenosis on the left  No focal intracranial stenosis or aneurysm  · Infiltrating soft tissue in the mediastinum surrounding the patient's esophageal stent extending to the level of the descending thoracic aorta as well as the vertebral column  Endplates are irregular at T4-5, T5-6, and T6-7 with bony sclerosis present suggesting either direct extension of tumor from the patient's known esophageal cancer versus discitis and osteomyelitis  Thoracic spine MRI with gadolinium recommended to further evaluate  Plan  · No neurologic deficits, mental status has improved overnight  This morning the patient is alert and oriented to time place person  · Neurology evaluated patient  It was felt Ativan  Cause for altered mental status  Advised to avoid benzodiazepines in the future  Patient is cleared for discharge per Neurology and primary team   This has been discussed with family and patient

## 2020-09-24 NOTE — H&P
H&P- Cindy Polanco 1955, 76 y o  male MRN: [id number]    Unit/Bed#: ED 07 Encounter: [id number]    Primary Care Provider: SARA Muro   Date and time admitted to hospital: 9/23/2020  6:38 PM        * AMS (altered mental status)  Assessment & Plan  Patient presents with altered mental status for several hours after taking 2 mg Ativan prior to an MRI due to his claustrophobia  · Currently patient is oriented x3 but still somewhat confused, per patient's son he has improved since earlier today but is not at his baseline  · Labs unremarkable, no infectious etiology found, no electrolyte abnormalities  Awaiting UA  ·  urine drug screen pending  · Will hold patient's trazodone, avoid benzodiazepines in the future  · CTA showing: Nonvisualization of the right vertebral artery the neck with intermittent visualization intracranially likely via retrograde or collateral pathway  This is of uncertain chronicity potentially chronic  Basilar artery widely patent  Left vertebral artery widely patent  · Atherosclerotic plaque at the carotid bifurcation bilaterally results in approximately 40% stenosis on the right and no significant stenosis on the left  No focal intracranial stenosis or aneurysm  · Infiltrating soft tissue in the mediastinum surrounding the patient's esophageal stent extending to the level of the descending thoracic aorta as well as the vertebral column  Endplates are irregular at T4-5, T5-6, and T6-7 with bony sclerosis present suggesting either direct extension of tumor from the patient's known esophageal cancer versus discitis and osteomyelitis  Thoracic spine MRI with gadolinium recommended to further evaluate  · No neurologic deficits, continue with neuro checks overnight  · Neurology consulted, appreciate recommendations      Esophageal cancer [facility])  Assessment & Plan  Patient with a history of esophageal cancer, per son patient last had chemo done last December    Patient currently has an esophageal stent in place  · CTA showing: Infiltrating soft tissue in the mediastinum surrounding the patient's esophageal stent extending to the level of the descending thoracic aorta as well as the vertebral column  Endplates are irregular at T4-5, T5-6, and T6-7 with bony sclerosis present suggesting either direct extension of tumor from the patient's known esophageal cancer versus discitis and osteomyelitis  Hypothyroid  Assessment & Plan  · Continue levothyroxine    Urinary retention  Assessment & Plan  · Bladder scan showing approximately 500 mL in patient's bladder after voiding, previously patient with 6 and 81 mL in bladder  · Urinary retention protocol  · If urinary retention continues, can consider urology consult in a m  Abnormal CT of thoracic spine  Assessment & Plan  Patient reports he has a history of "infection in his back", reports he has been on antibiotics for months  Patient and son are unsure of what medication he is on, but on medication list it shows Augmentin, however patient is listed to have an anaphylactic reaction to penicillins and neither patient or son is sure if he has really been taking Augmentin or a different antibiotic  Abebe Olivier Reports he was scheduled to have an MRI of his thoracic spine to further evaluate this today but was unable to get it  · On CT scan taken in ED today, it showed: Infiltrating soft tissue in the mediastinum surrounding the patient's esophageal stent extending to the level of the descending thoracic aorta as well as the vertebral column  Endplates are irregular at T4-5, T5-6, and T6-7 with bony sclerosis present suggesting either direct extension of tumor from the patient's known esophageal cancer versus discitis and osteomyelitis  Thoracic spine MRI with gadolinium recommended to further evaluate    · Patient without fever, no white blood cell count  · At this time not concern for cauda equina, patient is without neurologic deficits, no saddle anesthesia  Patient does have urinary retention, which per the patient's son is new  Urinary retention protocol  · Neurochecks q 4   · Blood cultures ordered  · Will not order antibiotics at this time as it is unclear what antibiotic patient is on at home  Recommend calling pharmacy in a m  to get full medication list  · Neurology consulted, appreciate recommendations    VTE Prophylaxis: Enoxaparin (Lovenox)  / sequential compression device   Code Status:  Full code  POLST: POLST is not applicable to this patient  Discussion with family:  Patient and son at bedside    Anticipated Length of Stay:  Patient will be admitted on an Observation basis with an anticipated length of stay of  < 2 midnights  Justification for Hospital Stay:  Patient with altered mental status, requiring further observation, neuro checks, and Neurology consultation in a m  Total Time for Visit, including Counseling / Coordination of Care: 1 hour  Greater than 50% of this total time spent on direct patient counseling and coordination of care  Chief Complaint:     Chief Complaint   Patient presents with    Altered Mental Status     Pt presents via EMS with reports of AMS  Pt was seen at his PCPs office today and perscribed ativan 2mg to take before his MRI  Family reported the pt became confused, unable to recognize them and having visual hallucinations of seeing things on his feet  History of Present Illness:    Erick Zarate is a 76 y o  male with a past medical history of esophageal cancer, hypothyroidism who presents with altered mental status  [de-identified] of history is obtained from patient's son who was at bedside  Per son, patient has been being treated for months , since Labor Day, for an infection in his back [place]  Patient's son is not sure if this infection is diskitis or osteomyelitis or something else    He reports that they have been trying to figure out for several months, and no one has been sure what it is   He reports it started out with pain in his dad's back  He reports that today he was scheduled to get an MRI of his thoracic spine to further evaluate this, and reports he was given Ativan by his PCP to use if he needed for anxiety and his claustrophobia  He reports his dad took 1 mg before going to the office, and reports right before the MRI he took another mg because he was still feeling anxious  He reports he was unable to get the MRI because he was not able to lie flat on his back due to pain  He reports he still was acting normally, but then on the road home about 20 minutes later his father was acting very strangely  He reports he was saying things that were making sense and seemed to be hallucinating  He reports that he was swatting at what he said were bugs crawling around [place], but there were no bugs in the car  Per the son his dad was speaking clearly, never had a facial droop, and had no trouble with coordination or walking, but the things he was saying did not make sense and he called his children by the wrong names  It was then that the son decided to take his father into the ED  Currently the patient is oriented x3 but still is not at baseline per his son  The patient denies any pain  He does report that he feels like he has to urinate, but he has tried twice and has only been able to urinate a small amount and has had residual urine left on bladder scan  Patient's son reports that he believes is a new issue  Patient denies any recent fevers or chills, cough, or recent sick contacts  He denies any burning with urination or blood in his urine  He denies any drug or alcohol use  Patient's son denies any known psychiatric history in his father  Review of Systems:    Review of Systems   Constitutional: Negative for activity change, chills, fatigue and fever  HENT: Negative for congestion, postnasal drip, sinus pain and sore throat  Eyes: Negative for visual disturbance  Respiratory: Negative for cough, chest tightness, shortness of breath and wheezing  Cardiovascular: Negative for chest pain, palpitations and leg swelling  Gastrointestinal: Negative for abdominal pain, blood in stool, constipation, diarrhea, nausea and vomiting  Genitourinary: Negative for dysuria, flank pain and hematuria  Musculoskeletal: Negative for back pain and myalgias  Skin: Negative for rash  Neurological: Negative for dizziness, tremors, syncope, facial asymmetry, weakness, light-headedness, numbness and headaches  Hematological: Does not bruise/bleed easily  Psychiatric/Behavioral: Positive for confusion  Negative for behavioral problems  Past Medical and Surgical History:     Past Medical History:   Diagnosis Date    [facility] ([facility] )     Cancer of esophagus ([place] [address] )        History reviewed  No pertinent surgical history  Meds/Allergies:    Prior to Admission medications    Medication Sig Start Date End Date Taking?  Authorizing Provider   amiodarone 200 mg tablet Take 200 mg by mouth daily with breakfast   Yes Historical Provider, MD   atorvastatin (LIPITOR) 40 mg tablet Take 40 mg by mouth daily with dinner   Yes Historical Provider, MD   dicyclomine (BENTYL) 20 mg tablet Take 20 mg by mouth every 6 (six) hours   Yes Historical Provider, MD   levothyroxine 25 mcg tablet Take 25 mcg by mouth daily   Yes Historical Provider, MD   ondansetron (ZOFRAN) 4 mg tablet Take 4 mg by mouth every 8 (eight) hours as needed for nausea or vomiting   Yes Historical Provider, MD   oxyCODONE (OxyCONTIN) 10 mg 12 hr tablet Take 5 mg by mouth every 12 (twelve) hours   Yes Historical Provider, MD   rOPINIRole (REQUIP) 0 25 mg tablet Take 0 25 mg by mouth daily at bedtime   Yes Historical Provider, MD   traZODone (DESYREL) 100 mg tablet Take 100 mg by mouth daily at bedtime   Yes Historical Provider, MD     I have been unable to obtain / verify an up to date medication list despite all reasonable attempts  Allergies: Allergies   Allergen Reactions    Penicillins Anaphylaxis       Social History:     Marital Status: Single   Patient Pre-hospital Living Situation:  Private residence  Patient Pre-hospital Level of Mobility:  Independent  Patient Pre-hospital Diet Restrictions:  None  Substance Use History:   Social History     Substance and Sexual Activity   Alcohol Use Never    Frequency: Never     Social History     Tobacco Use   Smoking Status Former Smoker   Smokeless Tobacco Never Used     Social History     Substance and Sexual Activity   Drug Use Never       Family History:    non-contributory    Physical Exam:     Vitals:   Blood Pressure: (!) 181/108 (09/24/20 0000)  Pulse: 96 (09/24/20 0000)  Temperature: 98 1 °F (36 7 °C) (09/23/20 1843)  Temp Source: Oral (09/23/20 1843)  Respirations: 19 (09/24/20 0000)  Height: 5' 10" (177 8 cm) (09/23/20 2300)  Weight - Scale: 61 9 kg (136 lb 7 4 oz) (09/23/20 2300)  SpO2: 99 % (09/24/20 0000)    Physical Exam  Vitals signs reviewed  Constitutional:       General: He is not in acute distress  Appearance: He is not toxic-appearing  HENT:      Head: Normocephalic and atraumatic  Eyes:      Pupils: Pupils are equal, round, and reactive to light  Neck:      Musculoskeletal: Normal range of motion  Cardiovascular:      Rate and Rhythm: Normal rate and regular rhythm  Pulmonary:      Effort: Pulmonary effort is normal  No respiratory distress  Breath sounds: Normal breath sounds  No wheezing  Abdominal:      General: Bowel sounds are normal       Palpations: Abdomen is soft  Tenderness: There is no abdominal tenderness  Musculoskeletal: Normal range of motion  Skin:     General: Skin is warm and dry  Neurological:      General: No focal deficit present  Mental Status: He is alert  GCS: GCS eye subscore is 3  GCS verbal subscore is 5  GCS motor subscore is 6  Cranial Nerves: Cranial nerves are intact        Sensory: Sensation is intact  Motor: Motor function is intact  Comments: Cranial nerves 2-12 grossly intact  Gross sensation intact, 5/5 strength in all extremities  Cerebellar function normal  Did not assess patient's gait   Psychiatric:         Mood and Affect: Mood normal          Additional Data:     Lab Results: I have personally reviewed pertinent reports  Results from last 7 days   Lab Units 09/24/20  0115 09/23/20 1937   WBC Thousand/uL  --  4 93   HEMOGLOBIN g/dL  --  10 5*   HEMATOCRIT %  --  32 7*   PLATELETS Thousands/uL 252 217   NEUTROS PCT %  --  66   LYMPHS PCT %  --  24   MONOS PCT %  --  6   EOS PCT %  --  3     Results from last 7 days   Lab Units 09/23/20 1937   SODIUM mmol/L 140   POTASSIUM mmol/L 3 7   CHLORIDE mmol/L 105   CO2 mmol/L 30   BUN mg/dL 17   CREATININE mg/dL 0 81   ANION GAP mmol/L 5   CALCIUM mg/dL 8 7   ALBUMIN g/dL 2 9*   TOTAL BILIRUBIN mg/dL 0 80   ALK PHOS U/L 53   ALT U/L 23   AST U/L 14   GLUCOSE RANDOM mg/dL 78     Results from last 7 days   Lab Units 09/23/20 1937   INR  1 06                   Imaging: I have personally reviewed pertinent reports  CTA head and neck with and without contrast   ED Interpretation by Lee Collins DO (09/23 2215)   Nonvisualization of the right vertebral artery the neck with intermittent visualization intracranially likely via retrograde or collateral pathway  This is of uncertain chronicity potentially chronic  Basilar artery widely patent  Left vertebral   artery widely patent      Atherosclerotic plaque at the carotid bifurcation bilaterally results in approximately 40% stenosis on the right and no significant stenosis on the left      No focal intracranial stenosis or aneurysm      Infiltrating soft tissue in the mediastinum surrounding the patient's esophageal stent extending to the level of the descending thoracic aorta as well as the vertebral column    Endplates are irregular at T4-5, T5-6, and T6-7 with bony sclerosis present   suggesting either direct extension of tumor from the patient's known esophageal cancer versus discitis and osteomyelitis  Thoracic spine MRI with gadolinium recommended to further evaluate  Final Result by Oc Steen DO (09/23 2146)      Nonvisualization of the right vertebral artery the neck with intermittent visualization intracranially likely via retrograde or collateral pathway  This is of uncertain chronicity potentially chronic  Basilar artery widely patent  Left vertebral    artery widely patent  Atherosclerotic plaque at the carotid bifurcation bilaterally results in approximately 40% stenosis on the right and no significant stenosis on the left  No focal intracranial stenosis or aneurysm  Infiltrating soft tissue in the mediastinum surrounding the patient's esophageal stent extending to the level of the descending thoracic aorta as well as the vertebral column  Endplates are irregular at T4-5, T5-6, and T6-7 with bony sclerosis present    suggesting either direct extension of tumor from the patient's known esophageal cancer versus discitis and osteomyelitis  Thoracic spine MRI with gadolinium recommended to further evaluate  I personally discussed this study with Ahslie Alberto on 9/23/2020 at 9:45 PM                            Workstation performed: [id number]         XR chest 1 view portable   ED Interpretation by Sigrid Limon DO (09/23 1940)   No acute cardiopulmonary abnormalities noted  Final Result by Florence Dempsey MD (09/23 2030)      No acute cardiopulmonary disease  Workstation performed: [id number]             EKG, Pathology, and Other Studies Reviewed on Admission:   · EKG:  Normal sinus rhythm, frequent PACs, no ischemic changes    Allscripts / Epic Records Reviewed: Yes     ** Please Note: This note has been constructed using a voice recognition system   **

## 2020-09-24 NOTE — ASSESSMENT & PLAN NOTE
Patient reports he has a history of "infection in his back", reports he has been on antibiotics for months  Patient and son are unsure of what medication he is on, but on medication list it shows Augmentin, however patient is listed to have an anaphylactic reaction to penicillins and neither patient or son is sure if he has really been taking Augmentin or a different antibiotic  Anamika Maxi Reports he was scheduled to have an MRI of his thoracic spine to further evaluate this today but was unable to get it  · On CT scan taken in ED today, it showed: Infiltrating soft tissue in the mediastinum surrounding the patient's esophageal stent extending to the level of the descending thoracic aorta as well as the vertebral column  Endplates are irregular at T4-5, T5-6, and T6-7 with bony sclerosis present suggesting either direct extension of tumor from the patient's known esophageal cancer versus discitis and osteomyelitis  Thoracic spine MRI with gadolinium recommended to further evaluate  · Patient without fever, no white blood cell count  · At this time not concern for cauda equina, patient is without neurologic deficits, no saddle anesthesia  Patient does have urinary retention, which per the patient's son is new  Urinary retention protocol    Plan  · No neurologic exam change overnight  · No issue with urinary retention  No neurologic findings    · Patient advised to follow-up with his oncologist/infectious disease doctor as outpatient

## 2020-09-24 NOTE — ASSESSMENT & PLAN NOTE
· Bladder scan showing approximately 500 mL in patient's bladder after voiding, previously patient with 6 and 81 mL in bladder  · Urinary retention protocol  · Urinary retention has resolved  Patient is voiding without difficulty at the time of discharge

## 2020-09-24 NOTE — DISCHARGE INSTR - AVS FIRST PAGE
DISCHARGE INSTRUCTIONS FROM HOSPITALIST     1  Follow up with your primary care physician in one week in regards to recent hospitalization     2  Take medications regularly     No medications were changed  Avoid taking benzodiazepines such as Ativan diazepam Xanax in the future    3  Come back to the ER if symptoms recur or worsen     4  Activity as tolerated    5   Diet : cardiac healthy

## 2020-09-24 NOTE — ASSESSMENT & PLAN NOTE
Patient with a history of esophageal cancer, per son patient last had chemo done last December  Patient currently has an esophageal stent in place  · CTA showing: Infiltrating soft tissue in the mediastinum surrounding the patient's esophageal stent extending to the level of the descending thoracic aorta as well as the vertebral column  Endplates are irregular at T4-5, T5-6, and T6-7 with bony sclerosis present suggesting either direct extension of tumor from the patient's known esophageal cancer versus discitis and osteomyelitis      Plan  Advised to follow-up with oncologist as outpatient

## 2020-09-24 NOTE — UTILIZATION REVIEW
Initial Clinical Review    Admission: Date/Time/Statement:   Admission Orders (From admission, onward)     Ordered        09/23/20 2243  Place in Observation  Once                   Orders Placed This Encounter   Procedures    Place in Observation     Standing Status:   Standing     Number of Occurrences:   1     Order Specific Question:   Admitting Physician     Answer:   Tara Toribio [[id number]]     Order Specific Question:   Level of Care     Answer:   Med Surg [[id number]]     ED Arrival Information     Expected Arrival Acuity Means of Arrival Escorted By Service Admission Type    - 9/23/2020 18:37 Urgent Ambulance [address] Urgent    Arrival Complaint    hallucinations        Chief Complaint   Patient presents with    Altered Mental Status     Pt presents via EMS with reports of AMS  Pt was seen at his PCPs office today and perscribed ativan 2mg to take before his MRI  Family reported the pt became confused, unable to recognize them and having visual hallucinations of seeing things on his feet  Assessment/Plan: 76year old male, presented to the ED @ [address] [facility], from home via EMS  Admitted as Observation due to altered mental status  Has had this back issue for months, they are unable to figure it out  To go for MRI Of T Spine, today, but claustrophobic and anxious, PCP ordered Ativan  reports his dad took 1 mg before going to the office, and reports right before the MRI he took another mg because he was still feeling anxious  He reports he was unable to get the MRI because he was not able to lie flat on his back due to pain  He reports he still was acting normally, but then on the road home about 20 minutes later his father was acting very strangely  He reports he was saying things that were making sense and seemed to be hallucinating  He reports that he was swatting at what he said were bugs crawling around [place], but there were no bugs in the car    Currently patient is oriented x3 but still somewhat confused, per patient's son he has improved since earlier today but is not at his baseline  Neuro checks  Consult Neuro  Blood cultures  VTE Prophylaxis: Enoxaparin (Lovenox)  / sequential compression device     ED Triage Vitals   Temperature Pulse Respirations Blood Pressure SpO2   09/23/20 1843 09/23/20 1843 09/23/20 1843 09/23/20 1843 09/23/20 1843   98 1 °F (36 7 °C) 81 15 139/80 98 %      Temp Source Heart Rate Source Patient Position - Orthostatic VS BP Location FiO2 (%)   09/23/20 1843 09/23/20 1843 -- 09/23/20 1843 --   Oral Monitor  Right arm       Pain Score       09/23/20 2300       No Pain          Wt Readings from Last 1 Encounters:   09/23/20 61 9 kg (136 lb 7 4 oz)     Additional Vital Signs:   Date/Time   Temp   Pulse   Resp   BP   MAP (mmHg)   SpO2   O2 Device    09/24/20 0630   98 2 °F (36 8 °C)   94   18   153/87      100 %       09/24/20 0000      96   19   181/108Abnormal     121   99 %   None (Room air)    09/23/20 2300      87   17   177/90Abnormal     124   99 %   None (Room air)    09/23/20 2130      86   19   167/100   129   99 %   None (Room air)    09/23/20 2000      77   18   159/81   114   98 %   None (Room air)      Date and Time  Eye Opening  Best Verbal Response  Best Motor Response  Benjamin Coma Scale Score    09/24/20 0400  3  4  6  13    09/23/20 2341  3  4  6  13    09/23/20 1848  3  4  6  13      09/23/2020 @ 2132  CTa head/neck:  Nonvisualization of the right vertebral artery the neck with intermittent visualization intracranially likely via retrograde or collateral pathway   This is of uncertain chronicity potentially chronic   Basilar artery widely patent   Left vertebral artery widely patent  Atherosclerotic plaque at the carotid bifurcation bilaterally results in approximately 40% stenosis on the right and no significant stenosis on the left  No focal intracranial stenosis or aneurysm     Infiltrating soft tissue in the mediastinum surrounding the patient's esophageal stent extending to the level of the descending thoracic aorta as well as the vertebral column   Endplates are irregular at T4-5, T5-6, and T6-7 with bony sclerosis present   suggesting either direct extension of tumor from the patient's known esophageal cancer versus discitis and osteomyelitis   Thoracic spine MRI with gadolinium recommended to further evaluate  2020 @   Chest X:  No acute cardiopulmonary disease  2020 @ 1914  EC, NSR      Pertinent Labs/Diagnostic Test Results:     Results from last 7 days   Lab Units 20  0639 20  0115 20   WBC Thousand/uL 6 03  --  4 93   HEMOGLOBIN g/dL 11 8*  --  10 5*   HEMATOCRIT % 36 5  --  32 7*   PLATELETS Thousands/uL 253 252 217   NEUTROS ABS Thousands/µL 4 61  --  3 23     Results from last 7 days   Lab Units 2039 20   SODIUM mmol/L 139 140   POTASSIUM mmol/L 3 6 3 7   CHLORIDE mmol/L 103 105   CO2 mmol/L 28 30   ANION GAP mmol/L 8 5   BUN mg/dL 13 17   CREATININE mg/dL 0 72 0 81   EGFR ml/min/1 73sq m 97 93   CALCIUM mg/dL 9 0 8 7     Results from last 7 days   Lab Units 20   AST U/L 14   ALT U/L 23   ALK PHOS U/L 53   TOTAL PROTEIN g/dL 6 8   ALBUMIN g/dL 2 9*   TOTAL BILIRUBIN mg/dL 0 80     Results from last 7 days   Lab Units 2039 20   GLUCOSE RANDOM mg/dL 92 78     Results from last 7 days   Lab Units 20   TROPONIN I ng/mL <0 02     Results from last 7 days   Lab Units 20   PROTIME seconds 13 3   INR  1 06   PTT seconds 34     Results from last 7 days   Lab Units 20  0009   CLARITY UA  Clear   COLOR UA  Yellow   SPEC GRAV UA  1 010   PH UA  7 0   GLUCOSE UA mg/dl Negative   KETONES UA mg/dl Negative   BLOOD UA  Negative   PROTEIN UA mg/dl Negative   NITRITE UA  Negative   BILIRUBIN UA  Negative   UROBILINOGEN UA E U /dl 0 2   LEUKOCYTES UA  Negative     Results from last 7 days   Lab Units 09/24/20  0009   AMPH/METH  Negative   BARBITURATE UR  Negative   BENZODIAZEPINE UR  Negative   COCAINE UR  Negative   METHADONE URINE  Negative   OPIATE UR  Negative   PCP UR  Negative   THC UR  Negative     Results from last 7 days   Lab Units 09/24/20  0105   ETHANOL LVL mg/dL <3   ACETAMINOPHEN LVL ug/mL <5 9*   SALICYLATE LVL mg/dL <3*     Results from last 7 days   Lab Units 09/24/20  0115 09/24/20  0105   BLOOD CULTURE  Received in Microbiology Lab  Culture in Progress  Received in Microbiology Lab  Culture in Progress       ED Treatment:   Medication Administration from 09/23/2020 1837 to 09/24/2020 0944       Date/Time Order Dose Route Action     09/23/2020 1937 sodium chloride 0 9 % bolus 1,000 mL 1,000 mL Intravenous New Bag     09/23/2020 2106 iohexol (OMNIPAQUE) 350 MG/ML injection (MULTI-DOSE) 100 mL 100 mL Intravenous Given     09/24/2020 0826 dicyclomine (BENTYL) tablet 20 mg 20 mg Oral Given     09/24/2020 0119 dicyclomine (BENTYL) tablet 20 mg 20 mg Oral Given     09/24/2020 0631 levothyroxine tablet 25 mcg 25 mcg Oral Given     09/24/2020 0827 docusate sodium (COLACE) capsule 100 mg 100 mg Oral Given     09/24/2020 0119 rOPINIRole (REQUIP) tablet 0 25 mg 0 25 mg Oral Given     09/24/2020 1889 amiodarone tablet 200 mg 200 mg Oral Given     09/24/2020 0826 enoxaparin (LOVENOX) subcutaneous injection 40 mg 40 mg Subcutaneous Given        Past Medical History:   Diagnosis Date    [facility]     [facility] esophagus ([facility] )      Admitting Diagnosis: Altered mental status [R41 82]  Hallucinations [R44 3]  Age/Sex: 76 y o  male  Admission Orders:  Scheduled Medications:  amiodarone, 200 mg, Oral, Daily With Breakfast  atorvastatin, 40 mg, Oral, Daily With Dinner  dicyclomine, 20 mg, Oral, Q6H  docusate sodium, 100 mg, Oral, BID  enoxaparin, 40 mg, Subcutaneous, Daily  levothyroxine, 25 mcg, Oral, Daily  rOPINIRole, 0 25 mg, Oral, HS      Continuous IV Infusions:     PRN Meds:  acetaminophen, 650 mg, Oral, Q6H PRN  ondansetron, 4 mg, Intravenous, Q6H PRN      Beto SCDs  Neuro checks q4h  IP CONSULT TO NEUROLOGY: altered mental status    Network Utilization Review Department  [email or web address]  ATTENTION: Please call with any questions or concerns to [phone] and carefully listen to the prompts so that you are directed to the right person  All voicemails are confidential   Grazyna Bradford all requests for admission clinical reviews, approved or denied determinations and any other requests to dedicated fax number below belonging to the campus where the patient is receiving treatment   List of dedicated fax numbers for the Facilities:  [address] DENIALS (Administrative/Medical Necessity) [phone]   [address] (Maternity/NICU/Pediatrics) [phone]   Asa Castaneda [phone]   Ciarra Platt [phone]   Faye Calvin [phone]   [facility] [phone]   [address] [phone]   [facility]  [phone]   [address] [facility]  [facility] [facility] [address] [facility] [phone]

## 2020-09-24 NOTE — CASE MANAGEMENT
LOS 16 HOURS  RISK OF UNPLANNED READMISSION SCORE N/A  30 DAY READMISSION: NO  BUNDLE: NO    CM s/w patient's daughter by phone  Nilson Jaime resides with daughter Rio Tran) in a raise ranch home with 0 PETEY  Patient does not use any DME  Patient does have a tube for nightly tube feeds which is maintained by patient and wife - this was placed in June 2020, patient follows with ENT OP due to esophageal stent  Patient IPTA w/ driving and ADLs  VNA Hx w/ [facility]  No Hx STR, MH, or SA identified  PCP: Everardo Miramontes    Preferred Pharmacy: [facility], no barriers identified to obtaining Rx from that location  CM reviewed discharge planning process including the following: identifying help at home, patient preference for discharge planning needs, pharmacy preference, and availability of treatment team to discuss questions or concerns patient and/or family may have regarding understanding medications and recognizing signs and symptoms once discharged  CM also encouraged patient to follow up with all recommended appointments after discharge  Patient advised of importance for patient and family to participate in managing patients medical well being  CM name and role reviewed  Discharge Checklist reviewed and CM will continue to monitor for progress toward discharge goals in nursing and provider rounds  Patient's will transport home when cleared for discharge  CM to follow through discharge

## 2020-09-24 NOTE — PLAN OF CARE
Problem: Potential for Falls  Goal: Patient will remain free of falls  Description: INTERVENTIONS:  - Assess patient frequently for physical needs  -  Identify cognitive and physical deficits and behaviors that affect risk of falls    -  [place] fall precautions as indicated by assessment   - Educate patient/family on patient safety including physical limitations  - Instruct patient to call for assistance with activity based on assessment  - Modify environment to reduce risk of injury  - Consider OT/PT consult to assist with strengthening/mobility  Outcome: Progressing     Problem: Prexisting or High Potential for Compromised Skin Integrity  Goal: Skin integrity is maintained or improved  Description: INTERVENTIONS:  - Identify patients at risk for skin breakdown  - Assess and monitor skin integrity  - Assess and monitor nutrition and hydration status  - Monitor labs   - Assess for incontinence   - Turn and reposition patient  - Assist with mobility/ambulation  - Relieve pressure over bony prominences  - Avoid friction and shearing  - Provide appropriate hygiene as needed including keeping skin clean and dry  - Evaluate need for skin moisturizer/barrier cream  - Collaborate with interdisciplinary team   - Patient/family teaching  - Consider wound care consult   Outcome: Progressing     Problem: PAIN - ADULT  Goal: Verbalizes/displays adequate comfort level or baseline comfort level  Description: Interventions:  - Encourage patient to monitor pain and request assistance  - Assess pain using appropriate pain scale  - Administer analgesics based on type and severity of pain and evaluate response  - Implement non-pharmacological measures as appropriate and evaluate response  - Consider cultural and social influences on pain and pain management  - Notify physician/advanced practitioner if interventions unsuccessful or patient reports new pain  Outcome: Progressing     Problem: INFECTION - ADULT  Goal: Absence or prevention of progression during hospitalization  Description: INTERVENTIONS:  - Assess and monitor for signs and symptoms of infection  - Monitor lab/diagnostic results  - Monitor all insertion sites, i e  indwelling lines, tubes, and drains  - Monitor endotracheal if appropriate and nasal secretions for changes in amount and color  - [place] appropriate cooling/warming therapies per order  - Administer medications as ordered  - Instruct and encourage patient and family to use good hand hygiene technique  - Identify and instruct in appropriate isolation precautions for identified infection/condition  Outcome: Progressing  Goal: Absence of fever/infection during neutropenic period  Description: INTERVENTIONS:  - Monitor WBC    Outcome: Progressing     Problem: INFECTION - ADULT  Goal: Absence of fever/infection during neutropenic period  Description: INTERVENTIONS:  - Monitor WBC    Outcome: Progressing     Problem: SAFETY ADULT  Goal: Patient will remain free of falls  Description: INTERVENTIONS:  - Assess patient frequently for physical needs  -  Identify cognitive and physical deficits and behaviors that affect risk of falls    -  [place] fall precautions as indicated by assessment   - Educate patient/family on patient safety including physical limitations  - Instruct patient to call for assistance with activity based on assessment  - Modify environment to reduce risk of injury  - Consider OT/PT consult to assist with strengthening/mobility  Outcome: Progressing  Goal: Maintain or return to baseline ADL function  Description: INTERVENTIONS:  -  Assess patient's ability to carry out ADLs; assess patient's baseline for ADL function and identify physical deficits which impact ability to perform ADLs (bathing, care of mouth/teeth, toileting, grooming, dressing, etc )  - Assess/evaluate cause of self-care deficits   - Assess range of motion  - Assess patient's mobility; develop plan if impaired  - Assess patient's need for assistive devices and provide as appropriate  - Encourage maximum independence but intervene and supervise when necessary  - Involve family in performance of ADLs  - Assess for home care needs following discharge   - Consider OT consult to assist with ADL evaluation and planning for discharge  - Provide patient education as appropriate  Outcome: Progressing  Goal: Maintain or return mobility status to optimal level  Description: INTERVENTIONS:  - Assess patient's baseline mobility status (ambulation, transfers, stairs, etc )    - Identify cognitive and physical deficits and behaviors that affect mobility  - Identify mobility aids required to assist with transfers and/or ambulation (gait belt, sit-to-stand, lift, walker, cane, etc )  - [place] fall precautions as indicated by assessment  - Record patient progress and toleration of activity level on Mobility SBAR; progress patient to next Phase/Stage  - Instruct patient to call for assistance with activity based on assessment  - Consider rehabilitation consult to assist with strengthening/weightbearing, etc   Outcome: Progressing     Problem: DISCHARGE PLANNING  Goal: Discharge to home or other facility with appropriate resources  Description: INTERVENTIONS:  - Identify barriers to discharge w/patient and caregiver  - Arrange for needed discharge resources and transportation as appropriate  - Identify discharge learning needs (meds, wound care, etc )  - Arrange for interpretive services to assist at discharge as needed  - Refer to Case Management Department for coordinating discharge planning if the patient needs post-hospital services based on physician/advanced practitioner order or complex needs related to functional status, cognitive ability, or social support system  Outcome: Progressing     Problem: Knowledge Deficit  Goal: Patient/family/caregiver demonstrates understanding of disease process, treatment plan, medications, and discharge instructions  Description: Complete learning assessment and assess knowledge base    Interventions:  - Provide teaching at level of understanding  - Provide teaching via preferred learning methods  Outcome: Progressing

## 2020-09-24 NOTE — ASSESSMENT & PLAN NOTE
· Bladder scan showing approximately 500 mL in patient's bladder after voiding, previously patient with 6 and 81 mL in bladder  · Urinary retention protocol  · If urinary retention continues, can consider urology consult in a m

## 2020-09-29 DIAGNOSIS — M46.20 VERTEBRAL OSTEOMYELITIS (HCC): Primary | ICD-10-CM

## 2020-09-29 LAB — BACTERIA BLD CULT: NORMAL

## 2020-09-29 NOTE — RESULT ENCOUNTER NOTE
Notified patient via telephone of 1 of 2 blood cultures  Growing gram positive cocci in clusters  Patient was admitted but discharged prior to blood culture results being available  Discussed lab results may represent contaminant but cannot completely exclude bactermia  Instructed patient to contact PCP office for repeat outpatient blood culture for further surveillance  Patient's symptoms are improved, and he is afebrile  Discussed risks and complications of bacteremia including but not limited to endocarditis  Verbalized understanding and agreement to contact PCP for repeat blood culture  Instructed to return to ED for any worsening or worrisome symptoms  Call back complete

## 2020-09-30 LAB
BACTERIA BLD CULT: ABNORMAL
GRAM STN SPEC: ABNORMAL

## 2020-10-04 DIAGNOSIS — G47.00 INSOMNIA, UNSPECIFIED TYPE: ICD-10-CM

## 2020-10-04 DIAGNOSIS — I65.29 CAROTID STENOSIS, ASYMPTOMATIC: ICD-10-CM

## 2020-10-05 RX ORDER — TRAZODONE HYDROCHLORIDE 100 MG/1
TABLET ORAL
Qty: 30 TABLET | Refills: 3 | Status: SHIPPED | OUTPATIENT
Start: 2020-10-05 | End: 2020-10-26

## 2020-10-05 RX ORDER — ATORVASTATIN CALCIUM 40 MG/1
TABLET, FILM COATED ORAL
Qty: 30 TABLET | Refills: 3 | Status: SHIPPED | OUTPATIENT
Start: 2020-10-05 | End: 2020-11-06 | Stop reason: ALTCHOICE

## 2020-10-07 DIAGNOSIS — G89.3 CANCER-RELATED PAIN: ICD-10-CM

## 2020-10-07 DIAGNOSIS — M46.20 PARASPINAL ABSCESS (HCC): ICD-10-CM

## 2020-10-07 RX ORDER — OXYCODONE HYDROCHLORIDE 10 MG/1
10 TABLET ORAL EVERY 4 HOURS PRN
Qty: 90 TABLET | Refills: 0 | Status: SHIPPED | OUTPATIENT
Start: 2020-10-07 | End: 2020-10-29 | Stop reason: SDUPTHER

## 2020-10-07 RX ORDER — AMOXICILLIN AND CLAVULANATE POTASSIUM 400; 57 MG/5ML; MG/5ML
400 POWDER, FOR SUSPENSION ORAL 2 TIMES DAILY
Qty: 300 ML | Refills: 0 | Status: SHIPPED | OUTPATIENT
Start: 2020-10-07 | End: 2020-11-06

## 2020-10-08 ENCOUNTER — TELEPHONE (OUTPATIENT)
Dept: PALLIATIVE MEDICINE | Facility: CLINIC | Age: 65
End: 2020-10-08

## 2020-10-12 ENCOUNTER — HOSPITAL ENCOUNTER (EMERGENCY)
Facility: HOSPITAL | Age: 65
Discharge: HOME | End: 2020-10-12
Attending: EMERGENCY MEDICINE | Admitting: EMERGENCY MEDICINE
Payer: COMMERCIAL

## 2020-10-12 ENCOUNTER — TELEPHONE (OUTPATIENT)
Dept: SURGICAL ONCOLOGY | Facility: CLINIC | Age: 65
End: 2020-10-12

## 2020-10-12 ENCOUNTER — APPOINTMENT (EMERGENCY)
Dept: RADIOLOGY | Facility: HOSPITAL | Age: 65
End: 2020-10-12
Payer: COMMERCIAL

## 2020-10-12 VITALS
TEMPERATURE: 99 F | HEART RATE: 88 BPM | OXYGEN SATURATION: 99 % | BODY MASS INDEX: 19.88 KG/M2 | HEIGHT: 70 IN | DIASTOLIC BLOOD PRESSURE: 81 MMHG | RESPIRATION RATE: 18 BRPM | SYSTOLIC BLOOD PRESSURE: 127 MMHG | WEIGHT: 138.89 LBS

## 2020-10-12 DIAGNOSIS — R19.7 DIARRHEA: ICD-10-CM

## 2020-10-12 DIAGNOSIS — K94.13 MALFUNCTION OF JEJUNOSTOMY TUBE (HCC): Primary | ICD-10-CM

## 2020-10-12 DIAGNOSIS — Z46.59 ENCOUNTER FOR CARE RELATED TO FEEDING TUBE: ICD-10-CM

## 2020-10-12 PROCEDURE — 49441 PLACE DUOD/JEJ TUBE PERC: CPT | Performed by: EMERGENCY MEDICINE

## 2020-10-12 PROCEDURE — 99284 EMERGENCY DEPT VISIT MOD MDM: CPT

## 2020-10-12 PROCEDURE — 99284 EMERGENCY DEPT VISIT MOD MDM: CPT | Performed by: EMERGENCY MEDICINE

## 2020-10-12 PROCEDURE — 74018 RADEX ABDOMEN 1 VIEW: CPT

## 2020-10-12 RX ORDER — LIDOCAINE HYDROCHLORIDE 10 MG/ML
2 INJECTION, SOLUTION EPIDURAL; INFILTRATION; INTRACAUDAL; PERINEURAL ONCE
Status: COMPLETED | OUTPATIENT
Start: 2020-10-12 | End: 2020-10-12

## 2020-10-12 RX ADMIN — IOHEXOL 30 ML: 240 INJECTION, SOLUTION INTRATHECAL; INTRAVASCULAR; INTRAVENOUS; ORAL at 19:41

## 2020-10-12 RX ADMIN — LIDOCAINE HYDROCHLORIDE 2 ML: 10 INJECTION, SOLUTION EPIDURAL; INFILTRATION; INTRACAUDAL; PERINEURAL at 18:03

## 2020-10-15 NOTE — DISCHARGE SUMMARY
Discharge- Willa Ship 1955, 76 y o  male MRN: [id number]    Unit/Bed#: ED 07 Encounter: [id number]    Primary Care Provider: SARA Olsen   Date and time admitted to hospital: 9/23/2020  6:38 PM           Esophageal cancer [facility])  Assessment & Plan  Patient with a history of esophageal cancer, per son patient last had chemo done last December  Patient currently has an esophageal stent in place  · CTA showing: Infiltrating soft tissue in the mediastinum surrounding the patient's esophageal stent extending to the level of the descending thoracic aorta as well as the vertebral column   Endplates are irregular at T4-5, T5-6, and T6-7 with bony sclerosis present suggesting either direct extension of tumor from the patient's known esophageal cancer versus discitis and osteomyelitis      Plan  Advised to follow-up with oncologist as outpatient     Hypothyroid  Assessment & Plan  · Continue levothyroxine     Urinary retention  Assessment & Plan  · Bladder scan showing approximately 500 mL in patient's bladder after voiding, previously patient with 6 and 81 mL in bladder  · Urinary retention protocol  · Urinary retention has resolved  Patient is voiding without difficulty at the time of discharge      Abnormal CT of thoracic spine  Assessment & Plan  Patient reports he has a history of "infection in his back", reports he has been on antibiotics for months  Patient and son are unsure of what medication he is on, but on medication list it shows Augmentin, however patient is listed to have an anaphylactic reaction to penicillins and neither patient or son is sure if he has really been taking Augmentin or a different antibiotic  Jerry Suero Reports he was scheduled to have an MRI of his thoracic spine to further evaluate this today but was unable to get it  · On CT scan taken in ED today, it showed:  Infiltrating soft tissue in the mediastinum surrounding the patient's esophageal stent extending to the level of the descending thoracic aorta as well as the vertebral column   Endplates are irregular at T4-5, T5-6, and T6-7 with bony sclerosis present suggesting either direct extension of tumor from the patient's known esophageal cancer versus discitis and osteomyelitis   Thoracic spine MRI with gadolinium recommended to further evaluate  · Patient without fever, no white blood cell count  · At this time not concern for cauda equina, patient is without neurologic deficits, no saddle anesthesia  Patient does have urinary retention, which per the patient's son is new  Urinary retention protocol     Plan  · No neurologic exam change overnight  · No issue with urinary retention  No neurologic findings  · Patient advised to follow-up with his oncologist/infectious disease doctor as outpatient     * AMS (altered mental status)  Assessment & Plan  Patient presents with altered mental status for several hours after taking 2 mg Ativan prior to an MRI due to his claustrophobia  · Currently patient is oriented x3 but still somewhat confused, per patient's son he has improved since earlier today but is not at his baseline  · Labs unremarkable, no infectious etiology found, no electrolyte abnormalities  Awaiting UA  ·  urine drug screen pending  · Will hold patient's trazodone, avoid benzodiazepines in the future  · CTA showing: Nonvisualization of the right vertebral artery the neck with intermittent visualization intracranially likely via retrograde or collateral pathway   This is of uncertain chronicity potentially chronic   Basilar artery widely patent   Left vertebral artery widely patent  ? Atherosclerotic plaque at the carotid bifurcation bilaterally results in approximately 40% stenosis on the right and no significant stenosis on the left  No focal intracranial stenosis or aneurysm  ?  Infiltrating soft tissue in the mediastinum surrounding the patient's esophageal stent extending to the level of the descending thoracic aorta as well as the vertebral column   Endplates are irregular at T4-5, T5-6, and T6-7 with bony sclerosis present suggesting either direct extension of tumor from the patient's known esophageal cancer versus discitis and osteomyelitis   Thoracic spine MRI with gadolinium recommended to further evaluate  Plan  · No neurologic deficits, mental status has improved overnight  This morning the patient is alert and oriented to time place person  · Neurology evaluated patient  It was felt Ativan  Cause for altered mental status  Advised to avoid benzodiazepines in the future    Patient is cleared for discharge per Neurology and primary team   This has been discussed with family and patient            Discharging Physician / Practitioner: Jo Ann Yang MD  PCP: Dena Ortiz[address]  Admission Date:       Admission Orders (From admission, onward)              Ordered          09/23/20 2243   Place in Observation  Once                       Discharge Date: 09/24/20          Resolved Problems  Date Reviewed: 9/24/2020     None             Consultations During Hospital Stay:  · Neurology     Procedures Performed:   · None     Significant Findings / Test Results:   Findings on CT head and neck as mentioned below     Incidental Findings:   CT  Head and neck   Nonvisualization of the right vertebral artery the neck with intermittent visualization intracranially likely via retrograde or collateral pathway   This is of uncertain chronicity potentially chronic   Basilar artery widely patent   Left vertebral   artery widely patent      Atherosclerotic plaque at the carotid bifurcation bilaterally results in approximately 40% stenosis on the right and no significant stenosis on the left      No focal intracranial stenosis or aneurysm      Infiltrating soft tissue in the mediastinum surrounding the patient's esophageal stent extending to the level of the descending thoracic aorta as well as the vertebral column   Endplates are irregular at T4-5, T5-6, and T6-7 with bony sclerosis present   suggesting either direct extension of tumor from the patient's known esophageal cancer versus discitis and osteomyelitis   Thoracic spine MRI with gadolinium recommended to further evaluate  Test Results Pending at Discharge (will require follow up): · Blood culture     Outpatient Tests Requested:  · None     Complications:  None     Reason for Admission:  Altered mental status,     HPI     Jona Granda a 76 y  o  male with a past medical history of esophageal cancer, hypothyroidism who presents with altered mental status   Majority of history is obtained from patient's son who was at bedside   Per son, patient has been being treated for months , since Labor Day, for an infection in his back [de-identified]   Patient's son is not sure if this infection is diskitis or osteomyelitis or something else  Dion Patterson reports that they have been trying to figure out for several months, and no one has been sure what it is  Dion Patterson reports it started out with pain in his dad's back  Dion Patterson reports that today he was scheduled to get an MRI of his thoracic spine to further evaluate this, and reports he was given Ativan by his PCP to use if he needed for anxiety and his claustrophobia   He reports his dad took 1 mg before going to the office, and reports right before the MRI he took another mg because he was still feeling anxious  Dion Patterson reports he was unable to get the MRI because he was not able to lie flat on his back due to pain   He reports he still was acting normally, but then on the road home about 20 minutes later his father was acting very strangely  Dion Patterson reports he was saying things that were making sense and seemed to be hallucinating   He reports that he was swatting at what he said were bugs crawling around [place], but there were no bugs in the car   Per the son his dad was speaking clearly, never had a facial droop, and had no trouble with coordination or walking, but the things he was saying did not make sense and he called his children by the wrong names  Maegan Beth was then that the son decided to take his father into the ED   Currently the patient is oriented x3 but still is not at baseline per his son  Clara Partida patient denies any pain  Guero Arambula does report that he feels like he has to urinate, but he has tried twice and has only been able to urinate a small amount and has had residual urine left on bladder scan   Patient's son reports that he believes is a new issue  Shaina Kevin denies any recent fevers or chills, cough, or recent sick contacts  Guero Arambula denies any burning with urination or blood in his urine   He denies any drug or alcohol use   Patient's son denies any known psychiatric history in his father   Jany Houser Course:      Summary of Hospital Course:      Pankaj Sharp is a 76 y o  male patient who originally presented to the hospital on 9/23/2020 due to AMS  Patient observed overnight in the hospital   Altered mental status resolved gradually  This morning patient is alert oriented to place person and time  Neurology team has evaluated patient and it was felt that Ativan is the cause for confusion  It is advised to avoid Ativan in the future  Patient is advised to follow-up with his primary care physician oncologist infectious disease physician and other care team members upon discharge regarding recent hospitalization and further care plan  Patient is advised to undergo planned MRI as outpatient as previously planned  Patient has been voiding without difficulty    Patient is cleared for discharge by Neurology and primary care team   Family has been contacted agree with the plan         Please see above list of diagnoses and related plan for additional information       Condition at Discharge: fair      Discharge Day Visit / Exam:      * Please refer to separate progress note for these details *     Discussion with Family: yes, daughter Marylen Estelle     Discharge instructions/Information to patient and family:   See after visit summary for information provided to patient and family        Provisions for Follow-Up Care:  See after visit summary for information related to follow-up care and any pertinent home health orders        Disposition:      Home     For Discharges to [address] [facility]:   · Not Applicable to this Patient - Not Applicable to this Patient     Planned Readmission: NO     Discharge Statement:  I spent 45 minutes discharging the patient  This time was spent on the day of discharge  I had direct contact with the patient on the day of discharge  Greater than 50% of the total time was spent examining patient, answering all patient questions, arranging and discussing plan of care with patient as well as directly providing post-discharge instructions    Additional time then spent on discharge activities      Discharge Medications:  See after visit summary for reconciled discharge medications provided to patient and family        ** Please Note: This note has been constructed using a voice recognition system **

## 2020-10-21 ENCOUNTER — TELEMEDICINE (OUTPATIENT)
Dept: FAMILY MEDICINE CLINIC | Facility: CLINIC | Age: 65
End: 2020-10-21
Payer: COMMERCIAL

## 2020-10-21 DIAGNOSIS — M79.89 BILATERAL SWELLING OF FEET: Primary | ICD-10-CM

## 2020-10-21 PROCEDURE — 99214 OFFICE O/P EST MOD 30 MIN: CPT | Performed by: FAMILY MEDICINE

## 2020-10-21 PROCEDURE — 1160F RVW MEDS BY RX/DR IN RCRD: CPT | Performed by: FAMILY MEDICINE

## 2020-10-21 PROCEDURE — 1036F TOBACCO NON-USER: CPT | Performed by: FAMILY MEDICINE

## 2020-10-23 ENCOUNTER — TELEPHONE (OUTPATIENT)
Dept: FAMILY MEDICINE CLINIC | Facility: CLINIC | Age: 65
End: 2020-10-23

## 2020-10-24 DIAGNOSIS — Z20.822 COVID-19 RULED OUT BY LABORATORY TESTING: ICD-10-CM

## 2020-10-24 PROCEDURE — U0003 INFECTIOUS AGENT DETECTION BY NUCLEIC ACID (DNA OR RNA); SEVERE ACUTE RESPIRATORY SYNDROME CORONAVIRUS 2 (SARS-COV-2) (CORONAVIRUS DISEASE [COVID-19]), AMPLIFIED PROBE TECHNIQUE, MAKING USE OF HIGH THROUGHPUT TECHNOLOGIES AS DESCRIBED BY CMS-2020-01-R: HCPCS | Performed by: PHYSICIAN ASSISTANT

## 2020-10-26 ENCOUNTER — HOSPITAL ENCOUNTER (OUTPATIENT)
Dept: INFUSION CENTER | Facility: CLINIC | Age: 65
Discharge: HOME | End: 2020-10-26
Payer: COMMERCIAL

## 2020-10-26 ENCOUNTER — OFFICE VISIT (OUTPATIENT)
Dept: FAMILY MEDICINE CLINIC | Facility: CLINIC | Age: 65
End: 2020-10-26
Payer: COMMERCIAL

## 2020-10-26 VITALS
WEIGHT: 139.2 LBS | HEIGHT: 70 IN | RESPIRATION RATE: 14 BRPM | OXYGEN SATURATION: 96 % | DIASTOLIC BLOOD PRESSURE: 60 MMHG | BODY MASS INDEX: 19.93 KG/M2 | SYSTOLIC BLOOD PRESSURE: 108 MMHG | HEART RATE: 80 BPM | TEMPERATURE: 96.7 F

## 2020-10-26 DIAGNOSIS — E11.65 TYPE 2 DIABETES MELLITUS WITH HYPERGLYCEMIA, WITHOUT LONG-TERM CURRENT USE OF INSULIN (HCC): ICD-10-CM

## 2020-10-26 DIAGNOSIS — G93.9 BRAIN LESION: ICD-10-CM

## 2020-10-26 DIAGNOSIS — T45.1X5A CHEMOTHERAPY INDUCED NEUTROPENIA (HCC): ICD-10-CM

## 2020-10-26 DIAGNOSIS — G63 NEUROPATHY ASSOCIATED WITH CANCER (HCC): ICD-10-CM

## 2020-10-26 DIAGNOSIS — D70.1 CHEMOTHERAPY INDUCED NEUTROPENIA (HCC): ICD-10-CM

## 2020-10-26 DIAGNOSIS — C80.1 NEUROPATHY ASSOCIATED WITH CANCER (HCC): ICD-10-CM

## 2020-10-26 DIAGNOSIS — Z01.818 PRE-OP EXAMINATION: Primary | ICD-10-CM

## 2020-10-26 DIAGNOSIS — Z95.828 PORT-A-CATH IN PLACE: Primary | ICD-10-CM

## 2020-10-26 DIAGNOSIS — I48.91 ATRIAL FIBRILLATION, UNSPECIFIED TYPE (HCC): ICD-10-CM

## 2020-10-26 DIAGNOSIS — F10.21 ALCOHOL DEPENDENCE, IN REMISSION (HCC): ICD-10-CM

## 2020-10-26 LAB
ALBUMIN SERPL BCP-MCNC: 2.9 G/DL (ref 3.5–5)
ALP SERPL-CCNC: 61 U/L (ref 46–116)
ALT SERPL W P-5'-P-CCNC: 31 U/L (ref 12–78)
ANION GAP SERPL CALCULATED.3IONS-SCNC: 8 MMOL/L (ref 4–13)
AST SERPL W P-5'-P-CCNC: 22 U/L (ref 5–45)
BILIRUB SERPL-MCNC: 0.6 MG/DL (ref 0.2–1)
BUN SERPL-MCNC: 18 MG/DL (ref 5–25)
CALCIUM ALBUM COR SERPL-MCNC: 9.5 MG/DL (ref 8.3–10.1)
CALCIUM SERPL-MCNC: 8.6 MG/DL (ref 8.3–10.1)
CHLORIDE SERPL-SCNC: 107 MMOL/L (ref 100–108)
CO2 SERPL-SCNC: 27 MMOL/L (ref 21–32)
CREAT SERPL-MCNC: 1.07 MG/DL (ref 0.6–1.3)
GFR SERPL CREATININE-BSD FRML MDRD: 72 ML/MIN/1.73SQ M
GLUCOSE SERPL-MCNC: 85 MG/DL (ref 65–140)
POTASSIUM SERPL-SCNC: 4.2 MMOL/L (ref 3.5–5.3)
PROT SERPL-MCNC: 6.7 G/DL (ref 6.4–8.2)
SODIUM SERPL-SCNC: 142 MMOL/L (ref 136–145)
TSH SERPL DL<=0.05 MIU/L-ACNC: 16.86 UIU/ML (ref 0.36–3.74)

## 2020-10-26 PROCEDURE — 84443 ASSAY THYROID STIM HORMONE: CPT

## 2020-10-26 PROCEDURE — 99214 OFFICE O/P EST MOD 30 MIN: CPT | Performed by: FAMILY MEDICINE

## 2020-10-26 PROCEDURE — 80053 COMPREHEN METABOLIC PANEL: CPT

## 2020-10-26 RX ORDER — SODIUM CHLORIDE, SODIUM LACTATE, POTASSIUM CHLORIDE, CALCIUM CHLORIDE 600; 310; 30; 20 MG/100ML; MG/100ML; MG/100ML; MG/100ML
75 INJECTION, SOLUTION INTRAVENOUS CONTINUOUS
Status: CANCELLED | OUTPATIENT
Start: 2020-10-26

## 2020-10-27 ENCOUNTER — TELEPHONE (OUTPATIENT)
Dept: FAMILY MEDICINE CLINIC | Facility: CLINIC | Age: 65
End: 2020-10-27

## 2020-10-27 DIAGNOSIS — E03.9 ACQUIRED HYPOTHYROIDISM: Primary | ICD-10-CM

## 2020-10-27 DIAGNOSIS — R19.7 DIARRHEA OF PRESUMED INFECTIOUS ORIGIN: ICD-10-CM

## 2020-10-27 LAB — SARS-COV-2 RNA SPEC QL NAA+PROBE: NOT DETECTED

## 2020-10-27 RX ORDER — LEVOTHYROXINE SODIUM 0.05 MG/1
50 TABLET ORAL DAILY
Qty: 30 TABLET | Refills: 3 | Status: SHIPPED | OUTPATIENT
Start: 2020-10-27 | End: 2021-01-22

## 2020-10-28 ENCOUNTER — HOSPITAL ENCOUNTER (OUTPATIENT)
Dept: RADIOLOGY | Facility: HOSPITAL | Age: 65
Discharge: HOME | End: 2020-10-28
Payer: COMMERCIAL

## 2020-10-28 VITALS
SYSTOLIC BLOOD PRESSURE: 145 MMHG | HEART RATE: 70 BPM | RESPIRATION RATE: 18 BRPM | BODY MASS INDEX: 20.1 KG/M2 | DIASTOLIC BLOOD PRESSURE: 64 MMHG | OXYGEN SATURATION: 99 % | HEIGHT: 70 IN | TEMPERATURE: 97.3 F | WEIGHT: 140.4 LBS

## 2020-10-28 DIAGNOSIS — C15.9 ADENOCARCINOMA OF ESOPHAGUS (HCC): ICD-10-CM

## 2020-10-28 DIAGNOSIS — G47.33 OBSTRUCTIVE SLEEP APNEA SYNDROME: Primary | ICD-10-CM

## 2020-10-28 DIAGNOSIS — Z20.822 COVID-19 RULED OUT BY LABORATORY TESTING: Primary | ICD-10-CM

## 2020-10-28 PROBLEM — J94.0 CHYLOTHORAX ON RIGHT: Status: RESOLVED | Noted: 2020-02-06 | Resolved: 2020-10-28

## 2020-10-28 PROBLEM — I48.91 ATRIAL FIBRILLATION (HCC): Status: ACTIVE | Noted: 2020-10-28

## 2020-10-28 PROBLEM — J90 LOCULATED PLEURAL EFFUSION: Status: RESOLVED | Noted: 2020-05-23 | Resolved: 2020-10-28

## 2020-10-28 LAB — GLUCOSE SERPL-MCNC: 74 MG/DL (ref 65–140)

## 2020-10-28 PROCEDURE — 82948 REAGENT STRIP/BLOOD GLUCOSE: CPT

## 2020-10-28 RX ORDER — SODIUM CHLORIDE, SODIUM LACTATE, POTASSIUM CHLORIDE, CALCIUM CHLORIDE 600; 310; 30; 20 MG/100ML; MG/100ML; MG/100ML; MG/100ML
75 INJECTION, SOLUTION INTRAVENOUS CONTINUOUS
Status: DISCONTINUED | OUTPATIENT
Start: 2020-10-28 | End: 2020-10-29 | Stop reason: HOSPADM

## 2020-10-29 ENCOUNTER — TELEPHONE (OUTPATIENT)
Dept: NEUROSURGERY | Facility: CLINIC | Age: 65
End: 2020-10-29

## 2020-10-29 ENCOUNTER — TELEMEDICINE (OUTPATIENT)
Dept: PALLIATIVE MEDICINE | Facility: CLINIC | Age: 65
End: 2020-10-29
Payer: COMMERCIAL

## 2020-10-29 DIAGNOSIS — M46.20 VERTEBRAL OSTEOMYELITIS (HCC): ICD-10-CM

## 2020-10-29 DIAGNOSIS — Z85.01 PERSONAL HISTORY OF ESOPHAGEAL CANCER: Primary | ICD-10-CM

## 2020-10-29 DIAGNOSIS — G47.01 INSOMNIA DUE TO MEDICAL CONDITION: ICD-10-CM

## 2020-10-29 DIAGNOSIS — G89.3 CANCER-RELATED PAIN: Chronic | ICD-10-CM

## 2020-10-29 PROCEDURE — 99213 OFFICE O/P EST LOW 20 MIN: CPT | Performed by: INTERNAL MEDICINE

## 2020-10-29 RX ORDER — OXYCODONE HYDROCHLORIDE 10 MG/1
10 TABLET ORAL EVERY 4 HOURS PRN
Qty: 90 TABLET | Refills: 0 | Status: SHIPPED | OUTPATIENT
Start: 2020-10-29 | End: 2020-11-23 | Stop reason: SDUPTHER

## 2020-10-30 ENCOUNTER — TELEPHONE (OUTPATIENT)
Dept: PALLIATIVE MEDICINE | Facility: CLINIC | Age: 65
End: 2020-10-30

## 2020-11-03 ENCOUNTER — ANESTHESIA EVENT (OUTPATIENT)
Dept: RADIOLOGY | Facility: HOSPITAL | Age: 65
End: 2020-11-03

## 2020-11-06 ENCOUNTER — HOSPITAL ENCOUNTER (OUTPATIENT)
Dept: RADIOLOGY | Facility: HOSPITAL | Age: 65
Discharge: HOME | End: 2020-11-06
Payer: COMMERCIAL

## 2020-11-06 ENCOUNTER — ANESTHESIA (OUTPATIENT)
Dept: RADIOLOGY | Facility: HOSPITAL | Age: 65
End: 2020-11-06

## 2020-11-06 ENCOUNTER — HOSPITAL ENCOUNTER (OUTPATIENT)
Dept: RADIOLOGY | Facility: HOSPITAL | Age: 65
End: 2020-11-06
Payer: COMMERCIAL

## 2020-11-06 VITALS
WEIGHT: 140 LBS | HEIGHT: 70 IN | SYSTOLIC BLOOD PRESSURE: 129 MMHG | HEART RATE: 83 BPM | RESPIRATION RATE: 16 BRPM | OXYGEN SATURATION: 97 % | DIASTOLIC BLOOD PRESSURE: 65 MMHG | BODY MASS INDEX: 20.04 KG/M2 | TEMPERATURE: 96.1 F

## 2020-11-06 LAB
GLUCOSE SERPL-MCNC: 76 MG/DL (ref 65–140)
GLUCOSE SERPL-MCNC: 94 MG/DL (ref 65–140)

## 2020-11-06 PROCEDURE — 82948 REAGENT STRIP/BLOOD GLUCOSE: CPT

## 2020-11-06 PROCEDURE — A9585 GADOBUTROL INJECTION: HCPCS | Performed by: PHYSICIAN ASSISTANT

## 2020-11-06 PROCEDURE — G1004 CDSM NDSC: HCPCS

## 2020-11-06 PROCEDURE — 72157 MRI CHEST SPINE W/O & W/DYE: CPT

## 2020-11-06 PROCEDURE — 70553 MRI BRAIN STEM W/O & W/DYE: CPT

## 2020-11-06 RX ORDER — SODIUM CHLORIDE, SODIUM LACTATE, POTASSIUM CHLORIDE, CALCIUM CHLORIDE 600; 310; 30; 20 MG/100ML; MG/100ML; MG/100ML; MG/100ML
125 INJECTION, SOLUTION INTRAVENOUS CONTINUOUS
Status: DISCONTINUED | OUTPATIENT
Start: 2020-11-06 | End: 2020-11-07 | Stop reason: HOSPADM

## 2020-11-06 RX ORDER — ONDANSETRON 2 MG/ML
INJECTION INTRAMUSCULAR; INTRAVENOUS AS NEEDED
Status: DISCONTINUED | OUTPATIENT
Start: 2020-11-06 | End: 2020-11-06

## 2020-11-06 RX ORDER — FENTANYL CITRATE/PF 50 MCG/ML
50 SYRINGE (ML) INJECTION
Status: DISCONTINUED | OUTPATIENT
Start: 2020-11-06 | End: 2020-11-06 | Stop reason: HOSPADM

## 2020-11-06 RX ORDER — DEXAMETHASONE SODIUM PHOSPHATE 10 MG/ML
INJECTION, SOLUTION INTRAMUSCULAR; INTRAVENOUS AS NEEDED
Status: DISCONTINUED | OUTPATIENT
Start: 2020-11-06 | End: 2020-11-06

## 2020-11-06 RX ORDER — SCOLOPAMINE TRANSDERMAL SYSTEM 1 MG/1
1 PATCH, EXTENDED RELEASE TRANSDERMAL
Status: DISCONTINUED | OUTPATIENT
Start: 2020-11-06 | End: 2020-11-06

## 2020-11-06 RX ORDER — EPHEDRINE SULFATE 50 MG/ML
INJECTION INTRAVENOUS AS NEEDED
Status: DISCONTINUED | OUTPATIENT
Start: 2020-11-06 | End: 2020-11-06

## 2020-11-06 RX ORDER — SUCCINYLCHOLINE/SOD CL,ISO/PF 100 MG/5ML
SYRINGE (ML) INTRAVENOUS AS NEEDED
Status: DISCONTINUED | OUTPATIENT
Start: 2020-11-06 | End: 2020-11-06

## 2020-11-06 RX ORDER — PROPOFOL 10 MG/ML
INJECTION, EMULSION INTRAVENOUS AS NEEDED
Status: DISCONTINUED | OUTPATIENT
Start: 2020-11-06 | End: 2020-11-06

## 2020-11-06 RX ORDER — FENTANYL CITRATE 50 UG/ML
INJECTION, SOLUTION INTRAMUSCULAR; INTRAVENOUS AS NEEDED
Status: DISCONTINUED | OUTPATIENT
Start: 2020-11-06 | End: 2020-11-06

## 2020-11-06 RX ORDER — SODIUM CHLORIDE, SODIUM LACTATE, POTASSIUM CHLORIDE, CALCIUM CHLORIDE 600; 310; 30; 20 MG/100ML; MG/100ML; MG/100ML; MG/100ML
INJECTION, SOLUTION INTRAVENOUS CONTINUOUS PRN
Status: DISCONTINUED | OUTPATIENT
Start: 2020-11-06 | End: 2020-11-06

## 2020-11-06 RX ADMIN — PROPOFOL 100 MG: 10 INJECTION, EMULSION INTRAVENOUS at 08:24

## 2020-11-06 RX ADMIN — SODIUM CHLORIDE, SODIUM LACTATE, POTASSIUM CHLORIDE, AND CALCIUM CHLORIDE 125 ML/HR: .6; .31; .03; .02 INJECTION, SOLUTION INTRAVENOUS at 07:53

## 2020-11-06 RX ADMIN — PHENYLEPHRINE HYDROCHLORIDE 100 MCG: 10 INJECTION INTRAVENOUS at 08:31

## 2020-11-06 RX ADMIN — PHENYLEPHRINE HYDROCHLORIDE 100 MCG: 10 INJECTION INTRAVENOUS at 08:27

## 2020-11-06 RX ADMIN — FENTANYL CITRATE 100 MCG: 50 INJECTION, SOLUTION INTRAMUSCULAR; INTRAVENOUS at 08:24

## 2020-11-06 RX ADMIN — Medication 50 MCG: at 10:59

## 2020-11-06 RX ADMIN — EPHEDRINE SULFATE 10 MG: 50 INJECTION, SOLUTION INTRAVENOUS at 08:36

## 2020-11-06 RX ADMIN — SODIUM CHLORIDE, SODIUM LACTATE, POTASSIUM CHLORIDE, AND CALCIUM CHLORIDE 125 ML/HR: .6; .31; .03; .02 INJECTION, SOLUTION INTRAVENOUS at 10:20

## 2020-11-06 RX ADMIN — ONDANSETRON 4 MG: 2 INJECTION INTRAMUSCULAR; INTRAVENOUS at 09:47

## 2020-11-06 RX ADMIN — SODIUM CHLORIDE, SODIUM LACTATE, POTASSIUM CHLORIDE, AND CALCIUM CHLORIDE: .6; .31; .03; .02 INJECTION, SOLUTION INTRAVENOUS at 08:17

## 2020-11-06 RX ADMIN — EPHEDRINE SULFATE 10 MG: 50 INJECTION, SOLUTION INTRAVENOUS at 09:16

## 2020-11-06 RX ADMIN — PROPOFOL 100 MG: 10 INJECTION, EMULSION INTRAVENOUS at 08:25

## 2020-11-06 RX ADMIN — GADOBUTROL 6 ML: 604.72 INJECTION INTRAVENOUS at 09:36

## 2020-11-06 RX ADMIN — Medication 100 MG: at 08:24

## 2020-11-06 RX ADMIN — DEXAMETHASONE SODIUM PHOSPHATE 10 MG: 10 INJECTION, SOLUTION INTRAMUSCULAR; INTRAVENOUS at 09:47

## 2020-11-10 ENCOUNTER — APPOINTMENT (OUTPATIENT)
Dept: LAB | Facility: CLINIC | Age: 65
End: 2020-11-10
Payer: COMMERCIAL

## 2020-11-10 DIAGNOSIS — R19.7 DIARRHEA: ICD-10-CM

## 2020-11-10 LAB
C DIFF TOX A+B STL QL IA: NEGATIVE
C DIFF TOX B TCDB STL QL NAA+PROBE: POSITIVE

## 2020-11-10 PROCEDURE — 87177 OVA AND PARASITES SMEARS: CPT

## 2020-11-10 PROCEDURE — 87427 SHIGA-LIKE TOXIN AG IA: CPT

## 2020-11-10 PROCEDURE — 87045 FECES CULTURE AEROBIC BACT: CPT | Performed by: FAMILY MEDICINE

## 2020-11-10 PROCEDURE — 87046 STOOL CULTR AEROBIC BACT EA: CPT

## 2020-11-10 PROCEDURE — 87209 SMEAR COMPLEX STAIN: CPT

## 2020-11-10 PROCEDURE — 36415 COLL VENOUS BLD VENIPUNCTURE: CPT | Performed by: FAMILY MEDICINE

## 2020-11-10 PROCEDURE — 87493 C DIFF AMPLIFIED PROBE: CPT

## 2020-11-11 ENCOUNTER — TELEMEDICINE (OUTPATIENT)
Dept: NEUROSURGERY | Facility: CLINIC | Age: 65
End: 2020-11-11
Payer: COMMERCIAL

## 2020-11-11 ENCOUNTER — TELEPHONE (OUTPATIENT)
Dept: NEUROSURGERY | Facility: CLINIC | Age: 65
End: 2020-11-11

## 2020-11-11 DIAGNOSIS — S22.050D CLOSED WEDGE COMPRESSION FRACTURE OF T6 VERTEBRA WITH ROUTINE HEALING, SUBSEQUENT ENCOUNTER: Primary | ICD-10-CM

## 2020-11-11 LAB — O+P STL CONC: NORMAL

## 2020-11-11 PROCEDURE — 99213 OFFICE O/P EST LOW 20 MIN: CPT | Performed by: PHYSICIAN ASSISTANT

## 2020-11-11 RX ORDER — ATORVASTATIN CALCIUM 40 MG/1
40 TABLET, FILM COATED ORAL DAILY
COMMUNITY

## 2020-11-12 DIAGNOSIS — R19.7 DIARRHEA OF PRESUMED INFECTIOUS ORIGIN: Primary | ICD-10-CM

## 2020-11-13 ENCOUNTER — TELEPHONE (OUTPATIENT)
Dept: FAMILY MEDICINE CLINIC | Facility: CLINIC | Age: 65
End: 2020-11-13

## 2020-11-13 ENCOUNTER — TELEPHONE (OUTPATIENT)
Dept: OTHER | Facility: HOSPITAL | Age: 65
End: 2020-11-13

## 2020-11-13 ENCOUNTER — TELEPHONE (OUTPATIENT)
Dept: INFECTIOUS DISEASES | Facility: CLINIC | Age: 65
End: 2020-11-13

## 2020-11-13 DIAGNOSIS — A04.72 C. DIFFICILE DIARRHEA: Primary | ICD-10-CM

## 2020-11-16 RX ORDER — VANCOMYCIN HYDROCHLORIDE 125 MG/1
125 CAPSULE ORAL 4 TIMES DAILY
Qty: 56 CAPSULE | Refills: 0 | Status: SHIPPED | OUTPATIENT
Start: 2020-11-16 | End: 2020-11-30

## 2020-11-17 ENCOUNTER — TELEPHONE (OUTPATIENT)
Dept: NEUROSURGERY | Facility: CLINIC | Age: 65
End: 2020-11-17

## 2020-11-17 PROBLEM — C79.31 BRAIN METASTASES (HCC): Status: ACTIVE | Noted: 2020-11-17

## 2020-11-18 ENCOUNTER — TELEMEDICINE (OUTPATIENT)
Dept: RADIATION ONCOLOGY | Facility: HOSPITAL | Age: 65
End: 2020-11-18
Attending: RADIOLOGY

## 2020-11-18 ENCOUNTER — TELEMEDICINE (OUTPATIENT)
Dept: NEUROSURGERY | Facility: CLINIC | Age: 65
End: 2020-11-18
Payer: COMMERCIAL

## 2020-11-18 ENCOUNTER — CLINICAL SUPPORT (OUTPATIENT)
Dept: RADIATION ONCOLOGY | Facility: HOSPITAL | Age: 65
End: 2020-11-18
Attending: RADIOLOGY

## 2020-11-18 VITALS — HEIGHT: 70 IN | BODY MASS INDEX: 20.09 KG/M2

## 2020-11-18 DIAGNOSIS — C79.31 BRAIN METASTASES (HCC): Primary | ICD-10-CM

## 2020-11-18 DIAGNOSIS — C15.9 MALIGNANT NEOPLASM OF ESOPHAGUS, UNSPECIFIED LOCATION (HCC): ICD-10-CM

## 2020-11-18 DIAGNOSIS — C15.5 MALIGNANT NEOPLASM OF LOWER THIRD OF ESOPHAGUS (HCC): ICD-10-CM

## 2020-11-18 DIAGNOSIS — M46.20 VERTEBRAL OSTEOMYELITIS (HCC): ICD-10-CM

## 2020-11-18 PROCEDURE — 99215 OFFICE O/P EST HI 40 MIN: CPT | Performed by: NEUROLOGICAL SURGERY

## 2020-11-18 RX ORDER — METHOCARBAMOL 750 MG/1
750 TABLET, FILM COATED ORAL
Qty: 10 TABLET | Refills: 0 | Status: SHIPPED | OUTPATIENT
Start: 2020-11-18 | End: 2021-01-14

## 2020-11-19 ENCOUNTER — TELEPHONE (OUTPATIENT)
Dept: NUTRITION | Facility: CLINIC | Age: 65
End: 2020-11-19

## 2020-11-20 ENCOUNTER — APPOINTMENT (OUTPATIENT)
Dept: RADIATION ONCOLOGY | Facility: HOSPITAL | Age: 65
End: 2020-11-20
Attending: RADIOLOGY
Payer: COMMERCIAL

## 2020-11-20 DIAGNOSIS — I48.91 ATRIAL FIBRILLATION, UNSPECIFIED TYPE (HCC): ICD-10-CM

## 2020-11-20 PROCEDURE — 77334 RADIATION TREATMENT AID(S): CPT | Performed by: RADIOLOGY

## 2020-11-20 PROCEDURE — 77290 THER RAD SIMULAJ FIELD CPLX: CPT | Performed by: RADIOLOGY

## 2020-11-20 PROCEDURE — 77370 RADIATION PHYSICS CONSULT: CPT | Performed by: RADIOLOGY

## 2020-11-20 PROCEDURE — 77470 SPECIAL RADIATION TREATMENT: CPT | Performed by: RADIOLOGY

## 2020-11-23 ENCOUNTER — TELEPHONE (OUTPATIENT)
Dept: CARDIAC SURGERY | Facility: CLINIC | Age: 65
End: 2020-11-23

## 2020-11-23 DIAGNOSIS — G89.3 CANCER-RELATED PAIN: Chronic | ICD-10-CM

## 2020-11-23 RX ORDER — OXYCODONE HYDROCHLORIDE 10 MG/1
10 TABLET ORAL EVERY 4 HOURS PRN
Qty: 90 TABLET | Refills: 0 | Status: SHIPPED | OUTPATIENT
Start: 2020-11-23 | End: 2020-12-28 | Stop reason: SDUPTHER

## 2020-11-23 RX ORDER — AMIODARONE HYDROCHLORIDE 200 MG/1
200 TABLET ORAL
Qty: 30 TABLET | Refills: 0 | Status: SHIPPED | OUTPATIENT
Start: 2020-11-23 | End: 2020-12-24

## 2020-11-24 ENCOUNTER — TELEPHONE (OUTPATIENT)
Dept: INFECTIOUS DISEASES | Facility: CLINIC | Age: 65
End: 2020-11-24

## 2020-11-24 PROCEDURE — 77334 RADIATION TREATMENT AID(S): CPT | Performed by: RADIOLOGY

## 2020-11-24 PROCEDURE — 77295 3-D RADIOTHERAPY PLAN: CPT | Performed by: RADIOLOGY

## 2020-11-24 PROCEDURE — 77370 RADIATION PHYSICS CONSULT: CPT | Performed by: RADIOLOGY

## 2020-11-24 PROCEDURE — 77300 RADIATION THERAPY DOSE PLAN: CPT | Performed by: RADIOLOGY

## 2020-11-25 ENCOUNTER — PROCEDURE VISIT (OUTPATIENT)
Dept: NEUROSURGERY | Facility: CLINIC | Age: 65
End: 2020-11-25
Payer: COMMERCIAL

## 2020-11-25 ENCOUNTER — APPOINTMENT (OUTPATIENT)
Dept: RADIATION ONCOLOGY | Facility: HOSPITAL | Age: 65
End: 2020-11-25
Attending: RADIOLOGY
Payer: COMMERCIAL

## 2020-11-25 ENCOUNTER — OFFICE VISIT (OUTPATIENT)
Dept: CARDIAC SURGERY | Facility: CLINIC | Age: 65
End: 2020-11-25
Payer: COMMERCIAL

## 2020-11-25 VITALS
SYSTOLIC BLOOD PRESSURE: 94 MMHG | BODY MASS INDEX: 21.19 KG/M2 | DIASTOLIC BLOOD PRESSURE: 58 MMHG | HEART RATE: 56 BPM | HEIGHT: 70 IN | RESPIRATION RATE: 16 BRPM | TEMPERATURE: 98.6 F | WEIGHT: 148 LBS

## 2020-11-25 DIAGNOSIS — K22.2 ESOPHAGEAL STRICTURE: Primary | ICD-10-CM

## 2020-11-25 DIAGNOSIS — C79.31 BRAIN METASTASES (HCC): Primary | ICD-10-CM

## 2020-11-25 PROCEDURE — 61797 SRS CRAN LES SIMPLE ADDL: CPT | Performed by: NEUROLOGICAL SURGERY

## 2020-11-25 PROCEDURE — 3008F BODY MASS INDEX DOCD: CPT | Performed by: THORACIC SURGERY (CARDIOTHORACIC VASCULAR SURGERY)

## 2020-11-25 PROCEDURE — 1036F TOBACCO NON-USER: CPT | Performed by: THORACIC SURGERY (CARDIOTHORACIC VASCULAR SURGERY)

## 2020-11-25 PROCEDURE — 99213 OFFICE O/P EST LOW 20 MIN: CPT | Performed by: THORACIC SURGERY (CARDIOTHORACIC VASCULAR SURGERY)

## 2020-11-25 PROCEDURE — 77372 SRS LINEAR BASED: CPT | Performed by: RADIOLOGY

## 2020-11-25 PROCEDURE — 77280 THER RAD SIMULAJ FIELD SMPL: CPT | Performed by: RADIOLOGY

## 2020-11-25 PROCEDURE — 77336 RADIATION PHYSICS CONSULT: CPT | Performed by: RADIOLOGY

## 2020-11-25 PROCEDURE — 1160F RVW MEDS BY RX/DR IN RCRD: CPT | Performed by: THORACIC SURGERY (CARDIOTHORACIC VASCULAR SURGERY)

## 2020-11-25 PROCEDURE — 61796 SRS CRANIAL LESION SIMPLE: CPT | Performed by: NEUROLOGICAL SURGERY

## 2020-11-27 ENCOUNTER — TELEPHONE (OUTPATIENT)
Dept: RADIATION ONCOLOGY | Facility: HOSPITAL | Age: 65
End: 2020-11-27

## 2020-11-30 ENCOUNTER — TELEPHONE (OUTPATIENT)
Dept: HEMATOLOGY ONCOLOGY | Facility: MEDICAL CENTER | Age: 65
End: 2020-11-30

## 2020-11-30 ENCOUNTER — TELEPHONE (OUTPATIENT)
Dept: FAMILY MEDICINE CLINIC | Facility: CLINIC | Age: 65
End: 2020-11-30

## 2020-12-01 ENCOUNTER — TELEMEDICINE (OUTPATIENT)
Dept: HEMATOLOGY ONCOLOGY | Facility: CLINIC | Age: 65
End: 2020-12-01
Payer: COMMERCIAL

## 2020-12-01 ENCOUNTER — TELEPHONE (OUTPATIENT)
Dept: FAMILY MEDICINE CLINIC | Facility: CLINIC | Age: 65
End: 2020-12-01

## 2020-12-01 DIAGNOSIS — J44.9 CHRONIC OBSTRUCTIVE PULMONARY DISEASE, UNSPECIFIED COPD TYPE (HCC): ICD-10-CM

## 2020-12-01 DIAGNOSIS — G63 NEUROPATHY ASSOCIATED WITH CANCER (HCC): ICD-10-CM

## 2020-12-01 DIAGNOSIS — E11.65 TYPE 2 DIABETES MELLITUS WITH HYPERGLYCEMIA, WITHOUT LONG-TERM CURRENT USE OF INSULIN (HCC): ICD-10-CM

## 2020-12-01 DIAGNOSIS — C15.5 MALIGNANT NEOPLASM OF LOWER THIRD OF ESOPHAGUS (HCC): Primary | ICD-10-CM

## 2020-12-01 DIAGNOSIS — E44.1 MILD PROTEIN-CALORIE MALNUTRITION (HCC): ICD-10-CM

## 2020-12-01 DIAGNOSIS — C79.31 BRAIN METASTASES (HCC): Primary | ICD-10-CM

## 2020-12-01 DIAGNOSIS — C15.5 MALIGNANT NEOPLASM OF LOWER THIRD OF ESOPHAGUS (HCC): ICD-10-CM

## 2020-12-01 DIAGNOSIS — C80.1 NEUROPATHY ASSOCIATED WITH CANCER (HCC): ICD-10-CM

## 2020-12-01 PROCEDURE — 99213 OFFICE O/P EST LOW 20 MIN: CPT | Performed by: INTERNAL MEDICINE

## 2020-12-03 ENCOUNTER — TELEPHONE (OUTPATIENT)
Dept: FAMILY MEDICINE CLINIC | Facility: CLINIC | Age: 65
End: 2020-12-03

## 2020-12-07 NOTE — H&P
H&P- Magalys Randhawa 1955, 71 y o  male MRN: [id number]    Unit/Bed#: [facility] 603-01 Encounter: [id number]    Primary Care Provider: SARA Sinha   Date and time admitted to hospital: 7/2/2020  6:35 PM        * Esophageal stricture  Assessment & Plan  Thoracics sent to ER  Thoracic consult  NPO  TF ordered  Speech consult    Paraspinal abscess [facility])  Assessment & Plan  MRI today showed thsi is worsening  Nsurg and ID consults  No signs of sepsis  C/w Rocephin and Flagyl for now    Atrial fibrillation ([facility] [address] )  Assessment & Plan  Pt was only supposed to be on amio tid for 6 weeks  Will decrease to daily    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[facility] [address] )  Assessment & Plan  Mgmt per thoracics    Type 2 diabetes mellitus with hyperglycemia, without long-term current use of insulin [facility])  Assessment & Plan  Lab Results   Component Value Date    HGBA1C 5 4 01/16/2020       No results for input(s): POCGLU in the last 72 hours  Blood Sugar Average: Last 72 hrs:    A1C  ISS        VTE Prophylaxis: Enoxaparin (Lovenox)  / sequential compression device   Code Status: Full  POLST: POLST form is not discussed and not completed at this time  Discussion with family: yes    Anticipated Length of Stay:  Patient will be admitted on an Inpatient basis with an anticipated length of stay of  At least 2 midnights  Justification for Hospital Stay: above issues    Total Time for Visit, including Counseling / Coordination of Care: 45 minutes  Greater than 50% of this total time spent on direct patient counseling and coordination of care  Chief Complaint:   dysphagia    History of Present Illness:    Magalys Randhawa is a 71 y o  male w/ GE carcinoma who presents with dysphagia  Pt unable to take PO  Sometimes able to take pills  No fevers  Of note, last month dx w/ paraspinal thoracic abscess  MRI yesterday showed this is worsening  PT gets pain at site of abscess that is relieved by oxycodone    Difficulty toelrating his J tube feeds as he gets full quickly  Review of Systems:    Review of Systems   Constitutional: Negative  HENT: Positive for trouble swallowing  Eyes: Negative  Respiratory: Negative  Cardiovascular: Negative  Gastrointestinal: Negative  Endocrine: Negative  Musculoskeletal: Negative  Skin: Negative  Allergic/Immunologic: Negative  Neurological: Negative  Hematological: Negative  Psychiatric/Behavioral: Negative  Past Medical and Surgical History:     Past Medical History:   Diagnosis Date    Bilateral pleural effusion     [facility] disease[facility] [address] [facility]     [facility]     Difficulty swallowing     Disease of thyroid gland     Edema     Esophageal mass     History of chemotherapy     History of transfusion     Malignant neoplasm of lower third of esophagus (HCC)     Port-A-Cath in place     LCW    Sleep apnea     no CPAP    SOB (shortness of breath)        Past Surgical History:   Procedure Laterality Date    BACK SURGERY      x2    DISC REMOVAL      ESOPHAGECTOMY N/A 1/28/2020    Procedure: ESOPHAGECTOMY, TRANSHIATAL;  Surgeon: Elmer Butler MD;  [facility];  Service: Surgical Oncology    ESOPHAGOGASTRODUODENOSCOPY N/A 1/28/2020    Procedure: ESOPHAGOGASTRODUODENOSCOPY (EGD);   Surgeon: Elmer Butler MD;  [facility];  Service: Surgical Oncology    FL GUIDED CENTRAL VENOUS ACCESS DEVICE INSERTION  9/26/2019    GASTROJEJUNOSTOMY W/ JEJUNOSTOMY TUBE N/A 9/26/2019    Procedure: INSERTION JEJUNOSTOMY TUBE OPEN;  Surgeon: Elmer Butler MD;  [facility];  Service: Surgical Oncology    GASTROJEJUNOSTOMY W/ JEJUNOSTOMY TUBE N/A 6/5/2020    Procedure: INSERTION JEJUNOSTOMY TUBE OPEN;  Surgeon: Elmer Butler MD;  [facility];  Service: Surgical Oncology    IR CHEST TUBE  5/26/2020    IR SPINE PROCEDURE  5/27/2020    IR THORACENTESIS  2/25/2020    [place] ESOPHAGOGASTRODUODENOSCOPY TRANSORAL DIAGNOSTIC N/A 4/28/2020    Procedure: ESOPHAGOGASTRODUODENOSCOPY (EGD); Surgeon: Gilberto Kellogg MD;  [facility];  Service: Thoracic    MD ESOPHAGOSCOPY FLEX BALLOON DILAT <30 MM DIAM N/A 4/28/2020    Procedure: DILATATION ESOPHAGEAL;  Surgeon: Gilberto Kellogg MD;  [facility];  Service: Thoracic    TONSILLECTOMY      TUNNELED VENOUS PORT PLACEMENT N/A 9/26/2019    Procedure: INSERTION VENOUS PORT (PORT-A-CATH); Surgeon: Keli Hodges MD;  [facility];  Service: Surgical Oncology    VOCAL CORD INJECTION N/A 2/7/2020    Procedure: MICROLARYNGOSCOPY WITH INJECTION;  Surgeon: Floyd Villasenor MD;  [facility];  Service: ENT       Meds/Allergies:    Prior to Admission medications    Medication Sig Start Date End Date Taking?  Authorizing Provider   oxyCODONE (ROXICODONE) 5 mg/5 mL solution Take 5 mg (5 ML) every 4 hours as needed for moderate pain or 10 mg (10 ML) every 4 hours as needed for severe pain 6/9/20  Yes SARA Trevino   oxyCODONE (ROXICODONE) 5 mg/5 mL solution Take 5 mL (5 mg total) by mouth every 4 (four) hours as needed for moderate painMax Daily Amount: 30 mg 6/23/20  Yes SARA Redd   amiodarone 200 mg tablet Take 1 tablet (200 mg total) by mouth 3 (three) times a day with meals 6/23/20 7/23/20  SARA Redd   aspirin 81 mg chewable tablet Chew 1 tablet (81 mg total) daily 6/10/20   SARA Trevino   atorvastatin (LIPITOR) 40 mg tablet Take 1 tablet (40 mg total) by mouth daily with dinner 6/9/20   SARA Trevino   Blood Glucose Monitoring Suppl (ONE TOUCH ULTRA 2) w/Device KIT Check bs , q fasting and 2 hr after meals 3/23/20   SARA Redd   canagliflozin (Invokana) 100 mg Take 100 mg by mouth daily before breakfast    Historical Provider, MD   cefTRIAXone 2,000 mg in dextrose 5 % 50 mL IVPB Infuse 2,000 mg into a venous catheter every 24 hours 6/9/20 7/9/20  SARA Trevino   glucose blood test strip Check blood sugar 3 times daily 20   SARA Delaney   Lancets [facility] ULTRASOFT) lancets Check bs , q fasting and 2 hr after meals 3/23/20   SARA Guajardo   levothyroxine 25 mcg tablet Take 25 mcg by mouth daily in the early morning 19   Historical Provider, MD   metroNIDAZOLE (FLAGYL) 500 mg tablet Take 1 tablet (500 mg total) by mouth every 8 (eight) hours 20  SARA Trevino   ondansetron (ZOFRAN) 4 mg tablet Take 1 tablet (4 mg total) by mouth every 6 (six) hours as needed for nausea or vomiting 20   SARA Trevino   rOPINIRole (REQUIP) 0 25 mg tablet take 1 tablet by mouth at bedtime 20   Karen Jimenez MD   traZODone (DESYREL) 100 mg tablet Take 1 tablet (100 mg total) by mouth daily at bedtime 20   SARA Trevino     I have reviewed home medications with patient personally  Allergies:    Allergies   Allergen Reactions    Penicillins Itching       Social History:     Marital Status: Single     Substance Use History:   Social History     Substance and Sexual Activity   Alcohol Use Not Currently    Alcohol/week: 0 0 standard drinks    Frequency: Never    Drinks per session: 1 or 2    Binge frequency: Never     Social History     Tobacco Use   Smoking Status Former Smoker    Packs/day: 0 50    Years: 50 00    Pack years: 25 00    Types: Cigarettes    Last attempt to quit: 2017    Years since quittin 5   Smokeless Tobacco Never Used     Social History     Substance and Sexual Activity   Drug Use Never       Family History:    Family History   Problem Relation Age of Onset    Diabetes Mother     No Known Problems Father        Physical Exam:     Vitals:   Blood Pressure: 133/83 (20)  Pulse: 80 (20)  Temperature: 98 °F (36 7 °C) (20)  Temp Source: Oral (20)  Respirations: 18 (20)  Weight - Scale: 69 4 kg (153 lb) (20)  SpO2: 100 % (20)    Physical Exam   Constitutional: He is oriented to person, place, and time  No distress  HENT:   Head: Normocephalic and atraumatic  Eyes: Conjunctivae and EOM are normal    Neck: Normal range of motion  Neck supple  Cardiovascular: Normal rate and regular rhythm  Pulmonary/Chest: Effort normal and breath sounds normal  He has no wheezes  He has no rales  Abdominal: Soft  Bowel sounds are normal  He exhibits no distension  There is no tenderness  Musculoskeletal: Normal range of motion  He exhibits tenderness (thoracic spine)  Neurological: He is alert and oriented to person, place, and time  Skin: Skin is warm and dry  He is not diaphoretic  Additional Data:     Lab Results: I have personally reviewed pertinent reports  Results from last 7 days   Lab Units 07/02/20  1917   WBC Thousand/uL 5 20   HEMOGLOBIN g/dL 10 9*   HEMATOCRIT % 34 6*   PLATELETS Thousands/uL 240   NEUTROS PCT % 60   LYMPHS PCT % 25   MONOS PCT % 8   EOS PCT % 6     Results from last 7 days   Lab Units 07/02/20  1917   SODIUM mmol/L 138   POTASSIUM mmol/L 3 8   CHLORIDE mmol/L 105   CO2 mmol/L 28   BUN mg/dL 17   CREATININE mg/dL 0 84   ANION GAP mmol/L 5   CALCIUM mg/dL 8 9   ALBUMIN g/dL 3 4*   TOTAL BILIRUBIN mg/dL 0 89   ALK PHOS U/L 66   ALT U/L 25   AST U/L 13   GLUCOSE RANDOM mg/dL 96                       Imaging: I have personally reviewed pertinent reports  No orders to display       EKG, Pathology, and Other Studies Reviewed on Admission:   · EKG: n/a    Allscripts / Epic Records Reviewed: Yes     ** Please Note: This note has been constructed using a voice recognition system   ** Samples Given: Ultravate lotion BID Detail Level: Simple

## 2020-12-15 DIAGNOSIS — I48.91 ATRIAL FIBRILLATION, UNSPECIFIED TYPE (HCC): ICD-10-CM

## 2020-12-21 ENCOUNTER — TELEPHONE (OUTPATIENT)
Dept: CARDIAC SURGERY | Facility: CLINIC | Age: 65
End: 2020-12-21

## 2020-12-24 RX ORDER — AMIODARONE HYDROCHLORIDE 200 MG/1
200 TABLET ORAL
Qty: 30 TABLET | Refills: 0 | Status: SHIPPED | OUTPATIENT
Start: 2020-12-24 | End: 2021-01-18

## 2020-12-28 ENCOUNTER — PATIENT MESSAGE (OUTPATIENT)
Dept: PALLIATIVE MEDICINE | Facility: CLINIC | Age: 65
End: 2020-12-28

## 2020-12-28 DIAGNOSIS — G89.3 CANCER-RELATED PAIN: Chronic | ICD-10-CM

## 2020-12-28 RX ORDER — OXYCODONE HYDROCHLORIDE 10 MG/1
10 TABLET ORAL EVERY 4 HOURS PRN
Qty: 90 TABLET | Refills: 0 | Status: SHIPPED | OUTPATIENT
Start: 2020-12-28 | End: 2021-01-13 | Stop reason: SDUPTHER

## 2020-12-31 ENCOUNTER — TELEMEDICINE (OUTPATIENT)
Dept: FAMILY MEDICINE CLINIC | Facility: CLINIC | Age: 65
End: 2020-12-31
Payer: COMMERCIAL

## 2020-12-31 DIAGNOSIS — C15.5 MALIGNANT NEOPLASM OF LOWER THIRD OF ESOPHAGUS (HCC): ICD-10-CM

## 2020-12-31 DIAGNOSIS — I82.4Y9 DEEP VEIN THROMBOSIS (DVT) OF PROXIMAL LOWER EXTREMITY, UNSPECIFIED CHRONICITY, UNSPECIFIED LATERALITY (HCC): ICD-10-CM

## 2020-12-31 DIAGNOSIS — E03.9 ACQUIRED HYPOTHYROIDISM: ICD-10-CM

## 2020-12-31 DIAGNOSIS — Z87.81 S/P RIGHT HIP FRACTURE: Primary | ICD-10-CM

## 2020-12-31 DIAGNOSIS — E11.65 TYPE 2 DIABETES MELLITUS WITH HYPERGLYCEMIA, WITHOUT LONG-TERM CURRENT USE OF INSULIN (HCC): ICD-10-CM

## 2020-12-31 PROCEDURE — 99495 TRANSJ CARE MGMT MOD F2F 14D: CPT | Performed by: FAMILY MEDICINE

## 2021-01-04 LAB — MISCELLANEOUS LAB TEST RESULT: NORMAL

## 2021-01-06 ENCOUNTER — TELEPHONE (OUTPATIENT)
Dept: SURGICAL ONCOLOGY | Facility: CLINIC | Age: 66
End: 2021-01-06

## 2021-01-06 ENCOUNTER — OFFICE VISIT (OUTPATIENT)
Dept: CARDIAC SURGERY | Facility: CLINIC | Age: 66
End: 2021-01-06
Payer: COMMERCIAL

## 2021-01-06 VITALS
HEIGHT: 70 IN | SYSTOLIC BLOOD PRESSURE: 96 MMHG | WEIGHT: 147 LBS | HEART RATE: 82 BPM | BODY MASS INDEX: 21.05 KG/M2 | TEMPERATURE: 99.2 F | OXYGEN SATURATION: 97 % | RESPIRATION RATE: 16 BRPM | DIASTOLIC BLOOD PRESSURE: 58 MMHG

## 2021-01-06 DIAGNOSIS — K22.2 ESOPHAGEAL STRICTURE: Primary | ICD-10-CM

## 2021-01-06 PROCEDURE — 99214 OFFICE O/P EST MOD 30 MIN: CPT | Performed by: PHYSICIAN ASSISTANT

## 2021-01-06 RX ORDER — APIXABAN 5 MG/1
5 TABLET, FILM COATED ORAL 2 TIMES DAILY
COMMUNITY
Start: 2020-12-21

## 2021-01-06 NOTE — H&P (VIEW-ONLY)
Thoracic Follow-Up  Assessment/Plan:    Esophageal stricture  We had a discussion with Mr Tin Vick regarding the surgery we are rescheduling  He has had his esophageal stent in since 8/31/20 and should be removed at this point  We will schedule his procedure for 1/11/21  It will consist of an EGD, esophageal stent removal, possible replacement, possible dilation  He will take his last dose of Eliquis on 1/8/21  He will need to undergo some blood work prior to the procedure  Diagnoses and all orders for this visit:    Esophageal stricture  -     Case request operating room: ESOPHAGOGASTRODUODENOSCOPY (EGD): esophageal stent removal, INSERTION STENT ESOPHAGEAL, DILATATION ESOPHAGEAL; Standing  -     Type and screen; Future  -     APTT; Future  -     Protime-INR; Future  -     Case request operating room: ESOPHAGOGASTRODUODENOSCOPY (EGD): esophageal stent removal, INSERTION STENT ESOPHAGEAL, DILATATION ESOPHAGEAL    Other orders  -     Eliquis 5 MG; Take 5 mg by mouth 2 (two) times a day  -     Diet NPO; Sips with meds; Standing  -     Void on call to OR; Standing  -     Insert peripheral IV; Standing  -     Place sequential compression device; Standing          Thoracic History       Diagnosis: Clinical stage T3N1M0 esophageal carcinoma  Procedure: 1   EGD, transhiatal esophagectomy performed on 1/28/20 2  EGD with dilation up to 42F performed on 4/28/20 3  EGD with dilation over guidewire and insertion of esophageal stent 8/31/20  Pathology: esophagogastrectomy reveals microscopic focus of residual adenocarcinoma in muscularis propria measuring 0 7 cm, adjacent Duong's esophagus with associated atypia indeterminate for dysplasia  7 lymph nodes negative for carcinoma  Proximal and distal margins negative for carcinoma  8th edition AJCC tumor stage I (ypT2, ypN0)  Therapy:  Neoadjuvant FLOT x 6 chemotherapy and Herceptin, completed 12/17/19           Patient ID: Dayne Mancilla is a 76 y o  male     HPI      Mr Malick Land is a 75 yo gentleman who underwent a THE in January 2020 with an esophageal stent placement on 8/31/20  He was last seen in our office on 11/25/20 at which point he was not having any difficulty swallowing foods and had gained about 6 lbs  He was undergoing brain xrt for a metastasis at that time  We planned for an EGD, stent removal, possible replacement, possible dilation for 12/7/20  However, it was canceled secondary to a hospital admission for a right hip fracture s/p fall  He was discharged from rehab on 12/19/20  He returns today to discuss rescheduling the above procedure  On discussion, he is on Eliquis secondary to DVT diagnosed at same time as his hip fracture  He denies fever, chills, cough  He does have some hoarseness and dyspnea on exertion  His weight is stable from his last office visit  He is eating almost everything without any limitation  He still has a Jtube, but does not use it anymore  He follows with Dr Joellen Muniz for this       The following portions of the patient's history were reviewed and updated as appropriate: allergies, current medications, past family history, past medical history, past social history, past surgical history and problem list     Past Medical History:   Diagnosis Date    Anemia     Bilateral pleural effusion     Brain lesion     Brain metastases ([address]     Brain tumor ([address]     C  difficile diarrhea     Cancer of esophagus ([address] )     COPD (chronic obstructive pulmonary disease) ([address]     Diabetes mellitus ([address] )     Difficulty swallowing     Disease of thyroid gland     Edema     Esophageal mass     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    History of chemotherapy     History of transfusion     Malignant neoplasm of lower third of esophagus ([address] ) 9/17/2019    Diagnosis: clinical stage T3N1M0 esophageal carcinoma Procedure: EGD, transhiatal esophagectomy performed on 1/28/20 Pathology: esophagogastrectomy reveals microscopic focus of residual adenocarcinoma in muscularis propria measuring 0 7 cm, adjacent Duong's esophagus with associated atypia indeterminate for dysplasia  7 lymph nodes negative for carcinoma  Proximal and distal margins negative for    Paraspinal abscess (HCC)     PONV (postoperative nausea and vomiting)     Port-A-Cath in place     LCW    Sleep apnea     no CPAP    SOB (shortness of breath)      Past Surgical History:   Procedure Laterality Date    BACK SURGERY      x2    DISC REMOVAL      ESOPHAGECTOMY N/A 1/28/2020    Procedure: ESOPHAGECTOMY, TRANSHIATAL;  Surgeon: Zahra Zamora MD;  [facility];  Service: Surgical Oncology    ESOPHAGOGASTRODUODENOSCOPY N/A 1/28/2020    Procedure: ESOPHAGOGASTRODUODENOSCOPY (EGD); Surgeon: Zahra Zamora MD;  [facility];  Service: Surgical Oncology    FL GUIDED CENTRAL VENOUS ACCESS DEVICE INSERTION  9/26/2019    GASTROJEJUNOSTOMY W/ JEJUNOSTOMY TUBE N/A 9/26/2019    Procedure: INSERTION JEJUNOSTOMY TUBE OPEN;  Surgeon: Zahra Zamora MD;  [facility];  Service: Surgical Oncology    GASTROJEJUNOSTOMY W/ JEJUNOSTOMY TUBE N/A 6/5/2020    Procedure: INSERTION JEJUNOSTOMY TUBE OPEN;  Surgeon: Zahra Zamora MD;  [facility];  Service: Surgical Oncology    IR CHEST TUBE PLACEMENT  5/26/2020    IR SPINE PROCEDURE  5/27/2020    IR THORACENTESIS  2/25/2020    TX EGD ENDOSCOPIC STENT PLACEMENT W/WIRE& DILATION N/A 8/31/2020    Procedure: INSERTION STENT ESOPHAGEAL;  Surgeon: Lory Shanks MD;  [facility];  Service: Thoracic    TX EGD INSERT GUIDE WIRE DILATOR PASSAGE ESOPHAGUS N/A 7/7/2020    Procedure: ESOPHAGOGASTRODUODENOSCOPY (EGD) with esophageal dilation under fluro with biopsy;  Surgeon: Lory Shanks MD;  [facility];  Service: Thoracic    TX ESOPHAGOGASTRODUODENOSCOPY TRANSORAL DIAGNOSTIC N/A 4/28/2020    Procedure: ESOPHAGOGASTRODUODENOSCOPY (EGD);   Surgeon: Lory Shanks MD;  [facility];  Service: Thoracic    NV ESOPHAGOGASTRODUODENOSCOPY TRANSORAL DIAGNOSTIC N/A 7/27/2020    Procedure: ESOPHAGOGASTRODUODENOSCOPY (EGD); Surgeon: Odalis Rojo MD;  [facility];  Service: Thoracic    NV ESOPHAGOGASTRODUODENOSCOPY TRANSORAL DIAGNOSTIC N/A 8/31/2020    Procedure: ESOPHAGOGASTRODUODENOSCOPY (EGD) dilation over guidewire 36-45 Fr , stent placement;  Surgeon: Odalis Rojo MD;  [facility];  Service: Thoracic    NV ESOPHAGOSCOPY FLEX Carey Garrett <30 MM DIAM N/A 4/28/2020    Procedure: DILATATION ESOPHAGEAL;  Surgeon: Odalis Rojo MD;  [facility];  Service: Thoracic    NV ESOPHAGOSCOPY FLEX BALLOON DILAT <30 MM DIAM N/A 7/27/2020    Procedure: DILATATION ESOPHAGEAL with Savary over the wire dilitation 33FR to 45FR; Surgeon: Odalis Rojo MD;  [facility];  Service: Thoracic    NV ESOPHAGOSCOPY FLEX BALLOON DILAT <30 MM DIAM N/A 8/31/2020    Procedure: DILATATION ESOPHAGEAL;  Surgeon: Odalis Rojo MD;  [facility];  Service: Thoracic    TONSILLECTOMY      TUNNELED VENOUS PORT PLACEMENT N/A 9/26/2019    Procedure: INSERTION VENOUS PORT (PORT-A-CATH);   Surgeon: Armando Patel MD;  [facility];  Service: Surgical Oncology    VOCAL CORD INJECTION N/A 2/7/2020    Procedure: MICROLARYNGOSCOPY WITH INJECTION;  Surgeon: Claudio Mcbride MD;  [facility];  Service: ENT     Family History   Problem Relation Age of Onset    Diabetes Mother     No Known Problems Father      Social History     Socioeconomic History    Marital status: Single     Spouse name: Not on file    Number of children: Not on file    Years of education: Not on file    Highest education level: Not on file   Occupational History    Not on file   Social Needs    Financial resource strain: Not on file    Food insecurity     Worry: Not on file     Inability: Not on file    Transportation needs     Medical: Not on file     Non-medical: Not on file   Tobacco Use    Smoking status: Former Smoker     Packs/day: 0 50     Years: 50 00     Pack years: 25 00     Types: Cigarettes     Quit date: 12/24/2017     Years since quitting: 3 0    Smokeless tobacco: Never Used   Substance and Sexual Activity    Alcohol use: Never     Frequency: Never    Drug use: Never    Sexual activity: Not on file   Lifestyle    Physical activity     Days per week: Not on file     Minutes per session: Not on file    Stress: Not on file   Relationships    Social connections     Talks on phone: Not on file     Gets together: Not on file     Attends Hindu service: Not on file     Active member of club or organization: Not on file     Attends meetings of clubs or organizations: Not on file     Relationship status: Not on file    Intimate partner violence     Fear of current or ex partner: Not on file     Emotionally abused: Not on file     Physically abused: Not on file     Forced sexual activity: Not on file   Other Topics Concern    Not on file   Social History Narrative    ** Merged History Encounter **          Allergies   Allergen Reactions    Penicillins Itching     Current Outpatient Medications on File Prior to Visit   Medication Sig Dispense Refill    amiodarone 200 mg tablet TAKE 1 TABLET (200 MG TOTAL) BY MOUTH DAILY WITH BREAKFAST 30 tablet 0    atorvastatin (LIPITOR) 40 mg tablet Take 40 mg by mouth daily      Blood Glucose Monitoring Suppl (ONE TOUCH ULTRA 2) w/Device KIT Check bs , q fasting and 2 hr after meals 1 each 0    Eliquis 5 MG Take 5 mg by mouth 2 (two) times a day      glucose blood test strip Check blood sugar 3 times daily 100 each 3    Lancets (ONETOUCH ULTRASOFT) lancets Check bs , q fasting and 2 hr after meals 100 each 5    levothyroxine 50 mcg tablet Take 1 tablet (50 mcg total) by mouth daily 30 tablet 3    methocarbamol (ROBAXIN) 750 mg tablet Take 1 tablet (750 mg total) by mouth 60 minutes pre-procedure 10 tablet 0    oxyCODONE (ROXICODONE) 10 MG TABS Take 1 tablet (10 mg total) by mouth every 4 (four) hours as needed for moderate painMax Daily Amount: 60 mg 90 tablet 0     No current facility-administered medications on file prior to visit  Review of Systems   Constitutional: Negative for chills, fever and unexpected weight change  HENT: Positive for voice change  Negative for sore throat and trouble swallowing  Respiratory: Positive for shortness of breath  Negative for cough  Cardiovascular: Negative for chest pain and palpitations  Gastrointestinal: Negative for abdominal distention, abdominal pain and nausea  Musculoskeletal: Negative for back pain  Psychiatric/Behavioral: Negative for agitation, behavioral problems and confusion  All other systems reviewed and are negative  Objective:   Physical Exam  Vitals signs reviewed  Constitutional:       General: He is not in acute distress  Appearance: Normal appearance  He is well-developed  HENT:      Head:      Comments: Hat on      Mouth/Throat:      Comments: Masked   Eyes:      General: No scleral icterus  Extraocular Movements: Extraocular movements intact  Neck:      Musculoskeletal: Normal range of motion and neck supple  Cardiovascular:      Rate and Rhythm: Normal rate and regular rhythm  Heart sounds: Normal heart sounds  Pulmonary:      Effort: Pulmonary effort is normal  No respiratory distress  Breath sounds: Normal breath sounds  No wheezing  Abdominal:      General: Abdomen is flat  Bowel sounds are normal  There is no distension  Palpations: Abdomen is soft  Skin:     General: Skin is warm and dry  Neurological:      Mental Status: He is alert and oriented to person, place, and time     Psychiatric:         Mood and Affect: Mood normal          Behavior: Behavior normal        BP 96/58   Pulse 82   Temp 99 2 °F (37 3 °C) (Temporal)   Resp 16   Ht 5' 10" (1 778 m)   Wt 66 7 kg (147 lb) SpO2 97%   BMI 21 09 kg/m²

## 2021-01-06 NOTE — ASSESSMENT & PLAN NOTE
We had a discussion with Mr Jean Hilton regarding the surgery we are rescheduling  He has had his esophageal stent in since 8/31/20 and should be removed at this point  We will schedule his procedure for 1/11/21  It will consist of an EGD, esophageal stent removal, possible replacement, possible dilation  He will take his last dose of Eliquis on 1/8/21  He will need to undergo some blood work prior to the procedure

## 2021-01-06 NOTE — PROGRESS NOTES
Thoracic Follow-Up  Assessment/Plan:    Esophageal stricture  We had a discussion with Mr Delaney Marsh regarding the surgery we are rescheduling  He has had his esophageal stent in since 8/31/20 and should be removed at this point  We will schedule his procedure for 1/11/21  It will consist of an EGD, esophageal stent removal, possible replacement, possible dilation  He will take his last dose of Eliquis on 1/8/21  He will need to undergo some blood work prior to the procedure  Diagnoses and all orders for this visit:    Esophageal stricture  -     Case request operating room: ESOPHAGOGASTRODUODENOSCOPY (EGD): esophageal stent removal, INSERTION STENT ESOPHAGEAL, DILATATION ESOPHAGEAL; Standing  -     Type and screen; Future  -     APTT; Future  -     Protime-INR; Future  -     Case request operating room: ESOPHAGOGASTRODUODENOSCOPY (EGD): esophageal stent removal, INSERTION STENT ESOPHAGEAL, DILATATION ESOPHAGEAL    Other orders  -     Eliquis 5 MG; Take 5 mg by mouth 2 (two) times a day  -     Diet NPO; Sips with meds; Standing  -     Void on call to OR; Standing  -     Insert peripheral IV; Standing  -     Place sequential compression device; Standing          Thoracic History       Diagnosis: Clinical stage T3N1M0 esophageal carcinoma  Procedure: 1   EGD, transhiatal esophagectomy performed on 1/28/20 2  EGD with dilation up to 42F performed on 4/28/20 3  EGD with dilation over guidewire and insertion of esophageal stent 8/31/20  Pathology: esophagogastrectomy reveals microscopic focus of residual adenocarcinoma in muscularis propria measuring 0 7 cm, adjacent Duong's esophagus with associated atypia indeterminate for dysplasia  7 lymph nodes negative for carcinoma  Proximal and distal margins negative for carcinoma  8th edition AJCC tumor stage I (ypT2, ypN0)  Therapy:  Neoadjuvant FLOT x 6 chemotherapy and Herceptin, completed 12/17/19           Patient ID: Navid Hernandez is a 76 y o  male     HPI      Mr Jesse Guadalupe is a 75 yo gentleman who underwent a THE in January 2020 with an esophageal stent placement on 8/31/20  He was last seen in our office on 11/25/20 at which point he was not having any difficulty swallowing foods and had gained about 6 lbs  He was undergoing brain xrt for a metastasis at that time  We planned for an EGD, stent removal, possible replacement, possible dilation for 12/7/20  However, it was canceled secondary to a hospital admission for a right hip fracture s/p fall  He was discharged from rehab on 12/19/20  He returns today to discuss rescheduling the above procedure  On discussion, he is on Eliquis secondary to DVT diagnosed at same time as his hip fracture  He denies fever, chills, cough  He does have some hoarseness and dyspnea on exertion  His weight is stable from his last office visit  He is eating almost everything without any limitation  He still has a Jtube, but does not use it anymore  He follows with Dr Kelley Prado for this       The following portions of the patient's history were reviewed and updated as appropriate: allergies, current medications, past family history, past medical history, past social history, past surgical history and problem list     Past Medical History:   Diagnosis Date    Anemia     Bilateral pleural effusion     Brain lesion     Brain metastases ([address]     Brain tumor ([address]     C  difficile diarrhea     Cancer of esophagus ([address] )     COPD (chronic obstructive pulmonary disease) ([address]     Diabetes mellitus ([address] )     Difficulty swallowing     Disease of thyroid gland     Edema     Esophageal mass     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 [address] [facility] 9/24/2019    History of chemotherapy     History of transfusion     Malignant neoplasm of lower third of esophagus ([address] ) 9/17/2019    Diagnosis: clinical stage T3N1M0 esophageal carcinoma Procedure: EGD, transhiatal esophagectomy performed on 1/28/20 Pathology: esophagogastrectomy reveals microscopic focus of residual adenocarcinoma in muscularis propria measuring 0 7 cm, adjacent Duong's esophagus with associated atypia indeterminate for dysplasia  7 lymph nodes negative for carcinoma  Proximal and distal margins negative for    Paraspinal abscess (HCC)     PONV (postoperative nausea and vomiting)     Port-A-Cath in place     LCW    Sleep apnea     no CPAP    SOB (shortness of breath)      Past Surgical History:   Procedure Laterality Date    BACK SURGERY      x2    DISC REMOVAL      ESOPHAGECTOMY N/A 1/28/2020    Procedure: ESOPHAGECTOMY, TRANSHIATAL;  Surgeon: Nick Salazar MD;  [facility];  Service: Surgical Oncology    ESOPHAGOGASTRODUODENOSCOPY N/A 1/28/2020    Procedure: ESOPHAGOGASTRODUODENOSCOPY (EGD); Surgeon: Nick Salazar MD;  [facility];  Service: Surgical Oncology    FL GUIDED CENTRAL VENOUS ACCESS DEVICE INSERTION  9/26/2019    GASTROJEJUNOSTOMY W/ JEJUNOSTOMY TUBE N/A 9/26/2019    Procedure: INSERTION JEJUNOSTOMY TUBE OPEN;  Surgeon: Nick Salazar MD;  [facility];  Service: Surgical Oncology    GASTROJEJUNOSTOMY W/ JEJUNOSTOMY TUBE N/A 6/5/2020    Procedure: INSERTION JEJUNOSTOMY TUBE OPEN;  Surgeon: Nick Salazar MD;  [facility];  Service: Surgical Oncology    IR CHEST TUBE PLACEMENT  5/26/2020    IR SPINE PROCEDURE  5/27/2020    IR THORACENTESIS  2/25/2020    NC EGD ENDOSCOPIC STENT PLACEMENT W/WIRE& DILATION N/A 8/31/2020    Procedure: INSERTION STENT ESOPHAGEAL;  Surgeon: Td Murrieta MD;  [facility];  Service: Thoracic    NC EGD INSERT GUIDE WIRE DILATOR PASSAGE ESOPHAGUS N/A 7/7/2020    Procedure: ESOPHAGOGASTRODUODENOSCOPY (EGD) with esophageal dilation under fluro with biopsy;  Surgeon: Td Murrieta MD;  [facility];  Service: Thoracic    NC ESOPHAGOGASTRODUODENOSCOPY TRANSORAL DIAGNOSTIC N/A 4/28/2020    Procedure: ESOPHAGOGASTRODUODENOSCOPY (EGD);   Surgeon: Td Murrieta MD;  [facility];  Service: Thoracic    NE ESOPHAGOGASTRODUODENOSCOPY TRANSORAL DIAGNOSTIC N/A 7/27/2020    Procedure: ESOPHAGOGASTRODUODENOSCOPY (EGD); Surgeon: Star Martinez MD;  [facility];  Service: Thoracic    NE ESOPHAGOGASTRODUODENOSCOPY TRANSORAL DIAGNOSTIC N/A 8/31/2020    Procedure: ESOPHAGOGASTRODUODENOSCOPY (EGD) dilation over guidewire 36-45 Fr , stent placement;  Surgeon: Star Martinez MD;  [facility];  Service: Thoracic    NE ESOPHAGOSCOPY FLEX Nickerson Doll <30 MM DIAM N/A 4/28/2020    Procedure: DILATATION ESOPHAGEAL;  Surgeon: Star Martinez MD;  [facility];  Service: Thoracic    NE ESOPHAGOSCOPY FLEX BALLOON DILAT <30 MM DIAM N/A 7/27/2020    Procedure: DILATATION ESOPHAGEAL with Savary over the wire dilitation 33FR to 45FR; Surgeon: Star Martinez MD;  [facility];  Service: Thoracic    NE ESOPHAGOSCOPY FLEX BALLOON DILAT <30 MM DIAM N/A 8/31/2020    Procedure: DILATATION ESOPHAGEAL;  Surgeon: Star Martinez MD;  [facility];  Service: Thoracic    TONSILLECTOMY      TUNNELED VENOUS PORT PLACEMENT N/A 9/26/2019    Procedure: INSERTION VENOUS PORT (PORT-A-CATH);   Surgeon: Naldo Zuniga MD;  [facility];  Service: Surgical Oncology    VOCAL CORD INJECTION N/A 2/7/2020    Procedure: MICROLARYNGOSCOPY WITH INJECTION;  Surgeon: Mike Arias MD;  [facility];  Service: ENT     Family History   Problem Relation Age of Onset    Diabetes Mother     No Known Problems Father      Social History     Socioeconomic History    Marital status: Single     Spouse name: Not on file    Number of children: Not on file    Years of education: Not on file    Highest education level: Not on file   Occupational History    Not on file   Social Needs    Financial resource strain: Not on file    Food insecurity     Worry: Not on file     Inability: Not on file    Transportation needs     Medical: Not on file     Non-medical: Not on file   Tobacco Use    Smoking status: Former Smoker     Packs/day: 0 50     Years: 50 00     Pack years: 25 00     Types: Cigarettes     Quit date: 12/24/2017     Years since quitting: 3 0    Smokeless tobacco: Never Used   Substance and Sexual Activity    Alcohol use: Never     Frequency: Never    Drug use: Never    Sexual activity: Not on file   Lifestyle    Physical activity     Days per week: Not on file     Minutes per session: Not on file    Stress: Not on file   Relationships    Social connections     Talks on phone: Not on file     Gets together: Not on file     Attends Bahai service: Not on file     Active member of club or organization: Not on file     Attends meetings of clubs or organizations: Not on file     Relationship status: Not on file    Intimate partner violence     Fear of current or ex partner: Not on file     Emotionally abused: Not on file     Physically abused: Not on file     Forced sexual activity: Not on file   Other Topics Concern    Not on file   Social History Narrative    ** Merged History Encounter **          Allergies   Allergen Reactions    Penicillins Itching     Current Outpatient Medications on File Prior to Visit   Medication Sig Dispense Refill    amiodarone 200 mg tablet TAKE 1 TABLET (200 MG TOTAL) BY MOUTH DAILY WITH BREAKFAST 30 tablet 0    atorvastatin (LIPITOR) 40 mg tablet Take 40 mg by mouth daily      Blood Glucose Monitoring Suppl (ONE TOUCH ULTRA 2) w/Device KIT Check bs , q fasting and 2 hr after meals 1 each 0    Eliquis 5 MG Take 5 mg by mouth 2 (two) times a day      glucose blood test strip Check blood sugar 3 times daily 100 each 3    Lancets (ONETOUCH ULTRASOFT) lancets Check bs , q fasting and 2 hr after meals 100 each 5    levothyroxine 50 mcg tablet Take 1 tablet (50 mcg total) by mouth daily 30 tablet 3    methocarbamol (ROBAXIN) 750 mg tablet Take 1 tablet (750 mg total) by mouth 60 minutes pre-procedure 10 tablet 0    oxyCODONE (ROXICODONE) 10 MG TABS Take 1 tablet (10 mg total) by mouth every 4 (four) hours as needed for moderate painMax Daily Amount: 60 mg 90 tablet 0     No current facility-administered medications on file prior to visit  Review of Systems   Constitutional: Negative for chills, fever and unexpected weight change  HENT: Positive for voice change  Negative for sore throat and trouble swallowing  Respiratory: Positive for shortness of breath  Negative for cough  Cardiovascular: Negative for chest pain and palpitations  Gastrointestinal: Negative for abdominal distention, abdominal pain and nausea  Musculoskeletal: Negative for back pain  Psychiatric/Behavioral: Negative for agitation, behavioral problems and confusion  All other systems reviewed and are negative  Objective:   Physical Exam  Vitals signs reviewed  Constitutional:       General: He is not in acute distress  Appearance: Normal appearance  He is well-developed  HENT:      Head:      Comments: Hat on      Mouth/Throat:      Comments: Masked   Eyes:      General: No scleral icterus  Extraocular Movements: Extraocular movements intact  Neck:      Musculoskeletal: Normal range of motion and neck supple  Cardiovascular:      Rate and Rhythm: Normal rate and regular rhythm  Heart sounds: Normal heart sounds  Pulmonary:      Effort: Pulmonary effort is normal  No respiratory distress  Breath sounds: Normal breath sounds  No wheezing  Abdominal:      General: Abdomen is flat  Bowel sounds are normal  There is no distension  Palpations: Abdomen is soft  Skin:     General: Skin is warm and dry  Neurological:      Mental Status: He is alert and oriented to person, place, and time     Psychiatric:         Mood and Affect: Mood normal          Behavior: Behavior normal        BP 96/58   Pulse 82   Temp 99 2 °F (37 3 °C) (Temporal)   Resp 16   Ht 5' 10" (1 778 m)   Wt 66 7 kg (147 lb) SpO2 97%   BMI 21 09 kg/m²

## 2021-01-07 ENCOUNTER — HOSPITAL ENCOUNTER (OUTPATIENT)
Dept: INFUSION CENTER | Facility: CLINIC | Age: 66
Discharge: HOME | End: 2021-01-07
Payer: COMMERCIAL

## 2021-01-07 ENCOUNTER — TELEMEDICINE (OUTPATIENT)
Dept: INFECTIOUS DISEASES | Facility: CLINIC | Age: 66
End: 2021-01-07
Payer: COMMERCIAL

## 2021-01-07 VITALS
HEIGHT: 70 IN | WEIGHT: 147 LBS | BODY MASS INDEX: 21.05 KG/M2 | TEMPERATURE: 99.2 F | HEART RATE: 82 BPM | DIASTOLIC BLOOD PRESSURE: 58 MMHG | SYSTOLIC BLOOD PRESSURE: 96 MMHG

## 2021-01-07 DIAGNOSIS — Z00.00 MEDICARE ANNUAL WELLNESS VISIT, SUBSEQUENT: ICD-10-CM

## 2021-01-07 DIAGNOSIS — R93.7 ABNORMAL CT OF THORACIC SPINE: ICD-10-CM

## 2021-01-07 DIAGNOSIS — E03.9 HYPOTHYROIDISM, UNSPECIFIED TYPE: ICD-10-CM

## 2021-01-07 DIAGNOSIS — D50.0 IRON DEFICIENCY ANEMIA DUE TO CHRONIC BLOOD LOSS: ICD-10-CM

## 2021-01-07 DIAGNOSIS — J38.01 PARALYSIS OF LEFT VOCAL FOLD: ICD-10-CM

## 2021-01-07 DIAGNOSIS — R49.0 DYSPHONIA: ICD-10-CM

## 2021-01-07 DIAGNOSIS — M46.20 VERTEBRAL OSTEOMYELITIS (HCC): Primary | ICD-10-CM

## 2021-01-07 DIAGNOSIS — C15.5 MALIGNANT NEOPLASM OF LOWER THIRD OF ESOPHAGUS (HCC): ICD-10-CM

## 2021-01-07 DIAGNOSIS — I82.4Y9 DEEP VEIN THROMBOSIS (DVT) OF PROXIMAL LOWER EXTREMITY, UNSPECIFIED CHRONICITY, UNSPECIFIED LATERALITY (HCC): ICD-10-CM

## 2021-01-07 DIAGNOSIS — Z85.01 PERSONAL HISTORY OF ESOPHAGEAL CANCER: ICD-10-CM

## 2021-01-07 DIAGNOSIS — R41.82 ALTERED MENTAL STATUS, UNSPECIFIED ALTERED MENTAL STATUS TYPE: ICD-10-CM

## 2021-01-07 DIAGNOSIS — E44.1 MILD PROTEIN-CALORIE MALNUTRITION (HCC): ICD-10-CM

## 2021-01-07 DIAGNOSIS — R13.14 PHARYNGOESOPHAGEAL DYSPHAGIA: ICD-10-CM

## 2021-01-07 DIAGNOSIS — M46.20 PARASPINAL ABSCESS (HCC): ICD-10-CM

## 2021-01-07 DIAGNOSIS — E11.65 TYPE 2 DIABETES MELLITUS WITH HYPERGLYCEMIA, WITHOUT LONG-TERM CURRENT USE OF INSULIN (HCC): ICD-10-CM

## 2021-01-07 DIAGNOSIS — E03.9 ACQUIRED HYPOTHYROIDISM: ICD-10-CM

## 2021-01-07 DIAGNOSIS — T40.2X5A THERAPEUTIC OPIOID-INDUCED CONSTIPATION (OIC): ICD-10-CM

## 2021-01-07 DIAGNOSIS — E78.00 HIGH CHOLESTEROL: Chronic | ICD-10-CM

## 2021-01-07 DIAGNOSIS — C79.31 BRAIN METASTASES (HCC): ICD-10-CM

## 2021-01-07 DIAGNOSIS — K22.2 ESOPHAGEAL STRICTURE: Primary | ICD-10-CM

## 2021-01-07 DIAGNOSIS — G44.309 HEADACHES DUE TO OLD HEAD INJURY: ICD-10-CM

## 2021-01-07 DIAGNOSIS — T45.1X5A CHEMOTHERAPY INDUCED NEUTROPENIA (HCC): ICD-10-CM

## 2021-01-07 DIAGNOSIS — S09.90XS HEADACHES DUE TO OLD HEAD INJURY: ICD-10-CM

## 2021-01-07 DIAGNOSIS — Z93.4 JEJUNOSTOMY TUBE PRESENT (HCC): ICD-10-CM

## 2021-01-07 DIAGNOSIS — J44.9 CHRONIC OBSTRUCTIVE PULMONARY DISEASE, UNSPECIFIED COPD TYPE (HCC): ICD-10-CM

## 2021-01-07 DIAGNOSIS — Z87.81 S/P RIGHT HIP FRACTURE: ICD-10-CM

## 2021-01-07 DIAGNOSIS — G63 NEUROPATHY ASSOCIATED WITH CANCER (HCC): ICD-10-CM

## 2021-01-07 DIAGNOSIS — R90.89 ABNORMAL BRAIN MRI: ICD-10-CM

## 2021-01-07 DIAGNOSIS — G47.33 OBSTRUCTIVE SLEEP APNEA SYNDROME: ICD-10-CM

## 2021-01-07 DIAGNOSIS — E43 SEVERE PROTEIN-CALORIE MALNUTRITION (HCC): ICD-10-CM

## 2021-01-07 DIAGNOSIS — Z87.891 HX OF SMOKING: ICD-10-CM

## 2021-01-07 DIAGNOSIS — G89.3 CANCER-RELATED PAIN: Chronic | ICD-10-CM

## 2021-01-07 DIAGNOSIS — Z98.890 PONV (POSTOPERATIVE NAUSEA AND VOMITING): ICD-10-CM

## 2021-01-07 DIAGNOSIS — M79.89 BILATERAL SWELLING OF FEET: ICD-10-CM

## 2021-01-07 DIAGNOSIS — R33.9 URINARY RETENTION: ICD-10-CM

## 2021-01-07 DIAGNOSIS — D70.1 CHEMOTHERAPY INDUCED NEUTROPENIA (HCC): ICD-10-CM

## 2021-01-07 DIAGNOSIS — M46.20 VERTEBRAL OSTEOMYELITIS (HCC): ICD-10-CM

## 2021-01-07 DIAGNOSIS — G93.9 BRAIN LESION: ICD-10-CM

## 2021-01-07 DIAGNOSIS — C80.1 NEUROPATHY ASSOCIATED WITH CANCER (HCC): ICD-10-CM

## 2021-01-07 DIAGNOSIS — E87.1 HYPONATREMIA: ICD-10-CM

## 2021-01-07 DIAGNOSIS — G47.01 INSOMNIA DUE TO MEDICAL CONDITION: ICD-10-CM

## 2021-01-07 DIAGNOSIS — J38.3 GLOTTIC INSUFFICIENCY: ICD-10-CM

## 2021-01-07 DIAGNOSIS — R11.2 PONV (POSTOPERATIVE NAUSEA AND VOMITING): ICD-10-CM

## 2021-01-07 DIAGNOSIS — K21.9 GASTROESOPHAGEAL REFLUX DISEASE, UNSPECIFIED WHETHER ESOPHAGITIS PRESENT: ICD-10-CM

## 2021-01-07 DIAGNOSIS — Z95.828 PORT-A-CATH IN PLACE: ICD-10-CM

## 2021-01-07 DIAGNOSIS — K22.2 ESOPHAGEAL OBSTRUCTION: ICD-10-CM

## 2021-01-07 DIAGNOSIS — K59.03 THERAPEUTIC OPIOID-INDUCED CONSTIPATION (OIC): ICD-10-CM

## 2021-01-07 DIAGNOSIS — A04.72 C. DIFFICILE DIARRHEA: ICD-10-CM

## 2021-01-07 DIAGNOSIS — E43 SEVERE PROTEIN-CALORIE MALNUTRITION (GOMEZ: LESS THAN 60% OF STANDARD WEIGHT) (HCC): ICD-10-CM

## 2021-01-07 DIAGNOSIS — Z46.59 ENCOUNTER FOR CARE RELATED TO FEEDING TUBE: ICD-10-CM

## 2021-01-07 LAB
ALBUMIN SERPL BCP-MCNC: 2.8 G/DL (ref 3.5–5)
ALP SERPL-CCNC: 77 U/L (ref 46–116)
ALT SERPL W P-5'-P-CCNC: 28 U/L (ref 12–78)
ANION GAP SERPL CALCULATED.3IONS-SCNC: 6 MMOL/L (ref 4–13)
APTT PPP: 44 SECONDS (ref 23–37)
AST SERPL W P-5'-P-CCNC: 17 U/L (ref 5–45)
BASOPHILS # BLD AUTO: 0.03 THOUSANDS/ΜL (ref 0–0.1)
BASOPHILS NFR BLD AUTO: 1 % (ref 0–1)
BILIRUB SERPL-MCNC: 0.7 MG/DL (ref 0.2–1)
BUN SERPL-MCNC: 16 MG/DL (ref 5–25)
CALCIUM ALBUM COR SERPL-MCNC: 9.5 MG/DL (ref 8.3–10.1)
CALCIUM SERPL-MCNC: 8.5 MG/DL (ref 8.3–10.1)
CHLORIDE SERPL-SCNC: 105 MMOL/L (ref 100–108)
CO2 SERPL-SCNC: 28 MMOL/L (ref 21–32)
CREAT SERPL-MCNC: 1.03 MG/DL (ref 0.6–1.3)
EOSINOPHIL # BLD AUTO: 0.19 THOUSAND/ΜL (ref 0–0.61)
EOSINOPHIL NFR BLD AUTO: 4 % (ref 0–6)
ERYTHROCYTE [DISTWIDTH] IN BLOOD BY AUTOMATED COUNT: 14.2 % (ref 11.6–15.1)
GFR SERPL CREATININE-BSD FRML MDRD: 75 ML/MIN/1.73SQ M
GLUCOSE SERPL-MCNC: 79 MG/DL (ref 65–140)
HCT VFR BLD AUTO: 28.5 % (ref 36.5–49.3)
HGB BLD-MCNC: 8.6 G/DL (ref 12–17)
IMM GRANULOCYTES # BLD AUTO: 0.02 THOUSAND/UL (ref 0–0.2)
IMM GRANULOCYTES NFR BLD AUTO: 0 % (ref 0–2)
INR PPP: 1.35 (ref 0.84–1.19)
LYMPHOCYTES # BLD AUTO: 1.29 THOUSANDS/ΜL (ref 0.6–4.47)
LYMPHOCYTES NFR BLD AUTO: 25 % (ref 14–44)
MCH RBC QN AUTO: 27.4 PG (ref 26.8–34.3)
MCHC RBC AUTO-ENTMCNC: 30.2 G/DL (ref 31.4–37.4)
MCV RBC AUTO: 91 FL (ref 82–98)
MONOCYTES # BLD AUTO: 0.39 THOUSAND/ΜL (ref 0.17–1.22)
MONOCYTES NFR BLD AUTO: 8 % (ref 4–12)
NEUTROPHILS # BLD AUTO: 3.2 THOUSANDS/ΜL (ref 1.85–7.62)
NEUTS SEG NFR BLD AUTO: 62 % (ref 43–75)
NRBC BLD AUTO-RTO: 0 /100 WBCS
PLATELET # BLD AUTO: 197 THOUSANDS/UL (ref 149–390)
PMV BLD AUTO: 9.2 FL (ref 8.9–12.7)
POTASSIUM SERPL-SCNC: 4.1 MMOL/L (ref 3.5–5.3)
PROT SERPL-MCNC: 6.5 G/DL (ref 6.4–8.2)
PROTHROMBIN TIME: 16.9 SECONDS (ref 11.6–14.5)
RBC # BLD AUTO: 3.14 MILLION/UL (ref 3.88–5.62)
SODIUM SERPL-SCNC: 139 MMOL/L (ref 136–145)
TSH SERPL DL<=0.05 MIU/L-ACNC: 12.02 UIU/ML (ref 0.36–3.74)
WBC # BLD AUTO: 5.12 THOUSAND/UL (ref 4.31–10.16)

## 2021-01-07 PROCEDURE — 85610 PROTHROMBIN TIME: CPT

## 2021-01-07 PROCEDURE — 85730 THROMBOPLASTIN TIME PARTIAL: CPT

## 2021-01-07 PROCEDURE — 84443 ASSAY THYROID STIM HORMONE: CPT

## 2021-01-07 PROCEDURE — 80053 COMPREHEN METABOLIC PANEL: CPT

## 2021-01-07 PROCEDURE — 99214 OFFICE O/P EST MOD 30 MIN: CPT | Performed by: INTERNAL MEDICINE

## 2021-01-07 PROCEDURE — 85025 COMPLETE CBC W/AUTO DIFF WBC: CPT

## 2021-01-07 NOTE — PATIENT INSTRUCTIONS
We will not initiate antibiotics at this time     -check bloodwork in one month (attached)    Follow up with us in 2 months, or sooner as needed

## 2021-01-07 NOTE — PRE-PROCEDURE INSTRUCTIONS
Pre-Surgery Instructions:    Have you had / have a sore throat? NO  have you had / have a cough less than 1 week? NO  Have you had / have a fever greater than 100 0 - 100  4? NO  Are you experiencing any shortness of breath? NO    Pre procedure instructions given, verbalizes understanding ALL QUESTIONS ANSWERED ON 1/7     Medication Instructions    amiodarone 200 mg tablet Instructed patient per Anesthesia Guidelines   atorvastatin (LIPITOR) 40 mg tablet Instructed patient per Anesthesia Guidelines   Eliquis 5 MG Patient was instructed by Physician and understands  STOPPING 1/8    levothyroxine 50 mcg tablet Instructed patient per Anesthesia Guidelines   oxyCODONE (ROXICODONE) 10 MG TABS Instructed patient per Anesthesia Guidelines  Direct Xa Inhibitor Med Class    Stop taking this medication at least 3 days prior to surgery/procedure with prescribing Physician and Surgeon consultation  Opioid Med Class    Continue to take this medication on your normal schedule  If this is an oral medication and you take it in the morning, then you may take this medicine with a sip of water  Statin Med Class    Continue to take this medication on your normal schedule  If this is an oral medication and you take it in the morning, then you may take this medicine with a sip of water  Thyroxine Med Class    Continue to take this medication on your normal schedule  If this is an oral medication and you take it in the morning, then you may take this medicine with a sip of water

## 2021-01-07 NOTE — PROGRESS NOTES
Virtual Regular Visit      Assessment/Plan:    Problem List Items Addressed This Visit     None        1  Thoracic paraspinal abscess and vertebral osteomyelitis  Suspect secondary to contiguous spread in setting of #3  Negative biopsy  Consider sterile collection as patient is otherwise afebrile with improvement in ESR and no new symptoms   Consider possible leak   Prior swallow study was negative  S/p 6 weeks of IV ceftriaxone/oral Flagyl with improvement in ESR  Patient was transitioned to longterm oral Augmentin, which was ultimately discontinued in November 2020 after discussions with family due to concern for possible C difficile infection  Most recent MRI from 11/06/2020 shows improvement in prevertebral phlegmon with stable right paraspinal phlegmon  ESR now mildly up to 44 from prior level of 34  Neck/back pain remains stable  I again discussed risks versus benefits of restarting antibiotic  Family would like to think about it  As patient's symptoms remain stable with no new neurologic deficits, fevers or chills, it is reasonable to continue monitoring symptoms any ESR  If patient's symptoms worsen, would need repeat imaging     -continue off antibiotics for now  If patient and family changed her mind, would restart oral Augmentin  -recheck  CBC, ESR in 1 month  - follow-up in office in 1-2 months or sooner if new concerns arise   -neurosurgery followup    2  Positive C diff PCR, negative EIA  Consider acute C difficile infection based on active symptoms  Consider colonization given negative EIA  Completed 14 day course of oral vancomycin  Patient currently has no diarrhea  -  If patient is to restart antibiotic, would also need prophylactic oral vancomycin   -  Monitor symptoms     3  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  Status post neoadjuvant chemotherapy via chest wall port, transhiatal esophagectomy on 01/28/2020   Repeat MRI brain on 11/06/2020 showed 2 metastatic lesions  Patient follows with Dr Ann Guzman of thoracic surgery and Dr Elliott Li of oncology  Last seen by Oncology on 12/01/2020 with referral to Radiation Oncology and neurosurgery      4  Dysphagia with recurrent esophageal stricture   In the setting of #3   Status post J-tube placement   Remains on tube feeds  Follows with Dr Ann Guzman  Last had EGD with dilation and stent placement on 8/31  Last visit with thoracic surgery on 01/06 noted with plan for repeat EGD and stent removal      5  COPD   No evidence of acute exacerbation        I discussed above plan in detail with patient and his daughter, and answered all of their questions  Followup in 1-2 months  Reason for visit is No chief complaint on file  Encounter provider Valentine Smallwood DO    Provider located at [address] [place] [id number] [phone]      Recent Visits  Date Type Provider Dept   12/31/20 Telemedicine Marlyn [facility]ricks[facility] recent visits within past 7 days and meeting all other requirements     Future Appointments  No visits were found meeting these conditions  Showing future appointments within next 150 days and meeting all other requirements        The patient was identified by name and date of birth  Carolyn Avila was informed that this is a telemedicine visit and that the visit is being conducted through [facility] and patient was informed that this is a secure, HIPAA-compliant platform  He agrees to proceed     My office door was closed  No one else was in the room  He acknowledged consent and understanding of privacy and security of the video platform  The patient has agreed to participate and understands they can discontinue the visit at any time  Patient is aware this is a billable service           Subjective  Carolyn Avila is a 76 y o  male  Is seen via virtual visit today for follow-up regarding paraspinal abscess and vertebral osteomyelitis  Patient has history of GE junction carcinoma for which he underwent chemotherapy and esophagectomy  Postoperatively, he developed worsening neck pain with imaging concerning for thoracic paraspinal abscess and vertebral osteomyelitis  Patient underwent biopsy which was negative  He completed 6 week course of ceftriaxone and oral Flagyl which was then transitioned long-term Augmentin  In early November 2020 patient was checked for C diff in the setting of abdominal pain and loose stools which showed positive PCR with negative EIA  He had a follow-up MRI on 11/06 showing improvement in prevertebral phlegmon a persistent right paraspinal phlegmon  After discussions with patient and family, it was decided to hold Augmentin and treat for possible acute C difficile colitis based on patient's symptoms  In early December 2020, patient was hospitalized at [facility] due to hip fracture after a fall requiring surgery  He was also checked for C diff at that time and found to have positive PCR, negative EIA, positive GDH for which she received short course of treatment  He currently denies diarrhea  Denies fevers, chills  Neck and back pain have remains stable  No new neurologic symptoms    Patient has continued to follow-up with thoracic surgery and was last seen on 01/06 with plan for EGD and stent removal       HPI     Past Medical History:   Diagnosis Date    Anemia     Bilateral pleural effusion     Brain lesion     Brain metastases (HCC)     Brain tumor (HCC)     C  difficile diarrhea     [facility]     [facility] of esophagus [facility]     COPD [facility]ronic obstructive [facility] disease) [facility]     Diabetes mellitus ([facility]     Difficulty swallowing     Disease of thyroid gland     Edema     Esophageal mass     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[facility] 9/24/2019    History of chemotherapy     History of transfusion     Malignant neoplasm of lower third of esophagus ([facility] ) 9/17/2019 Diagnosis: clinical stage [id number] esophageal carcinoma Procedure: EGD, transhiatal esophagectomy performed on 1/28/20 Pathology: esophagogastrectomy reveals microscopic focus of residual adenocarcinoma in muscularis propria measuring 0 7 cm, adjacent Duong's esophagus with associated atypia indeterminate for dysplasia  7 lymph nodes negative for carcinoma  Proximal and distal margins negative for    Paraspinal abscess (HCC)     PONV (postoperative nausea and vomiting)     Port-A-Cath in place     LCW    Sleep apnea     no CPAP    SOB (shortness of breath)        Past Surgical History:   Procedure Laterality Date    BACK SURGERY      x2    DISC REMOVAL      ESOPHAGECTOMY N/A 1/28/2020    Procedure: ESOPHAGECTOMY, TRANSHIATAL;  Surgeon: Carmen Schmid MD;  [facility];  Service: Surgical Oncology    ESOPHAGOGASTRODUODENOSCOPY N/A 1/28/2020    Procedure: ESOPHAGOGASTRODUODENOSCOPY (EGD);   Surgeon: Carmen Schmid MD;  [facility];  Service: Surgical Oncology    FL GUIDED CENTRAL VENOUS ACCESS DEVICE INSERTION  9/26/2019    GASTROJEJUNOSTOMY W/ JEJUNOSTOMY TUBE N/A 9/26/2019    Procedure: INSERTION JEJUNOSTOMY TUBE OPEN;  Surgeon: Carmen Schmid MD;  [facility];  Service: Surgical Oncology    GASTROJEJUNOSTOMY W/ JEJUNOSTOMY TUBE N/A 6/5/2020    Procedure: INSERTION JEJUNOSTOMY TUBE OPEN;  Surgeon: Carmen Schmid MD;  [facility];  Service: Surgical Oncology    IR CHEST TUBE PLACEMENT  5/26/2020    IR SPINE PROCEDURE  5/27/2020    IR THORACENTESIS  2/25/2020    JOINT REPLACEMENT      UT EGD ENDOSCOPIC STENT PLACEMENT W/WIRE& DILATION N/A 8/31/2020    Procedure: INSERTION STENT ESOPHAGEAL;  Surgeon: Traci Bustillos MD;  [facility];  Service: Thoracic    UT EGD INSERT GUIDE WIRE DILATOR PASSAGE ESOPHAGUS N/A 7/7/2020    Procedure: ESOPHAGOGASTRODUODENOSCOPY (EGD) with esophageal dilation under fluro with biopsy;  Surgeon: Traci Bustillos MD;  [facility];  Service: Thoracic    AK ESOPHAGOGASTRODUODENOSCOPY TRANSORAL DIAGNOSTIC N/A 4/28/2020    Procedure: ESOPHAGOGASTRODUODENOSCOPY (EGD); Surgeon: Peyman Dick MD;  [facility];  Service: Thoracic    AK ESOPHAGOGASTRODUODENOSCOPY TRANSORAL DIAGNOSTIC N/A 7/27/2020    Procedure: ESOPHAGOGASTRODUODENOSCOPY (EGD); Surgeon: Peyman Dick MD;  [facility];  Service: Thoracic    AK ESOPHAGOGASTRODUODENOSCOPY TRANSORAL DIAGNOSTIC N/A 8/31/2020    Procedure: ESOPHAGOGASTRODUODENOSCOPY (EGD) dilation over guidewire 36-45 Fr , stent placement;  Surgeon: Peyman Dick MD;  [facility];  Service: Thoracic    AK ESOPHAGOSCOPY FLEX [place] <30 MM DIAM N/A 4/28/2020    Procedure: DILATATION ESOPHAGEAL;  Surgeon: Peyman Dick MD;  [facility];  Service: Thoracic    AK ESOPHAGOSCOPY FLEX BALLOON DILAT <30 MM DIAM N/A 7/27/2020    Procedure: DILATATION ESOPHAGEAL with Savary over the wire dilitation 33FR to 45FR; Surgeon: Peyman Dick MD;  [facility];  Service: Thoracic    AK ESOPHAGOSCOPY FLEX BALLOON DILAT <30 MM DIAM N/A 8/31/2020    Procedure: DILATATION ESOPHAGEAL;  Surgeon: Peyman Dick MD;  [facility];  Service: Thoracic    TONSILLECTOMY      TUNNELED VENOUS PORT PLACEMENT N/A 9/26/2019    Procedure: INSERTION VENOUS PORT (PORT-A-CATH);   Surgeon: Maral Chatterjee MD;  [facility];  Service: Surgical Oncology    VOCAL CORD INJECTION N/A 2/7/2020    Procedure: MICROLARYNGOSCOPY WITH INJECTION;  Surgeon: Elmer Herbert MD;  [facility];  Service: ENT       Current Outpatient Medications   Medication Sig Dispense Refill    amiodarone 200 mg tablet TAKE 1 TABLET (200 MG TOTAL) BY MOUTH DAILY WITH BREAKFAST 30 tablet 0    atorvastatin (LIPITOR) 40 mg tablet Take 40 mg by mouth daily      Blood Glucose Monitoring Suppl (ONE TOUCH ULTRA 2) w/Device KIT Check bs , q fasting and 2 hr after meals 1 each 0  Eliquis 5 MG Take 5 mg by mouth 2 (two) times a day      glucose blood test strip Check blood sugar 3 times daily 100 each 3    Lancets (ONETOUCH ULTRASOFT) lancets Check bs , q fasting and 2 hr after meals 100 each 5    levothyroxine 50 mcg tablet Take 1 tablet (50 mcg total) by mouth daily 30 tablet 3    methocarbamol (ROBAXIN) 750 mg tablet Take 1 tablet (750 mg total) by mouth 60 minutes pre-procedure (Patient not taking: Reported on 1/6/2021) 10 tablet 0    oxyCODONE (ROXICODONE) 10 MG TABS Take 1 tablet (10 mg total) by mouth every 4 (four) hours as needed for moderate painMax Daily Amount: 60 mg 90 tablet 0     No current facility-administered medications for this visit  Allergies   Allergen Reactions    Penicillins Itching       Review of Systems    Video Exam    There were no vitals filed for this visit  Physical Exam     General: awake, alert, nontoxic, thin  Eyes:  Conjunctive clear with no hemorrhages or effusions  Oropharynx:  No visible perioral ulcerations  Neck:  Supple, trachea midline  Lungs: nonlabored respirations  Abdomen: nondistended  Extremities:  No visible edema  Skin:  No visible rashes  Neurological:  Moves all four extremities spontaneously  Psych: no acute psychosis    I spent 20 minutes directly with the patient during this visit      VIRTUAL VISIT DISCLAIMER    Navid Hernandez acknowledges that he has consented to an online visit or consultation  He understands that the online visit is based solely on information provided by him, and that, in the absence of a face-to-face physical evaluation by the physician, the diagnosis he receives is both limited and provisional in terms of accuracy and completeness  This is not intended to replace a full medical face-to-face evaluation by the physician  Navid Hernandez understands and accepts these terms

## 2021-01-07 NOTE — PROGRESS NOTES
Pt presents for port a cath flush  Pt offers no complaints  Labs drawn per pt request  Port accessed, labs drawn, flushed, saline locked and de accessed without difficulty  AVS declined, next appointment reviewed

## 2021-01-08 ENCOUNTER — LAB REQUISITION (OUTPATIENT)
Dept: LAB | Facility: HOSPITAL | Age: 66
End: 2021-01-08
Payer: COMMERCIAL

## 2021-01-08 ENCOUNTER — ANESTHESIA EVENT (OUTPATIENT)
Dept: PERIOP | Facility: HOSPITAL | Age: 66
End: 2021-01-08
Payer: COMMERCIAL

## 2021-01-08 DIAGNOSIS — K22.2 ESOPHAGEAL OBSTRUCTION: ICD-10-CM

## 2021-01-08 PROCEDURE — 86901 BLOOD TYPING SEROLOGIC RH(D): CPT | Performed by: PHYSICIAN ASSISTANT

## 2021-01-08 PROCEDURE — 86900 BLOOD TYPING SEROLOGIC ABO: CPT | Performed by: PHYSICIAN ASSISTANT

## 2021-01-08 PROCEDURE — 86850 RBC ANTIBODY SCREEN: CPT | Performed by: PHYSICIAN ASSISTANT

## 2021-01-11 ENCOUNTER — HOSPITAL ENCOUNTER (OUTPATIENT)
Facility: HOSPITAL | Age: 66
Setting detail: OUTPATIENT SURGERY
Discharge: HOME | End: 2021-01-11
Attending: THORACIC SURGERY (CARDIOTHORACIC VASCULAR SURGERY) | Admitting: THORACIC SURGERY (CARDIOTHORACIC VASCULAR SURGERY)
Payer: COMMERCIAL

## 2021-01-11 ENCOUNTER — ANESTHESIA (OUTPATIENT)
Dept: PERIOP | Facility: HOSPITAL | Age: 66
End: 2021-01-11
Payer: COMMERCIAL

## 2021-01-11 ENCOUNTER — APPOINTMENT (OUTPATIENT)
Dept: RADIOLOGY | Facility: HOSPITAL | Age: 66
End: 2021-01-11
Payer: COMMERCIAL

## 2021-01-11 VITALS
BODY MASS INDEX: 20.04 KG/M2 | OXYGEN SATURATION: 99 % | DIASTOLIC BLOOD PRESSURE: 76 MMHG | HEART RATE: 57 BPM | HEIGHT: 70 IN | SYSTOLIC BLOOD PRESSURE: 131 MMHG | WEIGHT: 140 LBS | TEMPERATURE: 96.2 F | RESPIRATION RATE: 16 BRPM

## 2021-01-11 VITALS — HEART RATE: 47 BPM

## 2021-01-11 PROBLEM — I82.409 DVT (DEEP VENOUS THROMBOSIS) (HCC): Status: ACTIVE | Noted: 2021-01-11

## 2021-01-11 LAB
GLUCOSE SERPL-MCNC: 72 MG/DL (ref 65–140)
GLUCOSE SERPL-MCNC: 76 MG/DL (ref 65–140)

## 2021-01-11 PROCEDURE — 43247 EGD REMOVE FOREIGN BODY: CPT | Performed by: THORACIC SURGERY (CARDIOTHORACIC VASCULAR SURGERY)

## 2021-01-11 PROCEDURE — 71045 X-RAY EXAM CHEST 1 VIEW: CPT

## 2021-01-11 PROCEDURE — 82948 REAGENT STRIP/BLOOD GLUCOSE: CPT

## 2021-01-11 RX ORDER — HYDROMORPHONE HCL/PF 1 MG/ML
0.2 SYRINGE (ML) INJECTION
Status: DISCONTINUED | OUTPATIENT
Start: 2021-01-11 | End: 2021-01-11 | Stop reason: HOSPADM

## 2021-01-11 RX ORDER — FENTANYL CITRATE 50 UG/ML
INJECTION, SOLUTION INTRAMUSCULAR; INTRAVENOUS AS NEEDED
Status: DISCONTINUED | OUTPATIENT
Start: 2021-01-11 | End: 2021-01-11

## 2021-01-11 RX ORDER — LIDOCAINE HYDROCHLORIDE 10 MG/ML
INJECTION, SOLUTION EPIDURAL; INFILTRATION; INTRACAUDAL; PERINEURAL AS NEEDED
Status: DISCONTINUED | OUTPATIENT
Start: 2021-01-11 | End: 2021-01-11

## 2021-01-11 RX ORDER — DEXAMETHASONE SODIUM PHOSPHATE 10 MG/ML
INJECTION, SOLUTION INTRAMUSCULAR; INTRAVENOUS AS NEEDED
Status: DISCONTINUED | OUTPATIENT
Start: 2021-01-11 | End: 2021-01-11

## 2021-01-11 RX ORDER — GLYCOPYRROLATE 0.2 MG/ML
INJECTION INTRAMUSCULAR; INTRAVENOUS AS NEEDED
Status: DISCONTINUED | OUTPATIENT
Start: 2021-01-11 | End: 2021-01-11

## 2021-01-11 RX ORDER — SUCCINYLCHOLINE/SOD CL,ISO/PF 100 MG/5ML
SYRINGE (ML) INTRAVENOUS AS NEEDED
Status: DISCONTINUED | OUTPATIENT
Start: 2021-01-11 | End: 2021-01-11

## 2021-01-11 RX ORDER — ONDANSETRON 2 MG/ML
INJECTION INTRAMUSCULAR; INTRAVENOUS AS NEEDED
Status: DISCONTINUED | OUTPATIENT
Start: 2021-01-11 | End: 2021-01-11

## 2021-01-11 RX ORDER — ROCURONIUM BROMIDE 10 MG/ML
INJECTION, SOLUTION INTRAVENOUS AS NEEDED
Status: DISCONTINUED | OUTPATIENT
Start: 2021-01-11 | End: 2021-01-11

## 2021-01-11 RX ORDER — PROPOFOL 10 MG/ML
INJECTION, EMULSION INTRAVENOUS AS NEEDED
Status: DISCONTINUED | OUTPATIENT
Start: 2021-01-11 | End: 2021-01-11

## 2021-01-11 RX ORDER — ONDANSETRON 2 MG/ML
4 INJECTION INTRAMUSCULAR; INTRAVENOUS ONCE AS NEEDED
Status: DISCONTINUED | OUTPATIENT
Start: 2021-01-11 | End: 2021-01-11 | Stop reason: HOSPADM

## 2021-01-11 RX ORDER — SODIUM CHLORIDE, SODIUM LACTATE, POTASSIUM CHLORIDE, CALCIUM CHLORIDE 600; 310; 30; 20 MG/100ML; MG/100ML; MG/100ML; MG/100ML
50 INJECTION, SOLUTION INTRAVENOUS CONTINUOUS
Status: DISCONTINUED | OUTPATIENT
Start: 2021-01-11 | End: 2021-01-11 | Stop reason: HOSPADM

## 2021-01-11 RX ORDER — PROPOFOL 10 MG/ML
INJECTION, EMULSION INTRAVENOUS CONTINUOUS PRN
Status: DISCONTINUED | OUTPATIENT
Start: 2021-01-11 | End: 2021-01-11

## 2021-01-11 RX ORDER — FENTANYL CITRATE/PF 50 MCG/ML
25 SYRINGE (ML) INJECTION
Status: DISCONTINUED | OUTPATIENT
Start: 2021-01-11 | End: 2021-01-11 | Stop reason: HOSPADM

## 2021-01-11 RX ORDER — NEOSTIGMINE METHYLSULFATE 1 MG/ML
INJECTION INTRAVENOUS AS NEEDED
Status: DISCONTINUED | OUTPATIENT
Start: 2021-01-11 | End: 2021-01-11

## 2021-01-11 RX ORDER — MIDAZOLAM HYDROCHLORIDE 2 MG/2ML
INJECTION, SOLUTION INTRAMUSCULAR; INTRAVENOUS AS NEEDED
Status: DISCONTINUED | OUTPATIENT
Start: 2021-01-11 | End: 2021-01-11

## 2021-01-11 RX ADMIN — SODIUM CHLORIDE, SODIUM LACTATE, POTASSIUM CHLORIDE, AND CALCIUM CHLORIDE: .6; .31; .03; .02 INJECTION, SOLUTION INTRAVENOUS at 07:46

## 2021-01-11 RX ADMIN — FENTANYL CITRATE 50 MCG: 50 INJECTION INTRAMUSCULAR; INTRAVENOUS at 07:52

## 2021-01-11 RX ADMIN — Medication 100 MG: at 07:54

## 2021-01-11 RX ADMIN — PROPOFOL 120 MG: 10 INJECTION, EMULSION INTRAVENOUS at 07:53

## 2021-01-11 RX ADMIN — DEXAMETHASONE SODIUM PHOSPHATE 10 MG: 10 INJECTION, SOLUTION INTRAMUSCULAR; INTRAVENOUS at 07:55

## 2021-01-11 RX ADMIN — LIDOCAINE HYDROCHLORIDE 50 MG: 10 INJECTION, SOLUTION EPIDURAL; INFILTRATION; INTRACAUDAL; PERINEURAL at 07:53

## 2021-01-11 RX ADMIN — GLYCOPYRROLATE 0.3 MG: 0.2 INJECTION, SOLUTION INTRAMUSCULAR; INTRAVENOUS at 08:20

## 2021-01-11 RX ADMIN — NEOSTIGMINE METHYLSULFATE 3 MG: 1 INJECTION INTRAVENOUS at 08:20

## 2021-01-11 RX ADMIN — ONDANSETRON 4 MG: 2 INJECTION INTRAMUSCULAR; INTRAVENOUS at 07:56

## 2021-01-11 RX ADMIN — MIDAZOLAM 2 MG: 1 INJECTION INTRAMUSCULAR; INTRAVENOUS at 07:46

## 2021-01-11 RX ADMIN — PHENYLEPHRINE HYDROCHLORIDE 100 MCG: 10 INJECTION INTRAVENOUS at 07:59

## 2021-01-11 RX ADMIN — PROPOFOL 150 MCG/KG/MIN: 10 INJECTION, EMULSION INTRAVENOUS at 07:55

## 2021-01-11 RX ADMIN — ROCURONIUM BROMIDE 10 MG: 50 INJECTION, SOLUTION INTRAVENOUS at 07:53

## 2021-01-11 NOTE — DISCHARGE INSTRUCTIONS
Upper Endoscopy   WHAT YOU NEED TO KNOW:   An upper endoscopy is also called an upper gastrointestinal (GI) endoscopy, or an esophagogastroduodenoscopy (EGD)  You may feel bloated, gassy, or have some abdominal discomfort after your procedure  Your throat may be sore for 24 to 36 hours  You may burp or pass gas from air that is still inside your body  DISCHARGE INSTRUCTIONS:   Call 911 if:   · You have sudden chest pain or trouble breathing  Seek care immediately if:   · You feel dizzy or faint  · You have trouble swallowing  · You have severe throat pain  · Your bowel movements are very dark or black  · Your abdomen is hard and firm and you have severe pain  · You vomit blood  Contact your healthcare provider if:   · You feel full or bloated and cannot burp or pass gas  · You have not had a bowel movement for 3 days after your procedure  · You have neck pain  · You have a fever or chills  · You have nausea or are vomiting  · You have a rash or hives  · You have questions or concerns about your endoscopy  Relieve a sore throat:  Suck on throat lozenges or crushed ice  Gargle with a small amount of warm salt water  Mix 1 teaspoon of salt and 1 cup of warm water to make salt water  Relieve gas and discomfort from bloating:  Lie on your right side with a heating pad on your abdomen  Take short walks to help pass gas  Eat small meals until bloating is relieved  Rest after your procedure:  Do not drive or make important decisions until the day after your procedure  Return to your normal activity as directed  You can usually return to work the day after your procedure  Please continue your soft diet  Follow up with Dr Alexis Graham in 3 weeks  During that time we will assess how you are tolerating your soft diet  © Copyright [address] Information is for End User's use only and may not be sold, redistributed or otherwise used for commercial purposes  All illustrations and images included in CareNotes® are the copyrighted property of A D A M , Inc  or Tiffanie Manzanares  The above information is an  only  It is not intended as medical advice for individual conditions or treatments  Talk to your doctor, nurse or pharmacist before following any medical regimen to see if it is safe and effective for you

## 2021-01-11 NOTE — OP NOTE
OPERATIVE REPORT  PATIENT NAME: Vani Cason    :  1955  MRN: [id number]  Pt Location:  OR ROOM 08    SURGERY DATE: 2021    Surgeon(s) and Role:     * Shira Huffman MD - Primary     * Refugroslyn Tran DO - Assisting    Preop Diagnosis:  Esophageal stricture [K22 2]    Post-Op Diagnosis Codes:     * Esophageal stricture [K22 2]    Procedure(s) (LRB):  ESOPHAGOGASTRODUODENOSCOPY (EGD): esophageal stent removal (N/A)    Specimen(s):  * No specimens in log *    Estimated Blood Loss:   Minimal    Drains:  Gastrostomy/Enterostomy Jejunostomy 14 Fr  LUQ (Active)   Number of days: 473       Gastrostomy/Enterostomy Jejunostomy 14 Fr  LUQ (Active)   Number of days: 220       Anesthesia Type:   General    Operative Indications:  Esophageal stricture [K252]  59-year-old male with history of dysphagia after esophagectomy status post 20 EGD and stent Placement    Operative Findings:  Large amount of food debris and corn in the stent and esophagus  Stent removed  Duodenum and intra-abdominal stomach appeared normal   Diaphragm pinch seen  Stomach just above that/conduit looked normal   Stent was above this area  Minimal trauma from stent but no evidence of mucosal tear perforation  Stomach was widely open  Anastomosis looked good  No appreciable masses    Complications:   None    Procedure and Technique:  After obtaining informed consent the patient was brought back to the operating room placed supine on the OR table  General anesthesia was induced without incident  A formal time-out was performed at this time verifying patient, date of birth, procedure, fire risk score, ASA score, antibiotic usage, need for blood products, anticipate specimens, beta-blocker usage, imaging and any other questions or concerns  At this point in adult endoscope was inserted into the patient's oropharynx and directed down the esophagus    Here we encountered a the large amount of food material   This had to be extensively suctioned out  There was large pieces of corn seen throughout the esophagus and in the stent  We could fully get through the stent due to the food material so a retrieval grasper was used and the proximal end of the stent was grabbed  This was collapsed and removed through the patient's mouth without issue  Repeat endoscopy was performed at that time to evaluate the esophagus, gastric conduit, distal stomach and duodenum  We were able to fully get through the conduit at this time without issue  Starting distally the duodenum appeared totally normal   The pylorus was widely patent  The intra-abdominal stomach showed no evidence of abnormality  The diaphragm pinch was seen at approximately 40 cm from the incisors  The stomach just above the diaphragm was completely normal   The conduit was widely patent without any significant narrowing  There is no appreciable masses  There is some minor mucosal trauma and bleeding from stent removal but overall no major mucosal defect or evidence of perforation  The area of the anastomosis appeared to be at 25-26 cm from the incisors  This appeared intact without masses  Stomach was fully suctioned out at this time  Based on the widely patent stomach without any evidence of mucosal defect we felt safe not replacing an esophageal stent  The adult endoscope was removed at this time  Patient woke from anesthesia and was extubated without issue  They were transferred to the PACU in stable condition       I was present for the entire procedure    Patient Disposition:  PACU     SIGNATURE: Bernabe Mcmahan MD  DATE: January 11, 2021  TIME: 8:30 AM

## 2021-01-11 NOTE — ANESTHESIA POSTPROCEDURE EVALUATION
Post-Op Assessment Note    CV Status:  Stable  Pain Score: 0    Pain management: adequate     Mental Status:  Arousable   Hydration Status:  Stable   PONV Controlled:  None   Airway Patency:  Patent      Post Op Vitals Reviewed: Yes      Staff: Anesthesiologist, CRNA         No complications documented      BP (!) 97/44 (01/11/21 0847)    Temp 98 °F (36 7 °C) (01/11/21 0847)    Pulse (!) 48 (01/11/21 0847)   Resp 16 (01/11/21 0847)    SpO2 100 % (01/11/21 0847)

## 2021-01-11 NOTE — ANESTHESIA PREPROCEDURE EVALUATION
Procedure:  ESOPHAGOGASTRODUODENOSCOPY (EGD): esophageal stent removal (N/A Esophagus)  INSERTION STENT ESOPHAGEAL (N/A Esophagus)  DILATATION ESOPHAGEAL (N/A Esophagus)    Relevant Problems   ANESTHESIA   (+) PONV (postoperative nausea and vomiting)      CARDIO  TTE 2/2020: LVEF 55%, normal RV   (+) Atrial fibrillation (HCC) (Paroxysmal)   (+) DVT (deep venous thrombosis) (HCC) (s/p IVCF)   (+) High cholesterol      ENDO   (+) Hypothyroid   (+) Type 2 diabetes mellitus with hyperglycemia, without long-term current use of insulin (HCC)      GI/HEPATIC  Peg   (+) Dysphagia   (+) Esophageal cancer (HCC)   (+) Gastroesophageal reflux disease      HEMATOLOGY   (+) Anemia, unspecified (Hb 8 6)      NEURO/PSYCH   (+) Personal history of esophageal cancer      PULMONARY   (+) COPD [facility]chronic obstructive pulmonary disease) ([facility])   (+) Obstructive sleep apnea syndrome        Physical Exam    Airway    Mallampati score: II  TM Distance: >3 FB  Neck ROM: full     Dental   No notable dental hx     Cardiovascular      Pulmonary      Other Findings        Anesthesia Plan  ASA Score- 3     Anesthesia Type- general with ASA Monitors  Additional Monitors:   Airway Plan: ETT  Plan Factors-Exercise tolerance (METS): >4 METS  Chart reviewed  EKG reviewed  Existing labs reviewed  Patient summary reviewed  Patient is not a current smoker  Obstructive sleep apnea risk education given perioperatively  Induction- intravenous  Postoperative Plan- Plan for postoperative opioid use  Planned trial extubation    Informed Consent- Anesthetic plan and risks discussed with patient  I personally reviewed this patient with the CRNA  Discussed and agreed on the Anesthesia Plan with the CRNA  Radha Weller

## 2021-01-13 ENCOUNTER — TELEPHONE (OUTPATIENT)
Dept: PALLIATIVE MEDICINE | Facility: CLINIC | Age: 66
End: 2021-01-13

## 2021-01-13 DIAGNOSIS — G89.3 CANCER-RELATED PAIN: Chronic | ICD-10-CM

## 2021-01-13 RX ORDER — OXYCODONE HYDROCHLORIDE 10 MG/1
10 TABLET ORAL EVERY 4 HOURS PRN
Qty: 90 TABLET | Refills: 0 | Status: SHIPPED | OUTPATIENT
Start: 2021-01-13 | End: 2021-02-01 | Stop reason: SDUPTHER

## 2021-01-13 RX ORDER — OXYCODONE HYDROCHLORIDE 10 MG/1
10 TABLET ORAL EVERY 4 HOURS PRN
Qty: 90 TABLET | Refills: 0 | Status: SHIPPED | OUTPATIENT
Start: 2021-01-13 | End: 2021-01-13 | Stop reason: SDUPTHER

## 2021-01-13 NOTE — TELEPHONE ENCOUNTER
Patients daughter called to ask if you could sent Oxycodone to a different pharmacy as [facility] will not have medication until tomorrow     Please send to [address]

## 2021-01-14 ENCOUNTER — TELEMEDICINE (OUTPATIENT)
Dept: PALLIATIVE MEDICINE | Facility: CLINIC | Age: 66
End: 2021-01-14
Payer: COMMERCIAL

## 2021-01-14 DIAGNOSIS — Z85.01 PERSONAL HISTORY OF ESOPHAGEAL CANCER: ICD-10-CM

## 2021-01-14 DIAGNOSIS — G89.3 CANCER-RELATED PAIN: Chronic | ICD-10-CM

## 2021-01-14 DIAGNOSIS — G47.01 INSOMNIA DUE TO MEDICAL CONDITION: Primary | ICD-10-CM

## 2021-01-14 PROCEDURE — 99214 OFFICE O/P EST MOD 30 MIN: CPT | Performed by: INTERNAL MEDICINE

## 2021-01-14 NOTE — PROGRESS NOTES
Virtual Regular Visit    Outpatient Virtual Regular Visit - Follow-up with Palliative and Supportive Care  Carmen Schuler 76 y o  male [id number]    Intake:    Reason for virtual visit is follow up symptom mgmt  The patient is unable to visit the clinic safely due to the coronavirus pandemic  After connecting through flikdate, the patient was identified by name and date of birth  Carmen Schuler was informed that this was a telemedicine visit and that the visit is being conducted through [employer] and patient was informed that this is a secure, HIPAA-compliant platform  He agrees to proceed     My office door was closed  No one else was in the room  He acknowledged consent and understanding of privacy and security of the video platform  The patient has agreed to participate and understands they can discontinue the visit at any time  Assessment and Plan  Problem List Items Addressed This Visit        Other    Cancer-related pain (Chronic)    Personal history of esophageal cancer    Insomnia due to medical condition - Primary        No orders of the defined types were placed in this encounter  Medications Discontinued During This Encounter   Medication Reason    methocarbamol (ROBAXIN) 750 mg tablet         Carmen Schuler was assessed today for symptoms and care planning related to above illnesses  I have reviewed the patient's controlled substance dispensing history in the Prescription Drug Monitoring Program in compliance with the [facility] regulations before prescribing any controlled substances  He is invited to continue to follow with us  If there are questions or concerns, please contact us through our clinic/answering service 24 hours a day, seven days a week  Beecher Gaucher, MD  [facility]  [phone]          Subjective    Carmen Schuler is a 76 y o  male with metasatic esophageal cancer who rpesents for follow up   Since our last visit Chinyere Walton was hospitalized and is now home with PT/OT  He reports he is actually doing overall well  He is eating and drinking well  Has gained weight (states he is now 150lbs)  Is able to do stairs, though he does fatigue  Pain is well controlled  Specifically he takes 2 oxycodone at bedtime  He takes 1 tablet throughout the day PRN  He does have some associated constipation but this was self-limiting fortunately  No straining with BMs  He sleeps in short stints but is able to get sufficient sleep throughout a 24 hour period  Past Medical History:   Diagnosis Date    Anemia     Bilateral pleural effusion     Brain lesion     Brain metastases (HCC)     Brain tumor (HCC)     C  difficile diarrhea     Cancer ([facility] )     Cancer of esophagus (HCC)     COPD (chronic obstructive pulmonary disease) (HCC)     Diabetes mellitus (HCC)     Difficulty swallowing     Disease of thyroid gland     Edema     Esophageal mass     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    History of chemotherapy     History of transfusion     Malignant neoplasm of lower third of esophagus ([facility] ) 9/17/2019    Diagnosis: clinical stage [id number] esophageal carcinoma Procedure: EGD, transhiatal esophagectomy performed on 1/28/20 Pathology: esophagogastrectomy reveals microscopic focus of residual adenocarcinoma in muscularis propria measuring 0 7 cm, adjacent Duong's esophagus with associated atypia indeterminate for dysplasia  7 lymph nodes negative for carcinoma   Proximal and distal margins negative for    Paraspinal abscess (HCC)     PONV (postoperative nausea and vomiting)     Port-A-Cath in place     LCW    Sleep apnea     no CPAP    SOB (shortness of breath)      Past Surgical History:   Procedure Laterality Date    BACK SURGERY      x2    DISC REMOVAL      ESOPHAGECTOMY N/A 1/28/2020    Procedure: ESOPHAGECTOMY, TRANSHIATAL;  Surgeon: Dipak Umanzor MD;  [facility];  Service: Surgical Oncology    ESOPHAGOGASTRODUODENOSCOPY N/A 1/28/2020    Procedure: ESOPHAGOGASTRODUODENOSCOPY (EGD); Surgeon: Tressia Pallas, MD;  [facility];  Service: Surgical Oncology    FL GUIDED CENTRAL VENOUS ACCESS DEVICE INSERTION  9/26/2019    GASTROJEJUNOSTOMY W/ JEJUNOSTOMY TUBE N/A 9/26/2019    Procedure: INSERTION JEJUNOSTOMY TUBE OPEN;  Surgeon: Tressia Pallas, MD;  [facility];  Service: Surgical Oncology    GASTROJEJUNOSTOMY W/ JEJUNOSTOMY TUBE N/A 6/5/2020    Procedure: INSERTION JEJUNOSTOMY TUBE OPEN;  Surgeon: Tressia Pallas, MD;  [facility];  Service: Surgical Oncology    IR CHEST TUBE PLACEMENT  5/26/2020    IR SPINE PROCEDURE  5/27/2020    IR THORACENTESIS  2/25/2020    JOINT REPLACEMENT      HI EGD ENDOSCOPIC STENT PLACEMENT W/WIRE& DILATION N/A 8/31/2020    Procedure: INSERTION STENT ESOPHAGEAL;  Surgeon: Melecio Stanford MD;  [facility];  Service: Thoracic    HI EGD INSERT GUIDE WIRE DILATOR PASSAGE ESOPHAGUS N/A 7/7/2020    Procedure: ESOPHAGOGASTRODUODENOSCOPY (EGD) with esophageal dilation under fluro with biopsy;  Surgeon: Melecio Stanford MD;  [facility];  Service: Thoracic    HI ESOPHAGOGASTRODUODENOSCOPY TRANSORAL DIAGNOSTIC N/A 4/28/2020    Procedure: ESOPHAGOGASTRODUODENOSCOPY (EGD); Surgeon: Melecio Stanford MD;  [facility];  Service: Thoracic    HI ESOPHAGOGASTRODUODENOSCOPY TRANSORAL DIAGNOSTIC N/A 7/27/2020    Procedure: ESOPHAGOGASTRODUODENOSCOPY (EGD);   Surgeon: Melecio Stanford MD;  [facility];  Service: Thoracic    HI ESOPHAGOGASTRODUODENOSCOPY TRANSORAL DIAGNOSTIC N/A 8/31/2020    Procedure: ESOPHAGOGASTRODUODENOSCOPY (EGD) dilation over guidewire 36-45 Fr , stent placement;  Surgeon: Melecio Stanford MD;  [facility];  Service: Thoracic    HI ESOPHAGOGASTRODUODENOSCOPY TRANSORAL DIAGNOSTIC N/A 1/11/2021    Procedure: ESOPHAGOGASTRODUODENOSCOPY (EGD): esophageal stent removal;  Surgeon: Melecio Stanford MD;  [facility];  Service: Thoracic    DC ESOPHAGOSCOPY FLEX BALLOON DILAT <30 MM DIAM N/A 4/28/2020    Procedure: DILATATION ESOPHAGEAL;  Surgeon: Lenny Swain MD;  [facility];  Service: Thoracic    DC ESOPHAGOSCOPY FLEX BALLOON DILAT <30 MM DIAM N/A 7/27/2020    Procedure: DILATATION ESOPHAGEAL with Savary over the wire dilitation 33FR to 45FR; Surgeon: Lenny Swain MD;  [facility];  Service: Thoracic    DC ESOPHAGOSCOPY FLEX BALLOON DILAT <30 MM DIAM N/A 8/31/2020    Procedure: DILATATION ESOPHAGEAL;  Surgeon: Lenny Swain MD;  [facility];  Service: Thoracic    TONSILLECTOMY      TUNNELED VENOUS PORT PLACEMENT N/A 9/26/2019    Procedure: INSERTION VENOUS PORT (PORT-A-CATH); Surgeon: Cullen Torres MD;  [facility];  Service: Surgical Oncology    VOCAL CORD INJECTION N/A 2/7/2020    Procedure: MICROLARYNGOSCOPY WITH INJECTION;  Surgeon: Pati England MD;  [facility];  Service: ENT     Current Outpatient Medications   Medication Sig Dispense Refill    amiodarone 200 mg tablet TAKE 1 TABLET (200 MG TOTAL) BY MOUTH DAILY WITH BREAKFAST 30 tablet 0    atorvastatin (LIPITOR) 40 mg tablet Take 40 mg by mouth daily      Blood Glucose Monitoring Suppl (ONE TOUCH ULTRA 2) w/Device KIT Check bs , q fasting and 2 hr after meals 1 each 0    Eliquis 5 MG Take 5 mg by mouth 2 (two) times a day      glucose blood test strip Check blood sugar 3 times daily 100 each 3    Lancets (ONETOUCH ULTRASOFT) lancets Check bs , q fasting and 2 hr after meals 100 each 5    levothyroxine 50 mcg tablet Take 1 tablet (50 mcg total) by mouth daily 30 tablet 3    oxyCODONE (ROXICODONE) 10 MG TABS Take 1 tablet (10 mg total) by mouth every 4 (four) hours as needed for moderate painMax Daily Amount: 60 mg 90 tablet 0     No current facility-administered medications for this visit         Allergies   Allergen Reactions    Penicillins Itching         Video Exam  There were no vitals filed for this visit  Physical Exam  Constitutional:       General: He is not in acute distress  Appearance: He is not toxic-appearing or diaphoretic  Comments: Chronically ill appearing   HENT:      Nose: Nose normal    Eyes:      General:         Right eye: No discharge  Left eye: No discharge  Pulmonary:      Effort: No respiratory distress  Musculoskeletal:         General: No swelling  Skin:     General: Skin is dry  Neurological:      General: No focal deficit present  Mental Status: He is alert  Mental status is at baseline  Psychiatric:         Mood and Affect: Mood normal          Behavior: Behavior normal                I spent 15+ minutes was spent during this virtual visit by Anjel, face to face with Tiffany Fields and his daughter with greater than 50% of the time spent in counseling or coordination of care including discussions of treatment instructions   All of the patient's questions were answered during this discussion  Encounter provider Daniel Roche MD, located at:  [address] [facility]  [address]  [phone]    Recent Visits  Date Type Provider Dept   01/13/21 Telephone Dipesh Love RN [facility]   Showing recent visits within past 7 days and meeting all other requirements     Today's Visits  Date Type Provider Dept   01/14/21 306 Nathaly Jim MD [facility]   Showing today's visits and meeting all other requirements     Future Appointments  No visits were found meeting these conditions  Showing future appointments within next 150 days and meeting all other requirements        VIRTUAL VISIT DISCLAIMER    Tiffany Fields acknowledges that He has consented to an online visit or consultation    He understands that the online visit is based solely on information provided by He, and that, in the absence of a face-to-face physical evaluation by the physician, the diagnosis He receives is both limited and provisional in terms of accuracy and completeness  This is not intended to replace a full medical face-to-face evaluation by the physician  Ann Penn understands and accepts these terms  Patient was informed this is a billable service

## 2021-01-17 DIAGNOSIS — I48.91 ATRIAL FIBRILLATION, UNSPECIFIED TYPE (HCC): ICD-10-CM

## 2021-01-18 ENCOUNTER — HOSPITAL ENCOUNTER (OUTPATIENT)
Dept: CT IMAGING | Facility: CLINIC | Age: 66
Discharge: HOME | End: 2021-01-18
Payer: COMMERCIAL

## 2021-01-18 DIAGNOSIS — C16.0 GE JUNCTION CARCINOMA (HCC): ICD-10-CM

## 2021-01-18 PROCEDURE — 71260 CT THORAX DX C+: CPT

## 2021-01-18 PROCEDURE — 74177 CT ABD & PELVIS W/CONTRAST: CPT

## 2021-01-18 RX ORDER — AMIODARONE HYDROCHLORIDE 200 MG/1
200 TABLET ORAL
Qty: 30 TABLET | Refills: 0 | Status: SHIPPED | OUTPATIENT
Start: 2021-01-18 | End: 2021-02-15

## 2021-01-18 RX ADMIN — IOHEXOL 100 ML: 350 INJECTION, SOLUTION INTRAVENOUS at 10:14

## 2021-01-22 DIAGNOSIS — E03.9 ACQUIRED HYPOTHYROIDISM: ICD-10-CM

## 2021-01-22 RX ORDER — LEVOTHYROXINE SODIUM 0.05 MG/1
TABLET ORAL
Qty: 90 TABLET | Refills: 1 | Status: SHIPPED | OUTPATIENT
Start: 2021-01-22

## 2021-01-25 ENCOUNTER — TELEPHONE (OUTPATIENT)
Dept: HEMATOLOGY ONCOLOGY | Facility: CLINIC | Age: 66
End: 2021-01-25

## 2021-01-27 PROBLEM — C15.5 MALIGNANT NEOPLASM OF DISTAL THIRD OF ESOPHAGUS (HCC): Status: ACTIVE | Noted: 2020-09-24

## 2021-01-28 ENCOUNTER — OFFICE VISIT (OUTPATIENT)
Dept: SURGICAL ONCOLOGY | Facility: CLINIC | Age: 66
End: 2021-01-28
Payer: COMMERCIAL

## 2021-01-28 VITALS
SYSTOLIC BLOOD PRESSURE: 102 MMHG | TEMPERATURE: 98.3 F | WEIGHT: 150 LBS | BODY MASS INDEX: 21.47 KG/M2 | DIASTOLIC BLOOD PRESSURE: 60 MMHG | HEART RATE: 93 BPM | HEIGHT: 70 IN | RESPIRATION RATE: 16 BRPM

## 2021-01-28 DIAGNOSIS — C15.5 MALIGNANT NEOPLASM OF DISTAL THIRD OF ESOPHAGUS (HCC): Primary | ICD-10-CM

## 2021-01-28 PROCEDURE — 1036F TOBACCO NON-USER: CPT | Performed by: SURGERY

## 2021-01-28 PROCEDURE — 1160F RVW MEDS BY RX/DR IN RCRD: CPT | Performed by: SURGERY

## 2021-01-28 PROCEDURE — 99215 OFFICE O/P EST HI 40 MIN: CPT | Performed by: SURGERY

## 2021-01-28 PROCEDURE — 3008F BODY MASS INDEX DOCD: CPT | Performed by: SURGERY

## 2021-01-28 RX ORDER — ERGOCALCIFEROL 1.25 MG/1
50000 CAPSULE ORAL WEEKLY
COMMUNITY
Start: 2020-12-19

## 2021-01-28 NOTE — LETTER
January 28, 2021     [facility], [address] [address]    Patient: Serjio Laboy   YOB: 1955   Date of Visit: 1/28/2021       Dear Dr Aden Barrera: Thank you for referring Serjio Laboy to me for evaluation  Below are my notes for this consultation  If you have questions, please do not hesitate to call me  I look forward to following your patient along with you  Sincerely,        Cullen Torres MD        CC: MD Shon Carson MD Eustacio Binet, MD Camella Patterson, MD Osborne Kass, MD James Francis, MD Lenell Sager, MD  1/28/2021 11:43 AM  Sign when Signing Visit               Surgical Oncology Follow Up       [address] [facility] [address] [address]  [phone]    Serjio Laboy  1955  [id number]  [address] [place] [address] CANCER [facility] [address] [facility]  [phone]    Diagnoses and all orders for this visit:    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 ([address] )    Other orders  -     ergocalciferol (VITAMIN D2) 50,000 units; Take 50,000 Units by mouth once a week        Chief Complaint   Patient presents with    Follow-up     6 MONTH FOLLOW UP       No follow-ups on file        Oncology History   Malignant neoplasm of lower third of esophagus ([address] ) (Resolved)   8/24/2019 Initial Diagnosis    Malignant neoplasm of lower third of esophagus (HCC)  At least intramucosal carcinoma  Her2/SHIMON positive     10/8/2019 - 12/30/2019 Chemotherapy    palonosetron (ALOXI) injection 0 25 mg, 0 25 mg, Intravenous, Once, 6 of 6 cycles  Administration: 0 25 mg (10/8/2019), 0 25 mg (10/22/2019), 0 25 mg (11/5/2019), 0 25 mg (11/19/2019), 0 25 mg (12/3/2019), 0 25 mg (12/17/2019)  pegfilgrastim (NEULASTA ONPRO) subcutaneous injection kit 6 mg, 6 mg, Subcutaneous, Once, 6 of 6 cycles  Administration: 6 mg (10/9/2019), 6 mg (10/23/2019), 6 mg (11/6/2019), 6 mg (11/20/2019), 6 mg (12/4/2019), 6 mg (12/18/2019)  fosaprepitant (EMEND) 150 mg in sodium chloride 0 9 % 250 mL IVPB, 150 mg, Intravenous, Once, 6 of 6 cycles  Administration: 150 mg (10/8/2019), 150 mg (10/22/2019), 150 mg (11/5/2019), 150 mg (11/19/2019), 150 mg (12/3/2019), 150 mg (12/17/2019)  DOCEtaxel (TAXOTERE) 99 mg in sodium chloride 0 9 % 250 mL chemo infusion, 50 mg/m2 = 99 mg (83 3 % of original dose 60 mg/m2), Intravenous, Once, 6 of 6 cycles  Dose modification: 50 mg/m2 (original dose 60 mg/m2, Cycle 1, Reason: Other (See Comments))  Administration: 99 mg (10/8/2019), 99 mg (10/22/2019), 99 mg (11/5/2019), 99 mg (11/19/2019), 99 mg (12/3/2019), 99 mg (12/17/2019)  leucovorin 396 mg in dextrose 5 % 250 mL IVPB, 200 mg/m2 = 396 mg (50 % of original dose 400 mg/m2), Intravenous, Once, 6 of 6 cycles  Dose modification: 200 mg/m2 (original dose 400 mg/m2, Cycle 1, Reason: Other (See Comments), Comment: FLOT regimen standard)  Administration: 396 mg (10/8/2019), 396 mg (10/22/2019), 396 mg (11/5/2019), 396 mg (11/19/2019), 396 mg (12/3/2019), 396 mg (12/17/2019)  oxaliplatin (ELOXATIN) 168 3 mg in dextrose 5 % 250 mL chemo infusion, 85 mg/m2 = 168 3 mg, Intravenous, Once, 6 of 6 cycles  Administration: 168 3 mg (10/8/2019), 168 3 mg (10/22/2019), 168 3 mg (11/5/2019), 168 3 mg (11/19/2019), 168 3 mg (12/3/2019), 168 3 mg (12/17/2019)  trastuzumab (HERCEPTIN) 496 mg in sodium chloride 0 9 % 250 mL chemo infusion, 6 mg/kg = 496 mg, Intravenous, Once, 6 of 6 cycles  Administration: 496 mg (10/8/2019), 331 mg (10/22/2019), 331 mg (11/5/2019), 331 mg (11/19/2019), 331 mg (12/3/2019), 331 mg (12/17/2019)     1/28/2020 Surgery    Esophagogastrectomy  - Microscopic focus of residual adenocarcinoma in muscularis propria  - Ulceration and associated histologic changes compatible with prior neoadjuvant chemotherapy    - Adjacent betancur's esophagus with associated atypia indeterminate for dysplasia  - Seven lymph nodes identified; all negative for malignancy (0/7)       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[address] ) (Resolved)   8/24/2019 Biopsy    Esophagus (biopsy):  - At 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      9/24/2019 Initial Diagnosis    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HCC)     10/8/2019 - 12/30/2019 Chemotherapy    palonosetron (ALOXI) injection 0 25 mg, 0 25 mg, Intravenous, Once, 6 of 6 cycles  Administration: 0 25 mg (10/8/2019), 0 25 mg (10/22/2019), 0 25 mg (11/5/2019), 0 25 mg (11/19/2019), 0 25 mg (12/3/2019), 0 25 mg (12/17/2019)  pegfilgrastim (NEULASTA ONPRO) subcutaneous injection kit 6 mg, 6 mg, Subcutaneous, Once, 6 of 6 cycles  Administration: 6 mg (10/9/2019), 6 mg (10/23/2019), 6 mg (11/6/2019), 6 mg (11/20/2019), 6 mg (12/4/2019), 6 mg (12/18/2019)  fosaprepitant (EMEND) 150 mg in sodium chloride 0 9 % 250 mL IVPB, 150 mg, Intravenous, Once, 6 of 6 cycles  Administration: 150 mg (10/8/2019), 150 mg (10/22/2019), 150 mg (11/5/2019), 150 mg (11/19/2019), 150 mg (12/3/2019), 150 mg (12/17/2019)  DOCEtaxel (TAXOTERE) 99 mg in sodium chloride 0 9 % 250 mL chemo infusion, 50 mg/m2 = 99 mg (83 3 % of original dose 60 mg/m2), Intravenous, Once, 6 of 6 cycles  Dose modification: 50 mg/m2 (original dose 60 mg/m2, Cycle 1, Reason: Other (See Comments))  Administration: 99 mg (10/8/2019), 99 mg (10/22/2019), 99 mg (11/5/2019), 99 mg (11/19/2019), 99 mg (12/3/2019), 99 mg (12/17/2019)  leucovorin 396 mg in dextrose 5 % 250 mL IVPB, 200 mg/m2 = 396 mg (50 % of original dose 400 mg/m2), Intravenous, Once, 6 of 6 cycles  Dose modification: 200 mg/m2 (original dose 400 mg/m2, Cycle 1, Reason: Other (See Comments), Comment: FLOT regimen standard)  Administration: 396 mg (10/8/2019), 396 mg (10/22/2019), 396 mg (11/5/2019), 396 mg (11/19/2019), 396 mg (12/3/2019), 396 mg (12/17/2019)  oxaliplatin (ELOXATIN) 168 3 mg in dextrose 5 % 250 mL chemo infusion, 85 mg/m2 = 168 3 mg, Intravenous, Once, 6 of 6 cycles  Administration: 168 3 mg (10/8/2019), 168 3 mg (10/22/2019), 168 3 mg (11/5/2019), 168 3 mg (11/19/2019), 168 3 mg (12/3/2019), 168 3 mg (12/17/2019)  trastuzumab (HERCEPTIN) 496 mg in sodium chloride 0 9 % 250 mL chemo infusion, 6 mg/kg = 496 mg, Intravenous, Once, 6 of 6 cycles  Administration: 496 mg (10/8/2019), 331 mg (10/22/2019), 331 mg (11/5/2019), 331 mg (11/19/2019), 331 mg (12/3/2019), 331 mg (12/17/2019)     Brain metastases ([facility] )   11/17/2020 Initial Diagnosis    Brain metastases ([facility] )         Staging: [id number] esophageal adenocarcinoma, January 2020  Treatment history:  Neoadjuvant chemo radiation, FLOT plus Herceptin  Transhiatal esophagectomy, January 2020  Current treatment:  Observation  Disease status: HUNG       History of Present Illness:  Patient returns in follow-up of his esophagus cancer  He recently broke his hip  He states he is feeling well  He is gaining weight  He is not using his  Feeding tube  His last MRI of the brain from November 6, 2020 revealed 2 metastatic lesions, 1 which has increased in size  These were being  Observed  His CT from January 18, 2021 reveals a new right pleural effusion  There is also some nodularity in the left lower lobe that was nonspecific  There is a question of possible liver metastasis  There is increased soft tissue thickening around his celiac and SMA  I personally reviewed the films  He has some occasional abdominal discomfort  But no nausea or vomiting  He is swallowing without any real difficulty  He states he has actually gained nearly 18 lb over the last 3 months  Review of Systems  Complete ROS Surg Onc:   Complete ROS Surg Onc:   Constitutional: The patient denies new or recent history of general fatigue, no recent weight loss, no change in appetite  Eyes: No complaints of visual problems, no scleral icterus     ENT: no complaints of ear pain, no hoarseness, no difficulty swallowing,  no tinnitus and no new masses in head, oral cavity, or neck  Cardiovascular: No complaints of chest pain, no palpitations, no ankle edema  Respiratory: No complaints of shortness of breath, no cough  Gastrointestinal: No complaints of jaundice, no bloody stools, no pale stools  Genitourinary: No complaints of dysuria, no hematuria, no nocturia, no frequent urination, no urethral discharge  Musculoskeletal: No complaints of weakness, paralysis, joint stiffness or arthralgias  Integumentary: No complaints of rash, no new lesions  Neurological: No complaints of convulsions, no seizures, no dizziness  Hematologic/Lymphatic: No complaints of easy bruising  Endocrine:  No hot or cold intolerance  No polydipsia, polyphagia, or polyuria  Allergy/immunology:  No environmental allergies  No food allergies  Not immunocompromised  Skin:  No pallor or rash  No wound          Patient Active Problem List   Diagnosis    COPD (chronic obstructive pulmonary disease) (HCC)    Headaches due to old head injury    High cholesterol    Obstructive sleep apnea syndrome    Type 2 diabetes mellitus with hyperglycemia, without long-term current use of insulin (HCC)    Esophageal obstruction    Chemotherapy induced neutropenia (HCC)    Anemia, unspecified    Insomnia due to medical condition    Encounter for care related to feeding tube    Paralysis of left vocal fold    Dysphonia    Mild protein-calorie malnutrition ([address] )    Port-A-Cath in place    Neuropathy associated with cancer ([address] )    Glottic insufficiency    Hx of smoking    Acquired hypothyroidism    Medicare annual wellness visit, subsequent    Esophageal stricture    Paraspinal abscess ([address] )    Brain lesion    Carotid stenosis, asymptomatic    Hyponatremia    Severe protein-calorie malnutrition (HCC)    Dysphagia    Bilateral swelling of feet    Severe protein-calorie malnutrition Jefm Port: less than 60% of standard weight) (HCC)    Cancer-related pain    Gastroesophageal reflux disease    [facility] tube present ([facility]    Therapeutic opioid-induced constipation ([facility]IC)    Personal history of esophageal cancer    PONV (postoperative nausea and vomiting)    Vertebral osteomyelitis (HCC)    AMS (altered mental status)    Abnormal CT of thoracic spine    Urinary retention    Hypothyroid    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 (HCC)    Abnormal brain MRI    Atrial fibrillation (HCC)    Brain metastases (HCC)    DVT (deep venous thrombosis) (HCC)     Past Medical History:   Diagnosis Date    Anemia     Bilateral pleural effusion     Brain lesion     Brain metastases (HCC)     Brain tumor (HCC)     C  difficile diarrhea     Cancer ([facility] )     Cancer of esophagus ([facility] )     COPD (chronic obstructive pulmonary disease) ([facility] )     Diabetes mellitus ([facility] )     Difficulty swallowing     Disease of thyroid gland     Edema     Esophageal mass     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[facility] ) 9/24/2019    History of chemotherapy     History of transfusion     Malignant neoplasm of lower third of esophagus ([facility] ) 9/17/2019    Diagnosis: clinical stage [id number] esophageal carcinoma Procedure: EGD, transhiatal esophagectomy performed on 1/28/20 Pathology: esophagogastrectomy reveals microscopic focus of residual adenocarcinoma in muscularis propria measuring 0 7 cm, adjacent Duong's esophagus with associated atypia indeterminate for dysplasia  7 lymph nodes negative for carcinoma   Proximal and distal margins negative for    Paraspinal abscess (HCC)     PONV (postoperative nausea and vomiting)     Port-A-Cath in place     LCW    Sleep apnea     no CPAP    SOB (shortness of breath)      Past Surgical History:   Procedure Laterality Date    BACK SURGERY      x2    DISC REMOVAL      ESOPHAGECTOMY N/A 1/28/2020    Procedure: ESOPHAGECTOMY, TRANSHIATAL;  Surgeon: Denver Romance, MD; [facility];  Service: Surgical Oncology    ESOPHAGOGASTRODUODENOSCOPY N/A 1/28/2020    Procedure: ESOPHAGOGASTRODUODENOSCOPY (EGD); Surgeon: Thania Robb MD;  [facility];  Service: Surgical Oncology    FL GUIDED CENTRAL VENOUS ACCESS DEVICE INSERTION  9/26/2019    GASTROJEJUNOSTOMY W/ JEJUNOSTOMY TUBE N/A 9/26/2019    Procedure: INSERTION JEJUNOSTOMY TUBE OPEN;  Surgeon: Thania Robb MD;  [facility];  Service: Surgical Oncology    GASTROJEJUNOSTOMY W/ JEJUNOSTOMY TUBE N/A 6/5/2020    Procedure: INSERTION JEJUNOSTOMY TUBE OPEN;  Surgeon: Thania Robb MD;  [facility];  Service: Surgical Oncology    IR CHEST TUBE PLACEMENT  5/26/2020    IR SPINE PROCEDURE  5/27/2020    IR THORACENTESIS  2/25/2020    JOINT REPLACEMENT      IL EGD ENDOSCOPIC STENT PLACEMENT W/WIRE& DILATION N/A 8/31/2020    Procedure: INSERTION STENT ESOPHAGEAL;  Surgeon: Stephany Ordonez MD;  [facility];  Service: Thoracic    IL EGD INSERT GUIDE WIRE DILATOR PASSAGE ESOPHAGUS N/A 7/7/2020    Procedure: ESOPHAGOGASTRODUODENOSCOPY (EGD) with esophageal dilation under fluro with biopsy;  Surgeon: Stephany Ordonez MD;  [facility];  Service: Thoracic    IL ESOPHAGOGASTRODUODENOSCOPY TRANSORAL DIAGNOSTIC N/A 4/28/2020    Procedure: ESOPHAGOGASTRODUODENOSCOPY (EGD); Surgeon: Stephany Ordonez MD;  [facility];  Service: Thoracic    IL ESOPHAGOGASTRODUODENOSCOPY TRANSORAL DIAGNOSTIC N/A 7/27/2020    Procedure: ESOPHAGOGASTRODUODENOSCOPY (EGD);   Surgeon: Stephany Ordonez MD;  [facility];  Service: Thoracic    IL ESOPHAGOGASTRODUODENOSCOPY TRANSORAL DIAGNOSTIC N/A 8/31/2020    Procedure: ESOPHAGOGASTRODUODENOSCOPY (EGD) dilation over guidewire 36-45 Fr , stent placement;  Surgeon: Stephany Ordonez MD;  [facility];  Service: Thoracic    IL ESOPHAGOGASTRODUODENOSCOPY TRANSORAL DIAGNOSTIC N/A 1/11/2021    Procedure: ESOPHAGOGASTRODUODENOSCOPY (EGD): esophageal stent removal;  Surgeon: Jamilah Olivares MD;  [facility];  Service: Thoracic    OR ESOPHAGOSCOPY FLEX BALLOON DILAT <30 MM DIAM N/A 4/28/2020    Procedure: DILATATION ESOPHAGEAL;  Surgeon: Jamilah Olivares MD;  [facility];  Service: Thoracic    OR ESOPHAGOSCOPY FLEX BALLOON DILAT <30 MM DIAM N/A 7/27/2020    Procedure: DILATATION ESOPHAGEAL with Savary over the wire dilitation 33FR to 45FR; Surgeon: Jamilah Olivares MD;  [facility];  Service: Thoracic    OR ESOPHAGOSCOPY FLEX BALLOON DILAT <30 MM DIAM N/A 8/31/2020    Procedure: DILATATION ESOPHAGEAL;  Surgeon: Jamilah Olivares MD;  [facility];  Service: Thoracic    TONSILLECTOMY      TUNNELED VENOUS PORT PLACEMENT N/A 9/26/2019    Procedure: INSERTION VENOUS PORT (PORT-A-CATH);   Surgeon: Jolanta Felix MD;  [facility];  Service: Surgical Oncology    VOCAL CORD INJECTION N/A 2/7/2020    Procedure: MICROLARYNGOSCOPY WITH INJECTION;  Surgeon: Mary Angeles MD;  [facility];  Service: ENT     Family History   Problem Relation Age of Onset    Diabetes Mother     No Known Problems Father      Social History     Socioeconomic History    Marital status: Single     Spouse name: Not on file    Number of children: Not on file    Years of education: Not on file    Highest education level: Not on file   Occupational History    Not on file   Social Needs    Financial resource strain: Not on file    Food insecurity     Worry: Not on file     Inability: Not on file    Transportation needs     Medical: Not on file     Non-medical: Not on file   Tobacco Use    Smoking status: Former Smoker     Packs/day: 0 50     Years: 50 00     Pack years: 25 00     Types: Cigarettes     Quit date: 12/24/2017     Years since quitting: 3 0    Smokeless tobacco: Never Used   Substance and Sexual Activity    Alcohol use: Never     Frequency: Never    Drug use: Never    Sexual activity: Not Currently Lifestyle    Physical activity     Days per week: Not on file     Minutes per session: Not on file    Stress: Not on file   Relationships    Social connections     Talks on phone: Not on file     Gets together: Not on file     Attends Congregational service: Not on file     Active member of club or organization: Not on file     Attends meetings of clubs or organizations: Not on file     Relationship status: Not on file    Intimate partner violence     Fear of current or ex partner: Not on file     Emotionally abused: Not on file     Physically abused: Not on file     Forced sexual activity: Not on file   Other Topics Concern    Not on file   Social History Narrative    ** Merged History Encounter **            Current Outpatient Medications:     amiodarone 200 mg tablet, TAKE 1 TABLET (200 MG TOTAL) BY MOUTH DAILY WITH BREAKFAST, Disp: 30 tablet, Rfl: 0    atorvastatin (LIPITOR) 40 mg tablet, Take 40 mg by mouth daily, Disp: , Rfl:     Blood Glucose Monitoring Suppl (ONE TOUCH ULTRA 2) w/Device KIT, Check bs , q fasting and 2 hr after meals, Disp: 1 each, Rfl: 0    Eliquis 5 MG, Take 5 mg by mouth 2 (two) times a day, Disp: , Rfl:     ergocalciferol (VITAMIN D2) 50,000 units, Take 50,000 Units by mouth once a week, Disp: , Rfl:     glucose blood test strip, Check blood sugar 3 times daily, Disp: 100 each, Rfl: 3    Lancets (ONETOUCH ULTRASOFT) lancets, Check bs , q fasting and 2 hr after meals, Disp: 100 each, Rfl: 5    levothyroxine 50 mcg tablet, TAKE 1 TABLET BY MOUTH EVERY DAY, Disp: 90 tablet, Rfl: 1    oxyCODONE (ROXICODONE) 10 MG TABS, Take 1 tablet (10 mg total) by mouth every 4 (four) hours as needed for moderate painMax Daily Amount: 60 mg, Disp: 90 tablet, Rfl: 0  Allergies   Allergen Reactions    Penicillins Itching     Vitals:    01/28/21 1058   BP: 102/60   Pulse: 93   Resp: 16   Temp: 98 3 °F (36 8 °C)       Physical Exam  Constitutional: General appearance:  The Patient is well-developed and well-nourished who appears the stated age in no acute distress  Patient is pleasant and talkative  HEENT:  Normocephalic  Sclerae are anicteric  Mucous membranes are moist  Neck is supple without adenopathy  No JVD  Chest: The lungs are clear to auscultation  Cardiac: Heart is regular rate  Abdomen: Abdomen is soft, non-tender, non-distended and without masses  Extremities: There is no clubbing or cyanosis  There is no edema  Symmetric  Neuro: Grossly nonfocal  Gait is normal      Lymphatic: No evidence of cervical adenopathy bilaterally  No evidence of axillary adenopathy bilaterally  No evidence of inguinal adenopathy bilaterally  Skin: Warm, anicteric  Psych:  Patient is pleasant and talkative  Breasts:        Pathology:  [unfilled]    Labs:      Imaging  Xr Chest 1 View    Result Date: 1/14/2021  Narrative: C-ARM - chest INDICATION: Esophageal stricture [K22 2]  Guidance for EGD and esophageal stent removal   History of esophagectomy  COMPARISON:  September 23, 2020 TECHNIQUE: FLUOROSCOPY TIME:   4 SECONDS 3 FLUOROSCOPIC IMAGES FINDINGS: Fluoroscopic guidance provided for surgical procedure  Images demonstrate endoscope in place  Esophageal stent has been removed  Multiple coils are present in the mediastinum along with linear and punctate opacities, findings suggestive of prior thoracic duct embolization  Left-sided Port-A-Cath enters SVC  Osseous and soft tissue detail limited by technique  Impression: Fluoroscopic guidance provided for EGD  Please refer to the separate procedure notes for additional details  Workstation performed: [id number]     Ct Chest Abdomen Pelvis W Contrast    Result Date: 1/23/2021  Narrative: CT CHEST, ABDOMEN AND PELVIS WITH IV CONTRAST INDICATION:   C16 0: Malignant neoplasm of cardia  History of T3 N1 M0 esophageal adenocarcinoma, status post neoadjuvant chemotherapy and transhiatal esophagectomy  Follow-up   COMPARISON:  CT, dated 8/29/2020  TECHNIQUE: CT examination of the chest, abdomen and pelvis was performed  Axial, sagittal, and coronal 2D reformatted images were created from the source data and submitted for interpretation  Radiation dose length product (DLP) for this visit:  742 mGy-cm   This examination, like all CT scans performed in the [facility], was performed utilizing techniques to minimize radiation dose exposure, including the use of iterative reconstruction and automated exposure control  IV Contrast:  100 mL of iohexol (OMNIPAQUE) Enteric Contrast: Enteric contrast was administered  FINDINGS: CHEST LUNGS:  Redemonstrated is a calcified right upper lobe granuloma (series 205, image 39)  Mild tree-in-bud nodularity is present in the apical segment of the left lower lobe (series 205, image 61), nonspecific  Additionally, there is a small amount of compressive atelectasis in the right lower lobe  No pulmonary nodules are seen  The tracheal and endobronchial tree is normal  PLEURA:  A right pleural effusion is moderate, worsened from the prior study  HEART/GREAT VESSELS:  Unremarkable for patient's age  MEDIASTINUM AND SHEA:  Postsurgical changes related to esophagectomy with gastric pull-up are present, as well as post endovascular changes related to thoracic duct embolization  The surgical anastomosis is normal in appearance  The surgical bed is normal  CHEST WALL AND LOWER NECK:   Unremarkable  A left subclavian approach port terminates at the cavoatrial junction  ABDOMEN LIVER/BILIARY TREE:  A 10 mm x 9 mm area of hypoattenuation in the lateral caudate measures 10 x 9 mm (series 201, image 56)  This was not present on recent comparison studies  A small amount of hypoattenuation surrounding the falciform ligament is favored to represent focal fatty infiltration  No additional liver lesions are identified   GALLBLADDER:  There are gallstone(s) within the gallbladder, without pericholecystic inflammatory changes  SPLEEN:  Unremarkable  Note is made of splenule  PANCREAS:  Unremarkable  ADRENAL GLANDS:  Unremarkable  Mild soft tissue nodularity of the right adrenal is unchanged  KIDNEYS/URETERS:  Unremarkable  No hydronephrosis  STOMACH AND BOWEL:  Remnant stomach is unchanged  A jejunostomy catheter is unchanged in position  Remainder of small and large bowel is normal  APPENDIX:  No findings to suggest appendicitis  ABDOMINOPELVIC CAVITY:  No ascites  No pneumoperitoneum  Soft tissue infiltration surrounding both the celiac and superior mesenteric arteries has worsened from the prior study, for example on series 201, image 60 and series 201, image 56  Lipiodol contrast extends through the soft tissue lymphatic system  VESSELS:  Atherosclerotic changes are present  No evidence of aneurysm  An Option Elite retrievable inferior vena cava filter is unchanged in position  PELVIS REPRODUCTIVE ORGANS:  Unremarkable for patient's age  URINARY BLADDER:  Unremarkable  ABDOMINAL WALL/INGUINAL REGIONS:  Lipiodol in the inguinal lymph nodes present  OSSEOUS STRUCTURES:  No acute fracture or destructive osseous lesion  Unchanged collapse of the T6 vertebral body, with additional sclerosis extending through the T7-T4 vertebral bodies  Surgical hardware at L5-S1 is stable, as is a right total hip replacement  Impression: CHEST: 1   Stable postsurgical changes related to esophagectomy with gastric pull-up  The surgical anastomosis and surgical field is unchanged  2   New moderate right pleural effusion  3   Mild tree-in-bud nodularity in the left lower lobe, nonspecific, with infectious or inflammatory etiologies being in the differential   Aspiration should also be considered  ABDOMEN AND PELVIS: 1  A new area of hypoattenuating nodularity in the lateral caudate raises suspicion for metastasis  This was not present previously    Please note that this would be a challenging location to percutaneously biopsy  2   Interval worsening of soft tissue thickening surrounding the proximal celiac and superior mesenteric arteries, again concerning for progression of neoplastic disease  3   Unchanged retrievable inferior vena cava filter  The study was marked in EPIC for significant notification  Workstation performed: [id number]     I reviewed the above laboratory and imaging data  Discussion/Summary: 76-year-old male who underwent transhiatal esophagectomy for a T3N1M0 esophageal adenocarcinoma after neoadjuvant chemotherapy   He did have an excellent response to chemo radiation   His final pathology revealed this was a [id number] adenocarcinoma with only a 7 mm residual tumor  He has been observed  The chest portion of imaging has been stable although there was a question of recurrence in the chest   There has been some progression based on his MRI of the brain  A follow-up MRI is pending and he is seeing Neurosurgery and Radiation Oncology next month  Of greater concern a is the progression of findings in the abdomen  On my review 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  I had a long discussion with him and his family  I discussed that the mainstay of treatment would be chemotherapy  I would not recommend any surgical intervention at this time  I have told him to keep his feeding tube in place since he may need this if he restarts chemotherapy  He does not have a follow-up appointment with Medical Oncology  I will arrange for this  I discussed that if a biopsy would be required, potentially endoscopic ultrasound can be obtained to 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

## 2021-01-28 NOTE — PROGRESS NOTES
Surgical Oncology Follow Up       [address] [facility] [place]  [address]  [facility] [address] [phone]    [facility]  1955  [id number]  [address] 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 ([address] )    Other orders  -     ergocalciferol (VITAMIN D2) 50,000 units; Take 50,000 Units by mouth once a week        Chief Complaint   Patient presents with    Follow-up     6 MONTH FOLLOW UP       No follow-ups on file        Oncology History   Malignant neoplasm of lower third of esophagus (HCC) (Resolved)   8/24/2019 Initial Diagnosis    Malignant neoplasm of lower third of esophagus (HCC)  At least intramucosal carcinoma  Her2/SHIMON positive     10/8/2019 - 12/30/2019 Chemotherapy    palonosetron (ALOXI) injection 0 25 mg, 0 25 mg, Intravenous, Once, 6 of 6 cycles  Administration: 0 25 mg (10/8/2019), 0 25 mg (10/22/2019), 0 25 mg (11/5/2019), 0 25 mg (11/19/2019), 0 25 mg (12/3/2019), 0 25 mg (12/17/2019)  pegfilgrastim (NEULASTA ONPRO) subcutaneous injection kit 6 mg, 6 mg, Subcutaneous, Once, 6 of 6 cycles  Administration: 6 mg (10/9/2019), 6 mg (10/23/2019), 6 mg (11/6/2019), 6 mg (11/20/2019), 6 mg (12/4/2019), 6 mg (12/18/2019)  fosaprepitant (EMEND) 150 mg in sodium chloride 0 9 % 250 mL IVPB, 150 mg, Intravenous, Once, 6 of 6 cycles  Administration: 150 mg (10/8/2019), 150 mg (10/22/2019), 150 mg (11/5/2019), 150 mg (11/19/2019), 150 mg (12/3/2019), 150 mg (12/17/2019)  DOCEtaxel (TAXOTERE) 99 mg in sodium chloride 0 9 % 250 mL chemo infusion, 50 mg/m2 = 99 mg (83 3 % of original dose 60 mg/m2), Intravenous, Once, 6 of 6 cycles  Dose modification: 50 mg/m2 (original dose 60 mg/m2, Cycle 1, Reason: Other (See Comments))  Administration: 99 mg (10/8/2019), 99 mg (10/22/2019), 99 mg (11/5/2019), 99 mg (11/19/2019), 99 mg (12/3/2019), 99 mg (12/17/2019)  leucovorin 396 mg in dextrose 5 % 250 mL IVPB, 200 mg/m2 = 396 mg (50 % of original dose 400 mg/m2), Intravenous, Once, 6 of 6 cycles  Dose modification: 200 mg/m2 (original dose 400 mg/m2, Cycle 1, Reason: Other (See Comments), Comment: FLOT regimen standard)  Administration: 396 mg (10/8/2019), 396 mg (10/22/2019), 396 mg (11/5/2019), 396 mg (11/19/2019), 396 mg (12/3/2019), 396 mg (12/17/2019)  oxaliplatin (ELOXATIN) 168 3 mg in dextrose 5 % 250 mL chemo infusion, 85 mg/m2 = 168 3 mg, Intravenous, Once, 6 of 6 cycles  Administration: 168 3 mg (10/8/2019), 168 3 mg (10/22/2019), 168 3 mg (11/5/2019), 168 3 mg (11/19/2019), 168 3 mg (12/3/2019), 168 3 mg (12/17/2019)  trastuzumab (HERCEPTIN) 496 mg in sodium chloride 0 9 % 250 mL chemo infusion, 6 mg/kg = 496 mg, Intravenous, Once, 6 of 6 cycles  Administration: 496 mg (10/8/2019), 331 mg (10/22/2019), 331 mg (11/5/2019), 331 mg (11/19/2019), 331 mg (12/3/2019), 331 mg (12/17/2019)     1/28/2020 Surgery    Esophagogastrectomy  - Microscopic focus of residual adenocarcinoma in muscularis propria  - Ulceration and associated histologic changes compatible with prior neoadjuvant chemotherapy  - Adjacent betancur's esophagus with associated atypia indeterminate for dysplasia  - Seven lymph nodes identified; all negative for malignancy (0/7)       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[address] ) (Resolved)   8/24/2019 Biopsy    Esophagus (biopsy):  - At least 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 background of Betancur's esophagus and high grade dysplasia      9/24/2019 Initial Diagnosis    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HCC)     10/8/2019 - 12/30/2019 Chemotherapy    palonosetron (ALOXI) injection 0 25 mg, 0 25 mg, Intravenous, Once, 6 of 6 cycles  Administration: 0 25 mg (10/8/2019), 0 25 mg (10/22/2019), 0 25 mg (11/5/2019), 0 25 mg (11/19/2019), 0 25 mg (12/3/2019), 0 25 mg (12/17/2019)  pegfilgrastim (NEULASTA ONPRO) subcutaneous injection kit 6 mg, 6 mg, Subcutaneous, Once, 6 of 6 cycles  Administration: 6 mg (10/9/2019), 6 mg (10/23/2019), 6 mg (11/6/2019), 6 mg (11/20/2019), 6 mg (12/4/2019), 6 mg (12/18/2019)  fosaprepitant (EMEND) 150 mg in sodium chloride 0 9 % 250 mL IVPB, 150 mg, Intravenous, Once, 6 of 6 cycles  Administration: 150 mg (10/8/2019), 150 mg (10/22/2019), 150 mg (11/5/2019), 150 mg (11/19/2019), 150 mg (12/3/2019), 150 mg (12/17/2019)  DOCEtaxel (TAXOTERE) 99 mg in sodium chloride 0 9 % 250 mL chemo infusion, 50 mg/m2 = 99 mg (83 3 % of original dose 60 mg/m2), Intravenous, Once, 6 of 6 cycles  Dose modification: 50 mg/m2 (original dose 60 mg/m2, Cycle 1, Reason: Other (See Comments))  Administration: 99 mg (10/8/2019), 99 mg (10/22/2019), 99 mg (11/5/2019), 99 mg (11/19/2019), 99 mg (12/3/2019), 99 mg (12/17/2019)  leucovorin 396 mg in dextrose 5 % 250 mL IVPB, 200 mg/m2 = 396 mg (50 % of original dose 400 mg/m2), Intravenous, Once, 6 of 6 cycles  Dose modification: 200 mg/m2 (original dose 400 mg/m2, Cycle 1, Reason: Other (See Comments), Comment: FLOT regimen standard)  Administration: 396 mg (10/8/2019), 396 mg (10/22/2019), 396 mg (11/5/2019), 396 mg (11/19/2019), 396 mg (12/3/2019), 396 mg (12/17/2019)  oxaliplatin (ELOXATIN) 168 3 mg in dextrose 5 % 250 mL chemo infusion, 85 mg/m2 = 168 3 mg, Intravenous, Once, 6 of 6 cycles  Administration: 168 3 mg (10/8/2019), 168 3 mg (10/22/2019), 168 3 mg (11/5/2019), 168 3 mg (11/19/2019), 168 3 mg (12/3/2019), 168 3 mg (12/17/2019)  trastuzumab (HERCEPTIN) 496 mg in sodium chloride 0 9 % 250 mL chemo infusion, 6 mg/kg = 496 mg, Intravenous, Once, 6 of 6 cycles  Administration: 496 mg (10/8/2019), 331 mg (10/22/2019), 331 mg (11/5/2019), 331 mg (11/19/2019), 331 mg (12/3/2019), 331 mg (12/17/2019)     Brain metastases ([facility] )   11/17/2020 Initial Diagnosis    Brain metastases ([facility] )         Staging: [id number] esophageal adenocarcinoma, January 2020  Treatment history:  Neoadjuvant chemo radiation, FLOT plus Herceptin  Transhiatal esophagectomy, January 2020  Current treatment:  Observation  Disease status: HUNG       History of Present Illness:  Patient returns in follow-up of his esophagus cancer  He recently broke his hip  He states he is feeling well  He is gaining weight  He is not using his  Feeding tube  His last MRI of the brain from November 6, 2020 revealed 2 metastatic lesions, 1 which has increased in size  These were being  Observed  His CT from January 18, 2021 reveals a new right pleural effusion  There is also some nodularity in the left lower lobe that was nonspecific  There is a question of possible liver metastasis  There is increased soft tissue thickening around his celiac and SMA  I personally reviewed the films  He has some occasional abdominal discomfort  But no nausea or vomiting  He is swallowing without any real difficulty  He states he has actually gained nearly 18 lb over the last 3 months  Review of Systems  Complete ROS Surg Onc:   Complete ROS Surg Onc:   Constitutional: The patient denies new or recent history of general fatigue, no recent weight loss, no change in appetite  Eyes: No complaints of visual problems, no scleral icterus  ENT: no complaints of ear pain, no hoarseness, no difficulty swallowing,  no tinnitus and no new masses in head, oral cavity, or neck  Cardiovascular: No complaints of chest pain, no palpitations, no ankle edema  Respiratory: No complaints of shortness of breath, no cough  Gastrointestinal: No complaints of jaundice, no bloody stools, no pale stools  Genitourinary: No complaints of dysuria, no hematuria, no nocturia, no frequent urination, no urethral discharge  Musculoskeletal: No complaints of weakness, paralysis, joint stiffness or arthralgias  Integumentary: No complaints of rash, no new lesions     Neurological: No complaints of convulsions, no seizures, no dizziness  Hematologic/Lymphatic: No complaints of easy bruising  Endocrine:  No hot or cold intolerance  No polydipsia, polyphagia, or polyuria  Allergy/immunology:  No environmental allergies  No food allergies  Not immunocompromised  Skin:  No pallor or rash  No wound          Patient Active Problem List   Diagnosis    COPD (chronic obstructive pulmonary disease) (HCC)    Headaches due to old head injury    High cholesterol    Obstructive sleep apnea syndrome    Type 2 diabetes mellitus with hyperglycemia, without long-term current use of insulin (HCC)    Esophageal obstruction    Chemotherapy induced neutropenia (HCC)    Anemia, unspecified    Insomnia due to medical condition    Encounter for care related to feeding tube    Paralysis of left vocal fold    Dysphonia    Mild protein-calorie malnutrition ([address] )    Port-A-Cath in place    Neuropathy associated with cancer ([facility] )    Glottic insufficiency    Hx of smoking    Acquired hypothyroidism    Medicare annual wellness visit, subsequent    Esophageal stricture    Paraspinal abscess ([facility] )    Brain lesion    Carotid stenosis, asymptomatic    Hyponatremia    Severe protein-calorie malnutrition (HCC)    Dysphagia    Bilateral swelling of feet    Severe protein-calorie malnutrition ([place] Mura: less than 60% of standard weight) (HCC)    Cancer-related pain    Gastroesophageal reflux disease    Jejunostomy tube present ([address] )    Therapeutic opioid-induced constipation (OIC)    Personal history of esophageal cancer    PONV (postoperative nausea and vomiting)    Vertebral osteomyelitis (HCC)    AMS (altered mental status)    Abnormal CT of thoracic spine    Urinary retention    Hypothyroid    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 (HCC)    Abnormal brain MRI    Atrial fibrillation (HCC)    Brain metastases (HCC)    DVT (deep venous thrombosis) (HCC)     Past Medical History:   Diagnosis Date    Anemia     Bilateral pleural effusion     Brain lesion     Brain metastases (HCC)     Brain tumor (HCC)     C  difficile diarrhea     Cancer ([facility] )     [facility]     COPD (chronic obstructive pulmonary disease) (HCC)     Diabetes mellitus ([facility]     Difficulty swallowing     Disease of thyroid gland     Edema     Esophageal mass     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 9/24/2019    History of chemotherapy     History of transfusion     Malignant neoplasm of lower third of esophagus ([facility] ) 9/17/2019    Diagnosis: clinical stage T3N1M0 esophageal carcinoma Procedure: EGD, transhiatal esophagectomy performed on 1/28/20 Pathology: esophagogastrectomy reveals microscopic focus of residual adenocarcinoma in muscularis propria measuring 0 7 cm, adjacent Duong's esophagus with associated atypia indeterminate for dysplasia  7 lymph nodes negative for carcinoma  Proximal and distal margins negative for    Paraspinal abscess (HCC)     PONV (postoperative nausea and vomiting)     Port-A-Cath in place     LCW    Sleep apnea     no CPAP    SOB (shortness of breath)      Past Surgical History:   Procedure Laterality Date    BACK SURGERY      x2    DISC REMOVAL      ESOPHAGECTOMY N/A 1/28/2020    Procedure: ESOPHAGECTOMY, TRANSHIATAL;  Surgeon: Dipak Umanzor MD;  [facility];  Service: Surgical Oncology    ESOPHAGOGASTRODUODENOSCOPY N/A 1/28/2020    Procedure: ESOPHAGOGASTRODUODENOSCOPY (EGD);   Surgeon: Dipak Umanzor MD;  [facility];  Service: Surgical Oncology    FL GUIDED CENTRAL VENOUS ACCESS DEVICE INSERTION  9/26/2019    GASTROJEJUNOSTOMY W/ JEJUNOSTOMY TUBE N/A 9/26/2019    Procedure: INSERTION JEJUNOSTOMY TUBE OPEN;  Surgeon: Dipak Umanzor MD;  [facility];  Service: Surgical Oncology    GASTROJEJUNOSTOMY W/ JEJUNOSTOMY TUBE N/A 6/5/2020    Procedure: INSERTION JEJUNOSTOMY TUBE OPEN;  Surgeon: Dipak Umanzor MD;  [facility];  Service: Surgical Oncology    IR CHEST TUBE PLACEMENT  5/26/2020    IR SPINE PROCEDURE  5/27/2020    IR THORACENTESIS  2/25/2020    JOINT REPLACEMENT      MA EGD ENDOSCOPIC STENT PLACEMENT W/WIRE& DILATION N/A 8/31/2020    Procedure: INSERTION STENT ESOPHAGEAL;  Surgeon: Odessa Lopez MD;  [facility];  Service: Thoracic    MA EGD INSERT GUIDE WIRE DILATOR PASSAGE ESOPHAGUS N/A 7/7/2020    Procedure: ESOPHAGOGASTRODUODENOSCOPY (EGD) with esophageal dilation under fluro with biopsy;  Surgeon: Odessa Lopez MD;  [facility];  Service: Thoracic    MA ESOPHAGOGASTRODUODENOSCOPY TRANSORAL DIAGNOSTIC N/A 4/28/2020    Procedure: ESOPHAGOGASTRODUODENOSCOPY (EGD); Surgeon: Odessa Lopez MD;  [facility];  Service: Thoracic    MA ESOPHAGOGASTRODUODENOSCOPY TRANSORAL DIAGNOSTIC N/A 7/27/2020    Procedure: ESOPHAGOGASTRODUODENOSCOPY (EGD); Surgeon: Odessa Lopez MD;  [facility];  Service: Thoracic    MA ESOPHAGOGASTRODUODENOSCOPY TRANSORAL DIAGNOSTIC N/A 8/31/2020    Procedure: ESOPHAGOGASTRODUODENOSCOPY (EGD) dilation over guidewire 36-45 Fr , stent placement;  Surgeon: Odessa Lopez MD;  [facility];  Service: Thoracic    MA ESOPHAGOGASTRODUODENOSCOPY TRANSORAL DIAGNOSTIC N/A 1/11/2021    Procedure: ESOPHAGOGASTRODUODENOSCOPY (EGD): esophageal stent removal;  Surgeon: Odessa Lopez MD;  [facility];  Service: Thoracic    MA ESOPHAGOSCOPY FLEX [place] <30 MM DIAM N/A 4/28/2020    Procedure: DILATATION ESOPHAGEAL;  Surgeon: Odessa Lopez MD;  [facility];  Service: Thoracic    MA ESOPHAGOSCOPY FLEX [place] <30 MM DIAM N/A 7/27/2020    Procedure: DILATATION ESOPHAGEAL with Savary over the wire dilitation 33FR to 45FR;   Surgeon: Odessa Lopez MD;  [facility];  Service: Thoracic    MA ESOPHAGOSCOPY FLEX BALLOON DILAT <30 MM DIAM N/A 8/31/2020    Procedure: DILATATION ESOPHAGEAL;  Surgeon: Odessa Lopez MD;  [facility];  Service: Thoracic    TONSILLECTOMY      TUNNELED VENOUS PORT PLACEMENT N/A 9/26/2019    Procedure: INSERTION VENOUS PORT (PORT-A-CATH);   Surgeon: Yamilka Kraft MD;  [facility];  Service: Surgical Oncology    VOCAL CORD INJECTION N/A 2/7/2020    Procedure: MICROLARYNGOSCOPY WITH INJECTION;  Surgeon: Danilo Lopez MD;  [facility];  Service: ENT     Family History   Problem Relation Age of Onset    Diabetes Mother     No Known Problems Father      Social History     Socioeconomic History    Marital status: Single     Spouse name: Not on file    Number of children: Not on file    Years of education: Not on file    Highest education level: Not on file   Occupational History    Not on file   Social Needs    Financial resource strain: Not on file    Food insecurity     Worry: Not on file     Inability: Not on file    Transportation needs     Medical: Not on file     Non-medical: Not on file   Tobacco Use    Smoking status: Former Smoker     Packs/day: 0 50     Years: 50 00     Pack years: 25 00     Types: Cigarettes     Quit date: 12/24/2017     Years since quitting: 3 0    Smokeless tobacco: Never Used   Substance and Sexual Activity    Alcohol use: Never     Frequency: Never    Drug use: Never    Sexual activity: Not Currently   Lifestyle    Physical activity     Days per week: Not on file     Minutes per session: Not on file    Stress: Not on file   Relationships    Social connections     Talks on phone: Not on file     Gets together: Not on file     Attends Buddhist service: Not on file     Active member of club or organization: Not on file     Attends meetings of clubs or organizations: Not on file     Relationship status: Not on file    Intimate partner violence     Fear of current or ex partner: Not on file     Emotionally abused: Not on file     Physically abused: Not on file     Forced sexual activity: Not on file   Other Topics Concern    Not on file   Social History Narrative    ** Merged History Encounter **            Current Outpatient Medications:     amiodarone 200 mg tablet, TAKE 1 TABLET (200 MG TOTAL) BY MOUTH DAILY WITH BREAKFAST, Disp: 30 tablet, Rfl: 0    atorvastatin (LIPITOR) 40 mg tablet, Take 40 mg by mouth daily, Disp: , Rfl:     Blood Glucose Monitoring Suppl (ONE TOUCH ULTRA 2) w/Device KIT, Check bs , q fasting and 2 hr after meals, Disp: 1 each, Rfl: 0    Eliquis 5 MG, Take 5 mg by mouth 2 (two) times a day, Disp: , Rfl:     ergocalciferol (VITAMIN D2) 50,000 units, Take 50,000 Units by mouth once a week, Disp: , Rfl:     glucose blood test strip, Check blood sugar 3 times daily, Disp: 100 each, Rfl: 3    Lancets (ONETOUCH ULTRASOFT) lancets, Check bs , q fasting and 2 hr after meals, Disp: 100 each, Rfl: 5    levothyroxine 50 mcg tablet, TAKE 1 TABLET BY MOUTH EVERY DAY, Disp: 90 tablet, Rfl: 1    oxyCODONE (ROXICODONE) 10 MG TABS, Take 1 tablet (10 mg total) by mouth every 4 (four) hours as needed for moderate painMax Daily Amount: 60 mg, Disp: 90 tablet, Rfl: 0  Allergies   Allergen Reactions    Penicillins Itching     Vitals:    01/28/21 1058   BP: 102/60   Pulse: 93   Resp: 16   Temp: 98 3 °F (36 8 °C)       Physical Exam  Constitutional: General appearance: The Patient is well-developed and well-nourished who appears the stated age in no acute distress  Patient is pleasant and talkative  HEENT:  Normocephalic  Sclerae are anicteric  Mucous membranes are moist  Neck is supple without adenopathy  No JVD  Chest: The lungs are clear to auscultation  Cardiac: Heart is regular rate  Abdomen: Abdomen is soft, non-tender, non-distended and without masses  Extremities: There is no clubbing or cyanosis  There is no edema  Symmetric  Neuro: Grossly nonfocal  Gait is normal      Lymphatic: No evidence of cervical adenopathy bilaterally  No evidence of axillary adenopathy bilaterally   No evidence of inguinal adenopathy bilaterally  Skin: Warm, anicteric  Psych:  Patient is pleasant and talkative  Breasts:        Pathology:  [unfilled]    Labs:      Imaging  Xr Chest 1 View    Result Date: 1/14/2021  Narrative: C-ARM - chest INDICATION: Esophageal stricture [K22 2]  Guidance for EGD and esophageal stent removal   History of esophagectomy  COMPARISON:  September 23, 2020 TECHNIQUE: FLUOROSCOPY TIME:   4 SECONDS 3 FLUOROSCOPIC IMAGES FINDINGS: Fluoroscopic guidance provided for surgical procedure  Images demonstrate endoscope in place  Esophageal stent has been removed  Multiple coils are present in the mediastinum along with linear and punctate opacities, findings suggestive of prior thoracic duct embolization  Left-sided Port-A-Cath enters SVC  Osseous and soft tissue detail limited by technique  Impression: Fluoroscopic guidance provided for EGD  Please refer to the separate procedure notes for additional details  Workstation performed: [id number]     Ct Chest Abdomen Pelvis W Contrast    Result Date: 1/23/2021  Narrative: CT CHEST, ABDOMEN AND PELVIS WITH IV CONTRAST INDICATION:   C16 0: Malignant neoplasm of cardia  History of T3 N1 M0 esophageal adenocarcinoma, status post neoadjuvant chemotherapy and transhiatal esophagectomy  Follow-up  COMPARISON:  CT, dated 8/29/2020  TECHNIQUE: CT examination of the chest, abdomen and pelvis was performed  Axial, sagittal, and coronal 2D reformatted images were created from the source data and submitted for interpretation  Radiation dose length product (DLP) for this visit:  742 mGy-cm   This examination, like all CT scans performed in the [facility], was performed utilizing techniques to minimize radiation dose exposure, including the use of iterative reconstruction and automated exposure control  IV Contrast:  100 mL of iohexol (OMNIPAQUE) Enteric Contrast: Enteric contrast was administered   FINDINGS: CHEST LUNGS:  Redemonstrated is a calcified right upper lobe granuloma (series 205, image 39)  Mild tree-in-bud nodularity is present in the apical segment of the left lower lobe (series 205, image 61), nonspecific  Additionally, there is a small amount of compressive atelectasis in the right lower lobe  No pulmonary nodules are seen  The tracheal and endobronchial tree is normal  PLEURA:  A right pleural effusion is moderate, worsened from the prior study  HEART/GREAT VESSELS:  Unremarkable for patient's age  MEDIASTINUM AND SHEA:  Postsurgical changes related to esophagectomy with gastric pull-up are present, as well as post endovascular changes related to thoracic duct embolization  The surgical anastomosis is normal in appearance  The surgical bed is normal  CHEST WALL AND LOWER NECK:   Unremarkable  A left subclavian approach port terminates at the cavoatrial junction  ABDOMEN LIVER/BILIARY TREE:  A 10 mm x 9 mm area of hypoattenuation in the lateral caudate measures 10 x 9 mm (series 201, image 56)  This was not present on recent comparison studies  A small amount of hypoattenuation surrounding the falciform ligament is favored to represent focal fatty infiltration  No additional liver lesions are identified  GALLBLADDER:  There are gallstone(s) within the gallbladder, without pericholecystic inflammatory changes  SPLEEN:  Unremarkable  Note is made of splenule  PANCREAS:  Unremarkable  ADRENAL GLANDS:  Unremarkable  Mild soft tissue nodularity of the right adrenal is unchanged  KIDNEYS/URETERS:  Unremarkable  No hydronephrosis  STOMACH AND BOWEL:  Remnant stomach is unchanged  A jejunostomy catheter is unchanged in position  Remainder of small and large bowel is normal  APPENDIX:  No findings to suggest appendicitis  ABDOMINOPELVIC CAVITY:  No ascites  No pneumoperitoneum    Soft tissue infiltration surrounding both the celiac and superior mesenteric arteries has worsened from the prior study, for example on series 201, image 60 and series 201, image 56  Lipiodol contrast extends through the soft tissue lymphatic system  VESSELS:  Atherosclerotic changes are present  No evidence of aneurysm  An Option Elite retrievable inferior vena cava filter is unchanged in position  PELVIS REPRODUCTIVE ORGANS:  Unremarkable for patient's age  URINARY BLADDER:  Unremarkable  ABDOMINAL WALL/INGUINAL REGIONS:  Lipiodol in the inguinal lymph nodes present  OSSEOUS STRUCTURES:  No acute fracture or destructive osseous lesion  Unchanged collapse of the T6 vertebral body, with additional sclerosis extending through the T7-T4 vertebral bodies  Surgical hardware at L5-S1 is stable, as is a right total hip replacement  Impression: CHEST: 1   Stable postsurgical changes related to esophagectomy with gastric pull-up  The surgical anastomosis and surgical field is unchanged  2   New moderate right pleural effusion  3   Mild tree-in-bud nodularity in the left lower lobe, nonspecific, with infectious or inflammatory etiologies being in the differential   Aspiration should also be considered  ABDOMEN AND PELVIS: 1  A new area of hypoattenuating nodularity in the lateral caudate raises suspicion for metastasis  This was not present previously  Please note that this would be a challenging location to percutaneously biopsy  2   Interval worsening of soft tissue thickening surrounding the proximal celiac and superior mesenteric arteries, again concerning for progression of neoplastic disease  3   Unchanged retrievable inferior vena cava filter  The study was marked in EPIC for significant notification  Workstation performed: [id number]     I reviewed the above laboratory and imaging data      Discussion/Summary: 77-year-old male who underwent transhiatal esophagectomy for a T3N1M0 esophageal adenocarcinoma after neoadjuvant chemotherapy   He did have an excellent response to chemo radiation   His final pathology revealed this was a [id number] adenocarcinoma with only a 7 mm residual tumor  He has been observed  The chest portion of imaging has been stable although there was a question of recurrence in the chest   There has been some progression based on his MRI of the brain  A follow-up MRI is pending and he is seeing Neurosurgery and Radiation Oncology next month  Of greater concern a is the progression of findings in the abdomen  On my review 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  I had a long discussion with him and his family  I discussed that the mainstay of treatment would be chemotherapy  I would not recommend any surgical intervention at this time  I have told him to keep his feeding tube in place since he may need this if he restarts chemotherapy  He does not have a follow-up appointment with Medical Oncology  I will arrange for this  I discussed that if a biopsy would be required, potentially endoscopic ultrasound can be obtained to 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

## 2021-02-01 DIAGNOSIS — G89.3 CANCER-RELATED PAIN: Chronic | ICD-10-CM

## 2021-02-01 RX ORDER — OXYCODONE HYDROCHLORIDE 10 MG/1
10 TABLET ORAL EVERY 4 HOURS PRN
Qty: 90 TABLET | Refills: 0 | Status: SHIPPED | OUTPATIENT
Start: 2021-02-01 | End: 2021-02-17 | Stop reason: SDUPTHER

## 2021-02-03 ENCOUNTER — OFFICE VISIT (OUTPATIENT)
Dept: FAMILY MEDICINE CLINIC | Facility: CLINIC | Age: 66
End: 2021-02-03
Payer: COMMERCIAL

## 2021-02-03 VITALS
HEART RATE: 89 BPM | WEIGHT: 148.2 LBS | TEMPERATURE: 97.2 F | RESPIRATION RATE: 15 BRPM | HEIGHT: 70 IN | DIASTOLIC BLOOD PRESSURE: 60 MMHG | SYSTOLIC BLOOD PRESSURE: 92 MMHG | BODY MASS INDEX: 21.22 KG/M2 | OXYGEN SATURATION: 96 %

## 2021-02-03 DIAGNOSIS — Z01.818 PRE-OP EXAMINATION: Primary | ICD-10-CM

## 2021-02-03 DIAGNOSIS — C79.31 BRAIN METASTASES (HCC): ICD-10-CM

## 2021-02-03 DIAGNOSIS — C15.5 MALIGNANT NEOPLASM OF DISTAL THIRD OF ESOPHAGUS (HCC): ICD-10-CM

## 2021-02-03 PROCEDURE — 99213 OFFICE O/P EST LOW 20 MIN: CPT | Performed by: FAMILY MEDICINE

## 2021-02-03 NOTE — PROGRESS NOTES
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 ([facility] )      Clearance for anesthesia MRI, cleared for anesthesia in regard to MRI, previously without issue   discussed current issues in regard to care regard to brain esophageal cancer, has maintain scheduled follow-up in regard to previous CT scan        Subjective:    Patient ID: Shelia Tejada is a 76 y o  male      Going for mri brain   Requires clearance " they want to put me out "  Has had aneasthia in the past for mri   Recently had ct , md morrison, has upcoming appt  To discuss  Appetite  Is down , some constipation  Not today laxative works    overall unchanged has gained some weight, consideration restart use of feeding tube            The following portions of the patient's history were reviewed and updated as appropriate: allergies, current medications, past family history, past medical history, past social history, past surgical history and problem list     Review of Systems      BP 92/60 (BP Location: Left arm, Patient Position: Sitting, Cuff Size: Adult)   Pulse 89   Temp (!) 97 2 °F (36 2 °C)   Resp 15   Ht 5' 10" (1 778 m)   Wt 67 2 kg (148 lb 3 2 oz)   SpO2 96%   BMI 21 26 kg/m²   Social History     Socioeconomic History    Marital status: Single     Spouse name: Not on file    Number of children: Not on file    Years of education: Not on file    Highest education level: Not on file   Occupational History    Not on file   Social Needs    Financial resource strain: Not on file    Food insecurity     Worry: Not on file     Inability: Not on file    Transportation needs     Medical: Not on file     Non-medical: Not on file   Tobacco Use    Smoking status: Former Smoker     Packs/day: 0 50     Years: 50 00     Pack years: 25 00     Types: Cigarettes     Quit date: 12/24/2017     Years since quitting: 3 1    Smokeless tobacco: Never Used   Substance and Sexual Activity    Alcohol use: Never     Frequency: Never    Drug use: Never    Sexual activity: Not Currently   Lifestyle    Physical activity     Days per week: Not on file     Minutes per session: Not on file    Stress: Not on file   Relationships    Social connections     Talks on phone: Not on file     Gets together: Not on file     Attends Church service: Not on file     Active member of club or organization: Not on file     Attends meetings of clubs or organizations: Not on file     Relationship status: Not on file    Intimate partner violence     Fear of current or ex partner: Not on file     Emotionally abused: Not on file     Physically abused: Not on file     Forced sexual activity: Not on file   Other Topics Concern    Not on file   Social History Narrative    ** Merged History Encounter **          Past Medical History:   Diagnosis Date    Anemia     Bilateral pleural effusion     Brain lesion     [facility]     Brain tumor ([facility] [address] [facility]     C  difficile diarrhea     Cancer ([facility] [address] [facility]     Cancer of esophagus [facility] [address] [facility]     COPD (chronic obstructive pulmonary disease) ([facility] [address] )     Diabetes mellitus (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 9/17/2019    Diagnosis: clinical stage T3N1M0 esophageal carcinoma Procedure: EGD, transhiatal esophagectomy performed on 1/28/20 Pathology: esophagogastrectomy reveals microscopic focus of residual adenocarcinoma in muscularis propria measuring 0 7 cm, adjacent Duong's esophagus with associated atypia indeterminate for dysplasia  7 lymph nodes negative for carcinoma   Proximal and distal margins negative for  Paraspinal abscess (HCC)     PONV (postoperative nausea and vomiting)     Port-A-Cath in place     LCW    Sleep apnea     no CPAP    SOB (shortness of breath)      Family History   Problem Relation Age of Onset    Diabetes Mother     No Known Problems Father      Past Surgical History:   Procedure Laterality Date    BACK SURGERY      x2    DISC REMOVAL      ESOPHAGECTOMY N/A 1/28/2020    Procedure: ESOPHAGECTOMY, TRANSHIATAL;  Surgeon: Angie Philippe MD;  [facility];  Service: Surgical Oncology    ESOPHAGOGASTRODUODENOSCOPY N/A 1/28/2020    Procedure: ESOPHAGOGASTRODUODENOSCOPY (EGD); Surgeon: Angie Philippe MD;  [facility];  Service: Surgical Oncology    FL GUIDED CENTRAL VENOUS ACCESS DEVICE INSERTION  9/26/2019    GASTROJEJUNOSTOMY W/ JEJUNOSTOMY TUBE N/A 9/26/2019    Procedure: INSERTION JEJUNOSTOMY TUBE OPEN;  Surgeon: Angie Philippe MD;  [facility];  Service: Surgical Oncology    GASTROJEJUNOSTOMY W/ JEJUNOSTOMY TUBE N/A 6/5/2020    Procedure: INSERTION JEJUNOSTOMY TUBE OPEN;  Surgeon: Angie Philippe MD;  [facility];  Service: Surgical Oncology    IR CHEST TUBE PLACEMENT  5/26/2020    IR SPINE PROCEDURE  5/27/2020    IR THORACENTESIS  2/25/2020    JOINT REPLACEMENT      LA EGD ENDOSCOPIC STENT PLACEMENT W/WIRE& DILATION N/A 8/31/2020    Procedure: INSERTION STENT ESOPHAGEAL;  Surgeon: Shira Huffman MD;  [facility];  Service: Thoracic    LA EGD INSERT GUIDE WIRE DILATOR PASSAGE ESOPHAGUS N/A 7/7/2020    Procedure: ESOPHAGOGASTRODUODENOSCOPY (EGD) with esophageal dilation under fluro with biopsy;  Surgeon: Shira Huffman MD;  [facility];  Service: Thoracic    LA ESOPHAGOGASTRODUODENOSCOPY TRANSORAL DIAGNOSTIC N/A 4/28/2020    Procedure: ESOPHAGOGASTRODUODENOSCOPY (EGD);   Surgeon: Shira Huffman MD;  [facility];  Service: Thoracic    LA ESOPHAGOGASTRODUODENOSCOPY TRANSORAL DIAGNOSTIC N/A 7/27/2020 Procedure: ESOPHAGOGASTRODUODENOSCOPY (EGD); Surgeon: Bolivar Lombard, MD;  [facility];  Service: Thoracic    OH ESOPHAGOGASTRODUODENOSCOPY TRANSORAL DIAGNOSTIC N/A 8/31/2020    Procedure: ESOPHAGOGASTRODUODENOSCOPY (EGD) dilation over guidewire 36-45 Fr , stent placement;  Surgeon: Bolivar Lombard, MD;  [facility];  Service: Thoracic    OH ESOPHAGOGASTRODUODENOSCOPY TRANSORAL DIAGNOSTIC N/A 1/11/2021    Procedure: ESOPHAGOGASTRODUODENOSCOPY (EGD): esophageal stent removal;  Surgeon: Bolivar Lombard, MD;  [facility];  Service: Thoracic    OH ESOPHAGOSCOPY FLEX Lorelie Ramus <30 MM DIAM N/A 4/28/2020    Procedure: DILATATION ESOPHAGEAL;  Surgeon: Bolivar Lombard, MD;  [facility];  Service: Thoracic    OH ESOPHAGOSCOPY FLEX Lorelie Ramus <30 MM DIAM N/A 7/27/2020    Procedure: DILATATION ESOPHAGEAL with Savary over the wire dilitation 33FR to 45FR; Surgeon: Bolivar Lombard, MD;  [facility];  Service: Thoracic    OH ESOPHAGOSCOPY FLEX BALLOON DILAT <30 MM DIAM N/A 8/31/2020    Procedure: DILATATION ESOPHAGEAL;  Surgeon: Bolivar Lombard, MD;  [facility];  Service: Thoracic    TONSILLECTOMY      TUNNELED VENOUS PORT PLACEMENT N/A 9/26/2019    Procedure: INSERTION VENOUS PORT (PORT-A-CATH);   Surgeon: Alejo Delatorre MD;  [facility];  Service: Surgical Oncology    VOCAL CORD INJECTION N/A 2/7/2020    Procedure: MICROLARYNGOSCOPY WITH INJECTION;  Surgeon: Serge Lawrence MD;  [facility];  Service: ENT       Current Outpatient Medications:     amiodarone 200 mg tablet, TAKE 1 TABLET (200 MG TOTAL) BY MOUTH DAILY WITH BREAKFAST, Disp: 30 tablet, Rfl: 0    atorvastatin (LIPITOR) 40 mg tablet, Take 40 mg by mouth daily, Disp: , Rfl:     Blood Glucose Monitoring Suppl (ONE TOUCH ULTRA 2) w/Device KIT, Check bs , q fasting and 2 hr after meals, Disp: 1 each, Rfl: 0    Eliquis 5 MG, Take 5 mg by mouth 2 (two) times a day, Disp: , Rfl:     ergocalciferol (VITAMIN D2) 50,000 units, Take 50,000 Units by mouth once a week, Disp: , Rfl:     glucose blood test strip, Check blood sugar 3 times daily, Disp: 100 each, Rfl: 3    Lancets (ONETOUCH ULTRASOFT) lancets, Check bs , q fasting and 2 hr after meals, Disp: 100 each, Rfl: 5    levothyroxine 50 mcg tablet, TAKE 1 TABLET BY MOUTH EVERY DAY, Disp: 90 tablet, Rfl: 1    oxyCODONE (ROXICODONE) 10 MG TABS, Take 1 tablet (10 mg total) by mouth every 4 (four) hours as needed for moderate painMax Daily Amount: 60 mg, Disp: 90 tablet, Rfl: 0    Allergies   Allergen Reactions    Penicillins Itching          Lab Review   Admission on 01/11/2021, Discharged on 01/11/2021   Component Date Value    POC Glucose 01/11/2021 72     POC Glucose 01/11/2021 76    Lab Requisition on 01/08/2021   Component Date Value    ABO Grouping 01/08/2021 O     Rh Factor 01/08/2021 Positive     Antibody Screen 01/08/2021 Negative     Specimen Expiration Date 01/08/2021 [id number]    Hospital Outpatient Visit on 01/07/2021   Component Date Value    PTT 01/07/2021 44*    Protime 01/07/2021 16 9*    INR 01/07/2021 1 35*    TSH 3RD GENERATON 01/07/2021 12 022*    Sodium 01/07/2021 139     Potassium 01/07/2021 4 1     Chloride 01/07/2021 105     CO2 01/07/2021 28     ANION GAP 01/07/2021 6     BUN 01/07/2021 16     Creatinine 01/07/2021 1 03     Glucose 01/07/2021 79     Calcium 01/07/2021 8 5     Corrected Calcium 01/07/2021 9 5     AST 01/07/2021 17     ALT 01/07/2021 28     Alkaline Phosphatase 01/07/2021 77     Total Protein 01/07/2021 6 5     Albumin 01/07/2021 2 8*    Total Bilirubin 01/07/2021 0 70     eGFR 01/07/2021 75     WBC 01/07/2021 5 12     RBC 01/07/2021 3 14*    Hemoglobin 01/07/2021 8 6*    Hematocrit 01/07/2021 28 5*    MCV 01/07/2021 91     MCH 01/07/2021 27 4     MCHC 01/07/2021 30 2*    RDW 01/07/2021 14 2     MPV 01/07/2021 9 2     Platelets 30/03/7402 197     nRBC 01/07/2021 0     Neutrophils Relative 01/07/2021 62     Immat GRANS % 01/07/2021 0     Lymphocytes Relative 01/07/2021 25     Monocytes Relative 01/07/2021 8     Eosinophils Relative 01/07/2021 4     Basophils Relative 01/07/2021 1     Neutrophils Absolute 01/07/2021 3 20     Immature Grans Absolute 01/07/2021 0 02     Lymphocytes Absolute 01/07/2021 1 29     Monocytes Absolute 01/07/2021 0 39     Eosinophils Absolute 01/07/2021 0 19     Basophils Absolute 01/07/2021 0 03         Imaging: Xr Chest 1 View    Result Date: 1/14/2021  Narrative: C-ARM - chest INDICATION: Esophageal stricture [K22 2]  Guidance for EGD and esophageal stent removal   History of esophagectomy  COMPARISON:  September 23, 2020 TECHNIQUE: FLUOROSCOPY TIME:   4 SECONDS 3 FLUOROSCOPIC IMAGES FINDINGS: Fluoroscopic guidance provided for surgical procedure  Images demonstrate endoscope in place  Esophageal stent has been removed  Multiple coils are present in the mediastinum along with linear and punctate opacities, findings suggestive of prior thoracic duct embolization  Left-sided Port-A-Cath enters SVC  Osseous and soft tissue detail limited by technique  Impression: Fluoroscopic guidance provided for EGD  Please refer to the separate procedure notes for additional details  Workstation performed: [id number]     Ct Chest Abdomen Pelvis W Contrast    Result Date: 1/23/2021  Narrative: CT CHEST, ABDOMEN AND PELVIS WITH IV CONTRAST INDICATION:   C16 0: Malignant neoplasm of cardia  History of T3 N1 M0 esophageal adenocarcinoma, status post neoadjuvant chemotherapy and transhiatal esophagectomy  Follow-up  COMPARISON:  CT, dated 8/29/2020  TECHNIQUE: CT examination of the chest, abdomen and pelvis was performed  Axial, sagittal, and coronal 2D reformatted images were created from the source data and submitted for interpretation  Radiation dose length product (DLP) for this visit:  742 mGy-cm     This examination, like all CT scans performed in the [facility], was performed utilizing techniques to minimize radiation dose exposure, including the use of iterative reconstruction and automated exposure control  IV Contrast:  100 mL of iohexol (OMNIPAQUE) Enteric Contrast: Enteric contrast was administered  FINDINGS: CHEST LUNGS:  Redemonstrated is a calcified right upper lobe granuloma (series 205, image 39)  Mild tree-in-bud nodularity is present in the apical segment of the left lower lobe (series 205, image 61), nonspecific  Additionally, there is a small amount of compressive atelectasis in the right lower lobe  No pulmonary nodules are seen  The tracheal and endobronchial tree is normal  PLEURA:  A right pleural effusion is moderate, worsened from the prior study  HEART/GREAT VESSELS:  Unremarkable for patient's age  MEDIASTINUM AND SHEA:  Postsurgical changes related to esophagectomy with gastric pull-up are present, as well as post endovascular changes related to thoracic duct embolization  The surgical anastomosis is normal in appearance  The surgical bed is normal  CHEST WALL AND LOWER NECK:   Unremarkable  A left subclavian approach port terminates at the cavoatrial junction  ABDOMEN LIVER/BILIARY TREE:  A 10 mm x 9 mm area of hypoattenuation in the lateral caudate measures 10 x 9 mm (series 201, image 56)  This was not present on recent comparison studies  A small amount of hypoattenuation surrounding the falciform ligament is favored to represent focal fatty infiltration  No additional liver lesions are identified  GALLBLADDER:  There are gallstone(s) within the gallbladder, without pericholecystic inflammatory changes  SPLEEN:  Unremarkable  Note is made of splenule  PANCREAS:  Unremarkable  ADRENAL GLANDS:  Unremarkable  Mild soft tissue nodularity of the right adrenal is unchanged  KIDNEYS/URETERS:  Unremarkable  No hydronephrosis  STOMACH AND BOWEL:  Remnant stomach is unchanged    A jejunostomy catheter is unchanged in position  Remainder of small and large bowel is normal  APPENDIX:  No findings to suggest appendicitis  ABDOMINOPELVIC CAVITY:  No ascites  No pneumoperitoneum  Soft tissue infiltration surrounding both the celiac and superior mesenteric arteries has worsened from the prior study, for example on series 201, image 60 and series 201, image 56  Lipiodol contrast extends through the soft tissue lymphatic system  VESSELS:  Atherosclerotic changes are present  No evidence of aneurysm  An Option Elite retrievable inferior vena cava filter is unchanged in position  PELVIS REPRODUCTIVE ORGANS:  Unremarkable for patient's age  URINARY BLADDER:  Unremarkable  ABDOMINAL WALL/INGUINAL REGIONS:  Lipiodol in the inguinal lymph nodes present  OSSEOUS STRUCTURES:  No acute fracture or destructive osseous lesion  Unchanged collapse of the T6 vertebral body, with additional sclerosis extending through the T7-T4 vertebral bodies  Surgical hardware at L5-S1 is stable, as is a right total hip replacement  Impression: CHEST: 1   Stable postsurgical changes related to esophagectomy with gastric pull-up  The surgical anastomosis and surgical field is unchanged  2   New moderate right pleural effusion  3   Mild tree-in-bud nodularity in the left lower lobe, nonspecific, with infectious or inflammatory etiologies being in the differential   Aspiration should also be considered  ABDOMEN AND PELVIS: 1  A new area of hypoattenuating nodularity in the lateral caudate raises suspicion for metastasis  This was not present previously  Please note that this would be a challenging location to percutaneously biopsy  2   Interval worsening of soft tissue thickening surrounding the proximal celiac and superior mesenteric arteries, again concerning for progression of neoplastic disease  3   Unchanged retrievable inferior vena cava filter  The study was marked in EPIC for significant notification   Workstation performed: [id number]       Objective:     Physical Exam  Constitutional:       Comments: Chronically ill in appearance   HENT:      Head: Normocephalic and atraumatic  Cardiovascular:      Pulses: Normal pulses  Pulmonary:      Effort: Pulmonary effort is normal  No respiratory distress  Musculoskeletal: Normal range of motion  Skin:     General: Skin is warm and dry  Neurological:      Mental Status: He is oriented to person, place, and time  There are no Patient Instructions on file for this visit  SARA Velze    Portions of the record may have been created with voice recognition software  Occasional wrong word or "sound a like" substitutions may have occurred due to the inherent limitations of voice recognition software  Read the chart carefully and recognize, using context, where substitutions have occurred

## 2021-02-05 ENCOUNTER — ANESTHESIA EVENT (OUTPATIENT)
Dept: RADIOLOGY | Facility: HOSPITAL | Age: 66
End: 2021-02-05

## 2021-02-10 ENCOUNTER — HOSPITAL ENCOUNTER (OUTPATIENT)
Dept: RADIOLOGY | Facility: HOSPITAL | Age: 66
Discharge: HOME | End: 2021-02-10
Attending: RADIOLOGY
Payer: COMMERCIAL

## 2021-02-10 ENCOUNTER — ANESTHESIA (OUTPATIENT)
Dept: RADIOLOGY | Facility: HOSPITAL | Age: 66
End: 2021-02-10

## 2021-02-10 VITALS
DIASTOLIC BLOOD PRESSURE: 77 MMHG | RESPIRATION RATE: 16 BRPM | WEIGHT: 148 LBS | SYSTOLIC BLOOD PRESSURE: 130 MMHG | HEIGHT: 70 IN | TEMPERATURE: 97.6 F | OXYGEN SATURATION: 99 % | HEART RATE: 82 BPM | BODY MASS INDEX: 21.19 KG/M2

## 2021-02-10 DIAGNOSIS — C79.31 BRAIN METASTASES (HCC): ICD-10-CM

## 2021-02-10 LAB — GLUCOSE SERPL-MCNC: 90 MG/DL (ref 65–140)

## 2021-02-10 PROCEDURE — A9585 GADOBUTROL INJECTION: HCPCS | Performed by: RADIOLOGY

## 2021-02-10 PROCEDURE — G1004 CDSM NDSC: HCPCS

## 2021-02-10 PROCEDURE — 82948 REAGENT STRIP/BLOOD GLUCOSE: CPT

## 2021-02-10 PROCEDURE — 70553 MRI BRAIN STEM W/O & W/DYE: CPT

## 2021-02-10 RX ORDER — LIDOCAINE HYDROCHLORIDE 10 MG/ML
0.5 INJECTION, SOLUTION EPIDURAL; INFILTRATION; INTRACAUDAL; PERINEURAL ONCE AS NEEDED
Status: DISCONTINUED | OUTPATIENT
Start: 2021-02-10 | End: 2021-02-11 | Stop reason: HOSPADM

## 2021-02-10 RX ORDER — SODIUM CHLORIDE, SODIUM LACTATE, POTASSIUM CHLORIDE, CALCIUM CHLORIDE 600; 310; 30; 20 MG/100ML; MG/100ML; MG/100ML; MG/100ML
125 INJECTION, SOLUTION INTRAVENOUS CONTINUOUS
Status: DISCONTINUED | OUTPATIENT
Start: 2021-02-10 | End: 2021-02-11 | Stop reason: HOSPADM

## 2021-02-10 RX ORDER — PROPOFOL 10 MG/ML
INJECTION, EMULSION INTRAVENOUS AS NEEDED
Status: DISCONTINUED | OUTPATIENT
Start: 2021-02-10 | End: 2021-02-10

## 2021-02-10 RX ORDER — SODIUM CHLORIDE, SODIUM LACTATE, POTASSIUM CHLORIDE, CALCIUM CHLORIDE 600; 310; 30; 20 MG/100ML; MG/100ML; MG/100ML; MG/100ML
20 INJECTION, SOLUTION INTRAVENOUS CONTINUOUS
Status: DISCONTINUED | OUTPATIENT
Start: 2021-02-10 | End: 2021-02-11 | Stop reason: HOSPADM

## 2021-02-10 RX ADMIN — PROPOFOL 50 MG: 10 INJECTION, EMULSION INTRAVENOUS at 12:17

## 2021-02-10 RX ADMIN — PROPOFOL 200 MG: 10 INJECTION, EMULSION INTRAVENOUS at 12:23

## 2021-02-10 RX ADMIN — SODIUM CHLORIDE, SODIUM LACTATE, POTASSIUM CHLORIDE, AND CALCIUM CHLORIDE 125 ML/HR: .6; .31; .03; .02 INJECTION, SOLUTION INTRAVENOUS at 11:51

## 2021-02-10 RX ADMIN — GADOBUTROL 7 ML: 604.72 INJECTION INTRAVENOUS at 12:44

## 2021-02-10 NOTE — ANESTHESIA POSTPROCEDURE EVALUATION
Post-Op Assessment Note    CV Status:  Stable  Pain Score: 0    Pain management: adequate     Mental Status:  Alert   Hydration Status:  Stable   PONV Controlled:  None   Airway Patency:  Patent      Post Op Vitals Reviewed: Yes      Staff: Anesthesiologist         No complications documented      /71 (02/10/21 1330)    Temp      Pulse 85 (02/10/21 1330)   Resp 12 (02/10/21 1330)    SpO2 99 % (02/10/21 1330)

## 2021-02-10 NOTE — NURSING NOTE
MRI completed, patient tolerated procedure  Post vital signs taken and recorded  Report given to  Kai Espinoza 169  Patient placed in transport to be taken to [address]  Patient offers no complaints or verbalizing any issues while in  MRI

## 2021-02-10 NOTE — ANESTHESIA PREPROCEDURE EVALUATION
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 (HCC)      HEMATOLOGY   (+) Anemia, unspecified      NEURO/PSYCH   (+) Headaches due to old head injury   (+) Personal history of esophageal cancer      PULMONARY   (+) COPD (chronic obstructive pulmonary disease) (HCC)   (+) Obstructive sleep apnea syndrome      Other   (+) Abnormal CT of thoracic spine   (+) Abnormal brain MRI   (+) Brain lesion   (+) Brain metastases (HCC)   (+) Cancer-related pain   (+) Chemotherapy induced neutropenia (HCC)   (+) Dysphonia   (+) Esophageal obstruction   (+) Esophageal stricture   (+) Glottic insufficiency   (+) Hx of smoking   (+) Insomnia due to medical condition   (+) Jejunostomy tube present (HCC)   (+) Mild protein-calorie malnutrition (HCC)   (+) Neuropathy associated with cancer (HCC)   (+) Paralysis of left vocal fold   (+) Port-A-Cath in place   (+) Severe protein-calorie malnutrition Karlie Stateless: less than 60% of standard weight) (HCC)   (+) Severe protein-calorie malnutrition (HCC)   (+) Therapeutic opioid-induced constipation (OIC)   (+) Urinary retention   (+) Vertebral osteomyelitis ([address] )      Recent labs personally reviewed:  Lab Results   Component Value Date    WBC 5 12 01/07/2021    HGB 8 6 (L) 01/07/2021     01/07/2021     Lab Results   Component Value Date    K 4 1 01/07/2021    BUN 16 01/07/2021    CREATININE 1 03 01/07/2021    GLUCOSE 339 (H) 02/01/2020     Lab Results   Component Value Date    PTT 44 (H) 01/07/2021 Lab Results   Component Value Date    INR 1 35 (H) 01/07/2021       Blood type O    Lab Results   Component Value Date    HGBA1C 5 2 12/01/2020     Physical Exam    Airway    Mallampati score: III  TM Distance: >3 FB  Neck ROM: full     Dental       Cardiovascular  Rhythm: regular, Rate: normal,     Pulmonary  Breath sounds clear to auscultation,     Other Findings        Anesthesia Plan  ASA Score- 3     Anesthesia Type- general with ASA Monitors  Additional Monitors:   Airway Plan: LMA  Comment: Shad Yun MD, have personally seen and evaluated the patient prior to anesthetic care  I have reviewed the pre-anesthetic record, and other medical records if appropriate to the anesthetic care  If a CRNA is involved in the case, I have reviewed the CRNA assessment, if present, and agree  All risks/benefits and alternatives were discussed with the patient including the possibility of aspiration, PONV, sore throat, as well as the possibility of other rare anesthetic and surgical emergencies  Risks involved with airway management, line placement, postoperative vision loss, neuropathy secondary to surgical positioning, as well as possible memory/awareness were discussed  Post-operative management was also discussed including the possibility of post-operative mechanical ventilation  Patient agreed and had no further questions prior to procedure          Plan Factors-    Chart reviewed  EKG reviewed  Existing labs reviewed  Induction- intravenous  Postoperative Plan-   Planned trial extubation    Informed Consent- Anesthetic plan and risks discussed with patient

## 2021-02-12 DIAGNOSIS — I48.91 ATRIAL FIBRILLATION, UNSPECIFIED TYPE (HCC): ICD-10-CM

## 2021-02-15 ENCOUNTER — TELEPHONE (OUTPATIENT)
Dept: HEMATOLOGY ONCOLOGY | Facility: CLINIC | Age: 66
End: 2021-02-15

## 2021-02-15 RX ORDER — AMIODARONE HYDROCHLORIDE 200 MG/1
200 TABLET ORAL
Qty: 30 TABLET | Refills: 0 | Status: SHIPPED | OUTPATIENT
Start: 2021-02-15 | End: 2021-03-11

## 2021-02-15 NOTE — TELEPHONE ENCOUNTER
Spoke with [facility] advised appt will be changed to virtual via [employer] due to weather, patient daughter Biju Gallo voiced understanding

## 2021-02-16 ENCOUNTER — TELEMEDICINE (OUTPATIENT)
Dept: HEMATOLOGY ONCOLOGY | Facility: CLINIC | Age: 66
End: 2021-02-16
Payer: COMMERCIAL

## 2021-02-16 ENCOUNTER — TELEPHONE (OUTPATIENT)
Dept: HEMATOLOGY ONCOLOGY | Facility: CLINIC | Age: 66
End: 2021-02-16

## 2021-02-16 ENCOUNTER — HOSPITAL ENCOUNTER (OUTPATIENT)
Dept: NON INVASIVE DIAGNOSTICS | Facility: CLINIC | Age: 66
Discharge: HOME | End: 2021-02-16
Payer: COMMERCIAL

## 2021-02-16 DIAGNOSIS — Z51.11 ENCOUNTER FOR ANTINEOPLASTIC CHEMOTHERAPY: ICD-10-CM

## 2021-02-16 DIAGNOSIS — D70.1 CHEMOTHERAPY INDUCED NEUTROPENIA (HCC): ICD-10-CM

## 2021-02-16 DIAGNOSIS — Z86.19 HISTORY OF CLOSTRIDIOIDES DIFFICILE INFECTION: ICD-10-CM

## 2021-02-16 DIAGNOSIS — C15.5 MALIGNANT NEOPLASM OF DISTAL THIRD OF ESOPHAGUS (HCC): Primary | ICD-10-CM

## 2021-02-16 DIAGNOSIS — C16.0 METASTATIC HER2 POSITIVE NEOPLASM OF GASTROESOPHAGEAL JUNCTION (HCC): ICD-10-CM

## 2021-02-16 DIAGNOSIS — T45.1X5A CHEMOTHERAPY INDUCED NEUTROPENIA (HCC): ICD-10-CM

## 2021-02-16 DIAGNOSIS — I82.411 ACUTE DEEP VEIN THROMBOSIS (DVT) OF FEMORAL VEIN OF RIGHT LOWER EXTREMITY (HCC): ICD-10-CM

## 2021-02-16 DIAGNOSIS — Z79.01 CHRONIC ANTICOAGULATION: ICD-10-CM

## 2021-02-16 DIAGNOSIS — Z95.828 S/P IVC FILTER: ICD-10-CM

## 2021-02-16 DIAGNOSIS — C15.5 MALIGNANT NEOPLASM OF DISTAL THIRD OF ESOPHAGUS (HCC): ICD-10-CM

## 2021-02-16 DIAGNOSIS — C79.31 BRAIN METASTASES (HCC): ICD-10-CM

## 2021-02-16 DIAGNOSIS — C79.9 METASTATIC HER2 POSITIVE NEOPLASM OF GASTROESOPHAGEAL JUNCTION (HCC): ICD-10-CM

## 2021-02-16 PROCEDURE — 93306 TTE W/DOPPLER COMPLETE: CPT

## 2021-02-16 PROCEDURE — 99214 OFFICE O/P EST MOD 30 MIN: CPT | Performed by: INTERNAL MEDICINE

## 2021-02-16 PROCEDURE — 93306 TTE W/DOPPLER COMPLETE: CPT | Performed by: INTERNAL MEDICINE

## 2021-02-16 NOTE — PROGRESS NOTES
[facility] - Hematology & Medical Oncology  Outpatient Visit Encounter Note    Carmen Schuler 76 y o  male [id number] Date:  2/16/2021    ONCOLOGY HISTORY        Oncology History Overview Note   Patient present today for three month f/u  with follow-up brain MRI after SRS to the brain on 11/25/2020  Carmen Schuler is a 76y o  year old male who presents with an adenocarcinoma of the GE junction who is status post neoadjuvant chemotherapy followed by surgical resection with transhiatal esophagectomy in late January 2020  He went to the emergency room with some lower extremity weakness and was found on CT and MRI imaging studies to have a large paraspinal mass from T4-T7 with secondary discitis-osteomyelitis due to infection and has concern over a possible leak  5/24/2020 MRI of the brain revealed a 4 mm nodular focus of enhancement in the paramedian left posterior frontal lobe that is potentially a metastatic lesion with subacute infarction not excluded  Short term interval follow-up MRI of the brain was recommended in 6-12 weeks by Radiology  12/1/2020-tele f/u med onc Dr Destin Godinez  Pt to have feeding tube out next week  Pt followed by rad onc and neurosurgery for brain lesion  12/1/2020-Pt went to [facility] s/p fall a few days prior  Had an increase in pain  Fractured his R hip, which was repaired 12/5/2020  He then had inpatient rehab after surgery and hospital stay  1/6/21-f/u thoracic surgery RAD[facility] FARRIS/Dr [facility] for repeat EGD and stent removal  J tube can be removed in the future if he maintains his weight    1/11/21-EGD with esophageal stent removal by Dr Pastora Duran      1/14/21-virtual f/u palliaitve Dr Downs Look  Pain well controlled    Taking Oxycodone qhs for pain relief and during the day prn     1/18/21-CT chest abdomen pelvis w contrast  IMPRESSION:  CHEST:  1   Stable postsurgical changes related to esophagectomy with gastric pull-up  The surgical anastomosis and surgical field is unchanged  2   New moderate right pleural effusion  3   Mild tree-in-bud nodularity in the left lower lobe, nonspecific, with infectious or inflammatory etiologies being in the differential   Aspiration should also be considered  ABDOMEN AND PELVIS:  1  A new area of hypoattenuating nodularity in the lateral caudate raises suspicion for metastasis  This was not present previously  Please note that this would be a challenging location to percutaneously biopsy  2   Interval worsening of soft tissue thickening surrounding the proximal celiac and superior mesenteric arteries, again concerning for progression of neoplastic disease  3   Unchanged retrievable inferior vena cava filter  1/28/21-f/u surg onc [facility] [facility]  6 month follow up  Pt has a progression of findings in the abdomen  Suspicious for tumor recurrence or metastasis  Surgery not recommended at this time  Chemo indicated  Advised pt to keep feeding tube at this time  Pt needs to f/u with med onc     2/10/21-MRI brain w wo contrast  IMPRESSION:   1  Decreased size of two metastatic lesions in the left frontoparietal and right parietal lobes with improved surrounding vasogenic edema  No new enhancing lesion  2   New small focus of FLAIR hyperintensity in the inferior left parietal white matter without associated enhancement could be treatment related  Stable additional scattered white matter FLAIR hyperintense foci most consistent with chronic   Microangiopathy          Upcoming  2/16/21-f/u med onc Dr Trini Low  3/18/21-f/u palliative care Dr Elif Mittal     Malignant neoplasm of lower third of esophagus ([address] ) (Resolved)   8/24/2019 Initial Diagnosis    Malignant neoplasm of lower third of esophagus ([address] )  At least intramucosal carcinoma  Her2/SHIMON positive     10/8/2019 - 12/30/2019 Chemotherapy    palonosetron (ALOXI) injection 0 25 mg, 0 25 mg, Intravenous, Once, 6 of 6 cycles  Administration: 0 25 mg (10/8/2019), 0 25 mg (10/22/2019), 0 25 mg (11/5/2019), 0 25 mg (11/19/2019), 0 25 mg (12/3/2019), 0 25 mg (12/17/2019)  pegfilgrastim (NEULASTA ONPRO) subcutaneous injection kit 6 mg, 6 mg, Subcutaneous, Once, 6 of 6 cycles  Administration: 6 mg (10/9/2019), 6 mg (10/23/2019), 6 mg (11/6/2019), 6 mg (11/20/2019), 6 mg (12/4/2019), 6 mg (12/18/2019)  fosaprepitant (EMEND) 150 mg in sodium chloride 0 9 % 250 mL IVPB, 150 mg, Intravenous, Once, 6 of 6 cycles  Administration: 150 mg (10/8/2019), 150 mg (10/22/2019), 150 mg (11/5/2019), 150 mg (11/19/2019), 150 mg (12/3/2019), 150 mg (12/17/2019)  DOCEtaxel (TAXOTERE) 99 mg in sodium chloride 0 9 % 250 mL chemo infusion, 50 mg/m2 = 99 mg (83 3 % of original dose 60 mg/m2), Intravenous, Once, 6 of 6 cycles  Dose modification: 50 mg/m2 (original dose 60 mg/m2, Cycle 1, Reason: Other (See Comments))  Administration: 99 mg (10/8/2019), 99 mg (10/22/2019), 99 mg (11/5/2019), 99 mg (11/19/2019), 99 mg (12/3/2019), 99 mg (12/17/2019)  leucovorin 396 mg in dextrose 5 % 250 mL IVPB, 200 mg/m2 = 396 mg (50 % of original dose 400 mg/m2), Intravenous, Once, 6 of 6 cycles  Dose modification: 200 mg/m2 (original dose 400 mg/m2, Cycle 1, Reason: Other (See Comments), Comment: FLOT regimen standard)  Administration: 396 mg (10/8/2019), 396 mg (10/22/2019), 396 mg (11/5/2019), 396 mg (11/19/2019), 396 mg (12/3/2019), 396 mg (12/17/2019)  oxaliplatin (ELOXATIN) 168 3 mg in dextrose 5 % 250 mL chemo infusion, 85 mg/m2 = 168 3 mg, Intravenous, Once, 6 of 6 cycles  Administration: 168 3 mg (10/8/2019), 168 3 mg (10/22/2019), 168 3 mg (11/5/2019), 168 3 mg (11/19/2019), 168 3 mg (12/3/2019), 168 3 mg (12/17/2019)  trastuzumab (HERCEPTIN) 496 mg in sodium chloride 0 9 % 250 mL chemo infusion, 6 mg/kg = 496 mg, Intravenous, Once, 6 of 6 cycles  Administration: 496 mg (10/8/2019), 331 mg (10/22/2019), 331 mg (11/5/2019), 331 mg (11/19/2019), 331 mg (12/3/2019), 331 mg (12/17/2019)     1/28/2020 Surgery    Esophagogastrectomy  - Microscopic focus of residual adenocarcinoma in muscularis propria  - Ulceration and associated histologic changes compatible with prior neoadjuvant chemotherapy  - Adjacent betancur's esophagus with associated atypia indeterminate for dysplasia  - Seven lymph nodes identified; all negative for malignancy (0/7)       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[address] ) (Resolved)   8/24/2019 Biopsy    Esophagus (biopsy):  - At least 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 background of Betancur's esophagus and high grade dysplasia      9/24/2019 Initial Diagnosis    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HCC)     10/8/2019 - 12/30/2019 Chemotherapy    palonosetron (ALOXI) injection 0 25 mg, 0 25 mg, Intravenous, Once, 6 of 6 cycles  Administration: 0 25 mg (10/8/2019), 0 25 mg (10/22/2019), 0 25 mg (11/5/2019), 0 25 mg (11/19/2019), 0 25 mg (12/3/2019), 0 25 mg (12/17/2019)  pegfilgrastim (NEULASTA ONPRO) subcutaneous injection kit 6 mg, 6 mg, Subcutaneous, Once, 6 of 6 cycles  Administration: 6 mg (10/9/2019), 6 mg (10/23/2019), 6 mg (11/6/2019), 6 mg (11/20/2019), 6 mg (12/4/2019), 6 mg (12/18/2019)  fosaprepitant (EMEND) 150 mg in sodium chloride 0 9 % 250 mL IVPB, 150 mg, Intravenous, Once, 6 of 6 cycles  Administration: 150 mg (10/8/2019), 150 mg (10/22/2019), 150 mg (11/5/2019), 150 mg (11/19/2019), 150 mg (12/3/2019), 150 mg (12/17/2019)  DOCEtaxel (TAXOTERE) 99 mg in sodium chloride 0 9 % 250 mL chemo infusion, 50 mg/m2 = 99 mg (83 3 % of original dose 60 mg/m2), Intravenous, Once, 6 of 6 cycles  Dose modification: 50 mg/m2 (original dose 60 mg/m2, Cycle 1, Reason: Other (See Comments))  Administration: 99 mg (10/8/2019), 99 mg (10/22/2019), 99 mg (11/5/2019), 99 mg (11/19/2019), 99 mg (12/3/2019), 99 mg (12/17/2019)  leucovorin 396 mg in dextrose 5 % 250 mL IVPB, 200 mg/m2 = 396 mg (50 % of original dose 400 mg/m2), Intravenous, Once, 6 of 6 cycles  Dose modification: 200 mg/m2 (original dose 400 mg/m2, Cycle 1, Reason: Other (See Comments), Comment: FLOT regimen standard)  Administration: 396 mg (10/8/2019), 396 mg (10/22/2019), 396 mg (11/5/2019), 396 mg (11/19/2019), 396 mg (12/3/2019), 396 mg (12/17/2019)  oxaliplatin (ELOXATIN) 168 3 mg in dextrose 5 % 250 mL chemo infusion, 85 mg/m2 = 168 3 mg, Intravenous, Once, 6 of 6 cycles  Administration: 168 3 mg (10/8/2019), 168 3 mg (10/22/2019), 168 3 mg (11/5/2019), 168 3 mg (11/19/2019), 168 3 mg (12/3/2019), 168 3 mg (12/17/2019)  trastuzumab (HERCEPTIN) 496 mg in sodium chloride 0 9 % 250 mL chemo infusion, 6 mg/kg = 496 mg, Intravenous, Once, 6 of 6 cycles  Administration: 496 mg (10/8/2019), 331 mg (10/22/2019), 331 mg (11/5/2019), 331 mg (11/19/2019), 331 mg (12/3/2019), 331 mg (12/17/2019)     Brain metastases ([address] )   11/17/2020 Initial Diagnosis    Brain metastases ([facility] [address] )     11/25/2020 - 11/25/2020 Radiation    Plan ID Energy Fractions Dose per Fraction (cGy) Dose Correction (cGy) Total Dose Delivered (cGy) Elapsed Days   [id number] 6X 1 / 1 2,000 0 2,000 0    Lt Parietal, and Rt Parietal  Treatment dates:  C1 SRS: 11/25/2020 - 11/25/2020     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)   2/16/2021 Initial Diagnosis    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 ([address] )     3/2/2021 -  Chemotherapy    fluorouracil (ADRUCIL) injection 735 mg, 400 mg/m2, Intravenous, Once, 0 of 6 cycles  leucovorin 736 mg in dextrose 5 % 250 mL IVPB, 400 mg/m2, Intravenous, Once, 0 of 6 cycles  oxaliplatin (ELOXATIN) 156 4 mg in dextrose 5 % 250 mL chemo infusion, 85 mg/m2, Intravenous, Once, 0 of 6 cycles  trastuzumab (HERCEPTIN) 402 mg in sodium chloride 0 9 % 250 mL chemo infusion, 6 mg/kg, Intravenous, Once, 0 of 6 cycles           SUBJECTIVE      ECOG Initial Visit 0   ECOG This Visit 0   Pain Score Initial Visit 0   Pain Score This Visit 0     Aparna Dodson is a 76 y o   Transferring his medical oncology care from my partner as his previous medical oncologist is leaving the [facility]  His principal diagnosis is recurrence esophageal HER2 positive metastatic adenocarcinoma  in review of his chart, he was diagnosed with node positive locally advanced esophageal adenocarcinoma that was HER2 positive in August 2019 that was thereafter managed with neoadjuvant chemoradiation with FLOT with Herceptin  This was followed by esophageal gastrectomy that identified a microscopic foci of residual adenocarcinoma in the muscularis propria with negative lymph node assessment  He was undergoing surveillance but was unfortunately found to have recurring disease in November 2020 with brain metastases  Today, he is doing a video visit with me and is joined by his daughter Yosi Clancy who is a medical assistant by profession  Since being seen by his previous oncologist, he was diagnosed with a broken hip on the right side and was managed by Orthopedic surgery at [facility] and was also diagnosed with a provoked deep vein thrombosis that was managed by IVC filter placement and then anticoagulation with Eliquis  Restaging scans from his CT and MRI showed that there is concern for possible new lesion in the brain and that there is  Very likely new site of metastases in the nodularity in the left lower lobe as well as soft tissue thickening around his celiac and superior mesenteric artery  He tells me that his primary complaint is abdominal discomfort and cramping that is slowly resolving  He denies any headaches nausea vomiting chest pain shortness of breath cough  He says that because he has had a previous episode of Clostridium difficile infection, he is concerned about a possible recurrence of that infection but states that the intensity of his abdominal cramping is not the same as it was during that initial infectious diagnosis          I have reviewed the relevant past medical, surgical, social and family history  I have also reviewed allergies and medications for this patient  Review of Systems  Review of Systems   All other systems reviewed and are negative  OBJECTIVE     Physical Exam  There were no vitals filed for this visit  Physical Exam  Constitutional:       General: He is not in acute distress  Appearance: Normal appearance  He is normal weight  He is not toxic-appearing  Eyes:      General: No scleral icterus  Extraocular Movements: Extraocular movements intact  Neck:      Musculoskeletal: Normal range of motion  Pulmonary:      Effort: Pulmonary effort is normal  No respiratory distress  Neurological:      Mental Status: He is alert and oriented to person, place, and time  Imaging  Relevant imaging reviewed in chart    Labs  Relevant labs reviewed in chart   ASSESSMENT & PLAN      Diagnosis ICD-10-CM Associated Orders   1  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 (HCC)  C15 5 Ambulatory referral to Hematology / Oncology     Echo complete with contrast if indicated   2  Encounter for antineoplastic chemotherapy  Z51 11 Echo complete with contrast if indicated     Infusion Calculated Appointment Request     Infusion Calculated Appointment Request     Infusion Calculated Appointment Request     Infusion Calculated Appointment Request     Infusion Calculated Appointment Request     Infusion Calculated Appointment Request     Infusion Calculated Appointment Request     Infusion Calculated Appointment Request     Infusion Calculated Appointment Request     Infusion Calculated Appointment Request     Infusion Calculated Appointment Request     Infusion Calculated Appointment Request   3  Acute deep vein thrombosis (DVT) of femoral vein of right lower extremity (HCC)  I82 411 Ambulatory referral to Interventional Radiology   4  S/P IVC filter  [id number] Ambulatory referral to Interventional Radiology   5  Chronic anticoagulation  Z79 01    6   Brain metastases ([facility] [address] )  C79 31    7  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)  C79 9 Infusion Calculated Appointment Request    C16 0 Infusion Calculated Appointment Request     Infusion Calculated Appointment Request     Infusion Calculated Appointment Request     Infusion Calculated Appointment Request     Infusion Calculated Appointment Request     Infusion Calculated Appointment Request     Infusion Calculated Appointment Request     Infusion Calculated Appointment Request     Infusion Calculated Appointment Request     Infusion Calculated Appointment Request     Infusion Calculated Appointment Request   8  Chemotherapy induced neutropenia (HCC)  D70 1 Infusion Calculated Appointment Request    T45 1X5A Infusion Calculated Appointment Request     Infusion Calculated Appointment Request     Infusion Calculated Appointment Request     Infusion Calculated Appointment Request     Infusion Calculated Appointment Request     Infusion Calculated Appointment Request     Infusion Calculated Appointment Request     Infusion Calculated Appointment Request     Infusion Calculated Appointment Request     Infusion Calculated Appointment Request     Infusion Calculated Appointment Request   9  History of Clostridioides difficile infection  Z86 19 Clostridium difficile toxin by PCR with EIA     Recurrence metastatic HER2 positive GE junction adenocarcinoma  Brain metastases  Encounter for antineoplastic chemotherapy   Oncology history updated accordingly this visit   Goals of care/patient communication  o I discussed with  him the clinical course leading up to their cancer diagnosis  I reviewed relevant office notes, imaging reports and pathology result as well   o I told  him that this is a case of incurable, Stage IV/Metastatic disease and what this means  I told  him that the goal of anti-cancer therapy is to provide best QoL and PFS/OS as shown in clinical trials   I told  him that there will be a point when the cancer will not respond to this anti-neoplastic therapy   o I explained the risks/benefits of the proposed cancer therapy:  FOLFOX plus Herceptin and after discussion including understanding risks of possible life-threatening complications and therapy-related malignancy development, informed consent has been verbally obtained  - Paper will be signed prior to starting therapy   TNM/Staging At Diagnosis  o Recurrent [id number]  o Stage IV   Disease Features/Tumor Markers/Genetics  o Notable Path Features: HER2 3+  o FoundationCDx ordered 2/16/21   Treatment  o SP neoadjuvant chemoRT with FLOT+Herceptin  o SP esophageal gastrectomy  o Recurrent disease with brain metastasis and other abdominal lesions consistent with recurrent disease  - Rx with 1L palliative intent FOLFOX + Herceptin   Other Supportive Care   Treatment Team Members  o Surgeon - Dr Olinda Dickerson and magnesium to be drawn 2 [facility]  o 2D echocardiogram to assess for cardiac function prior to Herceptin      Acute deep vein thrombosis in the right lower extremity, femoral vein  Status post IVC filter-December 2020  · Continue with Eliquis anticoagulation and recommend removal of his IVC filter that I being told was placed because he was supposed to have surgery and could not be on therapeutic anticoagulation  Abdominal cramping  History of C diff infection  ·  no clear etiology for the symptoms but given the history of the opportunistic infection, will order C diff stool examination  Follow Up     We will see with every other cycle starting with cycle 1  All questions were answered to the patient's satisfaction during this encounter  They appreciated and thanked me for spending time with them  The patient knows the contact information for our office and know to reach out for any relevant concerns related to this encounter   For all other listed problems and medical diagnosis in his chart - they are managed by PCP and/or other specialists, which patient acknowledges  Connie Jones MD  Hematology & Medical Oncology

## 2021-02-16 NOTE — TELEPHONE ENCOUNTER
Spoke with patient's daughter as a f/u to patient's virtual appt with Dr Niesha Dc  Went over all lab and treatment appts for patient with his daughter  Made her aware that [facility] has been scheduled to pick him up for all of his appts  Pt's daughter aware and okay with all new appts  She will go over everything with patient

## 2021-02-17 ENCOUNTER — CLINICAL SUPPORT (OUTPATIENT)
Dept: RADIATION ONCOLOGY | Facility: HOSPITAL | Age: 66
End: 2021-02-17
Attending: RADIOLOGY
Payer: COMMERCIAL

## 2021-02-17 DIAGNOSIS — C79.31 BRAIN METASTASES (HCC): Primary | ICD-10-CM

## 2021-02-17 DIAGNOSIS — G89.3 CANCER-RELATED PAIN: Chronic | ICD-10-CM

## 2021-02-17 PROCEDURE — 99211 OFF/OP EST MAY X REQ PHY/QHP: CPT | Performed by: RADIOLOGY

## 2021-02-17 RX ORDER — OXYCODONE HYDROCHLORIDE 10 MG/1
10 TABLET ORAL EVERY 4 HOURS PRN
Qty: 90 TABLET | Refills: 0 | Status: SHIPPED | OUTPATIENT
Start: 2021-02-17

## 2021-02-17 NOTE — PROGRESS NOTES
Virtual Regular Visit  Patient present today for three month f/u  with follow-up brain MRI after SRS to the brain on 11/25/2020  Nish Erazo is a 76y o  year old male who presents with an adenocarcinoma of the GE junction who is status post neoadjuvant chemotherapy followed by surgical resection with transhiatal esophagectomy in late January 2020  He went to the emergency room with some lower extremity weakness and was found on CT and MRI imaging studies to have a large paraspinal mass from T4-T7 with secondary discitis-osteomyelitis due to infection and has concern over a possible leak  5/24/2020 MRI of the brain revealed a 4 mm nodular focus of enhancement in the paramedian left posterior frontal lobe that is potentially a metastatic lesion with subacute infarction not excluded  Short term interval follow-up MRI of the brain was recommended in 6-12 weeks by Radiology  12/1/2020-tele f/u med onc Dr Marylee Anchors  Pt to have feeding tube out next week  Pt followed by rad onc and neurosurgery for brain lesion  12/1/2020-Pt went to [facility] s/p fall a few days prior  Had an increase in pain  Fractured his R hip, which was repaired 12/5/2020  He then had inpatient rehab after surgery and hospital stay  1/6/21-f/u thoracic surgery RADHA FARRIS/Dr WALLACE [facility] for repeat EGD and stent removal  J tube can be removed in the future if he maintains his weight    1/11/21-EGD with esophageal stent removal by Dr Claudeen Allis      1/14/21-virtual f/u palliaitve Dr Mikie Fontenot  Pain well controlled  Taking Oxycodone qhs for pain relief and during the day prn     1/18/21-CT chest abdomen pelvis w contrast  IMPRESSION:  CHEST:  1   Stable postsurgical changes related to esophagectomy with gastric pull-up  The surgical anastomosis and surgical field is unchanged  2   New moderate right pleural effusion    3   Mild tree-in-bud nodularity in the left lower lobe, nonspecific, with infectious or inflammatory etiologies being in the differential   Aspiration should also be considered  ABDOMEN AND PELVIS:  1  A new area of hypoattenuating nodularity in the lateral caudate raises suspicion for metastasis  This was not present previously  Please note that this would be a challenging location to percutaneously biopsy  2   Interval worsening of soft tissue thickening surrounding the proximal celiac and superior mesenteric arteries, again concerning for progression of neoplastic disease  3   Unchanged retrievable inferior vena cava filter  1/28/21-f/u surg onc Kervin Boston  6 month follow up  Pt has a progression of findings in the abdomen  Suspicious for tumor recurrence or metastasis  Surgery not recommended at this time  Chemo indicated  Advised pt to keep feeding tube at this time  Pt needs to f/u with med onc     2/10/21-MRI brain w wo contrast  IMPRESSION:   1  Decreased size of two metastatic lesions in the left frontoparietal and right parietal lobes with improved surrounding vasogenic edema  No new enhancing lesion  2   New small focus of FLAIR hyperintensity in the inferior left parietal white matter without associated enhancement could be treatment related  Stable additional scattered white matter FLAIR hyperintense foci most consistent with chronic   Microangiopathy  2/16/21-f/u med onc Dr Jamarcus Velazquez  Will start FOLFOX 6 in March 2, 2021 q 2 weeks  Will see pt prior to each chemo  Upcoming  3/18/21-f/u palliative care Dr Ta Whitten        Problem List Items Addressed This Visit     None               Reason for visit is follow up via TEAMS    Encounter provider Brittani Suarez MD    Provider located at [address] [id number]    Recent Visits  No visits were found meeting these conditions     Showing recent visits within past 7 days and meeting all other requirements Future Appointments  No visits were found meeting these conditions  Showing future appointments within next 150 days and meeting all other requirements        After connecting through [employer], the patient was identified by name and date of birth  Joseph Ledesma was informed that this is a telemedicine visit and that the visit is being conducted through [employer] and patient was informed that this is a secure, HIPAA-compliant platform  He agrees to proceed  which may not be secure and therefore, might not be HIPAA-compliant  My office door was closed  No one else was in the room  He acknowledged consent and understanding of privacy and security of the video platform  The patient has agreed to participate and understands they can discontinue the visit at any time  Subjective    Joseph Ledesma is a 76 y o  male    Past Medical History:   Diagnosis Date    Anemia     Bilateral pleural effusion     Brain lesion     Brain metastases (HCC)     Brain tumor (HCC)     C  difficile diarrhea     Cancer ([address] )     Cancer of esophagus (HCC)     COPD (chronic obstructive pulmonary disease) (HCC)     Diabetes mellitus (HCC)     Difficulty swallowing     Disease of thyroid gland     Edema     Esophageal mass     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[facility] ) 9/24/2019    History of chemotherapy     History of transfusion     Malignant neoplasm of lower third of esophagus ([facility] [address] ) 9/17/2019    Diagnosis: clinical stage T3N1M0 esophageal carcinoma Procedure: EGD, transhiatal esophagectomy performed on 1/28/20 Pathology: esophagogastrectomy reveals microscopic focus of residual adenocarcinoma in muscularis propria measuring 0 7 cm, adjacent Duong's esophagus with associated atypia indeterminate for dysplasia  7 lymph nodes negative for carcinoma   Proximal and distal margins negative for    Paraspinal abscess (HCC)     PONV (postoperative nausea and vomiting)     Port-A-Cath in place     LCW    Sleep apnea no CPAP    SOB (shortness of breath)        Past Surgical History:   Procedure Laterality Date    BACK SURGERY      x2    DISC REMOVAL      ESOPHAGECTOMY N/A 1/28/2020    Procedure: ESOPHAGECTOMY, TRANSHIATAL;  Surgeon: Cullen Torres MD;  [facility];  Service: Surgical Oncology    ESOPHAGOGASTRODUODENOSCOPY N/A 1/28/2020    Procedure: ESOPHAGOGASTRODUODENOSCOPY (EGD); Surgeon: Cullen Torres MD;  [facility];  Service: Surgical Oncology    FL GUIDED CENTRAL VENOUS ACCESS DEVICE INSERTION  9/26/2019    GASTROJEJUNOSTOMY W/ JEJUNOSTOMY TUBE N/A 9/26/2019    Procedure: INSERTION JEJUNOSTOMY TUBE OPEN;  Surgeon: Cullen Torres MD;  [facility];  Service: Surgical Oncology    GASTROJEJUNOSTOMY W/ JEJUNOSTOMY TUBE N/A 6/5/2020    Procedure: INSERTION JEJUNOSTOMY TUBE OPEN;  Surgeon: Cullen Torres MD;  [facility];  Service: Surgical Oncology    IR CHEST TUBE PLACEMENT  5/26/2020    IR SPINE PROCEDURE  5/27/2020    IR THORACENTESIS  2/25/2020    JOINT REPLACEMENT      OK EGD ENDOSCOPIC STENT PLACEMENT W/WIRE& DILATION N/A 8/31/2020    Procedure: INSERTION STENT ESOPHAGEAL;  Surgeon: Lenny Swain MD;  [facility];  Service: Thoracic    OK EGD INSERT GUIDE WIRE DILATOR PASSAGE ESOPHAGUS N/A 7/7/2020    Procedure: ESOPHAGOGASTRODUODENOSCOPY (EGD) with esophageal dilation under fluro with biopsy;  Surgeon: Lenny Swain MD;  [facility];  Service: Thoracic    OK ESOPHAGOGASTRODUODENOSCOPY TRANSORAL DIAGNOSTIC N/A 4/28/2020    Procedure: ESOPHAGOGASTRODUODENOSCOPY (EGD); Surgeon: Lenny Swain MD;  [facility];  Service: Thoracic    OK ESOPHAGOGASTRODUODENOSCOPY TRANSORAL DIAGNOSTIC N/A 7/27/2020    Procedure: ESOPHAGOGASTRODUODENOSCOPY (EGD);   Surgeon: Lenny Swain MD;  [facility];  Service: Thoracic    OK ESOPHAGOGASTRODUODENOSCOPY TRANSORAL DIAGNOSTIC N/A 8/31/2020    Procedure: ESOPHAGOGASTRODUODENOSCOPY (EGD) dilation over guidewire 36-45 Fr , stent placement;  Surgeon: Yvon Dawson MD;  [facility];  Service: Thoracic    OK ESOPHAGOGASTRODUODENOSCOPY TRANSORAL DIAGNOSTIC N/A 1/11/2021    Procedure: ESOPHAGOGASTRODUODENOSCOPY (EGD): esophageal stent removal;  Surgeon: Yvon Dawson MD;  [facility];  Service: Thoracic    OK ESOPHAGOSCOPY FLEX BALLOON DILAT <30 MM DIAM N/A 4/28/2020    Procedure: DILATATION ESOPHAGEAL;  Surgeon: Yvon Dawson MD;  [facility];  Service: Thoracic    OK ESOPHAGOSCOPY FLEX Ginger Navi <30 MM DIAM N/A 7/27/2020    Procedure: DILATATION ESOPHAGEAL with Savary over the wire dilitation 33FR to 45FR; Surgeon: Yvon Dawson MD;  [facility];  Service: Thoracic    OK ESOPHAGOSCOPY FLEX BALLOON DILAT <30 MM DIAM N/A 8/31/2020    Procedure: DILATATION ESOPHAGEAL;  Surgeon: Yvon Dawson MD;  [facility];  Service: Thoracic    TONSILLECTOMY      TUNNELED VENOUS PORT PLACEMENT N/A 9/26/2019    Procedure: INSERTION VENOUS PORT (PORT-A-CATH);   Surgeon: Modesto Lira MD;  [facility];  Service: Surgical Oncology    VOCAL CORD INJECTION N/A 2/7/2020    Procedure: MICROLARYNGOSCOPY WITH INJECTION;  Surgeon: Lianna Mcguire MD;  [facility];  Service: ENT       Current Outpatient Medications   Medication Sig Dispense Refill    amiodarone 200 mg tablet TAKE 1 TABLET (200 MG TOTAL) BY MOUTH DAILY WITH BREAKFAST 30 tablet 0    atorvastatin (LIPITOR) 40 mg tablet Take 40 mg by mouth daily      Eliquis 5 MG Take 5 mg by mouth 2 (two) times a day      ergocalciferol (VITAMIN D2) 50,000 units Take 50,000 Units by mouth once a week      levothyroxine 50 mcg tablet TAKE 1 TABLET BY MOUTH EVERY DAY 90 tablet 1    oxyCODONE (ROXICODONE) 10 MG TABS Take 1 tablet (10 mg total) by mouth every 4 (four) hours as needed for moderate painMax Daily Amount: 60 mg 90 tablet 0    Blood Glucose Monitoring Suppl (ONE TOUCH ULTRA 2) w/Device KIT Check bs , q fasting and 2 hr after meals (Patient not taking: Reported on 2/17/2021) 1 each 0    glucose blood test strip Check blood sugar 3 times daily (Patient not taking: Reported on 2/17/2021) 100 each 3    Lancets (ONETOUCH ULTRASOFT) lancets Check bs , q fasting and 2 hr after meals (Patient not taking: Reported on 2/17/2021) 100 each 5     No current facility-administered medications for this visit  Allergies   Allergen Reactions    Penicillins Itching         I spent 20 minutes with the patient and daughter during this visit      Follow up visit       Oncology History   Malignant neoplasm of lower third of esophagus (HCC) (Resolved)   8/24/2019 Initial Diagnosis    Malignant neoplasm of lower third of esophagus (HCC)  At least intramucosal carcinoma  Her2/SHIMON positive     10/8/2019 - 12/30/2019 Chemotherapy    palonosetron (ALOXI) injection 0 25 mg, 0 25 mg, Intravenous, Once, 6 of 6 cycles  Administration: 0 25 mg (10/8/2019), 0 25 mg (10/22/2019), 0 25 mg (11/5/2019), 0 25 mg (11/19/2019), 0 25 mg (12/3/2019), 0 25 mg (12/17/2019)  pegfilgrastim (NEULASTA ONPRO) subcutaneous injection kit 6 mg, 6 mg, Subcutaneous, Once, 6 of 6 cycles  Administration: 6 mg (10/9/2019), 6 mg (10/23/2019), 6 mg (11/6/2019), 6 mg (11/20/2019), 6 mg (12/4/2019), 6 mg (12/18/2019)  fosaprepitant (EMEND) 150 mg in sodium chloride 0 9 % 250 mL IVPB, 150 mg, Intravenous, Once, 6 of 6 cycles  Administration: 150 mg (10/8/2019), 150 mg (10/22/2019), 150 mg (11/5/2019), 150 mg (11/19/2019), 150 mg (12/3/2019), 150 mg (12/17/2019)  DOCEtaxel (TAXOTERE) 99 mg in sodium chloride 0 9 % 250 mL chemo infusion, 50 mg/m2 = 99 mg (83 3 % of original dose 60 mg/m2), Intravenous, Once, 6 of 6 cycles  Dose modification: 50 mg/m2 (original dose 60 mg/m2, Cycle 1, Reason: Other (See Comments))  Administration: 99 mg (10/8/2019), 99 mg (10/22/2019), 99 mg (11/5/2019), 99 mg (11/19/2019), 99 mg (12/3/2019), 99 mg (12/17/2019)  leucovorin 396 mg in dextrose 5 % 250 mL IVPB, 200 mg/m2 = 396 mg (50 % of original dose 400 mg/m2), Intravenous, Once, 6 of 6 cycles  Dose modification: 200 mg/m2 (original dose 400 mg/m2, Cycle 1, Reason: Other (See Comments), Comment: FLOT regimen standard)  Administration: 396 mg (10/8/2019), 396 mg (10/22/2019), 396 mg (11/5/2019), 396 mg (11/19/2019), 396 mg (12/3/2019), 396 mg (12/17/2019)  oxaliplatin (ELOXATIN) 168 3 mg in dextrose 5 % 250 mL chemo infusion, 85 mg/m2 = 168 3 mg, Intravenous, Once, 6 of 6 cycles  Administration: 168 3 mg (10/8/2019), 168 3 mg (10/22/2019), 168 3 mg (11/5/2019), 168 3 mg (11/19/2019), 168 3 mg (12/3/2019), 168 3 mg (12/17/2019)  trastuzumab (HERCEPTIN) 496 mg in sodium chloride 0 9 % 250 mL chemo infusion, 6 mg/kg = 496 mg, Intravenous, Once, 6 of 6 cycles  Administration: 496 mg (10/8/2019), 331 mg (10/22/2019), 331 mg (11/5/2019), 331 mg (11/19/2019), 331 mg (12/3/2019), 331 mg (12/17/2019)     1/28/2020 Surgery    Esophagogastrectomy  - Microscopic focus of residual adenocarcinoma in muscularis propria  - Ulceration and associated histologic changes compatible with prior neoadjuvant chemotherapy  - Adjacent betancur's esophagus with associated atypia indeterminate for dysplasia  - Seven lymph nodes identified; all negative for malignancy (0/7)       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[address] ) (Resolved)   8/24/2019 Biopsy    Esophagus (biopsy):  - At least 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 background of Betancur's esophagus and high grade dysplasia      9/24/2019 Initial Diagnosis    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HCC)     10/8/2019 - 12/30/2019 Chemotherapy    palonosetron (ALOXI) injection 0 25 mg, 0 25 mg, Intravenous, Once, 6 of 6 cycles  Administration: 0 25 mg (10/8/2019), 0 25 mg (10/22/2019), 0 25 mg (11/5/2019), 0 25 mg (11/19/2019), 0 25 mg (12/3/2019), 0 25 mg (12/17/2019)  pegfilgrastim (NEULASTA ONPRO) subcutaneous injection kit 6 mg, 6 mg, Subcutaneous, Once, 6 of 6 cycles  Administration: 6 mg (10/9/2019), 6 mg (10/23/2019), 6 mg (11/6/2019), 6 mg (11/20/2019), 6 mg (12/4/2019), 6 mg (12/18/2019)  fosaprepitant (EMEND) 150 mg in sodium chloride 0 9 % 250 mL IVPB, 150 mg, Intravenous, Once, 6 of 6 cycles  Administration: 150 mg (10/8/2019), 150 mg (10/22/2019), 150 mg (11/5/2019), 150 mg (11/19/2019), 150 mg (12/3/2019), 150 mg (12/17/2019)  DOCEtaxel (TAXOTERE) 99 mg in sodium chloride 0 9 % 250 mL chemo infusion, 50 mg/m2 = 99 mg (83 3 % of original dose 60 mg/m2), Intravenous, Once, 6 of 6 cycles  Dose modification: 50 mg/m2 (original dose 60 mg/m2, Cycle 1, Reason: Other (See Comments))  Administration: 99 mg (10/8/2019), 99 mg (10/22/2019), 99 mg (11/5/2019), 99 mg (11/19/2019), 99 mg (12/3/2019), 99 mg (12/17/2019)  leucovorin 396 mg in dextrose 5 % 250 mL IVPB, 200 mg/m2 = 396 mg (50 % of original dose 400 mg/m2), Intravenous, Once, 6 of 6 cycles  Dose modification: 200 mg/m2 (original dose 400 mg/m2, Cycle 1, Reason: Other (See Comments), Comment: FLOT regimen standard)  Administration: 396 mg (10/8/2019), 396 mg (10/22/2019), 396 mg (11/5/2019), 396 mg (11/19/2019), 396 mg (12/3/2019), 396 mg (12/17/2019)  oxaliplatin (ELOXATIN) 168 3 mg in dextrose 5 % 250 mL chemo infusion, 85 mg/m2 = 168 3 mg, Intravenous, Once, 6 of 6 cycles  Administration: 168 3 mg (10/8/2019), 168 3 mg (10/22/2019), 168 3 mg (11/5/2019), 168 3 mg (11/19/2019), 168 3 mg (12/3/2019), 168 3 mg (12/17/2019)  trastuzumab (HERCEPTIN) 496 mg in sodium chloride 0 9 % 250 mL chemo infusion, 6 mg/kg = 496 mg, Intravenous, Once, 6 of 6 cycles  Administration: 496 mg (10/8/2019), 331 mg (10/22/2019), 331 mg (11/5/2019), 331 mg (11/19/2019), 331 mg (12/3/2019), 331 mg (12/17/2019)     Brain metastases ([facility] [address] )   11/17/2020 Initial Diagnosis    Brain metastases ([facility] [address] )     11/25/2020 - 11/25/2020 Radiation    Plan ID Energy Fractions Dose per Fraction (cGy) Dose Correction (cGy) Total Dose Delivered (cGy) Elapsed Days   [id number] 6X 1 / 1 2,000 0 2,000 0    Lt Parietal, and Rt Parietal  Treatment dates:  C1 SRS: 11/25/2020 - 11/25/2020     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)   2/16/2021 Initial Diagnosis    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)     3/2/2021 -  Chemotherapy    fluorouracil (ADRUCIL) injection 735 mg, 400 mg/m2, Intravenous, Once, 0 of 6 cycles  leucovorin 736 mg in dextrose 5 % 250 mL IVPB, 400 mg/m2, Intravenous, Once, 0 of 6 cycles  oxaliplatin (ELOXATIN) 156 4 mg in dextrose 5 % 250 mL chemo infusion, 85 mg/m2, Intravenous, Once, 0 of 6 cycles  trastuzumab (HERCEPTIN) 402 mg in sodium chloride 0 9 % 250 mL chemo infusion, 6 mg/kg, Intravenous, Once, 0 of 6 cycles         Clinical Trial: no      Health Maintenance   Topic Date Due    Hepatitis C Screening  1955    DM Eye Exam  07/19/1964    DTaP,Tdap,and Td Vaccines (1 - Tdap) 07/19/1975    Lung Cancer Screening  07/19/2009    Pneumococcal Vaccine: 65+ Years (1 of 1 - PPSV23) 07/19/2019    URINE MICROALBUMIN  08/15/2020    Influenza Vaccine (1) 09/01/2020    Diabetic Foot Exam  03/02/2021    Fall Risk  04/03/2021    Depression Screening PHQ  04/03/2021    Medicare Annual Wellness Visit (AWV)  04/03/2021    HEMOGLOBIN A1C  06/01/2021    Falls: Plan of Care  06/24/2021    BMI: Adult  02/10/2022    Colonoscopy Surveillance  11/12/2022    Colorectal Cancer Screening  11/12/2029    HIB Vaccine  Aged Out    Hepatitis B Vaccine  Aged Out    IPV Vaccine  Aged Out    Hepatitis A Vaccine  Aged Out    Meningococcal ACWY Vaccine  Aged Out    HPV Vaccine  Aged Out       Patient Active Problem List   Diagnosis    COPD (chronic obstructive pulmonary disease) ([facility] [address] )    Headaches due to old head injury    High cholesterol    Obstructive sleep apnea syndrome    Type 2 diabetes mellitus with hyperglycemia, without long-term current use of insulin (HCC)    Esophageal obstruction    Chemotherapy induced neutropenia (HCC)    Anemia, unspecified    Insomnia due to medical condition    Encounter for care related to feeding tube    Paralysis of left vocal fold    Dysphonia    Mild protein-calorie malnutrition ([facility] [address] )    Port-A-Cath in place    [facility]    Glottic insufficiency    Hx of smoking    Acquired hypothyroidism    Medicare annual wellness visit, subsequent    Esophageal stricture    [facility]aspinal abscess ([facility]    Brain lesion    Carotid stenosis, asymptomatic    Hyponatremia    Severe protein-calorie malnutrition (HCC)    Dysphagia    Bilateral swelling of feet    Severe protein-calorie malnutrition (Melda Abrahan: less than 60% of standard weight) (HCC)    Cancer-related pain    Gastroesophageal reflux disease    Jejunostomy tube present (HCC)    Therapeutic opioid-induced constipation (OIC)    Personal history of esophageal cancer    PONV (postoperative nausea and vomiting)    Vertebral osteomyelitis (HCC)    AMS (altered mental status)    Abnormal CT of thoracic spine    Urinary retention    Hypothyroid    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 (HCC)    Abnormal brain MRI    Atrial fibrillation (HCC)    Brain metastases (HCC)    Acute deep vein thrombosis (DVT) of femoral vein of right lower extremity (HCC)    Chronic anticoagulation    S/P IVC filter    Encounter for antineoplastic chemotherapy    Encounter for antineoplastic chemotherapy    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)    History of Clostridioides difficile infection     Past Medical History:   Diagnosis Date    Anemia     Bilateral pleural effusion     Brain lesion     Brain metastases (HCC)     Brain tumor (HCC)     C  difficile diarrhea     Cancer ([facility] )     Cancer of esophagus ([facility] [address] )     COPD (chronic obstructive pulmonary disease) (HCC)     Diabetes mellitus ([facility]     Difficulty swallowing     Disease of thyroid gland     Edema     Esophageal mass     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[facility] [address] ) 9/24/2019    History of chemotherapy     History of transfusion     Malignant neoplasm of lower third of esophagus ([facility] [address] ) 9/17/2019    Diagnosis: clinical stage T3N1M0 esophageal carcinoma Procedure: EGD, transhiatal esophagectomy performed on 1/28/20 Pathology: esophagogastrectomy reveals microscopic focus of residual adenocarcinoma in muscularis propria measuring 0 7 cm, adjacent Duong's esophagus with associated atypia indeterminate for dysplasia  7 lymph nodes negative for carcinoma  Proximal and distal margins negative for    Paraspinal abscess (HCC)     PONV (postoperative nausea and vomiting)     Port-A-Cath in place     LCW    Sleep apnea     no CPAP    SOB (shortness of breath)      Past Surgical History:   Procedure Laterality Date    BACK SURGERY      x2    DISC REMOVAL      ESOPHAGECTOMY N/A 1/28/2020    Procedure: ESOPHAGECTOMY, TRANSHIATAL;  Surgeon: Dipak Umanzor MD;  [facility];  Service: Surgical Oncology    ESOPHAGOGASTRODUODENOSCOPY N/A 1/28/2020    Procedure: ESOPHAGOGASTRODUODENOSCOPY (EGD);   Surgeon: Dipak Umanzor MD;  [facility];  Service: Surgical Oncology    FL GUIDED CENTRAL VENOUS ACCESS DEVICE INSERTION  9/26/2019    GASTROJEJUNOSTOMY W/ JEJUNOSTOMY TUBE N/A 9/26/2019    Procedure: INSERTION JEJUNOSTOMY TUBE OPEN;  Surgeon: Dipak Umanzor MD;  [facility];  Service: Surgical Oncology    GASTROJEJUNOSTOMY W/ JEJUNOSTOMY TUBE N/A 6/5/2020    Procedure: INSERTION JEJUNOSTOMY TUBE OPEN;  Surgeon: Dipak Umanzor MD;  [facility];  Service: Surgical Oncology    IR CHEST TUBE PLACEMENT  5/26/2020    IR SPINE PROCEDURE  5/27/2020    IR THORACENTESIS  2/25/2020    JOINT REPLACEMENT      NE EGD ENDOSCOPIC STENT PLACEMENT W/WIRE& DILATION N/A 8/31/2020    Procedure: INSERTION STENT ESOPHAGEAL;  Surgeon: Abel Avila MD;  [facility];  Service: Thoracic    NE EGD INSERT GUIDE WIRE DILATOR PASSAGE ESOPHAGUS N/A 7/7/2020    Procedure: ESOPHAGOGASTRODUODENOSCOPY (EGD) with esophageal dilation under fluro with biopsy;  Surgeon: Traci Bustillos MD;  [facility];  Service: Thoracic    ME ESOPHAGOGASTRODUODENOSCOPY TRANSORAL DIAGNOSTIC N/A 4/28/2020    Procedure: ESOPHAGOGASTRODUODENOSCOPY (EGD); Surgeon: Traci Bustillos MD;  [facility];  Service: Thoracic    ME ESOPHAGOGASTRODUODENOSCOPY TRANSORAL DIAGNOSTIC N/A 7/27/2020    Procedure: ESOPHAGOGASTRODUODENOSCOPY (EGD); Surgeon: Traci Bustillos MD;  [facility];  Service: Thoracic    ME ESOPHAGOGASTRODUODENOSCOPY TRANSORAL DIAGNOSTIC N/A 8/31/2020    Procedure: ESOPHAGOGASTRODUODENOSCOPY (EGD) dilation over guidewire 36-45 Fr , stent placement;  Surgeon: Traci Bustillos MD;  [facility];  Service: Thoracic    ME ESOPHAGOGASTRODUODENOSCOPY TRANSORAL DIAGNOSTIC N/A 1/11/2021    Procedure: ESOPHAGOGASTRODUODENOSCOPY (EGD): esophageal stent removal;  Surgeon: Traci Bustillos MD;  [facility];  Service: Thoracic    ME ESOPHAGOSCOPY FLEX Crowder Killer <30 MM DIAM N/A 4/28/2020    Procedure: DILATATION ESOPHAGEAL;  Surgeon: Traci Bustillos MD;  [facility];  Service: Thoracic    ME ESOPHAGOSCOPY FLEX Crowder Killer <30 MM DIAM N/A 7/27/2020    Procedure: DILATATION ESOPHAGEAL with Savary over the wire dilitation 33FR to 45FR; Surgeon: Traci Bustillos MD;  [facility];  Service: Thoracic    ME ESOPHAGOSCOPY FLEX BALLOON DILAT <30 MM DIAM N/A 8/31/2020    Procedure: DILATATION ESOPHAGEAL;  Surgeon: Traci Bustillos MD;  [facility];  Service: Thoracic    TONSILLECTOMY      TUNNELED VENOUS PORT PLACEMENT N/A 9/26/2019    Procedure: INSERTION VENOUS PORT (PORT-A-CATH);   Surgeon: Carmen Schmid MD;  [facility];  Service: Surgical Oncology    VOCAL CORD INJECTION N/A 2/7/2020    Procedure: MICROLARYNGOSCOPY WITH INJECTION; Surgeon: Kirsten Bhagat MD;  [facility];  Service: ENT     Family History   Problem Relation Age of Onset    Diabetes Mother     No Known Problems Father      Social History     Socioeconomic History    Marital status: Single     Spouse name: Not on file    Number of children: Not on file    Years of education: Not on file    Highest education level: Not on file   Occupational History    Not on file   Social Needs    Financial resource strain: Not on file    Food insecurity     Worry: Not on file     Inability: Not on file    Transportation needs     Medical: Not on file     Non-medical: Not on file   Tobacco Use    Smoking status: Former Smoker     Packs/day: 0 50     Years: 50 00     Pack years: 25 00     Types: Cigarettes     Quit date: 12/24/2017     Years since quitting: 3 1    Smokeless tobacco: Never Used   Substance and Sexual Activity    Alcohol use: Never     Frequency: Never    Drug use: Never    Sexual activity: Not Currently   Lifestyle    Physical activity     Days per week: Not on file     Minutes per session: Not on file    Stress: Not on file   Relationships    Social connections     Talks on phone: Not on file     Gets together: Not on file     Attends Manda[employer] service: Not on file     Active member of club or organization: Not on file     Attends meetings of clubs or organizations: Not on file     Relationship status: Not on file    Intimate partner violence     Fear of current or ex partner: Not on file     Emotionally abused: Not on file     Physically abused: Not on file     Forced sexual activity: Not on file   Other Topics Concern    Not on file   Social History Narrative    ** Merged History Encounter **            Current Outpatient Medications:     amiodarone 200 mg tablet, TAKE 1 TABLET (200 MG TOTAL) BY MOUTH DAILY WITH BREAKFAST, Disp: 30 tablet, Rfl: 0    atorvastatin (LIPITOR) 40 mg tablet, Take 40 mg by mouth daily, Disp: , Rfl:     Eliquis 5 MG, Take 5 mg by mouth 2 (two) times a day, Disp: , Rfl:     ergocalciferol (VITAMIN D2) 50,000 units, Take 50,000 Units by mouth once a week, Disp: , Rfl:     levothyroxine 50 mcg tablet, TAKE 1 TABLET BY MOUTH EVERY DAY, Disp: 90 tablet, Rfl: 1    oxyCODONE (ROXICODONE) 10 MG TABS, Take 1 tablet (10 mg total) by mouth every 4 (four) hours as needed for moderate painMax Daily Amount: 60 mg, Disp: 90 tablet, Rfl: 0    Blood Glucose Monitoring Suppl (ONE TOUCH ULTRA 2) w/Device KIT, Check bs , q fasting and 2 hr after meals (Patient not taking: Reported on 2/17/2021), Disp: 1 each, Rfl: 0    glucose blood test strip, Check blood sugar 3 times daily (Patient not taking: Reported on 2/17/2021), Disp: 100 each, Rfl: 3    Lancets (ONETOUCH ULTRASOFT) lancets, Check bs , q fasting and 2 hr after meals (Patient not taking: Reported on 2/17/2021), Disp: 100 each, Rfl: 5  Allergies   Allergen Reactions    Penicillins Itching       Review of Systems:  Review of Systems   Constitutional: Positive for activity change and fatigue  Negative for appetite change, chills, fever and unexpected weight change  Has days and nights mixed up   HENT: Negative  Eyes: Negative  Respiratory: Negative  Negative for cough and shortness of breath  Cardiovascular: Positive for leg swelling (bilat feet R>L)  Negative for chest pain  Gastrointestinal: Positive for abdominal pain, constipation, diarrhea and nausea  Negative for blood in stool and vomiting  Cramping with BMs  J tube intact but not in use at present time   Genitourinary: Negative  Negative for difficulty urinating, dysuria and hematuria  Musculoskeletal: Positive for back pain  Negative for gait problem  Skin: Negative  Allergic/Immunologic: Negative  Neurological: Negative for dizziness, speech difficulty, weakness, light-headedness, numbness and headaches  Hematological: Does not bruise/bleed easily     Psychiatric/Behavioral: Positive for dysphoric mood and sleep disturbance  There were no vitals filed for this visit  Imaging:Mri Brain W Wo Contrast    Result Date: 2/11/2021  Narrative: MRI BRAIN WITH AND WITHOUT CONTRAST INDICATION: Follow-up metastatic esophageal cancer  COMPARISON:  Brain MRI 11/6/2020 TECHNIQUE: Sagittal T1, axial T2, axial FLAIR, axial T1, axial Gary, axial diffusion  Sagittal, axial T1 postcontrast   Axial bravo postcontrast with coronal reconstructions  IV Contrast:  7 mL of Gadobutrol injection (SINGLE-DOSE)  IMAGE QUALITY:   Diagnostic  FINDINGS: BRAIN PARENCHYMA: Interval decreased size of known to metastatic lesions and improved perilesional edema, including: Left frontoparietal 7 mm lesion (series 11 image 103), previously 10 mm  Right parietal 4 mm cortical lesion (series 11 image 103), previously 6 mm  A focus of T2/FLAIR hyperintensity in the inferior left parietal white matter (series 5 image 20), is new from prior exam without associated enhancement  Remaining scattered foci of subcortical and periventricular white matter T2/FLAIR hyperintensities are stable may indicate chronic microangiopathy  Stable anterior right frontal encephalomalacia/gliosis with hemosiderin deposition  VENTRICLES:  Normal for the patient's age  SELLA AND PITUITARY GLAND:  Normal  ORBITS:  Normal  PARANASAL SINUSES:  Mild mucosal thickening of right maxillary sinuses  VASCULATURE:  Evaluation of the major intracranial vasculature demonstrates appropriate flow voids  CALVARIUM AND SKULL BASE:  Normal  EXTRACRANIAL SOFT TISSUES:  Normal      Impression: 1  Decreased size of two metastatic lesions in the left frontoparietal and right parietal lobes with improved surrounding vasogenic edema  No new enhancing lesion  2   New small focus of FLAIR hyperintensity in the inferior left parietal white matter without associated enhancement could be treatment related    Stable additional scattered white matter FLAIR hyperintense foci most consistent with chronic microangiopathy   Workstation performed: [id number]

## 2021-02-17 NOTE — TELEPHONE ENCOUNTER
Primary palliative medicine provider: Dr Hunter Reyes    Medication requested:Oxycodone 10mg tablets    If for pain, how has the patient been taking their pain medicine?      Last appointment: 1/14/2021    Next scheduled appointment: 3/18/2021    PDMP review    Filled  Written  02/02/2021  1  02/01/2021  OXYCODONE HCL 10 MG TABLET  90 0  15  SUN INT  [id number]  [facility] ([id number])  0  90 0 MME  [place]    01/13/2021  2  01/13/2021  OXYCODONE HCL 10 MG TABLET  90 0  15  SUN INT  [id number]  [facility] ([id number])  0  90 0 MME  [place]    12/28/2020  1  12/28/2020  OXYCODONE HCL 10 MG TABLET  90 0  15  SUN INT  [id number]  [facility] ([id number])  0  90 0 MME  [place]    12/10/2020  2  12/10/2020  HYDROCODONE-ACETAMIN 5-325 MG  20 0  4  SH SHWETA  [id number]  [facility] ([id number])  0  25 0 MME  [place]

## 2021-02-17 NOTE — PROGRESS NOTES
Follow-up - Radiation Oncology   Justa Valenzuela 1955 76 y o  male [id number]      History of Present Illness   Cancer Staging  No matching staging information was found for the patient  Interval History:    Patient present today for three month f/u  with follow-up brain MRI after SRS to the brain on 11/25/2020      Justa Valenzuela is a 76y o  year old male who presents with an adenocarcinoma of the GE junction who is status post neoadjuvant chemotherapy followed by surgical resection with transhiatal esophagectomy in late January 2020  Gene Cheng went to the emergency room with some lower extremity weakness and was found on CT and MRI imaging studies to have a large paraspinal mass from T4-T7 with secondary discitis-osteomyelitis due to infection and has concern over a possible leak   5/24/2020 MRI of the brain revealed a 4 mm nodular focus of enhancement in the paramedian left posterior frontal lobe that is potentially a metastatic lesion with subacute infarction not excluded   Short term interval follow-up MRI of the brain was recommended in 6-12 weeks by Radiology       12/1/2020-tele f/u med onc Dr Shahram Yang  Pt to have feeding tube out next week  Pt followed by rad onc and neurosurgery for brain lesion      12/1/2020-Pt went to [facility] s/p fall a few days prior  Had an increase in pain  Fractured his R hip, which was repaired 12/5/2020  He then had inpatient rehab after surgery and hospital stay      1/6/21-f/u thoracic surgery RADHA FARRIS/Dr WALLACE [facility] for repeat EGD and stent removal  J tube can be removed in the future if he maintains his weight     1/11/21-EGD with esophageal stent removal by Dr Mary Ritter  1/14/21-virtual f/u palliaitve Dr Chay Lovelace  Pain well controlled    Taking Oxycodone qhs for pain relief and during the day prn      1/18/21-CT chest abdomen pelvis w contrast  IMPRESSION:  CHEST:  1   Stable postsurgical changes related to esophagectomy with gastric pull-up   The surgical anastomosis and surgical field is unchanged  2   New moderate right pleural effusion  3   Mild tree-in-bud nodularity in the left lower lobe, nonspecific, with infectious or inflammatory etiologies being in the differential   Aspiration should also be considered      ABDOMEN AND PELVIS:  1   A new area of hypoattenuating nodularity in the lateral caudate raises suspicion for metastasis   This was not present previously   Please note that this would be a challenging location to percutaneously biopsy  2   Interval worsening of soft tissue thickening surrounding the proximal celiac and superior mesenteric arteries, again concerning for progression of neoplastic disease  3   Unchanged retrievable inferior vena cava filter         1/28/21-f/u surg onc Peterson  6 month follow up  Pt has a progression of findings in the abdomen  Suspicious for tumor recurrence or metastasis  Surgery not recommended at this time  Chemo indicated  Advised pt to keep feeding tube at this time  Pt needs to f/u with med onc      2/10/21-MRI brain w wo contrast  IMPRESSION:   1   Decreased size of two metastatic lesions in the left frontoparietal and right parietal lobes with improved surrounding vasogenic edema   No new enhancing lesion      2   New small focus of FLAIR hyperintensity in the inferior left parietal white matter without associated enhancement could be treatment related   Stable additional scattered white matter FLAIR hyperintense foci most consistent with chronic   Microangiopathy      2/16/21-f/u med onc Dr Ana North  Will start FOLFOX 6 in March 2, 2021 q 2 weeks  Will see pt prior to each chemo              Upcoming  3/18/21-f/u palliative care Dr Reese Cavanaugh       He denies any headaches  The patient notes that while he was sitting in a wheeled chair when trying to get up the chair slipped from under him and he fell sustaining a fracture requiring surgery  He  Received rehab    His ambulation is improving      Historical Information   Oncology History   Malignant neoplasm of lower third of esophagus (HCC) (Resolved)   8/24/2019 Initial Diagnosis    Malignant neoplasm of lower third of esophagus (HCC)  At least intramucosal carcinoma  Her2/SHIMON positive     10/8/2019 - 12/30/2019 Chemotherapy    palonosetron (ALOXI) injection 0 25 mg, 0 25 mg, Intravenous, Once, 6 of 6 cycles  Administration: 0 25 mg (10/8/2019), 0 25 mg (10/22/2019), 0 25 mg (11/5/2019), 0 25 mg (11/19/2019), 0 25 mg (12/3/2019), 0 25 mg (12/17/2019)  pegfilgrastim (NEULASTA ONPRO) subcutaneous injection kit 6 mg, 6 mg, Subcutaneous, Once, 6 of 6 cycles  Administration: 6 mg (10/9/2019), 6 mg (10/23/2019), 6 mg (11/6/2019), 6 mg (11/20/2019), 6 mg (12/4/2019), 6 mg (12/18/2019)  fosaprepitant (EMEND) 150 mg in sodium chloride 0 9 % 250 mL IVPB, 150 mg, Intravenous, Once, 6 of 6 cycles  Administration: 150 mg (10/8/2019), 150 mg (10/22/2019), 150 mg (11/5/2019), 150 mg (11/19/2019), 150 mg (12/3/2019), 150 mg (12/17/2019)  DOCEtaxel (TAXOTERE) 99 mg in sodium chloride 0 9 % 250 mL chemo infusion, 50 mg/m2 = 99 mg (83 3 % of original dose 60 mg/m2), Intravenous, Once, 6 of 6 cycles  Dose modification: 50 mg/m2 (original dose 60 mg/m2, Cycle 1, Reason: Other (See Comments))  Administration: 99 mg (10/8/2019), 99 mg (10/22/2019), 99 mg (11/5/2019), 99 mg (11/19/2019), 99 mg (12/3/2019), 99 mg (12/17/2019)  leucovorin 396 mg in dextrose 5 % 250 mL IVPB, 200 mg/m2 = 396 mg (50 % of original dose 400 mg/m2), Intravenous, Once, 6 of 6 cycles  Dose modification: 200 mg/m2 (original dose 400 mg/m2, Cycle 1, Reason: Other (See Comments), Comment: FLOT regimen standard)  Administration: 396 mg (10/8/2019), 396 mg (10/22/2019), 396 mg (11/5/2019), 396 mg (11/19/2019), 396 mg (12/3/2019), 396 mg (12/17/2019)  oxaliplatin (ELOXATIN) 168 3 mg in dextrose 5 % 250 mL chemo infusion, 85 mg/m2 = 168 3 mg, Intravenous, Once, 6 of 6 cycles  Administration: 168 3 mg (10/8/2019), 168 3 mg (10/22/2019), 168 3 mg (11/5/2019), 168 3 mg (11/19/2019), 168 3 mg (12/3/2019), 168 3 mg (12/17/2019)  trastuzumab (HERCEPTIN) 496 mg in sodium chloride 0 9 % 250 mL chemo infusion, 6 mg/kg = 496 mg, Intravenous, Once, 6 of 6 cycles  Administration: 496 mg (10/8/2019), 331 mg (10/22/2019), 331 mg (11/5/2019), 331 mg (11/19/2019), 331 mg (12/3/2019), 331 mg (12/17/2019)     1/28/2020 Surgery    Esophagogastrectomy  - Microscopic focus of residual adenocarcinoma in muscularis propria  - Ulceration and associated histologic changes compatible with prior neoadjuvant chemotherapy  - Adjacent betancur's esophagus with associated atypia indeterminate for dysplasia  - Seven lymph nodes identified; all negative for malignancy (0/7)       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[address] ) (Resolved)   8/24/2019 Biopsy    Esophagus (biopsy):  - At least 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 background of Betancur's esophagus and high grade dysplasia      9/24/2019 Initial Diagnosis    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HCC)     10/8/2019 - 12/30/2019 Chemotherapy    palonosetron (ALOXI) injection 0 25 mg, 0 25 mg, Intravenous, Once, 6 of 6 cycles  Administration: 0 25 mg (10/8/2019), 0 25 mg (10/22/2019), 0 25 mg (11/5/2019), 0 25 mg (11/19/2019), 0 25 mg (12/3/2019), 0 25 mg (12/17/2019)  pegfilgrastim (NEULASTA ONPRO) subcutaneous injection kit 6 mg, 6 mg, Subcutaneous, Once, 6 of 6 cycles  Administration: 6 mg (10/9/2019), 6 mg (10/23/2019), 6 mg (11/6/2019), 6 mg (11/20/2019), 6 mg (12/4/2019), 6 mg (12/18/2019)  fosaprepitant (EMEND) 150 mg in sodium chloride 0 9 % 250 mL IVPB, 150 mg, Intravenous, Once, 6 of 6 cycles  Administration: 150 mg (10/8/2019), 150 mg (10/22/2019), 150 mg (11/5/2019), 150 mg (11/19/2019), 150 mg (12/3/2019), 150 mg (12/17/2019)  DOCEtaxel (TAXOTERE) 99 mg in sodium chloride 0 9 % 250 mL chemo infusion, 50 mg/m2 = 99 mg (83 3 % of original dose 60 mg/m2), Intravenous, Once, 6 of 6 cycles  Dose modification: 50 mg/m2 (original dose 60 mg/m2, Cycle 1, Reason: Other (See Comments))  Administration: 99 mg (10/8/2019), 99 mg (10/22/2019), 99 mg (11/5/2019), 99 mg (11/19/2019), 99 mg (12/3/2019), 99 mg (12/17/2019)  leucovorin 396 mg in dextrose 5 % 250 mL IVPB, 200 mg/m2 = 396 mg (50 % of original dose 400 mg/m2), Intravenous, Once, 6 of 6 cycles  Dose modification: 200 mg/m2 (original dose 400 mg/m2, Cycle 1, Reason: Other (See Comments), Comment: FLOT regimen standard)  Administration: 396 mg (10/8/2019), 396 mg (10/22/2019), 396 mg (11/5/2019), 396 mg (11/19/2019), 396 mg (12/3/2019), 396 mg (12/17/2019)  oxaliplatin (ELOXATIN) 168 3 mg in dextrose 5 % 250 mL chemo infusion, 85 mg/m2 = 168 3 mg, Intravenous, Once, 6 of 6 cycles  Administration: 168 3 mg (10/8/2019), 168 3 mg (10/22/2019), 168 3 mg (11/5/2019), 168 3 mg (11/19/2019), 168 3 mg (12/3/2019), 168 3 mg (12/17/2019)  trastuzumab (HERCEPTIN) 496 mg in sodium chloride 0 9 % 250 mL chemo infusion, 6 mg/kg = 496 mg, Intravenous, Once, 6 of 6 cycles  Administration: 496 mg (10/8/2019), 331 mg (10/22/2019), 331 mg (11/5/2019), 331 mg (11/19/2019), 331 mg (12/3/2019), 331 mg (12/17/2019)     Brain metastases ([address] )   11/17/2020 Initial Diagnosis    Brain metastases ([address] )     11/25/2020 - 11/25/2020 Radiation    Plan ID Energy Fractions Dose per Fraction (cGy) Dose Correction (cGy) Total Dose Delivered (cGy) Elapsed Days   [id number] 6X 1 / 1 2,000 0 2,000 0    Lt Parietal, and Rt Parietal  Treatment dates:  C1 SRS: 11/25/2020 - 11/25/2020     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)   2/16/2021 Initial Diagnosis    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)     3/2/2021 -  Chemotherapy    fluorouracil (ADRUCIL) injection 735 mg, 400 mg/m2, Intravenous, Once, 0 of 6 cycles  leucovorin 736 mg in dextrose 5 % 250 mL IVPB, 400 mg/m2, Intravenous, Once, 0 of 6 cycles  oxaliplatin (ELOXATIN) 156 4 mg in dextrose 5 % 250 mL chemo infusion, 85 mg/m2, Intravenous, Once, 0 of 6 cycles  trastuzumab (HERCEPTIN) 402 mg in sodium chloride 0 9 % 250 mL chemo infusion, 6 mg/kg, Intravenous, Once, 0 of 6 cycles         Past Medical History:   Diagnosis Date    Anemia     Bilateral pleural effusion     Brain lesion     Brain metastases (HCC)     Brain tumor (HCC)     C  difficile diarrhea     Cancer (HCC)     [facility] es[address] [address]     COPD (chronic obstructive pulmonary disease) (HCC)     Diabetes mellitus ([facility] )     Difficulty swallowing     Disease of thyroid gland     Edema     Esophageal mass     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 9/24/2019    History of chemotherapy     History of transfusion     [facility] 9/17/2019    Diagnosis: clinical stage [id number] esophageal carcinoma Procedure: EGD, transhiatal esophagectomy performed on 1/28/20 Pathology: esophagogastrectomy reveals microscopic focus of residual adenocarcinoma in muscularis propria measuring 0 7 cm, adjacent Duong's esophagus with associated atypia indeterminate for dysplasia  7 lymph nodes negative for carcinoma  Proximal and distal margins negative for    Paraspinal abscess (HCC)     PONV (postoperative nausea and vomiting)     Port-A-Cath in place     LCW    Sleep apnea     no CPAP    SOB (shortness of breath)      Past Surgical History:   Procedure Laterality Date    BACK SURGERY      x2    DISC REMOVAL      ESOPHAGECTOMY N/A 1/28/2020    Procedure: ESOPHAGECTOMY, TRANSHIATAL;  Surgeon: Precious Ryan MD;  [facility];  Service: Surgical Oncology    ESOPHAGOGASTRODUODENOSCOPY N/A 1/28/2020    Procedure: ESOPHAGOGASTRODUODENOSCOPY (EGD);   Surgeon: Precious Ryan MD;  [facility];  Service: Surgical Oncology    FL GUIDED CENTRAL VENOUS ACCESS DEVICE INSERTION  9/26/2019    GASTROJEJUNOSTOMY W/ JEJUNOSTOMY TUBE N/A 9/26/2019    Procedure: INSERTION JEJUNOSTOMY TUBE OPEN;  Surgeon: Precious Ryan MD; [facility];  Service: Surgical Oncology    GASTROJEJUNOSTOMY W/ JEJUNOSTOMY TUBE N/A 6/5/2020    Procedure: INSERTION JEJUNOSTOMY TUBE OPEN;  Surgeon: Jobe Bloch, MD;  [facility];  Service: Surgical Oncology    IR CHEST TUBE PLACEMENT  5/26/2020    IR SPINE PROCEDURE  5/27/2020    IR THORACENTESIS  2/25/2020    JOINT REPLACEMENT      IA EGD ENDOSCOPIC STENT PLACEMENT W/WIRE& DILATION N/A 8/31/2020    Procedure: INSERTION STENT ESOPHAGEAL;  Surgeon: Lorne Hartley MD;  [facility];  Service: Thoracic    IA EGD INSERT GUIDE WIRE DILATOR PASSAGE ESOPHAGUS N/A 7/7/2020    Procedure: ESOPHAGOGASTRODUODENOSCOPY (EGD) with esophageal dilation under fluro with biopsy;  Surgeon: Lorne Hartley MD;  [facility];  Service: Thoracic    IA ESOPHAGOGASTRODUODENOSCOPY TRANSORAL DIAGNOSTIC N/A 4/28/2020    Procedure: ESOPHAGOGASTRODUODENOSCOPY (EGD); Surgeon: Lorne Hartley MD;  [facility];  Service: Thoracic    IA ESOPHAGOGASTRODUODENOSCOPY TRANSORAL DIAGNOSTIC N/A 7/27/2020    Procedure: ESOPHAGOGASTRODUODENOSCOPY (EGD);   Surgeon: Lorne Hartley MD;  [facility];  Service: Thoracic    IA ESOPHAGOGASTRODUODENOSCOPY TRANSORAL DIAGNOSTIC N/A 8/31/2020    Procedure: ESOPHAGOGASTRODUODENOSCOPY (EGD) dilation over guidewire 36-45 Fr , stent placement;  Surgeon: Lorne Hartley MD;  [facility];  Service: Thoracic    IA ESOPHAGOGASTRODUODENOSCOPY TRANSORAL DIAGNOSTIC N/A 1/11/2021    Procedure: ESOPHAGOGASTRODUODENOSCOPY (EGD): esophageal stent removal;  Surgeon: Lorne Hartley MD;  [facility];  Service: Thoracic    IA ESOPHAGOSCOPY FLEX Emilie Higashi <30 MM DIAM N/A 4/28/2020    Procedure: DILATATION ESOPHAGEAL;  Surgeon: Lorne Hartley MD;  [facility];  Service: Thoracic    IA ESOPHAGOSCOPY FLEX Emilie Higashi <30 MM DIAM N/A 7/27/2020    Procedure: DILATATION ESOPHAGEAL with Savary over the wire dilitation 33FR to 45FR; Surgeon: Jamilah Olivares MD;  [facility];  Service: Thoracic    UT ESOPHAGOSCOPY FLEX BALLOON DILAT <30 MM DIAM N/A 8/31/2020    Procedure: DILATATION ESOPHAGEAL;  Surgeon: Jamilah Olivares MD;  [facility];  Service: Thoracic    TONSILLECTOMY      TUNNELED VENOUS PORT PLACEMENT N/A 9/26/2019    Procedure: INSERTION VENOUS PORT (PORT-A-CATH);   Surgeon: Jolanta Felix MD;  [facility];  Service: Surgical Oncology    VOCAL CORD INJECTION N/A 2/7/2020    Procedure: MICROLARYNGOSCOPY WITH INJECTION;  Surgeon: Mary Angeles MD;  [facility];  Service: ENT       Social History   Social History     Substance and Sexual Activity   Alcohol Use Never    Frequency: Never     Social History     Substance and Sexual Activity   Drug Use Never     Social History     Tobacco Use   Smoking Status Former Smoker    Packs/day: 0 50    Years: 50 00    Pack years: 25 00    Types: Cigarettes    Quit date: 12/24/2017    Years since quitting: 3 1   Smokeless Tobacco Never Used         Meds/Allergies     Current Outpatient Medications:     amiodarone 200 mg tablet, TAKE 1 TABLET (200 MG TOTAL) BY MOUTH DAILY WITH BREAKFAST, Disp: 30 tablet, Rfl: 0    atorvastatin (LIPITOR) 40 mg tablet, Take 40 mg by mouth daily, Disp: , Rfl:     Eliquis 5 MG, Take 5 mg by mouth 2 (two) times a day, Disp: , Rfl:     ergocalciferol (VITAMIN D2) 50,000 units, Take 50,000 Units by mouth once a week, Disp: , Rfl:     levothyroxine 50 mcg tablet, TAKE 1 TABLET BY MOUTH EVERY DAY, Disp: 90 tablet, Rfl: 1    oxyCODONE (ROXICODONE) 10 MG TABS, Take 1 tablet (10 mg total) by mouth every 4 (four) hours as needed for moderate painMax Daily Amount: 60 mg, Disp: 90 tablet, Rfl: 0    Blood Glucose Monitoring Suppl (ONE TOUCH ULTRA 2) w/Device KIT, Check bs , q fasting and 2 hr after meals (Patient not taking: Reported on 2/17/2021), Disp: 1 each, Rfl: 0    glucose blood test strip, Check blood sugar 3 times daily (Patient not taking: Reported on 2/17/2021), Disp: 100 each, Rfl: 3    Lancets (ONETOUCH ULTRASOFT) lancets, Check bs , q fasting and 2 hr after meals (Patient not taking: Reported on 2/17/2021), Disp: 100 each, Rfl: 5  Allergies   Allergen Reactions    Penicillins Itching         Review of Systems   Constitutional: Positive for activity change and fatigue  Negative for appetite change, chills, fever and unexpected weight change  Has days and nights mixed up   HENT: Negative  Eyes: Negative  Respiratory: Negative  Negative for cough and shortness of breath  Cardiovascular: Positive for leg swelling (bilat feet R>L)  Negative for chest pain  Gastrointestinal: Positive for abdominal pain, constipation, diarrhea and nausea  Negative for blood in stool and vomiting  Cramping with BMs   J tube intact but not in use at present time   Genitourinary: Negative  Negative for difficulty urinating, dysuria and hematuria  Musculoskeletal: Positive for back pain  Negative for gait problem  Skin: Negative  Allergic/Immunologic: Negative  Neurological: Negative for dizziness, speech difficulty, weakness, light-headedness, numbness and headaches  Hematological: Does not bruise/bleed easily  Psychiatric/Behavioral: Positive for dysphoric mood and sleep disturbance  OBJECTIVE:   There were no vitals taken for this visit  Pain Assessment:  0  ECOG/Zubrod/WHO: 1 - 2    Physical Exam     He is conversing appropriately  His breathing is unlabored        RESULTS    Lab Results:   Recent Results (from the past 672 hour(s))   Fingerstick Glucose (POCT)    Collection Time: 02/10/21 10:40 AM   Result Value Ref Range    POC Glucose 90 65 - 140 mg/dl       Imaging Studies:Mri Brain W Wo Contrast    Result Date: 2/11/2021  Narrative: MRI BRAIN WITH AND WITHOUT CONTRAST INDICATION: Follow-up metastatic esophageal cancer   COMPARISON:  Brain MRI 11/6/2020 TECHNIQUE: Sagittal T1, axial T2, axial FLAIR, axial T1, axial [place], axial diffusion  Sagittal, axial T1 postcontrast   Axial bravo postcontrast with coronal reconstructions  IV Contrast:  7 mL of Gadobutrol injection (SINGLE-DOSE)  IMAGE QUALITY:   Diagnostic  FINDINGS: BRAIN PARENCHYMA: Interval decreased size of known to metastatic lesions and improved perilesional edema, including: Left frontoparietal 7 mm lesion (series 11 image 103), previously 10 mm  Right parietal 4 mm cortical lesion (series 11 image 103), previously 6 mm  A focus of T2/FLAIR hyperintensity in the inferior left parietal white matter (series 5 image 20), is new from prior exam without associated enhancement  Remaining scattered foci of subcortical and periventricular white matter T2/FLAIR hyperintensities are stable may indicate chronic microangiopathy  Stable anterior right frontal encephalomalacia/gliosis with hemosiderin deposition  VENTRICLES:  Normal for the patient's age  SELLA AND PITUITARY GLAND:  Normal  ORBITS:  Normal  PARANASAL SINUSES:  Mild mucosal thickening of right maxillary sinuses  VASCULATURE:  Evaluation of the major intracranial vasculature demonstrates appropriate flow voids  CALVARIUM AND SKULL BASE:  Normal  EXTRACRANIAL SOFT TISSUES:  Normal      Impression: 1  Decreased size of two metastatic lesions in the left frontoparietal and right parietal lobes with improved surrounding vasogenic edema  No new enhancing lesion  2   New small focus of FLAIR hyperintensity in the inferior left parietal white matter without associated enhancement could be treatment related  Stable additional scattered white matter FLAIR hyperintense foci most consistent with chronic microangiopathy  Workstation performed: [id number]           Assessment/Plan:  No orders of the defined types were placed in this encounter  Joseph Ledesma is a 76y o  year old male   Is 2 months status post SRS to the right and left parietal region for brain metastasis    He tolerated treatment well and currently has no symptoms related to his therapy  I reviewed with him and his daughter his MRI scan which reveals improvement in his treated lesions  On extracranial imaging there is noted to be progression and he has met with Medical Oncology with plan to start FOLFOX  I have ordered follow-up MRI of the brain in 3 months and he will return to Neuro-Oncology Clinic thereafter  Delaney Torres MD  2/17/2021,2:59 PM    Portions of the record may have been created with voice recognition software   Occasional wrong word or "sound a like" substitutions may have occurred due to the inherent limitations of voice recognition software   Read the chart carefully and recognize, using context, where substitutions have occurred

## 2021-02-18 ENCOUNTER — HOSPITAL ENCOUNTER (OUTPATIENT)
Dept: INFUSION CENTER | Facility: CLINIC | Age: 66
End: 2021-02-18

## 2021-02-24 ENCOUNTER — TELEMEDICINE (OUTPATIENT)
Dept: INTERVENTIONAL RADIOLOGY/VASCULAR | Facility: CLINIC | Age: 66
End: 2021-02-24
Payer: COMMERCIAL

## 2021-02-24 DIAGNOSIS — Z51.11 ENCOUNTER FOR ANTINEOPLASTIC CHEMOTHERAPY: Primary | ICD-10-CM

## 2021-02-24 DIAGNOSIS — C79.9 METASTATIC HER2 POSITIVE NEOPLASM OF GASTROESOPHAGEAL JUNCTION (HCC): ICD-10-CM

## 2021-02-24 DIAGNOSIS — C16.0 METASTATIC HER2 POSITIVE NEOPLASM OF GASTROESOPHAGEAL JUNCTION (HCC): ICD-10-CM

## 2021-02-24 DIAGNOSIS — D70.1 CHEMOTHERAPY INDUCED NEUTROPENIA (HCC): ICD-10-CM

## 2021-02-24 DIAGNOSIS — Z95.828 S/P IVC FILTER: ICD-10-CM

## 2021-02-24 DIAGNOSIS — I82.411 ACUTE DEEP VEIN THROMBOSIS (DVT) OF FEMORAL VEIN OF RIGHT LOWER EXTREMITY (HCC): ICD-10-CM

## 2021-02-24 DIAGNOSIS — T45.1X5A CHEMOTHERAPY INDUCED NEUTROPENIA (HCC): ICD-10-CM

## 2021-02-24 PROCEDURE — 99212 OFFICE O/P EST SF 10 MIN: CPT | Performed by: RADIOLOGY

## 2021-02-24 NOTE — PROGRESS NOTES
INTERPROFESSIONAL (PHONE) Unique Akhtar 76 y o  male MRN: [id number]  Encounter Date: 02/24/21      Reason for Consult / Principal Problem: IVC filter removal    Consulting Provider: Trenton Epstein MD    Requesting Provider: Tricia Segura MD       ASSESSMENT:  76year old male with history of adenocarcinoma of the GE junction, S/P surgical resection with metastatic disease to the brain s/p radiation treatment and progressive disease in the abdomen seen on CT abd/pelvis performed Jan 18 2021  I had a discussion with the patient's daughter  Currently the patient will be receiving palliative chemotherapy and is not a surgical candidate  At this time we weighed the risks and benefits for filter removal  I explained to her that complications from filters do occur, which include caval injury, filter fragmentation and thrombus formation below the the filter  We also discussed that although the patient is on anticoagulation, sometimes clots can still develop and if this were to happen and clot went to his pulmonary arteries given his brain metastasis he would not be a candidate for pharmaceutical lysis  In this scenario the filter may still be of benefit  There is also known complications from filter removal which include bleeding and caval injury  The plan at this time is to leave the filter in place and re-discuss potential filter retrieval in approx  3 months once repeat imaging is performed  If his cancer continues to progress a filter removal may not be necessary/indicated  If his cancer is responding to treatment well then I think removing the filter would be an option  RECOMMENDATIONS:  Leave IVC filter in place at this time  Will re-discuss filter removal once follow up imaging for disease progression has been completed      Total time spent in review of data, discussion with requesting provider and rendering advice was 11-20 Mins

## 2021-02-26 ENCOUNTER — APPOINTMENT (EMERGENCY)
Dept: RADIOLOGY | Facility: HOSPITAL | Age: 66
DRG: 444 | End: 2021-02-26
Payer: COMMERCIAL

## 2021-02-26 ENCOUNTER — HOSPITAL ENCOUNTER (OUTPATIENT)
Dept: INFUSION CENTER | Facility: CLINIC | Age: 66
Discharge: HOME | End: 2021-02-26
Payer: COMMERCIAL

## 2021-02-26 ENCOUNTER — TELEPHONE (OUTPATIENT)
Dept: HEMATOLOGY ONCOLOGY | Facility: CLINIC | Age: 66
End: 2021-02-26

## 2021-02-26 ENCOUNTER — HOSPITAL ENCOUNTER (INPATIENT)
Facility: HOSPITAL | Age: 66
LOS: 5 days | Discharge: HOME | DRG: 444 | End: 2021-03-03
Attending: EMERGENCY MEDICINE | Admitting: INTERNAL MEDICINE
Payer: COMMERCIAL

## 2021-02-26 VITALS — TEMPERATURE: 97 F

## 2021-02-26 DIAGNOSIS — K86.89 PANCREATIC MASS: ICD-10-CM

## 2021-02-26 DIAGNOSIS — E11.65 TYPE 2 DIABETES MELLITUS WITH HYPERGLYCEMIA, WITHOUT LONG-TERM CURRENT USE OF INSULIN (HCC): ICD-10-CM

## 2021-02-26 DIAGNOSIS — R10.9 ABDOMINAL PAIN: ICD-10-CM

## 2021-02-26 DIAGNOSIS — C16.0 METASTATIC HER2 POSITIVE NEOPLASM OF GASTROESOPHAGEAL JUNCTION (HCC): ICD-10-CM

## 2021-02-26 DIAGNOSIS — R17 JAUNDICE: ICD-10-CM

## 2021-02-26 DIAGNOSIS — C15.5 MALIGNANT NEOPLASM OF DISTAL THIRD OF ESOPHAGUS (HCC): ICD-10-CM

## 2021-02-26 DIAGNOSIS — C15.5 MALIGNANT NEOPLASM OF DISTAL THIRD OF ESOPHAGUS (HCC): Primary | ICD-10-CM

## 2021-02-26 DIAGNOSIS — C79.9 METASTATIC HER2 POSITIVE NEOPLASM OF GASTROESOPHAGEAL JUNCTION (HCC): ICD-10-CM

## 2021-02-26 DIAGNOSIS — K83.1 COMMON BILE DUCT OBSTRUCTION: Primary | ICD-10-CM

## 2021-02-26 DIAGNOSIS — E43 SEVERE PROTEIN-CALORIE MALNUTRITION (HCC): ICD-10-CM

## 2021-02-26 DIAGNOSIS — R74.8 ELEVATED LIVER ENZYMES: ICD-10-CM

## 2021-02-26 DIAGNOSIS — C15.9 ESOPHAGEAL CANCER (HCC): ICD-10-CM

## 2021-02-26 DIAGNOSIS — Z51.11 ENCOUNTER FOR ANTINEOPLASTIC CHEMOTHERAPY: ICD-10-CM

## 2021-02-26 PROBLEM — K83.8 DILATION OF BILIARY TRACT: Status: ACTIVE | Noted: 2021-02-26

## 2021-02-26 LAB
ALBUMIN SERPL BCP-MCNC: 2.5 G/DL (ref 3.5–5)
ALP SERPL-CCNC: 1135 U/L (ref 46–116)
ALT SERPL W P-5'-P-CCNC: 103 U/L (ref 12–78)
ANION GAP SERPL CALCULATED.3IONS-SCNC: 9 MMOL/L (ref 4–13)
AST SERPL W P-5'-P-CCNC: 175 U/L (ref 5–45)
BASOPHILS # BLD AUTO: 0.03 THOUSANDS/ΜL (ref 0–0.1)
BASOPHILS NFR BLD AUTO: 1 % (ref 0–1)
BILIRUB DIRECT SERPL-MCNC: 4.98 MG/DL (ref 0–0.2)
BILIRUB SERPL-MCNC: 7.1 MG/DL (ref 0.2–1)
BUN SERPL-MCNC: 12 MG/DL (ref 5–25)
CALCIUM ALBUM COR SERPL-MCNC: 10.1 MG/DL (ref 8.3–10.1)
CALCIUM SERPL-MCNC: 8.9 MG/DL (ref 8.3–10.1)
CHLORIDE SERPL-SCNC: 103 MMOL/L (ref 100–108)
CO2 SERPL-SCNC: 27 MMOL/L (ref 21–32)
CREAT SERPL-MCNC: 1.17 MG/DL (ref 0.6–1.3)
EOSINOPHIL # BLD AUTO: 0.12 THOUSAND/ΜL (ref 0–0.61)
EOSINOPHIL NFR BLD AUTO: 3 % (ref 0–6)
ERYTHROCYTE [DISTWIDTH] IN BLOOD BY AUTOMATED COUNT: 15.5 % (ref 11.6–15.1)
GFR SERPL CREATININE-BSD FRML MDRD: 65 ML/MIN/1.73SQ M
GLUCOSE SERPL-MCNC: 103 MG/DL (ref 65–140)
HCT VFR BLD AUTO: 32.2 % (ref 36.5–49.3)
HGB BLD-MCNC: 10 G/DL (ref 12–17)
IMM GRANULOCYTES # BLD AUTO: 0.02 THOUSAND/UL (ref 0–0.2)
IMM GRANULOCYTES NFR BLD AUTO: 1 % (ref 0–2)
INR PPP: 1.55 (ref 0.84–1.19)
LACTATE SERPL-SCNC: 1.1 MMOL/L (ref 0.5–2)
LIPASE SERPL-CCNC: 3242 U/L (ref 73–393)
LYMPHOCYTES # BLD AUTO: 0.45 THOUSANDS/ΜL (ref 0.6–4.47)
LYMPHOCYTES NFR BLD AUTO: 12 % (ref 14–44)
MAGNESIUM SERPL-MCNC: 2.1 MG/DL (ref 1.6–2.6)
MCH RBC QN AUTO: 26.1 PG (ref 26.8–34.3)
MCHC RBC AUTO-ENTMCNC: 31.1 G/DL (ref 31.4–37.4)
MCV RBC AUTO: 84 FL (ref 82–98)
MONOCYTES # BLD AUTO: 0.31 THOUSAND/ΜL (ref 0.17–1.22)
MONOCYTES NFR BLD AUTO: 8 % (ref 4–12)
NEUTROPHILS # BLD AUTO: 2.99 THOUSANDS/ΜL (ref 1.85–7.62)
NEUTS SEG NFR BLD AUTO: 75 % (ref 43–75)
NRBC BLD AUTO-RTO: 0 /100 WBCS
PLATELET # BLD AUTO: 128 THOUSANDS/UL (ref 149–390)
PMV BLD AUTO: 10.1 FL (ref 8.9–12.7)
POTASSIUM SERPL-SCNC: 3.4 MMOL/L (ref 3.5–5.3)
PROT SERPL-MCNC: 6.9 G/DL (ref 6.4–8.2)
PROTHROMBIN TIME: 18.5 SECONDS (ref 11.6–14.5)
RBC # BLD AUTO: 3.83 MILLION/UL (ref 3.88–5.62)
SODIUM SERPL-SCNC: 139 MMOL/L (ref 136–145)
WBC # BLD AUTO: 3.92 THOUSAND/UL (ref 4.31–10.16)

## 2021-02-26 PROCEDURE — 87040 BLOOD CULTURE FOR BACTERIA: CPT | Performed by: EMERGENCY MEDICINE

## 2021-02-26 PROCEDURE — G1004 CDSM NDSC: HCPCS

## 2021-02-26 PROCEDURE — 85610 PROTHROMBIN TIME: CPT | Performed by: EMERGENCY MEDICINE

## 2021-02-26 PROCEDURE — 82248 BILIRUBIN DIRECT: CPT | Performed by: EMERGENCY MEDICINE

## 2021-02-26 PROCEDURE — 83690 ASSAY OF LIPASE: CPT | Performed by: EMERGENCY MEDICINE

## 2021-02-26 PROCEDURE — 99223 1ST HOSP IP/OBS HIGH 75: CPT | Performed by: INTERNAL MEDICINE

## 2021-02-26 PROCEDURE — 96376 TX/PRO/DX INJ SAME DRUG ADON: CPT

## 2021-02-26 PROCEDURE — 36415 COLL VENOUS BLD VENIPUNCTURE: CPT | Performed by: EMERGENCY MEDICINE

## 2021-02-26 PROCEDURE — NC001 PR NO CHARGE: Performed by: SURGERY

## 2021-02-26 PROCEDURE — 96361 HYDRATE IV INFUSION ADD-ON: CPT

## 2021-02-26 PROCEDURE — 80053 COMPREHEN METABOLIC PANEL: CPT

## 2021-02-26 PROCEDURE — 83605 ASSAY OF LACTIC ACID: CPT | Performed by: EMERGENCY MEDICINE

## 2021-02-26 PROCEDURE — 99285 EMERGENCY DEPT VISIT HI MDM: CPT

## 2021-02-26 PROCEDURE — 85025 COMPLETE CBC W/AUTO DIFF WBC: CPT

## 2021-02-26 PROCEDURE — 74177 CT ABD & PELVIS W/CONTRAST: CPT

## 2021-02-26 PROCEDURE — 83735 ASSAY OF MAGNESIUM: CPT

## 2021-02-26 PROCEDURE — 96375 TX/PRO/DX INJ NEW DRUG ADDON: CPT

## 2021-02-26 PROCEDURE — 96367 TX/PROPH/DG ADDL SEQ IV INF: CPT

## 2021-02-26 PROCEDURE — 96365 THER/PROPH/DIAG IV INF INIT: CPT

## 2021-02-26 PROCEDURE — 99291 CRITICAL CARE FIRST HOUR: CPT | Performed by: EMERGENCY MEDICINE

## 2021-02-26 RX ORDER — ATORVASTATIN CALCIUM 40 MG/1
40 TABLET, FILM COATED ORAL DAILY
Status: DISCONTINUED | OUTPATIENT
Start: 2021-02-27 | End: 2021-03-03 | Stop reason: HOSPADM

## 2021-02-26 RX ORDER — HYDROMORPHONE HCL/PF 1 MG/ML
0.5 SYRINGE (ML) INJECTION ONCE
Status: COMPLETED | OUTPATIENT
Start: 2021-02-26 | End: 2021-02-26

## 2021-02-26 RX ORDER — AMIODARONE HYDROCHLORIDE 200 MG/1
200 TABLET ORAL
Status: DISCONTINUED | OUTPATIENT
Start: 2021-02-27 | End: 2021-03-03 | Stop reason: HOSPADM

## 2021-02-26 RX ORDER — ONDANSETRON 2 MG/ML
4 INJECTION INTRAMUSCULAR; INTRAVENOUS EVERY 6 HOURS PRN
Status: DISCONTINUED | OUTPATIENT
Start: 2021-02-26 | End: 2021-03-03 | Stop reason: HOSPADM

## 2021-02-26 RX ORDER — LEVOTHYROXINE SODIUM 0.05 MG/1
50 TABLET ORAL
Status: DISCONTINUED | OUTPATIENT
Start: 2021-02-27 | End: 2021-03-03 | Stop reason: HOSPADM

## 2021-02-26 RX ORDER — OXYCODONE HYDROCHLORIDE 10 MG/1
10 TABLET ORAL EVERY 4 HOURS PRN
Status: DISCONTINUED | OUTPATIENT
Start: 2021-02-26 | End: 2021-02-27

## 2021-02-26 RX ORDER — SODIUM CHLORIDE, SODIUM GLUCONATE, SODIUM ACETATE, POTASSIUM CHLORIDE, MAGNESIUM CHLORIDE, SODIUM PHOSPHATE, DIBASIC, AND POTASSIUM PHOSPHATE .53; .5; .37; .037; .03; .012; .00082 G/100ML; G/100ML; G/100ML; G/100ML; G/100ML; G/100ML; G/100ML
100 INJECTION, SOLUTION INTRAVENOUS CONTINUOUS
Status: DISCONTINUED | OUTPATIENT
Start: 2021-02-26 | End: 2021-03-02

## 2021-02-26 RX ADMIN — HYDROMORPHONE HYDROCHLORIDE 0.5 MG: 1 INJECTION, SOLUTION INTRAMUSCULAR; INTRAVENOUS; SUBCUTANEOUS at 17:01

## 2021-02-26 RX ADMIN — SODIUM CHLORIDE 1000 ML: 0.9 INJECTION, SOLUTION INTRAVENOUS at 18:47

## 2021-02-26 RX ADMIN — HYDROMORPHONE HYDROCHLORIDE 0.5 MG: 1 INJECTION, SOLUTION INTRAMUSCULAR; INTRAVENOUS; SUBCUTANEOUS at 21:15

## 2021-02-26 RX ADMIN — METRONIDAZOLE 500 MG: 500 INJECTION, SOLUTION INTRAVENOUS at 19:39

## 2021-02-26 RX ADMIN — SODIUM CHLORIDE 1000 ML: 0.9 INJECTION, SOLUTION INTRAVENOUS at 17:03

## 2021-02-26 RX ADMIN — SODIUM CHLORIDE, SODIUM GLUCONATE, SODIUM ACETATE, POTASSIUM CHLORIDE, MAGNESIUM CHLORIDE, SODIUM PHOSPHATE, DIBASIC, AND POTASSIUM PHOSPHATE 100 ML/HR: .53; .5; .37; .037; .03; .012; .00082 INJECTION, SOLUTION INTRAVENOUS at 22:50

## 2021-02-26 RX ADMIN — CEFEPIME HYDROCHLORIDE 2000 MG: 2 INJECTION, POWDER, FOR SOLUTION INTRAVENOUS at 20:31

## 2021-02-26 RX ADMIN — IOHEXOL 100 ML: 350 INJECTION, SOLUTION INTRAVENOUS at 18:28

## 2021-02-26 NOTE — TELEPHONE ENCOUNTER
Spoke to Ahsan Cr, patient's daughter  Advised her Dr Phil Lopez reviewed Fabiano's labs, he is to go to the ER for an evaluation due to his elevated Bilirubin, ALT/AST and ALK PHOS  Informed her this cannot be managed outpatient therefore, Dr Phil Lopez would like Carlos Guajardo to go to the ER  Advised her we will not be able to treat the patient with chemotherapy next due to the elevated numbers  Ahsan Cr stated she will take him now  ADT 21 order placed

## 2021-02-26 NOTE — PLAN OF CARE
Problem: Potential for Falls  Goal: Patient will remain free of falls  Description: INTERVENTIONS:  - Assess patient frequently for physical needs  -  Identify cognitive and physical deficits and behaviors that affect risk of falls  -  Posey fall precautions as indicated by assessment   - Educate patient/family on patient safety including physical limitations  - Instruct patient to call for assistance with activity based on assessment  - Modify environment to reduce risk of injury  - Consider OT/PT consult to assist with strengthening/mobility  Outcome: Progressing     Problem: Knowledge Deficit  Goal: Patient/family/caregiver demonstrates understanding of disease process, treatment plan, medications, and discharge instructions  Description: Complete learning assessment and assess knowledge base    Interventions:  - Provide teaching at level of understanding  - Provide teaching via preferred learning methods  Outcome: Progressing

## 2021-02-26 NOTE — TELEPHONE ENCOUNTER
Received a message from [address] [facility] asking to divert patient to [facility] as IR Services not available over the weekend at [address]  Spoke to Reginaldo if she is able to take Alex Ellis to [facility] and advised her of the reason above, Reginaldo said she will take him to [facility]

## 2021-02-26 NOTE — ED ATTENDING ATTESTATION
2/26/2021  ISofia MD, saw and evaluated the patient  I have discussed the patient with the resident/non-physician practitioner and agree with the resident's/non-physician practitioner's findings, Plan of Care, and MDM as documented in the resident's/non-physician practitioner's note, except where noted  All available labs and Radiology studies were reviewed  I was present for key portions of any procedure(s) performed by the resident/non-physician practitioner and I was immediately available to provide assistance  At this point I agree with the current assessment done in the Emergency Department  I have conducted an independent evaluation of this patient a history and physical is as follows:    ED Course         Critical Care Time  CriticalCare Time  Performed by: Sofia Gallegos MD  Authorized by: Sofia Gallegos MD     Critical care provider statement:     Critical care time (minutes):  31    Critical care time was exclusive of:  Separately billable procedures and treating other patients and teaching time    Critical care was necessary to treat or prevent imminent or life-threatening deterioration of the following conditions:  Dehydration, sepsis and hepatic failure    Critical care was time spent personally by me on the following activities:  Ordering and review of radiographic studies, discussions with consultants, evaluation of patient's response to treatment, re-evaluation of patient's condition and examination of patient        77 yo male sent in for elevated liver enzymes done as outpatient, sent in by oncologist  Pt with hx of esophageal ca s/p resection  Pt with having intermittent diarrhea, abdominal pain, no n/v  No fever, no cp, no sob  Vss, afebrile, lungs cta, rrr, abdomen soft tender lower abdomen , pedal edema  Direct bili, lipase, inr, ct a/p ivf septic workup

## 2021-02-26 NOTE — ED NOTES
Attempt made for blood work and IV, unable to obtain IV, but able to get blood work  Dr Mushtaq Ferro made aware        Michelle Huang, RN  02/26/21 5631

## 2021-02-26 NOTE — ED PROVIDER NOTES
History  Chief Complaint   Patient presents with    Abnormal Lab     pt states he got blood work done this morning and the doctor called his daughter and told them to come to the ED  Serjio Laboy is a 76y o  year old male with history of metastatic adenocarcinoma of the GE junction who is status post chemotherapy and radiation as well as surgical resection, G-tube placement, diabetes, COPD, hypothyroidism, presents today for elevated LFTs/billirubin on outpatient labs  Per chart review, the patient was found to have bilirubin of 7 1, , , alk-phos 1135  He was supposed to start chemotherapy next week but was sent in by his oncologist for further evaluate patient of these abnormal labs  Patient admits to having pain across his lower abdomen for the past 2 weeks, waxing waning, sharp, nonradiating, moderate to severe in severity, no modifying factors  He he has had intermittent diarrhea  He denies nausea or vomiting  Has not noticed jaundice or changes in stool or urine color  Denies fever, chills, cough, chest pain, SOB, headache, any other complaints  d          Prior to Admission Medications   Prescriptions Last Dose Informant Patient Reported? Taking?    Blood Glucose Monitoring Suppl (ONE TOUCH ULTRA 2) w/Device KIT  Self No No   Sig: Check bs , q fasting and 2 hr after meals   Patient not taking: Reported on 2/17/2021   Eliquis 5 MG  Self Yes No   Sig: Take 5 mg by mouth 2 (two) times a day   Lancets (ONETOUCH ULTRASOFT) lancets  Self No No   Sig: Check bs , q fasting and 2 hr after meals   Patient not taking: Reported on 2/17/2021   amiodarone 200 mg tablet   No No   Sig: TAKE 1 TABLET (200 MG TOTAL) BY MOUTH DAILY WITH BREAKFAST   atorvastatin (LIPITOR) 40 mg tablet  Self Yes No   Sig: Take 40 mg by mouth daily   ergocalciferol (VITAMIN D2) 50,000 units   Yes No   Sig: Take 50,000 Units by mouth once a week   fluorouracil 4,415 mg in CADD infusion pump   No No   Sig: Infuse 4,415 mg (1,200 mg/m2/day x 1 84 m2 (Treatment plan recorded BSA)) into a venous catheter over 46 hours for 2 days   glucose blood test strip  Self No No   Sig: Check blood sugar 3 times daily   Patient not taking: Reported on 2/17/2021   levothyroxine 50 mcg tablet   No No   Sig: TAKE 1 TABLET BY MOUTH EVERY DAY   oxyCODONE (ROXICODONE) 10 MG TABS   No No   Sig: Take 1 tablet (10 mg total) by mouth every 4 (four) hours as needed for moderate painMax Daily Amount: 60 mg      Facility-Administered Medications: None       Past Medical History:   Diagnosis Date    Anemia     Bilateral pleural effusion     Brain lesion     Brain metastases (HCC)     Brain tumor (HCC)     C  difficile diarrhea     Cancer (HCC)     [facility] esophagus ([facility] )     COPD (chronic obstructive pulmonary disease) (HCC)     Diabetes mellitus ([facility] )     Difficulty swallowing     Disease of thyroid gland     Edema     Esophageal mass     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[facility] ) 9/24/2019    History of chemotherapy     History of transfusion     Malignant neoplasm of lower third of esophagus ([facility] ) 9/17/2019    Diagnosis: clinical stage T3N1M0 esophageal carcinoma Procedure: EGD, transhiatal esophagectomy performed on 1/28/20 Pathology: esophagogastrectomy reveals microscopic focus of residual adenocarcinoma in muscularis propria measuring 0 7 cm, adjacent Duong's esophagus with associated atypia indeterminate for dysplasia  7 lymph nodes negative for carcinoma   Proximal and distal margins negative for    Paraspinal abscess (HCC)     PONV (postoperative nausea and vomiting)     Port-A-Cath in place     LCW    Sleep apnea     no CPAP    SOB (shortness of breath)        Past Surgical History:   Procedure Laterality Date    BACK SURGERY      x2    DISC REMOVAL      ESOPHAGECTOMY N/A 1/28/2020    Procedure: ESOPHAGECTOMY, TRANSHIATAL;  Surgeon: Crystal Gallagher MD;  [facility];  Service: Surgical Oncology    ESOPHAGOGASTRODUODENOSCOPY N/A 1/28/2020    Procedure: ESOPHAGOGASTRODUODENOSCOPY (EGD); Surgeon: Yamilka Kraft MD;  [facility];  Service: Surgical Oncology    FL GUIDED CENTRAL VENOUS ACCESS DEVICE INSERTION  9/26/2019    GASTROJEJUNOSTOMY W/ JEJUNOSTOMY TUBE N/A 9/26/2019    Procedure: INSERTION JEJUNOSTOMY TUBE OPEN;  Surgeon: Yamilka Kraft MD;  [facility];  Service: Surgical Oncology    GASTROJEJUNOSTOMY W/ JEJUNOSTOMY TUBE N/A 6/5/2020    Procedure: INSERTION JEJUNOSTOMY TUBE OPEN;  Surgeon: Yamilka Kraft MD;  [facility];  Service: Surgical Oncology    IR CHEST TUBE PLACEMENT  5/26/2020    IR SPINE PROCEDURE  5/27/2020    IR THORACENTESIS  2/25/2020    JOINT REPLACEMENT      FL EGD ENDOSCOPIC STENT PLACEMENT W/WIRE& DILATION N/A 8/31/2020    Procedure: INSERTION STENT ESOPHAGEAL;  Surgeon: Wilfred Smith MD;  [facility];  Service: Thoracic    FL EGD INSERT GUIDE WIRE DILATOR PASSAGE ESOPHAGUS N/A 7/7/2020    Procedure: ESOPHAGOGASTRODUODENOSCOPY (EGD) with esophageal dilation under fluro with biopsy;  Surgeon: Wilfred Smith MD;  [facility];  Service: Thoracic    FL ESOPHAGOGASTRODUODENOSCOPY TRANSORAL DIAGNOSTIC N/A 4/28/2020    Procedure: ESOPHAGOGASTRODUODENOSCOPY (EGD); Surgeon: Wilfred Smith MD;  [facility];  Service: Thoracic    FL ESOPHAGOGASTRODUODENOSCOPY TRANSORAL DIAGNOSTIC N/A 7/27/2020    Procedure: ESOPHAGOGASTRODUODENOSCOPY (EGD);   Surgeon: Wilfred Smith MD;  [facility];  Service: Thoracic    FL ESOPHAGOGASTRODUODENOSCOPY TRANSORAL DIAGNOSTIC N/A 8/31/2020    Procedure: ESOPHAGOGASTRODUODENOSCOPY (EGD) dilation over guidewire 36-45 Fr , stent placement;  Surgeon: Wilfred Smith MD;  [facility];  Service: Thoracic    FL ESOPHAGOGASTRODUODENOSCOPY TRANSORAL DIAGNOSTIC N/A 1/11/2021    Procedure: ESOPHAGOGASTRODUODENOSCOPY (EGD): esophageal stent removal;  Surgeon: Wilfred Smith MD;  [facility]; Service: Thoracic    WY ESOPHAGOSCOPY FLEX BALLOON DILAT <30 MM DIAM N/A 4/28/2020    Procedure: DILATATION ESOPHAGEAL;  Surgeon: Peterson De La Rosa MD;  [facility];  Service: Thoracic    WY ESOPHAGOSCOPY FLEX BALLOON DILAT <30 MM DIAM N/A 7/27/2020    Procedure: DILATATION ESOPHAGEAL with Savary over the wire dilitation 33FR to 45FR; Surgeon: Peterson De La Rosa MD;  [facility];  Service: Thoracic    WY ESOPHAGOSCOPY FLEX BALLOON DILAT <30 MM DIAM N/A 8/31/2020    Procedure: DILATATION ESOPHAGEAL;  Surgeon: Peterson De La Rosa MD;  [facility];  Service: Thoracic    TONSILLECTOMY      TUNNELED VENOUS PORT PLACEMENT N/A 9/26/2019    Procedure: INSERTION VENOUS PORT (PORT-A-CATH); Surgeon: Crystal Gallagher MD;  [facility];  Service: Surgical Oncology    VOCAL CORD INJECTION N/A 2/7/2020    Procedure: MICROLARYNGOSCOPY WITH INJECTION;  Surgeon: Krzysztof Lehman MD;  [facility];  Service: ENT       Family History   Problem Relation Age of Onset    Diabetes Mother     No Known Problems Father      I have reviewed and agree with the history as documented  E-Cigarette/Vaping    E-Cigarette Use Never User      E-Cigarette/Vaping Substances    Nicotine No     THC No     CBD No     Flavoring No     Other No     Unknown No      Social History     Tobacco Use    Smoking status: Former Smoker     Packs/day: 0 50     Years: 50 00     Pack years: 25 00     Types: Cigarettes     Quit date: 12/24/2017     Years since quitting: 3 1    Smokeless tobacco: Never Used   Substance Use Topics    Alcohol use: Never     Frequency: Never    Drug use: Never        Review of Systems   Constitutional: Negative  Negative for chills and fever  HENT: Negative  Negative for congestion and rhinorrhea  Eyes: Negative  Respiratory: Negative  Negative for cough and shortness of breath  Cardiovascular: Negative  Negative for chest pain and leg swelling     Gastrointestinal: Positive for abdominal pain and diarrhea  Negative for abdominal distention, nausea and vomiting  Musculoskeletal: Negative  Negative for back pain and neck pain  Skin: Negative  Negative for rash  Neurological: Negative  Negative for light-headedness and headaches  Hematological: Negative  All other systems reviewed and are negative  Physical Exam  ED Triage Vitals [02/26/21 1329]   Temperature Pulse Respirations Blood Pressure SpO2   (!) 97 2 °F (36 2 °C) 72 20 92/60 97 %      Temp Source Heart Rate Source Patient Position - Orthostatic VS BP Location FiO2 (%)   Tympanic Monitor Sitting Left arm --      Pain Score       4             Orthostatic Vital Signs  Vitals:    02/26/21 1329 02/26/21 1713 02/26/21 1845 02/26/21 2120   BP: 92/60 143/81 150/76 138/83   Pulse: 72 64 64 87   Patient Position - Orthostatic VS: Sitting Lying Lying        Physical Exam  Vitals signs and nursing note reviewed  Constitutional:       General: He is not in acute distress  Appearance: He is well-developed and underweight  He is ill-appearing  HENT:      Head: Normocephalic and atraumatic  Mouth/Throat:      Mouth: Mucous membranes are moist    Eyes:      General: Scleral icterus present  Pupils: Pupils are equal, round, and reactive to light  Neck:      Musculoskeletal: Normal range of motion and neck supple  Cardiovascular:      Rate and Rhythm: Normal rate and regular rhythm  Heart sounds: Normal heart sounds  No murmur  No friction rub  No gallop  Pulmonary:      Effort: Pulmonary effort is normal  No respiratory distress  Breath sounds: Normal breath sounds  No stridor  No wheezing, rhonchi or rales  Abdominal:      General: Abdomen is flat  There is no distension  Palpations: Abdomen is soft  Tenderness: There is abdominal tenderness (across low abdomen)  There is no right CVA tenderness, left CVA tenderness, guarding or rebound        Comments: G-tube present Musculoskeletal: Normal range of motion  General: No swelling or tenderness  Right lower leg: No edema  Left lower leg: No edema  Skin:     General: Skin is warm and dry  Capillary Refill: Capillary refill takes less than 2 seconds  Coloration: Skin is jaundiced  Neurological:      Mental Status: He is alert and oriented to person, place, and time  Comments: Clear fluent speech         ED Medications  Medications   sodium chloride 0 9 % bolus 1,000 mL (0 mL Intravenous Stopped 2/26/21 1846)   HYDROmorphone (DILAUDID) injection 0 5 mg (0 5 mg Intravenous Given 2/26/21 1701)   sodium chloride 0 9 % bolus 1,000 mL (0 mL Intravenous Stopped 2/26/21 2103)   iohexol (OMNIPAQUE) 350 MG/ML injection (SINGLE-DOSE) 100 mL (100 mL Intravenous Given 2/26/21 1828)   cefepime (MAXIPIME) 2 g/50 mL dextrose IVPB (0 mg Intravenous Stopped 2/26/21 2103)   metroNIDAZOLE (FLAGYL) IVPB (premix) 500 mg 100 mL (0 mg Intravenous Stopped 2/26/21 2030)   HYDROmorphone (DILAUDID) injection 0 5 mg (0 5 mg Intravenous Given 2/26/21 2115)       Diagnostic Studies  Results Reviewed     Procedure Component Value Units Date/Time    Blood culture #1 [[id number]] Collected: 02/26/21 1544    Lab Status: Preliminary result Specimen: Blood from Hand, Right Updated: 02/26/21 1801     Blood Culture Received in Microbiology Lab  Culture in Progress  Blood culture #2 [[id number]] Collected: 02/26/21 1544    Lab Status: Preliminary result Specimen: Blood from Arm, Left Updated: 02/26/21 1801     Blood Culture Received in Microbiology Lab  Culture in Progress      Protime-INR [[id number]]  (Abnormal) Collected: 02/26/21 1544    Lab Status: Final result Specimen: Blood from Arm, Left Updated: 02/26/21 1650     Protime 18 5 seconds      INR 1 55    Lactic acid, plasma [[id number]]  (Normal) Collected: 02/26/21 1544    Lab Status: Final result Specimen: Blood from Arm, Left Updated: 02/26/21 1627     LACTIC ACID 1 1 mmol/L Narrative:      Result may be elevated if tourniquet was used during collection  Bilirubin, direct [[id number]]  (Abnormal) Collected: 02/26/21 1544    Lab Status: Final result Specimen: Blood from Arm, Left Updated: 02/26/21 1622     Bilirubin, Direct 4 98 mg/dL     Lipase [[id number]]  (Abnormal) Collected: 02/26/21 1044    Lab Status: Final result Specimen: Blood from Central Venous Line Updated: 02/26/21 1618     Lipase 3,242 u/L                  CT abdomen pelvis with contrast   Final Result by Lissette Plummer MD (02/26 2005)      Interval development of severe intra and extrahepatic biliary dilatation to the level of the pancreatic head  Appears to be caused by progression of infiltrating tumor mass inseparable from the posterior margin of the pancreatic head and probably    obstructing the distal common duct  Also, there is tumefactive sludge or noncalcified stone in the distal common duct  Progression of small hepatic metastatic lesion and development of new small hepatic metastatic lesions  Progression of infiltrating paraceliac tumor now completely obstructing splenic vein and near completely obstructing the upper superior mesenteric vein with slight increase in of developing venous collaterals in the mesentery  Progression of loculated effusion in the base of the right hemithorax  Interval development of small volume of complex abdominal pelvic ascites  This examination was marked "immediate notification" in Epic in order to begin the standard process by which the radiology reading room liaison alerts the referring practitioner  Workstation performed: [id number]               Procedures  Procedures      ED Course                             SBIRT 20yo+      Most Recent Value   SBIRT (25 yo +)   In order to provide better care to our patients, we are screening all of our patients for alcohol and drug use  Would it be okay to ask you these screening questions?   Yes Filed at: 02/26/2021 1504   Initial Alcohol Screen: US AUDIT-C    1  How often do you have a drink containing alcohol?  0 Filed at: 02/26/2021 1504   2  How many drinks containing alcohol do you have on a typical day you are drinking? 0 Filed at: 02/26/2021 1504   3a  Male UNDER 65: How often do you have five or more drinks on one occasion? 0 Filed at: 02/26/2021 1504   3b  FEMALE Any Age, or MALE 65+: How often do you have 4 or more drinks on one occassion? 0 Filed at: 02/26/2021 1504   Audit-C Score  0 Filed at: 02/26/2021 1504   CHRISTOPHER: How many times in the past year have you    Used an illegal drug or used a prescription medication for non-medical reasons? Never Filed at: 02/26/2021 1504                MDM  Number of Diagnoses or Management Options  Abdominal pain:   Common bile duct obstruction:   Elevated liver enzymes:   Esophageal cancer ([facility] ):   Jaundice:   Pancreatic mass:   Diagnosis management comments: Waldo Bray is a 76y o  year old male with history of metastatic adenocarcinoma of the GE junction who is status post chemotherapy and radiation as well as surgical resection, G-tube placement, diabetes, COPD, hypothyroidism, presents today for elevated LFTs/billirubin on outpatient labs  Will obtain additional labs and CT abd/pelvis to evaluate  Final assessment: Imaging reveals pancreatic mass  Surgical Oncology is consulted and patient will be admitted to Medicine with GI palliative consult  He remained stable throughout ED course        Disposition  Final diagnoses:   Common bile duct obstruction   Pancreatic mass   Esophageal cancer (HCC)   Jaundice   Elevated liver enzymes   Abdominal pain     Time reflects when diagnosis was documented in both MDM as applicable and the Disposition within this note     Time User Action Codes Description Comment    2/26/2021  9:20 PM Clarisa Rodríguez Add [K83 1] Common bile duct obstruction     2/26/2021  9:20 PM Clarisa Rodríguez Add [K86 89] Pancreatic mass 2/26/2021  9:20 PM Minor, Clarisa Add [C15 9] Esophageal cancer ([facility] )     2/26/2021  9:20 PM Minor, Clarisa Add [R17] Jaundice     2/26/2021  9:21 PM Minor, Clarisa Add [R74 8] Elevated liver enzymes     2/26/2021  9:21 PM Minor, Clarisa Add [R10 9] Abdominal pain       ED Disposition     ED Disposition Condition Date/Time Comment    Admit Stable Fri Feb 26, 2021  9:31 PM Case was discussed with DAVID and the patient's admission status was agreed to be Admission Status: inpatient status to the service of [facility]   Follow-up Information    None         Patient's Medications   Discharge Prescriptions    No medications on file     No discharge procedures on file  PDMP Review       Value Time User    PDMP Reviewed  Yes 2/26/2021  9:36 PM Arminda Cantrell DO           ED Provider  Attending physically available and evaluated Ricardalaisha Poonamia  OMERO managed the patient along with the ED Attending      Electronically Signed by         Carrie Brian MD  02/26/21 0324

## 2021-02-26 NOTE — ED RE-EVALUATION NOTE
ED Course as of Feb 26 1703 Fri Feb 26, 2021   1702 Procedure Note for Ultrasound Guided Peripheral Line:  Skin prepared using Chloraprep  In the LEFT brachial vein, using ultrasound guidance (CPT code 44947), a 20G peripheral IV long angiocatheter was placed successfully by physician secondary to difficulty placing IV by nursing staff  Successful  1One attempt  Good blood return  Good palpable flush  Adhered to skin using Tegederm                Andres Cooper MD  02/26/21 9763

## 2021-02-27 LAB
ALBUMIN SERPL BCP-MCNC: 2.3 G/DL (ref 3.5–5)
ALP SERPL-CCNC: 980 U/L (ref 46–116)
ALT SERPL W P-5'-P-CCNC: 87 U/L (ref 12–78)
ANION GAP SERPL CALCULATED.3IONS-SCNC: 6 MMOL/L (ref 4–13)
AST SERPL W P-5'-P-CCNC: 145 U/L (ref 5–45)
BILIRUB SERPL-MCNC: 6.8 MG/DL (ref 0.2–1)
BUN SERPL-MCNC: 11 MG/DL (ref 5–25)
CALCIUM ALBUM COR SERPL-MCNC: 10.4 MG/DL (ref 8.3–10.1)
CALCIUM SERPL-MCNC: 9 MG/DL (ref 8.3–10.1)
CHLORIDE SERPL-SCNC: 109 MMOL/L (ref 100–108)
CO2 SERPL-SCNC: 26 MMOL/L (ref 21–32)
CREAT SERPL-MCNC: 1.01 MG/DL (ref 0.6–1.3)
ERYTHROCYTE [DISTWIDTH] IN BLOOD BY AUTOMATED COUNT: 15.5 % (ref 11.6–15.1)
GFR SERPL CREATININE-BSD FRML MDRD: 77 ML/MIN/1.73SQ M
GLUCOSE SERPL-MCNC: 104 MG/DL (ref 65–140)
HCT VFR BLD AUTO: 33.3 % (ref 36.5–49.3)
HGB BLD-MCNC: 10.3 G/DL (ref 12–17)
MCH RBC QN AUTO: 26 PG (ref 26.8–34.3)
MCHC RBC AUTO-ENTMCNC: 30.9 G/DL (ref 31.4–37.4)
MCV RBC AUTO: 84 FL (ref 82–98)
PLATELET # BLD AUTO: 136 THOUSANDS/UL (ref 149–390)
PMV BLD AUTO: 11.2 FL (ref 8.9–12.7)
POTASSIUM SERPL-SCNC: 3.3 MMOL/L (ref 3.5–5.3)
PROT SERPL-MCNC: 6.3 G/DL (ref 6.4–8.2)
RBC # BLD AUTO: 3.96 MILLION/UL (ref 3.88–5.62)
SODIUM SERPL-SCNC: 141 MMOL/L (ref 136–145)
WBC # BLD AUTO: 5.09 THOUSAND/UL (ref 4.31–10.16)

## 2021-02-27 PROCEDURE — 99232 SBSQ HOSP IP/OBS MODERATE 35: CPT | Performed by: INTERNAL MEDICINE

## 2021-02-27 PROCEDURE — 99222 1ST HOSP IP/OBS MODERATE 55: CPT | Performed by: INTERNAL MEDICINE

## 2021-02-27 PROCEDURE — 85027 COMPLETE CBC AUTOMATED: CPT | Performed by: INTERNAL MEDICINE

## 2021-02-27 PROCEDURE — 99223 1ST HOSP IP/OBS HIGH 75: CPT | Performed by: SURGERY

## 2021-02-27 PROCEDURE — 80053 COMPREHEN METABOLIC PANEL: CPT | Performed by: INTERNAL MEDICINE

## 2021-02-27 PROCEDURE — 99223 1ST HOSP IP/OBS HIGH 75: CPT | Performed by: INTERNAL MEDICINE

## 2021-02-27 RX ORDER — POLYETHYLENE GLYCOL 3350 17 G/17G
17 POWDER, FOR SOLUTION ORAL DAILY
Status: DISCONTINUED | OUTPATIENT
Start: 2021-02-27 | End: 2021-03-03 | Stop reason: HOSPADM

## 2021-02-27 RX ORDER — HYDROMORPHONE HCL/PF 1 MG/ML
0.5 SYRINGE (ML) INJECTION EVERY 4 HOURS PRN
Status: DISCONTINUED | OUTPATIENT
Start: 2021-02-27 | End: 2021-02-27

## 2021-02-27 RX ORDER — OXYCODONE HYDROCHLORIDE 5 MG/1
5 TABLET ORAL EVERY 4 HOURS PRN
Status: DISCONTINUED | OUTPATIENT
Start: 2021-02-27 | End: 2021-02-27

## 2021-02-27 RX ORDER — OXYCODONE HYDROCHLORIDE 10 MG/1
10 TABLET ORAL EVERY 4 HOURS PRN
Status: DISCONTINUED | OUTPATIENT
Start: 2021-02-27 | End: 2021-03-03 | Stop reason: HOSPADM

## 2021-02-27 RX ORDER — POTASSIUM CHLORIDE 20 MEQ/1
20 TABLET, EXTENDED RELEASE ORAL ONCE
Status: COMPLETED | OUTPATIENT
Start: 2021-02-27 | End: 2021-02-27

## 2021-02-27 RX ORDER — SENNOSIDES 8.6 MG
1 TABLET ORAL
Status: DISCONTINUED | OUTPATIENT
Start: 2021-02-27 | End: 2021-02-28

## 2021-02-27 RX ORDER — HYDROMORPHONE HCL/PF 1 MG/ML
0.5 SYRINGE (ML) INJECTION
Status: DISCONTINUED | OUTPATIENT
Start: 2021-02-27 | End: 2021-03-03 | Stop reason: HOSPADM

## 2021-02-27 RX ORDER — POTASSIUM CHLORIDE 14.9 MG/ML
20 INJECTION INTRAVENOUS 4 TIMES DAILY
Status: DISCONTINUED | OUTPATIENT
Start: 2021-02-27 | End: 2021-02-27

## 2021-02-27 RX ORDER — METOCLOPRAMIDE HYDROCHLORIDE 5 MG/ML
10 INJECTION INTRAMUSCULAR; INTRAVENOUS EVERY 6 HOURS PRN
Status: DISCONTINUED | OUTPATIENT
Start: 2021-02-27 | End: 2021-03-03 | Stop reason: HOSPADM

## 2021-02-27 RX ADMIN — AMIODARONE HYDROCHLORIDE 200 MG: 200 TABLET ORAL at 10:04

## 2021-02-27 RX ADMIN — ENOXAPARIN SODIUM 40 MG: 40 INJECTION SUBCUTANEOUS at 17:58

## 2021-02-27 RX ADMIN — OXYCODONE HYDROCHLORIDE 10 MG: 10 TABLET ORAL at 01:23

## 2021-02-27 RX ADMIN — OXYCODONE HYDROCHLORIDE 10 MG: 10 TABLET ORAL at 11:33

## 2021-02-27 RX ADMIN — OXYCODONE HYDROCHLORIDE 15 MG: 10 TABLET ORAL at 17:57

## 2021-02-27 RX ADMIN — OXYCODONE HYDROCHLORIDE 15 MG: 10 TABLET ORAL at 22:19

## 2021-02-27 RX ADMIN — HYDROMORPHONE HYDROCHLORIDE 0.5 MG: 1 INJECTION, SOLUTION INTRAMUSCULAR; INTRAVENOUS; SUBCUTANEOUS at 13:13

## 2021-02-27 RX ADMIN — POTASSIUM CHLORIDE 20 MEQ: 1500 TABLET, EXTENDED RELEASE ORAL at 12:03

## 2021-02-27 RX ADMIN — METOCLOPRAMIDE 10 MG: 5 INJECTION, SOLUTION INTRAMUSCULAR; INTRAVENOUS at 05:08

## 2021-02-27 RX ADMIN — ATORVASTATIN CALCIUM 40 MG: 40 TABLET, FILM COATED ORAL at 10:04

## 2021-02-27 RX ADMIN — POLYETHYLENE GLYCOL 3350 17 G: 17 POWDER, FOR SOLUTION ORAL at 14:32

## 2021-02-27 RX ADMIN — ONDANSETRON 4 MG: 2 INJECTION INTRAMUSCULAR; INTRAVENOUS at 01:23

## 2021-02-27 RX ADMIN — LEVOTHYROXINE SODIUM 50 MCG: 50 TABLET ORAL at 05:08

## 2021-02-27 NOTE — ASSESSMENT & PLAN NOTE
Obstructive jaundice imaging   Imaging studies noted  Surgical oncology input noted  Presently on clear liquid diet  Awaiting GI inputs

## 2021-02-27 NOTE — PROGRESS NOTES
Progress Note - Vani Cason 1955, 76 y o  male MRN: [id number]    Unit/Bed#: -01 Encounter: [id number]    Primary Care Provider: SARA Alejandre   Date and time admitted to hospital: 2/26/2021  2:58 PM        * Dilation of biliary tract  Assessment & Plan  Obstructive jaundice imaging   Imaging studies noted  Surgical oncology input noted  Presently on clear liquid diet  Awaiting GI inputs    Acute deep vein thrombosis (DVT) of femoral vein of right lower extremity (HCC)  Assessment & Plan  · S/p IVC filter; was previously on Eliquis however held apparently for potential intervention  Will hold for now pending GI evaluation in the event intervention is required    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 [facility])  Assessment & Plan  · Known history of metastatic esophageal adenocarcinoma s/p neoadjuvant chemotherapy and esophagectomy  · Palliative care consult for assistance with cancer related pain    Severe protein-calorie malnutrition ([facility] [address] )  Assessment & Plan  Encourage adequate calorie and protein intake       Type 2 diabetes mellitus with hyperglycemia, without long-term current use of insulin ([facility])  Assessment & Plan  Lab Results   Component Value Date    HGBA1C 5 2 12/01/2020       Blood Sugar Average: Last 72 hrs:  ·  diet controlled  · SSI with Accu-Cheks while inpatient  · Initiate hypoglycemia protocol        VTE Pharmacologic Prophylaxis:   Pharmacologic: Enoxaparin (Lovenox)  Mechanical VTE Prophylaxis in Place: Yes    Patient Centered Rounds: I have performed bedside rounds with nursing staff today  Discussions with Specialists or Other Care Team Provider:     Education and Discussions with Family / Patient:  Patient, updated daughter Hodan Stahl in detail, questions answered    Time Spent for Care: 30 minutes  More than 50% of total time spent on counseling and coordination of care as described above      Current Length of Stay: 1 day(s)    Current Patient Status: Inpatient Certification Statement: The patient will continue to require additional inpatient hospital stay due to as outlined    Discharge Plan:  Awaiting clinical symptomatic improvement, awaiting GI inputs    Code Status: Level 1 - Full Code      Subjective:     Reports feeling tired and fatigued  Reports abdominal pain and nausea  Poor appetite  History chart labs medications reviewed      Objective:     Vitals:   No data recorded  HR:  [64-87] 74  Resp:  [20] 20  BP: (127-150)/(76-83) 127/79  SpO2:  [98 %-99 %] 98 %  Body mass index is 21 24 kg/m²  Input and Output Summary (last 24 hours): Intake/Output Summary (Last 24 hours) at 2/27/2021 1422  Last data filed at 2/27/2021 0200  Gross per 24 hour   Intake --   Output 1000 ml   Net -1000 ml       Physical Exam:     Physical Exam    Comfortably in bed  Neck supple  Lungs diminished breath sounds bilaterally  Heart sounds S1-S2 noted  Abdomen soft nontender  Awake obeys simple commands  Pulses noted  No rash  Bilateral pitting pedal edema noted    Additional Data:     Labs:    Results from last 7 days   Lab Units 02/27/21  0617 02/26/21  1044   WBC Thousand/uL 5 09 3 92*   HEMOGLOBIN g/dL 10 3* 10 0*   HEMATOCRIT % 33 3* 32 2*   PLATELETS Thousands/uL 136* 128*   NEUTROS PCT %  --  75   LYMPHS PCT %  --  12*   MONOS PCT %  --  8   EOS PCT %  --  3     Results from last 7 days   Lab Units 02/27/21  0617   SODIUM mmol/L 141   POTASSIUM mmol/L 3 3*   CHLORIDE mmol/L 109*   CO2 mmol/L 26   BUN mg/dL 11   CREATININE mg/dL 1 01   ANION GAP mmol/L 6   CALCIUM mg/dL 9 0   ALBUMIN g/dL 2 3*   TOTAL BILIRUBIN mg/dL 6 80*   ALK PHOS U/L 980*   ALT U/L 87*   AST U/L 145*   GLUCOSE RANDOM mg/dL 104     Results from last 7 days   Lab Units 02/26/21  1544   INR  1 55*             Results from last 7 days   Lab Units 02/26/21  1544   LACTIC ACID mmol/L 1 1           * I Have Reviewed All Lab Data Listed Above  * Additional Pertinent Lab Tests Reviewed:  All Labs Within Last 24 Hours Reviewed    Imaging:    Imaging Reports Reviewed Today Include:  Imaging studies noted  Imaging Personally Reviewed by Myself Includes:     Recent Cultures (last 7 days):     Results from last 7 days   Lab Units 02/26/21  1544   BLOOD CULTURE  Received in Microbiology Lab  Culture in Progress  Received in Microbiology Lab  Culture in Progress  Last 24 Hours Medication List:   Current Facility-Administered Medications   Medication Dose Route Frequency Provider Last Rate    amiodarone  200 mg Oral Daily With Breakfast Salina Reston, DO      atorvastatin  40 mg Oral Daily Salina Reston, DO      HYDROmorphone  0 5 mg Intravenous Q3H PRN Yunier Villanueva MD      levothyroxine  50 mcg Oral Early Morning Salina Reston, DO      metoclopramide  10 mg Intravenous Q6H PRN Clarisa Myers PA-C      multi-electrolyte  50 mL/hr Intravenous Continuous Ansley Evans  mL/hr (02/26/21 2250)    ondansetron  4 mg Intravenous Q6H PRN Salina Reston, DO      oxyCODONE  10 mg Oral Q4H PRN Yunier Villanueva MD      oxyCODONE  15 mg Oral Q4H PRN Yunier Villanueva MD      polyethylene glycol  17 g Oral Daily Yunier Villanueva MD      senna  1 tablet Oral HS PRN Yunier Villanueva MD          Today, Patient Was Seen By: Ansley Evans MD    ** Please Note: Dictation voice to text software may have been used in the creation of this document   **

## 2021-02-27 NOTE — ASSESSMENT & PLAN NOTE
Lab Results   Component Value Date    HGBA1C 5 2 12/01/2020       No results for input(s): POCGLU in the last 72 hours      Blood Sugar Average: Last 72 hrs:  ·  diet controlled  · SSI with Accu-Cheks while inpatient  · Initiate hypoglycemia protocol

## 2021-02-27 NOTE — CONSULTS
Consultation - [address] [facility]  Nish Erazo 76 y o  male MRN: [id number]  Unit/Bed#: -01 Encounter: [id number]        Inpatient consult to gastroenterology     Performed by  Maira Zimmer MD     Authorized by Elio Acuna DO          Reason for Consult / Principal Problem:   Hyperbilirubinemia, obstructive jaundice    ASSESSMENT AND PLAN:    Nish Erazo is a 76 y o  old male with PMH:   Esophageal adenocarcinoma status post neoadjuvant chemotherapy and transhiatal esophagectomy in January 2020 with known brain metastasis who presented with hyperbilirubinemia and obstructive jaundice  #Obstructive Jaundice  #Pancreatic Head Infiltrating Tumor Mass  Patient with progression acutely of obstructive jaundice and pancreatic head obstruction  Suspect that this is metastatic disease given that CT scan from January 18, 2021 did show evidence interval worsening of soft tissue thickening around the proximal celiac and superior mesenteric arteries concerning for neoplastic disease  Patient with no evidence of ascending cholangitis at this time however will need    Plan:  -ERCP/EUS on Monday, may be technically challenging given history of esophageal strictures requiring esophageal stent, may require previous dilation   -if GI unable to, consider  IR placement of drain   - NPO Sunday night  - CMP daily  - CBC daily  - close monitoring for signs of cholangitis, antibiotics/IVF per primary team    #Pancreatitis  Likely in the setting of worsening pancreatic head neoplasia  -IVF/pain control per primary team    #Esophageal Aden Marvin s/p Esophagectomy (1/20)  #Hx of esophageal stricture s/p recent stent placement (stent removal last month)  Patient with history of esophageal adenocarcinoma status post esophagectomy  Has had multiple esophageal strictures requiring dilations post esophagectomy  Most recently had stent removed last month    This may make ERCP and EUS more challenging   -Agree with palliative care consult    ______________________________________________________________________    HPI:    Patient with noted history of esophageal adenocarcinoma with metastasis sick to spine and brain status post neoadjuvant chemotherapy followed by esophagectomy  Patient was plan for resumption of chemotherapy in March 2021  Since that time has had some intermittent mild generalized abdominal pain with associated diarrhea  Had outpatient labs done by [facility] oncologist was found to have hyperbilirubinemia and elevated liver enzymes  Bilirubin was increased up to 7 1 after notably normal range a little less than 2 months ago in early January  Patient underwent transhiatal esophagectomy in January 2020  Since that time patient had esophageal strictures that required frequent dilation  Of note my patient underwent EGD with dilation in August 2020 with Dr Heather Bonner and had esophageal stent placed  Patient had a 18 mm x 103 mm long esophageal stent placed at that time  Most notably underwent repeat EGD on January 11, 2021 at which point the stent was removed by Dr Heather Bonner  At that time after removal of the stent there was minimal trauma and anastomosis looked good  Today patient states that his abdominal pain is better controlled  Patient denies any hematemesis, melena, or hematochezia  Does not endorse any weight loss, recent N/V/F/C  Denies any change in bowel habits, no new constipation or diarrhea  REVIEW OF SYSTEMS:    CONSTITUTIONAL: Denies any fever, chills, rigors, and weight loss  HEENT: No earache or tinnitus  Denies hearing loss or visual disturbances  CARDIOVASCULAR: No chest pain or palpitations  RESPIRATORY: Denies any cough, hemoptysis, shortness of breath or dyspnea on exertion  GASTROINTESTINAL: As noted in the History of Present Illness  GENITOURINARY: No problems with urination  Denies any hematuria or dysuria    NEUROLOGIC: No dizziness or vertigo, denies headaches  MUSCULOSKELETAL: Denies any muscle or joint pain  SKIN: Denies skin rashes or itching  ENDOCRINE: Denies excessive thirst  Denies intolerance to heat or cold  PSYCHOSOCIAL: Denies depression or anxiety  Denies any recent memory loss  Historical Information   Past Medical History:   Diagnosis Date    Anemia     Bilateral pleural effusion     Brain lesion     Brain metastases (HCC)     Brain tumor (HCC)     C  difficile diarrhea     [facility]     Cancer of esophagus (HCC)     COPD (chronic obstructive pulmonary disease) (HCC)     Diabetes mellitus (HCC)     Difficulty swallowing     Disease of thyroid gland     Edema     Esophageal mass     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    History of chemotherapy     History of transfusion     Malignant neoplasm of lower third of esophagus ([facility] ) 9/17/2019    Diagnosis: clinical stage [id number] esophageal carcinoma Procedure: EGD, transhiatal esophagectomy performed on 1/28/20 Pathology: esophagogastrectomy reveals microscopic focus of residual adenocarcinoma in muscularis propria measuring 0 7 cm, adjacent Duong's esophagus with associated atypia indeterminate for dysplasia  7 lymph nodes negative for carcinoma  Proximal and distal margins negative for    Paraspinal abscess (HCC)     PONV (postoperative nausea and vomiting)     Port-A-Cath in place     LCW    Sleep apnea     no CPAP    SOB (shortness of breath)      Past Surgical History:   Procedure Laterality Date    BACK SURGERY      x2    DISC REMOVAL      ESOPHAGECTOMY N/A 1/28/2020    Procedure: ESOPHAGECTOMY, TRANSHIATAL;  Surgeon: Jp Nelson MD;  [facility];  Service: Surgical Oncology    ESOPHAGOGASTRODUODENOSCOPY N/A 1/28/2020    Procedure: ESOPHAGOGASTRODUODENOSCOPY (EGD);   Surgeon: Jp Nelson MD;  [facility];  Service: Surgical Oncology    FL GUIDED CENTRAL VENOUS ACCESS DEVICE INSERTION  9/26/2019    GASTROJEJUNOSTOMY W/ JEJUNOSTOMY TUBE N/A 9/26/2019    Procedure: INSERTION JEJUNOSTOMY TUBE OPEN;  Surgeon: Sandra Hernandez MD;  [facility];  Service: Surgical Oncology    GASTROJEJUNOSTOMY W/ JEJUNOSTOMY TUBE N/A 6/5/2020    Procedure: INSERTION JEJUNOSTOMY TUBE OPEN;  Surgeon: Sandra Hernandez MD;  [facility];  Service: Surgical Oncology    IR CHEST TUBE PLACEMENT  5/26/2020    IR SPINE PROCEDURE  5/27/2020    IR THORACENTESIS  2/25/2020    JOINT REPLACEMENT      WV EGD ENDOSCOPIC STENT PLACEMENT W/WIRE& DILATION N/A 8/31/2020    Procedure: INSERTION STENT ESOPHAGEAL;  Surgeon: Farideh Mccullough MD;  [facility];  Service: Thoracic    WV EGD INSERT GUIDE WIRE DILATOR PASSAGE ESOPHAGUS N/A 7/7/2020    Procedure: ESOPHAGOGASTRODUODENOSCOPY (EGD) with esophageal dilation under fluro with biopsy;  Surgeon: Farideh Mccullough MD;  [facility];  Service: Thoracic    WV ESOPHAGOGASTRODUODENOSCOPY TRANSORAL DIAGNOSTIC N/A 4/28/2020    Procedure: ESOPHAGOGASTRODUODENOSCOPY (EGD); Surgeon: Farideh Mccullough MD;  [facility];  Service: Thoracic    WV ESOPHAGOGASTRODUODENOSCOPY TRANSORAL DIAGNOSTIC N/A 7/27/2020    Procedure: ESOPHAGOGASTRODUODENOSCOPY (EGD);   Surgeon: Farideh Mccullough MD;  [facility];  Service: Thoracic    WV ESOPHAGOGASTRODUODENOSCOPY TRANSORAL DIAGNOSTIC N/A 8/31/2020    Procedure: ESOPHAGOGASTRODUODENOSCOPY (EGD) dilation over guidewire 36-45 Fr , stent placement;  Surgeon: Farideh Mccullough MD;  [facility];  Service: Thoracic    WV ESOPHAGOGASTRODUODENOSCOPY TRANSORAL DIAGNOSTIC N/A 1/11/2021    Procedure: ESOPHAGOGASTRODUODENOSCOPY (EGD): esophageal stent removal;  Surgeon: Farideh Mccullough MD;  [facility];  Service: Thoracic    WV ESOPHAGOSCOPY FLEX Leeland Latin <30 MM DIAM N/A 4/28/2020    Procedure: DILATATION ESOPHAGEAL;  Surgeon: Farideh Mccullough MD;  [facility];  Service: Thoracic    WV ESOPHAGOSCOPY FLEX BALLOON DILAT <30 MM DIAM N/A 7/27/2020    Procedure: DILATATION ESOPHAGEAL with Savary over the wire dilitation 33FR to 45FR; Surgeon: Lory Shanks MD;  [facility];  Service: Thoracic    MD ESOPHAGOSCOPY FLEX BALLOON DILAT <30 MM DIAM N/A 8/31/2020    Procedure: DILATATION ESOPHAGEAL;  Surgeon: Lory Shanks MD;  [facility];  Service: Thoracic    TONSILLECTOMY      TUNNELED VENOUS PORT PLACEMENT N/A 9/26/2019    Procedure: INSERTION VENOUS PORT (PORT-A-CATH);   Surgeon: Zahra Zamora MD;  [facility];  Service: Surgical Oncology    VOCAL CORD INJECTION N/A 2/7/2020    Procedure: MICROLARYNGOSCOPY WITH INJECTION;  Surgeon: Holger Vincent MD;  [facility];  Service: ENT     Social History   Social History     Substance and Sexual Activity   Alcohol Use Never    Frequency: Never     Social History     Substance and Sexual Activity   Drug Use Never     Social History     Tobacco Use   Smoking Status Former Smoker    Packs/day: 0 50    Years: 50 00    Pack years: 25 00    Types: Cigarettes    Quit date: 12/24/2017    Years since quitting: 3 1   Smokeless Tobacco Never Used     Family History   Problem Relation Age of Onset    Diabetes Mother     No Known Problems Father        Meds/Allergies     Medications Prior to Admission   Medication    amiodarone 200 mg tablet    atorvastatin (LIPITOR) 40 mg tablet    Blood Glucose Monitoring Suppl (ONE TOUCH ULTRA 2) w/Device KIT    Eliquis 5 MG    ergocalciferol (VITAMIN D2) 50,000 units    [START ON 3/2/2021] fluorouracil 4,415 mg in CADD infusion pump    glucose blood test strip    Lancets (ONETOUCH ULTRASOFT) lancets    levothyroxine 50 mcg tablet    oxyCODONE (ROXICODONE) 10 MG TABS     Current Facility-Administered Medications   Medication Dose Route Frequency    amiodarone tablet 200 mg  200 mg Oral Daily With Breakfast    atorvastatin (LIPITOR) tablet 40 mg  40 mg Oral Daily    HYDROmorphone (DILAUDID) injection 0 5 mg  0 5 mg Intravenous Q4H PRN    levothyroxine tablet 50 mcg  50 mcg Oral Early Morning    metoclopramide (REGLAN) injection 10 mg  10 mg Intravenous Q6H PRN    multi-electrolyte (PLASMALYTE-A/ISOLYTE-S PH 7 4) IV solution  100 mL/hr Intravenous Continuous    ondansetron (ZOFRAN) injection 4 mg  4 mg Intravenous Q6H PRN    oxyCODONE (ROXICODONE) immediate release tablet 10 mg  10 mg Oral Q4H PRN    oxyCODONE (ROXICODONE) IR tablet 5 mg  5 mg Oral Q4H PRN     Allergies   Allergen Reactions    Penicillins Itching       Objective     Blood pressure 127/79, pulse 74, temperature (!) 97 2 °F (36 2 °C), temperature source Tympanic, resp  rate 20, weight 67 1 kg (148 lb), SpO2 98 %  Body mass index is 21 24 kg/m²  Intake/Output Summary (Last 24 hours) at 2/27/2021 1207  Last data filed at 2/27/2021 0200  Gross per 24 hour   Intake --   Output 1000 ml   Net -1000 ml       PHYSICAL EXAM:      General Appearance:   Alert, cooperative, no distress   HEENT:   Normocephalic, atraumatic, anicteric  Neck:   Supple, symmetrical, trachea midline   Lungs:   Clear to auscultation bilaterally; no rales, rhonchi or wheezing; respirations unlabored    Heart:   Regular rate and rhythm; no murmur, rub, or gallop  Abdomen:   J tube   Genitalia:   Deferred    Rectal:   Deferred    Extremities:  No cyanosis, clubbing or edema    Pulses:  2+ and symmetric all extremities    Skin:  No jaundice, rashes, or lesions    Lymph nodes:  No palpable cervical lymphadenopathy      Lab Results:   Admission on 02/26/2021   Component Date Value    Bilirubin, Direct 02/26/2021 4 98*    Lipase 02/26/2021 3,242*    Protime 02/26/2021 18 5*    INR 02/26/2021 1 55*    Blood Culture 02/26/2021 Received in Microbiology Lab  Culture in Progress   Blood Culture 02/26/2021 Received in Microbiology Lab  Culture in Progress       LACTIC ACID 02/26/2021 1 1     Sodium 02/27/2021 141     Potassium 02/27/2021 3 3*    Chloride 02/27/2021 109*    CO2 02/27/2021 26     ANION GAP 02/27/2021 6     BUN 02/27/2021 11     Creatinine 02/27/2021 1 01     Glucose 02/27/2021 104     Calcium 02/27/2021 9 0     Corrected Calcium 02/27/2021 10 4*    AST 02/27/2021 145*    ALT 02/27/2021 87*    Alkaline Phosphatase 02/27/2021 980*    Total Protein 02/27/2021 6 3*    Albumin 02/27/2021 2 3*    Total Bilirubin 02/27/2021 6 80*    eGFR 02/27/2021 77     WBC 02/27/2021 5 09     RBC 02/27/2021 3 96     Hemoglobin 02/27/2021 10 3*    Hematocrit 02/27/2021 33 3*    MCV 02/27/2021 84     MCH 02/27/2021 26 0*    MCHC 02/27/2021 30 9*    RDW 02/27/2021 15 5*    Platelets 17/75/1937 136*    MPV 02/27/2021 11 2        Imaging Studies: I have personally reviewed pertinent imaging studies  Mri Brain W Wo Contrast    Result Date: 2/11/2021  Narrative: MRI BRAIN WITH AND WITHOUT CONTRAST INDICATION: Follow-up metastatic esophageal cancer  COMPARISON:  Brain MRI 11/6/2020 TECHNIQUE: Sagittal T1, axial T2, axial FLAIR, axial T1, axial [place], axial diffusion  Sagittal, axial T1 postcontrast   Axial bravo postcontrast with coronal reconstructions  IV Contrast:  7 mL of Gadobutrol injection (SINGLE-DOSE)  IMAGE QUALITY:   Diagnostic  FINDINGS: BRAIN PARENCHYMA: Interval decreased size of known to metastatic lesions and improved perilesional edema, including: Left frontoparietal 7 mm lesion (series 11 image 103), previously 10 mm  Right parietal 4 mm cortical lesion (series 11 image 103), previously 6 mm  A focus of T2/FLAIR hyperintensity in the inferior left parietal white matter (series 5 image 20), is new from prior exam without associated enhancement  Remaining scattered foci of subcortical and periventricular white matter T2/FLAIR hyperintensities are stable may indicate chronic microangiopathy  Stable anterior right frontal encephalomalacia/gliosis with hemosiderin deposition  VENTRICLES:  Normal for the patient's age   SELLA AND PITUITARY GLAND:  Normal  ORBITS:  Normal  PARANASAL SINUSES:  Mild mucosal thickening of right maxillary sinuses  VASCULATURE:  Evaluation of the major intracranial vasculature demonstrates appropriate flow voids  CALVARIUM AND SKULL BASE:  Normal  EXTRACRANIAL SOFT TISSUES:  Normal      Impression: 1  Decreased size of two metastatic lesions in the left frontoparietal and right parietal lobes with improved surrounding vasogenic edema  No new enhancing lesion  2   New small focus of FLAIR hyperintensity in the inferior left parietal white matter without associated enhancement could be treatment related  Stable additional scattered white matter FLAIR hyperintense foci most consistent with chronic microangiopathy  Workstation performed: [id number]     Ct Abdomen Pelvis With Contrast    Result Date: 2/26/2021  Narrative: CT ABDOMEN AND PELVIS WITH IV CONTRAST INDICATION: Esophageal malignancy  Intracranial metastatic disease  Patient has been treated with neoadjuvant chemotherapy, esophagogastrectomy, and adjuvant chemotherapy  Patient presents to emergency department with abdominal pain and transaminitis  COMPARISON:  Multiple prior examinations including most recent CT examination of the chest, abdomen and pelvis performed January 18, 2021  TECHNIQUE:  CT examination of the abdomen and pelvis was performed  Axial, sagittal, and coronal 2D reformatted images were created from the source data and submitted for interpretation  Radiation dose length product (DLP) for this visit:  764 35 mGy-cm   This examination, like all CT scans performed in the [facility], was performed utilizing techniques to minimize radiation dose exposure, including the use of iterative  reconstruction and automated exposure control  IV Contrast:  100 mL of iohexol (OMNIPAQUE) Enteric Contrast:  Enteric contrast was not administered   FINDINGS: ABDOMEN LOWER CHEST:  Loculated effusion is noted in the base of the right hemithorax with overlying compressive atelectasis, progressed from prior exam   Surgical changes of esophagogastrectomy with gastric pull-up are reidentified  LIVER/BILIARY TREE:  There is a 9 mm mass in the medial aspect of the caudate lobe on image 20 of series 2, slightly increased from 7 mm on January 18, 2021  A 16 mm mass in segment 5 adjacent to gallbladder fossa on image 29 of series 2 is not detectable on previous examination  Similarly a 4 mm mass in the periphery of segment 6 on image 28 is not present on prior examination  There is marked intrahepatic biliary dilatation and enlargement of the common bile duct  There is an oval noncalcified density within the common bile duct, 5 mm in greatest transverse dimension, 12 mm in greatest craniocaudal dimension, suspicious for mid to distal common duct tumefactive sludge or stone  However, the cause of distal common duct obstruction is thought more likely to be the presence of extensive infiltrative tumor mass with its epicenter in the periceliac region, inseparable from the posterior margin of the pancreatic head and uncinate process and appearing to infiltrate the region of the common bile duct at the pancreatic head on image 35 of series 2  GALLBLADDER:  The bladder is distended and there are multiple intraluminal gallstones as well as layering sludge  SPLEEN:  No discrete splenic lesion  Spleen is at the upper limits of normal in size  PANCREAS:  No pancreatic ductal enlargement  The pancreatic body and head are ventrally displaced by infiltrating tumor mass with its epicenter in the paraceliac region, increased in size and with increased mass effect when compared to January 18, 2021  ADRENAL GLANDS:  Circumscribed right adrenal nodules are unchanged from prior examination  There is asymmetric enhancement of the left adrenal gland which is inseparable from infiltrating periceliac tumor mass that has progressed since prior examination   KIDNEYS/URETERS: Unremarkable  No hydronephrosis  STOMACH AND BOWEL:  Jejunostomy catheter is present  There is fluid-filled distention of the gastric pull-up procedure with narrowing of the gastric body as it passes infiltrating tumor in the periceliac region  Slightly larger than typical volume of stool seen in the colon suggesting some element of constipation  No bowel obstruction  APPENDIX:  No findings to suggest appendicitis  ABDOMINOPELVIC CAVITY:  Mesenteric edema  Small volume of abdominopelvic ascites  Some nonspecific layering high density in the deep endoluminal of ascites in the pelvis  Irregular very high density in the retroperitoneum and in the posterior mediastinum as well as the right pleura probably represents glue injected into the lymphatic system in an attempt to treat chylothorax  Correlated with procedure history  Progression of infiltrating tumor mass with its epicenter in the paraceliac region  Because of the infiltrating borders of the mass, it is difficult to measure however, subjectively the mass is significantly progressed since January 18, 2021  There is complete occlusion of the splenic vein in the region of the mass  There is near complete occlusion of the upper superior mesenteric vein on image 25 of series 2  As before there is complete encasement of celiac axis and superior mesenteric arteries  There is marked narrowing of the left renal vein  There is slightly greater than 50% encasement of the upper aorta  As mentioned above, there appears to be infiltrating tumor mass obstructing the distal common duct  VESSELS:  The above description of venous narrowing as a result of progression of periceliac tumor mass  There is increasing density in caliber in mesenteric venous collaterals in the left upper quadrant and epigastric region  Heavy atherosclerotic arterial vascular calcification is noted  IVC filter is reidentified   PELVIS: There is streak artifact over the pelvis is result of right hip arthroplasty, intact  REPRODUCTIVE ORGANS:  Unremarkable for patient's age  URINARY BLADDER:  Unremarkable  ABDOMINAL WALL/INGUINAL REGIONS:  Mild body wall edema  OSSEOUS STRUCTURES:  No acute fracture or destructive osseous lesion  Intact right hip arthroplasty  L5-S1 surgical change  Impression: Interval development of severe intra and extrahepatic biliary dilatation to the level of the pancreatic head  Appears to be caused by progression of infiltrating tumor mass inseparable from the posterior margin of the pancreatic head and probably obstructing the distal common duct  Also, there is tumefactive sludge or noncalcified stone in the distal common duct  Progression of small hepatic metastatic lesion and development of new small hepatic metastatic lesions  Progression of infiltrating paraceliac tumor now completely obstructing splenic vein and near completely obstructing the upper superior mesenteric vein with slight increase in of developing venous collaterals in the mesentery  Progression of loculated effusion in the base of the right hemithorax  Interval development of small volume of complex abdominal pelvic ascites  This examination was marked "immediate notification" in Epic in order to begin the standard process by which the radiology reading room liaison alerts the referring practitioner     Workstation performed: [id number]       ---------------------------------------------------  Note Electronically Signed By:    MD Michael Fontenot 72 Gastroenterology Fellow PGY-5  [id number] #: [id number]

## 2021-02-27 NOTE — ASSESSMENT & PLAN NOTE
· Known history of metastatic esophageal adenocarcinoma s/p neoadjuvant chemotherapy and esophagectomy    Is tentatively planned for resumption of chemotherapy in near future  · CT abdomen/pelvis as below  · Palliative care consult for assistance with cancer related pain

## 2021-02-27 NOTE — H&P
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)  Assessment & Plan  · Known history of metastatic esophageal adenocarcinoma s/p neoadjuvant chemotherapy and esophagectomy  Is tentatively planned for resumption of chemotherapy in near future  · CT abdomen/pelvis as below  · Palliative care consult for assistance with cancer related pain    Acute deep vein thrombosis (DVT) of femoral vein of right lower extremity (HCC)  Assessment & Plan  · S/p IVC filter; was previously on Eliquis however held apparently for potential intervention  Will hold for now pending GI evaluation in the event intervention is required    Severe protein-calorie malnutrition ([address] )  Assessment & Plan  · Nutrition consult will be appreciated  · Likely will add dietary nutrition supplements once okay for p o  Type 2 diabetes mellitus with hyperglycemia, without long-term current use of insulin [facility])  Assessment & Plan  Lab Results   Component Value Date    HGBA1C 5 2 12/01/2020       No results for input(s): POCGLU in the last 72 hours  Blood Sugar Average: Last 72 hrs:  ·  diet controlled  · SSI with Accu-Cheks while inpatient  · Initiate hypoglycemia protocol      VTE Prophylaxis: Pharmacologic VTE Prophylaxis contraindicated due to Possible pre procedure  / sequential compression device   Code Status: Level 1 - Full Code  POLST: POLST form is not discussed and not completed at this time      Anticipated Length of Stay:  Patient will be admitted on an Inpatient basis with an anticipated length of stay of  greater than 2 midnights  Justification for Hospital Stay: Please see detailed plans noted above  Chief Complaint:     Van Garcia Was told I have abnormal lab  History of Present Illness:  Salena Goldmann is a 76 y o  male who has a past medical history significant for 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 s/p neoadjuvant chemotherapy followed by esophagectomy; was planned for resumption of chemotherapy March 2021  Patient complained of some intermittent mild generalized abdominal pain with diarrhea, and during labs obtained by oncologist was found with hyperbilirubinemia and elevated AST/ALT/a LP for which patient was advised to present to ED for further evaluation  These were confirmed on recheck of CMP in ED, and subsequent CT abdomen/pelvis revealed severe intra/extrahepatic biliary dilatation at the level of pancreatic head suspected in setting of infiltrating tumor/mass obstructing distal common bile duct  Was seen by surgical oncology in ED, who advised supportive treatment at this time, and GI consult for potential ERCP/decompression  At the time of my evaluation, patient is lying in bed in no acute distress, noting some improvement in abdominal pain at this time  Denies fevers/chills, chest pain/pressure, shortness breath, nausea/vomiting, or dizziness/lightheadedness      Review of Systems:    Constitutional:  Denies fever or chills   Eyes:  Denies change in visual acuity   HENT:  Denies nasal congestion or sore throat   Respiratory:  Denies cough or shortness of breath   Cardiovascular:  Denies chest pain or edema   GI:  Denies abdominal pain, nausea, vomiting, bloody stools but endorsed diarrhea  :  Denies dysuria   Musculoskeletal:  Denies back pain or joint pain   Integument:  Denies rash but endorsed color change  Neurologic:  Denies headache, focal weakness or sensory changes   Endocrine:  Denies polyuria or polydipsia   Lymphatic:  Denies swollen glands   Psychiatric:  Denies depression or anxiety     Past Medical and Surgical History:   Past Medical History:   Diagnosis Date    Anemia     Bilateral pleural effusion     Brain lesion     Brain metastases (HCC)     Brain tumor (HCC)     C  difficile diarrhea     Cancer ([place] [id number] )     Cancer of esophagus (HCC)     COPD (chronic obstructive pulmonary disease) (HCC)     Diabetes mellitus ([facility] )     Difficulty swallowing     Disease of thyroid gland     Edema     Esophageal mass     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    History of chemotherapy     History of transfusion     Malignant neoplasm of lower third of esophagus ([place] [address] ) 9/17/2019    Diagnosis: clinical stage T3N1M0 esophageal carcinoma Procedure: EGD, transhiatal esophagectomy performed on 1/28/20 Pathology: esophagogastrectomy reveals microscopic focus of residual adenocarcinoma in muscularis propria measuring 0 7 cm, adjacent Duong's esophagus with associated atypia indeterminate for dysplasia  7 lymph nodes negative for carcinoma  Proximal and distal margins negative for    Paraspinal abscess (HCC)     PONV (postoperative nausea and vomiting)     Port-A-Cath in place     LCW    Sleep apnea     no CPAP    SOB (shortness of breath)      Past Surgical History:   Procedure Laterality Date    BACK SURGERY      x2    DISC REMOVAL      ESOPHAGECTOMY N/A 1/28/2020    Procedure: ESOPHAGECTOMY, TRANSHIATAL;  Surgeon: Maral Chatterjee MD;  [facility];  Service: Surgical Oncology    ESOPHAGOGASTRODUODENOSCOPY N/A 1/28/2020    Procedure: ESOPHAGOGASTRODUODENOSCOPY (EGD);   Surgeon: Maral Chatterjee MD;  [facility];  Service: Surgical Oncology    FL GUIDED CENTRAL VENOUS ACCESS DEVICE INSERTION  9/26/2019    GASTROJEJUNOSTOMY W/ JEJUNOSTOMY TUBE N/A 9/26/2019    Procedure: INSERTION JEJUNOSTOMY TUBE OPEN;  Surgeon: Maral Chatterjee MD;  [facility];  Service: Surgical Oncology    GASTROJEJUNOSTOMY W/ JEJUNOSTOMY TUBE N/A 6/5/2020    Procedure: INSERTION JEJUNOSTOMY TUBE OPEN;  Surgeon: Angie Philippe MD;  [facility];  Service: Surgical Oncology    IR CHEST TUBE PLACEMENT  5/26/2020    IR SPINE PROCEDURE  5/27/2020    IR THORACENTESIS  2/25/2020    JOINT REPLACEMENT      SD EGD ENDOSCOPIC STENT PLACEMENT W/WIRE& DILATION N/A 8/31/2020    Procedure: INSERTION STENT ESOPHAGEAL;  Surgeon: Shira Huffman MD;  [facility];  Service: Thoracic    SD EGD INSERT GUIDE WIRE DILATOR PASSAGE ESOPHAGUS N/A 7/7/2020    Procedure: ESOPHAGOGASTRODUODENOSCOPY (EGD) with esophageal dilation under fluro with biopsy;  Surgeon: Shira Huffman MD;  [facility];  Service: Thoracic    SD ESOPHAGOGASTRODUODENOSCOPY TRANSORAL DIAGNOSTIC N/A 4/28/2020    Procedure: ESOPHAGOGASTRODUODENOSCOPY (EGD); Surgeon: Shira Huffman MD;  [facility];  Service: Thoracic    SD ESOPHAGOGASTRODUODENOSCOPY TRANSORAL DIAGNOSTIC N/A 7/27/2020    Procedure: ESOPHAGOGASTRODUODENOSCOPY (EGD); Surgeon: Shira Huffman MD;  [facility];  Service: Thoracic    SD ESOPHAGOGASTRODUODENOSCOPY TRANSORAL DIAGNOSTIC N/A 8/31/2020    Procedure: ESOPHAGOGASTRODUODENOSCOPY (EGD) dilation over guidewire 36-45 Fr , stent placement;  Surgeon: Shira Huffman MD;  [facility];  Service: Thoracic    SD ESOPHAGOGASTRODUODENOSCOPY TRANSORAL DIAGNOSTIC N/A 1/11/2021    Procedure: ESOPHAGOGASTRODUODENOSCOPY (EGD): esophageal stent removal;  Surgeon: Shira Huffman MD;  [facility];  Service: Thoracic    SD ESOPHAGOSCOPY FLEX Clayton Jaquan <30 MM DIAM N/A 4/28/2020    Procedure: DILATATION ESOPHAGEAL;  Surgeon: Shira Huffman MD;  [facility];  Service: Thoracic    SD ESOPHAGOSCOPY FLEX Clayton Jaquan <30 MM DIAM N/A 7/27/2020    Procedure: DILATATION ESOPHAGEAL with Savary over the wire dilitation 33FR to 45FR;   Surgeon: Shira Huffman MD; [facility];  Service: Thoracic    MT ESOPHAGOSCOPY FLEX BALLOON DILAT <30 MM DIAM N/A 8/31/2020    Procedure: DILATATION ESOPHAGEAL;  Surgeon: Singh De Guzman MD;  [facility];  Service: Thoracic    TONSILLECTOMY      TUNNELED VENOUS PORT PLACEMENT N/A 9/26/2019    Procedure: INSERTION VENOUS PORT (PORT-A-CATH); Surgeon: Anabella Burciaga MD;  [facility];  Service: Surgical Oncology    VOCAL CORD INJECTION N/A 2/7/2020    Procedure: MICROLARYNGOSCOPY WITH INJECTION;  Surgeon: Stuart Zuniga MD;  [facility];  Service: ENT       Meds/Allergies:  Medications Prior to Admission   Medication    amiodarone 200 mg tablet    atorvastatin (LIPITOR) 40 mg tablet    Blood Glucose Monitoring Suppl (ONE TOUCH ULTRA 2) w/Device KIT    Eliquis 5 MG    ergocalciferol (VITAMIN D2) 50,000 units    [START ON 3/2/2021] fluorouracil 4,415 mg in CADD infusion pump    glucose blood test strip    Lancets (ONETOUCH ULTRASOFT) lancets    levothyroxine 50 mcg tablet    oxyCODONE (ROXICODONE) 10 MG TABS       Allergies:    Allergies   Allergen Reactions    Penicillins Itching     History:  Marital Status: Single     Substance Use History:   Social History     Substance and Sexual Activity   Alcohol Use Never    Frequency: Never     Social History     Tobacco Use   Smoking Status Former Smoker    Packs/day: 0 50    Years: 50 00    Pack years: 25 00    Types: Cigarettes    Quit date: 12/24/2017    Years since quitting: 3 1   Smokeless Tobacco Never Used     Social History     Substance and Sexual Activity   Drug Use Never       Family History:  Family History   Problem Relation Age of Onset    Diabetes Mother     No Known Problems Father        Physical Exam:     Vitals:   Blood Pressure: 127/79 (02/26/21 2200)  Pulse: 74 (02/26/21 2200)  Temperature: (!) 97 2 °F (36 2 °C) (02/26/21 1329)  Temp Source: Tympanic (02/26/21 1329)  Respirations: 20 (02/26/21 2200)  Weight - Scale: 67 1 kg (148 lb) (02/26/21 1845)  SpO2: 98 % (02/26/21 2200)    Constitutional:  Chronically ill-appearing, no acute distress, non-toxic appearance   Eyes:  PERRL, conjunctiva normal, sclerae icteric   HENT:  Atraumatic, external ears normal, nose normal, oropharynx moist, no pharyngeal exudates  Neck- normal range of motion, no tenderness, supple   Respiratory:  No respiratory distress, normal breath sounds, no rales, no wheezing   Cardiovascular:  Normal rate, normal rhythm, no murmurs, no gallops, no rubs   GI:  Soft, nondistended, normal bowel sounds, nontender, no organomegaly, no mass, no rebound, no guarding, feeding tube in place   :  No costovertebral angle tenderness   Musculoskeletal:  No edema, no tenderness, no deformities  Back- no tenderness  Integument:  Well hydrated, no rash, jaundiced  Lymphatic:  No lymphadenopathy noted   Neurologic:  Alert &awake, communicative, CN 2-12 normal, normal motor function, normal sensory function, no focal deficits noted   Psychiatric:  Speech and behavior appropriate       Lab Results: I have personally reviewed pertinent reports  Results from last 7 days   Lab Units 02/26/21  1044   WBC Thousand/uL 3 92*   HEMOGLOBIN g/dL 10 0*   HEMATOCRIT % 32 2*   PLATELETS Thousands/uL 128*   NEUTROS PCT % 75   LYMPHS PCT % 12*   MONOS PCT % 8   EOS PCT % 3     Results from last 7 days   Lab Units 02/26/21  1044   POTASSIUM mmol/L 3 4*   CHLORIDE mmol/L 103   CO2 mmol/L 27   BUN mg/dL 12   CREATININE mg/dL 1 17   CALCIUM mg/dL 8 9   ALK PHOS U/L 1,135*   ALT U/L 103*   AST U/L 175*     Results from last 7 days   Lab Units 02/26/21  1544   INR  1 55*       Imaging: I have personally reviewed pertinent reports  Mri Brain W Wo Contrast    Result Date: 2/11/2021  Narrative: MRI BRAIN WITH AND WITHOUT CONTRAST INDICATION: Follow-up metastatic esophageal cancer  COMPARISON:  Brain MRI 11/6/2020 TECHNIQUE: Sagittal T1, axial T2, axial FLAIR, axial T1, axial [place], axial diffusion  Sagittal, axial T1 postcontrast   Axial bravo postcontrast with coronal reconstructions  IV Contrast:  7 mL of Gadobutrol injection (SINGLE-DOSE)  IMAGE QUALITY:   Diagnostic  FINDINGS: BRAIN PARENCHYMA: Interval decreased size of known to metastatic lesions and improved perilesional edema, including: Left frontoparietal 7 mm lesion (series 11 image 103), previously 10 mm  Right parietal 4 mm cortical lesion (series 11 image 103), previously 6 mm  A focus of T2/FLAIR hyperintensity in the inferior left parietal white matter (series 5 image 20), is new from prior exam without associated enhancement  Remaining scattered foci of subcortical and periventricular white matter T2/FLAIR hyperintensities are stable may indicate chronic microangiopathy  Stable anterior right frontal encephalomalacia/gliosis with hemosiderin deposition  VENTRICLES:  Normal for the patient's age  SELLA AND PITUITARY GLAND:  Normal  ORBITS:  Normal  PARANASAL SINUSES:  Mild mucosal thickening of right maxillary sinuses  VASCULATURE:  Evaluation of the major intracranial vasculature demonstrates appropriate flow voids  CALVARIUM AND SKULL BASE:  Normal  EXTRACRANIAL SOFT TISSUES:  Normal      Impression: 1  Decreased size of two metastatic lesions in the left frontoparietal and right parietal lobes with improved surrounding vasogenic edema  No new enhancing lesion  2   New small focus of FLAIR hyperintensity in the inferior left parietal white matter without associated enhancement could be treatment related  Stable additional scattered white matter FLAIR hyperintense foci most consistent with chronic microangiopathy  Workstation performed: [id number]     Ct Abdomen Pelvis With Contrast    Result Date: 2/26/2021  Narrative: CT ABDOMEN AND PELVIS WITH IV CONTRAST INDICATION: Esophageal malignancy  Intracranial metastatic disease  Patient has been treated with neoadjuvant chemotherapy, esophagogastrectomy, and adjuvant chemotherapy    Patient presents to emergency department with abdominal pain and transaminitis  COMPARISON:  Multiple prior examinations including most recent CT examination of the chest, abdomen and pelvis performed January 18, 2021  TECHNIQUE:  CT examination of the abdomen and pelvis was performed  Axial, sagittal, and coronal 2D reformatted images were created from the source data and submitted for interpretation  Radiation dose length product (DLP) for this visit:  764 35 mGy-cm   This examination, like all CT scans performed in the [facility], was performed utilizing techniques to minimize radiation dose exposure, including the use of iterative  reconstruction and automated exposure control  IV Contrast:  100 mL of iohexol (OMNIPAQUE) Enteric Contrast:  Enteric contrast was not administered  FINDINGS: ABDOMEN LOWER CHEST:  Loculated effusion is noted in the base of the right hemithorax with overlying compressive atelectasis, progressed from prior exam   Surgical changes of esophagogastrectomy with gastric pull-up are reidentified  LIVER/BILIARY TREE:  There is a 9 mm mass in the medial aspect of the caudate lobe on image 20 of series 2, slightly increased from 7 mm on January 18, 2021  A 16 mm mass in segment 5 adjacent to gallbladder fossa on image 29 of series 2 is not detectable on previous examination  Similarly a 4 mm mass in the periphery of segment 6 on image 28 is not present on prior examination  There is marked intrahepatic biliary dilatation and enlargement of the common bile duct  There is an oval noncalcified density within the common bile duct, 5 mm in greatest transverse dimension, 12 mm in greatest craniocaudal dimension, suspicious for mid to distal common duct tumefactive sludge or stone    However, the cause of distal common duct obstruction is thought more likely to be the presence of extensive infiltrative tumor mass with its epicenter in the periceliac region, inseparable from the posterior margin of the pancreatic head and uncinate process and appearing to infiltrate the region of the common bile duct at the pancreatic head on image 35 of series 2  GALLBLADDER:  The bladder is distended and there are multiple intraluminal gallstones as well as layering sludge  SPLEEN:  No discrete splenic lesion  Spleen is at the upper limits of normal in size  PANCREAS:  No pancreatic ductal enlargement  The pancreatic body and head are ventrally displaced by infiltrating tumor mass with its epicenter in the paraceliac region, increased in size and with increased mass effect when compared to January 18, 2021  ADRENAL GLANDS:  Circumscribed right adrenal nodules are unchanged from prior examination  There is asymmetric enhancement of the left adrenal gland which is inseparable from infiltrating periceliac tumor mass that has progressed since prior examination  KIDNEYS/URETERS:  Unremarkable  No hydronephrosis  STOMACH AND BOWEL:  Jejunostomy catheter is present  There is fluid-filled distention of the gastric pull-up procedure with narrowing of the gastric body as it passes infiltrating tumor in the periceliac region  Slightly larger than typical volume of stool seen in the colon suggesting some element of constipation  No bowel obstruction  APPENDIX:  No findings to suggest appendicitis  ABDOMINOPELVIC CAVITY:  Mesenteric edema  Small volume of abdominopelvic ascites  Some nonspecific layering high density in the deep endoluminal of ascites in the pelvis  Irregular very high density in the retroperitoneum and in the posterior mediastinum as well as the right pleura probably represents glue injected into the lymphatic system in an attempt to treat chylothorax  Correlated with procedure history  Progression of infiltrating tumor mass with its epicenter in the paraceliac region    Because of the infiltrating borders of the mass, it is difficult to measure however, subjectively the mass is significantly progressed since January 18, 2021  There is complete occlusion of the splenic vein in the region of the mass  There is near complete occlusion of the upper superior mesenteric vein on image 25 of series 2  As before there is complete encasement of celiac axis and superior mesenteric arteries  There is marked narrowing of the left renal vein  There is slightly greater than 50% encasement of the upper aorta  As mentioned above, there appears to be infiltrating tumor mass obstructing the distal common duct  VESSELS:  The above description of venous narrowing as a result of progression of periceliac tumor mass  There is increasing density in caliber in mesenteric venous collaterals in the left upper quadrant and epigastric region  Heavy atherosclerotic arterial vascular calcification is noted  IVC filter is reidentified  PELVIS: There is streak artifact over the pelvis is result of right hip arthroplasty, intact  REPRODUCTIVE ORGANS:  Unremarkable for patient's age  URINARY BLADDER:  Unremarkable  ABDOMINAL WALL/INGUINAL REGIONS:  Mild body wall edema  OSSEOUS STRUCTURES:  No acute fracture or destructive osseous lesion  Intact right hip arthroplasty  L5-S1 surgical change  Impression: Interval development of severe intra and extrahepatic biliary dilatation to the level of the pancreatic head  Appears to be caused by progression of infiltrating tumor mass inseparable from the posterior margin of the pancreatic head and probably obstructing the distal common duct  Also, there is tumefactive sludge or noncalcified stone in the distal common duct  Progression of small hepatic metastatic lesion and development of new small hepatic metastatic lesions  Progression of infiltrating paraceliac tumor now completely obstructing splenic vein and near completely obstructing the upper superior mesenteric vein with slight increase in of developing venous collaterals in the mesentery   Progression of loculated effusion in the base of the right hemithorax  Interval development of small volume of complex abdominal pelvic ascites  This examination was marked "immediate notification" in Epic in order to begin the standard process by which the radiology reading room liaison alerts the referring practitioner  Workstation performed: [id number]         ** Please Note: Dragon 360 Dictation voice to text software was used in the creation of this document   **

## 2021-02-27 NOTE — ASSESSMENT & PLAN NOTE
Lab Results   Component Value Date    HGBA1C 5 2 12/01/2020       Blood Sugar Average: Last 72 hrs:  ·  diet controlled  · SSI with Accu-Cheks while inpatient  · Initiate hypoglycemia protocol

## 2021-02-27 NOTE — ASSESSMENT & PLAN NOTE
· Noted with transaminitis on outpatient labs    CT abdomen/pelvis concerning for obstructive jaundice  · Seen by surgical oncology in ED, appreciate recommendations  · Keep NPO pending GI evaluation  · IV fluids  · Trend CMP, CBC

## 2021-02-27 NOTE — ASSESSMENT & PLAN NOTE
· Nutrition consult will be appreciated  · Likely will add dietary nutrition supplements once okay for p o

## 2021-02-27 NOTE — PLAN OF CARE
Problem: Potential for Falls  Goal: Patient will remain free of falls  Description: INTERVENTIONS:  - Assess patient frequently for physical needs  -  Identify cognitive and physical deficits and behaviors that affect risk of falls  -  [place] fall precautions as indicated by assessment   - Educate patient/family on patient safety including physical limitations  - Instruct patient to call for assistance with activity based on assessment  - Modify environment to reduce risk of injury  - Consider OT/PT consult to assist with strengthening/mobility  Outcome: Progressing     Problem: PAIN - ADULT  Goal: Verbalizes/displays adequate comfort level or baseline comfort level  Description: Interventions:  - Encourage patient to monitor pain and request assistance  - Assess pain using appropriate pain scale  - Administer analgesics based on type and severity of pain and evaluate response  - Implement non-pharmacological measures as appropriate and evaluate response  - Consider cultural and social influences on pain and pain management  - Notify physician/advanced practitioner if interventions unsuccessful or patient reports new pain  Outcome: Progressing     Problem: SAFETY ADULT  Goal: Patient will remain free of falls  Description: INTERVENTIONS:  - Assess patient frequently for physical needs  -  Identify cognitive and physical deficits and behaviors that affect risk of falls    -  [place] fall precautions as indicated by assessment   - Educate patient/family on patient safety including physical limitations  - Instruct patient to call for assistance with activity based on assessment  - Modify environment to reduce risk of injury  - Consider OT/PT consult to assist with strengthening/mobility  Outcome: Progressing  Goal: Maintain or return to baseline ADL function  Description: INTERVENTIONS:  -  Assess patient's ability to carry out ADLs; assess patient's baseline for ADL function and identify physical deficits which impact ability to perform ADLs (bathing, care of mouth/teeth, toileting, grooming, dressing, etc )  - Assess/evaluate cause of self-care deficits   - Assess range of motion  - Assess patient's mobility; develop plan if impaired  - Assess patient's need for assistive devices and provide as appropriate  - Encourage maximum independence but intervene and supervise when necessary  - Involve family in performance of ADLs  - Assess for home care needs following discharge   - Consider OT consult to assist with ADL evaluation and planning for discharge  - Provide patient education as appropriate  Outcome: Progressing  Goal: Maintain or return mobility status to optimal level  Description: INTERVENTIONS:  - Assess patient's baseline mobility status (ambulation, transfers, stairs, etc )    - Identify cognitive and physical deficits and behaviors that affect mobility  - Identify mobility aids required to assist with transfers and/or ambulation (gait belt, sit-to-stand, lift, walker, cane, etc )  - [place] fall precautions as indicated by assessment  - Record patient progress and toleration of activity level on Mobility SBAR; progress patient to next Phase/Stage  - Instruct patient to call for assistance with activity based on assessment  - Consider rehabilitation consult to assist with strengthening/weightbearing, etc   Outcome: Progressing     Problem: DISCHARGE PLANNING  Goal: Discharge to home or other facility with appropriate resources  Description: INTERVENTIONS:  - Identify barriers to discharge w/patient and caregiver  - Arrange for needed discharge resources and transportation as appropriate  - Identify discharge learning needs (meds, wound care, etc )  - Arrange for interpretive services to assist at discharge as needed  - Refer to Case Management Department for coordinating discharge planning if the patient needs post-hospital services based on physician/advanced practitioner order or complex needs related to functional status, cognitive ability, or social support system  Outcome: Progressing     Problem: Knowledge Deficit  Goal: Patient/family/caregiver demonstrates understanding of disease process, treatment plan, medications, and discharge instructions  Description: Complete learning assessment and assess knowledge base    Interventions:  - Provide teaching at level of understanding  - Provide teaching via preferred learning methods  Outcome: Progressing

## 2021-02-27 NOTE — PROGRESS NOTES
Progress Note - Surgical Oncology  Lesly Evangelista 76 y o  male MRN: [id number]  Unit/Bed#: -01 Encounter: [id number]    Assessment:  Lesly Evangelista is a 76 y o  male with history of esophageal adenocarcinoma status post neoadjuvant chemotherapy and subsequent transhiatal esophagectomy (01/2020) with known brain metastases now awaiting resumption of chemotherapy and found to have hyperbilirubinemia and concern for obstructive jaundice without evidence of cholangitis        Plan:  · NPO / IV fluids  · Follow up GI consult for biliary decompression/ERCP as well as possible EUS/biopsy  · Trend WBC/monitor fever curve  · Trend LFTs, specifically bilirubin  · Follow up palliative care consult    Subjective/Objective     Subjective:   No acute events overnight  With pain and nausea this AM     Objective:    Blood pressure 127/79, pulse 74, temperature (!) 97 2 °F (36 2 °C), temperature source Tympanic, resp  rate 20, weight 67 1 kg (148 lb), SpO2 98 %  ,Body mass index is 21 24 kg/m²  Intake/Output Summary (Last 24 hours) at 2/27/2021 0819  Last data filed at 2/27/2021 0200  Gross per 24 hour   Intake --   Output 1000 ml   Net -1000 ml       Invasive Devices     Central Venous Catheter Line            Port A Cath 10/01/19 Left Chest 515 days          Peripheral Intravenous Line            Peripheral IV 02/26/21 Antecubital less than 1 day          Drain            Gastrostomy/Enterostomy Jejunostomy 14 Fr   days    Gastrostomy/Enterostomy Jejunostomy 14 Fr   days                Physical Exam:   Gen:  NAD  HEENT: NCAT  MMM, scleral icterus noted  CV: well perfused  Lungs: Normal respiratory effort  Abd: soft, mild tenderness in epigastric region  Skin: warm/ dry  Extremities: no peripheral edema, no clubbing or cyanosis  Neuro:  AxO x3      Results from last 7 days   Lab Units 02/27/21  0617 02/26/21  1044   WBC Thousand/uL 5 09 3 92*   HEMOGLOBIN g/dL 10 3* 10 0*   HEMATOCRIT % 33 3* 32 2* PLATELETS Thousands/uL 136* 128*     Results from last 7 days   Lab Units 02/27/21  0617 02/26/21  1044   POTASSIUM mmol/L 3 3* 3 4*   CHLORIDE mmol/L 109* 103   CO2 mmol/L 26 27   BUN mg/dL 11 12   CREATININE mg/dL 1 01 1 17   CALCIUM mg/dL 9 0 8 9     Results from last 7 days   Lab Units 02/26/21  1544   INR  1 55*        I have personally reviewed pertinent films in PACS      Medications:   Scheduled Meds:  Current Facility-Administered Medications   Medication Dose Route Frequency Provider Last Rate    amiodarone  200 mg Oral Daily With Breakfast Deepa Oksana, DO      atorvastatin  40 mg Oral Daily Deepa Oksana, DO      levothyroxine  50 mcg Oral Early Morning Deepa Oksana, DO      metoclopramide  10 mg Intravenous Q6H PRN Clarisa Myers PA-C      multi-electrolyte  100 mL/hr Intravenous Continuous Deepa Oksana,  mL/hr (02/26/21 2250)    ondansetron  4 mg Intravenous Q6H PRN Deepa Oksana, DO      oxyCODONE  10 mg Oral Q4H PRN Deepa Oksana, DO      potassium chloride  20 mEq Intravenous 4x Daily Demar Hayden MD       Continuous Infusions:multi-electrolyte, 100 mL/hr, Last Rate: 100 mL/hr (02/26/21 2250)      PRN Meds:  metoclopramide, 10 mg, Q6H PRN  ondansetron, 4 mg, Q6H PRN  oxyCODONE, 10 mg, Q4H PRN

## 2021-02-27 NOTE — CONSULTS
Consult - Surgical Oncology  Jeanne Joyner 76 y o  male MRN: [id number]  Unit/Bed#: ED 21 Encounter: [id number]    Assessment/Plan     Assessment:  Jeanne Joyner is a 76 y o  male with history of esophageal adenocarcinoma status post neoadjuvant chemotherapy and subsequent transhiatal esophagectomy (01/2020) with known brain metastases now awaiting resumption of chemotherapy and found to have hyperbilirubinemia and concern for obstructive jaundice without evidence of cholangitis    Plan:  · Recommend medicine admission  · NPO / IV fluids  · Recommend GI consult for biliary decompression/ERCP as well as possible EUS/biopsy  · With monitor off antibiotics at this time  Trend WBC/monitor fever curve  · Trend LFTs, specifically bilirubin  · Recommend palliative care consult  · Recommend nutrition consult for possible resumption of jejunostomy feeds          Inpatient Consult to Surgical Oncology     Performed by  Juany Talley MD     Authorized by Roldan Sow MD              History of Present Illness     HPI:  Jeanne Joyner is a 76 y o  male who presents with abnormal outpatient blood work  Patient is currently awaiting resumption of chemotherapy for known metastatic disease with history of esophageal adenocarcinoma status post neoadjuvant chemotherapy and esophagectomy  Patient was found to have evidence of hyperbilirubinemia amongst other LFT abnormalities concerning for obstructive jaundice and was referred to the emergency department for further evaluation  Patient states that over the past several days his appetite has been diminished and associated with mild nausea improved with antiemetics  He reports mild generalized abdominal discomfort   He endorses continued bowel function and denies emesis, fever, chills    Workup in the emergency department was notable for absence of leukocytosis, elevated AST/ALT/alk-phos as well as total bilirubin of 7 10, lipase of 3200s, as well as CT imaging demonstrating severe intra/extrahepatic biliary dilatation at the level of the pancreatic head which seems to be related to an infiltrating tumor/mass obstructing the distal common bile duct  There is also possible sludge/noncalcified gallstone in the distal common duct  Review of Systems   Constitutional: Positive for appetite change  Negative for chills and fever  HENT: Negative  Eyes: Negative  Respiratory: Negative for shortness of breath  Cardiovascular: Negative for chest pain  Gastrointestinal: Positive for abdominal pain and nausea  Negative for abdominal distention, constipation, diarrhea and vomiting  Endocrine: Negative  Genitourinary: Negative  Musculoskeletal: Negative  Skin: Positive for color change  Allergic/Immunologic: Negative  Neurological: Negative  Hematological: Negative  Psychiatric/Behavioral: Negative  Historical Information   Past Medical History:   Diagnosis Date    Anemia     Bilateral pleural effusion     Brain lesion     Brain metastases (HCC)     Brain tumor (HCC)     C  difficile diarrhea     Cancer ([facility] )     Cancer of esophagus (HCC)     COPD (chronic obstructive pulmonary disease) (HCC)     Diabetes mellitus (HCC)     Difficulty swallowing     Disease of thyroid gland     Edema     Esophageal mass     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    History of chemotherapy     History of transfusion     Malignant neoplasm of lower third of esophagus ([facility] ) 9/17/2019    Diagnosis: clinical stage [id number] esophageal carcinoma Procedure: EGD, transhiatal esophagectomy performed on 1/28/20 Pathology: esophagogastrectomy reveals microscopic focus of residual adenocarcinoma in muscularis propria measuring 0 7 cm, adjacent Duong's esophagus with associated atypia indeterminate for dysplasia  7 lymph nodes negative for carcinoma   Proximal and distal margins negative for    Paraspinal abscess (HCC)     PONV (postoperative nausea and vomiting)     Port-A-Cath in place     LCW    Sleep apnea     no CPAP    SOB (shortness of breath)      Past Surgical History:   Procedure Laterality Date    BACK SURGERY      x2    DISC REMOVAL      ESOPHAGECTOMY N/A 1/28/2020    Procedure: ESOPHAGECTOMY, TRANSHIATAL;  Surgeon: Cullen Torres MD;  [facility];  Service: Surgical Oncology    ESOPHAGOGASTRODUODENOSCOPY N/A 1/28/2020    Procedure: ESOPHAGOGASTRODUODENOSCOPY (EGD); Surgeon: Cullen Torres MD;  [facility];  Service: Surgical Oncology    FL GUIDED CENTRAL VENOUS ACCESS DEVICE INSERTION  9/26/2019    GASTROJEJUNOSTOMY W/ JEJUNOSTOMY TUBE N/A 9/26/2019    Procedure: INSERTION JEJUNOSTOMY TUBE OPEN;  Surgeon: Cullen Torres MD;  [facility];  Service: Surgical Oncology    GASTROJEJUNOSTOMY W/ JEJUNOSTOMY TUBE N/A 6/5/2020    Procedure: INSERTION JEJUNOSTOMY TUBE OPEN;  Surgeon: Cullen Torres MD;  [facility];  Service: Surgical Oncology    IR CHEST TUBE PLACEMENT  5/26/2020    IR SPINE PROCEDURE  5/27/2020    IR THORACENTESIS  2/25/2020    JOINT REPLACEMENT      TN EGD ENDOSCOPIC STENT PLACEMENT W/WIRE& DILATION N/A 8/31/2020    Procedure: INSERTION STENT ESOPHAGEAL;  Surgeon: Lenny Swain MD;  [facility];  Service: Thoracic    TN EGD INSERT GUIDE WIRE DILATOR PASSAGE ESOPHAGUS N/A 7/7/2020    Procedure: ESOPHAGOGASTRODUODENOSCOPY (EGD) with esophageal dilation under fluro with biopsy;  Surgeon: Lenny Swain MD;  [facility];  Service: Thoracic    TN ESOPHAGOGASTRODUODENOSCOPY TRANSORAL DIAGNOSTIC N/A 4/28/2020    Procedure: ESOPHAGOGASTRODUODENOSCOPY (EGD); Surgeon: Lenny Swain MD;  [facility];  Service: Thoracic    TN ESOPHAGOGASTRODUODENOSCOPY TRANSORAL DIAGNOSTIC N/A 7/27/2020    Procedure: ESOPHAGOGASTRODUODENOSCOPY (EGD);   Surgeon: Lenny Swain MD;  [facility];  Service: Thoracic    TN ESOPHAGOGASTRODUODENOSCOPY TRANSORAL DIAGNOSTIC N/A 8/31/2020    Procedure: ESOPHAGOGASTRODUODENOSCOPY (EGD) dilation over guidewire 36-45 Fr , stent placement;  Surgeon: Peyman Dick MD;  [facility];  Service: Thoracic    TX ESOPHAGOGASTRODUODENOSCOPY TRANSORAL DIAGNOSTIC N/A 1/11/2021    Procedure: ESOPHAGOGASTRODUODENOSCOPY (EGD): esophageal stent removal;  Surgeon: Peyman Dick MD;  [facility];  Service: Thoracic    TX ESOPHAGOSCOPY FLEX [place] Jaz <30 MM DIAM N/A 4/28/2020    Procedure: DILATATION ESOPHAGEAL;  Surgeon: Peyman Dick MD;  [facility];  Service: Thoracic    TX ESOPHAGOSCOPY FLEX Agustin Jaz <30 MM DIAM N/A 7/27/2020    Procedure: DILATATION ESOPHAGEAL with [place] over the wire dilitation 33FR to 45FR; Surgeon: Peyman Dick MD;  [facility];  Service: Thoracic    TX ESOPHAGOSCOPY FLEX BALLOON DILAT <30 MM DIAM N/A 8/31/2020    Procedure: DILATATION ESOPHAGEAL;  Surgeon: Peyman Dick MD;  [facility];  Service: Thoracic    TONSILLECTOMY      TUNNELED VENOUS PORT PLACEMENT N/A 9/26/2019    Procedure: INSERTION VENOUS PORT (PORT-A-CATH);   Surgeon: Maral Chatterjee MD;  [facility];  Service: Surgical Oncology    VOCAL CORD INJECTION N/A 2/7/2020    Procedure: MICROLARYNGOSCOPY WITH INJECTION;  Surgeon: Elmer Herbert MD;  [facility];  Service: ENT     Social History   Social History     Substance and Sexual Activity   Alcohol Use Never    Frequency: Never     Social History     Substance and Sexual Activity   Drug Use Never     Social History     Tobacco Use   Smoking Status Former Smoker    Packs/day: 0 50    Years: 50 00    Pack years: 25 00    Types: Cigarettes    Quit date: 12/24/2017    Years since quitting: 3 1   Smokeless Tobacco Never Used     E-Cigarette/Vaping    E-Cigarette Use Never User      E-Cigarette/Vaping Substances    Nicotine No     THC No     CBD No     Flavoring No     Other No     Unknown No      Family History:   Family History   Problem Relation Age of Onset    Diabetes Mother     No Known Problems Father        Meds/Allergies   PTA meds:   Prior to Admission Medications   Prescriptions Last Dose Informant Patient Reported? Taking?    Blood Glucose Monitoring Suppl (ONE TOUCH ULTRA 2) w/Device KIT  Self No No   Sig: Check bs , q fasting and 2 hr after meals   Patient not taking: Reported on 2/17/2021   Eliquis 5 MG  Self Yes No   Sig: Take 5 mg by mouth 2 (two) times a day   Lancets (ONETOUCH ULTRASOFT) lancets  Self No No   Sig: Check bs , q fasting and 2 hr after meals   Patient not taking: Reported on 2/17/2021   amiodarone 200 mg tablet   No No   Sig: TAKE 1 TABLET (200 MG TOTAL) BY MOUTH DAILY WITH BREAKFAST   atorvastatin (LIPITOR) 40 mg tablet  Self Yes No   Sig: Take 40 mg by mouth daily   ergocalciferol (VITAMIN D2) 50,000 units   Yes No   Sig: Take 50,000 Units by mouth once a week   fluorouracil 4,415 mg in CADD infusion pump   No No   Sig: Infuse 4,415 mg (1,200 mg/m2/day x 1 84 m2 (Treatment plan recorded BSA)) into a venous catheter over 46 hours for 2 days   glucose blood test strip  Self No No   Sig: Check blood sugar 3 times daily   Patient not taking: Reported on 2/17/2021   levothyroxine 50 mcg tablet   No No   Sig: TAKE 1 TABLET BY MOUTH EVERY DAY   oxyCODONE (ROXICODONE) 10 MG TABS   No No   Sig: Take 1 tablet (10 mg total) by mouth every 4 (four) hours as needed for moderate painMax Daily Amount: 60 mg      Facility-Administered Medications: None     Allergies   Allergen Reactions    Penicillins Itching       Objective   First Vitals:   Blood Pressure: 92/60 (02/26/21 1329)  Pulse: 72 (02/26/21 1329)  Temperature: (!) 97 2 °F (36 2 °C) (02/26/21 1329)  Temp Source: Tympanic (02/26/21 1329)  Respirations: 20 (02/26/21 1329)  Weight - Scale: 67 1 kg (148 lb) (02/26/21 1845)  SpO2: 97 % (02/26/21 1329)    Current Vitals:   Blood Pressure: 138/83 (02/26/21 2120)  Pulse: 87 (02/26/21 2120)  Temperature: (!) 97 2 °F (36 2 °C) (02/26/21 1329)  Temp Source: Tympanic (02/26/21 1329)  Respirations: 20 (02/26/21 2120)  Weight - Scale: 67 1 kg (148 lb) (02/26/21 1845)  SpO2: 99 % (02/26/21 2120)    No intake or output data in the 24 hours ending 02/26/21 2128    Invasive Devices     Central Venous Catheter Line            Port A Cath 10/01/19 Left Chest 514 days          Peripheral Intravenous Line            Peripheral IV 02/26/21 Antecubital less than 1 day          Drain            Gastrostomy/Enterostomy Jejunostomy 14 Fr   days    Gastrostomy/Enterostomy Jejunostomy 14 Fr   days                Physical Exam  Constitutional:       General: He is not in acute distress  Appearance: Normal appearance  He is not ill-appearing or toxic-appearing  HENT:      Head: Normocephalic and atraumatic  Nose: Nose normal    Eyes:      General: Scleral icterus present  Neck:      Musculoskeletal: Normal range of motion and neck supple  Cardiovascular:      Comments: Warm/well perfused  Pulmonary:      Effort: Pulmonary effort is normal  No respiratory distress  Abdominal:      Comments: Abdomen soft, nontender, nondistended  Well-healed surgical incision appreciated  Feeding tube in place  Genitourinary:     Comments: Deferred  Musculoskeletal:         General: No swelling, tenderness or deformity  Skin:     General: Skin is warm and dry  Coloration: Skin is jaundiced  Neurological:      General: No focal deficit present  Mental Status: He is alert and oriented to person, place, and time     Psychiatric:         Mood and Affect: Mood normal          Behavior: Behavior normal            Lab Results:   CBC:   Lab Results   Component Value Date    WBC 3 92 (L) 02/26/2021    HGB 10 0 (L) 02/26/2021    HCT 32 2 (L) 02/26/2021    MCV 84 02/26/2021     (L) 02/26/2021    MCH 26 1 (L) 02/26/2021    MCHC 31 1 (L) 02/26/2021    RDW 15 5 (H) 02/26/2021    MPV 10 1 02/26/2021    NRBC 0 02/26/2021   , CMP:   Lab Results   Component Value Date    SODIUM 139 02/26/2021    K 3 4 (L) 02/26/2021     02/26/2021    CO2 27 02/26/2021    BUN 12 02/26/2021    CREATININE 1 17 02/26/2021    CALCIUM 8 9 02/26/2021     (H) 02/26/2021     (H) 02/26/2021    ALKPHOS 1,135 (H) 02/26/2021    EGFR 65 02/26/2021   , Coagulation:   Lab Results   Component Value Date    INR 1 55 (H) 02/26/2021   , Urinalysis: No results found for: Deyanne Los, SPECGRAV, PHUR, LEUKOCYTESUR, NITRITE, PROTEINUA, GLUCOSEU, KETONESU, BILIRUBINUR, BLOODU, Lipase:   Lab Results   Component Value Date    LIPASE 3,242 (H) 02/26/2021     Imaging: I have personally reviewed pertinent reports  and I have personally reviewed pertinent films in PACS  CT abdomen pelvis with contrast   Final Result by Bebeto Bermeo MD (02/26 2005)      Interval development of severe intra and extrahepatic biliary dilatation to the level of the pancreatic head  Appears to be caused by progression of infiltrating tumor mass inseparable from the posterior margin of the pancreatic head and probably    obstructing the distal common duct  Also, there is tumefactive sludge or noncalcified stone in the distal common duct  Progression of small hepatic metastatic lesion and development of new small hepatic metastatic lesions  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

## 2021-02-27 NOTE — CONSULTS
Consultation - Palliative & Supportive Care   Javon Elliott  76 y o   male  /-01   MRN: [id number]  Encounter: [id number]    ASSESSMENT:    Patient Active Problem List   Diagnosis    COPD (chronic obstructive pulmonary disease) ([facility] )    Headaches due to old head injury    High cholesterol    Obstructive sleep apnea syndrome    Type 2 diabetes mellitus with hyperglycemia, without long-term current use of insulin (HCC)    Esophageal obstruction    Chemotherapy induced neutropenia (HCC)    Anemia, unspecified    Insomnia due to medical condition    Encounter for care related to feeding tube    Paralysis of left vocal fold    Dysphonia    [facility] protein-calorie malnutrition [facility]    Port-A-Cath in place    Neuropathy associated with cancer ([facility]    Glottic insufficiency    Hx of smoking    Acquired hypothyroidism    Medicare annual wellness visit, subsequent    Esophageal stricture    [facility]aspinal abscess ([facility]    Brain lesion    Carotid stenosis, asymptomatic    Hyponatremia    Severe protein-calorie malnutrition (HCC)    Dysphagia    Bilateral swelling of feet    Severe protein-calorie malnutrition (Rodell Nickels: less than 60% of standard weight) (HCC)    Cancer-related pain    Gastroesophageal reflux disease    Jejunostomy tube present ([facility]    Therapeutic opioid-induced constipation ([facility])    Personal history of esophageal cancer    PONV (postoperative nausea and vomiting)    Vertebral osteomyelitis (HCC)    AMS (altered mental status)    Abnormal CT of thoracic spine    Urinary retention    Hypothyroid    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 (HCC)    Abnormal brain MRI    Atrial fibrillation (HCC)    Brain metastases (HCC)    Acute deep vein thrombosis (DVT) of femoral vein of right lower extremity (HCC)    Chronic anticoagulation    S/P IVC filter    Encounter for antineoplastic chemotherapy    Encounter for antineoplastic chemotherapy    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)    History of Clostridioides difficile infection    Dilation of biliary tract       65M w/ metasatic esophageal cancer admitted to [facility] 2/26/21 for biliary obstruction after abnormal lab values were reported  [facility] consulted for continuity, symptom management  He follows w/ Dr Edmund Leyden in the ambulatory setting  PLAN:    1  Goals:    Patient did not wish to discuss goals today d/t fatigue, discomfort  Continue full cares at this time   Palliative will follow for ongoing goals discussions as situation evolves   Patient may benefit from ACP discussion when symptoms are better controlled  2  Social Support:   Supportive listening provided   Provided anxiety containment    3  Symptom management:   As pain is poorly controlled, increasing oxyIR to 10-15mg q4h PRN moderate-severe pain   Continue hydromorphone 0 5mg IV q3h PRN breakthrough pain   Continue bowel and nausea regimen      Code status: Level 1 - Full Code   Decisional apparatus:  Patient does have capacity to make medical decisions on my exam today  If such capacity is lost, patient's substitute decision maker would default to adult children (he prefers Shay Lianejuanita be his primary contact) by [place] 169  Advance Directive / Living Will / POLST:  Nothing in EMR  I have reviewed the patient's controlled substance dispensing history in the Prescription Drug Monitoring Program in compliance with the [facility] regulations before prescribing any controlled substances  We appreciate the opportunity to participate in this patient's care  We will continue to follow  Please do not hesitate to contact our on-call provider through our clinic answering service at [phone] should you have acute symptom control concerns      IDENTIFICATION:  Inpatient consult to Palliative Care  Consult performed by: Yunier Villanueva MD  Consult ordered by: Tay Monte DO      Reason for Consult / Principal Problem: presumed to be for continuity, symptom control    HISTORY OF PRESENT ILLNESS:    Shelia Tejada is a 76 y o  male with metasatic esophageal cancer (currently on modified FOLFOX6 + Trastuzumab), RLE DVT, protein calorie malnutrition, h/o DM2 now diet-controlled  He is followed outpatient by Kane Angela, last seen 1/14/21 for symptom management  He was [facility] 2/26/21 for biliary obstruction after abnormal lab values were reported ("bilirubin of 7 1, , , alk-phos 1135")  Patient reports decreased appetite in the past week, along w/ nausea and abdominal pain which can be severe at times  Interview and exam limited by: patient's fatigue and discomfort    Review of Systems   Constitutional: Positive for activity change, appetite change and fatigue  HENT: Positive for sore throat and trouble swallowing  Gastrointestinal: Positive for abdominal pain, constipation and nausea  Endocrine: Negative for polydipsia and polyphagia  Allergic/Immunologic: Positive for immunocompromised state  Neurological: Negative for facial asymmetry  Psychiatric/Behavioral: Positive for dysphoric mood and sleep disturbance (d/t pain)         Past Medical History:   Diagnosis Date    Anemia     Bilateral pleural effusion     Brain lesion     Brain metastases (HCC)     Brain tumor (HCC)     C  difficile diarrhea     Cancer [facility]     Cancer of esophagus (HCC)     COPD (chronic obstructive pulmonary disease) (HCC)     Diabetes mellitus (HCC)     Difficulty swallowing     Disease of thyroid gland     Edema     Esophageal mass     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[facility] ) 9/24/2019    History of chemotherapy     History of transfusion     Malignant neoplasm of lower third of esophagus ([facility] ) 9/17/2019    Diagnosis: clinical stage [id number] esophageal carcinoma Procedure: EGD, transhiatal esophagectomy performed on 1/28/20 Pathology: esophagogastrectomy reveals microscopic focus of residual adenocarcinoma in muscularis propria measuring 0 7 cm, adjacent Duong's esophagus with associated atypia indeterminate for dysplasia  7 lymph nodes negative for carcinoma  Proximal and distal margins negative for    Paraspinal abscess (HCC)     PONV (postoperative nausea and vomiting)     Port-A-Cath in place     LCW    Sleep apnea     no CPAP    SOB (shortness of breath)      Past Surgical History:   Procedure Laterality Date    BACK SURGERY      x2    DISC REMOVAL      ESOPHAGECTOMY N/A 1/28/2020    Procedure: ESOPHAGECTOMY, TRANSHIATAL;  Surgeon: Crystal Gallagher MD;  [facility];  Service: Surgical Oncology    ESOPHAGOGASTRODUODENOSCOPY N/A 1/28/2020    Procedure: ESOPHAGOGASTRODUODENOSCOPY (EGD); Surgeon: Crystal Gallagher MD;  [facility];  Service: Surgical Oncology    FL GUIDED CENTRAL VENOUS ACCESS DEVICE INSERTION  9/26/2019    GASTROJEJUNOSTOMY W/ JEJUNOSTOMY TUBE N/A 9/26/2019    Procedure: INSERTION JEJUNOSTOMY TUBE OPEN;  Surgeon: Crystal Gallagher MD;  [facility];  Service: Surgical Oncology    GASTROJEJUNOSTOMY W/ JEJUNOSTOMY TUBE N/A 6/5/2020    Procedure: INSERTION JEJUNOSTOMY TUBE OPEN;  Surgeon: Crystal Gallagher MD;  [facility];  Service: Surgical Oncology    IR CHEST TUBE PLACEMENT  5/26/2020    IR SPINE PROCEDURE  5/27/2020    IR THORACENTESIS  2/25/2020    JOINT REPLACEMENT      ND EGD ENDOSCOPIC STENT PLACEMENT W/WIRE& DILATION N/A 8/31/2020    Procedure: INSERTION STENT ESOPHAGEAL;  Surgeon: Peterson De La Rosa MD;  [facility];  Service: Thoracic    ND EGD INSERT GUIDE WIRE DILATOR PASSAGE ESOPHAGUS N/A 7/7/2020    Procedure: ESOPHAGOGASTRODUODENOSCOPY (EGD) with esophageal dilation under fluro with biopsy;  Surgeon: Peterson De La Rosa MD;  [facility];  Service: Thoracic    ND ESOPHAGOGASTRODUODENOSCOPY TRANSORAL DIAGNOSTIC N/A 4/28/2020    Procedure: ESOPHAGOGASTRODUODENOSCOPY (EGD);   Surgeon: Peterson De La Rosa MD;  [facility];  Service: Thoracic    CA ESOPHAGOGASTRODUODENOSCOPY TRANSORAL DIAGNOSTIC N/A 7/27/2020    Procedure: ESOPHAGOGASTRODUODENOSCOPY (EGD); Surgeon: Craine Pérez MD;  [facility];  Service: Thoracic    CA ESOPHAGOGASTRODUODENOSCOPY TRANSORAL DIAGNOSTIC N/A 8/31/2020    Procedure: ESOPHAGOGASTRODUODENOSCOPY (EGD) dilation over guidewire 36-45 Fr , stent placement;  Surgeon: Carine Pérez MD;  [facility];  Service: Thoracic    CA ESOPHAGOGASTRODUODENOSCOPY TRANSORAL DIAGNOSTIC N/A 1/11/2021    Procedure: ESOPHAGOGASTRODUODENOSCOPY (EGD): esophageal stent removal;  Surgeon: Carine Pérez MD;  [facility];  Service: Thoracic    CA ESOPHAGOSCOPY FLEX Jackquline Seats <30 MM DIAM N/A 4/28/2020    Procedure: DILATATION ESOPHAGEAL;  Surgeon: Carine Pérez MD;  [facility];  Service: Thoracic    CA ESOPHAGOSCOPY FLEX Jackquline Seats <30 MM DIAM N/A 7/27/2020    Procedure: DILATATION ESOPHAGEAL with Savary over the wire dilitation 33FR to 45FR; Surgeon: Carine Pérez MD;  [facility];  Service: Thoracic    CA ESOPHAGOSCOPY FLEX BALLOON DILAT <30 MM DIAM N/A 8/31/2020    Procedure: DILATATION ESOPHAGEAL;  Surgeon: Carine Pérez MD;  [facility];  Service: Thoracic    TONSILLECTOMY      TUNNELED VENOUS PORT PLACEMENT N/A 9/26/2019    Procedure: INSERTION VENOUS PORT (PORT-A-CATH);   Surgeon: Ihsan Haywood MD;  [facility];  Service: Surgical Oncology    VOCAL CORD INJECTION N/A 2/7/2020    Procedure: MICROLARYNGOSCOPY WITH INJECTION;  Surgeon: Herve Burton MD;  [facility];  Service: ENT     Social History     Socioeconomic History    Marital status: Single     Spouse name: Not on file    Number of children: Not on file    Years of education: Not on file    Highest education level: Not on file   Occupational History    Not on file   Social Needs    Financial resource strain: Not on file    Food insecurity     Worry: Not on file     Inability: Not on file    Transportation needs     Medical: Not on file     Non-medical: Not on file   Tobacco Use    Smoking status: Former Smoker     Packs/day: 0 50     Years: 50 00     Pack years: 25 00     Types: Cigarettes     Quit date: 12/24/2017     Years since quitting: 3 1    Smokeless tobacco: Never Used   Substance and Sexual Activity    Alcohol use: Never     Frequency: Never    Drug use: Never    Sexual activity: Not Currently   Lifestyle    Physical activity     Days per week: Not on file     Minutes per session: Not on file    Stress: Not on file   Relationships    Social connections     Talks on phone: Not on file     Gets together: Not on file     Attends [employer] service: Not on file     Active member of club or organization: Not on file     Attends meetings of clubs or organizations: Not on file     Relationship status: Not on file    Intimate partner violence     Fear of current or ex partner: Not on file     Emotionally abused: Not on file     Physically abused: Not on file     Forced sexual activity: Not on file   Other Topics Concern    Not on file   Social History Narrative    ** Merged History Encounter **          Family History   Problem Relation Age of Onset    Diabetes Mother     No Known Problems Father        MEDICATIONS / ALLERGIES:  all current active meds have been reviewed and current meds:   Current Facility-Administered Medications   Medication Dose Route Frequency    amiodarone tablet 200 mg  200 mg Oral Daily With Breakfast    atorvastatin (LIPITOR) tablet 40 mg  40 mg Oral Daily    HYDROmorphone (DILAUDID) injection 0 5 mg  0 5 mg Intravenous Q3H PRN    levothyroxine tablet 50 mcg  50 mcg Oral Early Morning    metoclopramide (REGLAN) injection 10 mg  10 mg Intravenous Q6H PRN    multi-electrolyte (PLASMALYTE-A/ISOLYTE-S PH 7 4) IV solution  100 mL/hr Intravenous Continuous    ondansetron (ZOFRAN) injection 4 mg  4 mg Intravenous Q6H PRN    oxyCODONE (ROXICODONE) immediate release tablet 10 mg  10 mg Oral Q4H PRN    oxyCODONE (ROXICODONE) IR tablet 15 mg  15 mg Oral Q4H PRN    polyethylene glycol (MIRALAX) packet 17 g  17 g Oral Daily    senna (SENOKOT) tablet 8 6 mg  1 tablet Oral HS PRN       Allergies   Allergen Reactions    Penicillins Itching       OBJECTIVE:  /79   Pulse 74   Temp (!) 97 2 °F (36 2 °C) (Tympanic)   Resp 20   Wt 67 1 kg (148 lb)   SpO2 98%   BMI 21 24 kg/m²   Nursing notes and vital signs reviewed  Physical Exam:  Constitutional: Jaundiced, frail, debilitated, chronically ill-appearing elderly male  In no acute emotional distress  In discomfort  Head: Normocephalic and atraumatic  Eyes: EOM are normal  No ocular discharge  No scleral icterus  Neck: No visible adenopathy or masses  Respiratory: Effort normal  No stridor  No respiratory distress  Gastrointestinal: No abdominal distension  Abdomen diffusely tender w/ guarding  Musculoskeletal: No edema  Neurological: Alert, oriented and appropriately conversant  No focal deficits  Follows commands appropriately  Skin: Dry, no diaphoresis  Jaundiced  Psychiatric: Displays a dysphoric mood  Behavior, judgment and thought content appear normal      Lab Results: I have personally reviewed pertinent labs    CBC:  Lab Results   Component Value Date    WBC 5 09 02/27/2021    HGB 10 3 (L) 02/27/2021    HCT 33 3 (L) 02/27/2021    MCV 84 02/27/2021     (L) 02/27/2021    MCH 26 0 (L) 02/27/2021    MCHC 30 9 (L) 02/27/2021    RDW 15 5 (H) 02/27/2021    MPV 11 2 02/27/2021     CMP:  Lab Results   Component Value Date    SODIUM 141 02/27/2021    K 3 3 (L) 02/27/2021     (H) 02/27/2021    CO2 26 02/27/2021    BUN 11 02/27/2021    CREATININE 1 01 02/27/2021    CALCIUM 9 0 02/27/2021     (H) 02/27/2021    ALT 87 (H) 02/27/2021    ALKPHOS 980 (H) 02/27/2021    EGFR 77 02/27/2021     PT/PTT:  No results found for: PT, PTT  Albumin:  0   Lab Value Date/Time    ALB 2 3 (L) 02/27/2021 0617     Imaging Studies: I have personally reviewed pertinent reports  EKG, Pathology, and Other Studies: I have personally reviewed pertinent reports  Counseling / Coordination of Care: Total floor / unit time spent today 25 minutes  Greater than 50% of total time was spent with the patient and / or family counseling and / or coordination of care  A description of the counseling / coordination of care: symptom assessment and management, medication review and adjustment, psychosocial support, chart review, imaging review and lab review  Isabel Soto MD  [facility] Palliative and Supportive Care      Portions of this document may have been created using dictation software and as such some "sound alike" terms may have been generated by the system  Do not hesitate to contact me with any questions or clarifications

## 2021-02-27 NOTE — ASSESSMENT & PLAN NOTE
· Known history of metastatic esophageal adenocarcinoma s/p neoadjuvant chemotherapy and esophagectomy      · Palliative care consult for assistance with cancer related pain

## 2021-02-28 LAB
ALBUMIN SERPL BCP-MCNC: 2 G/DL (ref 3.5–5)
ALP SERPL-CCNC: 920 U/L (ref 46–116)
ALT SERPL W P-5'-P-CCNC: 71 U/L (ref 12–78)
ANION GAP SERPL CALCULATED.3IONS-SCNC: 6 MMOL/L (ref 4–13)
AST SERPL W P-5'-P-CCNC: 122 U/L (ref 5–45)
BASOPHILS # BLD AUTO: 0.02 THOUSANDS/ΜL (ref 0–0.1)
BASOPHILS NFR BLD AUTO: 1 % (ref 0–1)
BILIRUB SERPL-MCNC: 6.15 MG/DL (ref 0.2–1)
BUN SERPL-MCNC: 8 MG/DL (ref 5–25)
CALCIUM ALBUM COR SERPL-MCNC: 9.5 MG/DL (ref 8.3–10.1)
CALCIUM SERPL-MCNC: 7.9 MG/DL (ref 8.3–10.1)
CHLORIDE SERPL-SCNC: 108 MMOL/L (ref 100–108)
CO2 SERPL-SCNC: 27 MMOL/L (ref 21–32)
CREAT SERPL-MCNC: 0.92 MG/DL (ref 0.6–1.3)
EOSINOPHIL # BLD AUTO: 0.06 THOUSAND/ΜL (ref 0–0.61)
EOSINOPHIL NFR BLD AUTO: 2 % (ref 0–6)
ERYTHROCYTE [DISTWIDTH] IN BLOOD BY AUTOMATED COUNT: 15.6 % (ref 11.6–15.1)
GFR SERPL CREATININE-BSD FRML MDRD: 86 ML/MIN/1.73SQ M
GLUCOSE SERPL-MCNC: 105 MG/DL (ref 65–140)
HCT VFR BLD AUTO: 28.1 % (ref 36.5–49.3)
HGB BLD-MCNC: 8.8 G/DL (ref 12–17)
IMM GRANULOCYTES # BLD AUTO: 0.01 THOUSAND/UL (ref 0–0.2)
IMM GRANULOCYTES NFR BLD AUTO: 0 % (ref 0–2)
INR PPP: 1.55 (ref 0.84–1.19)
LYMPHOCYTES # BLD AUTO: 0.6 THOUSANDS/ΜL (ref 0.6–4.47)
LYMPHOCYTES NFR BLD AUTO: 18 % (ref 14–44)
MCH RBC QN AUTO: 26.3 PG (ref 26.8–34.3)
MCHC RBC AUTO-ENTMCNC: 31.3 G/DL (ref 31.4–37.4)
MCV RBC AUTO: 84 FL (ref 82–98)
MONOCYTES # BLD AUTO: 0.3 THOUSAND/ΜL (ref 0.17–1.22)
MONOCYTES NFR BLD AUTO: 9 % (ref 4–12)
NEUTROPHILS # BLD AUTO: 2.3 THOUSANDS/ΜL (ref 1.85–7.62)
NEUTS SEG NFR BLD AUTO: 70 % (ref 43–75)
NRBC BLD AUTO-RTO: 0 /100 WBCS
PLATELET # BLD AUTO: 106 THOUSANDS/UL (ref 149–390)
PMV BLD AUTO: 10.9 FL (ref 8.9–12.7)
POTASSIUM SERPL-SCNC: 3.3 MMOL/L (ref 3.5–5.3)
PROT SERPL-MCNC: 5.5 G/DL (ref 6.4–8.2)
PROTHROMBIN TIME: 18.6 SECONDS (ref 11.6–14.5)
RBC # BLD AUTO: 3.34 MILLION/UL (ref 3.88–5.62)
SODIUM SERPL-SCNC: 141 MMOL/L (ref 136–145)
WBC # BLD AUTO: 3.29 THOUSAND/UL (ref 4.31–10.16)

## 2021-02-28 PROCEDURE — 99232 SBSQ HOSP IP/OBS MODERATE 35: CPT | Performed by: INTERNAL MEDICINE

## 2021-02-28 PROCEDURE — 80053 COMPREHEN METABOLIC PANEL: CPT | Performed by: INTERNAL MEDICINE

## 2021-02-28 PROCEDURE — 85610 PROTHROMBIN TIME: CPT | Performed by: INTERNAL MEDICINE

## 2021-02-28 PROCEDURE — 99232 SBSQ HOSP IP/OBS MODERATE 35: CPT | Performed by: SURGERY

## 2021-02-28 PROCEDURE — 85025 COMPLETE CBC W/AUTO DIFF WBC: CPT | Performed by: SURGERY

## 2021-02-28 RX ORDER — OXYCODONE HYDROCHLORIDE 10 MG/1
20 TABLET ORAL EVERY 4 HOURS PRN
Status: DISCONTINUED | OUTPATIENT
Start: 2021-02-28 | End: 2021-03-03 | Stop reason: HOSPADM

## 2021-02-28 RX ORDER — DOCUSATE SODIUM 100 MG/1
100 CAPSULE, LIQUID FILLED ORAL 2 TIMES DAILY
Status: DISCONTINUED | OUTPATIENT
Start: 2021-02-28 | End: 2021-02-28

## 2021-02-28 RX ORDER — DOCUSATE SODIUM 100 MG/1
100 CAPSULE, LIQUID FILLED ORAL DAILY
Status: DISCONTINUED | OUTPATIENT
Start: 2021-02-28 | End: 2021-03-03 | Stop reason: HOSPADM

## 2021-02-28 RX ORDER — POTASSIUM CHLORIDE 20 MEQ/1
40 TABLET, EXTENDED RELEASE ORAL ONCE
Status: COMPLETED | OUTPATIENT
Start: 2021-02-28 | End: 2021-02-28

## 2021-02-28 RX ORDER — SENNOSIDES 8.6 MG
1 TABLET ORAL 2 TIMES DAILY PRN
Status: DISCONTINUED | OUTPATIENT
Start: 2021-02-28 | End: 2021-03-03 | Stop reason: HOSPADM

## 2021-02-28 RX ADMIN — ENOXAPARIN SODIUM 40 MG: 40 INJECTION SUBCUTANEOUS at 09:00

## 2021-02-28 RX ADMIN — SODIUM CHLORIDE, SODIUM GLUCONATE, SODIUM ACETATE, POTASSIUM CHLORIDE, MAGNESIUM CHLORIDE, SODIUM PHOSPHATE, DIBASIC, AND POTASSIUM PHOSPHATE 50 ML/HR: .53; .5; .37; .037; .03; .012; .00082 INJECTION, SOLUTION INTRAVENOUS at 00:18

## 2021-02-28 RX ADMIN — HYDROMORPHONE HYDROCHLORIDE 0.5 MG: 1 INJECTION, SOLUTION INTRAMUSCULAR; INTRAVENOUS; SUBCUTANEOUS at 15:29

## 2021-02-28 RX ADMIN — LEVOTHYROXINE SODIUM 50 MCG: 50 TABLET ORAL at 05:57

## 2021-02-28 RX ADMIN — DOCUSATE SODIUM 100 MG: 100 CAPSULE, LIQUID FILLED ORAL at 12:23

## 2021-02-28 RX ADMIN — SODIUM CHLORIDE, SODIUM GLUCONATE, SODIUM ACETATE, POTASSIUM CHLORIDE, MAGNESIUM CHLORIDE, SODIUM PHOSPHATE, DIBASIC, AND POTASSIUM PHOSPHATE 50 ML/HR: .53; .5; .37; .037; .03; .012; .00082 INJECTION, SOLUTION INTRAVENOUS at 20:37

## 2021-02-28 RX ADMIN — OXYCODONE HYDROCHLORIDE 15 MG: 10 TABLET ORAL at 09:05

## 2021-02-28 RX ADMIN — ATORVASTATIN CALCIUM 40 MG: 40 TABLET, FILM COATED ORAL at 09:04

## 2021-02-28 RX ADMIN — POTASSIUM CHLORIDE 40 MEQ: 1500 TABLET, EXTENDED RELEASE ORAL at 10:36

## 2021-02-28 RX ADMIN — OXYCODONE HYDROCHLORIDE 20 MG: 10 TABLET ORAL at 19:07

## 2021-02-28 RX ADMIN — POTASSIUM CHLORIDE 40 MEQ: 1500 TABLET, EXTENDED RELEASE ORAL at 12:23

## 2021-02-28 RX ADMIN — AMIODARONE HYDROCHLORIDE 200 MG: 200 TABLET ORAL at 07:22

## 2021-02-28 RX ADMIN — OXYCODONE HYDROCHLORIDE 20 MG: 10 TABLET ORAL at 12:23

## 2021-02-28 RX ADMIN — OXYCODONE HYDROCHLORIDE 15 MG: 10 TABLET ORAL at 03:58

## 2021-02-28 NOTE — ASSESSMENT & PLAN NOTE
· Known history of metastatic esophageal adenocarcinoma s/p neoadjuvant chemotherapy and esophagectomy      · Palliative care following, continue opioid analgesia, input noted

## 2021-02-28 NOTE — ASSESSMENT & PLAN NOTE
· S/p IVC filter  · Presently Eliquis on hold in view of scheduled GI intervention  · Resume Eliquis when cleared by GI

## 2021-02-28 NOTE — ASSESSMENT & PLAN NOTE
Obstructive jaundice   Imaging studies noted  Surgical oncology input noted  Presently on clear liquid diet  GI plans for endoscopic intervention possible stent placement noted

## 2021-02-28 NOTE — UTILIZATION REVIEW
Initial Clinical Review    Admission: Date/Time/Statement:   Admission Orders (From admission, onward)     Ordered        02/26/21 2131  Inpatient Admission  Once                   Orders Placed This Encounter   Procedures    Inpatient Admission     Standing Status:   Standing     Number of Occurrences:   1     Order Specific Question:   Level of Care     Answer:   Med Surg [16]     Order Specific Question:   Estimated length of stay     Answer:   More than 2 Midnights     Order Specific Question:   Certification     Answer:   I certify that inpatient services are medically necessary for this patient for a duration of greater than two midnights  See H&P and MD Progress Notes for additional information about the patient's course of treatment  ED Arrival Information     Expected Arrival Acuity Means of Arrival Escorted By Service Admission Type    2/26/2021 2/26/2021 13:20 Emergent Walk-In Family [facility] Hospitalist Emergency    Arrival Complaint    Abnormal Labs         Chief Complaint   Patient presents with    Abnormal Lab     pt states he got blood work done this morning and the doctor called his daughter and told them to come to the ED  Assessment/Plan: 76 y o  male,  To ER from home,  [facility]  Pain/ pancreatitis/ obstructive jaundice 2/2 advancing metastatic CA   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 s/p neoadjuvant chemotherapy followed by esophagectomy; was planned for resumption of chemotherapy March 2021  Presents today w/c/o  abd pain w/diarrhea, and labs (ordered by OP Oncology)  Revealed hyperbilirubinemia and elevated AST/ALT/a LP for which patient was advised to present to ED for further evaluation  IN ER,  CTAP revealed severe intra/extrahepatic biliary dilatation at the level of pancreatic head, suspected 2/2 infiltrating tumor/mass obstructing distal common bile duct    Was seen by surgical oncology in ED, who advised supportive treatment at this time, and GI consult for potential ERCP/decompression  will keep NPO, consult GI / Oncology, give IVF,  Trend CBC & CMP  Consult Palliative care for management of  Ca related pain  2/27  GI      Pancreatitis  And  Obstructive jaundice 2/2 advancing pancreatic head neoplasia   No evidence ascending cholangitis  REC  ERCP/EUS on  3/1:  May be challenging given h/o esophageal strictures requiring esophageal stent, may require previous dilation  IF unable to complete, will consider IR placement of drain  2/28  Palliative care :  Pt wants to cont disease directed care, no long term mechanical ventilation  2/28    Pain better controlled  Cont to HOLD eliquis, npo after midno  ED Triage Vitals [02/26/21 1329]   Temperature Pulse Respirations Blood Pressure SpO2   (!) 97 2 °F (36 2 °C) 72 20 92/60 97 %      Temp Source Heart Rate Source Patient Position - Orthostatic VS BP Location FiO2 (%)   Tympanic Monitor Sitting Left arm --      Pain Score       4          Wt Readings from Last 1 Encounters:   02/26/21 67 1 kg (148 lb)     Additional Vital Signs:   02/28/21 07:45:47  97 8 °F   --  --  104/53  70  --  --  --   02/28/21 0358  --  68  --  113/63  --  --  --  Lying   02/27/21 2232  98 2  73  18  89/52  65  96 %           Pertinent Labs/Diagnostic Test Results:   2/26  CTAP -  Interval development of severe intra and extrahepatic biliary dilatation to the level of the pancreatic head   Appears to be caused by progression of infiltrating tumor mass inseparable from the posterior margin of the pancreatic head and probably   obstructing the distal common duct   Also, there is tumefactive sludge or noncalcified stone in the distal common duct  Progression of small hepatic metastatic lesion and development of new small hepatic metastatic lesions     Progression of infiltrating paraceliac tumor now completely obstructing splenic vein and near completely obstructing the upper superior mesenteric vein with slight increase in of developing venous collaterals in the mesentery  Progression of loculated effusion in the base of the right hemithorax   Interval development of small volume of complex abdominal pelvic ascites  Results from last 7 days   Lab Units 02/28/21  0809 02/27/21  0617 02/26/21  1044   WBC Thousand/uL 3 29* 5 09 3 92*   HEMOGLOBIN g/dL 8 8* 10 3* 10 0*   HEMATOCRIT % 28 1* 33 3* 32 2*   PLATELETS Thousands/uL 106* 136* 128*   NEUTROS ABS Thousands/µL 2 30  --  2 99         Results from last 7 days   Lab Units 02/28/21  0809 02/27/21  0617 02/26/21  1044   SODIUM mmol/L 141 141 139   POTASSIUM mmol/L 3 3* 3 3* 3 4*   CHLORIDE mmol/L 108 109* 103   CO2 mmol/L 27 26 27   ANION GAP mmol/L 6 6 9   BUN mg/dL 8 11 12   CREATININE mg/dL 0 92 1 01 1 17   EGFR ml/min/1 73sq m 86 77 65   CALCIUM mg/dL 7 9* 9 0 8 9   MAGNESIUM mg/dL  --   --  2 1     Results from last 7 days   Lab Units 02/28/21  0809 02/27/21  0617 02/26/21  1544 02/26/21  1044   AST U/L 122* 145*  --  175*   ALT U/L 71 87*  --  103*   ALK PHOS U/L 920* 980*  --  1,135*   TOTAL PROTEIN g/dL 5 5* 6 3*  --  6 9   ALBUMIN g/dL 2 0* 2 3*  --  2 5*   TOTAL BILIRUBIN mg/dL 6 15* 6 80*  --  7 10*   BILIRUBIN DIRECT mg/dL  --   --  4 98*  --          Results from last 7 days   Lab Units 02/28/21  0809 02/27/21  0617 02/26/21  1044   GLUCOSE RANDOM mg/dL 105 104 103             No results found for: BETA-HYDROXYBUTYRATE                           Results from last 7 days   Lab Units 02/28/21  0603 02/26/21  1544   PROTIME seconds 18 6* 18 5*   INR  1 55* 1 55*             Results from last 7 days   Lab Units 02/26/21  1544   LACTIC ACID mmol/L 1 1                         Results from last 7 days   Lab Units 02/26/21  1044   LIPASE u/L 3,242*                                             Results from last 7 days   Lab Units 02/26/21  1544   BLOOD CULTURE  No Growth at 24 hrs  No Growth at 24 hrs  ED Treatment:   Medication Administration from 02/26/2021 1228 to 02/26/2021 2220       Date/Time Order Dose Route Action Action by Comments     02/26/2021 1846 sodium chloride 0 9 % bolus 1,000 mL 0 mL Intravenous Stopped Ilan Cantor RN      02/26/2021 1703 sodium chloride 0 9 % bolus 1,000 mL 1,000 mL Intravenous [address] Ilan Cantor RN      02/26/2021 1701 HYDROmorphone (DILAUDID) injection 0 5 mg 0 5 mg Intravenous Given Ilan Cantor RN      02/26/2021 2103 sodium chloride 0 9 % bolus 1,000 mL 0 mL Intravenous Stopped Doyle Larry RN      02/26/2021 1847 sodium chloride 0 9 % bolus 1,000 mL 1,000 mL Intravenous New [address] Ilan Cantor RN      02/26/2021 1828 iohexol (OMNIPAQUE) 350 MG/ML injection (SINGLE-DOSE) 100 mL 100 mL Intravenous Given Regina Grajeda      02/26/2021 2103 cefepime (MAXIPIME) 2 g/50 mL dextrose IVPB 0 mg Intravenous Stopped Doyle Larry RN      02/26/2021 2031 cefepime (MAXIPIME) 2 g/50 mL dextrose IVPB 2,000 mg Intravenous New Bag Baltazar Young, SAMEERA      02/26/2021 2030 metroNIDAZOLE (FLAGYL) IVPB (premix) 500 mg 100 mL 0 mg Intravenous Stopped Doyle Larry RN      02/26/2021 1939 metroNIDAZOLE (FLAGYL) IVPB (premix) 500 mg 100 mL 500 mg Intravenous New Bag Baltazar Young RN      02/26/2021 2115 HYDROmorphone (DILAUDID) injection 0 5 mg 0 5 mg Intravenous Given Doyle Larry RN         Past Medical History:   Diagnosis Date    Anemia     Bilateral pleural effusion     Brain lesion     Brain metastases (HCC)     Brain tumor (HCC)     C  difficile diarrhea     [facility]     [facility]     COPD (chronic obstructive pulmonary disease) [facility]     Diabetes mellitus [facility]     Difficulty swallowing     Disease of thyroid gland     Edema     Esophageal mass     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    History of chemotherapy     History of transfusion     Malignant neoplasm of lower third of esophagus ([facility] ) 9/17/2019 Diagnosis: clinical stage [id number] esophageal carcinoma Procedure: EGD, transhiatal esophagectomy performed on 1/28/20 Pathology: esophagogastrectomy reveals microscopic focus of residual adenocarcinoma in muscularis propria measuring 0 7 cm, adjacent Duong's esophagus with associated atypia indeterminate for dysplasia  7 lymph nodes negative for carcinoma   Proximal and distal margins negative for    Paraspinal abscess (HCC)     PONV (postoperative nausea and vomiting)     Port-A-Cath in place     LCW    Sleep apnea     no CPAP    SOB (shortness of breath)      Present on Admission:   Dilation of biliary tract   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 (HCC)   Type 2 diabetes mellitus with hyperglycemia, without long-term current use of insulin (HCC)   Acute deep vein thrombosis (DVT) of femoral vein of right lower extremity (HCC)   Severe protein-calorie malnutrition (HCC)      Admitting Diagnosis: Esophageal cancer (HCC) [C15 9]  Abdominal pain [R10 9]  Jaundice [R17]  Elevated liver enzymes [R74 8]  Pancreatic mass [K86 89]  Common bile duct obstruction [K83 1]  Abnormal laboratory test result [R89 9]  Age/Sex: 76 y o  male  Admission Orders:      IP CONSULT TO SURGICAL ONCOLOGY  IP CONSULT TO GASTROENTEROLOGY  IP CONSULT TO PALLIATIVE CARE  IP CONSULT TO NUTRITION SERVICES    Scheduled Medications:  amiodarone, 200 mg, Oral, Daily With Breakfast  atorvastatin, 40 mg, Oral, Daily  docusate sodium, 100 mg, Oral, Daily  enoxaparin, 40 mg, Subcutaneous, Q24H TANNER  levothyroxine, 50 mcg, Oral, Early Morning  polyethylene glycol, 17 g, Oral, Daily      Continuous IV Infusions:  multi-electrolyte, 50 mL/hr, Intravenous, Continuous      PRN Meds:  HYDROmorphone, 0 5 mg, Intravenous, Q3H PRN  metoclopramide, 10 mg, Intravenous, Q6H PRN  ondansetron, 4 mg, Intravenous, Q6H PRN  oxyCODONE, 10 mg, Oral, Q4H PRN  oxyCODONE, 20 mg, Oral, Q4H PRN  senna, 1 tablet, Oral, BID PRN        IP CONSULT TO SURGICAL ONCOLOGY  IP CONSULT TO GASTROENTEROLOGY  IP CONSULT TO PALLIATIVE CARE  IP CONSULT TO NUTRITION SERVICES    Network Utilization Review Department  ATTENTION: Please call with any questions or concerns to [phone] and carefully listen to the prompts so that you are directed to the right person  All voicemails are confidential   Chrystine Can all requests for admission clinical reviews, approved or denied determinations and any other requests to dedicated fax number below belonging to the campus where the patient is receiving treatment   List of dedicated fax numbers for the Facilities:  [address] DENIALS (Administrative/Medical Necessity) [phone]   1000  [facility] (Maternity/NICU/Pediatrics) [phone]   [address]  [address] (  Elsie Solomon "Betsy" 103) [address] [place] [address] [address] [place] [address] [phone]

## 2021-02-28 NOTE — PROGRESS NOTES
Progress Note - Jeanne Joyner 1955, 76 y o  male MRN: [id number]    Unit/Bed#: -01 Encounter: [id number]    Primary Care Provider: SARA Moulton   Date and time admitted to hospital: 2/26/2021  2:58 PM        * Dilation of biliary tract  Assessment & Plan  Obstructive jaundice   Imaging studies noted  Surgical oncology input noted  Presently on clear liquid diet  GI plans for endoscopic intervention possible stent placement noted    Acute deep vein thrombosis (DVT) of femoral vein of right lower extremity (HCC)  Assessment & Plan  · S/p IVC filter  · Presently Eliquis on hold in view of scheduled GI intervention  · Resume Eliquis when cleared by GI    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 (HCC)  Assessment & Plan  · Known history of metastatic esophageal adenocarcinoma s/p neoadjuvant chemotherapy and esophagectomy  · Palliative care following, continue opioid analgesia, input noted    Severe protein-calorie malnutrition ([facility] )  Assessment & Plan  Encourage adequate calorie and protein intake       Type 2 diabetes mellitus with hyperglycemia, without long-term current use of insulin ([facility])  Assessment & Plan  Lab Results   Component Value Date    HGBA1C 5 2 12/01/2020       Blood Sugar Average: Last 72 hrs:  ·  diet controlled  · SSI with Accu-Cheks while inpatient  · Initiate hypoglycemia protocol      VTE Pharmacologic Prophylaxis:   Pharmacologic: Enoxaparin (Lovenox)  Mechanical VTE Prophylaxis in Place: Yes    Patient Centered Rounds: I have performed bedside rounds with nursing staff today  Discussions with Specialists or Other Care Team Provider:     Education and Discussions with Family / Patient:  Patient, updated daughter Kasigluk Yung    Time Spent for Care: 30 minutes  More than 50% of total time spent on counseling and coordination of care as described above      Current Length of Stay: 2 day(s)    Current Patient Status: Inpatient   Certification Statement: The patient will continue to require additional inpatient hospital stay due to As outlined    Discharge Plan: For endoscopic evaluation and intervention tomorrow with GI    Code Status: Level 1 - Full Code      Subjective:     Reports feeling tired  Encouraged out of bed into chair  Poor appetite    Objective:     Vitals:   Temp (24hrs), Av °F (36 7 °C), Min:97 8 °F (36 6 °C), Max:98 2 °F (36 8 °C)    Temp:  [97 8 °F (36 6 °C)-98 2 °F (36 8 °C)] 97 8 °F (36 6 °C)  HR:  [68-73] 68  Resp:  [18] 18  BP: ()/(52-63) 104/53  SpO2:  [96 %] 96 %  Body mass index is 21 24 kg/m²  Input and Output Summary (last 24 hours): Intake/Output Summary (Last 24 hours) at 2021 1216  Last data filed at 2021 1207  Gross per 24 hour   Intake 1360 ml   Output --   Net 1360 ml       Physical Exam:     Physical Exam    Comfortably in bed  Icterus noted  Neck supple  Lungs diminished breath sounds bilateral  Heart sounds S1-S2 noted  Abdomen soft nontender  Awake obeys simple commands  Pulses noted  No rash    Additional Data:     Labs:    Results from last 7 days   Lab Units 21  0809   WBC Thousand/uL 3 29*   HEMOGLOBIN g/dL 8 8*   HEMATOCRIT % 28 1*   PLATELETS Thousands/uL 106*   NEUTROS PCT % 70   LYMPHS PCT % 18   MONOS PCT % 9   EOS PCT % 2     Results from last 7 days   Lab Units 21  0809   SODIUM mmol/L 141   POTASSIUM mmol/L 3 3*   CHLORIDE mmol/L 108   CO2 mmol/L 27   BUN mg/dL 8   CREATININE mg/dL 0 92   ANION GAP mmol/L 6   CALCIUM mg/dL 7 9*   ALBUMIN g/dL 2 0*   TOTAL BILIRUBIN mg/dL 6 15*   ALK PHOS U/L 920*   ALT U/L 71   AST U/L 122*   GLUCOSE RANDOM mg/dL 105     Results from last 7 days   Lab Units 21  0603   INR  1 55*             Results from last 7 days   Lab Units 21  1544   LACTIC ACID mmol/L 1 1           * I Have Reviewed All Lab Data Listed Above  * Additional Pertinent Lab Tests Reviewed:  All Labs Within Last 24 Hours Reviewed    Imaging:    Imaging Reports Reviewed Today Include:   Imaging Personally Reviewed by Myself Includes:     Recent Cultures (last 7 days):     Results from last 7 days   Lab Units 02/26/21  7464   BLOOD CULTURE  No Growth at 24 hrs  No Growth at 24 hrs  Last 24 Hours Medication List:   Current Facility-Administered Medications   Medication Dose Route Frequency Provider Last Rate    amiodarone  200 mg Oral Daily With Breakfast Deepa Gandhi, DO      atorvastatin  40 mg Oral Daily Deepa Gandhi, DO      docusate sodium  100 mg Oral Daily Dunia Blevins MD      enoxaparin  40 mg Subcutaneous Q24H [address] Светлана Yang MD      HYDROmorphone  0 5 mg Intravenous Q3H PRN Dunia Blevins MD      levothyroxine  50 mcg Oral Early Morning Deepa Gandhi,       metoclopramide  10 mg Intravenous Q6H PRN Clarisa Myers PA-C      multi-electrolyte  50 mL/hr Intravenous Continuous Светлана Yang MD 50 mL/hr (02/28/21 0018)    ondansetron  4 mg Intravenous Q6H PRN Deepa Gandhi DO      oxyCODONE  10 mg Oral Q4H PRN Dunia Blevins MD      oxyCODONE  20 mg Oral Q4H PRN Dunia Blevins MD      polyethylene glycol  17 g Oral Daily Dunia Blevins MD      potassium chloride  40 mEq Oral Once Marilu Hopper MD      senna  1 tablet Oral BID PRN Dunia Blevins MD          Today, Patient Was Seen By: Светлана Yang MD    ** Please Note: Dictation voice to text software may have been used in the creation of this document   **

## 2021-02-28 NOTE — PROGRESS NOTES
Progress Note - Palliative & Supportive Care  Waldo Bray  76 y o   male  /-01   MRN: [id number]  Encounter: [id number]     ASSESSMENT:    Patient Active Problem List   Diagnosis    COPD (chronic obstructive pulmonary disease) ([facility] )    Headaches due to old head injury    High cholesterol    Obstructive sleep apnea syndrome    Type 2 diabetes mellitus with hyperglycemia, without long-term current use of insulin (HCC)    Esophageal obstruction    Chemotherapy induced neutropenia (HCC)    Anemia, unspecified    Insomnia due to medical condition    Encounter for care related to feeding tube    Paralysis of left vocal fold    Dysphonia    Mild protein-calorie malnutrition ([facility] )    Port-A-Cath in place    [facility]urop[facility] associated with cancer ([facility]    Glottic insufficiency    Hx of smoking    Acquired hypothyroidism    Medicare annual wellness visit, subsequent    Esophageal stricture    [facility]aspinal abscess ([facility]    Brain lesion    Carotid stenosis, asymptomatic    Hyponatremia    Severe protein-calorie malnutrition (HCC)    Dysphagia    Bilateral swelling of feet    Severe protein-calorie malnutrition ([place] : less than 60% of standard weight) (HCC)    Cancer-related pain    Gastroesophageal reflux disease    Jejunostomy tube present ([facility] )    Therapeutic opioid-induced constipation ([facility])    Personal history of esophageal cancer    PONV (postoperative nausea and vomiting)    Vertebral osteomyelitis (HCC)    AMS (altered mental status)    Abnormal CT of thoracic spine    Urinary retention    Hypothyroid    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 (HCC)    Abnormal brain MRI    Atrial fibrillation (HCC)    Brain metastases (HCC)    Acute deep vein thrombosis (DVT) of femoral vein of right lower extremity (HCC)    Chronic anticoagulation    S/P IVC filter    Encounter for antineoplastic chemotherapy    Encounter for antineoplastic chemotherapy    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)    History of Clostridioides difficile infection    Dilation of biliary tract       65M w/ metasatic esophageal cancer admitted [facility]/26[facility] for biliary obstruction after abnormal lab values were reported  [place] consulted for continuity, symptom management  PLAN:    1  Goals:    Patient more willing to talk as pain is better controlled today   He wishes to continue disease-directed care w/o limits at this time, but would not want long-term ventilation   Patient states he has completed advanced directives and they are at home  Asked if a family member could bring documents in to be scanned   Palliative will follow for ongoing goals of care discussions as situation evolves, as well as for symptom management  2  Social Support:   Supportive listening provided   Provided anxiety containment    3  Symptom management:   Increasing oxyIR to 10-20mg q4h PRN moderate-severe pain   Continue hydromorphone 0 5mg IV q3h PRN breakthrough   Added colace to bowel regimen (as bowel movements have caused abdominal pain), increased senna to BID PRN, continue Miralax   Continue antiemetics   Consult placed to Nutrition team for protein calorie malnutrition    Code status: Level 1 - Full Code   Decisional apparatus:  Patient does have capacity to make medical decisions on my exam today  If such capacity is lost, patient's substitute decision maker would default to his adult children [place] being his preferred primary) by [place] 169  Advance Directive / Living Will / POLST:  Patient states he has completed advanced directives and they are at home  Nothing in EMR  We appreciate the opportunity to participate in this patient's care  We will continue to follow  Please do not hesitate to contact our on-call provider through our clinic answering service at [phone] should you have acute symptom control concerns      INTERVAL HISTORY:    Patient feels his abdominal pain has improved w/ PRN oxyIR (increased to 10-15mg q4h PRN yesterday) but feels his hip pain is still problematic  He feels he would be more comfortable w/ 10-20mg q4h PRN dosing as "that's what I was taking at home"  His mood has improved  He is comfortable w/ the plan (ERCP planned for 3/1/21)  He is tolerating PO nutrition today  He notes that his abdominal pain had worsened when trying to have a bowel movement in the past week (but has not had a BM in 3-4 days)  Patient had received oxyIR 10mg x 2, oxyIR 15mg x 4, hydromorphone 0 5mg IV x1 in the 24h prior to our visit today  Review of Systems   Constitutional: Positive for activity change, appetite change and fatigue  HENT: Positive for sore throat and trouble swallowing  Eyes: Negative for discharge and redness  Gastrointestinal: Positive for abdominal pain, constipation and nausea  Negative for rectal pain  Endocrine: Positive for polydipsia  Negative for polyphagia  Musculoskeletal: Positive for arthralgias  Skin: Positive for color change  Allergic/Immunologic: Positive for immunocompromised state  Neurological: Negative for facial asymmetry and speech difficulty  Psychiatric/Behavioral: Positive for dysphoric mood (improved today)  Negative for decreased concentration  The patient is not nervous/anxious          MEDICATIONS / ALLERGIES:  all current active meds have been reviewed and current meds:   Current Facility-Administered Medications   Medication Dose Route Frequency    amiodarone tablet 200 mg  200 mg Oral Daily With Breakfast    atorvastatin (LIPITOR) tablet 40 mg  40 mg Oral Daily    docusate sodium (COLACE) capsule 100 mg  100 mg Oral Daily    enoxaparin (LOVENOX) subcutaneous injection 40 mg  40 mg Subcutaneous Q24H [place]AN[place]    HYDROmorphone (DILAUDID) injection 0 5 mg  0 5 mg Intravenous Q3H PRN    levothyroxine tablet 50 mcg  50 mcg Oral Early Morning    metoclopramide (REGLAN) injection 10 mg  10 mg Intravenous Q6H PRN    multi-electrolyte (PLASMALYTE-A/ISOLYTE-S PH 7 4) IV solution  50 mL/hr Intravenous Continuous    ondansetron (ZOFRAN) injection 4 mg  4 mg Intravenous Q6H PRN    oxyCODONE (ROXICODONE) immediate release tablet 10 mg  10 mg Oral Q4H PRN    oxyCODONE (ROXICODONE) immediate release tablet 20 mg  20 mg Oral Q4H PRN    polyethylene glycol (MIRALAX) packet 17 g  17 g Oral Daily    senna (SENOKOT) tablet 8 6 mg  1 tablet Oral BID PRN       Allergies   Allergen Reactions    Penicillins Itching       OBJECTIVE:  /53   Pulse 68   Temp 97 8 °F (36 6 °C)   Resp 18   Wt 67 1 kg (148 lb)   SpO2 96%   BMI 21 24 kg/m²   Nursing notes and vital signs reviewed  Physical Exam:  Constitutional: Jaundiced, frail, debilitated, chronically ill-appearing elderly male  In no acute physical or emotional distress  Head: Normocephalic and atraumatic  Eyes: EOM are normal  No ocular discharge  No scleral icterus  Neck: No visible adenopathy or masses  Respiratory: Effort normal  No stridor  No respiratory distress  Gastrointestinal: No abdominal distension  Musculoskeletal: No edema  Neurological: Alert, oriented and appropriately conversant  No focal deficits  Follows commands appropriately  Skin: No diaphoresis, no rashes seen on exposed areas of skin  Jaundiced  Psychiatric: Displays a normal mood and affect  Behavior, judgment and thought content appear normal      Lab Results: I have personally reviewed pertinent labs    CBC:  Lab Results   Component Value Date    WBC 3 29 (L) 02/28/2021    HGB 8 8 (L) 02/28/2021    HCT 28 1 (L) 02/28/2021    MCV 84 02/28/2021     (L) 02/28/2021    MCH 26 3 (L) 02/28/2021    MCHC 31 3 (L) 02/28/2021    RDW 15 6 (H) 02/28/2021    MPV 10 9 02/28/2021    NRBC 0 02/28/2021     CMP:  Lab Results   Component Value Date    SODIUM 141 02/28/2021    K 3 3 (L) 02/28/2021     02/28/2021    CO2 27 02/28/2021    BUN 8 02/28/2021    CREATININE 0 92 02/28/2021    CALCIUM 7 9 (L) 02/28/2021     (H) 02/28/2021    ALT 71 02/28/2021    ALKPHOS 920 (H) 02/28/2021    EGFR 86 02/28/2021     Albumin:  0   Lab Value Date/Time    ALB 2 0 (L) 02/28/2021 0809     Imaging Studies: I have personally reviewed pertinent reports  EKG, Pathology, and Other Studies: I have personally reviewed pertinent reports  Counseling / Coordination of Care: Total floor / unit time spent today 25+ minutes  Greater than 50% of total time was spent with the patient and / or family counseling and / or coordination of care  A description of the counseling / coordination of care: symptom assessment and management, medication review and adjustment, psychosocial support, chart review, imaging review, lab review, advanced directives and goals of care  Ayala Hopkins MD  [facility] Palliative and Supportive Care      Portions of this document may have been created using dictation software and as such some "sound alike" terms may have been generated by the system  Do not hesitate to contact me with any questions or clarifications

## 2021-02-28 NOTE — CASE MANAGEMENT
Called pt in room, explained cm program/cm role: Info obtained from patient, also pt gave permission to reach out to daughter Renetta Mendez to confirm pharmacy preference  Verify Address: [address] [place] [address]  LOS: 2 days  Bundle: no         Unplanned Readmission Color yellow  27 Day Readmission: no                            Resides with:  daughter, son in law 3 grand kids                    Type Home:  2SH                   PETEY:10 front, none in back of house      Do they have rails? yes  Bedroom Located:   First fl       Do you have a bathroom set up on the same floor yes  Primary Caregiver: self  ADLs/Ambulations  independent no devices  Drive: yes      PCP: Jorgito Morin      Preferred Pharmacy:[facility] [address] [place]       home star for dc meds: No  Afford Medication: Yes  [facility]  DME: walker wheel chair  IP Rehab: no  Did you ever receive Dialysis:  no        MH Illness: denies   meds? n/a   IP/OP Care denies  Drug Abuse: denies  Alcohol Abuse denies  Employed: retired  Primary Contact: Walter Malik Daughter [phone]  POA: yes will fax in [address]   lw yes  Transportation Home:family to transport home    CM reviewed d/c planning process including the following: identifying help at home, patient preference for d/c planning needs, Discharge Lounge, [facility] Meds to Bed program, availability of treatment team to discuss questions or concerns patient and/or family may have regarding understanding medications and recognizing signs and symptoms once discharged  CM also encouraged patient to follow up with all recommended appointments after discharge  Patient advised of importance for patient and family to participate in managing patients medical well being  Patient/caregiver received discharge checklist   Content reviewed  Patient/caregiver encouraged to participate in discharge plan of care prior to discharge home

## 2021-02-28 NOTE — PROGRESS NOTES
Progress Note - Surgical Oncology  Navid Hernandez 76 y o  male MRN: [id number]  Unit/Bed#: -01 Encounter: [id number]    Assessment:  76 M with history of esophageal adenocarcinoma status post neoadjuvant chemotherapy and subsequent transhiatal esophagectomy (01/2020) with known brain metastases now awaiting resumption of chemotherapy and found to have hyperbilirubinemia and concern for obstructive jaundice without evidence of cholangitis  Plan:  CLD  IVF  Palliative recs appreciated for pain control/ GOC  GI planning ERCP on Monday  -IR consult if scope unable to pass through stricture  Trend LFTs- t bili  Rest of care per medicine    Subjective/Objective     Subjective:   No acute events overnight  Pain improved  No nausea/ vomiting  Objective:    Blood pressure 104/53, pulse 68, temperature 97 8 °F (36 6 °C), resp  rate 18, weight 67 1 kg (148 lb), SpO2 96 %  ,Body mass index is 21 24 kg/m²  Intake/Output Summary (Last 24 hours) at 2/28/2021 0825  Last data filed at 2/28/2021 0018  Gross per 24 hour   Intake 1000 ml   Output --   Net 1000 ml       Invasive Devices     Central Venous Catheter Line            Port A Cath 10/01/19 Left Chest 516 days          Peripheral Intravenous Line            Peripheral IV 02/26/21 Antecubital 1 day          Drain            Gastrostomy/Enterostomy Jejunostomy 14 Fr   days    Gastrostomy/Enterostomy Jejunostomy 14 Fr   days                Physical Exam:   Gen:  NAD  Scleral icterus improved  HEENT: NCAT  MMM  CV: well perfused  Lungs: Normal respiratory effort  Abd: soft, mild epigastric tenderness/nd  Skin: warm/ dry  Extremities: no peripheral edema, no clubbing or cyanosis  Neuro:  AxO x3      Results from last 7 days   Lab Units 02/27/21  0617 02/26/21  1044   WBC Thousand/uL 5 09 3 92*   HEMOGLOBIN g/dL 10 3* 10 0*   HEMATOCRIT % 33 3* 32 2*   PLATELETS Thousands/uL 136* 128*     Results from last 7 days   Lab Units 02/27/21  0617 02/26/21  1044 POTASSIUM mmol/L 3 3* 3 4*   CHLORIDE mmol/L 109* 103   CO2 mmol/L 26 27   BUN mg/dL 11 12   CREATININE mg/dL 1 01 1 17   CALCIUM mg/dL 9 0 8 9     Results from last 7 days   Lab Units 02/28/21  0603 02/26/21  1544   INR  1 55* 1 55*        I have personally reviewed pertinent films in PACS      Medications:   Scheduled Meds:  Current Facility-Administered Medications   Medication Dose Route Frequency Provider Last Rate    amiodarone  200 mg Oral Daily With Breakfast Cheryl Locks, DO      atorvastatin  40 mg Oral Daily Cheryl Locks, DO      enoxaparin  40 mg Subcutaneous Q24H [address] Anaya Alba MD      HYDROmorphone  0 5 mg Intravenous Q3H PRN Michel Martinez MD      levothyroxine  50 mcg Oral Early Morning Cheryl Locks, DO      metoclopramide  10 mg Intravenous Q6H PRN Clarisa Myers PA-C      multi-electrolyte  50 mL/hr Intravenous Continuous Anaya Alba MD 50 mL/hr (02/28/21 0018)    ondansetron  4 mg Intravenous Q6H PRN Cheryl Locks, DO      oxyCODONE  10 mg Oral Q4H PRN Michel Martinez MD      oxyCODONE  15 mg Oral Q4H PRN Michel Martinez MD      polyethylene glycol  17 g Oral Daily Michel Martinez MD      senna  1 tablet Oral HS PRN Michel Martinez MD       Continuous Infusions:multi-electrolyte, 50 mL/hr, Last Rate: 50 mL/hr (02/28/21 0018)      PRN Meds:  HYDROmorphone, 0 5 mg, Q3H PRN  metoclopramide, 10 mg, Q6H PRN  ondansetron, 4 mg, Q6H PRN  oxyCODONE, 10 mg, Q4H PRN  oxyCODONE, 15 mg, Q4H PRN  senna, 1 tablet, HS PRN      VTE Pharmacologic Prophylaxis: Enoxaparin (Lovenox)  VTE Mechanical Prophylaxis: sequential compression device

## 2021-02-28 NOTE — PLAN OF CARE
Problem: Potential for Falls  Goal: Patient will remain free of falls  Description: INTERVENTIONS:  - Assess patient frequently for physical needs  -  Identify cognitive and physical deficits and behaviors that affect risk of falls  -  [place] fall precautions as indicated by assessment   - Educate patient/family on patient safety including physical limitations  - Instruct patient to call for assistance with activity based on assessment  - Modify environment to reduce risk of injury  - Consider OT/PT consult to assist with strengthening/mobility  Outcome: Progressing     Problem: PAIN - ADULT  Goal: Verbalizes/displays adequate comfort level or baseline comfort level  Description: Interventions:  - Encourage patient to monitor pain and request assistance  - Assess pain using appropriate pain scale  - Administer analgesics based on type and severity of pain and evaluate response  - Implement non-pharmacological measures as appropriate and evaluate response  - Consider cultural and social influences on pain and pain management  - Notify physician/advanced practitioner if interventions unsuccessful or patient reports new pain  Outcome: Progressing     Problem: SAFETY ADULT  Goal: Patient will remain free of falls  Description: INTERVENTIONS:  - Assess patient frequently for physical needs  -  Identify cognitive and physical deficits and behaviors that affect risk of falls    -  [place] fall precautions as indicated by assessment   - Educate patient/family on patient safety including physical limitations  - Instruct patient to call for assistance with activity based on assessment  - Modify environment to reduce risk of injury  - Consider OT/PT consult to assist with strengthening/mobility  Outcome: Progressing  Goal: Maintain or return to baseline ADL function  Description: INTERVENTIONS:  -  Assess patient's ability to carry out ADLs; assess patient's baseline for ADL function and identify physical deficits which impact ability to perform ADLs (bathing, care of mouth/teeth, toileting, grooming, dressing, etc )  - Assess/evaluate cause of self-care deficits   - Assess range of motion  - Assess patient's mobility; develop plan if impaired  - Assess patient's need for assistive devices and provide as appropriate  - Encourage maximum independence but intervene and supervise when necessary  - Involve family in performance of ADLs  - Assess for home care needs following discharge   - Consider OT consult to assist with ADL evaluation and planning for discharge  - Provide patient education as appropriate  Outcome: Progressing  Goal: Maintain or return mobility status to optimal level  Description: INTERVENTIONS:  - Assess patient's baseline mobility status (ambulation, transfers, stairs, etc )    - Identify cognitive and physical deficits and behaviors that affect mobility  - Identify mobility aids required to assist with transfers and/or ambulation (gait belt, sit-to-stand, lift, walker, cane, etc )  - [place] fall precautions as indicated by assessment  - Record patient progress and toleration of activity level on Mobility SBAR; progress patient to next Phase/Stage  - Instruct patient to call for assistance with activity based on assessment  - Consider rehabilitation consult to assist with strengthening/weightbearing, etc   Outcome: Progressing     Problem: DISCHARGE PLANNING  Goal: Discharge to home or other facility with appropriate resources  Description: INTERVENTIONS:  - Identify barriers to discharge w/patient and caregiver  - Arrange for needed discharge resources and transportation as appropriate  - Identify discharge learning needs (meds, wound care, etc )  - Arrange for interpretive services to assist at discharge as needed  - Refer to Case Management Department for coordinating discharge planning if the patient needs post-hospital services based on physician/advanced practitioner order or complex needs related to functional status, cognitive ability, or social support system  Outcome: Progressing     Problem: Knowledge Deficit  Goal: Patient/family/caregiver demonstrates understanding of disease process, treatment plan, medications, and discharge instructions  Description: Complete learning assessment and assess knowledge base    Interventions:  - Provide teaching at level of understanding  - Provide teaching via preferred learning methods  Outcome: Progressing     Problem: Prexisting or High Potential for Compromised Skin Integrity  Goal: Skin integrity is maintained or improved  Description: INTERVENTIONS:  - Identify patients at risk for skin breakdown  - Assess and monitor skin integrity  - Assess and monitor nutrition and hydration status  - Monitor labs   - Assess for incontinence   - Turn and reposition patient  - Assist with mobility/ambulation  - Relieve pressure over bony prominences  - Avoid friction and shearing  - Provide appropriate hygiene as needed including keeping skin clean and dry  - Evaluate need for skin moisturizer/barrier cream  - Collaborate with interdisciplinary team   - Patient/family teaching  - Consider wound care consult   Outcome: Progressing

## 2021-03-01 ENCOUNTER — ANESTHESIA EVENT (INPATIENT)
Dept: GASTROENTEROLOGY | Facility: HOSPITAL | Age: 66
DRG: 444 | End: 2021-03-01
Payer: COMMERCIAL

## 2021-03-01 ENCOUNTER — ANESTHESIA (INPATIENT)
Dept: GASTROENTEROLOGY | Facility: HOSPITAL | Age: 66
DRG: 444 | End: 2021-03-01
Payer: COMMERCIAL

## 2021-03-01 ENCOUNTER — APPOINTMENT (INPATIENT)
Dept: GASTROENTEROLOGY | Facility: HOSPITAL | Age: 66
DRG: 444 | End: 2021-03-01
Payer: COMMERCIAL

## 2021-03-01 ENCOUNTER — APPOINTMENT (INPATIENT)
Dept: RADIOLOGY | Facility: HOSPITAL | Age: 66
DRG: 444 | End: 2021-03-01
Payer: COMMERCIAL

## 2021-03-01 DIAGNOSIS — M46.20 VERTEBRAL OSTEOMYELITIS (HCC): Primary | ICD-10-CM

## 2021-03-01 LAB
ALBUMIN SERPL BCP-MCNC: 2.1 G/DL (ref 3.5–5)
ALP SERPL-CCNC: 970 U/L (ref 46–116)
ALT SERPL W P-5'-P-CCNC: 69 U/L (ref 12–78)
ANION GAP SERPL CALCULATED.3IONS-SCNC: 4 MMOL/L (ref 4–13)
AST SERPL W P-5'-P-CCNC: 135 U/L (ref 5–45)
BASOPHILS # BLD AUTO: 0.04 THOUSANDS/ΜL (ref 0–0.1)
BASOPHILS NFR BLD AUTO: 1 % (ref 0–1)
BILIRUB SERPL-MCNC: 7.52 MG/DL (ref 0.2–1)
BUN SERPL-MCNC: 8 MG/DL (ref 5–25)
CALCIUM ALBUM COR SERPL-MCNC: 9.8 MG/DL (ref 8.3–10.1)
CALCIUM SERPL-MCNC: 8.3 MG/DL (ref 8.3–10.1)
CHLORIDE SERPL-SCNC: 105 MMOL/L (ref 100–108)
CO2 SERPL-SCNC: 29 MMOL/L (ref 21–32)
CREAT SERPL-MCNC: 0.8 MG/DL (ref 0.6–1.3)
EOSINOPHIL # BLD AUTO: 0.08 THOUSAND/ΜL (ref 0–0.61)
EOSINOPHIL NFR BLD AUTO: 3 % (ref 0–6)
ERYTHROCYTE [DISTWIDTH] IN BLOOD BY AUTOMATED COUNT: 15.9 % (ref 11.6–15.1)
ERYTHROCYTE [SEDIMENTATION RATE] IN BLOOD: 62 MM/HOUR (ref 0–19)
GFR SERPL CREATININE-BSD FRML MDRD: 93 ML/MIN/1.73SQ M
GLUCOSE SERPL-MCNC: 60 MG/DL (ref 65–140)
HCT VFR BLD AUTO: 30.8 % (ref 36.5–49.3)
HGB BLD-MCNC: 9.4 G/DL (ref 12–17)
IMM GRANULOCYTES # BLD AUTO: 0 THOUSAND/UL (ref 0–0.2)
IMM GRANULOCYTES NFR BLD AUTO: 0 % (ref 0–2)
INR PPP: 1.31 (ref 0.84–1.19)
LYMPHOCYTES # BLD AUTO: 0.57 THOUSANDS/ΜL (ref 0.6–4.47)
LYMPHOCYTES NFR BLD AUTO: 18 % (ref 14–44)
MCH RBC QN AUTO: 25.8 PG (ref 26.8–34.3)
MCHC RBC AUTO-ENTMCNC: 30.5 G/DL (ref 31.4–37.4)
MCV RBC AUTO: 84 FL (ref 82–98)
MONOCYTES # BLD AUTO: 0.32 THOUSAND/ΜL (ref 0.17–1.22)
MONOCYTES NFR BLD AUTO: 10 % (ref 4–12)
NEUTROPHILS # BLD AUTO: 2.22 THOUSANDS/ΜL (ref 1.85–7.62)
NEUTS SEG NFR BLD AUTO: 68 % (ref 43–75)
NRBC BLD AUTO-RTO: 0 /100 WBCS
PLATELET # BLD AUTO: 110 THOUSANDS/UL (ref 149–390)
PMV BLD AUTO: 11.1 FL (ref 8.9–12.7)
POTASSIUM SERPL-SCNC: 3.9 MMOL/L (ref 3.5–5.3)
PROT SERPL-MCNC: 5.8 G/DL (ref 6.4–8.2)
PROTHROMBIN TIME: 16.3 SECONDS (ref 11.6–14.5)
RBC # BLD AUTO: 3.65 MILLION/UL (ref 3.88–5.62)
SODIUM SERPL-SCNC: 138 MMOL/L (ref 136–145)
WBC # BLD AUTO: 3.23 THOUSAND/UL (ref 4.31–10.16)

## 2021-03-01 PROCEDURE — 88305 TISSUE EXAM BY PATHOLOGIST: CPT | Performed by: PATHOLOGY

## 2021-03-01 PROCEDURE — C2617 STENT, NON-COR, TEM W/O DEL: HCPCS

## 2021-03-01 PROCEDURE — 0F778ZZ DILATION OF COMMON HEPATIC DUCT, VIA NATURAL OR ARTIFICIAL OPENING ENDOSCOPIC: ICD-10-PCS | Performed by: INTERNAL MEDICINE

## 2021-03-01 PROCEDURE — 0FD98ZX EXTRACTION OF COMMON BILE DUCT, VIA NATURAL OR ARTIFICIAL OPENING ENDOSCOPIC, DIAGNOSTIC: ICD-10-PCS | Performed by: INTERNAL MEDICINE

## 2021-03-01 PROCEDURE — C1769 GUIDE WIRE: HCPCS

## 2021-03-01 PROCEDURE — 85025 COMPLETE CBC W/AUTO DIFF WBC: CPT | Performed by: INTERNAL MEDICINE

## 2021-03-01 PROCEDURE — 88112 CYTOPATH CELL ENHANCE TECH: CPT | Performed by: PATHOLOGY

## 2021-03-01 PROCEDURE — 88173 CYTOPATH EVAL FNA REPORT: CPT | Performed by: PATHOLOGY

## 2021-03-01 PROCEDURE — 88312 SPECIAL STAINS GROUP 1: CPT | Performed by: PATHOLOGY

## 2021-03-01 PROCEDURE — 80053 COMPREHEN METABOLIC PANEL: CPT | Performed by: INTERNAL MEDICINE

## 2021-03-01 PROCEDURE — 85610 PROTHROMBIN TIME: CPT | Performed by: INTERNAL MEDICINE

## 2021-03-01 PROCEDURE — 85652 RBC SED RATE AUTOMATED: CPT

## 2021-03-01 PROCEDURE — 88172 CYTP DX EVAL FNA 1ST EA SITE: CPT | Performed by: PATHOLOGY

## 2021-03-01 PROCEDURE — 99232 SBSQ HOSP IP/OBS MODERATE 35: CPT | Performed by: SURGERY

## 2021-03-01 PROCEDURE — 43238 EGD US FINE NEEDLE BX/ASPIR: CPT | Performed by: INTERNAL MEDICINE

## 2021-03-01 PROCEDURE — 99232 SBSQ HOSP IP/OBS MODERATE 35: CPT | Performed by: PHYSICIAN ASSISTANT

## 2021-03-01 PROCEDURE — 88342 IMHCHEM/IMCYTCHM 1ST ANTB: CPT | Performed by: PATHOLOGY

## 2021-03-01 PROCEDURE — 0F798DZ DILATION OF COMMON BILE DUCT WITH INTRALUMINAL DEVICE, VIA NATURAL OR ARTIFICIAL OPENING ENDOSCOPIC: ICD-10-PCS | Performed by: INTERNAL MEDICINE

## 2021-03-01 PROCEDURE — 43239 EGD BIOPSY SINGLE/MULTIPLE: CPT | Performed by: INTERNAL MEDICINE

## 2021-03-01 PROCEDURE — 43274 ERCP DUCT STENT PLACEMENT: CPT | Performed by: INTERNAL MEDICINE

## 2021-03-01 PROCEDURE — 74330 X-RAY BILE/PANC ENDOSCOPY: CPT

## 2021-03-01 PROCEDURE — 0FDG8ZX EXTRACTION OF PANCREAS, VIA NATURAL OR ARTIFICIAL OPENING ENDOSCOPIC, DIAGNOSTIC: ICD-10-PCS | Performed by: INTERNAL MEDICINE

## 2021-03-01 PROCEDURE — 0DB58ZX EXCISION OF ESOPHAGUS, VIA NATURAL OR ARTIFICIAL OPENING ENDOSCOPIC, DIAGNOSTIC: ICD-10-PCS | Performed by: INTERNAL MEDICINE

## 2021-03-01 RX ORDER — EPHEDRINE SULFATE 50 MG/ML
INJECTION INTRAVENOUS AS NEEDED
Status: DISCONTINUED | OUTPATIENT
Start: 2021-03-01 | End: 2021-03-01

## 2021-03-01 RX ORDER — FENTANYL CITRATE 50 UG/ML
INJECTION, SOLUTION INTRAMUSCULAR; INTRAVENOUS AS NEEDED
Status: DISCONTINUED | OUTPATIENT
Start: 2021-03-01 | End: 2021-03-01

## 2021-03-01 RX ORDER — SUCCINYLCHOLINE/SOD CL,ISO/PF 100 MG/5ML
SYRINGE (ML) INTRAVENOUS AS NEEDED
Status: DISCONTINUED | OUTPATIENT
Start: 2021-03-01 | End: 2021-03-01

## 2021-03-01 RX ORDER — PROPOFOL 10 MG/ML
INJECTION, EMULSION INTRAVENOUS AS NEEDED
Status: DISCONTINUED | OUTPATIENT
Start: 2021-03-01 | End: 2021-03-01

## 2021-03-01 RX ORDER — DEXAMETHASONE SODIUM PHOSPHATE 10 MG/ML
INJECTION, SOLUTION INTRAMUSCULAR; INTRAVENOUS AS NEEDED
Status: DISCONTINUED | OUTPATIENT
Start: 2021-03-01 | End: 2021-03-01

## 2021-03-01 RX ORDER — LIDOCAINE HYDROCHLORIDE 10 MG/ML
INJECTION, SOLUTION EPIDURAL; INFILTRATION; INTRACAUDAL; PERINEURAL AS NEEDED
Status: DISCONTINUED | OUTPATIENT
Start: 2021-03-01 | End: 2021-03-01

## 2021-03-01 RX ORDER — ONDANSETRON 2 MG/ML
INJECTION INTRAMUSCULAR; INTRAVENOUS AS NEEDED
Status: DISCONTINUED | OUTPATIENT
Start: 2021-03-01 | End: 2021-03-01

## 2021-03-01 RX ADMIN — IOHEXOL 25 ML: 240 INJECTION, SOLUTION INTRATHECAL; INTRAVASCULAR; INTRAVENOUS; ORAL at 14:20

## 2021-03-01 RX ADMIN — LEVOTHYROXINE SODIUM 50 MCG: 50 TABLET ORAL at 06:44

## 2021-03-01 RX ADMIN — DEXAMETHASONE SODIUM PHOSPHATE 5 MG: 10 INJECTION, SOLUTION INTRAMUSCULAR; INTRAVENOUS at 13:18

## 2021-03-01 RX ADMIN — ONDANSETRON 4 MG: 2 INJECTION INTRAMUSCULAR; INTRAVENOUS at 19:51

## 2021-03-01 RX ADMIN — SODIUM CHLORIDE, SODIUM GLUCONATE, SODIUM ACETATE, POTASSIUM CHLORIDE, MAGNESIUM CHLORIDE, SODIUM PHOSPHATE, DIBASIC, AND POTASSIUM PHOSPHATE: .53; .5; .37; .037; .03; .012; .00082 INJECTION, SOLUTION INTRAVENOUS at 13:26

## 2021-03-01 RX ADMIN — PROPOFOL 100 MG: 10 INJECTION, EMULSION INTRAVENOUS at 13:10

## 2021-03-01 RX ADMIN — AMIODARONE HYDROCHLORIDE 200 MG: 200 TABLET ORAL at 06:44

## 2021-03-01 RX ADMIN — PHENYLEPHRINE HYDROCHLORIDE 200 MCG: 10 INJECTION INTRAVENOUS at 13:18

## 2021-03-01 RX ADMIN — ONDANSETRON 4 MG: 2 INJECTION INTRAMUSCULAR; INTRAVENOUS at 13:18

## 2021-03-01 RX ADMIN — FENTANYL CITRATE 50 MCG: 50 INJECTION INTRAMUSCULAR; INTRAVENOUS at 13:10

## 2021-03-01 RX ADMIN — PHENYLEPHRINE HYDROCHLORIDE 20 MCG/MIN: 10 INJECTION INTRAVENOUS at 13:44

## 2021-03-01 RX ADMIN — ENOXAPARIN SODIUM 40 MG: 40 INJECTION SUBCUTANEOUS at 08:25

## 2021-03-01 RX ADMIN — OXYCODONE HYDROCHLORIDE 20 MG: 10 TABLET ORAL at 12:35

## 2021-03-01 RX ADMIN — PHENYLEPHRINE HYDROCHLORIDE 200 MCG: 10 INJECTION INTRAVENOUS at 13:29

## 2021-03-01 RX ADMIN — EPHEDRINE SULFATE 10 MG: 50 INJECTION, SOLUTION INTRAVENOUS at 13:31

## 2021-03-01 RX ADMIN — OXYCODONE HYDROCHLORIDE 10 MG: 10 TABLET ORAL at 17:56

## 2021-03-01 RX ADMIN — ATORVASTATIN CALCIUM 40 MG: 40 TABLET, FILM COATED ORAL at 08:25

## 2021-03-01 RX ADMIN — SODIUM CHLORIDE, SODIUM GLUCONATE, SODIUM ACETATE, POTASSIUM CHLORIDE, MAGNESIUM CHLORIDE, SODIUM PHOSPHATE, DIBASIC, AND POTASSIUM PHOSPHATE 50 ML/HR: .53; .5; .37; .037; .03; .012; .00082 INJECTION, SOLUTION INTRAVENOUS at 17:47

## 2021-03-01 RX ADMIN — OXYCODONE HYDROCHLORIDE 10 MG: 10 TABLET ORAL at 04:51

## 2021-03-01 RX ADMIN — LIDOCAINE HYDROCHLORIDE 50 MG: 10 INJECTION, SOLUTION EPIDURAL; INFILTRATION; INTRACAUDAL; PERINEURAL at 13:10

## 2021-03-01 RX ADMIN — PHENYLEPHRINE HYDROCHLORIDE 100 MCG: 10 INJECTION INTRAVENOUS at 13:44

## 2021-03-01 RX ADMIN — Medication 100.65 MG: at 13:10

## 2021-03-01 NOTE — ANESTHESIA POSTPROCEDURE EVALUATION
Post-Op Assessment Note    CV Status:  Stable  Pain Score: 0    Pain management: adequate     Mental Status:  Sleepy and arousable   Hydration Status:  Euvolemic   PONV Controlled:  Controlled   Airway Patency:  Patent      Post Op Vitals Reviewed: Yes      Staff: CRNA, Anesthesiologist         No complications documented      /61 (03/01/21 1446)    Temp (!) 97 °F (36 1 °C) (03/01/21 1446)    Pulse 75 (03/01/21 1446)   Resp 18 (03/01/21 1446)    SpO2 100 % (03/01/21 1446)

## 2021-03-01 NOTE — PLAN OF CARE
Problem: Potential for Falls  Goal: Patient will remain free of falls  Description: INTERVENTIONS:  - Assess patient frequently for physical needs  -  Identify cognitive and physical deficits and behaviors that affect risk of falls  -  [place] fall precautions as indicated by assessment   - Educate patient/family on patient safety including physical limitations  - Instruct patient to call for assistance with activity based on assessment  - Modify environment to reduce risk of injury  - Consider OT/PT consult to assist with strengthening/mobility  Outcome: Progressing     Problem: PAIN - ADULT  Goal: Verbalizes/displays adequate comfort level or baseline comfort level  Description: Interventions:  - Encourage patient to monitor pain and request assistance  - Assess pain using appropriate pain scale  - Administer analgesics based on type and severity of pain and evaluate response  - Implement non-pharmacological measures as appropriate and evaluate response  - Consider cultural and social influences on pain and pain management  - Notify physician/advanced practitioner if interventions unsuccessful or patient reports new pain  Outcome: Progressing     Problem: SAFETY ADULT  Goal: Patient will remain free of falls  Description: INTERVENTIONS:  - Assess patient frequently for physical needs  -  Identify cognitive and physical deficits and behaviors that affect risk of falls    -  [place] fall precautions as indicated by assessment   - Educate patient/family on patient safety including physical limitations  - Instruct patient to call for assistance with activity based on assessment  - Modify environment to reduce risk of injury  - Consider OT/PT consult to assist with strengthening/mobility  Outcome: Progressing  Goal: Maintain or return to baseline ADL function  Description: INTERVENTIONS:  -  Assess patient's ability to carry out ADLs; assess patient's baseline for ADL function and identify physical deficits which impact ability to perform ADLs (bathing, care of mouth/teeth, toileting, grooming, dressing, etc )  - Assess/evaluate cause of self-care deficits   - Assess range of motion  - Assess patient's mobility; develop plan if impaired  - Assess patient's need for assistive devices and provide as appropriate  - Encourage maximum independence but intervene and supervise when necessary  - Involve family in performance of ADLs  - Assess for home care needs following discharge   - Consider OT consult to assist with ADL evaluation and planning for discharge  - Provide patient education as appropriate  Outcome: Progressing  Goal: Maintain or return mobility status to optimal level  Description: INTERVENTIONS:  - Assess patient's baseline mobility status (ambulation, transfers, stairs, etc )    - Identify cognitive and physical deficits and behaviors that affect mobility  - Identify mobility aids required to assist with transfers and/or ambulation (gait belt, sit-to-stand, lift, walker, cane, etc )  - [place] fall precautions as indicated by assessment  - Record patient progress and toleration of activity level on Mobility SBAR; progress patient to next Phase/Stage  - Instruct patient to call for assistance with activity based on assessment  - Consider rehabilitation consult to assist with strengthening/weightbearing, etc   Outcome: Progressing     Problem: DISCHARGE PLANNING  Goal: Discharge to home or other facility with appropriate resources  Description: INTERVENTIONS:  - Identify barriers to discharge w/patient and caregiver  - Arrange for needed discharge resources and transportation as appropriate  - Identify discharge learning needs (meds, wound care, etc )  - Arrange for interpretive services to assist at discharge as needed  - Refer to Case Management Department for coordinating discharge planning if the patient needs post-hospital services based on physician/advanced practitioner order or complex needs related to functional status, cognitive ability, or social support system  Outcome: Progressing     Problem: Knowledge Deficit  Goal: Patient/family/caregiver demonstrates understanding of disease process, treatment plan, medications, and discharge instructions  Description: Complete learning assessment and assess knowledge base    Interventions:  - Provide teaching at level of understanding  - Provide teaching via preferred learning methods  Outcome: Progressing     Problem: Prexisting or High Potential for Compromised Skin Integrity  Goal: Skin integrity is maintained or improved  Description: INTERVENTIONS:  - Identify patients at risk for skin breakdown  - Assess and monitor skin integrity  - Assess and monitor nutrition and hydration status  - Monitor labs   - Assess for incontinence   - Turn and reposition patient  - Assist with mobility/ambulation  - Relieve pressure over bony prominences  - Avoid friction and shearing  - Provide appropriate hygiene as needed including keeping skin clean and dry  - Evaluate need for skin moisturizer/barrier cream  - Collaborate with interdisciplinary team   - Patient/family teaching  - Consider wound care consult   Outcome: Progressing     Problem: GASTROINTESTINAL - ADULT  Goal: Minimal or absence of nausea and/or vomiting  Description: INTERVENTIONS:  - Administer IV fluids if ordered to ensure adequate hydration  - Maintain NPO status until nausea and vomiting are resolved  - Nasogastric tube if ordered  - Administer ordered antiemetic medications as needed  - Provide nonpharmacologic comfort measures as appropriate  - Advance diet as tolerated, if ordered  - Consider nutrition services referral to assist patient with adequate nutrition and appropriate food choices  Outcome: Progressing  Goal: Maintains or returns to baseline bowel function  Description: INTERVENTIONS:  - Assess bowel function  - Encourage oral fluids to ensure adequate hydration  - Administer IV fluids if ordered to ensure adequate hydration  - Administer ordered medications as needed  - Encourage mobilization and activity  - Consider nutritional services referral to assist patient with adequate nutrition and appropriate food choices  Outcome: Progressing  Goal: Maintains adequate nutritional intake  Description: INTERVENTIONS:  - Monitor percentage of each meal consumed  - Identify factors contributing to decreased intake, treat as appropriate  - Assist with meals as needed  - Monitor I&O, weight, and lab values if indicated  - Obtain nutrition services referral as needed  Outcome: Progressing     Problem: GENITOURINARY - ADULT  Goal: Maintains or returns to baseline urinary function  Description: INTERVENTIONS:  - Assess urinary function  - Encourage oral fluids to ensure adequate hydration if ordered  - Administer IV fluids as ordered to ensure adequate hydration  - Administer ordered medications as needed  - Offer frequent toileting  - Follow urinary retention protocol if ordered  Outcome: Progressing  Goal: Absence of urinary retention  Description: INTERVENTIONS:  - Assess patients ability to void and empty bladder  - Monitor I/O  - Bladder scan as needed  - Discuss with physician/AP medications to alleviate retention as needed  - Discuss catheterization for long term situations as appropriate  Outcome: Progressing  Goal: Urinary catheter remains patent  Description: INTERVENTIONS:  - Assess patency of urinary catheter  - If patient has a chronic johnson, consider changing catheter if non-functioning  - Follow guidelines for intermittent irrigation of non-functioning urinary catheter  Outcome: Progressing

## 2021-03-01 NOTE — PROGRESS NOTES
Patient is resting in bed  Callbell within reach  No complains at this time  Will continue to monitor

## 2021-03-01 NOTE — ANESTHESIA PREPROCEDURE EVALUATION
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)   (+) 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)      HEMATOLOGY   (+) Anemia, unspecified      NEURO/PSYCH   (+) Headaches due to old head injury   (+) History of Clostridioides difficile infection   (+) Personal history of esophageal cancer      PULMONARY   (+) COPD (chronic obstructive pulmonary disease) (HCC)   (+) Obstructive sleep apnea syndrome      Other   (+) Brain metastases (HCC)   (+) Cancer-related pain   (+) Dysphonia   (+) Esophageal stricture   (+) Hx of smoking   (+) Paralysis of left vocal fold   (+) Vertebral osteomyelitis ([address] )   2/16/21  EF-60%     Physical Exam    Airway    Mallampati score: II  TM Distance: >3 FB       Dental   No notable dental hx     Cardiovascular      Pulmonary      Other Findings        Anesthesia Plan  ASA Score- 4     Anesthesia Type- general with ASA Monitors  Additional Monitors:   Airway Plan: ETT  Plan Factors-Exercise tolerance (METS): <4 METS  Chart reviewed  Induction- intravenous  Postoperative Plan-     Informed Consent- Anesthetic plan and risks discussed with patient  I personally reviewed this patient with the CRNA  Discussed and agreed on the Anesthesia Plan with the CRNA             Lab Results   Component Value Date    GLUC 60 (L) 03/01/2021    GLUF 92 09/24/2020    ALT 69 03/01/2021     (H) 03/01/2021    BUN 8 03/01/2021    CALCIUM 8 3 03/01/2021     03/01/2021    CO2 29 03/01/2021    CREATININE 0 80 03/01/2021    HDL 24 (L) 02/03/2020    INR 1 31 (H) 03/01/2021    HCT 30 8 (L) 03/01/2021    HGB 9 4 (L) 03/01/2021    HGBA1C 5 2 12/01/2020    MG 2 1 02/26/2021    PHOS 3 8 07/03/2020     (L) 03/01/2021    K 3 9 03/01/2021    TRIG 157 (H) 02/03/2020    WBC 3 23 (L) 03/01/2021

## 2021-03-01 NOTE — APP STUDENT NOTE
[facility] STUDENT  Inpatient Progress Note for TRAINING ONLY  Not Part of Legal Medical Record     Progress Note - Aparna Dodson 76 y o  male MRN: [id number]  Unit/Bed#: [facility] Encounter: [id number]        * Dilation of biliary tract  Assessment & Plan  · Obstructive jaundice   · CT 2/26: "Interval development of severe intra and extrahepatic biliary dilatation to the level of the pancreatic head  Appears to be caused by progression of infiltrating tumor mass inseparable from the posterior margin of the pancreatic head and probably obstructing the distal common duct  Also, there is tumefactive sludge or noncalcified stone in the distal common duct "  · Surgical oncology input noted  · Presently on clear liquid diet  · GI plans for EUS and ERCP today  · May be technically challenging given history of esophageal strictures requiring esophageal stent, may require previous dilation  · If GI unable to, consider  IR placement of drain     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 ([facility] [address] )  Assessment & Plan  · Known history of metastatic esophageal adenocarcinoma s/p neoadjuvant chemotherapy and esophagectomy      · Palliative care following, continue opioid analgesia, input noted    Brain metastases [facility])  Assessment & Plan  · Metastatic lesions in left frontoparietal and right parietal lobes  · MRI 2/10/2021 notes decreased lesion size    Acute deep vein thrombosis (DVT) of femoral vein of right lower extremity (HCC)  Assessment & Plan  · S/p IVC filter  · Presently Eliquis on hold in view of scheduled GI intervention  · Resume Eliquis when cleared by GI    Anemia, unspecified  Assessment & Plan  In the setting of metastatic cancer  Hgb 9 4 today  Continue to monitor    Type 2 diabetes mellitus with hyperglycemia, without long-term current use of insulin (HCC)  Assessment & Plan  Lab Results   Component Value Date    HGBA1C 5 2 12/01/2020       Blood Sugar Average: Last 72 hrs:  ·  diet controlled  · SSI with Accu-Cheks while inpatient  · Initiate hypoglycemia protocol    Severe protein-calorie malnutrition ([address] )  Assessment & Plan  · Early satiety likely secondary to metastatic disease  · Encourage adequate calorie and protein intake       Cancer-related pain  Assessment & Plan  · Palliative care following, continue opioid analgesia, input noted       VTE Pharmacologic Prophylaxis:   Pharmacologic: Enoxaparin (Lovenox)  Mechanical VTE Prophylaxis in Place: No    Patient Centered Rounds: Will discuss with preceptor    Discussions with Specialists or Other Care Team Provider: Reviewed notes from other care teams    Education and Discussions with Family / Patient: patient    Time Spent for Care: 30 minutes  More than 50% of total time spent on counseling and coordination of care as described above  Current Length of Stay: 3 day(s)    Current Patient Status: Inpatient   Certification Statement: The patient will continue to require additional inpatient hospital stay due to pending ERCP and further clinical management    Discharge Plan: pending clinical course    Code Status: Level 1 - Full Code    Subjective:   Patient is resting in bed during visit today  He reports feeling fatigued  He has mild cramping abdominal pain which he states his current pain regimen helps to alleviated  He states his last bowel movement was 4 days ago and he is feeling constipated  He notes some intermittent nausea but none at the moment  He has decreased appetite  Objective:     Vitals:   Temp (24hrs), Av 7 °F (36 5 °C), Min:96 °F (35 6 °C), Max:98 4 °F (36 9 °C)    Temp:  [96 °F (35 6 °C)-98 4 °F (36 9 °C)] 96 °F (35 6 °C)  HR:  [67-72] 69  Resp:  [17-18] 18  BP: ()/(54-74) 132/74  SpO2:  [95 %-98 %] 97 %  Body mass index is 21 24 kg/m²  Input and Output Summary (last 24 hours):        Intake/Output Summary (Last 24 hours) at 3/1/2021 1438  Last data filed at 3/1/2021 1326  Gross per 24 hour   Intake 1610 ml   Output 600 ml Net 1010 ml       Physical Exam:     Physical Exam  Constitutional:       Appearance: Normal appearance  He is normal weight  He is ill-appearing  Comments: Fatigued   HENT:      Head: Normocephalic and atraumatic  Mouth/Throat:      Pharynx: Oropharynx is clear  Eyes:      General: Scleral icterus present  Cardiovascular:      Rate and Rhythm: Normal rate and regular rhythm  Heart sounds: Normal heart sounds  Pulmonary:      Effort: Pulmonary effort is normal       Breath sounds: Normal breath sounds  Abdominal:      General: Bowel sounds are normal  There is no distension  Palpations: Abdomen is soft  Tenderness: There is abdominal tenderness  Comments: G tube present  Dressed  Small amount yellow drainage  No erythema or edema  Musculoskeletal:         General: Swelling present  Right lower leg: 3+ Pitting Edema present  Left lower leg: 3+ Pitting Edema present  Comments: Left hand edema   Skin:     Coloration: Skin is jaundiced  Neurological:      General: No focal deficit present  Mental Status: He is alert and oriented to person, place, and time     Psychiatric:         Behavior: Behavior normal          Historical Information   Past Medical History:   Diagnosis Date    Anemia     Bilateral pleural effusion     Brain lesion     Brain metastases (HCC)     Brain tumor (HCC)     C  difficile diarrhea     Cancer (HCC)     Cancer of esophagus (HCC)     COPD (chronic obstructive pulmonary disease) (HCC)     Diabetes mellitus (HCC)     Difficulty swallowing     Disease of thyroid gland     Edema     Esophageal mass     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    History of chemotherapy     History of transfusion     Malignant neoplasm of lower third of esophagus ([facility] [address] ) 9/17/2019    Diagnosis: clinical stage [id number] esophageal carcinoma Procedure: EGD, transhiatal esophagectomy performed on 1/28/20 Pathology: esophagogastrectomy reveals microscopic focus of residual adenocarcinoma in muscularis propria measuring 0 7 cm, adjacent Duong's esophagus with associated atypia indeterminate for dysplasia  7 lymph nodes negative for carcinoma  Proximal and distal margins negative for    Paraspinal abscess (HCC)     PONV (postoperative nausea and vomiting)     Port-A-Cath in place     LCW    Sleep apnea     no CPAP    SOB (shortness of breath)      Past Surgical History:   Procedure Laterality Date    BACK SURGERY      x2    DISC REMOVAL      ESOPHAGECTOMY N/A 1/28/2020    Procedure: ESOPHAGECTOMY, TRANSHIATAL;  Surgeon: Yamilka Kraft MD;  [facility];  Service: Surgical Oncology    ESOPHAGOGASTRODUODENOSCOPY N/A 1/28/2020    Procedure: ESOPHAGOGASTRODUODENOSCOPY (EGD); Surgeon: Yamilka Kraft MD;  [facility];  Service: Surgical Oncology    FL GUIDED CENTRAL VENOUS ACCESS DEVICE INSERTION  9/26/2019    GASTROJEJUNOSTOMY W/ JEJUNOSTOMY TUBE N/A 9/26/2019    Procedure: INSERTION JEJUNOSTOMY TUBE OPEN;  Surgeon: Yamilka Kraft MD;  [facility];  Service: Surgical Oncology    GASTROJEJUNOSTOMY W/ JEJUNOSTOMY TUBE N/A 6/5/2020    Procedure: INSERTION JEJUNOSTOMY TUBE OPEN;  Surgeon: Yamilka Kraft MD;  [facility];  Service: Surgical Oncology    IR CHEST TUBE PLACEMENT  5/26/2020    IR SPINE PROCEDURE  5/27/2020    IR THORACENTESIS  2/25/2020    JOINT REPLACEMENT      MD EGD ENDOSCOPIC STENT PLACEMENT W/WIRE& DILATION N/A 8/31/2020    Procedure: INSERTION STENT ESOPHAGEAL;  Surgeon: Wilfred Smith MD;  [facility];  Service: Thoracic    MD EGD INSERT GUIDE WIRE DILATOR PASSAGE ESOPHAGUS N/A 7/7/2020    Procedure: ESOPHAGOGASTRODUODENOSCOPY (EGD) with esophageal dilation under fluro with biopsy;  Surgeon: Wilfred Smith MD;  [facility];  Service: Thoracic    MD ESOPHAGOGASTRODUODENOSCOPY TRANSORAL DIAGNOSTIC N/A 4/28/2020    Procedure: ESOPHAGOGASTRODUODENOSCOPY (EGD);   Surgeon: Odalis Rojo MD;  [facility];  Service: Thoracic    RI ESOPHAGOGASTRODUODENOSCOPY TRANSORAL DIAGNOSTIC N/A 7/27/2020    Procedure: ESOPHAGOGASTRODUODENOSCOPY (EGD); Surgeon: Odalis Rojo MD;  [facility];  Service: Thoracic    RI ESOPHAGOGASTRODUODENOSCOPY TRANSORAL DIAGNOSTIC N/A 8/31/2020    Procedure: ESOPHAGOGASTRODUODENOSCOPY (EGD) dilation over guidewire 36-45 Fr , stent placement;  Surgeon: Odalis Rojo MD;  [facility];  Service: Thoracic    RI ESOPHAGOGASTRODUODENOSCOPY TRANSORAL DIAGNOSTIC N/A 1/11/2021    Procedure: ESOPHAGOGASTRODUODENOSCOPY (EGD): esophageal stent removal;  Surgeon: Odalis Rojo MD;  [facility];  Service: Thoracic    RI ESOPHAGOSCOPY FLEX Carey Manvel <30 MM DIAM N/A 4/28/2020    Procedure: DILATATION ESOPHAGEAL;  Surgeon: Odalis Rojo MD;  [facility];  Service: Thoracic    RI ESOPHAGOSCOPY FLEX Carey Manvel <30 MM DIAM N/A 7/27/2020    Procedure: DILATATION ESOPHAGEAL with Savary over the wire dilitation 33FR to 45FR; Surgeon: Odalis Rojo MD;  [facility];  Service: Thoracic    RI ESOPHAGOSCOPY FLEX BALLOON DILAT <30 MM DIAM N/A 8/31/2020    Procedure: DILATATION ESOPHAGEAL;  Surgeon: Odalis Rojo MD;  [facility];  Service: Thoracic    TONSILLECTOMY      TUNNELED VENOUS PORT PLACEMENT N/A 9/26/2019    Procedure: INSERTION VENOUS PORT (PORT-A-CATH);   Surgeon: Armando Patel MD;  [facility];  Service: Surgical Oncology    VOCAL CORD INJECTION N/A 2/7/2020    Procedure: MICROLARYNGOSCOPY WITH INJECTION;  Surgeon: Claudio Mcbride MD;  [facility];  Service: ENT     Social History   Social History     Substance and Sexual Activity   Alcohol Use Never    Frequency: Never     Social History     Substance and Sexual Activity   Drug Use Never     Social History     Tobacco Use   Smoking Status Former Smoker    Packs/day: 0 50    Years: 50 00    Pack years: 25 00    Types: Cigarettes    Quit date: 12/24/2017    Years since quitting: 3 1   Smokeless Tobacco Never Used     Family History:   Family History   Problem Relation Age of Onset    Diabetes Mother     No Known Problems Father        Meds/Allergies   all medications and allergies reviewed  Allergies   Allergen Reactions    Penicillins Itching       Additional Data:     Labs:    Results from last 7 days   Lab Units 03/01/21  0500   WBC Thousand/uL 3 23*   HEMOGLOBIN g/dL 9 4*   HEMATOCRIT % 30 8*   PLATELETS Thousands/uL 110*   NEUTROS PCT % 68   LYMPHS PCT % 18   MONOS PCT % 10   EOS PCT % 3     Results from last 7 days   Lab Units 03/01/21  0500   SODIUM mmol/L 138   POTASSIUM mmol/L 3 9   CHLORIDE mmol/L 105   CO2 mmol/L 29   BUN mg/dL 8   CREATININE mg/dL 0 80   ANION GAP mmol/L 4   CALCIUM mg/dL 8 3   ALBUMIN g/dL 2 1*   TOTAL BILIRUBIN mg/dL 7 52*   ALK PHOS U/L 970*   ALT U/L 69   AST U/L 135*   GLUCOSE RANDOM mg/dL 60*     Results from last 7 days   Lab Units 03/01/21  0500   INR  1 31*             Results from last 7 days   Lab Units 02/26/21  1544   LACTIC ACID mmol/L 1 1         * I Have Reviewed All Lab Data Listed Above  * Additional Pertinent Lab Tests Reviewed: All Labs Within Last 24 Hours Reviewed    Imaging:    Imaging Reports Reviewed Today Include: CT from 2/26  Imaging Personally Reviewed by Myself Includes:  none    Recent Cultures (last 7 days):     Results from last 7 days   Lab Units 02/26/21  1544   BLOOD CULTURE  No Growth at 48 hrs  No Growth at 48 hrs         Last 24 Hours Medication List:   Current Facility-Administered Medications   Medication Dose Route Frequency Provider Last Rate    amiodarone  200 mg Oral Daily With Breakfast Jay Fruits, DO      atorvastatin  40 mg Oral Daily Jay Fruits, DO      docusate sodium  100 mg Oral Daily Deepika Chacon MD      enoxaparin  40 mg Subcutaneous Q24H Moustapha Manzanares MD      HYDROmorphone  0 5 mg Intravenous Q3H PRN Leana Paul MD      levothyroxine  50 mcg Oral Early Morning Maryan Sullivan, DO      metoclopramide  10 mg Intravenous Q6H PRN Clarisa Myers PA-C      multi-electrolyte  50 mL/hr Intravenous Continuous Aliya Ramirez MD 50 mL/hr (02/28/21 2037)    ondansetron  4 mg Intravenous Q6H PRN Maryan Sullivan, DO      oxyCODONE  10 mg Oral Q4H PRN Leana Paul MD      oxyCODONE  20 mg Oral Q4H PRN Leana Paul, MD      polyethylene glycol  17 g Oral Daily Leana Paul MD      senna  1 tablet Oral BID PRN Leana Paul MD       Facility-Administered Medications Ordered in Other Encounters   Medication Dose Route Frequency Provider Last Rate    dexamethasone (PF)   Intravenous PRN Sanford Chamber, CRNA      ePHEDrine    PRN Sanford Chamber, CRNA      fentanyl citrate (PF)   Intravenous PRN Maia Chamber, CRNA      lidocaine (PF)    PRN Sanford Chamber, CRNA      ondansetron   Intravenous PRN Sanford Chamber, CRNA      phenylephrine (EMERALD-SYNEPHRINE) 50 mg (STANDARD CONCENTRATION) in sodium chloride 0 9% 250 mL    Continuous PRN Maia Chamber, CRNA Stopped (03/01/21 1435)    phenylephrine    PRN Maia Chamber, CRNA      propofol   Intravenous PRN Sanford Chamber, CRNA      Succinylcholine Chloride   Intravenous PRN Sanford Chamber, CRNA          Today, Patient Was Seen By: Rut Cullen    ** Please Note: Dictation voice to text software may have been used in the creation of this document   **

## 2021-03-01 NOTE — PROGRESS NOTES
Progress Note - Surgical Oncology  Fred Aquino 76 y o  male MRN: [id number]  Unit/Bed#: -01 Encounter: [id number]    Assessment:  76 M with metastatic esophageal adenocarcinoma now with obstructive jaundice    Plan:  NPO/IV fluids  For ERCP/EUS with GI today  Ongoing goals of care discussion  DVT prophylaxis    Subjective/Objective     Subjective: No acute events overnight  Feels relatively well  Objective:    Blood pressure 90/54, pulse 72, temperature 98 4 °F (36 9 °C), resp  rate 18, weight 67 1 kg (148 lb), SpO2 98 %  ,Body mass index is 21 24 kg/m²        Intake/Output Summary (Last 24 hours) at 3/1/2021 0612  Last data filed at 2/28/2021 2036  Gross per 24 hour   Intake 1820 ml   Output 600 ml   Net 1220 ml       Invasive Devices     Central Venous Catheter Line            Port A Cath 10/01/19 Left Chest 517 days          Peripheral Intravenous Line            Peripheral IV 02/26/21 Antecubital 2 days          Drain            Gastrostomy/Enterostomy Jejunostomy 14 Fr   days    Gastrostomy/Enterostomy Jejunostomy 14 Fr   days                Physical Exam:   General: NAD, AAOx3  Eyes: PERRL  ENT: moist mucous membranes  Neck: supple  CV: RRR +S1/S2  Chest: respirations non-labored  Abdomen: soft, NT ND, J tube in place  Extremities: atraumatic, no edema      Results from last 7 days   Lab Units 02/28/21  0809 02/27/21  0617 02/26/21  1044   WBC Thousand/uL 3 29* 5 09 3 92*   HEMOGLOBIN g/dL 8 8* 10 3* 10 0*   HEMATOCRIT % 28 1* 33 3* 32 2*   PLATELETS Thousands/uL 106* 136* 128*     Results from last 7 days   Lab Units 02/28/21  0809 02/27/21  0617 02/26/21  1044   POTASSIUM mmol/L 3 3* 3 3* 3 4*   CHLORIDE mmol/L 108 109* 103   CO2 mmol/L 27 26 27   BUN mg/dL 8 11 12   CREATININE mg/dL 0 92 1 01 1 17   CALCIUM mg/dL 7 9* 9 0 8 9     Results from last 7 days   Lab Units 02/28/21  0603 02/26/21  1544   INR  1 55* 1 55*

## 2021-03-01 NOTE — ASSESSMENT & PLAN NOTE
· Early satiety likely secondary to metastatic disease  · Encourage adequate calorie and protein intake

## 2021-03-01 NOTE — ASSESSMENT & PLAN NOTE
· S/p IVC filter    Known dx, unclear when dx   · Presently Eliquis on hold in view of scheduled GI intervention  · Resume Eliquis when cleared by GI

## 2021-03-01 NOTE — PROGRESS NOTES
[facility] Internal Medicine  Progress Note - [facility] 1955, 76 y o  male MRN: [id number]    Unit/Bed#: [facility] [place]5-01 Encounter: [id number]    Primary Care Provider: SARA Mike   Date and time admitted to hospital: 2/26/2021  2:58 PM        * Dilation of biliary tract  Assessment & Plan  · Obstructive jaundice   · CT 2/26: "Interval development of severe intra and extrahepatic biliary dilatation to the level of the pancreatic head  Appears to be caused by progression of infiltrating tumor mass inseparable from the posterior margin of the pancreatic head and probably obstructing the distal common duct  Also, there is tumefactive sludge or noncalcified stone in the distal common duct "  · Surgical oncology input noted  · Presently on clear liquid diet  · GI plans for EUS and ERCP  · S/p ERCP s/p sphincterotomy, brushings, and placement of stent in CBD   · Monitor LFTs    Acute deep vein thrombosis (DVT) of femoral vein of right lower extremity ([address] )  Assessment & Plan  · S/p IVC filter  Known dx, unclear when dx   · Presently Eliquis on hold in view of scheduled GI intervention  · Resume Eliquis when cleared by GI    Brain metastases ([address] )  Assessment & Plan  · Metastatic lesions in left frontoparietal and right parietal lobes  · MRI 2/10/2021 notes decreased lesion size    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 ([address] )  Assessment & Plan  · Known history of metastatic esophageal adenocarcinoma s/p neoadjuvant chemotherapy and esophagectomy      · Palliative care following, continue opioid analgesia, input noted    Cancer-related pain  Assessment & Plan  · Palliative care following, continue opioid analgesia, input noted    Severe protein-calorie malnutrition (HCC)  Assessment & Plan  · Early satiety likely secondary to metastatic disease  · Encourage adequate calorie and protein intake     Anemia, unspecified  Assessment & Plan  In the setting of metastatic cancer  Hgb 9 4 today  Continue to monitor    Type 2 diabetes mellitus with hyperglycemia, without long-term current use of insulin [facility])  Assessment & Plan  Lab Results   Component Value Date    HGBA1C 5 2 2020       Blood Sugar Average: Last 72 hrs:  ·  diet controlled  · SSI with Accu-Cheks while inpatient  · Initiate hypoglycemia protocol    COPD (chronic obstructive pulmonary disease) ([facility] [address] )  Assessment & Plan  · No acute exacerbation        VTE Pharmacologic Prophylaxis:   Pharmacologic: Enoxaparin (Lovenox)  Mechanical VTE Prophylaxis in Place: Yes    Patient Centered Rounds: I have performed bedside rounds with nursing staff today  Discussions with Specialists or Other Care Team Provider: RN    Education and Discussions with Family / Patient: patient, daughter Jenna Zhong over phone    Time Spent for Care: 30 minutes  More than 50% of total time spent on counseling and coordination of care as described above  Current Length of Stay: 3 day(s)    Current Patient Status: Inpatient   Certification Statement: The patient will continue to require additional inpatient hospital stay due to monitor LFTs    Discharge Plan: when cleared by GI, surg/onc, if labs improved  Code Status: Level 1 - Full Code      Subjective: The patient reports he is hungry and would like to eat  Denies pain, n/v     Objective:     Vitals:   Temp (24hrs), Av 4 °F (36 3 °C), Min:96 °F (35 6 °C), Max:98 4 °F (36 9 °C)    Temp:  [96 °F (35 6 °C)-98 4 °F (36 9 °C)] 97 6 °F (36 4 °C)  HR:  [67-79] 79  Resp:  [17-18] 18  BP: ()/(54-74) 98/58  SpO2:  [94 %-100 %] 94 %  Body mass index is 21 24 kg/m²  Input and Output Summary (last 24 hours): Intake/Output Summary (Last 24 hours) at 3/1/2021 1826  Last data filed at 3/1/2021 1747  Gross per 24 hour   Intake 1347 5 ml   Output --   Net 1347 5 ml       Physical Exam:     Physical Exam  Vitals signs reviewed  Constitutional:       General: He is not in acute distress       Appearance: He is not toxic-appearing  HENT:      Head: Normocephalic and atraumatic  Eyes:      General: Scleral icterus present  Extraocular Movements: Extraocular movements intact  Neck:      Musculoskeletal: Normal range of motion  Cardiovascular:      Rate and Rhythm: Normal rate and regular rhythm  Pulmonary:      Effort: Pulmonary effort is normal  No respiratory distress  Breath sounds: Normal breath sounds  Abdominal:      General: Bowel sounds are normal  There is no distension  Palpations: Abdomen is soft  Musculoskeletal: Normal range of motion  General: No swelling  Skin:     Coloration: Skin is jaundiced  Neurological:      General: No focal deficit present  Mental Status: He is alert and oriented to person, place, and time  Psychiatric:         Mood and Affect: Mood normal          Behavior: Behavior normal          Additional Data:     Labs:    Results from last 7 days   Lab Units 03/01/21  0500   WBC Thousand/uL 3 23*   HEMOGLOBIN g/dL 9 4*   HEMATOCRIT % 30 8*   PLATELETS Thousands/uL 110*   NEUTROS PCT % 68   LYMPHS PCT % 18   MONOS PCT % 10   EOS PCT % 3     Results from last 7 days   Lab Units 03/01/21  0500   SODIUM mmol/L 138   POTASSIUM mmol/L 3 9   CHLORIDE mmol/L 105   CO2 mmol/L 29   BUN mg/dL 8   CREATININE mg/dL 0 80   ANION GAP mmol/L 4   CALCIUM mg/dL 8 3   ALBUMIN g/dL 2 1*   TOTAL BILIRUBIN mg/dL 7 52*   ALK PHOS U/L 970*   ALT U/L 69   AST U/L 135*   GLUCOSE RANDOM mg/dL 60*     Results from last 7 days   Lab Units 03/01/21  0500   INR  1 31*             Results from last 7 days   Lab Units 02/26/21  1544   LACTIC ACID mmol/L 1 1           * I Have Reviewed All Lab Data Listed Above  * Additional Pertinent Lab Tests Reviewed:  All Labs Within Last 24 Hours Reviewed    Imaging:    Imaging Reports Reviewed Today Include: none  Imaging Personally Reviewed by Myself Includes:  none    Recent Cultures (last 7 days):     Results from last 7 days   Lab Units 02/26/21  1544   BLOOD CULTURE  No Growth at 72 hrs  No Growth at 72 hrs  Last 24 Hours Medication List:   Current Facility-Administered Medications   Medication Dose Route Frequency Provider Last Rate    amiodarone  200 mg Oral Daily With Breakfast Josey Frye, DO      atorvastatin  40 mg Oral Daily Josey Frye, DO      docusate sodium  100 mg Oral Daily Mellissa Brian MD      enoxaparin  40 mg Subcutaneous Q24H [facility] Paty Ascencio MD      HYDROmorphone  0 5 mg Intravenous Q3H PRN Mellissa Brian MD      levothyroxine  50 mcg Oral Early Morning Josey Frye, DO      metoclopramide  10 mg Intravenous Q6H PRN Clarisa Myers PA-C      multi-electrolyte  50 mL/hr Intravenous Continuous Paty Ascencio MD 50 mL/hr (03/01/21 9637)    ondansetron  4 mg Intravenous Q6H PRN Josey Frye, DO      oxyCODONE  10 mg Oral Q4H PRN Mellissa Brian MD      oxyCODONE  20 mg Oral Q4H PRN Mellissa Brian MD      polyethylene glycol  17 g Oral Daily Mellissa Brian MD      senna  1 tablet Oral BID PRN Mellissa Brian MD          Today, Patient Was Seen By: Ange Becker PA-C    ** Please Note: Dictation voice to text software may have been used in the creation of this document   **

## 2021-03-01 NOTE — ASSESSMENT & PLAN NOTE
· Metastatic lesions in left frontoparietal and right parietal lobes  · MRI 2/10/2021 notes decreased lesion size

## 2021-03-01 NOTE — ASSESSMENT & PLAN NOTE
· Obstructive jaundice   · CT 2/26: "Interval development of severe intra and extrahepatic biliary dilatation to the level of the pancreatic head  Appears to be caused by progression of infiltrating tumor mass inseparable from the posterior margin of the pancreatic head and probably obstructing the distal common duct    Also, there is tumefactive sludge or noncalcified stone in the distal common duct "  · Surgical oncology input noted  · Presently on clear liquid diet  · GI plans for EUS and ERCP  · S/p ERCP s/p sphincterotomy, brushings, and placement of stent in CBD   · Monitor LFTs

## 2021-03-02 ENCOUNTER — HOSPITAL ENCOUNTER (OUTPATIENT)
Dept: INFUSION CENTER | Facility: CLINIC | Age: 66
End: 2021-03-02

## 2021-03-02 LAB
ALBUMIN SERPL BCP-MCNC: 2 G/DL (ref 3.5–5)
ALP SERPL-CCNC: 877 U/L (ref 46–116)
ALT SERPL W P-5'-P-CCNC: 63 U/L (ref 12–78)
ANION GAP SERPL CALCULATED.3IONS-SCNC: 7 MMOL/L (ref 4–13)
AST SERPL W P-5'-P-CCNC: 110 U/L (ref 5–45)
BILIRUB SERPL-MCNC: 4.54 MG/DL (ref 0.2–1)
BUN SERPL-MCNC: 11 MG/DL (ref 5–25)
CALCIUM ALBUM COR SERPL-MCNC: 9.8 MG/DL (ref 8.3–10.1)
CALCIUM SERPL-MCNC: 8.2 MG/DL (ref 8.3–10.1)
CHLORIDE SERPL-SCNC: 104 MMOL/L (ref 100–108)
CO2 SERPL-SCNC: 26 MMOL/L (ref 21–32)
CREAT SERPL-MCNC: 0.86 MG/DL (ref 0.6–1.3)
GFR SERPL CREATININE-BSD FRML MDRD: 90 ML/MIN/1.73SQ M
GLUCOSE SERPL-MCNC: 120 MG/DL (ref 65–140)
POTASSIUM SERPL-SCNC: 4.2 MMOL/L (ref 3.5–5.3)
PROT SERPL-MCNC: 5.6 G/DL (ref 6.4–8.2)
SODIUM SERPL-SCNC: 137 MMOL/L (ref 136–145)

## 2021-03-02 PROCEDURE — 80053 COMPREHEN METABOLIC PANEL: CPT | Performed by: INTERNAL MEDICINE

## 2021-03-02 PROCEDURE — 99232 SBSQ HOSP IP/OBS MODERATE 35: CPT | Performed by: PHYSICIAN ASSISTANT

## 2021-03-02 PROCEDURE — 99232 SBSQ HOSP IP/OBS MODERATE 35: CPT | Performed by: SURGERY

## 2021-03-02 PROCEDURE — 99232 SBSQ HOSP IP/OBS MODERATE 35: CPT | Performed by: NURSE PRACTITIONER

## 2021-03-02 PROCEDURE — 99232 SBSQ HOSP IP/OBS MODERATE 35: CPT | Performed by: INTERNAL MEDICINE

## 2021-03-02 RX ADMIN — OXYCODONE HYDROCHLORIDE 10 MG: 10 TABLET ORAL at 23:57

## 2021-03-02 RX ADMIN — ONDANSETRON 4 MG: 2 INJECTION INTRAMUSCULAR; INTRAVENOUS at 19:41

## 2021-03-02 RX ADMIN — LEVOTHYROXINE SODIUM 50 MCG: 50 TABLET ORAL at 05:01

## 2021-03-02 RX ADMIN — DOCUSATE SODIUM 100 MG: 100 CAPSULE, LIQUID FILLED ORAL at 08:00

## 2021-03-02 RX ADMIN — ATORVASTATIN CALCIUM 40 MG: 40 TABLET, FILM COATED ORAL at 08:00

## 2021-03-02 RX ADMIN — AMIODARONE HYDROCHLORIDE 200 MG: 200 TABLET ORAL at 07:51

## 2021-03-02 RX ADMIN — ENOXAPARIN SODIUM 40 MG: 40 INJECTION SUBCUTANEOUS at 08:00

## 2021-03-02 RX ADMIN — POLYETHYLENE GLYCOL 3350 17 G: 17 POWDER, FOR SOLUTION ORAL at 08:00

## 2021-03-02 RX ADMIN — ONDANSETRON 4 MG: 2 INJECTION INTRAMUSCULAR; INTRAVENOUS at 05:41

## 2021-03-02 RX ADMIN — SODIUM CHLORIDE, SODIUM GLUCONATE, SODIUM ACETATE, POTASSIUM CHLORIDE, MAGNESIUM CHLORIDE, SODIUM PHOSPHATE, DIBASIC, AND POTASSIUM PHOSPHATE 100 ML/HR: .53; .5; .37; .037; .03; .012; .00082 INJECTION, SOLUTION INTRAVENOUS at 05:04

## 2021-03-02 RX ADMIN — SODIUM CHLORIDE, SODIUM LACTATE, POTASSIUM CHLORIDE, AND CALCIUM CHLORIDE 500 ML: .6; .31; .03; .02 INJECTION, SOLUTION INTRAVENOUS at 21:59

## 2021-03-02 RX ADMIN — METOCLOPRAMIDE 10 MG: 5 INJECTION, SOLUTION INTRAMUSCULAR; INTRAVENOUS at 10:43

## 2021-03-02 RX ADMIN — OXYCODONE HYDROCHLORIDE 10 MG: 10 TABLET ORAL at 19:40

## 2021-03-02 RX ADMIN — OXYCODONE HYDROCHLORIDE 20 MG: 10 TABLET ORAL at 03:14

## 2021-03-02 NOTE — PLAN OF CARE
Problem: Potential for Falls  Goal: Patient will remain free of falls  Description: INTERVENTIONS:  - Assess patient frequently for physical needs  -  Identify cognitive and physical deficits and behaviors that affect risk of falls  -  [place] fall precautions as indicated by assessment   - Educate patient/family on patient safety including physical limitations  - Instruct patient to call for assistance with activity based on assessment  - Modify environment to reduce risk of injury  - Consider OT/PT consult to assist with strengthening/mobility  Outcome: Progressing     Problem: PAIN - ADULT  Goal: Verbalizes/displays adequate comfort level or baseline comfort level  Description: Interventions:  - Encourage patient to monitor pain and request assistance  - Assess pain using appropriate pain scale  - Administer analgesics based on type and severity of pain and evaluate response  - Implement non-pharmacological measures as appropriate and evaluate response  - Consider cultural and social influences on pain and pain management  - Notify physician/advanced practitioner if interventions unsuccessful or patient reports new pain  Outcome: Progressing     Problem: SAFETY ADULT  Goal: Patient will remain free of falls  Description: INTERVENTIONS:  - Assess patient frequently for physical needs  -  Identify cognitive and physical deficits and behaviors that affect risk of falls    -  [place] fall precautions as indicated by assessment   - Educate patient/family on patient safety including physical limitations  - Instruct patient to call for assistance with activity based on assessment  - Modify environment to reduce risk of injury  - Consider OT/PT consult to assist with strengthening/mobility  Outcome: Progressing  Goal: Maintain or return to baseline ADL function  Description: INTERVENTIONS:  -  Assess patient's ability to carry out ADLs; assess patient's baseline for ADL function and identify physical deficits which impact ability to perform ADLs (bathing, care of mouth/teeth, toileting, grooming, dressing, etc )  - Assess/evaluate cause of self-care deficits   - Assess range of motion  - Assess patient's mobility; develop plan if impaired  - Assess patient's need for assistive devices and provide as appropriate  - Encourage maximum independence but intervene and supervise when necessary  - Involve family in performance of ADLs  - Assess for home care needs following discharge   - Consider OT consult to assist with ADL evaluation and planning for discharge  - Provide patient education as appropriate  Outcome: Progressing  Goal: Maintain or return mobility status to optimal level  Description: INTERVENTIONS:  - Assess patient's baseline mobility status (ambulation, transfers, stairs, etc )    - Identify cognitive and physical deficits and behaviors that affect mobility  - Identify mobility aids required to assist with transfers and/or ambulation (gait belt, sit-to-stand, lift, walker, cane, etc )  - Withams fall precautions as indicated by assessment  - Record patient progress and toleration of activity level on Mobility SBAR; progress patient to next Phase/Stage  - Instruct patient to call for assistance with activity based on assessment  - Consider rehabilitation consult to assist with strengthening/weightbearing, etc   Outcome: Progressing     Problem: DISCHARGE PLANNING  Goal: Discharge to home or other facility with appropriate resources  Description: INTERVENTIONS:  - Identify barriers to discharge w/patient and caregiver  - Arrange for needed discharge resources and transportation as appropriate  - Identify discharge learning needs (meds, wound care, etc )  - Arrange for interpretive services to assist at discharge as needed  - Refer to Case Management Department for coordinating discharge planning if the patient needs post-hospital services based on physician/advanced practitioner order or complex needs related to functional status, cognitive ability, or social support system  Outcome: Progressing     Problem: Knowledge Deficit  Goal: Patient/family/caregiver demonstrates understanding of disease process, treatment plan, medications, and discharge instructions  Description: Complete learning assessment and assess knowledge base    Interventions:  - Provide teaching at level of understanding  - Provide teaching via preferred learning methods  Outcome: Progressing     Problem: Prexisting or High Potential for Compromised Skin Integrity  Goal: Skin integrity is maintained or improved  Description: INTERVENTIONS:  - Identify patients at risk for skin breakdown  - Assess and monitor skin integrity  - Assess and monitor nutrition and hydration status  - Monitor labs   - Assess for incontinence   - Turn and reposition patient  - Assist with mobility/ambulation  - Relieve pressure over bony prominences  - Avoid friction and shearing  - Provide appropriate hygiene as needed including keeping skin clean and dry  - Evaluate need for skin moisturizer/barrier cream  - Collaborate with interdisciplinary team   - Patient/family teaching  - Consider wound care consult   Outcome: Progressing     Problem: GASTROINTESTINAL - ADULT  Goal: Minimal or absence of nausea and/or vomiting  Description: INTERVENTIONS:  - Administer IV fluids if ordered to ensure adequate hydration  - Maintain NPO status until nausea and vomiting are resolved  - Nasogastric tube if ordered  - Administer ordered antiemetic medications as needed  - Provide nonpharmacologic comfort measures as appropriate  - Advance diet as tolerated, if ordered  - Consider nutrition services referral to assist patient with adequate nutrition and appropriate food choices  Outcome: Progressing  Goal: Maintains or returns to baseline bowel function  Description: INTERVENTIONS:  - Assess bowel function  - Encourage oral fluids to ensure adequate hydration  - Administer IV fluids if ordered to ensure adequate hydration  - Administer ordered medications as needed  - Encourage mobilization and activity  - Consider nutritional services referral to assist patient with adequate nutrition and appropriate food choices  Outcome: Progressing  Goal: Maintains adequate nutritional intake  Description: INTERVENTIONS:  - Monitor percentage of each meal consumed  - Identify factors contributing to decreased intake, treat as appropriate  - Assist with meals as needed  - Monitor I&O, weight, and lab values if indicated  - Obtain nutrition services referral as needed  Outcome: Progressing     Problem: GENITOURINARY - ADULT  Goal: Maintains or returns to baseline urinary function  Description: INTERVENTIONS:  - Assess urinary function  - Encourage oral fluids to ensure adequate hydration if ordered  - Administer IV fluids as ordered to ensure adequate hydration  - Administer ordered medications as needed  - Offer frequent toileting  - Follow urinary retention protocol if ordered  Outcome: Progressing  Goal: Absence of urinary retention  Description: INTERVENTIONS:  - Assess patients ability to void and empty bladder  - Monitor I/O  - Bladder scan as needed  - Discuss with physician/AP medications to alleviate retention as needed  - Discuss catheterization for long term situations as appropriate  Outcome: Progressing  Goal: Urinary catheter remains patent  Description: INTERVENTIONS:  - Assess patency of urinary catheter  - If patient has a chronic johnson, consider changing catheter if non-functioning  - Follow guidelines for intermittent irrigation of non-functioning urinary catheter  Outcome: Progressing     Problem: Nutrition/Hydration-ADULT  Goal: Nutrient/Hydration intake appropriate for improving, restoring or maintaining nutritional needs  Description: Monitor and assess patient's nutrition/hydration status for malnutrition  Collaborate with interdisciplinary team and initiate plan and interventions as ordered  Monitor patient's weight and dietary intake as ordered or per policy  Utilize nutrition screening tool and intervene as necessary  Determine patient's food preferences and provide high-protein, high-caloric foods as appropriate       INTERVENTIONS:  - Monitor oral intake, urinary output, labs, and treatment plans  - Assess nutrition and hydration status and recommend course of action  - Evaluate amount of meals eaten  - Assist patient with eating if necessary   - Allow adequate time for meals  - Recommend/ encourage appropriate diets, oral nutritional supplements, and vitamin/mineral supplements  - Order, calculate, and assess calorie counts as needed  - Recommend, monitor, and adjust tube feedings and TPN/PPN based on assessed needs  - Assess need for intravenous fluids  - Provide specific nutrition/hydration education as appropriate  - Include patient/family/caregiver in decisions related to nutrition  Outcome: Progressing

## 2021-03-02 NOTE — PROGRESS NOTES
Progress Note - Palliative & Supportive Care  Shelia Tejada  76 y o   male  /-01   MRN: [id number]  Encounter: [id number]     Assessment  Patient Active Problem List   Diagnosis    COPD (chronic obstructive pulmonary disease) ([facility] )    Headaches due to old head injury    High cholesterol    Obstructive sleep apnea syndrome    Type 2 diabetes mellitus with hyperglycemia, without long-term current use of insulin (HCC)    Esophageal obstruction    Chemotherapy induced neutropenia (HCC)    Anemia, unspecified    Insomnia due to medical condition    Encounter for care related to feeding tube    Paralysis of left vocal fold    Dysphonia    Mild protein-calorie malnutrition ([facility] )    Port-A-Cath in place    Neuropathy associated with cancer ([facility] )    Glottic insufficiency    Hx of smoking    Acquired hypothyroidism    Medicare annual wellness visit, subsequent    Esophageal stricture    Paraspinal abscess ([facility] )    Brain lesion    Carotid stenosis, asymptomatic    Hyponatremia    Severe protein-calorie malnutrition (HCC)    Dysphagia    Bilateral swelling of feet    Severe protein-calorie malnutrition (Grace Cocking: less than 60% of standard weight) (HCC)    Cancer-related pain    Gastroesophageal reflux disease    Jejunostomy tube present ([facility] )    Therapeutic opioid-induced constipation ([facility])    Personal history of esophageal cancer    PONV (postoperative nausea and vomiting)    Vertebral osteomyelitis (HCC)    AMS (altered mental status)    Abnormal CT of thoracic spine    Urinary retention    Hypothyroid    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 (HCC)    Abnormal brain MRI    Atrial fibrillation (HCC)    Brain metastases (HCC)    Acute deep vein thrombosis (DVT) of femoral vein of right lower extremity (HCC)    Chronic anticoagulation    S/P IVC filter    Encounter for antineoplastic chemotherapy    Encounter for antineoplastic chemotherapy    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)    History of Clostridioides difficile infection    Dilation of biliary tract     Consulted for symptom management  Goals of Care  Level 1 code status, no limits  He states if he needs to be intubated for more than 3 days, "pull the plug "    Plan  Symptom Management    · OxyIR 10-20mg q4h PRN moderate-severe pain  · Hydromorphone 0 5mg IV q3h PRN breakthrough  · Bowel regimen with colace, senna BID, and Miralax   · Reglan 10mg Q6H PRN nausea, 2nd line  · Zofran 4mg Q6H PRN nausea    24 History  Chart reviewed before visit  Patient underwent ERCP and sphincterotomy yesterday  He reports that he was able to have a bowel movement this morning and feels relieved  His pain is well controlled with his current pain regimen  The pain is along his middle abdomen and is cramping in nature  Only the pain medication seems to make it better  Nothing makes it worse  He does not feel anxious and feels his mood is improved today  He has some nausea but feels that Zofran and Reglan are working well  He has not vomited  He denies itching  His breathing is comfortable  He states that his family has his ACP documents at home and will be sending them in  In the mean time, he wants full cares  If he would need to be placed on a ventilator, he wishes to limit his intubation to 3 days and would like to stop medical care after that time  Current pain location(s): Abdomen  Pain Scale: 5-8  PRN Use of Pain Medications: 3 x Oxycodone  24 hour Total OME: 75      Review of Systems: Pertinent items are noted in HPI      Medications    Current Facility-Administered Medications:     amiodarone tablet 200 mg, 200 mg, Oral, Daily With Breakfast, Cheryl Locks, DO, 200 mg at 03/02/21 0751    atorvastatin (LIPITOR) tablet 40 mg, 40 mg, Oral, Daily, Cheryl Locks, DO, 40 mg at 03/02/21 0800    docusate sodium (COLACE) capsule 100 mg, 100 mg, Oral, Daily, Michel Papa, MD, 100 mg at 03/02/21 0800    enoxaparin (LOVENOX) subcutaneous injection 40 mg, 40 mg, Subcutaneous, Q24H [address], Vianca Ball MD, 40 mg at 03/02/21 0800    HYDROmorphone (DILAUDID) injection 0 5 mg, 0 5 mg, Intravenous, Q3H PRN, Lexie Mathur MD, 0 5 mg at 02/28/21 1529    levothyroxine tablet 50 mcg, 50 mcg, Oral, Early Morning, Sean Prajapati DO, 50 mcg at 03/02/21 0501    metoclopramide (REGLAN) injection 10 mg, 10 mg, Intravenous, Q6H PRN, Clarisa Myers PA-C, 10 mg at 02/27/21 0508    multi-electrolyte (PLASMALYTE-A/ISOLYTE-S PH 7 4) IV solution, 100 mL/hr, Intravenous, Continuous, SARA Santos, Last Rate: 100 mL/hr at 03/02/21 0504, 100 mL/hr at 03/02/21 0504    ondansetron (ZOFRAN) injection 4 mg, 4 mg, Intravenous, Q6H PRN, Sean Prajapati DO, 4 mg at 03/02/21 0541    oxyCODONE (ROXICODONE) immediate release tablet 10 mg, 10 mg, Oral, Q4H PRN, Lexie Mathur MD, 10 mg at 03/01/21 1756    oxyCODONE (ROXICODONE) immediate release tablet 20 mg, 20 mg, Oral, Q4H PRN, Lexie Mathur MD, 20 mg at 03/02/21 0314    polyethylene glycol (MIRALAX) packet 17 g, 17 g, Oral, Daily, Lexie Mathur MD, 17 g at 03/02/21 0800    senna (SENOKOT) tablet 8 6 mg, 1 tablet, Oral, BID PRN, Lexie Mathur MD    Objective  BP 98/55 (BP Location: Right arm)   Pulse 75   Temp 100 1 °F (37 8 °C)   Resp 16   Wt 67 1 kg (148 lb)   SpO2 93%   BMI 21 24 kg/m²   Physical Exam:   Constitutional: Appears chronically ill  Head: Normocephalic and atraumatic  Eyes: EOM are normal  No ocular discharge  Icteric sclera  Neck: no visible adenopathy or masses  Respiratory: Effort normal  No stridor  No respiratory distress  Gastrointestinal: Distention is present  Bowel sounds positive  BM this morning, passing flatus  Musculoskeletal: No edema  Neurological: Alert, oriented and appropriately conversant  Skin: Dry, no diaphoresis  Psychiatric: Displays a normal mood and affect   Behavior, judgement and thought content appear normal       Lab Results:   I have personally reviewed pertinent labs  , CBC: No results found for: WBC, HGB, HCT, MCV, PLT, ADJUSTEDWBC, MCH, MCHC, RDW, MPV, NRBC, CMP:   Lab Results   Component Value Date    SODIUM 137 03/02/2021    K 4 2 03/02/2021     03/02/2021    CO2 26 03/02/2021    BUN 11 03/02/2021    CREATININE 0 86 03/02/2021    CALCIUM 8 2 (L) 03/02/2021     (H) 03/02/2021    ALT 63 03/02/2021    ALKPHOS 877 (H) 03/02/2021    EGFR 90 03/02/2021       Counseling / Coordination of Care  Total floor / unit time spent today 20 minutes  Greater than 50% of total time was spent with the patient and / or family counseling and / or coordinating of care   A description of the counseling / coordination of care: I provided medical updates, discussed palliative care, determined competency, determined goals of care, determined social/family support, discussed plans of care, discussed symptom management, provided psychosocial support, collaborated with nursing team     Chery Ríos, [address] [place] [address]

## 2021-03-02 NOTE — PROGRESS NOTES
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  Patient presented with progression acutely of obstructive jaundice and pancreatic head obstruction  Suspicion was that this was metastatic disease given that CT scan from January 18, 2021 did show evidence interval worsening of soft tissue thickening around the proximal celiac and superior mesenteric arteries concerning for neoplastic disease  Patient underwent EUS on 03/02/2021 which showed evidence of dilation of the PD up to 8 mm as well as prominent lymphadenopathy in the peripancreatic region  There was an irregular heterogeneous solid mass measuring 3 1 x 2 6 cm the pancreas fine-needle biopsies were taken and on-site cytologist with preliminary malignant cells  Also underwent ERCP with sphincterotomy and brushings taken  Also had placement of a 6 cm uncovered metal stent in the CBD       Plan:  -f/u pancreatitic mass biopsies, CBD brushings  -advance diet as tolerated  -qdaily LFTs  -pain control, IVF per primary team     #Esophageal Elida Rotonda s/p Esophagectomy (1/20)  #Hx of esophageal stricture s/p recent stent placement (stent removal last month)  Patient with history of esophageal adenocarcinoma status post esophagectomy  Has had multiple esophageal strictures requiring dilations post esophagectomy  Most recently had stent removed last month  On upper endoscopy on 03/01/2021 was noted to have polyp/ nodule at the anastomotic site which was biopsied    Will need to follow up to ensure that there is no recurrence of adenocarcinoma  Plan:  -f/u esophageal polyp/nodule biopsy results  ______________________________________________________________________    Subjective:     Patient with no acute complaints today  States that his pain is better and his energy levels are improved  Was able to tolerate a diet today without significant discomfort  Denies any blood in his stool       Medication Administration - last 24 hours from 03/01/2021 1328 to 03/02/2021 1328       Date/Time Order Dose Route Action Action by     03/02/2021 0751 amiodarone tablet 200 mg 200 mg Oral Given Jordyn Delgado RN     03/02/2021 0800 atorvastatin (LIPITOR) tablet 40 mg 40 mg Oral Given Jordyn Delgado, RN     03/02/2021 0501 levothyroxine tablet 50 mcg 50 mcg Oral Given Jordyn Delgado, RN     03/02/2021 1320 multi-electrolyte (PLASMALYTE-A/ISOLYTE-S PH 7 4) IV solution 0 mL/hr Intravenous Stopped Jordyn Delgado RN     03/02/2021 1622 multi-electrolyte (PLASMALYTE-A/ISOLYTE-S PH 7 4) IV solution 100 mL/hr Intravenous [employer], RN     03/01/2021 2155 multi-electrolyte (PLASMALYTE-A/ISOLYTE-S PH 7 4) IV solution 100 mL/hr Intravenous Rate/Dose Change Ezra Moreno, SAMEERA     03/01/2021 1747 multi-electrolyte (PLASMALYTE-A/ISOLYTE-S PH 7 4) IV solution 50 mL/hr Intravenous [address] Ezra Moreno, SAMEERA     03/02/2021 0541 ondansetron (ZOFRAN) injection 4 mg 4 mg Intravenous Given Kamla Melgar, SAMEERA     03/01/2021 1951 ondansetron (ZOFRAN) injection 4 mg 4 mg Intravenous Given Ezra Moreno, SAMEERA     03/02/2021 1043 metoclopramide (REGLAN) injection 10 mg 10 mg Intravenous Given Jordyn Delgado, RN     03/01/2021 1756 oxyCODONE (ROXICODONE) immediate release tablet 10 mg 10 mg Oral Given Ezra Moreno, SAMEERA     03/02/2021 0800 polyethylene glycol (MIRALAX) packet 17 g 17 g Oral Given Kamla Melgar RN     03/02/2021 0800 enoxaparin (LOVENOX) subcutaneous injection 40 mg 40 mg Subcutaneous Given Jordyn Delgado RN     03/02/2021 1789 oxyCODONE (ROXICODONE) immediate release tablet 20 mg 20 mg Oral Given Jordyn Delgado RN     03/02/2021 0800 docusate sodium (COLACE) capsule 100 mg 100 mg Oral Given Jordyn Delgado, RN     03/01/2021 1420 iohexol (OMNIPAQUE) 240 MG/ML solution 50 mL 25 mL Other Given by Ortega Rutledge          Objective:     Vitals: Blood pressure 98/55, pulse 75, temperature 100 1 °F (37 8 °C), resp  rate 16, weight 67 1 kg (148 lb), SpO2 93 %  ,Body mass index is 21 24 kg/m²  Intake/Output Summary (Last 24 hours) at 3/2/2021 1328  Last data filed at 3/1/2021 2155  Gross per 24 hour   Intake 424 17 ml   Output --   Net 424 17 ml       Physical Exam:   General Appearance:   Alert, cooperative, no distress   HEENT:   Normocephalic, atraumatic, anicteric  Neck:  Supple, symmetrical, trachea midline   Lungs:   Clear to auscultation bilaterally; no rales, rhonchi or wheezing; respirations unlabored    Heart[de-identified]   Regular rate and rhythm; no murmur, rub, or gallop  Abdomen:   Soft, non-tender, non-distended; normal bowel sounds; no masses, no organomegaly    Genitalia:   Deferred    Rectal:   Deferred    Extremities:  No cyanosis, clubbing or edema    Pulses:  2+ and symmetric all extremities    Skin:  No jaundice, rashes, or lesions    Lymph nodes:  No palpable cervical lymphadenopathy        Invasive Devices     Central Venous Catheter Line            Port A Cath 10/01/19 Left Chest 518 days          Drain            Gastrostomy/Enterostomy Jejunostomy 14 Fr   days    Gastrostomy/Enterostomy Jejunostomy 14 Fr   days                Lab Results:  Admission on 02/26/2021   Component Date Value    Bilirubin, Direct 02/26/2021 4 98*    Lipase 02/26/2021 3,242*    Protime 02/26/2021 18 5*    INR 02/26/2021 1 55*    Blood Culture 02/26/2021 No Growth at 72 hrs   Blood Culture 02/26/2021 No Growth at 72 hrs       LACTIC ACID 02/26/2021 1 1     Sodium 02/27/2021 141     Potassium 02/27/2021 3 3*    Chloride 02/27/2021 109*    CO2 02/27/2021 26     ANION GAP 02/27/2021 6     BUN 02/27/2021 11     Creatinine 02/27/2021 1 01     Glucose 02/27/2021 104     Calcium 02/27/2021 9 0     Corrected Calcium 02/27/2021 10 4*    AST 02/27/2021 145*    ALT 02/27/2021 87*    Alkaline Phosphatase 02/27/2021 980*    Total Protein 02/27/2021 6 3*    Albumin 02/27/2021 2 3*    Total Bilirubin 02/27/2021 6 80*    eGFR 02/27/2021 77     WBC 02/27/2021 5 09     RBC 02/27/2021 3 96     Hemoglobin 02/27/2021 10 3*    Hematocrit 02/27/2021 33 3*    MCV 02/27/2021 84     MCH 02/27/2021 26 0*    MCHC 02/27/2021 30 9*    RDW 02/27/2021 15 5*    Platelets 35/95/7502 136*    MPV 02/27/2021 11 2     WBC 02/28/2021 3 29*    RBC 02/28/2021 3 34*    Hemoglobin 02/28/2021 8 8*    Hematocrit 02/28/2021 28 1*    MCV 02/28/2021 84     MCH 02/28/2021 26 3*    MCHC 02/28/2021 31 3*    RDW 02/28/2021 15 6*    MPV 02/28/2021 10 9     Platelets 96/60/7855 106*    nRBC 02/28/2021 0     Neutrophils Relative 02/28/2021 70     Immat GRANS % 02/28/2021 0     Lymphocytes Relative 02/28/2021 18     Monocytes Relative 02/28/2021 9     Eosinophils Relative 02/28/2021 2     Basophils Relative 02/28/2021 1     Neutrophils Absolute 02/28/2021 2 30     Immature Grans Absolute 02/28/2021 0 01     Lymphocytes Absolute 02/28/2021 0 60     Monocytes Absolute 02/28/2021 0 30     Eosinophils Absolute 02/28/2021 0 06     Basophils Absolute 02/28/2021 0 02     Sodium 02/28/2021 141     Potassium 02/28/2021 3 3*    Chloride 02/28/2021 108     CO2 02/28/2021 27     ANION GAP 02/28/2021 6     BUN 02/28/2021 8     Creatinine 02/28/2021 0 92     Glucose 02/28/2021 105     Calcium 02/28/2021 7 9*    Corrected Calcium 02/28/2021 9 5     AST 02/28/2021 122*    ALT 02/28/2021 71     Alkaline Phosphatase 02/28/2021 920*    Total Protein 02/28/2021 5 5*    Albumin 02/28/2021 2 0*    Total Bilirubin 02/28/2021 6 15*    eGFR 02/28/2021 86     Protime 02/28/2021 18 6*    INR 02/28/2021 1 55*    Sodium 03/01/2021 138     Potassium 03/01/2021 3 9     Chloride 03/01/2021 105     CO2 03/01/2021 29     ANION GAP 03/01/2021 4     BUN 03/01/2021 8     Creatinine 03/01/2021 0 80     Glucose 03/01/2021 60*    Calcium 03/01/2021 8 3     Corrected Calcium 03/01/2021 9 8     AST 03/01/2021 135*    ALT 03/01/2021 69     Alkaline Phosphatase 03/01/2021 970*    Total Protein 03/01/2021 5 8*    Albumin 03/01/2021 2 1*    Total Bilirubin 03/01/2021 7 52*    eGFR 03/01/2021 93     WBC 03/01/2021 3 23*    RBC 03/01/2021 3 65*    Hemoglobin 03/01/2021 9 4*    Hematocrit 03/01/2021 30 8*    MCV 03/01/2021 84     MCH 03/01/2021 25 8*    MCHC 03/01/2021 30 5*    RDW 03/01/2021 15 9*    MPV 03/01/2021 11 1     Platelets 04/07/6567 110*    nRBC 03/01/2021 0     Neutrophils Relative 03/01/2021 68     Immat GRANS % 03/01/2021 0     Lymphocytes Relative 03/01/2021 18     Monocytes Relative 03/01/2021 10     Eosinophils Relative 03/01/2021 3     Basophils Relative 03/01/2021 1     Neutrophils Absolute 03/01/2021 2 22     Immature Grans Absolute 03/01/2021 0 00     Lymphocytes Absolute 03/01/2021 0 57*    Monocytes Absolute 03/01/2021 0 32     Eosinophils Absolute 03/01/2021 0 08     Basophils Absolute 03/01/2021 0 04     Protime 03/01/2021 16 3*    INR 03/01/2021 1 31*    Sodium 03/02/2021 137     Potassium 03/02/2021 4 2     Chloride 03/02/2021 104     CO2 03/02/2021 26     ANION GAP 03/02/2021 7     BUN 03/02/2021 11     Creatinine 03/02/2021 0 86     Glucose 03/02/2021 120     Calcium 03/02/2021 8 2*    Corrected Calcium 03/02/2021 9 8     AST 03/02/2021 110*    ALT 03/02/2021 63     Alkaline Phosphatase 03/02/2021 877*    Total Protein 03/02/2021 5 6*    Albumin 03/02/2021 2 0*    Total Bilirubin 03/02/2021 4 54*    eGFR 03/02/2021 90        Imaging Studies: I have personally reviewed pertinent imaging studies        ---------------------------------------------------  Note Electronically Signed By:    MD Michael Nelson 72 Gastroenterology Fellow PGY-5  [id number] [employer] #: [id number]

## 2021-03-02 NOTE — APP STUDENT NOTE
[place] STUDENT  Inpatient Progress Note for TRAINING ONLY  Not Part of Legal Medical Record     Progress Note - Justa Valenzuela 76 y o  male MRN: [id number]  Unit/Bed#: MS Corado5-Kim Encounter: [id number]        * Dilation of biliary tract  Assessment & Plan  · Obstructive jaundice   · CT 2/26: "Interval development of severe intra and extrahepatic biliary dilatation to the level of the pancreatic head  Appears to be caused by progression of infiltrating tumor mass inseparable from the posterior margin of the pancreatic head and probably obstructing the distal common duct  Also, there is tumefactive sludge or noncalcified stone in the distal common duct "  · Surgical oncology input noted  · S/p ERCP s/p sphincterotomy, brushings, and placement of stent in CBD  · AST, ALT, and alk phos trending down  · Monitor LFTs    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 ([facility] [address] )  Assessment & Plan  · Known history of metastatic esophageal adenocarcinoma s/p neoadjuvant chemotherapy and esophagectomy  · Palliative care following, continue opioid analgesia, input noted    Brain metastases [facility])  Assessment & Plan  · Metastatic lesions in left frontoparietal and right parietal lobes  · MRI 2/10/2021 notes decreased lesion size    Acute deep vein thrombosis (DVT) of femoral vein of right lower extremity (HCC)  Assessment & Plan  · S/p IVC filter    Known dx, unclear when dx   · Presently Eliquis on hold in view of scheduled GI intervention  · Resume Eliquis when cleared by GI    Anemia, unspecified  Assessment & Plan  · In the setting of metastatic cancer  · Continue to monitor    Type 2 diabetes mellitus with hyperglycemia, without long-term current use of insulin (HCC)  Assessment & Plan  Lab Results   Component Value Date    HGBA1C 5 2 12/01/2020       Blood Sugar Average: Last 72 hrs:  ·  diet controlled  · SSI with Accu-Cheks while inpatient  · Initiate hypoglycemia protocol    Severe protein-calorie malnutrition [facility])  Assessment & Plan  · Presented with early satiety likely secondary to metastatic disease  · Encourage adequate calorie and protein intake  · Patient reporting appetite has returned s/p EUS and ERCP s/p sphincterotomy, brushings, and placement of stent in CBD    Cancer-related pain  Assessment & Plan  · Palliative care following, continue opioid analgesia, input noted    COPD (chronic obstructive pulmonary disease) ([address] )  Assessment & Plan  · No acute exacerbation       VTE Pharmacologic Prophylaxis:   Pharmacologic: Enoxaparin (Lovenox)  Mechanical VTE Prophylaxis in Place: No    Patient Centered Rounds: Will discuss patient case with preceptor    Discussions with Specialists or Other Care Team Provider: Reviewed notes from other care teams    Education and Discussions with Family / Patient: patient    Time Spent for Care: 30 minutes  More than 50% of total time spent on counseling and coordination of care as described above  Current Length of Stay: 4 day(s)    Current Patient Status: Inpatient   Certification Statement: The patient will continue to require additional inpatient hospital stay due to monitor s/p ERCP    Discharge Plan: pending clinical course    Code Status: Level 1 - Full Code    Subjective:   Patient is sitting comfortably in chair at time of visit  He reports feeling better than yesterday  He has some nausea, and mild abdominal pain that he describes as a bloating discomfort  He was able to have a BM this morning which was soft  He was able to eat dinner last night and breakfast this morning noting that his appetite has returned which he is pleased with  The swelling of his left hand has subsided      Objective:     Vitals:   Temp (24hrs), Av 5 °F (36 4 °C), Min:96 °F (35 6 °C), Max:100 1 °F (37 8 °C)    Temp:  [96 °F (35 6 °C)-100 1 °F (37 8 °C)] 100 1 °F (37 8 °C)  HR:  [68-79] 75  Resp:  [16-18] 16  BP: ()/(45-74) 98/55  SpO2:  [93 %-100 %] 93 %  Body mass index is 21 24 kg/m²  Input and Output Summary (last 24 hours): Intake/Output Summary (Last 24 hours) at 3/2/2021 1129  Last data filed at 3/1/2021 2155  Gross per 24 hour   Intake 574 17 ml   Output --   Net 574 17 ml       Physical Exam:     Physical Exam  Constitutional:       General: He is not in acute distress  Appearance: Normal appearance  He is normal weight  He is ill-appearing  HENT:      Head: Normocephalic and atraumatic  Mouth/Throat:      Mouth: Mucous membranes are moist       Pharynx: Oropharynx is clear  Eyes:      General: Scleral icterus present  Cardiovascular:      Rate and Rhythm: Normal rate and regular rhythm  Heart sounds: Normal heart sounds  Pulmonary:      Effort: Pulmonary effort is normal  No respiratory distress  Breath sounds: Normal breath sounds  Abdominal:      General: There is no distension  Palpations: Abdomen is soft  Tenderness: There is no abdominal tenderness  There is no guarding  Musculoskeletal:         General: No swelling  Right lower leg: Edema present  Left lower leg: Edema present  Comments: Patient notes that BLE edema has been present for months   Skin:     General: Skin is warm and dry  Coloration: Skin is jaundiced  Comments: Less jaundiced than yesterday   Neurological:      Mental Status: He is alert     Psychiatric:         Mood and Affect: Mood normal          Behavior: Behavior normal          Historical Information   Past Medical History:   Diagnosis Date    Anemia     Bilateral pleural effusion     Brain lesion     Brain metastases (HCC)     Brain tumor (HCC)     C  difficile diarrhea     [facility]     Cancer of esophagus (HCC)     COPD (chronic obstructive pulmonary disease) (HCC)     Diabetes mellitus (HCC)     Difficulty swallowing     Disease of thyroid gland     Edema     Esophageal mass     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 9/24/2019    History of chemotherapy     History of transfusion     Malignant neoplasm of lower third of esophagus ([facility] [address] ) 9/17/2019    Diagnosis: clinical stage [id number] esophageal carcinoma Procedure: EGD, transhiatal esophagectomy performed on 1/28/20 Pathology: esophagogastrectomy reveals microscopic focus of residual adenocarcinoma in muscularis propria measuring 0 7 cm, adjacent Duong's esophagus with associated atypia indeterminate for dysplasia  7 lymph nodes negative for carcinoma  Proximal and distal margins negative for    Paraspinal abscess (HCC)     PONV (postoperative nausea and vomiting)     Port-A-Cath in place     LCW    Sleep apnea     no CPAP    SOB (shortness of breath)      Past Surgical History:   Procedure Laterality Date    BACK SURGERY      x2    DISC REMOVAL      ESOPHAGECTOMY N/A 1/28/2020    Procedure: ESOPHAGECTOMY, TRANSHIATAL;  Surgeon: Jennifer Pierson MD;  [facility];  Service: Surgical Oncology    ESOPHAGOGASTRODUODENOSCOPY N/A 1/28/2020    Procedure: ESOPHAGOGASTRODUODENOSCOPY (EGD);   Surgeon: Jennifer Pierson MD;  [facility];  Service: Surgical Oncology    FL GUIDED CENTRAL VENOUS ACCESS DEVICE INSERTION  9/26/2019    GASTROJEJUNOSTOMY W/ JEJUNOSTOMY TUBE N/A 9/26/2019    Procedure: INSERTION JEJUNOSTOMY TUBE OPEN;  Surgeon: Jennifer Pierson MD;  [facility];  Service: Surgical Oncology    GASTROJEJUNOSTOMY W/ JEJUNOSTOMY TUBE N/A 6/5/2020    Procedure: INSERTION JEJUNOSTOMY TUBE OPEN;  Surgeon: Jennifer Pierson MD;  [facility];  Service: Surgical Oncology    IR CHEST TUBE PLACEMENT  5/26/2020    IR SPINE PROCEDURE  5/27/2020    IR THORACENTESIS  2/25/2020    JOINT REPLACEMENT      DC EGD ENDOSCOPIC STENT PLACEMENT W/WIRE& DILATION N/A 8/31/2020    Procedure: INSERTION STENT ESOPHAGEAL;  Surgeon: Maria Esther Adamson MD;  [facility];  Service: Thoracic    DC EGD INSERT GUIDE WIRE DILATOR PASSAGE ESOPHAGUS N/A 7/7/2020    Procedure: ESOPHAGOGASTRODUODENOSCOPY (EGD) with esophageal dilation under fluro with biopsy;  Surgeon: Michelle Chung MD;  [facility];  Service: Thoracic    [place] ESOPHAGOGASTRODUODENOSCOPY TRANSORAL DIAGNOSTIC N/A 4/28/2020    Procedure: ESOPHAGOGASTRODUODENOSCOPY (EGD); Surgeon: Michelle Chung MD;  [facility];  Service: Thoracic    [place] ESOPHAGOGASTRODUODENOSCOPY TRANSORAL DIAGNOSTIC N/A 7/27/2020    Procedure: ESOPHAGOGASTRODUODENOSCOPY (EGD); Surgeon: Michelle Chung MD;  [facility];  Service: Thoracic    [place] ESOPHAGOGASTRODUODENOSCOPY TRANSORAL DIAGNOSTIC N/A 8/31/2020    Procedure: ESOPHAGOGASTRODUODENOSCOPY (EGD) dilation over guidewire 36-45 Fr , stent placement;  Surgeon: Michelle Chung MD;  [facility];  Service: Thoracic    [place] ESOPHAGOGASTRODUODENOSCOPY TRANSORAL DIAGNOSTIC N/A 1/11/2021    Procedure: ESOPHAGOGASTRODUODENOSCOPY (EGD): esophageal stent removal;  Surgeon: Michelle Chung MD;  [facility];  Service: Thoracic    [place] ESOPHAGOSCOPY FLEX Nancy Sartorius <30 MM DIAM N/A 4/28/2020    Procedure: DILATATION ESOPHAGEAL;  Surgeon: Michelle Chung MD;  [facility];  Service: Thoracic    [place] ESOPHAGOSCOPY FLEX Anncy Sartorius <30 MM DIAM N/A 7/27/2020    Procedure: DILATATION ESOPHAGEAL with Savary over the wire dilitation 33FR to 45FR; Surgeon: Michelle Chung MD;  [facility];  Service: Thoracic    [place] ESOPHAGOSCOPY FLEX BALLOON DILAT <30 MM DIAM N/A 8/31/2020    Procedure: DILATATION ESOPHAGEAL;  Surgeon: Michelle Chung MD;  [facility];  Service: Thoracic    TONSILLECTOMY      TUNNELED VENOUS PORT PLACEMENT N/A 9/26/2019    Procedure: INSERTION VENOUS PORT (PORT-A-CATH);   Surgeon: Simba Odell MD;  [facility];  Service: Surgical Oncology    VOCAL CORD INJECTION N/A 2/7/2020    Procedure: MICROLARYNGOSCOPY WITH INJECTION;  Surgeon: Zohaib Serrato MD;  [facility];  Service: ENT     Social History   Social History     Substance and Sexual Activity   Alcohol Use Never    Frequency: Never     Social History     Substance and Sexual Activity   Drug Use Never     Social History     Tobacco Use   Smoking Status Former Smoker    Packs/day: 0 50    Years: 50 00    Pack years: 25 00    Types: Cigarettes    Quit date: 12/24/2017    Years since quitting: 3 1   Smokeless Tobacco Never Used     Family History: non-contributory    Meds/Allergies   all medications and allergies reviewed  Allergies   Allergen Reactions    Penicillins Itching       Additional Data:     Labs:    Results from last 7 days   Lab Units 03/01/21  0500   WBC Thousand/uL 3 23*   HEMOGLOBIN g/dL 9 4*   HEMATOCRIT % 30 8*   PLATELETS Thousands/uL 110*   NEUTROS PCT % 68   LYMPHS PCT % 18   MONOS PCT % 10   EOS PCT % 3     Results from last 7 days   Lab Units 03/02/21  0508   SODIUM mmol/L 137   POTASSIUM mmol/L 4 2   CHLORIDE mmol/L 104   CO2 mmol/L 26   BUN mg/dL 11   CREATININE mg/dL 0 86   ANION GAP mmol/L 7   CALCIUM mg/dL 8 2*   ALBUMIN g/dL 2 0*   TOTAL BILIRUBIN mg/dL 4 54*   ALK PHOS U/L 877*   ALT U/L 63   AST U/L 110*   GLUCOSE RANDOM mg/dL 120     Results from last 7 days   Lab Units 03/01/21  0500   INR  1 31*             Results from last 7 days   Lab Units 02/26/21  1544   LACTIC ACID mmol/L 1 1         * I Have Reviewed All Lab Data Listed Above  * Additional Pertinent Lab Tests Reviewed: All Labs Within Last 24 Hours Reviewed    Imaging:    Imaging Reports Reviewed Today Include: 3/1 procedure notes  Imaging Personally Reviewed by Myself Includes:  none    Recent Cultures (last 7 days):     Results from last 7 days   Lab Units 02/26/21  1544   BLOOD CULTURE  No Growth at 72 hrs  No Growth at 72 hrs         Last 24 Hours Medication List:   Current Facility-Administered Medications   Medication Dose Route Frequency Provider Last Rate    amiodarone  200 mg Oral Daily With Breakfast Arturo Quach DO      atorvastatin  40 mg Oral Daily Arturo Quach DO      docusate sodium  100 mg Oral Daily Jay Tolbert MD      enoxaparin  40 mg Subcutaneous Q24H [address] , MD      HYDROmorphone  0 5 mg Intravenous Q3H PRN Jay Tolbert MD      levothyroxine  50 mcg Oral Early Morning Jeremy Rashid, DO      metoclopramide  10 mg Intravenous Q6H PRN Clarisa Myers PA-C      multi-electrolyte  100 mL/hr Intravenous Continuous Mitzi Zbarascsorenk, CRNP 100 mL/hr (03/02/21 0504)    ondansetron  4 mg Intravenous Q6H PRN Jeremy Rashid, DO      oxyCODONE  10 mg Oral Q4H PRN Jay Tolbert MD      oxyCODONE  20 mg Oral Q4H PRN Jay Tolbert MD      polyethylene glycol  17 g Oral Daily Jay Tolbert, MD      senna  1 tablet Oral BID PRN Jay Tolbert, MD          Today, Patient Was Seen By: Maria Luz Nava    ** Please Note: Dictation voice to text software may have been used in the creation of this document   **

## 2021-03-02 NOTE — PROGRESS NOTES
Progress Note - Surgery Oncology  Carmen Schuler 76 y o  male MRN: [id number]  Unit/Bed#: -01 Encounter: [id number]    Assessment:  76 M with metastatic esophageal adenocarcinoma now with obstructive jaundice s/p 3/1 EGD/EUS, ERCP w sphincterotomy, brushings, metal stent placement     Plan:  Diet as tolerated  F/u T bili  GI following  Palliative care following- GOC, pt has capacity   Primary per medicine    Subjective/Objective   Chief Complaint:     Subjective: HONG  Afebrile  No abdominal pain, denies N/V  Objective:     Blood pressure 91/50, pulse 79, temperature 97 6 °F (36 4 °C), resp  rate 18, weight 67 1 kg (148 lb), SpO2 94 %  ,Body mass index is 21 24 kg/m²        Intake/Output Summary (Last 24 hours) at 3/2/2021 0547  Last data filed at 3/1/2021 2155  Gross per 24 hour   Intake 574 17 ml   Output --   Net 574 17 ml       Invasive Devices     Central Venous Catheter Line            Port A Cath 10/01/19 Left Chest 518 days          Drain            Gastrostomy/Enterostomy Jejunostomy 14 Fr   days    Gastrostomy/Enterostomy Jejunostomy 14 Fr   days                Physical Exam: NAD  Atraumatic/normocephalic, jaundice  AAOX3  Normal mood and affect  Normal respiratory effort  Soft, non tender, ND  J tube in place  Skin warm/dry  Cap refil <2 sec

## 2021-03-02 NOTE — ASSESSMENT & PLAN NOTE
· Obstructive jaundice   · CT 2/26: "Interval development of severe intra and extrahepatic biliary dilatation to the level of the pancreatic head  Appears to be caused by progression of infiltrating tumor mass inseparable from the posterior margin of the pancreatic head and probably obstructing the distal common duct    Also, there is tumefactive sludge or noncalcified stone in the distal common duct "  · Surgical oncology input noted  · S/p ERCP s/p sphincterotomy, brushings, and placement of stent in CBD  · AST, ALT, and alk phos trending down  · Monitor LFTs - improving

## 2021-03-02 NOTE — ASSESSMENT & PLAN NOTE
· Presented with early satiety likely secondary to metastatic disease  · Encourage adequate calorie and protein intake  · Patient reporting appetite has returned s/p EUS and ERCP s/p sphincterotomy, brushings, and placement of stent in CBD

## 2021-03-02 NOTE — MALNUTRITION/BMI
This medical record reflects one or more clinical indicators suggestive of malnutrition and/or morbid obesity  Malnutrition Findings:   Adult Malnutrition type: Chronic illness  Adult Degree of Malnutrition: Other severe protein calorie malnutrition(Severe Pro/jamal malnutrition r/t CA dx as evidenced by 22% unplanned weight loss since 2/20/20, consuming < 74% energy intake compared to est energy needs> 1 month  Treated with diet and Glucerna tid)           See Nutrition note dated 3/2/21 for additional details  Completed nutrition assessment is viewable in the nutrition documentation

## 2021-03-02 NOTE — PROGRESS NOTES
[facility]  Progress Note - [place] 1955, 76 y o  male MRN: [id number]    Unit/Bed#: MS Corado5-01 Encounter: [id number]    Primary Care Provider: SARA Hatch   Date and time admitted to hospital: 2/26/2021  2:58 PM        * Dilation of biliary tract  Assessment & Plan  · Obstructive jaundice   · CT 2/26: "Interval development of severe intra and extrahepatic biliary dilatation to the level of the pancreatic head  Appears to be caused by progression of infiltrating tumor mass inseparable from the posterior margin of the pancreatic head and probably obstructing the distal common duct  Also, there is tumefactive sludge or noncalcified stone in the distal common duct "  · Surgical oncology input noted  · S/p ERCP s/p sphincterotomy, brushings, and placement of stent in CBD  · AST, ALT, and alk phos trending down  · Monitor LFTs - improving     Acute deep vein thrombosis (DVT) of femoral vein of right lower extremity ([address] )  Assessment & Plan  · S/p IVC filter  Known dx, unclear when dx   · Presently Eliquis on hold in view of scheduled GI intervention  · Resume Eliquis when cleared by GI    Brain metastases ([address] )  Assessment & Plan  · Metastatic lesions in left frontoparietal and right parietal lobes  · MRI 2/10/2021 notes decreased lesion size    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 ([address] )  Assessment & Plan  · Known history of metastatic esophageal adenocarcinoma s/p neoadjuvant chemotherapy and esophagectomy      · Palliative care following, continue opioid analgesia, input noted    Cancer-related pain  Assessment & Plan  · Palliative care following, continue opioid analgesia, input noted    Severe protein-calorie malnutrition (HCC)  Assessment & Plan  · Presented with early satiety likely secondary to metastatic disease  · Encourage adequate calorie and protein intake  · Patient reporting appetite has returned s/p EUS and ERCP s/p sphincterotomy, brushings, and placement of stent in CBD    Anemia, unspecified  Assessment & Plan  · In the setting of metastatic cancer  · Continue to monitor    Type 2 diabetes mellitus with hyperglycemia, without long-term current use of insulin (HCC)  Assessment & Plan  Lab Results   Component Value Date    HGBA1C 5 2 2020       Blood Sugar Average: Last 72 hrs:  ·  diet controlled  · SSI with Accu-Cheks while inpatient  · Initiate hypoglycemia protocol    COPD (chronic obstructive pulmonary disease) ([facility] [address] )  Assessment & Plan  · No acute exacerbation        VTE Pharmacologic Prophylaxis:   Pharmacologic: Enoxaparin (Lovenox)  Mechanical VTE Prophylaxis in Place: Yes    Patient Centered Rounds: I have performed bedside rounds with nursing staff today  Discussions with Specialists or Other Care Team Provider: RN    Education and Discussions with Family / Patient: patient, declined call to family    Time Spent for Care: 20 minutes  More than 50% of total time spent on counseling and coordination of care as described above  Current Length of Stay: 4 day(s)    Current Patient Status: Inpatient   Certification Statement: The patient will continue to require additional inpatient hospital stay due to monitor LFTs    Discharge Plan: likely 24 hrs  Code Status: Level 1 - Full Code      Subjective: The patient has no acute complaints, states he feels much better, ate breakfast and lunch    Objective:     Vitals:   Temp (24hrs), Av 9 °F (37 2 °C), Min:97 6 °F (36 4 °C), Max:100 1 °F (37 8 °C)    Temp:  [97 6 °F (36 4 °C)-100 1 °F (37 8 °C)] 100 1 °F (37 8 °C)  HR:  [75-79] 75  Resp:  [16] 16  BP: (76-98)/(45-55) 98/55  SpO2:  [93 %] 93 %  Body mass index is 21 24 kg/m²  Input and Output Summary (last 24 hours):        Intake/Output Summary (Last 24 hours) at 3/2/2021 1757  Last data filed at 3/1/2021 2155  Gross per 24 hour   Intake 206 67 ml   Output --   Net 206 67 ml       Physical Exam:     Physical Exam  Vitals signs reviewed  Constitutional:       General: He is not in acute distress  Appearance: He is not toxic-appearing  HENT:      Head: Normocephalic and atraumatic  Eyes:      General: Scleral icterus present  Extraocular Movements: Extraocular movements intact  Neck:      Musculoskeletal: Normal range of motion  Cardiovascular:      Rate and Rhythm: Normal rate and regular rhythm  Pulmonary:      Effort: Pulmonary effort is normal  No respiratory distress  Breath sounds: Normal breath sounds  Abdominal:      General: Bowel sounds are normal  There is no distension  Palpations: Abdomen is soft  Tenderness: There is no abdominal tenderness  Musculoskeletal: Normal range of motion  Skin:     General: Skin is warm and dry  Coloration: Skin is jaundiced  Neurological:      General: No focal deficit present  Mental Status: He is alert and oriented to person, place, and time  Psychiatric:         Mood and Affect: Mood normal          Behavior: Behavior normal          Thought Content: Thought content normal          Additional Data:     Labs:    Results from last 7 days   Lab Units 03/01/21  0500   WBC Thousand/uL 3 23*   HEMOGLOBIN g/dL 9 4*   HEMATOCRIT % 30 8*   PLATELETS Thousands/uL 110*   NEUTROS PCT % 68   LYMPHS PCT % 18   MONOS PCT % 10   EOS PCT % 3     Results from last 7 days   Lab Units 03/02/21  0508   SODIUM mmol/L 137   POTASSIUM mmol/L 4 2   CHLORIDE mmol/L 104   CO2 mmol/L 26   BUN mg/dL 11   CREATININE mg/dL 0 86   ANION GAP mmol/L 7   CALCIUM mg/dL 8 2*   ALBUMIN g/dL 2 0*   TOTAL BILIRUBIN mg/dL 4 54*   ALK PHOS U/L 877*   ALT U/L 63   AST U/L 110*   GLUCOSE RANDOM mg/dL 120     Results from last 7 days   Lab Units 03/01/21  0500   INR  1 31*             Results from last 7 days   Lab Units 02/26/21  1544   LACTIC ACID mmol/L 1 1           * I Have Reviewed All Lab Data Listed Above  * Additional Pertinent Lab Tests Reviewed:  All Labs Within Last 24 Hours Reviewed    Imaging:    Imaging Reports Reviewed Today Include: none  Imaging Personally Reviewed by Myself Includes:  none    Recent Cultures (last 7 days):     Results from last 7 days   Lab Units 02/26/21  1544   BLOOD CULTURE  No Growth After 4 Days  No Growth After 4 Days  Last 24 Hours Medication List:   Current Facility-Administered Medications   Medication Dose Route Frequency Provider Last Rate    amiodarone  200 mg Oral Daily With Breakfast [place], DO      atorvastatin  40 mg Oral Daily [place], DO      docusate sodium  100 mg Oral Daily Estrada Sultana MD      enoxaparin  40 mg Subcutaneous Q24H [address] Melisa Mukherjee MD      HYDROmorphone  0 5 mg Intravenous Q3H PRN Estrada Sultana MD      levothyroxine  50 mcg Oral Early Morning [place], DO      metoclopramide  10 mg Intravenous Q6H PRN Clarisa Myers PA-C      ondansetron  4 mg Intravenous Q6H PRN [place], DO      oxyCODONE  10 mg Oral Q4H PRN Estrada Sultana MD      oxyCODONE  20 mg Oral Q4H PRN Estrada Sultana MD      polyethylene glycol  17 g Oral Daily Estrada Sultana MD      senna  1 tablet Oral BID PRN Esrtada Sultana MD          Today, Patient Was Seen By: Parisa Anderson PA-C    ** Please Note: Dictation voice to text software may have been used in the creation of this document   **

## 2021-03-03 VITALS
SYSTOLIC BLOOD PRESSURE: 90 MMHG | WEIGHT: 148 LBS | BODY MASS INDEX: 21.24 KG/M2 | TEMPERATURE: 96.8 F | HEART RATE: 72 BPM | OXYGEN SATURATION: 95 % | DIASTOLIC BLOOD PRESSURE: 54 MMHG | RESPIRATION RATE: 18 BRPM

## 2021-03-03 LAB
ALBUMIN SERPL BCP-MCNC: 2 G/DL (ref 3.5–5)
ALP SERPL-CCNC: 792 U/L (ref 46–116)
ALT SERPL W P-5'-P-CCNC: 58 U/L (ref 12–78)
ANION GAP SERPL CALCULATED.3IONS-SCNC: 3 MMOL/L (ref 4–13)
AST SERPL W P-5'-P-CCNC: 94 U/L (ref 5–45)
BACTERIA BLD CULT: NORMAL
BACTERIA BLD CULT: NORMAL
BILIRUB SERPL-MCNC: 3.65 MG/DL (ref 0.2–1)
BUN SERPL-MCNC: 13 MG/DL (ref 5–25)
CALCIUM ALBUM COR SERPL-MCNC: 9.7 MG/DL (ref 8.3–10.1)
CALCIUM SERPL-MCNC: 8.1 MG/DL (ref 8.3–10.1)
CHLORIDE SERPL-SCNC: 106 MMOL/L (ref 100–108)
CO2 SERPL-SCNC: 29 MMOL/L (ref 21–32)
CREAT SERPL-MCNC: 0.84 MG/DL (ref 0.6–1.3)
ERYTHROCYTE [DISTWIDTH] IN BLOOD BY AUTOMATED COUNT: 16.2 % (ref 11.6–15.1)
GFR SERPL CREATININE-BSD FRML MDRD: 91 ML/MIN/1.73SQ M
GLUCOSE SERPL-MCNC: 82 MG/DL (ref 65–140)
HCT VFR BLD AUTO: 29.3 % (ref 36.5–49.3)
HGB BLD-MCNC: 9.1 G/DL (ref 12–17)
MCH RBC QN AUTO: 25.9 PG (ref 26.8–34.3)
MCHC RBC AUTO-ENTMCNC: 31.1 G/DL (ref 31.4–37.4)
MCV RBC AUTO: 84 FL (ref 82–98)
PLATELET # BLD AUTO: 114 THOUSANDS/UL (ref 149–390)
PMV BLD AUTO: 10.7 FL (ref 8.9–12.7)
POTASSIUM SERPL-SCNC: 3.8 MMOL/L (ref 3.5–5.3)
PROT SERPL-MCNC: 5.8 G/DL (ref 6.4–8.2)
RBC # BLD AUTO: 3.51 MILLION/UL (ref 3.88–5.62)
SODIUM SERPL-SCNC: 138 MMOL/L (ref 136–145)
WBC # BLD AUTO: 3.93 THOUSAND/UL (ref 4.31–10.16)

## 2021-03-03 PROCEDURE — 99232 SBSQ HOSP IP/OBS MODERATE 35: CPT | Performed by: SURGERY

## 2021-03-03 PROCEDURE — 80053 COMPREHEN METABOLIC PANEL: CPT | Performed by: PHYSICIAN ASSISTANT

## 2021-03-03 PROCEDURE — 99239 HOSP IP/OBS DSCHRG MGMT >30: CPT | Performed by: STUDENT IN AN ORGANIZED HEALTH CARE EDUCATION/TRAINING PROGRAM

## 2021-03-03 PROCEDURE — 85027 COMPLETE CBC AUTOMATED: CPT | Performed by: PHYSICIAN ASSISTANT

## 2021-03-03 RX ADMIN — AMIODARONE HYDROCHLORIDE 200 MG: 200 TABLET ORAL at 07:06

## 2021-03-03 RX ADMIN — ATORVASTATIN CALCIUM 40 MG: 40 TABLET, FILM COATED ORAL at 08:36

## 2021-03-03 RX ADMIN — ONDANSETRON 4 MG: 2 INJECTION INTRAMUSCULAR; INTRAVENOUS at 03:19

## 2021-03-03 RX ADMIN — ENOXAPARIN SODIUM 40 MG: 40 INJECTION SUBCUTANEOUS at 08:35

## 2021-03-03 RX ADMIN — LEVOTHYROXINE SODIUM 50 MCG: 50 TABLET ORAL at 05:31

## 2021-03-03 RX ADMIN — OXYCODONE HYDROCHLORIDE 20 MG: 10 TABLET ORAL at 03:44

## 2021-03-03 NOTE — PROGRESS NOTES
Discussed BP's with SLIM on rounds this am and called for port to be deaccesssed for upcoming d/c today, family wants to pull up to lobby and we take him down, told them to just call us once they are here and we will take him down

## 2021-03-03 NOTE — PROGRESS NOTES
Progress Note - Surgical Oncology   Kendell Grande 76 y o  male MRN: [id number]  Unit/Bed#: -01 Encounter: [id number]  03/03/21  11:15 AM      Subjective/Objective   Chief Complaint   Patient presents with    Abnormal Lab     pt states he got blood work done this morning and the doctor called his daughter and told them to come to the ED  Subjective:  No complaints    Objective:      Blood pressure 90/54, pulse 72, temperature (!) 96 8 °F (36 °C), resp  rate 18, weight 67 1 kg (148 lb), SpO2 95 %  ,Body mass index is 21 24 kg/m²  Intake/Output Summary (Last 24 hours) at 3/3/2021 1115  Last data filed at 3/3/2021 0816  Gross per 24 hour   Intake 620 ml   Output --   Net 620 ml       Invasive Devices     Central Venous Catheter Line            Port A Cath 10/01/19 Left Chest 519 days          Drain            Gastrostomy/Enterostomy Jejunostomy 14 Fr   days    Gastrostomy/Enterostomy Jejunostomy 14 Fr   days                Physical Exam:   Head and neck: is normocephalic  Neck is supple without adenopathy  Sclerae are icteric   Mucous membranes are moist    Abdomen: Soft, nontender, nondistended, and without masses  Extremities: Without cyanosis, clubbing, or edema, symmetric  Neuro: Grossly nonfocal  Skin is warm and icteric  Psych: Patient is pleasant and talkative              Lab Results:  Lab Results   Component Value Date    WBC 3 93 (L) 03/03/2021    HGB 9 1 (L) 03/03/2021    HCT 29 3 (L) 03/03/2021    MCV 84 03/03/2021     (L) 03/03/2021     Lab Results   Component Value Date    GLUCOSE 339 (H) 02/01/2020    CALCIUM 8 1 (L) 03/03/2021    K 3 8 03/03/2021    CO2 29 03/03/2021     03/03/2021    BUN 13 03/03/2021    CREATININE 0 84 03/03/2021     No results found for: AFP  Lab Results   Component Value Date    CALCIUM 8 1 (L) 03/03/2021    PHOS 3 8 07/03/2020     Lab Results   Component Value Date    CEA 1 8 05/12/2020     Bilirubin is 3 65          Assessment:  58-year-old male with metastatic esophagus cancer status post stent placement for jaundice    Plan:  Await pathology  Trend bilirubin  Medical oncology evaluation was bilirubin is under 2  No surgical intervention  I will sign off  Please call if there are any questions or concerns      Jolanta Felix MD  11:15 AM

## 2021-03-03 NOTE — DISCHARGE SUMMARY
[facility]  Discharge- Tiffany Fields 1955, 76 y o  male MRN: [id number]    Unit/Bed#: -01 Encounter: [id number]    Primary Care Provider: SARA Vallejo   Date and time admitted to hospital: 2/26/2021  2:58 PM        * Dilation of biliary tract  Assessment & Plan  · Obstructive jaundice   · CT 2/26: "Interval development of severe intra and extrahepatic biliary dilatation to the level of the pancreatic head  Appears to be caused by progression of infiltrating tumor mass inseparable from the posterior margin of the pancreatic head and probably obstructing the distal common duct  Also, there is tumefactive sludge or noncalcified stone in the distal common duct "  · Surgical oncology input noted  · S/p ERCP s/p sphincterotomy, brushings, and placement of stent in CBD  · AST, ALT, alk phos, and bili trending down  · Follow up outpatient with GI for further LFT monitoring and biopsy results  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 [facility])  Assessment & Plan  · Known history of metastatic esophageal adenocarcinoma s/p neoadjuvant chemotherapy and esophagectomy  · Palliative care following, continue opioid analgesia, input noted    Cancer-related pain  Assessment & Plan  · Palliative care following, continue opioid analgesia, input noted  · OxyIR 10-20mg q4h PRN moderate-severe pain  · Hydromorphone 0 5mg IV q3h PRN breakthrough  · Bowel regimen with colace, senna BID, and Miralax   · Reglan 10mg Q6H PRN nausea, 2nd line  · Zofran 4mg Q6H PRN nausea    Severe protein-calorie malnutrition (HCC)  Assessment & Plan  · Presented with early satiety likely secondary to metastatic disease  · Encourage adequate calorie and protein intake  · Patient reporting appetite has returned s/p EUS and ERCP s/p sphincterotomy, brushings, and placement of stent in CBD  · Patient tolerating diet well      Type 2 diabetes mellitus with hyperglycemia, without long-term current use of insulin [facility])  Assessment & Plan  Lab Results   Component Value Date    HGBA1C 5 2 12/01/2020       Blood Sugar Average: Last 72 hrs:  · Diet controlled    Brain metastases ([facility] [address] )  Assessment & Plan  · Metastatic lesions in left frontoparietal and right parietal lobes  · MRI 2/10/2021 notes decreased lesion size    Anemia, unspecified  Assessment & Plan  · In the setting of metastatic cancer  · At baseline    Acute deep vein thrombosis (DVT) of femoral vein of right lower extremity (HCC)  Assessment & Plan  · S/p IVC filter  Known dx, unclear when dx   · Resume Eliquis 5mg BID on discharge  COPD (chronic obstructive pulmonary disease) ([facility] [address] )  Assessment & Plan  · No acute exacerbation        Discharging Physician / Practitioner: Jonny Middleton PA-C  PCP: Alejo Hawkins  Admission Date:   Admission Orders (From admission, onward)     Ordered        02/26/21 2131  Inpatient Admission  Once                   Discharge Date: 03/03/21    Resolved Problems  Date Reviewed: 3/3/2021    None          Consultations During Hospital Stay:  · GI, Surgery/Oncology, Palliative     Procedures Performed:   · Upper GI endoscopic U/S: EGD- Previous esophagectomy in the esophagus and stomach  There were white plaques/food in the upper esophagus  This was biopsied using cold biopsy forceps  There was also polyp/nodule at the anastomotic site which is biopsied using cold biopsy forceps  The stomach and duodenum appeared normal ; EUS- Celiac take-off was not identified given altered anatomy  Dilation of the PD at the head of the pancreas up to 8 mm  Bile duct was dilated up to 15 mm  There was lymphadenopathy appreciated in the peripancreatic region  Irregular, heterogeneous and solid mass measuring 31 mm x 26 mm in the pancreas; 4 fine needle biopsy passes were taken with a 25 gauge Covidien Sharkcore needle using a transduodenal approach guided by Doppler and performed cytology analysis   Onsite cytologist was present and cytology results were preliminarily malignant  The liver appeared normal  There was lesions identified in the left lobe of the liver on limited exam   · ERCP: Deeply cannulated the common bile duct after 1 attempt  Used 260 cm x 0 025 in straight guidewire  Cannulation was not difficult  Injected contrast   Localized, intrinsic, irregular stricture with length of 30 mm in the distal common bile duct  The stricture was traversable by wire  Generalized dilation in the common bile duct and common hepatic duct to a maximal diameter of 15 mm  Performed 10 brushings in the distal common bile duct  Brushings were sent for cytology  Placed metal, uncovered stent with length of 6 cm and diameter of 10 mm in the common bile duct  Dark bile drained from the stent    Significant Findings / Test Results:   · CMP: abnormal 2/2 poor liver function, improved from admission  · CBC: abnormal but at baseline  · CT abdomen/pelvis: Interval development of severe intra and extrahepatic biliary dilatation to the level of the pancreatic head  Appears to be caused by progression of infiltrating tumor mass inseparable from the posterior margin of the pancreatic head and probably obstructing the distal common duct  Also, there is tumefactive sludge or noncalcified stone in the distal common duct  Progression of small hepatic metastatic lesion and development of new small hepatic metastatic lesions  Progression of infiltrating paraceliac tumor now completely obstructing splenic vein and near completely obstructing the upper superior mesenteric vein with slight increase in of developing venous collaterals in the mesentery  Progression of loculated effusion in the base of the right hemithorax  Interval development of small volume of complex abdominal pelvic ascites  Incidental Findings:   · None    Test Results Pending at Discharge (will require follow up):    · Cytology (brushings)  · Fine needle aspiration biopsy     Outpatient Tests Requested:  · None    Complications:  None    Reason for Admission: Elevated LFTs and bilirubin on outpatient labs per oncology     Hospital Course:     John Gutierrez is a 76 y o  male patient with a history of metastatic adenocarcinoma of the GE junction s/p chemotherapy and radiation and surgical resection, G-tube placement, diabetes, COPD and hypothyroidism who originally presented to the hospital on 2/26/2021 due to elevated LFTs and bilirubin on outpatient labs  Outpatient oncology suggested he come in for further workup  At the time of admission the patient was complaining of intermittent moderate to severe abdominal pain that was sharp and nonradiating  Pt had a CT abdomen/pelvis done in the ED with findings as noted above  Surgical oncology saw the patient and recommended a GI consult for ERCP/EUS and biopsy with likely palliative, preventative stent  GI saw the patient, agreed to ERCP/EUS with biopsy and stent placement  The biopsy results will be followed up outpatient  Palliative was consulted for pain management  At this time LFTs and bilirubin have improved with stent placement, patient denies pain and endorses tolerating his diet  Pt continue Eliquis for DVT prophylaxis, and will follow up outpatient with GI, oncology and palliative care  Please see above list of diagnoses and related plan for additional information  Condition at Discharge: fair     Discharge Day Visit / Exam:     Subjective:  Pt awake and alert sitting up in bed at time of evaluation  PT admits to tolerating PO diet and denies any abdominal pain at this time  Pt also denies chest pain, SOB, N/V/D        Vitals: Blood Pressure: 90/54 (03/03/21 0700)  Pulse: 72 (03/03/21 0700)  Temperature: (!) 96 8 °F (36 °C) (03/03/21 0700)  Temp Source: Tympanic (03/01/21 1446)  Respirations: 18 (03/03/21 0700)  Weight - Scale: 67 1 kg (148 lb) (02/26/21 1845)  SpO2: 95 % (03/02/21 2146)  Exam:   Physical Exam  Constitutional:       Appearance: He is underweight  He is not ill-appearing, toxic-appearing or diaphoretic  HENT:      Head: Normocephalic  Eyes:      General: Scleral icterus (mild) present  Cardiovascular:      Rate and Rhythm: Normal rate and regular rhythm  Heart sounds: Normal heart sounds  No murmur  No friction rub  No gallop  Pulmonary:      Breath sounds: Normal breath sounds  No stridor  No wheezing, rhonchi or rales  Abdominal:      General: Bowel sounds are normal       Palpations: Abdomen is soft  Tenderness: There is no abdominal tenderness  There is no guarding or rebound  Musculoskeletal:      Right lower leg: No edema  Left lower leg: No edema  Skin:     General: Skin is warm  Coloration: Skin is jaundiced (mild)  Findings: No bruising, erythema or lesion  Neurological:      Mental Status: He is alert and oriented to person, place, and time  Mental status is at baseline  Discussion with Family: Patient and daughter over the phone  Will be transporting patient home  Discharge instructions/Information to patient and family:   See after visit summary for information provided to patient and family  Provisions for Follow-Up Care:  See after visit summary for information related to follow-up care and any pertinent home health orders  Disposition:     Home    For Discharges to [facility] SNF:   · Not Applicable to this Patient - Not Applicable to this Patient    Planned Readmission: None      Discharge Statement:  I spent 60 minutes discharging the patient  This time was spent on the day of discharge  I had direct contact with the patient on the day of discharge  Greater than 50% of the total time was spent examining patient, answering all patient questions, arranging and discussing plan of care with patient as well as directly providing post-discharge instructions    Additional time then spent on discharge activities  Discharge Medications:  See after visit summary for reconciled discharge medications provided to patient and family        ** Please Note: This note has been constructed using a voice recognition system **

## 2021-03-03 NOTE — ASSESSMENT & PLAN NOTE
Lab Results   Component Value Date    HGBA1C 5 2 12/01/2020       Blood Sugar Average: Last 72 hrs:  · Diet controlled

## 2021-03-03 NOTE — PLAN OF CARE
Problem: Potential for Falls  Goal: Patient will remain free of falls  Description: INTERVENTIONS:  - Assess patient frequently for physical needs  -  Identify cognitive and physical deficits and behaviors that affect risk of falls  -  [place] fall precautions as indicated by assessment   - Educate patient/family on patient safety including physical limitations  - Instruct patient to call for assistance with activity based on assessment  - Modify environment to reduce risk of injury  - Consider OT/PT consult to assist with strengthening/mobility  Outcome: Progressing     Problem: PAIN - ADULT  Goal: Verbalizes/displays adequate comfort level or baseline comfort level  Description: Interventions:  - Encourage patient to monitor pain and request assistance  - Assess pain using appropriate pain scale  - Administer analgesics based on type and severity of pain and evaluate response  - Implement non-pharmacological measures as appropriate and evaluate response  - Consider cultural and social influences on pain and pain management  - Notify physician/advanced practitioner if interventions unsuccessful or patient reports new pain  Outcome: Progressing     Problem: SAFETY ADULT  Goal: Patient will remain free of falls  Description: INTERVENTIONS:  - Assess patient frequently for physical needs  -  Identify cognitive and physical deficits and behaviors that affect risk of falls    -  [place] fall precautions as indicated by assessment   - Educate patient/family on patient safety including physical limitations  - Instruct patient to call for assistance with activity based on assessment  - Modify environment to reduce risk of injury  - Consider OT/PT consult to assist with strengthening/mobility  Outcome: Progressing  Goal: Maintain or return to baseline ADL function  Description: INTERVENTIONS:  -  Assess patient's ability to carry out ADLs; assess patient's baseline for ADL function and identify physical deficits which impact ability to perform ADLs (bathing, care of mouth/teeth, toileting, grooming, dressing, etc )  - Assess/evaluate cause of self-care deficits   - Assess range of motion  - Assess patient's mobility; develop plan if impaired  - Assess patient's need for assistive devices and provide as appropriate  - Encourage maximum independence but intervene and supervise when necessary  - Involve family in performance of ADLs  - Assess for home care needs following discharge   - Consider OT consult to assist with ADL evaluation and planning for discharge  - Provide patient education as appropriate  Outcome: Progressing  Goal: Maintain or return mobility status to optimal level  Description: INTERVENTIONS:  - Assess patient's baseline mobility status (ambulation, transfers, stairs, etc )    - Identify cognitive and physical deficits and behaviors that affect mobility  - Identify mobility aids required to assist with transfers and/or ambulation (gait belt, sit-to-stand, lift, walker, cane, etc )  - [place] fall precautions as indicated by assessment  - Record patient progress and toleration of activity level on Mobility SBAR; progress patient to next Phase/Stage  - Instruct patient to call for assistance with activity based on assessment  - Consider rehabilitation consult to assist with strengthening/weightbearing, etc   Outcome: Progressing     Problem: DISCHARGE PLANNING  Goal: Discharge to home or other facility with appropriate resources  Description: INTERVENTIONS:  - Identify barriers to discharge w/patient and caregiver  - Arrange for needed discharge resources and transportation as appropriate  - Identify discharge learning needs (meds, wound care, etc )  - Arrange for interpretive services to assist at discharge as needed  - Refer to Case Management Department for coordinating discharge planning if the patient needs post-hospital services based on physician/advanced practitioner order or complex needs related to functional status, cognitive ability, or social support system  Outcome: Progressing     Problem: Knowledge Deficit  Goal: Patient/family/caregiver demonstrates understanding of disease process, treatment plan, medications, and discharge instructions  Description: Complete learning assessment and assess knowledge base    Interventions:  - Provide teaching at level of understanding  - Provide teaching via preferred learning methods  Outcome: Progressing     Problem: Prexisting or High Potential for Compromised Skin Integrity  Goal: Skin integrity is maintained or improved  Description: INTERVENTIONS:  - Identify patients at risk for skin breakdown  - Assess and monitor skin integrity  - Assess and monitor nutrition and hydration status  - Monitor labs   - Assess for incontinence   - Turn and reposition patient  - Assist with mobility/ambulation  - Relieve pressure over bony prominences  - Avoid friction and shearing  - Provide appropriate hygiene as needed including keeping skin clean and dry  - Evaluate need for skin moisturizer/barrier cream  - Collaborate with interdisciplinary team   - Patient/family teaching  - Consider wound care consult   Outcome: Progressing     Problem: GASTROINTESTINAL - ADULT  Goal: Minimal or absence of nausea and/or vomiting  Description: INTERVENTIONS:  - Administer IV fluids if ordered to ensure adequate hydration  - Maintain NPO status until nausea and vomiting are resolved  - Nasogastric tube if ordered  - Administer ordered antiemetic medications as needed  - Provide nonpharmacologic comfort measures as appropriate  - Advance diet as tolerated, if ordered  - Consider nutrition services referral to assist patient with adequate nutrition and appropriate food choices  Outcome: Progressing  Goal: Maintains or returns to baseline bowel function  Description: INTERVENTIONS:  - Assess bowel function  - Encourage oral fluids to ensure adequate hydration  - Administer IV fluids if ordered to ensure adequate hydration  - Administer ordered medications as needed  - Encourage mobilization and activity  - Consider nutritional services referral to assist patient with adequate nutrition and appropriate food choices  Outcome: Progressing  Goal: Maintains adequate nutritional intake  Description: INTERVENTIONS:  - Monitor percentage of each meal consumed  - Identify factors contributing to decreased intake, treat as appropriate  - Assist with meals as needed  - Monitor I&O, weight, and lab values if indicated  - Obtain nutrition services referral as needed  Outcome: Progressing     Problem: GENITOURINARY - ADULT  Goal: Maintains or returns to baseline urinary function  Description: INTERVENTIONS:  - Assess urinary function  - Encourage oral fluids to ensure adequate hydration if ordered  - Administer IV fluids as ordered to ensure adequate hydration  - Administer ordered medications as needed  - Offer frequent toileting  - Follow urinary retention protocol if ordered  Outcome: Progressing  Goal: Absence of urinary retention  Description: INTERVENTIONS:  - Assess patients ability to void and empty bladder  - Monitor I/O  - Bladder scan as needed  - Discuss with physician/AP medications to alleviate retention as needed  - Discuss catheterization for long term situations as appropriate  Outcome: Progressing  Goal: Urinary catheter remains patent  Description: INTERVENTIONS:  - Assess patency of urinary catheter  - If patient has a chronic johnson, consider changing catheter if non-functioning  - Follow guidelines for intermittent irrigation of non-functioning urinary catheter  Outcome: Progressing     Problem: Nutrition/Hydration-ADULT  Goal: Nutrient/Hydration intake appropriate for improving, restoring or maintaining nutritional needs  Description: Monitor and assess patient's nutrition/hydration status for malnutrition  Collaborate with interdisciplinary team and initiate plan and interventions as ordered  Monitor patient's weight and dietary intake as ordered or per policy  Utilize nutrition screening tool and intervene as necessary  Determine patient's food preferences and provide high-protein, high-caloric foods as appropriate       INTERVENTIONS:  - Monitor oral intake, urinary output, labs, and treatment plans  - Assess nutrition and hydration status and recommend course of action  - Evaluate amount of meals eaten  - Assist patient with eating if necessary   - Allow adequate time for meals  - Recommend/ encourage appropriate diets, oral nutritional supplements, and vitamin/mineral supplements  - Order, calculate, and assess calorie counts as needed  - Recommend, monitor, and adjust tube feedings and TPN/PPN based on assessed needs  - Assess need for intravenous fluids  - Provide specific nutrition/hydration education as appropriate  - Include patient/family/caregiver in decisions related to nutrition  Outcome: Progressing

## 2021-03-03 NOTE — NURSING NOTE
Pt wheeled to entrance via w/c and his daughter Radha Ortiz wanted to wait there for him so staff took him down, all belongings with him, dressing changed over j tube site before d/c and reminded pt his d/c instructions with all med times due are in his belongings bag with him and his daughter as well, dressing intact over port site once deaccessed

## 2021-03-03 NOTE — ASSESSMENT & PLAN NOTE
· Palliative care following, continue opioid analgesia, input noted  · OxyIR 10-20mg q4h PRN moderate-severe pain  · Hydromorphone 0 5mg IV q3h PRN breakthrough  · Bowel regimen with colace, senna BID, and Miralax   · Reglan 10mg Q6H PRN nausea, 2nd line  · Zofran 4mg Q6H PRN nausea

## 2021-03-03 NOTE — PLAN OF CARE
Problem: Potential for Falls  Goal: Patient will remain free of falls  Description: INTERVENTIONS:  - Assess patient frequently for physical needs  -  Identify cognitive and physical deficits and behaviors that affect risk of falls  -  [place] fall precautions as indicated by assessment   - Educate patient/family on patient safety including physical limitations  - Instruct patient to call for assistance with activity based on assessment  - Modify environment to reduce risk of injury  - Consider OT/PT consult to assist with strengthening/mobility  3/3/2021 1328 by Sayra Hicks RN  Outcome: Adequate for Discharge  3/3/2021 0705 by Sayra Hicks RN  Outcome: Progressing     Problem: PAIN - ADULT  Goal: Verbalizes/displays adequate comfort level or baseline comfort level  Description: Interventions:  - Encourage patient to monitor pain and request assistance  - Assess pain using appropriate pain scale  - Administer analgesics based on type and severity of pain and evaluate response  - Implement non-pharmacological measures as appropriate and evaluate response  - Consider cultural and social influences on pain and pain management  - Notify physician/advanced practitioner if interventions unsuccessful or patient reports new pain  3/3/2021 1328 by Sayra Hicks RN  Outcome: Adequate for Discharge  3/3/2021 0705 by Sayra Hicks RN  Outcome: Progressing     Problem: SAFETY ADULT  Goal: Patient will remain free of falls  Description: INTERVENTIONS:  - Assess patient frequently for physical needs  -  Identify cognitive and physical deficits and behaviors that affect risk of falls    -  [place] fall precautions as indicated by assessment   - Educate patient/family on patient safety including physical limitations  - Instruct patient to call for assistance with activity based on assessment  - Modify environment to reduce risk of injury  - Consider OT/PT consult to assist with strengthening/mobility  3/3/2021 1328 by Radha Ortiz Maliha Austin RN  Outcome: Adequate for Discharge  3/3/2021 4534 by Sayra Hicks RN  Outcome: Progressing  Goal: Maintain or return to baseline ADL function  Description: INTERVENTIONS:  -  Assess patient's ability to carry out ADLs; assess patient's baseline for ADL function and identify physical deficits which impact ability to perform ADLs (bathing, care of mouth/teeth, toileting, grooming, dressing, etc )  - Assess/evaluate cause of self-care deficits   - Assess range of motion  - Assess patient's mobility; develop plan if impaired  - Assess patient's need for assistive devices and provide as appropriate  - Encourage maximum independence but intervene and supervise when necessary  - Involve family in performance of ADLs  - Assess for home care needs following discharge   - Consider OT consult to assist with ADL evaluation and planning for discharge  - Provide patient education as appropriate  3/3/2021 1328 by Sayra Hicks RN  Outcome: Adequate for Discharge  3/3/2021 0705 by Sayra Hicks RN  Outcome: Progressing  Goal: Maintain or return mobility status to optimal level  Description: INTERVENTIONS:  - Assess patient's baseline mobility status (ambulation, transfers, stairs, etc )    - Identify cognitive and physical deficits and behaviors that affect mobility  - Identify mobility aids required to assist with transfers and/or ambulation (gait belt, sit-to-stand, lift, walker, cane, etc )  - [place] fall precautions as indicated by assessment  - Record patient progress and toleration of activity level on Mobility SBAR; progress patient to next Phase/Stage  - Instruct patient to call for assistance with activity based on assessment  - Consider rehabilitation consult to assist with strengthening/weightbearing, etc   3/3/2021 1328 by Sayra Hicks RN  Outcome: Adequate for Discharge  3/3/2021 0705 by Sayra Hicks RN  Outcome: Progressing     Problem: DISCHARGE PLANNING  Goal: Discharge to home or other facility with appropriate resources  Description: INTERVENTIONS:  - Identify barriers to discharge w/patient and caregiver  - Arrange for needed discharge resources and transportation as appropriate  - Identify discharge learning needs (meds, wound care, etc )  - Arrange for interpretive services to assist at discharge as needed  - Refer to Case Management Department for coordinating discharge planning if the patient needs post-hospital services based on physician/advanced practitioner order or complex needs related to functional status, cognitive ability, or social support system  3/3/2021 1328 by Demetria Ralph RN  Outcome: Adequate for Discharge  3/3/2021 0705 by Demetria Ralph RN  Outcome: Progressing     Problem: Knowledge Deficit  Goal: Patient/family/caregiver demonstrates understanding of disease process, treatment plan, medications, and discharge instructions  Description: Complete learning assessment and assess knowledge base    Interventions:  - Provide teaching at level of understanding  - Provide teaching via preferred learning methods  3/3/2021 1328 by Demetria Ralph RN  Outcome: Adequate for Discharge  3/3/2021 0705 by Demetria Ralph RN  Outcome: Progressing     Problem: Prexisting or High Potential for Compromised Skin Integrity  Goal: Skin integrity is maintained or improved  Description: INTERVENTIONS:  - Identify patients at risk for skin breakdown  - Assess and monitor skin integrity  - Assess and monitor nutrition and hydration status  - Monitor labs   - Assess for incontinence   - Turn and reposition patient  - Assist with mobility/ambulation  - Relieve pressure over bony prominences  - Avoid friction and shearing  - Provide appropriate hygiene as needed including keeping skin clean and dry  - Evaluate need for skin moisturizer/barrier cream  - Collaborate with interdisciplinary team   - Patient/family teaching  - Consider wound care consult   3/3/2021 1328 by Demetria Ralph RN  Outcome: Adequate for Discharge  3/3/2021 0705 by Allyssa De La Cruz RN  Outcome: Progressing     Problem: GASTROINTESTINAL - ADULT  Goal: Minimal or absence of nausea and/or vomiting  Description: INTERVENTIONS:  - Administer IV fluids if ordered to ensure adequate hydration  - Maintain NPO status until nausea and vomiting are resolved  - Nasogastric tube if ordered  - Administer ordered antiemetic medications as needed  - Provide nonpharmacologic comfort measures as appropriate  - Advance diet as tolerated, if ordered  - Consider nutrition services referral to assist patient with adequate nutrition and appropriate food choices  3/3/2021 1328 by Allyssa De La Cruz RN  Outcome: Adequate for Discharge  3/3/2021 0705 by Allyssa De La Cruz RN  Outcome: Progressing  Goal: Maintains or returns to baseline bowel function  Description: INTERVENTIONS:  - Assess bowel function  - Encourage oral fluids to ensure adequate hydration  - Administer IV fluids if ordered to ensure adequate hydration  - Administer ordered medications as needed  - Encourage mobilization and activity  - Consider nutritional services referral to assist patient with adequate nutrition and appropriate food choices  3/3/2021 1328 by Allyssa De La Cruz RN  Outcome: Adequate for Discharge  3/3/2021 0705 by Allyssa De La Cruz RN  Outcome: Progressing  Goal: Maintains adequate nutritional intake  Description: INTERVENTIONS:  - Monitor percentage of each meal consumed  - Identify factors contributing to decreased intake, treat as appropriate  - Assist with meals as needed  - Monitor I&O, weight, and lab values if indicated  - Obtain nutrition services referral as needed  3/3/2021 1328 by Allyssa De La Cruz RN  Outcome: Adequate for Discharge  3/3/2021 0705 by Allyssa De La Cruz RN  Outcome: Progressing     Problem: GENITOURINARY - ADULT  Goal: Maintains or returns to baseline urinary function  Description: INTERVENTIONS:  - Assess urinary function  - Encourage oral fluids to ensure adequate hydration if ordered  - Administer IV fluids as ordered to ensure adequate hydration  - Administer ordered medications as needed  - Offer frequent toileting  - Follow urinary retention protocol if ordered  3/3/2021 1328 by Andrey Duenas RN  Outcome: Adequate for Discharge  3/3/2021 0705 by Andrey Duenas RN  Outcome: Progressing  Goal: Absence of urinary retention  Description: INTERVENTIONS:  - Assess patients ability to void and empty bladder  - Monitor I/O  - Bladder scan as needed  - Discuss with physician/AP medications to alleviate retention as needed  - Discuss catheterization for long term situations as appropriate  3/3/2021 1328 by Andrey Duenas RN  Outcome: Adequate for Discharge  3/3/2021 0705 by Andrey Duenas RN  Outcome: Progressing  Goal: Urinary catheter remains patent  Description: INTERVENTIONS:  - Assess patency of urinary catheter  - If patient has a chronic jhonson, consider changing catheter if non-functioning  - Follow guidelines for intermittent irrigation of non-functioning urinary catheter  3/3/2021 1328 by Andrey Duenas RN  Outcome: Adequate for Discharge  3/3/2021 0705 by Andrey Duenas RN  Outcome: Progressing     Problem: Nutrition/Hydration-ADULT  Goal: Nutrient/Hydration intake appropriate for improving, restoring or maintaining nutritional needs  Description: Monitor and assess patient's nutrition/hydration status for malnutrition  Collaborate with interdisciplinary team and initiate plan and interventions as ordered  Monitor patient's weight and dietary intake as ordered or per policy  Utilize nutrition screening tool and intervene as necessary  Determine patient's food preferences and provide high-protein, high-caloric foods as appropriate       INTERVENTIONS:  - Monitor oral intake, urinary output, labs, and treatment plans  - Assess nutrition and hydration status and recommend course of action  - Evaluate amount of meals eaten  - Assist patient with eating if necessary   - Allow adequate time for meals  - Recommend/ encourage appropriate diets, oral nutritional supplements, and vitamin/mineral supplements  - Order, calculate, and assess calorie counts as needed  - Recommend, monitor, and adjust tube feedings and TPN/PPN based on assessed needs  - Assess need for intravenous fluids  - Provide specific nutrition/hydration education as appropriate  - Include patient/family/caregiver in decisions related to nutrition  3/3/2021 1328 by Aleksandra Henry RN  Outcome: Adequate for Discharge  3/3/2021 0705 by Aleksandra Henry RN  Outcome: Progressing

## 2021-03-03 NOTE — ASSESSMENT & PLAN NOTE
· Presented with early satiety likely secondary to metastatic disease  · Encourage adequate calorie and protein intake  · Patient reporting appetite has returned s/p EUS and ERCP s/p sphincterotomy, brushings, and placement of stent in CBD  · Patient tolerating diet well

## 2021-03-05 ENCOUNTER — TELEPHONE (OUTPATIENT)
Dept: HEMATOLOGY ONCOLOGY | Facility: CLINIC | Age: 66
End: 2021-03-05

## 2021-03-05 NOTE — TELEPHONE ENCOUNTER
Spoke to Amado Ness regarding her [employer]  Advised her I spoke to Dr Shawn Mar and we will be moving forward with Fabiano's treatment on 3/16  Amado Ness informed me that she has all the times and dates  Amado Ness wanted to confirm [facility] will pick patient, advised her they would  Amado Ness verbalized understanding of the above

## 2021-03-09 ENCOUNTER — TELEMEDICINE (OUTPATIENT)
Dept: INFECTIOUS DISEASES | Facility: CLINIC | Age: 66
End: 2021-03-09
Payer: COMMERCIAL

## 2021-03-09 ENCOUNTER — TRANSITIONAL CARE MANAGEMENT (OUTPATIENT)
Dept: FAMILY MEDICINE CLINIC | Facility: CLINIC | Age: 66
End: 2021-03-09

## 2021-03-09 VITALS
BODY MASS INDEX: 21.18 KG/M2 | WEIGHT: 147.93 LBS | HEART RATE: 72 BPM | DIASTOLIC BLOOD PRESSURE: 70 MMHG | SYSTOLIC BLOOD PRESSURE: 112 MMHG | HEIGHT: 70 IN | TEMPERATURE: 98.2 F | RESPIRATION RATE: 18 BRPM

## 2021-03-09 DIAGNOSIS — M46.20 VERTEBRAL OSTEOMYELITIS (HCC): Primary | ICD-10-CM

## 2021-03-09 DIAGNOSIS — Z95.828 PORT-A-CATH IN PLACE: ICD-10-CM

## 2021-03-09 DIAGNOSIS — C15.5 MALIGNANT NEOPLASM OF DISTAL THIRD OF ESOPHAGUS (HCC): ICD-10-CM

## 2021-03-09 DIAGNOSIS — Z86.19 HISTORY OF CLOSTRIDIOIDES DIFFICILE INFECTION: ICD-10-CM

## 2021-03-09 DIAGNOSIS — C79.31 BRAIN METASTASES (HCC): ICD-10-CM

## 2021-03-09 PROCEDURE — 99213 OFFICE O/P EST LOW 20 MIN: CPT | Performed by: PHYSICIAN ASSISTANT

## 2021-03-09 PROCEDURE — 3008F BODY MASS INDEX DOCD: CPT | Performed by: INTERNAL MEDICINE

## 2021-03-09 NOTE — ASSESSMENT & PLAN NOTE
History of diarrhea with C Difficile PCR +, EIA -  Diarrhea improved with 14 day course of po vancomycin  Now with soft stools but no diarrhea

## 2021-03-09 NOTE — ASSESSMENT & PLAN NOTE
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  Patient completed a 6 week course of IV ceftriaxone with oral Flagyl and was then transitioned over to oral Augmentin  Patient was to continue on oral Augmentin until ESR improved or plateaued  Unfortunately in the interim patient developed diarrhea with C diff PCR positive/EIA negative   In November 2020  At that point patient stopped the Augmentin and was treated with a 14 day course of vancomycin  Since that time we have been observing the patient off antibiotics  Due to worsening findings related to his cancer diagnosis patient will be starting chemotherapy next week  I discussed with patient (and his daughter was present) that there may be some risk associated of infection due to the chemotherapy  Offered to restart the antibiotics (augmentin with prophylactic vancomycin)  Patient would prefer to remain off the antibiotics at this time  He did have an elevation in his ESR which is nonspecific  He has no back pain at the present time  Plan  - continue off antibiotics  - no further ID follow up for now  Patient to call if anything changes

## 2021-03-09 NOTE — PATIENT INSTRUCTIONS
- continue off antibiotics  - no further ID follow up needed at this time  - please call with any questions or concerns

## 2021-03-09 NOTE — PROGRESS NOTES
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  Patient completed a 6 week course of IV ceftriaxone with oral Flagyl and was then transitioned over to oral Augmentin  Patient was to continue on oral Augmentin until ESR improved or plateaued  Unfortunately in the interim patient developed diarrhea with C diff PCR positive/EIA negative   In November 2020  At that point patient stopped the Augmentin and was treated with a 14 day course of vancomycin  Since that time we have been observing the patient off antibiotics  Due to worsening findings related to his cancer diagnosis patient will be starting chemotherapy next week  I discussed with patient (and his daughter was present) that there may be some risk associated of infection due to the chemotherapy  Offered to restart the antibiotics (augmentin with prophylactic vancomycin)  Patient would prefer to remain off the antibiotics at this time  He did have an elevation in his ESR which is nonspecific  He has no back pain at the present time  Plan  - continue off antibiotics  - no further ID follow up for now  Patient to call if anything changes  Other    Port-A-Cath in place    History of Clostridioides difficile infection     History of diarrhea with C Difficile PCR +, EIA -  Diarrhea improved with 14 day course of po vancomycin  Now with soft stools but no diarrhea                       Reason for visit is   Chief Complaint   Patient presents with    Paraspinal Abscess        Encounter provider Colonel Eloise PA-C    Provider located at [place] [address] [facility]  [address] [place] [id number] [phone]      Recent Visits  No visits were found meeting these conditions  Showing recent visits within past 7 days and meeting all other requirements     Today's Visits  Date Type Provider Dept   03/09/21 Telemedicine Tello Harrell PA-C [facility]   Showing today's visits and meeting all other requirements     Future Appointments  No visits were found meeting these conditions  Showing future appointments within next 150 days and meeting all other requirements        The patient was identified by name and date of birth  Tiffany Fields was informed that this is a telemedicine visit and that the visit is being conducted through [facility] and patient was informed that this is a secure, HIPAA-compliant platform  He agrees to proceed     My office door was closed  No one else was in the room  He acknowledged consent and understanding of privacy and security of the video platform  The patient has agreed to participate and understands they can discontinue the visit at any time  Patient is aware this is a billable service  Subjective  Tiffany Fields is a 76 y o  male  Presenting for office follow-up today regarding 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  Patient completed a 6 week course of IV ceftriaxone with oral Flagyl and was then transitioned over to oral Augmentin  Patient was to continue on oral Augmentin until ESR improved or plateaued  Unfortunately in the interim patient developed diarrhea with C diff PCR positive/EIA negative   In November 2020  At that point patient stopped the Augmentin and was treated with a 14 day course of vancomycin  Since that time we have been observing the patient off antibiotics  Today he states his back does not bother him    He does feel weak but this is likely related to his cancer  Patient recently noted to have worsening findings related to his cancer diagnosis  He will be starting a palliative chemotherapy next week  He is currently having soft stools but no diarrhea  HPI     Past Medical History:   Diagnosis Date    Anemia     Bilateral pleural effusion     Brain lesion     Brain metastases (HCC)     Brain tumor (HCC)     C  difficile diarrhea     [facility]     Cancer of esophagus (HCC)     COPD (chronic obstructive pulmonary disease) (HCC)     Diabetes mellitus (HCC)     Difficulty swallowing     Disease of thyroid gland     Edema     Esophageal mass     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[facility] ) 9/24/2019    History of chemotherapy     History of transfusion     Malignant neoplasm of lower third of esophagus ([facility] ) 9/17/2019    Diagnosis: clinical stage [id number] esophageal carcinoma Procedure: EGD, transhiatal esophagectomy performed on 1/28/20 Pathology: esophagogastrectomy reveals microscopic focus of residual adenocarcinoma in muscularis propria measuring 0 7 cm, adjacent Duong's esophagus with associated atypia indeterminate for dysplasia  7 lymph nodes negative for carcinoma  Proximal and distal margins negative for    Paraspinal abscess (HCC)     PONV (postoperative nausea and vomiting)     Port-A-Cath in place     LCW    Sleep apnea     no CPAP    SOB (shortness of breath)        Past Surgical History:   Procedure Laterality Date    BACK SURGERY      x2    DISC REMOVAL      ESOPHAGECTOMY N/A 1/28/2020    Procedure: ESOPHAGECTOMY, TRANSHIATAL;  Surgeon: Cullen Torres MD;  [facility];  Service: Surgical Oncology    ESOPHAGOGASTRODUODENOSCOPY N/A 1/28/2020    Procedure: ESOPHAGOGASTRODUODENOSCOPY (EGD);   Surgeon: Cullen Torres MD;  [facility];  Service: Surgical Oncology    FL GUIDED CENTRAL VENOUS ACCESS DEVICE INSERTION  9/26/2019    GASTROJEJUNOSTOMY W/ JEJUNOSTOMY TUBE N/A 9/26/2019    Procedure: INSERTION JEJUNOSTOMY TUBE OPEN;  Surgeon: Jp Nelson MD;  [facility];  Service: Surgical Oncology    GASTROJEJUNOSTOMY W/ JEJUNOSTOMY TUBE N/A 6/5/2020    Procedure: INSERTION JEJUNOSTOMY TUBE OPEN;  Surgeon: Jp Nelson MD;  [facility];  Service: Surgical Oncology    IR CHEST TUBE PLACEMENT  5/26/2020    IR SPINE PROCEDURE  5/27/2020    IR THORACENTESIS  2/25/2020    JOINT REPLACEMENT      MN EGD ENDOSCOPIC STENT PLACEMENT W/WIRE& DILATION N/A 8/31/2020    Procedure: INSERTION STENT ESOPHAGEAL;  Surgeon: Amandeep Lockett MD;  [facility];  Service: Thoracic    MN EGD INSERT GUIDE WIRE DILATOR PASSAGE ESOPHAGUS N/A 7/7/2020    Procedure: ESOPHAGOGASTRODUODENOSCOPY (EGD) with esophageal dilation under fluro with biopsy;  Surgeon: Amandeep Lockett MD;  [facility];  Service: Thoracic    MN ESOPHAGOGASTRODUODENOSCOPY TRANSORAL DIAGNOSTIC N/A 4/28/2020    Procedure: ESOPHAGOGASTRODUODENOSCOPY (EGD); Surgeon: Amandeep Lockett MD;  [facility];  Service: Thoracic    MN ESOPHAGOGASTRODUODENOSCOPY TRANSORAL DIAGNOSTIC N/A 7/27/2020    Procedure: ESOPHAGOGASTRODUODENOSCOPY (EGD);   Surgeon: Amandeep Lockett MD;  [facility];  Service: Thoracic    MN ESOPHAGOGASTRODUODENOSCOPY TRANSORAL DIAGNOSTIC N/A 8/31/2020    Procedure: ESOPHAGOGASTRODUODENOSCOPY (EGD) dilation over guidewire 36-45 Fr , stent placement;  Surgeon: Amandeep Lockett MD;  [facility];  Service: Thoracic    MN ESOPHAGOGASTRODUODENOSCOPY TRANSORAL DIAGNOSTIC N/A 1/11/2021    Procedure: ESOPHAGOGASTRODUODENOSCOPY (EGD): esophageal stent removal;  Surgeon: Amandeep Lockett MD;  [facility];  Service: Thoracic    MN ESOPHAGOSCOPY FLEX Willie Brand <30 MM DIAM N/A 4/28/2020    Procedure: DILATATION ESOPHAGEAL;  Surgeon: Amandeep Lockett MD;  [facility];  Service: Thoracic    MN ESOPHAGOSCOPY FLEX Willie Brand <30 MM DIAM N/A 7/27/2020 Procedure: DILATATION ESOPHAGEAL with Savary over the wire dilitation 33FR to 45FR; Surgeon: Amandeep Lockett MD;  [facility];  Service: Thoracic    MN ESOPHAGOSCOPY FLEX BALLOON DILAT <30 MM DIAM N/A 8/31/2020    Procedure: DILATATION ESOPHAGEAL;  Surgeon: Amandeep Lockett MD;  [facility];  Service: Thoracic    TONSILLECTOMY      TUNNELED VENOUS PORT PLACEMENT N/A 9/26/2019    Procedure: INSERTION VENOUS PORT (PORT-A-CATH); Surgeon: Jp Nelson MD;  [facility];  Service: Surgical Oncology    VOCAL CORD INJECTION N/A 2/7/2020    Procedure: MICROLARYNGOSCOPY WITH INJECTION;  Surgeon: Jennifer Almeida MD;  [facility];  Service: ENT       Current Outpatient Medications   Medication Sig Dispense Refill    amiodarone 200 mg tablet TAKE 1 TABLET (200 MG TOTAL) BY MOUTH DAILY WITH BREAKFAST 30 tablet 0    atorvastatin (LIPITOR) 40 mg tablet Take 40 mg by mouth daily      Eliquis 5 MG Take 5 mg by mouth 2 (two) times a day      ergocalciferol (VITAMIN D2) 50,000 units Take 50,000 Units by mouth once a week      levothyroxine 50 mcg tablet TAKE 1 TABLET BY MOUTH EVERY DAY 90 tablet 1    oxyCODONE (ROXICODONE) 10 MG TABS Take 1 tablet (10 mg total) by mouth every 4 (four) hours as needed for moderate painMax Daily Amount: 60 mg 90 tablet 0    Blood Glucose Monitoring Suppl (ONE TOUCH ULTRA 2) w/Device KIT Check bs , q fasting and 2 hr after meals (Patient not taking: Reported on 2/17/2021) 1 each 0    glucose blood test strip Check blood sugar 3 times daily (Patient not taking: Reported on 2/17/2021) 100 each 3    Lancets (ONETOUCH ULTRASOFT) lancets Check bs , q fasting and 2 hr after meals (Patient not taking: Reported on 2/17/2021) 100 each 5     No current facility-administered medications for this visit  Allergies   Allergen Reactions    Penicillins Itching       Review of Systems   Constitutional: Negative for chills and fever     Respiratory: Positive for cough  Negative for shortness of breath  Gastrointestinal: Negative for abdominal pain, diarrhea, nausea and vomiting  Musculoskeletal: Negative for back pain  Psychiatric/Behavioral: Negative for behavioral problems and confusion  Video Exam    Vitals:    03/09/21 1155   BP: 112/70   Pulse: 72   Resp: 18   Temp: 98 2 °F (36 8 °C)   Weight: 67 1 kg (147 lb 14 9 oz)   Height: 5' 10" (1 778 m)       Physical Exam  Vitals signs reviewed  Constitutional:       General: He is not in acute distress  Appearance: Normal appearance  He is ill-appearing  He is not diaphoretic  HENT:      Head: Normocephalic and atraumatic  Eyes:      General: No scleral icterus  Right eye: No discharge  Left eye: No discharge  Conjunctiva/sclera: Conjunctivae normal    Pulmonary:      Effort: Pulmonary effort is normal  No respiratory distress  Skin:     Coloration: Skin is not jaundiced  Findings: No rash  Comments:   Port site without erythema or swelling  Neurological:      Mental Status: He is alert and oriented to person, place, and time  Comments:   Patient moving all 4 extremities   Psychiatric:         Mood and Affect: Mood normal          Behavior: Behavior normal        Labs:    ESR on 03/01/2021: 62    30 minutes spent on today's visit including face to face time with patient as well as chart/lab review  VIRTUAL VISIT DISCLAIMER    Adalgisa Mcfarlane acknowledges that he has consented to an online visit or consultation  He understands that the online visit is based solely on information provided by him, and that, in the absence of a face-to-face physical evaluation by the physician, the diagnosis he receives is both limited and provisional in terms of accuracy and completeness  This is not intended to replace a full medical face-to-face evaluation by the physician  Adalgisa Mcfarlane understands and accepts these terms

## 2021-03-10 ENCOUNTER — TELEPHONE (OUTPATIENT)
Dept: HEMATOLOGY ONCOLOGY | Facility: MEDICAL CENTER | Age: 66
End: 2021-03-10

## 2021-03-10 ENCOUNTER — TELEMEDICINE (OUTPATIENT)
Dept: HEMATOLOGY ONCOLOGY | Facility: CLINIC | Age: 66
End: 2021-03-10
Payer: COMMERCIAL

## 2021-03-10 DIAGNOSIS — Z51.11 ENCOUNTER FOR ANTINEOPLASTIC CHEMOTHERAPY: ICD-10-CM

## 2021-03-10 DIAGNOSIS — C79.9 METASTATIC HER2 POSITIVE NEOPLASM OF GASTROESOPHAGEAL JUNCTION (HCC): Primary | ICD-10-CM

## 2021-03-10 DIAGNOSIS — Z79.01 CHRONIC ANTICOAGULATION: ICD-10-CM

## 2021-03-10 DIAGNOSIS — C16.0 METASTATIC HER2 POSITIVE NEOPLASM OF GASTROESOPHAGEAL JUNCTION (HCC): Primary | ICD-10-CM

## 2021-03-10 PROCEDURE — 1160F RVW MEDS BY RX/DR IN RCRD: CPT | Performed by: INTERNAL MEDICINE

## 2021-03-10 PROCEDURE — 1111F DSCHRG MED/CURRENT MED MERGE: CPT | Performed by: INTERNAL MEDICINE

## 2021-03-10 PROCEDURE — 1036F TOBACCO NON-USER: CPT | Performed by: INTERNAL MEDICINE

## 2021-03-10 PROCEDURE — 99214 OFFICE O/P EST MOD 30 MIN: CPT | Performed by: INTERNAL MEDICINE

## 2021-03-10 NOTE — TELEPHONE ENCOUNTER
Patient daughter called in to switch today's 03/10/2021 visit to a televist - I transfer call to office

## 2021-03-10 NOTE — PROGRESS NOTES
[facility] - Hematology & Medical Oncology  Outpatient Visit Encounter Note    Adalgisa Mcfarlane 76 y o  male [id number] Date:  3/10/2021    ONCOLOGY HISTORY        Oncology History   Malignant neoplasm of lower third of esophagus ([address] ) (Resolved)   8/24/2019 Initial Diagnosis    Malignant neoplasm of lower third of esophagus (HCC)  At least intramucosal carcinoma  Her2/SHIMON positive     10/8/2019 - 12/30/2019 Chemotherapy    palonosetron (ALOXI) injection 0 25 mg, 0 25 mg, Intravenous, Once, 6 of 6 cycles  Administration: 0 25 mg (10/8/2019), 0 25 mg (10/22/2019), 0 25 mg (11/5/2019), 0 25 mg (11/19/2019), 0 25 mg (12/3/2019), 0 25 mg (12/17/2019)  pegfilgrastim (NEULASTA ONPRO) subcutaneous injection kit 6 mg, 6 mg, Subcutaneous, Once, 6 of 6 cycles  Administration: 6 mg (10/9/2019), 6 mg (10/23/2019), 6 mg (11/6/2019), 6 mg (11/20/2019), 6 mg (12/4/2019), 6 mg (12/18/2019)  fosaprepitant (EMEND) 150 mg in sodium chloride 0 9 % 250 mL IVPB, 150 mg, Intravenous, Once, 6 of 6 cycles  Administration: 150 mg (10/8/2019), 150 mg (10/22/2019), 150 mg (11/5/2019), 150 mg (11/19/2019), 150 mg (12/3/2019), 150 mg (12/17/2019)  DOCEtaxel (TAXOTERE) 99 mg in sodium chloride 0 9 % 250 mL chemo infusion, 50 mg/m2 = 99 mg (83 3 % of original dose 60 mg/m2), Intravenous, Once, 6 of 6 cycles  Dose modification: 50 mg/m2 (original dose 60 mg/m2, Cycle 1, Reason: Other (See Comments))  Administration: 99 mg (10/8/2019), 99 mg (10/22/2019), 99 mg (11/5/2019), 99 mg (11/19/2019), 99 mg (12/3/2019), 99 mg (12/17/2019)  leucovorin 396 mg in dextrose 5 % 250 mL IVPB, 200 mg/m2 = 396 mg (50 % of original dose 400 mg/m2), Intravenous, Once, 6 of 6 cycles  Dose modification: 200 mg/m2 (original dose 400 mg/m2, Cycle 1, Reason: Other (See Comments), Comment: FLOT regimen standard)  Administration: 396 mg (10/8/2019), 396 mg (10/22/2019), 396 mg (11/5/2019), 396 mg (11/19/2019), 396 mg (12/3/2019), 396 mg (12/17/2019)  oxaliplatin (ELOXATIN) 168 3 mg in dextrose 5 % 250 mL chemo infusion, 85 mg/m2 = 168 3 mg, Intravenous, Once, 6 of 6 cycles  Administration: 168 3 mg (10/8/2019), 168 3 mg (10/22/2019), 168 3 mg (11/5/2019), 168 3 mg (11/19/2019), 168 3 mg (12/3/2019), 168 3 mg (12/17/2019)  trastuzumab (HERCEPTIN) 496 mg in sodium chloride 0 9 % 250 mL chemo infusion, 6 mg/kg = 496 mg, Intravenous, Once, 6 of 6 cycles  Administration: 496 mg (10/8/2019), 331 mg (10/22/2019), 331 mg (11/5/2019), 331 mg (11/19/2019), 331 mg (12/3/2019), 331 mg (12/17/2019)     1/28/2020 Surgery    Esophagogastrectomy  - Microscopic focus of residual adenocarcinoma in muscularis propria  - Ulceration and associated histologic changes compatible with prior neoadjuvant chemotherapy  - Adjacent betancur's esophagus with associated atypia indeterminate for dysplasia  - Seven lymph nodes identified; all negative for malignancy (0/7)       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[address] ) (Resolved)   8/24/2019 Biopsy    Esophagus (biopsy):  - At least 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 background of Betancur's esophagus and high grade dysplasia      9/24/2019 Initial Diagnosis    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HCC)     10/8/2019 - 12/30/2019 Chemotherapy    palonosetron (ALOXI) injection 0 25 mg, 0 25 mg, Intravenous, Once, 6 of 6 cycles  Administration: 0 25 mg (10/8/2019), 0 25 mg (10/22/2019), 0 25 mg (11/5/2019), 0 25 mg (11/19/2019), 0 25 mg (12/3/2019), 0 25 mg (12/17/2019)  pegfilgrastim (NEULASTA ONPRO) subcutaneous injection kit 6 mg, 6 mg, Subcutaneous, Once, 6 of 6 cycles  Administration: 6 mg (10/9/2019), 6 mg (10/23/2019), 6 mg (11/6/2019), 6 mg (11/20/2019), 6 mg (12/4/2019), 6 mg (12/18/2019)  fosaprepitant (EMEND) 150 mg in sodium chloride 0 9 % 250 mL IVPB, 150 mg, Intravenous, Once, 6 of 6 cycles  Administration: 150 mg (10/8/2019), 150 mg (10/22/2019), 150 mg (11/5/2019), 150 mg (11/19/2019), 150 mg (12/3/2019), 150 mg (12/17/2019)  DOCEtaxel (TAXOTERE) 99 mg in sodium chloride 0 9 % 250 mL chemo infusion, 50 mg/m2 = 99 mg (83 3 % of original dose 60 mg/m2), Intravenous, Once, 6 of 6 cycles  Dose modification: 50 mg/m2 (original dose 60 mg/m2, Cycle 1, Reason: Other (See Comments))  Administration: 99 mg (10/8/2019), 99 mg (10/22/2019), 99 mg (11/5/2019), 99 mg (11/19/2019), 99 mg (12/3/2019), 99 mg (12/17/2019)  leucovorin 396 mg in dextrose 5 % 250 mL IVPB, 200 mg/m2 = 396 mg (50 % of original dose 400 mg/m2), Intravenous, Once, 6 of 6 cycles  Dose modification: 200 mg/m2 (original dose 400 mg/m2, Cycle 1, Reason: Other (See Comments), Comment: FLOT regimen standard)  Administration: 396 mg (10/8/2019), 396 mg (10/22/2019), 396 mg (11/5/2019), 396 mg (11/19/2019), 396 mg (12/3/2019), 396 mg (12/17/2019)  oxaliplatin (ELOXATIN) 168 3 mg in dextrose 5 % 250 mL chemo infusion, 85 mg/m2 = 168 3 mg, Intravenous, Once, 6 of 6 cycles  Administration: 168 3 mg (10/8/2019), 168 3 mg (10/22/2019), 168 3 mg (11/5/2019), 168 3 mg (11/19/2019), 168 3 mg (12/3/2019), 168 3 mg (12/17/2019)  trastuzumab (HERCEPTIN) 496 mg in sodium chloride 0 9 % 250 mL chemo infusion, 6 mg/kg = 496 mg, Intravenous, Once, 6 of 6 cycles  Administration: 496 mg (10/8/2019), 331 mg (10/22/2019), 331 mg (11/5/2019), 331 mg (11/19/2019), 331 mg (12/3/2019), 331 mg (12/17/2019)     Brain metastases ([facility] )   11/17/2020 Initial Diagnosis    Brain metastases ([facility] )     11/25/2020 - 11/25/2020 Radiation    Plan ID Energy Fractions Dose per Fraction (cGy) Dose Correction (cGy) Total Dose Delivered (cGy) Elapsed Days   [id number] 6X 1 / 1 2,000 0 2,000 0    Lt Parietal, and Rt Parietal  Treatment dates:  C1 SRS: 11/25/2020 - 11/25/2020     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 ([facility] )   2/16/2021 Initial Diagnosis    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 ([facility] )    Foundation Results:  Clarice Avila is 1% from 3/3/2021  No reportable alterations     3/2/2021 -  Chemotherapy    fluorouracil (ADRUCIL) injection 735 mg, 400 mg/m2, Intravenous, Once, 0 of 6 cycles  leucovorin 736 mg in dextrose 5 % 250 mL IVPB, 400 mg/m2, Intravenous, Once, 0 of 6 cycles  oxaliplatin (ELOXATIN) 156 4 mg in dextrose 5 % 250 mL chemo infusion, 85 mg/m2, Intravenous, Once, 0 of 6 cycles  trastuzumab (HERCEPTIN) 402 mg in sodium chloride 0 9 % 250 mL chemo infusion, 6 mg/kg, Intravenous, Once, 0 of 6 cycles           SUBJECTIVE      ECOG Initial Visit 0   ECOG This Visit 4   Pain Score Initial Visit 0   Pain Score This Visit 0     Initial Visit    Kendell Grande is a 76 y o  Transferring his medical oncology care from my partner as his previous medical oncologist is leaving the [place] [id number]  His principal diagnosis is recurrence esophageal HER2 positive metastatic adenocarcinoma  in review of his chart, he was diagnosed with node positive locally advanced esophageal adenocarcinoma that was HER2 positive in August 2019 that was thereafter managed with neoadjuvant chemoradiation with FLOT with Herceptin  This was followed by esophageal gastrectomy that identified a microscopic foci of residual adenocarcinoma in the muscularis propria with negative lymph node assessment  He was undergoing surveillance but was unfortunately found to have recurring disease in November 2020 with brain metastases  Today, he is doing a video visit with me and is joined by his daughter Fernando Diaz who is a medical assistant by profession  Since being seen by his previous oncologist, he was diagnosed with a broken hip on the right side and was managed by Orthopedic surgery at [facility] and was also diagnosed with a provoked deep vein thrombosis that was managed by IVC filter placement and then anticoagulation with Eliquis        Restaging scans from his CT and MRI showed that there is concern for possible new lesion in the brain and that there is  Very likely new site of metastases in the nodularity in the left lower lobe as well as soft tissue thickening around his celiac and superior mesenteric artery  He tells me that his primary complaint is abdominal discomfort and cramping that is slowly resolving  He denies any headaches nausea vomiting chest pain shortness of breath cough  He says that because he has had a previous episode of Clostridium difficile infection, he is concerned about a possible recurrence of that infection but states that the intensity of his abdominal cramping is not the same as it was during that initial infectious diagnosis  This Visit      This is a video visit done because the patient was not able to come to see me in the office  His daughter participate in the video visit as well  The patient's daughter gave me a lot of concerns about her father saying that he has not shower for the past 1 week and has been spending almost all of his time in the chair throughout the day  She says that his weakness and fatigue is getting worse  This patient was supposed to start his chemotherapy for palliative intent but was hospitalized because of a bilirubin of 8 and worsening liver function enzymes  During his hospital course was found to have worsening intra and extrahepatic dilatation because of progressive liver metastases and subsequently underwent an ERCP and had a stent placed in  He has since been home and is recovering and he denies any pain  He says he is trying to gather his strength by having increase in tube feeds and will contact nutrition for this  Denies any fevers or chills or infectious symptoms  Denies any abdominal pain per se  I have reviewed the relevant past medical, surgical, social and family history  I have also reviewed allergies and medications for this patient  Review of Systems  Review of Systems   All other systems reviewed and are negative          OBJECTIVE     Physical Exam     Over the video call, he appears very weak and frail  He is alert awake and oriented x3  He does not appear to have any respiratory distress when talking with me  Imaging  Relevant imaging reviewed in chart    Labs  Relevant labs reviewed in chart   ASSESSMENT & PLAN      Diagnosis ICD-10-CM Associated Orders   1  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)  C79 9     C16 0    2  Encounter for antineoplastic chemotherapy  Z51 11    3  Chronic anticoagulation  Z79 01           Recurrence metastatic HER2 positive GE junction adenocarcinoma  Brain metastases  Encounter for antineoplastic chemotherapy   Oncology history updated accordingly this visit   Goals of care/patient communication  - Please see prior notes for discussion regarding palliative intent treatment and associated toxicities, etc   - I told Carlos Guajardo that I am concerned about his continuing clinical decline and given his ECOG of 4 right now I would not recommend chemotherapy as the side effects outweighed the benefits of getting chemotherapy at this point for his cancer  I told him that I am afraid his cancer is getting worse as evident on the CT scan he had at the end of February compared to the 1 from middle of January and that he slowly dying from his cancer   - Because of this worsening situation, I told him that I recommend he consider hospice to provide end of life care and support  His daughter expressed understanding  and openness in having her dad enter hospice  - The patient himself tells me that he was trying to gather up his strength and increase his tube feeds to see if he can gain some weight and energy so he can try chemotherapy  I respect that I told him that I will see him in the office in 3 weeks and hope that in these 3 weeks he can make some progress we can make a judgment about how to move forward    - In the meantime, the patient's daughter and other children are going to be talking with their dad about his goals of care and help him make a decision that is best for him in light of his clinical status   TNM/Staging At Diagnosis  o Recurrent TxNxM1  o Stage IV   Disease Features/Tumor Markers/Genetics  o Notable Path Features: HER2 3+  o FoundationCDx ordered 2/16/21 - no new targets   Treatment  o SP neoadjuvant chemoRT with FLOT+Herceptin  o SP esophageal gastrectomy  o Recurrent disease with brain metastasis and other abdominal lesions consistent with recurrent disease  - Rx with 1L palliative intent FOLFOX + Herceptin being held right now   Treatment Team Members  o Surgeon - Dr Nirmala Weinstein and magnesium to be drawn 2 days before each treatment from the port at [facility] GEROPSYCH UN[facility]  o None for now      Acute deep vein thrombosis in the right lower extremity, femoral vein  Status post IVC filter-December 2020  · Continue with Eliquis anticoagulation and recommend removal of his IVC filter that I being told was placed because he was supposed to have surgery and could not be on therapeutic anticoagulation  Follow Up     We will see with every other cycle starting with cycle 1  All questions were answered to the patient's satisfaction during this encounter  They appreciated and thanked me for spending time with them  The patient knows the contact information for our office and know to reach out for any relevant concerns related to this encounter  For all other listed problems and medical diagnosis in his chart - they are managed by PCP and/or other specialists, which patient acknowledges  Connie Jones MD  Hematology & Medical Oncology

## 2021-03-11 ENCOUNTER — TELEPHONE (OUTPATIENT)
Dept: HEMATOLOGY ONCOLOGY | Facility: CLINIC | Age: 66
End: 2021-03-11

## 2021-03-11 DIAGNOSIS — I48.91 ATRIAL FIBRILLATION, UNSPECIFIED TYPE (HCC): ICD-10-CM

## 2021-03-11 DIAGNOSIS — C16.0 METASTATIC HER2 POSITIVE NEOPLASM OF GASTROESOPHAGEAL JUNCTION (HCC): Primary | ICD-10-CM

## 2021-03-11 DIAGNOSIS — C79.9 METASTATIC HER2 POSITIVE NEOPLASM OF GASTROESOPHAGEAL JUNCTION (HCC): Primary | ICD-10-CM

## 2021-03-11 RX ORDER — AMIODARONE HYDROCHLORIDE 200 MG/1
200 TABLET ORAL
Qty: 30 TABLET | Refills: 0 | Status: SHIPPED | OUTPATIENT
Start: 2021-03-11 | End: 2021-03-16

## 2021-03-11 NOTE — TELEPHONE ENCOUNTER
Spoke to Rhianna Mel, patient's daughter, advised her I put in a referral for hospice and someone will be contacting them within the next 24 hours  Asked her to call if she has not heard from them  Asked if there was anything else I can help her with, she stated no  Advised her we are here if Laura Tony needs any further assistance  Rhianna Leal appreciated the call and verbalized understanding

## 2021-03-11 NOTE — TELEPHONE ENCOUNTER
Call from patient's daughter Cathi Arthur  Patient would like to initiate hospice ASAP  Will pass on to Dr Walker Check RNs  Daughter aware of plan

## 2021-03-15 DIAGNOSIS — I48.91 ATRIAL FIBRILLATION, UNSPECIFIED TYPE (HCC): ICD-10-CM

## 2021-03-16 RX ORDER — AMIODARONE HYDROCHLORIDE 200 MG/1
200 TABLET ORAL
Qty: 90 TABLET | Refills: 1 | Status: SHIPPED | OUTPATIENT
Start: 2021-03-16

## 2021-04-09 DIAGNOSIS — Z51.5 HOSPICE CARE PATIENT: Primary | ICD-10-CM

## 2021-04-09 RX ORDER — HALOPERIDOL 2 MG/ML
SOLUTION ORAL
Qty: 120 ML | Refills: 0 | Status: SHIPPED | OUTPATIENT
Start: 2021-04-09

## 2022-05-19 NOTE — LETTER
----- Message from Ameena Rivera MA sent at 5/19/2022 11:04 AM CDT -----  Regarding: Elevated blood pressures  Fela Naranjo was in the clinic today to see Demetrius Barragan III, MD for Patient presents with:  Office Visit: 7 month follow up for multiple sclerosis   .    Please note, her blood pressures were elevated x2 while in the clinic.    BP Readings from Last 1 Encounters:  05/19/22 1103 : (!) 137/99  05/19/22 1032 : (!) 137/104      Please review with PCP and follow up with patient as appropriate.    Patient states she is NOT taking lisinopril (ZESTRIL) 5 MG tablet 4/19/2022    Sig - Route: Take 1 tablet by mouth daily. - Oral     Patient states she has \"white coat syndrome\" Patient bought a blood pressure cuff instead of taking Lisinopril 5 mg tablet daily to monitor her readings at home. Patient states Dr. Moy is aware of this.      Writer informed patient Dr. Moy's office will be notified of her elevated blood pressures. Patient verbalized understanding and states she will take her blood pressure at home today and sent her blood pressure readings to Dr. Moy' office.      March 4, 2020     Joey Burnett, [address] [address]    Patient: Gely Guevara   YOB: 1955   Date of Visit: 3/4/2020       Dear Dr Raymon Schmitt: Thank you for referring Gely Guevara to me for evaluation  Below are my notes for this consultation  If you have questions, please do not hesitate to call me  I look forward to following your patient along with you  Sincerely,        Kayla Childress MD        CC: MD Moses Viramontes MD Aldona Colla, MD Loman Cora, PA-C  3/4/2020  3:55 PM  Attested  Thoracic Follow-Up  Assessment/Plan:    Malignant neoplasm of lower third of esophagus [facility])  Mr Mikel Santoro is progressing well from a thoracic surgery standpoint  His neck incision is well healed  His most recent CXR does not reveal a pleural effusion  His shortness of breath and fatigue are normal post operative symptoms  He should improve over time  He will be undergoing surveillance with Dr Ines Barbosa so he will only need to return to our office on as needed basis  Diagnoses and all orders for this visit:    Malignant neoplasm of lower third of esophagus ([address] )    Other orders  -     oxyCODONE (OXY-IR) 5 MG capsule; Take 5 mg by mouth every 4 (four) hours as needed for moderate pain             Thoracic History       Diagnosis: Clinical stage T3N1M0 esophageal carcinoma  Procedure: EGD, transhiatal esophagectomy performed on 1/28/20  Pathology: esophagogastrectomy reveals microscopic focus of residual adenocarcinoma in muscularis propria measuring 0 7 cm, adjacent Duong's esophagus with associated atypia indeterminate for dysplasia  7 lymph nodes negative for carcinoma  Proximal and distal margins negative for carcinoma  8th edition AJCC tumor stage I (ypT2, ypN0)  Therapy:  Neoadjuvant FLOT x 6 chemotherapy and Herceptin, completed 12/17/19       Patient ID: Gely Guevara is a 71 y o  male      HPI   Mr Mikel Santoro is a 70 yo gentleman who was found to have adenocarcinoma of the distal esophagus  He had neoadjuvant therapy from 10/8/19-12/17/19  He was admitted on 1/28/20 and underwent an EGD, transhiatal esophagectomy in conjunction with Dr Arnold Dietrich  His hospital course was complicated by volume overload, bilateral pleural effusions treated with chest tubes and subsequent embolization of thoracic duct due to a right chylothorax  He also had a code blue called when he aspirated and coded on 2/1/20  ENT performed a laryngoscopy and vocal cord injection  His tube feeds were eventually cycled and he started on a surgical soft diet  He was started on Amiodarone for Atrial Fibrillation  He eventually had his chest tubes removed and was able to be discharged on 2/12/20 with cycle TF and oral Amiodarone for 6 weeks  His PCP was going to handle him diabetic medications  After discharge, his post op course has been further complicated by a left foot infection and right pleural effusion, treated with a thoracentesis for 1600 mL of fluid on February 25, 2020  His daughter called our office on 2/25/20 due to her dad's recurrent shortness of breath  He obtained a cxr on 2/26/20 which did not reveal a recurrent effusion  I called her to see how he was doing and his breathing had improved  He presents today for his first post op visit  On discussion, his foot infection is improving  His voice had improved after the vocal cord injection, but is becoming hoarse again  He is eating eggs, chicken salad, sausage, forrester, pb&j, without significant limitation  He stopped his cycle tf about 1 5 weeks ago and holding his weight around 190  He also has increased mucous production  His next appt with Dr Michel Fung is 3/12/20  He is seeing Dr Arnold Dietrich tomorrow for his 2nd post op visit         The following portions of the patient's history were reviewed and updated as appropriate: allergies, current medications, past family history, past medical history, past social history, past surgical history and problem list     Review of Systems      Objective:   Physical Exam   Constitutional: He is oriented to person, place, and time  He appears well-developed and well-nourished  HENT:   Head: Normocephalic and atraumatic  Neck: Neck supple  Incision well healed    Pulmonary/Chest: Effort normal and breath sounds normal  No respiratory distress  He has no wheezes  Abdominal: Soft  Bowel sounds are normal    Midline incision healing well, no evidence of infection  PEG site without any abnormality   Musculoskeletal:   Trace edema b/l LE    Neurological: He is alert and oriented to person, place, and time  Skin: Skin is warm and dry  Psychiatric: He has a normal mood and affect  His behavior is normal    Nursing note and vitals reviewed  /60 (BP Location: Left arm, Patient Position: Sitting, Cuff Size: Large)   Pulse 92   Temp (!) 95 7 °F (35 4 °C)   Ht 5' 10" (1 778 m)   Wt 85 9 kg (189 lb 6 4 oz)   SpO2 94%   BMI 27 18 kg/m²      Xr Chest Pa & Lateral    Result Date: 2/28/2020  Impression Resolution of right effusion with no pneumothorax  Trace left effusion  Esophagectomy and gastric pull-up  Workstation performed: [id number]     Attestation signed by Traci Bustillos MD at 3/4/2020  4:43 PM:  Thoracic surgery attending note    Patient seen and examined with PA this afternoon  He was accompanied by his daughter  Overall he is doing well since getting home from his transhiatal esophagectomy  His hospital course was complicated by left recurrent laryngeal nerve paresis with aspiration requiring vocal cord medialization by ENT, volume overload requiring bilateral thoracentesis, thoracic duct leak requiring chest tube placement and IR thoracic duct embolization and deconditioning  Since being at home he has been slowly doing better  He is totally off of tube feeds at this time and is eating a more regular diet    He notes that he no longer knows when he is totally full and eat sometimes until he feels distension of his stomach up in his neck  He has regurgitated once or twice with this  Denies any pain with swallowing or difficulty swallowing  He also complains of some minor shortness of breath  Last week he underwent an IR thoracentesis on 02/25 with significant improvement in his breathing  Repeat chest x-ray in 2/27 showed no effusion and no pneumothorax  He states breathing improved after the thoracentesis but continues to limit him somewhat  He denies any chest pain  No abdominal pain  No other complaints at this time    I reviewed imaging with the patient and his daughter in PACs  Most recent chest x-ray from 02/27/2020 shows no pneumothorax, no pleural effusions, well expanded lung  We reviewed his OR pathology  He understands there is a microscopic focus of adenocarcinoma in the esophagectomy specimen  Otherwise no evidence of cancer  No lymph nodes involved  This was a yp T2, ypN0 MX esophageal adenocarcinoma    From a thoracic surgery standpoint he is doing well at this time  His left neck incision is perfectly healed  His chest x-ray from 02/27 shows no pleural effusion  I think his breathing is from deconditioning and his prolonged hospital stay  No need for further procedures from a thoracic surgery standpoint  I would like him to follow up with as surgical oncologist Dr Darlene Anaya and his medical oncologist Dr Nina Syed  Will defer to them on need for adjuvant chemotherapy  Given his response we could consider just observation  Follow up with thoracic surgery as needed moving forward  He will undergo surveillance imaging with Surgical Oncology and Medical oncology    Follow-up with ENT surgery next week for vocal cord analysis      Fabiana Lopez MD no

## 2022-08-09 NOTE — ANESTHESIA PROCEDURE NOTES
Epidural Block    Patient location during procedure: holding area  Start time: 1/28/2020 7:50 AM  Reason for block: at surgeon's request and post-op pain management  Staffing  Anesthesiologist: Maria Del Rosario Orlando MD  Performed: anesthesiologist   Preanesthetic Checklist  Completed: patient identified, site marked, surgical consent, pre-op evaluation, timeout performed, IV checked, risks and benefits discussed and monitors and equipment checked  Epidural  Patient position: sitting  Prep: ChloraPrep  Patient monitoring: continuous pulse ox  Approach: midline  Location: lumbar (1-5)  Injection technique: PAMELA air  Needle  Needle type: Tuohy   Needle gauge: 18 G  Catheter type: side hole  Catheter size: 20 G  Catheter at skin depth: 10 cm  Test dose: negativenegative aspiration for CSF, negative aspiration for heme and no paresthesia on injection  patient tolerated the procedure well with no immediate complications  Additional Notes  Transient paresthesia on left with threading of cath  Immediately resolved once stopped  No paresthesia with injection of saline through cath  Pt tolerated well  Personalized Preventive Plan for Romana Lively - 8/9/2022  Medicare offers a range of preventive health benefits. Some of the tests and screenings are paid in full while other may be subject to a deductible, co-insurance, and/or copay. Some of these benefits include a comprehensive review of your medical history including lifestyle, illnesses that may run in your family, and various assessments and screenings as appropriate. After reviewing your medical record and screening and assessments performed today your provider may have ordered immunizations, labs, imaging, and/or referrals for you. A list of these orders (if applicable) as well as your Preventive Care list are included within your After Visit Summary for your review. Other Preventive Recommendations:    A preventive eye exam performed by an eye specialist is recommended every 1-2 years to screen for glaucoma; cataracts, macular degeneration, and other eye disorders. A preventive dental visit is recommended every 6 months. Try to get at least 150 minutes of exercise per week or 10,000 steps per day on a pedometer . Order or download the FREE \"Exercise & Physical Activity: Your Everyday Guide\" from [employer]. Call 5-244.810.7245 or search [employer] online. You need 0795-8821 mg of calcium and 3998-3840 IU of vitamin D per day. It is possible to meet your calcium requirement with diet alone, but a vitamin D supplement is usually necessary to meet this goal.  When exposed to the sun, use a sunscreen that protects against both UVA and UVB radiation with an SPF of 30 or greater. Reapply every 2 to 3 hours or after sweating, drying off with a towel, or swimming. Always wear a seat belt when traveling in a car. Always wear a helmet when riding a bicycle or motorcycle.

## 2024-12-12 NOTE — PATIENT INSTRUCTIONS
Continue Augmentin as discussed  Please obtain bloodwork every month  Follow up in office in 2 months  We will call you to schedule this 
Stable

## (undated) DEVICE — SINGLE-USE BIOPSY FORCEPS: Brand: RADIAL JAW 4

## (undated) DEVICE — DRAPE LAPAROTOMY W/POUCHES

## (undated) DEVICE — 3M™ TEGADERM™ TRANSPARENT FILM DRESSING FRAME STYLE, 1628, 6 IN X 8 IN (15 CM X 20 CM), 10/CT 8CT/CASE: Brand: 3M™ TEGADERM™

## (undated) DEVICE — GAUZE SPONGES,16 PLY: Brand: CURITY

## (undated) DEVICE — SYRINGE 10ML LL

## (undated) DEVICE — SUT PROLENE 2-0 SH 36 IN 8523H

## (undated) DEVICE — BAG DECANTER

## (undated) DEVICE — 2000CC GUARDIAN II: Brand: GUARDIAN

## (undated) DEVICE — GLOVE EXAM NON-STRL NTRL PLUS LRG PF

## (undated) DEVICE — ADHESIVE SKIN HIGH VISCOSITY EXOFIN 1ML

## (undated) DEVICE — IV BUTTERFLY 23G SAFETY

## (undated) DEVICE — TUBING SUCTION 5MM X 12 FT

## (undated) DEVICE — SUT ETHILON 3-0 FS-1 18 IN 663G

## (undated) DEVICE — SUT VICRYL 3-0 SH 27 IN J416H

## (undated) DEVICE — 3M™ IOBAN™ 2 ANTIMICROBIAL INCISE DRAPE 6650EZ: Brand: IOBAN™ 2

## (undated) DEVICE — TUBE FEEDING MIC JEJUNAL 14FR

## (undated) DEVICE — MEDI-VAC YANK SUCT HNDL W/TPRD BULBOUS TIP: Brand: CARDINAL HEALTH

## (undated) DEVICE — Device: Brand: DEFENDO AIR/WATER/SUCTION AND BIOPSY VALVE

## (undated) DEVICE — SUT MONOCRYL 4-0 PS-2 27 IN Y426H

## (undated) DEVICE — INTENDED FOR TISSUE SEPARATION, AND OTHER PROCEDURES THAT REQUIRE A SHARP SURGICAL BLADE TO PUNCTURE OR CUT.: Brand: BARD-PARKER SAFETY BLADES SIZE 15, STERILE

## (undated) DEVICE — SUT PDS II 1 CTX 36 IN Z371T

## (undated) DEVICE — PLUMEPEN PRO 10FT

## (undated) DEVICE — AIR AND WATER TUBING/CAP SET FOR OLYMPUS® SCOPES: Brand: ERBE

## (undated) DEVICE — UTILITY MARKER,BLACK WITH LABELS: Brand: DEVON

## (undated) DEVICE — NEEDLE 25G X 1 1/2

## (undated) DEVICE — LIGACLIP MCA MULTIPLE CLIP APPLIERS, 20 MEDIUM CLIPS: Brand: LIGACLIP

## (undated) DEVICE — GLOVE SRG BIOGEL ECLIPSE 8

## (undated) DEVICE — 60 ML SYRINGE,REGULAR TIP: Brand: MONOJECT

## (undated) DEVICE — PENROSE DRAIN, 18 X 3 8: Brand: CARDINAL HEALTH

## (undated) DEVICE — HIGH PERFORMANCE GUIDEWIRE: Brand: JAGWIRE

## (undated) DEVICE — SUT SILK 2-0 SH CR/8 18 IN C012D

## (undated) DEVICE — HEAVY DUTY TABLE COVER: Brand: CONVERTORS

## (undated) DEVICE — FIRST STEP BEDSIDE KIT - STAND-UP POUCH, ENDOSCOPIC CLEANING PAD - 1 POUCH: Brand: FIRST STEP BEDSIDE KIT - STAND-UP POUCH, ENDOSCOPIC CLEANING PAD

## (undated) DEVICE — STERILE THORACIC PACK: Brand: CARDINAL HEALTH

## (undated) DEVICE — 3000CC GUARDIAN II: Brand: GUARDIAN

## (undated) DEVICE — ENSEAL 20 CM SHAFT, LARGE JAW: Brand: ENSEAL X1

## (undated) DEVICE — GLOVE INDICATOR PI UNDERGLOVE SZ 8 BLUE

## (undated) DEVICE — SYRINGE 5ML LL

## (undated) DEVICE — BETHLEHEM UNIVERSAL OUTPATIENT: Brand: CARDINAL HEALTH

## (undated) DEVICE — VESSEL LOOPS X-RAY DETECTABLE: Brand: DEROYAL

## (undated) DEVICE — THE ECHELON, ECHELON ENDOPATH™ AND ECHELON FLEX™ FAMILIES OF ENDOSCOPIC LINEAR CUTTERS AND RELOADS ARE STERILE, SINGLE PATIENT USE INSTRUMENTS THAT SIMULTANEOUSLY CUT AND STAPLE TISSUE. THERE ARE SIX STAGGERED ROWS OF STAPLES, THREE ON EITHER SIDE OF THE CUT LINE. THE 45 MM INSTRUMENTS HAVE A STAPLE LINE THATIS APPROXIMATELY 45 MM LONG AND A CUT LINE THAT IS APPROXIMATELY 42 MM LONG. THE SHAFT CAN ROTATE FREELY IN BOTH DIRECTIONS AND AN ARTICULATION MECHANISM ON ARTICULATING INSTRUMENTS ENABLES BENDING THE DISTAL PORTIONOF THE SHAFT TO FACILITATE LATERAL ACCESS OF THE OPERATIVE SITE.THE INSTRUMENTS ARE SHIPPED WITHOUT A RELOAD AND MUST BE LOADED PRIOR TO USE. A STAPLE RETAINING CAP ON THE RELOAD PROTECTS THE STAPLE LEG POINTS DURING SHIPPING AND TRANSPORTATION. THE INSTRUMENTS’ LOCK-OUT FEATURE IS DESIGNED TO PREVENT A USED RELOAD FROM BEING REFIRED.: Brand: ECHELON ENDOPATH

## (undated) DEVICE — SUT MONOCRYL 4-0 PS-2 18 IN Y496G

## (undated) DEVICE — NEEDLE 18 G X 1 1/2

## (undated) DEVICE — MAYO STAND COVER: Brand: CONVERTORS

## (undated) DEVICE — SUT VICRYL 2-0 D-SPECIAL 27 IN D8114

## (undated) DEVICE — ANTI-FOG SOLUTION WITH FOAM PAD: Brand: DEVON

## (undated) DEVICE — ROSEBUD DISSECTORS: Brand: DEROYAL

## (undated) DEVICE — CHLORAPREP HI-LITE 26ML ORANGE

## (undated) DEVICE — SPONGE LAP 18 X 18 IN STRL RFD

## (undated) DEVICE — SUT SILK 3-0 SH CR/8 18 IN C013D

## (undated) DEVICE — BULB SYRINGE,IRRIGATION WITH PROTECTIVE CAP: Brand: DOVER

## (undated) DEVICE — BETHLEHEM MAJOR GENERAL PACK: Brand: CARDINAL HEALTH

## (undated) DEVICE — SURGICEL 4 X 8

## (undated) DEVICE — PAD GROUNDING ADULT

## (undated) DEVICE — TOWEL SURG XR DETECT GREEN STRL RFD

## (undated) DEVICE — TRAY FOLEY 16FR URIMETER SURESTEP

## (undated) DEVICE — PENCIL ELECTROSURG E-Z CLEAN -0035H

## (undated) DEVICE — GAUZE SPONGES,USP TYPE VII GAUZE, 12 PLY: Brand: CURITY

## (undated) DEVICE — 3M™ STERI-STRIP™ REINFORCED ADHESIVE SKIN CLOSURES, R1547, 1/2 IN X 4 IN (12 MM X 100 MM), 6 STRIPS/ENVELOPE: Brand: 3M™ STERI-STRIP™

## (undated) DEVICE — SUT PROLENE 3-0 SH 36 IN 8522H

## (undated) DEVICE — 3M™ TEGADERM™ TRANSPARENT FILM DRESSING FRAME STYLE, 1626W, 4 IN X 4-3/4 IN (10 CM X 12 CM), 50/CT 4CT/CASE: Brand: 3M™ TEGADERM™

## (undated) DEVICE — SUT VICRYL 2-0 SH 27 IN UNDYED J417H

## (undated) DEVICE — THE ECHELON FLEX POWERED PLUS ARTICULATING ENDOSCOPIC LINEAR CUTTERS ARE STERILE, SINGLE PATIENT USE INSTRUMENTS THAT SIMULTANEOUSLYCUT AND STAPLE TISSUE. THERE ARE SIX STAGGERED ROWS OF STAPLES, THREE ON EITHER SIDE OF THE CUT LINE. THE ECHELON FLEX 45 POWERED PLUSINSTRUMENTS HAVE A STAPLE LINE THAT IS APPROXIMATELY 45 MM LONG AND A CUT LINE THAT IS APPROXIMATELY 42 MM LONG. THE SHAFT CAN ROTATE FREELYIN BOTH DIRECTIONS AND AN ARTICULATION MECHANISM ENABLES THE DISTAL PORTION OF THE SHAFT TO PIVOT TO FACILITATE LATERAL ACCESS TO THE OPERATIVESITE.THE INSTRUMENTS ARE PACKAGED WITH A PRIMARY LITHIUM BATTERY PACK THAT MUST BE INSTALLED PRIOR TO USE. THERE ARE SPECIFIC REQUIREMENTS FORDISPOSING OF THE BATTERY PACK. REFER TO THE BATTERY PACK DISPOSAL SECTION.THE INSTRUMENTS ARE PACKAGED WITHOUT A RELOAD AND MUST BE LOADED PRIOR TO USE. A STAPLE RETAINING CAP ON THE RELOAD PROTECTS THE STAPLE LEGPOINTS DURING SHIPPING AND TRANSPORTATION. THE INSTRUMENTS’ LOCK-OUT FEATURE IS DESIGNED TO PREVENT A USED OR IMPROPERLY INSTALLED RELOADFROM BEING REFIRED OR AN INSTRUMENT FROM BEING FIRED WITHOUT A RELOAD.: Brand: ECHELON FLEX

## (undated) DEVICE — STRL BETHLACCESS CATHETER PACK: Brand: CARDINAL HEALTH

## (undated) DEVICE — INTENDED FOR TISSUE SEPARATION, AND OTHER PROCEDURES THAT REQUIRE A SHARP SURGICAL BLADE TO PUNCTURE OR CUT.: Brand: BARD-PARKER SAFETY BLADES SIZE 10, STERILE

## (undated) DEVICE — STRL PENROSE DRAIN 18" X 3/4": Brand: CARDINAL HEALTH

## (undated) DEVICE — INTENDED FOR TISSUE SEPARATION, AND OTHER PROCEDURES THAT REQUIRE A SHARP SURGICAL BLADE TO PUNCTURE OR CUT.: Brand: BARD-PARKER SAFETY BLADES SIZE 11, STERILE

## (undated) DEVICE — SUT SILK 2-0 18 IN A185H

## (undated) DEVICE — PROXIMATE SKIN STAPLERS (35 WIDE) CONTAINS 35 STAINLESS STEEL STAPLES (FIXED HEAD): Brand: PROXIMATE